# Patient Record
Sex: FEMALE | Race: WHITE | Employment: OTHER | URBAN - METROPOLITAN AREA
[De-identification: names, ages, dates, MRNs, and addresses within clinical notes are randomized per-mention and may not be internally consistent; named-entity substitution may affect disease eponyms.]

---

## 2017-01-03 ENCOUNTER — TELEPHONE (OUTPATIENT)
Dept: INTERNAL MEDICINE | Facility: CLINIC | Age: 62
End: 2017-01-03

## 2017-01-03 NOTE — TELEPHONE ENCOUNTER
[facility] Prior Authorization Team   Phone: [phone]  Fax: [phone]    PA Initiation    Medication: Lyrica 75mg capsules   Insurance Company: [employer]  Fax Number: [phone]    Phone Number:    Pharmacy Filling the Rx: [facility] [address]  Filling Pharmacy Phone: [phone]  Filling Pharmacy Fax:    Start Date: 1/3/2017    ALSO FAXED CLINIC NOTES.

## 2017-01-03 NOTE — TELEPHONE ENCOUNTER
[facility] Prior Authorization Team Request    Medication: Lyrica 75mg capsules   Dosing: One capsule three times daily   Qty: 90   Day Supply: 30   NDC (required for Medicaid members):     Insurance advance PCS   BIN: [id number]  PCN: [id number]  Grp: [id number]  ID: [id number]    CoverMyMeds Key (if applicable):     Additional documentation: non-formulary. Patient has been on this, insurance changed first of the year.       Filling Pharmacy: [facility]   Phone Number: [phone]  Contact:  Dieudonne Linder NPI (required for Medicaid members):

## 2017-01-04 DIAGNOSIS — G89.29 CHRONIC ABDOMINAL PAIN: Primary | ICD-10-CM

## 2017-01-04 DIAGNOSIS — R10.9 CHRONIC ABDOMINAL PAIN: Primary | ICD-10-CM

## 2017-01-04 NOTE — TELEPHONE ENCOUNTER
Prior Authorization Approval    Authorization Effective Date: 1/3/2017  Authorization Expiration Date: 1/3/2018  Medication: Lyrica 75mg capsules -approved  Approved Dose/Quantity:   Reference #:     Insurance Company: [employer]  Expected CoPay: $0.00     CoPay Card Available:      Foundation Assistance Needed:    Which Pharmacy is filling the prescription (Not needed for infusion/clinic administered): [facility] [place], [place] - [address] [place] [address] [phone]    Pharmacy has been notified and they will notify pt

## 2017-01-06 RX ORDER — PREGABALIN 75 MG/1
75 CAPSULE ORAL 3 TIMES DAILY
Qty: 90 CAPSULE | Refills: 2
Start: 2017-01-06 | End: 2017-03-22 | Stop reason: DRUGHIGH

## 2017-01-12 DIAGNOSIS — F31.9 BIPOLAR I DISORDER (H): Primary | ICD-10-CM

## 2017-01-13 RX ORDER — RISPERIDONE 1 MG/1
1 TABLET ORAL AT BEDTIME
Qty: 30 TABLET | Refills: 2 | Status: SHIPPED | OUTPATIENT
Start: 2017-01-13 | End: 2017-04-10

## 2017-02-21 ENCOUNTER — TELEPHONE (OUTPATIENT)
Dept: INTERNAL MEDICINE | Facility: CLINIC | Age: 62
End: 2017-02-21

## 2017-02-21 DIAGNOSIS — D64.9 ANEMIA: Primary | ICD-10-CM

## 2017-02-21 NOTE — TELEPHONE ENCOUNTER
Ordered.  -------------------------------------------      ----- Message from Fredy Merritt MD sent at 2/20/2017  1:47 PM CST -----  Regarding: RE: Patient with Lab Appt 2/22/17 and no lab orders  Soon mi,  Can you set up cbc/diff, iron, tibc, ferritin and cmet, all for anemia?

## 2017-02-22 ENCOUNTER — OFFICE VISIT (OUTPATIENT)
Dept: FAMILY MEDICINE | Facility: CLINIC | Age: 62
End: 2017-02-22

## 2017-02-22 VITALS
DIASTOLIC BLOOD PRESSURE: 86 MMHG | SYSTOLIC BLOOD PRESSURE: 135 MMHG | RESPIRATION RATE: 16 BRPM | BODY MASS INDEX: 23.14 KG/M2 | WEIGHT: 134.8 LBS | HEART RATE: 64 BPM

## 2017-02-22 DIAGNOSIS — D64.9 ANEMIA: ICD-10-CM

## 2017-02-22 DIAGNOSIS — M79.7 FIBROMYALGIA: Primary | ICD-10-CM

## 2017-02-22 LAB
ALBUMIN SERPL-MCNC: 2.7 G/DL (ref 3.4–5)
ALP SERPL-CCNC: 102 U/L (ref 40–150)
ALT SERPL W P-5'-P-CCNC: 15 U/L (ref 0–50)
ANION GAP SERPL CALCULATED.3IONS-SCNC: 8 MMOL/L (ref 3–14)
AST SERPL W P-5'-P-CCNC: 16 U/L (ref 0–45)
BASOPHILS # BLD AUTO: 0 10E9/L (ref 0–0.2)
BASOPHILS NFR BLD AUTO: 0.4 %
BILIRUB SERPL-MCNC: 0.1 MG/DL (ref 0.2–1.3)
BUN SERPL-MCNC: 18 MG/DL (ref 7–30)
CALCIUM SERPL-MCNC: 7.9 MG/DL (ref 8.5–10.1)
CHLORIDE SERPL-SCNC: 110 MMOL/L (ref 94–109)
CO2 SERPL-SCNC: 27 MMOL/L (ref 20–32)
CREAT SERPL-MCNC: 0.68 MG/DL (ref 0.52–1.04)
DIFFERENTIAL METHOD BLD: ABNORMAL
EOSINOPHIL # BLD AUTO: 0.3 10E9/L (ref 0–0.7)
EOSINOPHIL NFR BLD AUTO: 5.4 %
ERYTHROCYTE [DISTWIDTH] IN BLOOD BY AUTOMATED COUNT: 16.3 % (ref 10–15)
FERRITIN SERPL-MCNC: 5 NG/ML (ref 8–252)
GFR SERPL CREATININE-BSD FRML MDRD: 88 ML/MIN/1.7M2
GLUCOSE SERPL-MCNC: 116 MG/DL (ref 70–99)
HCT VFR BLD AUTO: 34 % (ref 35–47)
HGB BLD-MCNC: 9.6 G/DL (ref 11.7–15.7)
IMM GRANULOCYTES # BLD: 0 10E9/L (ref 0–0.4)
IMM GRANULOCYTES NFR BLD: 0.4 %
IRON SATN MFR SERPL: 7 % (ref 15–46)
IRON SERPL-MCNC: 23 UG/DL (ref 35–180)
LYMPHOCYTES # BLD AUTO: 1.2 10E9/L (ref 0.8–5.3)
LYMPHOCYTES NFR BLD AUTO: 23 %
MCH RBC QN AUTO: 24.2 PG (ref 26.5–33)
MCHC RBC AUTO-ENTMCNC: 28.2 G/DL (ref 31.5–36.5)
MCV RBC AUTO: 86 FL (ref 78–100)
MONOCYTES # BLD AUTO: 0.6 10E9/L (ref 0–1.3)
MONOCYTES NFR BLD AUTO: 10.9 %
NEUTROPHILS # BLD AUTO: 3.2 10E9/L (ref 1.6–8.3)
NEUTROPHILS NFR BLD AUTO: 59.9 %
NRBC # BLD AUTO: 0 10*3/UL
NRBC BLD AUTO-RTO: 0 /100
PLATELET # BLD AUTO: 174 10E9/L (ref 150–450)
POTASSIUM SERPL-SCNC: 4.1 MMOL/L (ref 3.4–5.3)
PROT SERPL-MCNC: 5.6 G/DL (ref 6.8–8.8)
RBC # BLD AUTO: 3.96 10E12/L (ref 3.8–5.2)
SODIUM SERPL-SCNC: 145 MMOL/L (ref 133–144)
TIBC SERPL-MCNC: 352 UG/DL (ref 240–430)
WBC # BLD AUTO: 5.4 10E9/L (ref 4–11)

## 2017-02-22 RX ORDER — PREGABALIN 100 MG/1
100 CAPSULE ORAL 3 TIMES DAILY
Qty: 90 CAPSULE | Refills: 3 | Status: SHIPPED | OUTPATIENT
Start: 2017-02-22 | End: 2017-06-14

## 2017-02-22 ASSESSMENT — PAIN SCALES - GENERAL: PAINLEVEL: SEVERE PAIN (7)

## 2017-02-22 NOTE — PATIENT INSTRUCTIONS
[facility] Medication Refill Request Information:  * Please contact your pharmacy regarding ANY request for medication refills.  ** [facility] Prescription Fax = [phone]  * Please allow 3 business days for routine medication refills.  * Please allow 5 business days for controlled substance medication refills.     [facility] Test Result notification information:  *You will be notified with in 7-10 days of your appointment day regarding the results of your test.  If you are on MyChart you will be notified as soon as the provider has reviewed the results and signed off on them.    [facility] [phone]     Rheumatology [phone]     Gastroenterology Consultation [phone]

## 2017-02-22 NOTE — MR AVS SNAPSHOT
After Visit Summary   2/22/2017    Demetra Richardson    MRN: [id number]           Patient Information     Date Of Birth          1955        Visit Information        Provider Department      2/22/2017 1:35 PM Fredy Merritt MD [facility]        Today's Diagnoses     Fibromyalgia    -  1      Care Instructions    [facility] Medication Refill Request Information:  * Please contact your pharmacy regarding ANY request for medication refills.  ** [facility] Prescription Fax = [phone]  * Please allow 3 business days for routine medication refills.  * Please allow 5 business days for controlled substance medication refills.     [facility] Test Result notification information:  *You will be notified with in 7-10 days of your appointment day regarding the results of your test.  If you are on MyChart you will be notified as soon as the provider has reviewed the results and signed off on them.    [facility] [phone]     Rheumatology [phone]     Gastroenterology Consultation [phone]          Follow-ups after your visit        Who to contact     Please call your clinic at [phone] to:    Ask questions about your health    Make or cancel appointments    Discuss your medicines    Learn about your test results    Speak to your doctor   If you have compliments or concerns about an experience at your clinic, or if you wish to file a complaint, please contact [facility] Patient Relations at [phone] or email us at [email or web address]         Additional Information About Your Visit        MyChart Information     [employer] is an electronic gateway that provides easy, online access to your medical records. With [employer], you can request a clinic appointment, read your test results, renew a prescription or communicate with your care team.     To sign up for [employer] visit the website at  [email or web address]   You will be asked to enter the access code listed below, as well as some personal information. Please follow the directions to create your username and password.     Your access code is: [email or web address]  Expires: 2017  6:31 AM     Your access code will  in 90 days. If you need help or a new code, please contact your [facility] or call [phone] for assistance.        Care EveryWhere ID     This is your Care EveryWhere ID. This could be used by other organizations to access your [facility] medical records  [id number]        Your Vitals Were     Pulse Respirations Breastfeeding? BMI (Body Mass Index)          64 16 No 23.14 kg/m2         Blood Pressure from Last 3 Encounters:   17 135/86   10/14/16 93/62   10/12/16 135/85    Weight from Last 3 Encounters:   17 61.1 kg (134 lb 12.8 oz)   10/14/16 65.3 kg (144 lb)   10/12/16 65.3 kg (144 lb)              Today, you had the following     No orders found for display         Today's Medication Changes          These changes are accurate as of: 17  3:03 PM.  If you have any questions, ask your nurse or doctor.               These medicines have changed or have updated prescriptions.        Dose/Directions    * pregabalin 75 MG capsule   Commonly known as:  LYRICA   This may have changed:  Another medication with the same name was added. Make sure you understand how and when to take each.   Used for:  Chronic abdominal pain   Changed by:  Fredy Merritt MD        Dose:  75 mg   Take 1 capsule (75 mg) by mouth 3 times daily   Quantity:  90 capsule   Refills:  2       * pregabalin 100 MG capsule   Commonly known as:  LYRICA   This may have changed:  You were already taking a medication with the same name, and this prescription was added. Make sure you understand how and when to take each.   Used for:  Fibromyalgia   Changed by:  Fredy Merritt MD        Dose:  100 mg   Take 1  capsule (100 mg) by mouth 3 times daily   Quantity:  90 capsule   Refills:  3       * Notice:  This list has 2 medication(s) that are the same as other medications prescribed for you. Read the directions carefully, and ask your doctor or other care provider to review them with you.         Where to get your medicines      Some of these will need a paper prescription and others can be bought over the counter.  Ask your nurse if you have questions.     Bring a paper prescription for each of these medications     pregabalin 100 MG capsule                Primary Care Provider Office Phone # Fax #    Fredy Merritt [phone]       [facility] [address] [place] [address]  [facility] [address]        Thank you!     Thank you for choosing [facility]  for your care. Our goal is always to provide you with excellent care. Hearing back from our patients is one way we can continue to improve our services. Please take a few minutes to complete the written survey that you may receive in the mail after your visit with us. Thank you!             Your Updated Medication List - Protect others around you: Learn how to safely use, store and throw away your medicines at www.disposemymeds.org.          This list is accurate as of: 2/22/17  3:03 PM.  Always use your most recent med list.                   Brand Name Dispense Instructions for use    aspirin-acetaminophen-caffeine 250-250-65 MG per tablet    EXCEDRIN MIGRAINE    180 tablet    Take 2 tablets by mouth every 6 hours as needed for headaches       bisacodyl 5 MG EC tablet     60 tablet    Take 2 tablets (10 mg) by mouth At Bedtime       escitalopram 10 MG tablet    LEXAPRO    30 tablet    Take 1 tablet (10 mg) by mouth daily       hydroxychloroquine 200 MG tablet    PLAQUENIL    60 tablet    Take 1 tablet (200 mg) by mouth 2 times daily       HYDROXYZINE HCL PO      Take 50 mg by mouth Take 1-2 tabs by mouth up to 3 times a day as  needed       ibuprofen 800 MG tablet    ADVIL/MOTRIN    90 tablet    Take 1 tablet (800 mg) by mouth every 6 hours as needed for moderate pain       * lamoTRIgine 200 MG tablet    LaMICtal    30 tablet    Take 1 tablet (200 mg) by mouth every morning       * lamoTRIgine 100 MG tablet    LaMICtal    30 tablet    Take 1 tablet (100 mg) by mouth At Bedtime       Lidocaine HCl 2 % Crea     1 Bottle    Externally apply topically 2 times daily       methadone 5 MG/5ML solution    DOLPHINE     Take 128 mLs (128 mg) by mouth daily       mupirocin 2 % ointment    BACTROBAN    22 g    Apply topically 3 times daily       pantoprazole 40 MG EC tablet    PROTONIX    30 tablet    Take 1 tablet (40 mg) by mouth every morning (before breakfast)       * pregabalin 75 MG capsule    LYRICA    90 capsule    Take 1 capsule (75 mg) by mouth 3 times daily       * pregabalin 100 MG capsule    LYRICA    90 capsule    Take 1 capsule (100 mg) by mouth 3 times daily       * risperiDONE 0.5 MG tablet    risperDAL    60 tablet    Take 1 tab by mouth at 6 AM and take 1 tab daily as needed for irritability, anxiety or racing thoughts       * risperiDONE 1 MG tablet    risperDAL    30 tablet    Take 1 tablet (1 mg) by mouth At Bedtime       tamsulosin 0.4 MG capsule    FLOMAX    30 capsule    Take 1 capsule (0.4 mg) by mouth daily       valACYclovir 500 MG tablet    VALTREX    6 tablet    Take 1 tablet (500 mg) by mouth 2 times daily       * Notice:  This list has 6 medication(s) that are the same as other medications prescribed for you. Read the directions carefully, and ask your doctor or other care provider to review them with you.

## 2017-02-22 NOTE — NURSING NOTE
Chief Complaint   Patient presents with     Results     Here for blood labs follow up     Refill Request     Also here for Lyrica refills; discuss change in doses     Chetan Abel CMA at 1:47 PM on 2/22/2017

## 2017-02-22 NOTE — PROGRESS NOTES
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, continuous     Chronic abdominal pain     Nephrolithiasis     Lupus (systemic lupus erythematosus) (H)     Anxiety     Sclerosing cholangitis     Hepatitis     Microcytic anemia     Migraines     Leukopenia     Hydronephrosis     Acute post-operative pain     Anemia     Seizure (H)     Bipolar 1 disorder, mixed, moderate (H)     Bipolar 1 disorder (H)     Anemia     Iron deficiency anemia     Depression     Overdose     Behavior disturbance     Closed Colles' fracture of left radius with routine healing, subsequent encounter     Left wrist pain     Borderline personality disorder       who is here for evaluation of had concerns including Results and Refill Request.    Here for a few things.  One, per pt since last here doing very well--got own apt, lives alone, has not seen any other MD.   Two, plans to see local community psychiatrist soon.  Three, FM, Lyrica, well tolerated, helps but still pain  Four, last fall saw me, we discussed her many issues and gave her many referrals for tests and consults, she did not do any. We review those; she agrees to rheum and liver here and community psychiatry.  Five, anemia, low iron, in past did IV iron, cannot use po. Would like to redo, is fatigued and feels the way she did when low before then felt better after IV iron.    Allergies   Allergen Reactions     Penicillins Hives     Hives in childhood.           Current Outpatient Prescriptions   Medication Sig Dispense Refill     pregabalin (LYRICA) 100 MG capsule Take 1 capsule (100 mg) by mouth 3 times daily 90 capsule 3     risperiDONE (RISPERDAL) 1 MG tablet Take 1 tablet (1 mg) by mouth At Bedtime 30 tablet 2     pregabalin (LYRICA) 75 MG capsule Take 1 capsule (75 mg) by mouth 3 times daily 90 capsule 2     lamoTRIgine (LAMICTAL) 200  MG tablet Take 1 tablet (200 mg) by mouth every morning 30 tablet 2     lamoTRIgine (LAMICTAL) 100 MG tablet Take 1 tablet (100 mg) by mouth At Bedtime 30 tablet 2     risperiDONE (RISPERDAL) 0.5 MG tablet Take 1 tab by mouth at 6 AM and take 1 tab daily as needed for irritability, anxiety or racing thoughts 60 tablet 1     pantoprazole (PROTONIX) 40 MG enteric coated tablet Take 1 tablet (40 mg) by mouth every morning (before breakfast) 30 tablet 2     bisacodyl (DULCOLAX) 5 MG EC tablet Take 2 tablets (10 mg) by mouth At Bedtime 60 tablet 2     valACYclovir (VALTREX) 500 MG tablet Take 1 tablet (500 mg) by mouth 2 times daily 6 tablet 3     mupirocin (BACTROBAN) 2 % ointment Apply topically 3 times daily 22 g 0     Lidocaine HCl 2 % CREA Externally apply topically 2 times daily 1 Bottle 0     escitalopram (LEXAPRO) 10 MG tablet Take 1 tablet (10 mg) by mouth daily 30 tablet 2     HYDROXYZINE HCL PO Take 50 mg by mouth Take 1-2 tabs by mouth up to 3 times a day as needed       aspirin-acetaminophen-caffeine (EXCEDRIN MIGRAINE) 250-250-65 MG per tablet Take 2 tablets by mouth every 6 hours as needed for headaches 180 tablet 1     ibuprofen (ADVIL,MOTRIN) 800 MG tablet Take 1 tablet (800 mg) by mouth every 6 hours as needed for moderate pain 90 tablet 1     hydroxychloroquine (PLAQUENIL) 200 MG tablet Take 1 tablet (200 mg) by mouth 2 times daily 60 tablet 2     tamsulosin (FLOMAX) 0.4 MG 24 hr capsule Take 1 capsule (0.4 mg) by mouth daily 30 capsule 2     methadone (DOLPHINE) 5 MG/5ML solution Take 128 mLs (128 mg) by mouth daily  0       Social History   Substance Use Topics     Smoking status: Never Smoker     Smokeless tobacco: Never Used     Alcohol use No      Comment: none in 10 years           OBJECTIVE:  /86 (BP Location: Right arm, Patient Position: Chair, Cuff Size: Adult Regular)  Pulse 64  Resp 16  Wt 61.1 kg (134 lb 12.8 oz)  Breastfeeding? No  BMI 23.14 kg/m2  GENERAL APPEARANCE: Alert, no acute  distress  RESP: lungs clear to auscultation   CV: normal rate, regular rhythm, no murmur or gallop  ABDOMEN: soft, no organomegaly, masses or tenderness  MS: extremities normal, no peripheral edema  NEURO: Alert, oriented, speech and mentation normal  PSYCHE: mentation appears normal, affect and mood normal    ASSESSMENT/PLAN:    Bipolar: her mental health presentation today is normal  Liver ds: see liver clinic  Lupus: rheum f/u  Anemia: will communicate w/ her hematologist for IV iron ideas  See me in the spring  See my Oct note for further referral needs, again rvwd and the rest declined today    SOPHIA COPE MD

## 2017-02-28 ENCOUNTER — TELEPHONE (OUTPATIENT)
Dept: INTERNAL MEDICINE | Facility: CLINIC | Age: 62
End: 2017-02-28

## 2017-02-28 NOTE — TELEPHONE ENCOUNTER
Ordered 2 infusions of injectafer.  Pt was informed. She will call for appts. Phone number given.  ---------------------------------------------------      ----- Message from Fredy Merritt MD sent at 2/27/2017  1:27 PM CST -----  Soon mi do you know how to order this? If so put in order and let me know, and pt too.  ----- Message -----     From: Milton Chapa MD     Sent: 2/27/2017   1:15 PM       To: Fredy Merritt MD    Easiest thing to to give her Injectafer (ferric carboxymaltose).  It requires 2 infusions, a week apart.  Infusions are quick -- less than 15 min each (although the nurses watch them for about 30 min after).  No need for premeds or test dose.    There is an order template in Epic for this.  Once the orders are in, Infusion room will schedule.    Let me know if you need more help.  I could place the orders, but I haven't seen her since 2014, so would need to see her first.    Milton.          ----- Message -----     From: Fredy Merritt MD     Sent: 2/22/2017   1:52 PM       To: Milton Chapa MD    Hi,  Her hemoglobin a bit low, iron too, you'd ordered IV iron for her way back, could you advise me on how to arrange for? I don't order it much.  Thanks  Edgar

## 2017-03-22 ENCOUNTER — INFUSION THERAPY VISIT (OUTPATIENT)
Dept: INFUSION THERAPY | Facility: CLINIC | Age: 62
End: 2017-03-22
Attending: FAMILY MEDICINE
Payer: COMMERCIAL

## 2017-03-22 VITALS
DIASTOLIC BLOOD PRESSURE: 62 MMHG | OXYGEN SATURATION: 95 % | SYSTOLIC BLOOD PRESSURE: 96 MMHG | TEMPERATURE: 99.4 F | HEART RATE: 60 BPM | RESPIRATION RATE: 16 BRPM

## 2017-03-22 DIAGNOSIS — D50.9 IRON DEFICIENCY ANEMIA: Primary | ICD-10-CM

## 2017-03-22 DIAGNOSIS — R10.9 CHRONIC ABDOMINAL PAIN: ICD-10-CM

## 2017-03-22 DIAGNOSIS — G89.29 CHRONIC ABDOMINAL PAIN: ICD-10-CM

## 2017-03-22 PROCEDURE — 25000128 H RX IP 250 OP 636: Mod: ZF | Performed by: FAMILY MEDICINE

## 2017-03-22 PROCEDURE — 96365 THER/PROPH/DIAG IV INF INIT: CPT

## 2017-03-22 RX ADMIN — FERRIC CARBOXYMALTOSE INJECTION 750 MG: 50 INJECTION, SOLUTION INTRAVENOUS at 14:30

## 2017-03-22 NOTE — PATIENT INSTRUCTIONS
**Call [phone] and ask for the physician on call for Dr GILDA Vasquez should you have any concerns following discharge.**    Ferric carboxymaltose Solution for injection  What is this medicine?  FERRIC CARBOXYMALTOSE (ferr-ik car-box-ee-mol-toes) is an iron complex. Iron is used to make healthy red blood cells, which carry oxygen and nutrients throughout the body. This medicine is used to treat anemia in people with chronic kidney disease or people who cannot take iron by mouth.  This medicine may be used for other purposes; ask your health care provider or pharmacist if you have questions.  What should I tell my health care provider before I take this medicine?  They need to know if you have any of these conditions:    anemia not caused by low iron levels    high levels of iron in the blood    liver disease    an unusual or allergic reaction to iron, other medicines, foods, dyes, or preservatives    pregnant or trying to get pregnant    breast-feeding  How should I use this medicine?  This medicine is for infusion into a vein. It is given by a health care professional in a hospital or clinic setting.  Talk to your pediatrician regarding the use of this medicine in children. Special care may be needed.  Overdosage: If you think you've taken too much of this medicine contact a poison control center or emergency room at once.  NOTE: This medicine is only for you. Do not share this medicine with others.  What if I miss a dose?  It is important not to miss your dose. Call your doctor or health care professional if you are unable to keep an appointment.  What may interact with this medicine?  Do not take this medicine with any of the following medications:  deferoxamine  dimercaprol  other iron products  This medicine may also interact with the following medications:  chloramphenicol  deferasirox  This list may not describe all possible interactions. Give your health care provider a list of all the medicines, herbs,  non-prescription drugs, or dietary supplements you use. Also tell them if you smoke, drink alcohol, or use illegal drugs. Some items may interact with your medicine.  What should I watch for while using this medicine?  Visit your doctor or health care professional regularly. Tell your doctor if your symptoms do not start to get better or if they get worse. You may need blood work done while you are taking this medicine.  You may need to follow a special diet. Talk to your doctor. Foods that contain iron include: whole grains/cereals, dried fruits, beans, or peas, leafy green vegetables, and organ meats (liver, kidney).  What side effects may I notice from receiving this medicine?  Side effects that you should report to your doctor or health care professional as soon as possible:  allergic reactions like skin rash, itching or hives, swelling of the face, lips, or tonguebreathing problems  changes in blood pressure  feeling faint or lightheaded, falls  flushing, sweating, or hot feelings  Side effects that usually do not require medical attention (Report these to your doctor or health care professional if they continue or are bothersome.):  changes in taste  constipation  dizziness  headache  nausea  pain, redness, or irritation at site where injected  vomiting  This list may not describe all possible side effects. Call your doctor for medical advice about side effects. You may report side effects to FDA at 4-506-FDA-4191.  Where should I keep my medicine?  This drug is given in a hospital or clinic and will not be stored at home.  NOTE: This sheet is a summary. It may not cover all possible information. If you have questions about this medicine, talk to your doctor, pharmacist, or health care provider.  NOTE:This sheet is a summary. It may not cover all possible information. If you have questions about this medicine, talk to your doctor, pharmacist, or health care provider. Copyright  2016 [employer] Standard

## 2017-03-22 NOTE — PROGRESS NOTES
Nursing Note  Demetra Richardson [facility] for:   Chief Complaint   Patient presents with     Infusion     IV Injectafer for Iron deficiency anemia     During today's [facility] appointment, orders from Dr GILDA Merritt were completed.  Frequency: today is dose 1 of 2 total.    Progress note:  Patient identification verified by name and date of birth.  Assessment completed.  Vitals recorded in Doc Flowsheets.  Patient was provided with education regarding infusion and possible side effects.  Patient verbalized understanding.      needed: No  Premedications: were not ordered.  Infusion Rates: infusion given over approximately 15 minutes; patient then observed for 30 minutes post infusion per orders.  Labs: were not ordered for this appointment.  Vascular access: peripheral IV placed today.  Treatment Conditions: patient denies fever, chills, signs of infection, recent illness, on antibiotics, productive cough or elevated temperature.  non-applicable.  Patient tolerated infusion: well.    Discharge Plan:   Follow up plan of care with: ongoing infusions at [facility].  Discharge instructions were reviewed with patient.  Patient/representative verbalized understanding of discharge instructions and all questions answered.  Patient discharged from [facility] in stable condition.    Melania Luong RN    Administrations This Visit     ferric carboxymaltose (INJECTAFER) 750 mg in NaCl 0.9 % 100 mL intermittent infusion     Admin Date Action Dose Rate Route Administered By          03/22/2017 New Bag 750 mg 500 mL/hr Intravenous Melania Luong RN                         BP 99/63 (BP Location: Left arm)  Pulse 66  Temp 99.4  F (37.4  C) (Tympanic)  Resp 16  SpO2 95%

## 2017-03-22 NOTE — MR AVS SNAPSHOT
After Visit Summary   3/22/2017    Demetra Richardson    MRN: [id number]           Patient Information     Date Of Birth          1955        Visit Information        Provider Department      3/22/2017 2:00 PM  45 ATC;  SPEC INFUSION [facility] Specialty and Procedure        Today's Diagnoses     Iron deficiency anemia    -  1    Chronic abdominal pain          Care Instructions      **Call [phone] and ask for the physician on call for Dr GILDA Vasquez should you have any concerns following discharge.**    Ferric carboxymaltose Solution for injection  What is this medicine?  FERRIC CARBOXYMALTOSE (ferr-ik car-box-ee-mol-toes) is an iron complex. Iron is used to make healthy red blood cells, which carry oxygen and nutrients throughout the body. This medicine is used to treat anemia in people with chronic kidney disease or people who cannot take iron by mouth.  This medicine may be used for other purposes; ask your health care provider or pharmacist if you have questions.  What should I tell my health care provider before I take this medicine?  They need to know if you have any of these conditions:    anemia not caused by low iron levels    high levels of iron in the blood    liver disease    an unusual or allergic reaction to iron, other medicines, foods, dyes, or preservatives    pregnant or trying to get pregnant    breast-feeding  How should I use this medicine?  This medicine is for infusion into a vein. It is given by a health care professional in a hospital or clinic setting.  Talk to your pediatrician regarding the use of this medicine in children. Special care may be needed.  Overdosage: If you think you've taken too much of this medicine contact a poison control center or emergency room at once.  NOTE: This medicine is only for you. Do not share this medicine with others.  What if I miss a dose?  It is important not to miss your dose. Call your doctor or  health care professional if you are unable to keep an appointment.  What may interact with this medicine?  Do not take this medicine with any of the following medications:  deferoxamine  dimercaprol  other iron products  This medicine may also interact with the following medications:  chloramphenicol  deferasirox  This list may not describe all possible interactions. Give your health care provider a list of all the medicines, herbs, non-prescription drugs, or dietary supplements you use. Also tell them if you smoke, drink alcohol, or use illegal drugs. Some items may interact with your medicine.  What should I watch for while using this medicine?  Visit your doctor or health care professional regularly. Tell your doctor if your symptoms do not start to get better or if they get worse. You may need blood work done while you are taking this medicine.  You may need to follow a special diet. Talk to your doctor. Foods that contain iron include: whole grains/cereals, dried fruits, beans, or peas, leafy green vegetables, and organ meats (liver, kidney).  What side effects may I notice from receiving this medicine?  Side effects that you should report to your doctor or health care professional as soon as possible:  allergic reactions like skin rash, itching or hives, swelling of the face, lips, or tonguebreathing problems  changes in blood pressure  feeling faint or lightheaded, falls  flushing, sweating, or hot feelings  Side effects that usually do not require medical attention (Report these to your doctor or health care professional if they continue or are bothersome.):  changes in taste  constipation  dizziness  headache  nausea  pain, redness, or irritation at site where injected  vomiting  This list may not describe all possible side effects. Call your doctor for medical advice about side effects. You may report side effects to FDA at 8-643-FDA-4508.  Where should I keep my medicine?  This drug is given in a hospital  "or clinic and will not be stored at home.  NOTE: This sheet is a summary. It may not cover all possible information. If you have questions about this medicine, talk to your doctor, pharmacist, or health care provider.  NOTE:This sheet is a summary. It may not cover all possible information. If you have questions about this medicine, talk to your doctor, pharmacist, or health care provider. Copyright  2016 Gold Standard              Follow-ups after your visit        Who to contact     If you have questions or need follow up information about today's clinic visit or your schedule please contact [facility] SPECIALTY AND PROCEDURE directly at [phone].  Normal or non-critical lab and imaging results will be communicated to you by [employer], letter or phone within 4 business days after the clinic has received the results. If you do not hear from us within 7 days, please contact the clinic through [employer] or phone. If you have a critical or abnormal lab result, we will notify you by phone as soon as possible.  Submit refill requests through [employer] or call your pharmacy and they will forward the refill request to us. Please allow 3 business days for your refill to be completed.          Additional Information About Your Visit        Mersive Information     [employer] lets you send messages to your doctor, view your test results, renew your prescriptions, schedule appointments and more. To sign up, go to [email or web address] . Click on \"Log in\" on the left side of the screen, which will take you to the Welcome page. Then click on \"Sign up Now\" on the right side of the page.     You will be asked to enter the access code listed below, as well as some personal information. Please follow the directions to create your username and password.     Your access code is: [email or web address]  Expires: 2017  7:31 AM     Your access code will  in 90 days. If you need help or a new code, please call your " [facility] or [phone].        Care EveryWhere ID     This is your Care EveryWhere ID. This could be used by other organizations to access your [facility] medical records  [id number]        Your Vitals Were     Pulse Temperature Respirations Pulse Oximetry          66 99.4  F (37.4  C) (Tympanic) 16 95%         Blood Pressure from Last 3 Encounters:   03/22/17 99/63   02/22/17 135/86   10/14/16 93/62    Weight from Last 3 Encounters:   02/22/17 61.1 kg (134 lb 12.8 oz)   10/14/16 65.3 kg (144 lb)   10/12/16 65.3 kg (144 lb)              We Performed the Following     Treatment Conditions          Today's Medication Changes          These changes are accurate as of: 3/22/17  2:36 PM.  If you have any questions, ask your nurse or doctor.               These medicines have changed or have updated prescriptions.        Dose/Directions    pregabalin 100 MG capsule   Commonly known as:  LYRICA   This may have changed:  Another medication with the same name was removed. Continue taking this medication, and follow the directions you see here.   Used for:  Fibromyalgia   Changed by:  Fredy Merritt MD        Dose:  100 mg   Take 1 capsule (100 mg) by mouth 3 times daily   Quantity:  90 capsule   Refills:  3                Primary Care Provider Office Phone # Fax #    Fredy Merritt [phone]       [facility] [address] [place] [id number]        Thank you!     Thank you for choosing [facility] SPECIALTY AND PROCEDURE  for your care. Our goal is always to provide you with excellent care. Hearing back from our patients is one way we can continue to improve our services. Please take a few minutes to complete the written survey that you may receive in the mail after your visit with us. Thank you!             Your Updated Medication List - Protect others around you: Learn how to safely use, store and throw away your medicines at  www.disposemymeds.org.          This list is accurate as of: 3/22/17  2:36 PM.  Always use your most recent med list.                   Brand Name Dispense Instructions for use    aspirin-acetaminophen-caffeine 250-250-65 MG per tablet    EXCEDRIN MIGRAINE    180 tablet    Take 2 tablets by mouth every 6 hours as needed for headaches       bisacodyl 5 MG EC tablet     60 tablet    Take 2 tablets (10 mg) by mouth At Bedtime       escitalopram 10 MG tablet    LEXAPRO    30 tablet    Take 1 tablet (10 mg) by mouth daily       hydroxychloroquine 200 MG tablet    PLAQUENIL    60 tablet    Take 1 tablet (200 mg) by mouth 2 times daily       HYDROXYZINE HCL PO      Take 50 mg by mouth Reported on 3/22/2017       ibuprofen 800 MG tablet    ADVIL/MOTRIN    90 tablet    Take 1 tablet (800 mg) by mouth every 6 hours as needed for moderate pain       Lidocaine HCl 2 % Crea     1 Bottle    Externally apply topically 2 times daily       methadone 5 MG/5ML solution    DOLPHINE     Take 128 mLs (128 mg) by mouth daily       mupirocin 2 % ointment    BACTROBAN    22 g    Apply topically 3 times daily       pantoprazole 40 MG EC tablet    PROTONIX    30 tablet    Take 1 tablet (40 mg) by mouth every morning (before breakfast)       pregabalin 100 MG capsule    LYRICA    90 capsule    Take 1 capsule (100 mg) by mouth 3 times daily       * risperiDONE 0.5 MG tablet    risperDAL    60 tablet    Take 1 tab by mouth at 6 AM and take 1 tab daily as needed for irritability, anxiety or racing thoughts       * risperiDONE 1 MG tablet    risperDAL    30 tablet    Take 1 tablet (1 mg) by mouth At Bedtime       tamsulosin 0.4 MG capsule    FLOMAX    30 capsule    Take 1 capsule (0.4 mg) by mouth daily       valACYclovir 500 MG tablet    VALTREX    6 tablet    Take 1 tablet (500 mg) by mouth 2 times daily       * Notice:  This list has 2 medication(s) that are the same as other medications prescribed for you. Read the directions carefully, and  ask your doctor or other care provider to review them with you.

## 2017-03-29 ENCOUNTER — INFUSION THERAPY VISIT (OUTPATIENT)
Dept: INFUSION THERAPY | Facility: CLINIC | Age: 62
End: 2017-03-29
Attending: FAMILY MEDICINE
Payer: COMMERCIAL

## 2017-03-29 VITALS
SYSTOLIC BLOOD PRESSURE: 99 MMHG | OXYGEN SATURATION: 94 % | HEART RATE: 58 BPM | DIASTOLIC BLOOD PRESSURE: 63 MMHG | RESPIRATION RATE: 16 BRPM | TEMPERATURE: 96.5 F

## 2017-03-29 DIAGNOSIS — D50.9 IRON DEFICIENCY ANEMIA: Primary | ICD-10-CM

## 2017-03-29 DIAGNOSIS — D64.9 ANEMIA: ICD-10-CM

## 2017-03-29 DIAGNOSIS — G89.29 CHRONIC ABDOMINAL PAIN: ICD-10-CM

## 2017-03-29 DIAGNOSIS — R10.9 CHRONIC ABDOMINAL PAIN: ICD-10-CM

## 2017-03-29 PROCEDURE — 96374 THER/PROPH/DIAG INJ IV PUSH: CPT

## 2017-03-29 PROCEDURE — 25000128 H RX IP 250 OP 636: Mod: ZF | Performed by: FAMILY MEDICINE

## 2017-03-29 RX ADMIN — FERRIC CARBOXYMALTOSE INJECTION 750 MG: 50 INJECTION, SOLUTION INTRAVENOUS at 14:30

## 2017-03-29 NOTE — MR AVS SNAPSHOT
After Visit Summary   3/29/2017    Demetra Richardson    MRN: [id number]           Patient Information     Date Of Birth          1955        Visit Information        Provider Department      3/29/2017 2:00 PM UC 41 ATC;  SPEC [facility] Specialty and Procedure        Today's Diagnoses     Iron deficiency anemia    -  1    Anemia        Chronic abdominal pain          Care Instructions      Patient Education    Iron Sucrose Chewable tablet    Iron Sucrose Solution for injection  Iron Sucrose Solution for injection  What is this medicine?  IRON SUCROSE (AHY bridger TIA krohs) is an iron complex. Iron is used to make healthy red blood cells, which carry oxygen and nutrients throughout the body. This medicine is used to treat iron deficiency anemia in people with chronic kidney disease.  This medicine may be used for other purposes; ask your health care provider or pharmacist if you have questions.  What should I tell my health care provider before I take this medicine?  They need to know if you have any of these conditions:    anemia not caused by low iron levels    heart disease    high levels of iron in the blood    kidney disease    liver disease    an unusual or allergic reaction to iron, other medicines, foods, dyes, or preservatives    pregnant or trying to get pregnant    breast-feeding  How should I use this medicine?  This medicine is for infusion into a vein. It is given by a health care professional in a hospital or clinic setting.  Talk to your pediatrician regarding the use of this medicine in children. While this drug may be prescribed for children as young as 2 years for selected conditions, precautions do apply.  Overdosage: If you think you have taken too much of this medicine contact a poison control center or emergency room at once.  NOTE: This medicine is only for you. Do not share this medicine with others.  What if I miss a dose?  It is  important not to miss your dose. Call your doctor or health care professional if you are unable to keep an appointment.  What may interact with this medicine?  Do not take this medicine with any of the following medications:    deferoxamine    dimercaprol    other iron products  This medicine may also interact with the following medications:    chloramphenicol    deferasirox  This list may not describe all possible interactions. Give your health care provider a list of all the medicines, herbs, non-prescription drugs, or dietary supplements you use. Also tell them if you smoke, drink alcohol, or use illegal drugs. Some items may interact with your medicine.  What should I watch for while using this medicine?  Visit your doctor or healthcare professional regularly. Tell your doctor or healthcare professional if your symptoms do not start to get better or if they get worse. You may need blood work done while you are taking this medicine.  You may need to follow a special diet. Talk to your doctor. Foods that contain iron include: whole grains/cereals, dried fruits, beans, or peas, leafy green vegetables, and organ meats (liver, kidney).  What side effects may I notice from receiving this medicine?  Side effects that you should report to your doctor or health care professional as soon as possible:    allergic reactions like skin rash, itching or hives, swelling of the face, lips, or tongue    breathing problems    changes in blood pressure    cough    fast, irregular heartbeat    feeling faint or lightheaded, falls    fever or chills    flushing, sweating, or hot feelings    joint or muscle aches/pains    seizures    swelling of the ankles or feet    unusually weak or tired  Side effects that usually do not require medical attention (report to your doctor or health care professional if they continue or are bothersome):    diarrhea    feeling achy    headache    irritation at site where injected    nausea,  "vomiting    stomach upset    tiredness  This list may not describe all possible side effects. Call your doctor for medical advice about side effects. You may report side effects to FDA at 0-302-WUU-4483.  Where should I keep my medicine?  This drug is given in a hospital or clinic and will not be stored at home.  NOTE:This sheet is a summary. It may not cover all possible information. If you have questions about this medicine, talk to your doctor, pharmacist, or health care provider. Copyright  2016 Gold Standard              Follow-ups after your visit        Who to contact     If you have questions or need follow up information about today's clinic visit or your schedule please contact [facility] SPECIALTY AND PROCEDURE directly at [phone].  Normal or non-critical lab and imaging results will be communicated to you by [employer], letter or phone within 4 business days after the clinic has received the results. If you do not hear from us within 7 days, please contact the clinic through "[employer]t or phone. If you have a critical or abnormal lab result, we will notify you by phone as soon as possible.  Submit refill requests through [employer] or call your pharmacy and they will forward the refill request to us. Please allow 3 business days for your refill to be completed.          Additional Information About Your Visit        [employer] Information     [employer] lets you send messages to your doctor, view your test results, renew your prescriptions, schedule appointments and more. To sign up, go to [email or web address] . Click on \"Log in\" on the left side of the screen, which will take you to the Welcome page. Then click on \"Sign up Now\" on the right side of the page.     You will be asked to enter the access code listed below, as well as some personal information. Please follow the directions to create your username and password.     Your access code is: [email or web address]  Expires: 5/21/2017  7:31 " AM     Your access code will  in 90 days. If you need help or a new code, please call your [facility] clinic or [phone].        Care EveryWhere ID     This is your Care EveryWhere ID. This could be used by other organizations to access your [facility] medical records  [id number]        Your Vitals Were     Pulse Temperature Respirations Pulse Oximetry          58 96.5  F (35.8  C) (Oral) 16 94%         Blood Pressure from Last 3 Encounters:   17 99/63   17 (P) 97/62   17 135/86    Weight from Last 3 Encounters:   17 61.1 kg (134 lb 12.8 oz)   10/14/16 65.3 kg (144 lb)   10/12/16 65.3 kg (144 lb)              Today, you had the following     No orders found for display       Primary Care Provider Office Phone # Fax #    Fredy Merritt [phone]       [facility] [address] [place] [id number]        Thank you!     Thank you for choosing [facility] SPECIALTY AND PROCEDURE  for your care. Our goal is always to provide you with excellent care. Hearing back from our patients is one way we can continue to improve our services. Please take a few minutes to complete the written survey that you may receive in the mail after your visit with us. Thank you!             Your Updated Medication List - Protect others around you: Learn how to safely use, store and throw away your medicines at www.disposemymeds.org.          This list is accurate as of: 3/29/17  3:30 PM.  Always use your most recent med list.                   Brand Name Dispense Instructions for use    aspirin-acetaminophen-caffeine 250-250-65 MG per tablet    EXCEDRIN MIGRAINE    180 tablet    Take 2 tablets by mouth every 6 hours as needed for headaches       bisacodyl 5 MG EC tablet     60 tablet    Take 2 tablets (10 mg) by mouth At Bedtime       escitalopram 10 MG tablet    LEXAPRO    30 tablet    Take 1 tablet (10 mg) by mouth daily       hydroxychloroquine  200 MG tablet    PLAQUENIL    60 tablet    Take 1 tablet (200 mg) by mouth 2 times daily       HYDROXYZINE HCL PO      Take 50 mg by mouth Reported on 3/22/2017       ibuprofen 800 MG tablet    ADVIL/MOTRIN    90 tablet    Take 1 tablet (800 mg) by mouth every 6 hours as needed for moderate pain       Lidocaine HCl 2 % Crea     1 Bottle    Externally apply topically 2 times daily       methadone 5 MG/5ML solution    DOLPHINE     Take 128 mLs (128 mg) by mouth daily       mupirocin 2 % ointment    BACTROBAN    22 g    Apply topically 3 times daily       pantoprazole 40 MG EC tablet    PROTONIX    30 tablet    Take 1 tablet (40 mg) by mouth every morning (before breakfast)       pregabalin 100 MG capsule    LYRICA    90 capsule    Take 1 capsule (100 mg) by mouth 3 times daily       * risperiDONE 0.5 MG tablet    risperDAL    60 tablet    Take 1 tab by mouth at 6 AM and take 1 tab daily as needed for irritability, anxiety or racing thoughts       * risperiDONE 1 MG tablet    risperDAL    30 tablet    Take 1 tablet (1 mg) by mouth At Bedtime       tamsulosin 0.4 MG capsule    FLOMAX    30 capsule    Take 1 capsule (0.4 mg) by mouth daily       valACYclovir 500 MG tablet    VALTREX    6 tablet    Take 1 tablet (500 mg) by mouth 2 times daily       * Notice:  This list has 2 medication(s) that are the same as other medications prescribed for you. Read the directions carefully, and ask your doctor or other care provider to review them with you.

## 2017-03-29 NOTE — PATIENT INSTRUCTIONS
Patient Education    Iron Sucrose Chewable tablet    Iron Sucrose Solution for injection  Iron Sucrose Solution for injection  What is this medicine?  IRON SUCROSE (MILAGRO gerber) is an iron complex. Iron is used to make healthy red blood cells, which carry oxygen and nutrients throughout the body. This medicine is used to treat iron deficiency anemia in people with chronic kidney disease.  This medicine may be used for other purposes; ask your health care provider or pharmacist if you have questions.  What should I tell my health care provider before I take this medicine?  They need to know if you have any of these conditions:    anemia not caused by low iron levels    heart disease    high levels of iron in the blood    kidney disease    liver disease    an unusual or allergic reaction to iron, other medicines, foods, dyes, or preservatives    pregnant or trying to get pregnant    breast-feeding  How should I use this medicine?  This medicine is for infusion into a vein. It is given by a health care professional in a hospital or clinic setting.  Talk to your pediatrician regarding the use of this medicine in children. While this drug may be prescribed for children as young as 2 years for selected conditions, precautions do apply.  Overdosage: If you think you have taken too much of this medicine contact a poison control center or emergency room at once.  NOTE: This medicine is only for you. Do not share this medicine with others.  What if I miss a dose?  It is important not to miss your dose. Call your doctor or health care professional if you are unable to keep an appointment.  What may interact with this medicine?  Do not take this medicine with any of the following medications:    deferoxamine    dimercaprol    other iron products  This medicine may also interact with the following medications:    chloramphenicol    deferasirox  This list may not describe all possible interactions. Give your health care  provider a list of all the medicines, herbs, non-prescription drugs, or dietary supplements you use. Also tell them if you smoke, drink alcohol, or use illegal drugs. Some items may interact with your medicine.  What should I watch for while using this medicine?  Visit your doctor or healthcare professional regularly. Tell your doctor or healthcare professional if your symptoms do not start to get better or if they get worse. You may need blood work done while you are taking this medicine.  You may need to follow a special diet. Talk to your doctor. Foods that contain iron include: whole grains/cereals, dried fruits, beans, or peas, leafy green vegetables, and organ meats (liver, kidney).  What side effects may I notice from receiving this medicine?  Side effects that you should report to your doctor or health care professional as soon as possible:    allergic reactions like skin rash, itching or hives, swelling of the face, lips, or tongue    breathing problems    changes in blood pressure    cough    fast, irregular heartbeat    feeling faint or lightheaded, falls    fever or chills    flushing, sweating, or hot feelings    joint or muscle aches/pains    seizures    swelling of the ankles or feet    unusually weak or tired  Side effects that usually do not require medical attention (report to your doctor or health care professional if they continue or are bothersome):    diarrhea    feeling achy    headache    irritation at site where injected    nausea, vomiting    stomach upset    tiredness  This list may not describe all possible side effects. Call your doctor for medical advice about side effects. You may report side effects to FDA at 4-914-FDA-5631.  Where should I keep my medicine?  This drug is given in a hospital or clinic and will not be stored at home.  NOTE:This sheet is a summary. It may not cover all possible information. If you have questions about this medicine, talk to your doctor, pharmacist, or  health care provider. Copyright  2016 Gold Standard

## 2017-03-29 NOTE — PROGRESS NOTES
Nursing Note  Demetra Richardson presents today to [facility] for:   Chief Complaint   Patient presents with     Infusion     injectafer     During today's [facility] appointment, orders from Dr GILDA Merritt were completed.  Frequency: today is dose 2 of 2 total.    Progress note:  Patient identification verified by name and date of birth.  Assessment completed.  Vitals recorded in Doc Flowsheets.  Patient was provided with education regarding infusion and possible side effects.  Patient verbalized understanding.      needed: No  Premedications: were not ordered.  Infusion Rates: infusion given over approximately 15 minutes and observed for 30 minutes after infusion per therapy plan.   Labs: were not ordered for this appointment.  Vascular access: peripheral IV placed today.  Treatment Conditions: patient denies fever, chills, signs of infection, recent illness, on antibiotics, productive cough or elevated temperature.  non-applicable.  Patient tolerated infusion: well.    Administrations This Visit     ferric carboxymaltose (INJECTAFER) 750 mg in NaCl 0.9 % 100 mL intermittent infusion     Admin Date Action Dose Route Administered By             03/29/2017 New Bag 750 mg Intravenous Ramandeep Kim RN                          Discharge Plan:   Follow up plan of care with: ongoing infusions at [facility].  Discharge instructions were reviewed with patient.  Patient/representative verbalized understanding of discharge instructions and all questions answered.  Patient discharged from [facility] in stable condition.    Ramandeep Kim RN        /57  Pulse 59  Temp 96.5  F (35.8  C) (Oral)  Resp 16  SpO2 94%

## 2017-04-10 DIAGNOSIS — F31.9 BIPOLAR I DISORDER (H): ICD-10-CM

## 2017-04-10 RX ORDER — RISPERIDONE 1 MG/1
1 TABLET ORAL AT BEDTIME
Qty: 30 TABLET | Refills: 2 | Status: SHIPPED | OUTPATIENT
Start: 2017-04-10 | End: 2017-11-28

## 2017-04-10 NOTE — TELEPHONE ENCOUNTER
Nicoledol    Last Written Prescription Date:  1/13/17  Last Fill Quantity: 30,   # refills: 2  Last Office Visit : 2/22/17  Future Office visit:  no    Routing refill request to provider for review/approval because:  Drug not on the [facility], [facility] or [facility] refill protocol or controlled substance

## 2017-05-05 ENCOUNTER — TELEPHONE (OUTPATIENT)
Dept: FAMILY MEDICINE | Facility: CLINIC | Age: 62
End: 2017-05-05

## 2017-05-05 DIAGNOSIS — G43.009 MIGRAINE WITHOUT AURA AND WITHOUT STATUS MIGRAINOSUS, NOT INTRACTABLE: ICD-10-CM

## 2017-05-05 RX ORDER — IBUPROFEN 800 MG/1
800 TABLET, FILM COATED ORAL EVERY 6 HOURS PRN
Qty: 90 TABLET | Refills: 1 | Status: ON HOLD | OUTPATIENT
Start: 2017-05-05 | End: 2019-04-29

## 2017-05-05 NOTE — TELEPHONE ENCOUNTER
"Demetra called earlier and asked for a refill on her Fioricet.    Per note in chart ibuprofen was the requested medication.  Demetra asked that I route this message to Dr Merritt now \"I'm sure they're (the providers) still there. \"  I could not find the last time this was filled.  Please call Demetra, [phone]  Routed: DR. Shaina MARSHALL RN [facility]     "

## 2017-05-21 DIAGNOSIS — F31.9 BIPOLAR I DISORDER (H): ICD-10-CM

## 2017-05-21 NOTE — TELEPHONE ENCOUNTER
risperiDONE (RISPERDAL) 0.5 MG tablet  Last Written Prescription Date:  12/14/16  Last Fill Quantity: 60,   # refills: 1  Last Office Visit with G, P or [facility] prescribing provider: 2/22/17  Future Office visit:  None  Lab Results   Component Value Date    A1C 5.5 06/02/2011     Recent Labs   Lab Test  06/06/11   0712  05/30/11   0533   10/08/09   0934   CHOL   --    --    --   122   HDL   --    --    --   35*   LDL   --    --    --   65   TRIG  50  127   < >  108   CHOLHDLRATIO   --    --    --   3.5    < > = values in this interval not displayed.       Routing refill request to provider for review/approval because:   risperiDONE (RISPERDAL) 0.5 MG tablet.  Lipid panel past due.  rf?

## 2017-05-22 RX ORDER — RISPERIDONE 0.5 MG/1
TABLET ORAL
Qty: 60 TABLET | Refills: 1 | Status: SHIPPED | OUTPATIENT
Start: 2017-05-22 | End: 2017-08-09

## 2017-06-03 ENCOUNTER — HEALTH MAINTENANCE LETTER (OUTPATIENT)
Age: 62
End: 2017-06-03

## 2017-06-14 DIAGNOSIS — M79.7 FIBROMYALGIA: ICD-10-CM

## 2017-06-14 RX ORDER — PREGABALIN 100 MG/1
100 CAPSULE ORAL 3 TIMES DAILY
Qty: 90 CAPSULE | Refills: 3
Start: 2017-06-14 | End: 2017-10-15

## 2017-07-02 DIAGNOSIS — F31.9 BIPOLAR I DISORDER (H): ICD-10-CM

## 2017-07-02 RX ORDER — ESCITALOPRAM OXALATE 10 MG/1
10 TABLET ORAL DAILY
Qty: 90 TABLET | Refills: 2 | Status: SHIPPED | OUTPATIENT
Start: 2017-07-02 | End: 2018-04-05

## 2017-07-02 NOTE — TELEPHONE ENCOUNTER
escitalopram (LEXAPRO) 10 MG tablet  Last Written Prescription Date: 10/5/16  Last Fill Quantity: 30,   # refills: 2  Last Office Visit with [facility], [facility] or [facility] prescribing provider: 2/22/17  Future Office visit:   none

## 2017-07-21 DIAGNOSIS — N20.0 KIDNEY STONE: Primary | ICD-10-CM

## 2017-07-27 ENCOUNTER — PRE VISIT (OUTPATIENT)
Dept: UROLOGY | Facility: CLINIC | Age: 62
End: 2017-07-27

## 2017-08-01 ENCOUNTER — RECORDS - HEALTHEAST (OUTPATIENT)
Dept: ADMINISTRATIVE | Facility: OTHER | Age: 62
End: 2017-08-01

## 2017-08-09 DIAGNOSIS — F31.9 BIPOLAR I DISORDER (H): ICD-10-CM

## 2017-08-09 NOTE — TELEPHONE ENCOUNTER
risperiDONE (RISPERDAL) 0.5 MG tablet   Last Written Prescription Date:  5/22/17  Last Fill Quantity: 60,   # refills: 1  Last Office Visit with G, P or [facility] prescribing provider: 2/22/17  Future Office visit:   None    Lab Results   Component Value Date    A1C 5.5 06/02/2011     BP Readings from Last 3 Encounters:   03/29/17 99/63   03/22/17 (P) 97/62   02/22/17 135/86     Recent Labs   Lab Test  06/06/11   0712  05/30/11   0533   10/08/09   0934   CHOL   --    --    --   122   HDL   --    --    --   35*   LDL   --    --    --   65   TRIG  50  127   < >  108   CHOLHDLRATIO   --    --    --   3.5    < > = values in this interval not displayed.       Routing refill request to provider for review/approval because:    risperiDONE (RISPERDAL) 0.5 MG tablet  Labs past due rf?

## 2017-08-14 RX ORDER — RISPERIDONE 0.5 MG/1
TABLET ORAL
Qty: 60 TABLET | Refills: 1 | Status: SHIPPED | OUTPATIENT
Start: 2017-08-14 | End: 2017-11-10

## 2017-09-01 ENCOUNTER — OFFICE VISIT (OUTPATIENT)
Dept: OPHTHALMOLOGY | Facility: CLINIC | Age: 62
End: 2017-09-01
Attending: OPHTHALMOLOGY
Payer: COMMERCIAL

## 2017-09-01 DIAGNOSIS — H40.003 GLAUCOMA SUSPECT OF BOTH EYES: ICD-10-CM

## 2017-09-01 DIAGNOSIS — H04.123 DRY EYES: Primary | ICD-10-CM

## 2017-09-01 PROCEDURE — 92015 DETERMINE REFRACTIVE STATE: CPT | Mod: ZF

## 2017-09-01 PROCEDURE — 68761 CLOSE TEAR DUCT OPENING: CPT | Mod: ZF | Performed by: OPHTHALMOLOGY

## 2017-09-01 PROCEDURE — 68761 CLOSE TEAR DUCT OPENING: CPT | Mod: ZF,E2

## 2017-09-01 PROCEDURE — 99213 OFFICE O/P EST LOW 20 MIN: CPT | Mod: ZF

## 2017-09-01 ASSESSMENT — REFRACTION_MANIFEST
OD_CYLINDER: +0.50
OS_SPHERE: -0.75
OD_SPHERE: -0.50
OD_AXIS: 167
OD_ADD: +2.50
OS_AXIS: 167
OS_ADD: +2.50
OS_CYLINDER: +1.75

## 2017-09-01 ASSESSMENT — TONOMETRY
OS_IOP_MMHG: 14
OD_IOP_MMHG: 12
IOP_METHOD: TONOPEN

## 2017-09-01 ASSESSMENT — EXTERNAL EXAM - LEFT EYE: OS_EXAM: NORMAL

## 2017-09-01 ASSESSMENT — VISUAL ACUITY
METHOD: SNELLEN - LINEAR
OD_PH_SC: 20/30
OS_SC+: +2
OD_SC: 20/40
OS_SC: 20/40
OS_PH_SC: 20/25

## 2017-09-01 ASSESSMENT — CONF VISUAL FIELD
OS_NORMAL: 1
METHOD: COUNTING FINGERS
OD_NORMAL: 1

## 2017-09-01 ASSESSMENT — CUP TO DISC RATIO
OD_RATIO: 0.5
OS_RATIO: 0.65

## 2017-09-01 ASSESSMENT — SLIT LAMP EXAM - LIDS
COMMENTS: NORMAL
COMMENTS: NORMAL

## 2017-09-01 ASSESSMENT — EXTERNAL EXAM - RIGHT EYE: OD_EXAM: NORMAL

## 2017-09-01 NOTE — NURSING NOTE
Chief Complaints and History of Present Illnesses   Patient presents with     Follow Up For     Annual eye exam      HPI    Additional Referring Providers:  self referred    Affected eye(s):  Both   Symptoms:     Blurred vision      Unknown duration    Frequency:  Constant       Do you have eye pain now?:  No      Comments:  She is here today for her annual eye exam. She was told to come to to the eye clinic by her primary   She has had glasses in the past but lost them.  She denies flashes or floaters OU.    Jones Benton COT 1:46 PM September 1, 2017

## 2017-09-01 NOTE — MR AVS SNAPSHOT
After Visit Summary   2017    Demetra Richardson    MRN: [id number]           Patient Information     Date Of Birth          1955        Visit Information        Provider Department      2017 1:45 PM Deandra Donald [facility]        Today's Diagnoses     Dry eyes - Both Eyes    -  1    Glaucoma suspect of both eyes - Both Eyes           Follow-ups after your visit        Follow-up notes from your care team     Return in about 3 months (around 2017) for Follow Up, OCT ON.      Who to contact     Please call your clinic at [phone] to:    Ask questions about your health    Make or cancel appointments    Discuss your medicines    Learn about your test results    Speak to your doctor   If you have compliments or concerns about an experience at your clinic, or if you wish to file a complaint, please contact [facility] Patient Relations at [phone] or email us at [email or web address]         Additional Information About Your Visit        MyChart Information     [employer] is an electronic gateway that provides easy, online access to your medical records. With [employer], you can request a clinic appointment, read your test results, renew a prescription or communicate with your care team.     To sign up for [employer] visit the website at [email or web address]   You will be asked to enter the access code listed below, as well as some personal information. Please follow the directions to create your username and password.     Your access code is: [email or web address]  Expires: 10/12/2017  6:31 AM     Your access code will  in 90 days. If you need help or a new code, please contact your [facility] or call [phone] for assistance.        Care EveryWhere ID     This is your Care EveryWhere ID. This could be used by other organizations to access your [place] medical records  [id number]         Blood Pressure  from Last 3 Encounters:   03/29/17 99/63   03/22/17 (P) 97/62   02/22/17 135/86    Weight from Last 3 Encounters:   02/22/17 61.1 kg (134 lb 12.8 oz)   10/14/16 65.3 kg (144 lb)   10/12/16 65.3 kg (144 lb)              We Performed the Following     Punctal Closure, Plugs          Today's Medication Changes          These changes are accurate as of: 9/1/17 11:59 PM.  If you have any questions, ask your nurse or doctor.               Stop taking these medicines if you haven't already. Please contact your care team if you have questions.     HYDROXYZINE HCL PO   Stopped by:  Deandra Donald MD                    Primary Care Provider Office Phone # Fax #    Fredy Merritt [phone] [address] [address]        Equal Access to Services     [facility] : Hadii aad ku hadasho Sobebeto, waaxda luqadaha, qaybta kaalmada adesureshyada, glen schulz . So [facility] [phone].    ATENCIÓN: Si habla español, tiene a [place] disposición servicios gratuitos de asistencia lingüística. Franklyn al [phone].    We comply with applicable federal civil rights laws and [place] laws. We do not discriminate on the basis of race, color, national origin, age, disability sex, sexual orientation or gender identity.            Thank you!     Thank you for choosing [facility]  for your care. Our goal is always to provide you with excellent care. Hearing back from our patients is one way we can continue to improve our services. Please take a few minutes to complete the written survey that you may receive in the mail after your visit with us. Thank you!             Your Updated Medication List - Protect others around you: Learn how to safely use, store and throw away your medicines at www.disposemymeds.org.          This list is accurate as of: 9/1/17 11:59 PM.  Always use your most recent med list.                   Brand Name Dispense Instructions for use Diagnosis     aspirin-acetaminophen-caffeine 250-250-65 MG per tablet    EXCEDRIN MIGRAINE    180 tablet    Take 2 tablets by mouth every 6 hours as needed for headaches    Migraine without aura and without status migrainosus, not intractable       bisacodyl 5 MG EC tablet     60 tablet    Take 2 tablets (10 mg) by mouth At Bedtime    Other constipation       escitalopram 10 MG tablet    LEXAPRO    90 tablet    Take 1 tablet (10 mg) by mouth daily    Bipolar I disorder (H)       hydroxychloroquine 200 MG tablet    PLAQUENIL    60 tablet    Take 1 tablet (200 mg) by mouth 2 times daily    Lupus (systemic lupus erythematosus) (H)       ibuprofen 800 MG tablet    ADVIL/MOTRIN    90 tablet    Take 1 tablet (800 mg) by mouth every 6 hours as needed for moderate pain    Migraine without aura and without status migrainosus, not intractable       Lidocaine HCl 2 % Crea     1 Bottle    Externally apply topically 2 times daily    HSV (herpes simplex virus) infection       methadone 5 MG/5ML solution    DOLPHINE     Take 138 mg by mouth daily        mupirocin 2 % ointment    BACTROBAN    22 g    Apply topically 3 times daily    Open wound       pantoprazole 40 MG EC tablet    PROTONIX    30 tablet    Take 1 tablet (40 mg) by mouth every morning (before breakfast)    Gastroesophageal reflux disease without esophagitis       pregabalin 100 MG capsule    LYRICA    90 capsule    Take 1 capsule (100 mg) by mouth 3 times daily    Fibromyalgia       * risperiDONE 1 MG tablet    risperDAL    30 tablet    Take 1 tablet (1 mg) by mouth At Bedtime    Bipolar I disorder (H)       * risperiDONE 0.5 MG tablet    risperDAL    60 tablet    Take 1 tab by mouth at 6 AM and take 1 tab daily as needed for irritability, anxiety or racing thoughts    Bipolar I disorder (H)       tamsulosin 0.4 MG capsule    FLOMAX    30 capsule    Take 1 capsule (0.4 mg) by mouth daily    Urinary hesitancy       valACYclovir 500 MG tablet    VALTREX    6 tablet    Take 1  tablet (500 mg) by mouth 2 times daily    HSV (herpes simplex virus) infection       * Notice:  This list has 2 medication(s) that are the same as other medications prescribed for you. Read the directions carefully, and ask your doctor or other care provider to review them with you.

## 2017-09-01 NOTE — PROGRESS NOTES
CC: Red eyes OU    HPI: A 61 yr F presented with hx of red eyes in morning OS>OD, started few days prior (first episode). It has improved ever last 3 days.  Denies hx of blood thinner, no hx of HTN    Denies vision problems, seeing well with MRx done today. Denies any worsening of photophobia, flashes, floaters    Hx of migraine, last episode many years prior    FHx of glaucoma in grandfather and Fuch's dystrophy.    Meds: none    A/P  Dry eyes OU  Hx of lupus  Diffuse PEE OU, likely causing symptoms  On visine (might be contributing): stop visine  No intraocular inflammation  Collagen punctal plugs LL OU placed today  Start PFAT q2h OU    Glaucoma suspect  FHx in granfather, CDR asym  IOP controlled  OCT ON OU on f/up    FHx of Fuch's dystrophy  No guttata OU on exam today, monitor    F/up 3 months with OCT ON, earlier prn    Complete documentation of historical and exam elements from today's encounter can be found in the full encounter summary report (not reduplicated in this progress note). I personally obtained the chief complaint(s) and history of present illness.  I confirmed and edited as necessary the review of systems, past medical/surgical history, family history, social history, and examination findings as documented by others.  I examined the patient myself, and I personally reviewed the relevant tests, images, and reports as documented above. I formulated and edited as necessary the assessment and plan and discussed the findings and management plan with the patient and family.        LESLEY Rios  Cornea fellow

## 2017-10-15 DIAGNOSIS — M79.7 FIBROMYALGIA: ICD-10-CM

## 2017-10-16 NOTE — TELEPHONE ENCOUNTER
pregabalin 100 MG      Last Written Prescription Date:  6/14/17  Last Fill Quantity: 90,   # refills: 3  Last Office Visit : 2/22/17  Future Office visit:  NONE  Routing refill request to provider for review/approval because:  Drug not on refill protocol or controlled substance

## 2017-10-17 RX ORDER — PREGABALIN 100 MG/1
100 CAPSULE ORAL 3 TIMES DAILY
Qty: 90 CAPSULE | Refills: 3
Start: 2017-10-17 | End: 2018-02-09

## 2017-10-18 ENCOUNTER — TELEPHONE (OUTPATIENT)
Dept: FAMILY MEDICINE | Facility: CLINIC | Age: 62
End: 2017-10-18

## 2017-11-10 DIAGNOSIS — F31.9 BIPOLAR I DISORDER (H): ICD-10-CM

## 2017-11-13 DIAGNOSIS — F31.9 BIPOLAR I DISORDER (H): ICD-10-CM

## 2017-11-13 RX ORDER — RISPERIDONE 1 MG/1
1 TABLET ORAL AT BEDTIME
Qty: 30 TABLET | Refills: 2 | Status: CANCELLED | OUTPATIENT
Start: 2017-11-13

## 2017-11-13 NOTE — TELEPHONE ENCOUNTER
Risperidone 1 mg  Last Written Prescription Date:  4/10/16  Last Fill Quantity: 30,   # refills: 2  Last Office Visit : 2/22/17  Future Office visit:  No future appt  Lab Results   Component Value Date    CHOL 122 10/08/2009     Lab Results   Component Value Date    HDL 35 10/08/2009     Lab Results   Component Value Date    LDL 65 10/08/2009     Lab Results   Component Value Date    TRIG 50 06/06/2011     Lab Results   Component Value Date    CHOLHDLRATIO 3.5 10/08/2009     Lab Results   Component Value Date    WBC 5.4 02/22/2017     Lab Results   Component Value Date    RBC 3.96 02/22/2017     Lab Results   Component Value Date    HGB 9.6 02/22/2017     Lab Results   Component Value Date    HCT 34.0 02/22/2017     No components found for: MCT  Lab Results   Component Value Date    MCV 86 02/22/2017     Lab Results   Component Value Date    MCH 24.2 02/22/2017     Lab Results   Component Value Date    MCHC 28.2 02/22/2017     Lab Results   Component Value Date    RDW 16.3 02/22/2017     Lab Results   Component Value Date     02/22/2017         Routing refill request to clinic nurse for review/approval because:  Lipid lab needed per protocol

## 2017-11-13 NOTE — TELEPHONE ENCOUNTER
Last Written Prescription Date:  8/14/17  Last Fill Quantity: 60,   # refills: 1  Last Office Visit : 2/22/17  Future Office visit:  NONE    Routing refill request to provider for review/approval  LABS

## 2017-11-14 RX ORDER — RISPERIDONE 0.5 MG/1
TABLET ORAL
Qty: 60 TABLET | Refills: 1 | Status: ON HOLD | OUTPATIENT
Start: 2017-11-14 | End: 2019-04-29

## 2017-11-28 DIAGNOSIS — F31.9 BIPOLAR I DISORDER (H): ICD-10-CM

## 2017-11-28 RX ORDER — RISPERIDONE 1 MG/1
1 TABLET ORAL AT BEDTIME
Qty: 30 TABLET | Refills: 1 | Status: SHIPPED | OUTPATIENT
Start: 2017-11-28 | End: 2018-02-12

## 2017-12-19 DIAGNOSIS — B00.9 HSV (HERPES SIMPLEX VIRUS) INFECTION: ICD-10-CM

## 2017-12-19 RX ORDER — VALACYCLOVIR HYDROCHLORIDE 500 MG/1
500 TABLET, FILM COATED ORAL 2 TIMES DAILY
Qty: 6 TABLET | Refills: 0 | Status: SHIPPED | OUTPATIENT
Start: 2017-12-19 | End: 2018-01-22

## 2018-01-10 ENCOUNTER — TELEPHONE (OUTPATIENT)
Dept: INTERNAL MEDICINE | Facility: CLINIC | Age: 63
End: 2018-01-10

## 2018-01-10 NOTE — TELEPHONE ENCOUNTER
Prior Authorization Retail Medication Request  Medication/Dose: pregabalin (LYRICA) 100 MG capsule  Diagnosis and ICD code:  New/Renewal/Insurance Change PA:   Previously Tried and Failed Therapies:     Insurance ID (if provided):   Insurance Phone (if provided):     Any additional info from fax request:     If you received a fax notification from an outside Pharmacy:  Pharmacy Name:  Pharmacy #:  Pharmacy Fax:

## 2018-01-11 NOTE — TELEPHONE ENCOUNTER
Questions still have not generated.  Called and spoke with Gary at [employer], he cancelled out the request and renewed the PA to try and get PA questions.  Get a response of A pending PA request for the patient/medication combination exists; If you need to make any changes to pending request please contact [phone] on new PA request.  [facility] told Gary that since it hasn't hit the 24 hour george of waiting for the questions, nothing can be done.

## 2018-01-12 NOTE — TELEPHONE ENCOUNTER
Central Prior Authorization Team   Phone: [phone]      PA Initiation    Medication: pregabalin (LYRICA) 100 MG capsule-Initiated  Insurance Company: [employer] - Phone [phone] Fax [phone]  Pharm[facility]illing [id number] [place] [facility] [place], [place] - [address] [place] [address]  Filling Pharmacy Phone: [phone]  Filling Pharmacy Fax:    Start Date: 1/12/2018    Questions still did not generate on Cover [employer], called [facility] and spoke with Monalisa and initiated PA via the phone.

## 2018-01-12 NOTE — TELEPHONE ENCOUNTER
Prior Authorization Approval    Authorization Effective Date: 1/12/2018  Authorization Expiration Date: 1/12/2019  Medication: pregabalin (LYRICA) 100 MG capsule-Initiated-Approved-  Approved Dose/Quantity:   Reference #:     Insurance Company: [employer] - Phone [phone] Fax [phone]  Expected CoPay: $0.00     CoPay Card Available:      Foundation Assistance Needed:    Which Pharmacy is filling the prescription (Not needed for infusion/clinic administered): [id number] [place] [address]  Pharmacy Notified: Yes  Patient Notified: Yes

## 2018-01-22 DIAGNOSIS — B00.9 HSV (HERPES SIMPLEX VIRUS) INFECTION: ICD-10-CM

## 2018-01-23 RX ORDER — VALACYCLOVIR HYDROCHLORIDE 500 MG/1
500 TABLET, FILM COATED ORAL 2 TIMES DAILY
Qty: 6 TABLET | Refills: 0 | Status: SHIPPED | OUTPATIENT
Start: 2018-01-23 | End: 2018-05-01

## 2018-02-09 DIAGNOSIS — M79.7 FIBROMYALGIA: ICD-10-CM

## 2018-02-09 NOTE — TELEPHONE ENCOUNTER
LYRICA 100 MG capsule      Last Written Prescription Date:  10-17-17  Last Fill Quantity: 90,   # refills: 3  Last Office Visit : 2-22-17  Future Office visit:  none    Routing refill request to provider for review/approval because:  Scheduled med. Kathleen M Doege RN

## 2018-02-12 DIAGNOSIS — F31.9 BIPOLAR I DISORDER (H): ICD-10-CM

## 2018-02-12 NOTE — TELEPHONE ENCOUNTER
patrickadal  Last Written Prescription Date:  11/28/17  Last Fill Quantity: 30,   # refills: 1  Last Office Visit : 2/22/17  Future Office visit:  No future appt    Routing refill request to clinic nurse for review/approval because:  Drug not on the [facility], [facility] or [facility] refill protocol or controlled substance

## 2018-02-15 RX ORDER — PREGABALIN 100 MG/1
100 CAPSULE ORAL 3 TIMES DAILY
Qty: 90 CAPSULE | Refills: 1 | Status: SHIPPED | OUTPATIENT
Start: 2018-02-15 | End: 2018-05-01

## 2018-02-16 RX ORDER — RISPERIDONE 1 MG/1
TABLET ORAL
Qty: 30 TABLET | Refills: 1 | Status: ON HOLD | OUTPATIENT
Start: 2018-02-16 | End: 2019-04-29

## 2018-04-05 DIAGNOSIS — F31.9 BIPOLAR I DISORDER (H): ICD-10-CM

## 2018-04-07 RX ORDER — ESCITALOPRAM OXALATE 10 MG/1
10 TABLET ORAL DAILY
Qty: 30 TABLET | Refills: 0 | Status: SHIPPED | OUTPATIENT
Start: 2018-04-07 | End: 2018-05-01

## 2018-04-07 NOTE — TELEPHONE ENCOUNTER
escitalopram (LEXAPRO) 10 MG tablet     Last Written Prescription Date:  7/2/17  Last Fill Quantity: 90,   # refills: 2  Last Office Visit : 2/22/17  Future Office visit:  None    Scheduling has been notified to contact the pt for appointment.     30 tabs to pharmacy

## 2018-04-09 ENCOUNTER — TELEPHONE (OUTPATIENT)
Dept: INTERNAL MEDICINE | Facility: CLINIC | Age: 63
End: 2018-04-09

## 2018-04-09 NOTE — TELEPHONE ENCOUNTER
----- Message from Leighann Moon RN sent at 4/7/2018  4:30 PM CDT -----  Pt    Demetra Richardson [[id number]]  [facility]    Please call to schedule.    Patient is overdue for annual clinic visit - unless otherwise indicated patient needs to be scheduled with 1st available.    Patient may need labs and or imaging - please check with RN care coordinator.    Thanks    I do not need any follow up on the scheduling of this appointment unless the patient will no longer be receiving care in our clinic.        Spoke to patient and schedule annual clinic visit with PCP on Wed, April 18 at 2:35 PM. Appointment confirm by patient.   Gina Smith 4/9/18 10:47 AM

## 2018-04-25 ENCOUNTER — TELEPHONE (OUTPATIENT)
Dept: FAMILY MEDICINE | Facility: CLINIC | Age: 63
End: 2018-04-25

## 2018-04-25 NOTE — TELEPHONE ENCOUNTER
----- Message from Leighann Moon RN sent at 4/25/2018 11:09 AM CDT -----  Pt    Demetra Richardson [[id number]]  [facility]    Please call to schedule.    Patient is overdue for annual clinic visit - unless otherwise indicated patient needs to be scheduled with 1st available.    Patient may need labs and or imaging - please check with RN care coordinator.    Thanks    I do not need any follow up on the scheduling of this appointment unless the patient will no longer be receiving care in our clinic.

## 2018-04-25 NOTE — TELEPHONE ENCOUNTER
Andrés Sanon-Mi,  I called and scheduled patient as ordered. She is scheduled for next Tuesday May 1 at 11am. She would like to do her labs ahead of time. Can this be done ahead of time or does she need to be seen first? Can you please call patient? Thanks

## 2018-04-26 NOTE — PROGRESS NOTES
[facility]  Primary Care Center   Fredy Merritt MD  05/01/2018      Chief Complaint:   Recheck Medication       History of Present Illness:   Demetra Richardson is a 62 year old female with a history of fibromyalgia, migraine, lupus, seizure, major depressive disorder, bipolar 1 disorder, and generalized anxiety disorder  who presents for an annual physical examination and a medication check.    Gastrointestinal: Her stomach is acting up, and she wants Protonix. Protonix helps when she takes it. She is sensitive to hot and cold. Her stools are normal.     Fibromyalgia: Her fibromyalgia is worsening, and she wants an increase of her Lyrica, which she is currently taking 2 100mg tablets daily. It hurts to carry her body around.     Lupus: She needs Plaquenil for lupus.    Ophthalmology: She has seen an ophthalmologist, because her tear duct is blocked, and she was given drops which did not help. Her eye is still red.     Psychiatry: She takes Lexapro regularly. She takes 0.5 mg Risperidone, which was prescribed by [facility].    Health Maintenance: She is due for a colonoscopy, bone density, and mammogram. She has not been to see a rheumatologist or liver doctor, as I had recommended at her last visit. She does not want to see a liver doctor, psychiatrist, gynecologist, or ophthalmologist. I discussed with her why she should see each one, she decines. She will schedule a bone density exam. She walks every day. She is living on [employer], and reports that her diet is not the best.    Osteoporosis: She wants a prescription for something to take for her osteoporosis, because she is not good about taking Vitamin D.    Viral: She gets herpes every 2 months, and she needs a refill of her Valacyclovir.    She has no other concerns.     Review of Systems:   Pertinent items are noted in HPI and as below, remainder of complete ROS is negative.    Answers for HPI/ROS submitted by the patient on  5/1/2018   General Symptoms: No  Skin Symptoms: No  HENT Symptoms: No  EYE SYMPTOMS: No  HEART SYMPTOMS: No  LUNG SYMPTOMS: No  INTESTINAL SYMPTOMS: Yes  URINARY SYMPTOMS: No  GYNECOLOGIC SYMPTOMS: No  BREAST SYMPTOMS: No  SKELETAL SYMPTOMS: No  BLOOD SYMPTOMS: No  NERVOUS SYSTEM SYMPTOMS: Yes  MENTAL HEALTH SYMPTOMS: No  Heart burn or indigestion: Yes  Nausea: No  Vomiting: No  Abdominal pain: Yes  Bloating: No  Constipation: No  Diarrhea: No  Blood in stool: No  Black stools: No  Rectal or Anal pain: No  Fecal incontinence: No  Yellowing of skin or eyes: No  Vomit with blood: No  Change in stools: No  Trouble with coordination: No  Dizziness or trouble with balance: No  Fainting or black-out spells: No  Memory loss: No  Headache: No  Seizures: No  Speech problems: No  Tingling: No  Tremor: No  Weakness: No  Difficulty walking: No  Paralysis: No  Numbness: No    Active Medications:     Current Outpatient Prescriptions:      aspirin-acetaminophen-caffeine (EXCEDRIN MIGRAINE) 250-250-65 MG per tablet, Take 2 tablets by mouth every 6 hours as needed for headaches, Disp: 180 tablet, Rfl: 1     bisacodyl (DULCOLAX) 5 MG EC tablet, Take 2 tablets (10 mg) by mouth At Bedtime, Disp: 60 tablet, Rfl: 2     escitalopram (LEXAPRO) 10 MG tablet, Take 1 tablet (10 mg) by mouth daily, Disp: 30 tablet, Rfl: 0     hydroxychloroquine (PLAQUENIL) 200 MG tablet, Take 1 tablet (200 mg) by mouth 2 times daily, Disp: 60 tablet, Rfl: 2     ibuprofen (ADVIL/MOTRIN) 800 MG tablet, Take 1 tablet (800 mg) by mouth every 6 hours as needed for moderate pain, Disp: 90 tablet, Rfl: 1     Lidocaine HCl 2 % CREA, Externally apply topically 2 times daily, Disp: 1 Bottle, Rfl: 0     methadone (DOLPHINE) 5 MG/5ML solution, Take 138 mg by mouth daily, Disp: , Rfl: 0     mupirocin (BACTROBAN) 2 % ointment, Apply topically 3 times daily, Disp: 22 g, Rfl: 0     pantoprazole (PROTONIX) 40 MG enteric coated tablet, Take 1 tablet (40 mg) by mouth every  morning (before breakfast), Disp: 30 tablet, Rfl: 2     pregabalin (LYRICA) 100 MG capsule, Take 1 capsule (100 mg) by mouth 3 times daily, Disp: 90 capsule, Rfl: 1     risperiDONE (RISPERDAL) 0.5 MG tablet, TAKE 1 TABLET BY MOUTH EVERY MORNING AT 6AM & 1T DAILY AS NEEDED IRRITIBILITY/ANXIETY/RACING THOUGHT, Disp: 60 tablet, Rfl: 1     risperiDONE (RISPERDAL) 1 MG tablet, TAKE 1 TABLET (1 MG) BY MOUTH AT BEDTIME, Disp: 30 tablet, Rfl: 1     tamsulosin (FLOMAX) 0.4 MG 24 hr capsule, Take 1 capsule (0.4 mg) by mouth daily, Disp: 30 capsule, Rfl: 2     valACYclovir (VALTREX) 500 MG tablet, Take 1 tablet (500 mg) by mouth 2 times daily **Call clinic to schedule follow up MD appointment FOR REFILLS/ANNUAL PHYS., Disp: 6 tablet, Rfl: 0      Allergies:   Penicillins      Past Medical History:  Microcytic anemia (2/2 h/p gastrectomy with inability to absorb oral iron), Iron deficiency anemia  Benzodiazepine dependence (H) (With h/o withdrawal seizure x 1)  Bipolar 1 disorder, mixed, moderate (H) 2/7/2013  Chronic pain (Back, legs.)  Epidural abscess (x4) 2005  Fibromyalgia  Generalised anxiety disorder  GERD (gastroesophageal reflux disease)  Major depressive disorder  Migraine  Nephrolithiasis (S/p left sided lithotripsy)  Opiate dependence (H)  Osteoarthritis  Osteoporosis  Primary sclerosing cholangitis 2005  PUD (peptic ulcer disease)  SLE (systemic lupus erythematosus) (H)  Acute post-operative pain  Combinations of opioid type drug with any other drug dependence, continuous  Chronic abdominal pain  Nephrolithiasis  Lupus (systemic lupus erythematosus) (H)  Anxiety  Sclerosing cholangitis  Hepatitis  Leukopenia  Hydronephrosis  Seizure (H)  Overdose  Behavior disturbance  Closed Colles' fracture of left radius with routine healing, subsequent encounter  Left wrist pain  Borderline personality disorder    Past Surgical History:  Back surgery (L4-5 epidural abscess) 7-16-04  Back surgery (L3-4 spinal stenosis)  04  Back surgery (Lt psoas abscess) 04   section X2  Cholecystectomy 2-  Closed reduction, percutaneous pinning finger (5th proximal phalanx), combined 8/10/2011  Gastrectomy (s/p 11 had temporary fdg tube, now removed)  Gastrojejunostomy, exploratory laparotomy with revision of gastrojejunostomy, antrectomy, koby-en-y, gastrojejunostomy 2011  Laser Holmium lithotripsy ureter(s), insert stent, combined  Laser Holmium nephrolithotomy via percutaneous nephrostomy (left percutaneous access, left percutaneous ultrasonic nephrolithotomy, ureteroscopy Holmium laser lithotripsy stent placement) 2012  Mammoplasty augmentation bilateral  Open reduction internal fixation wrist, left 2016  Reconstruct breast, implant prosthesis, combined    Family History:    Family history is negative in father.  Mother- stroke     Social History:   Non-smoker, and never used smokeless tobacco. No alcohol use in 10 years. Her mom had a stroke in [place] last week from a protein build up in her brain. Her dad is 87. She has 2 children, age 30 and 22.     Physical Exam:   /71 (BP Location: Right arm, Patient Position: Sitting, Cuff Size: Adult Regular)  Pulse 70  Resp 16  Wt 67.6 kg (149 lb)  SpO2 94%  Breastfeeding? No  BMI 25.58 kg/m2   Constitutional: Alert. In no distress.  Head: Normocephalic. No masses, lesions, tenderness or abnormalities.  ENT: No neck nodes or sinus tenderness.  Cardiovascular: RRR. No murmurs, clicks, gallops, or rub.  Respiratory: Clear to auscultation bilaterally, no wheezes or crackles.  Gastrointestinal: Abdomen soft. Non-tender. BS normal. No masses. Liver palpable several inces belowe the rib cage, non tender and firm.  Musculoskeletal: Extremities normal. No gross deformities noted. Normal muscle tone. Trace edema bilaterally on legs.  Skin: No suspicious lesions. No rashes.  Neurologic: Gait normal. Reflexes normal and symmetric. Sensation grossly  WNL.  Psychiatric: Mentation appears normal. Normal affect.   Hematologic/Lymphatic/Immunologic: Normal cervical lymph nodes.      Assessment and Plan:  We will meet in the near future to discuss follow up and results.  Bipolar I disorder (H)  History of bipolar. She feels very stable. She declines psychiatry follow up, I recommend. Only taking Lexapro and Lyrica.  Mentation and affect are all normoal today. Do PHQ9 and GAD7 at next visit, and discuss seeing a psychiatrist again then.  - escitalopram (LEXAPRO) 10 MG tablet  Dispense: 30 tablet; Refill: 3    Systemic lupus erythematosus, unspecified SLE type, unspecified organ involvement status (H)  She is due for follow up in rheumatology for lupus, and would like to get back on her Plaquenil. I will fill it for 2 months, with the understanding she will follow up with rheumatology for her other med needs.  - Comprehensive metabolic panel  - hydroxychloroquine (PLAQUENIL) 200 MG tablet  Dispense: 60 tablet; Refill: 1  - RHEUMATOLOGY REFERRAL    Gastroesophageal reflux disease without esophagitis  She is off of her PPI, and is having some reflux symptoms. These were well managed on Protonix, I will refill it today.   - pantoprazole (PROTONIX) 40 MG EC tablet  Dispense: 30 tablet; Refill: 3    Fibromyalgia  Only taking Lexapro and Lyrica. For fibromyalgia, Lyrica works well, but she is only taking it 2X/day.I encouraged her to take it 3X/day.  - pregabalin (LYRICA) 100 MG capsule  Dispense: 90 capsule; Refill: 3    HSV (herpes simplex virus) infection  She gets herpes every 2 months. Valtrex works well. I will refill this for PRN use.  - valACYclovir (VALTREX) 500 MG tablet  Dispense: 6 tablet; Refill: 11    Iron deficiency anemia, unspecified iron deficiency anemia type  She is anemic by history. We will recheck labs today. Has needed IV iron in the past.  - CBC with platelets differential  - Iron and iron binding capacity  - Ferritin    Routine general medical  examination at a health care facility  Recent eye doctor note reviewed. They would like to see her back at this point. She declines. She is due for follow up in OBGYN routinely. She declines. She is due for follow up with a liver specialist for liver disease, and she declines. She is due for a colonoscopy and declines. Given handout for new shingles vaccine.  - Lipid panel reflex to direct LDL Fasting  - Mammogram, routine screening    Postmenopausal status  She also gets a bone density for osteoporosis follow up.  - Dexa hip/pelvis/spine*     Follow-up: Data Unavailable See me in 2 months.        Scribe Disclosure:   Negra CASTELLON, am serving as a scribe to document services personally performed by Fredy Merritt MD at this visit, based upon the provider's statements to me. All documentation has been reviewed by the aforementioned provider prior to being entered into the official medical record.     Portions of this medical record were completed by a scribe. UPON MY REVIEW AND AUTHENTICATION BY ELECTRONIC SIGNATURE, this confirms (a) I performed the applicable clinical services, and (b) the record is accurate.   Fredy Merritt MD

## 2018-04-27 NOTE — TELEPHONE ENCOUNTER
Spoke with pt and informed that Dr. Merritt will decide what to order during the appt. No labs prior to the appt. Pt agreed.

## 2018-05-01 ENCOUNTER — OFFICE VISIT (OUTPATIENT)
Dept: FAMILY MEDICINE | Facility: CLINIC | Age: 63
End: 2018-05-01
Payer: COMMERCIAL

## 2018-05-01 VITALS
BODY MASS INDEX: 25.58 KG/M2 | RESPIRATION RATE: 16 BRPM | WEIGHT: 149 LBS | SYSTOLIC BLOOD PRESSURE: 104 MMHG | OXYGEN SATURATION: 94 % | DIASTOLIC BLOOD PRESSURE: 71 MMHG | HEART RATE: 70 BPM

## 2018-05-01 DIAGNOSIS — K21.9 GASTROESOPHAGEAL REFLUX DISEASE WITHOUT ESOPHAGITIS: ICD-10-CM

## 2018-05-01 DIAGNOSIS — M79.7 FIBROMYALGIA: ICD-10-CM

## 2018-05-01 DIAGNOSIS — F31.9 BIPOLAR I DISORDER (H): ICD-10-CM

## 2018-05-01 DIAGNOSIS — Z00.00 ROUTINE GENERAL MEDICAL EXAMINATION AT A HEALTH CARE FACILITY: ICD-10-CM

## 2018-05-01 DIAGNOSIS — D50.9 IRON DEFICIENCY ANEMIA, UNSPECIFIED IRON DEFICIENCY ANEMIA TYPE: Primary | ICD-10-CM

## 2018-05-01 DIAGNOSIS — M32.9 SYSTEMIC LUPUS ERYTHEMATOSUS, UNSPECIFIED SLE TYPE, UNSPECIFIED ORGAN INVOLVEMENT STATUS (H): ICD-10-CM

## 2018-05-01 DIAGNOSIS — B00.9 HSV (HERPES SIMPLEX VIRUS) INFECTION: ICD-10-CM

## 2018-05-01 DIAGNOSIS — Z78.0 POSTMENOPAUSAL STATUS: ICD-10-CM

## 2018-05-01 DIAGNOSIS — D50.9 IRON DEFICIENCY ANEMIA, UNSPECIFIED IRON DEFICIENCY ANEMIA TYPE: ICD-10-CM

## 2018-05-01 LAB
ALBUMIN SERPL-MCNC: 3.4 G/DL (ref 3.4–5)
ALP SERPL-CCNC: 190 U/L (ref 40–150)
ALT SERPL W P-5'-P-CCNC: 52 U/L (ref 0–50)
ANION GAP SERPL CALCULATED.3IONS-SCNC: 6 MMOL/L (ref 3–14)
AST SERPL W P-5'-P-CCNC: 25 U/L (ref 0–45)
BASOPHILS # BLD AUTO: 0.1 10E9/L (ref 0–0.2)
BASOPHILS NFR BLD AUTO: 0.8 %
BILIRUB SERPL-MCNC: 0.2 MG/DL (ref 0.2–1.3)
BUN SERPL-MCNC: 22 MG/DL (ref 7–30)
CALCIUM SERPL-MCNC: 9.3 MG/DL (ref 8.5–10.1)
CHLORIDE SERPL-SCNC: 107 MMOL/L (ref 94–109)
CHOLEST SERPL-MCNC: 163 MG/DL
CO2 SERPL-SCNC: 30 MMOL/L (ref 20–32)
CREAT SERPL-MCNC: 0.83 MG/DL (ref 0.52–1.04)
DIFFERENTIAL METHOD BLD: ABNORMAL
EOSINOPHIL # BLD AUTO: 0.6 10E9/L (ref 0–0.7)
EOSINOPHIL NFR BLD AUTO: 8 %
ERYTHROCYTE [DISTWIDTH] IN BLOOD BY AUTOMATED COUNT: 12.6 % (ref 10–15)
FERRITIN SERPL-MCNC: 40 NG/ML (ref 8–252)
GFR SERPL CREATININE-BSD FRML MDRD: 70 ML/MIN/1.7M2
GLUCOSE SERPL-MCNC: 90 MG/DL (ref 70–99)
HCT VFR BLD AUTO: 45.6 % (ref 35–47)
HDLC SERPL-MCNC: 41 MG/DL
HGB BLD-MCNC: 14.3 G/DL (ref 11.7–15.7)
IMM GRANULOCYTES # BLD: 0.1 10E9/L (ref 0–0.4)
IMM GRANULOCYTES NFR BLD: 0.6 %
IRON SATN MFR SERPL: 16 % (ref 15–46)
IRON SERPL-MCNC: 57 UG/DL (ref 35–180)
LDLC SERPL CALC-MCNC: 95 MG/DL
LYMPHOCYTES # BLD AUTO: 1.8 10E9/L (ref 0.8–5.3)
LYMPHOCYTES NFR BLD AUTO: 22.9 %
MCH RBC QN AUTO: 31.4 PG (ref 26.5–33)
MCHC RBC AUTO-ENTMCNC: 31.4 G/DL (ref 31.5–36.5)
MCV RBC AUTO: 100 FL (ref 78–100)
MONOCYTES # BLD AUTO: 0.6 10E9/L (ref 0–1.3)
MONOCYTES NFR BLD AUTO: 7.3 %
NEUTROPHILS # BLD AUTO: 4.8 10E9/L (ref 1.6–8.3)
NEUTROPHILS NFR BLD AUTO: 60.4 %
NONHDLC SERPL-MCNC: 122 MG/DL
NRBC # BLD AUTO: 0 10*3/UL
NRBC BLD AUTO-RTO: 0 /100
PLATELET # BLD AUTO: 241 10E9/L (ref 150–450)
POTASSIUM SERPL-SCNC: 5.3 MMOL/L (ref 3.4–5.3)
PROT SERPL-MCNC: 6.9 G/DL (ref 6.8–8.8)
RBC # BLD AUTO: 4.56 10E12/L (ref 3.8–5.2)
SODIUM SERPL-SCNC: 143 MMOL/L (ref 133–144)
TIBC SERPL-MCNC: 354 UG/DL (ref 240–430)
TRIGL SERPL-MCNC: 139 MG/DL
WBC # BLD AUTO: 7.9 10E9/L (ref 4–11)

## 2018-05-01 RX ORDER — PREGABALIN 100 MG/1
100 CAPSULE ORAL 3 TIMES DAILY
Qty: 90 CAPSULE | Refills: 3 | Status: SHIPPED | OUTPATIENT
Start: 2018-05-01 | End: 2018-08-21

## 2018-05-01 RX ORDER — HYDROXYCHLOROQUINE SULFATE 200 MG/1
200 TABLET, FILM COATED ORAL 2 TIMES DAILY
Qty: 60 TABLET | Refills: 1 | Status: ON HOLD | OUTPATIENT
Start: 2018-05-01 | End: 2019-04-29

## 2018-05-01 RX ORDER — VALACYCLOVIR HYDROCHLORIDE 500 MG/1
500 TABLET, FILM COATED ORAL 2 TIMES DAILY
Qty: 6 TABLET | Refills: 11 | Status: ON HOLD | OUTPATIENT
Start: 2018-05-01 | End: 2019-04-29

## 2018-05-01 RX ORDER — PANTOPRAZOLE SODIUM 40 MG/1
40 TABLET, DELAYED RELEASE ORAL
Qty: 30 TABLET | Refills: 3 | Status: ON HOLD | OUTPATIENT
Start: 2018-05-01 | End: 2019-04-29

## 2018-05-01 RX ORDER — ESCITALOPRAM OXALATE 10 MG/1
10 TABLET ORAL DAILY
Qty: 30 TABLET | Refills: 3 | Status: SHIPPED | OUTPATIENT
Start: 2018-05-01 | End: 2018-08-22

## 2018-05-01 ASSESSMENT — PAIN SCALES - GENERAL: PAINLEVEL: MILD PAIN (3)

## 2018-05-01 ASSESSMENT — ENCOUNTER SYMPTOMS
ABDOMINAL PAIN: 1
DIARRHEA: 0
NAUSEA: 0
MEMORY LOSS: 0
BLOOD IN STOOL: 0
RECTAL PAIN: 0
WEAKNESS: 0
CONSTIPATION: 0
TINGLING: 0
SPEECH CHANGE: 0
DISTURBANCES IN COORDINATION: 0
TREMORS: 0
JAUNDICE: 0
HEADACHES: 0
PARALYSIS: 0
BLOATING: 0
VOMITING: 0
SEIZURES: 0
LOSS OF CONSCIOUSNESS: 0
NUMBNESS: 0
BOWEL INCONTINENCE: 0
DIZZINESS: 0
HEARTBURN: 1

## 2018-05-01 NOTE — MR AVS SNAPSHOT
After Visit Summary   5/1/2018    Demetra Richardson    MRN: [id number]           Patient Information     Date Of Birth          1955        Visit Information        Provider Department      5/1/2018 11:00 AM Fredy Merritt MD [facility]        Today's Diagnoses     Iron deficiency anemia, unspecified iron deficiency anemia type    -  1    Bipolar I disorder (H)        Systemic lupus erythematosus, unspecified SLE type, unspecified organ involvement status (H)        Gastroesophageal reflux disease without esophagitis        Fibromyalgia        HSV (herpes simplex virus) infection        Routine general medical examination at a health care facility        Postmenopausal status          Care Instructions    Primary Care Center Phone Number [phone]  Primary Care Center Medication Refill Request Information:  * Please contact your pharmacy regarding ANY request for medication refills.  ** [facility] Prescription Fax = [phone]  * Please allow 3 business days for routine medication refills.  * Please allow 5 business days for controlled substance medication refills.     Primary Care Center Test Result notification information:  *You will be notified with in 7-10 days of your appointment day regarding the results of your test.  If you are on MyChart you will be notified as soon as the provider has reviewed the results and signed off on them.      The Primary Care Clinic now has the Shingrix vaccine available.     However, not all insurance carriers cover the entire cost of the Shingrix vaccine if the vaccine is administered at a primary clinic.     Prior to receiving the vaccine, we recommend that you call your insurance carrier and ask them the following questions:     * Does my insurance cover the Shingrix vaccine and administration of the vaccine?   * What is my co-pay or deductible for the vaccine?   * Is there a cost difference if I receive the vaccine at my doctors  office or at a pharmacy?     Unfortunately, the clinic cannot determine your insurance benefits.  Please call your insurance provider prior to scheduling an appointment to receive the Shingrix vaccine.    If you decide to receive your vaccine at the Primary Care Clinic please call [phone] and request a nurse only appointment for the Shingrix vaccine.     Please schedule the following appointments:  R[place] [phone] [place])  Mammogram:  Breast Center [facility]) [phone] ([facility])  Breast Center ([facility]) [phone] ([address])   Dexa Scan: Radiology ([place] [facility] [place])  [phone]               Follow-ups after your visit        Additional Services     RHEUMATOLOGY REFERRAL       Your provider has referred you to: [facility]: Rheumatology Clinic [facility] [phone]   [email or web address]    Please be aware that coverage of these services is subject to the terms and limitations of your health insurance plan.  Call member services at your health plan with any benefit or coverage questions.      Please bring the following with you to your appointment:    (1) Any X-Rays, CTs or MRIs which have been performed.  Contact the facility where they were done to arrange for  prior to your scheduled appointment.    (2) List of current medications   (3) This referral request   (4) Any documents/labs given to you for this referral                  Future tests that were ordered for you today     Open Future Orders        Priority Expected Expires Ordered    Mammogram, routine screening Routine  5/1/2019 5/1/2018    Dexa hip/pelvis/spine* Routine  5/1/2019 5/1/2018    Lipid panel reflex to direct LDL Fasting Routine 5/1/2018 5/1/2019 5/1/2018    CBC with platelets differential Routine 5/1/2018 5/15/2018 5/1/2018    Comprehensive metabolic panel Routine 5/1/2018 5/15/2018 5/1/2018    Iron and iron binding  capacity Routine 2018    Ferritin Routine 2018            Who to contact     Please call your clinic at [phone] to:    Ask questions about your health    Make or cancel appointments    Discuss your medicines    Learn about your test results    Speak to your doctor            Additional Information About Your Visit        MyChart Information     "[employer]" is an electronic gateway that provides easy, online access to your medical records. With "[employer]", you can request a clinic appointment, read your test results, renew a prescription or communicate with your care team.     To sign up for [employer] visit the website at [email or web address]   You will be asked to enter the access code listed below, as well as some personal information. Please follow the directions to create your username and password.     Your access code is: [email or web address]  Expires: 2018  6:31 AM     Your access code will  in 90 days. If you need help or a new code, please contact your [facility] or call [phone] for assistance.        Care EveryWhere ID     This is your Care EveryWhere ID. This could be used by other organizations to access your [place] medical records  [id number]        Your Vitals Were     Pulse Respirations Pulse Oximetry Breastfeeding? BMI (Body Mass Index)       70 16 94% No 25.58 kg/m2        Blood Pressure from Last 3 Encounters:   18 104/71   17 99/63   17 (P) 97/62    Weight from Last 3 Encounters:   18 67.6 kg (149 lb)   17 61.1 kg (134 lb 12.8 oz)   10/14/16 65.3 kg (144 lb)              We Performed the Following     RHEUMATOLOGY REFERRAL          Today's Medication Changes          These changes are accurate as of 18 11:37 AM.  If you have any questions, ask your nurse or doctor.               These medicines have changed or have updated prescriptions.        Dose/Directions    valACYclovir  500 MG tablet   Commonly known as:  VALTREX   This may have changed:  additional instructions   Used for:  HSV (herpes simplex virus) infection   Changed by:  Fredy Merritt MD        Dose:  500 mg   Take 1 tablet (500 mg) by mouth 2 times daily   Quantity:  6 tablet   Refills:  11            Where to get your medicines      These medications were sent to [place], [place] - [address]  [address], [facility] [address]    Hours:  TRANSPLANT PHONE NUMBER [phone] Phone:  [phone]     escitalopram 10 MG tablet    hydroxychloroquine 200 MG tablet    pantoprazole 40 MG EC tablet    valACYclovir 500 MG tablet         Some of these will need a paper prescription and others can be bought over the counter.  Ask your nurse if you have questions.     Bring a paper prescription for each of these medications     pregabalin 100 MG capsule                Primary Care Provider Office Phone # Fax #    Fredy Merritt [phone]       [address] [address]        Equal Access to Services     [facility] AH: Hadii cinthia wen hadasho Soomaali, waaxda luqadaha, qaybta kaalmada adeegyada, waxay emma haypaul schulz . So [facility] [phone].    ATENCIÓN: Si habla español, tiene a [place] disposición servicios gratuitos de asistencia lingüística. Llame al [phone].    We comply with applicable federal civil rights laws and [place] laws. We do not discriminate on the basis of race, color, national origin, age, disability, sex, sexual orientation, or gender identity.            Thank you!     Thank you for choosing [facility]  for your care. Our goal is always to provide you with excellent care. Hearing back from our patients is one way we can continue to improve our services. Please take a few minutes to complete the written survey that you may receive in the mail after your visit with us.  Thank you!             Your Updated Medication List - Protect others around you: Learn how to safely use, store and throw away your medicines at www.disposemymeds.org.          This list is accurate as of 5/1/18 11:37 AM.  Always use your most recent med list.                   Brand Name Dispense Instructions for use Diagnosis    aspirin-acetaminophen-caffeine 250-250-65 MG per tablet    EXCEDRIN MIGRAINE    180 tablet    Take 2 tablets by mouth every 6 hours as needed for headaches    Migraine without aura and without status migrainosus, not intractable       bisacodyl 5 MG EC tablet     60 tablet    Take 2 tablets (10 mg) by mouth At Bedtime    Other constipation       escitalopram 10 MG tablet    LEXAPRO    30 tablet    Take 1 tablet (10 mg) by mouth daily    Bipolar I disorder (H)       hydroxychloroquine 200 MG tablet    PLAQUENIL    60 tablet    Take 1 tablet (200 mg) by mouth 2 times daily    Systemic lupus erythematosus, unspecified SLE type, unspecified organ involvement status (H)       ibuprofen 800 MG tablet    ADVIL/MOTRIN    90 tablet    Take 1 tablet (800 mg) by mouth every 6 hours as needed for moderate pain    Migraine without aura and without status migrainosus, not intractable       Lidocaine HCl 2 % Crea     1 Bottle    Externally apply topically 2 times daily    HSV (herpes simplex virus) infection       methadone 5 MG/5ML solution    DOLPHINE     Take 138 mg by mouth daily        mupirocin 2 % ointment    BACTROBAN    22 g    Apply topically 3 times daily    Open wound       pantoprazole 40 MG EC tablet    PROTONIX    30 tablet    Take 1 tablet (40 mg) by mouth every morning (before breakfast)    Gastroesophageal reflux disease without esophagitis       pregabalin 100 MG capsule    LYRICA    90 capsule    Take 1 capsule (100 mg) by mouth 3 times daily    Fibromyalgia       * risperiDONE 0.5 MG tablet    risperDAL    60 tablet    TAKE 1 TABLET BY MOUTH EVERY MORNING AT 6AM & 1T DAILY AS NEEDED  IRRITIBILITY/ANXIETY/RACING THOUGHT    Bipolar I disorder (H)       * risperiDONE 1 MG tablet    risperDAL    30 tablet    TAKE 1 TABLET (1 MG) BY MOUTH AT BEDTIME    Bipolar I disorder (H)       tamsulosin 0.4 MG capsule    FLOMAX    30 capsule    Take 1 capsule (0.4 mg) by mouth daily    Urinary hesitancy       valACYclovir 500 MG tablet    VALTREX    6 tablet    Take 1 tablet (500 mg) by mouth 2 times daily    HSV (herpes simplex virus) infection       * Notice:  This list has 2 medication(s) that are the same as other medications prescribed for you. Read the directions carefully, and ask your doctor or other care provider to review them with you.

## 2018-05-01 NOTE — PATIENT INSTRUCTIONS
Primary Care Center Phone Number [phone]  Primary Care Center Medication Refill Request Information:  * Please contact your pharmacy regarding ANY request for medication refills.  ** [facility] Prescription Fax = [phone]  * Please allow 3 business days for routine medication refills.  * Please allow 5 business days for controlled substance medication refills.     Primary Care Center Test Result notification information:  *You will be notified with in 7-10 days of your appointment day regarding the results of your test.  If you are on MyChart you will be notified as soon as the provider has reviewed the results and signed off on them.      The Primary Care Clinic now has the Shingrix vaccine available.     However, not all insurance carriers cover the entire cost of the Shingrix vaccine if the vaccine is administered at a primary clinic.     Prior to receiving the vaccine, we recommend that you call your insurance carrier and ask them the following questions:     * Does my insurance cover the Shingrix vaccine and administration of the vaccine?   * What is my co-pay or deductible for the vaccine?   * Is there a cost difference if I receive the vaccine at my doctors office or at a pharmacy?     Unfortunately, the clinic cannot determine your insurance benefits.  Please call your insurance provider prior to scheduling an appointment to receive the Shingrix vaccine.    If you decide to receive your vaccine at the Primary Care Clinic please call [phone] and request a nurse only appointment for the Shingrix vaccine.     Please schedule the following appointments:  Rheumatology [phone] ([facility] [place] [facility])  Mammogram:  Breast Center ([facility]) [phone] ([facility] [place] [facility])  Breast Center ([facility]) [phone] ([address])   Dexa Scan: Radiology ([facility] Center [facility] [place] 1st [facility])  [phone]

## 2018-05-01 NOTE — NURSING NOTE
Chief Complaint   Patient presents with     Recheck Medication     Patient is here to discuss medication     Jessica Juarez CMA 11:02 AM on 5/1/2018.

## 2018-05-07 ENCOUNTER — TELEPHONE (OUTPATIENT)
Dept: INTERNAL MEDICINE | Facility: CLINIC | Age: 63
End: 2018-05-07

## 2018-05-07 NOTE — TELEPHONE ENCOUNTER
[facility] Call Center    Phone Message    May a detailed message be left on voicemail: yes    Reason for Call: Symptoms or Concerns     If patient has red-flag symptoms, warm transfer to triage line    Current symptom or concern: RBC 7.9, would like to know if she is needing a Iron infusion.    Symptoms have been present for:  A few day(s)    Has patient previously been seen for this? Yes    By : KIEL Merritt    Date: multiple time     Are there any new or worsening symptoms? No      Action Taken: Message routed to:  [facility] ([facility] Primary Care

## 2018-05-08 NOTE — TELEPHONE ENCOUNTER
Patient returned call. Accidentally looked at WBC and thought it was RBC.  Advised no infusion needed.  Patient verbalizes understanding.  Ritu Aguirre LPN

## 2018-05-15 DIAGNOSIS — F31.9 BIPOLAR I DISORDER (H): ICD-10-CM

## 2018-05-16 RX ORDER — ESCITALOPRAM OXALATE 10 MG/1
10 TABLET ORAL DAILY
Qty: 30 TABLET | Refills: 2 | Status: SHIPPED | OUTPATIENT
Start: 2018-05-16 | End: 2018-08-21

## 2018-06-29 ENCOUNTER — RADIANT APPOINTMENT (OUTPATIENT)
Dept: MAMMOGRAPHY | Facility: CLINIC | Age: 63
End: 2018-06-29
Attending: FAMILY MEDICINE
Payer: COMMERCIAL

## 2018-06-29 ENCOUNTER — RADIANT APPOINTMENT (OUTPATIENT)
Dept: BONE DENSITY | Facility: CLINIC | Age: 63
End: 2018-06-29
Attending: FAMILY MEDICINE
Payer: COMMERCIAL

## 2018-06-29 DIAGNOSIS — Z00.00 ROUTINE GENERAL MEDICAL EXAMINATION AT A HEALTH CARE FACILITY: ICD-10-CM

## 2018-06-29 DIAGNOSIS — Z78.0 POSTMENOPAUSAL STATUS: ICD-10-CM

## 2018-07-11 ENCOUNTER — TELEPHONE (OUTPATIENT)
Dept: INTERNAL MEDICINE | Facility: CLINIC | Age: 63
End: 2018-07-11

## 2018-07-11 NOTE — TELEPHONE ENCOUNTER
dexa actually, while still low, is a bit better than last time. Will discuss further next visit. She should be on over the counter calcium 1000 mg/d, and vit D 1000 iu/day, if not on already.

## 2018-07-11 NOTE — TELEPHONE ENCOUNTER
Discussed with patient. She will get vitamin D and Calcium and start taking as directed.  Patient verbalizes understanding.  Ritu Aguirre LPN

## 2018-07-11 NOTE — TELEPHONE ENCOUNTER
[facility] Call Center    Phone Message    May a detailed message be left on voicemail: no    Reason for Call: Other: Wants her dexa scan results      Action Taken: Other: Wants her dexa scan results

## 2018-08-21 DIAGNOSIS — F31.9 BIPOLAR I DISORDER (H): ICD-10-CM

## 2018-08-21 DIAGNOSIS — M79.7 FIBROMYALGIA: ICD-10-CM

## 2018-08-21 RX ORDER — ESCITALOPRAM OXALATE 10 MG/1
10 TABLET ORAL DAILY
Qty: 30 TABLET | Refills: 8 | Status: SHIPPED | OUTPATIENT
Start: 2018-08-21 | End: 2019-06-29

## 2018-08-21 NOTE — TELEPHONE ENCOUNTER
pregabalin (LYRICA) 100 MG capsule  Last Written Prescription Date:  5/1/18  Last Fill Quantity: 90,   # refills: 3  Last Office Visit : 5/1/18  Future Office visit:  none    Routing refill request to RN for review/approval because:  Drug not on the [facility], [facility] or [facility] refill protocol

## 2018-08-22 RX ORDER — PREGABALIN 100 MG/1
100 CAPSULE ORAL 3 TIMES DAILY
Qty: 90 CAPSULE | Refills: 1 | Status: SHIPPED | OUTPATIENT
Start: 2018-08-22 | End: 2018-10-17

## 2018-08-23 DIAGNOSIS — M79.7 FIBROMYALGIA: ICD-10-CM

## 2018-08-24 RX ORDER — PREGABALIN 100 MG
CAPSULE ORAL
Qty: 90 CAPSULE | Refills: 2 | OUTPATIENT
Start: 2018-08-24

## 2018-09-04 ENCOUNTER — TELEPHONE (OUTPATIENT)
Dept: RHEUMATOLOGY | Facility: CLINIC | Age: 63
End: 2018-09-04

## 2018-09-04 NOTE — TELEPHONE ENCOUNTER
[facility] Call Center    Phone Message    May a detailed message be left on voicemail: yes    Reason for Call: Other: REFERRAL DX: Systemic lupus erythematosus/ PATIENT HAS SEEN DR. WATSON PREVIOUSLY IN 2014     Action Taken: Message routed to:  [facility] RHEUM

## 2018-09-13 NOTE — TELEPHONE ENCOUNTER
Call attempted to patient regarding the referral. Patient stated that now was not a good time and ended the call.   Lianna Rayo CMA  9/13/2018 12:09 PM

## 2018-09-17 ENCOUNTER — OFFICE VISIT (OUTPATIENT)
Dept: DERMATOLOGY | Facility: CLINIC | Age: 63
End: 2018-09-17
Payer: COMMERCIAL

## 2018-09-17 DIAGNOSIS — C44.310 BCC (BASAL CELL CARCINOMA), FACE: Primary | ICD-10-CM

## 2018-09-17 ASSESSMENT — PAIN SCALES - GENERAL
PAINLEVEL: NO PAIN (0)
PAINLEVEL: NO PAIN (0)

## 2018-09-17 NOTE — NURSING NOTE
Dermatology Rooming Note    Demetra Richardson's goals for this visit include:   Chief Complaint   Patient presents with     Derm Problem     Demetra is here today to have her face looked at- some pigmentation issues.      Bel Nguyen MA

## 2018-09-17 NOTE — PROGRESS NOTES
[facility] Dermatology Note      Dermatology Problem List:  1. Likely BCC on the left and right nasal bridge, shave biopsy 9/17/2018  2. SLE, on plaquenil     Specialty Problems     None          CC:   Derm Problem (Demetra is here today to have her face looked at- some pigmentation issues. )      Encounter Date: Sep 17, 2018    History of Present Illness:  Ms. Demetra Richardson is a 62 year old female who presents as a self referral. She is concerned about uneven pigmentation on her cheeks which has been present since the mid-2000s. She also has 2 spots on her left and right nasal bridge that have been present for the past few months. The size waxes and wanes but they are always present. They are not associated with itching, pain, or bleeding. She also has red spots on her hands that come and go but have been present for years. She suspects that these are related to SLE. She also has history of genital herpes and opioid use disorder.     Past Medical History:   Patient Active Problem List   Diagnosis     Postoperative pain     Combinations of opioid type drug with any other drug dependence, continuous     Chronic abdominal pain     Nephrolithiasis     Lupus (systemic lupus erythematosus) (H)     Anxiety     Sclerosing cholangitis     Hepatitis     Microcytic anemia     Migraines     Leukopenia     Hydronephrosis     Acute post-operative pain     Anemia     Seizure (H)     Bipolar 1 disorder, mixed, moderate (H)     Bipolar 1 disorder (H)     Anemia     Iron deficiency anemia     Depression     Overdose     Behavior disturbance     Closed Colles' fracture of left radius with routine healing, subsequent encounter     Left wrist pain     Borderline personality disorder     Past Medical History:   Diagnosis Date     Anemia     2/2 h/p gastrectomy with inability to absorb oral iron     Benzodiazepine dependence (H)     With h/o withdrawal seizure x 1     Bipolar 1 disorder, mixed, moderate (H)  2/7/2013     Chronic pain     Back, legs.     Epidural abscess 2005    x4     Fibromyalgia      Generalised anxiety disorder      GERD (gastroesophageal reflux disease)      Major depressive disorder      Migraine      Nephrolithiasis     S/p left sided lithotripsy     Opiate dependence (H)      Osteoarthritis      Osteoporosis      Primary sclerosing cholangitis 2005     PUD (peptic ulcer disease)      SLE (systemic lupus erythematosus) (H)        Allergy History:     Allergies   Allergen Reactions     Penicillins Hives     Hives in childhood.       Social History:   reports that she has never smoked. She has never used smokeless tobacco. She reports that she does not drink alcohol or use illicit drugs.      Family History:  Family History   Problem Relation Age of Onset     Family History Negative Mother      Family History Negative Father        Medications:  Current Outpatient Prescriptions   Medication Sig Dispense Refill     aspirin-acetaminophen-caffeine (EXCEDRIN MIGRAINE) 250-250-65 MG per tablet Take 2 tablets by mouth every 6 hours as needed for headaches 180 tablet 1     bisacodyl (DULCOLAX) 5 MG EC tablet Take 2 tablets (10 mg) by mouth At Bedtime 60 tablet 2     Bupivacaine HCl (BUPIVACAINE-LIDOCAINE-EPINEPHERINE 0.33%-0.33%-1:300,000) SOLN injection Inject 3 mLs as directed once for 1 dose 3 mL 0     escitalopram (LEXAPRO) 10 MG tablet Take 1 tablet (10 mg) by mouth daily 30 tablet 8     hydroxychloroquine (PLAQUENIL) 200 MG tablet Take 1 tablet (200 mg) by mouth 2 times daily 60 tablet 1     ibuprofen (ADVIL/MOTRIN) 800 MG tablet Take 1 tablet (800 mg) by mouth every 6 hours as needed for moderate pain 90 tablet 1     methadone (DOLPHINE) 5 MG/5ML solution Take 138 mg by mouth daily  0     pantoprazole (PROTONIX) 40 MG EC tablet Take 1 tablet (40 mg) by mouth every morning (before breakfast) 30 tablet 3     pregabalin (LYRICA) 100 MG capsule Take 1 capsule (100 mg) by mouth 3 times daily 90 capsule 1      risperiDONE (RISPERDAL) 0.5 MG tablet TAKE 1 TABLET BY MOUTH EVERY MORNING AT 6AM & 1T DAILY AS NEEDED IRRITIBILITY/ANXIETY/RACING THOUGHT 60 tablet 1     valACYclovir (VALTREX) 500 MG tablet Take 1 tablet (500 mg) by mouth 2 times daily 6 tablet 11     Lidocaine HCl 2 % CREA Externally apply topically 2 times daily (Patient not taking: Reported on 9/17/2018) 1 Bottle 0     mupirocin (BACTROBAN) 2 % ointment Apply topically 3 times daily (Patient not taking: Reported on 9/17/2018) 22 g 0     risperiDONE (RISPERDAL) 1 MG tablet TAKE 1 TABLET (1 MG) BY MOUTH AT BEDTIME (Patient not taking: Reported on 9/17/2018) 30 tablet 1     tamsulosin (FLOMAX) 0.4 MG 24 hr capsule Take 1 capsule (0.4 mg) by mouth daily (Patient not taking: Reported on 5/1/2018) 30 capsule 2       Review of Systems:  -As per HPI  -Constitutional: The patient denies fatigue, fevers, chills, unintended weight loss, and night sweats.  -HEENT: Patient denies nonhealing oral sores.  -Skin: As above in HPI. No additional skin concerns.    Physical exam:  Vitals: There were no vitals taken for this visit.  GEN: This is a well developed, well-nourished female in no acute distress, in a pleasant mood.    SKIN: Focused examination of the face and hands was performed.  -Medial angle of the left eye infiltration of the area between eye and nose of about 12x6mm centrally ulcerated lesion with pearly infiltrations. Unfortunately as well the upper part of medial eyelid involved  -Nose right below the eye a not well defined, about 6mm lesion with infiltrated wall  -Patchy hypopigmentation on the bilateral cheeks.   -Several pink rough annular plaques on the bilateral palms.   -No other lesions of concern on areas examined.     Impression/Plan:  1. Periorbital/eyelid medial left and below right eye suspicion for each of them sclerodermiform/invasive type of BCC    Plan: Dermatological path order and indications, BIOPSY SKIN/SUBQ/MUC MEM, SINGLE LESION, BIOPSY  SKIN/SUBQ/MUC MEM, EACH ADDTL LESION, Bupivacaine HCl (BUPIVACAINE-LIDOCAINE-EPINEPHERINE 0.33%-0.33%-1:300,000) SOLN injection. Shave biopsy:  After discussion of benefits and risks including but not limited to bleeding/bruising, pain/swelling, infection, scar, incomplete removal, nerve damage/numbness, recurrence, and non-diagnostic biopsy, written consent, verbal consent and photographs were obtained. Time-out was performed. The area was cleaned with isopropyl alcohol. 0.5mL of 1% lidocaine with epinephrine was injected to obtain adequate anesthesia of the lesion on the 2 lesions on the face. A shave biopsy was performed. Hemostasis was achieved with aluminium chloride. Vaseline and a sterile dressing were applied. The patient tolerated the procedure and no complications were noted. The patient was provided with verbal and written post care instructions.     As planned referral to dermatologic surgery     --> Specimen #: [id number]   A: Skin, periorbital medial left   B: Skin, paranasal right below eyelid     FINAL DIAGNOSIS:   A. Skin, periorbital medial left:   - Basal cell carcinoma, nodular type, extending to the lateral and deep   margins - (see description)     B. Skin, paranasal right below eyelid:   - Basal cell carcinoma, infiltrating type, extending to the lateral and   deep margins - (see description)     2. Hypopigmentation on the bilateral cheeks    Likely 2/2 SLE    Encourage sun precautions for now, will consult with cosmetic team in the future given likely new diagnosis of BCC     3. Dyshidrotic eczema on bilateral palms     Follow-up once biopsy results have returned.     Bebe Myers ([facility]), MS4    Staff Physician Comments:  I was present with the medical student who participated in the service and in the documentation of the note. I have verified the history and personally performed the physical exam and medical decision making. I agree with the assessment and plan as documented in the note.  I have reviewed and if necessary amended the note.  I personally performed the procedure.      Antoine Naranjo MD  Professor  Head of Dermato-Allergy Division  Department of Dermatology  [facility]

## 2018-09-17 NOTE — LETTER
9/17/2018       RE: Demetra Richardson  [address]  [address]  [place] [address]     Dear Colleague,    Thank you for referring your patient, Demetra Richardson, to the [facility] DERMATOLOG[facility] at [facility]. Please see a copy of my visit note below.    [facility] Dermatology Note      Dermatology Problem List:  1. Likely BCC on the left and right nasal bridge, shave biopsy 9/17/2018  2. SLE, on plaquenil     Specialty Problems     None          CC:   Derm Problem (Demetra is here today to have her face looked at- some pigmentation issues. )      Encounter Date: Sep 17, 2018    History of Present Illness:  Ms. Demetra Richardson is a 62 year old female who presents as a self referral. She is concerned about uneven pigmentation on her cheeks which has been present since the mid-2000s. She also has 2 spots on her left and right nasal bridge that have been present for the past few months. The size waxes and wanes but they are always present. They are not associated with itching, pain, or bleeding. She also has red spots on her hands that come and go but have been present for years. She suspects that these are related to SLE. She also has history of genital herpes and opioid use disorder.     Past Medical History:   Patient Active Problem List   Diagnosis     Postoperative pain     Combinations of opioid type drug with any other drug dependence, continuous     Chronic abdominal pain     Nephrolithiasis     Lupus (systemic lupus erythematosus) (H)     Anxiety     Sclerosing cholangitis     Hepatitis     Microcytic anemia     Migraines     Leukopenia     Hydronephrosis     Acute post-operative pain     Anemia     Seizure (H)     Bipolar 1 disorder, mixed, moderate (H)     Bipolar 1 disorder (H)     Anemia     Iron deficiency anemia     Depression     Overdose     Behavior disturbance     Closed Colles' fracture of left radius with routine healing,  subsequent encounter     Left wrist pain     Borderline personality disorder     Past Medical History:   Diagnosis Date     Anemia     2/2 h/p gastrectomy with inability to absorb oral iron     Benzodiazepine dependence (H)     With h/o withdrawal seizure x 1     Bipolar 1 disorder, mixed, moderate (H) 2/7/2013     Chronic pain     Back, legs.     Epidural abscess 2005    x4     Fibromyalgia      Generalised anxiety disorder      GERD (gastroesophageal reflux disease)      Major depressive disorder      Migraine      Nephrolithiasis     S/p left sided lithotripsy     Opiate dependence (H)      Osteoarthritis      Osteoporosis      Primary sclerosing cholangitis 2005     PUD (peptic ulcer disease)      SLE (systemic lupus erythematosus) (H)        Allergy History:     Allergies   Allergen Reactions     Penicillins Hives     Hives in childhood.       Social History:   reports that she has never smoked. She has never used smokeless tobacco. She reports that she does not drink alcohol or use illicit drugs.      Family History:  Family History   Problem Relation Age of Onset     Family History Negative Mother      Family History Negative Father        Medications:  Current Outpatient Prescriptions   Medication Sig Dispense Refill     aspirin-acetaminophen-caffeine (EXCEDRIN MIGRAINE) 250-250-65 MG per tablet Take 2 tablets by mouth every 6 hours as needed for headaches 180 tablet 1     bisacodyl (DULCOLAX) 5 MG EC tablet Take 2 tablets (10 mg) by mouth At Bedtime 60 tablet 2     Bupivacaine HCl (BUPIVACAINE-LIDOCAINE-EPINEPHERINE 0.33%-0.33%-1:300,000) SOLN injection Inject 3 mLs as directed once for 1 dose 3 mL 0     escitalopram (LEXAPRO) 10 MG tablet Take 1 tablet (10 mg) by mouth daily 30 tablet 8     hydroxychloroquine (PLAQUENIL) 200 MG tablet Take 1 tablet (200 mg) by mouth 2 times daily 60 tablet 1     ibuprofen (ADVIL/MOTRIN) 800 MG tablet Take 1 tablet (800 mg) by mouth every 6 hours as needed for moderate pain  90 tablet 1     methadone (DOLPHINE) 5 MG/5ML solution Take 138 mg by mouth daily  0     pantoprazole (PROTONIX) 40 MG EC tablet Take 1 tablet (40 mg) by mouth every morning (before breakfast) 30 tablet 3     pregabalin (LYRICA) 100 MG capsule Take 1 capsule (100 mg) by mouth 3 times daily 90 capsule 1     risperiDONE (RISPERDAL) 0.5 MG tablet TAKE 1 TABLET BY MOUTH EVERY MORNING AT 6AM & 1T DAILY AS NEEDED IRRITIBILITY/ANXIETY/RACING THOUGHT 60 tablet 1     valACYclovir (VALTREX) 500 MG tablet Take 1 tablet (500 mg) by mouth 2 times daily 6 tablet 11     Lidocaine HCl 2 % CREA Externally apply topically 2 times daily (Patient not taking: Reported on 9/17/2018) 1 Bottle 0     mupirocin (BACTROBAN) 2 % ointment Apply topically 3 times daily (Patient not taking: Reported on 9/17/2018) 22 g 0     risperiDONE (RISPERDAL) 1 MG tablet TAKE 1 TABLET (1 MG) BY MOUTH AT BEDTIME (Patient not taking: Reported on 9/17/2018) 30 tablet 1     tamsulosin (FLOMAX) 0.4 MG 24 hr capsule Take 1 capsule (0.4 mg) by mouth daily (Patient not taking: Reported on 5/1/2018) 30 capsule 2       Review of Systems:  -As per HPI  -Constitutional: The patient denies fatigue, fevers, chills, unintended weight loss, and night sweats.  -HEENT: Patient denies nonhealing oral sores.  -Skin: As above in HPI. No additional skin concerns.    Physical exam:  Vitals: There were no vitals taken for this visit.  GEN: This is a well developed, well-nourished female in no acute distress, in a pleasant mood.    SKIN: Focused examination of the face and hands was performed.  -Medial angle of the left eye infiltration of the area between eye and nose of about 12x6mm centrally ulcerated lesion with pearly infiltrations. Unfortunately as well the upper part of medial eyelid involved  -Nose right below the eye a not well defined, about 6mm lesion with infiltrated wall  -Patchy hypopigmentation on the bilateral cheeks.   -Several pink rough annular plaques on the  bilateral palms.   -No other lesions of concern on areas examined.     Impression/Plan:  1. Periorbital/eyelid medial left and below right eye suspicion for each of them sclerodermiform/invasive type of BCC    Plan: Dermatological path order and indications, BIOPSY SKIN/SUBQ/MUC MEM, SINGLE LESION, BIOPSY SKIN/SUBQ/MUC MEM, EACH ADDTL LESION, Bupivacaine HCl (BUPIVACAINE-LIDOCAINE-EPINEPHERINE 0.33%-0.33%-1:300,000) SOLN injection. Shave biopsy:  After discussion of benefits and risks including but not limited to bleeding/bruising, pain/swelling, infection, scar, incomplete removal, nerve damage/numbness, recurrence, and non-diagnostic biopsy, written consent, verbal consent and photographs were obtained. Time-out was performed. The area was cleaned with isopropyl alcohol. 0.5mL of 1% lidocaine with epinephrine was injected to obtain adequate anesthesia of the lesion on the 2 lesions on the face. A shave biopsy was performed. Hemostasis was achieved with aluminium chloride. Vaseline and a sterile dressing were applied. The patient tolerated the procedure and no complications were noted. The patient was provided with verbal and written post care instructions.     Referral to dermatologic surgery     2. Hypopigmentation on the bilateral cheeks    Likely 2/2 SLE    Encourage sun precautions for now, will consult with cosmetic team in the future given likely new diagnosis of BCC     3. Dyshidrotic eczema on bilateral palms     Follow-up once biopsy results have returned.     Bebe Myers ([facility]), MS4    Staff Physician Comments:  I was present with the medical student who participated in the service and in the documentation of the note. I have verified the history and personally performed the physical exam and medical decision making. I agree with the assessment and plan as documented in the note. I have reviewed and if necessary amended the note.  I personally performed the procedure.        Antoine Naranjo,  MD

## 2018-09-17 NOTE — PATIENT INSTRUCTIONS

## 2018-09-17 NOTE — MR AVS SNAPSHOT
After Visit Summary   9/17/2018    Demetra Richardson    MRN: [id number]           Patient Information     Date Of Birth          1955        Visit Information        Provider Department      9/17/2018 11:15 AM Antoine Naranjo MD [facility] Dermatology        Today's Diagnoses     BCC (basal cell carcinoma), face    -  1      Care Instructions    Wound Care After a Biopsy    What is a skin biopsy?  A skin biopsy allows the doctor to examine a very small piece of tissue under the microscope to determine the diagnosis and the best treatment for the skin condition. A local anesthetic (numbing medicine)  is injected with a very small needle into the skin area to be tested. A small piece of skin is taken from the area. Sometimes a suture (stitch) is used.     What are the risks of a skin biopsy?  I will experience scar, bleeding, swelling, pain, crusting and redness. I may experience incomplete removal or recurrence. Risks of this procedure are excessive bleeding, bruising, infection, nerve damage, numbness, thick (hypertrophic or keloidal) scar and non-diagnostic biopsy.    How should I care for my wound for the first 24 hours?    Keep the wound dry and covered for 24 hours    If it bleeds, hold direct pressure on the area for 15 minutes. If bleeding does not stop then go to the emergency room    Avoid strenuous exercise the first 1-2 days or as your doctor instructs you    How should I care for the wound after 24 hours?    After 24 hours, remove the bandage    You may bathe or shower as normal    If you had a scalp biopsy, you can shampoo as usual and can use shower water to clean the biopsy site daily    Clean the wound twice a day with gentle soap and water    Do not scrub, be gentle    Apply white petroleum/Vaseline after cleaning the wound with a cotton swab or a clean finger, and keep the site covered with a Bandaid /bandage. Bandages are not necessary with a scalp biopsy    If you are unable  to cover the site with a Bandaid /bandage, re-apply ointment 2-3 times a day to keep the site moist. Moisture will help with healing    Avoid strenuous activity for first 1-2 days    Avoid lakes, rivers, pools, and oceans until the stitches are removed or the site is healed    How do I clean my wound?    Wash hands thoroughly with soap or use hand  before all wound care    Clean the wound with gentle soap and water    Apply white petroleum/Vaseline  to wound after it is clean    Replace the Bandaid /bandage to keep the wound covered for the first few days or as instructed by your doctor    If you had a scalp biopsy, warm shower water to the area on a daily basis should suffice    What should I use to clean my wound?     Cotton-tipped applicators (Qtips )    White petroleum jelly (Vaseline ). Use a clean new container and use Q-tips to apply.    Bandaids   as needed    Gentle soap     How should I care for my wound long term?    Do not get your wound dirty    Keep up with wound care for one week or until the area is healed.    A small scab will form and fall off by itself when the area is completely healed. The area will be red and will become pink in color as it heals. Sun protection is very important for how your scar will turn out. Sunscreen with an SPF 30 or greater is recommended once the area is healed.I    You should have some soreness but it should be mild and slowly go away over several days. Talk to your doctor about using tylenol for pain,    When should I call my doctor?  If you have increased:     Pain or swelling    Pus or drainage (clear or slightly yellow drainage is ok)    Temperature over 100F    Spreading redness or warmth around wound    When will I hear about my results?  The biopsy results can take 2-3 weeks to come back. The clinic will call you with the results, send you a [employer] message, or have you schedule a follow-up clinic or phone time to discuss the results. Contact our  clinics if you do not hear from us in 3 weeks.     Who should I call with questions?    [facility]: [phone]     [facility]: [phone]    For urgent needs outside of business hours call the [facility] at [phone] and ask for the dermatology resident on call            Follow-ups after your visit        Additional Services     DERMATOLOGY SURGERY REFERRAL       Will be tricky, because it looks like that the medial part of the eyelid angle is involved. See photo                  Who to contact     Please call your clinic at [phone] to:    Ask questions about your health    Make or cancel appointments    Discuss your medicines    Learn about your test results    Speak to your doctor            Additional Information About Your Visit        [employer] Information     [employer] gives you secure access to your electronic health record. If you see a primary care provider, you can also send messages to your care team and make appointments. If you have questions, please call your primary care clinic.  If you do not have a primary care provider, please call [phone] and they will assist you.      [employer] is an electronic gateway that provides easy, online access to your medical records. With [employer], you can request a clinic appointment, read your test results, renew a prescription or communicate with your care team.     To access your existing account, please contact your [facility] or call [phone] for assistance.        Care EveryWhere ID     This is your Care EveryWhere ID. This could be used by other organizations to access your [place] medical records  [id number]         Blood Pressure from Last 3 Encounters:   05/01/18 104/71   03/29/17 99/63   03/22/17 (P) 97/62    Weight from Last 3 Encounters:   05/01/18 67.6 kg (149 lb)   02/22/17 61.1 kg (134 lb 12.8 oz)   10/14/16 65.3 kg (144 lb)               We Performed the Following     BIOPSY SKIN/SUBQ/MUC MEM, EACH ADDTL LESION     BIOPSY SKIN/SUBQ/MUC MEM, SINGLE LESION     Dermatological path order and indications     DERMATOLOGY SURGERY REFERRAL          Today's Medication Changes          These changes are accurate as of 9/17/18  1:46 PM.  If you have any questions, ask your nurse or doctor.               Start taking these medicines.        Dose/Directions    bupivacaine-lidocaine-EPINEPHErine 0.33%-0.33%-1:300,000 Soln injection   Used for:  BCC (basal cell carcinoma), face   Started by:  Antoine Naranjo MD        Dose:  3 mL   Inject 3 mLs as directed once for 1 dose   Quantity:  3 mL   Refills:  0            Where to get your medicines      Some of these will need a paper prescription and others can be bought over the counter.  Ask your nurse if you have questions.     You don't need a prescription for these medications     bupivacaine-lidocaine-EPINEPHErine 0.33%-0.33%-1:300,000 Soln injection                Primary Care Provider Office Phone # Fax #    Fredy Merritt [phone]        [address] [address]        Equal Access to Services     [facility]: Hadii cinthia wen hadaliciao Sobebeto, waaxda luqcesar, qaybta kaalmada adenicolas, glen schulz . So [facility] [phone].    ATENCIÓN: Si habla español, tiene a [place] disposición servicios gratuitos de asistencia lingüística. [facility] [phone].    We comply with applicable federal civil rights laws and [place] laws. We do not discriminate on the basis of race, color, national origin, age, disability, sex, sexual orientation, or gender identity.            Thank you!     Thank you for choosing [facility]  for your care. Our goal is always to provide you with excellent care. Hearing back from our patients is one way we can continue to improve our services. Please take a few minutes to complete the written survey that you may  receive in the mail after your visit with us. Thank you!             Your Updated Medication List - Protect others around you: Learn how to safely use, store and throw away your medicines at www.disposemymeds.org.          This list is accurate as of 9/17/18  1:46 PM.  Always use your most recent med list.                   Brand Name Dispense Instructions for use Diagnosis    aspirin-acetaminophen-caffeine 250-250-65 MG per tablet    EXCEDRIN MIGRAINE    180 tablet    Take 2 tablets by mouth every 6 hours as needed for headaches    Migraine without aura and without status migrainosus, not intractable       bisacodyl 5 MG EC tablet     60 tablet    Take 2 tablets (10 mg) by mouth At Bedtime    Other constipation       bupivacaine-lidocaine-EPINEPHErine 0.33%-0.33%-1:300,000 Soln injection     3 mL    Inject 3 mLs as directed once for 1 dose    BCC (basal cell carcinoma), face       escitalopram 10 MG tablet    LEXAPRO    30 tablet    Take 1 tablet (10 mg) by mouth daily    Bipolar I disorder (H)       hydroxychloroquine 200 MG tablet    PLAQUENIL    60 tablet    Take 1 tablet (200 mg) by mouth 2 times daily    Systemic lupus erythematosus, unspecified SLE type, unspecified organ involvement status (H)       ibuprofen 800 MG tablet    ADVIL/MOTRIN    90 tablet    Take 1 tablet (800 mg) by mouth every 6 hours as needed for moderate pain    Migraine without aura and without status migrainosus, not intractable       Lidocaine HCl 2 % Crea     1 Bottle    Externally apply topically 2 times daily    HSV (herpes simplex virus) infection       methadone 5 MG/5ML solution    DOLPHINE     Take 138 mg by mouth daily        mupirocin 2 % ointment    BACTROBAN    22 g    Apply topically 3 times daily    Open wound       pantoprazole 40 MG EC tablet    PROTONIX    30 tablet    Take 1 tablet (40 mg) by mouth every morning (before breakfast)    Gastroesophageal reflux disease without esophagitis       pregabalin 100 MG capsule     LYRICA    90 capsule    Take 1 capsule (100 mg) by mouth 3 times daily    Fibromyalgia       * risperiDONE 0.5 MG tablet    risperDAL    60 tablet    TAKE 1 TABLET BY MOUTH EVERY MORNING AT 6AM & 1T DAILY AS NEEDED IRRITIBILITY/ANXIETY/RACING THOUGHT    Bipolar I disorder (H)       * risperiDONE 1 MG tablet    risperDAL    30 tablet    TAKE 1 TABLET (1 MG) BY MOUTH AT BEDTIME    Bipolar I disorder (H)       tamsulosin 0.4 MG capsule    FLOMAX    30 capsule    Take 1 capsule (0.4 mg) by mouth daily    Urinary hesitancy       valACYclovir 500 MG tablet    VALTREX    6 tablet    Take 1 tablet (500 mg) by mouth 2 times daily    HSV (herpes simplex virus) infection       * Notice:  This list has 2 medication(s) that are the same as other medications prescribed for you. Read the directions carefully, and ask your doctor or other care provider to review them with you.

## 2018-09-17 NOTE — NURSING NOTE
Lidocaine 1% injection   3mL once for one use, starting 9/17/2018 ending 9/17/2018,  2mL disp, R-0, injection  Injected by Chayo Crystal LPN

## 2018-09-19 LAB — COPATH REPORT: NORMAL

## 2018-09-20 PROBLEM — C44.310 BCC (BASAL CELL CARCINOMA), FACE: Status: ACTIVE | Noted: 2018-09-20

## 2018-09-22 ENCOUNTER — TELEPHONE (OUTPATIENT)
Dept: DERMATOLOGY | Facility: CLINIC | Age: 63
End: 2018-09-22

## 2018-09-22 ENCOUNTER — NURSE TRIAGE (OUTPATIENT)
Dept: NURSING | Facility: CLINIC | Age: 63
End: 2018-09-22

## 2018-09-22 NOTE — TELEPHONE ENCOUNTER
Patient wanting lab results from 9-17-18.  Relayed this information to patient:    Notes Recorded by Antoine Naranjo MD on 9/20/2018 at 8:17 PM  As expected in face tow Basal cell carcinomas and both need surgical removal. I have already written the referral to Dermatosurgery and patient should go to them in the next 1-2 weeks for discussion of the procedures.  However, these skin cancers do NOT spread and make metastasis!    Patient states she has received a letter from the surgery center.  She will follow-up with them for the next steps.    Protocol and care advice reviewed  Caller states understanding of the recommended disposition  Advised to call back if further questions or concerns    Additional Information    [1] Follow-up call to recent contact AND [2] information only call, no triage required    Protocols used: INFORMATION ONLY CALL-ADULT-

## 2018-09-22 NOTE — TELEPHONE ENCOUNTER
[facility] Call Center    Phone Message    May a detailed message be left on voicemail: yes    Reason for Call: Other: Please call the Pt with the most recent lab results as soon as possible     Action Taken: Message routed to:  [facility] ([facility]): Transferred to  nurse line

## 2018-09-24 NOTE — TELEPHONE ENCOUNTER
I spoke to Demetra Richardson and gave results. Patient understood and had no further questions or concerns.  Mohs consult scheduled.

## 2018-09-25 ENCOUNTER — OFFICE VISIT (OUTPATIENT)
Dept: DERMATOLOGY | Facility: CLINIC | Age: 63
End: 2018-09-25
Payer: COMMERCIAL

## 2018-09-25 DIAGNOSIS — C44.111 BASAL CELL CARCINOMA OF MEDIAL CANTHUS OF RIGHT EYE: ICD-10-CM

## 2018-09-25 DIAGNOSIS — C44.111 BASAL CELL CARCINOMA OF MEDIAL CANTHUS OF LEFT EYE: Primary | ICD-10-CM

## 2018-09-25 ASSESSMENT — PAIN SCALES - GENERAL: PAINLEVEL: NO PAIN (0)

## 2018-09-25 NOTE — PATIENT INSTRUCTIONS
Mohs Cutaneous Micrographic Surgery Unit  Mo Wagoner DO      Pre-Surgical Instruction Sheet    1. Expect to be here majority of the morning.  The Mohs surgical procedure can be an extensive surgery requiring multiple stages. Each stage may take 1-2 hours.    ? You may eat breakfast  ? You may bring snacks or beverages with you  ? Take all normal medications morning of surgery -- unless otherwise indicated by your physician  ? If you have an artificial heart valve, or joint replacement within two years, we will prescribe an antibiotic. Please take one hour prior to surgery.    2. You will need a  to be with you if your surgical site is close to your eyes. You can get swelling/bruising immediately after surgery and will go home with a big bulky bandage that could obstruct your view of driving safely.    3. Wear comfortable clothing -- preferably a button down shirt or a loose fitted shirt. (to avoid pulling over pressure bandage off when you get home) If your surgery site is on your leg, try wearing loose fitted pants. If your surgery site is on your arm, try wearing a short-sleeve shirt.    4. Bring reading material or other items to help pass time. We do have internet access available if you own a laptop, iPad, etc.)    5. Women - do NOT wear make-up. We will be washing it off anyone needing surgery on the face    6. Do NOT stop blood thinning medication unless instructed to do so by your surgeon. This will include: Warfarin, Coumadin, Jantoven, Aspirin, Plavix and Pradaxa. If you are taking Warfarin or Coumadin, please have your INR blood test results sent to our office no more than 7 days prior to your surgery.     7. Tylenol is a good alternative to take for headaches and pain, and can be taken throughout your surgery and postoperative recovery.    8. If your surgery is on your trunk, arms, hands, legs, feet, fingernail or a toenail, please wash the area with Hibiclens, an over-the-counter antiseptic soap,  the night before and morning of your surgery.     If you have any questions, please feel free to contact us.    Phone numbers:  During business hours (M-F 8:00-4:30 p.m.)  [facility]:  [phone] - select option 1 for appt. desk  [phone] - select option 3 for nurse triage line  [facility]:   [phone] - goes straight to call center   ---------------------------------------------------------  Evenings/Weekends/Holidays  Hospital - [phone]   TTY for hearing mhyxwofu-[phone]  *Ask  to page dermatologist on-call  Emergency Ogvd-[phone]  TTY for hearing impaired- [phone]

## 2018-09-25 NOTE — NURSING NOTE
Chief Complaint   Patient presents with     Consult For     mohs x2: L periorbital medial & R paranasal below eyelid      Jayashree Echols, EMT

## 2018-09-25 NOTE — MR AVS SNAPSHOT
After Visit Summary   9/25/2018    Demetra Richardson    MRN: [id number]           Patient Information     Date Of Birth          1955        Visit Information        Provider Department      9/25/2018 9:45 AM Mo Wagoner MD [facility] Dermatologic Surgery        Care Instructions    Mohs Cutaneous Micrographic Surgery Unit  Mo Wagoner DO      Pre-Surgical Instruction Sheet    1. Expect to be here majority of the morning.  The Mohs surgical procedure can be an extensive surgery requiring multiple stages. Each stage may take 1-2 hours.    ? You may eat breakfast  ? You may bring snacks or beverages with you  ? Take all normal medications morning of surgery -- unless otherwise indicated by your physician  ? If you have an artificial heart valve, or joint replacement within two years, we will prescribe an antibiotic. Please take one hour prior to surgery.    2. You will need a  to be with you if your surgical site is close to your eyes. You can get swelling/bruising immediately after surgery and will go home with a big bulky bandage that could obstruct your view of driving safely.    3. Wear comfortable clothing -- preferably a button down shirt or a loose fitted shirt. (to avoid pulling over pressure bandage off when you get home) If your surgery site is on your leg, try wearing loose fitted pants. If your surgery site is on your arm, try wearing a short-sleeve shirt.    4. Bring reading material or other items to help pass time. We do have internet access available if you own a laptop, iPad, etc.)    5. Women - do NOT wear make-up. We will be washing it off anyone needing surgery on the face    6. Do NOT stop blood thinning medication unless instructed to do so by your surgeon. This will include: Warfarin, Coumadin, Jantoven, Aspirin, Plavix and Pradaxa. If you are taking Warfarin or Coumadin, please have your INR blood test results sent to our office no more than 7 days prior to your  surgery.     7. Tylenol is a good alternative to take for headaches and pain, and can be taken throughout your surgery and postoperative recovery.    8. If your surgery is on your trunk, arms, hands, legs, feet, fingernail or a toenail, please wash the area with Hibiclens, an over-the-counter antiseptic soap, the night before and morning of your surgery.     If you have any questions, please feel free to contact us.    Phone numbers:  During business hours (M-F 8:00-4:30 p.m.)  [facility]:  [phone] - select option 1 for appt. desk  [phone] - select option 3 for nurse triage line  [facility]:   [phone] - goes straight to call center   ---------------------------------------------------------  Evenings/Weekends/Holidays  Hospital - [phone]   TTY for hearing bvexkpft-[phone]  *Ask  to page dermatologist on-call  Emergency Sywh-[phone]  TTY for hearing impaired- [phone]                Follow-ups after your visit        Your next 10 appointments already scheduled     Oct 04, 2018  8:30 AM CDT   (Arrive by 8:15 AM)   New Mohs with Mo Wagoner MD   [facility] Dermatologic [facility] ([facility])    [address] [place] [id number] [phone]              Who to contact     Please call your clinic at [phone] to:    Ask questions about your health    Make or cancel appointments    Discuss your medicines    Learn about your test results    Speak to your doctor            Additional Information About Your Visit        [employer] Information     [employer] gives you secure access to your electronic health record. If you see a primary care provider, you can also send messages to your care team and make appointments. If you have questions, please call your primary care clinic.  If you do not have a primary care provider, please call [phone] and they will assist you.       [employer] is an electronic gateway that provides easy, online access to your medical records. With [employer], you can request a clinic appointment, read your test results, renew a prescription or communicate with your care team.     To access your existing account, please contact your [facility] or call [phone] for assistance.        Care EveryWhere ID     This is your Care EveryWhere ID. This could be used by other organizations to access your [place] medical records  [id number]         Blood Pressure from Last 3 Encounters:   05/01/18 104/71   03/29/17 99/63   03/22/17 (P) 97/62    Weight from Last 3 Encounters:   05/01/18 67.6 kg (149 lb)   02/22/17 61.1 kg (134 lb 12.8 oz)   10/14/16 65.3 kg (144 lb)              Today, you had the following     No orders found for display       Primary Care Provider Office Phone # Fax #    Fredy Merritt [phone]       [address] [address]        Equal Access to Services     [facility]: Hadii aad ku hadasho Soomaali, waaxda luqadaha, qaybta kaalmada adeegyada, glen schulz . So [facility] [phone].    ATENCIÓN: Si habla español, tiene a [place] disposición servicios gratuitos de asistencia lingüística. Llame al [phone].    We comply with applicable federal civil rights laws and [place] laws. We do not discriminate on the basis of race, color, national origin, age, disability, sex, sexual orientation, or gender identity.            Thank you!     Thank you for choosing [facility] DERMAT[facility] SURG[facility]  for your care. Our goal is always to provide you with excellent care. Hearing back from our patients is one way we can continue to improve our services. Please take a few minutes to complete the written survey that you may receive in the mail after your visit with us. Thank you!             Your Updated Medication List - Protect others around you: Learn how to safely use,  store and throw away your medicines at www.disposemymeds.org.          This list is accurate as of 9/25/18 10:01 AM.  Always use your most recent med list.                   Brand Name Dispense Instructions for use Diagnosis    aspirin-acetaminophen-caffeine 250-250-65 MG per tablet    EXCEDRIN MIGRAINE    180 tablet    Take 2 tablets by mouth every 6 hours as needed for headaches    Migraine without aura and without status migrainosus, not intractable       bisacodyl 5 MG EC tablet     60 tablet    Take 2 tablets (10 mg) by mouth At Bedtime    Other constipation       escitalopram 10 MG tablet    LEXAPRO    30 tablet    Take 1 tablet (10 mg) by mouth daily    Bipolar I disorder (H)       hydroxychloroquine 200 MG tablet    PLAQUENIL    60 tablet    Take 1 tablet (200 mg) by mouth 2 times daily    Systemic lupus erythematosus, unspecified SLE type, unspecified organ involvement status (H)       ibuprofen 800 MG tablet    ADVIL/MOTRIN    90 tablet    Take 1 tablet (800 mg) by mouth every 6 hours as needed for moderate pain    Migraine without aura and without status migrainosus, not intractable       Lidocaine HCl 2 % Crea     1 Bottle    Externally apply topically 2 times daily    HSV (herpes simplex virus) infection       methadone 5 MG/5ML solution    DOLPHINE     Take 138 mg by mouth daily        mupirocin 2 % ointment    BACTROBAN    22 g    Apply topically 3 times daily    Open wound       pantoprazole 40 MG EC tablet    PROTONIX    30 tablet    Take 1 tablet (40 mg) by mouth every morning (before breakfast)    Gastroesophageal reflux disease without esophagitis       pregabalin 100 MG capsule    LYRICA    90 capsule    Take 1 capsule (100 mg) by mouth 3 times daily    Fibromyalgia       * risperiDONE 0.5 MG tablet    risperDAL    60 tablet    TAKE 1 TABLET BY MOUTH EVERY MORNING AT 6AM & 1T DAILY AS NEEDED IRRITIBILITY/ANXIETY/RACING THOUGHT    Bipolar I disorder (H)       * risperiDONE 1 MG tablet     risperDAL    30 tablet    TAKE 1 TABLET (1 MG) BY MOUTH AT BEDTIME    Bipolar I disorder (H)       tamsulosin 0.4 MG capsule    FLOMAX    30 capsule    Take 1 capsule (0.4 mg) by mouth daily    Urinary hesitancy       valACYclovir 500 MG tablet    VALTREX    6 tablet    Take 1 tablet (500 mg) by mouth 2 times daily    HSV (herpes simplex virus) infection       * Notice:  This list has 2 medication(s) that are the same as other medications prescribed for you. Read the directions carefully, and ask your doctor or other care provider to review them with you.

## 2018-09-25 NOTE — PROGRESS NOTES
[facility] Dermatology Note    Dermatology Surgery Clinic  [facility]  [address] [place] [id number]    Dermatology Problem List:  1. BCC:  - Periorbital medial left, Mohs 10/04/2018  - Right Paranasal below eyelid, Mohs TBD  2. SLE, on plaquenil     Encounter Date: Sep 25, 2018    CC:  No chief complaint on file.    History of Present Illness:  Ms. Demetra Richardson is a 62 year old female who presents today for a Mohs consultation regarding basal cell carcinoma of the paranasal and periorbital regions. These BCCs were diagnosed during her pervious visit with Dr. Naranjo, on 09/17/2018. Today she states these lesions emerged during summer 2018, and are painful to the touch. She had some general questions in regards to the healing process post procedure. The patient is otherwise feeling well. There are no other skin concerns at this time.    Past Medical History:   Patient Active Problem List   Diagnosis     Postoperative pain     Combinations of opioid type drug with any other drug dependence, continuous     Chronic abdominal pain     Nephrolithiasis     Lupus (systemic lupus erythematosus) (H)     Anxiety     Sclerosing cholangitis     Hepatitis     Microcytic anemia     Migraines     Leukopenia     Hydronephrosis     Acute post-operative pain     Anemia     Seizure (H)     Bipolar 1 disorder, mixed, moderate (H)     Bipolar 1 disorder (H)     Anemia     Iron deficiency anemia     Depression     Overdose     Behavior disturbance     Closed Colles' fracture of left radius with routine healing, subsequent encounter     Left wrist pain     Borderline personality disorder     BCC (basal cell carcinoma), face     Past Medical History:   Diagnosis Date     Anemia     2/2 h/p gastrectomy with inability to absorb oral iron     Benzodiazepine dependence (H)     With h/o withdrawal seizure x 1     Bipolar 1 disorder, mixed, moderate (H)  2013     Chronic pain     Back, legs.     Epidural abscess 2005    x4     Fibromyalgia      Generalised anxiety disorder      GERD (gastroesophageal reflux disease)      Major depressive disorder      Migraine      Nephrolithiasis     S/p left sided lithotripsy     Opiate dependence (H)      Osteoarthritis      Osteoporosis      Primary sclerosing cholangitis      PUD (peptic ulcer disease)      SLE (systemic lupus erythematosus) (H)      Past Surgical History:   Procedure Laterality Date     BACK SURGERY  04    L4-5 epidural abscess     BACK SURGERY  04    L3-4 spinal stenosis     BACK SURGERY  04    Lt psoas abscess      SECTION      x 2     CHOLECYSTECTOMY  -     CHOLECYSTECTOMY       CLOSED REDUCTION, PERCUTANEOUS PINNING FINGER, COMBINED  8/10/2011    Procedure:COMBINED CLOSED REDUCTION, PERCUTANEOUS PINNING FINGER; 5th Proximal Phalanx; Surgeon:RADHA BUITRAGO; [facility]     COLONOSCOPY       GASTRECTOMY  2005    Bilat truncal vagotomy, hemigastrectomy, RnY gastrojejunostomy     GASTRECTOMY      s/p 11 had temporary fdg tube, now removed     GASTROJEJUNOSTOMY  2011    Procedure:GASTROJEJUNOSTOMY; exploratory laparotmy with revision of gastrojejunostomy, Antrectomy, Miles-en-y, Gastrojejunostomy; Surgeon:JULIUS HELM; [facility]     LASER HOLMIUM LITHOTRIPSY URETER(S), INSERT STENT, COMBINED      Procedure: COMBINED CYSTOSCOPY, URETEROSCOPY, LASER HOLMIUM LITHOTRIPSY URETER(S), INSERT STENT;;  Surgeon: Blair Correa MD;  [facility]     LASER HOLMIUM NEPHROLITHOTOMY VIA PERCUTANEOUS NEPHROSTOMY  2012    Procedure: LASER HOLMIUM NEPHROLITHOTOMY VIA PERCUTANEOUS NEPHROSTOMY;  proceedure should read: Left Percutaneous Access, Left Percutaneous ultrasonic Nephrolithotomy, Ureteroscopy Holmium Laser Lithotripsy Stent Placement ;  Surgeon: Blair Correa MD;  [facility]     MAMMOPLASTY AUGMENTATION BILATERAL       OPEN REDUCTION  INTERNAL FIXATION WRIST Left 1/11/2016    Procedure: OPEN REDUCTION INTERNAL FIXATION WRIST;  Surgeon: Darling Ellis MD;  [facility]     RECONSTRUCT BREAST, IMPLANT PROSTHESIS, COMBINED         Social History:  Social History     Social History     Marital status: Single     Spouse name: N/A     Number of children: N/A     Years of education: N/A     Social History Main Topics     Smoking status: Never Smoker     Smokeless tobacco: Never Used     Alcohol use No      Comment: none in 10 years     Drug use: No     Sexual activity: No     Other Topics Concern     Not on file     Social History Narrative    Intake 4/16/15:        H/o divorce but most recently living with JUANIS Hamilton. Alfonso recently passed away.         In past employed as a .         Tobacco use: denies    Alcohol use: denies    Drug use: daily pot use for 30 years. Currently with sobriety.                Family History:  Family History   Problem Relation Age of Onset     Family History Negative Mother      Family History Negative Father         Medications:  Current Outpatient Prescriptions   Medication Sig Dispense Refill     aspirin-acetaminophen-caffeine (EXCEDRIN MIGRAINE) 250-250-65 MG per tablet Take 2 tablets by mouth every 6 hours as needed for headaches 180 tablet 1     bisacodyl (DULCOLAX) 5 MG EC tablet Take 2 tablets (10 mg) by mouth At Bedtime 60 tablet 2     escitalopram (LEXAPRO) 10 MG tablet Take 1 tablet (10 mg) by mouth daily 30 tablet 8     hydroxychloroquine (PLAQUENIL) 200 MG tablet Take 1 tablet (200 mg) by mouth 2 times daily 60 tablet 1     ibuprofen (ADVIL/MOTRIN) 800 MG tablet Take 1 tablet (800 mg) by mouth every 6 hours as needed for moderate pain 90 tablet 1     Lidocaine HCl 2 % CREA Externally apply topically 2 times daily (Patient not taking: Reported on 9/17/2018) 1 Bottle 0     methadone (DOLPHINE) 5 MG/5ML solution Take 138 mg by mouth daily  0     mupirocin (BACTROBAN) 2 % ointment Apply topically 3  times daily (Patient not taking: Reported on 9/17/2018) 22 g 0     pantoprazole (PROTONIX) 40 MG EC tablet Take 1 tablet (40 mg) by mouth every morning (before breakfast) 30 tablet 3     pregabalin (LYRICA) 100 MG capsule Take 1 capsule (100 mg) by mouth 3 times daily 90 capsule 1     risperiDONE (RISPERDAL) 0.5 MG tablet TAKE 1 TABLET BY MOUTH EVERY MORNING AT 6AM & 1T DAILY AS NEEDED IRRITIBILITY/ANXIETY/RACING THOUGHT 60 tablet 1     risperiDONE (RISPERDAL) 1 MG tablet TAKE 1 TABLET (1 MG) BY MOUTH AT BEDTIME (Patient not taking: Reported on 9/17/2018) 30 tablet 1     tamsulosin (FLOMAX) 0.4 MG 24 hr capsule Take 1 capsule (0.4 mg) by mouth daily (Patient not taking: Reported on 5/1/2018) 30 capsule 2     valACYclovir (VALTREX) 500 MG tablet Take 1 tablet (500 mg) by mouth 2 times daily 6 tablet 11       Allergies   Allergen Reactions     Penicillins Hives     Hives in childhood.       Review of Systems:  -Skin Establ Pt: The patient denies any new rash, pruritus, or lesions that are symptomatic, changing or bleeding, except as per HPI.  -Constitutional: The patient is feeling generally well.    Physical exam:  Vitals: There were no vitals taken for this visit.  GEN: This is a well developed, well-nourished female in no acute distress, in a pleasant mood.    SKIN: Focused examination of the face was performed.  - 1.3 cm ulcerated papule of right paranasal region  - 2.5 cm pearly  plaque of left periorbital region.  Mobile on underlying tissue.  NO epiphora.....  - No other lesions of concern on areas examined.     Impression/Plan:  1. Basal cell carcinoma of the paranasal right below eyelid and periorbital medial left. Mohs surgery recommended:    Discussed nature of BCC with patient.     Both eyes will be performed during two separate procedures, to allow proper vision between surgeries. Left BCC first, and Right BCC second.    Risks, benefits, and process of Mohs micrographic surgery were discussed including  possibility of damage to surrounding anatomical structures and infection. Patient is comfortable proceeding with the surgery; consent form was obtained. She will be scheduled at her convenience.      Follow-up as scheduled for MMS.       Staff Involved:    Scribe Disclosure  I, Atif Laura, am serving as a scribe to document services personally performed by Dr. Mo Wagoner, based on data collection and the provider's statements to me.     Attending Attestation  I attest that the Scribe recorded the interview and exam that I personally performed.  I have reviewed the note and edited it as necessary.    Mo Wagoner M.D.    Director of Dermatologic Surgery  Department of Dermatology  [facility]

## 2018-09-25 NOTE — LETTER
9/25/2018       RE: Demetra Richardson  [address]  [address]  [place] [address]     Dear Colleague,    Thank you for referring your patient, Demetra Richardson, to the [facility] DERMATOLOGIC SURGERY at [facility]. Please see a copy of my visit note below.    [facility] Dermatology Note    Dermatology Surgery Clinic  [facility]  [address] [place] [id number]    Dermatology Problem List:  1. BCC:  - Periorbital medial left, Mohs 10/04/2018  - Right Paranasal below eyelid, Mohs TBD  2. SLE, on plaquenil     Encounter Date: Sep 25, 2018    CC:  No chief complaint on file.    History of Present Illness:  Ms. Demetra Richardson is a 62 year old female who presents today for a Mohs consultation regarding basal cell carcinoma of the paranasal and periorbital regions. These BCCs were diagnosed during her pervious visit with Dr. Naranjo, on 09/17/2018. Today she states these lesions emerged during summer 2018, and are painful to the touch. She had some general questions in regards to the healing process post procedure. The patient is otherwise feeling well. There are no other skin concerns at this time.    Past Medical History:   Patient Active Problem List   Diagnosis     Postoperative pain     Combinations of opioid type drug with any other drug dependence, continuous     Chronic abdominal pain     Nephrolithiasis     Lupus (systemic lupus erythematosus) (H)     Anxiety     Sclerosing cholangitis     Hepatitis     Microcytic anemia     Migraines     Leukopenia     Hydronephrosis     Acute post-operative pain     Anemia     Seizure (H)     Bipolar 1 disorder, mixed, moderate (H)     Bipolar 1 disorder (H)     Anemia     Iron deficiency anemia     Depression     Overdose     Behavior disturbance     Closed Colles' fracture of left radius with routine healing, subsequent encounter     Left  wrist pain     Borderline personality disorder     BCC (basal cell carcinoma), face     Past Medical History:   Diagnosis Date     Anemia      h/p gastrectomy with inability to absorb oral iron     Benzodiazepine dependence (H)     With h/o withdrawal seizure x 1     Bipolar 1 disorder, mixed, moderate (H) 2013     Chronic pain     Back, legs.     Epidural abscess 2005    x4     Fibromyalgia      Generalised anxiety disorder      GERD (gastroesophageal reflux disease)      Major depressive disorder      Migraine      Nephrolithiasis     S/p left sided lithotripsy     Opiate dependence (H)      Osteoarthritis      Osteoporosis      Primary sclerosing cholangitis      PUD (peptic ulcer disease)      SLE (systemic lupus erythematosus) (H)      Past Surgical History:   Procedure Laterality Date     BACK SURGERY  04    L4-5 epidural abscess     BACK SURGERY  04    L3-4 spinal stenosis     BACK SURGERY  04    Lt psoas abscess      SECTION      x 2     CHOLECYSTECTOMY  -     CHOLECYSTECTOMY       CLOSED REDUCTION, PERCUTANEOUS PINNING FINGER, COMBINED  8/10/2011    Procedure:COMBINED CLOSED REDUCTION, PERCUTANEOUS PINNING FINGER; 5th Proximal Phalanx; Surgeon:RADHA BUITRAGO; [facility]     COLONOSCOPY       GASTRECTOMY  2005    Bilat truncal vagotomy, hemigastrectomy, RnY gastrojejunostomy     GASTRECTOMY      s/p 11 had temporary fdg tube, now removed     GASTROJEJUNOSTOMY  2011    Procedure:GASTROJEJUNOSTOMY; exploratory laparotmy with revision of gastrojejunostomy, Antrectomy, Miles-en-y, Gastrojejunostomy; Surgeon:JULIUS HELM; [facility]     LASER HOLMIUM LITHOTRIPSY URETER(S), INSERT STENT, COMBINED      Procedure: COMBINED CYSTOSCOPY, URETEROSCOPY, LASER HOLMIUM LITHOTRIPSY URETER(S), INSERT STENT;;  Surgeon: Blair Correa MD;  [facility]     LASER HOLMIUM NEPHROLITHOTOMY VIA PERCUTANEOUS NEPHROSTOMY  2012    Procedure: LASER HOLMIUM  NEPHROLITHOTOMY VIA PERCUTANEOUS NEPHROSTOMY;  proceedure should read: Left Percutaneous Access, Left Percutaneous ultrasonic Nephrolithotomy, Ureteroscopy Holmium Laser Lithotripsy Stent Placement ;  Surgeon: Blair Correa MD;  [facility]     MAMMOPLASTY AUGMENTATION BILATERAL       OPEN REDUCTION INTERNAL FIXATION WRIST Left 1/11/2016    Procedure: OPEN REDUCTION INTERNAL FIXATION WRIST;  Surgeon: Darling Ellis MD;  [facility]     RECONSTRUCT BREAST, IMPLANT PROSTHESIS, COMBINED         Social History:  Social History     Social History     Marital status: Single     Spouse name: N/A     Number of children: N/A     Years of education: N/A     Social History Main Topics     Smoking status: Never Smoker     Smokeless tobacco: Never Used     Alcohol use No      Comment: none in 10 years     Drug use: No     Sexual activity: No     Other Topics Concern     Not on file     Social History Narrative    Intake 4/16/15:        H/o divorce but most recently living with JUANIS Hamilton. Alfonso recently passed away.         In past employed as a .         Tobacco use: denies    Alcohol use: denies    Drug use: daily pot use for 30 years. Currently with sobriety.                Family History:  Family History   Problem Relation Age of Onset     Family History Negative Mother      Family History Negative Father         Medications:  Current Outpatient Prescriptions   Medication Sig Dispense Refill     aspirin-acetaminophen-caffeine (EXCEDRIN MIGRAINE) 250-250-65 MG per tablet Take 2 tablets by mouth every 6 hours as needed for headaches 180 tablet 1     bisacodyl (DULCOLAX) 5 MG EC tablet Take 2 tablets (10 mg) by mouth At Bedtime 60 tablet 2     escitalopram (LEXAPRO) 10 MG tablet Take 1 tablet (10 mg) by mouth daily 30 tablet 8     hydroxychloroquine (PLAQUENIL) 200 MG tablet Take 1 tablet (200 mg) by mouth 2 times daily 60 tablet 1     ibuprofen (ADVIL/MOTRIN) 800 MG tablet Take 1 tablet (800 mg) by  mouth every 6 hours as needed for moderate pain 90 tablet 1     Lidocaine HCl 2 % CREA Externally apply topically 2 times daily (Patient not taking: Reported on 9/17/2018) 1 Bottle 0     methadone (DOLPHINE) 5 MG/5ML solution Take 138 mg by mouth daily  0     mupirocin (BACTROBAN) 2 % ointment Apply topically 3 times daily (Patient not taking: Reported on 9/17/2018) 22 g 0     pantoprazole (PROTONIX) 40 MG EC tablet Take 1 tablet (40 mg) by mouth every morning (before breakfast) 30 tablet 3     pregabalin (LYRICA) 100 MG capsule Take 1 capsule (100 mg) by mouth 3 times daily 90 capsule 1     risperiDONE (RISPERDAL) 0.5 MG tablet TAKE 1 TABLET BY MOUTH EVERY MORNING AT 6AM & 1T DAILY AS NEEDED IRRITIBILITY/ANXIETY/RACING THOUGHT 60 tablet 1     risperiDONE (RISPERDAL) 1 MG tablet TAKE 1 TABLET (1 MG) BY MOUTH AT BEDTIME (Patient not taking: Reported on 9/17/2018) 30 tablet 1     tamsulosin (FLOMAX) 0.4 MG 24 hr capsule Take 1 capsule (0.4 mg) by mouth daily (Patient not taking: Reported on 5/1/2018) 30 capsule 2     valACYclovir (VALTREX) 500 MG tablet Take 1 tablet (500 mg) by mouth 2 times daily 6 tablet 11       Allergies   Allergen Reactions     Penicillins Hives     Hives in childhood.       Review of Systems:  -Skin Establ Pt: The patient denies any new rash, pruritus, or lesions that are symptomatic, changing or bleeding, except as per HPI.  -Constitutional: The patient is feeling generally well.    Physical exam:  Vitals: There were no vitals taken for this visit.  GEN: This is a well developed, well-nourished female in no acute distress, in a pleasant mood.    SKIN: Focused examination of the face was performed.  - 1.3 cm ulcerated papule of right paranasal region  - 2.5 cm pearly  plaque of left periorbital region.  Mobile on underlying tissue.  NO epiphora.....  - No other lesions of concern on areas examined.     Impression/Plan:  1. Basal cell carcinoma of the paranasal right below eyelid and periorbital  medial left. Mohs surgery recommended:    Discussed nature of BCC with patient.     Both eyes will be performed during two separate procedures, to allow proper vision between surgeries. Left BCC first, and Right BCC second.    Risks, benefits, and process of Mohs micrographic surgery were discussed including possibility of damage to surrounding anatomical structures and infection. Patient is comfortable proceeding with the surgery; consent form was obtained. She will be scheduled at her convenience.      Follow-up as scheduled for MMS.       Staff Involved:    Scribe Disclosure  I, Atif Laura, am serving as a scribe to document services personally performed by Dr. Mo Wagoner, based on data collection and the provider's statements to me.     Attending Attestation  I attest that the Scribe recorded the interview and exam that I personally performed.  I have reviewed the note and edited it as necessary.    Mo Wagoner M.D.    Director of Dermatologic Surgery  Department of Dermatology  [facility]        History of Skin cancer : no    History of Mohs Surgery: no    History of a solid organ transplant: no  Bone Marrow Transplant : no     Immunosuppressive Medications: yes     HIV or Hepatitis B or C : no    Chronic lymphocytic leukemia: no    Diabetes: no    Any Bleeding disorders: no    Do you have a Pacemaker or Defibrillator: no     Artificial heart valve: no     Joint Replacement in the last 2 years: no    Do you typically take Prophylactic Antibiotics before seeing a dentist or having a procedure?: no    If yes, verify that patient has the antibiotic on hand and instruct to take one hour before their surgery appointment.    If they do not have the antibiotic, verify the patients pharmacy and notify surgeon by printing the pre-mohs call sheet.    Do you wear a C Pap Mask: no    If yes, and procedure is on the face, ask patient to bring in the mask with them (Mask  only)    Do you have any mobility issues: yes    If yes, is a van service is required to transport you to/from your appointment? Yes     Smoking History (tobacco of any sort): former smoker (Cannibis)     Do you have any other health issues that we should know about? no    Do you take any of the following Blood Thinners:    Aspirin: no    Plavix/Aggrastat/Brilinta: no    Warfarin: no     Pradaxa/Eliquis/Xarelto: no    Ibuprofen (Advil/Motrin): no    Naproxen (Aleve): no    Vitamin E: no    Fish Oil: no    Ginkgo biloba: no    Other: Excendrine    noPaient was instructed of the following: Ibuprofen, naproxen, Vitamin E, Fish Oil, and Ginkgo biloba should be stopped 1 week prior to and 1 week after the procedure.    Medication Allergies: Penicillins    Are medications in Epic: yes    Yes Patient was reminded to take all medications as usual, other than those listed above, on the day of surgery    Yes Patient was instructed to bring all medications to clinic on day of surgery    Yes Patient will bring a     (A  is helpful for the following reasons: some patients need medications to relax, but once given, patients should not drive; sometimes bandages obstruct your vision making it difficult to see and unsafe to drive; the day can get long so it s nice to have a filemon; sometimes the procedure wears people out/makes them quite tired)    Yes Photograph of the surgical site(s) is/are available    Yes Patient was reminded that this can be an all-day procedure and that no other appointments should be scheduled on the day of Mohs  Mo Wagoner MD

## 2018-09-25 NOTE — PROGRESS NOTES
History of Skin cancer : no    History of Mohs Surgery: no    History of a solid organ transplant: no  Bone Marrow Transplant : no     Immunosuppressive Medications: yes     HIV or Hepatitis B or C : no    Chronic lymphocytic leukemia: no    Diabetes: no    Any Bleeding disorders: no    Do you have a Pacemaker or Defibrillator: no     Artificial heart valve: no     Joint Replacement in the last 2 years: no    Do you typically take Prophylactic Antibiotics before seeing a dentist or having a procedure?: no    If yes, verify that patient has the antibiotic on hand and instruct to take one hour before their surgery appointment.    If they do not have the antibiotic, verify the patients pharmacy and notify surgeon by printing the pre-mohs call sheet.    Do you wear a C Pap Mask: no    If yes, and procedure is on the face, ask patient to bring in the mask with them (Mask only)    Do you have any mobility issues: yes    If yes, is a van service is required to transport you to/from your appointment? Yes     Smoking History (tobacco of any sort): former smoker (Cannibis)     Do you have any other health issues that we should know about? no    Do you take any of the following Blood Thinners:    Aspirin: no    Plavix/Aggrastat/Brilinta: no    Warfarin: no     Pradaxa/Eliquis/Xarelto: no    Ibuprofen (Advil/Motrin): no    Naproxen (Aleve): no    Vitamin E: no    Fish Oil: no    Ginkgo biloba: no    Other: Excendrine    noPaient was instructed of the following: Ibuprofen, naproxen, Vitamin E, Fish Oil, and Ginkgo biloba should be stopped 1 week prior to and 1 week after the procedure.    Medication Allergies: Penicillins    Are medications in Epic: yes    Yes Patient was reminded to take all medications as usual, other than those listed above, on the day of surgery    Yes Patient was instructed to bring all medications to clinic on day of surgery    Yes Patient will bring a     (A  is helpful for the following  reasons: some patients need medications to relax, but once given, patients should not drive; sometimes bandages obstruct your vision making it difficult to see and unsafe to drive; the day can get long so it s nice to have a filemon; sometimes the procedure wears people out/makes them quite tired)    Yes Photograph of the surgical site(s) is/are available    Yes Patient was reminded that this can be an all-day procedure and that no other appointments should be scheduled on the day of Mohs

## 2018-09-28 ENCOUNTER — TELEPHONE (OUTPATIENT)
Dept: DERMATOLOGY | Facility: CLINIC | Age: 63
End: 2018-09-28

## 2018-09-28 NOTE — TELEPHONE ENCOUNTER
[facility] Center    Phone Message    May a detailed message be left on voicemail: yes    Reason for Call: Other: PT states she is returning a call from the clinic but there was no message or name left.  She is wondering if it's regarding her upcoming surgery.  Please follow up with the PT.     Action Taken: Message routed to:  [facility] ([facility]): Derm Surg.

## 2018-10-01 ENCOUNTER — TELEPHONE (OUTPATIENT)
Dept: DERMATOLOGY | Facility: CLINIC | Age: 63
End: 2018-10-01

## 2018-10-01 NOTE — TELEPHONE ENCOUNTER
[facility] Call Center    Phone Message    May a detailed message be left on voicemail: yes    Reason for Call: pt is scheduled for MOHS on Thursday.  Her lesion is lan-orbital -she is very anxious and would like to talk to someone about sedation.     Action Taken: Message routed to:  [facility] ([facility] derm/surg clinic

## 2018-10-01 NOTE — TELEPHONE ENCOUNTER
I called the patient and I let her know that we do not put anyone to sleep for our portion of the surgery, I let her know that the risks are too high doing that. All of the patients questions were answered.   Katy Valdivia CMA

## 2018-10-04 ENCOUNTER — OFFICE VISIT (OUTPATIENT)
Dept: DERMATOLOGY | Facility: CLINIC | Age: 63
End: 2018-10-04
Payer: COMMERCIAL

## 2018-10-04 VITALS — HEART RATE: 86 BPM | DIASTOLIC BLOOD PRESSURE: 98 MMHG | SYSTOLIC BLOOD PRESSURE: 151 MMHG

## 2018-10-04 DIAGNOSIS — C44.111 BASAL CELL CARCINOMA (BCC) OF MEDIAL CANTHUS OF LEFT EYE: Primary | ICD-10-CM

## 2018-10-04 RX ORDER — DIAZEPAM 5 MG
5 TABLET ORAL ONCE
Qty: 1 TABLET | Refills: 0 | Status: SHIPPED | OUTPATIENT
Start: 2018-10-04 | End: 2018-10-04

## 2018-10-04 RX ORDER — ACETAMINOPHEN 500 MG
1000 TABLET ORAL ONCE
Qty: 1 TABLET | Refills: 0 | Status: SHIPPED | OUTPATIENT
Start: 2018-10-04 | End: 2018-10-04

## 2018-10-04 ASSESSMENT — PAIN SCALES - GENERAL
PAINLEVEL: NO PAIN (0)
PAINLEVEL: MILD PAIN (2)

## 2018-10-04 NOTE — LETTER
10/4/2018       RE: Demetra Richardson  [address]  [address]  [place] [address]     Dear Colleague,    Thank you for referring your patient, Demetra Richardson, to the [facility] DERMATOLOGIC SURGERY at [facility]. Please see a copy of my visit note below.    MOHS MICROGRAPHIC SURGERY REPORT   October 4, 2018    Dermatology Surgery Clinic  [facility]  [address] [place] [address]    Surgeon:  Mo Wagoner MD  Scrub:  Lara Goldman RN    INDICATION:    Preoperative Diagnosis: primary basal cell carcinoma, nodular type, extending to the lateral and deep margins  Location: L periorbital medial   Postoperative Diagnosis: same  Preoperative Lesion size: 1.8 x 1.2 cm    After appropriate discussion and informed consent for Mohs surgery and possible repair of the Mohs surgery defect, the patient underwent Mohs surgery as follows:    STAGE I:  The patient was placed on the operating room table.  The area was cleansed with chlorhexidine and infiltrated with 1% Lidocaine and epinephrine. Tumor was debulked. Using a #15-blade, complete excision was made around the tumor in 1 section.  Hemostasis was obtained by electrodesiccation.  A dressing was placed.  Tissue was divided into 1 tissue block that was subsequently mapped, color coded and processed in the Mohs Laboratory.  Microscopic tumor (BCC) was  found in the tissue block.    STAGE II: The patient returned to the operating room.  By reference to the Mohs map, the area of positivity was delineated, infiltrated with the anesthetic mixture described above and excised in 1 section. Tissue was divided into 1 tissue block that were again mapped, color coded and processed in the Mohs Laboratory.  Hemostasis was obtained in the usual manner and a dressing placed.  Microscopic tumor was not found in the tissue block.    With the lesion clear of micrographic tumor,  surgery was considered complete.  The defect extended to the fascia and measured 2.8 x 2.6 cm.    RECONSTRUCTIVE DERMATOLOGIC SURGERY REPORT  We discussed the options for wound management in full with the patient including risks/benefits/possible outcomes.      Full Thickness Skin Graft  Final defect measured 2.8 x 2.6 cm. Because of the size and location of the defect a Burow's graft was planned.  After Betasept prep and local anesthesia, the patient was draped in a sterile fashion.  After templating the defect to be repaired, the template was transferred to the donor site on the posterior auricular neck.  A elliptical incision was made in the relaxed skin tension lines posterior auricular neck to the defect and a Burow s graft was harvested.  The donor site (left post auricular region) was then widely undermined, and, after hemostasis, closed by advancement in a layered fashion using 4-0 & 5-0 Monocryl sutures. The graft skin, measuring 4.2 cm2, was then defatted and trimmed to fit the defect.  It was sutured into place using 4-0 & 5-0 Monocryl and a bolster dressing was applied.  Post-operative size was 2.6 x 2.4 cm.  Estimated blood loss, minimal; complications, none; bandaging and wound care, routine.   Patient was given written and verbal wound care procedures prior to discharge. Total anesthesia 14.0 ml. Patient was discharged in good condition and will return for assessment of surgical repair in 2 weeks.     Staff Involved:    Scribe Disclosure  I, Atif Laura, am serving as a scribe to document services personally performed by Dr. Mo Wagoner, based on data collection and the provider's statements to me.     Attending attestation:  I personally performed the entire procedure.  I have reviewed the note and edited it as necessary, and agree with its contents.  Pt will need Mohs on R nasofacial sulcus site.    Dermatology Surgery Clinic  [facility]  [address]  [place] [address]    Again, thank you for allowing me to participate in the care of your patient.      Sincerely,    Mo Wagoner MD

## 2018-10-04 NOTE — NURSING NOTE
Wound pressure dressing applied:   left periorbital rim: Vaseline gauze, telfa, gauze and tape  left side of neck (donor site): Vaseline, telfa, dental rolls, gauze and tape    Patient denies pain and sent home with supplies: Vaseline, Vaseline gauze, telfa, gauze and tape  All questions answered.    Defect photo:  DH  Closure photo: TRINH Villegas LPN

## 2018-10-04 NOTE — NURSING NOTE
Closure performed on the left periorbital. Injected 2.0ml of Xylocaine  (Lidocaine 1% and Epinephrine  (1:100,000))    Donor site:  Skin graft site on the left side of neck. Injected 12.0ml of Xylocaine  (Lidocaine 1% and Epinephrine  (1:100,000))    Present for procedure:  Ga Villegas LPN  Doctor Mo Wagoner

## 2018-10-04 NOTE — NURSING NOTE
2nd layer performed on the L periorbital medial. Injected 4.0 ml of Xylocaine  (Lidocaine 1% and Epinephrine  (1:100,000)) BH      Present for procedure:  Dr. Ciaran Villegas, TONG Echols, EMT

## 2018-10-04 NOTE — PROGRESS NOTES
MOHS MICROGRAPHIC SURGERY REPORT   October 4, 2018    Dermatology Surgery Clinic  [facility]  [address] [place] [address]    Surgeon:  Mo Wagoner MD  Scrub:  Lara Goldman RN    INDICATION:    Preoperative Diagnosis: primary basal cell carcinoma, nodular type, extending to the lateral and deep margins  Location: L periorbital medial   Postoperative Diagnosis: same  Preoperative Lesion size: 1.8 x 1.2 cm    After appropriate discussion and informed consent for Mohs surgery and possible repair of the Mohs surgery defect, the patient underwent Mohs surgery as follows:    STAGE I:  The patient was placed on the operating room table.  The area was cleansed with chlorhexidine and infiltrated with 1% Lidocaine and epinephrine. Tumor was debulked. Using a #15-blade, complete excision was made around the tumor in 1 section.  Hemostasis was obtained by electrodesiccation.  A dressing was placed.  Tissue was divided into 1 tissue block that was subsequently mapped, color coded and processed in the Mohs Laboratory.  Microscopic tumor (BCC) was  found in the tissue block.    STAGE II: The patient returned to the operating room.  By reference to the Mohs map, the area of positivity was delineated, infiltrated with the anesthetic mixture described above and excised in 1 section. Tissue was divided into 1 tissue block that were again mapped, color coded and processed in the Mohs Laboratory.  Hemostasis was obtained in the usual manner and a dressing placed.  Microscopic tumor was not found in the tissue block.    With the lesion clear of micrographic tumor, surgery was considered complete.  The defect extended to the fascia and measured 2.8 x 2.6 cm.    RECONSTRUCTIVE DERMATOLOGIC SURGERY REPORT  We discussed the options for wound management in full with the patient including risks/benefits/possible outcomes.      Full Thickness Skin Graft  Final defect measured 2.8 x 2.6  cm. Because of the size and location of the defect a Burow's graft was planned.  After Betasept prep and local anesthesia, the patient was draped in a sterile fashion.  After templating the defect to be repaired, the template was transferred to the donor site on the posterior auricular neck.  A elliptical incision was made in the relaxed skin tension lines posterior auricular neck to the defect and a Burow s graft was harvested.  The donor site (left post auricular region) was then widely undermined, and, after hemostasis, closed by advancement in a layered fashion using 4-0 & 5-0 Monocryl sutures. The graft skin, measuring 4.2 cm2, was then defatted and trimmed to fit the defect.  It was sutured into place using 4-0 & 5-0 Monocryl and a bolster dressing was applied.  Post-operative size was 2.6 x 2.4 cm.  Estimated blood loss, minimal; complications, none; bandaging and wound care, routine.   Patient was given written and verbal wound care procedures prior to discharge. Total anesthesia 14.0 ml. Patient was discharged in good condition and will return for assessment of surgical repair in 2 weeks.     Staff Involved:    Scribe Disclosure  I, Atif Laura, am serving as a scribe to document services personally performed by Dr. Mo Wagoner, based on data collection and the provider's statements to me.     Attending attestation:  I personally performed the entire procedure.  I have reviewed the note and edited it as necessary, and agree with its contents.  Pt will need Mohs on R nasofacial sulcus site.  Mo Wagoner M.D.    Director of Dermatologic Surgery  Department of Dermatology  [facility]    Dermatology Surgery Clinic  [facility] [address] [address]

## 2018-10-04 NOTE — NURSING NOTE
Chief Complaint   Patient presents with     Derm Problem     mohs x1 periorbital medial     Jayashree Echols, EMT

## 2018-10-04 NOTE — MR AVS SNAPSHOT
After Visit Summary   10/4/2018    Demetra Richardson    MRN: [id number]           Patient Information     Date Of Birth          1955        Visit Information        Provider Department      10/4/2018 8:30 AM Mo Wagoner MD M [facility] Dermatologic Surgery        Today's Diagnoses     Basal cell carcinoma (BCC) of medial canthus of left eye    -  1      Care Instructions    Excision Wound Care Instructions  I will experience scar, altered skin color, bleeding, swelling, pain, crusting and redness. I may experience altered sensation. Risks are excessive bleeding, infection, muscle weakness, thick (hypertrophic or keloidal) scar, and recurrence,. A second procedure may be recommended to obtain the best cosmetic or functional result.  Possible complications of any surgical procedure are bleeding, infection, scarring, alteration in skin color and sensation, muscle weakness in the area, wound dehiscence or seperation, or recurrence of the lesion or disease. On occasion, after healing, a secondary procedure or revision may be recommended in order to obtain the best cosmetic or functional result.   After your surgery, a pressure bandage will be placed over the area that has sutures. This will help prevent bleeding.   For the First 48 hours After Surgery:  1. Leave the pressure bandage on and keep it dry. If it should come loose, you may retape it, but do not take it off.  2. Relax and take it easy. Do not do any vigorous exercise, heavy lifting, or bending forward. This could cause the wound to bleed.  3. Post-operative pain is usually mild. You may take plain or extra strength Tylenol every 4 hours as needed (do not take more than 4,000mg in one day). Do not take any medicine that contains aspirin, ibuprofen or motrin unless you have been recommended these by a doctor.  Avoid alcohol and vitamin E as these may increase your tendency to bleed.  4. You may put an ice pack around the bandaged area for 20  minutes every 2-3 hours. This may help reduce swelling, bruising, and pain. Make sure the ice pack is waterproof so that the pressure bandage does not get wet.   5. You may see a small amount of drainage or blood on your pressure bandage. This is normal. However, if drainage or bleeding continues or saturates the bandage, you will need to apply firm pressure over the bandage with a washcloth for 15 minutes. If bleeding continues after applying pressure for 15 minutes then go to the nearest emergency room.  48 Hours After Surgery  Wound care for 2 weeks  Carefully remove the bandage and start daily wound care and dressing changes. You may also now shower and get the wound wet. Wash wound with a mild soap and water.  Use caution when washing the wound. Be gentle and do not let the forceful shower stream hit the wound directly.  PAT dry.  Daily Wound Care:  1. Wash wound with a mild soap and water.  Use caution when washing the wound, be gentle and do not let the forceful shower stream hit the wound directly.  2. PAT DRY.  3. Apply Vaseline (from a new container or tube) over the suture line with a Q-tip. It is very important to keep the wound continuously moist, as wounds heal best in a moist environment.  4.  Keep the site covered until sutures are removed, you can cover it with a Telfa (non-stick) dressing and tape or a band-aid.    5. If you are unable to keep wound covered, you must apply Vaseline every 2 - 3 hours (while awake) to ensure it is being kept moist for optimal healing. A dressing overnight is recommended to keep the area moist.   Call Us If:  1. You have pain that is not controlled with Tylenol.  2. You have signs or symptoms of an infection, such as: fever over 100 degrees F, redness, warmth, or foul-smelling or yellow/creamy drainage from the wound.  Who should I call with questions?    [facility]: [phone]     [facility]:  [phone]    For urgent needs outside of business hours call the [facility] at [phone] and ask for the dermatology resident on call          Follow-ups after your visit        Your next 10 appointments already scheduled     Oct 18, 2018  1:30 PM CDT   Nurse Visit with  Derm Surg Nurse   [facility] Dermatologic Surgery ([facility])    [address]  3rd Floor  [facility] [address] [phone]              Who to contact     Please call your clinic at [phone] to:    Ask questions about your health    Make or cancel appointments    Discuss your medicines    Learn about your test results    Speak to your doctor            Additional Information About Your Visit        [employer] Information     [email or web address] gives you secure access to your electronic health record. If you see a primary care provider, you can also send messages to your care team and make appointments. If you have questions, please call your primary care clinic.  If you do not have a primary care provider, please call [phone] and they will assist you.      [email or web address] is an electronic gateway that provides easy, online access to your medical records. With [email or web address], you can request a clinic appointment, read your test results, renew a prescription or communicate with your care team.     To access your existing account, please contact your [facility] or call [phone] for assistance.        Care EveryWhere ID     This is your Care EveryWhere ID. This could be used by other organizations to access your [facility] medical records  [id number]        Your Vitals Were     Pulse                   86            Blood Pressure from Last 3 Encounters:   10/04/18 (!) 151/98   05/01/18 104/71   03/29/17 99/63    Weight from Last 3 Encounters:   05/01/18 67.6 kg (149 lb)   02/22/17 61.1 kg (134 lb 12.8 oz)   10/14/16 65.3 kg (144 lb)              Today, you had the following     No  orders found for display         Today's Medication Changes          These changes are accurate as of 10/4/18 12:56 PM.  If you have any questions, ask your nurse or doctor.               Start taking these medicines.        Dose/Directions    acetaminophen 500 MG tablet   Commonly known as:  TYLENOL   Used for:  Basal cell carcinoma (BCC) of medial canthus of left eye   Started by:  Mo Wagoner MD        Dose:  1000 mg   Take 2 tablets (1,000 mg) by mouth once for 1 dose   Quantity:  1 tablet   Refills:  0       * diazepam 5 MG tablet   Commonly known as:  VALIUM   Used for:  Basal cell carcinoma (BCC) of medial canthus of left eye   Started by:  Mo Wagoner MD        Dose:  5 mg   Take 1 tablet (5 mg) by mouth once for 1 dose   Quantity:  1 tablet   Refills:  0       * diazepam 5 MG tablet   Commonly known as:  VALIUM   Used for:  Basal cell carcinoma (BCC) of medial canthus of left eye   Started by:  Mo Wagoner MD        Dose:  5 mg   Take 1 tablet (5 mg) by mouth once for 1 dose   Quantity:  1 tablet   Refills:  0       * Notice:  This list has 2 medication(s) that are the same as other medications prescribed for you. Read the directions carefully, and ask your doctor or other care provider to review them with you.         Where to get your medicines      These medications were sent to [place] [id number] [address] [place] [address] [address] [address]    Hours:  TRANSPLANT PHONE NUMBER [phone] Phone:  [phone]     acetaminophen 500 MG tablet         Some of these will need a paper prescription and others can be bought over the counter.  Ask your nurse if you have questions.     Bring a paper prescription for each of these medications     diazepam 5 MG tablet    diazepam 5 MG tablet                Primary Care Provider Office Phone # Fax #    Fredy Merritt [phone]       [address] [place] [address]  [phone]        Equal Access to Services     [facility]: Hadii cinthia wen elio Brooks, warohitda luqadaha, qaybta kaomairajessica rojas, glen pavondylanwes manzanares. So [facility] [phone].    ATENCIÓN: Si habla español, tiene a [place] disposición servicios gratuitos de asistencia lingüística. Ellieame al [phone].    We comply with applicable federal civil rights laws and [place] laws. We do not discriminate on the basis of race, color, national origin, age, disability, sex, sexual orientation, or gender identity.            Thank you!     Thank you for choosing [facility] DERMATOLOGIC SURGERY  for your care. Our goal is always to provide you with excellent care. Hearing back from our patients is one way we can continue to improve our services. Please take a few minutes to complete the written survey that you may receive in the mail after your visit with us. Thank you!             Your Updated Medication List - Protect others around you: Learn how to safely use, store and throw away your medicines at www.disposemymeds.org.          This list is accurate as of 10/4/18 12:56 PM.  Always use your most recent med list.                   Brand Name Dispense Instructions for use Diagnosis    acetaminophen 500 MG tablet    TYLENOL    1 tablet    Take 2 tablets (1,000 mg) by mouth once for 1 dose    Basal cell carcinoma (BCC) of medial canthus of left eye       aspirin-acetaminophen-caffeine 250-250-65 MG per tablet    EXCEDRIN MIGRAINE    180 tablet    Take 2 tablets by mouth every 6 hours as needed for headaches    Migraine without aura and without status migrainosus, not intractable       bisacodyl 5 MG EC tablet     60 tablet    Take 2 tablets (10 mg) by mouth At Bedtime    Other constipation       * diazepam 5 MG tablet    VALIUM    1 tablet    Take 1 tablet (5 mg) by mouth once for 1 dose    Basal cell carcinoma (BCC) of medial canthus of left eye       * diazepam 5 MG tablet    VALIUM    1 tablet    Take 1  tablet (5 mg) by mouth once for 1 dose    Basal cell carcinoma (BCC) of medial canthus of left eye       escitalopram 10 MG tablet    LEXAPRO    30 tablet    Take 1 tablet (10 mg) by mouth daily    Bipolar I disorder (H)       hydroxychloroquine 200 MG tablet    PLAQUENIL    60 tablet    Take 1 tablet (200 mg) by mouth 2 times daily    Systemic lupus erythematosus, unspecified SLE type, unspecified organ involvement status (H)       ibuprofen 800 MG tablet    ADVIL/MOTRIN    90 tablet    Take 1 tablet (800 mg) by mouth every 6 hours as needed for moderate pain    Migraine without aura and without status migrainosus, not intractable       Lidocaine HCl 2 % Crea     1 Bottle    Externally apply topically 2 times daily    HSV (herpes simplex virus) infection       methadone 5 MG/5ML solution    DOLPHINE     Take 138 mg by mouth daily        mupirocin 2 % ointment    BACTROBAN    22 g    Apply topically 3 times daily    Open wound       pantoprazole 40 MG EC tablet    PROTONIX    30 tablet    Take 1 tablet (40 mg) by mouth every morning (before breakfast)    Gastroesophageal reflux disease without esophagitis       pregabalin 100 MG capsule    LYRICA    90 capsule    Take 1 capsule (100 mg) by mouth 3 times daily    Fibromyalgia       * risperiDONE 0.5 MG tablet    risperDAL    60 tablet    TAKE 1 TABLET BY MOUTH EVERY MORNING AT 6AM & 1T DAILY AS NEEDED IRRITIBILITY/ANXIETY/RACING THOUGHT    Bipolar I disorder (H)       * risperiDONE 1 MG tablet    risperDAL    30 tablet    TAKE 1 TABLET (1 MG) BY MOUTH AT BEDTIME    Bipolar I disorder (H)       tamsulosin 0.4 MG capsule    FLOMAX    30 capsule    Take 1 capsule (0.4 mg) by mouth daily    Urinary hesitancy       valACYclovir 500 MG tablet    VALTREX    6 tablet    Take 1 tablet (500 mg) by mouth 2 times daily    HSV (herpes simplex virus) infection       * Notice:  This list has 4 medication(s) that are the same as other medications prescribed for you. Read the  directions carefully, and ask your doctor or other care provider to review them with you.

## 2018-10-04 NOTE — PATIENT INSTRUCTIONS

## 2018-10-04 NOTE — NURSING NOTE
1st layer performed on the lan orbital rim. Injected 4.5ml of Xylocaine  (Lidocaine 1% and Epinephrine  (1:100,000)      Present for procedure:  MD Katy Fajardo, PATRICIA Echols, EMIR Walton RN

## 2018-10-08 ENCOUNTER — TELEPHONE (OUTPATIENT)
Dept: DERMATOLOGY | Facility: CLINIC | Age: 63
End: 2018-10-08

## 2018-10-08 NOTE — TELEPHONE ENCOUNTER
Follow up call completed following Mohs procedure with Dr. Kaur.     The following questions were asked:    What are you doing to manage your pain? nothing  Is it helping? n/a  Are you applying ice?  no  Have you had any noticeable bleeding through the bandage? no   Do you have any concerns? Wound care was gone over with the patient.            Please call [phone] option 3 if you have any additional questions.

## 2018-10-10 ENCOUNTER — TELEPHONE (OUTPATIENT)
Dept: DERMATOLOGY | Facility: CLINIC | Age: 63
End: 2018-10-10

## 2018-10-10 DIAGNOSIS — T81.41XA INFECTION OF SUPERFICIAL INCISIONAL SURGICAL SITE AFTER PROCEDURE, INITIAL ENCOUNTER: Primary | ICD-10-CM

## 2018-10-10 RX ORDER — DOXYCYCLINE 100 MG/1
100 CAPSULE ORAL 2 TIMES DAILY
Qty: 20 CAPSULE | Refills: 0 | Status: SHIPPED | OUTPATIENT
Start: 2018-10-10 | End: 2018-10-20

## 2018-10-10 NOTE — TELEPHONE ENCOUNTER
Demetra called stating that she is having yellow creamy discharge coming out of the graph site. She believes the site is infected. Will route to the resident on call.    Please call patient    JHOANA Jones    ______________________________________________  Called patient and discussed her concerns. She is having foul yellow thick discharge from her graft site. It is sticky and the site is painful. She had chills yesterday but no fevers she is aware of. Given significant concern for infection, asked the patient to take doxycycline 100 mg BID x 10 days. Will ask for her to be scheduled tomorrow with Dr. Wagoner if possible.     __________________________  Jessica Ballard MD  Medicine/Dermatology PGY-4  p [phone]

## 2018-10-11 NOTE — TELEPHONE ENCOUNTER
I called the patient and she was given the information below. She stated that she has an appointment next week and would call if anything changes.   Katy Valdivia, [facility]     done

## 2018-10-11 NOTE — TELEPHONE ENCOUNTER
----- Message from Mo Wagoner MD sent at 10/11/2018 10:43 AM CDT -----  She needs to come in and see us next week.  Please have her call us if she is having any increasing pain or discharge.  ----- Message -----     From: Yamilka Avila     Sent: 10/11/2018   8:30 AM       To: Jessica Bustillos MD, Katy Valdivia [facility], #    Spoke with the patient this morning for scheduling. She said that she was able to get some antibiotics and she will not be coming in today.       ----- Message -----     From: Jessica Bustillos MD     Sent: 10/10/2018   5:51 PM       To: Clinic Coordinators-Derm[facility],    Please schedule Demetra to come in today (Thursday) due to concern for infection of her graft site. Please schedule with Dr. Wagoner if possible, as he did the surgery.    Thanks,  Jessica

## 2018-10-17 DIAGNOSIS — M79.7 FIBROMYALGIA: ICD-10-CM

## 2018-10-18 ENCOUNTER — ALLIED HEALTH/NURSE VISIT (OUTPATIENT)
Dept: DERMATOLOGY | Facility: CLINIC | Age: 63
End: 2018-10-18
Payer: COMMERCIAL

## 2018-10-18 DIAGNOSIS — T81.41XA INFECTION OF SUPERFICIAL INCISIONAL SURGICAL SITE AFTER PROCEDURE, INITIAL ENCOUNTER: Primary | ICD-10-CM

## 2018-10-18 RX ORDER — PREGABALIN 100 MG
CAPSULE ORAL
Qty: 90 CAPSULE | Refills: 1 | Status: SHIPPED | OUTPATIENT
Start: 2018-10-18 | End: 2018-12-12

## 2018-10-18 ASSESSMENT — PAIN SCALES - GENERAL: PAINLEVEL: NO PAIN (0)

## 2018-10-18 NOTE — NURSING NOTE
Chief Complaint   Patient presents with     Derm Problem     2 week follow up mohs Lindsay Clay, EMT

## 2018-10-18 NOTE — NURSING NOTE
"Demetra came in today to clinic for a nurse visit. Patient has 5 days of antibiotics left to take. I informed her to complete the course of antibiotics. She is not having any pain at this time. Patient states, \" I am not doing the other site.\" I informed patient to call if she has any issues or concerns. Patient is happy with how her site looks and feels today.     Neris Walton RN    "

## 2018-10-18 NOTE — MR AVS SNAPSHOT
After Visit Summary   10/18/2018    Demetra Richardson    MRN: [id number]           Patient Information     Date Of Birth          1955        Visit Information        Provider Department      10/18/2018 1:30 PM Nurse, [facility]        Today's Diagnoses     Infection of superficial incisional surgical site after procedure, initial encounter    -  1       Follow-ups after your visit        Who to contact     Please call your clinic at [phone] to:    Ask questions about your health    Make or cancel appointments    Discuss your medicines    Learn about your test results    Speak to your doctor            Additional Information About Your Visit        MyChart Information     A[employer] gives you secure access to your electronic health record. If you see a primary care provider, you can also send messages to your care team and make appointments. If you have questions, please call your primary care clinic.  If you do not have a primary care provider, please call [phone] and they will assist you.      A[employer] is an electronic gateway that provides easy, online access to your medical records. With [employer], you can request a clinic appointment, read your test results, renew a prescription or communicate with your care team.     To access your existing account, please contact your [facility] or call [phone] for assistance.        Care EveryWhere ID     This is your Care EveryWhere ID. This could be used by other organizations to access your [place] medical records  [id number]         Blood Pressure from Last 3 Encounters:   10/04/18 (!) 151/98   05/01/18 104/71   03/29/17 99/63    Weight from Last 3 Encounters:   05/01/18 67.6 kg (149 lb)   02/22/17 61.1 kg (134 lb 12.8 oz)   10/14/16 65.3 kg (144 lb)              Today, you had the following     No orders found for display       Primary Care Provider Office Phone # Fax #     Fredy Merritt [phone]       [address] [address]        Equal Access to Services     YANDEL GARZA : Lisa cinthia wen elio Brooks, jono johanncesar, ady kaomairada crystal, glen sharp lalondonjim glenna. So [facility] [phone].    ATENCIÓN: Si habla español, tiene a [place] disposición servicios gratuitos de asistencia lingüística. Llame al [phone].    We comply with applicable federal civil rights laws and [place] laws. We do not discriminate on the basis of race, color, national origin, age, disability, sex, sexual orientation, or gender identity.            Thank you!     Thank you for choosing [facility]  for your care. Our goal is always to provide you with excellent care. Hearing back from our patients is one way we can continue to improve our services. Please take a few minutes to complete the written survey that you may receive in the mail after your visit with us. Thank you!             Your Updated Medication List - Protect others around you: Learn how to safely use, store and throw away your medicines at www.disposemymeds.org.          This list is accurate as of 10/18/18  2:03 PM.  Always use your most recent med list.                   Brand Name Dispense Instructions for use Diagnosis    aspirin-acetaminophen-caffeine 250-250-65 MG per tablet    EXCEDRIN MIGRAINE    180 tablet    Take 2 tablets by mouth every 6 hours as needed for headaches    Migraine without aura and without status migrainosus, not intractable       bisacodyl 5 MG EC tablet     60 tablet    Take 2 tablets (10 mg) by mouth At Bedtime    Other constipation       doxycycline monohydrate 100 MG capsule     20 capsule    Take 1 capsule (100 mg) by mouth 2 times daily for 10 days    Infection of superficial incisional surgical site after procedure, initial encounter       escitalopram 10 MG tablet    LEXAPRO    30 tablet    Take 1 tablet (10 mg) by mouth  daily    Bipolar I disorder (H)       hydroxychloroquine 200 MG tablet    PLAQUENIL    60 tablet    Take 1 tablet (200 mg) by mouth 2 times daily    Systemic lupus erythematosus, unspecified SLE type, unspecified organ involvement status (H)       ibuprofen 800 MG tablet    ADVIL/MOTRIN    90 tablet    Take 1 tablet (800 mg) by mouth every 6 hours as needed for moderate pain    Migraine without aura and without status migrainosus, not intractable       Lidocaine HCl 2 % Crea     1 Bottle    Externally apply topically 2 times daily    HSV (herpes simplex virus) infection       methadone 5 MG/5ML solution    DOLPHINE     Take 138 mg by mouth daily        mupirocin 2 % ointment    BACTROBAN    22 g    Apply topically 3 times daily    Open wound       pantoprazole 40 MG EC tablet    PROTONIX    30 tablet    Take 1 tablet (40 mg) by mouth every morning (before breakfast)    Gastroesophageal reflux disease without esophagitis       pregabalin 100 MG capsule    LYRICA    90 capsule    Take 1 capsule (100 mg) by mouth 3 times daily    Fibromyalgia       * risperiDONE 0.5 MG tablet    risperDAL    60 tablet    TAKE 1 TABLET BY MOUTH EVERY MORNING AT 6AM & 1T DAILY AS NEEDED IRRITIBILITY/ANXIETY/RACING THOUGHT    Bipolar I disorder (H)       * risperiDONE 1 MG tablet    risperDAL    30 tablet    TAKE 1 TABLET (1 MG) BY MOUTH AT BEDTIME    Bipolar I disorder (H)       tamsulosin 0.4 MG capsule    FLOMAX    30 capsule    Take 1 capsule (0.4 mg) by mouth daily    Urinary hesitancy       valACYclovir 500 MG tablet    VALTREX    6 tablet    Take 1 tablet (500 mg) by mouth 2 times daily    HSV (herpes simplex virus) infection       * Notice:  This list has 2 medication(s) that are the same as other medications prescribed for you. Read the directions carefully, and ask your doctor or other care provider to review them with you.

## 2018-10-19 ENCOUNTER — TELEPHONE (OUTPATIENT)
Dept: DERMATOLOGY | Facility: CLINIC | Age: 63
End: 2018-10-19

## 2018-10-19 NOTE — TELEPHONE ENCOUNTER
I contacted the patient and she was scheduled for a recheck in 2 more weeks. She was also advised to continue wound care.   Katy Valdivia CMA

## 2018-11-23 ENCOUNTER — TELEPHONE (OUTPATIENT)
Dept: RHEUMATOLOGY | Facility: CLINIC | Age: 63
End: 2018-11-23

## 2018-11-23 NOTE — TELEPHONE ENCOUNTER
[facility] Call Center    Phone Message    May a detailed message be left on voicemail: yes    Reason for Call: Other: New Rheum // order in epic // no outside records // call patient to schedule.      Action Taken: Message routed to:  [facility] ines

## 2018-11-29 DIAGNOSIS — M79.7 FIBROMYALGIA: ICD-10-CM

## 2018-11-29 RX ORDER — PREGABALIN 100 MG
CAPSULE ORAL
Qty: 90 CAPSULE | Refills: 1 | Status: CANCELLED | OUTPATIENT
Start: 2018-11-29

## 2018-11-30 NOTE — TELEPHONE ENCOUNTER
LYRICA 100 MG capsule      Last Written Prescription Date:  10-18-18  Last Fill Quantity: 90,   # refills: 1  Last Office Visit : 5-1-18  Future Office visit:  none    Routing refill request to provider for review/approval because:  Controlled medication.    Pharmacy was called and they confirm that the patient picked up first refill from the 10-18-1/ order on 10-18 and the second one on 11-15. Can not  next refill until 12-13 - but patient is requesting - perhaps to get ready for next refill.      Kathleen M Doege RN

## 2018-12-11 NOTE — TELEPHONE ENCOUNTER
Call was placed to patient regarding the referral we received for lupus from Dr. Merritt, however, there was no answer. Message was left asking patient to call the clinic to discuss the referral. Awaiting a call back from the patient.  Lianna Rayo CMA  12/11/2018 1:51 PM

## 2018-12-12 DIAGNOSIS — M79.7 FIBROMYALGIA: ICD-10-CM

## 2018-12-12 NOTE — TELEPHONE ENCOUNTER
LYRICA 100 MG capsule      Last Written Prescription Date:  10-18-18  Last Fill Quantity: 90,   # refills: 1  Last Office Visit : 5-1-18  Future Office visit:  none    Routing refill request to provider for review/approval because:  Controlled medication.      Kathleen M Doege RN

## 2018-12-13 RX ORDER — PREGABALIN 100 MG/1
100 CAPSULE ORAL 3 TIMES DAILY
Qty: 90 CAPSULE | Refills: 1 | Status: SHIPPED | OUTPATIENT
Start: 2018-12-13 | End: 2019-02-12

## 2018-12-13 NOTE — TELEPHONE ENCOUNTER
[facility] Call Center    Phone Message    May a detailed message be left on voicemail: yes    Reason for Call: Other: Pt calling to see when Lyrica will be sent to her pharmacy.     Action Taken: Message routed to:  [facility] ANNABEL

## 2018-12-14 NOTE — TELEPHONE ENCOUNTER
Spoke with patient who stated that she has not followed up with a Rheumatologist since she last saw Dr. Narayanan in 2014. She states that she is currently having skin rashes and leg swelling and would like to reestablish care. Patient is scheduled 3/14/2018 with Dr. Narayanan.  Lianna Rayo [facility]  12/14/2018 3:45 PM

## 2019-01-16 ENCOUNTER — TELEPHONE (OUTPATIENT)
Dept: FAMILY MEDICINE | Facility: CLINIC | Age: 64
End: 2019-01-16

## 2019-01-16 NOTE — TELEPHONE ENCOUNTER
Central Prior Authorization Team   Phone: [phone]      PA Initiation    Medication: pregabalin (LYRICA) 100 MG capsule-PA Initiated  Insurance Company: [place] Medicaid ([facility]) - Phone [phone] Fax [phone]  Pharmacy [facility]illing the Rx[id number] - [place], [place] - [address] [place] [address]  Filling Pharmacy Phone: [phone]  Filling Pharmacy Fax: [phone]  Start Date: 1/16/2019

## 2019-01-16 NOTE — TELEPHONE ENCOUNTER
Central Prior Authorization Team   Phone: [phone]    Prior Authorization Retail Medication Request    Medication/Dose: pregabalin (LYRICA) 100 MG capsule  ICD code (if different than what is on RX):  Fibromyalgia [M79.7]   Previously Tried and Failed:    Rationale:      Insurance Name:  BO.LT  Insurance ID:  [id number]      Pharmacy Information (if different than what is on RX)  Name:  [facility] [address]  Phone:  [phone]

## 2019-01-17 NOTE — TELEPHONE ENCOUNTER
Prior Authorization Approval    Authorization Effective Date: 1/17/2019  Authorization Expiration Date: 1/17/2020  Medication: pregabalin (LYRICA) 100 MG capsule-APPROVED  Approved Dose/Quantity:   Reference #: [id number]   Insurance Company: [employer] - Phone [phone] Fax [phone]  Expected CoPay:       CoPay Card Available:      Foundation Assistance Needed:    Which Pharmacy is filling the prescription (Not needed for infusion/clinic administered): [id number] [place] [address]  Pharmacy Notified: Yes  Patient Notified: No-Pharmacy will be filling script and patient is aware.

## 2019-01-17 NOTE — TELEPHONE ENCOUNTER
PA Initiation (re-submitted to [facility]tor)    Medication: pregabalin (LYRICA) 100 MG capsule-PA Initiated   Insurance Company: [employer] - Phone [phone] Fax [phone]  Ph[facility] [id number] [place] [facility] [place], [place] - [address] [place] [address]  Filling Pharmacy Phone: [phone]  Filling Pharmacy Fax: [phone]  Start Date: 1/17/2019

## 2019-01-17 NOTE — TELEPHONE ENCOUNTER
[facility] Call Center    Phone Message    May a detailed message be left on voicemail: yes    Reason for Call: Form or Letter   Type or form/letter needing completion: Prior Auth for pregabalin (LYRICA) 100 MG capsule [63656]  Please call the patients insurance to start that process [phone] once that's completed please call patient to  Let her know we've done our part thank you its urgent      Provider: Dr Merritt   Date form needed: asap        Action Taken: Message routed to:  [facility] daniel

## 2019-01-17 NOTE — TELEPHONE ENCOUNTER
[facility] Call Center    Phone Message    May a detailed message be left on voicemail: yes    Reason for Call: Other: CORRECTION TO PREVIOUS ENCOUNTER NEW ###:  Please call Medicaid for prior auth on Lyrica phone: [phone]     Action Taken: Message routed to:  [facility] fariha primary

## 2019-02-12 DIAGNOSIS — M79.7 FIBROMYALGIA: ICD-10-CM

## 2019-02-13 ENCOUNTER — TELEPHONE (OUTPATIENT)
Dept: FAMILY MEDICINE | Facility: CLINIC | Age: 64
End: 2019-02-13

## 2019-02-13 NOTE — TELEPHONE ENCOUNTER
[facility] Call Center    Phone Message    May a detailed message be left on voicemail: yes    Reason for Call: Medication Refill Request    Has the patient contacted the pharmacy for the refill? Yes   Name of medication being requested: pregabalin (LYRICA) 100 MG capsule  Provider who prescribed the medication: Fredy Smith  Pharmacy: [facility] [id number] [place] [address]  Date medication is needed: 2/15/2019       Action Taken: Message routed to:  [facility] daniel

## 2019-02-14 RX ORDER — PREGABALIN 100 MG
CAPSULE ORAL
Qty: 90 CAPSULE | Refills: 3 | Status: ON HOLD | OUTPATIENT
Start: 2019-02-14 | End: 2019-05-14

## 2019-03-04 ENCOUNTER — DOCUMENTATION ONLY (OUTPATIENT)
Dept: CARE COORDINATION | Facility: CLINIC | Age: 64
End: 2019-03-04

## 2019-03-14 ENCOUNTER — OFFICE VISIT (OUTPATIENT)
Dept: RHEUMATOLOGY | Facility: CLINIC | Age: 64
End: 2019-03-14
Attending: INTERNAL MEDICINE
Payer: COMMERCIAL

## 2019-03-14 VITALS
WEIGHT: 152 LBS | DIASTOLIC BLOOD PRESSURE: 85 MMHG | SYSTOLIC BLOOD PRESSURE: 129 MMHG | HEIGHT: 63 IN | TEMPERATURE: 98.2 F | OXYGEN SATURATION: 95 % | BODY MASS INDEX: 26.93 KG/M2 | HEART RATE: 96 BPM

## 2019-03-14 DIAGNOSIS — M35.00 SICCA SYNDROME (H): Primary | ICD-10-CM

## 2019-03-14 DIAGNOSIS — R06.01 ORTHOPNEA: ICD-10-CM

## 2019-03-14 PROCEDURE — G0463 HOSPITAL OUTPT CLINIC VISIT: HCPCS | Mod: ZF

## 2019-03-14 RX ORDER — CEVIMELINE HYDROCHLORIDE 30 MG/1
30 CAPSULE ORAL 3 TIMES DAILY
Qty: 90 CAPSULE | Refills: 3 | Status: ON HOLD | OUTPATIENT
Start: 2019-03-14 | End: 2019-04-29

## 2019-03-14 ASSESSMENT — MIFFLIN-ST. JEOR: SCORE: 1218.6

## 2019-03-14 ASSESSMENT — PAIN SCALES - GENERAL: PAINLEVEL: MODERATE PAIN (5)

## 2019-03-14 NOTE — PROGRESS NOTES
"New Patient Rheumatology    Demetra Richardson MRN# [id number]   Age: 63 year old YOB: 1955       Reason for consult: { :9689935}       Requesting physician: ***              {   Rheumatology Assessment and Plan headers :4611754}          Chief Complaint:   {                      :5305189}     {History obtained from :3768945::\"History is obtained from the patient\"}    {   HPI format                                      :6402118}           Past Medical History:   { :8963772}          Past Surgical History:   { :4967009}          Social History:   { :8545820}          Family History:   { :8815524}          Immunizations:   { :7150443}          Allergies:   { :0514495}          Medications:   { :7338076}          Review of Systems:   CONSTITUTIONAL:  {CONSTITUTIONAL:413304}  EYES:  {EYES:684383}  HEENT:  {HEENT:157897}  RESPIRATORY:  {RESPIRATORY:772194}  CARDIOVASCULAR:  {CARDIAC:441566}  GASTROINTESTINAL:  {GASTROINTESTINAL:726556}  GENITOURINARY:  {:946040}  INTEGUMENT/BREAST:  {SKIN/BREAST:893533}  HEMATOLOGIC/LYMPHATIC:  {HEMATOLOGIC LYMPHATIC:095878}  ENDOCRINE:  {ENDOCRINE:665153}  MUSCULOSKELETAL:  {MUSCULOSKELETAL:481256}  NEUROLOGICAL:  {NEURO:048456}  BEHAVIOR/PSYCH:  {PSYCHIATRIC:198263}     {   Physical exam format             :8230929}          Data:   {Lab options               :9995058}  {Cardiac studies        :7476814}  {Imaging                     :0837657}    Attestation:  {   Attending physician attestation:7381506}    Haylee Marshall         "
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
## 2019-03-14 NOTE — NURSING NOTE
"Chief Complaint   Patient presents with     Consult     Lupus     /85   Pulse 96   Temp 98.2  F (36.8  C) (Oral)   Ht 1.6 m (5' 3\")   Wt 68.9 kg (152 lb)   SpO2 95%   BMI 26.93 kg/m    Jessi Holguin MA    "
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
## 2019-03-14 NOTE — PATIENT INSTRUCTIONS
Labs     For dry mouth, try evoxac (Rx), ACT lozenges, hydris toothpaste, biotene products    For dry eyes, try systane eyedrops without preservatives, ask your eye doctor about restasis or xiidra (Rx)    For fibromyalgia, recommend Eliud Chi    2D-Echo 1st then cardiology referral    Return in 6 months

## 2019-03-28 DIAGNOSIS — R68.2 DRY MOUTH: Primary | ICD-10-CM

## 2019-03-28 NOTE — TELEPHONE ENCOUNTER
[facility] Call Center    Phone Message    May a detailed message be left on voicemail: yes    Reason for Call: Other: Pt states that Dr. Narayanan and her self discuss for the pt to have a MRI done, and not a EKG, which that what the imaging lab has. Please reach out and discuss     Action Taken: Message routed to:  [facility] ([facility] Rheum

## 2019-03-29 RX ORDER — PILOCARPINE HYDROCHLORIDE 5 MG/1
5 TABLET, FILM COATED ORAL 4 TIMES DAILY PRN
Qty: 120 TABLET | Refills: 3 | Status: ON HOLD | OUTPATIENT
Start: 2019-03-29 | End: 2019-04-29

## 2019-03-29 NOTE — TELEPHONE ENCOUNTER
Discussed the difference between and EKG and Echocardiogram.  Pt now understands the difference and will schedule.      Pt is requesting a different medication as the evoxac is not covered by her insurance and she would like to try something else that may be covered by her insurance.  Discussed Pilocarpine with her.  She is willing to try.  Will call back if insurance does not cover that one as well.    Discussed finding TaiChi classes and that her PCP will manage fibromyalgia.     Pt had no further questions.    Deirdre Russell RN  Rheumatology Clinic

## 2019-04-26 ENCOUNTER — ANCILLARY PROCEDURE (OUTPATIENT)
Dept: CARDIOLOGY | Facility: CLINIC | Age: 64
End: 2019-04-26
Attending: INTERNAL MEDICINE
Payer: COMMERCIAL

## 2019-04-26 ENCOUNTER — ANCILLARY PROCEDURE (OUTPATIENT)
Dept: GENERAL RADIOLOGY | Facility: CLINIC | Age: 64
End: 2019-04-26
Attending: NURSE PRACTITIONER
Payer: COMMERCIAL

## 2019-04-26 ENCOUNTER — OFFICE VISIT (OUTPATIENT)
Dept: INTERNAL MEDICINE | Facility: CLINIC | Age: 64
End: 2019-04-26
Payer: COMMERCIAL

## 2019-04-26 VITALS
BODY MASS INDEX: 27.07 KG/M2 | WEIGHT: 152.8 LBS | SYSTOLIC BLOOD PRESSURE: 95 MMHG | DIASTOLIC BLOOD PRESSURE: 63 MMHG | HEART RATE: 73 BPM

## 2019-04-26 DIAGNOSIS — J18.9 PNEUMONIA OF LEFT LOWER LOBE DUE TO INFECTIOUS ORGANISM: ICD-10-CM

## 2019-04-26 DIAGNOSIS — R07.81 PLEURITIC PAIN: ICD-10-CM

## 2019-04-26 DIAGNOSIS — R07.81 PLEURITIC PAIN: Primary | ICD-10-CM

## 2019-04-26 DIAGNOSIS — R06.01 ORTHOPNEA: ICD-10-CM

## 2019-04-26 DIAGNOSIS — M35.00 SICCA SYNDROME (H): ICD-10-CM

## 2019-04-26 LAB
ALBUMIN SERPL-MCNC: 2.3 G/DL (ref 3.4–5)
ALBUMIN UR-MCNC: 30 MG/DL
ALT SERPL W P-5'-P-CCNC: 32 U/L (ref 0–50)
APPEARANCE UR: ABNORMAL
AST SERPL W P-5'-P-CCNC: 27 U/L (ref 0–45)
BASOPHILS # BLD AUTO: 0 10E9/L (ref 0–0.2)
BASOPHILS NFR BLD AUTO: 0.3 %
BILIRUB UR QL STRIP: ABNORMAL
COLOR UR AUTO: YELLOW
CREAT SERPL-MCNC: 0.89 MG/DL (ref 0.52–1.04)
CREAT UR-MCNC: 161 MG/DL
CREAT UR-MCNC: 161 MG/DL
CRP SERPL-MCNC: 198 MG/L (ref 0–8)
DIFFERENTIAL METHOD BLD: ABNORMAL
EOSINOPHIL # BLD AUTO: 0.5 10E9/L (ref 0–0.7)
EOSINOPHIL NFR BLD AUTO: 3.1 %
ERYTHROCYTE [DISTWIDTH] IN BLOOD BY AUTOMATED COUNT: 14.2 % (ref 10–15)
ERYTHROCYTE [SEDIMENTATION RATE] IN BLOOD BY WESTERGREN METHOD: 99 MM/H (ref 0–30)
GFR SERPL CREATININE-BSD FRML MDRD: 69 ML/MIN/{1.73_M2}
GLUCOSE UR STRIP-MCNC: NEGATIVE MG/DL
HCT VFR BLD AUTO: 37.5 % (ref 35–47)
HGB BLD-MCNC: 11.3 G/DL (ref 11.7–15.7)
HGB UR QL STRIP: ABNORMAL
HYALINE CASTS #/AREA URNS LPF: 6 /LPF (ref 0–2)
IMM GRANULOCYTES # BLD: 0.1 10E9/L (ref 0–0.4)
IMM GRANULOCYTES NFR BLD: 0.5 %
KETONES UR STRIP-MCNC: NEGATIVE MG/DL
LEUKOCYTE ESTERASE UR QL STRIP: ABNORMAL
LYMPHOCYTES # BLD AUTO: 1.5 10E9/L (ref 0.8–5.3)
LYMPHOCYTES NFR BLD AUTO: 9.4 %
MCH RBC QN AUTO: 28.8 PG (ref 26.5–33)
MCHC RBC AUTO-ENTMCNC: 30.1 G/DL (ref 31.5–36.5)
MCV RBC AUTO: 95 FL (ref 78–100)
MONOCYTES # BLD AUTO: 1 10E9/L (ref 0–1.3)
MONOCYTES NFR BLD AUTO: 6.1 %
MUCOUS THREADS #/AREA URNS LPF: PRESENT /LPF
NEUTROPHILS # BLD AUTO: 12.5 10E9/L (ref 1.6–8.3)
NEUTROPHILS NFR BLD AUTO: 80.6 %
NITRATE UR QL: NEGATIVE
NRBC # BLD AUTO: 0 10*3/UL
NRBC BLD AUTO-RTO: 0 /100
PH UR STRIP: 5 PH (ref 5–7)
PLATELET # BLD AUTO: 288 10E9/L (ref 150–450)
PROT UR-MCNC: 0.44 G/L
PROT/CREAT 24H UR: 0.28 G/G CR (ref 0–0.2)
RBC # BLD AUTO: 3.93 10E12/L (ref 3.8–5.2)
RBC #/AREA URNS AUTO: 33 /HPF (ref 0–2)
SOURCE: ABNORMAL
SP GR UR STRIP: 1.03 (ref 1–1.03)
SQUAMOUS #/AREA URNS AUTO: 1 /HPF (ref 0–1)
TSH SERPL DL<=0.005 MIU/L-ACNC: 0.68 MU/L (ref 0.4–4)
UROBILINOGEN UR STRIP-MCNC: 0 MG/DL (ref 0–2)
WBC # BLD AUTO: 15.5 10E9/L (ref 4–11)
WBC #/AREA URNS AUTO: 32 /HPF (ref 0–5)

## 2019-04-26 PROCEDURE — 86200 CCP ANTIBODY: CPT | Performed by: INTERNAL MEDICINE

## 2019-04-26 PROCEDURE — 86235 NUCLEAR ANTIGEN ANTIBODY: CPT | Performed by: INTERNAL MEDICINE

## 2019-04-26 PROCEDURE — 87086 URINE CULTURE/COLONY COUNT: CPT | Performed by: INTERNAL MEDICINE

## 2019-04-26 PROCEDURE — 86160 COMPLEMENT ANTIGEN: CPT | Performed by: INTERNAL MEDICINE

## 2019-04-26 PROCEDURE — 82306 VITAMIN D 25 HYDROXY: CPT | Performed by: INTERNAL MEDICINE

## 2019-04-26 PROCEDURE — 86039 ANTINUCLEAR ANTIBODIES (ANA): CPT | Performed by: INTERNAL MEDICINE

## 2019-04-26 PROCEDURE — 86038 ANTINUCLEAR ANTIBODIES: CPT | Performed by: INTERNAL MEDICINE

## 2019-04-26 PROCEDURE — 86225 DNA ANTIBODY NATIVE: CPT | Performed by: INTERNAL MEDICINE

## 2019-04-26 PROCEDURE — 86431 RHEUMATOID FACTOR QUANT: CPT | Performed by: INTERNAL MEDICINE

## 2019-04-26 RX ORDER — DOXYCYCLINE 100 MG/1
100 CAPSULE ORAL 2 TIMES DAILY
Qty: 14 CAPSULE | Refills: 0 | Status: ON HOLD | OUTPATIENT
Start: 2019-04-26 | End: 2019-04-29

## 2019-04-26 ASSESSMENT — PAIN SCALES - GENERAL: PAINLEVEL: SEVERE PAIN (7)

## 2019-04-26 NOTE — NURSING NOTE
Chief Complaint   Patient presents with     Back Pain     pt states she is having upper back pain, trouble breathing,        Cristela Paulson CMA at 12:20 PM on 4/26/2019.

## 2019-04-26 NOTE — LETTER
Patient:  Demetra Richardson  :   1955  MRN:     [id number]        Ms.Patricia SEVERIANO Richardson  [address]   [place] [address]        May 14, 2019    Dear ,    We are writing to inform you of your test results. 2D-Echo shows mild narrowing of aortic valve which is new. Recommend follow up with cardiology for close monitoring.      Results for orders placed or performed in visit on 19   Echocardiogram Complete    Narrative    [id number]  [id number]  [id number]  [id number]^SHIRLEY^ERIKA           [facility]  Diagnostic and Treamtent-3rd Floor  [address] [place] [address]     Name: DEMETRA RICHARDSON  MRN: [id number]  : 1955  Study Date: 2019 02:51 PM  Age: 63 yrs  Gender: Female  Patient Location: [facility]  Reason For Study: Orthopnea  Ordering Physician: ERIKA WATSON  Referring Physician: ERIKA WATSON  Performed By: Marie Roque     BSA: 1.7 m2  Height: 63 in  Weight: 152 lb  HR: 76  BP: 108/66 mmHg  _____________________________________________________________________________  __        Procedure  Echocardiogram with two-dimensional, color and spectral Doppler performed.  _____________________________________________________________________________  __        Interpretation Summary  Global and regional left ventricular function is normal with an EF of 55-60%.  Left ventricular diastolic function is normal.  The right ventricle is normal size. Global right ventricular function is  normal.  Mild aortic stenosis is present.  IVC is normal, RA pressure is normal.  No pericardial effusion is present.     This study was compared with the study from 2014. Mild aortic stenosis is  new in this study.  A left pleural effusion is present.  _____________________________________________________________________________  __        Left Ventricle  Left ventricular size is normal. Left ventricular wall  thickness is normal.  Global and regional left ventricular function is normal with an EF of 55-60%.  Left ventricular diastolic function is normal.     Right Ventricle  The right ventricle is normal size. Global right ventricular function is  normal.     Atria  Both atria appear normal. The atrial septum is intact as assessed by color  Doppler .     Mitral Valve  The mitral valve is normal. Mild mitral annular calcification is present.        Aortic Valve  Mild aortic valve calcification is present. The aortic valve is tricuspid.  Trace aortic insufficiency is present. Mild aortic stenosis is present. The  mean AoV pressure gradient is 15.2 mmHg. The peak AoV pressure gradient is  29.0 mmHg. The calculated aortic valve are is 1.9 cm^2.     Tricuspid Valve  The tricuspid valve is normal. The peak velocity of the tricuspid regurgitant  jet is not obtainable. Pulmonary artery systolic pressure cannot be assessed.     Pulmonic Valve  The pulmonic valve is normal.     Vessels  The aorta root is normal. The thoracic aorta is normal. The inferior vena cava  was normal in size with preserved respiratory variability. IVC diameter <2.1  cm collapsing >50% with sniff suggests a normal RA pressure of 3 mmHg.     Pericardium  No pericardial effusion is present.        Miscellaneous  A left pleural effusion is present.     Compared to Previous Study  This study was compared with the study from 11/5/2014 . Mild aortic stenosis  is new in this study.  _____________________________________________________________________________  __     MMode/2D Measurements & Calculations  IVSd: 0.96 cm  LVIDd: 3.7 cm  LVIDs: 2.9 cm  LVPWd: 0.80 cm  FS: 22.0 %  LV mass(C)d: 96.2 grams  LV mass(C)dI: 55.9 grams/m2  Ao root diam: 3.3 cm  asc Aorta Diam: 3.6 cm  LVOT diam: 1.9 cm  LVOT area: 3.0 cm2  LA Volume (BP): 54.9 ml     LA Volume Index (BP): 31.9 ml/m2  RWT: 0.43        Doppler Measurements & Calculations  MV E max candice: 82.5 cm/sec  MV A max  denny: 110.1 cm/sec  MV E/A: 0.75  MV dec slope: 468.4 cm/sec2  MV dec time: 0.18 sec  Ao V2 max: 266.7 cm/sec  Ao max P.0 mmHg  Ao V2 mean: 184.4 cm/sec  Ao mean PG: 15.2 mmHg  Ao V2 VTI: 53.3 cm  MARLENA(I,D): 1.9 cm2  MARLENA(V,D): 1.9 cm2  LV V1 max P.1 mmHg  LV V1 max: 173.9 cm/sec  LV V1 VTI: 34.0 cm  SV(LVOT): 100.8 ml  SI(LVOT): 58.6 ml/m2  PA V2 max: 101.8 cm/sec  PA max P.1 mmHg  PA acc time: 0.14 sec  AV Denny Ratio (DI): 0.65  MARLENA Index (cm2/m2): 1.1  E/E' av.8  Lateral E/e': 7.0  Medial E/e': 10.5        _____________________________________________________________________________  __        Report approved by: João ZUNIGA 2019 06:50 PM        *Note: Due to a large number of results and/or encounters for the requested time period, some results have not been displayed. A complete set of results can be found in Results Review.       Sam Narayanan MD

## 2019-04-26 NOTE — PATIENT INSTRUCTIONS
[facility] Medication Refill Request Information:  * Please contact your pharmacy regarding ANY request for medication refills.  ** [facility] Prescription Fax = [phone]  * Please allow 3 business days for routine medication refills.  * Please allow 5 business days for controlled substance medication refills.     [facility] Test Result notification information:  *You will be notified with in 7-10 days of your appointment day regarding the results of your test.  If you are on MyChart you will be notified as soon as the provider has reviewed the results and signed off on them.    [facility]: [phone]

## 2019-04-26 NOTE — PROGRESS NOTES
[facility] [place]   Cecilia London, VINOD CNP  2019      Chief Complaint:   Back Pain     History of Present Illness:   Demetra Richardson is a 63 year old female with a history of chronic back pain with a history of opioid dependence, as well as epidural abscess x4, fibromyalgia who presents for evaluation of back pain.    Back pain  She has worsening generalized back pain, worse with inspiration. She thinks that this is related to her lungs. Her pain is worse with walking. In addition, she has had a cough productive of yellow phlegm intermittently, as well as episodic chills and shortness of breath. Patient states that she will not trust a CXR only because this pain is new and significant for her. Denies fever, rashes, and wheezing. She does comment that her father  from pneumonia in [place] 7 weeks ago, and she wonders if she could have caught something from this. In addition, she notes that she has a large kidney stone that was supposed to be surgically removed, but she has canceled this three times because it was not causing her problems. She had one previously removed. She wonders if this pain could be related to that stone. Planning on rescheduling with urology soon.     Of note, she also adds that she fell on the ice in January and hit her head, she describes caused her expressive aphasia for 20 hours--she was seen at [facility] for this, and was intubated during this time as well because she was having trouble breathing and became apneic. She denies that this was related to an overdose as is documented, as she states she had one Xanax in her system at the time. See note from  for full summary--she states that she had purchased Xanax that day and had it in her pocket, but states again that she took just the one, and no more than that.  Patient continues to work with Methadone clinic at [place]. States that the Methadone is has helped significantly.     Pain  Wondering if she  can get an increased dose of Lyrica for her fibromyalgia pain. She states that this is not well managed, and knows people on a higher dose.     Review of Systems:   Pertinent items are noted in HPI, remainder of complete ROS is negative.      Active Medications:      aspirin-acetaminophen-caffeine (EXCEDRIN MIGRAINE) 250-250-65 MG per tablet, Take 2 tablets by mouth every 6 hours as needed for headaches, Disp: 180 tablet, Rfl: 1     escitalopram (LEXAPRO) 10 MG tablet, Take 1 tablet (10 mg) by mouth daily, Disp: 30 tablet, Rfl: 8     LYRICA 100 MG capsule, TAKE 1 CAPSULE BY MOUTH THREE TIMES A DAY, Disp: 90 capsule, Rfl: 3     methadone (DOLPHINE) 5 MG/5ML solution, Take 138 mg by mouth daily, Disp: , Rfl: 0     bisacodyl (DULCOLAX) 5 MG EC tablet, Take 2 tablets (10 mg) by mouth At Bedtime (Patient not taking: Reported on 3/14/2019), Disp: 60 tablet, Rfl: 2     cevimeline (EVOXAC) 30 MG capsule, Take 1 capsule (30 mg) by mouth 3 times daily (Patient not taking: Reported on 4/26/2019), Disp: 90 capsule, Rfl: 3     hydroxychloroquine (PLAQUENIL) 200 MG tablet, Take 1 tablet (200 mg) by mouth 2 times daily (Patient not taking: Reported on 3/14/2019), Disp: 60 tablet, Rfl: 1     ibuprofen (ADVIL/MOTRIN) 800 MG tablet, Take 1 tablet (800 mg) by mouth every 6 hours as needed for moderate pain (Patient not taking: Reported on 3/14/2019), Disp: 90 tablet, Rfl: 1     Lidocaine HCl 2 % CREA, Externally apply topically 2 times daily (Patient not taking: Reported on 9/17/2018), Disp: 1 Bottle, Rfl: 0     mupirocin (BACTROBAN) 2 % ointment, Apply topically 3 times daily (Patient not taking: Reported on 9/17/2018), Disp: 22 g, Rfl: 0     pantoprazole (PROTONIX) 40 MG EC tablet, Take 1 tablet (40 mg) by mouth every morning (before breakfast) (Patient not taking: Reported on 3/14/2019), Disp: 30 tablet, Rfl: 3     pilocarpine (SALAGEN) 5 MG tablet, Take 1 tablet (5 mg) by mouth 4 times daily as needed (Patient not taking: Reported  on 2019), Disp: 120 tablet, Rfl: 3     risperiDONE (RISPERDAL) 0.5 MG tablet, TAKE 1 TABLET BY MOUTH EVERY MORNING AT 6AM & 1T DAILY AS NEEDED IRRITIBILITY/ANXIETY/RACING THOUGHT (Patient not taking: Reported on 3/14/2019), Disp: 60 tablet, Rfl: 1     risperiDONE (RISPERDAL) 1 MG tablet, TAKE 1 TABLET (1 MG) BY MOUTH AT BEDTIME (Patient not taking: Reported on 2018), Disp: 30 tablet, Rfl: 1     tamsulosin (FLOMAX) 0.4 MG 24 hr capsule, Take 1 capsule (0.4 mg) by mouth daily (Patient not taking: Reported on 2018), Disp: 30 capsule, Rfl: 2     valACYclovir (VALTREX) 500 MG tablet, Take 1 tablet (500 mg) by mouth 2 times daily (Patient not taking: Reported on 3/14/2019), Disp: 6 tablet, Rfl: 11      Allergies:   Penicillins      Past Medical History:  Anemia  Basal cell carcinoma   Benzodiazepine dependence with history of withdrawal seizure x1  Bipolar I disorder  Chronic pain - back, legs  Fibromyalgia   Epidural abscess x4   Generalized anxiety disorder   Gastroesophageal reflux disease   Major depressive disorder  Migraine  Nephrolithiasis   Opiate dependence   Osteoarthritis   Osteoporosis   Primary sclerosing cholangitis   Peptic ulcer disease  Systemic lupus erythematous  Microcytic anemia   Hydronephrosis   Closed Colles' fracture of left radius   Overdose      Past Surgical History:  Back surgery x3   section x2  Cholecystectomy    Closed reduction pre cutaneous pinning finger, combined   Colonoscopy  Gasterectomy x2   Gastrojejunostomy   Laser holmium lithotripsy ureters insert stent combined   Laser holmium nephrolithotomy via percutaneous nephrostomy   Mammoplasty augmentation bilateral  Open reduction internal fixation wrist  Reconstruct breast implant prosthesis combined     Family History:    History reviewed. No pertinent family history.        Social History:   The patient was alone.  Smoking Status: Never   Smokeless Tobacco: Never   Alcohol Use: No       Physical Exam:   BP  95/63 (BP Location: Right arm, Patient Position: Sitting, Cuff Size: Adult Regular)   Pulse 73   Wt 69.3 kg (152 lb 12.8 oz)   BMI 27.07 kg/m       Constitutional: Alert, oriented, pleasant, no acute distress  Head: Normocephalic, atraumatic  Eyes: Extra-ocular movements intact, pupils equally round and reactive bilaterally, no scleral icterus  Ears: tympanic membranes pearly gray with positive light reflex  ENT: Oropharynx clear, moist mucus membranes, good dentition  Neck: Supple, no lymphadenopathy, no thyromegaly  Cardiovascular: Regular rate and rhythm, 3/6 holosystolic murmurs, no rubs or gallops  Respiratory: Good air movement bilaterally, lungs clear, no wheezes/rales/rhonchi. Posterior pain with inspiration.  GI: Abdomen soft, bowel sounds present, nondistended, nontender, no organomegaly or masses, no rebound/guarding  Musculoskeletal: No edema, normal muscle tone, normal gait. No spinal or paraspinal muscle tenderness. No bruising or scoliosis. Normal movement.   Skin: No rashes/lesions  Psychiatric: normal mentation, affect and mood       Assessment and Plan:  Pleuritic pain  Will order CXR to evaluate her lungs. Rheumatology has labs ordered, including CBC, requested that she have these completed today. She will also have her echocardiogram today as scheduled. For her pain, recommended ibuprofen or Tylenol.  Will call her with the results of her x-ray.   - XR Chest 2 Views     Xray shows L pleural effusion and opacity concerning for infection. WBC elevated to 15.5 with increased CRP and sed rate. Will treat with 7-day course doxycycline. F/u if not improving, seek emergency care over the weekend if worsening SOB or any CP. She agrees with plan.  Fibromyalgia   To discuss her Lyrica dose, she is aware that she will need to follow-up with Dr. Merritt for this as he managing this prescription, but first should treat the infection as this may be contributing to her worsening generalized pain at this  point.    Follow-up: as needed if symptoms worsen or do not improve     Scribe Disclosure:  I, Renya Quezada, am serving as a scribe to document services personally performed by VINOD Mahmood CNP at this visit, based upon the provider's statements to me. All documentation has been reviewed by the aforementioned provider prior to being entered into the official medical record.     Portions of this medical record were completed by a scribe. UPON MY REVIEW AND AUTHENTICATION BY ELECTRONIC SIGNATURE, this confirms (a) I performed the applicable clinical services, and (b) the record is accurate.     VINOD Mahmood CNP

## 2019-04-27 LAB
BACTERIA SPEC CULT: NORMAL
ENA RNP IGG SER IA-ACNC: 0.7 AI (ref 0–0.9)
ENA SCL70 IGG SER IA-ACNC: <0.2 AI (ref 0–0.9)
ENA SM IGG SER-ACNC: 0.2 AI (ref 0–0.9)
ENA SS-A IGG SER IA-ACNC: 0.3 AI (ref 0–0.9)
ENA SS-B IGG SER IA-ACNC: <0.2 AI (ref 0–0.9)
Lab: NORMAL
SPECIMEN SOURCE: NORMAL

## 2019-04-28 ENCOUNTER — HOSPITAL ENCOUNTER (INPATIENT)
Facility: CLINIC | Age: 64
LOS: 16 days | Discharge: NURSING, REHAB OR LONG-TERM CARE | DRG: 853 | End: 2019-05-14
Attending: INTERNAL MEDICINE | Admitting: FAMILY MEDICINE
Payer: COMMERCIAL

## 2019-04-28 ENCOUNTER — APPOINTMENT (OUTPATIENT)
Dept: CT IMAGING | Facility: CLINIC | Age: 64
DRG: 853 | End: 2019-04-28
Attending: INTERNAL MEDICINE
Payer: COMMERCIAL

## 2019-04-28 DIAGNOSIS — R07.81 PLEURITIC CHEST PAIN: ICD-10-CM

## 2019-04-28 DIAGNOSIS — M35.09 SICCA SYNDROME WITH OTHER ORGAN INVOLVEMENT: ICD-10-CM

## 2019-04-28 DIAGNOSIS — J90 PLEURAL EFFUSION: ICD-10-CM

## 2019-04-28 DIAGNOSIS — D62 ANEMIA DUE TO BLOOD LOSS, ACUTE: Primary | ICD-10-CM

## 2019-04-28 DIAGNOSIS — J90 RECURRENT LEFT PLEURAL EFFUSION: ICD-10-CM

## 2019-04-28 DIAGNOSIS — A60.00 RECURRENT GENITAL HERPES SIMPLEX: ICD-10-CM

## 2019-04-28 DIAGNOSIS — J90 LOCULATED PLEURAL EFFUSION: ICD-10-CM

## 2019-04-28 DIAGNOSIS — F41.9 ANXIETY DISORDER, UNSPECIFIED TYPE: ICD-10-CM

## 2019-04-28 DIAGNOSIS — G89.29 OTHER CHRONIC PAIN: ICD-10-CM

## 2019-04-28 DIAGNOSIS — M35.00 SJOGREN'S SYNDROME, WITH UNSPECIFIED ORGAN INVOLVEMENT (H): ICD-10-CM

## 2019-04-28 PROBLEM — T50.901A POLYSUBSTANCE OVERDOSE: Status: ACTIVE | Noted: 2019-01-22

## 2019-04-28 LAB
ALBUMIN SERPL-MCNC: 2.4 G/DL (ref 3.4–5)
ALBUMIN UR-MCNC: 10 MG/DL
ALP SERPL-CCNC: 318 U/L (ref 40–150)
ALT SERPL W P-5'-P-CCNC: 43 U/L (ref 0–50)
ANION GAP SERPL CALCULATED.3IONS-SCNC: 7 MMOL/L (ref 3–14)
APPEARANCE UR: CLEAR
AST SERPL W P-5'-P-CCNC: 49 U/L (ref 0–45)
BASOPHILS # BLD AUTO: 0 10E9/L (ref 0–0.2)
BASOPHILS NFR BLD AUTO: 0 %
BILIRUB SERPL-MCNC: 0.3 MG/DL (ref 0.2–1.3)
BILIRUB UR QL STRIP: NEGATIVE
BUN SERPL-MCNC: 29 MG/DL (ref 7–30)
C3 SERPL-MCNC: 130 MG/DL (ref 76–169)
C4 SERPL-MCNC: 34 MG/DL (ref 15–50)
CALCIUM SERPL-MCNC: 9.4 MG/DL (ref 8.5–10.1)
CHLORIDE SERPL-SCNC: 102 MMOL/L (ref 94–109)
CO2 SERPL-SCNC: 27 MMOL/L (ref 20–32)
COLOR UR AUTO: YELLOW
CREAT SERPL-MCNC: 0.96 MG/DL (ref 0.52–1.04)
CRP SERPL-MCNC: 360 MG/L (ref 0–8)
DIFFERENTIAL METHOD BLD: ABNORMAL
EOSINOPHIL # BLD AUTO: 0 10E9/L (ref 0–0.7)
EOSINOPHIL NFR BLD AUTO: 0 %
ERYTHROCYTE [DISTWIDTH] IN BLOOD BY AUTOMATED COUNT: 14.4 % (ref 10–15)
ERYTHROCYTE [SEDIMENTATION RATE] IN BLOOD BY WESTERGREN METHOD: 46 MM/H (ref 0–30)
GFR SERPL CREATININE-BSD FRML MDRD: 64 ML/MIN/{1.73_M2}
GLUCOSE SERPL-MCNC: 146 MG/DL (ref 70–99)
GLUCOSE UR STRIP-MCNC: NEGATIVE MG/DL
HCT VFR BLD AUTO: 39.2 % (ref 35–47)
HGB BLD-MCNC: 11.8 G/DL (ref 11.7–15.7)
HGB UR QL STRIP: NEGATIVE
INR PPP: 1.13 (ref 0.86–1.14)
KETONES UR STRIP-MCNC: NEGATIVE MG/DL
LACTATE BLD-SCNC: 2.5 MMOL/L (ref 0.7–2)
LEUKOCYTE ESTERASE UR QL STRIP: ABNORMAL
LIPASE SERPL-CCNC: 32 U/L (ref 73–393)
LYMPHOCYTES # BLD AUTO: 0.9 10E9/L (ref 0.8–5.3)
LYMPHOCYTES NFR BLD AUTO: 3.4 %
MCH RBC QN AUTO: 28.6 PG (ref 26.5–33)
MCHC RBC AUTO-ENTMCNC: 30.1 G/DL (ref 31.5–36.5)
MCV RBC AUTO: 95 FL (ref 78–100)
MONOCYTES # BLD AUTO: 0.9 10E9/L (ref 0–1.3)
MONOCYTES NFR BLD AUTO: 3.4 %
MUCOUS THREADS #/AREA URNS LPF: PRESENT /LPF
NEUTROPHILS # BLD AUTO: 25 10E9/L (ref 1.6–8.3)
NEUTROPHILS NFR BLD AUTO: 93.2 %
NITRATE UR QL: NEGATIVE
PH UR STRIP: 6 PH (ref 5–7)
PLATELET # BLD AUTO: 403 10E9/L (ref 150–450)
PLATELET # BLD EST: ABNORMAL 10*3/UL
POLYCHROMASIA BLD QL SMEAR: SLIGHT
POTASSIUM SERPL-SCNC: 5.5 MMOL/L (ref 3.4–5.3)
PROT SERPL-MCNC: 8.4 G/DL (ref 6.8–8.8)
RBC # BLD AUTO: 4.13 10E12/L (ref 3.8–5.2)
RBC #/AREA URNS AUTO: 9 /HPF (ref 0–2)
RHEUMATOID FACT SER NEPH-ACNC: <20 IU/ML (ref 0–20)
SODIUM SERPL-SCNC: 136 MMOL/L (ref 133–144)
SOURCE: ABNORMAL
SP GR UR STRIP: 1.03 (ref 1–1.03)
SQUAMOUS #/AREA URNS AUTO: 1 /HPF (ref 0–1)
TRANS CELLS #/AREA URNS HPF: <1 /HPF (ref 0–1)
UROBILINOGEN UR STRIP-MCNC: 2 MG/DL (ref 0–2)
WBC # BLD AUTO: 26.8 10E9/L (ref 4–11)
WBC #/AREA URNS AUTO: 5 /HPF (ref 0–5)

## 2019-04-28 PROCEDURE — 96376 TX/PRO/DX INJ SAME DRUG ADON: CPT | Performed by: INTERNAL MEDICINE

## 2019-04-28 PROCEDURE — 25000128 H RX IP 250 OP 636: Performed by: RADIOLOGY

## 2019-04-28 PROCEDURE — 93010 ELECTROCARDIOGRAM REPORT: CPT | Mod: Z6 | Performed by: INTERNAL MEDICINE

## 2019-04-28 PROCEDURE — 80307 DRUG TEST PRSMV CHEM ANLYZR: CPT | Performed by: FAMILY MEDICINE

## 2019-04-28 PROCEDURE — 85652 RBC SED RATE AUTOMATED: CPT | Performed by: INTERNAL MEDICINE

## 2019-04-28 PROCEDURE — 86140 C-REACTIVE PROTEIN: CPT | Performed by: INTERNAL MEDICINE

## 2019-04-28 PROCEDURE — 81001 URINALYSIS AUTO W/SCOPE: CPT | Performed by: INTERNAL MEDICINE

## 2019-04-28 PROCEDURE — 99285 EMERGENCY DEPT VISIT HI MDM: CPT | Mod: 25 | Performed by: INTERNAL MEDICINE

## 2019-04-28 PROCEDURE — 83690 ASSAY OF LIPASE: CPT | Performed by: INTERNAL MEDICINE

## 2019-04-28 PROCEDURE — 96375 TX/PRO/DX INJ NEW DRUG ADDON: CPT | Performed by: INTERNAL MEDICINE

## 2019-04-28 PROCEDURE — 96361 HYDRATE IV INFUSION ADD-ON: CPT | Performed by: INTERNAL MEDICINE

## 2019-04-28 PROCEDURE — 87899 AGENT NOS ASSAY W/OPTIC: CPT | Performed by: EMERGENCY MEDICINE

## 2019-04-28 PROCEDURE — 71260 CT THORAX DX C+: CPT

## 2019-04-28 PROCEDURE — 25800030 ZZH RX IP 258 OP 636: Performed by: INTERNAL MEDICINE

## 2019-04-28 PROCEDURE — 96366 THER/PROPH/DIAG IV INF ADDON: CPT | Performed by: INTERNAL MEDICINE

## 2019-04-28 PROCEDURE — 85610 PROTHROMBIN TIME: CPT | Performed by: INTERNAL MEDICINE

## 2019-04-28 PROCEDURE — 76604 US EXAM CHEST: CPT | Mod: 26 | Performed by: INTERNAL MEDICINE

## 2019-04-28 PROCEDURE — 25000128 H RX IP 250 OP 636: Performed by: INTERNAL MEDICINE

## 2019-04-28 PROCEDURE — 96365 THER/PROPH/DIAG IV INF INIT: CPT | Mod: 59 | Performed by: INTERNAL MEDICINE

## 2019-04-28 PROCEDURE — 83605 ASSAY OF LACTIC ACID: CPT | Performed by: INTERNAL MEDICINE

## 2019-04-28 PROCEDURE — 93005 ELECTROCARDIOGRAM TRACING: CPT | Performed by: INTERNAL MEDICINE

## 2019-04-28 PROCEDURE — 25000125 ZZHC RX 250: Performed by: RADIOLOGY

## 2019-04-28 PROCEDURE — 76604 US EXAM CHEST: CPT | Performed by: INTERNAL MEDICINE

## 2019-04-28 PROCEDURE — 80320 DRUG SCREEN QUANTALCOHOLS: CPT | Performed by: FAMILY MEDICINE

## 2019-04-28 PROCEDURE — 85025 COMPLETE CBC W/AUTO DIFF WBC: CPT | Performed by: INTERNAL MEDICINE

## 2019-04-28 PROCEDURE — 99291 CRITICAL CARE FIRST HOUR: CPT | Mod: 25 | Performed by: INTERNAL MEDICINE

## 2019-04-28 PROCEDURE — 12000001 ZZH R&B MED SURG/OB UMMC

## 2019-04-28 PROCEDURE — 80053 COMPREHEN METABOLIC PANEL: CPT | Performed by: INTERNAL MEDICINE

## 2019-04-28 RX ORDER — HYDROXYCHLOROQUINE SULFATE 200 MG/1
200 TABLET, FILM COATED ORAL 2 TIMES DAILY
Status: CANCELLED | OUTPATIENT
Start: 2019-04-28

## 2019-04-28 RX ORDER — HYDROMORPHONE HYDROCHLORIDE 1 MG/ML
0.5 INJECTION, SOLUTION INTRAMUSCULAR; INTRAVENOUS; SUBCUTANEOUS ONCE
Status: COMPLETED | OUTPATIENT
Start: 2019-04-28 | End: 2019-04-28

## 2019-04-28 RX ORDER — CEVIMELINE HYDROCHLORIDE 30 MG/1
30 CAPSULE ORAL 3 TIMES DAILY
Status: CANCELLED | OUTPATIENT
Start: 2019-04-29

## 2019-04-28 RX ORDER — BISACODYL 5 MG
10 TABLET, DELAYED RELEASE (ENTERIC COATED) ORAL AT BEDTIME
Status: CANCELLED | OUTPATIENT
Start: 2019-04-28

## 2019-04-28 RX ORDER — KETOROLAC TROMETHAMINE 30 MG/ML
30 INJECTION, SOLUTION INTRAMUSCULAR; INTRAVENOUS ONCE
Status: COMPLETED | OUTPATIENT
Start: 2019-04-28 | End: 2019-04-28

## 2019-04-28 RX ORDER — IOPAMIDOL 755 MG/ML
55 INJECTION, SOLUTION INTRAVASCULAR ONCE
Status: COMPLETED | OUTPATIENT
Start: 2019-04-28 | End: 2019-04-28

## 2019-04-28 RX ORDER — LEVOFLOXACIN 5 MG/ML
750 INJECTION, SOLUTION INTRAVENOUS ONCE
Status: COMPLETED | OUTPATIENT
Start: 2019-04-28 | End: 2019-04-28

## 2019-04-28 RX ORDER — CEFTRIAXONE 2 G/1
2 INJECTION, POWDER, FOR SOLUTION INTRAMUSCULAR; INTRAVENOUS EVERY 24 HOURS
Status: CANCELLED | OUTPATIENT
Start: 2019-04-28

## 2019-04-28 RX ADMIN — IOPAMIDOL 55 ML: 755 INJECTION, SOLUTION INTRAVENOUS at 20:05

## 2019-04-28 RX ADMIN — SODIUM CHLORIDE 1000 ML: 9 INJECTION, SOLUTION INTRAVENOUS at 20:48

## 2019-04-28 RX ADMIN — SODIUM CHLORIDE, PRESERVATIVE FREE 86 ML: 5 INJECTION INTRAVENOUS at 20:07

## 2019-04-28 RX ADMIN — LEVOFLOXACIN 750 MG: 5 INJECTION, SOLUTION INTRAVENOUS at 22:00

## 2019-04-28 RX ADMIN — KETOROLAC TROMETHAMINE 30 MG: 30 INJECTION, SOLUTION INTRAMUSCULAR at 20:47

## 2019-04-28 RX ADMIN — Medication 0.5 MG: at 23:40

## 2019-04-28 RX ADMIN — Medication 0.5 MG: at 22:02

## 2019-04-28 RX ADMIN — SODIUM CHLORIDE 1000 ML: 9 INJECTION, SOLUTION INTRAVENOUS at 19:46

## 2019-04-28 ASSESSMENT — ENCOUNTER SYMPTOMS
COUGH: 0
FEVER: 0

## 2019-04-28 NOTE — ED NOTES
Bed: ED11  Expected date:   Expected time:   Means of arrival:   Comments:  H 432 63 F left chest wall pain, recent pneumonia.

## 2019-04-28 NOTE — LETTER
Transition Communication Hand-off for Care Transitions to Next Level of Care Provider    Name: Demetra Richardson  : 1955  MRN #: [id number]  Primary Care Provider: Fredy Merritt     Primary Clinic: [address] [place] [id number]     Reason for Hospitalization:  Pleuritic chest pain [R07.81]  Recurrent left pleural effusion [J90]  Sjogren's syndrome, with unspecified organ involvement (H) [M35.00]  Admit Date/Time: 2019  6:23 PM  Discharge Date: 19   Payor Source: Payor: MEDICA / Plan: MEDICA ACCESS ABILITY MA / Product Type: HMO /     Reason for Communication Hand-off Referral: Multiple providers/specialties    Discharge Plan:  [facility]     Concern for non-adherence with plan of care:   Y/N No  Discharge Needs Assessment:  Needs      Most Recent Value   Anticipated Changes Related to Illness  none   Equipment Currently Used at Home  none            Follow-up specialty is recommended: Yes    Follow-up plan:    Future Appointments   Date Time Provider Department Center   5/15/2019  6:00 AM Rossy Gonzales, OT [facility]   5/15/2019  9:30 AM Yodit Ling, OT [facility]   5/15/2019  1:30 PM India Booker, PT [facility]   2019 11:00 AM Sam Narayanan MD [facility]       Any outstanding tests or procedures:        Radiology & Cardiology Orders     Future Labs/Procedures Complete By Expires    X-ray Chest 2 vws*  2019 (Approximate) 2019    Comments:    Schedule prior to visit with Thoracic Surgery in 1 month.        Referrals     Future Labs/Procedures    Occupational Therapy Adult Consult     Comments:    Evaluate and treat as clinically indicated.    Reason:  deconditioning    Physical Therapy Adult Consult     Comments:    Evaluate and treat as clinically indicated.    Reason:  deconditioning            Key Recommendations:      Salma Neville    AVS/Discharge Summary is the source of truth; this is a helpful guide  for improved communication of patient story
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
## 2019-04-28 NOTE — ED TRIAGE NOTES
Pt went to clinic Friday and was dx with LLL pluretic pain and was placed on Doxycycline. Pt presents today with worse left sided pain.

## 2019-04-29 ENCOUNTER — APPOINTMENT (OUTPATIENT)
Dept: GENERAL RADIOLOGY | Facility: CLINIC | Age: 64
DRG: 853 | End: 2019-04-29
Attending: INTERNAL MEDICINE
Payer: COMMERCIAL

## 2019-04-29 PROBLEM — J90 LOCULATED PLEURAL EFFUSION: Status: ACTIVE | Noted: 2019-04-29

## 2019-04-29 LAB
ALBUMIN FLD-MCNC: 1.7 G/DL
ALBUMIN SERPL-MCNC: 1.9 G/DL (ref 3.4–5)
ALP SERPL-CCNC: 211 U/L (ref 40–150)
ALT SERPL W P-5'-P-CCNC: 33 U/L (ref 0–50)
AMPHETAMINES UR QL SCN: NEGATIVE
AMYLASE FLD-CCNC: 14 U/L
ANA PAT SER IF-IMP: ABNORMAL
ANA SER QL IF: ABNORMAL
ANA TITR SER IF: ABNORMAL {TITER}
ANION GAP SERPL CALCULATED.3IONS-SCNC: 7 MMOL/L (ref 3–14)
APTT PPP: 45 SEC (ref 22–37)
AST SERPL W P-5'-P-CCNC: 41 U/L (ref 0–45)
BARBITURATES UR QL: NEGATIVE
BASOPHILS # BLD AUTO: 0.1 10E9/L (ref 0–0.2)
BASOPHILS NFR BLD AUTO: 0.2 %
BENZODIAZ UR QL: NEGATIVE
BILIRUB SERPL-MCNC: 0.4 MG/DL (ref 0.2–1.3)
BUN SERPL-MCNC: 32 MG/DL (ref 7–30)
CALCIUM SERPL-MCNC: 8.6 MG/DL (ref 8.5–10.1)
CANNABINOIDS UR QL SCN: NEGATIVE
CCP AB SER IA-ACNC: 16 U/ML
CHLORIDE SERPL-SCNC: 106 MMOL/L (ref 94–109)
CHOLEST FLD-MCNC: 68 MG/DL
CO2 SERPL-SCNC: 25 MMOL/L (ref 20–32)
COCAINE UR QL: NEGATIVE
CREAT SERPL-MCNC: 0.85 MG/DL (ref 0.52–1.04)
DIFFERENTIAL METHOD BLD: ABNORMAL
DSDNA AB SER-ACNC: 13 IU/ML
EOSINOPHIL # BLD AUTO: 0.1 10E9/L (ref 0–0.7)
EOSINOPHIL NFR BLD AUTO: 0.5 %
ERYTHROCYTE [DISTWIDTH] IN BLOOD BY AUTOMATED COUNT: 14.5 % (ref 10–15)
ETHANOL UR QL SCN: NEGATIVE
GFR SERPL CREATININE-BSD FRML MDRD: 73 ML/MIN/{1.73_M2}
GLUCOSE FLD-MCNC: 26 MG/DL
GLUCOSE SERPL-MCNC: 109 MG/DL (ref 70–99)
GRAM STN SPEC: NORMAL
HCT VFR BLD AUTO: 35.3 % (ref 35–47)
HGB BLD-MCNC: 10.2 G/DL (ref 11.7–15.7)
IMM GRANULOCYTES # BLD: 0.2 10E9/L (ref 0–0.4)
IMM GRANULOCYTES NFR BLD: 0.7 %
INR PPP: 1.38 (ref 0.86–1.14)
INTERPRETATION ECG - MUSE: NORMAL
LACTATE BLD-SCNC: 0.7 MMOL/L (ref 0.7–2)
LACTATE BLD-SCNC: 1.4 MMOL/L (ref 0.7–2)
LDH FLD L TO P-CCNC: 1116 U/L
LIPASE FLD-CCNC: 30 U/L
LYMPHOCYTES # BLD AUTO: 0.9 10E9/L (ref 0.8–5.3)
LYMPHOCYTES NFR BLD AUTO: 4.2 %
MCH RBC QN AUTO: 28.3 PG (ref 26.5–33)
MCHC RBC AUTO-ENTMCNC: 28.9 G/DL (ref 31.5–36.5)
MCV RBC AUTO: 98 FL (ref 78–100)
MONOCYTES # BLD AUTO: 1 10E9/L (ref 0–1.3)
MONOCYTES NFR BLD AUTO: 4.6 %
MRSA DNA SPEC QL NAA+PROBE: NEGATIVE
NEUTROPHILS # BLD AUTO: 19.8 10E9/L (ref 1.6–8.3)
NEUTROPHILS NFR BLD AUTO: 89.8 %
NRBC # BLD AUTO: 0 10*3/UL
NRBC BLD AUTO-RTO: 0 /100
OPIATES UR QL SCN: NEGATIVE
PCP UR QL SCN: NEGATIVE
PH FLD: 7.7 PH
PLATELET # BLD AUTO: 281 10E9/L (ref 150–450)
POTASSIUM SERPL-SCNC: 4.4 MMOL/L (ref 3.4–5.3)
PROT FLD-MCNC: 4.7 G/DL
PROT SERPL-MCNC: 6.7 G/DL (ref 6.8–8.8)
RBC # BLD AUTO: 3.6 10E12/L (ref 3.8–5.2)
S PNEUM AG SPEC QL: NORMAL
SODIUM SERPL-SCNC: 138 MMOL/L (ref 133–144)
SPECIMEN SOURCE FLD: NORMAL
SPECIMEN SOURCE: NORMAL
TRIGL FLD-MCNC: 34 MG/DL
WBC # BLD AUTO: 22.1 10E9/L (ref 4–11)

## 2019-04-29 PROCEDURE — 87641 MR-STAPH DNA AMP PROBE: CPT | Performed by: INTERNAL MEDICINE

## 2019-04-29 PROCEDURE — 87102 FUNGUS ISOLATION CULTURE: CPT | Performed by: INTERNAL MEDICINE

## 2019-04-29 PROCEDURE — 84311 SPECTROPHOTOMETRY: CPT | Performed by: INTERNAL MEDICINE

## 2019-04-29 PROCEDURE — 36415 COLL VENOUS BLD VENIPUNCTURE: CPT | Performed by: STUDENT IN AN ORGANIZED HEALTH CARE EDUCATION/TRAINING PROGRAM

## 2019-04-29 PROCEDURE — 88112 CYTOPATH CELL ENHANCE TECH: CPT | Performed by: INTERNAL MEDICINE

## 2019-04-29 PROCEDURE — 40000556 ZZH STATISTIC PERIPHERAL IV START W US GUIDANCE

## 2019-04-29 PROCEDURE — 25000128 H RX IP 250 OP 636: Performed by: INTERNAL MEDICINE

## 2019-04-29 PROCEDURE — 36415 COLL VENOUS BLD VENIPUNCTURE: CPT | Performed by: INTERNAL MEDICINE

## 2019-04-29 PROCEDURE — 87070 CULTURE OTHR SPECIMN AEROBIC: CPT | Performed by: INTERNAL MEDICINE

## 2019-04-29 PROCEDURE — 88184 FLOWCYTOMETRY/ TC 1 MARKER: CPT | Performed by: INTERNAL MEDICINE

## 2019-04-29 PROCEDURE — 82042 OTHER SOURCE ALBUMIN QUAN EA: CPT | Performed by: INTERNAL MEDICINE

## 2019-04-29 PROCEDURE — 88185 FLOWCYTOMETRY/TC ADD-ON: CPT | Performed by: INTERNAL MEDICINE

## 2019-04-29 PROCEDURE — 96366 THER/PROPH/DIAG IV INF ADDON: CPT | Performed by: INTERNAL MEDICINE

## 2019-04-29 PROCEDURE — 96365 THER/PROPH/DIAG IV INF INIT: CPT | Mod: 59 | Performed by: INTERNAL MEDICINE

## 2019-04-29 PROCEDURE — 40000986 XR CHEST PORT 1 VW

## 2019-04-29 PROCEDURE — 87077 CULTURE AEROBIC IDENTIFY: CPT | Performed by: INTERNAL MEDICINE

## 2019-04-29 PROCEDURE — 0W9B30Z DRAINAGE OF LEFT PLEURAL CAVITY WITH DRAINAGE DEVICE, PERCUTANEOUS APPROACH: ICD-10-PCS | Performed by: INTERNAL MEDICINE

## 2019-04-29 PROCEDURE — 83986 ASSAY PH BODY FLUID NOS: CPT | Performed by: INTERNAL MEDICINE

## 2019-04-29 PROCEDURE — 87015 SPECIMEN INFECT AGNT CONCNTJ: CPT | Performed by: INTERNAL MEDICINE

## 2019-04-29 PROCEDURE — 87040 BLOOD CULTURE FOR BACTERIA: CPT | Performed by: EMERGENCY MEDICINE

## 2019-04-29 PROCEDURE — 87040 BLOOD CULTURE FOR BACTERIA: CPT | Performed by: FAMILY MEDICINE

## 2019-04-29 PROCEDURE — 93010 ELECTROCARDIOGRAM REPORT: CPT | Performed by: INTERNAL MEDICINE

## 2019-04-29 PROCEDURE — 85730 THROMBOPLASTIN TIME PARTIAL: CPT | Performed by: STUDENT IN AN ORGANIZED HEALTH CARE EDUCATION/TRAINING PROGRAM

## 2019-04-29 PROCEDURE — 85025 COMPLETE CBC W/AUTO DIFF WBC: CPT | Performed by: STUDENT IN AN ORGANIZED HEALTH CARE EDUCATION/TRAINING PROGRAM

## 2019-04-29 PROCEDURE — 87206 SMEAR FLUORESCENT/ACID STAI: CPT | Performed by: INTERNAL MEDICINE

## 2019-04-29 PROCEDURE — 85610 PROTHROMBIN TIME: CPT | Performed by: STUDENT IN AN ORGANIZED HEALTH CARE EDUCATION/TRAINING PROGRAM

## 2019-04-29 PROCEDURE — 25000132 ZZH RX MED GY IP 250 OP 250 PS 637: Performed by: FAMILY MEDICINE

## 2019-04-29 PROCEDURE — 87205 SMEAR GRAM STAIN: CPT | Performed by: INTERNAL MEDICINE

## 2019-04-29 PROCEDURE — 82150 ASSAY OF AMYLASE: CPT | Performed by: INTERNAL MEDICINE

## 2019-04-29 PROCEDURE — 00000102 ZZHCL STATISTIC CYTO WRIGHT STAIN TC: Performed by: INTERNAL MEDICINE

## 2019-04-29 PROCEDURE — 00000155 ZZHCL STATISTIC H-CELL BLOCK W/STAIN: Performed by: INTERNAL MEDICINE

## 2019-04-29 PROCEDURE — 25000132 ZZH RX MED GY IP 250 OP 250 PS 637: Performed by: SURGERY

## 2019-04-29 PROCEDURE — 32551 INSERTION OF CHEST TUBE: CPT | Performed by: INTERNAL MEDICINE

## 2019-04-29 PROCEDURE — 83690 ASSAY OF LIPASE: CPT | Performed by: INTERNAL MEDICINE

## 2019-04-29 PROCEDURE — 82664 ELECTROPHORETIC TEST: CPT | Performed by: INTERNAL MEDICINE

## 2019-04-29 PROCEDURE — 82945 GLUCOSE OTHER FLUID: CPT | Performed by: INTERNAL MEDICINE

## 2019-04-29 PROCEDURE — G0500 MOD SEDAT ENDO SERVICE >5YRS: HCPCS | Performed by: INTERNAL MEDICINE

## 2019-04-29 PROCEDURE — 80053 COMPREHEN METABOLIC PANEL: CPT | Performed by: STUDENT IN AN ORGANIZED HEALTH CARE EDUCATION/TRAINING PROGRAM

## 2019-04-29 PROCEDURE — 83605 ASSAY OF LACTIC ACID: CPT

## 2019-04-29 PROCEDURE — 12000001 ZZH R&B MED SURG/OB UMMC

## 2019-04-29 PROCEDURE — 83615 LACTATE (LD) (LDH) ENZYME: CPT | Performed by: INTERNAL MEDICINE

## 2019-04-29 PROCEDURE — 84157 ASSAY OF PROTEIN OTHER: CPT | Performed by: INTERNAL MEDICINE

## 2019-04-29 PROCEDURE — 93005 ELECTROCARDIOGRAM TRACING: CPT

## 2019-04-29 PROCEDURE — 25000128 H RX IP 250 OP 636: Performed by: FAMILY MEDICINE

## 2019-04-29 PROCEDURE — 87186 SC STD MICRODIL/AGAR DIL: CPT | Performed by: INTERNAL MEDICINE

## 2019-04-29 PROCEDURE — 87640 STAPH A DNA AMP PROBE: CPT | Performed by: INTERNAL MEDICINE

## 2019-04-29 PROCEDURE — HZ2ZZZZ DETOXIFICATION SERVICES FOR SUBSTANCE ABUSE TREATMENT: ICD-10-PCS | Performed by: FAMILY MEDICINE

## 2019-04-29 PROCEDURE — 84478 ASSAY OF TRIGLYCERIDES: CPT | Performed by: INTERNAL MEDICINE

## 2019-04-29 PROCEDURE — 96367 TX/PROPH/DG ADDL SEQ IV INF: CPT | Performed by: INTERNAL MEDICINE

## 2019-04-29 PROCEDURE — 40001004 ZZHCL STATISTIC FLOW INT 9-15 ABY TC 88188: Performed by: INTERNAL MEDICINE

## 2019-04-29 PROCEDURE — 83605 ASSAY OF LACTIC ACID: CPT | Performed by: FAMILY MEDICINE

## 2019-04-29 PROCEDURE — 87116 MYCOBACTERIA CULTURE: CPT | Performed by: INTERNAL MEDICINE

## 2019-04-29 PROCEDURE — 82465 ASSAY BLD/SERUM CHOLESTEROL: CPT | Performed by: INTERNAL MEDICINE

## 2019-04-29 PROCEDURE — 89051 BODY FLUID CELL COUNT: CPT | Performed by: INTERNAL MEDICINE

## 2019-04-29 PROCEDURE — 88305 TISSUE EXAM BY PATHOLOGIST: CPT | Performed by: INTERNAL MEDICINE

## 2019-04-29 RX ORDER — ONDANSETRON 2 MG/ML
4 INJECTION INTRAMUSCULAR; INTRAVENOUS EVERY 6 HOURS PRN
Status: DISCONTINUED | OUTPATIENT
Start: 2019-04-29 | End: 2019-05-14 | Stop reason: HOSPADM

## 2019-04-29 RX ORDER — LIDOCAINE 40 MG/G
CREAM TOPICAL
Status: DISCONTINUED | OUTPATIENT
Start: 2019-04-29 | End: 2019-05-14 | Stop reason: HOSPADM

## 2019-04-29 RX ORDER — LEVOFLOXACIN 5 MG/ML
750 INJECTION, SOLUTION INTRAVENOUS EVERY 24 HOURS
Status: DISCONTINUED | OUTPATIENT
Start: 2019-04-29 | End: 2019-05-02

## 2019-04-29 RX ORDER — METHADONE HYDROCHLORIDE 10 MG/ML
113 CONCENTRATE ORAL DAILY
Status: ON HOLD | COMMUNITY
End: 2019-05-14

## 2019-04-29 RX ORDER — LIDOCAINE 4 G/G
1 PATCH TOPICAL
Status: DISCONTINUED | OUTPATIENT
Start: 2019-04-29 | End: 2019-05-01

## 2019-04-29 RX ORDER — PREGABALIN 100 MG/1
100 CAPSULE ORAL 3 TIMES DAILY
Status: DISCONTINUED | OUTPATIENT
Start: 2019-04-29 | End: 2019-05-08

## 2019-04-29 RX ORDER — OXYCODONE HYDROCHLORIDE 5 MG/1
5-10 TABLET ORAL
Status: DISCONTINUED | OUTPATIENT
Start: 2019-04-29 | End: 2019-05-02

## 2019-04-29 RX ORDER — IBUPROFEN 200 MG
600 TABLET ORAL EVERY 6 HOURS
Status: DISCONTINUED | OUTPATIENT
Start: 2019-04-29 | End: 2019-04-30

## 2019-04-29 RX ORDER — PANTOPRAZOLE SODIUM 40 MG/1
40 TABLET, DELAYED RELEASE ORAL
Status: DISCONTINUED | OUTPATIENT
Start: 2019-04-29 | End: 2019-05-14 | Stop reason: HOSPADM

## 2019-04-29 RX ORDER — ONDANSETRON 4 MG/1
4 TABLET, ORALLY DISINTEGRATING ORAL EVERY 6 HOURS PRN
Status: DISCONTINUED | OUTPATIENT
Start: 2019-04-29 | End: 2019-05-14 | Stop reason: HOSPADM

## 2019-04-29 RX ORDER — NALOXONE HYDROCHLORIDE 0.4 MG/ML
.1-.4 INJECTION, SOLUTION INTRAMUSCULAR; INTRAVENOUS; SUBCUTANEOUS
Status: DISCONTINUED | OUTPATIENT
Start: 2019-04-29 | End: 2019-05-07

## 2019-04-29 RX ORDER — SACCHAROMYCES BOULARDII 250 MG
250 CAPSULE ORAL 2 TIMES DAILY
Status: DISCONTINUED | OUTPATIENT
Start: 2019-04-29 | End: 2019-05-14 | Stop reason: HOSPADM

## 2019-04-29 RX ORDER — ACETAMINOPHEN 325 MG/1
650 TABLET ORAL EVERY 6 HOURS PRN
Status: DISCONTINUED | OUTPATIENT
Start: 2019-04-29 | End: 2019-04-29

## 2019-04-29 RX ORDER — POLYETHYLENE GLYCOL 3350 17 G/17G
17 POWDER, FOR SOLUTION ORAL DAILY
Status: DISCONTINUED | OUTPATIENT
Start: 2019-04-29 | End: 2019-05-08

## 2019-04-29 RX ORDER — IBUPROFEN 600 MG/1
600 TABLET, FILM COATED ORAL EVERY 6 HOURS PRN
Status: DISCONTINUED | OUTPATIENT
Start: 2019-04-29 | End: 2019-04-29

## 2019-04-29 RX ORDER — HYDROMORPHONE HYDROCHLORIDE 1 MG/ML
0.5 INJECTION, SOLUTION INTRAMUSCULAR; INTRAVENOUS; SUBCUTANEOUS ONCE
Status: COMPLETED | OUTPATIENT
Start: 2019-04-29 | End: 2019-04-29

## 2019-04-29 RX ORDER — METHADONE HYDROCHLORIDE 10 MG/ML
113 CONCENTRATE ORAL DAILY
Status: DISCONTINUED | OUTPATIENT
Start: 2019-04-29 | End: 2019-05-01

## 2019-04-29 RX ORDER — ACETAMINOPHEN 325 MG/1
650 TABLET ORAL EVERY 6 HOURS
Status: DISCONTINUED | OUTPATIENT
Start: 2019-04-29 | End: 2019-05-06

## 2019-04-29 RX ORDER — FENTANYL CITRATE 50 UG/ML
INJECTION, SOLUTION INTRAMUSCULAR; INTRAVENOUS PRN
Status: DISCONTINUED | OUTPATIENT
Start: 2019-04-29 | End: 2019-04-29 | Stop reason: HOSPADM

## 2019-04-29 RX ORDER — ESCITALOPRAM OXALATE 10 MG/1
10 TABLET ORAL DAILY
Status: DISCONTINUED | OUTPATIENT
Start: 2019-04-29 | End: 2019-05-14 | Stop reason: HOSPADM

## 2019-04-29 RX ADMIN — OXYCODONE HYDROCHLORIDE 10 MG: 5 TABLET ORAL at 10:24

## 2019-04-29 RX ADMIN — PREGABALIN 100 MG: 100 CAPSULE ORAL at 20:35

## 2019-04-29 RX ADMIN — METRONIDAZOLE 500 MG: 500 INJECTION, SOLUTION INTRAVENOUS at 10:25

## 2019-04-29 RX ADMIN — ACETAMINOPHEN, ASPIRIN AND CAFFEINE 2 TABLET: 250; 250; 65 TABLET, FILM COATED ORAL at 22:01

## 2019-04-29 RX ADMIN — LEVOFLOXACIN 750 MG: 5 INJECTION, SOLUTION INTRAVENOUS at 22:01

## 2019-04-29 RX ADMIN — METHADONE HYDROCHLORIDE 113 MG: 10 CONCENTRATE ORAL at 09:38

## 2019-04-29 RX ADMIN — ESCITALOPRAM 10 MG: 5 TABLET, FILM COATED ORAL at 07:42

## 2019-04-29 RX ADMIN — POLYETHYLENE GLYCOL 3350 17 G: 17 POWDER, FOR SOLUTION ORAL at 09:38

## 2019-04-29 RX ADMIN — OXYCODONE HYDROCHLORIDE 5 MG: 5 TABLET ORAL at 02:38

## 2019-04-29 RX ADMIN — OXYCODONE HYDROCHLORIDE 10 MG: 5 TABLET ORAL at 23:48

## 2019-04-29 RX ADMIN — Medication 250 MG: at 07:42

## 2019-04-29 RX ADMIN — METRONIDAZOLE 500 MG: 500 INJECTION, SOLUTION INTRAVENOUS at 17:54

## 2019-04-29 RX ADMIN — OXYCODONE HYDROCHLORIDE 5 MG: 5 TABLET ORAL at 04:57

## 2019-04-29 RX ADMIN — IBUPROFEN 600 MG: 600 TABLET ORAL at 07:45

## 2019-04-29 RX ADMIN — PANTOPRAZOLE SODIUM 40 MG: 40 TABLET, DELAYED RELEASE ORAL at 07:42

## 2019-04-29 RX ADMIN — PREGABALIN 100 MG: 100 CAPSULE ORAL at 07:42

## 2019-04-29 RX ADMIN — LIDOCAINE 1 PATCH: 560 PATCH PERCUTANEOUS; TOPICAL; TRANSDERMAL at 07:43

## 2019-04-29 RX ADMIN — ACETAMINOPHEN 650 MG: 325 TABLET, FILM COATED ORAL at 02:41

## 2019-04-29 RX ADMIN — Medication 0.5 MG: at 06:29

## 2019-04-29 RX ADMIN — OXYCODONE HYDROCHLORIDE 10 MG: 5 TABLET ORAL at 14:57

## 2019-04-29 RX ADMIN — METRONIDAZOLE 500 MG: 500 INJECTION, SOLUTION INTRAVENOUS at 02:37

## 2019-04-29 RX ADMIN — ACETAMINOPHEN, ASPIRIN AND CAFFEINE 2 TABLET: 250; 250; 65 TABLET, FILM COATED ORAL at 10:25

## 2019-04-29 RX ADMIN — IBUPROFEN 600 MG: 600 TABLET ORAL at 14:57

## 2019-04-29 RX ADMIN — PREGABALIN 100 MG: 100 CAPSULE ORAL at 14:57

## 2019-04-29 RX ADMIN — IBUPROFEN 600 MG: 600 TABLET ORAL at 02:41

## 2019-04-29 RX ADMIN — ACETAMINOPHEN 650 MG: 325 TABLET, FILM COATED ORAL at 07:45

## 2019-04-29 ASSESSMENT — ENCOUNTER SYMPTOMS
TREMORS: 0
ABDOMINAL PAIN: 0
LIGHT-HEADEDNESS: 0
SORE THROAT: 1
JOINT SWELLING: 0
NERVOUS/ANXIOUS: 0
SHORTNESS OF BREATH: 1
DYSURIA: 0
DIAPHORESIS: 0
CHILLS: 1
WEAKNESS: 0
UNEXPECTED WEIGHT CHANGE: 0
DYSPHORIC MOOD: 0
CONSTIPATION: 0
NAUSEA: 0
DIFFICULTY URINATING: 0
ARTHRALGIAS: 0
HEADACHES: 0
VOMITING: 0
NUMBNESS: 0
FEVER: 0

## 2019-04-29 ASSESSMENT — ACTIVITIES OF DAILY LIVING (ADL)
COGNITION: 0 - NO COGNITION ISSUES REPORTED
DRESS: 0-->INDEPENDENT
TRANSFERRING: 0-->INDEPENDENT
ADLS_ACUITY_SCORE: 8
ADLS_ACUITY_SCORE: 9.5
RETIRED_COMMUNICATION: 0-->UNDERSTANDS/COMMUNICATES WITHOUT DIFFICULTY
FALL_HISTORY_WITHIN_LAST_SIX_MONTHS: NO
ADLS_ACUITY_SCORE: 8
AMBULATION: 0-->INDEPENDENT
ADLS_ACUITY_SCORE: 8
TOILETING: 0-->INDEPENDENT
RETIRED_EATING: 0-->INDEPENDENT
BATHING: 0-->INDEPENDENT
SWALLOWING: 0-->SWALLOWS FOODS/LIQUIDS WITHOUT DIFFICULTY

## 2019-04-29 NOTE — OR NURSING
Chest tube placed to plaft side. Placed to water seal. Pt to have xray when back to her inpt room. Pt tolerated procedure fair with conscious sedation. About 600ml pink fluid drained, 200 of which was sent to lab. Pt retruning to 5A with transport.

## 2019-04-29 NOTE — H&P
[facility] Medicine History and Physical        Date of Admission:  4/28/2019  Date of Service: 4/28/2019         HPI      Chief Complaint   Chest pain and SOB     History is obtained from the patient and extensive chart review    History of Present Illness   Demetra Richardson is a 63 year old female w/complicated PMHx, significant for SLE, PSC, Sjogren's, fibromyalgia, opiate dependence and polysubstance abuse with history of OD and withdrawal seizures, epidural abscess, bipolar disorder type II, currently on a methadone program, and is admitted for further workup of dyspnea and chest pain.     Patient reports onset of left-sided stabbing pain and SOB on exertion about a week ago. She describes the pain as stabbing, very intense, worse with deep inspiration, and radiates down her left chest towards the back.  Symptoms have been progressive, w/dyspnea now w/ADLS. She denies upper respiratory symptoms, cough, fever, chills, night sweats, or known sick contacts. She saw her doctor on 2/26, where she was found to have leukocytosis, elevated CRP, and CXR concerning for PNA, so she was started on doxycycline. She had normal echocardiogram, and autoimmune workup as well. She has been compliant with the antibiotics.     No palpitations, no abdominal pain, n/v, diarrhea, bloody stools, urinary frequency or burning. She also denies myalgia, arthralgias or joint swelling. She has not been taking steroids or immunosuppressants recently. No recent LOC, denies recent drug use other than her prescribed methadone. Denies IVDU ever. She does have a history of substance abuse, including alcohol (sober for 7 years), prescription opiates, as well as benzodiazepines w/history of accidental overdose and w/d seizures. She has been on methadone treatment at [place], attends support group.     On arrival to the ED, patient was afebrile, non-tachycardic, normotensive, oxygenating  well on RA. Workup was significant for hyperkalemia 5.5, normal renal function, elevated alkPhos and AST, elevated CRP and ESR, LA 2.5, WBC 26.8 w/neutrophilia. UA unremarkable, CT PE showed loculated left-sided pleural effusion with atelectasis. RLL and RML atelectasis, as well as lymphadenopathy.     ED discussed case w/Pulm, who recommended admission w/plan for thoracentesis and possible bronchoscopy. She received IV toradol and Dilaudid, 2 L IV NS, levofloxacin, and was admitted to our service.         History:   Review of Systems   The 10 point Review of Systems is negative other than noted in the HPI or here.     Past Medical History    I have reviewed this patient's medical history and updated it with pertinent information if needed.   Past Medical History:   Diagnosis Date     Anemia     2/2 h/p gastrectomy with inability to absorb oral iron     Basal cell carcinoma     Left-sided, skin over over medial orbit/nasal bone. Removed Oct 2018.     Benzodiazepine dependence (H)     With h/o withdrawal seizure x 1     Bipolar 1 disorder, mixed, moderate (H) 2013     Chronic pain     Back, legs.     Epidural abscess 2005    x4     Fibromyalgia      Generalised anxiety disorder      GERD (gastroesophageal reflux disease)      Major depressive disorder      Migraine      Nephrolithiasis     S/p left sided lithotripsy     Opiate dependence (H)      Osteoarthritis      Osteoporosis      Primary sclerosing cholangitis      PUD (peptic ulcer disease)      SLE (systemic lupus erythematosus) (H)         Past Surgical History   I have reviewed this patient's surgical history and updated it with pertinent information if needed.  Past Surgical History:   Procedure Laterality Date     BACK SURGERY  04    L4-5 epidural abscess     BACK SURGERY  04    L3-4 spinal stenosis     BACK SURGERY  04    Lt psoas abscess      SECTION      x 2     CHOLECYSTECTOMY  -     CHOLECYSTECTOMY       CLOSED  REDUCTION, PERCUTANEOUS PINNING FINGER, COMBINED  8/10/2011    Procedure:COMBINED CLOSED REDUCTION, PERCUTANEOUS PINNING FINGER; 5th Proximal Phalanx; Surgeon:RADHA BUITRAGO; Location:[facility]     COLONOSCOPY       GASTRECTOMY  11-    Bilat truncal vagotomy, hemigastrectomy, RnY gastrojejunostomy     GASTRECTOMY      s/p 6/2/11 had temporary fdg tube, now removed     GASTROJEJUNOSTOMY  6/2/2011    Procedure:GASTROJEJUNOSTOMY; exploratory laparotmy with revision of gastrojejunostomy, Antrectomy, Miles-en-y, Gastrojejunostomy; Surgeon:JULIUS HELM; Location:[facility]     LASER HOLMIUM LITHOTRIPSY URETER(S), INSERT STENT, COMBINED      Procedure: COMBINED CYSTOSCOPY, URETEROSCOPY, LASER HOLMIUM LITHOTRIPSY URETER(S), INSERT STENT;;  Surgeon: Blair Correa MD;  Location: [facility]     LASER HOLMIUM NEPHROLITHOTOMY VIA PERCUTANEOUS NEPHROSTOMY  7/11/2012    Procedure: LASER HOLMIUM NEPHROLITHOTOMY VIA PERCUTANEOUS NEPHROSTOMY;  proceedure should read: Left Percutaneous Access, Left Percutaneous ultrasonic Nephrolithotomy, Ureteroscopy Holmium Laser Lithotripsy Stent Placement ;  Surgeon: Blair Correa MD;  [facility]     MAMMOPLASTY AUGMENTATION BILATERAL       OPEN REDUCTION INTERNAL FIXATION WRIST Left 1/11/2016    Procedure: OPEN REDUCTION INTERNAL FIXATION WRIST;  Surgeon: Darling Ellis MD;  Location: [facility]     RECONSTRUCT BREAST, IMPLANT PROSTHESIS, COMBINED          Social History   Social History     Tobacco Use     Smoking status: Never Smoker     Smokeless tobacco: Never Used   Substance Use Topics     Alcohol use: No     Comment: none in 10 years     Drug use: No       Family History   Non-contributory.    Prior to Admission Medications   Prior to Admission Medications   Prescriptions Last Dose Informant Patient Reported? Taking?   LYRICA 100 MG capsule   No No   Sig: TAKE 1 CAPSULE BY MOUTH THREE TIMES A DAY   Lidocaine HCl 2 % CREA   No No   Sig: Externally apply topically 2 times  daily   Patient not taking: Reported on 9/17/2018   aspirin-acetaminophen-caffeine (EXCEDRIN MIGRAINE) 250-250-65 MG per tablet   No No   Sig: Take 2 tablets by mouth every 6 hours as needed for headaches   bisacodyl (DULCOLAX) 5 MG EC tablet   No No   Sig: Take 2 tablets (10 mg) by mouth At Bedtime   Patient not taking: Reported on 3/14/2019   cevimeline (EVOXAC) 30 MG capsule   No No   Sig: Take 1 capsule (30 mg) by mouth 3 times daily   Patient not taking: Reported on 4/26/2019   doxycycline hyclate (VIBRAMYCIN) 100 MG capsule   No No   Sig: Take 1 capsule (100 mg) by mouth 2 times daily   escitalopram (LEXAPRO) 10 MG tablet   No No   Sig: Take 1 tablet (10 mg) by mouth daily   hydroxychloroquine (PLAQUENIL) 200 MG tablet   No No   Sig: Take 1 tablet (200 mg) by mouth 2 times daily   Patient not taking: Reported on 3/14/2019   ibuprofen (ADVIL/MOTRIN) 800 MG tablet   No No   Sig: Take 1 tablet (800 mg) by mouth every 6 hours as needed for moderate pain   Patient not taking: Reported on 3/14/2019   methadone (DOLPHINE) 5 MG/5ML solution   Yes No   Sig: Take 138 mg by mouth daily   mupirocin (BACTROBAN) 2 % ointment   No No   Sig: Apply topically 3 times daily   Patient not taking: Reported on 9/17/2018   pantoprazole (PROTONIX) 40 MG EC tablet   No No   Sig: Take 1 tablet (40 mg) by mouth every morning (before breakfast)   Patient not taking: Reported on 3/14/2019   pilocarpine (SALAGEN) 5 MG tablet   No No   Sig: Take 1 tablet (5 mg) by mouth 4 times daily as needed   Patient not taking: Reported on 4/26/2019   risperiDONE (RISPERDAL) 0.5 MG tablet   No No   Sig: TAKE 1 TABLET BY MOUTH EVERY MORNING AT 6AM & 1T DAILY AS NEEDED IRRITIBILITY/ANXIETY/RACING THOUGHT   Patient not taking: Reported on 3/14/2019   risperiDONE (RISPERDAL) 1 MG tablet   No No   Sig: TAKE 1 TABLET (1 MG) BY MOUTH AT BEDTIME   Patient not taking: Reported on 9/17/2018   tamsulosin (FLOMAX) 0.4 MG 24 hr capsule   No No   Sig: Take 1 capsule  (0.4 mg) by mouth daily   Patient not taking: Reported on 5/1/2018   valACYclovir (VALTREX) 500 MG tablet   No No   Sig: Take 1 tablet (500 mg) by mouth 2 times daily   Patient not taking: Reported on 3/14/2019      Facility-Administered Medications: None     Allergies   Allergies   Allergen Reactions     Penicillins Hives     Hives in childhood.           Physical Exam      Vital Signs: Temp: 97.7  F (36.5  C) Temp src: Oral BP: 104/69 Pulse: 81 Heart Rate: 69 Resp: 13 SpO2: 93 % O2 Device: None (Room air)    Weight: 0 lbs 0 oz    Physical Exam  General: very pleasant, talkative white elderly female. Resting comfortably in bed, NAD. Non-toxic appearing.    HEENT: No signs of trauma. No rhinorrhea, clear oropharynx. Moist mucous membranes.   Eyes: Conjunctivae are normal. Pupils are equal.   Neck: Normal range of motion.    Resp: No respiratory distress. Lower half of L lung with decreased sounds, underlying crackles. RLL w/scant crackles in lower 1/3. No wheezing.   CV: normal RRR, 2/6 systolic murmur, best auscultated at R parasternal 2nd ICS. Normal capillary refill.  MSK: Normal range of motion. No obvious deformity.  Neuro: The patient is alert and interactive. Speech normal. No gross focal symptoms.  Skin: No lesion or sign of trauma noted.   Psych: Affect is appropriate and concordant with mood, behavior is normal.    Assessment & Plan      Demetra Richardson is a 63 year old female w/complicated PMHx, significant for SLE, PSC, Sjogren's, fibromyalgia, opiate dependence and polysubstance abuse with history of OD and withdrawal seizures, epidural abscess, bipolar disorder type II, currently on a methadone program, and is admitted for management of L pleural effusion.    # PNA  # loculated pleural effusion  CRP increased at 360 from 2 days before, ESR improved at 46, WBC increased at 26.8, but neutrophilia, and elevated lactic. Patient is afebrile, hemodynamically stable and is not requiring oxygen.  Most  "likely etiology is parapneumonic effusion, also considered malignant effusion, effusion secondary to SLE, PE and pulmonary infarction, hemothorax, among others.  PE ruled out given negative CT PE.  She denies recent loss of consciousness to suspect aspiration pneumonia, and the lack of improvement with doxycycline could be due to increasing pneumococcal resistance.  However, given loculation of pleural effusion, will need to cover for anaerobes, as well as typical and atypical bacteria.  She denies history of IV drug use or previous MRSA infection, but will obtain screening.  Unlikely related to malignancy, given no weight loss, and acute presentation, with increased inflammatory markers.  No other symptoms of SLE exacerbation to suggest effusion secondary to this, and she has elevated neutrophils.   - MRSA nares  - Strep Ag  - Blood cultures x2  - repeat LA  - IV levofloxacin 750 mg qd  - IV metronidazole 500 mg TID  - probiotic  - O2 PRN to sat's >92%  - Pulm consult in the AM  - thoracentesis w/pleural fluid studies in the AM  - strict i/o     # thoracic lymphadenopathy  CT showed enlarged left lower cervical lymph node, prominent left heel or soft tissue, possibly left hilar adenopathy, prominent left paratracheal lymph nodes.  DDX includes infectious versus malignant versus sarcoidosis.  Will discuss with pulmonary as they had already digested bronchoscopy to ED attending.    # chronic pain  # polysubstance use disorder  # h/o overdose and w/d seizures  # on methadone treatment   Patient denies recent substance use, and she has not had alcohol in 7 years. She does admit to taking some xanax about 2 months ago (1/22/2019), and she was hospitalized with acute toxic encephalopathy, even intubated as she was having apneic pauses. [facility] but was unable to reach anyone despite trying multiple phone #, including their \"24/7\" line.   - withdrawal precautions, withdrawal monitoring    - " neurochecks every 4 hours  - UTS pending  - call [facility] AM to confirm methadone dose      - Scheduled Tylenol, ibuprofen   - PTA pregabalin   - heat/cold packs  - topical diclofenac   - oxycodone 5-10 mg q4h PRN   - Avoid IV opiates    # SLE  # Sjogren's syndrome  # primary sclerosing cholangitis  Patient had recent serology, but has returned negative so far.  She is not taking steroids, hydroxychloroquine or other immunosuppressants.  She she has recently tried medications for Sjogren's without much success.  She denies significant dry mouth, no rash, joint swelling, neurologic or acute psychiatric issues to suggest flares.  She was noted to have a protein creatinine ratio of 0.28, but denies other renal complications  She does have mildly elevated alk phos and AST, normal bilirubin and lipase    #Bipolar disorder type II  Patient endorses recent loss of her father to atypical pneumonia, but considers her mood has been stable, denies suicidal ideation.  -PTA escitalopram    # mild aortic stenosis  Murmur noticed on exam, echocardiogram 4/26 showed LVEF, w/normal diastolic function, normal RV. Unlikely to be related to current symptoms.     # migraines  -PTA Excedrin Migraine    # s/p Billroth II w/revision   # History of iron deficiency anemia  She has evidence of hypochromic RBC's without anemia. Patient has malabsorption secondary to probably surgery, receives IV iron as outpatient. On 1/23/19 she had low iron and saturation, normal ferritin, folate and B12.  - continue PTA pantoprazole   - IV iron replacement as outpatient    # Pain Assessment:  Current Pain Score 3/29/2017   Patient currently in pain? denies   Pain score (0-10) -   Pain location -   Pain descriptors -   Some encounter information is confidential and restricted. Go to Review Flowsheets activity to see all data.   - Demetra is experiencing pain due to left-sided pleural effusion. Pain management was discussed and the plan was created in a  collaborative caroline.  Demetra's response to the current recommendations: mixed response  - Please see the plan for pain management as documented above    Diet: Advance Diet as Tolerated: Clear Liquid Diet  Fluids: PO  DVT Prophylaxis: Pneumatic Compression Devices and Ambulate every shift  Code Status: Full Code    Disposition Plan   Expected discharge: 4 - 7 days; recommended to prior living arrangement once antibiotic plan established, safe disposition plan/ TCU bed available and evaluation completed. Dispo: Expected Discharge Date: 05/02/19        Entered: Caterina Tyson 04/28/2019, 11:31 PM   Information in the above section will display in the discharge planner report.    The patient was discussed with Dr. Lei and will be seen by Dr. Deny Tyson MD    [facility] Inpatient Service  [facility]   Pager: [phone]  Please see sticky note for cross cover information      Results:     Data   Recent Labs   Lab 04/28/19  1935 04/28/19  1907 04/26/19  1304   WBC 26.8*  --  15.5*   HGB 11.8  --  11.3*   MCV 95  --  95     --  288   INR  --  1.13  --    NA  --  136  --    POTASSIUM  --  5.5*  --    CHLORIDE  --  102  --    CO2  --  27  --    BUN  --  29  --    CR  --  0.96 0.89   ANIONGAP  --  7  --    RAFAELA  --  9.4  --    GLC  --  146*  --    ALBUMIN  --  2.4* 2.3*   PROTTOTAL  --  8.4  --    BILITOTAL  --  0.3  --    ALKPHOS  --  318*  --    ALT  --  43 32   AST  --  49* 27   LIPASE  --  32*  --      Recent Results (from the past 24 hour(s))   POC US CHEST B-SCAN    Impression    [facility] Procedure Note      Limited Bedside ED Ultrasound of Thorax:    PROCEDURE: PERFORMED BY: Dr. Carole Flores MD  INDICATIONS/SYMPTOM:  Chest pain  PROBE: High frequency linear probe  BODY LOCATION: Chest  FINDINGS:  Images of both lung hemithoracies taken in 2D in multiple rib spaces        Right side:  Lung sliding artifact  Present     Comet tail artifacts  Present   Left  side:  Lung sliding artifact  Present     Comet tail artifacts  Present   Hemothorax: Right side Absent     Left side Absent   Pleural effusion: Right side Absent      Left side Present    INTERPRETATION: There is evidence of free fluid consistent with a Left pleural effusion.  IMAGE DOCUMENTATION: Images were archived to PACs system.     CT Chest Pulmonary Embolism w Contrast    Narrative    EXAMINATION: CTA pulmonary angiogram, 4/28/2019 8:17 PM     COMPARISON: Chest radiograph 4/26/2019    HISTORY: PA suspected, high pretest probability.    TECHNIQUE: Volumetric helical acquisition of CT images of the chest  from the lung apices to the kidneys were acquired after the  administration of 55 mL of Isovue-370 IV contrast. .   Three-dimensional (3D) post-processed angiographic images were  reconstructed, archived to PACS and used in interpretation of this  study.     FINDINGS:      Contrast bolus is: suboptimal.  Exam is negative for acute pulmonary  embolism.     Moderate-sized loculated left-sided pleural effusion, with left  interfissural component. Associated left lower lobe atelectasis. No  pneumothorax. Mild subsegmental right lower lobe and middle lobe  atelectasis. There is mild bronchial nodularity in the posterior  aspect of the superior right lower lobe. The right ventricle is  slightly larger than the left ventricle. The thoracic aorta is  unremarkable. No pericardial effusion. Enlarged left lower cervical  lymph node (series 5, image 18). Prominent left hilar soft tissue  series 5, image 40) which may represent left hilar adenopathy.  Prominent left paratracheal lymph nodes.    Bone and soft tissue: Bilateral breast implants which are peripherally  calcified. Thoracic kyphosis with mild anterior wedge deformity of the  mid-lower thoracic vertebra. Redemonstration of the nonobstructing  right-sided renal stones.    Upper abdomen: Partially visualized postsurgical changes of gastric  bypass.      Impression     IMPRESSION:   1. Exam is negative for acute pulmonary embolism.   2. Moderate size loculated left-sided pleural effusion with associated  atelectasis. Mild right lower lobe and right middle lobe atelectasis.   3. Left hilar soft tissue, likely enlarged left hilar lymph node. Left  lower cervical adenopathy.    I have personally reviewed the examination and initial interpretation  and I agree with the findings.    ALICE JAY MD

## 2019-04-29 NOTE — PHARMACY-ADMISSION MEDICATION HISTORY
"Admission medication history interview status for the 4/28/2019 admission is complete. See Epic admission navigator for allergy information, pharmacy, prior to admission medications and immunization status.     Medication history interview sources:  Patient, [facility] [phone]    Changes made to PTA medication list (reason)  Added: None  Deleted: bisacodyl, cevimeline, doxycycline, hydroxychloroquine, ibuprofen, lidocaine, mupirocin, pantoprazole, pilocarpine, risperidone, tamsulosin, valacyclovir  Changed: Excedrin (takes 2 tablets scheduled three times daily, not PRN)    Additional medication history information (including reliability of information, actions taken by pharmacist):  -Patient knows all names and doses of medications  -Methadone dosing confirmed by Dara Garcia earlier this morning. Patient prefers high concentration liquid.  -With current Excedrin dosing, patient is taking 1000 mg of aspirin daily, which may put her at risk of GI bleed, especially in the setting of previous gastrectomy. She takes it regularly because \"it helps with lots of things.\"  -Patient was prescribed pilocarpine for Sjogren's but stopped using d/t side effect of sweating. She was subsequently prescribed cevimeline, but did not  from pharmacy.  -Patient stopped taking hydroxychloroquine months ago because her diagnosis of lupus was unclear. Met with rheumatologist about 6 weeks ago and, per pt, plan may be to restart it in the future if needed.  -Patient does not use any OTC med, supplements, or topical products.     Prior to Admission medications    Medication Sig Last Dose Taking? Auth Provider   aspirin-acetaminophen-caffeine (EXCEDRIN MIGRAINE) 250-250-65 MG tablet Take 2 tablets by mouth 3 times daily 4/28/2019 at Unknown time Yes Unknown, Entered By History   escitalopram (LEXAPRO) 10 MG tablet Take 1 tablet (10 mg) by mouth daily 4/28/2019 at Unknown time Yes Fredy Merritt MD   LYRICA 100 MG " capsule TAKE 1 CAPSULE BY MOUTH THREE TIMES A DAY 4/28/2019 at Unknown time Yes Fredy Merritt MD   methadone (DOLOPHINE-INTENSOL) 10 MG/ML (HIGH CONC) solution Take 113 mg by mouth daily [facility] [phone]    Dose as of 4/29/19 4/28/2019 at Unknown time Yes Unknown, Entered By History     Medication history completed by: Roslyn Tenorio, [employer]4 pharmacy student

## 2019-04-29 NOTE — PROCEDURES
Chest tube placement     Consent:   1. Obtained from patient after discussion of risk/benefit/alternatives) and all questions answered to the best of my knowledge. This signed consent is in the chart. Yes     Universal Protocol: Patient Identification was verified, time out was performed, site marking was done. Imaging data reviewed. Full Barrier precaution done: Hands washed, mask, gloves, gown, cap and eye protection all used.     Indication: Loculated pleural effusion    Narrative:  Ultrasound was used to identify area of the interest.  Drugs given for sedation: Versed 2 mg, Fentanyl 100 mcg  Area prepped with chlorhexedine/sterily draped. 1% lidocaine injected subcut. A small needle was then advanced above the superior margin of the rib in the 5th ITC space until the pleural space was entered. Using a standard Seldinger technique, a 14F Albanian pigtail/small chest tube was inserted into the pleural space and connected to the water seal chamber and wall suction. Approximately 600ml serosanguinous fluid immediately obtained. The tube was then sutured to the skin and all tubing connections were secured.  A Chest X ray ordered     Complications: None    Procedure performed by Jay Maxwell MD on April 29, 2019.  I was present for the entire procedure by Melissa Nichols MD.

## 2019-04-29 NOTE — CONSULTS
[facility]    Pulmonary, Allergy, Critical Care and Sleep Medicine    Initial Consultation  04/29/2019    Demetra Richardson    Age: 63 year old    MRN# [id number]  YOB: 1955    Date of Admission: 4/28/2019  Reason for Consultation: Loculated pleural effusion   Requesting Team: [place]     Primary care provider: Freyd Merritt     Assessment and Recommendations:    Demetra Richardson is a 63 year old female with history of SLE, PSC, Sjogren's, fibromyalgia and previous hs of subsubstance currently on methadone who presented with 1 week of worsening shortness of breath and found to have a moderate left sided loculated pleural effusion.    Moderate loculated left-sided pleural effusion  Likely complex parapneumonic effusion  She is in quite a bit of discomfort with lots of pleuritic chest pain.  Bedside ultrasound shows loculated appearing pleural effusion with multiple septations.  Given the likelihood of this representing a complicated parapneumonic effusion, a 14F pigtail chest tube was placed today with 600 cc of serosanguineous fluid removed.  Initial pleural fluid analysis shows pending cell count, LDH of 1116, glucose of 26 and protein of 4.7.  Gram stain is pending as is pleural fluid cultures.  Repeat chest x-ray shows slight improvement in left-sided effusion.    -For now please keep chest tube to -20 suction overnight  -CXR in the a.m. (ordered for you)  -We will follow-up pleural fluid analysis  -Likely will begin TPA/dornase through chest tube tomorrow (I will order in the a.m.)  -Agree with continuing empiric antibiotics, can tailor coverage based on cultures    Seen and discussed with Dr. Nichols.    Jay Maxwell MD  Pulmonary and Critical Care Fellow   Pager [phone]    Chief Complaint and History of Present Illness:    CC: Left sided chest pain     HPI: 63 year old female with history of SLE, PSC, Sjogren's, fibromyalgia and previous hs of  subsubstance currently on methadone who presented with 1 week of worsening shortness of breath and found to have a moderate left sided loculated pleural effusion.    Patient notes 1 week of stabbing left-sided chest pain and shortness of breath on exertion.  The pain has become so bad that she is unable to move around without severe discomfort.  The pain progressed to involve both her upper left chest and radiating around to her back.  She denies cold symptoms, fever, chills or night sweats.  She is recently been seen by her rheumatologist and there was some concern about recent blackouts and whether this was related to her lupus.  This was ruled out and she has been told that her lupus is likely not active.    She has a long history of substance abuse and is currently on methadone.  She denies recent abuse or IV drugs.  Previous alcohol abuse but has been sober for a number of years.    Upon arrival to the ED she was found to have a white count of 26 with a grossly elevated CRP of 198.  CT PE study showed no evidence of pleural effusion but a moderate-sized loculated left-sided pleural effusion with some associated atelectasis.  In addition there was some right middle lobe and right lower lobe atelectasis as well as some lymphadenopathy.     On my evaluation today, she is in extreme discomfort in her chest and back that is exacerbated with any movement.  She is currently on 2 L oxygen but does not appear to be in any respiratory distress.  She denies any coughing and has been afebrile since admission.    Review of Systems:  C: negative for fever, chills, change in weight, change in appetite  INTEGUMENTARY/SKIN: no rash or obvious new lesions  ENT/MOUTH: no sore throat, no sinus pain, no nasal drainage  RESP: see interval history  CV: + chest pain, no palpitations, no peripheral edema, no orthopnea  GI: no nausea, no vomiting, no reflux symptoms, no change in stools  : no dysuria  MUSCULOSKELETAL: + myalgias, +  arthralgias  ENDOCRINE: blood sugars with adequate control  NEURO: no headache, no numbness or tingling  PSYCHIATRIC: mood stable    Histories, Prior to Admission Medications, Allergies:    Past Medical History:  Past Medical History:   Diagnosis Date     Anemia     2/2 h/p gastrectomy with inability to absorb oral iron     Basal cell carcinoma     Left-sided, skin over over medial orbit/nasal bone. Removed Oct 2018.     Benzodiazepine dependence (H)     With h/o withdrawal seizure x 1     Bipolar 1 disorder, mixed, moderate (H) 2013     Chronic pain     Back, legs.     Epidural abscess 2005    x4     Fibromyalgia      Generalised anxiety disorder      GERD (gastroesophageal reflux disease)      Major depressive disorder      Migraine      Nephrolithiasis     S/p left sided lithotripsy     Opiate dependence (H)      Osteoarthritis      Osteoporosis      Primary sclerosing cholangitis      PUD (peptic ulcer disease)      SLE (systemic lupus erythematosus) (H)        Past Surgical History:  Past Surgical History:   Procedure Laterality Date     BACK SURGERY  04    L4-5 epidural abscess     BACK SURGERY  04    L3-4 spinal stenosis     BACK SURGERY  04    Lt psoas abscess      SECTION      x 2     CHOLECYSTECTOMY  -     CHOLECYSTECTOMY       CLOSED REDUCTION, PERCUTANEOUS PINNING FINGER, COMBINED  8/10/2011    Procedure:COMBINED CLOSED REDUCTION, PERCUTANEOUS PINNING FINGER; 5th Proximal Phalanx; Surgeon:RADHA BUITRAGO; Location[facility]     COLONOSCOPY       GASTRECTOMY  2005    Bilat truncal vagotomy, hemigastrectomy, RnY gastrojejunostomy     GASTRECTOMY      s/p 11 had temporary fdg tube, now removed     GASTROJEJUNOSTOMY  2011    Procedure:GASTROJEJUNOSTOMY; exploratory laparotmy with revision of gastrojejunostomy, Antrectomy, Miles-en-y, Gastrojejunostomy; Surgeon:JULIUS HELM; Location[facility]     LASER HOLMIUM LITHOTRIPSY URETER(S), INSERT STENT, COMBINED       Procedure: COMBINED CYSTOSCOPY, URETEROSCOPY, LASER HOLMIUM LITHOTRIPSY URETER(S), INSERT STENT;;  Surgeon: Blair Correa MD;  Location: [facility]     LASER HOLMIUM NEPHROLITHOTOMY VIA PERCUTANEOUS NEPHROSTOMY  7/11/2012    Procedure: LASER HOLMIUM NEPHROLITHOTOMY VIA PERCUTANEOUS NEPHROSTOMY;  proceedure should read: Left Percutaneous Access, Left Percutaneous ultrasonic Nephrolithotomy, Ureteroscopy Holmium Laser Lithotripsy Stent Placement ;  Surgeon: Blair Correa MD;  Location: [facility]     MAMMOPLASTY AUGMENTATION BILATERAL       OPEN REDUCTION INTERNAL FIXATION WRIST Left 1/11/2016    Procedure: OPEN REDUCTION INTERNAL FIXATION WRIST;  Surgeon: Darling Ellis MD;  Location: [facility]     RECONSTRUCT BREAST, IMPLANT PROSTHESIS, COMBINED         Past Social History:  ETOH: Previous abuse  Tobacco: Never smoker   Prior substance abuse with oxycodone. Now on methadone   Significant inhalational exposures: None   PPD/TB exposure status: None    Social History     Socioeconomic History     Marital status: Single     Spouse name: Not on file     Number of children: Not on file     Years of education: Not on file     Highest education level: Not on file   Occupational History     Not on file   Social Needs     Financial resource strain: Not on file     Food insecurity:     Worry: Not on file     Inability: Not on file     Transportation needs:     Medical: Not on file     Non-medical: Not on file   Tobacco Use     Smoking status: Never Smoker     Smokeless tobacco: Never Used   Substance and Sexual Activity     Alcohol use: No     Comment: none in 10 years     Drug use: No     Sexual activity: Not Currently   Lifestyle     Physical activity:     Days per week: Not on file     Minutes per session: Not on file     Stress: Not on file   Relationships     Social connections:     Talks on phone: Not on file     Gets together: Not on file     Attends [employer] service: Not on file     Active member of club or  organization: Not on file     Attends meetings of clubs or organizations: Not on file     Relationship status: Not on file     Intimate partner violence:     Fear of current or ex partner: Not on file     Emotionally abused: Not on file     Physically abused: Not on file     Forced sexual activity: Not on file   Other Topics Concern     Parent/sibling w/ CABG, MI or angioplasty before 65F 55M? Not Asked   Social History Narrative    Intake 4/16/15:        H/o divorce but most recently living with JUANIS Hamilton. Alfonso recently passed away.         In past employed as a .         Tobacco use: denies    Alcohol use: denies    Drug use: daily pot use for 30 years. Currently with sobriety.            Family History:  Family History   Problem Relation Age of Onset     Family History Negative Mother      Family History Negative Father        Medications:    acetaminophen  650 mg Oral Q6H     escitalopram  10 mg Oral Daily     ibuprofen  600 mg Oral Q6H     levofloxacin  750 mg Intravenous Q24H     lidocaine  1 patch Transdermal Q24H     lidocaine   Transdermal Q8H     lidocaine   Transdermal Q24h     methadone  113 mg Oral Daily     metroNIDAZOLE  500 mg Intravenous Q8H     pantoprazole  40 mg Oral QAM AC     polyethylene glycol  17 g Oral Daily     pregabalin  100 mg Oral TID     saccharomyces boulardii  250 mg Oral BID     sodium chloride (PF)  3 mL Intracatheter Q8H     aspirin-acetaminophen-caffeine, lidocaine 4%, lidocaine (buffered or not buffered), melatonin, naloxone, ondansetron **OR** ondansetron, oxyCODONE, sodium chloride (PF)    Allergies:     Allergies   Allergen Reactions     Penicillins Hives     Hives in childhood.       Physical Exam:    Temp:  [96  F (35.6  C)-97.7  F (36.5  C)] 96  F (35.6  C)  Pulse:  [68-89] 68  Heart Rate:  [64-89] 72  Resp:  [11-26] 18  BP: ()/(65-78) 114/69  FiO2 (%):  [2 %] 2 %  SpO2:  [90 %-97 %] 93 %    Intake/Output Summary (Last 24 hours) at 4/29/2019 1311  Last data  filed at 4/29/2019 0700  Gross per 24 hour   Intake 2150 ml   Output 300 ml   Net 1850 ml     General: laying in bed, very anxious, screaming out in pain with any movememt  HEENT: anicteric, moist mucosa  Neck: no palpable lymphadenopathy, no JVD noted  Chest: absent breath sounds at the left base, no wheeze or crackles appreciated   Cardiac: RRR no murmurs  Abdomen: Soft, flat, non tender, active BS  Extremities: no lower extremity edema  Neuro: awake and oriented x3, no focal deficits   Skin: no rash noted    Laboratory, imaging, and microbiologic data:    All laboratory and imaging data reviewed.      WBC 15.5 -> 26.8 -> 22.1   -> 360  Lactate 2.5 -> 0.7    CTPE:   Reviewed by me.   No pulmonary embolism. Moderate left sided loculated pleural effusion with some associated atelectasis.    Pleural fluid analysis  Cell count: Pending  LDH 1116  Glucose 26  Lipase 30  Total protein 4.7  Triglycerides 34   Cholesterol 68  Amylase 14  Gram stain culture: Pending

## 2019-04-29 NOTE — PHARMACY-CONSULT NOTE
Methadone Clinic Information Note    Clinic Name: [facility]  Clinic Location ([facility]): [place], [place]  Phone Number: [phone]    I talked to Monica, who said the patient's dose was 113mg mg of methadone daily, in 10mg/ml concentrated liquid.    Khang Qureshi PharmD, [facility]    [phone]  Pager [phone]

## 2019-04-29 NOTE — PROGRESS NOTES
Pt admitted from ED at 0450. VSS. 3 L O2 via NC. LS: exp wheezes, SOB d/t anxiety. Sats above 92%. +BS, +FL. Pt states she had BM yesterday. Denies nausea. On clear liquid diet, can be advanced. Pt states she is anxious d/t pain in left chest, rating pain 9/10. Also anxious about getting methadone dose verified so she can get her dose of methadone. Pt called methadone clinic but they needed release of information before releasing current dose. Pt given release of info and filled out and sent. Pt received 5 mg oxycodone on arrival. She states that has not been helping. Pt requesting IV dilaudid since she received in ED and she said that is what helped the most. MD paged and 1x dose of Dilaudid ordered and given. Pt states it was helpful. Up with SBA. Pt is calling appropriately.

## 2019-04-29 NOTE — PROGRESS NOTES
[facility] Progress Note    Main Plans for Today   - Thoracentesis with pulmonology  - Continue Antibiotics  - EKG to check QTc    Assessment & Plan   Demetra Richardson is a 63 year old female w/complicated PMHx, significant for SLE, PSC, Sjogren's, fibromyalgia, opiate dependence and polysubstance abuse with history of OD and withdrawal seizures, epidural abscess, bipolar disorder type II, currently on a methadone program, and is admitted for management of L pleural effusion.     # PNA  # loculated pleural effusion  CRP increased at 360 from 2 days before, ESR improved at 46, WBC increased at 26.8, but neutrophilia, and elevated lactic. Patient is afebrile, hemodynamically stable and is requiring 3L O2. Most likely etiology is parapneumonic effusion, also considered malignant effusion, effusion secondary to SLE, PE and pulmonary infarction, hemothorax, among others.  PE ruled out given negative CT PE. She denies recent loss of consciousness to suspect aspiration pneumonia, and the lack of improvement with doxycycline could be due to increasing pneumococcal resistance.  However, given loculation of pleural effusion, will need to cover for anaerobes, as well as typical and atypical bacteria.  She denies history of IV drug use or previous MRSA infection, but will obtain screening.  Unlikely related to malignancy, given no weight loss, and acute presentation, with increased inflammatory markers.  No other symptoms of SLE exacerbation to suggest effusion secondary to this, and she has elevated neutrophils.   - MRSA nares negative  - Strep Ag negative  - Blood cultures x2  - repeat LA within normal limits after mild elevation on admission  - IV levofloxacin 750 mg qd  - IV metronidazole 500 mg TID  - probiotic  - O2 PRN to sat's >92%  - Pulm consulted, planning for thoracentesis; appreciate recs  - thoracentesis w/pleural fluid studies in the AM  -  "strict i/o      # thoracic lymphadenopathy  CT showed enlarged left lower cervical lymph node, prominent left heel or soft tissue, possibly left hilar adenopathy, prominent left paratracheal lymph nodes.  DDX includes infectious versus malignant versus sarcoidosis.    - Pulm likely will not do bronchoscopy at this time     # chronic pain  # polysubstance use disorder  # h/o overdose and w/d seizures  # on methadone treatment   Patient denies recent substance use, and she has not had alcohol in 7 years. She does admit to taking some xanax about 2 months ago (2019), and she was hospitalized with acute toxic encephalopathy, even intubated as she was having apneic pauses. Patient is on methadone, and fills her prescriptions at [facility].  - withdrawal precautions, withdrawal monitoring    - UTS negative  - Continue methadone 113 mg daily     - Scheduled Tylenol, ibuprofen   - PTA pregabalin   - heat/cold packs  - topical diclofenac   - oxycodone 5-10 mg q4h PRN   - Avoid IV opiates     # SLE  # Sjogren's syndrome  # primary sclerosing cholangitis  Patient had recent serology, but has returned negative so far.  She is not taking steroids, hydroxychloroquine or other immunosuppressants.  She has recently tried medications for Sjogren's without much success.  She does report a dry mouth and dry eyes, and has a rash on her bilateral hands, but has no joint swelling, neurologic or acute psychiatric issues to suggest flares.  She was noted to have a protein creatinine ratio of 0.28, but denies other renal complications  She does have mildly elevated alk phos and AST, normal bilirubin and lipase     #Bipolar disorder type II  Patient endorses recent loss of her father to atypical pneumonia, but considers her mood has been stable, denies suicidal ideation. Patient reports being on Risperidone in the past after a \"manic episode after a family member .\" She reports not being on this medication for 6 months at least " though.  -PTA escitalopram     # mild aortic stenosis  Murmur noticed on exam, echocardiogram 4/26 showed LVEF, w/normal diastolic function, normal RV. Unlikely to be related to current symptoms.      # migraines  -PTA Excedrin Migraine     # s/p Billroth II w/revision   # History of iron deficiency anemia  She has evidence of hypochromic RBC's without anemia. Patient has malabsorption secondary to probably surgery, receives IV iron as outpatient. On 1/23/19 she had low iron and saturation, normal ferritin, folate and B12.  - continue PTA pantoprazole   - IV iron replacement as outpatient       # Pain Assessment:  Current Pain Score 4/29/2019   Patient currently in pain? yes   Pain score (0-10) -   Pain location -   Pain descriptors -   Some encounter information is confidential and restricted. Go to Review Flowsheets activity to see all data.   - Demetra is experiencing pain due to pleural effusion. Pain management was discussed and the plan was created in a collaborative fashion.  Demetra's response to the current recommendations: engaged  - Please see the plan for pain management as documented above      Diet: NPO per Anesthesia Guidelines for Procedure/Surgery Except for: Meds  Fluids: PO  DVT Prophylaxis: Pneumatic Compression Devices  Code Status: Full Code    Disposition Plan   Expected discharge:Expected Discharge Date: 05/02/19 2 - 3 days, recommended to prior living arrangement once adequate pain management/ tolerating PO medications and antibiotic plan established.Dispo: Expected Discharge Date: 05/02/19        Entered: Gio Stephens 04/29/2019, 8:50 AM   Information in the above section will display in the discharge planner report.      The patient's care was discussed with the Attending Physician, Dr. Barclay.    Gio Stephens  [facility]: PGY-1  Pager: [phone]  Please see sticky note for cross cover information    Interval History    Patient is  still dyspneic this AM. She still also has some chest pain worse with deep breaths. Denies abdominal pain, N/V. Denies any other concerns at this time besides wanting to get her Methadone dose this morning.    Review of Systems   Constitutional: Positive for chills. Negative for diaphoresis, fever and unexpected weight change.   HENT: Positive for sore throat.    Respiratory: Positive for shortness of breath.    Cardiovascular: Positive for chest pain. Negative for leg swelling.   Gastrointestinal: Negative for abdominal pain, constipation, nausea and vomiting.   Genitourinary: Negative for difficulty urinating and dysuria.   Musculoskeletal: Negative for arthralgias and joint swelling.   Skin: Positive for rash.   Neurological: Negative for tremors, weakness, light-headedness, numbness and headaches.   Psychiatric/Behavioral: Negative for dysphoric mood. The patient is not nervous/anxious.        Physical Exam   Vital Signs: Temp: 96.6  F (35.9  C) Temp src: Oral BP: 115/65 Pulse: 68 Heart Rate: 74 Resp: 22 SpO2: 94 % O2 Device: Nasal cannula Oxygen Delivery: 3 LPM  Weight: 156 lbs 0 oz  Physical Exam   Constitutional: She is oriented to person, place, and time. She appears well-developed and well-nourished. No distress.   HENT:   Head: Normocephalic and atraumatic.   Mouth/Throat: Oropharynx is clear and moist. No oropharyngeal exudate.   Eyes: Conjunctivae and EOM are normal. No scleral icterus.   Cardiovascular: Normal rate and regular rhythm. Exam reveals no friction rub.   Murmur heard.  Pulmonary/Chest: Effort normal. No respiratory distress. She has rales (LLL).   Mildly tachypneic  Taking shallow breaths with chest movement limited by pain   Abdominal: Soft. Bowel sounds are normal. There is no tenderness. There is no guarding.   Musculoskeletal: She exhibits no edema.   Neurological: She is alert and oriented to person, place, and time.   Skin: Skin is warm and dry. Rash (faint erythematous 2-10 mm patches  on bilateral hands) noted. No erythema.   Psychiatric: She has a normal mood and affect.

## 2019-04-29 NOTE — PLAN OF CARE
Pt is alert, orientated, up with SBA, using call light appropriately, c/o left chest pain radiating to back, taking oxycodone with some relief, Excedrin for headache with relief, pt down for chest tube insertion this afternoon, pt would not go down until they agreed for her to have some sedation for procedure, pt has chest tube to left side with 450 serosanguinous drainage, VSS on 3 liters, some BENSON, triggered sepsis this a.m. Lactic 0.7, CIWA score 2    1800: BP 85/51 95/59 HR 70's, paged and updated MD, no dizziness

## 2019-04-30 ENCOUNTER — APPOINTMENT (OUTPATIENT)
Dept: GENERAL RADIOLOGY | Facility: CLINIC | Age: 64
DRG: 853 | End: 2019-04-30
Attending: INTERNAL MEDICINE
Payer: COMMERCIAL

## 2019-04-30 LAB
ALBUMIN SERPL-MCNC: 1.7 G/DL (ref 3.4–5)
ALP SERPL-CCNC: 296 U/L (ref 40–150)
ALT SERPL W P-5'-P-CCNC: 33 U/L (ref 0–50)
ANION GAP SERPL CALCULATED.3IONS-SCNC: 5 MMOL/L (ref 3–14)
APPEARANCE FLD: NORMAL
AST SERPL W P-5'-P-CCNC: 49 U/L (ref 0–45)
BASOPHILS # BLD AUTO: 0 10E9/L (ref 0–0.2)
BASOPHILS NFR BLD AUTO: 0.2 %
BILIRUB SERPL-MCNC: 0.3 MG/DL (ref 0.2–1.3)
BUN SERPL-MCNC: 20 MG/DL (ref 7–30)
CALCIUM SERPL-MCNC: 8.4 MG/DL (ref 8.5–10.1)
CHLORIDE SERPL-SCNC: 106 MMOL/L (ref 94–109)
CO2 SERPL-SCNC: 27 MMOL/L (ref 20–32)
COLOR FLD: NORMAL
COPATH REPORT: NORMAL
CREAT SERPL-MCNC: 0.74 MG/DL (ref 0.52–1.04)
CRP SERPL-MCNC: 320 MG/L (ref 0–8)
DEPRECATED CALCIDIOL+CALCIFEROL SERPL-MC: 46 UG/L (ref 20–75)
DIFFERENTIAL METHOD BLD: ABNORMAL
EOSINOPHIL # BLD AUTO: 0.7 10E9/L (ref 0–0.7)
EOSINOPHIL NFR BLD AUTO: 3.8 %
ERYTHROCYTE [DISTWIDTH] IN BLOOD BY AUTOMATED COUNT: 14.5 % (ref 10–15)
GFR SERPL CREATININE-BSD FRML MDRD: 86 ML/MIN/{1.73_M2}
GLUCOSE SERPL-MCNC: 93 MG/DL (ref 70–99)
HCT VFR BLD AUTO: 36.4 % (ref 35–47)
HGB BLD-MCNC: 10.7 G/DL (ref 11.7–15.7)
IMM GRANULOCYTES # BLD: 0.1 10E9/L (ref 0–0.4)
IMM GRANULOCYTES NFR BLD: 0.7 %
INTERPRETATION ECG - MUSE: NORMAL
LACTATE BLD-SCNC: 0.7 MMOL/L (ref 0.7–2)
LYMPHOCYTES # BLD AUTO: 0.7 10E9/L (ref 0.8–5.3)
LYMPHOCYTES NFR BLD AUTO: 3.9 %
LYMPHOCYTES NFR FLD MANUAL: 7 %
MCH RBC QN AUTO: 28.7 PG (ref 26.5–33)
MCHC RBC AUTO-ENTMCNC: 29.4 G/DL (ref 31.5–36.5)
MCV RBC AUTO: 98 FL (ref 78–100)
MONOCYTES # BLD AUTO: 1 10E9/L (ref 0–1.3)
MONOCYTES NFR BLD AUTO: 5.7 %
MONOS+MACROS NFR FLD MANUAL: 6 %
NEUTROPHILS # BLD AUTO: 15.4 10E9/L (ref 1.6–8.3)
NEUTROPHILS NFR BLD AUTO: 85.7 %
NEUTS BAND NFR FLD MANUAL: 87 %
NRBC # BLD AUTO: 0 10*3/UL
NRBC BLD AUTO-RTO: 0 /100
PLATELET # BLD AUTO: 300 10E9/L (ref 150–450)
POTASSIUM SERPL-SCNC: 4.8 MMOL/L (ref 3.4–5.3)
PROT SERPL-MCNC: 6.4 G/DL (ref 6.8–8.8)
RBC # BLD AUTO: 3.73 10E12/L (ref 3.8–5.2)
SODIUM SERPL-SCNC: 138 MMOL/L (ref 133–144)
SPECIMEN SOURCE FLD: NORMAL
WBC # BLD AUTO: 18 10E9/L (ref 4–11)
WBC # FLD AUTO: NORMAL /UL

## 2019-04-30 PROCEDURE — 25000132 ZZH RX MED GY IP 250 OP 250 PS 637: Performed by: FAMILY MEDICINE

## 2019-04-30 PROCEDURE — 80053 COMPREHEN METABOLIC PANEL: CPT | Performed by: STUDENT IN AN ORGANIZED HEALTH CARE EDUCATION/TRAINING PROGRAM

## 2019-04-30 PROCEDURE — 25000128 H RX IP 250 OP 636: Performed by: FAMILY MEDICINE

## 2019-04-30 PROCEDURE — 25000132 ZZH RX MED GY IP 250 OP 250 PS 637: Performed by: SURGERY

## 2019-04-30 PROCEDURE — 25000125 ZZHC RX 250: Performed by: FAMILY MEDICINE

## 2019-04-30 PROCEDURE — 12000001 ZZH R&B MED SURG/OB UMMC

## 2019-04-30 PROCEDURE — 71045 X-RAY EXAM CHEST 1 VIEW: CPT

## 2019-04-30 PROCEDURE — 86140 C-REACTIVE PROTEIN: CPT | Performed by: STUDENT IN AN ORGANIZED HEALTH CARE EDUCATION/TRAINING PROGRAM

## 2019-04-30 PROCEDURE — 25800030 ZZH RX IP 258 OP 636: Performed by: STUDENT IN AN ORGANIZED HEALTH CARE EDUCATION/TRAINING PROGRAM

## 2019-04-30 PROCEDURE — 85025 COMPLETE CBC W/AUTO DIFF WBC: CPT | Performed by: STUDENT IN AN ORGANIZED HEALTH CARE EDUCATION/TRAINING PROGRAM

## 2019-04-30 PROCEDURE — 25800030 ZZH RX IP 258 OP 636: Performed by: FAMILY MEDICINE

## 2019-04-30 PROCEDURE — 25000128 H RX IP 250 OP 636: Performed by: STUDENT IN AN ORGANIZED HEALTH CARE EDUCATION/TRAINING PROGRAM

## 2019-04-30 PROCEDURE — 3E0L3GC INTRODUCTION OF OTHER THERAPEUTIC SUBSTANCE INTO PLEURAL CAVITY, PERCUTANEOUS APPROACH: ICD-10-PCS | Performed by: INTERNAL MEDICINE

## 2019-04-30 PROCEDURE — 25000125 ZZHC RX 250: Performed by: STUDENT IN AN ORGANIZED HEALTH CARE EDUCATION/TRAINING PROGRAM

## 2019-04-30 PROCEDURE — 36415 COLL VENOUS BLD VENIPUNCTURE: CPT | Performed by: FAMILY MEDICINE

## 2019-04-30 PROCEDURE — 36415 COLL VENOUS BLD VENIPUNCTURE: CPT | Performed by: STUDENT IN AN ORGANIZED HEALTH CARE EDUCATION/TRAINING PROGRAM

## 2019-04-30 PROCEDURE — 83605 ASSAY OF LACTIC ACID: CPT | Performed by: FAMILY MEDICINE

## 2019-04-30 RX ADMIN — ACETAMINOPHEN, ASPIRIN AND CAFFEINE 2 TABLET: 250; 250; 65 TABLET, FILM COATED ORAL at 06:09

## 2019-04-30 RX ADMIN — POLYETHYLENE GLYCOL 3350 17 G: 17 POWDER, FOR SOLUTION ORAL at 07:56

## 2019-04-30 RX ADMIN — LEVOFLOXACIN 750 MG: 5 INJECTION, SOLUTION INTRAVENOUS at 22:24

## 2019-04-30 RX ADMIN — PREGABALIN 100 MG: 100 CAPSULE ORAL at 20:38

## 2019-04-30 RX ADMIN — OXYCODONE HYDROCHLORIDE 5 MG: 5 TABLET ORAL at 16:48

## 2019-04-30 RX ADMIN — OXYCODONE HYDROCHLORIDE 5 MG: 5 TABLET ORAL at 15:10

## 2019-04-30 RX ADMIN — OXYCODONE HYDROCHLORIDE 10 MG: 5 TABLET ORAL at 20:59

## 2019-04-30 RX ADMIN — OXYCODONE HYDROCHLORIDE 10 MG: 5 TABLET ORAL at 12:08

## 2019-04-30 RX ADMIN — Medication 50 ML: at 22:24

## 2019-04-30 RX ADMIN — PREGABALIN 100 MG: 100 CAPSULE ORAL at 15:10

## 2019-04-30 RX ADMIN — METRONIDAZOLE 500 MG: 500 INJECTION, SOLUTION INTRAVENOUS at 02:01

## 2019-04-30 RX ADMIN — OXYCODONE HYDROCHLORIDE 10 MG: 5 TABLET ORAL at 09:06

## 2019-04-30 RX ADMIN — Medication 50 ML: at 17:28

## 2019-04-30 RX ADMIN — Medication 50 ML: at 11:01

## 2019-04-30 RX ADMIN — PREGABALIN 100 MG: 100 CAPSULE ORAL at 07:57

## 2019-04-30 RX ADMIN — METRONIDAZOLE 500 MG: 500 INJECTION, SOLUTION INTRAVENOUS at 18:22

## 2019-04-30 RX ADMIN — METHADONE HYDROCHLORIDE 113 MG: 10 CONCENTRATE ORAL at 07:55

## 2019-04-30 RX ADMIN — ACETAMINOPHEN, ASPIRIN AND CAFFEINE 2 TABLET: 250; 250; 65 TABLET, FILM COATED ORAL at 16:45

## 2019-04-30 RX ADMIN — METRONIDAZOLE 500 MG: 500 INJECTION, SOLUTION INTRAVENOUS at 11:01

## 2019-04-30 RX ADMIN — ESCITALOPRAM 10 MG: 5 TABLET, FILM COATED ORAL at 07:57

## 2019-04-30 RX ADMIN — OXYCODONE HYDROCHLORIDE 10 MG: 5 TABLET ORAL at 02:47

## 2019-04-30 RX ADMIN — OXYCODONE HYDROCHLORIDE 10 MG: 5 TABLET ORAL at 06:09

## 2019-04-30 RX ADMIN — PANTOPRAZOLE SODIUM 40 MG: 40 TABLET, DELAYED RELEASE ORAL at 07:57

## 2019-04-30 ASSESSMENT — ENCOUNTER SYMPTOMS
DIAPHORESIS: 0
NUMBNESS: 0
SORE THROAT: 1
FEVER: 0
NERVOUS/ANXIOUS: 0
CHILLS: 0
HEADACHES: 0
DYSURIA: 0
UNEXPECTED WEIGHT CHANGE: 0
MYALGIAS: 1
ABDOMINAL PAIN: 0
LIGHT-HEADEDNESS: 0
TREMORS: 0
CONSTIPATION: 0
SHORTNESS OF BREATH: 1
VOMITING: 0
DYSPHORIC MOOD: 0
ARTHRALGIAS: 1
WEAKNESS: 0
NAUSEA: 0
DIFFICULTY URINATING: 0
JOINT SWELLING: 0

## 2019-04-30 ASSESSMENT — ACTIVITIES OF DAILY LIVING (ADL)
ADLS_ACUITY_SCORE: 8

## 2019-04-30 NOTE — PLAN OF CARE
Time: 7195-7977    Reason for admission: shortness of breath  Vitals: VSS on 2.5L via Nc.  BP soft  Activity: SBA to BSC  Pain: C/O in L lateral chest, at chest tube site.  Oxycodone 10 mg given x3, declines scheduled tylenol and ibuprofen, prefers Excedrin extra strength.  Excedrin given x2  Neuro: A&O x4  Cardiac: WDL  Respiratory: LS diminished bilaterally  GI/: Voiding spontaneously, last BM 4/29  Diet: regular diet, tolerating   Lines: R PIV infusing TKO, IV flagyl and levofloxacin  Skin: chest tube site on L posterior chest, dressing CDI,  at -20 suction, little drainage since previous shift  Plan: will continue to monitor and follow POC.

## 2019-04-30 NOTE — PROGRESS NOTES
[facility], [facility] Progress Note    Main Plans for Today   - Continue Antibiotics  - Incentive spirometry  - f/u repeat CXR  - tPA through chest tube TID per pulm.  - Discontinue NSAIDs for pain    Assessment & Plan   Demetra Richardson is a 63 year old female w/complicated PMHx, significant for SLE, PSC, Sjogren's, fibromyalgia, opiate dependence and polysubstance abuse with history of OD and withdrawal seizures, epidural abscess, bipolar disorder type II, currently on a methadone program, and is admitted for management of L pleural effusion.     # Pneumonia  # Loculated pleural effusion   # Sepsis secondary to pneumonia  Most likely parapneumonic effusion, other differentials include effusion 2/2 rheumatologic disease given ?history SLE, sjogren's, primary sclerosing cholangitis. Initial labs significant for , WBC 26.8 - now downtrending. She is s/p chest tube placement on 4/29 with immediate drainage of 600 ml serosanguinous fluid, and subsequent 150 ml throughout the next evening. Fluid analysis with pleural to serum protein 4.7/6.7, LDH 1116, glucose 26, WBCs seen with no organisms in stain. Repeat CXR on 4/30 with stable L-sided effusion.  - Pulmonology consulted, appreciate recs  - Start TID tPA through chest tube per pulm recs  - Discontinue NSAIDs (ibuprofen) given tPA and also given hx of Billroth since reversed. Goal to eventually discontinue excedrin given ASA component, but will taper per patient as able given severity of migraines.   - Continue levo 750 mg every day, flagyl 500 mg TID  - Supplemental oxygen prn, goal >92%  - Start incentive spirometry  - f/u full fluid analysis, cultures  - q2d CRP, daily CBC  - f/u chest tube output  - Plan to re-image in next 2-3 days per pulm    # Thoracic lymphadenopathy  CT showed enlarged left lower cervical lymph node, prominent left heel or soft tissue, possibly left hilar adenopathy, prominent  left paratracheal lymph nodes.  DDX includes infectious versus malignant versus sarcoidosis.    - Pulm likely will not do bronchoscopy at this time     # Chronic pain  # Polysubstance use disorder  # History of overdose and withdrawal seizures  # Methadone maintenance therapy, active  # Fibromyalgia  Patient denies recent substance use, reports no alcohol use in last 7 years. She does admit to taking some xanax about 2 months ago (1/22/2019), and she was hospitalized with acute toxic encephalopathy, even intubated as she was having apneic pauses. Patient is on methadone and fills her prescriptions at [place]. Admission UDS negative.  - Withdrawal precautions, withdrawal monitoring    - Continue methadone 113 mg daily     - Continue scheduled Tylenol - discontinue ibuprofen per above  - PTA pregabalin   - Heat/cold packs  - Topical diclofenac   - Oxycodone 5-10 mg q4h PRN   - Avoid IV opiates     # Hypoalbuminemia  Low albumin 2.4 >1.9 >1.7. Likely due to acute infection, with chronic malnutrition contributing. Patient aware and reports poor diet at home - lots of frozen meals, fast foods, due to issues with ambulation. Lives alone, no help with food prep or grocery shopping.   - Nutrition consult  - Discuss outpatient services for meal/grocery delivery services    # SLE  # Sjogren's syndrome  # primary sclerosing cholangitis  Patient had recent serology, but has returned negative so far. She is not taking steroids, hydroxychloroquine or other immunosuppressants. Has ongoing history of symptoms and reports diagnosis of PSC 14 years ago, and notes chronic LFT abnormalities. Does report of dry mouth and dry eyes, and has recently tried medications for Sjogren's without much success. Also reports rash on her bilateral hands, but has no joint swelling, neurologic or acute psychiatric issues to suggest flares.  She was noted to have a protein creatinine ratio of 0.28, but denies other renal complications.  - Labs here  "notable for alk phos elevation 318 >211>296. Normal bilirubin, lipase, ALT, AST wnl.  - Admission ESR 46,   - INR 1.13>1.38, likely secondary to malnutrition vs mild hepatic impairment from PSC. No s/s of bleeding.     #Bipolar disorder type II  Patient endorses recent loss of her father to atypical pneumonia, but considers her mood has been stable, denies suicidal ideation. Patient reports being on Risperidone in the past after a \"manic episode after a family member .\" She reports not being on this medication for 6 months at least though.  -PTA escitalopram     # Mild aortic stenosis  Murmur noticed on exam, echocardiogram  showed LVEF, w/normal diastolic function, normal RV. Unlikely to be related to current symptoms.      # Migraines  # Fibromyalgia  Patient has long history of migraines with significant use of excedrin - now prone to rebound headaches.  -PTA Excedrin Migraine - attempt wean per above.      # s/p Billroth II w/revision   # History of iron deficiency anemia  She has evidence of hypochromic RBC's without anemia. Patient has malabsorption secondary to probably surgery, receives IV iron as outpatient. On 19 she had low iron and saturation, normal ferritin, folate and B12.  - continue PTA pantoprazole   - Discontinue ibuprofen  - IV iron replacement as outpatient       # Pain Assessment:  Current Pain Score 2019   Patient currently in pain? denies   Pain score (0-10) -   Pain location -   Pain descriptors -   Some encounter information is confidential and restricted. Go to Review Flowsheets activity to see all data.   - Demetra is experiencing pain due to pleural effusion. Pain management was discussed and the plan was created in a collaborative fashion.  Demetra's response to the current recommendations: engaged  - Please see the plan for pain management as documented above      Diet: Regular Diet Adult  Snacks/Supplements Adult: Other; bowl of chicken salad at bedtime with " crackers + milk skim; Between Meals  Fluids: PO  DVT Prophylaxis: Pneumatic Compression Devices  Code Status: Full Code    Disposition Plan   Expected discharge:Expected Discharge Date: 05/02/19 2 - 3 days, recommended to transitional care unit once adequate pain management/ tolerating PO medications and antibiotic plan established.Dispo: Expected Discharge Date: 05/02/19        Entered: Rimma Pizarro 04/30/2019, 3:15 PM   Information in the above section will display in the discharge planner report.      The patient's care was discussed with the Attending Physician, Dr. Barclay.    Rimma Pizarro  [facility] Medicine: PGY-1  Pager: [phone]  Please see sticky note for cross cover information    Interval History    Patient reports no change in pain since chest tube insertion. Has now some pain over R lower chest also. Overall feels okay, pain is manageable, still pleuritic, worse with deep inspiration.    Review of Systems   Constitutional: Negative for chills, diaphoresis, fever and unexpected weight change.   HENT: Positive for sore throat.    Respiratory: Positive for shortness of breath.    Cardiovascular: Positive for chest pain. Negative for leg swelling.   Gastrointestinal: Negative for abdominal pain, constipation, nausea and vomiting.   Genitourinary: Negative for difficulty urinating and dysuria.   Musculoskeletal: Positive for arthralgias (R knee, chronic) and myalgias. Negative for joint swelling.   Skin: Positive for rash.   Neurological: Negative for tremors, weakness, light-headedness, numbness and headaches.   Psychiatric/Behavioral: Negative for dysphoric mood. The patient is not nervous/anxious.        Physical Exam   Vital Signs: Temp: 96.8  F (36  C) Temp src: Oral BP: 117/87   Heart Rate: 85 Resp: 18 SpO2: 94 % O2 Device: Nasal cannula Oxygen Delivery: 3 LPM  Weight: 154 lbs 14.4 oz  Physical Exam   Constitutional: She is oriented to person, place, and time. She  appears well-developed and well-nourished. No distress.   HENT:   Head: Normocephalic and atraumatic.   Mouth/Throat: Oropharynx is clear and moist. No oropharyngeal exudate.   Eyes: Conjunctivae and EOM are normal. No scleral icterus.   Cardiovascular: Normal rate and regular rhythm. Exam reveals no friction rub.   Murmur (systolic ejection) heard.  Pulmonary/Chest: Effort normal. No respiratory distress. She has no rales (LLL).   Diminished breath sounds in LLL. R lower chest wall tender to palpation over rib (patient notes fall few weeks prior)   Abdominal: Soft. Bowel sounds are normal. There is no tenderness. There is no guarding.   Neurological: She is alert and oriented to person, place, and time.   Skin: Skin is warm and dry.   Psychiatric: She has a normal mood and affect.

## 2019-04-30 NOTE — PROGRESS NOTES
CLINICAL NUTRITION SERVICES - ASSESSMENT NOTE     Nutrition Prescription    RECOMMENDATIONS FOR MDs/PROVIDERS TO ORDER:  1. May consider Social work consult to assist patient with meal delivery assistance and list of programs available.     2. Patient requesting prescription for bowel regimen, please discuss with patient      Malnutrition Status:    Patient does not meet two of the above criteria necessary for diagnosing malnutrition but is at risk for malnutrition    Recommendations already ordered by Registered Dietitian (RD):  Set up bed time snacks for patient, encouraged TID meal intake.     Future/Additional Recommendations:  None       REASON FOR ASSESSMENT  Demetra Richardson is a/an 63 year old female assessed by the dietitian for Provider Order - Optimize nutrition    Chart reviewed:  PMH: PMHx, significant for SLE, PSC, Sjogren's, fibromyalgia, opiate dependence and history of OD and withdrawal seizures, epidural abscess, bipolar disorder type II, currently on a methadone program, and is admitted for management of L pleural effusion.    --Also with PMH of Gastric ulcer/Peptic ulcer disease, lead to partial gastrectomy, bilateral truncal vagotomy with Billroth 2 gastrojejunostomy in 2005. Has had severe delayed gastric emptying and history of gastric outlet obstruction due to stricture and stenosis at gastro-jejunal outlet, S/P surgery on (6/2/11).      NUTRITION HISTORY  --Patient is on a regular diet at home.     - Typical food/fluid intake: Patient reports she does not consume meals per say due to unavailability, difficult time to buy foods ( physically vs. Financially). Per patient, she  snacks throughout the days. Has a protein source at all meals and either toast or leopoldo crackers with peanut butter for snacks and drinks skim milk through out the day. Unable to prepare much meals. Would like to know of services that would deliver meals to home.     --GI status: Per patient, she is usually  constipated due to prior surgeries and being on opiates. Per patient, she uses miralex weekly and would like to get a prescription for it given financial status.       CURRENT NUTRITION ORDERS  Diet: Regular  Intake/Tolerance: Tolerating intake, had 1/2 peanut butter jelly with skim milk for lunch. Not feeling hungry. Not interested in oral nutrition drinks at this time.       LABS  CRP: 320 (H), WBC: 18 (H), Albumin: 1.7 ( likely related to infection/inflammation picture).  Albumin @ 1.7, is a poor nutrition status indicator as it is decreased during inflammation d/t down-regulated synthesis in acute phase response. CRP currently elevated @ 320, Albumin is not an appropriate nutrition status indicator in this pt.     Urine ketones: negative on admit     MEDICATIONS  Medications reviewed    ANTHROPOMETRICS  Height: 0 cm (Data Unavailable)- 160 cm   Most Recent Weight: 70.3 kg (154 lb 14.4 oz) - wt on 4/30   IBW: 52.3 kg ( 134% IBW)   BMI: 27.44 kg /m2 - Overweight BMI 25-29.9  Weight History: Stable wt @ baseline   Wt Readings from Last 25 Encounters:   04/30/19 70.3 kg (154 lb 14.4 oz)   04/26/19 69.3 kg (152 lb 12.8 oz)   03/14/19 68.9 kg (152 lb)   05/01/18 67.6 kg (149 lb)   02/22/17 61.1 kg (134 lb 12.8 oz)   10/14/16 65.3 kg (144 lb)   10/12/16 65.3 kg (144 lb)   07/29/16 69.7 kg (153 lb 11.2 oz)   07/19/16 68.9 kg (151 lb 14.4 oz)   07/08/16 70.3 kg (155 lb)   05/26/16 72.1 kg (159 lb)   03/27/16 68 kg (150 lb)   03/18/16 70.9 kg (156 lb 4.8 oz)   01/11/16 70.8 kg (156 lb 1.4 oz)   01/08/16 71.8 kg (158 lb 6.4 oz)   01/06/16 72.2 kg (159 lb 3.2 oz)   01/04/16 68.5 kg (151 lb)   12/18/15 68.8 kg (151 lb 9.6 oz)   12/14/15 67.6 kg (149 lb)   12/04/15 68.9 kg (151 lb 12.8 oz)   11/19/15 69.8 kg (153 lb 12.8 oz)   11/06/15 69.9 kg (154 lb 1.6 oz)   11/02/15 68.5 kg (151 lb)   10/20/15 70.5 kg (155 lb 8 oz)   10/14/15 61.9 kg (136 lb 8 oz)       Dosing Weight: 57 kg adjusted wt based on current dry wt     ASSESSED  NUTRITION NEEDS  Estimated Energy Needs: 7762-2940 kcals/day (25 - 30 kcals/kg)  Justification: Maintenance  Estimated Protein Needs: 68-86 grams protein/day (1.2 - 1.5 grams of pro/kg)  Justification: Hypercatabolism with acute illness, Infection etiology   Estimated Fluid Needs: (1 mL/kcal)   Justification: Maintenance    PHYSICAL FINDINGS  Chest tube site on L posterior chest    MALNUTRITION  % Intake: </=75% for >/= 1 month (severe)  % Weight Loss: None noted  Subcutaneous Fat Loss: None observed  Muscle Loss: None observed  Fluid Accumulation/Edema: None noted  Malnutrition Diagnosis: Patient does not meet two of the above criteria necessary for diagnosing malnutrition but is at risk for malnutrition    NUTRITION DIAGNOSIS  Inadequate oral intake related to hypercatabolism associated with infectious etiology may hinder patients ability to take sufficient PO with history of difficulties providing daily meals at home PTA as evidence by patients report     INTERVENTIONS  Implementation  Nutrition Education: Role of RD in nutrition POC,   --Will Discuss ways to  provide meal delivery assistance with social work     Goals  Patient to consume % of nutritionally adequate meal trays TID, or the equivalent with supplements/snacks.     Monitoring/Evaluation  Progress toward goals will be monitored and evaluated per protocol.    Wild Lloyd RD/CONSUELO  Pager [phone]

## 2019-04-30 NOTE — PLAN OF CARE
A&O. VSS on RA while awake. 2L NC while resting. C/o L chest pain at chest tube site. PRN oxycodone given. PRN Excedrin given for headache. Chest Tube to -20 suction. TPA instilled at scheduled times. Increased output following TPA. IV ABX given as scheduled. Lido patch on L shoulder. Decreased appetite nutrition consult place. Ambulating SBA to BSC. Will continue with POC.

## 2019-05-01 ENCOUNTER — APPOINTMENT (OUTPATIENT)
Dept: GENERAL RADIOLOGY | Facility: CLINIC | Age: 64
DRG: 853 | End: 2019-05-01
Attending: INTERNAL MEDICINE
Payer: COMMERCIAL

## 2019-05-01 LAB
ACID FAST STN SPEC QL: NORMAL
ACID FAST STN SPEC QL: NORMAL
ADENOSINE DEAMINASE PLR-CCNC: 7.8 U/L (ref 0–9.4)
ALBUMIN SERPL-MCNC: 1.6 G/DL (ref 3.4–5)
ALP SERPL-CCNC: 456 U/L (ref 40–150)
ALT SERPL W P-5'-P-CCNC: 31 U/L (ref 0–50)
ANION GAP SERPL CALCULATED.3IONS-SCNC: 9 MMOL/L (ref 3–14)
AST SERPL W P-5'-P-CCNC: 39 U/L (ref 0–45)
BILIRUB SERPL-MCNC: 0.3 MG/DL (ref 0.2–1.3)
BUN SERPL-MCNC: 15 MG/DL (ref 7–30)
CALCIUM SERPL-MCNC: 8.3 MG/DL (ref 8.5–10.1)
CHLORIDE SERPL-SCNC: 106 MMOL/L (ref 94–109)
CHYLO FLD QL: NORMAL
CO2 SERPL-SCNC: 24 MMOL/L (ref 20–32)
CREAT SERPL-MCNC: 0.69 MG/DL (ref 0.52–1.04)
ERYTHROCYTE [DISTWIDTH] IN BLOOD BY AUTOMATED COUNT: 14.5 % (ref 10–15)
GFR SERPL CREATININE-BSD FRML MDRD: >90 ML/MIN/{1.73_M2}
GLUCOSE SERPL-MCNC: 136 MG/DL (ref 70–99)
HCT VFR BLD AUTO: 40 % (ref 35–47)
HGB BLD-MCNC: 11.9 G/DL (ref 11.7–15.7)
MCH RBC QN AUTO: 28.3 PG (ref 26.5–33)
MCHC RBC AUTO-ENTMCNC: 29.8 G/DL (ref 31.5–36.5)
MCV RBC AUTO: 95 FL (ref 78–100)
PLATELET # BLD AUTO: 367 10E9/L (ref 150–450)
POTASSIUM SERPL-SCNC: 4.5 MMOL/L (ref 3.4–5.3)
PROT SERPL-MCNC: 6.4 G/DL (ref 6.8–8.8)
RBC # BLD AUTO: 4.2 10E12/L (ref 3.8–5.2)
SODIUM SERPL-SCNC: 138 MMOL/L (ref 133–144)
SPECIMEN SOURCE: NORMAL
WBC # BLD AUTO: 20.6 10E9/L (ref 4–11)

## 2019-05-01 PROCEDURE — 36415 COLL VENOUS BLD VENIPUNCTURE: CPT | Performed by: STUDENT IN AN ORGANIZED HEALTH CARE EDUCATION/TRAINING PROGRAM

## 2019-05-01 PROCEDURE — 25000132 ZZH RX MED GY IP 250 OP 250 PS 637: Performed by: STUDENT IN AN ORGANIZED HEALTH CARE EDUCATION/TRAINING PROGRAM

## 2019-05-01 PROCEDURE — 25000125 ZZHC RX 250: Performed by: STUDENT IN AN ORGANIZED HEALTH CARE EDUCATION/TRAINING PROGRAM

## 2019-05-01 PROCEDURE — 25000132 ZZH RX MED GY IP 250 OP 250 PS 637: Performed by: FAMILY MEDICINE

## 2019-05-01 PROCEDURE — 25000128 H RX IP 250 OP 636: Performed by: INTERNAL MEDICINE

## 2019-05-01 PROCEDURE — 99253 IP/OBS CNSLTJ NEW/EST LOW 45: CPT | Performed by: PHYSICIAN ASSISTANT

## 2019-05-01 PROCEDURE — 25000125 ZZHC RX 250: Performed by: INTERNAL MEDICINE

## 2019-05-01 PROCEDURE — 25000128 H RX IP 250 OP 636: Performed by: FAMILY MEDICINE

## 2019-05-01 PROCEDURE — 85027 COMPLETE CBC AUTOMATED: CPT | Performed by: STUDENT IN AN ORGANIZED HEALTH CARE EDUCATION/TRAINING PROGRAM

## 2019-05-01 PROCEDURE — 25000128 H RX IP 250 OP 636: Performed by: STUDENT IN AN ORGANIZED HEALTH CARE EDUCATION/TRAINING PROGRAM

## 2019-05-01 PROCEDURE — 25000132 ZZH RX MED GY IP 250 OP 250 PS 637: Performed by: SURGERY

## 2019-05-01 PROCEDURE — 25000125 ZZHC RX 250: Performed by: FAMILY MEDICINE

## 2019-05-01 PROCEDURE — 40000986 XR CHEST PORT 1 VW

## 2019-05-01 PROCEDURE — 25800030 ZZH RX IP 258 OP 636: Performed by: FAMILY MEDICINE

## 2019-05-01 PROCEDURE — 25800030 ZZH RX IP 258 OP 636: Performed by: STUDENT IN AN ORGANIZED HEALTH CARE EDUCATION/TRAINING PROGRAM

## 2019-05-01 PROCEDURE — 25800030 ZZH RX IP 258 OP 636: Performed by: INTERNAL MEDICINE

## 2019-05-01 PROCEDURE — 80053 COMPREHEN METABOLIC PANEL: CPT | Performed by: STUDENT IN AN ORGANIZED HEALTH CARE EDUCATION/TRAINING PROGRAM

## 2019-05-01 PROCEDURE — 12000001 ZZH R&B MED SURG/OB UMMC

## 2019-05-01 RX ORDER — METHOCARBAMOL 500 MG/1
500 TABLET, FILM COATED ORAL EVERY 6 HOURS PRN
Status: DISCONTINUED | OUTPATIENT
Start: 2019-05-01 | End: 2019-05-07

## 2019-05-01 RX ORDER — ALPRAZOLAM 1 MG
1 TABLET ORAL 3 TIMES DAILY PRN
Status: DISCONTINUED | OUTPATIENT
Start: 2019-05-01 | End: 2019-05-01

## 2019-05-01 RX ORDER — METHADONE HYDROCHLORIDE 10 MG/ML
57 CONCENTRATE ORAL
Status: DISCONTINUED | OUTPATIENT
Start: 2019-05-02 | End: 2019-05-03

## 2019-05-01 RX ORDER — LIDOCAINE 50 MG/G
OINTMENT TOPICAL EVERY 6 HOURS PRN
Status: DISCONTINUED | OUTPATIENT
Start: 2019-05-01 | End: 2019-05-08

## 2019-05-01 RX ORDER — METHADONE HYDROCHLORIDE 10 MG/ML
56 CONCENTRATE ORAL EVERY MORNING
Status: DISCONTINUED | OUTPATIENT
Start: 2019-05-02 | End: 2019-05-03

## 2019-05-01 RX ORDER — ANALGESIC BALM 1.74; 4.06 G/29G; G/29G
OINTMENT TOPICAL EVERY 6 HOURS PRN
Status: DISCONTINUED | OUTPATIENT
Start: 2019-05-01 | End: 2019-05-14 | Stop reason: HOSPADM

## 2019-05-01 RX ORDER — ALPRAZOLAM 1 MG
1 TABLET ORAL ONCE
Status: COMPLETED | OUTPATIENT
Start: 2019-05-01 | End: 2019-05-01

## 2019-05-01 RX ADMIN — ESCITALOPRAM 10 MG: 5 TABLET, FILM COATED ORAL at 08:09

## 2019-05-01 RX ADMIN — ACETAMINOPHEN, ASPIRIN AND CAFFEINE 2 TABLET: 250; 250; 65 TABLET, FILM COATED ORAL at 05:23

## 2019-05-01 RX ADMIN — ALPRAZOLAM 1 MG: 1 TABLET ORAL at 18:37

## 2019-05-01 RX ADMIN — OXYCODONE HYDROCHLORIDE 10 MG: 5 TABLET ORAL at 00:40

## 2019-05-01 RX ADMIN — LEVOFLOXACIN 750 MG: 5 INJECTION, SOLUTION INTRAVENOUS at 22:54

## 2019-05-01 RX ADMIN — METHOCARBAMOL 500 MG: 500 TABLET, FILM COATED ORAL at 22:54

## 2019-05-01 RX ADMIN — OXYCODONE HYDROCHLORIDE 10 MG: 5 TABLET ORAL at 19:13

## 2019-05-01 RX ADMIN — Medication 50 ML: at 16:32

## 2019-05-01 RX ADMIN — OXYCODONE HYDROCHLORIDE 10 MG: 5 TABLET ORAL at 15:27

## 2019-05-01 RX ADMIN — METRONIDAZOLE 500 MG: 500 INJECTION, SOLUTION INTRAVENOUS at 02:33

## 2019-05-01 RX ADMIN — PREGABALIN 100 MG: 100 CAPSULE ORAL at 08:05

## 2019-05-01 RX ADMIN — Medication 50 ML: at 09:01

## 2019-05-01 RX ADMIN — OXYCODONE HYDROCHLORIDE 10 MG: 5 TABLET ORAL at 12:22

## 2019-05-01 RX ADMIN — PREGABALIN 100 MG: 100 CAPSULE ORAL at 19:13

## 2019-05-01 RX ADMIN — Medication 250 MG: at 19:13

## 2019-05-01 RX ADMIN — OXYCODONE HYDROCHLORIDE 10 MG: 5 TABLET ORAL at 22:54

## 2019-05-01 RX ADMIN — OXYCODONE HYDROCHLORIDE 10 MG: 5 TABLET ORAL at 05:21

## 2019-05-01 RX ADMIN — Medication 50 ML: at 20:23

## 2019-05-01 RX ADMIN — METRONIDAZOLE 500 MG: 500 INJECTION, SOLUTION INTRAVENOUS at 09:58

## 2019-05-01 RX ADMIN — ACETAMINOPHEN 650 MG: 325 TABLET, FILM COATED ORAL at 12:22

## 2019-05-01 RX ADMIN — PANTOPRAZOLE SODIUM 40 MG: 40 TABLET, DELAYED RELEASE ORAL at 08:09

## 2019-05-01 RX ADMIN — METRONIDAZOLE 500 MG: 500 INJECTION, SOLUTION INTRAVENOUS at 18:37

## 2019-05-01 RX ADMIN — POLYETHYLENE GLYCOL 3350 17 G: 17 POWDER, FOR SOLUTION ORAL at 08:08

## 2019-05-01 RX ADMIN — PREGABALIN 100 MG: 100 CAPSULE ORAL at 14:37

## 2019-05-01 RX ADMIN — METHOCARBAMOL 500 MG: 500 TABLET, FILM COATED ORAL at 14:37

## 2019-05-01 RX ADMIN — ACETAMINOPHEN 650 MG: 325 TABLET, FILM COATED ORAL at 18:37

## 2019-05-01 RX ADMIN — METHADONE HYDROCHLORIDE 113 MG: 10 CONCENTRATE ORAL at 08:09

## 2019-05-01 ASSESSMENT — ENCOUNTER SYMPTOMS
CONSTIPATION: 0
DYSURIA: 0
FEVER: 0
ARTHRALGIAS: 1
SHORTNESS OF BREATH: 1
LIGHT-HEADEDNESS: 0
ABDOMINAL PAIN: 0
HEADACHES: 0
UNEXPECTED WEIGHT CHANGE: 0
CHILLS: 0
NAUSEA: 0
JOINT SWELLING: 0
NUMBNESS: 0
TREMORS: 0
DYSPHORIC MOOD: 0
VOMITING: 0
DIAPHORESIS: 0
WEAKNESS: 0
MYALGIAS: 1
NERVOUS/ANXIOUS: 0

## 2019-05-01 ASSESSMENT — ACTIVITIES OF DAILY LIVING (ADL)
ADLS_ACUITY_SCORE: 8

## 2019-05-01 ASSESSMENT — PAIN DESCRIPTION - DESCRIPTORS
DESCRIPTORS: ACHING
DESCRIPTORS: ACHING;SHARP

## 2019-05-01 NOTE — PROGRESS NOTES
[facility]    Pulmonary, Allergy, Critical Care and Sleep Medicine    Follow Up Visit  05/01/2019    Demetra Richardson    Age: 63 year old    MRN# [id number]  YOB: 1955    Date of Admission: 4/28/2019  Reason for Consultation: Loculated pleural effusion   Requesting Team: [place]     Primary care provider: Fredy Merritt     Assessment and Recommendations:    Demetra Richardson is a 63 year old female with history of SLE, PSC, Sjogren's, fibromyalgia and previous hs of subsubstance currently on methadone who presented with 1 week of worsening shortness of breath and found to have a moderate left sided loculated pleural effusion.    Moderate loculated left-sided pleural effusion  complex (possibly) parapneumonic effusion  Continue lytic therapy via 14F pigtail chest tube. pleural fluid analysis clearly inflammatory/exudative no definite infection detected yet.  Today chest x-ray shows slight worsening in left-sided effusion, possible kinked tube .    - chest tube to -20cm H20 suction   -CXR daily  -TPA/dornase through chest tube tomorrow scheduled x 3 days then reassess with CT.   -Today we redressed the chest tube and reinforced support next to the skin to prevent kinking at the skin exit site.  -The chest tube drain should be occlusive clamped during lytic administration (using fingers or Ginny clamp, which is stronger than the plastic clamp intrinsic to the drainage tube).  Today when we did this, we proved that the tube was in fact kinked, as we were unable to instill medications.  If there is concern for tube kinking, or  inability to administer lytic, please contact pulmonary consult service.  -Today, chest tube should be clamped until 6 PM and released at that time.  Evening administration of lytic can be pushed back from 6 PM to 8 PM and then resume usual post lytic care.  -Agree with continuing empiric antibiotics, can tailor coverage based on  cultures      History of Present Illness:      Today she is complaining of chest and shoulder pain.  The bedside nurse reported a concern for chest tube kinking.  We attempted to administer the held dose of lytic, and confirmed chest tube kinked.  We redressed the tube and reinforced a large wad of 4 x 4's placed under the tube against the skin, to prevent kinking at the skin exit site.  Immediately, nearly a liter of dark red fluid came out of the chest tube.  The patient experienced an increase in pain, so the tube was clamped while we were at the bedside (approximately 5 PM).    Review of Systems:  C: negative for fever, chills, change in weight, change in appetite  INTEGUMENTARY/SKIN: no rash or obvious new lesions  ENT/MOUTH: no sore throat, no sinus pain, no nasal drainage  RESP: see interval history  CV: + chest pain, no palpitations, no peripheral edema, no orthopnea  GI: no nausea, no vomiting, no reflux symptoms, no change in stools  : no dysuria  MUSCULOSKELETAL: + myalgias, + arthralgias  ENDOCRINE: blood sugars with adequate control  NEURO: no headache, no numbness or tingling  PSYCHIATRIC: mood stable    Histories, Prior to Admission Medications, Allergies:    Past Medical History:  Past Medical History:   Diagnosis Date     Anemia     2/2 h/p gastrectomy with inability to absorb oral iron     Basal cell carcinoma     Left-sided, skin over over medial orbit/nasal bone. Removed Oct 2018.     Benzodiazepine dependence (H)     With h/o withdrawal seizure x 1     Bipolar 1 disorder, mixed, moderate (H) 2/7/2013     Chronic pain     Back, legs.     Epidural abscess 2005    x4     Fibromyalgia      Generalised anxiety disorder      GERD (gastroesophageal reflux disease)      Major depressive disorder      Migraine      Nephrolithiasis     S/p left sided lithotripsy     Opiate dependence (H)      Osteoarthritis      Osteoporosis      Primary sclerosing cholangitis 2005     PUD (peptic ulcer disease)       SLE (systemic lupus erythematosus) (H)          Past Social History:  ETOH: Previous abuse  Tobacco: Never smoker   Prior substance abuse with oxycodone. Now on methadone   Significant inhalational exposures: None   PPD/TB exposure status: None    Social History     Socioeconomic History     Marital status: Single     Spouse name: Not on file     Number of children: Not on file     Years of education: Not on file     Highest education level: Not on file   Occupational History     Not on file   Social Needs     Financial resource strain: Not on file     Food insecurity:     Worry: Not on file     Inability: Not on file     Transportation needs:     Medical: Not on file     Non-medical: Not on file   Tobacco Use     Smoking status: Never Smoker     Smokeless tobacco: Never Used   Substance and Sexual Activity     Alcohol use: No     Comment: none in 10 years     Drug use: No     Sexual activity: Not Currently   Lifestyle     Physical activity:     Days per week: Not on file     Minutes per session: Not on file     Stress: Not on file   Relationships     Social connections:     Talks on phone: Not on file     Gets together: Not on file     Attends Zoroastrian service: Not on file     Active member of club or organization: Not on file     Attends meetings of clubs or organizations: Not on file     Relationship status: Not on file     Intimate partner violence:     Fear of current or ex partner: Not on file     Emotionally abused: Not on file     Physically abused: Not on file     Forced sexual activity: Not on file   Other Topics Concern     Parent/sibling w/ CABG, MI or angioplasty before 65F 55M? Not Asked   Social History Narrative    Intake 4/16/15:        H/o divorce but most recently living with JUANIS Hamilton. Alfonso recently passed away.         In past employed as a .         Tobacco use: denies    Alcohol use: denies    Drug use: daily pot use for 30 years. Currently with sobriety.            Medications:     acetaminophen  650 mg Oral Q6H     alteplase (ACTIVASE) 10 mg and dornase 5 mg in NS syringe for chest tube instillation  50 mL Chest Tube TID     enoxaparin  40 mg Subcutaneous Q24H     escitalopram  10 mg Oral Daily     levofloxacin  750 mg Intravenous Q24H     lidocaine  1 patch Transdermal Q24H     lidocaine   Transdermal Q8H     lidocaine   Transdermal Q24h     methadone  113 mg Oral Daily     metroNIDAZOLE  500 mg Intravenous Q8H     pantoprazole  40 mg Oral QAM AC     polyethylene glycol  17 g Oral Daily     pregabalin  100 mg Oral TID     saccharomyces boulardii  250 mg Oral BID     sodium chloride (PF)  3 mL Intracatheter Q8H     aspirin-acetaminophen-caffeine, lidocaine 4%, lidocaine (buffered or not buffered), melatonin, methyl salicylate-menthol, naloxone, ondansetron **OR** ondansetron, oxyCODONE, sodium chloride (PF)    Allergies:     Allergies   Allergen Reactions     Penicillins Hives     Hives in childhood.       Physical Exam:    Temp:  [95.9  F (35.5  C)-97  F (36.1  C)] 95.9  F (35.5  C)  Heart Rate:  [] 95  Resp:  [18] 18  BP: (104-117)/(73-87) 116/74  SpO2:  [92 %-94 %] 92 %    Intake/Output Summary (Last 24 hours) at 4/29/2019 1311  Last data filed at 4/29/2019 0700  Gross per 24 hour   Intake 2150 ml   Output 300 ml   Net 1850 ml     General: laying in bed, very anxious, screaming out in pain with any movememt  HEENT: anicteric, moist mucosa  Neck: no palpable lymphadenopathy, no JVD noted  Chest: absent breath sounds at the left base, no wheeze or crackles appreciated   Cardiac: RRR no murmurs  Abdomen: Soft, flat, non tender, active BS  Extremities: no lower extremity edema  Neuro: awake and oriented x3, no focal deficits   Skin: no rash noted    Laboratory, imaging, and microbiologic data:    All laboratory and imaging data reviewed.      WBC 20.6   -> 320  Lactate 2.5 -> 0.7    CTPE:   Reviewed by me.   No pulmonary embolism. Moderate left sided loculated pleural effusion  with some associated atelectasis.    Pleural fluid analysis  Cell count: WBC 15,373 (87% neutrophils)  LDH 1116  Glucose 26  Lipase 30  Total protein 4.7  Triglycerides 34   Cholesterol 68  Amylase 14  Gram stain culture: Pending

## 2019-05-01 NOTE — PLAN OF CARE
"AVSS on 2L O2 via NC. Increase O2 to 4L when pt up to commode; pt needs SBA. No bm. Chest tube to -20 suction, unclamped at 0025; 110 ml output since then. Pt expresses strong pain/dyspnea when moving around/out of bed. Gave 10 mg oxy x2 which pt reports \"doesn't touch the pain\", also gave excedrin x1. Moonlighter was paged about pt's inadequate pain control & menthyl cream was ordered which pt refused. T-pump & aqua K pad ordered to try to offer some relief. Pt becoming increasingly frustrated/anxious this morning about her pain/what the plan is. Offered emotional support & reassurance. Continue to monitor & follow POC.    Problem: Adult Inpatient Plan of Care  Goal: Plan of Care Review  5/1/2019 0635 by Taty Preston, RN  Outcome: No Change     Problem: Adult Inpatient Plan of Care  Goal: Readiness for Transition of Care  5/1/2019 0635 by Taty Preston, RN  Outcome: No Change     Problem: Fluid Imbalance (Pneumonia)  Goal: Fluid Balance  5/1/2019 0635 by Taty Preston, RN  Outcome: No Change     Problem: Infection (Pneumonia)  Goal: Resolution of Infection Signs/Symptoms  5/1/2019 0635 by Taty Preston, RN  Outcome: No Change     Problem: Respiratory Compromise (Pneumonia)  Goal: Effective Oxygenation and Ventilation  5/1/2019 0635 by Taty Preston, RN  Outcome: No Change     Problem: Mood Alteration Comorbidity  Goal: Mood Alteration Comorbidity  Description  Patient comorbidity will be monitored for signs and symptoms of Mood Alteration condition.  Problems will be absent, minimized or managed by discharge/transition of care.  5/1/2019 0635 by Taty Preston, RN  Outcome: No Change     Problem: Adult Inpatient Plan of Care  Goal: Optimal Comfort and Wellbeing  5/1/2019 0635 by Taty Preston, RN  Outcome: Declining     Problem: Pain Chronic (Persistent) (Comorbidity Management)  Goal: Acceptable Pain Control and Functional Ability  5/1/2019 0635 by Taty Preston, RN  Outcome: " Declining

## 2019-05-01 NOTE — PLAN OF CARE
Time:9313-4981   Reason for admission: Loculated pleural effusion   VS: /73 (BP Location: Right arm)   Pulse 76   Temp 97  F (36.1  C) (Oral)   Resp 18   Wt 70.3 kg (154 lb 14.4 oz)   SpO2 94%   BMI 27.44 kg/m     Activity: up with SBA  Neuros: AA & Ox4  Cardiac: Tachycardiac  Respiratory: dyspnea with expiratory after transferring from commode to bed  GI/: +BS, voiding adequately, passing  flatus   Diet: fair appetite   Lines: CT to -20 suction   Labs: triggered the sepsis,  lactate-0.7  New changes this shift: tpa 50ml given through chest tube port, clamped until 1225. Pain is managed with oxy 10mg.   Will continue to monitor & follow POC.

## 2019-05-01 NOTE — PLAN OF CARE
A&O. VSS on RA. C/o pain at chest tube site. PRN oxycodone and tylenol given. Pain management consult. Alteplase given via CT x1. AM CXR showed possible kinked CT, waiting on Pulm recs. IV ABX given as scheduled. + growth on pleural fluid cultures MD aware. CT -20 suction. Ambulating SBA to BSC. Voiding. BMx1. Decreased appetite po intake encouraged. Will continue with POC.

## 2019-05-01 NOTE — PROGRESS NOTES
Care Coordinator Note    Admission Date/Time:  4/28/2019  Attending MD:  Demetra Barclay MD    Data  Chart reviewed, discussed with interdisciplinary team.   Patient was admitted for:    Pleuritic chest pain  Recurrent left pleural effusion  Sjogren's syndrome, with unspecified organ involvement (H)  Pleural effusion  Sicca syndrome with other organ involvement (H).      Coordination of Care  D: Plan of care discussed with Medical Team.     I/A: Per Dietitian patient needs Home Delivered meals information. Printed information (page 52, 2019 version) from [facility] Guide Book and met with patient at bedside to discuss. Patient was satisfied with this list and stated she would follow up independently after discharge.    P: Care Coordinator will remain available for discharge needs that may arise.      Plan  Anticipated Discharge Date:  Pending medical needs  Anticipated Discharge Plan:  Home    Marcia Rhodes RN, BSN, PHN  Medicine Care Coordinator  [facility]  Desk Phone: [phone]  Pager: [phone]    To contact Weekend RN[facility], dial [phone] and enter job code [phone] at prompt.   This pager can not be contacted by text page or outside line.

## 2019-05-01 NOTE — PROGRESS NOTES
[facility], [facility] Progress Note    Main Plans for Today   - Continue Antibiotics  - Incentive spirometry  - f/u pulm recs  - Pain consulted, appreciate recs  - Lovenox for DVT prophylaxis    Assessment & Plan   Demetra Richardson is a 63 year old female w/complicated PMHx, significant for SLE, PSC, Sjogren's, fibromyalgia, opiate dependence and polysubstance abuse with history of OD and withdrawal seizures, epidural abscess, bipolar disorder type II, currently on a methadone program, and is admitted for management of L pleural effusion.    # Pneumonia  # Loculated pleural effusion   # Sepsis secondary to pneumonia  Most likely parapneumonic effusion, other differentials include effusion 2/2 rheumatologic disease given ?history SLE, sjogren's, primary sclerosing cholangitis. Initial labs significant for , WBC 26.8 - now downtrending. She is s/p chest tube placement on 4/29 with immediate drainage of 600 ml serosanguinous fluid, and subsequent 150 ml throughout the next evening. Fluid analysis with pleural to serum protein 4.7/6.7, LDH 1116, glucose 26, WBCs seen with no organisms in stain. Repeat CXR on 4/30 with stable L-sided effusion. CXR 5/1 worsening left pleural effusion with concern for chest tube kink. After Pulm fixed chest tube, it put out over 1 L in fluid within minutes.   - Pulmonology consulted, appreciate recs. Discussion with pulm 5/1- patient was in extreme pain/anxiety when repositioning chest tube and fixing kink (can become very anxious with chest tube, can order ativan once on an as needed basis)  - Continue TID tPA through chest tube per pulm recs  - Goal to eventually discontinue excedrin given ASA component, but will taper per patient as able given severity of migraines.   - Daily CXR  - Continue levo 750 mg every day, flagyl 500 mg TID  - Supplemental oxygen prn, goal >92%  - Continue incentive spirometry  - f/u full fluid  "analysis, cultures, fluid cx growing staph intermedius   - q2d CRP, daily CBC  - f/u chest tube output  - Plan to re-image in next 1-2 days per pulm    # Thoracic lymphadenopathy  CT showed enlarged left lower cervical lymph node, prominent left heel or soft tissue, possibly left hilar adenopathy, prominent left paratracheal lymph nodes.  DDX includes infectious versus malignant versus sarcoidosis.    - Pulm likely will not do bronchoscopy at this time     # Chronic pain  # Polysubstance use disorder  # History of overdose and withdrawal seizures  # Methadone maintenance therapy, active  # Fibromyalgia  Patient denies recent substance use, reports no alcohol use in last 7 years. She does admit to taking some xanax about 2 months ago (1/22/2019), and she was hospitalized with acute toxic encephalopathy, even intubated as she was having apneic pauses. Patient is on methadone and fills her prescriptions at [facility]. Admission UDS negative.  - Withdrawal precautions, withdrawal monitoring    - Pain consult 5/1 as patient tearful, requests more opioids and is concerned that \"nothing is touching the pain\"  - Split methadone dose for 8 AM and 8 PM  - robaxin 500 mg po Q6h as needed   - Lidocaine ointment around chest tube as needed  - Continue scheduled Tylenol  - PTA pregabalin   - Heat/cold packs  - Oxycodone 5-10 mg q3h PRN  - Avoid IV opiates  - Bowel regimen    # Hypoalbuminemia  Low albumin 2.4 >1.9 >1.7. Likely due to acute infection, with chronic malnutrition contributing. Patient aware and reports poor diet at home - lots of frozen meals, fast foods, due to issues with ambulation. Lives alone, no help with food prep or grocery shopping.   - Nutrition consult  - Discuss outpatient services for meal/grocery delivery services- talked with [facility] 5/1    # SLE  # Sjogren's syndrome  # primary sclerosing cholangitis  Patient had recent serology, but has returned negative so far. She is not taking steroids, " "hydroxychloroquine or other immunosuppressants. Has ongoing history of symptoms and reports diagnosis of PSC 14 years ago, and notes chronic LFT abnormalities. Does report of dry mouth and dry eyes, and has recently tried medications for Sjogren's without much success. Also reports rash on her bilateral hands, but has no joint swelling, neurologic or acute psychiatric issues to suggest flares.  She was noted to have a protein creatinine ratio of 0.28, but denies other renal complications.  - Labs here notable for alk phos elevation, Normal bilirubin, lipase, ALT, AST wnl.  - Admission ESR 46,   - INR 1.13>1.38, likely secondary to malnutrition vs mild hepatic impairment from PSC. No s/s of bleeding.     #Bipolar disorder type II  Patient endorses recent loss of her father to atypical pneumonia, but considers her mood has been stable, denies suicidal ideation. Patient reports being on Risperidone in the past after a \"manic episode after a family member .\"   -PTA escitalopram     # Mild aortic stenosis  Murmur noticed on exam, echocardiogram  showed LVEF, w/normal diastolic function, normal RV. Unlikely to be related to current symptoms.      # Migraines  # Fibromyalgia  Patient has long history of migraines with significant use of excedrin - now prone to rebound headaches.  -PTA Excedrin Migraine - attempt wean per above     # s/p Billroth II w/revision   # History of iron deficiency anemia  She has evidence of hypochromic RBC's without anemia. Patient has malabsorption secondary to probably surgery, receives IV iron as outpatient. On 19 she had low iron and saturation, normal ferritin, folate and B12.  - continue PTA pantoprazole   - IV iron replacement as outpatient     # Pain Assessment:  Current Pain Score 2019   Patient currently in pain? yes   Pain score (0-10) -   Pain location -   Pain descriptors Aching;Sharp   Some encounter information is confidential and restricted. Go to Review " Flowsheets activity to see all data.   - Demetra is experiencing pain due to pleural effusion. Pain management was discussed and the plan was created in a collaborative fashion.  Demetra's response to the current recommendations: engaged  - Please see the plan for pain management as documented above    Diet: Regular Diet Adult  Snacks/Supplements Adult: Other; bowl of chicken salad at bedtime with crackers + milk skim; Between Meals  Fluids: PO  DVT Prophylaxis:  Lovenox   Code Status: Full Code    Disposition Plan   Expected discharge:Expected Discharge Date: 05/02/19 2 - 3 days, recommended to transitional care unit once adequate pain management/ tolerating PO medications and antibiotic plan established.Dispo: Expected Discharge Date: 05/02/19      Entered: Wander Mooney 05/01/2019, 6:52 AM   Information in the above section will display in the discharge planner report.      The patient's care was discussed with the Attending Physician, Dr. Barclay.    Wander Mooney  [facility]: PGY-1  Pager: [phone]  Please see sticky note for cross cover information    Interval History  Pain still present overnight and was given menthyl cream, patient refused.Tachycardic overnight (101) triggered sepsis protocol, lactate at 0.7. Dyspnea worsened with ambulation and was on 2 L O2 overnight satting 92-94%. Otherwise HDS. 1 BM yesterday. 0.4 ml/kg/hr UOP (with one unmeasured yesterday). Chest tube put out 250 total yesterday. Net negative 500 yesterday. She does still have significant pain, became tearful when talking, says the oxy is not touching the pain and that she would like a pain consult.     Review of Systems   Constitutional: Negative for chills, diaphoresis, fever and unexpected weight change.   Respiratory: Positive for shortness of breath.    Cardiovascular: Negative for chest pain and leg swelling.   Gastrointestinal: Negative for abdominal pain, constipation,  nausea and vomiting.   Genitourinary: Negative for dysuria.   Musculoskeletal: Positive for arthralgias (R knee, chronic) and myalgias. Negative for joint swelling.   Skin: Positive for rash.   Neurological: Negative for tremors, weakness, light-headedness, numbness and headaches.   Psychiatric/Behavioral: Negative for dysphoric mood. The patient is not nervous/anxious.      Physical Exam   Vital Signs: Temp: 95.9  F (35.5  C) Temp src: Oral BP: 116/74   Heart Rate: 95 Resp: 18 SpO2: 92 % O2 Device: Nasal cannula Oxygen Delivery: 2 LPM  Weight: 154 lbs 14.4 oz  Physical Exam   Constitutional: She is oriented to person, place, and time. She appears well-developed and well-nourished. No distress.   HENT:   Head: Normocephalic and atraumatic.   Mouth/Throat: Oropharynx is clear and moist. No oropharyngeal exudate.   Eyes: Conjunctivae and EOM are normal. No scleral icterus.   Cardiovascular: Normal rate and regular rhythm. Exam reveals no friction rub.   Murmur (systolic ejection) heard.  Pain when touching chest with stethoscope   Pulmonary/Chest: Effort normal. No respiratory distress. She has no wheezes. She has no rales (LLL).   Diminished breath sounds in LLL.   Abdominal: Soft. Bowel sounds are normal. There is no tenderness. There is no guarding.   Neurological: She is alert and oriented to person, place, and time.   Skin: Skin is warm and dry. She is not diaphoretic.   Psychiatric: She has a normal mood and affect.   Appears somewhat anxious, was tearful when initially seen due to pain   Vitals reviewed.

## 2019-05-01 NOTE — CONSULTS
Inpatient Pain Management Service: Consultation    DATE OF CONSULT: May 1, 2019      REASON FOR PAIN CONSULTATION:  Demetra Richardson is a 63 year old female I am seeing in consultation at the request of Anita Edwards MD for evaluation and recommendations for her   Left sided chest pain due to pleural effusion s/p pigtail chest tube placement on 4/29/19.    CHIEF PAIN COMPLAINT: left sided chest pain      ASSESSMENT:   1. Left sided chest pain due to pleural effusion s/p pigtail chest tube placement on 4/29/19.  2. H/o SLE, Sjogren's, primary sclerosing cholantitis  3. Fibromyalgia on Lyrica  4. On methadone maintenance x 6 years for h/o oxycodone abuse including snorting OxyContin 80mg, 5x/day.  States that she has been sober since being on methadone.  Of note, patient admits to getting Xanax on the streets and had an overdose on 1/22/19 requiring intubation at [facility].  5. H/o alcohol abuse-sober for many years.  6. H/o PUD s/p Billroth II with revision.  7. H/o Bipolar II  8. Opioid induced constipation-has bowel regimen PTA.      -- Outpatient opioid requirements prior to admission: Methadone maintenance at [facility] x 6 years: on Methadone liquid 113mg/day.  States that she is working with clinic to taper down and off.  Plan is to decrease methadone by 3mg/day/on a weekly basis.     reviewed.  Mostly Lyrica prescription    Primary Care Provider: Fredy Merritt  Chronic Pain Provider: none at this time.    TREATMENT RECOMMENDATIONS/PLAN:   1. Continue oxycodone 5-10mg PO Q 3 hours PRN.  Advise to stay on top of pain as she felt pain increased this morning because she skipped a dose.    2. Consider splitting methadone dosing while inpatient:  Methadone solution 56mg at 8 am and 57 mg at 8 pm.  Start on Thursday 5/2/19 morning.    Discussed with patient that she will return to using PTA methadone (113mg) once daily dosing upon discharge and is in agreement.  3. Start Robaxin 500mg PO  Q 6 hours PRN for spasm which patient endorses.  4. Start lidocaine ointment, apply to intact affected skin area up to 4x/day PRN.  5. Discontinue lidocaine patch as ineffective-pt felt it caused more pain at the shoulder area.  6. Continue PTA dose of Lyrica 100mg PO TID  7. Continue acetaminophen 650mg PO Q 6 hours  8. Continue bowel regimen to prevent opioid induced constipation.  Last BM on 5/1/19.  9. Could consider RAPS consult for nerve block for chest tube site pain on 5/2/19 if needed.    Pain Service will Continue to follow peripherally, but will not put notes in every day.     Recommendations were discussed with medicine team and pain team  Plan was reviewed by the Pain Service consisting of Arsenio Chaney MD.    Thank you for consulting the Inpatient Pain Management Service.   The above recommendations are to be acted upon at the primary team s discretion.    To reach us:  Mon - Friday 8 AM - 3 PM: Pager [phone] (Text Page)  After hours, weekends and holidays: Primary service should call [phone] for the on-call pain specialist    HISTORY OF PRESENT ILLNESS: Demetra Richardson is a 63 year old female with history of SLE, primary sclerosing cholangitis, Sjogren's, fibromyalgia, bipolar disorder 2, substance abuse of alcohol and snorting of OxyContin s/p chemical dependency treatment at [facility] -currently on methadone maintenance x 6 years who was admitted on 4/28/19 for left sided pleural effusion s/p pigtail chest tube placement on 4/29/19.   Pleural fluid from 4/29/19 shows light growth of streptococcus intermedius-on antibiotics.    Today, patient is lying in bed. NAD.  She states pain is on the left side of chest.  Rate pain at rest is 5/10 which is tolerable to her.  Felt pain increased this morning because she was behind with her oxycodone dosing due to skipping a dose.   States pain is worse with breathing, swallowing and any movements.  She is fearful to move because of pain.  Pain is  better with staying still.      We reviewed the current medication regimen that she has available to use for pain.  Discussed risks and benefits of the medications which she expresses understanding.  She agrees to try some adjuvants.      PAIN HISTORY:   Location: L chest  Duration: constant but worse with movements  Pain intensity: 5/10 on VAS  Quality of the pain: sharp  Aggravating factors: movements  Relieving factors : rest, pain medication    CAPA (Clinically Aligned Pain Assessment)  Comfort (How is your pain?): Tolerable with discomfort  Change in Pain (Since your last medication/intervention?): About the same  Pain Control (How are your pain treatments working?): Partially effective pain control  Functioning (Are you able to do activities to get better?) : Pain keeps me from doing most of what I need to do  Sleep (Does your pain management allow you to sleep or rest?): Awake with pain due to environment (roommate groaning) and pain      FUNCTIONAL STATUS:  Change:      staying the same  Oral intake:     Regular, painful due to increase chest movements with swallowing  Bowel function:    Soft  Activity level:     Up with assistance ambulating in room but painful  Sleep:      Did not sleep well due to pain  Mood:      Calm, pleasant, cooperative      REVIEW OF 10 BODY SYSTEMS: 10 point ROS of systems including Constitutional, Eyes, Respiratory, Cardiovascular, Gastroenterology, Genitourinary, Integumentary, Musculoskeletal, Psychiatric were all negative except for pertinent positives noted in my HPI.       INPATIENT MEDICATIONS PERTINENT TO PAIN CONSULT:   Medications related to Pain Management (From now, onward)    Start     Dose/Rate Route Frequency Ordered Stop    04/29/19 0800  pregabalin (LYRICA) capsule 100 mg      100 mg Oral 3 TIMES DAILY 04/29/19 0127      04/29/19 0800  Lidocaine (LIDOCARE) 4 % Patch 1 patch      1 patch  over 12 Hours Transdermal EVERY 24 HOURS 0800 04/29/19 0225      04/29/19 0800   lidocaine patch in PLACE       Transdermal EVERY 8 HOURS 04/29/19 0225      04/29/19 0800  methadone (DOLOPHINE-INTENSOL) 10 MG/ML (HIGH CONC) solution 113 mg      113 mg Oral DAILY 04/29/19 0733      04/29/19 0800  polyethylene glycol (MIRALAX/GLYCOLAX) Packet 17 g      17 g Oral DAILY 04/29/19 0734      04/29/19 0453  lidocaine 1 % 0.1-1 mL      0.1-1 mL Other EVERY 1 HOUR PRN 04/29/19 0453      04/29/19 0453  lidocaine (LMX4) cream       Topical EVERY 1 HOUR PRN 04/29/19 0453      04/29/19 0453  aspirin-acetaminophen-caffeine (EXCEDRIN MIGRAINE) per tablet 2 tablet      2 tablet Oral EVERY 6 HOURS PRN 04/29/19 0453      04/29/19 0226  acetaminophen (TYLENOL) tablet 650 mg      650 mg Oral EVERY 6 HOURS 04/29/19 0225      04/29/19 0224  oxyCODONE (ROXICODONE) tablet 5-10 mg     Note to Pharmacy:  DO NOT USE THIS FIELD FOR ADMIN INSTRUCTIONS; INFORMATION DOES NOT SHOW ON MAR. USE THE FIELD ABOVE MARKED ADMIN INSTRUCTIONS    5-10 mg Oral EVERY 3 HOURS PRN 04/29/19 0225              CURRENT MEDICATIONS:   Current Facility-Administered Medications Ordered in Epic   Medication Dose Route Frequency Provider Last Rate Last Dose     acetaminophen (TYLENOL) tablet 650 mg  650 mg Oral Q6H Caterina Tyson MD   650 mg at 04/29/19 0745     alteplase (ACTIVASE) 10 mg, dornase alpha (PULMOZYME) 5 mg in sodium chloride 0.9 % 50 mL for chest tube instillation in syringe  50 mL Chest Tube TID Anita Edwards MD   50 mL at 05/01/19 0901     aspirin-acetaminophen-caffeine (EXCEDRIN MIGRAINE) per tablet 2 tablet  2 tablet Oral Q6H PRN Caterina Tyson MD   2 tablet at 05/01/19 0523     enoxaparin (LOVENOX) injection 40 mg  40 mg Subcutaneous Q24H Wander Mooney MD         escitalopram (LEXAPRO) tablet 10 mg  10 mg Oral Daily Caterina Tyson MD   10 mg at 05/01/19 0809     levofloxacin (LEVAQUIN) infusion 750 mg  750 mg Intravenous Q24H Caterina Tyson  mL/hr at 04/30/19 2224 750 mg at 04/30/19 2224      Lidocaine (LIDOCARE) 4 % Patch 1 patch  1 patch Transdermal Q24H Caterina Tyson MD   1 patch at 04/30/19 2039     lidocaine (LMX4) cream   Topical Q1H PRN Caterina Tyson MD         lidocaine 1 % 0.1-1 mL  0.1-1 mL Other Q1H PRN Caterina Tyson MD         lidocaine patch in PLACE   Transdermal Q8H Caterina Tyson MD         lidocaine patch REMOVAL   Transdermal Q24h Caterina Tyson MD         melatonin tablet 1 mg  1 mg Oral At Bedtime PRN Caterina Tyson MD         methadone (DOLOPHINE-INTENSOL) 10 MG/ML (HIGH CONC) solution 113 mg  113 mg Oral Daily Caterina Tyson MD   113 mg at 05/01/19 0809     methyl salicylate-menthol (DUARTE-QURESHI) ointment   Topical Q6H PRN Kaveh Moon DO         metroNIDAZOLE (FLAGYL) infusion 500 mg  500 mg Intravenous Q8H Caterina Tyson  mL/hr at 04/30/19 0201 500 mg at 05/01/19 0958     naloxone (NARCAN) injection 0.1-0.4 mg  0.1-0.4 mg Intravenous Q2 Min PRN Caterina Tyson MD         ondansetron (ZOFRAN-ODT) ODT tab 4 mg  4 mg Oral Q6H PRN Caterina Tyson MD        Or     ondansetron (ZOFRAN) injection 4 mg  4 mg Intravenous Q6H PRN Caterina Tyson MD         oxyCODONE (ROXICODONE) tablet 5-10 mg  5-10 mg Oral Q3H PRN Caterina Tyson MD   10 mg at 05/01/19 0521     pantoprazole (PROTONIX) EC tablet 40 mg  40 mg Oral QAM AC Caterina Tyson MD   40 mg at 05/01/19 0809     polyethylene glycol (MIRALAX/GLYCOLAX) Packet 17 g  17 g Oral Daily Caterina Tyson MD   17 g at 05/01/19 0808     pregabalin (LYRICA) capsule 100 mg  100 mg Oral TID Caterina Tyson MD   100 mg at 05/01/19 0805     saccharomyces boulardii (FLORASTOR) capsule 250 mg  250 mg Oral BID Caterina Tyson MD   250 mg at 04/29/19 0742     sodium chloride (PF) 0.9% PF flush 3 mL  3 mL Intracatheter q1 min prn Caterina Tyson MD         sodium chloride (PF) 0.9% PF flush 3 mL  3 mL Intracatheter Q8H Caterina Tyson MD   3 mL at 04/29/19 0630     No current  [facility]-ordered outpatient medications on file.           HOME/PREVIOUS MEDICATIONS:   Prior to Admission medications    Medication Sig Start Date End Date Taking? Authorizing Provider   aspirin-acetaminophen-caffeine (EXCEDRIN MIGRAINE) 250-250-65 MG tablet Take 2 tablets by mouth 3 times daily   Yes Unknown, Entered By History   escitalopram (LEXAPRO) 10 MG tablet Take 1 tablet (10 mg) by mouth daily 18  Yes Fredy Merritt MD   LYRICA 100 MG capsule TAKE 1 CAPSULE BY MOUTH THREE TIMES A DAY 19  Yes Fredy Merritt MD   methadone (DOLOPHINE-INTENSOL) 10 MG/ML (HIGH CONC) solution Take 113 mg by mouth daily Rufina Marcial [phone]    Dose as of 19   Yes Unknown, Entered By History               ALLERGIES:    Allergies   Allergen Reactions     Penicillins Hives     Hives in childhood.            PAST MEDICAL AND PSYCHIATRIC HISTORY:    Past Medical History:   Diagnosis Date     Anemia     2/2 h/p gastrectomy with inability to absorb oral iron     Basal cell carcinoma     Left-sided, skin over over medial orbit/nasal bone. Removed Oct 2018.     Benzodiazepine dependence (H)     With h/o withdrawal seizure x 1     Bipolar 1 disorder, mixed, moderate (H) 2013     Chronic pain     Back, legs.     Epidural abscess 2005    x4     Fibromyalgia      Generalised anxiety disorder      GERD (gastroesophageal reflux disease)      Major depressive disorder      Migraine      Nephrolithiasis     S/p left sided lithotripsy     Opiate dependence (H)      Osteoarthritis      Osteoporosis      Primary sclerosing cholangitis      PUD (peptic ulcer disease)      SLE (systemic lupus erythematosus) (H)            PAST SURGICAL HISTORY:   Past Surgical History:   Procedure Laterality Date     BACK SURGERY  04    L4-5 epidural abscess     BACK SURGERY  04    L3-4 spinal stenosis     BACK SURGERY  04    Lt psoas abscess      SECTION      x 2     CHOLECYSTECTOMY  -      CHOLECYSTECTOMY       CLOSED REDUCTION, PERCUTANEOUS PINNING FINGER, COMBINED  8/10/2011    Procedure:COMBINED CLOSED REDUCTION, PERCUTANEOUS PINNING FINGER; 5th Proximal Phalanx; Surgeon:RADHA BUITRAGO; Location:[facility]     COLONOSCOPY       GASTRECTOMY  11-    Bilat truncal vagotomy, hemigastrectomy, RnY gastrojejunostomy     GASTRECTOMY      s/p 6/2/11 had temporary fdg tube, now removed     GASTROJEJUNOSTOMY  6/2/2011    Procedure:GASTROJEJUNOSTOMY; exploratory laparotmy with revision of gastrojejunostomy, Antrectomy, Miles-en-y, Gastrojejunostomy; Surgeon:JULIUS HELM; Location:[facility] OR     INSERT CHEST TUBE N/A 4/29/2019    Procedure: INSERTION, CATHETER, INTERCOSTAL, FOR DRAINAGE;  Surgeon: Melissa Nichols MD;  Location: [facility] GI     LASER HOLMIUM LITHOTRIPSY URETER(S), INSERT STENT, COMBINED      Procedure: COMBINED CYSTOSCOPY, URETEROSCOPY, LASER HOLMIUM LITHOTRIPSY URETER(S), INSERT STENT;;  Surgeon: Blair Correa MD;  Location: [facility] OR     LASER HOLMIUM NEPHROLITHOTOMY VIA PERCUTANEOUS NEPHROSTOMY  7/11/2012    Procedure: LASER HOLMIUM NEPHROLITHOTOMY VIA PERCUTANEOUS NEPHROSTOMY;  proceedure should read: Left Percutaneous Access, Left Percutaneous ultrasonic Nephrolithotomy, Ureteroscopy Holmium Laser Lithotripsy Stent Placement ;  Surgeon: Blair Correa MD;  Location: [facility]     MAMMOPLASTY AUGMENTATION BILATERAL       OPEN REDUCTION INTERNAL FIXATION WRIST Left 1/11/2016    Procedure: OPEN REDUCTION INTERNAL FIXATION WRIST;  Surgeon: Darling Ellis MD;  Location: [facility]     RECONSTRUCT BREAST, IMPLANT PROSTHESIS, COMBINED             FAMILY HISTORY: family history includes Family History Negative in her father and mother.      HEALTH & LIFESTYLE PRACTICES:   Tobacco:  reports that she has never smoked. She has never used smokeless tobacco.  Alcohol:  reports that she does not drink alcohol.  Illicit drugs:  reports that she does not use drugs.      SOCIAL HISTORY:   Social  History     Socioeconomic History     Marital status: Single     Spouse name: Not on file     Number of children: Not on file     Years of education: Not on file     Highest education level: Not on file   Occupational History     Not on file   Social Needs     Financial resource strain: Not on file     Food insecurity:     Worry: Not on file     Inability: Not on file     Transportation needs:     Medical: Not on file     Non-medical: Not on file   Tobacco Use     Smoking status: Never Smoker     Smokeless tobacco: Never Used   Substance and Sexual Activity     Alcohol use: No     Comment: none in 10 years     Drug use: No     Sexual activity: Not Currently   Lifestyle     Physical activity:     Days per week: Not on file     Minutes per session: Not on file     Stress: Not on file   Relationships     Social connections:     Talks on phone: Not on file     Gets together: Not on file     Attends Congregational service: Not on file     Active member of club or organization: Not on file     Attends meetings of clubs or organizations: Not on file     Relationship status: Not on file     Intimate partner violence:     Fear of current or ex partner: Not on file     Emotionally abused: Not on file     Physically abused: Not on file     Forced sexual activity: Not on file   Other Topics Concern     Parent/sibling w/ CABG, MI or angioplasty before 65F 55M? Not Asked   Social History Narrative    Intake 4/16/15:        H/o divorce but most recently living with JUANIS Hamilton. Alfonso recently passed away.         In past employed as a .         Tobacco use: denies    Alcohol use: denies    Drug use: daily pot use for 30 years. Currently with sobriety.              LABORATORY VALUES:   Last Basic Metabolic Panel:  Lab Results   Component Value Date     05/01/2019      Lab Results   Component Value Date    POTASSIUM 4.5 05/01/2019     Lab Results   Component Value Date    CHLORIDE 106 05/01/2019     Lab Results   Component  Value Date    RAFAELA 8.3 05/01/2019     Lab Results   Component Value Date    CO2 24 05/01/2019     Lab Results   Component Value Date    BUN 15 05/01/2019     Lab Results   Component Value Date    CR 0.69 05/01/2019     Lab Results   Component Value Date     05/01/2019       CBC results:  Lab Results   Component Value Date    WBC 20.6 05/01/2019     Lab Results   Component Value Date    HGB 11.9 05/01/2019     Lab Results   Component Value Date    HCT 40.0 05/01/2019     Lab Results   Component Value Date     05/01/2019       DIAGNOSTIC TESTS:       Labs above reviewed as well as additional relevant diagnostic studies from the EPIC record.       PHYSICAL EXAMINATION:  VITAL SIGNS:  B/P: 116/74, T: 95.9, P: 76, R: 18    CONSTITUTIONAL/GENERAL APPEARANCE: AAOx3. Conversant.  EYES: EOMI, sclerae clear  ENT/NECK: neck is supple  RESPIRATORY: breathing on room air, discomfort at times with deep breathing.  Chest tube site on left posterior flank-d,c,i  CARDIOVASCULAR: RRR  GI:soft, nontender, bowel sounds present  MUSCULOSKELETAL/BACK/SPINE/EXTREMITIES: Moving UE and LE spontaneously.     NEURO:  SILT to UE and LE.  SKIN/VASCULAR EXAM:  Dry and warm.  PSYCHIATRIC/BEHAVIORAL/OBSERVATIONS:  No objective signs of pain observed during our interview.   Judgment/Insight -fair   Orientation - x3   Memory -good   Mood and affect - calm, pleasant, cooperative        TIME SPENT: 60 minutes including 30 minutes of face-to-face time counseling her  about her pain management treatment options, and coordinating care with the primary team.    Doris Bonds PA-C  May 1, 2019, 11:53 AM  Inpatient Pain Management Service

## 2019-05-02 ENCOUNTER — APPOINTMENT (OUTPATIENT)
Dept: GENERAL RADIOLOGY | Facility: CLINIC | Age: 64
DRG: 853 | End: 2019-05-02
Attending: INTERNAL MEDICINE
Payer: COMMERCIAL

## 2019-05-02 LAB
ALBUMIN SERPL-MCNC: 1.4 G/DL (ref 3.4–5)
ALP SERPL-CCNC: 574 U/L (ref 40–150)
ALT SERPL W P-5'-P-CCNC: 28 U/L (ref 0–50)
ANION GAP SERPL CALCULATED.3IONS-SCNC: 9 MMOL/L (ref 3–14)
AST SERPL W P-5'-P-CCNC: 41 U/L (ref 0–45)
BILIRUB SERPL-MCNC: 0.6 MG/DL (ref 0.2–1.3)
BUN SERPL-MCNC: 18 MG/DL (ref 7–30)
CALCIUM SERPL-MCNC: 8.4 MG/DL (ref 8.5–10.1)
CHLORIDE SERPL-SCNC: 104 MMOL/L (ref 94–109)
CO2 SERPL-SCNC: 25 MMOL/L (ref 20–32)
COPATH REPORT: NORMAL
CREAT SERPL-MCNC: 0.66 MG/DL (ref 0.52–1.04)
CRP SERPL-MCNC: 290 MG/L (ref 0–8)
ERYTHROCYTE [DISTWIDTH] IN BLOOD BY AUTOMATED COUNT: 14.6 % (ref 10–15)
GFR SERPL CREATININE-BSD FRML MDRD: >90 ML/MIN/{1.73_M2}
GLUCOSE SERPL-MCNC: 123 MG/DL (ref 70–99)
HCT VFR BLD AUTO: 43 % (ref 35–47)
HGB BLD-MCNC: 12.8 G/DL (ref 11.7–15.7)
LACTATE BLD-SCNC: 1.6 MMOL/L (ref 0.7–2)
MCH RBC QN AUTO: 28.3 PG (ref 26.5–33)
MCHC RBC AUTO-ENTMCNC: 29.8 G/DL (ref 31.5–36.5)
MCV RBC AUTO: 95 FL (ref 78–100)
PLATELET # BLD AUTO: 424 10E9/L (ref 150–450)
POTASSIUM SERPL-SCNC: 4.9 MMOL/L (ref 3.4–5.3)
PROT SERPL-MCNC: 6 G/DL (ref 6.8–8.8)
RBC # BLD AUTO: 4.52 10E12/L (ref 3.8–5.2)
SODIUM SERPL-SCNC: 138 MMOL/L (ref 133–144)
WBC # BLD AUTO: 18.7 10E9/L (ref 4–11)

## 2019-05-02 PROCEDURE — 12000001 ZZH R&B MED SURG/OB UMMC

## 2019-05-02 PROCEDURE — 84080 ASSAY ALKALINE PHOSPHATASES: CPT | Performed by: STUDENT IN AN ORGANIZED HEALTH CARE EDUCATION/TRAINING PROGRAM

## 2019-05-02 PROCEDURE — 36415 COLL VENOUS BLD VENIPUNCTURE: CPT | Performed by: STUDENT IN AN ORGANIZED HEALTH CARE EDUCATION/TRAINING PROGRAM

## 2019-05-02 PROCEDURE — 25000128 H RX IP 250 OP 636: Performed by: FAMILY MEDICINE

## 2019-05-02 PROCEDURE — 25000125 ZZHC RX 250: Performed by: STUDENT IN AN ORGANIZED HEALTH CARE EDUCATION/TRAINING PROGRAM

## 2019-05-02 PROCEDURE — 25000132 ZZH RX MED GY IP 250 OP 250 PS 637: Performed by: FAMILY MEDICINE

## 2019-05-02 PROCEDURE — 25000132 ZZH RX MED GY IP 250 OP 250 PS 637: Performed by: SURGERY

## 2019-05-02 PROCEDURE — 85027 COMPLETE CBC AUTOMATED: CPT | Performed by: STUDENT IN AN ORGANIZED HEALTH CARE EDUCATION/TRAINING PROGRAM

## 2019-05-02 PROCEDURE — 80053 COMPREHEN METABOLIC PANEL: CPT | Performed by: STUDENT IN AN ORGANIZED HEALTH CARE EDUCATION/TRAINING PROGRAM

## 2019-05-02 PROCEDURE — 99232 SBSQ HOSP IP/OBS MODERATE 35: CPT | Performed by: PHYSICIAN ASSISTANT

## 2019-05-02 PROCEDURE — 25000128 H RX IP 250 OP 636: Performed by: STUDENT IN AN ORGANIZED HEALTH CARE EDUCATION/TRAINING PROGRAM

## 2019-05-02 PROCEDURE — 40000556 ZZH STATISTIC PERIPHERAL IV START W US GUIDANCE

## 2019-05-02 PROCEDURE — 25000132 ZZH RX MED GY IP 250 OP 250 PS 637: Performed by: STUDENT IN AN ORGANIZED HEALTH CARE EDUCATION/TRAINING PROGRAM

## 2019-05-02 PROCEDURE — 99207 ZZC CDG-MDM COMPONENT: MEETS MODERATE - UP CODED: CPT | Performed by: PHYSICIAN ASSISTANT

## 2019-05-02 PROCEDURE — 25800030 ZZH RX IP 258 OP 636: Performed by: INTERNAL MEDICINE

## 2019-05-02 PROCEDURE — 25000128 H RX IP 250 OP 636: Performed by: SURGERY

## 2019-05-02 PROCEDURE — 86140 C-REACTIVE PROTEIN: CPT | Performed by: STUDENT IN AN ORGANIZED HEALTH CARE EDUCATION/TRAINING PROGRAM

## 2019-05-02 PROCEDURE — 25000128 H RX IP 250 OP 636: Performed by: INTERNAL MEDICINE

## 2019-05-02 PROCEDURE — 71045 X-RAY EXAM CHEST 1 VIEW: CPT

## 2019-05-02 PROCEDURE — 83605 ASSAY OF LACTIC ACID: CPT | Performed by: INTERNAL MEDICINE

## 2019-05-02 PROCEDURE — 36415 COLL VENOUS BLD VENIPUNCTURE: CPT | Performed by: INTERNAL MEDICINE

## 2019-05-02 PROCEDURE — 25000125 ZZHC RX 250: Performed by: INTERNAL MEDICINE

## 2019-05-02 PROCEDURE — 25800030 ZZH RX IP 258 OP 636: Performed by: STUDENT IN AN ORGANIZED HEALTH CARE EDUCATION/TRAINING PROGRAM

## 2019-05-02 RX ORDER — CEFTRIAXONE 2 G/1
2 INJECTION, POWDER, FOR SOLUTION INTRAMUSCULAR; INTRAVENOUS EVERY 24 HOURS
Status: DISCONTINUED | OUTPATIENT
Start: 2019-05-02 | End: 2019-05-11

## 2019-05-02 RX ORDER — ALPRAZOLAM 0.5 MG
0.5 TABLET ORAL 3 TIMES DAILY PRN
Status: DISCONTINUED | OUTPATIENT
Start: 2019-05-02 | End: 2019-05-03

## 2019-05-02 RX ADMIN — ACETAMINOPHEN 650 MG: 325 TABLET, FILM COATED ORAL at 17:48

## 2019-05-02 RX ADMIN — PANTOPRAZOLE SODIUM 40 MG: 40 TABLET, DELAYED RELEASE ORAL at 08:18

## 2019-05-02 RX ADMIN — OXYCODONE HYDROCHLORIDE 10 MG: 5 TABLET ORAL at 11:32

## 2019-05-02 RX ADMIN — ACETAMINOPHEN 650 MG: 325 TABLET, FILM COATED ORAL at 05:38

## 2019-05-02 RX ADMIN — METRONIDAZOLE 500 MG: 500 INJECTION, SOLUTION INTRAVENOUS at 02:00

## 2019-05-02 RX ADMIN — ESCITALOPRAM 10 MG: 5 TABLET, FILM COATED ORAL at 08:19

## 2019-05-02 RX ADMIN — OXYCODONE HYDROCHLORIDE 10 MG: 5 TABLET ORAL at 05:38

## 2019-05-02 RX ADMIN — Medication 50 ML: at 13:48

## 2019-05-02 RX ADMIN — Medication 250 MG: at 08:18

## 2019-05-02 RX ADMIN — PREGABALIN 100 MG: 100 CAPSULE ORAL at 08:01

## 2019-05-02 RX ADMIN — PREGABALIN 100 MG: 100 CAPSULE ORAL at 13:49

## 2019-05-02 RX ADMIN — OXYCODONE HYDROCHLORIDE 10 MG: 5 TABLET ORAL at 08:50

## 2019-05-02 RX ADMIN — ALPRAZOLAM 0.5 MG: 0.5 TABLET ORAL at 16:27

## 2019-05-02 RX ADMIN — ACETAMINOPHEN, ASPIRIN AND CAFFEINE 2 TABLET: 250; 250; 65 TABLET, FILM COATED ORAL at 22:50

## 2019-05-02 RX ADMIN — METHADONE HYDROCHLORIDE 56 MG: 10 CONCENTRATE ORAL at 08:04

## 2019-05-02 RX ADMIN — CEFTRIAXONE SODIUM 2 G: 2 INJECTION, POWDER, FOR SOLUTION INTRAMUSCULAR; INTRAVENOUS at 17:49

## 2019-05-02 RX ADMIN — Medication 50 ML: at 07:44

## 2019-05-02 RX ADMIN — METRONIDAZOLE 500 MG: 500 INJECTION, SOLUTION INTRAVENOUS at 10:17

## 2019-05-02 RX ADMIN — Medication 250 MG: at 20:34

## 2019-05-02 RX ADMIN — POLYETHYLENE GLYCOL 3350 17 G: 17 POWDER, FOR SOLUTION ORAL at 08:18

## 2019-05-02 RX ADMIN — OXYCODONE HYDROCHLORIDE 10 MG: 5 TABLET ORAL at 02:00

## 2019-05-02 RX ADMIN — ACETAMINOPHEN 650 MG: 325 TABLET, FILM COATED ORAL at 00:07

## 2019-05-02 RX ADMIN — ACETAMINOPHEN 650 MG: 325 TABLET, FILM COATED ORAL at 11:32

## 2019-05-02 RX ADMIN — METHADONE HYDROCHLORIDE 57 MG: 10 CONCENTRATE ORAL at 19:15

## 2019-05-02 RX ADMIN — PREGABALIN 100 MG: 100 CAPSULE ORAL at 20:34

## 2019-05-02 RX ADMIN — METHOCARBAMOL 500 MG: 500 TABLET, FILM COATED ORAL at 08:06

## 2019-05-02 RX ADMIN — Medication 50 ML: at 20:35

## 2019-05-02 ASSESSMENT — ACTIVITIES OF DAILY LIVING (ADL)
ADLS_ACUITY_SCORE: 8
ADLS_ACUITY_SCORE: 8.5

## 2019-05-02 ASSESSMENT — ENCOUNTER SYMPTOMS
MYALGIAS: 1
NERVOUS/ANXIOUS: 0
DYSURIA: 0
DIAPHORESIS: 0
CONSTIPATION: 0
DYSPHORIC MOOD: 0
ARTHRALGIAS: 1
UNEXPECTED WEIGHT CHANGE: 0
WEAKNESS: 0
JOINT SWELLING: 0
TREMORS: 0
NUMBNESS: 0
CHILLS: 0
SHORTNESS OF BREATH: 1
VOMITING: 0
LIGHT-HEADEDNESS: 0
NAUSEA: 0
ABDOMINAL PAIN: 0
FEVER: 0
HEADACHES: 0

## 2019-05-02 NOTE — PROGRESS NOTES
[facility]    Pulmonary, Allergy, Critical Care and Sleep Medicine    Follow-up Note  05/02/2019    Assessment and Recommendations:    Demetra Richardson is a 63 year old female with hx of Sjogren's, SLE, PSC, fibromyalgia and prior substance abuse on methadone who presented 4/28/2019 with progressive shortness and found to have moderate left sided loculated pleural effusion, now s/p chest tube with ongoing tPA and strep intermedius on pleural cultures.     Loculated Strep Empyema   Loculated fluid with septations seen on initial bedside ultrasound, now growing strep intermedius. Pigtail chest tube in place that following repositioning yesterday has had significant output in past 24 hrs with administration of lytics. CXR improved from prior and will plan to continue lytic therapy will still having large output from chest tube.     - Agree with ceftriaxone  - Chest tube to -20 cm H2O suction  - Continue tPA/dornase through chest tube for additional day, ordered changed for you  - Chest tube drain to be clamped using Ginny clamp during lytic administration   - Contact Pulmonary service if issues with tube kinking or inability to instil lytics  - Pending output will determine if should extend lytics for additional 1-2 days over the weekend  - CXR daily, will obtain CT once stop lytic therapy    Seen and discussed with Dr. Magdalena Champagne MD PhD  Pulmonary and Critical Care Fellow   Pager [phone]     Subjective, Interval history:   Overnight significant drainage from chest tube which continued today. Patient was reportedly frustrated by lack of anxiety medication with chest tube cares. Today very upset during the day, tearful and frustrated with slow pace of her recovery. Feels somewhat abandoned by her many siblings. More calm later in day. Discussed plan for continued tPA.    Objective:   Medications:    acetaminophen  650 mg Oral Q6H     alteplase (ACTIVASE) 10 mg and  dornase 5 mg in NS syringe for chest tube instillation  50 mL Chest Tube TID     cefTRIAXone  2 g Intravenous Q24H     enoxaparin  40 mg Subcutaneous Q24H     escitalopram  10 mg Oral Daily     methadone  56 mg Oral QAM     methadone  57 mg Oral Daily at 8 pm     pantoprazole  40 mg Oral QAM AC     polyethylene glycol  17 g Oral Daily     pregabalin  100 mg Oral TID     saccharomyces boulardii  250 mg Oral BID     sodium chloride (PF)  3 mL Intracatheter Q8H     ALPRAZolam, aspirin-acetaminophen-caffeine, lidocaine 4%, lidocaine, lidocaine (buffered or not buffered), melatonin, methocarbamol, methyl salicylate-menthol, naloxone, ondansetron **OR** ondansetron, sodium chloride (PF)    Physical Exam:  Temp:  [96  F (35.6  C)-97.6  F (36.4  C)] 97.6  F (36.4  C)  Pulse:  [105] 105  Heart Rate:  [] 103  Resp:  [18-22] 20  BP: (100-109)/(66-79) 100/66  SpO2:  [91 %-96 %] 95 %    Intake/Output Summary (Last 24 hours) at 5/2/2019 1824  Last data filed at 5/2/2019 1550  Gross per 24 hour   Intake 420 ml   Output 2665 ml   Net -2245 ml     General: laying in bed in distress, tearful  HEENT: anicteric, moist mucosa  Chest: Diminished breath sounds left base, no wheezing or crackles  Cardiac: RRR no murmurs  Abdomen: Soft, flat, non tender, active BS  Extremities: No LE Edema  Neuro: A&Ox3, no focal deficits   Skin: no rash noted    Labs and imaging: All laboratory and imaging data personally reviewed.

## 2019-05-02 NOTE — PROGRESS NOTES
[facility], [facility] Progress Note    Main Plans for Today   - Start ceftriaxone  - Incentive spirometry  - f/u pulm recs     Assessment & Plan   Demetra Richardson is a 63 year old female w/complicated PMHx, significant for SLE, PSC, Sjogren's, fibromyalgia, opiate dependence and polysubstance abuse with history of OD and withdrawal seizures, epidural abscess, bipolar disorder type II, currently on a methadone program, and is admitted for management of L pleural effusion.    # Pneumonia  # Loculated pleural effusion  # Sepsis secondary to pneumonia  Most likely parapneumonic effusion, other differentials include effusion 2/2 rheumatologic disease given ?history SLE, sjogren's, primary sclerosing cholangitis. Initial labs significant for , WBC 26.8 - now downtrending. She is s/p chest tube placement on 4/29. Fluid analysis with pleural to serum protein 4.7/6.7, LDH 1116, glucose 26, WBCs seen with no organisms in stain. CXR 5/1 worsening left pleural effusion with concern for chest tube kink; After Pulm fixed chest tube, it put out over 1 L in fluid within minutes. Pleural effusion seems to be improving 5/2.   - Pulmonology consulted, appreciate cares  - Continue TID tPA through chest tube per pulm recs  - Goal to eventually discontinue excedrin given ASA component, but will taper per patient as able given severity of migraines.   - Daily CXR  - Stop levo 750 mg (4/28-5/2) flagyl 500 mg tid (4/29- 5/2) switch to ceftriaxone 5/2  - Supplemental oxygen prn, goal >92%  - Continue incentive spirometry  - f/u full fluid analysis, cultures, fluid cx growing strep intermedius   - q2d CRP, daily CBC  - f/u chest tube output  - Plan to re-image in next 1-2 days per pulm    # Thoracic lymphadenopathy  CT showed enlarged left lower cervical lymph node, prominent left heel or soft tissue, possibly left hilar adenopathy, prominent left paratracheal lymph  nodes.  DDX includes infectious versus malignant versus sarcoidosis.    - Pulm likely will not do bronchoscopy at this time     # Chronic pain  # Polysubstance use disorder  # History of overdose and withdrawal seizures  # Methadone maintenance therapy, active  # Fibromyalgia  Patient denies recent substance use, reports no alcohol use in last 7 years. She does admit to taking some xanax about 2 months ago (1/22/2019), and she was hospitalized with acute toxic encephalopathy, even intubated as she was having apneic pauses. Patient is on methadone and fills her prescriptions at [facility]. Admission UDS negative.  - Withdrawal precautions, withdrawal monitoring    - Pain consult 5/1, appreciate recs  - Split methadone dose for 8 AM and 8 PM  - robaxin 500 mg po Q6h as needed   - Lidocaine ointment around chest tube as needed  - Continue scheduled Tylenol  - PTA pregabalin  - Heat/cold packs  - Oxycodone 5-10 mg q3h AS NEEDED, after multiple discussions with patient, patient feels like xanax helps her pain/anxiety more than oxy, decision made to stop oxy and switch to as needed xanax   - Avoid IV opiates  - Bowel regimen    # Hypoalbuminemia  Low albumin 2.4 >1.9 >1.7. Likely due to acute infection, with chronic malnutrition contributing. Patient aware and reports poor diet at home - lots of frozen meals, fast foods, due to issues with ambulation. Lives alone, no help with food prep or grocery shopping.   - Nutrition consult  - Discuss outpatient services for meal/grocery delivery services- talked with [facility] 5/1    # SLE  # Sjogren's syndrome  # primary sclerosing cholangitis  Patient had recent serology, but has returned negative so far. She is not taking steroids, hydroxychloroquine or other immunosuppressants. Has ongoing history of symptoms and reports diagnosis of PSC 14 years ago, and notes chronic LFT abnormalities. Does report of dry mouth and dry eyes, and has recently tried medications for Sjogren's without much  "success. Also reports rash on her bilateral hands, but has no joint swelling, neurologic or acute psychiatric issues to suggest flares.  She was noted to have a protein creatinine ratio of 0.28, but denies other renal complications.   Alk phos elevation up to 500 .   - Alk phos bone specific test pending  - Labs here notable for alk phos elevation, Normal bilirubin, lipase, ALT, AST wnl.  - Admission ESR 46,   - INR 1.13>1.38, likely secondary to malnutrition vs mild hepatic impairment from PSC. No s/s of bleeding.     #Bipolar disorder type II  Patient endorses recent loss of her father to atypical pneumonia, but considers her mood has been stable, denies suicidal ideation. Patient reports being on Risperidone in the past after a \"manic episode after a family member .\"   -PTA escitalopram     # Mild aortic stenosis  Murmur noticed on exam, echocardiogram  showed LVEF, w/normal diastolic function, normal RV. Unlikely to be related to current symptoms.      # Migraines  # Fibromyalgia  Patient has long history of migraines with significant use of excedrin - now prone to rebound headaches.  -PTA Excedrin Migraine - attempt wean per above     # s/p Billroth II w/revision   # History of iron deficiency anemia  She has evidence of hypochromic RBC's without anemia. Patient has malabsorption secondary to probably surgery, receives IV iron as outpatient. On 19 she had low iron and saturation, normal ferritin, folate and B12.  - continue PTA pantoprazole  - IV iron replacement as outpatient     # Pain Assessment:  Current Pain Score 2019   Patient currently in pain? yes   Pain score (0-10) -   Pain location -   Pain descriptors Aching   Some encounter information is confidential and restricted. Go to Review Flowsheets activity to see all data.   - Demetra is experiencing pain due to pleural effusion. Pain management was discussed and the plan was created in a collaborative fashion.  Demetra's " response to the current recommendations: engaged  - Please see the plan for pain management as documented above    Diet: Regular Diet Adult  Snacks/Supplements Adult: Other; bowl of chicken salad at bedtime with crackers + milk skim; Between Meals  Fluids: PO  DVT Prophylaxis:  Lovenox  Code Status: Full Code    Disposition Plan   Expected discharge:Expected Discharge Date: 05/02/19 2 - 3 days, recommended to transitional care unit once adequate pain management/ tolerating PO medications and antibiotic plan established.Dispo: Expected Discharge Date: 05/02/19      Entered: Wander Mooney 05/02/2019, 6:41 AM   Information in the above section will display in the discharge planner report.      The patient's care was discussed with the Attending Physician, Dr. Barclay.    Wander Mooney  [facility]: PGY-1  Pager: [phone]  Please see sticky note for cross cover information    Interval History  HDS overnight. Tachycardic up to 105, otherwise within normal limits. BP normal. Now on RA. Chest tube put out 1.9 L yesterday after Pulm fixed kink.   BM x1 yesterday. Less shortness of breath now. Pain largely unchanged but managed with meds. UOP adequate. Net neg 1.8 L yesterday.     Review of Systems   Constitutional: Negative for chills, diaphoresis, fever and unexpected weight change.   Respiratory: Positive for shortness of breath.    Cardiovascular: Negative for chest pain and leg swelling.   Gastrointestinal: Negative for abdominal pain, constipation, nausea and vomiting.   Genitourinary: Negative for dysuria.   Musculoskeletal: Positive for arthralgias (R knee, chronic) and myalgias. Negative for joint swelling.   Skin: Positive for rash.   Neurological: Negative for tremors, weakness, light-headedness, numbness and headaches.   Psychiatric/Behavioral: Negative for dysphoric mood. The patient is not nervous/anxious.      Physical Exam   Vital Signs: Temp: 96  F (35.6   C) Temp src: Axillary BP: 109/78 Pulse: 105 Heart Rate: 94 Resp: 22 SpO2: 96 % O2 Device: None (Room air)    Weight: 154 lbs 14.4 oz  Physical Exam   Constitutional: She is oriented to person, place, and time. She appears well-developed and well-nourished. No distress.   HENT:   Head: Normocephalic and atraumatic.   Mouth/Throat: Oropharynx is clear and moist. No oropharyngeal exudate.   Eyes: Conjunctivae and EOM are normal. No scleral icterus.   Cardiovascular: Normal rate and regular rhythm. Exam reveals no friction rub.   Murmur (systolic ejection) heard.  Pain when touching chest with stethoscope   Pulmonary/Chest: Effort normal. No respiratory distress. She has no wheezes. She has no rales (LLL).   Diminished breath sounds in LLL.   Abdominal: Soft. Bowel sounds are normal. There is no tenderness. There is no guarding.   Neurological: She is alert and oriented to person, place, and time.   Skin: Skin is warm and dry. She is not diaphoretic.   Psychiatric: She has a normal mood and affect.   Vitals reviewed.

## 2019-05-02 NOTE — PROGRESS NOTES
Patient: Demetra Richardson  Date of Service: May 2, 2019 Admission Date:4/28/2019   3 Days Post-Op     Chief Pain Endorsement: left sided chest pain 2/2 left pleural effusion    Recommendations were discussed and relayed to medicine team  Plan was reviewed by the Inpatient Pain Service and staff attending, Dr. Rodrick Christopher      1. Continue with oxycodone 5-10mg PO Q 3 hours PRN while inpatient.    On 5/3/19, consider decreasing oxycodone 5-10 mg PO Q 4 hours PRN.    2. While inpatient, continue with methadone solution 56mg at 8 am and 57mg at 8 pm.      Discussed with patient that she will return to using PTA methadone (113mg) once daily dosing upon discharge and is in agreement.    -of note, her methadone clinic opens from Monday through Saturday.  She receives her methadone on site at the clinic M-Sat.  She has one carry out dose for Sunday.  At this time, she   does not have any methadone dosing at home.  Please plan accordingly when discharging patient, she will need to go to the methadone clinic ([facility]) for her continued outpatient   methadone.    3. Continue lidocaine ointment, apply to intact affected skin area up to 4x/day PRN.  4. Continue Menthol 5% patches, 1 patch transdermal every 8 hours as needed when Lidocaine 4% patches are off.   5. Continue Robaxin 500mg PO Q 6 hours PRN for spasms. Pt feels this is helpful.  6. Continue PTA dose of Lyrica 100mg PO TID.   7. Continue acetaminophen 650mg PO Q 6 hours  8. Bowel regimen per primary team to prevent opioid induced constipation.    Pain Service will Continue to follow peripherally, but will not put notes in every day.     Thank you for consulting the Inpatient Pain Management Service. The above recommendations are to be acted upon at the primary team s discretion.     To reach us:  Mon - Friday 8 AM - 3 PM: Pager [phone] (Text Page)  After hours, weekends and holidays: Primary service should call [phone] for the on-call pain  specialist    PAIN MEDICATION SAFE USE:   Prior to discharge instruct patient on the following in addition to the medication fact sheet:    Caution: these medications can cause sedation    Take prescription medicine only if it has been prescribed by your doctor    Do not take more medicine or take it more often than instructed     Call your doctor if pain gets worse    Never mix pain medicine with alcohol, sleeping pills, or any illicit drugs    Do not operate heavy machinery, including vehicles, when initiation opioid therapy or increasing dosage    Store prescription opioids in a locked container, whenever possible     Dispose of unused opioids appropriately     Do not stop abruptly once at higher doses.  These medications must be tapered off.    Opioid pain medications do carry the risk for physical dependence and addiction and patients should be counseled about this.         1. Improving-Left sided chest pain due to pleural effusion s/p pigtail chest tube placement on 4/29/19.  Today, 5/2/19, she states that she is able to breathe better and feels pain is improved overall.  2. H/o SLE, Sjogren's, primary sclerosing cholantitis  3. Fibromyalgia on Lyrica  4. On methadone maintenance x 6 years for h/o oxycodone abuse including snorting OxyContin 80mg, 5x/day.  States that she has been sober since being on methadone.  Of note, patient admits to getting Xanax on the streets and had an overdose on 1/22/19 requiring intubation at [facility].  5. H/o alcohol abuse-sober for many years.  6. H/o PUD s/p Billroth II with revision.  7. H/o Bipolar II  8. Opioid induced constipation-has bowel regimen PTA.        -- Outpatient opioid requirements prior to admission: Methadone maintenance at [facility] x 6 years: on Methadone liquid 113mg/day.  States that she is working with methadone clinic to taper down and off.  Plan is to decrease methadone by 3mg/day/on a weekly basis.     reviewed:Mostly Lyrica  prescription          Primary Care Provider: Fredy Merritt  Chronic Pain Provider: none at this time.    Interval History:  Demetra (Paxton Richardson was seen today (May 2, 2019) and she reports that her overall pain management is better but she still hurts.  States throbbing pain on left side of chest with severity between 6-10/10 on VAS.  Pain is increased with activations.  She reports improved eating, breathing and sleeping.   However, her main challenge is not being able to activate well due to pain.  States she has not worked with PT as she does not want to yet due to pain.  She reports being able to get out of bed to use commode this morning.    She appears sleepy today but is able to converse and answers questions appropriately. She states that she is catching up on sleep and able to do that since her roommate has been moved from the room.  She also admits that PTA, she does get sedated 45-60mins after her methadone dose but then improves with a bit of time.    We reviewed the pain medication regimen that she has while inpatient which she was agreeable with.  Reviewed risks and benefits of medications. No changes to regimen today.  Encourage activations.    Her last bowel movement was 5/1/19 and it was Normal.     CAPA (Clinically Aligned Pain Assessment):    Comfort (How is your pain?): Tolerable with discomfort  Change in Pain (Since your last medication/intervention?): Getting better  Pain Control (How are your pain treatments working?):  effective control  Functioning (Are you able to do activities to get better?) : Pain keeps me from doing most of what I need to do  Sleep (Does your pain management allow you to sleep or rest?): Normal sleep      FUNCTIONAL STATUS:  Change:      unchanged  Oral intake:     Regular  Activity level:     Up with assistance ambulating a few steps in room to commode  Mood:      tired, poor energy, cooperative     -- Inpatient Medications Related to Pain Management:    Medications related to Pain Management (From now, onward)    Start     Dose/Rate Route Frequency Ordered Stop    05/02/19 2000  methadone (DOLOPHINE-INTENSOL) 10 MG/ML (HIGH CONC) solution 57 mg      57 mg Oral DAILY AT 8PM 05/01/19 1335      05/02/19 0800  methadone (DOLOPHINE-INTENSOL) 10 MG/ML (HIGH CONC) solution 56 mg      56 mg Oral EVERY MORNING 05/01/19 1335      05/01/19 1416  lidocaine (XYLOCAINE) 5 % ointment       Topical EVERY 6 HOURS PRN 05/01/19 1417      05/01/19 1409  methocarbamol (ROBAXIN) tablet 500 mg      500 mg Oral EVERY 6 HOURS PRN 05/01/19 1417      04/29/19 0800  pregabalin (LYRICA) capsule 100 mg      100 mg Oral 3 TIMES DAILY 04/29/19 0127      04/29/19 0800  polyethylene glycol (MIRALAX/GLYCOLAX) Packet 17 g      17 g Oral DAILY 04/29/19 0734      04/29/19 0453  lidocaine 1 % 0.1-1 mL      0.1-1 mL Other EVERY 1 HOUR PRN 04/29/19 0453      04/29/19 0453  lidocaine (LMX4) cream       Topical EVERY 1 HOUR PRN 04/29/19 0453      04/29/19 0453  aspirin-acetaminophen-caffeine (EXCEDRIN MIGRAINE) per tablet 2 tablet      2 tablet Oral EVERY 6 HOURS PRN 04/29/19 0453      04/29/19 0226  acetaminophen (TYLENOL) tablet 650 mg      650 mg Oral EVERY 6 HOURS 04/29/19 0225      04/29/19 0224  oxyCODONE (ROXICODONE) tablet 5-10 mg     Note to Pharmacy:  DO NOT USE THIS FIELD FOR ADMIN INSTRUCTIONS; INFORMATION DOES NOT SHOW ON MAR. USE THE FIELD ABOVE MARKED ADMIN INSTRUCTIONS    5-10 mg Oral EVERY 3 HOURS PRN 04/29/19 0225            LAB DATA:  Recent Labs   Lab 05/02/19  0549 05/01/19  0848 04/30/19  0542   CR 0.66 0.69 0.74   WBC 18.7* 20.6* 18.0*   HGB 12.8 11.9 10.7*   AST 41 39 49*   ALT 28 31 33     CONSTITUTIONAL/GENERAL APPEARANCE: AAOx3. Tired appearing. Conversant.  EYES: EOMI, sclerae clear  ENT/NECK: normocephalic  RESPIRATORY: non-labored breathing.  Pigtail chest tube in present on L flank-dressing-c,d,i  CARDIOVASCULAR: S1 and S2 appreciated  GI:soft, nontender, bowel sounds  present  MUSCULOSKELETAL/BACK/SPINE/EXTREMITIES: Moving UE and LE spontaneously       NEURO:  SILT to UE and LE.  SKIN/VASCULAR EXAM:  Dry and warm.  PSYCHIATRIC/BEHAVIORAL/OBSERVATIONS:  No objective signs of pain observed during our interview. Appears comfortable.   Judgment/Insight -fair   Orientation - x3   Memory -good   Mood and affect - calm, pleasant, cooperative        ----------------------------------------------------------------------------------  Doris Bonds PA-C  Inpatient Pain Service     To reach us:  Mon - Friday 8 AM - 3 PM: Pager [phone] (Text Page)  After hours, weekends and holidays: Primary service should call [phone] for the on-call pain specialist    Helpful Resources:  Getting Rid of Unwanted Medications (printable PDF for patients)   Opioid Overdose Prevention Toolkit (printable PDF for patients)   Prescription Opioids: What You Need To Know (printable PDF for patients)

## 2019-05-02 NOTE — PLAN OF CARE
"Time: 2291-3013  BP: 105/71 P: 92 Temp: 97.2 RR:18 Wt: 69.6 kg, SpO2: 96%, BMI: 27.17 kg/m2  Activity: SBA of 1 to BSC  Pain: C/o pain in left shoulder and right knee, heat pack administered. Pain improvement at beginning of shift but decline in management throughout shift. Taking oxycodone q3hr and robaxin q6hr.  Neuro: A&O x4  Cardiac: WDL  Resp: Chest tube to -20 suction with 450 mL sanguineous  output. TPA given via CT x2. Changed drainage pump at 1350. Lung sounds clear with left lung sounds diminished.   GI/: Voided x2, no BM on shift. Voiding in BSC.  Diet: Pt tolerated meals, regular diet. Enc. Fluids.  Lines: IV infiltrated at 1020, restarted in left lower arm. CT left posterior back, no air leak noted, clamps at bedside.  Labs: Lactate levels from sepsis trigger on NOC is WDL. Hypokalemic and low albumin. WBC elevated. CRP inflammation values extremely elevated.    New this shift: Pt requested Xanax for anxiety after IV infiltrated stating, \"It's all too much to deal with\". Paged doctor for order, but did not fill. Talked to Pt about appropriate use for Xanax, but did not feel like it was fair to be declined the medication. Continued requesting throughout shift.    S: Pt stating \"It's all too much to deal with\" after IV infiltrated and notified her that a new line would be placed. Stated, \"Something has gone wrong with every thing I have had done to me.\"  Pt requested Xanax.  O: IV infiltrated, bulge of liquid at IV site  A: Pt requested medications to ease anxiety before seeking other interventions.   P: RN and student nurse discussed appropriate use of Xanax. Offered Pt lavender for anxiety with relief. Pt refused hand massage.        "

## 2019-05-02 NOTE — PLAN OF CARE
Pt is alert, orientated, VSS on room air, slight tachycardia at times, pt chest tube I instilled altepase X 2 per order and clamped for 2 hours with some serosanguinious output, see flowsheets for amount, pt feels SOB has improved, also states pain has improved this morning, this afternoon, she explains the pain is so bad and she is so anxious that she wants Xanax, updated medical team but decided not to order, pt very upset she cant have Xanax and refused to get new PIV until she gets Xanax, able to encourage her to take her oxycodone that was due and she agreed to get PIV despite not getting Xanax, active listening and lavender aromatherapy to help her anxiety.  Pt sleeping between cares, fair po intake, up with SBA due to lines, using call light appropriately, c/o left shoulder and chest tube insertion site pain, triggered sepsis this a.m., lactic 1.6

## 2019-05-02 NOTE — PLAN OF CARE
Pt A&O. VSS no RA. Up with SBA to BSC. C/o pain in back and at chest tube site, managed with oxycodone q3h. Reports pain is improved this am. Chest tube to -20 suction. Dressing CDI, no air leak noted, clamps at bedside. Triggered sepsis this am, lactic pending. Moderate amount of sanguinous output. Void x 2, no BM. Urine is dark daniel. Plan to monitor chest tube/pleural effusion.

## 2019-05-02 NOTE — PLAN OF CARE
Pt is alert, oriented and anxious. Up with SBA to BSC. Chest tube to -20 cm suction, dark red drainage. Chest tube dressing changed by pulm and tube splinted with gauze to prevent future kinking. Lytic therapy performed at 8 pm per pulm's request. Pt frustrated that the primary team cancelled the xanax that pulm team ordered. Notified [facility] and a 1 time dose was ordered. Pt requesting oxy q 3 hrs and robaxin q 6 hrs. Reports pain is poorly controlled. Voiding in BSC. Pt reports 1 BM today. Eating without issue.

## 2019-05-03 ENCOUNTER — APPOINTMENT (OUTPATIENT)
Dept: GENERAL RADIOLOGY | Facility: CLINIC | Age: 64
DRG: 853 | End: 2019-05-03
Payer: COMMERCIAL

## 2019-05-03 ENCOUNTER — APPOINTMENT (OUTPATIENT)
Dept: GENERAL RADIOLOGY | Facility: CLINIC | Age: 64
DRG: 853 | End: 2019-05-03
Attending: INTERNAL MEDICINE
Payer: COMMERCIAL

## 2019-05-03 LAB
ALBUMIN SERPL-MCNC: 1.3 G/DL (ref 3.4–5)
ALP BONE SERPL-MCNC: 50.4 UG/L
ALP SERPL-CCNC: 328 U/L (ref 40–150)
ALT SERPL W P-5'-P-CCNC: 16 U/L (ref 0–50)
ANION GAP SERPL CALCULATED.3IONS-SCNC: 7 MMOL/L (ref 3–14)
AST SERPL W P-5'-P-CCNC: 16 U/L (ref 0–45)
BACTERIA SPEC CULT: ABNORMAL
BACTERIA SPEC CULT: ABNORMAL
BILIRUB SERPL-MCNC: 0.2 MG/DL (ref 0.2–1.3)
BUN SERPL-MCNC: 15 MG/DL (ref 7–30)
CALCIUM SERPL-MCNC: 8.1 MG/DL (ref 8.5–10.1)
CHLORIDE SERPL-SCNC: 104 MMOL/L (ref 94–109)
CO2 SERPL-SCNC: 25 MMOL/L (ref 20–32)
CREAT SERPL-MCNC: 0.62 MG/DL (ref 0.52–1.04)
ERYTHROCYTE [DISTWIDTH] IN BLOOD BY AUTOMATED COUNT: 14.6 % (ref 10–15)
GFR SERPL CREATININE-BSD FRML MDRD: >90 ML/MIN/{1.73_M2}
GLUCOSE SERPL-MCNC: 119 MG/DL (ref 70–99)
HCT VFR BLD AUTO: 40.9 % (ref 35–47)
HGB BLD-MCNC: 12.2 G/DL (ref 11.7–15.7)
LACTATE BLD-SCNC: 1 MMOL/L (ref 0.7–2)
MCH RBC QN AUTO: 27.7 PG (ref 26.5–33)
MCHC RBC AUTO-ENTMCNC: 29.8 G/DL (ref 31.5–36.5)
MCV RBC AUTO: 93 FL (ref 78–100)
PHOSPHATE SERPL-MCNC: 3.8 MG/DL (ref 2.5–4.5)
PLATELET # BLD AUTO: 426 10E9/L (ref 150–450)
POTASSIUM SERPL-SCNC: 4.6 MMOL/L (ref 3.4–5.3)
PROT SERPL-MCNC: 5.4 G/DL (ref 6.8–8.8)
RBC # BLD AUTO: 4.4 10E12/L (ref 3.8–5.2)
SODIUM SERPL-SCNC: 136 MMOL/L (ref 133–144)
SPECIMEN SOURCE: ABNORMAL
WBC # BLD AUTO: 18.5 10E9/L (ref 4–11)

## 2019-05-03 PROCEDURE — 36415 COLL VENOUS BLD VENIPUNCTURE: CPT | Performed by: STUDENT IN AN ORGANIZED HEALTH CARE EDUCATION/TRAINING PROGRAM

## 2019-05-03 PROCEDURE — 25000125 ZZHC RX 250: Performed by: STUDENT IN AN ORGANIZED HEALTH CARE EDUCATION/TRAINING PROGRAM

## 2019-05-03 PROCEDURE — 25000132 ZZH RX MED GY IP 250 OP 250 PS 637: Performed by: FAMILY MEDICINE

## 2019-05-03 PROCEDURE — 71045 X-RAY EXAM CHEST 1 VIEW: CPT

## 2019-05-03 PROCEDURE — 25000128 H RX IP 250 OP 636: Performed by: SURGERY

## 2019-05-03 PROCEDURE — 85027 COMPLETE CBC AUTOMATED: CPT | Performed by: STUDENT IN AN ORGANIZED HEALTH CARE EDUCATION/TRAINING PROGRAM

## 2019-05-03 PROCEDURE — 12000001 ZZH R&B MED SURG/OB UMMC

## 2019-05-03 PROCEDURE — 25000128 H RX IP 250 OP 636: Performed by: STUDENT IN AN ORGANIZED HEALTH CARE EDUCATION/TRAINING PROGRAM

## 2019-05-03 PROCEDURE — 25000132 ZZH RX MED GY IP 250 OP 250 PS 637: Performed by: SURGERY

## 2019-05-03 PROCEDURE — 71045 X-RAY EXAM CHEST 1 VIEW: CPT | Mod: 77

## 2019-05-03 PROCEDURE — 84100 ASSAY OF PHOSPHORUS: CPT | Performed by: STUDENT IN AN ORGANIZED HEALTH CARE EDUCATION/TRAINING PROGRAM

## 2019-05-03 PROCEDURE — 80053 COMPREHEN METABOLIC PANEL: CPT | Performed by: STUDENT IN AN ORGANIZED HEALTH CARE EDUCATION/TRAINING PROGRAM

## 2019-05-03 PROCEDURE — 25000125 ZZHC RX 250: Performed by: SURGERY

## 2019-05-03 PROCEDURE — 25800030 ZZH RX IP 258 OP 636: Performed by: STUDENT IN AN ORGANIZED HEALTH CARE EDUCATION/TRAINING PROGRAM

## 2019-05-03 PROCEDURE — 25000132 ZZH RX MED GY IP 250 OP 250 PS 637: Performed by: STUDENT IN AN ORGANIZED HEALTH CARE EDUCATION/TRAINING PROGRAM

## 2019-05-03 PROCEDURE — 25000128 H RX IP 250 OP 636: Performed by: FAMILY MEDICINE

## 2019-05-03 PROCEDURE — 83605 ASSAY OF LACTIC ACID: CPT

## 2019-05-03 RX ORDER — OXYCODONE HYDROCHLORIDE 5 MG/1
10 TABLET ORAL
Status: DISCONTINUED | OUTPATIENT
Start: 2019-05-03 | End: 2019-05-04

## 2019-05-03 RX ORDER — AMOXICILLIN 250 MG
1 CAPSULE ORAL 2 TIMES DAILY
Status: DISCONTINUED | OUTPATIENT
Start: 2019-05-03 | End: 2019-05-07

## 2019-05-03 RX ORDER — METHADONE HYDROCHLORIDE 10 MG/ML
113 CONCENTRATE ORAL DAILY
Status: DISCONTINUED | OUTPATIENT
Start: 2019-05-03 | End: 2019-05-05

## 2019-05-03 RX ADMIN — OXYCODONE HYDROCHLORIDE 10 MG: 5 TABLET ORAL at 21:06

## 2019-05-03 RX ADMIN — ESCITALOPRAM 10 MG: 5 TABLET, FILM COATED ORAL at 08:43

## 2019-05-03 RX ADMIN — PREGABALIN 100 MG: 100 CAPSULE ORAL at 08:43

## 2019-05-03 RX ADMIN — ALPRAZOLAM 0.5 MG: 0.5 TABLET ORAL at 00:38

## 2019-05-03 RX ADMIN — PREGABALIN 100 MG: 100 CAPSULE ORAL at 13:43

## 2019-05-03 RX ADMIN — METHOCARBAMOL 500 MG: 500 TABLET, FILM COATED ORAL at 17:20

## 2019-05-03 RX ADMIN — ALPRAZOLAM 0.5 MG: 0.5 TABLET ORAL at 14:01

## 2019-05-03 RX ADMIN — ENOXAPARIN SODIUM 40 MG: 40 INJECTION SUBCUTANEOUS at 11:35

## 2019-05-03 RX ADMIN — ALPRAZOLAM 0.5 MG: 0.5 TABLET ORAL at 08:44

## 2019-05-03 RX ADMIN — OXYCODONE HYDROCHLORIDE 10 MG: 5 TABLET ORAL at 17:57

## 2019-05-03 RX ADMIN — PREGABALIN 100 MG: 100 CAPSULE ORAL at 21:06

## 2019-05-03 RX ADMIN — Medication 50 ML: at 15:04

## 2019-05-03 RX ADMIN — LIDOCAINE: 50 OINTMENT TOPICAL at 08:51

## 2019-05-03 RX ADMIN — Medication 50 ML: at 08:43

## 2019-05-03 RX ADMIN — METHADONE HYDROCHLORIDE 113 MG: 10 CONCENTRATE ORAL at 08:42

## 2019-05-03 RX ADMIN — ACETAMINOPHEN, ASPIRIN AND CAFFEINE 2 TABLET: 250; 250; 65 TABLET, FILM COATED ORAL at 06:52

## 2019-05-03 RX ADMIN — ACETAMINOPHEN 650 MG: 325 TABLET, FILM COATED ORAL at 17:20

## 2019-05-03 RX ADMIN — Medication 50 ML: at 19:37

## 2019-05-03 RX ADMIN — CEFTRIAXONE SODIUM 2 G: 2 INJECTION, POWDER, FOR SOLUTION INTRAMUSCULAR; INTRAVENOUS at 16:23

## 2019-05-03 RX ADMIN — ACETAMINOPHEN 650 MG: 325 TABLET, FILM COATED ORAL at 11:36

## 2019-05-03 RX ADMIN — METHOCARBAMOL 500 MG: 500 TABLET, FILM COATED ORAL at 02:14

## 2019-05-03 RX ADMIN — PANTOPRAZOLE SODIUM 40 MG: 40 TABLET, DELAYED RELEASE ORAL at 08:43

## 2019-05-03 RX ADMIN — Medication 250 MG: at 08:43

## 2019-05-03 ASSESSMENT — ENCOUNTER SYMPTOMS
FEVER: 0
VOMITING: 0
NAUSEA: 0
DIAPHORESIS: 0
CHILLS: 0
UNEXPECTED WEIGHT CHANGE: 0
NERVOUS/ANXIOUS: 0
SHORTNESS OF BREATH: 1
MYALGIAS: 1
DYSPHORIC MOOD: 0
JOINT SWELLING: 0
WEAKNESS: 0
CONSTIPATION: 0
ABDOMINAL PAIN: 0
ARTHRALGIAS: 1
NUMBNESS: 0
TREMORS: 0
HEADACHES: 0
LIGHT-HEADEDNESS: 0
DYSURIA: 0

## 2019-05-03 ASSESSMENT — ACTIVITIES OF DAILY LIVING (ADL)
ADLS_ACUITY_SCORE: 10

## 2019-05-03 NOTE — PLAN OF CARE
Time: 4605-8242     Reason for admission: pleural effusion  Vitals: VSS on RA, intermittently tachycardic  Activity: SBA to BSC  Pain: C/O of headache and back pain, Excedrin and robaxin given, pt reports little pain relief  Neuro: A&O x4, pt anxious, xanax given x1  Cardiac: intermittently tachycardic,   Respiratory: SOB, LS clear bilaterally, diminished LS in L base.    GI/: Voiding spontaneously, last BM 3/2  Diet: Regular diet, tolerating  Lines: L PIV infusing TKO  Skin: Chest tube on L side to -20 suction.  TPA given at 2030 and unclamped at 0030.  450ml sanguinous drainage.    Plan: discharge when pain is adequately managed.  Will continue to monitor and follow POC.

## 2019-05-03 NOTE — PLAN OF CARE
Pt is alert, orientated, sleeps between cares, pt c/o significant pain at chest tube site, upset we are not giving her oxycodone along with her Xanax but yesterday, agreed to take Xanax instead of oxycodone because her anxiety is so much worse then her pain.  But now upset we are not treating her pain, medical team came to discuss and pt wants to stay on her Xanax verses change to oxycodone, pt up to BSC with assist of one and then chest tube leaking and sucking noise at site with large air leak indicated in chest tube chamber, tasia's doc to bedside and then pulmonary came to assess and reinserted slightly, TPA administered per pulmonary team, TPA X 2 today with chest tube clamped for 2 hours, serosanguinous output, see flow sheets for amounts, pt is tearful and frustrated with cares and lack of medications we are giving to her, seems to prefer narcotics and not other med's or other forms of pain relief like other med's, aromatherapy, heat, ice etc., activated sepsis protocol today and lactic acid WNL    TPA administered at 1930 and chest tube clamped, at 2130 pt up to BSC and chest tube unclamped, moderate amount bright red bleeding from chest tube site, noticed air leak in chest tube chamber but only when up on BSC, also skin bubbled up around chest tube insertion site, was like this when pulm reinserted but not as large, air leak gone once pt back in bed, called and updated jeannine jacinto who came bedside to assess, pulm consulted and awaiting CXR, pt taking her oxycodone q 3 hours, angry and tearful throughout day

## 2019-05-03 NOTE — PROGRESS NOTES
Inpatient Pain Service:  Follow Up    Discussed patient case with Dr. Mooney, patient requested methadone be changed back to once daily dosing and preferred to have alprazolam rather than oxycodone to control her pain / anxiety.  The pain service will sign off at this time.  Thank you cor the consultation.    Sharon Llanos, PharmD, MS  Inpatient Pain Service  [phone]

## 2019-05-03 NOTE — PROGRESS NOTES
[facility]    Pulmonary, Allergy, Critical Care and Sleep Medicine    Follow-up Note  05/03/2019    Assessment and Recommendations:    Demetra Richardson is a 63 year old female with hx of Sjogren's, SLE, PSC, fibromyalgia and prior substance abuse on methadone who presented 4/28/2019 with progressive shortness and found to have moderate left sided loculated pleural effusion, now s/p chest tube with ongoing tPA and strep intermedius on pleural cultures.     Loculated Strep Empyema   Loculated fluid with septations seen on initial bedside ultrasound, now growing strep intermedius. Pigtail chest tube that initially kinked, following repositioning has had ongoing large output. Slightly displaced today and again repositioned. Will be important to keep chest tube while having regular output as fluid needs to be drained and placement of a replacement chest tube will be quite difficult for patient to tolerate. Continued improvement on daily CXR.     - Chest tube to -20 cm H2O suction  -Continue tPA/dornase through chest tube for additional day (through Sat 5/4), order changed for you  - Chest tube drain to be clamped using Ginny clamp during lytic administration   - Contact Pulmonary service if issues with tube kinking or inability to instil lytics  - Pending output will determine if should extend lytics for additional 1-2 days over the weekend  - CXR daily, will obtain CT once stop lytic therapy    Seen and discussed with Dr. Magdalena Champagne MD PhD  Pulmonary and Critical Care Fellow   Pager [phone]     Subjective, Interval history:   Patient reports feeling better today. Thinks breathing has improved significantly with drain of fluid. Still having some chest pain. Increased discomfort later in day with redressing of chest tube. Discussed importance of keeping chest tube in place while there is still significant fluid to drain.     Objective:   Medications:    acetaminophen  650  mg Oral Q6H     alteplase (ACTIVASE) 10 mg and dornase 5 mg in NS syringe for chest tube instillation  50 mL Chest Tube TID     cefTRIAXone  2 g Intravenous Q24H     enoxaparin  40 mg Subcutaneous Q24H     escitalopram  10 mg Oral Daily     methadone  56 mg Oral QAM     methadone  57 mg Oral Daily at 8 pm     pantoprazole  40 mg Oral QAM AC     polyethylene glycol  17 g Oral Daily     pregabalin  100 mg Oral TID     saccharomyces boulardii  250 mg Oral BID     sodium chloride (PF)  3 mL Intracatheter Q8H     ALPRAZolam, aspirin-acetaminophen-caffeine, lidocaine 4%, lidocaine, lidocaine (buffered or not buffered), melatonin, methocarbamol, methyl salicylate-menthol, naloxone, ondansetron **OR** ondansetron, sodium chloride (PF)    Physical Exam:  Temp:  [97.2  F (36.2  C)-98.7  F (37.1  C)] 97.6  F (36.4  C)  Heart Rate:  [] 100  Resp:  [18-20] 20  BP: (100-126)/(66-81) 124/81  SpO2:  [92 %-96 %] 96 %    Intake/Output Summary (Last 24 hours) at 5/3/2019 0654  Last data filed at 5/3/2019 0634  Gross per 24 hour   Intake 660 ml   Output 2160 ml   Net -1500 ml     General: laying in bed, NAD  HEENT: anicteric, moist mucosa  Chest: Diminished breath sounds left base, no wheezing or crackles, chest tube with continued serosanginous output, no air leak  Cardiac: RRR no murmurs  Abdomen: Soft, flat, non tender, active BS  Extremities: No LE Edema  Neuro: A&Ox3, no focal deficits   Skin: no rash noted    Labs and imaging: All laboratory and imaging data personally reviewed, pertinent results discussed above.

## 2019-05-03 NOTE — PROGRESS NOTES
"[id number]  BP: 92/64, RUE P: 92 T: 98.3 F RR: 15 Wt: 69 kg SpO2: 93% BMI: 26.96 kg/m2  Activity: SBA x1, up to BSC  Pain: C/O pain to touch near CT, PRN Xanax given along with scheduled Tylenol with relief. Pt reports better pain control today  Neuro: A&O x4, Pt anxious this morning and after CT slipped out, Xanax given x2  Cardiac: Pt became tacchycardic when CT slipped out, otherwise stable throughout shift  Resp: SB, RUL, and RML clear to auscultation. LLL and RLL remain diminished. Alteplase instilled x1. 600 mL serosanguineous drainage output. CT pulled out a few centimeters and sucking air at 1330, doctor paged and discovered fenestration on tubing open to air, awaiting pulmonology.  GI/: Last BM 5/1. Pt refused laxatives.  Diet: Regular diet, tolerating meals, ate 50% of both breakfast and lunch  Lines: IV line right lower arm. CT left upper back with air leak.  Labs: Calcium, albumin, and protein low. Alkaline phosphatase still significantly elevated. WBC elevated.  New this shift: Pt taking PRN Xanax x2 throughout shift for pain/anxiety. CT pulled out a few centimeters with air leak after transfer from BSC to bed.    S: Patient states, \"Something is dripping on my foot\" after standing to transfer from BSC to bed. Denies pain at CT site.  O: Serosanguineous fluid dripping out of leur-lock port. Air leak in CT. Doctor paged, inspected and indicated a portion of fenestrated tubing pulled out and sucking air. Paged pulmonology.  A: Tubing of CT open to air after transfer. Respiratory assessment is WDL.  P: Reapplied dressing after inspection and awaiting pulmonology. Patient sitting at edge of bed and anxious, administered Xanax.     To the best of my knowledge the note above, written by Tamika Rosa, is accurate.  "

## 2019-05-03 NOTE — PROGRESS NOTES
[facility], [facility] Progress Note    Main Plans for Today   - ceftriaxone  - Incentive spirometry  - f/u pulm recs   - F/u chest tube output  - Lovenox discussion (patient has been refusing)    Assessment & Plan   Demetra Richardson is a 63 year old female w/complicated PMHx, significant for SLE, PSC, Sjogren's, fibromyalgia, opiate dependence and polysubstance abuse with history of OD and withdrawal seizures, epidural abscess, bipolar disorder type II, currently on a methadone program, and is admitted for management of L pleural effusion.     # Pneumonia  # Loculated pleural effusion  # Sepsis secondary to pneumonia  Most likely parapneumonic effusion, other differentials include effusion 2/2 rheumatologic disease given ?history SLE, sjogren's, primary sclerosing cholangitis. Initial labs significant for , WBC 26.8 - now downtrending. She is s/p chest tube placement on 4/29. Fluid analysis with pleural to serum protein 4.7/6.7, LDH 1116, glucose 26, WBCs seen with no organisms in stain. CXR 5/1 worsening left pleural effusion with concern for chest tube kink; After Pulm fixed chest tube, it put out over 1 L in fluid within minutes. Pleural effusion seems to be improving 5/2.   - Pulmonology consulted, appreciate cares  - Continue tPA through chest tube tid per pulm recs, continue until chest tube drops off below  ml   - Goal to eventually discontinue excedrin given ASA component, but will taper per patient as able given severity of migraines.   - Daily CXR  - Ceftriaxone 5/2  (Stopped levo 750 mg (4/28-5/2) flagyl 500 mg tid (4/29- 5/2)  - Supplemental oxygen prn, goal >92%  - Continue incentive spirometry  - f/u full fluid analysis, cultures, fluid cx growing strep intermedius   - q2d CRP, daily CBC  - f/u chest tube output  - Plan to re-image once stopping lytic therapy per Pulm    # Thoracic lymphadenopathy  CT showed enlarged left lower  "cervical lymph node, prominent left heel or soft tissue, possibly left hilar adenopathy, prominent left paratracheal lymph nodes.  DDX includes infectious versus malignant versus sarcoidosis.    - Pulm likely will not do bronchoscopy at this time     # Chronic pain  # Polysubstance use disorder  # History of overdose and withdrawal seizures  # Methadone maintenance therapy, active  # Fibromyalgia  Patient denies recent substance use, reports no alcohol use in last 7 years. She does admit to taking some xanax about 2 months ago (1/22/2019), and she was hospitalized with acute toxic encephalopathy, even intubated as she was having apneic pauses. Patient is on methadone and fills her prescriptions at [place]. Admission UDS negative.  - Withdrawal precautions, withdrawal monitoring    - Pain consult 5/1, appreciate recs  - PTA methadone dose  - robaxin 500 mg po Q6h as needed   - Lidocaine ointment around chest tube as needed  - Continue scheduled Tylenol  - PTA pregabalin  - Heat/cold packs  - after multiple discussions with patient, patient feels like xanax helps her pain/anxiety more than oxy, decision made to stop oxy and switch to as needed xanax. 5/3: Told me that adding xanax and taking away oxy was a \"terrible idea\" but that she does NOT want me to change it, told her I am uncomfortable with both oxy and xanax. Pain team aware and suggest lowering xanax if possible, will discuss with patient.   - Avoid IV opiates  - Bowel regimen    # Hypoalbuminemia  Low albumin 2.4 >1.9 >1.7. Likely due to acute infection, with chronic malnutrition contributing. Patient aware and reports poor diet at home - lots of frozen meals, fast foods, due to issues with ambulation. Lives alone, no help with food prep or grocery shopping.   - Nutrition consult  - Discuss outpatient services for meal/grocery delivery services- talked with SW 5/1    # SLE  # Sjogren's syndrome  # primary sclerosing cholangitis  Patient had recent serology, " "but has returned negative so far. She is not taking steroids, hydroxychloroquine or other immunosuppressants. Has ongoing history of symptoms and reports diagnosis of PSC 14 years ago, and notes chronic LFT abnormalities. Does report of dry mouth and dry eyes, and has recently tried medications for Sjogren's without much success. Also reports rash on her bilateral hands, but has no joint swelling, neurologic or acute psychiatric issues to suggest flares.  She was noted to have a protein creatinine ratio of 0.28, but denies other renal complications.   Alk phos elevation up to 500 .   - Alk phos bone specific test pending  - Labs here notable for alk phos elevation, Normal bilirubin, lipase, ALT, AST wnl.  - Admission ESR 46,   - INR 1.13>1.38, likely secondary to malnutrition vs mild hepatic impairment from PSC. No s/s of bleeding.     #Bipolar disorder type II  Patient endorses recent loss of her father to atypical pneumonia, but considers her mood has been stable, denies suicidal ideation. Patient reports being on Risperidone in the past after a \"manic episode after a family member .\"   -PTA escitalopram     # Mild aortic stenosis  Murmur noticed on exam, echocardiogram  showed LVEF, w/normal diastolic function, normal RV. Unlikely to be related to current symptoms.      # Migraines  # Fibromyalgia  Patient has long history of migraines with significant use of excedrin - now prone to rebound headaches.  -PTA Excedrin Migraine - attempt wean per above     # s/p Billroth II w/revision   # History of iron deficiency anemia  She has evidence of hypochromic RBC's without anemia. Patient has malabsorption secondary to probably surgery, receives IV iron as outpatient. On 19 she had low iron and saturation, normal ferritin, folate and B12.  - continue PTA pantoprazole  - IV iron replacement as outpatient     # Pain Assessment:  Current Pain Score 2019   Patient currently in pain? sleeping: " "patient not able to self report   Pain score (0-10) -   Pain location -   Pain descriptors -   Some encounter information is confidential and restricted. Go to Review Flowsheets activity to see all data.   - Demetra is experiencing pain due to pleural effusion. Pain management was discussed and the plan was created in a collaborative fashion.  Demetra's response to the current recommendations: engaged  - Please see the plan for pain management as documented above    Diet: Regular Diet Adult  Snacks/Supplements Adult: Other; bowl of chicken salad at bedtime with crackers + milk skim; Between Meals  Fluids: PO  DVT Prophylaxis:  Lovenox, has been refusing, will discuss with patient.   Code Status: Full Code    Disposition Plan   Expected discharge:Expected Discharge Date: 05/02/19 2 - 3 days, recommended to transitional care unit once adequate pain management/ tolerating PO medications and antibiotic plan established.Dispo: Expected Discharge Date: 05/02/19      Entered: Wander Mooney 05/03/2019, 6:37 AM   Information in the above section will display in the discharge planner report.      The patient's care was discussed with the Attending Physician, Dr. Barclay.    Wander Mooney  [facility]: PGY-1  Pager: [phone]  Please see sticky note for cross cover information    Interval History  HDS overnight. Mild tachycardic up to 109, otherwise within normal limits. BP normal. Now on RA. Chest tube put out  960 ml yesterday with lytic therapy.  No BM yesterday says she usually has \"one BM a week\". Less shortness of breath now. Pain largely unchanged but managed with meds, told me that adding xanax and taking away oxy was a \"terrible idea\" but that she does NOT want me to change it, told her I am uncomfortable with both oxy and xanax. Pain team aware and suggest lowering xanax if possible, will discuss with patient.  UOP adequate. Net neg 1.4  L yesterday.     Review " of Systems   Constitutional: Negative for chills, diaphoresis, fever and unexpected weight change.   Respiratory: Positive for shortness of breath.    Cardiovascular: Negative for chest pain and leg swelling.   Gastrointestinal: Negative for abdominal pain, constipation, nausea and vomiting.   Genitourinary: Negative for dysuria.   Musculoskeletal: Positive for arthralgias (R knee, chronic) and myalgias. Negative for joint swelling.   Skin: Positive for rash.   Neurological: Negative for tremors, weakness, light-headedness, numbness and headaches.   Psychiatric/Behavioral: Negative for dysphoric mood. The patient is not nervous/anxious.      Physical Exam   Vital Signs: Temp: 97.6  F (36.4  C) Temp src: Oral BP: 124/81 Pulse: 105 Heart Rate: 100 Resp: 20 SpO2: 96 % O2 Device: None (Room air)    Weight: 153 lbs 6.4 oz  Physical Exam   Constitutional: She is oriented to person, place, and time. She appears well-developed and well-nourished. No distress.   HENT:   Head: Normocephalic and atraumatic.   Mouth/Throat: Oropharynx is clear and moist. No oropharyngeal exudate.   Eyes: Conjunctivae and EOM are normal. No scleral icterus.   Cardiovascular: Normal rate and regular rhythm. Exam reveals no friction rub.   Murmur (systolic ejection) heard.  Pain when touching chest with stethoscope   Pulmonary/Chest: Effort normal. No respiratory distress. She has no wheezes. She has no rales (LLL).   Diminished breath sounds in LLL.   Abdominal: Soft. Bowel sounds are normal. There is no tenderness. There is no guarding.   Neurological: She is alert and oriented to person, place, and time.   Skin: Skin is warm and dry. She is not diaphoretic.   Psychiatric: She has a normal mood and affect.   Vitals reviewed.

## 2019-05-04 ENCOUNTER — APPOINTMENT (OUTPATIENT)
Dept: CT IMAGING | Facility: CLINIC | Age: 64
DRG: 853 | End: 2019-05-04
Attending: STUDENT IN AN ORGANIZED HEALTH CARE EDUCATION/TRAINING PROGRAM
Payer: COMMERCIAL

## 2019-05-04 ENCOUNTER — APPOINTMENT (OUTPATIENT)
Dept: GENERAL RADIOLOGY | Facility: CLINIC | Age: 64
DRG: 853 | End: 2019-05-04
Attending: INTERNAL MEDICINE
Payer: COMMERCIAL

## 2019-05-04 LAB
ALBUMIN SERPL-MCNC: 1.3 G/DL (ref 3.4–5)
ALP SERPL-CCNC: 262 U/L (ref 40–150)
ALT SERPL W P-5'-P-CCNC: 13 U/L (ref 0–50)
ANION GAP SERPL CALCULATED.3IONS-SCNC: 5 MMOL/L (ref 3–14)
AST SERPL W P-5'-P-CCNC: 14 U/L (ref 0–45)
BILIRUB SERPL-MCNC: 0.4 MG/DL (ref 0.2–1.3)
BUN SERPL-MCNC: 16 MG/DL (ref 7–30)
CALCIUM SERPL-MCNC: 8 MG/DL (ref 8.5–10.1)
CHLORIDE SERPL-SCNC: 102 MMOL/L (ref 94–109)
CO2 SERPL-SCNC: 27 MMOL/L (ref 20–32)
CREAT SERPL-MCNC: 0.68 MG/DL (ref 0.52–1.04)
ERYTHROCYTE [DISTWIDTH] IN BLOOD BY AUTOMATED COUNT: 14.7 % (ref 10–15)
GFR SERPL CREATININE-BSD FRML MDRD: >90 ML/MIN/{1.73_M2}
GLUCOSE SERPL-MCNC: 110 MG/DL (ref 70–99)
HCT VFR BLD AUTO: 37.4 % (ref 35–47)
HGB BLD-MCNC: 11.6 G/DL (ref 11.7–15.7)
LACTATE BLD-SCNC: 0.9 MMOL/L (ref 0.7–2)
MCH RBC QN AUTO: 28.1 PG (ref 26.5–33)
MCHC RBC AUTO-ENTMCNC: 31 G/DL (ref 31.5–36.5)
MCV RBC AUTO: 91 FL (ref 78–100)
PHOSPHATE SERPL-MCNC: 3.4 MG/DL (ref 2.5–4.5)
PLATELET # BLD AUTO: 495 10E9/L (ref 150–450)
POTASSIUM SERPL-SCNC: 4.4 MMOL/L (ref 3.4–5.3)
PROT SERPL-MCNC: 5.6 G/DL (ref 6.8–8.8)
RBC # BLD AUTO: 4.13 10E12/L (ref 3.8–5.2)
SODIUM SERPL-SCNC: 134 MMOL/L (ref 133–144)
TSH SERPL DL<=0.005 MIU/L-ACNC: 0.61 MU/L (ref 0.4–4)
WBC # BLD AUTO: 15.8 10E9/L (ref 4–11)

## 2019-05-04 PROCEDURE — 25000132 ZZH RX MED GY IP 250 OP 250 PS 637: Performed by: SURGERY

## 2019-05-04 PROCEDURE — 25000128 H RX IP 250 OP 636: Performed by: STUDENT IN AN ORGANIZED HEALTH CARE EDUCATION/TRAINING PROGRAM

## 2019-05-04 PROCEDURE — 36415 COLL VENOUS BLD VENIPUNCTURE: CPT | Performed by: STUDENT IN AN ORGANIZED HEALTH CARE EDUCATION/TRAINING PROGRAM

## 2019-05-04 PROCEDURE — 25000132 ZZH RX MED GY IP 250 OP 250 PS 637: Performed by: STUDENT IN AN ORGANIZED HEALTH CARE EDUCATION/TRAINING PROGRAM

## 2019-05-04 PROCEDURE — 85027 COMPLETE CBC AUTOMATED: CPT | Performed by: STUDENT IN AN ORGANIZED HEALTH CARE EDUCATION/TRAINING PROGRAM

## 2019-05-04 PROCEDURE — 71045 X-RAY EXAM CHEST 1 VIEW: CPT

## 2019-05-04 PROCEDURE — 25000132 ZZH RX MED GY IP 250 OP 250 PS 637: Performed by: FAMILY MEDICINE

## 2019-05-04 PROCEDURE — 12000001 ZZH R&B MED SURG/OB UMMC

## 2019-05-04 PROCEDURE — 84100 ASSAY OF PHOSPHORUS: CPT | Performed by: STUDENT IN AN ORGANIZED HEALTH CARE EDUCATION/TRAINING PROGRAM

## 2019-05-04 PROCEDURE — 80053 COMPREHEN METABOLIC PANEL: CPT | Performed by: STUDENT IN AN ORGANIZED HEALTH CARE EDUCATION/TRAINING PROGRAM

## 2019-05-04 PROCEDURE — 25000128 H RX IP 250 OP 636: Performed by: SURGERY

## 2019-05-04 PROCEDURE — 84443 ASSAY THYROID STIM HORMONE: CPT | Performed by: STUDENT IN AN ORGANIZED HEALTH CARE EDUCATION/TRAINING PROGRAM

## 2019-05-04 PROCEDURE — 83605 ASSAY OF LACTIC ACID: CPT | Performed by: INTERNAL MEDICINE

## 2019-05-04 PROCEDURE — 36415 COLL VENOUS BLD VENIPUNCTURE: CPT | Performed by: INTERNAL MEDICINE

## 2019-05-04 PROCEDURE — 71250 CT THORAX DX C-: CPT

## 2019-05-04 RX ORDER — OXYCODONE HYDROCHLORIDE 5 MG/1
10 TABLET ORAL EVERY 4 HOURS PRN
Status: DISCONTINUED | OUTPATIENT
Start: 2019-05-04 | End: 2019-05-04

## 2019-05-04 RX ORDER — OXYCODONE HYDROCHLORIDE 5 MG/1
5-10 TABLET ORAL EVERY 4 HOURS PRN
Status: DISCONTINUED | OUTPATIENT
Start: 2019-05-04 | End: 2019-05-05

## 2019-05-04 RX ADMIN — ACETAMINOPHEN 650 MG: 325 TABLET, FILM COATED ORAL at 00:01

## 2019-05-04 RX ADMIN — PANTOPRAZOLE SODIUM 40 MG: 40 TABLET, DELAYED RELEASE ORAL at 08:21

## 2019-05-04 RX ADMIN — CEFTRIAXONE SODIUM 2 G: 2 INJECTION, POWDER, FOR SOLUTION INTRAMUSCULAR; INTRAVENOUS at 16:35

## 2019-05-04 RX ADMIN — OXYCODONE HYDROCHLORIDE 10 MG: 5 TABLET ORAL at 23:08

## 2019-05-04 RX ADMIN — ACETAMINOPHEN, ASPIRIN AND CAFFEINE 2 TABLET: 250; 250; 65 TABLET, FILM COATED ORAL at 22:05

## 2019-05-04 RX ADMIN — PREGABALIN 100 MG: 100 CAPSULE ORAL at 14:18

## 2019-05-04 RX ADMIN — OXYCODONE HYDROCHLORIDE 10 MG: 5 TABLET ORAL at 10:40

## 2019-05-04 RX ADMIN — ACETAMINOPHEN 650 MG: 325 TABLET, FILM COATED ORAL at 06:25

## 2019-05-04 RX ADMIN — ACETAMINOPHEN, ASPIRIN AND CAFFEINE 2 TABLET: 250; 250; 65 TABLET, FILM COATED ORAL at 11:53

## 2019-05-04 RX ADMIN — OXYCODONE HYDROCHLORIDE 10 MG: 5 TABLET ORAL at 03:32

## 2019-05-04 RX ADMIN — ANALGESIC BALM: 1.74; 4.06 OINTMENT TOPICAL at 20:33

## 2019-05-04 RX ADMIN — SENNOSIDES AND DOCUSATE SODIUM 1 TABLET: 8.6; 5 TABLET ORAL at 20:33

## 2019-05-04 RX ADMIN — OXYCODONE HYDROCHLORIDE 10 MG: 5 TABLET ORAL at 00:01

## 2019-05-04 RX ADMIN — Medication 1 MG: at 23:08

## 2019-05-04 RX ADMIN — ACETAMINOPHEN 650 MG: 325 TABLET, FILM COATED ORAL at 13:02

## 2019-05-04 RX ADMIN — ESCITALOPRAM 10 MG: 5 TABLET, FILM COATED ORAL at 08:20

## 2019-05-04 RX ADMIN — METHOCARBAMOL 500 MG: 500 TABLET, FILM COATED ORAL at 13:08

## 2019-05-04 RX ADMIN — METHADONE HYDROCHLORIDE 113 MG: 10 CONCENTRATE ORAL at 08:23

## 2019-05-04 RX ADMIN — METHOCARBAMOL 500 MG: 500 TABLET, FILM COATED ORAL at 06:25

## 2019-05-04 RX ADMIN — OXYCODONE HYDROCHLORIDE 10 MG: 5 TABLET ORAL at 06:25

## 2019-05-04 RX ADMIN — OXYCODONE HYDROCHLORIDE 10 MG: 5 TABLET ORAL at 15:15

## 2019-05-04 RX ADMIN — PREGABALIN 100 MG: 100 CAPSULE ORAL at 08:23

## 2019-05-04 RX ADMIN — METHOCARBAMOL 500 MG: 500 TABLET, FILM COATED ORAL at 00:04

## 2019-05-04 RX ADMIN — OXYCODONE HYDROCHLORIDE 10 MG: 5 TABLET ORAL at 18:58

## 2019-05-04 RX ADMIN — PREGABALIN 100 MG: 100 CAPSULE ORAL at 20:33

## 2019-05-04 RX ADMIN — ACETAMINOPHEN 650 MG: 325 TABLET, FILM COATED ORAL at 18:58

## 2019-05-04 RX ADMIN — POLYETHYLENE GLYCOL 3350 17 G: 17 POWDER, FOR SOLUTION ORAL at 08:20

## 2019-05-04 RX ADMIN — METHOCARBAMOL 500 MG: 500 TABLET, FILM COATED ORAL at 20:33

## 2019-05-04 ASSESSMENT — ENCOUNTER SYMPTOMS
LIGHT-HEADEDNESS: 0
WEAKNESS: 0
TREMORS: 0
DYSPHORIC MOOD: 0
HEADACHES: 0
NERVOUS/ANXIOUS: 0
NUMBNESS: 0
CONSTIPATION: 0
CHILLS: 0
UNEXPECTED WEIGHT CHANGE: 0
ABDOMINAL PAIN: 0
FEVER: 0
JOINT SWELLING: 0
NAUSEA: 0
VOMITING: 0
SHORTNESS OF BREATH: 0
DYSURIA: 0
DIAPHORESIS: 0

## 2019-05-04 ASSESSMENT — ACTIVITIES OF DAILY LIVING (ADL)
ADLS_ACUITY_SCORE: 10
ADLS_ACUITY_SCORE: 16
ADLS_ACUITY_SCORE: 12
ADLS_ACUITY_SCORE: 10

## 2019-05-04 NOTE — PROGRESS NOTES
"Progress note    S: Patient assessed at bedside due to concerns about chest tube and bleeding. Increased bleeding around chest tube site during active tPA infusion noted by nursing, as well as new air leak as patient is sitting on commode currently, as well as \"hissing\" at chest tube insertion site earlier during tPA administration. Nursing also notes persistent swelling over back around chest tube insertion site, potentially enlarging size. Tube in position, no new displacements since afternoon. Patient anxious. Palm-width area of blood on pillow.    O:   /71   Pulse 117   Temp 99.2  F (37.3  C) (Axillary)   Resp 16   Wt 69 kg (152 lb 3.2 oz)   SpO2 94%   BMI 26.96 kg/m      Gen: Patient anxious, but alert, sitting on commode.  Resp/chest: no respiratory distress. Posterior chest wall at site of chest tube insertion with surrounding firm swelling. No crepitus. Non-tender. Chest tube taped in place to skin, scant oozing of blood. Does not appear to be kinked or displaced.    AP:  No active bleeding since tPA infusion completed. Air leak resolved after patient back in bed.  - Bleed minor and only with tPA infusion. No need to f/u Hgb unless repeat/significant bleed  - Hold AM dose of tPA until assessed by pulmonology.  - f/u CXR  Discussed with pulmonology on call.      Rimma Pizarro MD  [facility] PGY-1  Pager [phone]       "

## 2019-05-04 NOTE — PLAN OF CARE
Pt A&O. VSS on RA except for tachy. HRs 100-118. Up w SBA to BSC. L CT to -20 cm suction, oozing blood at site, 390 ml of serosanguineous output. No air leak noted this shift. PRN oxycodone, robaxin, and scheduled Tylenol given for pain at CT site. Scored 3 on COWS scale. Voiding. No BM. L PIV-TKO. Will cont to monitor.

## 2019-05-04 NOTE — PROGRESS NOTES
[facility], [facility] Progress Note    Main Plans for Today   - ceftriaxone  - Pulm to see prior to tPA this AM, follow up recs  - Incentive spirometry  - f/u pulm recs  - F/u chest tube output    Assessment & Plan   Demetra Richardson is a 63 year old female w/complicated PMHx, significant for SLE, PSC, Sjogren's, fibromyalgia, opiate dependence and polysubstance abuse with history of OD and withdrawal seizures, epidural abscess, bipolar disorder type II, currently on a methadone program, and is admitted for management of L pleural effusion.     # Pneumonia  # Loculated pleural effusion  # Sepsis secondary to pneumonia  Most likely parapneumonic effusion, other differentials include effusion 2/2 rheumatologic disease given ?history SLE, sjogren's, primary sclerosing cholangitis. Initial labs significant for , WBC 26.8 - now downtrending. She is s/p chest tube placement on 4/29. Fluid analysis with pleural to serum protein 4.7/6.7, LDH 1116, glucose 26, WBCs seen with no organisms in stain. CXR 5/1 worsening left pleural effusion with concern for chest tube kink; After Pulm fixed chest tube, it put out over 1 L in fluid within minutes. Pleural effusion seems to be improving 5/2- 5/4. Chest tube may be kinked again 5/4, lytics held, Pulm to assess 5/4. Follow up pulm recs.   - Pulmonology consulted, appreciate cares  - Continue tPA through chest tube tid per pulm recs, continue until chest tube drops off below  ml   - Goal to eventually discontinue excedrin given ASA component, but will taper per patient as able given severity of migraines.   - Daily CXR  - Ceftriaxone started 5/2  (Stopped levo 750 mg (4/28-5/2) flagyl 500 mg tid (4/29- 5/2)  - Supplemental oxygen prn, goal >92%  - Continue incentive spirometry  - f/u full fluid analysis, cultures, fluid cx growing strep intermedius   - q2d CRP, daily CBC  - f/u chest tube output  - Plan to  re-image once stopping lytic therapy per Pulm    # Thoracic lymphadenopathy  CT showed enlarged left lower cervical lymph node, prominent left heel or soft tissue, possibly left hilar adenopathy, prominent left paratracheal lymph nodes.  DDX includes infectious versus malignant versus sarcoidosis.    - Pulm likely will not do bronchoscopy at this time     # Chronic pain  # Polysubstance use disorder  # History of overdose and withdrawal seizures  # Methadone maintenance therapy, active  # Fibromyalgia  Patient denies recent substance use, reports no alcohol use in last 7 years. She does admit to taking some xanax about 2 months ago (1/22/2019), and she was hospitalized with acute toxic encephalopathy, even intubated as she was having apneic pauses. Patient is on methadone and fills her prescriptions at [place]. Admission UDS negative.  - Withdrawal precautions, withdrawal monitoring    - Pain consult 5/1, appreciate recs  - PTA methadone dose  - robaxin 500 mg po Q6h as needed   - Lidocaine ointment around chest tube as needed  - Continue scheduled Tylenol  - PTA pregabalin  - Heat/cold packs  - Oxy 5-10 mg Q4h as needed per discussion with patient 5/3  - Avoid IV opiates  - Bowel regimen    # Hypoalbuminemia  Low albumin 2.4 >1.9 >1.7. Likely due to acute infection, with chronic malnutrition contributing. Patient aware and reports poor diet at home - lots of frozen meals, fast foods, due to issues with ambulation. Lives alone, no help with food prep or grocery shopping.   - Nutrition consult  - Discuss outpatient services for meal/grocery delivery services- talked with SW 5/1    # SLE  # Sjogren's syndrome  # primary sclerosing cholangitis  Patient had recent serology, but has returned negative so far. She is not taking steroids, hydroxychloroquine or other immunosuppressants. Has ongoing history of symptoms and reports diagnosis of PSC 14 years ago, and notes chronic LFT abnormalities. Does report of dry mouth  "and dry eyes, and has recently tried medications for Sjogren's without much success. Also reports rash on her bilateral hands, but has no joint swelling, neurologic or acute psychiatric issues to suggest flares.  She was noted to have a protein creatinine ratio of 0.28, but denies other renal complications.   Alk phos elevation up to 500 .   - Alk phos bone specific test elevated to 50 consistent with bone etiology. Did have recent fall but no documented fracture.   - TSH low normal, parathyroid level pending in AM, phosphorus pending in AM to rule out other etiology of elevated bone alk phos  - Labs here notable for alk phos elevation, Normal bilirubin, lipase, ALT, AST wnl.  - Admission ESR 46,   - INR 1.13>1.38, likely secondary to malnutrition vs mild hepatic impairment from PSC. No s/s of bleeding.     #Bipolar disorder type II  Patient endorses recent loss of her father to atypical pneumonia, but considers her mood has been stable, denies suicidal ideation. Patient reports being on Risperidone in the past after a \"manic episode after a family member .\"   -PTA escitalopram     # Mild aortic stenosis  Murmur noticed on exam, echocardiogram  showed LVEF, w/normal diastolic function, normal RV. Unlikely to be related to current symptoms.      # Migraines  # Fibromyalgia  Patient has long history of migraines with significant use of excedrin - now prone to rebound headaches.  -PTA Excedrin Migraine - attempt wean per above     # s/p Billroth II w/revision   # History of iron deficiency anemia  She has evidence of hypochromic RBC's without anemia. Patient has malabsorption secondary to probably surgery, receives IV iron as outpatient. On 19 she had low iron and saturation, normal ferritin, folate and B12.  - continue PTA pantoprazole  - IV iron replacement as outpatient     # Pain Assessment:  Current Pain Score 2019   Patient currently in pain? yes   Pain score (0-10) -   Pain location - " "  Pain descriptors Aching   Some encounter information is confidential and restricted. Go to Review Flowsheets activity to see all data.   - Demetra is experiencing pain due to pleural effusion. Pain management was discussed and the plan was created in a collaborative fashion.  Demetra's response to the current recommendations: engaged  - Please see the plan for pain management as documented above    Diet: Regular Diet Adult  Snacks/Supplements Adult: Other; bowl of chicken salad at bedtime with crackers + milk skim; Between Meals  Fluids: PO  DVT Prophylaxis:  Lovenox  Code Status: Full Code    Disposition Plan   Expected discharge:Expected Discharge Date: 05/09/19 2 - 3 days, recommended to transitional care unit once adequate pain management/ tolerating PO medications and antibiotic plan established.Dispo: Expected Discharge Date: 05/09/19      Entered: Wander Mooney 05/04/2019, 2:15 PM   Information in the above section will display in the discharge planner report.      The patient's care was discussed with the Attending Physician, Dr. Barclay.    Wander Mooney  [facility]: PGY-1  Pager: [phone]  Please see sticky note for cross cover information    Interval History  Night team called to assess patient with increased bleeding around chest tube site. Discussed with pulm night team to hold AM dose of tPA until Pulm sees. Didn't appear to be kinked or displaced overnight on report. CXR shows chest tube in place and unchanged pleural effusion. Afebrile. Tachycardic overnight up to 117, BP stable. On RA. Chest tube put out  900 ml yesterday with lytic therapy.  No BM yesterday says she usually has \"one BM a week\".     UOP adequate. Net neg 510 mL yesterday.     Review of Systems   Constitutional: Negative for chills, diaphoresis, fever and unexpected weight change.   Respiratory: Negative for shortness of breath.    Cardiovascular: Negative for chest pain and " leg swelling.   Gastrointestinal: Negative for abdominal pain, constipation, nausea and vomiting.   Genitourinary: Negative for dysuria.   Musculoskeletal: Negative for joint swelling.   Neurological: Negative for tremors, weakness, light-headedness, numbness and headaches.   Psychiatric/Behavioral: Negative for dysphoric mood. The patient is not nervous/anxious.      Physical Exam   Vital Signs: Temp: 96.2  F (35.7  C) Temp src: Axillary BP: 97/62 Pulse: 108 Heart Rate: 90 Resp: 18 SpO2: 94 % O2 Device: None (Room air) Oxygen Delivery: 2 LPM  Weight: 152 lbs 3.2 oz  Physical Exam   Constitutional: She is oriented to person, place, and time. She appears well-developed and well-nourished. No distress.   HENT:   Head: Normocephalic and atraumatic.   Mouth/Throat: Oropharynx is clear and moist. No oropharyngeal exudate.   Eyes: Conjunctivae and EOM are normal. No scleral icterus.   Cardiovascular: Normal rate and regular rhythm. Exam reveals no friction rub.   Murmur (systolic ejection) heard.  Pain when touching chest with stethoscope   Pulmonary/Chest: Effort normal. No respiratory distress. She has no wheezes. She has no rales (LLL).   Diminished breath sounds in LLL but improved  Left sided chest tube site- dried blood around site, mild edema surrounding insertion site- no fluctuance, no erythema. Non tender to palpation.    Abdominal: Soft. Bowel sounds are normal. There is no tenderness. There is no guarding.   Neurological: She is alert and oriented to person, place, and time.   Skin: Skin is warm and dry. She is not diaphoretic.   Psychiatric: She has a normal mood and affect.   Vitals reviewed.

## 2019-05-04 NOTE — PLAN OF CARE
Time 6637-2822     Reason for admission: L sided chest pain related to L lower lobe infection  Vitals: VSS on RA  Activity: Pt up to bedside commode with stand by assist  Pain: Pain relief managed with oxycodone PRN and scheduled Tylenol   Neuro: A&O x 4  Cardiac: Runs tachy  Respiratory: Dyspnea upon exertion   GI/: Last BM 3/2, voiding spontaneously, calls appropriately  Diet: Regular, tolerating well  Lines: L PIV saline locked. Chest tube at -20 suction, small serosanguinous drainage from chest tube site. No air leaks during shift. 150 ml output from chest tube.   Wounds: None       New changes this shift: Pt is in pain from movement with chest tube. COWS score of 2. Pt has Chest CT with contrast scheduled for today. Calls appropriately.     Plan: Continue antibiotics for pneumonia, doctors trying to wean off of oxycodone, monitor chest tube site for worsening drainage.      Continue to monitor and follow POC

## 2019-05-04 NOTE — PROGRESS NOTES
[facility]    Pulmonary, Allergy, Critical Care and Sleep Medicine    Follow-up Note  05/04/2019    Assessment and Recommendations:    Demetra Richardson is a 63 year old female with hx of Sjogren's, SLE, PSC, fibromyalgia and prior substance abuse on methadone who presented 4/28/2019 with progressive shortness and found to have moderate left sided loculated pleural effusion, now s/p chest tube with ongoing tPA and strep intermedius on pleural cultures.     Loculated Strep Empyema   Loculated fluid with septations seen on initial bedside ultrasound, now growing strep intermedius. Pigtail chest tube in place that following repositioning yesterday has had significant output in past 24 hrs with administration of lytics. CXR improved from prior and still having moderate output from chest tube. Review of CXR again today and may not have a large amount of fluid left. Will proceed with non-contrast CT of the chest for evaluation of residual fluid. No evidence of infection around chest tube site but with some bleeding and swelling would hold off on any additional lytics.    - CT non-contrast of chest today  - Leave chest tube in place overnight, no additional lytics  - Pending amount and location of remaining fluid would discuss if chest tube still likely to drain vs need repositioning vs should be pulled    Seen and discussed with Dr. Bety Champagne MD PhD  Pulmonary and Critical Care Fellow   Pager [phone]     Subjective, Interval history:   During evening administration of tPA reportedly skin around chest tube becoming more swollen and bleeding from tube site. Air leak noticed when patient to commode that resolved when back in bed. CXR with chest tube in place. This morning denies any increasing pain around chest tube site. No air leak and with continued output overnight. Discussed importance of fluid drainage to treat infection.    Objective:   Medications:    acetaminophen   650 mg Oral Q6H     cefTRIAXone  2 g Intravenous Q24H     enoxaparin  40 mg Subcutaneous Q24H     escitalopram  10 mg Oral Daily     methadone  113 mg Oral Daily     pantoprazole  40 mg Oral QAM AC     polyethylene glycol  17 g Oral Daily     pregabalin  100 mg Oral TID     saccharomyces boulardii  250 mg Oral BID     senna-docusate  1 tablet Oral BID     sodium chloride (PF)  3 mL Intracatheter Q8H     [Rx hold ] alteplase (ACTIVASE) 10 mg and dornase 5 mg in NS syringe for chest tube instillation, aspirin-acetaminophen-caffeine, lidocaine 4%, lidocaine, lidocaine (buffered or not buffered), melatonin, methocarbamol, methyl salicylate-menthol, naloxone, ondansetron **OR** ondansetron, oxyCODONE, sodium chloride (PF)    Physical Exam:  Temp:  [97.3  F (36.3  C)-99.2  F (37.3  C)] 97.3  F (36.3  C)  Pulse:  [100-117] 108  Heart Rate:  [88-94] 92  Resp:  [15-18] 18  BP: ()/(64-76) 121/76  SpO2:  [92 %-96 %] 96 %    Intake/Output Summary (Last 24 hours) at 5/2/2019 1824  Last data filed at 5/2/2019 1550  Gross per 24 hour   Intake 420 ml   Output 2665 ml   Net -2245 ml     General: laying in bed, NAD  HEENT: anicteric, moist mucosa  Chest: Diminished breath sounds left base, no wheezing or crackles, chest tube with blood around site, small amount of lateral swelling without significant tenderness or warmth  Cardiac: RRR no murmurs  Abdomen: Soft, flat, non tender, active BS  Extremities: No LE Edema  Neuro: A&Ox3, no focal deficits   Skin: no rash noted    Labs and imaging: All laboratory and imaging data personally reviewed.

## 2019-05-05 ENCOUNTER — APPOINTMENT (OUTPATIENT)
Dept: GENERAL RADIOLOGY | Facility: CLINIC | Age: 64
DRG: 853 | End: 2019-05-05
Payer: COMMERCIAL

## 2019-05-05 LAB
ALBUMIN SERPL-MCNC: 1.3 G/DL (ref 3.4–5)
ALP SERPL-CCNC: 256 U/L (ref 40–150)
ALT SERPL W P-5'-P-CCNC: 11 U/L (ref 0–50)
ANION GAP SERPL CALCULATED.3IONS-SCNC: 6 MMOL/L (ref 3–14)
AST SERPL W P-5'-P-CCNC: 16 U/L (ref 0–45)
BACTERIA SPEC CULT: NO GROWTH
BACTERIA SPEC CULT: NO GROWTH
BILIRUB SERPL-MCNC: 0.2 MG/DL (ref 0.2–1.3)
BUN SERPL-MCNC: 14 MG/DL (ref 7–30)
CA-I SERPL ISE-MCNC: 4.4 MG/DL (ref 4.4–5.2)
CALCIUM SERPL-MCNC: 7.9 MG/DL (ref 8.5–10.1)
CHLORIDE SERPL-SCNC: 102 MMOL/L (ref 94–109)
CO2 SERPL-SCNC: 26 MMOL/L (ref 20–32)
CREAT SERPL-MCNC: 0.62 MG/DL (ref 0.52–1.04)
ERYTHROCYTE [DISTWIDTH] IN BLOOD BY AUTOMATED COUNT: 14.9 % (ref 10–15)
GFR SERPL CREATININE-BSD FRML MDRD: >90 ML/MIN/{1.73_M2}
GLUCOSE SERPL-MCNC: 109 MG/DL (ref 70–99)
HCT VFR BLD AUTO: 34.9 % (ref 35–47)
HGB BLD-MCNC: 10.5 G/DL (ref 11.7–15.7)
Lab: NORMAL
Lab: NORMAL
MCH RBC QN AUTO: 27.5 PG (ref 26.5–33)
MCHC RBC AUTO-ENTMCNC: 30.1 G/DL (ref 31.5–36.5)
MCV RBC AUTO: 91 FL (ref 78–100)
PLATELET # BLD AUTO: 417 10E9/L (ref 150–450)
POTASSIUM SERPL-SCNC: 4.2 MMOL/L (ref 3.4–5.3)
PROT SERPL-MCNC: 5.7 G/DL (ref 6.8–8.8)
PTH-INTACT SERPL-MCNC: 35 PG/ML (ref 18–80)
RBC # BLD AUTO: 3.82 10E12/L (ref 3.8–5.2)
SODIUM SERPL-SCNC: 134 MMOL/L (ref 133–144)
SPECIMEN SOURCE: NORMAL
SPECIMEN SOURCE: NORMAL
WBC # BLD AUTO: 15.2 10E9/L (ref 4–11)

## 2019-05-05 PROCEDURE — 25000132 ZZH RX MED GY IP 250 OP 250 PS 637: Performed by: SURGERY

## 2019-05-05 PROCEDURE — 85027 COMPLETE CBC AUTOMATED: CPT | Performed by: STUDENT IN AN ORGANIZED HEALTH CARE EDUCATION/TRAINING PROGRAM

## 2019-05-05 PROCEDURE — 25000132 ZZH RX MED GY IP 250 OP 250 PS 637: Performed by: FAMILY MEDICINE

## 2019-05-05 PROCEDURE — 25000125 ZZHC RX 250: Performed by: STUDENT IN AN ORGANIZED HEALTH CARE EDUCATION/TRAINING PROGRAM

## 2019-05-05 PROCEDURE — 80053 COMPREHEN METABOLIC PANEL: CPT | Performed by: STUDENT IN AN ORGANIZED HEALTH CARE EDUCATION/TRAINING PROGRAM

## 2019-05-05 PROCEDURE — 71045 X-RAY EXAM CHEST 1 VIEW: CPT

## 2019-05-05 PROCEDURE — 83970 ASSAY OF PARATHORMONE: CPT | Performed by: STUDENT IN AN ORGANIZED HEALTH CARE EDUCATION/TRAINING PROGRAM

## 2019-05-05 PROCEDURE — 25000128 H RX IP 250 OP 636: Performed by: STUDENT IN AN ORGANIZED HEALTH CARE EDUCATION/TRAINING PROGRAM

## 2019-05-05 PROCEDURE — 25000128 H RX IP 250 OP 636: Performed by: SURGERY

## 2019-05-05 PROCEDURE — 87633 RESP VIRUS 12-25 TARGETS: CPT | Performed by: STUDENT IN AN ORGANIZED HEALTH CARE EDUCATION/TRAINING PROGRAM

## 2019-05-05 PROCEDURE — 82330 ASSAY OF CALCIUM: CPT | Performed by: STUDENT IN AN ORGANIZED HEALTH CARE EDUCATION/TRAINING PROGRAM

## 2019-05-05 PROCEDURE — 36415 COLL VENOUS BLD VENIPUNCTURE: CPT | Performed by: STUDENT IN AN ORGANIZED HEALTH CARE EDUCATION/TRAINING PROGRAM

## 2019-05-05 PROCEDURE — 25800030 ZZH RX IP 258 OP 636: Performed by: STUDENT IN AN ORGANIZED HEALTH CARE EDUCATION/TRAINING PROGRAM

## 2019-05-05 PROCEDURE — 25000132 ZZH RX MED GY IP 250 OP 250 PS 637: Performed by: STUDENT IN AN ORGANIZED HEALTH CARE EDUCATION/TRAINING PROGRAM

## 2019-05-05 PROCEDURE — 12000026 ZZH R&B TRANSPLANT

## 2019-05-05 RX ORDER — ALPRAZOLAM 0.5 MG
0.5 TABLET ORAL 2 TIMES DAILY PRN
Status: DISCONTINUED | OUTPATIENT
Start: 2019-05-05 | End: 2019-05-06

## 2019-05-05 RX ORDER — METHADONE HYDROCHLORIDE 10 MG/ML
113 CONCENTRATE ORAL DAILY
Status: DISCONTINUED | OUTPATIENT
Start: 2019-05-06 | End: 2019-05-14 | Stop reason: HOSPADM

## 2019-05-05 RX ORDER — OXYCODONE HYDROCHLORIDE 5 MG/1
5 TABLET ORAL EVERY 4 HOURS PRN
Status: DISCONTINUED | OUTPATIENT
Start: 2019-05-05 | End: 2019-05-07

## 2019-05-05 RX ADMIN — METHOCARBAMOL 500 MG: 500 TABLET, FILM COATED ORAL at 04:45

## 2019-05-05 RX ADMIN — POLYETHYLENE GLYCOL 3350 17 G: 17 POWDER, FOR SOLUTION ORAL at 08:14

## 2019-05-05 RX ADMIN — OXYCODONE HYDROCHLORIDE 10 MG: 5 TABLET ORAL at 11:09

## 2019-05-05 RX ADMIN — ACETAMINOPHEN, ASPIRIN AND CAFFEINE 2 TABLET: 250; 250; 65 TABLET, FILM COATED ORAL at 21:38

## 2019-05-05 RX ADMIN — OXYCODONE HYDROCHLORIDE 5 MG: 5 TABLET ORAL at 20:34

## 2019-05-05 RX ADMIN — ACETAMINOPHEN 650 MG: 325 TABLET, FILM COATED ORAL at 01:31

## 2019-05-05 RX ADMIN — ALPRAZOLAM 0.5 MG: 0.5 TABLET ORAL at 17:56

## 2019-05-05 RX ADMIN — OXYCODONE HYDROCHLORIDE 10 MG: 5 TABLET ORAL at 04:31

## 2019-05-05 RX ADMIN — ACETAMINOPHEN 650 MG: 325 TABLET, FILM COATED ORAL at 17:54

## 2019-05-05 RX ADMIN — OXYCODONE HYDROCHLORIDE 10 MG: 5 TABLET ORAL at 15:59

## 2019-05-05 RX ADMIN — Medication 50 ML: at 21:39

## 2019-05-05 RX ADMIN — PREGABALIN 100 MG: 100 CAPSULE ORAL at 07:54

## 2019-05-05 RX ADMIN — METHADONE HYDROCHLORIDE 113 MG: 10 CONCENTRATE ORAL at 07:55

## 2019-05-05 RX ADMIN — PANTOPRAZOLE SODIUM 40 MG: 40 TABLET, DELAYED RELEASE ORAL at 07:53

## 2019-05-05 RX ADMIN — ESCITALOPRAM 10 MG: 5 TABLET, FILM COATED ORAL at 08:11

## 2019-05-05 RX ADMIN — ACETAMINOPHEN, ASPIRIN AND CAFFEINE 2 TABLET: 250; 250; 65 TABLET, FILM COATED ORAL at 06:15

## 2019-05-05 RX ADMIN — PREGABALIN 100 MG: 100 CAPSULE ORAL at 20:34

## 2019-05-05 RX ADMIN — CEFTRIAXONE SODIUM 2 G: 2 INJECTION, POWDER, FOR SOLUTION INTRAMUSCULAR; INTRAVENOUS at 15:59

## 2019-05-05 RX ADMIN — PREGABALIN 100 MG: 100 CAPSULE ORAL at 15:13

## 2019-05-05 RX ADMIN — ACETAMINOPHEN 650 MG: 325 TABLET, FILM COATED ORAL at 11:09

## 2019-05-05 RX ADMIN — ANALGESIC BALM: 1.74; 4.06 OINTMENT TOPICAL at 04:45

## 2019-05-05 ASSESSMENT — ENCOUNTER SYMPTOMS
NERVOUS/ANXIOUS: 0
CONSTIPATION: 0
LIGHT-HEADEDNESS: 0
NUMBNESS: 0
FEVER: 0
JOINT SWELLING: 0
CHILLS: 0
NAUSEA: 0
DIAPHORESIS: 0
DYSURIA: 0
TREMORS: 0
ABDOMINAL PAIN: 0
DYSPHORIC MOOD: 0
UNEXPECTED WEIGHT CHANGE: 0
VOMITING: 0
SHORTNESS OF BREATH: 0
HEADACHES: 0
WEAKNESS: 0

## 2019-05-05 ASSESSMENT — ACTIVITIES OF DAILY LIVING (ADL)
ADLS_ACUITY_SCORE: 14
ADLS_ACUITY_SCORE: 16

## 2019-05-05 NOTE — PROGRESS NOTES
Discussed case with pulm. Given new Right sided opacity, will add procal, resp viral panel and sputum culture. Pulm considering a thoracic surgery consult, will follow-up on this issue tomorrow.    Kaveh Moon D.O.  [place] [id number]  [phone]

## 2019-05-05 NOTE — PLAN OF CARE
Pt A&O. VSS on RA except for intermittent tachy. Up w SBA to BSC. L CT to -20 cm suction, leaking small amounts of blood at CT site, 50 ml of serosanguineous output. No air leak noted this shift. PRN oxycodone, robaxin,and scheduled Tylenol given for pain at CT site. Excedrin given for headache. Scored 3 on COWS scale. Voiding. No BM. L PIV-SL. CT done. Triggered sepsis- lactic 0.9.Will cont to monitor.

## 2019-05-05 NOTE — PROGRESS NOTES
[facility], [facility] Progress Note    Main Plans for Today   -continue antibiotics  - f/u pulm recs  - F/u chest tube output  -Schedule Methadone at 5am    Assessment & Plan   Demetra Richardson is a 63 year old female w/complicated PMHx, significant for SLE, PSC, Sjogren's, fibromyalgia, opiate dependence and polysubstance abuse with history of OD and withdrawal seizures, epidural abscess, bipolar disorder type II, currently on a methadone program, and is admitted for management of L pleural effusion.     # Pneumonia  # Loculated pleural effusion  # Sepsis secondary to pneumonia  Most likely parapneumonic effusion, other differentials include effusion 2/2 rheumatologic disease given ?history SLE, sjogren's, primary sclerosing cholangitis. Initial labs significant for , WBC 26.8 - now downtrending. She is s/p chest tube placement on 4/29. Fluid analysis with pleural to serum protein 4.7/6.7, LDH 1116, glucose 26, WBCs seen with no organisms in stain. CXR 5/1 worsening left pleural effusion with concern for chest tube kink; After Pulm fixed chest tube, it put out over 1 L in fluid within minutes. Pleural effusion seems to be improving 5/2- 5/4. Chest tube may be kinked again 5/4, lytics held, Pulm to assess 5/4. Follow up pulm recs.   - Pulmonology consulted, appreciate cares  - holding TPA in chest tube  - Goal to eventually discontinue excedrin given ASA component, but will taper per patient as able given severity of migraines.   - Daily CXR  - Ceftriaxone started 5/2  (Stopped levo 750 mg (4/28-5/2) flagyl 500 mg tid (4/29- 5/2)  - Supplemental oxygen prn, goal >92%  - Continue incentive spirometry  - f/u full fluid analysis, cultures, fluid cx growing strep intermedius   - q2d CRP, daily CBC  - f/u chest tube output    # Thoracic lymphadenopathy  CT showed enlarged left lower cervical lymph node, prominent left heel or soft tissue, possibly left  hilar adenopathy, prominent left paratracheal lymph nodes.  DDX includes infectious versus malignant versus sarcoidosis.    - Pulm likely will not do bronchoscopy at this time     # Chronic pain  # Polysubstance use disorder  # History of overdose and withdrawal seizures  # Methadone maintenance therapy, active  # Fibromyalgia  Patient denies recent substance use, reports no alcohol use in last 7 years. She does admit to taking some xanax about 2 months ago (1/22/2019), and she was hospitalized with acute toxic encephalopathy, even intubated as she was having apneic pauses. Patient is on methadone and fills her prescriptions at [place]. Admission UDS negative.  - Withdrawal precautions, withdrawal monitoring    - Pain consult 5/1, appreciate recs  - PTA methadone dose, change admin time to 5AM  - robaxin 500 mg po Q6h as needed   - Lidocaine ointment around chest tube as needed  - Continue scheduled Tylenol  - PTA pregabalin  - Heat/cold packs  - Oxy 5-10 mg Q4h as needed per discussion with patient 5/3  - Avoid IV opiates  - Bowel regimen    # Hypoalbuminemia  Low albumin 2.4 >1.9 >1.7. Likely due to acute infection, with chronic malnutrition contributing. Patient aware and reports poor diet at home - lots of frozen meals, fast foods, due to issues with ambulation. Lives alone, no help with food prep or grocery shopping.   - Nutrition consult  - Discuss outpatient services for meal/grocery delivery services- talked with PARVIZ 5/1    # SLE  # Sjogren's syndrome  # primary sclerosing cholangitis  Patient had recent serology, but has returned negative so far. She is not taking steroids, hydroxychloroquine or other immunosuppressants. Has ongoing history of symptoms and reports diagnosis of PSC 14 years ago, and notes chronic LFT abnormalities. Does report of dry mouth and dry eyes, and has recently tried medications for Sjogren's without much success. Also reports rash on her bilateral hands, but has no joint swelling,  "neurologic or acute psychiatric issues to suggest flares.  She was noted to have a protein creatinine ratio of 0.28, but denies other renal complications.   Alk phos elevation up to 500 .   - Alk phos bone specific test elevated to 50 consistent with bone etiology. Did have recent fall but no documented fracture. Unclear cause of elevated alk phos  -consider outpatient endocrine consult for elevated bone specific alk phos  - TSH low normal, parathyroid level normal,, ionized Ca normal  - Labs here notable for alk phos elevation, Normal bilirubin, lipase, ALT, AST wnl.  - INR 1.13>1.38, likely secondary to malnutrition vs mild hepatic impairment from PSC. No s/s of bleeding.     #Bipolar disorder type II  Patient endorses recent loss of her father to atypical pneumonia, but considers her mood has been stable, denies suicidal ideation. Patient reports being on Risperidone in the past after a \"manic episode after a family member .\"   -PTA escitalopram     # Mild aortic stenosis  Murmur noticed on exam, echocardiogram  showed LVEF, w/normal diastolic function, normal RV. Unlikely to be related to current symptoms.      # Migraines  # Fibromyalgia  Patient has long history of migraines with significant use of excedrin - now prone to rebound headaches.  -PTA Excedrin Migraine - attempt wean per above     # s/p Billroth II w/revision   # History of iron deficiency anemia  She has evidence of hypochromic RBC's without anemia. Patient has malabsorption secondary to probably surgery, receives IV iron as outpatient. On 19 she had low iron and saturation, normal ferritin, folate and B12.  - continue PTA pantoprazole  - IV iron replacement as outpatient     # Pain Assessment:  Current Pain Score 2019   Patient currently in pain? yes   Pain score (0-10) -   Pain location -   Pain descriptors -   Some encounter information is confidential and restricted. Go to Review Flowsheets activity to see all data.   - " Demetra is experiencing pain due to pleural effusion. Pain management was discussed and the plan was created in a collaborative fashion.  Demetra's response to the current recommendations: engaged  - Please see the plan for pain management as documented above    Diet: Regular Diet Adult  Snacks/Supplements Adult: Other; bowl of chicken salad at bedtime with crackers + milk skim; Between Meals  Fluids: PO  DVT Prophylaxis:  Lovenox  Code Status: Full Code    Disposition Plan   Expected discharge:Expected Discharge Date: 05/09/19 2 - 3 days, recommended to transitional care unit once adequate pain management/ tolerating PO medications and antibiotic plan established.Dispo: Expected Discharge Date: 05/09/19      Entered: Kaveh Moon 05/05/2019, 1:30 PM   Information in the above section will display in the discharge planner report.      The patient's care was discussed with the Attending Physician, Dr. Barclay.    Kaveh Moon  [facility] Medicine: PGY-3  Pager: [phone]  Please see sticky note for cross cover information    Interval History  No acute events overnight. Patient reports stable symptoms. Pain seems to breakthrough in early AM, would prefer to move up time of methadone dose. No fevers or chills.    Review of Systems   Constitutional: Negative for chills, diaphoresis, fever and unexpected weight change.   Respiratory: Negative for shortness of breath.    Cardiovascular: Negative for chest pain and leg swelling.   Gastrointestinal: Negative for abdominal pain, constipation, nausea and vomiting.   Genitourinary: Negative for dysuria.   Musculoskeletal: Negative for joint swelling.   Neurological: Negative for tremors, weakness, light-headedness, numbness and headaches.   Psychiatric/Behavioral: Negative for dysphoric mood. The patient is not nervous/anxious.      Physical Exam   Vital Signs: Temp: 95.5  F (35.3  C) Temp src: Axillary BP: 99/67 Pulse: 103 Heart Rate:  80 Resp: 18 SpO2: 93 % O2 Device: None (Room air) Oxygen Delivery: 2 LPM  Weight: 152 lbs 3.2 oz  Physical Exam   Constitutional: She is oriented to person, place, and time. She appears well-developed and well-nourished. No distress.   HENT:   Head: Normocephalic and atraumatic.   Eyes: Conjunctivae are normal. No scleral icterus.   Cardiovascular: Normal rate and regular rhythm. Exam reveals no friction rub.   Murmur (systolic ejection) heard.  Pain when touching chest with stethoscope   Pulmonary/Chest: Effort normal. No respiratory distress. She has no wheezes. She has no rales (LLL).   Diminished breath sounds in LLL but improved  Left sided chest tube site- dried blood around site, mild edema surrounding insertion site- no fluctuance, no erythema. Non tender to palpation.    Neurological: She is alert and oriented to person, place, and time.   Skin: Skin is warm and dry. She is not diaphoretic.   Psychiatric: She has a normal mood and affect.   Vitals reviewed.

## 2019-05-05 NOTE — PLAN OF CARE
Transfer  Transferred to: 7A  Via: bed  Reason for transfer:Pt needs private room for droplet precautions, rule out RSV  Family:  n/a  Belongings: Packed and sent with pt  Chart: Delivered with pt to next unit  Medications: Meds sent to new unit with pt  Report given to: Jesenia  Pt status: stable

## 2019-05-05 NOTE — PROGRESS NOTES
Discussed at bedside with patient regarding her anxiety. She is very nervous about getting a procedure from surgery. Also nervous about being apart from her family. Earlier in week the plan was to decrease opioids and replace with xanax, initially she didn't like this, but wants to try again    --decrease oxycodone to 5mg q4prn  --add xanax 0.5mgbid prn  --thoracic surgery to see patient per Pulm

## 2019-05-05 NOTE — PROGRESS NOTES
Shift: [id number]  Vitals: BP: 99/67, T: 95.5 ,  HR: 80, RR: 18, SpO2: 93%  Activity: SBA x1, up to BSC x3  Pain: C/O pain to touch near CT, Tylenol and Oxycodone given by RN. Patient reports better pain control today.  Neuro: A&O x4, Patient anxious this morning, methadone given by RN.  Cardio: Patient stable throughout shift.  Resp: SB, and RUL clear to auscultate, LLL and RLL diminished.  Serosanguineous chest tube drainage 100 mL at 1330. CT left upper back, no air leak noted today.    GI/: Last BM 5/3.   Diet: Regular  Lines: IV line right lower arm.   Labs:  Albumin, Calcium, and Protein low Alkaline Phosphatase still significantly elevated (5/5/19). WBC elevated (5/5/19).  New this shift: Portable CT done at bedside.

## 2019-05-05 NOTE — PROGRESS NOTES
[facility]    Pulmonary, Allergy, Critical Care and Sleep Medicine    Follow-up Note  05/05/2019    Assessment and Recommendations:    Demetra Richardson is a 63 year old female with hx of Sjogren's, SLE, PSC, fibromyalgia and prior substance abuse on methadone who presented 4/28/2019 with progressive shortness and found to have moderate left sided loculated pleural effusion, now s/p chest tube with ongoing tPA and strep intermedius on pleural cultures.     Loculated Strep Empyema   Loculated fluid with septations seen on initial bedside ultrasound, now growing strep intermedius. Pigtail chest tube in place that following repositioning has had significant output even without lytics in last 48 hours. CT chest with hydropneumothorax, decreased loculated fluid, chest tube in fine position. Given continued presence of empyema despite course of lytics should be evaluated by Thoracic Surgery as may ultimately require decortication. Can attempt lytics again tonight to see if can get additional drainage.  - Thoracic Surgery consult, ok to see tomorrow  - tPA/dornase (order for you) to start tonight  - Daily CXR    Ground Glass Opacities  New right upper lobe opacities on CT. No new clinical symptoms per patient but may have developed infection. Does not appear consistent with pulmonary edema.  - Respiratory viral panel, procal, sputum culture    Seen and discussed with Dr. Osito Champagne MD PhD  Pulmonary and Critical Care Fellow   Pager [phone]     Subjective, Interval history:   No issues with chest tube overnight. Still with some bleeding at site, no air leak. This afternoon feeling better, denies significant pain at her chest tube site. No new fever, chills, or cough.    Objective:   Medications:    acetaminophen  650 mg Oral Q6H     cefTRIAXone  2 g Intravenous Q24H     enoxaparin  40 mg Subcutaneous Q24H     escitalopram  10 mg Oral Daily     methadone  113 mg Oral Daily      pantoprazole  40 mg Oral QAM AC     polyethylene glycol  17 g Oral Daily     pregabalin  100 mg Oral TID     saccharomyces boulardii  250 mg Oral BID     senna-docusate  1 tablet Oral BID     sodium chloride (PF)  3 mL Intracatheter Q8H     [Rx hold ] alteplase (ACTIVASE) 10 mg and dornase 5 mg in NS syringe for chest tube instillation, aspirin-acetaminophen-caffeine, lidocaine 4%, lidocaine, lidocaine (buffered or not buffered), melatonin, methocarbamol, methyl salicylate-menthol, naloxone, ondansetron **OR** ondansetron, oxyCODONE, sodium chloride (PF)    Physical Exam:  Temp:  [96.2  F (35.7  C)-98.1  F (36.7  C)] 97.2  F (36.2  C)  Pulse:  [103-105] 103  Heart Rate:  [90-96] 96  Resp:  [18] 18  BP: ()/(62-75) 101/66  SpO2:  [93 %-95 %] 93 %    Intake/Output Summary (Last 24 hours) at 5/2/2019 1824  Last data filed at 5/2/2019 1550  Gross per 24 hour   Intake 420 ml   Output 2665 ml   Net -2245 ml     General: laying in bed, NAD  HEENT: anicteric, moist mucosa  Chest: Diminished breath sounds left base, no wheezing or crackles, chest tube with ongoing drainage, no air leak  Cardiac: RRR no murmurs  Abdomen: Soft, flat, non tender, active BS  Extremities: No LE Edema  Neuro: A&Ox3, no focal deficits   Skin: no rash noted    Labs and imaging: All laboratory and imaging data personally reviewed.

## 2019-05-05 NOTE — CONSULTS
Thoracic Surgery Consult Note     Patient name: Demetra Richardson  Date of Service: 5/5/2019  Reason for Consult: Loculated pleural effusion   Requesting Physician: Dr. Moon      Assessment/Plan:   64 yo F admitted with pneumonia and loculated left pleural effusion, s/p chest tube placement and lytics with persistent loculated collections.    - Recommend continuing lytics at this time as the chest tube is draining and appears to be in good position  - Continue antibiotics  - Thoracic surgery will follow and further address need for surgical intervention    Discussed with fellow, Dr. Vieyra.    Celena Nguyen  General Surgery PGY2  ------------------------------------------------------------------------  HPI:   63 F with PMH Sjogren's, SLD, PSC, fibromyalgia, prior opiate abuse on methadone who was admitted 4/28 with progressive shortness of breath and was found to have pneumonia with a left-sided loculated pleural effusion.  A chest tube was placed and she was started on antibiotics.  Chest tube has been draining however due to presence of loculations she underwent tPA lytics via the chest tube for 4 days.  Repeat CT scan demonstrated persistent effusion and possible new right sided consolidation.  Thoracic surgery is consulted for possible decortication.  She states her SOB is somewhat improved however she still has sharp pleuritis left sided chest pain.  She states she fell onto her left side 4 weeks ago and was intubated for ~1 day (upon chart review she was admitted for polysubstance overdose and was intubated briefly due to apneic episodes), she denies any rib fractures or hemopneumothorax at that time.    Pleural fluid cultures growing strep intermedius, tube draining ~750cc daily.    PMH:   Past Medical History:   Diagnosis Date     Anemia     2/2 h/p gastrectomy with inability to absorb oral iron     Basal cell carcinoma     Left-sided, skin over over medial orbit/nasal bone. Removed Oct 2018.      Benzodiazepine dependence (H)     With h/o withdrawal seizure x 1     Bipolar 1 disorder, mixed, moderate (H) 2013     Chronic pain     Back, legs.     Epidural abscess 2005    x4     Fibromyalgia      Generalised anxiety disorder      GERD (gastroesophageal reflux disease)      Major depressive disorder      Migraine      Nephrolithiasis     S/p left sided lithotripsy     Opiate dependence (H)      Osteoarthritis      Osteoporosis      Primary sclerosing cholangitis      PUD (peptic ulcer disease)      SLE (systemic lupus erythematosus) (H)      PSH:   Past Surgical History:   Procedure Laterality Date     BACK SURGERY  04    L4-5 epidural abscess     BACK SURGERY  04    L3-4 spinal stenosis     BACK SURGERY  04    Lt psoas abscess      SECTION      x 2     CHOLECYSTECTOMY  2-     CHOLECYSTECTOMY       CLOSED REDUCTION, PERCUTANEOUS PINNING FINGER, COMBINED  8/10/2011    Procedure:COMBINED CLOSED REDUCTION, PERCUTANEOUS PINNING FINGER; 5th Proximal Phalanx; Surgeon:RADHA BUITRAGO; Location:[facility]     COLONOSCOPY       GASTRECTOMY  2005    Bilat truncal vagotomy, hemigastrectomy, RnY gastrojejunostomy     GASTRECTOMY      s/p 11 had temporary fdg tube, now removed     GASTROJEJUNOSTOMY  2011    Procedure:GASTROJEJUNOSTOMY; exploratory laparotmy with revision of gastrojejunostomy, Antrectomy, Miles-en-y, Gastrojejunostomy; Surgeon:JULIUS HELM; Location:[facility]     INSERT CHEST TUBE N/A 2019    Procedure: INSERTION, CATHETER, INTERCOSTAL, FOR DRAINAGE;  Surgeon: Melissa Nichols MD;  Location: [facility]     LASER HOLMIUM LITHOTRIPSY URETER(S), INSERT STENT, COMBINED      Procedure: COMBINED CYSTOSCOPY, URETEROSCOPY, LASER HOLMIUM LITHOTRIPSY URETER(S), INSERT STENT;;  Surgeon: Blair Correa MD;  Location: [facility]     LASER HOLMIUM NEPHROLITHOTOMY VIA PERCUTANEOUS NEPHROSTOMY  2012    Procedure: LASER HOLMIUM NEPHROLITHOTOMY VIA PERCUTANEOUS NEPHROSTOMY;   proceedure should read: Left Percutaneous Access, Left Percutaneous ultrasonic Nephrolithotomy, Ureteroscopy Holmium Laser Lithotripsy Stent Placement ;  Surgeon: Blair Correa MD;  Location: [facility]     MAMMOPLASTY AUGMENTATION BILATERAL       OPEN REDUCTION INTERNAL FIXATION WRIST Left 1/11/2016    Procedure: OPEN REDUCTION INTERNAL FIXATION WRIST;  Surgeon: Darling Ellis MD;  Location: [facility]     RECONSTRUCT BREAST, IMPLANT PROSTHESIS, COMBINED       Meds:   Medications Prior to Admission   Medication Sig Dispense Refill Last Dose     aspirin-acetaminophen-caffeine (EXCEDRIN MIGRAINE) 250-250-65 MG tablet Take 2 tablets by mouth 3 times daily   4/28/2019 at Unknown time     escitalopram (LEXAPRO) 10 MG tablet Take 1 tablet (10 mg) by mouth daily 30 tablet 8 4/28/2019 at Unknown time     LYRICA 100 MG capsule TAKE 1 CAPSULE BY MOUTH THREE TIMES A DAY 90 capsule 3 4/28/2019 at Unknown time     methadone (DOLOPHINE-INTENSOL) 10 MG/ML (HIGH CONC) solution Take 113 mg by mouth daily Rufina Marcial [phone]    Dose as of 4/29/19 4/28/2019 at Unknown time     Allergies:   Allergies   Allergen Reactions     Penicillins Hives     Hives in childhood.     FamHx:   Family History   Problem Relation Age of Onset     Family History Negative Mother      Family History Negative Father      SocHx:   Never smoker  No current ETOH     ROS: 10-pt ROS negative except as noted above.     Objective:   /72 (BP Location: Right arm)   Pulse 103   Temp 97.4  F (36.3  C) (Axillary)   Resp 18   Wt 69 kg (152 lb 3.2 oz)   SpO2 94%   BMI 26.96 kg/m    General - no acute distress, comfortable  HEENT - normocephalic, atraumatic, EOMI  Cardio - regular rate, regular rhythm  Pulm - non labored respirations on room air.  L posterior chest tube in place with serosang fluid in canister.  No air leak  Abdomen - soft, nontender, nondistended  Neuro - moves all extremities without apparent deficit,  non-focal  Extremities - no lower extremity edema, warm, well-perfused  Skin - no rash or bruising  Psych - age appropriate mental status / engagement     Labs:  WBC 15.2 (26 on admission)  Hbg 10.5  Plt 417  INR 1.38 (on 4/29)    Imaging:  CT 5/4:  IMPRESSION:   1. Decreased size of the loculated left pleural effusion and the  associated basilar atelectasis.  2. Left hilar soft tissue prominence is likely hilar lymphadenopathy  and reactive to the above process.  3. New right upper lobe ground glass opacities may be of infectious  etiology.  4. Partially visualized large right renal stones.    CXR:  IMPRESSION:   1. No significant change in size of left pleural effusion with  overlying atelectasis versus consolidation. Left chest tube in place.  2. Hazy opacities in the right upper lobe, stable, likely atelectasis.

## 2019-05-05 NOTE — PLAN OF CARE
1530 to 1930:pt. is alert and oriented x 4 c/o left sided chest pain ,oxycodone 10 mg and tylenol given at 7 PM.pt denies nausea tolerated well regular diet,pt voided adequate amount but no BM.Chest tube to suction ,no air leak ,chest tube site is oozing serosanguinous drainage there is swelling at the chest tube site pt had about 200 ml   output from chest tube .patient had chest CT this evening .pt needs SBA to use bedside commode.VSS,except slightly tachy.RA 94%

## 2019-05-06 ENCOUNTER — TELEPHONE (OUTPATIENT)
Dept: SURGERY | Facility: CLINIC | Age: 64
End: 2019-05-06

## 2019-05-06 ENCOUNTER — APPOINTMENT (OUTPATIENT)
Dept: GENERAL RADIOLOGY | Facility: CLINIC | Age: 64
DRG: 853 | End: 2019-05-06
Payer: COMMERCIAL

## 2019-05-06 ENCOUNTER — ANESTHESIA EVENT (OUTPATIENT)
Dept: SURGERY | Facility: CLINIC | Age: 64
DRG: 853 | End: 2019-05-06
Payer: COMMERCIAL

## 2019-05-06 LAB
ALBUMIN SERPL-MCNC: 1.2 G/DL (ref 3.4–5)
ALP SERPL-CCNC: 1698 U/L (ref 40–150)
ALT SERPL W P-5'-P-CCNC: 61 U/L (ref 0–50)
ANION GAP SERPL CALCULATED.3IONS-SCNC: 3 MMOL/L (ref 3–14)
APAP SERPL-MCNC: 4 MG/L (ref 10–20)
AST SERPL W P-5'-P-CCNC: 167 U/L (ref 0–45)
BASOPHILS # BLD AUTO: 0.1 10E9/L (ref 0–0.2)
BASOPHILS NFR BLD AUTO: 0.6 %
BILIRUB SERPL-MCNC: 0.1 MG/DL (ref 0.2–1.3)
BUN SERPL-MCNC: 12 MG/DL (ref 7–30)
CALCIUM SERPL-MCNC: 8 MG/DL (ref 8.5–10.1)
CHLORIDE SERPL-SCNC: 106 MMOL/L (ref 94–109)
CO2 SERPL-SCNC: 29 MMOL/L (ref 20–32)
CREAT SERPL-MCNC: 0.64 MG/DL (ref 0.52–1.04)
CRP SERPL-MCNC: 190 MG/L (ref 0–8)
DIFFERENTIAL METHOD BLD: ABNORMAL
EOSINOPHIL # BLD AUTO: 1.1 10E9/L (ref 0–0.7)
EOSINOPHIL NFR BLD AUTO: 9.2 %
ERYTHROCYTE [DISTWIDTH] IN BLOOD BY AUTOMATED COUNT: 14.8 % (ref 10–15)
FLUAV H1 2009 PAND RNA SPEC QL NAA+PROBE: NEGATIVE
FLUAV H1 RNA SPEC QL NAA+PROBE: NEGATIVE
FLUAV H3 RNA SPEC QL NAA+PROBE: NEGATIVE
FLUAV RNA SPEC QL NAA+PROBE: NEGATIVE
FLUBV RNA SPEC QL NAA+PROBE: NEGATIVE
GFR SERPL CREATININE-BSD FRML MDRD: >90 ML/MIN/{1.73_M2}
GGT SERPL-CCNC: 1223 U/L (ref 0–40)
GLUCOSE SERPL-MCNC: 98 MG/DL (ref 70–99)
HADV DNA SPEC QL NAA+PROBE: NEGATIVE
HADV DNA SPEC QL NAA+PROBE: NEGATIVE
HCT VFR BLD AUTO: 32.9 % (ref 35–47)
HGB BLD-MCNC: 10 G/DL (ref 11.7–15.7)
HMPV RNA SPEC QL NAA+PROBE: NEGATIVE
HPIV1 RNA SPEC QL NAA+PROBE: NEGATIVE
HPIV2 RNA SPEC QL NAA+PROBE: NEGATIVE
HPIV3 RNA SPEC QL NAA+PROBE: NEGATIVE
IMM GRANULOCYTES # BLD: 0.6 10E9/L (ref 0–0.4)
IMM GRANULOCYTES NFR BLD: 4.6 %
LYMPHOCYTES # BLD AUTO: 1.8 10E9/L (ref 0.8–5.3)
LYMPHOCYTES NFR BLD AUTO: 14.9 %
MCH RBC QN AUTO: 28.2 PG (ref 26.5–33)
MCHC RBC AUTO-ENTMCNC: 30.4 G/DL (ref 31.5–36.5)
MCV RBC AUTO: 93 FL (ref 78–100)
MICROBIOLOGIST REVIEW: NORMAL
MONOCYTES # BLD AUTO: 1.1 10E9/L (ref 0–1.3)
MONOCYTES NFR BLD AUTO: 9.1 %
NEUTROPHILS # BLD AUTO: 7.4 10E9/L (ref 1.6–8.3)
NEUTROPHILS NFR BLD AUTO: 61.6 %
NRBC # BLD AUTO: 0 10*3/UL
NRBC BLD AUTO-RTO: 0 /100
PLATELET # BLD AUTO: 408 10E9/L (ref 150–450)
POTASSIUM SERPL-SCNC: 4.8 MMOL/L (ref 3.4–5.3)
PROCALCITONIN SERPL-MCNC: 0.69 NG/ML
PROT SERPL-MCNC: 5.9 G/DL (ref 6.8–8.8)
RBC # BLD AUTO: 3.55 10E12/L (ref 3.8–5.2)
RHINOVIRUS RNA SPEC QL NAA+PROBE: NEGATIVE
RSV RNA SPEC QL NAA+PROBE: NEGATIVE
RSV RNA SPEC QL NAA+PROBE: NEGATIVE
SODIUM SERPL-SCNC: 138 MMOL/L (ref 133–144)
SPECIMEN SOURCE: NORMAL
WBC # BLD AUTO: 12.1 10E9/L (ref 4–11)

## 2019-05-06 PROCEDURE — 25000128 H RX IP 250 OP 636: Performed by: STUDENT IN AN ORGANIZED HEALTH CARE EDUCATION/TRAINING PROGRAM

## 2019-05-06 PROCEDURE — 25000132 ZZH RX MED GY IP 250 OP 250 PS 637: Performed by: SURGERY

## 2019-05-06 PROCEDURE — 80053 COMPREHEN METABOLIC PANEL: CPT | Performed by: STUDENT IN AN ORGANIZED HEALTH CARE EDUCATION/TRAINING PROGRAM

## 2019-05-06 PROCEDURE — 71045 X-RAY EXAM CHEST 1 VIEW: CPT

## 2019-05-06 PROCEDURE — 99207 ZZC NO CHARGE FOLLOW UP PS: CPT

## 2019-05-06 PROCEDURE — 80329 ANALGESICS NON-OPIOID 1 OR 2: CPT | Performed by: STUDENT IN AN ORGANIZED HEALTH CARE EDUCATION/TRAINING PROGRAM

## 2019-05-06 PROCEDURE — 25000132 ZZH RX MED GY IP 250 OP 250 PS 637: Performed by: FAMILY MEDICINE

## 2019-05-06 PROCEDURE — 85025 COMPLETE CBC W/AUTO DIFF WBC: CPT | Performed by: STUDENT IN AN ORGANIZED HEALTH CARE EDUCATION/TRAINING PROGRAM

## 2019-05-06 PROCEDURE — 82977 ASSAY OF GGT: CPT | Performed by: STUDENT IN AN ORGANIZED HEALTH CARE EDUCATION/TRAINING PROGRAM

## 2019-05-06 PROCEDURE — 99222 1ST HOSP IP/OBS MODERATE 55: CPT | Performed by: PSYCHIATRY & NEUROLOGY

## 2019-05-06 PROCEDURE — 36415 COLL VENOUS BLD VENIPUNCTURE: CPT | Performed by: STUDENT IN AN ORGANIZED HEALTH CARE EDUCATION/TRAINING PROGRAM

## 2019-05-06 PROCEDURE — 99207 ZZC NO CHARGE SIGN-OFF PS: CPT

## 2019-05-06 PROCEDURE — 80048 BASIC METABOLIC PNL TOTAL CA: CPT | Performed by: STUDENT IN AN ORGANIZED HEALTH CARE EDUCATION/TRAINING PROGRAM

## 2019-05-06 PROCEDURE — 84145 PROCALCITONIN (PCT): CPT | Performed by: STUDENT IN AN ORGANIZED HEALTH CARE EDUCATION/TRAINING PROGRAM

## 2019-05-06 PROCEDURE — 25000132 ZZH RX MED GY IP 250 OP 250 PS 637: Performed by: STUDENT IN AN ORGANIZED HEALTH CARE EDUCATION/TRAINING PROGRAM

## 2019-05-06 PROCEDURE — 86140 C-REACTIVE PROTEIN: CPT | Performed by: STUDENT IN AN ORGANIZED HEALTH CARE EDUCATION/TRAINING PROGRAM

## 2019-05-06 PROCEDURE — 12000012 ZZH R&B MS OVERFLOW UMMC

## 2019-05-06 RX ORDER — CLONAZEPAM 0.5 MG/1
0.5 TABLET ORAL 2 TIMES DAILY
Status: DISCONTINUED | OUTPATIENT
Start: 2019-05-06 | End: 2019-05-14 | Stop reason: HOSPADM

## 2019-05-06 RX ORDER — ACETAMINOPHEN 325 MG/1
650 TABLET ORAL EVERY 6 HOURS PRN
Status: DISCONTINUED | OUTPATIENT
Start: 2019-05-06 | End: 2019-05-12

## 2019-05-06 RX ORDER — OXYCODONE HYDROCHLORIDE 5 MG/1
5 TABLET ORAL EVERY 4 HOURS PRN
Status: COMPLETED | OUTPATIENT
Start: 2019-05-06 | End: 2019-05-07

## 2019-05-06 RX ADMIN — PREGABALIN 100 MG: 100 CAPSULE ORAL at 08:14

## 2019-05-06 RX ADMIN — OXYCODONE HYDROCHLORIDE 5 MG: 5 TABLET ORAL at 16:59

## 2019-05-06 RX ADMIN — ALPRAZOLAM 0.5 MG: 0.5 TABLET ORAL at 06:05

## 2019-05-06 RX ADMIN — PREGABALIN 100 MG: 100 CAPSULE ORAL at 20:23

## 2019-05-06 RX ADMIN — POLYETHYLENE GLYCOL 3350 17 G: 17 POWDER, FOR SOLUTION ORAL at 08:19

## 2019-05-06 RX ADMIN — OXYCODONE HYDROCHLORIDE 5 MG: 5 TABLET ORAL at 12:42

## 2019-05-06 RX ADMIN — OXYCODONE HYDROCHLORIDE 5 MG: 5 TABLET ORAL at 20:24

## 2019-05-06 RX ADMIN — PANTOPRAZOLE SODIUM 40 MG: 40 TABLET, DELAYED RELEASE ORAL at 08:16

## 2019-05-06 RX ADMIN — METHOCARBAMOL 500 MG: 500 TABLET, FILM COATED ORAL at 18:40

## 2019-05-06 RX ADMIN — OXYCODONE HYDROCHLORIDE 5 MG: 5 TABLET ORAL at 20:23

## 2019-05-06 RX ADMIN — OXYCODONE HYDROCHLORIDE 5 MG: 5 TABLET ORAL at 03:51

## 2019-05-06 RX ADMIN — ACETAMINOPHEN 650 MG: 325 TABLET, FILM COATED ORAL at 06:05

## 2019-05-06 RX ADMIN — PREGABALIN 100 MG: 100 CAPSULE ORAL at 14:09

## 2019-05-06 RX ADMIN — ESCITALOPRAM 10 MG: 5 TABLET, FILM COATED ORAL at 08:15

## 2019-05-06 RX ADMIN — OXYCODONE HYDROCHLORIDE 5 MG: 5 TABLET ORAL at 00:06

## 2019-05-06 RX ADMIN — ACETAMINOPHEN 650 MG: 325 TABLET, FILM COATED ORAL at 11:28

## 2019-05-06 RX ADMIN — METHADONE HYDROCHLORIDE 113 MG: 10 CONCENTRATE ORAL at 04:58

## 2019-05-06 RX ADMIN — ACETAMINOPHEN 650 MG: 325 TABLET, FILM COATED ORAL at 00:06

## 2019-05-06 RX ADMIN — ACETAMINOPHEN, ASPIRIN AND CAFFEINE 2 TABLET: 250; 250; 65 TABLET, FILM COATED ORAL at 08:29

## 2019-05-06 RX ADMIN — Medication 0.5 MG: at 18:41

## 2019-05-06 RX ADMIN — METHOCARBAMOL 500 MG: 500 TABLET, FILM COATED ORAL at 13:11

## 2019-05-06 ASSESSMENT — ENCOUNTER SYMPTOMS
UNEXPECTED WEIGHT CHANGE: 0
DIAPHORESIS: 0
CHILLS: 0
ABDOMINAL PAIN: 0
LIGHT-HEADEDNESS: 0
TREMORS: 0
NERVOUS/ANXIOUS: 0
DYSPHORIC MOOD: 0
DYSURIA: 0
HEADACHES: 0
SHORTNESS OF BREATH: 0
WEAKNESS: 0
FEVER: 0
NAUSEA: 0
JOINT SWELLING: 0
CONSTIPATION: 0
VOMITING: 0
NUMBNESS: 0

## 2019-05-06 ASSESSMENT — ACTIVITIES OF DAILY LIVING (ADL)
ADLS_ACUITY_SCORE: 14
ADLS_ACUITY_SCORE: 14
ADLS_ACUITY_SCORE: 15
ADLS_ACUITY_SCORE: 15
ADLS_ACUITY_SCORE: 14
ADLS_ACUITY_SCORE: 14

## 2019-05-06 NOTE — PROGRESS NOTES
THORACIC SURGERY PROGRESS NOTE     S: No acute events. Seen sitting up comfortably in bed. No dyspnea on room air. CT to suction without appreciable air leak. Pulmonology has been managing the chest tube. Has not been ambulating much, just up in her room. She has remained afebrile on ceftriaxone.       O:  /69 (BP Location: Right arm)   Pulse 103   Temp 97.8  F (36.6  C) (Oral)   Resp 14   Wt 69.3 kg (152 lb 11.2 oz)   SpO2 92%   BMI 27.05 kg/m       Gen: NAD   Resp: non labored breathing on room air. Left-sided chest tube in place to suction without appreciable air leak.   CV: WWP, non cyanotic   Abd: soft, NT, ND   Ext: No edema     Imaging:   Chest CT  5/4/2019   IMPRESSION:   1. Decreased size of the loculated left pleural effusion and the  associated basilar atelectasis.  2. Left hilar soft tissue prominence is likely hilar lymphadenopathy  and reactive to the above process.  3. New right upper lobe ground glass opacities may be of infectious  etiology.  4. Partially visualized large right renal stones.     A/P: 62 yo F with PMH of Sjogren's, SLD, PSC, fibromyalgia, and prior opiate abuse on methadone who was admitted 4/28/19 with progressive shortness of breath and was found to have pneumonia with a left-sided loculated pleural effusion. A chest tube was placed (4/29) and she was started on antibiotics. Chest tube has been draining however due to presence of loculations she underwent tPA lytics via the chest tube. She has thus far received 13 doses since 4/30/19. Repeat CT scan demonstrating persistent effusion and possible new right sided consolidation. Thoracic surgery is consulted for possible decortication. Decortication has been offered to the patient, and she is on the schedule for Tuesday 5/7/19. She would like to further discuss it with family today prior to signing consent form.     -Recommend pain consult pre-operatively, given patient is on methadone and will likely have robust pain  control needs post-operatively.   -Do not recommend continuing lytics   -NPO at midnight  -Will plan to keep patient on the family medicine service post-operatively given her medical history, and thoracic surgery will follow closely.       Seen and discussed with staff, Dr. Wood Delcid MD   Surgery PGY-1

## 2019-05-06 NOTE — PROGRESS NOTES
[facility] Progress Note    Main Plans for Today   -continue antibiotics  -stop tPA   -Thoracic surgery consulted, follow up recs/plan  -Follow up pulm recs  -Psych consult  -Pain consult    Assessment & Plan   Demetra Richardson is a 63 year old female w/complicated PMHx, significant for SLE, PSC, Sjogren's, fibromyalgia, opiate dependence and polysubstance abuse with history of OD and withdrawal seizures, epidural abscess, bipolar disorder type II, currently on a methadone program, and is admitted for management of L pleural effusion.     # Pneumonia  # Loculated pleural effusion/ empyema  # Sepsis secondary to pneumonia  Most likely parapneumonic effusion, other differentials include effusion 2/2 rheumatologic disease given ?history SLE, sjogren's, primary sclerosing cholangitis. Initial labs significant for , WBC 26.8 - now downtrending. She is s/p chest tube placement on 4/29. Fluid analysis with pleural to serum protein 4.7/6.7, LDH 1116, glucose 26, WBCs seen with no organisms in stain. CXR 5/1 worsening left pleural effusion with concern for chest tube kink; After Pulm fixed chest tube, it put out over 1 L in fluid within minutes. Pleural effusion seems to be improving 5/2- 5/4. Thoracic consulted 5/5 with concern for persistent empyema to perform decortication 5/7. CT showed new opacities in RUL, pro jazzy returned moderate risk 0.6.   - Hold tPA  - Thoracic surgery to perform decortication 5/7, NPO at midnight  - RVP pending   - Pulmonology consulted, appreciate care  - Daily CXR  - Ceftriaxone started 5/2  (Stopped levo 750 mg (4/28-5/2) flagyl 500 mg tid (4/29- 5/2)  - Supplemental oxygen prn, goal >92%  - Continue incentive spirometry  - f/u full fluid analysis, cultures, fluid cx growing strep intermedius   - q2d CRP, daily CBC  - f/u chest tube output    # Thoracic lymphadenopathy  CT showed enlarged left lower cervical lymph  node, prominent left heel or soft tissue, possibly left hilar adenopathy, prominent left paratracheal lymph nodes.  DDX includes infectious versus malignant versus sarcoidosis.    - Pulm likely will not do bronchoscopy at this time     # Chronic pain  # Polysubstance use disorder  # History of overdose and withdrawal seizures  # Methadone maintenance therapy, active  # Fibromyalgia  # anxiety  Patient denies recent substance use, reports no alcohol use in last 7 years. She does admit to taking some xanax about 2 months ago (1/22/2019), and she was hospitalized with acute toxic encephalopathy, even intubated as she was having apneic pauses. Patient is on methadone and fills her prescriptions at [place]. Admission UDS negative. Pain and anxiety continue to be an issue for the patient, we have made several adjustments and pain/anxiety still not well controlled. Will consult psych and pain team for further recs.   - Withdrawal precautions, withdrawal monitoring    - Pain consult 5/1, appreciate recs  - Re consult Pain Team 5/6  - Psych consult, appreciate recs for anxiety  - PTA methadone dose, changed admin time to 5AM 5/5  - robaxin 500 mg po Q6h as needed   - Lidocaine ointment around chest tube as needed  - Continue scheduled Tylenol  - PTA pregabalin  - Heat/cold packs  - Oxy 5 mg Q4h as needed  - Xanax 0.5 mg twice daily as needed   - Avoid IV opiates  - Bowel regimen    # Hypoalbuminemia  Low albumin 2.4 >1.9 >1.7. Likely due to acute infection, with chronic malnutrition contributing. Patient aware and reports poor diet at home - lots of frozen meals, fast foods, due to issues with ambulation. Lives alone, no help with food prep or grocery shopping.   - Nutrition consult  - Discuss outpatient services for meal/grocery delivery services- talked with [facility] 5/1    # SLE  # Sjogren's syndrome  # primary sclerosing cholangitis  Patient had recent serology, but has returned negative so far. She is not taking steroids,  "hydroxychloroquine or other immunosuppressants. Has ongoing history of symptoms and reports diagnosis of PSC 14 years ago, and notes chronic LFT abnormalities. Does report of dry mouth and dry eyes, and has recently tried medications for Sjogren's without much success. Also reports rash on her bilateral hands, but has no joint swelling, neurologic or acute psychiatric issues to suggest flares.  She was noted to have a protein creatinine ratio of 0.28, but denies other renal complications.   Alk phos elevation up to 500 5 and now elevated up to 1600 , unknown etiology at this point.   - Alk phos bone specific test elevated to 50 consistent with bone etiology. Did have recent fall but no documented fracture.   - TSH low normal, PTH normal, calcium is low (would expect elevated with MM), ionized calcium normal   - Corrected calcium with hypoalbuminemia on  is 9.2 (normal)   - consider outpatient endocrine consult for elevated bone specific alk phos  - INR 1.13>1.38, likely secondary to malnutrition vs mild hepatic impairment from PSC. No s/s of bleeding.     #Bipolar disorder type II  Patient endorses recent loss of her father to atypical pneumonia, but considers her mood has been stable, denies suicidal ideation. Patient reports being on Risperidone in the past after a \"manic episode after a family member .\"   -PTA escitalopram     # Mild aortic stenosis  Murmur noticed on exam, echocardiogram  showed LVEF, w/normal diastolic function, normal RV. Unlikely to be related to current symptoms.      # Migraines  # Fibromyalgia  Patient has long history of migraines with significant use of excedrin - now prone to rebound headaches.  -PTA Excedrin Migraine - attempt wean per above     # s/p Billroth II w/revision   # History of iron deficiency anemia  She has evidence of hypochromic RBC's without anemia. Patient has malabsorption secondary to probably surgery, receives IV iron as outpatient. On 19 she " had low iron and saturation, normal ferritin, folate and B12.  - continue PTA pantoprazole  - IV iron replacement as outpatient     # Pain Assessment:  Current Pain Score 5/6/2019   Patient currently in pain? yes   Pain score (0-10) -   Pain location -   Pain descriptors Aching   Some encounter information is confidential and restricted. Go to Review Flowsheets activity to see all data.   - Demetra is experiencing pain due to pleural effusion. Pain management was discussed and the plan was created in a collaborative fashion.  Demetra's response to the current recommendations: engaged  - Please see the plan for pain management as documented above    Diet: Regular Diet Adult  Snacks/Supplements Adult: Other; bowl of chicken salad at bedtime with crackers + milk skim; Between Meals  NPO per Anesthesia Guidelines for Procedure/Surgery Except for: Meds  Fluids: PO  DVT Prophylaxis:  Lovenox  Code Status: Full Code    Disposition Plan   Expected discharge:Expected Discharge Date: 05/09/19 2 - 3 days, recommended to transitional care unit once adequate pain management/ tolerating PO medications and antibiotic plan established.Dispo: Expected Discharge Date: 05/09/19      Entered: Wander Mooney 05/06/2019, 1:43 PM   Information in the above section will display in the discharge planner report.      The patient's care was discussed with the Attending Physician, Dr. dwyer.    aWnder Mooney  [facility]: PGY-3  Pager: [phone]  Please see sticky note for cross cover information    Interval History  No acute events overnight. HDS. Thoracic consulted yesterday with concerns for persistent empyema despite chest tube and lytics. Decision to start back lytics overnight. Now oxy spaced 5 mg Q4H as needed as yesterday afternoon patient was very anxious, wanting more xanax. Was prescribed 0.5 mg twice daily as needed. 190 out of chest tube yesterday.     Review of Systems    Constitutional: Negative for chills, diaphoresis, fever and unexpected weight change.   Respiratory: Negative for shortness of breath.    Cardiovascular: Negative for chest pain and leg swelling.   Gastrointestinal: Negative for abdominal pain, constipation, nausea and vomiting.   Genitourinary: Negative for dysuria.   Musculoskeletal: Negative for joint swelling.   Neurological: Negative for tremors, weakness, light-headedness, numbness and headaches.   Psychiatric/Behavioral: Negative for dysphoric mood. The patient is not nervous/anxious.      Physical Exam   Vital Signs: Temp: 97.8  F (36.6  C) Temp src: Axillary BP: 95/68   Heart Rate: 80 Resp: 14 SpO2: 91 % O2 Device: None (Room air) Oxygen Delivery: 1 LPM  Weight: 152 lbs 11.2 oz  Physical Exam   Constitutional: She is oriented to person, place, and time. She appears well-developed and well-nourished. No distress.   HENT:   Head: Normocephalic and atraumatic.   Eyes: Conjunctivae are normal. No scleral icterus.   Cardiovascular: Normal rate and regular rhythm. Exam reveals no friction rub.   Murmur (systolic ejection) heard.  Pulmonary/Chest: Effort normal. No respiratory distress. She has no wheezes. She has no rales (LLL).   Diminished breath sounds in LLL but improved   Neurological: She is alert and oriented to person, place, and time.   Skin: Skin is warm and dry. She is not diaphoretic.   Psychiatric: She has a normal mood and affect.   Vitals reviewed.

## 2019-05-06 NOTE — PLAN OF CARE
BP 95/68 (BP Location: Right arm)   Pulse 103   Temp 97.8  F (36.6  C) (Axillary)   Resp 14   Wt 69.3 kg (152 lb 11.2 oz)   SpO2 91%   BMI 27.05 kg/m     Pt A/Ox3. VSS on RA except tachy. Patient back/chest pain controlled by scheduled methadone+ tylenol and PRN oxycodone x1 and Robaxin x1. CIWA score 2. Patient denies nausea. Chest tube intact at -20 suction, about 30ml serosanguineous drainage, Alteplase on hold for thoracic surgery tomorrow. Urine Output - voiding spontaneously in bedside commode. Bowel Function - on BM this shift and pt refused senna. Nutrition - regular diet and tolerating well. Drains - Chest tube intact, PIV SL. Activity - assist of 1. Education - pain/bowel management. Plan of Care - pain management and psych consult are pending.  Pt requested that thoracic surgery call her family and give them updates. Continue with POC  Update: Thoracic surgery team came in and explained surgery plan to pt, called pt's sister Rosette and gave update.

## 2019-05-06 NOTE — PROGRESS NOTES
Patient: Demetra Richardson  Date of Service: May 6, 2019 Admission Date:4/28/2019   7 Days Post-Op     Chief Pain Endorsement: left sided chest pain 2/2 left pleural effusion    Recommendations were discussed and relayed to Dr. Mooney  Plan was reviewed by the Inpatient Pain Service and staff attending, Dr. Chaney      1. Change acetaminophen to 650 mg PO every 6 hours as needed d/t increased LFTs  2. Please enter RAPS consult for block/catheter prior to procedure tomorrow 5/7/19  3. Post procedure tomorrow 5/7/19 increase oxycodone to 5 mg PO every 3 hours PRN. If psychiatry stops the benzodiazepines, okay to increase to oxycodone 5-10 mg PO every 3 hours PRN if absolutely necessary. This should not be necessary if patient receives a block pre-op.   4. Continue PTA methadone solution 113 mg PO every morning (maintenance home dose, patient refuses to split while inpatient to aid in pain management)  5. Hold Excedrin (contains aspirin) prior to procedure 5/7/19. Defer to primary team  6. Continue methocarbamol 500 mg PO every 6 hours PRN  7. Continue lidocaine ointment, apply to intact affected skin area up to 4x/day PRN.  8. Continue PTA Lyrica 100 mg PO three times daily  9. Pysch consult to manage anxiety  10. Bowel regimen per primary team to prevent opioid induced constipation.    Pain Service will Continue to follow at this time.     Thank you for consulting the Inpatient Pain Management Service. The above recommendations are to be acted upon at the primary team s discretion.     To reach us:  Mon - Friday 8 AM - 3 PM: Pager [phone] (Text Page)  After hours, weekends and holidays: Primary service should call [phone] for the on-call pain specialist    PAIN MEDICATION SAFE USE:   Prior to discharge instruct patient on the following in addition to the medication fact sheet:    Caution: these medications can cause sedation    Take prescription medicine only if it has been prescribed by your  doctor    Do not take more medicine or take it more often than instructed     Call your doctor if pain gets worse    Never mix pain medicine with alcohol, sleeping pills, or any illicit drugs    Do not operate heavy machinery, including vehicles, when initiation opioid therapy or increasing dosage    Store prescription opioids in a locked container, whenever possible     Dispose of unused opioids appropriately     Do not stop abruptly once at higher doses.  These medications must be tapered off.    Opioid pain medications do carry the risk for physical dependence and addiction and patients should be counseled about this.         1. Left sided chest pain due to pleural effusion s/p pigtail chest tube placement on 4/29/19. Today 5/6, pain is worst in left side of back up into her left shoulder  2. H/o SLE, Sjogren's, primary sclerosing cholantitis  3. Fibromyalgia on Lyrica  4. On methadone maintenance x 6 years for h/o oxycodone abuse including snorting OxyContin 80mg, 5x/day.  States that she has been sober since being on methadone.  Of note, patient admits to getting Xanax on the streets and had an overdose on 1/22/19 requiring intubation at [facility].  5. H/o alcohol abuse-sober for many years.  6. H/o PUD s/p Billroth II with revision.  7. H/o Bipolar II  8. Opioid induced constipation-has bowel regimen PTA.      -- Outpatient opioid requirements prior to admission:   Methadone maintenance at [facility] x 6 years: on Methadone liquid 113mg/day.  States that she is working with methadone clinic to taper down and off.  Plan is to decrease methadone by 3mg/day/on a weekly basis.      reviewed: Mostly Lyrica prescription     Primary Care Provider: Fredy Merritt  Chronic Pain Provider: none at this time.    Interval History:  Demetra Richardson was seen today (May 6, 2019) and she reports that her pain is not being well controlled. She is upset that when she wants to take some Xanax for her anxiety,  that the oxycodone gets decreased. She also feels that the Xanax dose is not high enough. We briefly discussed her history noted in her chart of a Xanax overdose and she said that was a misunderstanding. She said she fell and had the wind knocked out of her and this is why she wasn't able to breathe-- she said she hadn't taken a Xanax for 2 days, but did admit that taking a medication that was not prescribed for her was wrong.   She describes her worst pain today as being on the left side of her back going up into her left shoulder. She said the pain is both achy and sharp, and is worse when she inhales and gets up to use the commode (every couple hours). She rates her pain today as a 7/10 on the numeric scale, which she says is not tolerable. She said a tolerable score for her would be 5-6/10. She does appear comfortable. She also complains of pain around the chest tube site. She refuses any patches because she said the pressure makes things worse. She said that pain medications help, but only the oxycodone 10 mg dose every 3 hours (not 5 mg every 4 hours). She said her maintenance methadone dose every morning also helps. Recently this daily methadone dose had been split (AM/PM) to help manage her pain as well, but she said this was not helpful and refused to try this again after her procedure tomorrow. We made it clear that we would not be increasing her maintenance methadone dose while she is here and she expressed understanding of this. She also refused Icy-hot patches for her shoulder and back and said that BenGay cream with a heating pad helped a little. She said she slept well last night being she no longer has a roommate. She also is eating pretty well.   We did explain to her that there was a psych evaluation entered to help manage her anxiety, and she is looking forward to this visit. We also mentioned that she possible could be evaluated for a block prior to surgery to help with post-op pain. She seemed a  little anxious about this d/t a past painful injection, but was open to considering it. We said that she may need to hold her Excedrin for a few doses being it has aspirin in it. She said she gets rebound headaches if she doesn't take it, and she has been taking it twice daily long-term for headaches, fibromyalgia, and arthritis pain.     Her last bowel movement was 2 days ago, which she states is normal for her while she's been on methadone the past 6 years.   CAPA (Clinically Aligned Pain Assessment):    Comfort (How is your pain?): Intolerable  Change in Pain (Since your last medication/intervention?): Getting worse  Pain Control (How are your pain treatments working?):  Inadequate pain control  Functioning (Are you able to do activities to get better?) : Can't do anything because of pain  Sleep (Does your pain management allow you to sleep or rest?): Awake with occasional pain      FUNCTIONAL STATUS:  Change:      getting worse  Oral intake:     Regular  Activity level:     Up with assistance ambulating in room  Up in chair and to bathroom  Mood:      agitation     -- Inpatient Medications Related to Pain Management:   Medications related to Pain Management (From now, onward)    Start     Dose/Rate Route Frequency Ordered Stop    05/06/19 0500  methadone (DOLOPHINE-INTENSOL) 10 MG/ML (HIGH CONC) solution 113 mg      113 mg Oral DAILY 05/05/19 1201      05/05/19 1658  ALPRAZolam (XANAX) tablet 0.5 mg      0.5 mg Oral 2 TIMES DAILY PRN 05/05/19 1658      05/05/19 1657  oxyCODONE (ROXICODONE) tablet 5 mg      5 mg Oral EVERY 4 HOURS PRN 05/05/19 1658      05/03/19 0915  senna-docusate (SENOKOT-S/PERICOLACE) 8.6-50 MG per tablet 1 tablet      1 tablet Oral 2 TIMES DAILY 05/03/19 0914      05/01/19 1416  lidocaine (XYLOCAINE) 5 % ointment       Topical EVERY 6 HOURS PRN 05/01/19 1417      05/01/19 1409  methocarbamol (ROBAXIN) tablet 500 mg      500 mg Oral EVERY 6 HOURS PRN 05/01/19 1417      04/29/19 0800   pregabalin (LYRICA) capsule 100 mg      100 mg Oral 3 TIMES DAILY 04/29/19 0127      04/29/19 0800  polyethylene glycol (MIRALAX/GLYCOLAX) Packet 17 g      17 g Oral DAILY 04/29/19 0734      04/29/19 0453  lidocaine 1 % 0.1-1 mL      0.1-1 mL Other EVERY 1 HOUR PRN 04/29/19 0453      04/29/19 0453  lidocaine (LMX4) cream       Topical EVERY 1 HOUR PRN 04/29/19 0453      04/29/19 0453  aspirin-acetaminophen-caffeine (EXCEDRIN MIGRAINE) per tablet 2 tablet      2 tablet Oral EVERY 6 HOURS PRN 04/29/19 0453      04/29/19 0226  acetaminophen (TYLENOL) tablet 650 mg      650 mg Oral EVERY 6 HOURS 04/29/19 0225            LAB DATA:  Recent Labs   Lab 05/06/19  0559 05/05/19  0737 05/04/19  0727   CR 0.64 0.62 0.68   WBC 12.1* 15.2* 15.8*   HGB 10.0* 10.5* 11.6*   * 16 14   ALT 61* 11 13         ----------------------------------------------------------------------------------  [facility]uz Villalta [facility]  Inpatient Pain Service     To reach us:  Mon - Friday 8 AM - 3 PM: Pager [phone] (Text Page)  After hours, weekends and holidays: Primary service should call [phone] for the on-call pain specialist    Helpful Resources:  Getting Rid of Unwanted Medications (printable PDF for patients)   Opioid Overdose Prevention Toolkit (printable PDF for patients)   Prescription Opioids: What You Need To Know (printable PDF for patients)

## 2019-05-06 NOTE — ANESTHESIA PREPROCEDURE EVALUATION
Anesthesia Pre-Procedure Evaluation    Patient: Demetra Richardson   MRN:     [id number] Gender:   female   Age:    63 year old :      1955        Preoperative Diagnosis: Left Empyema   Procedure(s):  Left Video Assisted Thoracoscopic Decortication  Possible Thoracotomy  Flexible Bronchoscopy     Past Medical History:   Diagnosis Date     Anemia     2/2 h/p gastrectomy with inability to absorb oral iron     Basal cell carcinoma     Left-sided, skin over over medial orbit/nasal bone. Removed Oct 2018.     Benzodiazepine dependence (H)     With h/o withdrawal seizure x 1     Bipolar 1 disorder, mixed, moderate (H) 2013     Chronic pain     Back, legs.     Epidural abscess 2005    x4     Fibromyalgia      Generalised anxiety disorder      GERD (gastroesophageal reflux disease)      Major depressive disorder      Migraine      Nephrolithiasis     S/p left sided lithotripsy     Opiate dependence (H)      Osteoarthritis      Osteoporosis      Primary sclerosing cholangitis      PUD (peptic ulcer disease)      SLE (systemic lupus erythematosus) (H)       Past Surgical History:   Procedure Laterality Date     BACK SURGERY  04    L4-5 epidural abscess     BACK SURGERY  04    L3-4 spinal stenosis     BACK SURGERY  04    Lt psoas abscess      SECTION      x 2     CHOLECYSTECTOMY  2-     CHOLECYSTECTOMY       CLOSED REDUCTION, PERCUTANEOUS PINNING FINGER, COMBINED  8/10/2011    Procedure:COMBINED CLOSED REDUCTION, PERCUTANEOUS PINNING FINGER; 5th Proximal Phalanx; Surgeon:RADHA BUITRAGO; Location:[facility]     COLONOSCOPY       GASTRECTOMY  2005    Bilat truncal vagotomy, hemigastrectomy, RnY gastrojejunostomy     GASTRECTOMY      s/p 11 had temporary fdg tube, now removed     GASTROJEJUNOSTOMY  2011    Procedure:GASTROJEJUNOSTOMY; exploratory laparotmy with revision of gastrojejunostomy, Antrectomy, Miles-en-y, Gastrojejunostomy; Surgeon:JULIUS HELM; Location:[facility]      INSERT CHEST TUBE N/A 4/29/2019    Procedure: INSERTION, CATHETER, INTERCOSTAL, FOR DRAINAGE;  Surgeon: Melissa Nichols MD;  Location: [facility]     LASER HOLMIUM LITHOTRIPSY URETER(S), INSERT STENT, COMBINED      Procedure: COMBINED CYSTOSCOPY, URETEROSCOPY, LASER HOLMIUM LITHOTRIPSY URETER(S), INSERT STENT;;  Surgeon: Blair Correa MD;  Location: [facility]     LASER HOLMIUM NEPHROLITHOTOMY VIA PERCUTANEOUS NEPHROSTOMY  7/11/2012    Procedure: LASER HOLMIUM NEPHROLITHOTOMY VIA PERCUTANEOUS NEPHROSTOMY;  proceedure should read: Left Percutaneous Access, Left Percutaneous ultrasonic Nephrolithotomy, Ureteroscopy Holmium Laser Lithotripsy Stent Placement ;  Surgeon: Blair Correa MD;  Location: [facility]     MAMMOPLASTY AUGMENTATION BILATERAL       OPEN REDUCTION INTERNAL FIXATION WRIST Left 1/11/2016    Procedure: OPEN REDUCTION INTERNAL FIXATION WRIST;  Surgeon: Darling Ellis MD;  Location: [facility]     RECONSTRUCT BREAST, IMPLANT PROSTHESIS, COMBINED            Anesthesia Evaluation     . Pt has had prior anesthetic. Type: General           ROS/MED HX    ENT/Pulmonary: Comment: Loculated pleural effusion s/p CT placement      Neurologic: Comment: H/o epidural abscess  Fibromyalgia      Cardiovascular:  - neg cardiovascular ROS       METS/Exercise Tolerance:  4 - Raking leaves, gardening   Hematologic:     (+) Anemia, -      Musculoskeletal:  - neg musculoskeletal ROS (+)  other musculoskeletal- SLE      GI/Hepatic: Comment: Primary sclerosing cholangitis    (+) GERD hepatitis liver disease,       Renal/Genitourinary:     (+) Nephrolithiasis ,       Endo:  - neg endo ROS       Psychiatric:     (+) psychiatric history bipolar      Infectious Disease:  - neg infectious disease ROS       Malignancy:      - no malignancy   Other: Comment: Methadone program   (+) H/O Chronic Pain,H/O chronic opiod use ,                        PHYSICAL EXAM:   Mental Status/Neuro: A/A/O   Airway:   Mallampati:  "II  Mouth/Opening: Full  TM distance: > 6 cm  Neck ROM: Full   Respiratory:   Resp. Rate: Normal     Resp. Effort: Normal      CV:    Comments:                    Lab Results   Component Value Date    WBC 12.1 (H) 05/06/2019    HGB 10.0 (L) 05/06/2019    HCT 32.9 (L) 05/06/2019     05/06/2019    .0 (H) 05/06/2019    SED 46 (H) 04/28/2019     05/06/2019    POTASSIUM 4.8 05/06/2019    CHLORIDE 106 05/06/2019    CO2 29 05/06/2019    BUN 12 05/06/2019    CR 0.64 05/06/2019    GLC 98 05/06/2019    RAFAELA 8.0 (L) 05/06/2019    PHOS 3.4 05/04/2019    MAG 1.8 07/14/2015    ALBUMIN 1.2 (L) 05/06/2019    PROTTOTAL 5.9 (L) 05/06/2019    ALT 61 (H) 05/06/2019     (H) 05/06/2019     (H) 02/18/2009    ALKPHOS 1,698 (H) 05/06/2019    BILITOTAL 0.1 (L) 05/06/2019    LIPASE 32 (L) 04/28/2019    AMYLASE 33 05/20/2016    PTT 45 (H) 04/29/2019    INR 1.38 (H) 04/29/2019    FIBR 219 06/02/2011    TSH 0.61 05/04/2019    T4 0.81 02/04/2013    HCG Negative 04/15/2015    HCGS Negative 11/05/2010       Preop Vitals  BP Readings from Last 3 Encounters:   05/06/19 95/68   04/26/19 95/63   03/14/19 129/85    Pulse Readings from Last 3 Encounters:   05/04/19 103   04/26/19 73   03/14/19 96      Resp Readings from Last 3 Encounters:   05/06/19 14   05/01/18 16   03/29/17 16    SpO2 Readings from Last 3 Encounters:   05/06/19 91%   03/14/19 95%   05/01/18 94%      Temp Readings from Last 1 Encounters:   05/06/19 36.6  C (97.8  F) (Axillary)    Ht Readings from Last 1 Encounters:   03/14/19 1.6 m (5' 3\")      Wt Readings from Last 1 Encounters:   05/06/19 69.3 kg (152 lb 11.2 oz)    Estimated body mass index is 27.05 kg/m  as calculated from the following:    Height as of 3/14/19: 1.6 m (5' 3\").    Weight as of this encounter: 69.3 kg (152 lb 11.2 oz).     LDA:  Peripheral IV 05/02/19 Left;Anterior Lower forearm (Active)   Site Assessment WDL 5/6/2019  8:00 AM   Line Status Saline locked 5/6/2019  8:00 AM   Phlebitis " Scale 0-->no symptoms 5/6/2019  8:00 AM   Infiltration Scale 0 5/6/2019  8:00 AM   Infiltration Site Treatment Method  None 5/6/2019 12:00 AM   Extravasation? No 5/6/2019 12:00 AM   Number of days: 4       Chest Tube 1 Left (Active)   Site Assessment WDL 5/6/2019  8:00 AM   Suction -20 cm H2O 5/6/2019  8:00 AM   Chest Tube Airleak No 5/6/2019  8:00 AM   Drainage Description Serosanguinous 5/6/2019  8:00 AM   Dressing Status Normal: Clean, Dry & Intact 5/6/2019  8:00 AM   Dressing Change Due 05/06/19 5/6/2019 12:06 AM   Dressing Intervention Gauze 5/6/2019 12:06 AM   Patency Intervention Tip/Tilt 5/6/2019  8:00 AM   Chest Tube Clamps at Bedside present 5/6/2019  8:00 AM   Irrigation Intake (mL) 50 5/1/2019  9:00 AM   Container Amount 230 5/6/2019  8:00 AM   Output (ml) 40 ml 5/5/2019  7:00 PM   Number of days: 7            Assessment:   ASA SCORE: 3    NPO Status: > 6 hours since completed Solid Foods   Documentation: Preop Testing complete   Proceeding: Proceed without further delay  Tobacco Use:  NO Active use of Tobacco/UNKNOWN Tobacco use status     Plan:   Anes. Type:  General   Pre-Induction: Midazolam IV   Induction:  IV (Standard)   Airway: Oral ETT     Advanced: Double Lumen ETT   Access/Monitoring: PIV; 2nd PIV; A-Line   Maintenance: Balanced   Emergence: Procedure Site        Postop Pain/Sedation Strategy:  Standard-Options: Opioids PRN     PONV Management:  Adult Risk Factors: Female, Non-Smoker, Postop Opioids  Prevention: Ondansetron; Dexamethasone     CONSENT: Direct conversation   Plan and risks discussed with: Patient   Blood Products: Consented (ALL Blood Products)                         Harpreet Brooks MD

## 2019-05-06 NOTE — CONSULTS
Consult Date:  05/06/2019      PSYCHIATRIC CONSULTATION      IDENTIFICATION:  Ms. Richardson is a 63-year-old white female with a significant pulmonary infection.  She also has a history of chronic pain and is on a methadone program.  I am asked to evaluate her anxiety by Dr. Edwards.      Ms. Richardson is currently treated with Xanax 0.5 b.i.d. and Lexapro 10.  It is unclear exactly how much Xanax she has been taking.  She has a history of taking Xanax  perhaps off the streets.  She tells me that she may have been taking some Xanax recently, but it is unclear to me how much.  I therefore think it is reasonable to cover her with Klonopin, so I will be recommending we discontinue the Xanax and start Klonopin 0.5 b.i.d.  This could be tapered as an outpatient.  At that point, I would also think about increasing her Lexapro to 20.  The patient is quite anxious about upcoming procedures and has a history of chronic anxiety.      The patient does have a history of becoming dependent on opiates and apparently has done well on a methadone program.  She reports that her family is in [place] with the exception of her son and she would like to get to [place], so she is slowly tapering the methadone.  She has gone from 150-113.  She denies depression at this point, but thinks her biggest issue is anxiety around her upcoming procedure.      I note that her LFTs are somewhat elevated and was concerned that perhaps she was taking too much Excedrin/acetaminophen.  The patient tells me she has a chronic history of subacute sclerosing cholangitis and has chronic liver enzyme abnormalities.  The reason for her elevated alk phos remains unclear.  She does have a pulmonary infection with elevated white count and elevated CRP.  She has loculated pulmonary effusion and will undergo a pulmonary procedure tomorrow.  According to our records, she has a history of withdrawal seizures in the past, again suggesting that we should probably cover  "her for potential benzodiazepine withdrawal.  At some point in the past, she was apparently diagnosed with bipolar disorder.  This would not be consistent with her current medication regimen.  She reports that her sister has depression and/or borderline personality disorder.  It is interesting to note that despite what she tells me in interview ,on 01/22/2019 she was seen at [facility] and there she \"revealed that she has been taking Xanax more consistently.  She is worried about withdrawal.  Psych saw patient, recommended short course of Klonopin.\"  Zyprexa was also started on a p.r.n. basis.  On that hospitalization, she did receive a diagnosis of borderline personality disorder, and on that hospitalization she did require intubation.  I note that back in 2015 at [facility], she was seen with anxiety and anger issues, she was buying Xanax and opiates on the street, she was noted to be irrational and angry and made both homicidal and suicidal threats at that point.  Apparently, her boyfriend had recently committed suicide.  She did have a history of a brief stay at an intensive residential treatment center.  She obviously has a long history of intermittent substance abuse and very significant psychosocial stressors.      PAST MEDICAL HISTORY:  Includes anemia, basal cell carcinoma, benzodiazepine dependence, history of bipolar 1 - mixed, chronic pain, epidural abscess, fibromyalgia, generalized anxiety disorder, GERD, depression, migraine, nephrolithiasis, opiate dependence, osteoarthritis, osteoporosis, primary sclerosing cholangitis, peptic ulcer disease and lupus.      PAST SURGICAL HISTORY:  Also quite complex, but well described in the initial history and physical which was completed by Dr. Tyson.      FAMILY HISTORY:  Includes at least 1 sister with depression and borderline personality disorder.      SOCIAL HISTORY:  The patient is not a smoker.  She has not used alcohol in over 10 years, but she " does have a history of polysubstance abuse, including opiates and benzodiazepines.      PHYSICAL REVIEW OF SYSTEMS:  The patient denies current headache, but has intermittent migraine.  She denies problems with vision or hearing.  She does have chest pain and shortness of breath.  She denied abdominal pain, diarrhea or constipation, genitourinary symptoms or current problems with muscles, skin or joints, though she does carry a history of fibromyalgia.  She tells me that she walks 45 minutes a day.      MENTAL STATUS EXAMINATION:  On my interview, the patient was pleasant and cooperative, anxious appearing white female.  Her mood was neutral.  Her affect was full and appropriate.  Her speech was coherent and goal oriented.  Her associations were tight.  Her thought process is logical and linear.  Her content of thought was without overt psychosis or suicidal ideation.  Recent and remote memory, concentration, fund of knowledge and use of language appear to be at baseline.  She is alert and oriented x3.  Insight and judgment may be somewhat guarded.  Muscle strength and tone appear to be at baseline.  Recent vital signs include a temperature of 98, heart rate of 80, respiration rate of 16 with 94% oxygen saturation on nasal cannula oxygen, blood pressure is 109/74.      ASSESSMENT:  A history of polysubstance use, currently generalized anxiety disorder, past history of borderline personality disorder.      RECOMMENDATIONS:  Discontinue Xanax and use Klonopin 0.5 p.o. b.i.d.  This can be tapered as an outpatient and as an outpatient the Lexapro could be increased to 20.         JAVY ORR MD             D: 2019   T: 2019   MT: ANN MARIE      Name:     NIA CURTIS   MRN:      [id number]        Account:       [id number]   :      1955           Consult Date:  2019      Document: [id number]       cc: Fredy Merritt MD

## 2019-05-06 NOTE — PROGRESS NOTES
[facility] Physicians    Pulmonary, Allergy, Critical Care and Sleep Medicine    Follow-up Note  05/06/2019    Assessment and Recommendations:    Demetra Richardson is a 63 year old female with hx of Sjogren's, SLE, PSC, fibromyalgia and prior substance abuse on methadone who presented 4/28/2019 with progressive shortness and found to have moderate left sided loculated pleural effusion, now s/p chest tube with ongoing tPA and strep intermedius on pleural cultures.     Loculated Strep Empyema   Loculated fluid with septations seen on initial bedside ultrasound, now growing strep intermedius. Pigtail chest tube in place that following repositioning has had significant output. CT chest with hydropneumothorax, decreased loculated fluid, chest tube in fine position. With continued empyema despite course of lytics evaluation by Thoracic Surgery with plan for decortication in the morning. Ground glass opacities on CT from 5/4. No new clinical symptoms and unlikely pulmonary edema.  - Hold of any additional lytics per Thoracic Surgery  - RVP negative, sputum culture to be performed    Seen and discussed with Dr. Geovany Champagne MD PhD  Pulmonary and Critical Care Fellow   Pager [phone]     Subjective, Interval history:   This morning patient upset about pain and anxiety treatment. No significant respiratory symptoms. Output from chest tube decreasing.    Objective:   Medications:    cefTRIAXone  2 g Intravenous Q24H     clonazePAM  0.5 mg Oral BID     enoxaparin  40 mg Subcutaneous Q24H     escitalopram  10 mg Oral Daily     methadone  113 mg Oral Daily     pantoprazole  40 mg Oral QAM AC     polyethylene glycol  17 g Oral Daily     pregabalin  100 mg Oral TID     saccharomyces boulardii  250 mg Oral BID     senna-docusate  1 tablet Oral BID     sodium chloride (PF)  3 mL Intracatheter Q8H     acetaminophen, [Rx hold ] aspirin-acetaminophen-caffeine, lidocaine 4%, lidocaine, lidocaine  (buffered or not buffered), melatonin, methocarbamol, methyl salicylate-menthol, naloxone, ondansetron **OR** ondansetron, oxyCODONE, sodium chloride (PF)    Physical Exam:  Temp:  [97.6  F (36.4  C)-99.6  F (37.6  C)] 98  F (36.7  C)  Heart Rate:  [80-98] 80  Resp:  [14-18] 16  BP: ()/(61-77) 109/74  SpO2:  [89 %-95 %] 94 %    Intake/Output Summary (Last 24 hours) at 5/2/2019 1824  Last data filed at 5/2/2019 1550  Gross per 24 hour   Intake 420 ml   Output 2665 ml   Net -2245 ml     General: laying in bed, NAD  HEENT: anicteric, moist mucosa  Chest: Diminished breath sounds at the left lung base, chest tube with decreasing drainage, no air leak  Cardiac: RRR no murmurs  Abdomen: Soft, flat, non tender, active BS  Extremities: No LE Edema  Neuro: A&Ox3, no focal deficits   Skin: no rash noted    Labs and imaging: All laboratory and imaging data personally reviewed.

## 2019-05-06 NOTE — PROVIDER NOTIFICATION
"  Notified oncall provider of pt request for more pain medication; current regimen of oxy 5mg q4hrs; pt stating, \"this isn't working!\" pt very angry/frustrated. Pt also refusing dose of IV rocephin tonight stating that her PIV is 'painful'; but pt is also refusing to have another PIV placed as well. MD to come and speak with pt. Will continue to monitor.     ADDENDUM: 3 additional 5mg doses ordered to be given tonight alongside current prn oxy dosage. Per MD, okay if pt does not take IV rocephin tonight and okay if pt does not get another PIV; day team to reassess tomorrow.   "

## 2019-05-06 NOTE — TELEPHONE ENCOUNTER
----- Message from Lucius Walsh PA-C sent at 5/6/2019  7:52 AM CDT -----  Regarding: Case add on for tomorrow  Betty Alejo,  Happy Monday.  Can we please schedule this patient for the OR tomorrow with Dr. Muir?    PROCEDURE: Left VATS decortication, possible thoracotomy, flexible bronchoscopy    CASE LENGTH = 3 hours    DIAGNOSIS = Left empyema    Please call me on my cell at [phone] if there are additional questions.    Thank you!    Jesse

## 2019-05-06 NOTE — TELEPHONE ENCOUNTER
Patient scheduled on 05/07 at [facility] OR per In-basket message.   Did not contact patient as she is currently In-patient.

## 2019-05-06 NOTE — PLAN OF CARE
VS: VSS, afebrile, on RA  Mental Status: A&Ox4, anxious at baseline   Cardiac: WNL, denies chest pain   Respiratory: LS diminished bilaterally, dyspnea with exertion, chest tube patent on suction, continues on RA  GI/: voiding adequate amount urine. Abdomen rounded, + bowel tones, denies nausea, no BM/patient denies constipation   Pain: + back and L shoulder pain; continues on scheduled methadone and tylenol, PRN oxycodone given x1 and PRN xanax given x1  Diet: tolerating regular diet   Mobility: Up with SBA; pivot to BSC   Treatment: continue chest tube care with tPA scheduled flush, chest x-ray this AM to assess chest tube status and need  DC plan: TBD

## 2019-05-06 NOTE — PROGRESS NOTES
"SPIRITUAL HEALTH SERVICES  SPIRITUAL ASSESSMENT Progress Note  [facility] [address]     REFERRAL SOURCE: Length-of-stay      I made had a brief introductory visit with \"Radha\" based on length-of-stay.    Radha respectfully declined  support but understands that if her needs change, she can request a visit through her nurse.    PLAN: [facility] remains available to support Radha as needed.    Seth Lima  Chaplain Resident  Pager [phone]    "

## 2019-05-06 NOTE — PLAN OF CARE
1950-2330  *Contact and Droplet iso r/o RSV  BP 94/61 (BP Location: Right arm)   Pulse 103   Temp 99.6  F (37.6  C) (Axillary)   Resp 18   Wt 69 kg (152 lb 3.2 oz)   SpO2 92%   BMI 26.96 kg/m       VS: AVSS, t max 99.6 (pt warm in room as heat was all the way up) on 1 LPM NC continuous pulse ox.   BG: none.   LABS: WNL.   Pain: Oxy 5mg x1, Excedrin x1 for headache, effective relief .  Nausea: denies.   Diet: Regular.   LDA: Chest tube right side, site intact, to suction -20, currently clamped due to TPA in chest tube line, needs to be clamped for 2 hours after administration (will unclamp at 2330).   GI/: Voiding in bedside commode, no BM this shift.   Skin: intact.   Mobility: SBA (needs assistance with managing chest tube when moving).   Education: Managing pain plan, distraction when putting tpa in chest tube as it is uncomfortable.   Tests/Procedures: none.   Plan: Continue to monitor output and chest tube site, monitor pain, create a relaxing environment and waiting for RSV result.     All care explained and questions answered. Will continue to monitor and notify team of changes.      03-Feb-2018 09:35

## 2019-05-07 ENCOUNTER — APPOINTMENT (OUTPATIENT)
Dept: GENERAL RADIOLOGY | Facility: CLINIC | Age: 64
DRG: 853 | End: 2019-05-07
Payer: COMMERCIAL

## 2019-05-07 ENCOUNTER — ANESTHESIA (OUTPATIENT)
Dept: SURGERY | Facility: CLINIC | Age: 64
DRG: 853 | End: 2019-05-07
Payer: COMMERCIAL

## 2019-05-07 LAB
ALBUMIN SERPL-MCNC: 1.3 G/DL (ref 3.4–5)
ALP SERPL-CCNC: 1063 U/L (ref 40–150)
ALT SERPL W P-5'-P-CCNC: 31 U/L (ref 0–50)
ANION GAP SERPL CALCULATED.3IONS-SCNC: 2 MMOL/L (ref 3–14)
ANION GAP SERPL CALCULATED.3IONS-SCNC: 6 MMOL/L (ref 3–14)
AST SERPL W P-5'-P-CCNC: 47 U/L (ref 0–45)
BASE EXCESS BLDA CALC-SCNC: 2.2 MMOL/L
BASE EXCESS BLDA CALC-SCNC: 2.3 MMOL/L
BASE EXCESS BLDA CALC-SCNC: 2.3 MMOL/L
BASE EXCESS BLDA CALC-SCNC: 2.6 MMOL/L
BILIRUB SERPL-MCNC: 0.3 MG/DL (ref 0.2–1.3)
BLD PROD TYP BPU: NORMAL
BLD PROD TYP BPU: NORMAL
BLD UNIT ID BPU: 0
BLD UNIT ID BPU: 0
BLOOD PRODUCT CODE: NORMAL
BLOOD PRODUCT CODE: NORMAL
BPU ID: NORMAL
BPU ID: NORMAL
BUN SERPL-MCNC: 8 MG/DL (ref 7–30)
BUN SERPL-MCNC: 8 MG/DL (ref 7–30)
CA-I BLD-MCNC: 4.4 MG/DL (ref 4.4–5.2)
CA-I BLD-MCNC: 4.5 MG/DL (ref 4.4–5.2)
CA-I BLD-MCNC: 5.3 MG/DL (ref 4.4–5.2)
CA-I SERPL ISE-MCNC: 4.7 MG/DL (ref 4.4–5.2)
CALCIUM SERPL-MCNC: 7.9 MG/DL (ref 8.5–10.1)
CALCIUM SERPL-MCNC: 8.2 MG/DL (ref 8.5–10.1)
CHLORIDE SERPL-SCNC: 102 MMOL/L (ref 94–109)
CHLORIDE SERPL-SCNC: 102 MMOL/L (ref 94–109)
CO2 SERPL-SCNC: 28 MMOL/L (ref 20–32)
CO2 SERPL-SCNC: 31 MMOL/L (ref 20–32)
CREAT SERPL-MCNC: 0.61 MG/DL (ref 0.52–1.04)
CREAT SERPL-MCNC: 0.66 MG/DL (ref 0.52–1.04)
ERYTHROCYTE [DISTWIDTH] IN BLOOD BY AUTOMATED COUNT: 14.8 % (ref 10–15)
ERYTHROCYTE [DISTWIDTH] IN BLOOD BY AUTOMATED COUNT: 15 % (ref 10–15)
GFR SERPL CREATININE-BSD FRML MDRD: >90 ML/MIN/{1.73_M2}
GFR SERPL CREATININE-BSD FRML MDRD: >90 ML/MIN/{1.73_M2}
GLUCOSE BLD-MCNC: 128 MG/DL (ref 70–99)
GLUCOSE BLD-MCNC: 139 MG/DL (ref 70–99)
GLUCOSE BLD-MCNC: 140 MG/DL (ref 70–99)
GLUCOSE BLDC GLUCOMTR-MCNC: 92 MG/DL (ref 70–99)
GLUCOSE SERPL-MCNC: 104 MG/DL (ref 70–99)
GLUCOSE SERPL-MCNC: 132 MG/DL (ref 70–99)
HCO3 BLD-SCNC: 27 MMOL/L (ref 21–28)
HCT VFR BLD AUTO: 27.4 % (ref 35–47)
HCT VFR BLD AUTO: 33.7 % (ref 35–47)
HGB BLD-MCNC: 10.1 G/DL (ref 11.7–15.7)
HGB BLD-MCNC: 7.2 G/DL (ref 11.7–15.7)
HGB BLD-MCNC: 8.9 G/DL (ref 11.7–15.7)
HGB BLD-MCNC: 9.3 G/DL (ref 11.7–15.7)
HGB BLD-MCNC: 9.6 G/DL (ref 11.7–15.7)
INR PPP: 1.17 (ref 0.86–1.14)
MAGNESIUM SERPL-MCNC: 1.7 MG/DL (ref 1.6–2.3)
MCH RBC QN AUTO: 27.4 PG (ref 26.5–33)
MCH RBC QN AUTO: 28.5 PG (ref 26.5–33)
MCHC RBC AUTO-ENTMCNC: 28.5 G/DL (ref 31.5–36.5)
MCHC RBC AUTO-ENTMCNC: 32.5 G/DL (ref 31.5–36.5)
MCV RBC AUTO: 88 FL (ref 78–100)
MCV RBC AUTO: 96 FL (ref 78–100)
O2/TOTAL GAS SETTING VFR VENT: 99 %
O2/TOTAL GAS SETTING VFR VENT: ABNORMAL %
PCO2 BLD: 42 MM HG (ref 35–45)
PCO2 BLD: 43 MM HG (ref 35–45)
PCO2 BLD: 44 MM HG (ref 35–45)
PCO2 BLD: 44 MM HG (ref 35–45)
PH BLD: 7.4 PH (ref 7.35–7.45)
PH BLD: 7.41 PH (ref 7.35–7.45)
PH BLD: 7.41 PH (ref 7.35–7.45)
PH BLD: 7.42 PH (ref 7.35–7.45)
PHOSPHATE SERPL-MCNC: 5 MG/DL (ref 2.5–4.5)
PLATELET # BLD AUTO: 389 10E9/L (ref 150–450)
PLATELET # BLD AUTO: 439 10E9/L (ref 150–450)
PO2 BLD: 117 MM HG (ref 80–105)
PO2 BLD: 231 MM HG (ref 80–105)
PO2 BLD: 246 MM HG (ref 80–105)
PO2 BLD: 276 MM HG (ref 80–105)
POTASSIUM BLD-SCNC: 4.1 MMOL/L (ref 3.4–5.3)
POTASSIUM BLD-SCNC: 4.3 MMOL/L (ref 3.4–5.3)
POTASSIUM BLD-SCNC: 4.5 MMOL/L (ref 3.4–5.3)
POTASSIUM SERPL-SCNC: 4.6 MMOL/L (ref 3.4–5.3)
POTASSIUM SERPL-SCNC: 4.7 MMOL/L (ref 3.4–5.3)
PROT SERPL-MCNC: 5.8 G/DL (ref 6.8–8.8)
RBC # BLD AUTO: 3.12 10E12/L (ref 3.8–5.2)
RBC # BLD AUTO: 3.5 10E12/L (ref 3.8–5.2)
SODIUM BLD-SCNC: 132 MMOL/L (ref 133–144)
SODIUM BLD-SCNC: 133 MMOL/L (ref 133–144)
SODIUM BLD-SCNC: 133 MMOL/L (ref 133–144)
SODIUM SERPL-SCNC: 135 MMOL/L (ref 133–144)
SODIUM SERPL-SCNC: 135 MMOL/L (ref 133–144)
TRANSFUSION STATUS PATIENT QL: NORMAL
WBC # BLD AUTO: 12 10E9/L (ref 4–11)
WBC # BLD AUTO: 18.5 10E9/L (ref 4–11)

## 2019-05-07 PROCEDURE — 25000128 H RX IP 250 OP 636

## 2019-05-07 PROCEDURE — 71000016 ZZH RECOVERY PHASE 1 LEVEL 3 FIRST HR: Performed by: THORACIC SURGERY (CARDIOTHORACIC VASCULAR SURGERY)

## 2019-05-07 PROCEDURE — 37000009 ZZH ANESTHESIA TECHNICAL FEE, EACH ADDTL 15 MIN: Performed by: THORACIC SURGERY (CARDIOTHORACIC VASCULAR SURGERY)

## 2019-05-07 PROCEDURE — 86901 BLOOD TYPING SEROLOGIC RH(D): CPT | Performed by: STUDENT IN AN ORGANIZED HEALTH CARE EDUCATION/TRAINING PROGRAM

## 2019-05-07 PROCEDURE — 27210794 ZZH OR GENERAL SUPPLY STERILE: Performed by: THORACIC SURGERY (CARDIOTHORACIC VASCULAR SURGERY)

## 2019-05-07 PROCEDURE — 86850 RBC ANTIBODY SCREEN: CPT | Performed by: STUDENT IN AN ORGANIZED HEALTH CARE EDUCATION/TRAINING PROGRAM

## 2019-05-07 PROCEDURE — 015L4ZZ DESTRUCTION OF THORACIC SYMPATHETIC NERVE, PERCUTANEOUS ENDOSCOPIC APPROACH: ICD-10-PCS | Performed by: THORACIC SURGERY (CARDIOTHORACIC VASCULAR SURGERY)

## 2019-05-07 PROCEDURE — 25000125 ZZHC RX 250: Performed by: ANESTHESIOLOGY

## 2019-05-07 PROCEDURE — 82330 ASSAY OF CALCIUM: CPT | Performed by: FAMILY MEDICINE

## 2019-05-07 PROCEDURE — 84295 ASSAY OF SERUM SODIUM: CPT | Performed by: ANESTHESIOLOGY

## 2019-05-07 PROCEDURE — 0W9B30Z DRAINAGE OF LEFT PLEURAL CAVITY WITH DRAINAGE DEVICE, PERCUTANEOUS APPROACH: ICD-10-PCS | Performed by: THORACIC SURGERY (CARDIOTHORACIC VASCULAR SURGERY)

## 2019-05-07 PROCEDURE — 25000132 ZZH RX MED GY IP 250 OP 250 PS 637: Performed by: SURGERY

## 2019-05-07 PROCEDURE — 82947 ASSAY GLUCOSE BLOOD QUANT: CPT | Performed by: FAMILY MEDICINE

## 2019-05-07 PROCEDURE — 71000017 ZZH RECOVERY PHASE 1 LEVEL 3 EA ADDTL HR: Performed by: THORACIC SURGERY (CARDIOTHORACIC VASCULAR SURGERY)

## 2019-05-07 PROCEDURE — 25000128 H RX IP 250 OP 636: Performed by: ANESTHESIOLOGY

## 2019-05-07 PROCEDURE — 87102 FUNGUS ISOLATION CULTURE: CPT | Performed by: THORACIC SURGERY (CARDIOTHORACIC VASCULAR SURGERY)

## 2019-05-07 PROCEDURE — 00HU33Z INSERTION OF INFUSION DEVICE INTO SPINAL CANAL, PERCUTANEOUS APPROACH: ICD-10-PCS | Performed by: ANESTHESIOLOGY

## 2019-05-07 PROCEDURE — 36415 COLL VENOUS BLD VENIPUNCTURE: CPT | Performed by: STUDENT IN AN ORGANIZED HEALTH CARE EDUCATION/TRAINING PROGRAM

## 2019-05-07 PROCEDURE — 82803 BLOOD GASES ANY COMBINATION: CPT | Performed by: SURGERY

## 2019-05-07 PROCEDURE — 25000125 ZZHC RX 250

## 2019-05-07 PROCEDURE — 87070 CULTURE OTHR SPECIMN AEROBIC: CPT | Performed by: THORACIC SURGERY (CARDIOTHORACIC VASCULAR SURGERY)

## 2019-05-07 PROCEDURE — 80053 COMPREHEN METABOLIC PANEL: CPT | Performed by: STUDENT IN AN ORGANIZED HEALTH CARE EDUCATION/TRAINING PROGRAM

## 2019-05-07 PROCEDURE — 83735 ASSAY OF MAGNESIUM: CPT | Performed by: SURGERY

## 2019-05-07 PROCEDURE — 85027 COMPLETE CBC AUTOMATED: CPT | Performed by: SURGERY

## 2019-05-07 PROCEDURE — 00000146 ZZHCL STATISTIC GLUCOSE BY METER IP

## 2019-05-07 PROCEDURE — 36000062 ZZH SURGERY LEVEL 4 1ST 30 MIN - UMMC: Performed by: THORACIC SURGERY (CARDIOTHORACIC VASCULAR SURGERY)

## 2019-05-07 PROCEDURE — 40000014 ZZH STATISTIC ARTERIAL MONITORING DAILY

## 2019-05-07 PROCEDURE — 86900 BLOOD TYPING SEROLOGIC ABO: CPT | Performed by: STUDENT IN AN ORGANIZED HEALTH CARE EDUCATION/TRAINING PROGRAM

## 2019-05-07 PROCEDURE — 40000275 ZZH STATISTIC RCP TIME EA 10 MIN

## 2019-05-07 PROCEDURE — 82330 ASSAY OF CALCIUM: CPT | Performed by: ANESTHESIOLOGY

## 2019-05-07 PROCEDURE — 82947 ASSAY GLUCOSE BLOOD QUANT: CPT | Performed by: ANESTHESIOLOGY

## 2019-05-07 PROCEDURE — 3E0R3BZ INTRODUCTION OF ANESTHETIC AGENT INTO SPINAL CANAL, PERCUTANEOUS APPROACH: ICD-10-PCS | Performed by: ANESTHESIOLOGY

## 2019-05-07 PROCEDURE — 87176 TISSUE HOMOGENIZATION CULTR: CPT | Performed by: THORACIC SURGERY (CARDIOTHORACIC VASCULAR SURGERY)

## 2019-05-07 PROCEDURE — 25000132 ZZH RX MED GY IP 250 OP 250 PS 637: Performed by: STUDENT IN AN ORGANIZED HEALTH CARE EDUCATION/TRAINING PROGRAM

## 2019-05-07 PROCEDURE — 84132 ASSAY OF SERUM POTASSIUM: CPT | Performed by: FAMILY MEDICINE

## 2019-05-07 PROCEDURE — 25800030 ZZH RX IP 258 OP 636

## 2019-05-07 PROCEDURE — 85027 COMPLETE CBC AUTOMATED: CPT | Performed by: STUDENT IN AN ORGANIZED HEALTH CARE EDUCATION/TRAINING PROGRAM

## 2019-05-07 PROCEDURE — 88305 TISSUE EXAM BY PATHOLOGIST: CPT | Performed by: THORACIC SURGERY (CARDIOTHORACIC VASCULAR SURGERY)

## 2019-05-07 PROCEDURE — 25000128 H RX IP 250 OP 636: Performed by: STUDENT IN AN ORGANIZED HEALTH CARE EDUCATION/TRAINING PROGRAM

## 2019-05-07 PROCEDURE — 85610 PROTHROMBIN TIME: CPT | Performed by: SURGERY

## 2019-05-07 PROCEDURE — 82330 ASSAY OF CALCIUM: CPT | Performed by: SURGERY

## 2019-05-07 PROCEDURE — 40000170 ZZH STATISTIC PRE-PROCEDURE ASSESSMENT II: Performed by: THORACIC SURGERY (CARDIOTHORACIC VASCULAR SURGERY)

## 2019-05-07 PROCEDURE — 84295 ASSAY OF SERUM SODIUM: CPT | Performed by: FAMILY MEDICINE

## 2019-05-07 PROCEDURE — 20000004 ZZH R&B ICU UMMC

## 2019-05-07 PROCEDURE — 25000128 H RX IP 250 OP 636: Performed by: NURSE PRACTITIONER

## 2019-05-07 PROCEDURE — 71045 X-RAY EXAM CHEST 1 VIEW: CPT

## 2019-05-07 PROCEDURE — 40000986 XR CHEST PORT 1 VW

## 2019-05-07 PROCEDURE — 25000132 ZZH RX MED GY IP 250 OP 250 PS 637: Performed by: FAMILY MEDICINE

## 2019-05-07 PROCEDURE — 84132 ASSAY OF SERUM POTASSIUM: CPT | Performed by: ANESTHESIOLOGY

## 2019-05-07 PROCEDURE — 25000125 ZZHC RX 250: Performed by: NURSE ANESTHETIST, CERTIFIED REGISTERED

## 2019-05-07 PROCEDURE — 86923 COMPATIBILITY TEST ELECTRIC: CPT | Performed by: STUDENT IN AN ORGANIZED HEALTH CARE EDUCATION/TRAINING PROGRAM

## 2019-05-07 PROCEDURE — 84100 ASSAY OF PHOSPHORUS: CPT | Performed by: SURGERY

## 2019-05-07 PROCEDURE — 0BBP4ZZ EXCISION OF LEFT PLEURA, PERCUTANEOUS ENDOSCOPIC APPROACH: ICD-10-PCS | Performed by: THORACIC SURGERY (CARDIOTHORACIC VASCULAR SURGERY)

## 2019-05-07 PROCEDURE — 37000008 ZZH ANESTHESIA TECHNICAL FEE, 1ST 30 MIN: Performed by: THORACIC SURGERY (CARDIOTHORACIC VASCULAR SURGERY)

## 2019-05-07 PROCEDURE — C2618 PROBE/NEEDLE, CRYO: HCPCS | Performed by: THORACIC SURGERY (CARDIOTHORACIC VASCULAR SURGERY)

## 2019-05-07 PROCEDURE — 25800030 ZZH RX IP 258 OP 636: Performed by: NURSE PRACTITIONER

## 2019-05-07 PROCEDURE — 36000064 ZZH SURGERY LEVEL 4 EA 15 ADDTL MIN - UMMC: Performed by: THORACIC SURGERY (CARDIOTHORACIC VASCULAR SURGERY)

## 2019-05-07 PROCEDURE — 82803 BLOOD GASES ANY COMBINATION: CPT | Performed by: ANESTHESIOLOGY

## 2019-05-07 PROCEDURE — 25000566 ZZH SEVOFLURANE, EA 15 MIN: Performed by: THORACIC SURGERY (CARDIOTHORACIC VASCULAR SURGERY)

## 2019-05-07 PROCEDURE — P9016 RBC LEUKOCYTES REDUCED: HCPCS | Performed by: STUDENT IN AN ORGANIZED HEALTH CARE EDUCATION/TRAINING PROGRAM

## 2019-05-07 PROCEDURE — 0BJ08ZZ INSPECTION OF TRACHEOBRONCHIAL TREE, VIA NATURAL OR ARTIFICIAL OPENING ENDOSCOPIC: ICD-10-PCS | Performed by: THORACIC SURGERY (CARDIOTHORACIC VASCULAR SURGERY)

## 2019-05-07 PROCEDURE — 25000128 H RX IP 250 OP 636: Performed by: NURSE ANESTHETIST, CERTIFIED REGISTERED

## 2019-05-07 PROCEDURE — 80048 BASIC METABOLIC PNL TOTAL CA: CPT | Performed by: SURGERY

## 2019-05-07 PROCEDURE — 25800030 ZZH RX IP 258 OP 636: Performed by: STUDENT IN AN ORGANIZED HEALTH CARE EDUCATION/TRAINING PROGRAM

## 2019-05-07 PROCEDURE — 87075 CULTR BACTERIA EXCEPT BLOOD: CPT | Performed by: THORACIC SURGERY (CARDIOTHORACIC VASCULAR SURGERY)

## 2019-05-07 PROCEDURE — 0BNL4ZZ RELEASE LEFT LUNG, PERCUTANEOUS ENDOSCOPIC APPROACH: ICD-10-PCS | Performed by: THORACIC SURGERY (CARDIOTHORACIC VASCULAR SURGERY)

## 2019-05-07 PROCEDURE — P9041 ALBUMIN (HUMAN),5%, 50ML: HCPCS

## 2019-05-07 PROCEDURE — 82803 BLOOD GASES ANY COMBINATION: CPT | Performed by: FAMILY MEDICINE

## 2019-05-07 RX ORDER — CLINDAMYCIN PHOSPHATE 900 MG/50ML
INJECTION, SOLUTION INTRAVENOUS PRN
Status: DISCONTINUED | OUTPATIENT
Start: 2019-05-07 | End: 2019-05-07

## 2019-05-07 RX ORDER — FENTANYL CITRATE 50 UG/ML
25-50 INJECTION, SOLUTION INTRAMUSCULAR; INTRAVENOUS
Status: DISCONTINUED | OUTPATIENT
Start: 2019-05-07 | End: 2019-05-07 | Stop reason: HOSPADM

## 2019-05-07 RX ORDER — ONDANSETRON 4 MG/1
4 TABLET, ORALLY DISINTEGRATING ORAL EVERY 30 MIN PRN
Status: DISCONTINUED | OUTPATIENT
Start: 2019-05-07 | End: 2019-05-07 | Stop reason: HOSPADM

## 2019-05-07 RX ORDER — NALBUPHINE HYDROCHLORIDE 10 MG/ML
2.5-5 INJECTION, SOLUTION INTRAMUSCULAR; INTRAVENOUS; SUBCUTANEOUS EVERY 6 HOURS PRN
Status: DISCONTINUED | OUTPATIENT
Start: 2019-05-07 | End: 2019-05-08

## 2019-05-07 RX ORDER — CEFAZOLIN SODIUM 2 G/100ML
2 INJECTION, SOLUTION INTRAVENOUS
Status: DISCONTINUED | OUTPATIENT
Start: 2019-05-07 | End: 2019-05-07 | Stop reason: HOSPADM

## 2019-05-07 RX ORDER — OXYCODONE HYDROCHLORIDE 5 MG/1
5-10 TABLET ORAL
Status: DISCONTINUED | OUTPATIENT
Start: 2019-05-07 | End: 2019-05-14 | Stop reason: HOSPADM

## 2019-05-07 RX ORDER — CEFAZOLIN SODIUM 1 G/3ML
1 INJECTION, POWDER, FOR SOLUTION INTRAMUSCULAR; INTRAVENOUS SEE ADMIN INSTRUCTIONS
Status: DISCONTINUED | OUTPATIENT
Start: 2019-05-07 | End: 2019-05-07 | Stop reason: HOSPADM

## 2019-05-07 RX ORDER — CALCIUM CHLORIDE 100 MG/ML
INJECTION INTRAVENOUS; INTRAVENTRICULAR PRN
Status: DISCONTINUED | OUTPATIENT
Start: 2019-05-07 | End: 2019-05-07

## 2019-05-07 RX ORDER — AMOXICILLIN 250 MG
2 CAPSULE ORAL 2 TIMES DAILY
Status: DISCONTINUED | OUTPATIENT
Start: 2019-05-07 | End: 2019-05-12

## 2019-05-07 RX ORDER — HYDROMORPHONE HYDROCHLORIDE 1 MG/ML
.3-.5 INJECTION, SOLUTION INTRAMUSCULAR; INTRAVENOUS; SUBCUTANEOUS EVERY 5 MIN PRN
Status: DISCONTINUED | OUTPATIENT
Start: 2019-05-07 | End: 2019-05-07 | Stop reason: HOSPADM

## 2019-05-07 RX ORDER — ONDANSETRON 2 MG/ML
INJECTION INTRAMUSCULAR; INTRAVENOUS PRN
Status: DISCONTINUED | OUTPATIENT
Start: 2019-05-07 | End: 2019-05-07

## 2019-05-07 RX ORDER — FLUMAZENIL 0.1 MG/ML
0.2 INJECTION, SOLUTION INTRAVENOUS
Status: DISCONTINUED | OUTPATIENT
Start: 2019-05-07 | End: 2019-05-07 | Stop reason: HOSPADM

## 2019-05-07 RX ORDER — ONDANSETRON 2 MG/ML
4 INJECTION INTRAMUSCULAR; INTRAVENOUS EVERY 30 MIN PRN
Status: DISCONTINUED | OUTPATIENT
Start: 2019-05-07 | End: 2019-05-07 | Stop reason: HOSPADM

## 2019-05-07 RX ORDER — IPRATROPIUM BROMIDE AND ALBUTEROL SULFATE 2.5; .5 MG/3ML; MG/3ML
3 SOLUTION RESPIRATORY (INHALATION)
Status: COMPLETED | OUTPATIENT
Start: 2019-05-07 | End: 2019-05-07

## 2019-05-07 RX ORDER — FENTANYL CITRATE 50 UG/ML
INJECTION, SOLUTION INTRAMUSCULAR; INTRAVENOUS PRN
Status: DISCONTINUED | OUTPATIENT
Start: 2019-05-07 | End: 2019-05-07

## 2019-05-07 RX ORDER — NALOXONE HYDROCHLORIDE 0.4 MG/ML
.1-.4 INJECTION, SOLUTION INTRAMUSCULAR; INTRAVENOUS; SUBCUTANEOUS
Status: CANCELLED | OUTPATIENT
Start: 2019-05-07 | End: 2019-05-08

## 2019-05-07 RX ORDER — ALBUMIN, HUMAN INJ 5% 5 %
SOLUTION INTRAVENOUS CONTINUOUS PRN
Status: DISCONTINUED | OUTPATIENT
Start: 2019-05-07 | End: 2019-05-07

## 2019-05-07 RX ORDER — METHOCARBAMOL 500 MG/1
500 TABLET, FILM COATED ORAL 3 TIMES DAILY
Status: DISCONTINUED | OUTPATIENT
Start: 2019-05-08 | End: 2019-05-08

## 2019-05-07 RX ORDER — SODIUM CHLORIDE, SODIUM LACTATE, POTASSIUM CHLORIDE, CALCIUM CHLORIDE 600; 310; 30; 20 MG/100ML; MG/100ML; MG/100ML; MG/100ML
INJECTION, SOLUTION INTRAVENOUS CONTINUOUS PRN
Status: DISCONTINUED | OUTPATIENT
Start: 2019-05-07 | End: 2019-05-07

## 2019-05-07 RX ORDER — SODIUM CHLORIDE, SODIUM LACTATE, POTASSIUM CHLORIDE, CALCIUM CHLORIDE 600; 310; 30; 20 MG/100ML; MG/100ML; MG/100ML; MG/100ML
INJECTION, SOLUTION INTRAVENOUS CONTINUOUS
Status: DISCONTINUED | OUTPATIENT
Start: 2019-05-07 | End: 2019-05-07 | Stop reason: HOSPADM

## 2019-05-07 RX ORDER — NALOXONE HYDROCHLORIDE 0.4 MG/ML
.1-.4 INJECTION, SOLUTION INTRAMUSCULAR; INTRAVENOUS; SUBCUTANEOUS
Status: DISCONTINUED | OUTPATIENT
Start: 2019-05-07 | End: 2019-05-13

## 2019-05-07 RX ORDER — DEXAMETHASONE SODIUM PHOSPHATE 4 MG/ML
INJECTION, SOLUTION INTRA-ARTICULAR; INTRALESIONAL; INTRAMUSCULAR; INTRAVENOUS; SOFT TISSUE PRN
Status: DISCONTINUED | OUTPATIENT
Start: 2019-05-07 | End: 2019-05-07

## 2019-05-07 RX ORDER — NALOXONE HYDROCHLORIDE 0.4 MG/ML
.1-.4 INJECTION, SOLUTION INTRAMUSCULAR; INTRAVENOUS; SUBCUTANEOUS
Status: DISCONTINUED | OUTPATIENT
Start: 2019-05-07 | End: 2019-05-07 | Stop reason: HOSPADM

## 2019-05-07 RX ORDER — OXYCODONE HYDROCHLORIDE 5 MG/1
5 TABLET ORAL
Status: DISCONTINUED | OUTPATIENT
Start: 2019-05-07 | End: 2019-05-07

## 2019-05-07 RX ORDER — LIDOCAINE HYDROCHLORIDE AND EPINEPHRINE 15; 5 MG/ML; UG/ML
INJECTION, SOLUTION EPIDURAL PRN
Status: DISCONTINUED | OUTPATIENT
Start: 2019-05-07 | End: 2019-05-07

## 2019-05-07 RX ORDER — LIDOCAINE HYDROCHLORIDE 10 MG/ML
INJECTION, SOLUTION INFILTRATION; PERINEURAL PRN
Status: DISCONTINUED | OUTPATIENT
Start: 2019-05-07 | End: 2019-05-07

## 2019-05-07 RX ORDER — PROPOFOL 10 MG/ML
INJECTION, EMULSION INTRAVENOUS PRN
Status: DISCONTINUED | OUTPATIENT
Start: 2019-05-07 | End: 2019-05-07

## 2019-05-07 RX ORDER — LIDOCAINE HYDROCHLORIDE 20 MG/ML
INJECTION, SOLUTION INFILTRATION; PERINEURAL PRN
Status: DISCONTINUED | OUTPATIENT
Start: 2019-05-07 | End: 2019-05-07

## 2019-05-07 RX ADMIN — ROCURONIUM BROMIDE 20 MG: 10 INJECTION INTRAVENOUS at 14:37

## 2019-05-07 RX ADMIN — Medication 0.5 MG: at 20:31

## 2019-05-07 RX ADMIN — FENTANYL CITRATE 50 MCG: 50 INJECTION INTRAMUSCULAR; INTRAVENOUS at 11:02

## 2019-05-07 RX ADMIN — OXYCODONE HYDROCHLORIDE 5 MG: 5 TABLET ORAL at 04:46

## 2019-05-07 RX ADMIN — BUPIVACAINE HYDROCHLORIDE 8 ML/HR: 7.5 INJECTION, SOLUTION EPIDURAL; RETROBULBAR at 13:30

## 2019-05-07 RX ADMIN — LIDOCAINE HYDROCHLORIDE 2 ML: 10 INJECTION, SOLUTION INFILTRATION; PERINEURAL at 11:11

## 2019-05-07 RX ADMIN — CALCIUM CHLORIDE 0.5 G: 100 INJECTION, SOLUTION INTRAVENOUS at 16:41

## 2019-05-07 RX ADMIN — Medication 0.5 MG: at 06:03

## 2019-05-07 RX ADMIN — PANTOPRAZOLE SODIUM 40 MG: 40 TABLET, DELAYED RELEASE ORAL at 07:41

## 2019-05-07 RX ADMIN — DEXAMETHASONE SODIUM PHOSPHATE 4 MG: 4 INJECTION, SOLUTION INTRA-ARTICULAR; INTRALESIONAL; INTRAMUSCULAR; INTRAVENOUS; SOFT TISSUE at 12:12

## 2019-05-07 RX ADMIN — METHADONE HYDROCHLORIDE 113 MG: 10 CONCENTRATE ORAL at 05:15

## 2019-05-07 RX ADMIN — PHENYLEPHRINE HYDROCHLORIDE 150 MCG: 10 INJECTION, SOLUTION INTRAMUSCULAR; INTRAVENOUS; SUBCUTANEOUS at 16:09

## 2019-05-07 RX ADMIN — ONDANSETRON 4 MG: 2 INJECTION INTRAMUSCULAR; INTRAVENOUS at 18:25

## 2019-05-07 RX ADMIN — Medication 1 UNITS: at 15:21

## 2019-05-07 RX ADMIN — PHENYLEPHRINE HYDROCHLORIDE 150 MCG: 10 INJECTION, SOLUTION INTRAMUSCULAR; INTRAVENOUS; SUBCUTANEOUS at 12:14

## 2019-05-07 RX ADMIN — Medication 0.2 MG: at 20:12

## 2019-05-07 RX ADMIN — PROPOFOL 50 MG: 10 INJECTION, EMULSION INTRAVENOUS at 18:47

## 2019-05-07 RX ADMIN — OXYCODONE HYDROCHLORIDE 5 MG: 5 TABLET ORAL at 00:29

## 2019-05-07 RX ADMIN — OXYCODONE HYDROCHLORIDE 5 MG: 5 TABLET ORAL at 00:28

## 2019-05-07 RX ADMIN — PHENYLEPHRINE HYDROCHLORIDE 200 MCG: 10 INJECTION, SOLUTION INTRAMUSCULAR; INTRAVENOUS; SUBCUTANEOUS at 12:56

## 2019-05-07 RX ADMIN — OXYCODONE HYDROCHLORIDE 5 MG: 5 TABLET ORAL at 04:45

## 2019-05-07 RX ADMIN — ANALGESIC BALM: 1.74; 4.06 OINTMENT TOPICAL at 07:44

## 2019-05-07 RX ADMIN — PHENYLEPHRINE HYDROCHLORIDE 200 MCG: 10 INJECTION, SOLUTION INTRAMUSCULAR; INTRAVENOUS; SUBCUTANEOUS at 14:17

## 2019-05-07 RX ADMIN — SODIUM CHLORIDE, POTASSIUM CHLORIDE, SODIUM LACTATE AND CALCIUM CHLORIDE: 600; 310; 30; 20 INJECTION, SOLUTION INTRAVENOUS at 12:20

## 2019-05-07 RX ADMIN — ROCURONIUM BROMIDE 10 MG: 10 INJECTION INTRAVENOUS at 17:01

## 2019-05-07 RX ADMIN — ROCURONIUM BROMIDE 20 MG: 10 INJECTION INTRAVENOUS at 13:24

## 2019-05-07 RX ADMIN — LIDOCAINE HYDROCHLORIDE 60 MG: 20 INJECTION, SOLUTION INFILTRATION; PERINEURAL at 12:02

## 2019-05-07 RX ADMIN — PHENYLEPHRINE HYDROCHLORIDE 150 MCG: 10 INJECTION, SOLUTION INTRAMUSCULAR; INTRAVENOUS; SUBCUTANEOUS at 14:49

## 2019-05-07 RX ADMIN — ROCURONIUM BROMIDE 30 MG: 10 INJECTION INTRAVENOUS at 12:50

## 2019-05-07 RX ADMIN — FENTANYL CITRATE 25 MCG: 50 INJECTION INTRAMUSCULAR; INTRAVENOUS at 19:44

## 2019-05-07 RX ADMIN — CLINDAMYCIN PHOSPHATE 900 MG: 18 INJECTION, SOLUTION INTRAVENOUS at 12:40

## 2019-05-07 RX ADMIN — PHENYLEPHRINE HYDROCHLORIDE 100 MCG: 10 INJECTION, SOLUTION INTRAMUSCULAR; INTRAVENOUS; SUBCUTANEOUS at 14:45

## 2019-05-07 RX ADMIN — PHENYLEPHRINE HYDROCHLORIDE 100 MCG: 10 INJECTION, SOLUTION INTRAMUSCULAR; INTRAVENOUS; SUBCUTANEOUS at 16:35

## 2019-05-07 RX ADMIN — ROCURONIUM BROMIDE 10 MG: 10 INJECTION INTRAVENOUS at 17:47

## 2019-05-07 RX ADMIN — PHENYLEPHRINE HYDROCHLORIDE 100 MCG: 10 INJECTION, SOLUTION INTRAMUSCULAR; INTRAVENOUS; SUBCUTANEOUS at 17:53

## 2019-05-07 RX ADMIN — PHENYLEPHRINE HYDROCHLORIDE 100 MCG: 10 INJECTION, SOLUTION INTRAMUSCULAR; INTRAVENOUS; SUBCUTANEOUS at 14:06

## 2019-05-07 RX ADMIN — PHENYLEPHRINE HYDROCHLORIDE 100 MCG: 10 INJECTION, SOLUTION INTRAMUSCULAR; INTRAVENOUS; SUBCUTANEOUS at 18:09

## 2019-05-07 RX ADMIN — PHENYLEPHRINE HYDROCHLORIDE 150 MCG: 10 INJECTION, SOLUTION INTRAMUSCULAR; INTRAVENOUS; SUBCUTANEOUS at 15:32

## 2019-05-07 RX ADMIN — PHENYLEPHRINE HYDROCHLORIDE 100 MCG: 10 INJECTION, SOLUTION INTRAMUSCULAR; INTRAVENOUS; SUBCUTANEOUS at 14:01

## 2019-05-07 RX ADMIN — PHENYLEPHRINE HYDROCHLORIDE 200 MCG: 10 INJECTION, SOLUTION INTRAMUSCULAR; INTRAVENOUS; SUBCUTANEOUS at 14:28

## 2019-05-07 RX ADMIN — FENTANYL CITRATE 50 MCG: 50 INJECTION, SOLUTION INTRAMUSCULAR; INTRAVENOUS at 13:03

## 2019-05-07 RX ADMIN — HYDROMORPHONE HYDROCHLORIDE 0.5 MG: 1 INJECTION, SOLUTION INTRAMUSCULAR; INTRAVENOUS; SUBCUTANEOUS at 18:33

## 2019-05-07 RX ADMIN — ROCURONIUM BROMIDE 20 MG: 10 INJECTION INTRAVENOUS at 15:09

## 2019-05-07 RX ADMIN — ROCURONIUM BROMIDE 10 MG: 10 INJECTION INTRAVENOUS at 16:14

## 2019-05-07 RX ADMIN — ALBUMIN HUMAN: 0.05 INJECTION, SOLUTION INTRAVENOUS at 19:11

## 2019-05-07 RX ADMIN — SODIUM CHLORIDE, POTASSIUM CHLORIDE, SODIUM LACTATE AND CALCIUM CHLORIDE: 600; 310; 30; 20 INJECTION, SOLUTION INTRAVENOUS at 16:55

## 2019-05-07 RX ADMIN — PHENYLEPHRINE HYDROCHLORIDE 150 MCG: 10 INJECTION, SOLUTION INTRAMUSCULAR; INTRAVENOUS; SUBCUTANEOUS at 12:48

## 2019-05-07 RX ADMIN — FENTANYL CITRATE 50 MCG: 50 INJECTION, SOLUTION INTRAMUSCULAR; INTRAVENOUS at 16:57

## 2019-05-07 RX ADMIN — OXYCODONE HYDROCHLORIDE 10 MG: 5 TABLET ORAL at 22:44

## 2019-05-07 RX ADMIN — PHENYLEPHRINE HYDROCHLORIDE 200 MCG: 10 INJECTION, SOLUTION INTRAMUSCULAR; INTRAVENOUS; SUBCUTANEOUS at 13:33

## 2019-05-07 RX ADMIN — FENTANYL CITRATE 50 MCG: 50 INJECTION INTRAMUSCULAR; INTRAVENOUS at 19:51

## 2019-05-07 RX ADMIN — MIDAZOLAM HYDROCHLORIDE 2 MG: 1 INJECTION, SOLUTION INTRAMUSCULAR; INTRAVENOUS at 11:01

## 2019-05-07 RX ADMIN — LIDOCAINE HYDROCHLORIDE,EPINEPHRINE BITARTRATE 3 ML: 15; .005 INJECTION, SOLUTION EPIDURAL; INFILTRATION; INTRACAUDAL; PERINEURAL at 11:08

## 2019-05-07 RX ADMIN — OXYCODONE HYDROCHLORIDE 5 MG: 5 TABLET ORAL at 09:10

## 2019-05-07 RX ADMIN — IPRATROPIUM BROMIDE AND ALBUTEROL SULFATE 3 ML: .5; 3 SOLUTION RESPIRATORY (INHALATION) at 20:21

## 2019-05-07 RX ADMIN — PHENYLEPHRINE HYDROCHLORIDE 100 MCG: 10 INJECTION, SOLUTION INTRAMUSCULAR; INTRAVENOUS; SUBCUTANEOUS at 17:32

## 2019-05-07 RX ADMIN — FENTANYL CITRATE 25 MCG: 50 INJECTION INTRAMUSCULAR; INTRAVENOUS at 19:33

## 2019-05-07 RX ADMIN — PHENYLEPHRINE HYDROCHLORIDE 150 MCG: 10 INJECTION, SOLUTION INTRAMUSCULAR; INTRAVENOUS; SUBCUTANEOUS at 16:18

## 2019-05-07 RX ADMIN — PHENYLEPHRINE HYDROCHLORIDE 150 MCG: 10 INJECTION, SOLUTION INTRAMUSCULAR; INTRAVENOUS; SUBCUTANEOUS at 15:55

## 2019-05-07 RX ADMIN — ESCITALOPRAM 10 MG: 5 TABLET, FILM COATED ORAL at 07:42

## 2019-05-07 RX ADMIN — PHENYLEPHRINE HYDROCHLORIDE 100 MCG: 10 INJECTION, SOLUTION INTRAMUSCULAR; INTRAVENOUS; SUBCUTANEOUS at 15:50

## 2019-05-07 RX ADMIN — Medication 0.5 MG: at 21:04

## 2019-05-07 RX ADMIN — PHENYLEPHRINE HYDROCHLORIDE 150 MCG: 10 INJECTION, SOLUTION INTRAMUSCULAR; INTRAVENOUS; SUBCUTANEOUS at 16:05

## 2019-05-07 RX ADMIN — PHENYLEPHRINE HYDROCHLORIDE 100 MCG: 10 INJECTION, SOLUTION INTRAMUSCULAR; INTRAVENOUS; SUBCUTANEOUS at 12:10

## 2019-05-07 RX ADMIN — ROCURONIUM BROMIDE 40 MG: 10 INJECTION INTRAVENOUS at 12:03

## 2019-05-07 RX ADMIN — FENTANYL CITRATE 100 MCG: 50 INJECTION, SOLUTION INTRAMUSCULAR; INTRAVENOUS at 12:02

## 2019-05-07 RX ADMIN — FENTANYL CITRATE 25 MCG: 50 INJECTION INTRAMUSCULAR; INTRAVENOUS at 11:01

## 2019-05-07 RX ADMIN — PHENYLEPHRINE HYDROCHLORIDE 100 MCG: 10 INJECTION, SOLUTION INTRAMUSCULAR; INTRAVENOUS; SUBCUTANEOUS at 14:32

## 2019-05-07 RX ADMIN — ALBUMIN HUMAN: 0.05 INJECTION, SOLUTION INTRAVENOUS at 14:58

## 2019-05-07 RX ADMIN — PHENYLEPHRINE HYDROCHLORIDE 100 MCG: 10 INJECTION, SOLUTION INTRAMUSCULAR; INTRAVENOUS; SUBCUTANEOUS at 15:27

## 2019-05-07 RX ADMIN — METHOCARBAMOL 500 MG: 500 TABLET, FILM COATED ORAL at 07:41

## 2019-05-07 RX ADMIN — ALBUMIN HUMAN: 0.05 INJECTION, SOLUTION INTRAVENOUS at 13:45

## 2019-05-07 RX ADMIN — SODIUM CHLORIDE, POTASSIUM CHLORIDE, SODIUM LACTATE AND CALCIUM CHLORIDE: 600; 310; 30; 20 INJECTION, SOLUTION INTRAVENOUS at 15:12

## 2019-05-07 RX ADMIN — PREGABALIN 100 MG: 100 CAPSULE ORAL at 08:46

## 2019-05-07 RX ADMIN — SUGAMMADEX 150 MG: 100 INJECTION, SOLUTION INTRAVENOUS at 18:59

## 2019-05-07 RX ADMIN — Medication 0.3 MG: at 20:06

## 2019-05-07 RX ADMIN — SENNOSIDES AND DOCUSATE SODIUM 1 TABLET: 8.6; 5 TABLET ORAL at 07:41

## 2019-05-07 RX ADMIN — PHENYLEPHRINE HYDROCHLORIDE 100 MCG: 10 INJECTION, SOLUTION INTRAMUSCULAR; INTRAVENOUS; SUBCUTANEOUS at 17:59

## 2019-05-07 RX ADMIN — Medication 0.5 MG: at 20:20

## 2019-05-07 RX ADMIN — PHENYLEPHRINE HYDROCHLORIDE 100 MCG: 10 INJECTION, SOLUTION INTRAMUSCULAR; INTRAVENOUS; SUBCUTANEOUS at 12:57

## 2019-05-07 RX ADMIN — PHENYLEPHRINE HYDROCHLORIDE 100 MCG: 10 INJECTION, SOLUTION INTRAMUSCULAR; INTRAVENOUS; SUBCUTANEOUS at 18:57

## 2019-05-07 RX ADMIN — PHENYLEPHRINE HYDROCHLORIDE 150 MCG: 10 INJECTION, SOLUTION INTRAMUSCULAR; INTRAVENOUS; SUBCUTANEOUS at 15:16

## 2019-05-07 RX ADMIN — PHENYLEPHRINE HYDROCHLORIDE 150 MCG: 10 INJECTION, SOLUTION INTRAMUSCULAR; INTRAVENOUS; SUBCUTANEOUS at 15:59

## 2019-05-07 RX ADMIN — PROPOFOL 140 MG: 10 INJECTION, EMULSION INTRAVENOUS at 12:02

## 2019-05-07 ASSESSMENT — ENCOUNTER SYMPTOMS
DYSURIA: 0
CHILLS: 0
NUMBNESS: 0
NAUSEA: 0
LIGHT-HEADEDNESS: 0
ABDOMINAL PAIN: 0
WEAKNESS: 0
UNEXPECTED WEIGHT CHANGE: 0
JOINT SWELLING: 0
DIAPHORESIS: 0
DYSPHORIC MOOD: 0
SHORTNESS OF BREATH: 0
TREMORS: 0
CONSTIPATION: 0
NERVOUS/ANXIOUS: 0
VOMITING: 0
HEADACHES: 0
FEVER: 0

## 2019-05-07 ASSESSMENT — ACTIVITIES OF DAILY LIVING (ADL)
ADLS_ACUITY_SCORE: 14

## 2019-05-07 NOTE — PLAN OF CARE
Pt A/Ox3. VSS at . Pain controlled by scheduled methadone, tylenol, Lyrica and PRN oxycodone and Robaxin. Chest tube intact at -20 pressure suction, total of 90ml serosanguineous drainage, connected to portable suction before transferring to . Voiding in bedside commode. Assist of 1. NPO. Denies nausea. Pt's two sister were at bedside and accompanied pt to . Called and reported to . Robbie wipes completed.

## 2019-05-07 NOTE — PROGRESS NOTES
Block procedure complete.  Patient responding appropriately.  Side rails x2 elevated.  Monitors and oxygen to remain on until transferred to OR.

## 2019-05-07 NOTE — ANESTHESIA PROCEDURE NOTES
Arterial Line Procedure Note  Staff:     Anesthesiologist:  Amy Rios MD  Location: In OR After Induction  Line Placement:     Procedure:  Arterial Line    Insertion Site:  Radial    Insertion laterality:  Right    Skin Prep: Chloraprep      Patient Prep: patient draped      Local skin infiltration:  None    Ultrasound Guided?: No      Catheter size:  3 Fr, 5 cm    Cath secured with: other (comment)      Dressing:  Tegaderm    Complications:  None obvious    Arterial waveform: Yes      IBP within 10% of NIBP: Yes

## 2019-05-07 NOTE — ANESTHESIA PROCEDURE NOTES
Epidural Procedure Note    Staff:     Anesthesiologist:  Hector Klein MD    Resident/CRNA:  Mo Pack MD    Procedure performed by resident/CRNA in the presence of a teaching physician    Location: Pre-op     Procedure start time:  5/7/2019 11:01 AM     Procedure end time:  5/7/2019 11:12 AM   Pre-procedure checklist:   patient identified, IV checked, site marked, risks and benefits discussed, informed consent, monitors and equipment checked, pre-op evaluation, at physician/surgeon's request and post-op pain management      Correct Patient: Yes      Correct Position: Yes      Correct Site: Yes      Correct Procedure: Yes      Correct Laterality:  Yes    Site Marked:  Yes  Procedure:     Procedure:  Epidural catheter    ASA:  3    Diagnosis:  Post operative pain control    Position:  Sitting    Sterile Prep: chloraprep, mask, sterile gloves and patient draped      Insertion site:  T5-6    Local skin infiltration:  1% lidocaine    amount (mL):  3    Approach:  Right paramedian    Needle gauge (G):  17    Needle Length (in):  3.5    Block Needle Type:  Donald    SCHUYLER at (cm):  3    Attempts:  1    Redirects:  0    Catheter gauge (G):  19    Catheter threaded easily: Yes      Threaded to cm at skin:  8    Paresthesias:  No    Aspiration negative for Heme or CSF: Yes      Test dose (mL):  3     Local anesthetic:  Lidocaine 1.5% w/ 1:200,000 epinephrine    Test dose time:  11:08    Test dose negative for signs of intravascular, subdural or intrathecal injection: Yes

## 2019-05-07 NOTE — PLAN OF CARE
/80 (BP Location: Left arm)   Pulse 103   Temp 98  F (36.7  C) (Axillary)   Resp 18   Wt 69.3 kg (152 lb 11.2 oz)   SpO2 96%   BMI 27.05 kg/m      [id number]  VSS on RA, no s/s distress. A/Ox4, agitated/angry and frustrated most of shift d/t pain medication regimen; cooperative with some cares. After pt was seen by jonny LUA and additional oxy was ordered, pt calmed and slept from 2000 on. Pain treated with prn oxy x2 and prn robaxin x1. Denied n/v--on regular diet with fair appetite. Klonopin ordered by psych and pt was very happy but was disgruntled that start time was scheduled for 2000, so klonopin retimed and given @1800 per her request. CT to -20cm H2O suction, patent with scant 5cc serosang output this shift. PIV SL--PIV flushes but pt states PIV hurts with abx infusion; attempted to slow rate of infusion but pt refused to have infusion on at all; MD paged and aware; unsure of PIV patency at this time and pt refusing another PIV placement (but this is okay per jonny LUA). Up SBAx1 to commode--voiding adequate amounts, no BMs this shift; refused bowel meds. Plan for thoracic sx tomorrow--OR time scheduled for 11am. Pt sleeping now, call light and belongings within reach. Will continue to monitor and continue POC/notify team as needed.

## 2019-05-07 NOTE — PROGRESS NOTES
"CLINICAL NUTRITION SERVICES - REASSESSMENT NOTE     Nutrition Prescription    RECOMMENDATIONS FOR MDs/PROVIDERS TO ORDER:  Recommend daily MVI with minerals    Recommend 1000 mcg vitamin B12 injection monthly or 500 mcg sublingual vitamin B12 daily d/t hx of partial gastrectomy (may be getting d/t elevated B12 level on 1/23/19 per care everywhere)    Malnutrition Status:    Severe malnutrition in the context of acute on chronic illness    Future/Additional Recommendations:  Encouraged small/frequent meals/snacks     EVALUATION OF THE PROGRESS TOWARD GOALS   Diet: NPO for procedure (previously on Regular)  + \"bowl of chicken salad at bedtime with crackers + skim milk\"    Intake: Intake is variable, recorded mainly as an average of  ~ 50% of meals.  Nursing reports fair intake, tolerating well.  Has been ordering 3 meals/day, ~2200 kcal and 130 grams of protein daily per ordering system.  Therefore, can estimate patient consuming ~0028-6648 kcal (60-70% of needs), 65 grams of protein daily (96% of needs).  Patient declined oral supplements during meeting with RD 1 week ago.       NEW FINDINGS   Meds: Miralax, Florastor BID, Protonix, Zofran PRN    Labs:  on 4/28, down to 190 on 5/6 (improving, still elevated), Albumin 1.3 (low, related to inflammation), Vitamin D 46 (normal) - 4/26/19    Weight: Weight down 2.5 kg (3.5%) from admission (~1 week).     MALNUTRITION  % Intake: </=75% for >/= 1 month (severe)  % Weight Loss: >2% in 1 week (severe)  Subcutaneous Fat Loss: None observed (per previous)  Muscle Loss: None observed (per previous)  Fluid Accumulation/Edema: None noted (per previous)  Malnutrition Diagnosis: Severe malnutrition in the context of acute on chronic illness    Previous Goals   Patient to consume % of nutritionally adequate meal trays TID, or the equivalent with supplements/snacks.  Evaluation: Not met    Previous Nutrition Diagnosis  Inadequate oral intake  Evaluation: No " change    CURRENT NUTRITION DIAGNOSIS  Inadequate oral intake related to acute illness, early satiety as evidenced by patient eating ~50% of meals and/or 60-70% of assessed needs on average based on RN intake records and [facility]    INTERVENTIONS  Implementation  Nutrition education for recommended modifications - unable to provide d/t patient in procedure.  Will re-attempt a visit when able.     Goals  Patient to consume % of nutritionally adequate meal trays TID, or the equivalent with supplements/snacks.    Monitoring/Evaluation  Progress toward goals will be monitored and evaluated per protocol.    Neris Bhatti MS, RD, LD  Pager [phone]

## 2019-05-07 NOTE — CONSULTS
"REGIONAL ANESTHESIA PAIN SERVICE NOTE  Reason for consult: \"nerve block prior to thoracic surgery decortication 5/7\" Thoracic Surgery Service agrees with request  Provider requesting consult: LUCAS Mooney MD     Demetra Richardson is a 63 year old female with PMH of Sjogrens, SLD, PSC, fibromyalgia, prior opioid use disorder that is managed on methadone maintenance who was admitted 4/28/2019 with progressive shortness of breath and found to have pneumonia with moderate left-sided loculated pleural effusion, s/p L) CT placement 4/29 and despite ongoing tPA, empyema continued.  Eval by Thoracic Surgery yesterday and plan is for decortication this AM.       Please refer to Anesthesia Procedure Note by LIU Pack MD for full details.  Preoperatively, epidural T5-6 catheter threaded to 8 cm at the skin without complication for postoperative pain management.      PLAN:  Epidural infusion bupivacaine 0.125% at 8 mL/hr  RAPS will continue to follow and adjust as needed    Megan Colon CNP  Regional Anesthesia Pain Service (RAPS)  May 7, 2019  11:43 AM    RAPS Contact Info (for in-house use only):  Job code ID: [id number]   [employer] 0599  Peds [phone]  [employer] phone: dial [phone], enter jobcode ID, then enter call-back number.    Text: Use [employer] on the Intranet <Paging/Directory> tab and enter Jobcode ID.   If no call back at any time, contact the hospital  and ask for RAPS attending or backup         "

## 2019-05-07 NOTE — PLAN OF CARE
Time: 7451-2183  Reason of admission: left sided loculated pleural effusion, now s/p chest tube in place.   Vitals: low 100s heart rate with activity, afebrile, and stable the rest vital signs.   Activity: SBA to bedside commode, slept well almost all night.   Pain: c/o left upper back chest tube area pain, treated with Oxycodone 10 mg and patient wants to have prn pain med when its due even when patient is asleep.   Neuro:  A&Ox4, anxious at times, cooperative with cares.   Cardiac: tachycardia, asymptomatic, denied chest pain.  Respiratory: on room air, denied SOB when at rest, but dyspnea with exertion sometimes per patient report, lung sound diminished to bases, chest tube in place.   GI/: abdomen soft and non tender. Denied nausea/vomiting. No bm this shift. Voiding spontaneously without difficult.   Diet: .NPO after midnight except meds.   Skin: no deficit   LDAs:Rt PIV sl, left chest tube -20 cm water seal, drained 175 ml seronsang for this shift.   Labs: perding from this am.  New change for this shift: no change, patient wanted to be awaken for pain med when due.   Plan: . NPO after midnight for left video assisted thoracoscopic decortication, possible thoracotomy, and flexible bronchoscopy.

## 2019-05-07 NOTE — BRIEF OP NOTE
[facility], [place]    Brief Operative Note    Pre-operative diagnosis: Left Empyema  Post-operative diagnosis Same  Procedure: Procedure(s):  Left Video Assisted Thoracoscopic Decortication, Intercostal Nerve Cryo Analgesia  Flexible Bronchoscopy  Surgeon: Surgeon(s) and Role:     * Patrice Regalado MD - Primary     * Amy Morales MD - Assisting  Anesthesia: General   Estimated blood loss: 1000 ml  UOP: 510 ml  Crystalloid: 2700 ml  Albumin: 500 ml  2U PRBCs  Drains:  28F chest tubes x3   #1 angled in base   #2 straight in apex posterior    #3 straight in apex anterior  Specimens:   ID Type Source Tests Collected by Time Destination   1 : left empyema Fluid Other ANAEROBIC BACTERIAL CULTURE, FLUID CULTURE AEROBIC BACTERIAL, FUNGUS CULTURE Patrice Regalado MD 5/7/2019  1:22 PM    2 : left empyema Tissue Other ANAEROBIC BACTERIAL CULTURE, FUNGUS CULTURE, TISSUE CULTURE AEROBIC BACTERIAL Patrice Regalado MD 5/7/2019  1:24 PM    A : Left Pleura Tissue Pleural/Thoracic Cavity SURGICAL PATHOLOGY EXAM Patrice Regalado MD 5/7/2019  4:54 PM      Findings:   Thick rind debrided from visceral and parietal pleura; multiple tears in lung during decortication. Cryoablation of intercostal nerves (rib spaces 3-9). Bronchoscopy with thick clot suctioned.  Complications: None.  Implants:  * No implants in log *     Plan:  - Epidural, Pain team following, pain management per primary team. Robaxin 500 TID ordered. Also ordered oxycodone 5-10 mg PO q3h PRN per Pain team note from 5/6/2019. No IV narcotic ordered as Pain team note did not recommend given patient has epidural and cryoablation of nerves was performed; defer to primary team.  - Chest tubes to suction for 72 hours, even when ambulating. Air leak present and expected.  - CXR in PACU and daily while chest tubes in place.  - Clear liquid diet for the night, likely advance as tolerated tomorrow.  - No IVFs. NO  IVF BOLUSES without discussion with Thoracic Surgery, as fluid overloading can cause lung swelling and prolonged air leak.  - Heparin subcutaneous (while epidural in place) tomorrow ordered for 12 PM.   - Labs in PACU and in AM.  - Cisneros in place for strict I&Os, typically remove POD#1.  - Remainder per primary; Thoracic Surgery will continue to follow.      Plan discussed with Dr. Muir. Primary team notified of above plan as well.  - - - - - - - - - - - - - - - - - -  Amy Morales MD  General Surgery PGY-3  See [facility] for on call information prior to paging me directly. Pager [phone].

## 2019-05-07 NOTE — PROGRESS NOTES
[facility], [facility] Progress Note    Main Plans for Today    -Thoracic surgery, decortication today, RAPS prior for pain block  -continue antibiotics    -Follow up thoracic/ pulm recs  -Pain control    Assessment & Plan   Demetra Richardson is a 63 year old female w/complicated PMHx, significant for SLE, PSC, Sjogren's, fibromyalgia, opiate dependence and polysubstance abuse with history of OD and withdrawal seizures, epidural abscess, bipolar disorder type II, currently on a methadone program, and is admitted for management of L pleural effusion now growing strep intermedius and persistent empyema despite chest tube placement.    # Pneumonia  # Loculated pleural effusion/ empyema  # Sepsis secondary to pneumonia  Most likely parapneumonic effusion, other differentials include effusion 2/2 rheumatologic disease given ?history SLE, sjogren's, primary sclerosing cholangitis. Initial labs significant for , WBC 26.8 - now downtrending. Fluid analysis with pleural to serum protein 4.7/6.7, LDH 1116, glucose 26, WBCs seen with no organisms in stain.  Fluid culture growing strep intermedius. She is s/p chest tube placement on 4/29 with lytic therapy until 5/6, concern for persistent empyema on recent CT scan on 5/5. Thoracic consulted 5/5 with concern for persistent empyema to perform decortication 5/7. CT showed new opacities in RUL, pro jazzy returned moderate risk 0.6.  RVP negative. Sputum not collected yet.   - Thoracic surgery to perform decortication 5/7, pain control as below, RAPS prior to procedure  - Pulmonology consulted, appreciate care  - Daily CXR  - Ceftriaxone started 5/2  (Stopped levo 750 mg (4/28-5/2) flagyl 500 mg tid (4/29- 5/2)  - Supplemental oxygen prn, goal >92%  - Continue incentive spirometry  - q2d CRP, daily CBC  - f/u chest tube output    # Thoracic lymphadenopathy  CT showed enlarged left lower cervical lymph node, prominent  left heel or soft tissue, possibly left hilar adenopathy, prominent left paratracheal lymph nodes.  DDX includes infectious versus malignant versus sarcoidosis.    - Pulm likely will not do bronchoscopy at this time      # Chronic pain  # Polysubstance use disorder  # History of overdose and withdrawal seizures  # Methadone maintenance therapy, active  # Fibromyalgia  # anxiety  Patient denies recent substance use, reports no alcohol use in last 7 years. She does admit to taking some xanax about 2 months ago (1/22/2019), and she was hospitalized with acute toxic encephalopathy, even intubated as she was having apneic pauses. Patient is on methadone and fills her prescriptions at [place]. Admission UDS negative. Pain and anxiety continue to be an issue for the patient, we have made several adjustments and pain/anxiety still not well controlled. Will consult psych and pain team for further recs.   - Withdrawal precautions, withdrawal monitoring    - Pain consult 5/1, appreciate recs;Re consult Pain Team 5/6, changed Tylenol to as needed, RAPs consulted for pain block 5/7 prior to procedure, can increase oxy 5 mg Q3H as needed, hold excedrin, continue other cares  - Psych consult, appreciate recs for anxiety, started clonopin and stop xanax, can increase lexapro outpatient  - PTA methadone dose, changed admin time to 5AM 5/5  - robaxin 500 mg po Q6h as needed   - Lidocaine ointment around chest tube as needed  - PTA pregabalin  - Heat/cold packs  - Avoid IV opiates  - Bowel regimen - miralax twice daily, refusing senna  - Per recs from pain team: Oxy 5 mg Q3h as needed after procedure, may require up to 10 mg Q3H as needed but not to use unless absolutely necessary    # Hypoalbuminemia  Low albumin 2.4 >1.9 >1.7. Likely due to acute infection, with chronic malnutrition contributing. Patient aware and reports poor diet at home - lots of frozen meals, fast foods, due to issues with ambulation. Lives alone, no help with  "food prep or grocery shopping.   - Nutrition consult  - Discuss outpatient services for meal/grocery delivery services- talked with [facility]     # SLE  # Sjogren's syndrome  # primary sclerosing cholangitis  Patient had recent serology, but has returned negative so far. She is not taking steroids, hydroxychloroquine or other immunosuppressants. Has ongoing history of symptoms and reports diagnosis of PSC 14 years ago, and notes chronic LFT abnormalities. Does report of dry mouth and dry eyes, and has recently tried medications for Sjogren's without much success. Also reports rash on her bilateral hands, but has no joint swelling, neurologic or acute psychiatric issues to suggest flares.  She was noted to have a protein creatinine ratio of 0.28, but denies other renal complications.   Alk phos elevation up to 500  and now elevated up to 1600  with elevated GGT, normal acetaminophen level consistent with PSC.   - Alk phos bone specific test elevated to 50 elevated but not significant enough compared to elevated GGT in the 1,000 so likely PSC. Did have recent fall but no documented fracture.   - TSH low normal, PTH normal, calcium is low (would expect elevated with MM), ionized calcium normal   - Corrected calcium with hypoalbuminemia on  is 9.2 (normal)   - consider outpatient endocrine consult for elevated bone specific alk phos if needed    #Bipolar disorder type II  Patient endorses recent loss of her father to atypical pneumonia, but considers her mood has been stable, denies suicidal ideation. Patient reports being on Risperidone in the past after a \"manic episode after a family member .\"   -PTA escitalopram, may increase to 20 mg as outpatient      # Mild aortic stenosis  Murmur noticed on exam, echocardiogram  showed LVEF, w/normal diastolic function, normal RV. Unlikely to be related to current symptoms.      # Migraines  # Fibromyalgia  Patient has long history of migraines with significant use " of excedrin - now prone to rebound headaches.  -PTA Excedrin Migraine -help pre procedure     # s/p Billroth II w/revision   # History of iron deficiency anemia  She has evidence of hypochromic RBC's without anemia. Patient has malabsorption secondary to probably surgery, receives IV iron as outpatient. On 1/23/19 she had low iron and saturation, normal ferritin, folate and B12.  - continue PTA pantoprazole  - IV iron replacement as outpatient     # Pain Assessment:  Current Pain Score 5/7/2019   Patient currently in pain? yes   Pain score (0-10) -   Pain location -   Pain descriptors Aching   Some encounter information is confidential and restricted. Go to Review Flowsheets activity to see all data.   - Demetra is experiencing pain due to pleural effusion. Pain management was discussed and the plan was created in a collaborative fashion.  Demetra's response to the current recommendations: engaged  - Please see the plan for pain management as documented above    Diet: Snacks/Supplements Adult: Other; bowl of chicken salad at bedtime with crackers + milk skim; Between Meals  NPO per Anesthesia Guidelines for Procedure/Surgery Except for: Meds  Fluids: PO  DVT Prophylaxis:  Lovenox, held today for procedure  Code Status: Full Code    Disposition Plan   Expected discharge:Expected Discharge Date: 05/09/19 2 - 3 days, recommended to transitional care unit once adequate pain management/ tolerating PO medications and antibiotic plan established.Dispo: Expected Discharge Date: 05/09/19      Entered: Wander Mooney 05/07/2019, 2:15 PM   Information in the above section will display in the discharge planner report.      The patient's care was discussed with the Attending Physician, Dr. dwyer.    Wander Mooney  [facility]: PGY-3  Pager: [phone]  Please see sticky note for cross cover information    Interval History  Patient had increased pain overnight- Given 3 5 mg  Oxy additional overnight due to worsening pain, 6 total. HDS on RA. Stopped ceftriaxone dose 5/6 overnight due to pain from IV site and patient refused?  50 ml out of chest tube yesterday.     Review of Systems   Constitutional: Negative for chills, diaphoresis, fever and unexpected weight change.   Respiratory: Negative for shortness of breath.    Cardiovascular: Negative for chest pain and leg swelling.   Gastrointestinal: Negative for abdominal pain, constipation, nausea and vomiting.   Genitourinary: Negative for dysuria.   Musculoskeletal: Negative for joint swelling.   Neurological: Negative for tremors, weakness, light-headedness, numbness and headaches.   Psychiatric/Behavioral: Negative for dysphoric mood. The patient is not nervous/anxious.      Physical Exam   Vital Signs: Temp: 99.1  F (37.3  C) Temp src: Axillary BP: 112/78   Heart Rate: 84 Resp: 22 SpO2: 95 % O2 Device: Nasal cannula Oxygen Delivery: 4 LPM  Weight: 150 lbs 9.6 oz  Physical Exam   Constitutional: She is oriented to person, place, and time. She appears well-developed and well-nourished. No distress.   HENT:   Head: Normocephalic and atraumatic.   Eyes: Conjunctivae are normal. No scleral icterus.   Cardiovascular: Normal rate and regular rhythm. Exam reveals no friction rub.   Murmur (systolic ejection) heard.  Pulmonary/Chest: Effort normal. No respiratory distress. She has no wheezes. She has no rales (LLL).   Diminished breath sounds in LLL but improved   Neurological: She is alert and oriented to person, place, and time.   Skin: Skin is warm and dry. She is not diaphoretic.   Psychiatric: She has a normal mood and affect.   Vitals reviewed.

## 2019-05-07 NOTE — CONSULTS
Patient was a re-consult in the same visit. Please see note from 5/6/19 at 10:40am, thank you.  Luz Villalta RPH on 5/7/2019 at 7:04 AM

## 2019-05-08 ENCOUNTER — APPOINTMENT (OUTPATIENT)
Dept: GENERAL RADIOLOGY | Facility: CLINIC | Age: 64
DRG: 853 | End: 2019-05-08
Payer: COMMERCIAL

## 2019-05-08 LAB
ALBUMIN SERPL-MCNC: 1.7 G/DL (ref 3.4–5)
ALP SERPL-CCNC: 685 U/L (ref 40–150)
ALT SERPL W P-5'-P-CCNC: 26 U/L (ref 0–50)
ANION GAP SERPL CALCULATED.3IONS-SCNC: 6 MMOL/L (ref 3–14)
AST SERPL W P-5'-P-CCNC: 60 U/L (ref 0–45)
BILIRUB SERPL-MCNC: 0.4 MG/DL (ref 0.2–1.3)
BUN SERPL-MCNC: 7 MG/DL (ref 7–30)
CALCIUM SERPL-MCNC: 7.9 MG/DL (ref 8.5–10.1)
CHLORIDE SERPL-SCNC: 101 MMOL/L (ref 94–109)
CO2 SERPL-SCNC: 28 MMOL/L (ref 20–32)
CREAT SERPL-MCNC: 0.59 MG/DL (ref 0.52–1.04)
ERYTHROCYTE [DISTWIDTH] IN BLOOD BY AUTOMATED COUNT: 15 % (ref 10–15)
GFR SERPL CREATININE-BSD FRML MDRD: >90 ML/MIN/{1.73_M2}
GLUCOSE SERPL-MCNC: 110 MG/DL (ref 70–99)
HCT VFR BLD AUTO: 24.4 % (ref 35–47)
HGB BLD-MCNC: 7.8 G/DL (ref 11.7–15.7)
MCH RBC QN AUTO: 28 PG (ref 26.5–33)
MCHC RBC AUTO-ENTMCNC: 32 G/DL (ref 31.5–36.5)
MCV RBC AUTO: 88 FL (ref 78–100)
PLATELET # BLD AUTO: 376 10E9/L (ref 150–450)
POTASSIUM SERPL-SCNC: 4.2 MMOL/L (ref 3.4–5.3)
PROT SERPL-MCNC: 5 G/DL (ref 6.8–8.8)
RBC # BLD AUTO: 2.79 10E12/L (ref 3.8–5.2)
SODIUM SERPL-SCNC: 136 MMOL/L (ref 133–144)
WBC # BLD AUTO: 16.4 10E9/L (ref 4–11)

## 2019-05-08 PROCEDURE — 25000132 ZZH RX MED GY IP 250 OP 250 PS 637: Performed by: SURGERY

## 2019-05-08 PROCEDURE — 71046 X-RAY EXAM CHEST 2 VIEWS: CPT

## 2019-05-08 PROCEDURE — 80053 COMPREHEN METABOLIC PANEL: CPT | Performed by: SURGERY

## 2019-05-08 PROCEDURE — 12000001 ZZH R&B MED SURG/OB UMMC

## 2019-05-08 PROCEDURE — 85027 COMPLETE CBC AUTOMATED: CPT | Performed by: SURGERY

## 2019-05-08 PROCEDURE — 25800030 ZZH RX IP 258 OP 636: Performed by: NURSE PRACTITIONER

## 2019-05-08 PROCEDURE — 25000132 ZZH RX MED GY IP 250 OP 250 PS 637: Performed by: STUDENT IN AN ORGANIZED HEALTH CARE EDUCATION/TRAINING PROGRAM

## 2019-05-08 PROCEDURE — 25000128 H RX IP 250 OP 636: Performed by: NURSE PRACTITIONER

## 2019-05-08 PROCEDURE — 25000128 H RX IP 250 OP 636: Performed by: SURGERY

## 2019-05-08 PROCEDURE — 99207 ZZC NO CHARGE FOLLOW UP PS: CPT

## 2019-05-08 RX ORDER — METHOCARBAMOL 500 MG/1
500 TABLET, FILM COATED ORAL EVERY 6 HOURS
Status: DISCONTINUED | OUTPATIENT
Start: 2019-05-08 | End: 2019-05-14 | Stop reason: HOSPADM

## 2019-05-08 RX ORDER — BUPIVACAINE HYDROCHLORIDE 5 MG/ML
1.3 INJECTION, SOLUTION EPIDURAL; INTRACAUDAL ONCE
Status: COMPLETED | OUTPATIENT
Start: 2019-05-08 | End: 2019-05-08

## 2019-05-08 RX ORDER — HEPARIN SODIUM 5000 [USP'U]/.5ML
5000 INJECTION, SOLUTION INTRAVENOUS; SUBCUTANEOUS EVERY 8 HOURS
Status: DISCONTINUED | OUTPATIENT
Start: 2019-05-08 | End: 2019-05-14 | Stop reason: HOSPADM

## 2019-05-08 RX ORDER — PREGABALIN 100 MG/1
100 CAPSULE ORAL 2 TIMES DAILY
Status: DISCONTINUED | OUTPATIENT
Start: 2019-05-08 | End: 2019-05-14 | Stop reason: HOSPADM

## 2019-05-08 RX ORDER — POLYETHYLENE GLYCOL 3350 17 G/17G
17 POWDER, FOR SOLUTION ORAL 2 TIMES DAILY
Status: DISCONTINUED | OUTPATIENT
Start: 2019-05-08 | End: 2019-05-14 | Stop reason: HOSPADM

## 2019-05-08 RX ORDER — PREGABALIN 75 MG/1
150 CAPSULE ORAL AT BEDTIME
Status: DISCONTINUED | OUTPATIENT
Start: 2019-05-08 | End: 2019-05-14 | Stop reason: HOSPADM

## 2019-05-08 RX ADMIN — OXYCODONE HYDROCHLORIDE 10 MG: 5 TABLET ORAL at 06:26

## 2019-05-08 RX ADMIN — Medication 0.5 MG: at 18:09

## 2019-05-08 RX ADMIN — METHADONE HYDROCHLORIDE 113 MG: 10 CONCENTRATE ORAL at 05:03

## 2019-05-08 RX ADMIN — OXYCODONE HYDROCHLORIDE 10 MG: 5 TABLET ORAL at 12:15

## 2019-05-08 RX ADMIN — METHOCARBAMOL 500 MG: 500 TABLET, FILM COATED ORAL at 23:01

## 2019-05-08 RX ADMIN — OXYCODONE HYDROCHLORIDE 10 MG: 5 TABLET ORAL at 23:01

## 2019-05-08 RX ADMIN — BUPIVACAINE HYDROCHLORIDE 6.5 MG: 5 INJECTION, SOLUTION EPIDURAL; INTRACAUDAL; PERINEURAL at 11:44

## 2019-05-08 RX ADMIN — PREGABALIN 100 MG: 100 CAPSULE ORAL at 09:23

## 2019-05-08 RX ADMIN — CEFTRIAXONE SODIUM 2 G: 2 INJECTION, POWDER, FOR SOLUTION INTRAMUSCULAR; INTRAVENOUS at 16:11

## 2019-05-08 RX ADMIN — PREGABALIN 150 MG: 75 CAPSULE ORAL at 19:58

## 2019-05-08 RX ADMIN — POLYETHYLENE GLYCOL 3350 17 G: 17 POWDER, FOR SOLUTION ORAL at 19:59

## 2019-05-08 RX ADMIN — BUPIVACAINE HYDROCHLORIDE: 7.5 INJECTION, SOLUTION EPIDURAL; RETROBULBAR at 20:14

## 2019-05-08 RX ADMIN — Medication 0.5 MG: at 06:26

## 2019-05-08 RX ADMIN — OXYCODONE HYDROCHLORIDE 10 MG: 5 TABLET ORAL at 19:58

## 2019-05-08 RX ADMIN — OXYCODONE HYDROCHLORIDE 10 MG: 5 TABLET ORAL at 02:17

## 2019-05-08 RX ADMIN — OXYCODONE HYDROCHLORIDE 10 MG: 5 TABLET ORAL at 16:40

## 2019-05-08 RX ADMIN — METHOCARBAMOL 500 MG: 500 TABLET, FILM COATED ORAL at 16:12

## 2019-05-08 RX ADMIN — Medication 250 MG: at 19:59

## 2019-05-08 RX ADMIN — PREGABALIN 100 MG: 100 CAPSULE ORAL at 14:15

## 2019-05-08 RX ADMIN — PANTOPRAZOLE SODIUM 40 MG: 40 TABLET, DELAYED RELEASE ORAL at 09:23

## 2019-05-08 RX ADMIN — ESCITALOPRAM 10 MG: 5 TABLET, FILM COATED ORAL at 09:23

## 2019-05-08 RX ADMIN — METHOCARBAMOL 500 MG: 500 TABLET, FILM COATED ORAL at 09:23

## 2019-05-08 RX ADMIN — SENNOSIDES AND DOCUSATE SODIUM 2 TABLET: 8.6; 5 TABLET ORAL at 19:58

## 2019-05-08 RX ADMIN — POLYETHYLENE GLYCOL 3350 17 G: 17 POWDER, FOR SOLUTION ORAL at 09:23

## 2019-05-08 ASSESSMENT — ENCOUNTER SYMPTOMS
WEAKNESS: 0
VOMITING: 0
HEADACHES: 0
CONSTIPATION: 0
SHORTNESS OF BREATH: 0
DIAPHORESIS: 0
NERVOUS/ANXIOUS: 0
DYSPHORIC MOOD: 0
UNEXPECTED WEIGHT CHANGE: 0
TREMORS: 0
LIGHT-HEADEDNESS: 0
NAUSEA: 0
NUMBNESS: 0
DYSURIA: 0
ABDOMINAL PAIN: 0
FEVER: 0
CHILLS: 0
JOINT SWELLING: 0

## 2019-05-08 ASSESSMENT — ACTIVITIES OF DAILY LIVING (ADL)
ADLS_ACUITY_SCORE: 15
ADLS_ACUITY_SCORE: 15
ADLS_ACUITY_SCORE: 17
ADLS_ACUITY_SCORE: 17
ADLS_ACUITY_SCORE: 15
ADLS_ACUITY_SCORE: 17

## 2019-05-08 ASSESSMENT — PAIN DESCRIPTION - DESCRIPTORS
DESCRIPTORS: ACHING
DESCRIPTORS: ACHING

## 2019-05-08 NOTE — PROGRESS NOTES
Admitted/transferred from: PACU  Reason for admission/transfer: Overflow patient for 6B needing Tele monitoring  Patient status upon admission/transfer: Alert, c/o having some pain. 3 chest tubes sites on the left side slight oozing on one site otherwise dressing are clean and intact. An incisional site on the left side that is sutured and with a derma-bond seal looks clean, dry an intact. On 3L oxygen and sats.90%  Interventions: monitor hemodynamics overnight and ETCO's. Oxycodone 10mg PRN given for orally. Epidural running 8mg/hr site with small drainage but intact.  Plan: Monitor patient for status changes and notify MD.  2 RN skin assessment: completed by Kiley HASSAN and Sol HOLLOWAY  Result of skin assessment and interventions/actions: incisional sites on the left look clean and intact. Some bruising and PIV access sites.  Height, weight, drug calc weight: done  Patient belongings (see Flowsheet - Adult Profile for details): with patient at bedside  MDRO education (if applicable): n/a

## 2019-05-08 NOTE — PROGRESS NOTES
[facility], [facility] Progress Note    Main Plans for Today    -continue antibiotics   -Follow up thoracic recs  -Follow up pain team recs, pain control    Assessment & Plan   Demetra Richardson is a 63 year old female w/complicated PMHx, significant for SLE, PSC, Sjogren's, fibromyalgia, opiate dependence and polysubstance abuse with history of OD and withdrawal seizures, epidural abscess, bipolar disorder type II, currently on a methadone program, and is admitted for management of L pleural effusion now growing strep intermedius and persistent empyema despite chest tube placement. Now POD#1 from left decortication and bronchoscopy by thoracic surgery.     # Loculated pleural effusion/ empyema  # Sepsis secondary to empyema  Most likely parapneumonic effusion, other differentials include effusion 2/2 rheumatologic disease given ?history SLE, sjogren's, primary sclerosing cholangitis. Initial labs significant for , WBC 26.8 - now downtrending. Fluid analysis with pleural to serum protein 4.7/6.7, LDH 1116, glucose 26, WBCs seen with no organisms in stain.  Fluid culture growing strep intermedius.   She is s/p chest tube placement on 4/29 with lytic therapy until 5/6, concern for persistent empyema on recent CT scan on 5/5. Thoracic consulted 5/5 with concern for persistent empyema to perform decortication 5/7. CT showed new opacities in RUL, pro jzazy returned moderate risk 0.6.  RVP negative. Sputum not collected yet.   POD #1 from decortication 5/7, pain control as below, RAPS prior to procedure  - Pulmonology consulted, appreciate care  - Daily CXR  - Ceftriaxone started 5/2  (Stopped levo 750 mg (4/28-5/2) flagyl 500 mg tid (4/29- 5/2)  - Supplemental oxygen prn, goal >92%  - Continue incentive spirometry  - q2d CRP, daily CBC  - f/u chest tube output    # Thoracic lymphadenopathy  CT showed enlarged left lower cervical lymph node, prominent left heel  or soft tissue, possibly left hilar adenopathy, prominent left paratracheal lymph nodes.  DDX includes infectious versus malignant versus sarcoidosis.    - Pulm likely will not do bronchoscopy at this time      # Chronic pain  # Polysubstance use disorder  # History of overdose and withdrawal seizures  # Methadone maintenance therapy, active  # Fibromyalgia  # anxiety  Patient denies recent substance use, reports no alcohol use in last 7 years. She does admit to taking some xanax about 2 months ago (1/22/2019), and she was hospitalized with acute toxic encephalopathy, even intubated as she was having apneic pauses. Patient is on methadone and fills her prescriptions at [place]. Admission UDS negative. Pain and anxiety continue to be an issue for the patient, we have made several adjustments and pain/anxiety still not well controlled. Will consult psych and pain team for further recs.   - Withdrawal precautions, withdrawal monitoring    - Pain consult 5/1, appreciate recs;Re consult Pain Team 5/6, changed Tylenol to as needed, RAPs consulted for pain block 5/7 prior to procedure, can increase oxy 5 mg Q3H as needed, hold excedrin, continue other cares  - Psych consult, appreciate recs for anxiety, started clonopin and stop xanax, can increase lexapro outpatient  - PTA methadone dose, changed admin time to 5AM 5/5  - robaxin 500 mg po Q6h as needed   - Lidocaine ointment around chest tube as needed  - PTA pregabalin  - Heat/cold packs  - Avoid IV opiates  - Bowel regimen - miralax twice daily, refusing senna  - Per recs from pain team: Oxy 5 mg Q3h as needed after procedure, may require up to 10 mg Q3H as needed but not to use unless absolutely necessary    # Hypoalbuminemia  Low albumin 2.4 >1.9 >1.7. Likely due to acute infection, with chronic malnutrition contributing. Patient aware and reports poor diet at home - lots of frozen meals, fast foods, due to issues with ambulation. Lives alone, no help with food prep  "or grocery shopping.   - Nutrition consult  - Discuss outpatient services for meal/grocery delivery services- talked with [facility]     # SLE  # Sjogren's syndrome  # primary sclerosing cholangitis  Patient had recent serology, but has returned negative so far. She is not taking steroids, hydroxychloroquine or other immunosuppressants. Has ongoing history of symptoms and reports diagnosis of PSC 14 years ago, and notes chronic LFT abnormalities. Does report of dry mouth and dry eyes, and has recently tried medications for Sjogren's without much success. Also reports rash on her bilateral hands, but has no joint swelling, neurologic or acute psychiatric issues to suggest flares.  She was noted to have a protein creatinine ratio of 0.28, but denies other renal complications.   Alk phos elevation up to 500 5 and now elevated up to 1600  with elevated GGT, normal acetaminophen level consistent with PSC.   - Alk phos bone specific test elevated to 50 elevated but not significant enough compared to elevated GGT in the 1,000 so likely PSC. Did have recent fall but no documented fracture.   - TSH low normal, PTH normal, calcium is low (would expect elevated with MM), ionized calcium normal   - Corrected calcium with hypoalbuminemia on  is 9.2 (normal)   - consider outpatient endocrine consult for elevated bone specific alk phos if needed but suspect elevated alk phos due to PSC with elevated GGT.     #Bipolar disorder type II  Patient endorses recent loss of her father to atypical pneumonia, but considers her mood has been stable, denies suicidal ideation. Patient reports being on Risperidone in the past after a \"manic episode after a family member .\"   -PTA escitalopram, may increase to 20 mg as outpatient      # Mild aortic stenosis  Murmur noticed on exam, echocardiogram  showed LVEF, w/normal diastolic function, normal RV. Unlikely to be related to current symptoms.      # Migraines  # Fibromyalgia  Patient " has long history of migraines with significant use of excedrin - now prone to rebound headaches.  -PTA Excedrin Migraine -help pre procedure     # s/p Billroth II w/revision   # History of iron deficiency anemia  She has evidence of hypochromic RBC's without anemia. Patient has malabsorption secondary to probably surgery, receives IV iron as outpatient. On 1/23/19 she had low iron and saturation, normal ferritin, folate and B12.  - continue PTA pantoprazole  - IV iron replacement as outpatient     # Pain Assessment:  Current Pain Score 5/8/2019   Patient currently in pain? yes   Pain score (0-10) -   Pain location -   Pain descriptors Aching   Some encounter information is confidential and restricted. Go to Review Flowsheets activity to see all data.   - Demetra is experiencing pain due to pleural effusion. Pain management was discussed and the plan was created in a collaborative fashion.  Demetra's response to the current recommendations: engaged  - Please see the plan for pain management as documented above    Diet: Snacks/Supplements Adult: Other; bowl of chicken salad at bedtime with crackers + milk skim; Between Meals  Snacks/Supplements Adult: Boost Plus; With Meals  Clear Liquid Diet  Fluids: PO  DVT Prophylaxis:  Lovenox, will discuss with thoracic surg when appropriate  Code Status: Full Code    Disposition Plan   Expected discharge:Expected Discharge Date: 05/09/19 2 - 3 days, recommended to transitional care unit once adequate pain management/ tolerating PO medications and antibiotic plan established.Dispo: Expected Discharge Date: 05/09/19      Entered: Wander Mooney 05/08/2019, 8:39 AM   Information in the above section will display in the discharge planner report.      The patient's care was discussed with the Attending Physician, Dr. dwyer.    Wander Mooney  [facility]  Pager: [phone]  Please see sticky note for cross cover  information    Interval History  NAEO. Recovering from post op. HDS. Pain currently controlled.   425 ml out of chest tubes yesterday. Net positive 590 yesterday.     Review of Systems   Constitutional: Negative for chills, diaphoresis, fever and unexpected weight change.   Respiratory: Negative for shortness of breath.    Cardiovascular: Negative for chest pain and leg swelling.   Gastrointestinal: Negative for abdominal pain, constipation, nausea and vomiting.   Genitourinary: Negative for dysuria.   Musculoskeletal: Negative for joint swelling.   Neurological: Negative for tremors, weakness, light-headedness, numbness and headaches.   Psychiatric/Behavioral: Negative for dysphoric mood. The patient is not nervous/anxious.      Physical Exam   Vital Signs: Temp: 97.9  F (36.6  C) Temp src: Oral BP: 116/75 Pulse: 100 Heart Rate: 90 Resp: 11 SpO2: 97 % O2 Device: Nasal cannula Oxygen Delivery: 2 LPM  Weight: 152 lbs 1.88 oz  Physical Exam   Constitutional: She is oriented to person, place, and time. She appears well-developed and well-nourished. No distress.   HENT:   Head: Normocephalic and atraumatic.   Eyes: Conjunctivae are normal. No scleral icterus.   Cardiovascular: Normal rate and regular rhythm. Exam reveals no friction rub.   Murmur (systolic ejection) heard.  Pulmonary/Chest: Effort normal. No respiratory distress. She has no wheezes. She has no rales (LLL).   Rales heard in LLL  3 chest tubes in place, draining serosang fluid   Neurological: She is alert and oriented to person, place, and time.   Skin: Skin is warm and dry. She is not diaphoretic.   Psychiatric: She has a normal mood and affect.   Vitals reviewed.

## 2019-05-08 NOTE — PROGRESS NOTES
Post-Operative check: 11:06 PM 5/7/2019   Name: Demetra Richardson 63 year old female ID: [id number]      POD#0 s/p Left Video Assisted Thoracoscopic Decortication, Intercostal Nerve Cryo Analgesia, Flexible Bronchoscopy, Flexible Bronchoscopy    S: Pt is alert and oriented, pt states that pain is adequately controlled. Tolerating sips of clears with medications. No nausea/vomiting/chest pain/shortness of breath.     O:  /75   Pulse 100   Temp (P) 100.2  F (37.9  C) (Axillary)   Resp 24   Wt 68.3 kg (150 lb 9.6 oz)   SpO2 100%   BMI 26.68 kg/m    GEN: Pleasant female Alert and oriented, appropriate responses to questions, NAD  CV: Non cyanotic   PULM: Non labored breathing on 3L Oxymask. Left anterior and posterior chest tube connected together with Y adaptor with serosanguinous output with airleak present. Base chest tube with serosanguinous output without airleak present. All chest tubes to suction  Abdomen: Soft, NT, ND   Ext: Warm and well perfused   Incision: c/d/i    A/P: 63 year old female who is s/p Left Video Assisted Thoracoscopic Decortication, Intercostal Nerve Cryo Analgesia, Flexible Bronchoscopy, Flexible Bronchoscopy. Recovering well following surgery. No acute post-op issues. Continue plan of care per primary team. Please call with any questions.      Alexandr Toro MD on 5/7/2019   PGY-1, General Surgery

## 2019-05-08 NOTE — OP NOTE
Procedure Date: 05/07/2019      DATE OF OPERATION:  05/07/2019      PREOPERATIVE DIAGNOSES:   1.  Left empyema.   2.  Trapped lung.   3.  History of methadone use.      POSTOPERATIVE DIAGNOSES:   1.  Left empyema.   2.  Trapped lung.   3.  History of methadone use.      PROCEDURES PERFORMED:   1.  Left VATS pulmonary decortication.   2.  Extensive pneumolysis.   3.  Evacuation of empyema.   4.  Complete parietal pleurectomy.   5.  Flexible bronchoscopy.   6.  Intercostal nerve block cryoanalgesia from intercostal space, levels 3 through 9.      ANESTHESIA:  General endotracheal anesthesia with a double-lumen endotracheal tube.      SURGEON:  Patrice Patel MD      ASSISTANT:  Amy Morales, General Surgery Resident.        SPECIMENS:     1.  Left parietal pleura for permanent.   2.  Left pleural fluid for Gram stain and culture.      DRAINS:     1.  A 28-Tunisian straight Lincoln tube, directed anteroapical (posterior on the skin).     2.  A #28-Tunisian straight Lincoln tube, directed posterior apical (middle on skin).   3.  A 28-Tunisian angled Lincoln tube, directed posterior and inferior (anterior on skin).      IMPLANTS:  None.      OPERATIVE FINDINGS:  The patient had severe, acute inflammatory changes of the entire left chest wall, worse on the left lower lobe.  The patient had a very thick rind on the visceral pleura, mainly affecting the interlobar fissure, as well as the left lower lobe.  The phrenic nerve was identified and preserved throughout the case.  We performed an intercostal nerve block cryoanalgesia from levels 3 through 9.  Hemostasis was confirmed.  Then, we placed 3 pleural tubes.  We did encounter several tears on the visceral pleura, mainly on the left lower lobe.      DESCRIPTION OF PROCEDURE IN DETAIL:  The patient was taken to the operating room, placed in a supine position.  All pressure points were adequately padded.  General endotracheal anesthesia was induced.  A double-lumen tube was  placed and confirmed with bronchoscopy.  She was then switched to the right lateral decubitus position.  The left chest was prepped with ChloraPrep and draped in a normal sterile fashion.  A formal timeout was carried out, confirming the name of the patient and correct procedure.  She had SCDs in place and functioning prior to induction of anesthesia.  She received antibiotics within 30 minutes of incision.      After the timeout was completed, we started by placing a 12 mm incision in the 8th intercostal space posterior axillary line.  We used a utility incision in the 5th intercostal space posterior axillary line.  We placed an Elpidio wound protector.  Initial inspection revealed severe pulmonary adhesions from the left lower lobe and left upper lobe to the chest wall.  She had an acute inflammatory process; however, she did have a very thick rind on the visceral pleura.  This made the dissection extremely challenging.  We spent at least 2 or 3 hours resecting the visceral pleura.  By doing this, we did encounter several tears of the visceral pleura, mainly in the left lower lobe.  Next, a complete parietal pleurectomy was performed.  Hemostasis was confirmed using the Aquamantys device.  Next, after an intercostal nerve cryoanalgesia was made from intercostal nerves 3 through 9.  Finally, we tested lung expansion, which was fairly adequate.  We placed 3 pleural tubes; 2 directed anteroapical and posterior apical, and 1 posterior inferior.  The wound was closed with multiple layers using absorbable suture.  The patient tolerated the procedure well.      She did require 2 units of packed red blood cells.  She made approximately 500 mL of urine output throughout the case.  All instruments and sponge counts were correct at the end of the case.  The patient was then extubated and transferred to PACU for recovery.         MAGED FONSECA MD             D: 05/07/2019   T: 05/07/2019   MT: [id number]      Name:      RUFINO CURTISRICIA   MRN:      [id number]        Account:        [id number]   :      1955           Procedure Date: 2019      Document: [id number]

## 2019-05-08 NOTE — ANESTHESIA CARE TRANSFER NOTE
Patient: Demetra Richardson    Procedure(s):  Left Video Assisted Thoracoscopic Decortication, Intercostal Nerve Cryo Analgesia, Flexible Bronchoscopy  Flexible Bronchoscopy    Diagnosis: Left Empyema  Diagnosis Additional Information: No value filed.    Anesthesia Type:   No value filed.     Note:  Airway :Face Mask  Patient transferred to:PACU  Comments: VSS. Breathing spont. Report to RN at bedside.Handoff Report: Identifed the Patient, Identified the Reponsible Provider, Reviewed the pertinent medical history, Discussed the surgical course, Reviewed Intra-OP anesthesia mangement and issues during anesthesia, Set expectations for post-procedure period and Allowed opportunity for questions and acknowledgement of understanding      Vitals: (Last set prior to Anesthesia Care Transfer)    CRNA VITALS  5/7/2019 1849 - 5/7/2019 1919      5/7/2019             Resp Rate (observed):  3  (Abnormal)                 Electronically Signed By: VINOD Harris CRNA  May 7, 2019  7:19 PM

## 2019-05-08 NOTE — ANESTHESIA POSTPROCEDURE EVALUATION
Anesthesia POST Procedure Evaluation    Patient: Demetra Richardson   MRN:     [id number] Gender:   female   Age:    63 year old :      1955        Preoperative Diagnosis: Left Empyema   Procedure(s):  Left Video Assisted Thoracoscopic Decortication, Intercostal Nerve Cryo Analgesia, Flexible Bronchoscopy  Flexible Bronchoscopy   Postop Comments: No value filed.       Anesthesia Type:  General  General, Peripheral Nerve Block for post-op pain at surgeon's request    Reportable Event: NO     PAIN: Uncomplicated   Sign Out status: Comfortable, Well controlled pain     PONV: No PONV   Sign Out status:  No Nausea or Vomiting     Neuro/Psych: Uneventful perioperative course   Sign Out Status: Preoperative baseline; Age appropriate mentation     Airway/Resp.: Uneventful perioperative course   Sign Out Status: Non labored breathing, age appropriate RR; Resp. Status within EXPECTED Parameters     CV: Uneventful perioperative course   Sign Out status: Appropriate BP and perfusion indices; Appropriate HR/Rhythm     Disposition:   Sign Out in:  PACU  Disposition:  Floor  Recovery Course: Uneventful  Follow-Up: Not required           Last Anesthesia Record Vitals:  CRNA VITALS  2019 1858 - 2019 1958      2019             EKG:  Sinus rhythm          Last PACU Vitals:  Vitals Value Taken Time   /80 2019  7:20 PM   Temp 37.4  C (99.4  F) 2019  8:00 PM   Pulse 105 2019  7:20 PM   Resp 18 2019  8:00 PM   SpO2 97 % 2019  8:38 PM   Temp src     NIBP     Pulse     SpO2     Resp     Temp     Ht Rate     Temp 2     Vitals shown include unvalidated device data.      Electronically Signed By: Roxi Gustafson MD, May 7, 2019, 8:40 PM

## 2019-05-08 NOTE — PROGRESS NOTES
REGIONAL ANESTHESIA PAIN SERVICE EPIDURAL NOTE  Demetra Richardson is a 63 year old female POD #1 s/p Left Video Assisted Thoracoscopic Decortication, Intercostal Nerve Cryo Analgesia, Flexible Bronchoscopy, Flexible Bronchoscopy and placement of T5-6 epidural catheter for postoperative pain management.    SUBJECTIVE  Interval History: Overnight no acute events. Patient reports adequate pain control with epidural infusion and current analgesic medications (see below). Patient has history of chronic pain from fibromyalgia. Reports pain mainly left shoulder and back. No weakness, paresthesias, circumoral numbness, metallic taste or tinnitus.  Patient not ambulating. Currently tolerating a regular diet, no nausea or vomiting, voiding without difficulty.     Clinically Aligned Pain Assessment (CAPA):  Comfort (How is your pain?): Tolerable with discomfort  Change in Pain (Since your last medication/intervention?): Getting better  Pain Control (How are your pain treatments working?):  Partially effective pain control  Functioning (Are you able to do activities to get better?) : Can do most things, but pain gets in the way of some   Sleep (Does your pain management allow you to sleep or rest?): Awake with occasional pain     Pain Intensity using Numerical Rating Scale (NRS):  7-8/10  Antithrombotic/Thrombolytic Therapy ordered:    5,000 Units, SC, Q8H             Analgesic Medications:  Medications related to Pain Management (From now, onward)    Start     Dose/Rate Route Frequency Ordered Stop    05/08/19 0800  polyethylene glycol (MIRALAX/GLYCOLAX) Packet 17 g      17 g Oral 2 TIMES DAILY 05/08/19 0706      05/08/19 0800  methocarbamol (ROBAXIN) tablet 500 mg      500 mg Oral 3 TIMES DAILY 05/07/19 2216 05/07/19 2230  senna-docusate (SENOKOT-S/PERICOLACE) 8.6-50 MG per tablet 2 tablet      2 tablet Oral 2 TIMES DAILY 05/07/19 2216 05/07/19 2216  oxyCODONE (ROXICODONE) tablet 5-10 mg      5-10 mg Oral EVERY 3  HOURS PRN 05/07/19 2216      05/07/19 1115  bupivacaine (MARCAINE) 0.125 % in sodium chloride 0.9 % 250 mL EPIDURAL Infusion      8 mL/hr  EPIDURAL CONTINUOUS 05/07/19 1101      05/06/19 1841  clonazePAM (klonoPIN) tablet 0.5 mg      0.5 mg Oral 2 TIMES DAILY 05/06/19 1601      05/06/19 1430  acetaminophen (TYLENOL) tablet 650 mg      650 mg Oral EVERY 6 HOURS PRN 05/06/19 1415      05/06/19 1430  [Rx hold ]  aspirin-acetaminophen-caffeine (EXCEDRIN MIGRAINE) per tablet 2 tablet     (Rx hold  since Mon 5/6/2019 at 1447 by Erica Casarez, [facility].)    2 tablet Oral HOLD 05/06/19 1415      05/06/19 0500  methadone (DOLOPHINE-INTENSOL) 10 MG/ML (HIGH CONC) solution 113 mg      113 mg Oral DAILY 05/05/19 1201      05/01/19 1416  lidocaine (XYLOCAINE) 5 % ointment       Topical EVERY 6 HOURS PRN 05/01/19 1417      04/29/19 0800  pregabalin (LYRICA) capsule 100 mg      100 mg Oral 3 TIMES DAILY 04/29/19 0127      04/29/19 0453  lidocaine 1 % 0.1-1 mL      0.1-1 mL Other EVERY 1 HOUR PRN 04/29/19 0453      04/29/19 0453  lidocaine (LMX4) cream       Topical EVERY 1 HOUR PRN 04/29/19 0453             OBJECTIVE  Lab Results:   Recent Labs   Lab Test 05/08/19  0440   WBC 16.4*   RBC 2.79*   HGB 7.8*   HCT 24.4*   MCV 88   MCH 28.0   MCHC 32.0   RDW 15.0          Lab Results   Component Value Date    INR 1.17 05/07/2019    INR 1.38 04/29/2019    INR 1.13 04/28/2019       Vitals:    Temp:  [36.6  C (97.8  F)-38.6  C (101.4  F)] 36.6  C (97.8  F)  Pulse:  [100-105] 100  Heart Rate:  [] 93  Resp:  [9-31] 14  BP: (116-125)/(75-80) 116/75  MAP:  [72 mmHg-94 mmHg] 78 mmHg  Arterial Line BP: ()/(54-74) 103/60  SpO2:  [95 %-100 %] 96 %  /75   Pulse 100   Temp 36.6  C (97.8  F) (Oral)   Resp 14   Wt 69 kg (152 lb 1.9 oz)   SpO2 96%   BMI 26.95 kg/m         Exam:   GEN: alert, no distress at rest and mild distress with repositioning  NEURO/MSK: Strength BLE 5/5  and overall symmetric  SKIN: Epidural  catheter site with dressing c/d/i, no tenderness, erythema, heme, edema          ASSESSMENT/PLAN:    Patient is receiving adequate analgesia with current multimodal therapy including T5-6 epidural catheter infusion of bupivacaine 0.125% at 8mL/hr.  Motor function intact and adequate sensory block, meeting activity goals.  No evidence of adverse side effects related to local anesthetic. Voiding without difficulty.      - continue current epidural infusion bupivacaine 0.125% at 8mL/hour, POD #1  - antithrombotic/thrombolytic therapy 5,000 Units, SC, Q8H ordered. Please contact RAPS (#[phone]) prior to any medication changes    - will continue to follow and adjust as needed    - discussed plan with attending anesthesiologist    Arian Guerra MD  Regional Anesthesia Pain Service  5/8/2019 12:04 PM    RAPS Contact Info (24 hour job code pager is the last 4 digits) For in-house use only:   YoungCurrent phone: [place] [phone], [place] [phone], [facility] [phone], then enter call-back number.    Text: Use [employer] on the Intranet <Paging/Directory> tab and enter Jobcode ID.   If no call back at any time, contact the hospital  and ask for RAPS attending or backup

## 2019-05-08 NOTE — PROGRESS NOTES
THORACIC & FOREGUT SURGERY PROGRESS NOTE  05/08/2019    Patient: Demetra Richardson  MRN: [id number]    Subjective/Interval Events  No acute overnight events. Febrile as expected after decortication; defervesced appropriately. Doing well since surgery, reports pain is controlled and using oxycodone intermittently. Hasn't been out of bed yet, but plans to walk today multiple times. Urine output adequate, tolerating clears, no n/v.       Objective  Temp:  [97.8  F (36.6  C)-101.4  F (38.6  C)] 97.9  F (36.6  C)  Pulse:  [100-105] 100  Heart Rate:  [] 90  Resp:  [9-31] 11  BP: (112-125)/(75-80) 116/75  MAP:  [72 mmHg-94 mmHg] 82 mmHg  Arterial Line BP: ()/(54-74) 110/62  SpO2:  [95 %-100 %] 97 %    General: AAOx4, NAD, lying comfortably in bed  CV: regular rate, warm, well-perfused  Pulm: no dyspnea, breathing comfortably on NC, left chest tubes x3 with serosanguinous output (apical tubes Y-ed to Atrium to moderate air leak, basilar Atrium with no air leak)  Abd: soft, non-distended, non-tender  Extremities: no edema  Neuro: moving all extremities spontaneously without apparent deficit    I/O last 3 completed shifts:  In: 3940 [P.O.:90; I.V.:2500]  Out: 3427 [Urine:1725; Blood:1300; Chest Tube:402]    Labs:  Recent Labs   Lab 05/08/19 0440 05/07/19 1940 05/07/19 1652 05/07/19 0616   WBC 16.4* 18.5*  --   --  12.0*   HGB 7.8* 8.9* 10.1*   < > 9.6*    389  --   --  439    < > = values in this interval not displayed.     Recent Labs   Lab 05/08/19 0440 05/07/19 1940 05/07/19 1652 05/07/19 0616  05/04/19  0727 05/03/19  0633    135 132*   < > 135   < > 134 136   POTASSIUM 4.2 4.6 4.5   < > 4.7   < > 4.4 4.6   CHLORIDE 101 102  --   --  102   < > 102 104   CO2 28 28  --   --  31   < > 27 25   BUN 7 8  --   --  8   < > 16 15   CR 0.59 0.61  --   --  0.66   < > 0.68 0.62   * 132* 140*   < > 104*   < > 110* 119*   RAFAELA 7.9* 7.9*  --   --  8.2*   < > 8.0* 8.1*   MAG  --  1.7  --   --    --   --   --   --    PHOS  --  5.0*  --   --   --   --  3.4 3.8    < > = values in this interval not displayed.       Imaging:  CXR pending      Assessment & Plan  Demetra Richardson is a 63 year old female with a h/o Sjogren's, SLD, PSC, fibromyalgia, and prior opiate abuse on methadone who was admitted 4/28/2019 with progressive shortness of breath and was found to have pneumonia with a left-sided loculated pleural effusion. A chest tube was placed (4/29/2019) and she was started on antibiotics. Chest tube had been draining however due to presence of loculations she underwent tPA lytics via the chest tube x3 days. Repeat CT scan demonstrating persistent effusion and possible new right sided consolidation. She is now s/p LEFT VATS decortication on 5/7/2019 with Dr. Muir.     - Chest tubes to suction (even when ambulating) for 72 hours postop.  - Daily CXR while chest tubes in place.  - Ambulate, PT/OT, aggressive pulmonary toilet with IS/Acapella (ordered).  - Ok to advance diet as tolerating and discontinue ascencio today.  - No IVFs. Please no IVFs/boluses without discussion with Thoracic Surgery, as fluid overloading can cause lung swelling and prolonged air leak.  - Hgb downtrending as expected due to acute blood loss intraop; recommend recheck in AM.  - DVT ppx  - Pain control per primary team and Pain team consultants, appreciate recs.  - Antibiotics and remainder of cares per primary team, appreciate cares.      Patient seen and discussed with fellow and staff.  - - - - - - - - - - - - - - - - - -  Amy Morales MD  General Surgery PGY-3  See [facility] for on call information prior to paging me directly. Pager [phone].

## 2019-05-08 NOTE — PROGRESS NOTES
Patient: Demetra Richardson  Date of Service: May 8, 2019 Admission Date:4/28/2019   1 Day Post-Op     Chief Pain Endorsement:  Left shoulder pain    Recommendations were discussed and relayed to Dr. Wander Mooney ([facility])  Plan was reviewed by the Inpatient Pain Service and staff attending, Dr. Rodrick Christopher      1. Continue oxycodone 5-10mg po q3h prn moderate to severe pain.  2. Continue methadone solution 113mg po daily.  This is a PTA medication.  3. Increase methocarbamol to 500mg po q6h scheduled for muscle spasm.  4. Increase pregabalin to [id number].  5. Discontinue lidocaine ointment while epidural is in place.  6. Continue methylsalicylate - menthol ointment ([place]) q6h prn to left shoulder.  7. Bowel regimen per primary team to prevent opioid induced constipation.  8. Bupivacaine 0.125% epidural running at 8 ml/hr, managed by RAPS team.    Pain Service will Continue to follow at this time.     Thank you for consulting the Inpatient Pain Management Service. The above recommendations are to be acted upon at the primary team s discretion.     To reach us:  Mon - Friday 8 AM - 3 PM: Pager [phone] (Text Page)  After hours, weekends and holidays: Primary service should call [phone] for the on-call pain specialist        1. Left sided chest pain due to pleural effusion s/p pigtail chest tube placement on 4/29/19. Today pain is worst in left side of back up into her left shoulder.  To OR on 5/7/19 for left video assisted thorascopic decortication, intercostal nerve cryo analgesia and flexible bronchoscopy.  2. H/o SLE, Sjogren's, primary sclerosing cholantitis  3. Fibromyalgia on Lyrica  4. On methadone maintenance x 6 years for h/o oxycodone abuse including snorting OxyContin 80mg, 5x/day.  States that she has been sober since being on methadone.  Of note, patient admits to getting Xanax on the streets and had an overdose on 1/22/19 requiring intubation at [facility].  5. H/o alcohol  abuse-sober for many years.  6. H/o PUD s/p Billroth II with revision.  7. H/o Bipolar II  8. Opioid induced constipation-has bowel regimen PTA.       -- Outpatient opioid requirements prior to admission:   Methadone maintenance at [facility] x 6 years: on Methadone liquid 113mg/day.  States that she is working with methadone clinic to taper down and off.  Plan is to decrease methadone by 3mg/day/on a weekly basis.      reviewed: Mostly Lyrica prescription     Primary Care Provider: Fredy Merritt  Chronic Pain Provider: none at this time.    Interval History:  Demetra Richardson was seen today (May 8, 2019) and she reports that she is doing well overall today. She still reports her pain as being the worst in her upper left shoulder and down her left back. She describes it as feeling  like someone punched me . It is constant. This pain was present prior to her surgery and she was hoping it would be better by now. She currently rates it as an 8/10 on the numeric scale.  She said that she definitely needs the oxycodone 10 mg by mouth every 3 hours right now. She is happy that she agreed to receive the epidural prior to her procedure yesterday and does not report any pain around her 3 chest tube sites. She did not report any muscle spasms initially, but then did say that her shoulder/back pain could be from some spasms. She would like to increase the methocarbamol if possible. She also reports poorly controlled fibromyalgia pain and has wanted to increase her Lyrica dose for a while now. We talked about possibly increasing the dose at bedtime slightly. She slept well last night and has not had a bowel movement for 4 days, but she is passing gas.     CAPA (Clinically Aligned Pain Assessment):    Comfort (How is your pain?): Tolerable with discomfort  Change in Pain (Since your last medication/intervention?): Getting better  Pain Control (How are your pain treatments working?):  Partially effective  control  Functioning (Are you able to do activities to get better?) : Pain keeps me from doing most of what I need to do  Sleep (Does your pain management allow you to sleep or rest?): Awake with occasional pain      FUNCTIONAL STATUS:  Change:      Improving  Oral intake:     Advancing  Activity level:     Planning to work with PT today.  Mood:      Tired, poor energy     -- Inpatient Medications Related to Pain Management:   Medications related to Pain Management (From now, onward)    Start     Dose/Rate Route Frequency Ordered Stop    05/08/19 0800  polyethylene glycol (MIRALAX/GLYCOLAX) Packet 17 g      17 g Oral 2 TIMES DAILY 05/08/19 0706 05/08/19 0800  methocarbamol (ROBAXIN) tablet 500 mg      500 mg Oral 3 TIMES DAILY 05/07/19 2216 05/07/19 2230  senna-docusate (SENOKOT-S/PERICOLACE) 8.6-50 MG per tablet 2 tablet      2 tablet Oral 2 TIMES DAILY 05/07/19 2216 05/07/19 2216  oxyCODONE (ROXICODONE) tablet 5-10 mg      5-10 mg Oral EVERY 3 HOURS PRN 05/07/19 2216      05/07/19 1115  bupivacaine (MARCAINE) 0.125 % in sodium chloride 0.9 % 250 mL EPIDURAL Infusion      8 mL/hr  EPIDURAL CONTINUOUS 05/07/19 1101      05/07/19 1101  nalbuphine (NUBAIN) injection 2.5-5 mg      2.5-5 mg Intravenous EVERY 6 HOURS PRN 05/07/19 1101      05/06/19 1841  clonazePAM (klonoPIN) tablet 0.5 mg      0.5 mg Oral 2 TIMES DAILY 05/06/19 1601      05/06/19 1430  acetaminophen (TYLENOL) tablet 650 mg      650 mg Oral EVERY 6 HOURS PRN 05/06/19 1415      05/06/19 1430  [Rx hold ]  aspirin-acetaminophen-caffeine (EXCEDRIN MIGRAINE) per tablet 2 tablet     (Rx hold  since Mon 5/6/2019 at 1447 by Erica Casarez, [facility].)    2 tablet Oral HOLD 05/06/19 1415      05/06/19 0500  methadone (DOLOPHINE-INTENSOL) 10 MG/ML (HIGH CONC) solution 113 mg      113 mg Oral DAILY 05/05/19 1201      05/01/19 1416  lidocaine (XYLOCAINE) 5 % ointment       Topical EVERY 6 HOURS PRN 05/01/19 1417      04/29/19 0800  pregabalin (LYRICA)  capsule 100 mg      100 mg Oral 3 TIMES DAILY 04/29/19 0127      04/29/19 0453  lidocaine 1 % 0.1-1 mL      0.1-1 mL Other EVERY 1 HOUR PRN 04/29/19 0453      04/29/19 0453  lidocaine (LMX4) cream       Topical EVERY 1 HOUR PRN 04/29/19 0453            LAB DATA:  Recent Labs   Lab 05/08/19  0440 05/07/19  1940 05/07/19  1652  05/07/19  0616 05/06/19  0559   CR 0.59 0.61  --   --  0.66 0.64   WBC 16.4* 18.5*  --   --  12.0* 12.1*   HGB 7.8* 8.9* 10.1*   < > 9.6* 10.0*   AST 60*  --   --   --  47* 167*   ALT 26  --   --   --  31 61*    < > = values in this interval not displayed.         ----------------------------------------------------------------------------------  Sharon Llanos PharmD, MS  Inpatient Pain Service     To reach us:  Mon - Friday 8 AM - 3 PM: Pager [phone] (Text Page)  After hours, weekends and holidays: Primary service should call [phone] for the on-call pain specialist    Helpful Resources:  Getting Rid of Unwanted Medications (printable PDF for patients)   Opioid Overdose Prevention Toolkit (printable PDF for patients)   Prescription Opioids: What You Need To Know (printable PDF for patients)

## 2019-05-08 NOTE — PLAN OF CARE
Admitted for L pleural effusion management. POD1 L decortication and bronch.    Pt lethargic at times. Irritable, frustrated and frequently asking for pain medication throughout shift. Pain management consulted and to bedside x3 this shift. Epidural in place and dose increased by anesthesia/bolused by anesthesia. See note. Afebrile this shift. Sinus tachy. Arterial line removed per MD order. On 3-4L oxymask. Lung sounds clear and diminished in bilateral lower bases. Chest tube dressings changed. No air leak after dressings changed. See flowsheets for output. Pt complaining of pain on left lower side by chest tube sites. No BM this shift. Poor appetite. On clear liquid diet. Cisneros removed. Awaiting urination. Xray completed this shift.    Transfer to  when bed available. Monitor chest tube output. Manage pain. PRN oxy q3h. Notify smileys if any changes.  Problem: Mood Alteration Comorbidity  Goal: Mood Alteration Comorbidity  Description  Patient comorbidity will be monitored for signs and symptoms of Mood Alteration condition.  Problems will be absent, minimized or managed by discharge/transition of care.  Outcome: No Change     Problem: Pain Chronic (Persistent) (Comorbidity Management)  Goal: Acceptable Pain Control and Functional Ability  Outcome: No Change

## 2019-05-08 NOTE — PROGRESS NOTES
REGIONAL ANESTHESIA PAIN SERVICE EPIDURAL NOTE  Demetra Richardson is a 63 year old female who is s/p Left Video Assisted Thoracoscopic Decortication, Intercostal Nerve Cryo Analgesia, Flexible Bronchoscopy, Flexible Bronchoscopy and placement of T5-6 epidural catheter for postoperative pain management.      SUBJECTIVE  Interval History: No acute events overnight, O2 1L by mask. Patient reports chest tube site and incisional pain well controlled with epidural infusion and current analgesic medications (see below).   Currently rates upper back and shoulder pain 7/10. Moves independently in bed. Denies weakness, paresthesias, circumoral numbness, metallic taste or tinnitus.  Patient has not been OOB yet.  Denies nausea or vomiting, urinary catheter in place,.  Patient has a history of fibromyalgia so muscles are painful to touch and movement, on outpatient dose of Lyrica; opioid use disorder on methadone 113 mg daily, is being followed by Inpatient Pain Management Service     Clinically Aligned Pain Assessment (CAPA):  Comfort (How is your pain?): Tolerable with discomfort  Change in Pain (Since your last medication/intervention?): About the same  Pain Control (How are your pain treatments working?):  Partially effective pain control      Antithrombotic/Thrombolytic Therapy ordered:    heparin sodium injection  5,000 Units, SC, Q8H         Analgesic Medications:  Medications related to Pain Management (From now, onward)    Start     Dose/Rate Route Frequency Ordered Stop    05/08/19 0800  polyethylene glycol (MIRALAX/GLYCOLAX) Packet 17 g      17 g Oral 2 TIMES DAILY 05/08/19 0706      05/08/19 0800  methocarbamol (ROBAXIN) tablet 500 mg      500 mg Oral 3 TIMES DAILY 05/07/19 2216 05/07/19 2230  senna-docusate (SENOKOT-S/PERICOLACE) 8.6-50 MG per tablet 2 tablet      2 tablet Oral 2 TIMES DAILY 05/07/19 2216 05/07/19 2216  oxyCODONE (ROXICODONE) tablet 5-10 mg      5-10 mg Oral EVERY 3 HOURS PRN 05/07/19  2216      05/07/19 1115  bupivacaine (MARCAINE) 0.125 % in sodium chloride 0.9 % 250 mL EPIDURAL Infusion      8 mL/hr  EPIDURAL CONTINUOUS 05/07/19 1101      05/07/19 1101  nalbuphine (NUBAIN) injection 2.5-5 mg      2.5-5 mg Intravenous EVERY 6 HOURS PRN 05/07/19 1101      05/06/19 1841  clonazePAM (klonoPIN) tablet 0.5 mg      0.5 mg Oral 2 TIMES DAILY 05/06/19 1601      05/06/19 1430  acetaminophen (TYLENOL) tablet 650 mg      650 mg Oral EVERY 6 HOURS PRN 05/06/19 1415      05/06/19 1430  [Rx hold ]  aspirin-acetaminophen-caffeine (EXCEDRIN MIGRAINE) per tablet 2 tablet     (Rx hold  since Mon 5/6/2019 at 1447 by Erica Casarez, [facility].)    2 tablet Oral HOLD 05/06/19 1415      05/06/19 0500  methadone (DOLOPHINE-INTENSOL) 10 MG/ML (HIGH CONC) solution 113 mg      113 mg Oral DAILY 05/05/19 1201      05/01/19 1416  lidocaine (XYLOCAINE) 5 % ointment       Topical EVERY 6 HOURS PRN 05/01/19 1417      04/29/19 0800  pregabalin (LYRICA) capsule 100 mg      100 mg Oral 3 TIMES DAILY 04/29/19 0127      04/29/19 0453  lidocaine 1 % 0.1-1 mL      0.1-1 mL Other EVERY 1 HOUR PRN 04/29/19 0453      04/29/19 0453  lidocaine (LMX4) cream       Topical EVERY 1 HOUR PRN 04/29/19 0453             OBJECTIVE  Lab Results:   Recent Labs   Lab Test 05/08/19  0440   WBC 16.4*   RBC 2.79*   HGB 7.8*   HCT 24.4*   MCV 88   MCH 28.0   MCHC 32.0   RDW 15.0          Lab Results   Component Value Date    INR 1.17 05/07/2019    INR 1.38 04/29/2019    INR 1.13 04/28/2019       Vitals:    Temp:  [97.8  F (36.6  C)-101.4  F (38.6  C)] 97.9  F (36.6  C)  Pulse:  [100-105] 100  Heart Rate:  [] 94  Resp:  [12-31] 14  BP: ()/(63-80) 116/75  MAP:  [75 mmHg-94 mmHg] 80 mmHg  Arterial Line BP: (103-131)/(54-74) 114/59  SpO2:  [91 %-100 %] 95 %  /75   Pulse 100   Temp 97.9  F (36.6  C) (Oral)   Resp 14   Wt 69 kg (152 lb 1.9 oz)   SpO2 95%   BMI 26.95 kg/m         Exam:   GEN: alert, no distress at rest and mild  distress with repositioning  NEURO/MSK: Strength BLE 5/5  and overall symmetric  SKIN: Epidural catheter site with dressing c/d/i, no tenderness, erythema, heme, edema     ASSESSMENT  Demetra Richardson is a 63 year old female who is s/p Left Video Assisted Thoracoscopic Decortication, Intercostal Nerve Cryo Analgesia, Flexible Bronchoscopy, Flexible Bronchoscopy and placement of T5-6 epidural catheter for postoperative pain management.      Patient is receiving adequate analgesia with current multimodal therapy including infusion of bupivacaine 0.125% at 8 mL/hr.  Motor function intact and currently is meeting activity goals.  No evidence of adverse side effects related to local anesthetic. Adequate daniel urine output      PLAN  - continue current epidural infusion bupivacaine 0.125% at 8mL/hour  - antithrombotic/thrombolytic therapy  Heparin SQ Q 8 hrs as ordered. Please contact RAPS (#[id number]) prior to any medication changes  - will continue to follow and adjust as needed    1145 Patient just returned from xray, reports L) back and shoulder pain worsened, currently rating 7/10.  Patient informed and would like a epidural bolus  Clinician administered epidural bolus  - MEDICATION: PF bupivacaine 0.125% 5 mL   - PROCEDURE: Clinician bolus administered without complication, negative aspirate before and between each mL.    No symptoms of local anesthetic systemic toxicity (LAST).   Remained with and assessed patient for 10 min post-injection. BP, P and MAP stable  - POST-PROCEDURE: Bedside RN aware of need to continue monitoring and document BP, P, and MAP Q 10 min for an additional 20 min. Contact RAPS (jobcode ID [id number]) if any of the following: patient experiencing any untoward effects, SBP < 90, P < 50 or > 120, MAP < 60     1330 Follow up. Patient reports pain virtually unchanged following bolus, notices about 10% improvement at best.    1400: Contacted by bedside RN who reports leaking clear fluid from under  epidural dressing.  Epidural catheter intact 8 cm at the skin.  Using sterile technique, added dermabond at insertion site, covered lockit device with folded sterile 4 x 4 and applied tegaderm and secured with tape.  No evidence of leaking.  Patient may require daily and PRN dressing changes to maintain epidural integrity.      - patient can be evaluated to receive local anesthetic bolus Q 12 hr PRN pain not controlled with continuous infusion.  Bedside RN must page RAPS to request bolus    - discussed plan with attending anesthesiologist    VINOD [facility]  Regional Anesthesia Pain Service  5/8/2019 7:26 AM    RAPS Contact Info (24 hour job code pager is the last 4 digits) For in-house use only:   [employer] phone: [place] [phone], [place] [employer] [phone], Peds [phone], then enter call-back number.    Text: Use [employer] on the Intranet <Paging/Directory> tab and enter Jobcode ID.   If no call back at any time, contact the hospital  and ask for RAPS attending or backup

## 2019-05-08 NOTE — PLAN OF CARE
POD #1: Patient states having pain left side where the chest tubes and incisional site located, oxycodone 10mg given for pain management. Continuing to titrate oxygen down to 1L unable to turn off as patient desat <90%.     P: Pull ascencio catheter this AM. A-line will be taken out when before patient transfers off the unit.

## 2019-05-09 ENCOUNTER — APPOINTMENT (OUTPATIENT)
Dept: OCCUPATIONAL THERAPY | Facility: CLINIC | Age: 64
DRG: 853 | End: 2019-05-09
Payer: COMMERCIAL

## 2019-05-09 ENCOUNTER — APPOINTMENT (OUTPATIENT)
Dept: PHYSICAL THERAPY | Facility: CLINIC | Age: 64
DRG: 853 | End: 2019-05-09
Payer: COMMERCIAL

## 2019-05-09 ENCOUNTER — APPOINTMENT (OUTPATIENT)
Dept: GENERAL RADIOLOGY | Facility: CLINIC | Age: 64
DRG: 853 | End: 2019-05-09
Payer: COMMERCIAL

## 2019-05-09 LAB
ABO + RH BLD: NORMAL
ABO + RH BLD: NORMAL
ANION GAP SERPL CALCULATED.3IONS-SCNC: 2 MMOL/L (ref 3–14)
BASOPHILS # BLD AUTO: 0.1 10E9/L (ref 0–0.2)
BASOPHILS NFR BLD AUTO: 0.4 %
BLD GP AB SCN SERPL QL: NORMAL
BLD PROD TYP BPU: NORMAL
BLD PROD TYP BPU: NORMAL
BLD UNIT ID BPU: 0
BLOOD BANK CMNT PATIENT-IMP: NORMAL
BLOOD PRODUCT CODE: NORMAL
BPU ID: NORMAL
BUN SERPL-MCNC: 7 MG/DL (ref 7–30)
CALCIUM SERPL-MCNC: 7.8 MG/DL (ref 8.5–10.1)
CHLORIDE SERPL-SCNC: 103 MMOL/L (ref 94–109)
CO2 SERPL-SCNC: 33 MMOL/L (ref 20–32)
COPATH REPORT: NORMAL
CREAT SERPL-MCNC: 0.6 MG/DL (ref 0.52–1.04)
CRP SERPL-MCNC: 170 MG/L (ref 0–8)
DIFFERENTIAL METHOD BLD: ABNORMAL
EOSINOPHIL # BLD AUTO: 1.2 10E9/L (ref 0–0.7)
EOSINOPHIL NFR BLD AUTO: 7.3 %
ERYTHROCYTE [DISTWIDTH] IN BLOOD BY AUTOMATED COUNT: 14.7 % (ref 10–15)
ERYTHROCYTE [DISTWIDTH] IN BLOOD BY AUTOMATED COUNT: 15 % (ref 10–15)
GFR SERPL CREATININE-BSD FRML MDRD: >90 ML/MIN/{1.73_M2}
GLUCOSE SERPL-MCNC: 93 MG/DL (ref 70–99)
HCT VFR BLD AUTO: 22.5 % (ref 35–47)
HCT VFR BLD AUTO: 29.1 % (ref 35–47)
HGB BLD-MCNC: 6.8 G/DL (ref 11.7–15.7)
HGB BLD-MCNC: 9 G/DL (ref 11.7–15.7)
IMM GRANULOCYTES # BLD: 0.4 10E9/L (ref 0–0.4)
IMM GRANULOCYTES NFR BLD: 2.2 %
LYMPHOCYTES # BLD AUTO: 1.7 10E9/L (ref 0.8–5.3)
LYMPHOCYTES NFR BLD AUTO: 10.6 %
MCH RBC QN AUTO: 28.2 PG (ref 26.5–33)
MCH RBC QN AUTO: 28.9 PG (ref 26.5–33)
MCHC RBC AUTO-ENTMCNC: 30.2 G/DL (ref 31.5–36.5)
MCHC RBC AUTO-ENTMCNC: 30.9 G/DL (ref 31.5–36.5)
MCV RBC AUTO: 93 FL (ref 78–100)
MCV RBC AUTO: 94 FL (ref 78–100)
MONOCYTES # BLD AUTO: 1.3 10E9/L (ref 0–1.3)
MONOCYTES NFR BLD AUTO: 8.4 %
NEUTROPHILS # BLD AUTO: 11.4 10E9/L (ref 1.6–8.3)
NEUTROPHILS NFR BLD AUTO: 71.1 %
NRBC # BLD AUTO: 0 10*3/UL
NRBC BLD AUTO-RTO: 0 /100
NUM BPU REQUESTED: 3
PLATELET # BLD AUTO: 340 10E9/L (ref 150–450)
PLATELET # BLD AUTO: 384 10E9/L (ref 150–450)
POTASSIUM SERPL-SCNC: 4.1 MMOL/L (ref 3.4–5.3)
RBC # BLD AUTO: 2.41 10E12/L (ref 3.8–5.2)
RBC # BLD AUTO: 3.11 10E12/L (ref 3.8–5.2)
SODIUM SERPL-SCNC: 138 MMOL/L (ref 133–144)
SPECIMEN EXP DATE BLD: NORMAL
TRANSFUSION STATUS PATIENT QL: NORMAL
TRANSFUSION STATUS PATIENT QL: NORMAL
WBC # BLD AUTO: 13.6 10E9/L (ref 4–11)
WBC # BLD AUTO: 16 10E9/L (ref 4–11)

## 2019-05-09 PROCEDURE — 25000132 ZZH RX MED GY IP 250 OP 250 PS 637: Performed by: SURGERY

## 2019-05-09 PROCEDURE — 25000128 H RX IP 250 OP 636: Performed by: SURGERY

## 2019-05-09 PROCEDURE — P9016 RBC LEUKOCYTES REDUCED: HCPCS | Performed by: STUDENT IN AN ORGANIZED HEALTH CARE EDUCATION/TRAINING PROGRAM

## 2019-05-09 PROCEDURE — 71046 X-RAY EXAM CHEST 2 VIEWS: CPT

## 2019-05-09 PROCEDURE — 40000141 ZZH STATISTIC PERIPHERAL IV START W/O US GUIDANCE

## 2019-05-09 PROCEDURE — 99207 ZZC NO CHARGE FOLLOW UP PS: CPT

## 2019-05-09 PROCEDURE — 25000128 H RX IP 250 OP 636: Performed by: NURSE PRACTITIONER

## 2019-05-09 PROCEDURE — 25800030 ZZH RX IP 258 OP 636: Performed by: NURSE PRACTITIONER

## 2019-05-09 PROCEDURE — 97530 THERAPEUTIC ACTIVITIES: CPT | Mod: GP

## 2019-05-09 PROCEDURE — 97161 PT EVAL LOW COMPLEX 20 MIN: CPT | Mod: GP

## 2019-05-09 PROCEDURE — 85025 COMPLETE CBC W/AUTO DIFF WBC: CPT | Performed by: FAMILY MEDICINE

## 2019-05-09 PROCEDURE — 12000012 ZZH R&B MS OVERFLOW UMMC

## 2019-05-09 PROCEDURE — 36415 COLL VENOUS BLD VENIPUNCTURE: CPT | Performed by: STUDENT IN AN ORGANIZED HEALTH CARE EDUCATION/TRAINING PROGRAM

## 2019-05-09 PROCEDURE — 85027 COMPLETE CBC AUTOMATED: CPT | Performed by: SURGERY

## 2019-05-09 PROCEDURE — 97535 SELF CARE MNGMENT TRAINING: CPT | Mod: GO

## 2019-05-09 PROCEDURE — 97530 THERAPEUTIC ACTIVITIES: CPT | Mod: GO

## 2019-05-09 PROCEDURE — 97165 OT EVAL LOW COMPLEX 30 MIN: CPT | Mod: GO

## 2019-05-09 PROCEDURE — 80048 BASIC METABOLIC PNL TOTAL CA: CPT | Performed by: STUDENT IN AN ORGANIZED HEALTH CARE EDUCATION/TRAINING PROGRAM

## 2019-05-09 PROCEDURE — 86140 C-REACTIVE PROTEIN: CPT | Performed by: STUDENT IN AN ORGANIZED HEALTH CARE EDUCATION/TRAINING PROGRAM

## 2019-05-09 PROCEDURE — 25000128 H RX IP 250 OP 636: Performed by: STUDENT IN AN ORGANIZED HEALTH CARE EDUCATION/TRAINING PROGRAM

## 2019-05-09 PROCEDURE — 12000004 ZZH R&B IMCU UMMC

## 2019-05-09 PROCEDURE — 36415 COLL VENOUS BLD VENIPUNCTURE: CPT | Performed by: FAMILY MEDICINE

## 2019-05-09 PROCEDURE — 25000132 ZZH RX MED GY IP 250 OP 250 PS 637: Performed by: STUDENT IN AN ORGANIZED HEALTH CARE EDUCATION/TRAINING PROGRAM

## 2019-05-09 RX ORDER — FUROSEMIDE 10 MG/ML
10 INJECTION INTRAMUSCULAR; INTRAVENOUS ONCE
Status: COMPLETED | OUTPATIENT
Start: 2019-05-09 | End: 2019-05-09

## 2019-05-09 RX ADMIN — PREGABALIN 150 MG: 75 CAPSULE ORAL at 20:09

## 2019-05-09 RX ADMIN — POLYETHYLENE GLYCOL 3350 17 G: 17 POWDER, FOR SOLUTION ORAL at 20:09

## 2019-05-09 RX ADMIN — OXYCODONE HYDROCHLORIDE 10 MG: 5 TABLET ORAL at 23:21

## 2019-05-09 RX ADMIN — ACETAMINOPHEN 650 MG: 325 TABLET, FILM COATED ORAL at 06:59

## 2019-05-09 RX ADMIN — POLYETHYLENE GLYCOL 3350 17 G: 17 POWDER, FOR SOLUTION ORAL at 08:27

## 2019-05-09 RX ADMIN — PANTOPRAZOLE SODIUM 40 MG: 40 TABLET, DELAYED RELEASE ORAL at 08:26

## 2019-05-09 RX ADMIN — OXYCODONE HYDROCHLORIDE 10 MG: 5 TABLET ORAL at 09:40

## 2019-05-09 RX ADMIN — OXYCODONE HYDROCHLORIDE 10 MG: 5 TABLET ORAL at 17:09

## 2019-05-09 RX ADMIN — METHOCARBAMOL 500 MG: 500 TABLET, FILM COATED ORAL at 08:32

## 2019-05-09 RX ADMIN — BUPIVACAINE HYDROCHLORIDE: 7.5 INJECTION, SOLUTION EPIDURAL; RETROBULBAR at 19:56

## 2019-05-09 RX ADMIN — OXYCODONE HYDROCHLORIDE 10 MG: 5 TABLET ORAL at 20:13

## 2019-05-09 RX ADMIN — METHOCARBAMOL 500 MG: 500 TABLET, FILM COATED ORAL at 14:50

## 2019-05-09 RX ADMIN — METHOCARBAMOL 500 MG: 500 TABLET, FILM COATED ORAL at 22:59

## 2019-05-09 RX ADMIN — ESCITALOPRAM 10 MG: 5 TABLET, FILM COATED ORAL at 08:27

## 2019-05-09 RX ADMIN — Medication 0.5 MG: at 06:07

## 2019-05-09 RX ADMIN — Medication 0.5 MG: at 17:42

## 2019-05-09 RX ADMIN — OXYCODONE HYDROCHLORIDE 10 MG: 5 TABLET ORAL at 06:07

## 2019-05-09 RX ADMIN — OXYCODONE HYDROCHLORIDE 10 MG: 5 TABLET ORAL at 13:27

## 2019-05-09 RX ADMIN — PREGABALIN 100 MG: 100 CAPSULE ORAL at 13:27

## 2019-05-09 RX ADMIN — PREGABALIN 100 MG: 100 CAPSULE ORAL at 08:26

## 2019-05-09 RX ADMIN — METHOCARBAMOL 500 MG: 500 TABLET, FILM COATED ORAL at 03:35

## 2019-05-09 RX ADMIN — HEPARIN SODIUM 5000 UNITS: 5000 INJECTION, SOLUTION INTRAVENOUS; SUBCUTANEOUS at 22:59

## 2019-05-09 RX ADMIN — METHADONE HYDROCHLORIDE 113 MG: 10 CONCENTRATE ORAL at 04:51

## 2019-05-09 RX ADMIN — FUROSEMIDE 10 MG: 10 INJECTION, SOLUTION INTRAVENOUS at 10:42

## 2019-05-09 RX ADMIN — OXYCODONE HYDROCHLORIDE 10 MG: 5 TABLET ORAL at 01:58

## 2019-05-09 ASSESSMENT — ENCOUNTER SYMPTOMS
JOINT SWELLING: 0
LIGHT-HEADEDNESS: 0
NAUSEA: 0
HEADACHES: 0
NERVOUS/ANXIOUS: 0
VOMITING: 0
ABDOMINAL PAIN: 0
DYSPHORIC MOOD: 0
CHILLS: 0
WEAKNESS: 0
CONSTIPATION: 0
DIAPHORESIS: 0
TREMORS: 0
NUMBNESS: 0
SHORTNESS OF BREATH: 0
DYSURIA: 0
UNEXPECTED WEIGHT CHANGE: 0
FEVER: 0

## 2019-05-09 ASSESSMENT — ACTIVITIES OF DAILY LIVING (ADL)
ADLS_ACUITY_SCORE: 15

## 2019-05-09 ASSESSMENT — PAIN DESCRIPTION - DESCRIPTORS
DESCRIPTORS: ACHING;CONSTANT
DESCRIPTORS: ACHING
DESCRIPTORS: JABBING

## 2019-05-09 ASSESSMENT — MIFFLIN-ST. JEOR: SCORE: 1229.13

## 2019-05-09 NOTE — PROGRESS NOTES
STAFF ADDENDUM:  I saw and evaluated Ms. Richardson and agree with the resident s findings and plan of care as documented in the resident s note and edited by me, as applicable.      In summary, Ms. Richardson is doing well.   PRBC for low HB  Consider diuresis  Continue tubes to suction    Beckie Kay MD

## 2019-05-09 NOTE — PLAN OF CARE
4C - PT evaluation completed and treatment initiated.   Discharge Planner PT   Patient plan for discharge: not discussed due to interruption by transport  Current status: Pt ambulated 15' with CGA-min A, decreased step length and tsering with impaired balance, improved balance and step length with UE assist x 1 on IV pole for support. Sit > supine with min A and cues for technique. Activity limited due to transport for chest x-ray  Barriers to return to prior living situation: pain, activity tolerance, balance  Recommendations for discharge: TCU  Rationale for recommendations: Currently recommend TCU due to impaired activity tolerance, balance, decreased strength and increased pain impacting overall functional mobility. Pt lives alone with decreased support system available to assist with chores, cooking, cleaning, ect.       Entered by: Coty Gross 05/09/2019 3:34 PM

## 2019-05-09 NOTE — PROGRESS NOTES
REGIONAL ANESTHESIA PAIN SERVICE EPIDURAL NOTE  Demetra Richardson is a 63 year old female who is POD#2 s/p Left Video Assisted Thoracoscopic Decortication, Intercostal Nerve Cryo Analgesia, Flexible Bronchoscopy, Flexible Bronchoscopy and placement of T5-6 epidural catheter for postoperative pain management.      SUBJECTIVE  Interval History: Overnight no acute events. Patient reports improved comfort and good pain control with epidural infusion + Q 12 hr bolus, bolus last night seemed to help a lot, tolerating current analgesic medications (see below).  Denies LE weakness, paresthesias, circumoral numbness, metallic taste or tinnitus.  Dangled at bedside and walked from bed to transport cart this AM.  Currently tolerating diet, denies nausea or vomiting, L) CT x 3, voiding without difficulty.  Patient has a history of chronic pain, fibromyalgia; on methadone maintenance 113mg PO daily for opioid-substance use disorder     Clinically Aligned Pain Assessment (CAPA):  Comfort (How is your pain?): Tolerable with discomfort  Change in Pain (Since your last medication/intervention?): Getting better  Pain Control (How are your pain treatments working?):  Partially effective pain control  Functioning (Are you able to do activities to get better?) : Can do most things, but pain gets in the way of some      Pain Intensity using Numerical Rating Scale (NRS):    8/10 at rest and 8/10 with activity      Antithrombotic/Thrombolytic Therapy ordered:   heparin sodium injection 5,000 Units 5,000 Units, SC, Q8H         Analgesic Medications:  Medications related to Pain Management (From now, onward)    Start     Dose/Rate Route Frequency Ordered Stop    05/08/19 2000  pregabalin (LYRICA) capsule 150 mg      150 mg Oral AT BEDTIME 05/08/19 1240      05/08/19 1523  methocarbamol (ROBAXIN) tablet 500 mg      500 mg Oral EVERY 6 HOURS 05/08/19 1235      05/08/19 1400  pregabalin (LYRICA) capsule 100 mg      100 mg Oral 2 TIMES DAILY  05/08/19 1240      05/08/19 0800  polyethylene glycol (MIRALAX/GLYCOLAX) Packet 17 g      17 g Oral 2 TIMES DAILY 05/08/19 0706      05/07/19 2230  senna-docusate (SENOKOT-S/PERICOLACE) 8.6-50 MG per tablet 2 tablet      2 tablet Oral 2 TIMES DAILY 05/07/19 2216      05/07/19 2216  oxyCODONE (ROXICODONE) tablet 5-10 mg      5-10 mg Oral EVERY 3 HOURS PRN 05/07/19 2216      05/07/19 1115  bupivacaine (MARCAINE) 0.125 % in sodium chloride 0.9 % 250 mL EPIDURAL Infusion      10 mL/hr  EPIDURAL CONTINUOUS 05/07/19 1101      05/06/19 1841  clonazePAM (klonoPIN) tablet 0.5 mg      0.5 mg Oral 2 TIMES DAILY 05/06/19 1601      05/06/19 1430  acetaminophen (TYLENOL) tablet 650 mg      650 mg Oral EVERY 6 HOURS PRN 05/06/19 1415      05/06/19 1430  [Rx hold ]  aspirin-acetaminophen-caffeine (EXCEDRIN MIGRAINE) per tablet 2 tablet     (Rx hold  since Mon 5/6/2019 at 1447 by Erica Casarez, [facility].)    2 tablet Oral HOLD 05/06/19 1415      05/06/19 0500  methadone (DOLOPHINE-INTENSOL) 10 MG/ML (HIGH CONC) solution 113 mg      113 mg Oral DAILY 05/05/19 1201      04/29/19 0453  lidocaine 1 % 0.1-1 mL      0.1-1 mL Other EVERY 1 HOUR PRN 04/29/19 0453      04/29/19 0453  lidocaine (LMX4) cream       Topical EVERY 1 HOUR PRN 04/29/19 0453             OBJECTIVE  Lab Results:   Recent Labs   Lab Test 05/09/19  0622   WBC 13.6*   RBC 2.41*   HGB 6.8*   HCT 22.5*   MCV 93   MCH 28.2   MCHC 30.2*   RDW 15.0          Lab Results   Component Value Date    INR 1.17 05/07/2019    INR 1.38 04/29/2019    INR 1.13 04/28/2019       Vitals:    Temp:  [96.6  F (35.9  C)-98.7  F (37.1  C)] 96.6  F (35.9  C)  Pulse:  [78-98] 81  Heart Rate:  [] 78  Resp:  [8-16] 10  BP: ()/(61-72) 90/71  MAP:  [72 mmHg-87 mmHg] 72 mmHg  Arterial Line BP: ()/(58-64) 93/58  SpO2:  [92 %-99 %] 99 %  BP 90/71   Pulse 81   Temp 96.6  F (35.9  C) (Axillary)   Resp 10   Wt 68.6 kg (151 lb 3.8 oz)   SpO2 99%   BMI 26.79 kg/m         Exam:    GEN: alert, pleasant, conversant, no distress  NEURO/MSK: Strength BLE 5/5  and overall symmetric  SKIN: Epidural catheter site with dressing c/d/i, no tenderness, erythema, heme, edema     ASSESSMENT  Demetra Richardson is a 63 year old female who is POD#2 s/p Left Video Assisted Thoracoscopic Decortication, Intercostal Nerve Cryo Analgesia, Flexible Bronchoscopy, Flexible Bronchoscopy and placement of T5-6 epidural catheter for postoperative pain management.    Patient is receiving moderate analgesia with current multimodal therapy including infusion of bupivacaine 0.125% at 10 mL/hr + Q 12 hr local anesthetic bolus.  Motor function intact and adequate sensory block, is prepared concerned about pain control and willing to work with PT to meet activity goals.  No evidence of adverse side effects related to local anesthetic. Adequate urine output    PLAN  0940 Clinician administered epidural bolus  - MEDICATION: PF bupivacaine 0.125% 5 mL   - PROCEDURE: Clinician bolus administered without complication via CADD Alexandre pump  No symptoms of local anesthetic systemic toxicity (LAST).   Remained with and assessed patient for 10 min post-injection. BP, P and MAP stable  - POST-PROCEDURE: Bedside RN aware of need to continue monitoring and document BP, P, and MAP Q 10 min for an additional 20 min. Contact RAPS (jobcode ID [id number]) if any of the following: patient experiencing any untoward effects, SBP < 90, P < 50 or > 120, MAP < 60    - patient can be evaluated to receive local anesthetic bolus Q 12 hr PRN, bedside nurse must page RAPS (#[phone]) to request bolus  - continue current epidural infusion bupivacaine 0.125% at 10 mL/hour  - antithrombotic/thrombolytic therapy okay to continue Heparin SQ Q 8 hrs as ordered. Please contact RAPS (#[phone]) prior to any medication changes  - will continue to follow and adjust as needed        1050 Follow up.  Patient reports bolus made a difference in left chest wall pain, currently  "reporting pain 6/10 and comfortably manageable with medications.    - At time of visit the CADD Alexandre pump alarm \"downstream occlusion.\" A slight kink was noted in epidural distal catheter so catheter cut and new sterile connector applied using sterile technique but pump still alarmed, so continued to work down system including extension tubing change, dressing change and catheter relooped using sterile technique without change in occlusion alarm, so a 1 mL bolus from CADD Alexandre pump was performed and occlusion alarm resolved.          - discussed plan and interventions with attending anesthesiologist    VINOD [facility]  Regional Anesthesia Pain Service  5/9/2019 10:30 AM    RAPS Contact Info (24 hour job code pager is the last 4 digits) For in-house use only:   [employer] phone: [place] [phone], [place] [employer] [phone], [place] [phone], then enter call-back number.    Text: Use [employer] on the Intranet <Paging/Directory> tab and enter Jobcode ID.   If no call back at any time, contact the hospital  and ask for RAPS attending or backup     "

## 2019-05-09 NOTE — PROGRESS NOTES
Report given to 6B RN.  Pt updated with POC.  Pt transported to  via wheelchair and patient transport staff.  All belongings sent with pt.  Eddie rodriguez MD notified of pt's refusal of IV antibiotics and 1600 CBC check.  Continue to with POC.

## 2019-05-09 NOTE — PROGRESS NOTES
RT department will order more metanebs to administer intrapulmonary percussive therapy per MD order. Currently, all in house are being used for other patients. RT will continue to follow.

## 2019-05-09 NOTE — PROGRESS NOTES
THORACIC & FOREGUT SURGERY PROGRESS NOTE  05/09/2019    Patient: Demetra Richardson  MRN: [id number]    Subjective/Interval Events  No acute overnight events. Air leak resolved this morning. Patient reports lots of pain and states it is limiting her ambulating and using IS/Acapella. She did walk some yesterday though. Refusing heparin subcutaneous DVT ppx because she doesn't like shots; thinks she might do better with daily shots after epidural is out. Urine output adequate, tolerating regular diet, no n/v, no BM yet.      Objective  Temp:  [96.6  F (35.9  C)-98.7  F (37.1  C)] 96.8  F (36  C)  Pulse:  [78-98] 89  Heart Rate:  [] 89  Resp:  [8-16] 12  BP: ()/(61-72) 94/69  MAP:  [72 mmHg-87 mmHg] 72 mmHg  Arterial Line BP: ()/(58-64) 93/58  SpO2:  [92 %-99 %] 94 %    General: AAOx4, NAD, lying comfortably in bed  CV: regular rate, warm, well-perfused  Pulm: no dyspnea, breathing comfortably on NC, left chest tubes x3 with serosanguinous output (apical tubes Y-ed to Atrium, basilar Atrium - both with no air leak)  Abd: soft, non-distended, non-tender  Extremities: no edema  Neuro: moving all extremities spontaneously without apparent deficit    I/O last 3 completed shifts:  In: 1230 [P.O.:950; I.V.:280]  Out: 1369 [Urine:1100; Chest Tube:269]    Labs:  Recent Labs   Lab 05/09/19 0622 05/08/19 0440 05/07/19 1940   WBC 13.6* 16.4* 18.5*   HGB 6.8* 7.8* 8.9*    376 389     Recent Labs   Lab 05/09/19 0622 05/08/19 0440 05/07/19 1940 05/04/19 0727 05/03/19  0633    136 135   < > 134 136   POTASSIUM 4.1 4.2 4.6   < > 4.4 4.6   CHLORIDE 103 101 102   < > 102 104   CO2 33* 28 28   < > 27 25   BUN 7 7 8   < > 16 15   CR 0.60 0.59 0.61   < > 0.68 0.62   GLC 93 110* 132*   < > 110* 119*   RAFAELA 7.8* 7.9* 7.9*   < > 8.0* 8.1*   MAG  --   --  1.7  --   --   --    PHOS  --   --  5.0*  --  3.4 3.8    < > = values in this interval not displayed.       Imaging:  CXR L pleural effusion stable,  pulmonary edema stable      Assessment & Plan  Demetra Richardson is a 63 year old female with a h/o Sjogren's, SLD, PSC, fibromyalgia, and prior opiate abuse on methadone who was admitted 4/28/2019 with progressive shortness of breath and was found to have pneumonia with a left-sided loculated pleural effusion. A chest tube was placed (4/29/2019) and she was started on antibiotics. Chest tube had been draining however due to presence of loculations she underwent tPA lytics via the chest tube x3 days. Repeat CT scan demonstrating persistent effusion and possible new right sided consolidation. She is now s/p LEFT VATS decortication on 5/7/2019 with Dr. Muir.     - Chest tubes to suction (even when ambulating) for 72 hours postop.  - Metanebs and aggressive pulmonary toilet to continue today; patient with atelectasis and effusion on CXR and will likely need further pulmonary toilet to help mobilize fluids and help lungs heal.   - Daily CXR while chest tubes in place.  - Ambulate, PT/OT, aggressive pulmonary toilet with IS/Acapella (ordered).  - No IVFs. Please no IVFs/boluses without discussion with Thoracic Surgery, as fluid overloading can cause lung swelling and prolonged air leak.  - Hgb downtrending continues, no large chest tube output to suggest ongoing surgical bleeding so suspect continued downtrend from acute blood loss intraop; could also have a dilutional component. Agree with 1U PRBCs and recommend following with Lasix as patient is at risk of fluid overload.  - DVT ppx to continue. Discussed risk of DVT/PE and death, as well as epidural hematoma (increased risk with use of Lovenox) with patient. She expressed understanding, and will continue to decline heparin ppx.  - Pain control per primary team and Pain team consultants, appreciate recs.  - Antibiotics and remainder of cares per primary team, appreciate cares.  - Ok for no further telemetry from a Thoracic Surgery standpoint.  - Thoracic Surgery will  continue to follow closely.      Patient seen and discussed with fellow and staff.  - - - - - - - - - - - - - - - - - -  Amy oMrales MD  General Surgery PGY-3  See [facility] for on call information prior to paging me directly. Pager [phone].

## 2019-05-09 NOTE — PROGRESS NOTES
[facility], [facility] Progress Note    Main Plans for Today    -continue antibiotics (day 6)   -Follow up thoracic recs  -Follow up pain team recs, RAPS  -Pain control  -Recheck Hgb at 2 PM s/p 1 unit     Assessment & Plan   Demetra Richardson is a 63 year old female w/complicated PMHx, significant for SLE, PSC, Sjogren's, fibromyalgia, opiate dependence and polysubstance abuse with history of OD and withdrawal seizures, epidural abscess, bipolar disorder type II, currently on a methadone program, and is admitted for management of L pleural effusion now growing strep intermedius and persistent empyema despite chest tube placement. Now POD#2 from left decortication and bronchoscopy by thoracic surgery.     # Loculated pleural effusion/ empyema  # Sepsis secondary to empyema  Most likely parapneumonic effusion, other differentials include effusion 2/2 rheumatologic disease given ?history SLE, sjogren's, primary sclerosing cholangitis. Fluid analysis with pleural to serum protein 4.7/6.7, LDH 1116, glucose 26, WBCs seen with no organisms in stain.  Fluid culture growing strep intermedius sensitive to ceftriaxone. She is s/p chest tube placement on 4/29 with lytic therapy until 5/6, concern for persistent empyema on recent CT scan on 5/5. Thoracic consulted 5/5 with concern for persistent empyema to perform decortication 5/7. CT showed new opacities in RUL, pro jazzy returned moderate risk 0.6.  RVP negative. Sputum not collected yet.   POD #2 from decortication 5/7, pain control as below, RAPS prior to procedure  - Pulmonology consulted, following along  - thoracic surgery managing chest tubes and post op care  - Daily CXR  - Ceftriaxone started 5/2  (Stopped levo 750 mg (4/28-5/2) flagyl 500 mg tid (4/29- 5/2)  - Supplemental oxygen prn, goal >92%  - Continue incentive spirometry  - f/u chest tube output    # Thoracic lymphadenopathy  CT showed enlarged left lower  cervical lymph node, prominent left heel or soft tissue, possibly left hilar adenopathy, prominent left paratracheal lymph nodes.  DDX includes infectious versus malignant versus sarcoidosis. Pulm on board.      # Chronic pain  # Polysubstance use disorder  # History of overdose and withdrawal seizures  # Methadone maintenance therapy, active  # Fibromyalgia  # anxiety  Patient denies recent substance use, reports no alcohol use in last 7 years. She does admit to taking some xanax about 2 months ago (1/22/2019), and she was hospitalized with acute toxic encephalopathy, even intubated as she was having apneic pauses. Patient is on methadone and fills her prescriptions at [facility]. Admission UDS negative. Pain and anxiety continue to be an issue for the patient, we have made several adjustments and pain/anxiety still not well controlled. Psych and pain team consulted for recs. Now post op requiring epidural pain control with RAPS team. Epidural has helped her pain.   - Pain consult 5/1, appreciate recs;Re consult Pain Team 5/6, changed Tylenol to as needed, can increase oxy 5 mg Q3H as needed, hold excedrin, continue other cares  - Psych consult, appreciate recs for anxiety, clonopin, can increase lexapro outpatient  - PTA methadone dose, changed admin time to 5AM 5/5  - robaxin 500 mg po Q6h  - PTA pregabalin increased to 100-100-150   - Heat/cold packs  - Avoid IV opiates  - Bowel regimen  - Per recs from pain team: Oxy 5 mg Q3h as needed after procedure, may require up to 10 mg Q3H as needed but not to use unless absolutely necessary    # Hypoalbuminemia  Likely due to acute infection and chronic malnutrition contributing. Patient aware and reports poor diet at home - lots of frozen meals, fast foods, due to issues with ambulation. Lives alone, no help with food prep or grocery shopping.   - Nutrition consult  - Discuss outpatient services for meal/grocery delivery services- talked with [facility] 5/1    # SLE  # Sjogren's  "syndrome  # primary sclerosing cholangitis  # elevated alk phos from biliary source (PSC)  Patient had recent serology, but has returned negative so far. Reports diagnosis of PSC 14 years ago, and notes chronic LFT abnormalities. Elevated GGT, normal acetaminophen level consistent with PSC. Alk phos bone specific test elevated was mildly elevated to 50 but not significant enough compared to elevated GGT in the 1,000 so likely PSC. Did have recent fall but no documented fracture.   - TSH low normal, PTH normal, calcium is low (would expect elevated with MM), ionized calcium normal   - Corrected calcium with hypoalbuminemia on  is 9.2 (normal)   - consider outpatient endocrine consult for elevated bone specific alk phos if needed but suspect elevated alk phos due to PSC with elevated GGT.     #Bipolar disorder type II  Patient endorses recent loss of her father to atypical pneumonia, but considers her mood has been stable, denies suicidal ideation. Patient reports being on Risperidone in the past after a \"manic episode after a family member .\"   -PTA escitalopram, may increase to 20 mg as outpatient      # Mild aortic stenosis  Murmur noticed on exam, echocardiogram  showed LVEF, w/normal diastolic function, normal RV. Unlikely to be related to current symptoms.      # Migraines  # Fibromyalgia  Patient has long history of migraines with significant use of excedrin - now prone to rebound headaches.  -PTA Excedrin Migraine, weaning     # s/p Billroth II w/revision   # History of iron deficiency anemia  # acute blood loss anemia  Patient has malabsorption secondary to probably surgery, receives IV iron as outpatient. On 19 she had low iron and saturation, normal ferritin, folate and B12. Now anemic requiring 1 unit of RBCs  from procedure, stable.   - continue PTA pantoprazole  - IV iron replacement as outpatient  - Recheck Hgb 2 PM, inform thoracic if significant bleeding     # Pain " Assessment:  Current Pain Score 5/9/2019   Patient currently in pain? yes   Pain score (0-10) -   Pain location -   Pain descriptors -   Some encounter information is confidential and restricted. Go to Review Flowsheets activity to see all data.   - Demetra is experiencing pain due to pleural effusion. Pain management was discussed and the plan was created in a collaborative fashion.  Demetra's response to the current recommendations: engaged  - Please see the plan for pain management as documented above    Diet: Snacks/Supplements Adult: Other; bowl of chicken salad at bedtime with crackers + milk skim; Between Meals  Snacks/Supplements Adult: Boost Plus; With Meals  Advance Diet as Tolerated: Regular Diet Adult  Fluids: PO  DVT Prophylaxis:  Heparin per RAPS  Code Status: Full Code    Disposition Plan   Expected discharge:Expected Discharge Date: 05/09/19 2 - 3 days, recommended to transitional care unit once adequate pain management/ tolerating PO medications and antibiotic plan established.Dispo: Expected Discharge Date: 05/09/19      Entered: Wander Mooney 05/09/2019, 12:52 PM   Information in the above section will display in the discharge planner report.      The patient's care was discussed with the Attending Physician, Dr. dwyer.    Wander Mooney  [facility]  Pager: [phone]  Please see sticky note for cross cover information    Interval History  NAEO. Recovering from post op. HDS. Pain currently controlled.   380 ml out of chest tubes yesterday. Net neg 700 yesterday. Given 1 unit of blood overnight with Hgb of 6.8 this AM    Review of Systems   Constitutional: Negative for chills, diaphoresis, fever and unexpected weight change.   Respiratory: Negative for shortness of breath.    Cardiovascular: Negative for chest pain and leg swelling.   Gastrointestinal: Negative for abdominal pain, constipation, nausea and vomiting.   Genitourinary: Negative  for dysuria.   Musculoskeletal: Negative for joint swelling.   Neurological: Negative for tremors, weakness, light-headedness, numbness and headaches.   Psychiatric/Behavioral: Negative for dysphoric mood. The patient is not nervous/anxious.      Physical Exam   Vital Signs: Temp: 96.8  F (36  C) Temp src: Oral BP: 94/69 Pulse: 89 Heart Rate: 89 Resp: 12 SpO2: 94 % O2 Device: Nasal cannula Oxygen Delivery: 2 LPM  Weight: 151 lbs 3.77 oz  Physical Exam   Constitutional: She is oriented to person, place, and time. She appears well-developed and well-nourished. No distress.   HENT:   Head: Normocephalic and atraumatic.   Eyes: Conjunctivae are normal. No scleral icterus.   Cardiovascular: Normal rate and regular rhythm. Exam reveals no friction rub.   Murmur (systolic ejection) heard.  Pulmonary/Chest: Effort normal. No respiratory distress. She has no wheezes. She has no rales (LLL).   Rales heard in LLL  3 chest tubes in place, draining serosang fluid   Neurological: She is alert and oriented to person, place, and time.   Skin: Skin is warm and dry. She is not diaphoretic.   Psychiatric: She has a normal mood and affect.   Vitals reviewed.

## 2019-05-09 NOTE — PLAN OF CARE
Problem: Mood Alteration Comorbidity  Intervention: Promote Anxiety Reduction    Patient a lot calmer this AM, more agreeable when informed of what is to happen to mentally prepare.    Problem: Pain Chronic (Persistent) (Comorbidity Management)  Goal: Acceptable Pain Control and Functional Ability  5/9/2019 0728 by Kiley Velasco, RN  Outcome: No Change  5/8/2019 1824 by Cristin Leon, RN  Outcome: No Change   Family/Support System Care: support provided;self-care encouraged  Note:   Patient continues to have pain on her left side giving oxycodone Q3 hrs has helped to decrease some of the pain. This AM she got her scheduled methadone and tylenol also given after patient was up this morning.    Will continue to monitor patient and intervene as needed.

## 2019-05-09 NOTE — PROGRESS NOTES
Patient: Demetra Richardson  Date of Service: May 9, 2019 Admission Date:4/28/2019   2 Days Post-Op     Chief Pain Endorsement: Left shoulder pain    Recommendations were discussed and relayed to Dr. Wander Mooney ([facility])  Plan was reviewed by the Inpatient Pain Service and staff attending, Dr. Arsenio Chaney      1. Continue oxycodone 5-10mg po q3h prn mdoerate to severe pain.  2. Continue methadone solution 113mg po daily.  This is a PTA medication.  3. Continue acetaminophen 650mg po q6h prn.  4. Continue methocarbamol 500mg po q6h scheduled for spasm.  5. Continue methylsalicylate - menthol ([employer]) ointment topically q6h prn.  6. Continue pregabalin 100mg-100mg -150mg.  7. Bowel regimen per primary team to prevent opioid induced constipation.    Pain Service will Continue to follow peripherally, but will not put notes in every day.     Thank you for consulting the Inpatient Pain Management Service. The above recommendations are to be acted upon at the primary team s discretion.     To reach us:  Mon - Friday 8 AM - 3 PM: Pager [phone] (Text Page)  After hours, weekends and holidays: Primary service should call [phone] for the on-call pain specialist    PAIN MEDICATION SAFE USE:   Prior to discharge instruct patient on the following in addition to the medication fact sheet:    Caution: these medications can cause sedation    Take prescription medicine only if it has been prescribed by your doctor    Do not take more medicine or take it more often than instructed     Call your doctor if pain gets worse    Never mix pain medicine with alcohol, sleeping pills, or any illicit drugs    Do not operate heavy machinery, including vehicles, when initiation opioid therapy or increasing dosage    Store prescription opioids in a locked container, whenever possible     Dispose of unused opioids appropriately     Do not stop abruptly once at higher doses.  These medications must be tapered off.    Opioid pain  medications do carry the risk for physical dependence and addiction and patients should be counseled about this.         1. Left sided chest pain due to pleural effusion s/p pigtail chest tube placement on 4/29/19. Today pain is worst in left side of back up into her left shoulder.  To OR on 5/7/19 for left video assisted thorascopic decortication, intercostal nerve cryo analgesia and flexible bronchoscopy.  Three left sided chest tubes in place.  2. H/o SLE, Sjogren's, primary sclerosing cholantitis  3. Fibromyalgia on Lyrica  4. On methadone maintenance x 6 years for h/o oxycodone abuse including snorting OxyContin 80mg, 5x/day.  States that she has been sober since being on methadone.  Of note, patient admits to getting Xanax on the streets and had an overdose on 1/22/19 requiring intubation at [facility].  5. H/o alcohol abuse-sober for many years.  6. H/o PUD s/p Billroth II with revision.  7. H/o Bipolar II  8. Opioid induced constipation-has bowel regimen PTA.       -- Outpatient opioid requirements prior to admission:   Methadone maintenance at [facility] x 6 years: on Methadone liquid 113mg/day.  States that she is working with methadone clinic to taper down and off.  Plan is to decrease methadone by 3mg/day/on a weekly basis.      reviewed: Mostly Lyrica prescription     Primary Care Provider: Fredy Merritt  Chronic Pain Provider: none at this time.      Interval History:  Demetra Richardson was seen today (May 9, 2019) and she reports that her pain is well controlled with her multimodal pain regimen and bupivacaine epidural.  Was able to get some good sleep last night and plans to work with PT after she had an x-ray earlier today.  Received 1 unit of prbcs today.  Patient is working on having a BM.  Will continue to chart check the patient and will assist with pain control when transitioning off the epidural.    CAPA (Clinically Aligned Pain Assessment):    Comfort (How is your pain?):  Tolerable with discomfort  Change in Pain (Since your last medication/intervention?): Getting better  Pain Control (How are your pain treatments working?):  Partially effective control  Functioning (Are you able to do activities to get better?) : Can do most things, but pain gets in the way of some   Sleep (Does your pain management allow you to sleep or rest?): Awake with occasional pain      FUNCTIONAL STATUS:  Change:      Improving  Oral intake:     Regular  Activity level:     Up with assistance ambulating in room  Mood:      Stable     -- Inpatient Medications Related to Pain Management:   Medications related to Pain Management (From now, onward)    Start     Dose/Rate Route Frequency Ordered Stop    05/08/19 2000  pregabalin (LYRICA) capsule 150 mg      150 mg Oral AT BEDTIME 05/08/19 1240      05/08/19 1523  methocarbamol (ROBAXIN) tablet 500 mg      500 mg Oral EVERY 6 HOURS 05/08/19 1235      05/08/19 1400  pregabalin (LYRICA) capsule 100 mg      100 mg Oral 2 TIMES DAILY 05/08/19 1240      05/08/19 0800  polyethylene glycol (MIRALAX/GLYCOLAX) Packet 17 g      17 g Oral 2 TIMES DAILY 05/08/19 0706      05/07/19 2230  senna-docusate (SENOKOT-S/PERICOLACE) 8.6-50 MG per tablet 2 tablet      2 tablet Oral 2 TIMES DAILY 05/07/19 2216      05/07/19 2216  oxyCODONE (ROXICODONE) tablet 5-10 mg      5-10 mg Oral EVERY 3 HOURS PRN 05/07/19 2216      05/07/19 1115  bupivacaine (MARCAINE) 0.125 % in sodium chloride 0.9 % 250 mL EPIDURAL Infusion      10 mL/hr  EPIDURAL CONTINUOUS 05/07/19 1101      05/06/19 1841  clonazePAM (klonoPIN) tablet 0.5 mg      0.5 mg Oral 2 TIMES DAILY 05/06/19 1601      05/06/19 1430  acetaminophen (TYLENOL) tablet 650 mg      650 mg Oral EVERY 6 HOURS PRN 05/06/19 1415      05/06/19 1430  [Rx hold ]  aspirin-acetaminophen-caffeine (EXCEDRIN MIGRAINE) per tablet 2 tablet     (Rx hold  since Mon 5/6/2019 at 1447 [facility]    2 tablet Oral HOLD 05/06/19 1415      05/06/19  0500  methadone (DOLOPHINE-INTENSOL) 10 MG/ML (HIGH CONC) solution 113 mg      113 mg Oral DAILY 05/05/19 1201      04/29/19 0453  lidocaine 1 % 0.1-1 mL      0.1-1 mL Other EVERY 1 HOUR PRN 04/29/19 0453      04/29/19 0453  lidocaine (LMX4) cream       Topical EVERY 1 HOUR PRN 04/29/19 0453            LAB DATA:  Recent Labs   Lab 05/09/19  0622 05/08/19 0440 05/07/19  1940  05/07/19  0616 05/06/19  0559   CR 0.60 0.59 0.61  --  0.66 0.64   WBC 13.6* 16.4* 18.5*  --  12.0* 12.1*   HGB 6.8* 7.8* 8.9*   < > 9.6* 10.0*   AST  --  60*  --   --  47* 167*   ALT  --  26  --   --  31 61*    < > = values in this interval not displayed.         ----------------------------------------------------------------------------------  Sharon Llanos PharmD, MS  Inpatient Pain Service     To reach us:  Mon - Friday 8 AM - 3 PM: Pager [phone] (Text Page)  After hours, weekends and holidays: Primary service should call [phone] for the on-call pain specialist    Helpful Resources:  Getting Rid of Unwanted Medications (printable PDF for patients)   Opioid Overdose Prevention Toolkit (printable PDF for patients)   Prescription Opioids: What You Need To Know (printable PDF for patients)

## 2019-05-09 NOTE — PROGRESS NOTES
05/09/19 1300   Quick Adds   Type of Visit Initial Occupational Therapy Evaluation   Living Environment   Lives With alone   Living Arrangements apartment   Home Accessibility stairs to enter home   Number of Stairs, Main Entrance 10   Stair Railings, Main Entrance railings on both sides of stairs   Transportation Anticipated public transportation   Self-Care   Usual Activity Tolerance good   Current Activity Tolerance fair   Regular Exercise Yes   Activity/Exercise Type walking   Exercise Amount/Frequency 45 mins;3-5 times/wk   Equipment Currently Used at Home none   Functional Level   Ambulation 0-->independent   Transferring 0-->independent   Toileting 0-->independent   Bathing 0-->independent   Dressing 0-->independent   Eating 0-->independent   Communication 0-->understands/communicates without difficulty   Swallowing 0-->swallows foods/liquids without difficulty   Cognition 0 - no cognition issues reported   Fall history within last six months yes   Number of times patient has fallen within last six months 1   Which of the above functional risks had a recent onset or change? ambulation;transferring;toileting;bathing;dressing   General Information   Onset of Illness/Injury or Date of Surgery - Date 04/28/19   Referring Physician Amy Morales MD   Patient/Family Goals Statement Pt would like to return home independently.    Additional Occupational Profile Info/Pertinent History of Current Problem Demetra Richardson is a 63 year old female with a h/o Sjogren's, SLD, PSC, fibromyalgia, and prior opiate abuse on methadone who was admitted 4/28/2019 with progressive shortness of breath and was found to have pneumonia with a left-sided loculated pleural effusion. A chest tube was placed (4/29/2019) and she was started on antibiotics. Chest tube had been draining however due to presence of loculations she underwent tPA lytics via the chest tube x3 days. Repeat CT scan demonstrating persistent effusion and  possible new right sided consolidation. She is now s/p LEFT VATS decortication on 5/7/2019 with Dr. Muir.    Precautions/Limitations fall precautions  (Left VATS precautions. )   Weight-Bearing Status - LUE   (20lbs)   Weight-Bearing Status - RUE full weight-bearing   Weight-Bearing Status - LLE full weight-bearing   Weight-Bearing Status - RLE full weight-bearing   Cognitive Status Examination   Orientation orientation to person, place and time   Level of Consciousness alert   Follows Commands (Cognition) physical/tactile prompts required;verbal cues/prompting required   Visual Perception   Visual Perception No deficits were identified;Wears glasses   Visual Perception Comments Reading glasses.    Sensory Examination   Sensory Quick Adds No deficits were identified   Sensory Comments Pt with no changes in sensation, Pt able to feel light touch and pressure throughout BUE's and BLE's.    Pain Assessment   Patient Currently in Pain Yes, see Vital Sign flowsheet   Integumentary/Edema   Integumentary/Edema no deficits were identifed   Range of Motion (ROM)   ROM Comment RUE AROM WFL. LUE AROM 0-~80 degrees humeral flxion limited by pain/tightness.    Strength   Strength Comments RUE Strength grossly 4-/5 MMT. LUE not formally tested due to VATS precautions, Pt demonstrates a moderate deficit in BUE  strength.    Mobility   Bed Mobility Comments Not assessed.    Transfer Skills   Transfer Comments CGA/Min A and vc's.   Activities of Daily Living Analysis   Impairments Contributing to Impaired Activities of Daily Living balance impaired;fear and anxiety;flexibility decreased;pain;post surgical precautions;ROM decreased;strength decreased   General Therapy Interventions   Planned Therapy Interventions ADL retraining;IADL retraining;bed mobility training;cognition;ROM;strengthening;stretching;transfer training;home program guidelines;progressive activity/exercise   Clinical Impression   Criteria for Skilled Therapeutic  "Interventions Met yes, treatment indicated   OT Diagnosis Decreased independence with functional transfers and ADLs.   Influenced by the following impairments Decreased strength, ROM and activity tolerance. Decreased functional mobility.    Assessment of Occupational Performance 3-5 Performance Deficits   Identified Performance Deficits Decreased independence with functional transfers/ADLs/IADLs.    Clinical Decision Making (Complexity) Low complexity   Therapy Frequency 5 times/wk   Predicted Duration of Therapy Intervention (days/wks) 5/16/2019   Anticipated Discharge Disposition Transitional Care Facility   Risks and Benefits of Treatment have been explained. Yes   Patient, Family & other staff in agreement with plan of care Yes   [facility] TM \"6 Clicks\"   2016, Trustees of [facility], under license to [employer].  All rights reserved.   6 Clicks Short Forms Daily Activity Inpatient Short Form   [facility]  \"6 Clicks\" Daily Activity Inpatient Short Form   1. Putting on and taking off regular lower body clothing? 2 - A Lot   2. Bathing (including washing, rinsing, drying)? 2 - A Lot   3. Toileting, which includes using toilet, bedpan or urinal? 3 - A Little   4. Putting on and taking off regular upper body clothing? 2 - A Lot   5. Taking care of personal grooming such as brushing teeth? 4 - None   6. Eating meals? 4 - None   Daily Activity Raw Score (Score out of 24.Lower scores equate to lower levels of function) 17   Total Evaluation Time   Total Evaluation Time (Minutes) 10      05/09/19 1300   Quick Adds   Type of Visit Initial Occupational Therapy Evaluation   Living Environment   Lives With alone   Living Arrangements apartment   Home Accessibility stairs to enter home   Number of Stairs, Main Entrance 10   Stair Railings, Main Entrance railings on both sides of stairs   Transportation Anticipated public transportation   Self-Care   Usual Activity Tolerance good "   Current Activity Tolerance fair   Regular Exercise Yes   Activity/Exercise Type walking   Exercise Amount/Frequency 45 mins;3-5 times/wk   Equipment Currently Used at Home none   Functional Level   Ambulation 0-->independent   Transferring 0-->independent   Toileting 0-->independent   Bathing 0-->independent   Dressing 0-->independent   Eating 0-->independent   Communication 0-->understands/communicates without difficulty   Swallowing 0-->swallows foods/liquids without difficulty   Cognition 0 - no cognition issues reported   Fall history within last six months yes   Number of times patient has fallen within last six months 1   Which of the above functional risks had a recent onset or change? ambulation;transferring;toileting;bathing;dressing   General Information   Onset of Illness/Injury or Date of Surgery - Date 04/28/19   Referring Physician Amy Morales MD   Patient/Family Goals Statement Pt would like to return home independently.    Additional Occupational Profile Info/Pertinent History of Current Problem Demetra Richardson is a 63 year old female with a h/o Sjogren's, SLD, PSC, fibromyalgia, and prior opiate abuse on methadone who was admitted 4/28/2019 with progressive shortness of breath and was found to have pneumonia with a left-sided loculated pleural effusion. A chest tube was placed (4/29/2019) and she was started on antibiotics. Chest tube had been draining however due to presence of loculations she underwent tPA lytics via the chest tube x3 days. Repeat CT scan demonstrating persistent effusion and possible new right sided consolidation. She is now s/p LEFT VATS decortication on 5/7/2019 with Dr. Muir.    Precautions/Limitations fall precautions  (Left VATS precautions. )   Weight-Bearing Status - LUE   (20lbs)   Weight-Bearing Status - RUE full weight-bearing   Weight-Bearing Status - LLE full weight-bearing   Weight-Bearing Status - RLE full weight-bearing   Cognitive Status Examination    Orientation orientation to person, place and time   Level of Consciousness alert   Follows Commands (Cognition) physical/tactile prompts required;verbal cues/prompting required   Visual Perception   Visual Perception No deficits were identified;Wears glasses   Visual Perception Comments Reading glasses.    Sensory Examination   Sensory Quick Adds No deficits were identified   Sensory Comments Pt with no changes in sensation, Pt able to feel light touch and pressure throughout BUE's and BLE's.    Pain Assessment   Patient Currently in Pain Yes, see Vital Sign flowsheet   Integumentary/Edema   Integumentary/Edema no deficits were identifed   Range of Motion (ROM)   ROM Comment RUE AROM WFL. LUE AROM 0-~80 degrees humeral flxion limited by pain/tightness.    Strength   Strength Comments RUE Strength grossly 4-/5 MMT. LUE not formally tested due to VATS precautions, Pt demonstrates a moderate deficit in BUE  strength.    Mobility   Bed Mobility Comments Not assessed.    Transfer Skills   Transfer Comments CGA/Min A and vc's.   Activities of Daily Living Analysis   Impairments Contributing to Impaired Activities of Daily Living balance impaired;fear and anxiety;flexibility decreased;pain;post surgical precautions;ROM decreased;strength decreased   General Therapy Interventions   Planned Therapy Interventions ADL retraining;IADL retraining;bed mobility training;cognition;ROM;strengthening;stretching;transfer training;home program guidelines;progressive activity/exercise   Clinical Impression   Criteria for Skilled Therapeutic Interventions Met yes, treatment indicated   OT Diagnosis Decreased independence with functional transfers and ADLs.   Influenced by the following impairments Decreased strength, ROM and activity tolerance. Decreased functional mobility.    Assessment of Occupational Performance 3-5 Performance Deficits   Identified Performance Deficits Decreased independence with functional  "transfers/ADLs/IADLs.    Clinical Decision Making (Complexity) Low complexity   Therapy Frequency 5 times/wk   Predicted Duration of Therapy Intervention (days/wks) 5/16/2019   Anticipated Discharge Disposition Transitional Care Facility   Risks and Benefits of Treatment have been explained. Yes   Patient, Family & other staff in agreement with plan of care Yes   [facility] TM \"6 Clicks\"   2016, Trustees of [facility], under license to [employer].  All rights reserved.   6 Clicks Short Forms Daily Activity Inpatient Short Form   [facility]  \"6 Clicks\" Daily Activity Inpatient Short Form   1. Putting on and taking off regular lower body clothing? 2 - A Lot   2. Bathing (including washing, rinsing, drying)? 2 - A Lot   3. Toileting, which includes using toilet, bedpan or urinal? 3 - A Little   4. Putting on and taking off regular upper body clothing? 2 - A Lot   5. Taking care of personal grooming such as brushing teeth? 4 - None   6. Eating meals? 4 - None   Daily Activity Raw Score (Score out of 24.Lower scores equate to lower levels of function) 17   Total Evaluation Time   Total Evaluation Time (Minutes) 10     "

## 2019-05-09 NOTE — PROGRESS NOTES
Transfer  Transferred from: 4C  Via: bed  Reason for transfer:Pt appropriate for 6B  Family: Aware of transfer  Belongings: Received with pt include glasses, cell phone, duffle bag, shoes, purse  Chart: Received with pt  Medications: Meds received from old unit with pt  2 RN Skin Assessment Completed By: Astrid Dejesus  Report received from: Lindsey VO  Pt status:  Alert and oriented, c/o pain at CT site. Chest tubes connected to -20 wall suction upon arrival. Epidural in place, site c/d/i/. Skin assessment completed. Will continue to monitor and follow POC.

## 2019-05-09 NOTE — PROGRESS NOTES
Perioperative Pain Service Cross Cover Note    Patient with moderately well controlled pain at this point.  Evaluated for scheduled bolus of epidural catheter.      Epidural catheter bolused with PF bupivacaine 0.125%, 5 mL incrementally with negative aspirate before and between each mL without complication, no symptoms of local anesthetic toxicity (LAST).  Remained with and assessed patient for 10 min post-injection.  Bedside nurse aware she should continue monitor BP/P, MAP Q 5 min x 30 min, and assess for any untoward effects to local anesthetic following injection and contact anesthesia for any questions or concerns. or concerns.    Ryan Swanson MD  Anesthesiology Resident   [phone]    5/8/2019 8:45 PM

## 2019-05-09 NOTE — PLAN OF CARE
Discharge Planner OT   Patient plan for discharge: Home   Current status: Evaluation completed and treatment initiated. Therapist educated pt on OT role and discussed POC/Goals for therapy. Pt required Mild encouragement to participate in therapy session. Educated pt on left VATS precautions and application to functional mobility. Pt verbalized understanding. Pt required CGA/Min A and vc's for transfer sit<->standing  transfer. Pt ambulated ~100ft x2 with CGA, vc's, IV pole and WC follow. Pt required standing and 2 seated rest breaks due to fatigue, weakness. Pt tolerated therapy session well. VSS. 3L O2 NC.   Barriers to return to prior living situation: Pain, Decreased independence with functional transfers and ADLs. Decreased strength and endurance, Fatigue, anxiety.   Recommendations for discharge: TCU at this time though pt will likely progress to discharge home with OP therapies.   Rationale for recommendations: Pt will benefit from continued therapy to address barriers above and to maximize functional independence.        Entered by: Rachid Mcgee 05/09/2019 2:25 PM

## 2019-05-09 NOTE — PLAN OF CARE
Admitted for L pleural effusion management. POD 2 L decortication and bronch.     Pt alert and oriented x4. Complains of pain throughout shift. PRN pain medication given. See MAR. Epidural in place and managed by anesthesia. See note. Afebrile this shift. Sinus tachy. On 2 L nasal cannula. Lung sounds clear and diminished in bilateral lower bases. Chest tube dressings clean, dry and intact. No air leak  noted. See flowsheets for output. Pt complaining of pain on left lower side by chest tube sites. No BM this shift. Poor appetite. On regular diet. Xray completed this shift. Up with stand by assist.     Transfer to  when bed available. Monitor chest tube output. Manage pain. PRN oxy q3h. Notify smileys if any changes. Notify CT surgery at [phone] if any bleeding or chest tube questions/concerns.     Problem: Mood Alteration Comorbidity  Goal: Mood Alteration Comorbidity  Description  Patient comorbidity will be monitored for signs and symptoms of Mood Alteration condition.  Problems will be absent, minimized or managed by discharge/transition of care.  5/9/2019 1458 by Cristin Leon RN  Outcome: No Change  5/9/2019 0728 by Kiley Velasco, RN  Outcome: Improving     Problem: Pain Chronic (Persistent) (Comorbidity Management)  Goal: Acceptable Pain Control and Functional Ability  5/9/2019 1458 by Cristin Leon RN  Outcome: No Change  5/9/2019 0728 by Kiley Velasco, RN  Outcome: No Change     Problem: Pain Chronic (Persistent) (Comorbidity Management)  Goal: Acceptable Pain Control and Functional Ability  5/9/2019 1458 by Cristin Leon RN  Outcome: No Change  5/9/2019 0728 by Kiley Velasco, RN  Outcome: No Change

## 2019-05-09 NOTE — PROGRESS NOTES
05/09/19 1100   Quick Adds   Type of Visit Initial PT Evaluation   Living Environment   Lives With alone   Living Arrangements apartment   Home Accessibility stairs to enter home   Number of Stairs, Main Entrance 10   Stair Railings, Main Entrance railing on left side (ascending)   Transportation Anticipated public transportation   Living Environment Comment Pt lives alone, reports she has minimal support system at home. Does her own cleaning, cooking, ect. Has not been eating much more than milk and grahams per patient, planning on ordering full meals in the future due to reports of malnutrition   Self-Care   Usual Activity Tolerance good   Current Activity Tolerance fair   Regular Exercise Yes   Activity/Exercise Type walking   Exercise Amount/Frequency 45 mins;3-5 times/wk  (3 days/wk)   Equipment Currently Used at Home none   Activity/Exercise/Self-Care Comment Pt travels to/from methadone clinic and walks for exercise 3 days/wk. Reports this exhausts her and she isn't able to do much more.   Functional Level Prior   Ambulation 0-->independent   Transferring 0-->independent   Toileting 0-->independent   Bathing 0-->independent   Communication 0-->understands/communicates without difficulty   Swallowing 0-->swallows foods/liquids without difficulty   Cognition 0 - no cognition issues reported   Fall history within last six months yes   Number of times patient has fallen within last six months 1  (Fall in Jan on icey day)   Which of the above functional risks had a recent onset or change? ambulation;transferring;toileting;bathing;dressing;fall history   Prior Functional Level Comment Previously IND with ADLs and IADLs   General Information   Onset of Illness/Injury or Date of Surgery - Date 04/28/19   Referring Physician Amy Morales MD   Patient/Family Goals Statement none specifically stated when asked   Pertinent History of Current Problem (include personal factors and/or comorbidities that impact the  POC) 63 year old female with a h/o Sjogren's, SLD, PSC, fibromyalgia, and prior opiate abuse on methadone who was admitted 4/28/2019 with progressive shortness of breath and was found to have pneumonia with a left-sided loculated pleural effusion. A chest tube was placed (4/29/2019) and she was started on antibiotics. Chest tube had been draining however due to presence of loculations she underwent tPA lytics via the chest tube x3 days. Repeat CT scan demonstrating persistent effusion and possible new right sided consolidation. She is now s/p LEFT VATS decortication on 5/7/2019 with Dr. Muir.    Precautions/Limitations   (L thoracotomy)   Heart Disease Risk Factors Lack of physical activity;Stress;Age   Cognitive Status Examination   Orientation orientation to person, place and time   Level of Consciousness alert   Follows Commands and Answers Questions 100% of the time   Personal Safety and Judgment intact   Pain Assessment   Patient Currently in Pain Yes, see Vital Sign flowsheet   Posture    Posture Forward head position   Range of Motion (ROM)   ROM Comment LUE grossly limited due to pain, RUE and BLE grossly WFL   Strength   Strength Comments BUE and BLE grossly demonstrates > 3/5 with transfers   Bed Mobility   Bed Mobility Comments Not assessed as pt up at EOB   Transfer Skills   Transfer Comments Sit <> stand with CGA   Gait   Gait Comments Ambulate 5' with no AD and CGA, minor unsteadiness   Balance   Balance Comments Good static sitting balance, unsteadiness with gait requiring UE support on IV pole   General Therapy Interventions   Planned Therapy Interventions balance training;bed mobility training;gait training;strengthening;transfer training;risk factor education;home program guidelines;progressive activity/exercise   Clinical Impression   Criteria for Skilled Therapeutic Intervention yes, treatment indicated   PT Diagnosis Impaired functional mobility   Influenced by the following impairments  "strength, activity tolerance, respiratory status, pain control   Functional limitations due to impairments gait, stairs, transfers, bed mobility, functional endurance   Clinical Presentation Stable/Uncomplicated   Clinical Presentation Rationale S/p L VATS, limited by pain, clinical reasoning   Clinical Decision Making (Complexity) Low complexity   Therapy Frequency` 5 times/week   Predicted Duration of Therapy Intervention (days/wks) 1 weeks   Anticipated Discharge Disposition Home with Outpatient Therapy;Home with Assist   Risk & Benefits of therapy have been explained Yes   Patient, Family & other staff in agreement with plan of care Yes   [facility] TM \"6 Clicks\"   2016, Trustees of [facility], under license to [employer].  All rights reserved.   6 Clicks Short Forms Basic Mobility Inpatient Short Form   [facility] AM-PAC  \"6 Clicks\" V.2 Basic Mobility Inpatient Short Form   1. Turning from your back to your side while in a flat bed without using bedrails? 3 - A Little   2. Moving from lying on your back to sitting on the side of a flat bed without using bedrails? 3 - A Little   3. Moving to and from a bed to a chair (including a wheelchair)? 3 - A Little   4. Standing up from a chair using your arms (e.g., wheelchair, or bedside chair)? 3 - A Little   5. To walk in hospital room? 3 - A Little   6. Climbing 3-5 steps with a railing? 2 - A Lot   Basic Mobility Raw Score (Score out of 24.Lower scores equate to lower levels of function) 17   Total Evaluation Time   Total Evaluation Time (Minutes) 9     "

## 2019-05-09 NOTE — PROGRESS NOTES
REGIONAL ANESTHESIA PAIN SERVICE EPIDURAL NOTE  Demetra Richardson is a 63 year old female POD #2 s/p No admission procedures for hospital encounter. and placement of T5-6 epidural catheter for pain management.      SUBJECTIVE  Interval History: Overnight no acute events. Patient reports breakthrough pain helped with bolus PF bupivacaine 0.125% 5 ml.  Patient reports adequate pain control with epidural infusion and current analgesic medications (see below).  No weakness, paresthesias, circumoral numbness, metallic taste or tinnitus.  Patient ambulating with assistance. Sits in chair.  Currently tolerating a regular diet, denies nausea or vomiting, voiding without difficulty.     Clinically Aligned Pain Assessment (CAPA):  Comfort (How is your pain?): Comfortably manageable  Change in Pain (Since your last medication/intervention?): Getting better  Pain Control (How are your pain treatments working?):  Partially effective pain control  Functioning (Are you able to do activities to get better?) : Can do most things, but pain gets in the way of some   Sleep (Does your pain management allow you to sleep or rest?): Awake with occasional pain     Pain Intensity using Numerical Rating Scale (NRS):    8/10 at rest and 8/10 with activity      Antithrombotic/Thrombolytic Therapy ordered:   heparin sodium injection 5,000 Units 5,000 Units, SC, Q8H     Analgesic Medications:  Medications related to Pain Management (From now, onward)    Start     Dose/Rate Route Frequency Ordered Stop    05/08/19 2000  pregabalin (LYRICA) capsule 150 mg      150 mg Oral AT BEDTIME 05/08/19 1240      05/08/19 1523  methocarbamol (ROBAXIN) tablet 500 mg      500 mg Oral EVERY 6 HOURS 05/08/19 1235      05/08/19 1400  pregabalin (LYRICA) capsule 100 mg      100 mg Oral 2 TIMES DAILY 05/08/19 1240      05/08/19 0800  polyethylene glycol (MIRALAX/GLYCOLAX) Packet 17 g      17 g Oral 2 TIMES DAILY 05/08/19 0706      05/07/19 2230  senna-docusate  (SENOKOT-S/PERICOLACE) 8.6-50 MG per tablet 2 tablet      2 tablet Oral 2 TIMES DAILY 05/07/19 2216      05/07/19 2216  oxyCODONE (ROXICODONE) tablet 5-10 mg      5-10 mg Oral EVERY 3 HOURS PRN 05/07/19 2216      05/07/19 1115  bupivacaine (MARCAINE) 0.125 % in sodium chloride 0.9 % 250 mL EPIDURAL Infusion      10 mL/hr  EPIDURAL CONTINUOUS 05/07/19 1101      05/06/19 1841  clonazePAM (klonoPIN) tablet 0.5 mg      0.5 mg Oral 2 TIMES DAILY 05/06/19 1601      05/06/19 1430  acetaminophen (TYLENOL) tablet 650 mg      650 mg Oral EVERY 6 HOURS PRN 05/06/19 1415      05/06/19 1430  [Rx hold ]  aspirin-acetaminophen-caffeine (EXCEDRIN MIGRAINE) per tablet 2 tablet     (Rx hold  since Mon 5/6/2019 at 1447 by Erica Casarez, [facility].)    2 tablet Oral HOLD 05/06/19 1415      05/06/19 0500  methadone (DOLOPHINE-INTENSOL) 10 MG/ML (HIGH CONC) solution 113 mg      113 mg Oral DAILY 05/05/19 1201      04/29/19 0453  lidocaine 1 % 0.1-1 mL      0.1-1 mL Other EVERY 1 HOUR PRN 04/29/19 0453      04/29/19 0453  lidocaine (LMX4) cream       Topical EVERY 1 HOUR PRN 04/29/19 0453             OBJECTIVE  Lab Results:   Recent Labs   Lab Test 05/09/19  0622   WBC 13.6*   RBC 2.41*   HGB 6.8*   HCT 22.5*   MCV 93   MCH 28.2   MCHC 30.2*   RDW 15.0          Lab Results   Component Value Date    INR 1.17 05/07/2019    INR 1.38 04/29/2019    INR 1.13 04/28/2019       Vitals:    Temp:  [35.9  C (96.6  F)-37.1  C (98.7  F)] 36  C (96.8  F)  Pulse:  [78-98] 89  Heart Rate:  [] 89  Resp:  [8-16] 12  BP: ()/(61-72) 94/69  MAP:  [72 mmHg-87 mmHg] 72 mmHg  Arterial Line BP: ()/(58-64) 93/58  SpO2:  [92 %-99 %] 94 %  BP 94/69   Pulse 89   Temp 36  C (96.8  F) (Oral)   Resp 12   Wt 68.6 kg (151 lb 3.8 oz)   SpO2 94%   BMI 26.79 kg/m         Exam:   GEN: alert, pleasant, conversant, no distress  NEURO/MSK: Strength BLE 5/5  and overall symmetric  SKIN: Epidural catheter site with dressing c/d/i, no tenderness,  erythema, heme, edema      ASSESSMENT/PLAN:    Patient is receiving adequate analgesia with current multimodal therapy including T5-6 epidural catheter infusion of bupivacaine 0.125% at 10mL/hr.  Motor function intact and adequate sensory block, meeting activity goals.  No evidence of adverse side effects related to local anesthetic. Voiding without difficulty.      - continue current epidural infusion bupivacaine 0.125% at 10 mL/hour, POD #2  - patient can be evaluated to receive local anesthetic bolus Q 12 hr PRN, bedside nurse must page RAPS (#[phone]) to request bolus  - antithrombotic/thrombolytic therapy Heparin SQ Q 8 hrs as ordered. Please contact RAPS (#[phone]) prior to any medication changes  - will continue to follow and adjust as needed    - discussed plan with attending anesthesiologist    Arian Guerra MD  Regional Anesthesia Pain Service  5/9/2019 1:28 PM    RAPS Contact Info (24 hour job code pager is the last 4 digits) For in-house use only:   [facility]ZO phone: [place] [phone], [place] [phone], [facility] [phone], then enter call-back number.    Text: Use Smile on the Intranet <Paging/Directory> tab and enter Jobcode ID.   If no call back at any time, contact the hospital  and ask for RAPS attending or backup

## 2019-05-10 ENCOUNTER — APPOINTMENT (OUTPATIENT)
Dept: PHYSICAL THERAPY | Facility: CLINIC | Age: 64
DRG: 853 | End: 2019-05-10
Payer: COMMERCIAL

## 2019-05-10 ENCOUNTER — APPOINTMENT (OUTPATIENT)
Dept: GENERAL RADIOLOGY | Facility: CLINIC | Age: 64
DRG: 853 | End: 2019-05-10
Payer: COMMERCIAL

## 2019-05-10 LAB
ANION GAP SERPL CALCULATED.3IONS-SCNC: 2 MMOL/L (ref 3–14)
BASOPHILS # BLD AUTO: 0.1 10E9/L (ref 0–0.2)
BASOPHILS NFR BLD AUTO: 0.5 %
BUN SERPL-MCNC: 7 MG/DL (ref 7–30)
CALCIUM SERPL-MCNC: 7.6 MG/DL (ref 8.5–10.1)
CHLORIDE SERPL-SCNC: 103 MMOL/L (ref 94–109)
CO2 SERPL-SCNC: 34 MMOL/L (ref 20–32)
CREAT SERPL-MCNC: 0.67 MG/DL (ref 0.52–1.04)
DIFFERENTIAL METHOD BLD: ABNORMAL
EOSINOPHIL # BLD AUTO: 1.3 10E9/L (ref 0–0.7)
EOSINOPHIL NFR BLD AUTO: 10.1 %
ERYTHROCYTE [DISTWIDTH] IN BLOOD BY AUTOMATED COUNT: 14.7 % (ref 10–15)
ERYTHROCYTE [DISTWIDTH] IN BLOOD BY AUTOMATED COUNT: 14.8 % (ref 10–15)
GFR SERPL CREATININE-BSD FRML MDRD: >90 ML/MIN/{1.73_M2}
GLUCOSE SERPL-MCNC: 93 MG/DL (ref 70–99)
HCT VFR BLD AUTO: 25.1 % (ref 35–47)
HCT VFR BLD AUTO: 27.5 % (ref 35–47)
HGB BLD-MCNC: 7.7 G/DL (ref 11.7–15.7)
HGB BLD-MCNC: 8.2 G/DL (ref 11.7–15.7)
IMM GRANULOCYTES # BLD: 0.3 10E9/L (ref 0–0.4)
IMM GRANULOCYTES NFR BLD: 2 %
LYMPHOCYTES # BLD AUTO: 1.9 10E9/L (ref 0.8–5.3)
LYMPHOCYTES NFR BLD AUTO: 15 %
MCH RBC QN AUTO: 28.3 PG (ref 26.5–33)
MCH RBC QN AUTO: 28.9 PG (ref 26.5–33)
MCHC RBC AUTO-ENTMCNC: 29.8 G/DL (ref 31.5–36.5)
MCHC RBC AUTO-ENTMCNC: 30.7 G/DL (ref 31.5–36.5)
MCV RBC AUTO: 94 FL (ref 78–100)
MCV RBC AUTO: 95 FL (ref 78–100)
MONOCYTES # BLD AUTO: 0.9 10E9/L (ref 0–1.3)
MONOCYTES NFR BLD AUTO: 7.2 %
NEUTROPHILS # BLD AUTO: 8.1 10E9/L (ref 1.6–8.3)
NEUTROPHILS NFR BLD AUTO: 65.2 %
NRBC # BLD AUTO: 0 10*3/UL
NRBC BLD AUTO-RTO: 0 /100
PLATELET # BLD AUTO: 364 10E9/L (ref 150–450)
PLATELET # BLD AUTO: 375 10E9/L (ref 150–450)
POTASSIUM SERPL-SCNC: 3.9 MMOL/L (ref 3.4–5.3)
RBC # BLD AUTO: 2.66 10E12/L (ref 3.8–5.2)
RBC # BLD AUTO: 2.9 10E12/L (ref 3.8–5.2)
SODIUM SERPL-SCNC: 139 MMOL/L (ref 133–144)
WBC # BLD AUTO: 12.4 10E9/L (ref 4–11)
WBC # BLD AUTO: 12.6 10E9/L (ref 4–11)

## 2019-05-10 PROCEDURE — 80048 BASIC METABOLIC PNL TOTAL CA: CPT | Performed by: STUDENT IN AN ORGANIZED HEALTH CARE EDUCATION/TRAINING PROGRAM

## 2019-05-10 PROCEDURE — 25000132 ZZH RX MED GY IP 250 OP 250 PS 637: Performed by: SURGERY

## 2019-05-10 PROCEDURE — 40000556 ZZH STATISTIC PERIPHERAL IV START W US GUIDANCE

## 2019-05-10 PROCEDURE — 85027 COMPLETE CBC AUTOMATED: CPT | Performed by: SURGERY

## 2019-05-10 PROCEDURE — 71046 X-RAY EXAM CHEST 2 VIEWS: CPT

## 2019-05-10 PROCEDURE — 12000004 ZZH R&B IMCU UMMC

## 2019-05-10 PROCEDURE — 25000128 H RX IP 250 OP 636: Performed by: STUDENT IN AN ORGANIZED HEALTH CARE EDUCATION/TRAINING PROGRAM

## 2019-05-10 PROCEDURE — 97116 GAIT TRAINING THERAPY: CPT | Mod: GP

## 2019-05-10 PROCEDURE — 25000128 H RX IP 250 OP 636: Performed by: NURSE PRACTITIONER

## 2019-05-10 PROCEDURE — 25800030 ZZH RX IP 258 OP 636: Performed by: NURSE PRACTITIONER

## 2019-05-10 PROCEDURE — 36415 COLL VENOUS BLD VENIPUNCTURE: CPT | Performed by: SURGERY

## 2019-05-10 PROCEDURE — 85025 COMPLETE CBC W/AUTO DIFF WBC: CPT | Performed by: STUDENT IN AN ORGANIZED HEALTH CARE EDUCATION/TRAINING PROGRAM

## 2019-05-10 PROCEDURE — 99207 ZZC NO CHARGE FOLLOW UP PS: CPT

## 2019-05-10 PROCEDURE — 25000128 H RX IP 250 OP 636: Performed by: SURGERY

## 2019-05-10 PROCEDURE — 36415 COLL VENOUS BLD VENIPUNCTURE: CPT | Performed by: STUDENT IN AN ORGANIZED HEALTH CARE EDUCATION/TRAINING PROGRAM

## 2019-05-10 PROCEDURE — 25000132 ZZH RX MED GY IP 250 OP 250 PS 637: Performed by: STUDENT IN AN ORGANIZED HEALTH CARE EDUCATION/TRAINING PROGRAM

## 2019-05-10 RX ORDER — FUROSEMIDE 10 MG/ML
20 INJECTION INTRAMUSCULAR; INTRAVENOUS ONCE
Status: COMPLETED | OUTPATIENT
Start: 2019-05-10 | End: 2019-05-10

## 2019-05-10 RX ADMIN — Medication 0.5 MG: at 06:01

## 2019-05-10 RX ADMIN — BUPIVACAINE HYDROCHLORIDE: 7.5 INJECTION, SOLUTION EPIDURAL; RETROBULBAR at 20:22

## 2019-05-10 RX ADMIN — FUROSEMIDE 20 MG: 10 INJECTION, SOLUTION INTRAVENOUS at 11:40

## 2019-05-10 RX ADMIN — ESCITALOPRAM 10 MG: 5 TABLET, FILM COATED ORAL at 08:01

## 2019-05-10 RX ADMIN — OXYCODONE HYDROCHLORIDE 10 MG: 5 TABLET ORAL at 12:43

## 2019-05-10 RX ADMIN — OXYCODONE HYDROCHLORIDE 10 MG: 5 TABLET ORAL at 22:48

## 2019-05-10 RX ADMIN — OXYCODONE HYDROCHLORIDE 10 MG: 5 TABLET ORAL at 06:01

## 2019-05-10 RX ADMIN — METHADONE HYDROCHLORIDE 113 MG: 10 CONCENTRATE ORAL at 05:19

## 2019-05-10 RX ADMIN — METHOCARBAMOL 500 MG: 500 TABLET, FILM COATED ORAL at 11:40

## 2019-05-10 RX ADMIN — OXYCODONE HYDROCHLORIDE 10 MG: 5 TABLET ORAL at 16:37

## 2019-05-10 RX ADMIN — PREGABALIN 100 MG: 100 CAPSULE ORAL at 14:03

## 2019-05-10 RX ADMIN — PREGABALIN 150 MG: 75 CAPSULE ORAL at 19:56

## 2019-05-10 RX ADMIN — CEFTRIAXONE SODIUM 2 G: 2 INJECTION, POWDER, FOR SOLUTION INTRAMUSCULAR; INTRAVENOUS at 16:34

## 2019-05-10 RX ADMIN — OXYCODONE HYDROCHLORIDE 10 MG: 5 TABLET ORAL at 09:34

## 2019-05-10 RX ADMIN — METHOCARBAMOL 500 MG: 500 TABLET, FILM COATED ORAL at 18:15

## 2019-05-10 RX ADMIN — OXYCODONE HYDROCHLORIDE 10 MG: 5 TABLET ORAL at 02:57

## 2019-05-10 RX ADMIN — OXYCODONE HYDROCHLORIDE 10 MG: 5 TABLET ORAL at 19:56

## 2019-05-10 RX ADMIN — ACETAMINOPHEN, ASPIRIN AND CAFFEINE 1 TABLET: 250; 250; 65 TABLET, FILM COATED ORAL at 12:52

## 2019-05-10 RX ADMIN — Medication 250 MG: at 08:01

## 2019-05-10 RX ADMIN — HEPARIN SODIUM 5000 UNITS: 5000 INJECTION, SOLUTION INTRAVENOUS; SUBCUTANEOUS at 14:03

## 2019-05-10 RX ADMIN — PREGABALIN 100 MG: 100 CAPSULE ORAL at 08:01

## 2019-05-10 RX ADMIN — POLYETHYLENE GLYCOL 3350 17 G: 17 POWDER, FOR SOLUTION ORAL at 08:00

## 2019-05-10 RX ADMIN — Medication 0.5 MG: at 18:15

## 2019-05-10 RX ADMIN — POLYETHYLENE GLYCOL 3350 17 G: 17 POWDER, FOR SOLUTION ORAL at 19:56

## 2019-05-10 RX ADMIN — ACETAMINOPHEN 650 MG: 325 TABLET, FILM COATED ORAL at 20:24

## 2019-05-10 RX ADMIN — METHOCARBAMOL 500 MG: 500 TABLET, FILM COATED ORAL at 06:01

## 2019-05-10 RX ADMIN — PANTOPRAZOLE SODIUM 40 MG: 40 TABLET, DELAYED RELEASE ORAL at 08:01

## 2019-05-10 ASSESSMENT — MIFFLIN-ST. JEOR: SCORE: 1220.13

## 2019-05-10 ASSESSMENT — ENCOUNTER SYMPTOMS
ABDOMINAL PAIN: 0
NERVOUS/ANXIOUS: 0
NAUSEA: 0
CONSTIPATION: 0
WEAKNESS: 0
LIGHT-HEADEDNESS: 0
DYSURIA: 0
VOMITING: 0
DIAPHORESIS: 0
TREMORS: 0
UNEXPECTED WEIGHT CHANGE: 0
CHILLS: 0
FEVER: 0
NUMBNESS: 0
HEADACHES: 0
SHORTNESS OF BREATH: 0
JOINT SWELLING: 0
DYSPHORIC MOOD: 0

## 2019-05-10 ASSESSMENT — PAIN DESCRIPTION - DESCRIPTORS
DESCRIPTORS: CONSTANT;CRUSHING
DESCRIPTORS: ACHING;CONSTANT
DESCRIPTORS: ACHING;CONSTANT

## 2019-05-10 ASSESSMENT — ACTIVITIES OF DAILY LIVING (ADL)
ADLS_ACUITY_SCORE: 14

## 2019-05-10 NOTE — PROGRESS NOTES
Perioperative Pain Service Cross Cover Note      Patient requests evening bolus. Reports pain well controlled.      Epidural catheter bolused with PF bupivacaine 0.125%, 5 mL incrementally with negative aspirate before and between each mL without complication, no symptoms of local anesthetic toxicity (LAST).  Remained with and assessed patient for 10 min post-injection.  Bedside nurse aware she should continue monitor BP/P, MAP Q 5 min x 30 min, and assess for any untoward effects to local anesthetic following injection and contact anesthesia for any questions or concerns. or concerns.     Ronaldo Lux MD  Anesthesiology Resident   [phone]

## 2019-05-10 NOTE — PROGRESS NOTES
REGIONAL ANESTHESIA PAIN SERVICE EPIDURAL NOTE  Demetra Richardson is a 63 year old female POD #3 s/p Left Video Assisted Thoracoscopic Decortication, Intercostal Nerve Cryo Analgesia, Flexible Bronchoscopy, Flexible Bronchoscopy and placement of T5-6 epidural catheter for postoperative pain management.      SUBJECTIVE  Interval History: Overnight no acute events. Patient reports good pain control with epidural infusion and current analgesic medications (see below).  Clinician bolus of PF bupivacaine 0.125% 5ml helpful. No weakness, paresthesias, circumoral numbness, metallic taste or tinnitus.  Patient ambulating without assistance between bed and commode.  She was administered lasix causing her to have frequent urination. Currently tolerating a regular diet, no nausea or vomiting, voiding without difficulty.     Clinically Aligned Pain Assessment (CAPA):  Comfort (How is your pain?): Comfortably manageable  Change in Pain (Since your last medication/intervention?): Getting better  Pain Control (How are your pain treatments working?):  Partially effective pain control  Functioning (Are you able to do activities to get better?) : Can do most things, but pain gets in the way of some   Sleep (Does your pain management allow you to sleep or rest?): Awake with occasional pain     Pain Intensity using Numerical Rating Scale (NRS):    6/10 at rest and 8/10 with activity      Antithrombotic/Thrombolytic Therapy ordered:    heparin sodium injection 5,000 Units 5,000 Units, SC, Q8H       Analgesic Medications:  Medications related to Pain Management (From now, onward)    Start     Dose/Rate Route Frequency Ordered Stop    05/10/19 0751  aspirin-acetaminophen-caffeine (EXCEDRIN MIGRAINE) per tablet 1 tablet      1 tablet Oral DAILY PRN 05/10/19 0751      05/08/19 2000  pregabalin (LYRICA) capsule 150 mg      150 mg Oral AT BEDTIME 05/08/19 1240      05/08/19 1523  methocarbamol (ROBAXIN) tablet 500 mg      500 mg Oral EVERY  "6 HOURS 05/08/19 1235      05/08/19 1400  pregabalin (LYRICA) capsule 100 mg      100 mg Oral 2 TIMES DAILY 05/08/19 1240      05/08/19 0800  polyethylene glycol (MIRALAX/GLYCOLAX) Packet 17 g      17 g Oral 2 TIMES DAILY 05/08/19 0706      05/07/19 2230  senna-docusate (SENOKOT-S/PERICOLACE) 8.6-50 MG per tablet 2 tablet      2 tablet Oral 2 TIMES DAILY 05/07/19 2216 05/07/19 2216  oxyCODONE (ROXICODONE) tablet 5-10 mg      5-10 mg Oral EVERY 3 HOURS PRN 05/07/19 2216      05/07/19 1115  bupivacaine (MARCAINE) 0.125 % in sodium chloride 0.9 % 250 mL EPIDURAL Infusion      10 mL/hr  EPIDURAL CONTINUOUS 05/07/19 1101      05/06/19 1841  clonazePAM (klonoPIN) tablet 0.5 mg      0.5 mg Oral 2 TIMES DAILY 05/06/19 1601      05/06/19 1430  acetaminophen (TYLENOL) tablet 650 mg      650 mg Oral EVERY 6 HOURS PRN 05/06/19 1415      05/06/19 0500  methadone (DOLOPHINE-INTENSOL) 10 MG/ML (HIGH CONC) solution 113 mg      113 mg Oral DAILY 05/05/19 1201      04/29/19 0453  lidocaine 1 % 0.1-1 mL      0.1-1 mL Other EVERY 1 HOUR PRN 04/29/19 0453      04/29/19 0453  lidocaine (LMX4) cream       Topical EVERY 1 HOUR PRN 04/29/19 0453             OBJECTIVE  Lab Results:   Recent Labs   Lab Test 05/10/19  0424   WBC 12.6*   RBC 2.90*   HGB 8.2*   HCT 27.5*   MCV 95   MCH 28.3   MCHC 29.8*   RDW 14.8          Lab Results   Component Value Date    INR 1.17 05/07/2019    INR 1.38 04/29/2019    INR 1.13 04/28/2019       Vitals:    Temp:  [36.2  C (97.1  F)-37.1  C (98.8  F)] 36.7  C (98  F)  Pulse:  [77-89] 86  Heart Rate:  [78-87] 78  Resp:  [14-16] 16  BP: ()/(58-72) 95/70  SpO2:  [94 %-100 %] 96 %  BP 95/70 (BP Location: Right arm)   Pulse 86   Temp 36.7  C (98  F) (Axillary)   Resp 16   Ht 1.6 m (5' 3\")   Wt 69.1 kg (152 lb 5.4 oz)   SpO2 96%   BMI 26.99 kg/m         Exam:   GEN: alert, pleasant, and no distress  NEURO/MSK: Strength BLE 5/5  and overall symmetric  SKIN: Epidural catheter site with dressing " c/d/i, no tenderness, erythema, heme, edema      ASSESSMENT/PLAN:    Patient is receiving adequate analgesia with current multimodal therapy including T5-6 epidural catheter infusion of bupivacaine 0.125% at 10mL/hr.  Motor function intact and adequate sensory block, meeting activity goals.  No evidence of adverse side effects related to local anesthetic. Voiding without difficulty.      - continue current epidural infusion bupivacaine 0.125% at 10mL/hour, POD #3  - patient can be evaluated to receive local anesthetic bolus Q 12 hr PRN, bedside nurse must page RAPS (#[phone]) to request bolus  -followed by pain service for oral analgesics in setting of opioid tolerance on methadone maintenance outpt.  - antithrombotic/thrombolytic therapy Heparin SQ Q 8 hrs as ordered. Please contact RAPS (#[phone]) prior to any medication changes  - will continue to follow and adjust as needed    - discussed plan with attending anesthesiologist    Arian Guerra MD  Regional Anesthesia Pain Service  5/10/2019 1:51 PM    RAPS Contact Info (24 hour job code pager is the last 4 digits) For in-house use only:   [employer] phone: [place] [phone], [place] [phone], [facility] [phone], then enter call-back number.    Text: Use nodila on the Intranet <Paging/Directory> tab and enter Jobcode ID.   If no call back at any time, contact the hospital  and ask for RAPS attending or backup

## 2019-05-10 NOTE — PROGRESS NOTES
THORACIC & FOREGUT SURGERY PROGRESS NOTE  05/10/2019    Patient: Demetra Richardson  MRN: [id number]    Subjective/Interval Events  No acute overnight events. No air leak on exam. Pain is controlled. Continues to refuse subQ heparin, yet is wearing the SCDs. She has ambulated some. Urine output adequate, tolerating regular diet, no n/v, no BM yet.    Objective  Temp:  [97.1  F (36.2  C)-98.8  F (37.1  C)] 98  F (36.7  C)  Pulse:  [77-89] 86  Heart Rate:  [78-87] 78  Resp:  [14-16] 16  BP: ()/(58-72) 95/70  SpO2:  [94 %-100 %] 96 %    General: AAOx4, NAD, lying comfortably in bed  CV: regular rate, warm, well-perfused  Pulm: no dyspnea, breathing comfortably on NC, left chest tubes x3 with serosanguinous output (apical tubes Y-ed to Atrium, basilar Atrium - both with no air leak)  Abd: soft, non-distended, non-tender  Extremities: no edema  Neuro: moving all extremities spontaneously without apparent deficit    I/O last 3 completed shifts:  In: 677.5 [P.O.:260; I.V.:100]  Out: 1278 [Urine:1175; Chest Tube:103]    Labs:  Recent Labs   Lab 05/10/19  0424 05/10/19  0106 05/09/19  1415   WBC 12.6* 12.4* 16.0*   HGB 8.2* 7.7* 9.0*    364 384     Recent Labs   Lab 05/10/19  0106 05/09/19  0622 05/08/19  0440 05/07/19  1940  05/04/19  0727    138 136 135   < > 134   POTASSIUM 3.9 4.1 4.2 4.6   < > 4.4   CHLORIDE 103 103 101 102   < > 102   CO2 34* 33* 28 28   < > 27   BUN 7 7 7 8   < > 16   CR 0.67 0.60 0.59 0.61   < > 0.68   GLC 93 93 110* 132*   < > 110*   RAFAELA 7.6* 7.8* 7.9* 7.9*   < > 8.0*   MAG  --   --   --  1.7  --   --    PHOS  --   --   --  5.0*  --  3.4    < > = values in this interval not displayed.       Imaging:  AM CXR reviewed   IMPRESSION:   1. Stable support devices.  2. Unchanged left basilar atelectasis or infection.    Assessment & Plan  Demetra Richardson is a 63 year old female with a h/o Sjogren's, SLD, PSC, fibromyalgia, and prior opiate abuse on methadone who was admitted  4/28/2019 with progressive shortness of breath and was found to have pneumonia with a left-sided loculated pleural effusion. A chest tube was placed (4/29/2019) and she was started on antibiotics. Chest tube had been draining however due to presence of loculations she underwent tPA lytics via the chest tube x3 days. Repeat CT scan demonstrating persistent effusion and possible new right sided consolidation. She is now s/p LEFT VATS decortication on 5/7/2019 with Dr. Muir.     - One chest tube removed today, will leave remaining to suction. Pending morning CXR will likely water seal and remove remaining CT tomorrow.   - Metanebs and aggressive pulmonary toilet to continue today; patient with atelectasis and effusion on CXR and will likely need further pulmonary toilet to help mobilize fluids and help lungs heal.   - Agree with diuresis today   - Daily CXR while chest tubes in place.  - Ambulate, PT/OT, aggressive pulmonary toilet with IS/Acapella (ordered).  - No IVFs. Please no IVFs/boluses without discussion with Thoracic Surgery, as fluid overloading can cause lung swelling and prolonged air leak.  - Hgb stable after one unit given yesterday, recommend continued monitoring with daily hgb.   - DVT ppx to continue. Discussed risk of DVT/PE and death, as well as epidural hematoma (increased risk with use of Lovenox) with patient. She expressed understanding, and will continue to decline heparin ppx. Continue SCDs.   - Pain control per primary team and Pain team consultants, appreciate recs.  - Antibiotics and remainder of cares per primary team, appreciate cares.  - Ok for no further telemetry from a Thoracic Surgery standpoint.  - Thoracic Surgery will continue to follow closely.      Patient seen and discussed with fellow and staff.  - - - - - - - - - - - - - - - - - -  Ching Delcid MD   Surgery PGY-1

## 2019-05-10 NOTE — PLAN OF CARE
"/71 (BP Location: Right arm)   Pulse 86   Temp 98.1  F (36.7  C) (Axillary)   Resp 16   Ht 1.6 m (5' 3\")   Wt 69.1 kg (152 lb 5.4 oz)   SpO2 96%   BMI 26.99 kg/m      VSS. Afebrile. Alert and oriented x 4. 2L NC. Epidural in place at 10 ml/hr. Left CT's x 3 to -20 suction with no air leaks. Up SBA to commode. Good UOP. Tolerating diet with no n/v. Patient refused heparin injection this AM but did agree to wearing SCD's. Continue to monitor.  "

## 2019-05-10 NOTE — PROGRESS NOTES
[facility], [facility] Progress Note    Main Plans for Today    -Follow up CXR, may need lasix 20 mg today with crackles   -continue antibiotics (day 7),Follow up pulm recs for antibiotics and duration  -Follow up thoracic recs  -Added back one dose of Excedrin daily per patient request  -Follow up pain team recs, RAPS  -Physical therapy/ occupational therapy recs TCU    Assessment & Plan   Demetra Richardson is a 63 year old female w/complicated PMHx, significant for SLE, PSC, Sjogren's, fibromyalgia, opiate dependence and polysubstance abuse with history of OD and withdrawal seizures, epidural abscess, bipolar disorder type II, currently on a methadone program, and is admitted for management of L pleural effusion now growing strep intermedius and persistent empyema despite chest tube placement. Now POD#3 from left decortication and bronchoscopy by thoracic surgery.     # Loculated pleural effusion/ empyema  # Sepsis secondary to empyema  Most likely parapneumonic effusion, other differentials include effusion 2/2 rheumatologic disease given ?history SLE, sjogren's, primary sclerosing cholangitis. Fluid analysis with pleural to serum protein 4.7/6.7, LDH 1116, glucose 26, WBCs seen with no organisms in stain.  Fluid culture growing strep intermedius sensitive to ceftriaxone. She is s/p chest tube placement on 4/29 with lytic therapy until 5/6, concern for persistent empyema on recent CT scan on 5/5. Thoracic consulted 5/5 with concern for persistent empyema to perform decortication 5/7. CT showed new opacities in RUL, pro jazzy returned moderate risk 0.6.  RVP negative. Sputum not collected yet.   POD #3 from decortication 5/7, pain control as below, RAPS prior to procedure  - Pulmonology consulted, following along  - thoracic surgery managing chest tubes and post op care  - Daily CXR   - Ceftriaxone started 5/2  (Stopped levo 750 mg (4/28-5/2) flagyl 500 mg tid  (4/29- 5/2)  - Supplemental oxygen prn, goal >92%  - Continue incentive spirometry  - f/u chest tube output  - Added metanebs 5/9    # Thoracic lymphadenopathy  CT showed enlarged left lower cervical lymph node, prominent left heel or soft tissue, possibly left hilar adenopathy, prominent left paratracheal lymph nodes.  DDX includes infectious versus malignant versus sarcoidosis. Pulm on board.      # Chronic pain  # Polysubstance use disorder  # History of overdose and withdrawal seizures  # Methadone maintenance therapy, active  # Fibromyalgia  # anxiety  Patient denies recent substance use, reports no alcohol use in last 7 years. She does admit to taking some xanax about 2 months ago (1/22/2019), and she was hospitalized with acute toxic encephalopathy, even intubated as she was having apneic pauses. Patient is on methadone and fills her prescriptions at [facility]. Admission UDS negative. Pain and anxiety continue to be an issue for the patient, we have made several adjustments and pain/anxiety still not well controlled. Psych and pain team consulted for recs. Now post op requiring epidural pain control with RAPS team. Epidural has helped her pain.   - Pain consult 5/1, appreciate recs;Re consult Pain Team 5/6, changed Tylenol to as needed, can increase oxy 5 mg Q3H as needed, hold excedrin, continue other cares  - Psych consult, appreciate recs for anxiety, clonopin, can increase lexapro outpatient  - PTA methadone dose, changed admin time to 5AM 5/5  - robaxin 500 mg po Q6h  - PTA pregabalin increased to 100-100-150   - Heat/cold packs  - Avoid IV opiates  - Bowel regimen  - Per recs from pain team: Oxy 5-10 mg Q3h as needed after procedure    # Hypoalbuminemia  Likely due to acute infection and chronic malnutrition contributing. Patient aware and reports poor diet at home - lots of frozen meals, fast foods, due to issues with ambulation. Lives alone, no help with food prep or grocery shopping.   - Nutrition  "consult  - Discuss outpatient services for meal/grocery delivery services- talked with [facility]     # SLE  # Sjogren's syndrome  # primary sclerosing cholangitis  # elevated alk phos from biliary source (PSC)  Patient had recent serology, but has returned negative so far. Reports diagnosis of PSC 14 years ago, and notes chronic LFT abnormalities. Elevated GGT, normal acetaminophen level consistent with PSC. Alk phos bone specific test elevated was mildly elevated to 50 but not significant enough compared to elevated GGT in the 1,000 so likely PSC. Did have recent fall but no documented fracture.   - TSH low normal, PTH normal, calcium is low (would expect elevated with MM), ionized calcium normal   - Corrected calcium with hypoalbuminemia on  is 9.2 (normal)   - consider outpatient endocrine consult for elevated bone specific alk phos if needed but suspect elevated alk phos due to PSC with elevated GGT.     #Bipolar disorder type II  Patient endorses recent loss of her father to atypical pneumonia, but considers her mood has been stable, denies suicidal ideation. Patient reports being on Risperidone in the past after a \"manic episode after a family member .\"   -PTA escitalopram, may increase to 20 mg as outpatient      # Mild aortic stenosis  Murmur noticed on exam, echocardiogram  showed LVEF, w/normal diastolic function, normal RV. Unlikely to be related to current symptoms.      # Migraines  # Fibromyalgia  Patient has long history of migraines with significant use of excedrin - now prone to rebound headaches.  -PTA Excedrin Migraine, weaning     # s/p Billroth II w/revision   # History of iron deficiency anemia  # acute blood loss anemia  Patient has malabsorption secondary to probably surgery, receives IV iron as outpatient. On 19 she had low iron and saturation, normal ferritin, folate and B12. Now anemic requiring 1 unit of RBCs  from procedure, stable.   - continue PTA pantoprazole  - IV iron " replacement as outpatient     # Pain Assessment:  Current Pain Score 5/10/2019   Patient currently in pain? sleeping: patient not able to self report   Pain score (0-10) -   Pain location -   Pain descriptors -   Some encounter information is confidential and restricted. Go to Review Flowsheets activity to see all data.   - Demetra is experiencing pain due to pleural effusion. Pain management was discussed and the plan was created in a collaborative fashion.  Demetra's response to the current recommendations: engaged  - Please see the plan for pain management as documented above    Diet: Snacks/Supplements Adult: Other; bowl of chicken salad at bedtime with crackers + milk skim; Between Meals  Snacks/Supplements Adult: Boost Plus; With Meals  Advance Diet as Tolerated: Regular Diet Adult  Fluids: PO  DVT Prophylaxis:  Heparin per RAPS  Code Status: Full Code    Disposition Plan   Expected discharge:Expected Discharge Date: 05/09/19 2 - 3 days, recommended to transitional care unit once adequate pain management/ tolerating PO medications and antibiotic plan established, physical therapy recs TCU.Dispo: Expected Discharge Date: 05/09/19      Entered: Wander Mooney 05/10/2019, 9:44 AM   Information in the above section will display in the discharge planner report.      The patient's care was discussed with the Attending Physician, Dr. dwyer.    Wander Mooney  [facility]  Pager: [phone]  Please see sticky note for cross cover information    Interval History  NAEO. Pain controlled. HDS, afebrile on 2 L NC. Pain currently controlled. 144 ml out of chest tubes yesterday. Net neg 400 yesterday. Given 10 mg IV lasix yesterday. Good UOP.   Agreed to take heparin shots and ceftriaxone.     Review of Systems   Constitutional: Negative for chills, diaphoresis, fever and unexpected weight change.   Respiratory: Negative for shortness of breath.    Cardiovascular:  Negative for chest pain and leg swelling.   Gastrointestinal: Negative for abdominal pain, constipation, nausea and vomiting.   Genitourinary: Negative for dysuria.   Musculoskeletal: Negative for joint swelling.   Neurological: Negative for tremors, weakness, light-headedness, numbness and headaches.   Psychiatric/Behavioral: Negative for dysphoric mood. The patient is not nervous/anxious.      Physical Exam   Vital Signs: Temp: 98  F (36.7  C) Temp src: Axillary BP: 95/70 Pulse: 86 Heart Rate: 78 Resp: 16 SpO2: 96 % O2 Device: Nasal cannula Oxygen Delivery: 2 LPM  Weight: 152 lbs 5.41 oz  Physical Exam   Constitutional: She is oriented to person, place, and time. She appears well-developed and well-nourished. No distress.   HENT:   Head: Normocephalic and atraumatic.   Eyes: Conjunctivae are normal. No scleral icterus.   Cardiovascular: Normal rate and regular rhythm. Exam reveals no friction rub.   Murmur (systolic ejection) heard.  Pulmonary/Chest: Effort normal. No respiratory distress. She has no wheezes. She has no rales (LLL).   Crackles heard in LLL  3 chest tubes in place, draining serosang fluid   Neurological: She is alert and oriented to person, place, and time.   Skin: Skin is warm and dry. She is not diaphoretic.   Psychiatric: She has a normal mood and affect.   Vitals reviewed.

## 2019-05-10 NOTE — PLAN OF CARE
OT/6B - Cancel. Pt working with other care providers upon afternoon attempt. Will check back and/or reschedule as appropriate.

## 2019-05-10 NOTE — PROGRESS NOTES
REGIONAL ANESTHESIA PAIN SERVICE EPIDURAL NOTE  Demetra Richardson is a 63 year old female who is POD #3 s/p Left Video Assisted Thoracoscopic Decortication, Intercostal Nerve Cryo Analgesia, Flexible Bronchoscopy, Flexible Bronchoscopy and placement of T5-6 epidural catheter for postoperative pain management.      SUBJECTIVE  Interval History: No acute events overnight.  Patient reports she is very pleased with benefit of epidural for pain control, reporting left-sided chest pain 6.5/10 and tolerating oxycodone PRN (see below).  Has history of opioid use disorder on methadone 113 daily and Lyrica.  Denies weakness, paresthesias, circumoral numbness, metallic taste or tinnitus.  Patient has been OOB, ambulated in [place]bin with PT yesterday, using bedside commode overnight, voiding without difficulty. Currently tolerating a regular diet, denies nausea     Clinically Aligned Pain Assessment (CAPA):  Comfort (How is your pain?): Tolerable with discomfort  Change in Pain (Since your last medication/intervention?): Getting better  Pain Control (How are your pain treatments working?):  Partially effective pain control  Functioning (Are you able to do activities to get better?) : Can do most things, but pain gets in the way of some       Antithrombotic/Thrombolytic Therapy ordered:    heparin sodium injection 5,000 Units 5,000 Units, SC, Q8H         Analgesic Medications:  Medications related to Pain Management (From now, onward)    Start     Dose/Rate Route Frequency Ordered Stop    05/10/19 0751  aspirin-acetaminophen-caffeine (EXCEDRIN MIGRAINE) per tablet 1 tablet      1 tablet Oral DAILY PRN 05/10/19 0751      05/08/19 2000  pregabalin (LYRICA) capsule 150 mg      150 mg Oral AT BEDTIME 05/08/19 1240      05/08/19 1523  methocarbamol (ROBAXIN) tablet 500 mg      500 mg Oral EVERY 6 HOURS 05/08/19 1235      05/08/19 1400  pregabalin (LYRICA) capsule 100 mg      100 mg Oral 2 TIMES DAILY 05/08/19 1240      05/08/19 0800   "polyethylene glycol (MIRALAX/GLYCOLAX) Packet 17 g      17 g Oral 2 TIMES DAILY 05/08/19 0706 05/07/19 2230  senna-docusate (SENOKOT-S/PERICOLACE) 8.6-50 MG per tablet 2 tablet      2 tablet Oral 2 TIMES DAILY 05/07/19 2216 05/07/19 2216  oxyCODONE (ROXICODONE) tablet 5-10 mg      5-10 mg Oral EVERY 3 HOURS PRN 05/07/19 2216      05/07/19 1115  bupivacaine (MARCAINE) 0.125 % in sodium chloride 0.9 % 250 mL EPIDURAL Infusion      10 mL/hr  EPIDURAL CONTINUOUS 05/07/19 1101      05/06/19 1841  clonazePAM (klonoPIN) tablet 0.5 mg      0.5 mg Oral 2 TIMES DAILY 05/06/19 1601      05/06/19 1430  acetaminophen (TYLENOL) tablet 650 mg      650 mg Oral EVERY 6 HOURS PRN 05/06/19 1415      05/06/19 0500  methadone (DOLOPHINE-INTENSOL) 10 MG/ML (HIGH CONC) solution 113 mg      113 mg Oral DAILY 05/05/19 1201      04/29/19 0453  lidocaine 1 % 0.1-1 mL      0.1-1 mL Other EVERY 1 HOUR PRN 04/29/19 0453      04/29/19 0453  lidocaine (LMX4) cream       Topical EVERY 1 HOUR PRN 04/29/19 0453             OBJECTIVE  Lab Results:   Recent Labs   Lab Test 05/10/19  0424   WBC 12.6*   RBC 2.90*   HGB 8.2*   HCT 27.5*   MCV 95   MCH 28.3   MCHC 29.8*   RDW 14.8          Lab Results   Component Value Date    INR 1.17 05/07/2019    INR 1.38 04/29/2019    INR 1.13 04/28/2019       Vitals:    Temp:  [96.6  F (35.9  C)-98.8  F (37.1  C)] 98  F (36.7  C)  Pulse:  [77-89] 86  Heart Rate:  [77-89] 78  Resp:  [8-16] 16  BP: ()/(58-72) 95/70  SpO2:  [94 %-100 %] 96 %  BP 95/70 (BP Location: Right arm)   Pulse 86   Temp 98  F (36.7  C) (Axillary)   Resp 16   Ht 1.6 m (5' 3\")   Wt 69.1 kg (152 lb 5.4 oz)   SpO2 96%   BMI 26.99 kg/m         Exam:   GEN: alert, pleasant, and no distress  NEURO/MSK: Strength BLE 5/5  and overall symmetric  SKIN: Epidural catheter site with dressing c/d/i, no tenderness, erythema, heme, edema     ASSESSMENT/PLAN:    Demetra Richardson is a 63 year old female who is POD #3 s/p Left Video " "Assisted Thoracoscopic Decortication, Intercostal Nerve Cryo Analgesia, Flexible Bronchoscopy, Flexible Bronchoscopy and placement of T5-6 epidural catheter for postoperative pain management.    Patient is receiving moderate analgesia with current multimodal therapy including infusion of bupivacaine 0.125% at 10 mL/hr + Q 12 hr local anesthetic bolus.  Being followed by pain service for oral analgesics in setting of opioid tolerance on methadone maintenance outpt.  Motor function intact and adequate sensory block, is meeting activity goals.  No evidence of adverse side effects related to local anesthetic. Adequate urine output    PLAN  0805 Clinician administered epidural bolus  - MEDICATION: PF bupivacaine 0.125% 5 mL   - PROCEDURE: Clinician bolus administered without complication via CADD Alexandre pump  No symptoms of local anesthetic systemic toxicity (LAST).   Remained with and assessed patient for 10 min post-injection. BP, P and MAP stable  - POST-PROCEDURE: Bedside RN aware of need to continue monitoring and document BP, P, and MAP Q 10 min for an additional 20 min. Contact RAPS (jobcode ID [id number]) if any of the following: patient experiencing any untoward effects, SBP < 90, P < 50 or > 120, MAP < 60      - continue current epidural infusion bupivacaine 0.125% at 10 mL/hour  - Inpatient Pain Management Service following for oral analgesic pain recommendations   - antithrombotic/thrombolytic therapy okay to continue Heparin SQ Q 8 hrs as ordered. Please contact RAPS (#[id number]) prior to any medication changes  - will continue to follow and adjust as needed    1110 Follow up. Patient being prepped for removal of one CT.  States \"its all good\" when asked if epidural bolus helped.    - patient can be evaluated to receive local anesthetic bolus Q 12 hr PRN, bedside nurse must page RAPS (#[id number]) to request bolus    - discussed plan with attending anesthesiologist    Megan Colon, VINOD CNP  Regional Anesthesia Pain " Service  5/10/2019 8:57 AM    RAPS Contact Info (24 hour job code pager is the last 4 digits) For in-house use only:   [employer] phone: [place] [phone], [place] [phone], [employer] [phone], then enter call-back number.    Text: Use [employer] on the Intranet <Paging/Directory> tab and enter Jobcode ID.   If no call back at any time, contact the hospital  and ask for RAPS attending or backup

## 2019-05-10 NOTE — PROGRESS NOTES
Perioperative Pain Service Cross Cover Note    Patient doing well.  Evaluated for scheduled bolus of epidural catheter.      Epidural catheter bolused with PF bupivacaine 0.125%, 5 mL incrementally with negative aspirate before and between each mL without complication, no symptoms of local anesthetic toxicity (LAST).  Remained with and assessed patient for 10 min post-injection.  Bedside nurse aware she should continue monitor BP/P, MAP Q 5 min x 30 min, and assess for any untoward effects to local anesthetic following injection and contact anesthesia for any questions or concerns. or concerns.    Ryan Swanson MD  Anesthesiology Resident   [phone]    5/9/2019 6:45 PM

## 2019-05-10 NOTE — PLAN OF CARE
Discharge Planner PT   Patient plan for discharge: Rehab  Current status: Pt ambulated 100' x1 Mod I with CGA hands on IV pole. Pt requires multiple standing rest breaks. Feet clearing, not passing. Pt trial FWW 40' and able to lengthen steps with cueing, able to pattern for a brief period prior to regressing. Reports lightheadedness, ringing in ears, and shortness of breath. VSS. Pt ambulated 100' to room with FWW and SBA, required single standing rest breaks.   Barriers to return to prior living situation: Level of A,   Recommendations for discharge: TCU  Rationale for recommendations: Pt requires further skilled intervention to address deficits.        Entered by: Kuldip Prakash 05/10/2019 3:28 PM

## 2019-05-10 NOTE — PLAN OF CARE
B/P: 92/66, T: 97.1, P: 89, R: 16  Alert and oriented. Chest tubes x3 to -20 suction with 10-30 mL serosanguinous output past 8 hrs. Slight drainage on dressing. LS Coarse. Sats 96% on 2L nc. Epidural bolus received per Anesthesia this evening, no residual symptoms, vitals remained stable. Pain controlled with epidural and PRN Oxycodone. Pt has no telemetry order, murmur on auscultation. Up to bedside commode with 1 assist. Voiding adequately. No BM, miralax given. Pt refused senna. Heparin dose cleared and Ok to give per Anesthesia. Pt stated she does not take the heparin shots because she doesn't like shots. Agreed to take evening dose after RN explanation of risks. Requested to take only one shot/day or alternate DVT prophylaxis. Question written on white board for morning rounds. C/o pain at IV site with IV Rocephin but after checking site, applying ice and slowing transfusion rate, administration able to be completed.  Continue to monitor and follow POC.

## 2019-05-11 ENCOUNTER — APPOINTMENT (OUTPATIENT)
Dept: GENERAL RADIOLOGY | Facility: CLINIC | Age: 64
DRG: 853 | End: 2019-05-11
Payer: COMMERCIAL

## 2019-05-11 LAB
ANION GAP SERPL CALCULATED.3IONS-SCNC: 2 MMOL/L (ref 3–14)
BUN SERPL-MCNC: 7 MG/DL (ref 7–30)
CALCIUM SERPL-MCNC: 7.8 MG/DL (ref 8.5–10.1)
CHLORIDE SERPL-SCNC: 103 MMOL/L (ref 94–109)
CO2 SERPL-SCNC: 33 MMOL/L (ref 20–32)
CREAT SERPL-MCNC: 0.66 MG/DL (ref 0.52–1.04)
ERYTHROCYTE [DISTWIDTH] IN BLOOD BY AUTOMATED COUNT: 14.9 % (ref 10–15)
GFR SERPL CREATININE-BSD FRML MDRD: >90 ML/MIN/{1.73_M2}
GLUCOSE SERPL-MCNC: 111 MG/DL (ref 70–99)
HCT VFR BLD AUTO: 27.5 % (ref 35–47)
HGB BLD-MCNC: 8.2 G/DL (ref 11.7–15.7)
MCH RBC QN AUTO: 28.6 PG (ref 26.5–33)
MCHC RBC AUTO-ENTMCNC: 29.8 G/DL (ref 31.5–36.5)
MCV RBC AUTO: 96 FL (ref 78–100)
PLATELET # BLD AUTO: 417 10E9/L (ref 150–450)
POTASSIUM SERPL-SCNC: 4 MMOL/L (ref 3.4–5.3)
RBC # BLD AUTO: 2.87 10E12/L (ref 3.8–5.2)
SODIUM SERPL-SCNC: 138 MMOL/L (ref 133–144)
WBC # BLD AUTO: 12.1 10E9/L (ref 4–11)

## 2019-05-11 PROCEDURE — 25000128 H RX IP 250 OP 636: Performed by: NURSE PRACTITIONER

## 2019-05-11 PROCEDURE — 36415 COLL VENOUS BLD VENIPUNCTURE: CPT | Performed by: SURGERY

## 2019-05-11 PROCEDURE — 25000132 ZZH RX MED GY IP 250 OP 250 PS 637: Performed by: FAMILY MEDICINE

## 2019-05-11 PROCEDURE — 25000132 ZZH RX MED GY IP 250 OP 250 PS 637: Performed by: SURGERY

## 2019-05-11 PROCEDURE — 85027 COMPLETE CBC AUTOMATED: CPT | Performed by: SURGERY

## 2019-05-11 PROCEDURE — 12000004 ZZH R&B IMCU UMMC

## 2019-05-11 PROCEDURE — 25800030 ZZH RX IP 258 OP 636: Performed by: NURSE PRACTITIONER

## 2019-05-11 PROCEDURE — 25000128 H RX IP 250 OP 636: Performed by: SURGERY

## 2019-05-11 PROCEDURE — 71046 X-RAY EXAM CHEST 2 VIEWS: CPT | Mod: 76

## 2019-05-11 PROCEDURE — 25000128 H RX IP 250 OP 636: Performed by: FAMILY MEDICINE

## 2019-05-11 PROCEDURE — 80048 BASIC METABOLIC PNL TOTAL CA: CPT | Performed by: SURGERY

## 2019-05-11 PROCEDURE — 71046 X-RAY EXAM CHEST 2 VIEWS: CPT

## 2019-05-11 PROCEDURE — 25000132 ZZH RX MED GY IP 250 OP 250 PS 637: Performed by: STUDENT IN AN ORGANIZED HEALTH CARE EDUCATION/TRAINING PROGRAM

## 2019-05-11 RX ORDER — CEFDINIR 300 MG/1
300 CAPSULE ORAL EVERY 12 HOURS SCHEDULED
Status: DISCONTINUED | OUTPATIENT
Start: 2019-05-11 | End: 2019-05-14 | Stop reason: HOSPADM

## 2019-05-11 RX ORDER — FUROSEMIDE 10 MG/ML
20 INJECTION INTRAMUSCULAR; INTRAVENOUS ONCE
Status: COMPLETED | OUTPATIENT
Start: 2019-05-11 | End: 2019-05-11

## 2019-05-11 RX ADMIN — OXYCODONE HYDROCHLORIDE 10 MG: 5 TABLET ORAL at 08:47

## 2019-05-11 RX ADMIN — HEPARIN SODIUM 5000 UNITS: 5000 INJECTION, SOLUTION INTRAVENOUS; SUBCUTANEOUS at 14:00

## 2019-05-11 RX ADMIN — ACETAMINOPHEN 650 MG: 325 TABLET, FILM COATED ORAL at 23:15

## 2019-05-11 RX ADMIN — Medication 0.5 MG: at 06:01

## 2019-05-11 RX ADMIN — OXYCODONE HYDROCHLORIDE 10 MG: 5 TABLET ORAL at 23:15

## 2019-05-11 RX ADMIN — ACETAMINOPHEN, ASPIRIN AND CAFFEINE 1 TABLET: 250; 250; 65 TABLET, FILM COATED ORAL at 14:01

## 2019-05-11 RX ADMIN — CEFDINIR 300 MG: 300 CAPSULE ORAL at 12:20

## 2019-05-11 RX ADMIN — METHOCARBAMOL 500 MG: 500 TABLET, FILM COATED ORAL at 12:20

## 2019-05-11 RX ADMIN — OXYCODONE HYDROCHLORIDE 10 MG: 5 TABLET ORAL at 01:44

## 2019-05-11 RX ADMIN — OXYCODONE HYDROCHLORIDE 10 MG: 5 TABLET ORAL at 12:20

## 2019-05-11 RX ADMIN — FUROSEMIDE 20 MG: 10 INJECTION, SOLUTION INTRAVENOUS at 14:00

## 2019-05-11 RX ADMIN — BUPIVACAINE HYDROCHLORIDE: 7.5 INJECTION, SOLUTION EPIDURAL; RETROBULBAR at 16:19

## 2019-05-11 RX ADMIN — OXYCODONE HYDROCHLORIDE 10 MG: 5 TABLET ORAL at 15:55

## 2019-05-11 RX ADMIN — OXYCODONE HYDROCHLORIDE 10 MG: 5 TABLET ORAL at 19:42

## 2019-05-11 RX ADMIN — METHOCARBAMOL 500 MG: 500 TABLET, FILM COATED ORAL at 00:27

## 2019-05-11 RX ADMIN — METHOCARBAMOL 500 MG: 500 TABLET, FILM COATED ORAL at 06:01

## 2019-05-11 RX ADMIN — METHOCARBAMOL 500 MG: 500 TABLET, FILM COATED ORAL at 18:28

## 2019-05-11 RX ADMIN — PREGABALIN 100 MG: 100 CAPSULE ORAL at 08:47

## 2019-05-11 RX ADMIN — Medication 0.5 MG: at 18:28

## 2019-05-11 RX ADMIN — OXYCODONE HYDROCHLORIDE 10 MG: 5 TABLET ORAL at 04:40

## 2019-05-11 RX ADMIN — ESCITALOPRAM 10 MG: 5 TABLET, FILM COATED ORAL at 08:47

## 2019-05-11 RX ADMIN — PREGABALIN 150 MG: 75 CAPSULE ORAL at 19:42

## 2019-05-11 RX ADMIN — ACETAMINOPHEN 650 MG: 325 TABLET, FILM COATED ORAL at 15:55

## 2019-05-11 RX ADMIN — METHADONE HYDROCHLORIDE 113 MG: 10 CONCENTRATE ORAL at 04:59

## 2019-05-11 RX ADMIN — CEFDINIR 300 MG: 300 CAPSULE ORAL at 19:47

## 2019-05-11 RX ADMIN — POLYETHYLENE GLYCOL 3350 17 G: 17 POWDER, FOR SOLUTION ORAL at 08:48

## 2019-05-11 RX ADMIN — PREGABALIN 100 MG: 100 CAPSULE ORAL at 14:00

## 2019-05-11 RX ADMIN — PANTOPRAZOLE SODIUM 40 MG: 40 TABLET, DELAYED RELEASE ORAL at 08:47

## 2019-05-11 RX ADMIN — ACETAMINOPHEN 650 MG: 325 TABLET, FILM COATED ORAL at 08:47

## 2019-05-11 RX ADMIN — Medication 250 MG: at 08:47

## 2019-05-11 RX ADMIN — HEPARIN SODIUM 5000 UNITS: 5000 INJECTION, SOLUTION INTRAVENOUS; SUBCUTANEOUS at 06:01

## 2019-05-11 ASSESSMENT — ENCOUNTER SYMPTOMS
NERVOUS/ANXIOUS: 0
DYSURIA: 0
ABDOMINAL PAIN: 0
LIGHT-HEADEDNESS: 0
NAUSEA: 0
FEVER: 0
NUMBNESS: 0
CHILLS: 0
WEAKNESS: 0
DYSPHORIC MOOD: 0
DIAPHORESIS: 0
VOMITING: 0
HEADACHES: 0
UNEXPECTED WEIGHT CHANGE: 0
TREMORS: 0
SHORTNESS OF BREATH: 0
JOINT SWELLING: 0
CONSTIPATION: 0

## 2019-05-11 ASSESSMENT — ACTIVITIES OF DAILY LIVING (ADL)
ADLS_ACUITY_SCORE: 14

## 2019-05-11 ASSESSMENT — PAIN DESCRIPTION - DESCRIPTORS: DESCRIPTORS: DULL;SHARP

## 2019-05-11 ASSESSMENT — MIFFLIN-ST. JEOR: SCORE: 1216.33

## 2019-05-11 NOTE — PLAN OF CARE
"Neuro: A&Ox4.Patient refused heparin at 2200 because it was too painful.  Took 0600 dose.   Cardiac: SR. VSS. BP 99/66 (BP Location: Right arm)   Pulse 86   Temp 98.8  F (37.1  C) (Axillary)   Resp 16   Ht 1.6 m (5' 3\")   Wt 69.1 kg (152 lb 5.4 oz)   SpO2 96%   BMI 26.99 kg/m       Respiratory: Sating * 95-99 on 2L NC. Pt desats to 85% on room air.  GI/: Adequate urine output. No BM on this shift  Diet/appetite: Tolerating regular diet. Not eating well.  Activity:  Assist of 1 for equipment management up to chair and in halls.  Pain: At acceptable level on current regimen. Patient taking 10mg of oxycodone q 3 hours on this shift  Skin: No change on this shift  LDA's: 1 Left PIV TKO    Plan: Continue with POC. Notify primary team with changes.   "

## 2019-05-11 NOTE — PROGRESS NOTES
STAFF ADDENDUM:  I saw and evaluated Ms. Richardson and agree with the resident s findings and plan of care as documented in the resident s note and edited by me, as applicable.      In summary, Ms. Richardson is progressing well. We'll remove her apical chest tube and keep the basilar tube one more day. We think a total of 7 days of antibiotics after surgery should be sufficient, she can switch to oral antibiotics.  The patient had all questions answered and was in agreement with the plan.  Jono Gray MD

## 2019-05-11 NOTE — PROGRESS NOTES
REGIONAL ANESTHESIA PAIN SERVICE EPIDURAL NOTE  Demetra Richardson is a 63 year old female POD #4 s/p Left Video Assisted Thoracoscopic Decortication, Intercostal Nerve Cryo Analgesia, Flexible Bronchoscopy, Flexible Bronchoscopy and placement of T5-6 epidural catheter for postoperative pain management.      SUBJECTIVE  Interval History: Overnight no acute events. Patient reports good pain control with epidural infusion and current analgesic medications (see below).  Clinician bolus's of PF bupivacaine 0.125% 5ml helpful. No weakness, paresthesias, circumoral numbness, metallic taste or tinnitus.  Patient ambulating without assistance between bed and commode. Currently tolerating a regular diet, no nausea or vomiting, voiding without difficulty.     Clinically Aligned Pain Assessment (CAPA):  Comfort (How is your pain?): Comfortably manageable  Change in Pain (Since your last medication/intervention?): Getting better  Pain Control (How are your pain treatments working?):  Partially effective pain control  Functioning (Are you able to do activities to get better?) : Can do most things, but pain gets in the way of some   Sleep (Does your pain management allow you to sleep or rest?): Awake with occasional pain     Pain Intensity using Numerical Rating Scale (NRS):    4/10 at rest and 8/10 with activity      Antithrombotic/Thrombolytic Therapy ordered:    heparin sodium injection 5,000 Units 5,000 Units, SC, Q8H       Analgesic Medications:  Medications related to Pain Management (From now, onward)    Start     Dose/Rate Route Frequency Ordered Stop    05/10/19 0751  aspirin-acetaminophen-caffeine (EXCEDRIN MIGRAINE) per tablet 1 tablet      1 tablet Oral DAILY PRN 05/10/19 0751      05/08/19 2000  pregabalin (LYRICA) capsule 150 mg      150 mg Oral AT BEDTIME 05/08/19 1240      05/08/19 1523  methocarbamol (ROBAXIN) tablet 500 mg      500 mg Oral EVERY 6 HOURS 05/08/19 1235      05/08/19 1400  pregabalin (LYRICA)  "capsule 100 mg      100 mg Oral 2 TIMES DAILY 05/08/19 1240      05/08/19 0800  polyethylene glycol (MIRALAX/GLYCOLAX) Packet 17 g      17 g Oral 2 TIMES DAILY 05/08/19 0706 05/07/19 2230  senna-docusate (SENOKOT-S/PERICOLACE) 8.6-50 MG per tablet 2 tablet      2 tablet Oral 2 TIMES DAILY 05/07/19 2216 05/07/19 2216  oxyCODONE (ROXICODONE) tablet 5-10 mg      5-10 mg Oral EVERY 3 HOURS PRN 05/07/19 2216      05/07/19 1115  bupivacaine (MARCAINE) 0.125 % in sodium chloride 0.9 % 250 mL EPIDURAL Infusion      10 mL/hr  EPIDURAL CONTINUOUS 05/07/19 1101      05/06/19 1841  clonazePAM (klonoPIN) tablet 0.5 mg      0.5 mg Oral 2 TIMES DAILY 05/06/19 1601      05/06/19 1430  acetaminophen (TYLENOL) tablet 650 mg      650 mg Oral EVERY 6 HOURS PRN 05/06/19 1415      05/06/19 0500  methadone (DOLOPHINE-INTENSOL) 10 MG/ML (HIGH CONC) solution 113 mg      113 mg Oral DAILY 05/05/19 1201      04/29/19 0453  lidocaine 1 % 0.1-1 mL      0.1-1 mL Other EVERY 1 HOUR PRN 04/29/19 0453      04/29/19 0453  lidocaine (LMX4) cream       Topical EVERY 1 HOUR PRN 04/29/19 0453             OBJECTIVE  Lab Results:   Recent Labs   Lab Test 05/10/19  0424   WBC 12.6*   RBC 2.90*   HGB 8.2*   HCT 27.5*   MCV 95   MCH 28.3   MCHC 29.8*   RDW 14.8          Lab Results   Component Value Date    INR 1.17 05/07/2019    INR 1.38 04/29/2019    INR 1.13 04/28/2019       Vitals:    Temp:  [36.6  C (97.8  F)-37.1  C (98.8  F)] 37.1  C (98.8  F)  Heart Rate:  [78-88] 88  Resp:  [16] 16  BP: ()/(53-70) 99/66  SpO2:  [91 %-97 %] 96 %  BP 99/66 (BP Location: Right arm)   Pulse 86   Temp 37.1  C (98.8  F) (Axillary)   Resp 16   Ht 1.6 m (5' 3\")   Wt 69.1 kg (152 lb 5.4 oz)   SpO2 96%   BMI 26.99 kg/m         Exam:   GEN: alert, pleasant, and no distress  NEURO/MSK: Strength BLE 5/5  and overall symmetric  SKIN: Epidural catheter site with dressing c/d/i, no tenderness, erythema, heme, edema      ASSESSMENT/PLAN:    Patient is " receiving adequate analgesia with current multimodal therapy including T5-6 epidural catheter infusion of bupivacaine 0.125% at 10mL/hr.  Motor function intact and adequate sensory block, meeting activity goals.  No evidence of adverse side effects related to local anesthetic. Voiding without difficulty.      - continue current epidural infusion bupivacaine 0.125% at 10mL/hour, POD #4  - patient can be evaluated to receive local anesthetic bolus Q 12 hr PRN, bedside nurse must page RAPS (#[phone]) to request bolus  -followed by pain service for oral analgesics in setting of opioid tolerance on methadone maintenance outpt.  - antithrombotic/thrombolytic therapy Heparin SQ Q 8 hrs as ordered. Please contact RAPS (#[phone]) prior to any medication changes  - will continue to follow and adjust as needed    -Bolused catheter with 5 mL of 0.25% bupivacaine and patient stated adequate relief. Monitored patient for 10 mins following and patient remained hemodynamically stable.     - discussed plan with attending anesthesiologist    George Sosa MD  Regional Anesthesia Pain Service  5/11/2019 7:49 AM    RAPS Contact Info (24 hour job code pager is the last 4 digits) For in-house use only:   [employer] phone: [place] [phone], [place] [phone], [facility] [phone], then enter call-back number.    Text: Use [employer] on the Intranet <Paging/Directory> tab and enter Jobcode ID.   If no call back at any time, contact the hospital  and ask for RAPS attending or backup

## 2019-05-11 NOTE — PROGRESS NOTES
THORACIC & FOREGUT SURGERY PROGRESS NOTE  05/11/2019    Patient: Demetra Richardson  MRN: [id number]    Subjective/Interval Events  No acute overnight events. No air leak on exam, minimal output from chest tubes. Pain is controlled. She has ambulated some. Urine output adequate, tolerating regular diet, no n/v, no BM yet.    Objective  Temp:  [97.5  F (36.4  C)-98.8  F (37.1  C)] 97.5  F (36.4  C)  Pulse:  [69-78] 69  Heart Rate:  [79-88] 88  Resp:  [16] 16  BP: ()/(62-72) 103/69  SpO2:  [91 %-99 %] 99 %    General: AAOx4, NAD, lying comfortably in bed  CV: regular rate, warm, well-perfused  Pulm: no dyspnea, breathing comfortably on NC, left chest tubes x2 with serosanguinous output no air leak  Extremities: no edema  Neuro: moving all extremities spontaneously without apparent deficit    I/O last 3 completed shifts:  In: 480 [P.O.:480]  Out: 2019 [Urine:1950; Chest Tube:69]    Labs:  Recent Labs   Lab 05/11/19  0505 05/10/19  0424 05/10/19  0106   WBC 12.1* 12.6* 12.4*   HGB 8.2* 8.2* 7.7*    375 364     Recent Labs   Lab 05/11/19  0505 05/10/19  0106 05/09/19  0622  05/07/19  1940    139 138   < > 135   POTASSIUM 4.0 3.9 4.1   < > 4.6   CHLORIDE 103 103 103   < > 102   CO2 33* 34* 33*   < > 28   BUN 7 7 7   < > 8   CR 0.66 0.67 0.60   < > 0.61   * 93 93   < > 132*   RAFAELA 7.8* 7.6* 7.8*   < > 7.9*   MAG  --   --   --   --  1.7   PHOS  --   --   --   --  5.0*    < > = values in this interval not displayed.       Imaging:  AM CXR reviewed. No change.      Assessment & Plan  Demetra Richardson is a 63 year old female with a h/o Sjogren's, SLD, PSC, fibromyalgia, and prior opiate abuse on methadone who was admitted 4/28/2019 with progressive shortness of breath and was found to have pneumonia with a left-sided loculated pleural effusion. A chest tube was placed (4/29/2019) and she was started on antibiotics. Chest tube had been draining however due to presence of loculations she underwent  tPA lytics via the chest tube x3 days. Repeat CT scan demonstrating persistent effusion and possible new right sided consolidation. She is now s/p LEFT VATS decortication on 5/7/2019 with Dr. Muir.     - Diuresis per primary.  - Bowel regimen per primary, but BM yet and it is POD#4 so would recommend enema or suppository.  - Recommend total 7 days of antibiotics from surgery; ok for po antibiotics; defer to primary.  - Remaining two chest tubes removed today and repeat CXR without PTX. Chest tube dressings should remain for 48 hours, then can be removed (5/13/2019 in the afternoon).   - Recommend continued DVT ppx while hospitalized. No indication for discharge with DVT chemoppx from our standpoint.  - Thoracic Surgery will sign off, but please do not hesitate to contact us with questions or concerns. Follow up with Thoracic Surgery as well as surgery-specific discharge instructions placed in Discharge Navigator.       Patient seen and discussed with fellow and staff.  - - - - - - - - - - - - - - - - - -  Amy Morales MD  General Surgery PGY-3  See [facility] for on call information prior to paging me directly. Pager [phone].

## 2019-05-11 NOTE — PLAN OF CARE
"/62 (BP Location: Right arm)   Pulse 86   Temp 98.6  F (37  C) (Oral)   Resp 16   Ht 1.6 m (5' 3\")   Wt 69.1 kg (152 lb 5.4 oz)   SpO2 96%   BMI 26.99 kg/m    Neuro: A&Ox4.   Cardiac: No tele orders. VSS. Afebrile  Respiratory: Sating 96% on RA. CT x2 to -20 suction, no air leak.  GI/: Adequate urine output. No BM this shift, BS+  Diet/appetite: Tolerating reg diet. Poor appetite  Activity:  Assist of 1, up to chair and in halls.  Pain: Continuous epidural @ 10mL/hr, PRN Oxy 10mg q3h, scheduled robaxin q6h  Skin: No new deficits noted.  LDA's: L PIV abx and TKO    Plan: Continue with POC. Notify primary team with changes.    "

## 2019-05-11 NOTE — PROGRESS NOTES
[facility], [facility] Progress Note    Main Plans for Today    -Follow up CXR, may need lasix 20 mg today but weight stable   -continue antibiotics for 7 days from surgery  -CT removal per thoracic, post removal CXR  -Epidural management per anesthesia  -Physical therapy/ occupational therapy recs TCU    Assessment & Plan   Demetra Richardson is a 63 year old female w/complicated PMHx, significant for SLE, PSC, Sjogren's, fibromyalgia, opiate dependence and polysubstance abuse with history of OD and withdrawal seizures, epidural abscess, bipolar disorder type II, currently on a methadone program, and is admitted for management of L pleural effusion now growing strep intermedius and persistent empyema despite chest tube placement. S/P VATS left pleural decortication and bronchoscopy by thoracic surgery.     # Loculated pleural effusion/ empyema  # Sepsis secondary to empyema  # S/P vats decortication  # S/P epidural placement for pain  Most likely parapneumonic effusion, other differentials include effusion 2/2 rheumatologic disease given ?history SLE, sjogren's, primary sclerosing cholangitis. Fluid analysis with pleural to serum protein 4.7/6.7, LDH 1116, glucose 26, WBCs seen with no organisms in stain.  Fluid culture growing strep intermedius sensitive to ceftriaxone. She is s/p chest tube placement on 4/29 with lytic therapy until 5/6, concern for persistent empyema on recent CT scan on 5/5. Thoracic consulted 5/5 with concern for persistent empyema to perform decortication 5/7. CT showed new opacities in RUL, pro jazzy returned moderate risk 0.6.  RVP negative. Sputum not collected yet.   S/P decortication 5/7, pain control as below  - CT management per thoracic surgery, likely CT removal today  - F/U CXR  - Transition to Cefdinir 300mg BID PO, 7 day course of antibiotics postoperatively (through 5/14)  - Supplemental oxygen prn, goal >92%  - Continue  incentive spirometry  - Added metanebs 5/9    # Thoracic lymphadenopathy  CT showed enlarged left lower cervical lymph node, prominent left heel or soft tissue, possibly left hilar adenopathy, prominent left paratracheal lymph nodes.  DDX includes infectious versus malignant versus sarcoidosis. Pulm on board. Likely reactive     # Pulmonary edema  # Fluid overload  - 20mg IV lasix x 1 today  - Daily weights    # Chronic pain  # Polysubstance use disorder  # History of overdose and withdrawal seizures  # Methadone maintenance therapy, active  # Fibromyalgia  # anxiety  Patient denies recent substance use, reports no alcohol use in last 7 years. She does admit to taking some xanax about 2 months ago (1/22/2019), and she was hospitalized with acute toxic encephalopathy, even intubated as she was having apneic pauses. Patient is on methadone and fills her prescriptions at [facility]. Admission UDS negative. Pain and anxiety continue to be an issue for the patient, we have made several adjustments and pain/anxiety still not well controlled. Psych and pain team consulted for recs. Now post op requiring epidural pain control with RAPS team. Epidural has helped her pain.   - Pain consult 5/1, appreciate recs;Re consult Pain Team 5/6, changed Tylenol to as needed, can increase oxy 5 mg Q3H as needed, hold excedrin, continue other cares  - Psych consult, appreciate recs for anxiety, clonopin, can increase lexapro outpatient  - PTA methadone dose, changed admin time to 5AM 5/5  - robaxin 500 mg po Q6h  - PTA pregabalin increased to 100-100-150   - Heat/cold packs  - Avoid IV opiates  - Bowel regimen  - Per recs from pain team: Oxy 5-10 mg Q3h as needed after procedure    # Hypoalbuminemia  Likely due to acute infection and chronic malnutrition contributing. Patient aware and reports poor diet at home - lots of frozen meals, fast foods, due to issues with ambulation. Lives alone, no help with food prep or grocery shopping.   -  "Nutrition consult  - Discuss outpatient services for meal/grocery delivery services- talked with [facility]     # SLE  # Sjogren's syndrome  # primary sclerosing cholangitis  # elevated alk phos from biliary source (PSC)  Patient had recent serology, but has returned negative so far. Reports diagnosis of PSC 14 years ago, and notes chronic LFT abnormalities. Elevated GGT, normal acetaminophen level consistent with PSC. Alk phos bone specific test elevated was mildly elevated to 50 but not significant enough compared to elevated GGT in the 1,000 so likely PSC. Did have recent fall but no documented fracture.   - TSH low normal, PTH normal, calcium is low (would expect elevated with MM), ionized calcium normal   - Corrected calcium with hypoalbuminemia on  is 9.2 (normal)   - consider outpatient endocrine consult for elevated bone specific alk phos if needed but suspect elevated alk phos due to PSC with elevated GGT.     #Bipolar disorder type II  Patient endorses recent loss of her father to atypical pneumonia, but considers her mood has been stable, denies suicidal ideation. Patient reports being on Risperidone in the past after a \"manic episode after a family member .\"   -PTA escitalopram, may increase to 20 mg as outpatient      # Mild aortic stenosis  Murmur noticed on exam, echocardiogram  showed LVEF, w/normal diastolic function, normal RV. Unlikely to be related to current symptoms.      # Migraines  # Fibromyalgia  Patient has long history of migraines with significant use of excedrin - now prone to rebound headaches.  -PTA Excedrin Migraine, weaning     # s/p Billroth II w/revision   # History of iron deficiency anemia  # acute blood loss anemia  Patient has malabsorption secondary to probably surgery, receives IV iron as outpatient. On 19 she had low iron and saturation, normal ferritin, folate and B12. Now anemic requiring 1 unit of RBCs  from procedure, stable.   - continue PTA " pantoprazole  - IV iron replacement as outpatient     # Pain Assessment:  Current Pain Score 5/11/2019   Patient currently in pain? yes   Pain score (0-10) -   Pain location -   Pain descriptors -   Some encounter information is confidential and restricted. Go to Review Flowsheets activity to see all data.   - Demetra is experiencing pain due to pleural effusion. Pain management was discussed and the plan was created in a collaborative fashion.  Demetra's response to the current recommendations: engaged  - Please see the plan for pain management as documented above    Diet: Snacks/Supplements Adult: Other; bowl of chicken salad at bedtime with crackers + milk skim; Between Meals  Snacks/Supplements Adult: Boost Plus; With Meals  Advance Diet as Tolerated: Regular Diet Adult  Fluids: PO  DVT Prophylaxis:  Heparin per RAPS  Code Status: Full Code    Disposition Plan   Expected discharge:Expected Discharge Date: 05/09/19 2 - 3 days, recommended to transitional care unit once adequate pain management/ tolerating PO medications and antibiotic plan established, physical therapy recs TCU.Dispo: Expected Discharge Date: 05/09/19      Entered: Jaswinder Parker 05/11/2019, 11:18 AM   Information in the above section will display in the discharge planner report.      The patient's care was discussed with the Attending Physician, Dr. dwyer.    Jaswinder Parker  [facility]  Pager: [phone]  Please see sticky note for cross cover information    Interval History  No overnight events. Pain controlled. Hemodynamically stable.  Breathing well.  OOB x 1 this morning.  No fevers or chills.  CT output low.  Patient does not want heparin shots and is willing to walk to avoid them.  Would prefer PO antibiotics if possible.      Review of Systems   Constitutional: Negative for chills, diaphoresis, fever and unexpected weight change.   Respiratory: Negative for shortness of breath.     Cardiovascular: Negative for leg swelling.   Gastrointestinal: Negative for abdominal pain, constipation, nausea and vomiting.   Endocrine: Positive for polyuria.   Genitourinary: Negative for dysuria.   Musculoskeletal: Negative for joint swelling.   Neurological: Negative for tremors, weakness, light-headedness, numbness and headaches.   Psychiatric/Behavioral: Negative for dysphoric mood. The patient is not nervous/anxious.      Physical Exam   Vital Signs: Temp: 97.6  F (36.4  C) Temp src: Axillary BP: 96/72 Pulse: 78 Heart Rate: 88 Resp: 16 SpO2: 98 % O2 Device: Nasal cannula Oxygen Delivery: 2 LPM  Weight: 151 lbs 8 oz  Physical Exam   Constitutional: She is oriented to person, place, and time. She appears well-developed and well-nourished. No distress.   HENT:   Head: Normocephalic and atraumatic.   Eyes: Conjunctivae are normal. No scleral icterus.   Cardiovascular: Normal rate and regular rhythm. Exam reveals no friction rub.   Murmur (systolic ejection) heard.  Pulmonary/Chest: Effort normal. No respiratory distress. She has no wheezes. She has no rales (LLL).   Crackles heard in LLL  2 chest tubes in place (1 apical and 1 basilar), draining serosanguineous fluid  CT dressings clean and dry   Neurological: She is alert and oriented to person, place, and time.   Skin: Skin is warm and dry. She is not diaphoretic.   Psychiatric: She has a normal mood and affect.   Vitals reviewed.      I/O last 3 completed shifts:  In: 360 [P.O.:360]  Out: 1331 [Urine:1250; Chest Tube:81]

## 2019-05-12 LAB
ANION GAP SERPL CALCULATED.3IONS-SCNC: 3 MMOL/L (ref 3–14)
BACTERIA SPEC CULT: NO GROWTH
BUN SERPL-MCNC: 8 MG/DL (ref 7–30)
CALCIUM SERPL-MCNC: 7.7 MG/DL (ref 8.5–10.1)
CHLORIDE SERPL-SCNC: 105 MMOL/L (ref 94–109)
CO2 SERPL-SCNC: 34 MMOL/L (ref 20–32)
CREAT SERPL-MCNC: 0.64 MG/DL (ref 0.52–1.04)
ERYTHROCYTE [DISTWIDTH] IN BLOOD BY AUTOMATED COUNT: 15.2 % (ref 10–15)
GFR SERPL CREATININE-BSD FRML MDRD: >90 ML/MIN/{1.73_M2}
GLUCOSE SERPL-MCNC: 95 MG/DL (ref 70–99)
HCT VFR BLD AUTO: 27.4 % (ref 35–47)
HGB BLD-MCNC: 8 G/DL (ref 11.7–15.7)
MCH RBC QN AUTO: 28.1 PG (ref 26.5–33)
MCHC RBC AUTO-ENTMCNC: 29.2 G/DL (ref 31.5–36.5)
MCV RBC AUTO: 96 FL (ref 78–100)
PLATELET # BLD AUTO: 435 10E9/L (ref 150–450)
POTASSIUM SERPL-SCNC: 4 MMOL/L (ref 3.4–5.3)
RBC # BLD AUTO: 2.85 10E12/L (ref 3.8–5.2)
SODIUM SERPL-SCNC: 141 MMOL/L (ref 133–144)
SPECIMEN SOURCE: NORMAL
WBC # BLD AUTO: 9.4 10E9/L (ref 4–11)

## 2019-05-12 PROCEDURE — 25000128 H RX IP 250 OP 636: Performed by: NURSE PRACTITIONER

## 2019-05-12 PROCEDURE — 25000132 ZZH RX MED GY IP 250 OP 250 PS 637: Performed by: FAMILY MEDICINE

## 2019-05-12 PROCEDURE — 25000132 ZZH RX MED GY IP 250 OP 250 PS 637: Performed by: SURGERY

## 2019-05-12 PROCEDURE — 12000001 ZZH R&B MED SURG/OB UMMC

## 2019-05-12 PROCEDURE — 85027 COMPLETE CBC AUTOMATED: CPT | Performed by: SURGERY

## 2019-05-12 PROCEDURE — 36415 COLL VENOUS BLD VENIPUNCTURE: CPT | Performed by: STUDENT IN AN ORGANIZED HEALTH CARE EDUCATION/TRAINING PROGRAM

## 2019-05-12 PROCEDURE — 25000132 ZZH RX MED GY IP 250 OP 250 PS 637: Performed by: STUDENT IN AN ORGANIZED HEALTH CARE EDUCATION/TRAINING PROGRAM

## 2019-05-12 PROCEDURE — 80048 BASIC METABOLIC PNL TOTAL CA: CPT | Performed by: STUDENT IN AN ORGANIZED HEALTH CARE EDUCATION/TRAINING PROGRAM

## 2019-05-12 PROCEDURE — 25800030 ZZH RX IP 258 OP 636: Performed by: NURSE PRACTITIONER

## 2019-05-12 RX ORDER — DOCUSATE SODIUM 100 MG/1
100 CAPSULE, LIQUID FILLED ORAL 2 TIMES DAILY
Status: DISCONTINUED | OUTPATIENT
Start: 2019-05-12 | End: 2019-05-14 | Stop reason: HOSPADM

## 2019-05-12 RX ORDER — BISACODYL 10 MG
10 SUPPOSITORY, RECTAL RECTAL DAILY
Status: DISCONTINUED | OUTPATIENT
Start: 2019-05-12 | End: 2019-05-12

## 2019-05-12 RX ORDER — ACETAMINOPHEN 325 MG/1
650 TABLET ORAL
Status: DISCONTINUED | OUTPATIENT
Start: 2019-05-12 | End: 2019-05-14 | Stop reason: HOSPADM

## 2019-05-12 RX ADMIN — PREGABALIN 100 MG: 100 CAPSULE ORAL at 08:16

## 2019-05-12 RX ADMIN — OXYCODONE HYDROCHLORIDE 10 MG: 5 TABLET ORAL at 18:08

## 2019-05-12 RX ADMIN — Medication 250 MG: at 20:07

## 2019-05-12 RX ADMIN — PREGABALIN 150 MG: 75 CAPSULE ORAL at 20:07

## 2019-05-12 RX ADMIN — CEFDINIR 300 MG: 300 CAPSULE ORAL at 20:06

## 2019-05-12 RX ADMIN — ACETAMINOPHEN, ASPIRIN AND CAFFEINE 1 TABLET: 250; 250; 65 TABLET, FILM COATED ORAL at 14:26

## 2019-05-12 RX ADMIN — Medication 0.5 MG: at 18:08

## 2019-05-12 RX ADMIN — METHOCARBAMOL 500 MG: 500 TABLET, FILM COATED ORAL at 05:14

## 2019-05-12 RX ADMIN — POLYETHYLENE GLYCOL 3350 17 G: 17 POWDER, FOR SOLUTION ORAL at 20:07

## 2019-05-12 RX ADMIN — PANTOPRAZOLE SODIUM 40 MG: 40 TABLET, DELAYED RELEASE ORAL at 08:16

## 2019-05-12 RX ADMIN — OXYCODONE HYDROCHLORIDE 10 MG: 5 TABLET ORAL at 15:29

## 2019-05-12 RX ADMIN — METHOCARBAMOL 500 MG: 500 TABLET, FILM COATED ORAL at 18:08

## 2019-05-12 RX ADMIN — METHOCARBAMOL 500 MG: 500 TABLET, FILM COATED ORAL at 12:11

## 2019-05-12 RX ADMIN — OXYCODONE HYDROCHLORIDE 10 MG: 5 TABLET ORAL at 05:14

## 2019-05-12 RX ADMIN — DOCUSATE SODIUM 100 MG: 100 CAPSULE, LIQUID FILLED ORAL at 20:06

## 2019-05-12 RX ADMIN — METHADONE HYDROCHLORIDE 113 MG: 10 CONCENTRATE ORAL at 05:14

## 2019-05-12 RX ADMIN — DOCUSATE SODIUM 100 MG: 100 CAPSULE, LIQUID FILLED ORAL at 12:11

## 2019-05-12 RX ADMIN — OXYCODONE HYDROCHLORIDE 10 MG: 5 TABLET ORAL at 08:16

## 2019-05-12 RX ADMIN — ESCITALOPRAM 10 MG: 5 TABLET, FILM COATED ORAL at 08:16

## 2019-05-12 RX ADMIN — PREGABALIN 100 MG: 100 CAPSULE ORAL at 14:26

## 2019-05-12 RX ADMIN — METHOCARBAMOL 500 MG: 500 TABLET, FILM COATED ORAL at 00:14

## 2019-05-12 RX ADMIN — METHOCARBAMOL 500 MG: 500 TABLET, FILM COATED ORAL at 23:49

## 2019-05-12 RX ADMIN — ACETAMINOPHEN 650 MG: 325 TABLET, FILM COATED ORAL at 08:16

## 2019-05-12 RX ADMIN — BUPIVACAINE HYDROCHLORIDE: 7.5 INJECTION, SOLUTION EPIDURAL; RETROBULBAR at 15:40

## 2019-05-12 RX ADMIN — CEFDINIR 300 MG: 300 CAPSULE ORAL at 08:16

## 2019-05-12 RX ADMIN — OXYCODONE HYDROCHLORIDE 10 MG: 5 TABLET ORAL at 21:46

## 2019-05-12 RX ADMIN — OXYCODONE HYDROCHLORIDE 10 MG: 5 TABLET ORAL at 12:11

## 2019-05-12 RX ADMIN — Medication 0.5 MG: at 05:14

## 2019-05-12 RX ADMIN — OXYCODONE HYDROCHLORIDE 10 MG: 5 TABLET ORAL at 02:29

## 2019-05-12 ASSESSMENT — ENCOUNTER SYMPTOMS
CONSTIPATION: 0
LIGHT-HEADEDNESS: 0
NUMBNESS: 0
HEADACHES: 0
DYSPHORIC MOOD: 0
VOMITING: 0
NERVOUS/ANXIOUS: 0
ABDOMINAL PAIN: 0
UNEXPECTED WEIGHT CHANGE: 0
TREMORS: 0
JOINT SWELLING: 0
SHORTNESS OF BREATH: 0
WEAKNESS: 0
FEVER: 0
DIAPHORESIS: 0
CHILLS: 0
NAUSEA: 0
DYSURIA: 0

## 2019-05-12 ASSESSMENT — ACTIVITIES OF DAILY LIVING (ADL)
ADLS_ACUITY_SCORE: 14
ADLS_ACUITY_SCORE: 15

## 2019-05-12 ASSESSMENT — PAIN DESCRIPTION - DESCRIPTORS
DESCRIPTORS: SHARP;DULL
DESCRIPTORS: DULL;SHARP
DESCRIPTORS: ACHING;SHARP

## 2019-05-12 ASSESSMENT — MIFFLIN-ST. JEOR: SCORE: 1209.53

## 2019-05-12 NOTE — PLAN OF CARE
"Neuro: A&Ox4.   Cardiac: SR. VSS. BP 98/73 (BP Location: Right arm)   Pulse 69   Temp 98  F (36.7  C) (Axillary)   Resp 16   Ht 1.6 m (5' 3\")   Wt 68.7 kg (151 lb 8 oz)   SpO2 99%   BMI 26.84 kg/m      Respiratory: Sating % on 2L NC  GI/: Adequate urine output. BM X1 on 5/11/20 in the evening   Diet/appetite: Tolerating regular diet.  Activity:  Stand by assist for equipment management  up to chair and in halls.  Pain: Prn pain medications used on schedule, 10 mg oxy q 3 hours.  Skin: No change in skin overnight  LDA's: Left PIV saline locked; Epidural catheter  in the middle of the back running at 10ml/hr.  Site is clean dry and intact.    Plan: Continue with POC. Notify primary team with changes.   "

## 2019-05-12 NOTE — PLAN OF CARE
Patient doing well today. Two CT removed in afternoon, site CDI. Epidural still intact with morning bolus. Patient ambulating in hallway x3 today, up in chair for meals. Had large BM in afternoon. Voiding spontaneously 2LNC. Requesting any and all PRN pain meds when they are due. Refusing heparin injections. Changed to oral ABX

## 2019-05-12 NOTE — PROGRESS NOTES
[facility] Progress Note    Main Plans for Today    -continue antibiotics for 7 days from surgery (end 5/14)  -Epidural management per anesthesia  -Bowel regimen, added dulcolax suppository  -RAPS recs Excedrin twice daily and tylenol at night  -Remove CT dressings 5/13 afternoon    Assessment & Plan   Demetra Richardson is a 63 year old female w/complicated PMHx, significant for SLE, PSC, Sjogren's, fibromyalgia, opiate dependence and polysubstance abuse with history of OD and withdrawal seizures, epidural abscess, bipolar disorder type II, currently on a methadone program, and is admitted for management of L pleural effusion now growing strep intermedius and persistent empyema despite chest tube placement. S/P VATS left pleural decortication and bronchoscopy by thoracic surgery.     # Loculated pleural effusion/ empyema  # Sepsis secondary to empyema  # S/P vats decortication  # S/P epidural placement for pain  Most likely parapneumonic effusion, other differentials include effusion 2/2 rheumatologic disease given ?history SLE, sjogren's, primary sclerosing cholangitis. Fluid analysis with pleural to serum protein 4.7/6.7, LDH 1116, glucose 26, WBCs seen with no organisms in stain.  Fluid culture growing strep intermedius sensitive to ceftriaxone. She is s/p chest tube placement on 4/29 with lytic therapy until 5/6, concern for persistent empyema on recent CT scan on 5/5. Thoracic consulted 5/5 with concern for persistent empyema to perform decortication 5/7. CT showed new opacities in RUL, pro jazzy returned moderate risk 0.6.  RVP negative. Sputum not collected yet.   S/P decortication 5/7, pain control as below  - all chest tubes removed now   - Transition to Cefdinir 300mg BID PO, 7 day course of antibiotics postoperatively (through 5/14)  - Supplemental oxygen prn, goal >92%  - Continue incentive spirometry  - Added metanebs 5/9    # Thoracic  lymphadenopathy  CT showed enlarged left lower cervical lymph node, prominent left heel or soft tissue, possibly left hilar adenopathy, prominent left paratracheal lymph nodes.  DDX includes infectious versus malignant versus sarcoidosis. Pulm on board. Likely reactive     # Pulmonary edema  # Fluid overload  - 20mg IV lasix x 1 today  - Daily weights    # Chronic pain  # Polysubstance use disorder  # History of overdose and withdrawal seizures  # Methadone maintenance therapy, active  # Fibromyalgia  # anxiety  Patient denies recent substance use, reports no alcohol use in last 7 years. She does admit to taking some xanax about 2 months ago (1/22/2019), and she was hospitalized with acute toxic encephalopathy, even intubated as she was having apneic pauses. Patient is on methadone and fills her prescriptions at [facility]. Admission UDS negative. Pain and anxiety continue to be an issue for the patient, we have made several adjustments and pain/anxiety still not well controlled. Psych and pain team consulted for recs. Now post op requiring epidural pain control with RAPS team. Epidural has helped her pain, now chest tubes out and anticipate epidural removed in the next 1-2 days, defer to RAPS. Pain team will resume seeing patient after epidural removed per last discussion with them.   - Pain consult 5/1, appreciate recs;Re consult Pain Team 5/6  - Psych consult, appreciate recs for anxiety, clonopin, can increase lexapro outpatient  - PTA methadone dose, changed admin time to 5AM 5/5  - robaxin 500 mg po Q6h  - PTA pregabalin increased to 100-100-150   - Heat/cold packs  - Avoid IV opiates  - Bowel regimen  - Per recs from pain team: Oxy 5-10 mg Q3h as needed after procedure    # Hypoalbuminemia  Likely due to acute infection and chronic malnutrition contributing. Patient aware and reports poor diet at home - lots of frozen meals, fast foods, due to issues with ambulation. Lives alone, no help with food prep or  "grocery shopping.   - Nutrition consult  - Discuss outpatient services for meal/grocery delivery services- talked with [facility]     # SLE  # Sjogren's syndrome  # primary sclerosing cholangitis  # elevated alk phos from biliary source (PSC)  Patient had recent serology, but has returned negative so far. Reports diagnosis of PSC 14 years ago, and notes chronic LFT abnormalities. Elevated GGT, normal acetaminophen level consistent with PSC. Alk phos bone specific test elevated was mildly elevated to 50 but not significant enough compared to elevated GGT in the 1,000 so likely PSC. Did have recent fall but no documented fracture.   - TSH low normal, PTH normal, calcium is low (would expect elevated with MM), ionized calcium normal   - Corrected calcium with hypoalbuminemia on  is 9.2 (normal)   - consider outpatient endocrine consult for elevated bone specific alk phos if needed but suspect elevated alk phos due to PSC with elevated GGT.     #Bipolar disorder type II  Patient endorses recent loss of her father to atypical pneumonia, but considers her mood has been stable, denies suicidal ideation. Patient reports being on Risperidone in the past after a \"manic episode after a family member .\"   -PTA escitalopram, may increase to 20 mg as outpatient      # Mild aortic stenosis  Murmur noticed on exam, echocardiogram  showed LVEF, w/normal diastolic function, normal RV. Unlikely to be related to current symptoms.      # Migraines  # Fibromyalgia  Patient has long history of migraines with significant use of excedrin - now prone to rebound headaches.  -PTA Excedrin Migraine, weaning     # s/p Billroth II w/revision   # History of iron deficiency anemia  # acute blood loss anemia, stable  Patient has malabsorption secondary to probably surgery, receives IV iron as outpatient. On 19 she had low iron and saturation, normal ferritin, folate and B12. Required 1 unit of RBCs  from procedure, stable.   - continue " PTA pantoprazole  - IV iron replacement as outpatient     # Pain Assessment:  Current Pain Score 5/12/2019   Patient currently in pain? yes   Pain score (0-10) -   Pain location -   Pain descriptors -   Some encounter information is confidential and restricted. Go to Review Flowsheets activity to see all data.   - Demetra is experiencing pain due to pleural effusion. Pain management was discussed and the plan was created in a collaborative fashion.  Demetra's response to the current recommendations: engaged  - Please see the plan for pain management as documented above    Diet: Snacks/Supplements Adult: Other; bowl of chicken salad at bedtime with crackers + milk skim; Between Meals  Snacks/Supplements Adult: Boost Plus; With Meals  Advance Diet as Tolerated: Regular Diet Adult  Fluids: PO  DVT Prophylaxis:  Heparin per RAPS, ambulate if continued refusal  Code Status: Full Code    Disposition Plan   Expected discharge:Expected Discharge Date: 05/09/19 2 - 3 days, recommended to transitional care unit once adequate pain management/ tolerating PO medications and antibiotic plan established, physical therapy recs TCU.Dispo: Expected Discharge Date: 05/09/19      Entered: Wander Mooney 05/12/2019, 6:51 AM   Information in the above section will display in the discharge planner report.      The patient's care was discussed with the Attending Physician, Dr. dwyer.    Wander Mooney  [facility]  Pager: [phone]  Please see sticky note for cross cover information    Interval History  NAEO. HDS. 1 BM overnight. All chest tubes removed. Good UOP. Epidural for pain control still in place per RAPS.  Ambulated yesterday.  Thoracic- removal of all chest tubes yesterday.    Review of Systems   Constitutional: Negative for chills, diaphoresis, fever and unexpected weight change.   Respiratory: Negative for shortness of breath.    Cardiovascular: Negative for leg swelling.    Gastrointestinal: Negative for abdominal pain, constipation, nausea and vomiting.   Genitourinary: Negative for dysuria.   Musculoskeletal: Negative for joint swelling.   Neurological: Negative for tremors, weakness, light-headedness, numbness and headaches.   Psychiatric/Behavioral: Negative for dysphoric mood. The patient is not nervous/anxious.      Physical Exam   Vital Signs: Temp: 98  F (36.7  C) Temp src: Axillary BP: 98/73 Pulse: 69 Heart Rate: 79 Resp: 16 SpO2: 99 % O2 Device: Nasal cannula Oxygen Delivery: 2 LPM  Weight: 151 lbs 8 oz  Physical Exam   Constitutional: She is oriented to person, place, and time. She appears well-developed and well-nourished. No distress.   HENT:   Head: Normocephalic and atraumatic.   Eyes: Conjunctivae are normal. No scleral icterus.   Cardiovascular: Normal rate and regular rhythm. Exam reveals no friction rub.   Murmur (systolic ejection) heard.  Pulmonary/Chest: Effort normal. No respiratory distress. She has no wheezes. She has rales (mild in LLL).   CT dressings- minimal serosang discharge on dressings from CT removal   Neurological: She is alert and oriented to person, place, and time.   Skin: Skin is warm and dry. She is not diaphoretic.   Psychiatric: She has a normal mood and affect.   Vitals reviewed.    I/O last 3 completed shifts:  In: 680 [P.O.:680]  Out: 1934 [Urine:1900; Chest Tube:34]

## 2019-05-12 NOTE — PROGRESS NOTES
"REGIONAL ANESTHESIA PAIN SERVICE CONTINUOUS NERVE INFUSION NOTE  Subjective and Interval History: Pt reports poor pain control via nerve block continuous infusion and PO analgesics.  Denies any weakness, paresthesias, circumoral numbness, metallic taste or tinnitus.  Pt is ambulating with assistance.  Patient currently denies nausea or vomiting.      Clinically Aligned Pain Assessment (CAPA):   Comfort (How is your pain?): Uncomfortable but manageable  Change in Pain (Since your last medication/intervention?): About the same  Pain Control (How are your pain treatments working?):  Fully effective pain control  Functioning (Are you able to do activities to get better?) : Can do most things, but pain gets in the way of some        OBJECTIVE:   Blood pressure 115/73, pulse 77, temperature 36.3  C (97.4  F), temperature source Axillary, resp. rate 16, height 1.6 m (5' 3\"), weight 68.7 kg (151 lb 8 oz), SpO2 93 %, not currently breastfeeding.      Exam:   Pt is pleasant and conversational, in no acute distress, AOx3.  Strength 5/5 and symmetric grossly in bilateral LE.    Sensorium in tact to light touch in bilateral LE.  Insertion sites c/d/i, no tenderness, erythema, heme, edema.     ASSESSMENT:     Demetra Richardson is a 63 year old female  s/p left VATS decortication and placement of T5-6 epidural for analgesia.  Pt is receiving marginally adequate mutli-modal analgesia with epidural infusion, oxycodone, acetaminophen and robaxin. However, this patient has a history of fibromyalgia which should be taken into account as she likely has a component of central sensitization.  Pt is ambulating without difficulty and denies weakness nor paresthesias, but still with an adequate sensory block.  No evidence of adverse side effects associated with local anesthetic.      PLAN:  - Epidural bolus delivered from the bump 0.125% 4mL. I stayed with the patient for an additional 20mins and VSS. She states that her pain has " marginally improved after the bolus. Of note, I started by attempted to hand bolus but there was a considerable amount of resistance. The catheter was then liberated from beneath the patients shoulder and then bolused from the pump.  - Radha also has a h/o chronic intermittent daily headaches that have been effectively treated with Excedrin in the past. Strongly recommend placing parameters on giving Excedrin if also scheduling Acetaminophen out of concern for additive hepatotoxicity. Consider Excedrin 0800 and 1200, with acetaminophen at bedtime only.  - pt can receive local anesthetic bolus Q 12 hr PRN, bedside nurse must contact [facility] jobcode pager [phone]  - Anticoagulation: Heparin 5kU q8hrs, [facility] or [facility]  - plan catheter removal nearing time of discharge, either tomorrow or Tuesday  - will continue to follow and adjust as needed  - discussed plan with attending anesthesiologist     Siva Evans DO, MSc  CA-2/PGY-3 Anesthesiology  [phone]     24 hour Job Code Pager.  For in-house use only.     [place]:  * * [phone]  [place]: * * [phone]  Peds: * * [phone]  Enter call-back number and #      This pager only accepts text messages through [facility]

## 2019-05-13 ENCOUNTER — APPOINTMENT (OUTPATIENT)
Dept: OCCUPATIONAL THERAPY | Facility: CLINIC | Age: 64
DRG: 853 | End: 2019-05-13
Payer: COMMERCIAL

## 2019-05-13 ENCOUNTER — APPOINTMENT (OUTPATIENT)
Dept: PHYSICAL THERAPY | Facility: CLINIC | Age: 64
DRG: 853 | End: 2019-05-13
Payer: COMMERCIAL

## 2019-05-13 LAB
ANION GAP SERPL CALCULATED.3IONS-SCNC: 5 MMOL/L (ref 3–14)
BUN SERPL-MCNC: 8 MG/DL (ref 7–30)
CALCIUM SERPL-MCNC: 8.1 MG/DL (ref 8.5–10.1)
CHLORIDE SERPL-SCNC: 106 MMOL/L (ref 94–109)
CO2 SERPL-SCNC: 31 MMOL/L (ref 20–32)
CREAT SERPL-MCNC: 0.62 MG/DL (ref 0.52–1.04)
ERYTHROCYTE [DISTWIDTH] IN BLOOD BY AUTOMATED COUNT: 15.4 % (ref 10–15)
GFR SERPL CREATININE-BSD FRML MDRD: >90 ML/MIN/{1.73_M2}
GLUCOSE SERPL-MCNC: 99 MG/DL (ref 70–99)
HCT VFR BLD AUTO: 27.9 % (ref 35–47)
HGB BLD-MCNC: 8.1 G/DL (ref 11.7–15.7)
MCH RBC QN AUTO: 28.2 PG (ref 26.5–33)
MCHC RBC AUTO-ENTMCNC: 29 G/DL (ref 31.5–36.5)
MCV RBC AUTO: 97 FL (ref 78–100)
PLATELET # BLD AUTO: 428 10E9/L (ref 150–450)
POTASSIUM SERPL-SCNC: 4.6 MMOL/L (ref 3.4–5.3)
RBC # BLD AUTO: 2.87 10E12/L (ref 3.8–5.2)
SODIUM SERPL-SCNC: 141 MMOL/L (ref 133–144)
WBC # BLD AUTO: 10.5 10E9/L (ref 4–11)

## 2019-05-13 PROCEDURE — 25000132 ZZH RX MED GY IP 250 OP 250 PS 637: Performed by: SURGERY

## 2019-05-13 PROCEDURE — 12000001 ZZH R&B MED SURG/OB UMMC

## 2019-05-13 PROCEDURE — 97530 THERAPEUTIC ACTIVITIES: CPT | Mod: GP | Performed by: REHABILITATION PRACTITIONER

## 2019-05-13 PROCEDURE — 99207 ZZC NO CHARGE FOLLOW UP PS: CPT

## 2019-05-13 PROCEDURE — 25000132 ZZH RX MED GY IP 250 OP 250 PS 637: Performed by: STUDENT IN AN ORGANIZED HEALTH CARE EDUCATION/TRAINING PROGRAM

## 2019-05-13 PROCEDURE — 97110 THERAPEUTIC EXERCISES: CPT | Mod: GO

## 2019-05-13 PROCEDURE — 36415 COLL VENOUS BLD VENIPUNCTURE: CPT | Performed by: SURGERY

## 2019-05-13 PROCEDURE — 25000128 H RX IP 250 OP 636: Performed by: STUDENT IN AN ORGANIZED HEALTH CARE EDUCATION/TRAINING PROGRAM

## 2019-05-13 PROCEDURE — 80048 BASIC METABOLIC PNL TOTAL CA: CPT | Performed by: STUDENT IN AN ORGANIZED HEALTH CARE EDUCATION/TRAINING PROGRAM

## 2019-05-13 PROCEDURE — 97116 GAIT TRAINING THERAPY: CPT | Mod: GP | Performed by: REHABILITATION PRACTITIONER

## 2019-05-13 PROCEDURE — 97535 SELF CARE MNGMENT TRAINING: CPT | Mod: GO

## 2019-05-13 PROCEDURE — 25000132 ZZH RX MED GY IP 250 OP 250 PS 637: Performed by: FAMILY MEDICINE

## 2019-05-13 PROCEDURE — 85027 COMPLETE CBC AUTOMATED: CPT | Performed by: SURGERY

## 2019-05-13 RX ORDER — LIDOCAINE 40 MG/G
CREAM TOPICAL 3 TIMES DAILY PRN
Status: DISCONTINUED | OUTPATIENT
Start: 2019-05-13 | End: 2019-05-14 | Stop reason: HOSPADM

## 2019-05-13 RX ORDER — FUROSEMIDE 10 MG/ML
20 INJECTION INTRAMUSCULAR; INTRAVENOUS ONCE
Status: COMPLETED | OUTPATIENT
Start: 2019-05-13 | End: 2019-05-13

## 2019-05-13 RX ORDER — VALACYCLOVIR HYDROCHLORIDE 500 MG/1
500 TABLET, FILM COATED ORAL EVERY 12 HOURS SCHEDULED
Status: DISCONTINUED | OUTPATIENT
Start: 2019-05-13 | End: 2019-05-14 | Stop reason: HOSPADM

## 2019-05-13 RX ADMIN — PREGABALIN 100 MG: 100 CAPSULE ORAL at 09:15

## 2019-05-13 RX ADMIN — FUROSEMIDE 20 MG: 10 INJECTION, SOLUTION INTRAVENOUS at 09:17

## 2019-05-13 RX ADMIN — ACETAMINOPHEN, ASPIRIN AND CAFFEINE 1 TABLET: 250; 250; 65 TABLET, FILM COATED ORAL at 06:12

## 2019-05-13 RX ADMIN — OXYCODONE HYDROCHLORIDE 10 MG: 5 TABLET ORAL at 00:50

## 2019-05-13 RX ADMIN — METHOCARBAMOL 500 MG: 500 TABLET, FILM COATED ORAL at 06:07

## 2019-05-13 RX ADMIN — Medication 250 MG: at 20:15

## 2019-05-13 RX ADMIN — OXYCODONE HYDROCHLORIDE 10 MG: 5 TABLET ORAL at 04:03

## 2019-05-13 RX ADMIN — POLYETHYLENE GLYCOL 3350 17 G: 17 POWDER, FOR SOLUTION ORAL at 09:15

## 2019-05-13 RX ADMIN — OXYCODONE HYDROCHLORIDE 10 MG: 5 TABLET ORAL at 12:24

## 2019-05-13 RX ADMIN — ACETAMINOPHEN 650 MG: 325 TABLET, FILM COATED ORAL at 03:15

## 2019-05-13 RX ADMIN — Medication 0.5 MG: at 06:07

## 2019-05-13 RX ADMIN — OXYCODONE HYDROCHLORIDE 10 MG: 5 TABLET ORAL at 09:15

## 2019-05-13 RX ADMIN — CEFDINIR 300 MG: 300 CAPSULE ORAL at 09:16

## 2019-05-13 RX ADMIN — METHOCARBAMOL 500 MG: 500 TABLET, FILM COATED ORAL at 18:06

## 2019-05-13 RX ADMIN — PREGABALIN 150 MG: 75 CAPSULE ORAL at 20:15

## 2019-05-13 RX ADMIN — VALACYCLOVIR HYDROCHLORIDE 500 MG: 500 TABLET, FILM COATED ORAL at 20:15

## 2019-05-13 RX ADMIN — OXYCODONE HYDROCHLORIDE 10 MG: 5 TABLET ORAL at 21:38

## 2019-05-13 RX ADMIN — PREGABALIN 100 MG: 100 CAPSULE ORAL at 14:51

## 2019-05-13 RX ADMIN — Medication 250 MG: at 09:15

## 2019-05-13 RX ADMIN — ACETAMINOPHEN, ASPIRIN AND CAFFEINE 1 TABLET: 250; 250; 65 TABLET, FILM COATED ORAL at 20:15

## 2019-05-13 RX ADMIN — OXYCODONE HYDROCHLORIDE 10 MG: 5 TABLET ORAL at 18:06

## 2019-05-13 RX ADMIN — CEFDINIR 300 MG: 300 CAPSULE ORAL at 20:15

## 2019-05-13 RX ADMIN — METHOCARBAMOL 500 MG: 500 TABLET, FILM COATED ORAL at 12:24

## 2019-05-13 RX ADMIN — METHADONE HYDROCHLORIDE 113 MG: 10 CONCENTRATE ORAL at 05:00

## 2019-05-13 RX ADMIN — VALACYCLOVIR HYDROCHLORIDE 500 MG: 500 TABLET, FILM COATED ORAL at 14:51

## 2019-05-13 RX ADMIN — ESCITALOPRAM 10 MG: 5 TABLET, FILM COATED ORAL at 09:16

## 2019-05-13 RX ADMIN — Medication 0.5 MG: at 18:06

## 2019-05-13 RX ADMIN — DOCUSATE SODIUM 100 MG: 100 CAPSULE, LIQUID FILLED ORAL at 09:15

## 2019-05-13 RX ADMIN — PANTOPRAZOLE SODIUM 40 MG: 40 TABLET, DELAYED RELEASE ORAL at 09:16

## 2019-05-13 ASSESSMENT — ENCOUNTER SYMPTOMS
DYSPHORIC MOOD: 0
SHORTNESS OF BREATH: 0
FEVER: 0
NUMBNESS: 0
WEAKNESS: 0
ABDOMINAL PAIN: 0
UNEXPECTED WEIGHT CHANGE: 0
VOMITING: 0
NERVOUS/ANXIOUS: 0
DIAPHORESIS: 0
CONSTIPATION: 0
LIGHT-HEADEDNESS: 0
HEADACHES: 0
JOINT SWELLING: 0
NAUSEA: 0
TREMORS: 0
DYSURIA: 0
CHILLS: 0

## 2019-05-13 ASSESSMENT — MIFFLIN-ST. JEOR: SCORE: 1202.72

## 2019-05-13 ASSESSMENT — ACTIVITIES OF DAILY LIVING (ADL)
ADLS_ACUITY_SCORE: 12
ADLS_ACUITY_SCORE: 12
ADLS_ACUITY_SCORE: 14
ADLS_ACUITY_SCORE: 12
ADLS_ACUITY_SCORE: 14
ADLS_ACUITY_SCORE: 14

## 2019-05-13 NOTE — PROGRESS NOTES
REGIONAL ANESTHESIA PAIN SERVICE EPIDURAL NOTE  Demetra Richardson is a 63 year old female POD #6 s/p VATS left pleural decortication and bronchoscopy by thoracic surgery on 5/7 and placement of T5-6 epidural catheter for pain management.      SUBJECTIVE  Interval History: Overnight events: none.  Patient reports adequate pain control with epidural infusion and current  analgesic medications (see below).  Denies weakness, paresthesias, circumoral numbness, metallic taste or tinnitus.  Patient is ambulating with assistance.  Currently tolerating a diet, denies nausea or vomiting.  Pt has chronic pain at baseline.                 Clinically Aligned Pain Assessment (CAPA):  Comfort (How is your pain?): Tolerable with discomfort  Change in Pain (Since your last medication/intervention?): Getting better  Pain Control (How are your pain treatments working?):  Partially effective pain control  Functioning (Are you able to do activities to get better?) : Can do most things, but pain gets in the way of some   Sleep (Does your pain management allow you to sleep or rest?): Awake with occasional pain           Antithrombotic/Thrombolytic Therapy ordered:  Heparin 5,000 units subcutaneous q 8hrs     Analgesic Medications:              Medications related to Pain Management (From now, onward)     Start     Dose/Rate Route Frequency Ordered Stop     05/12/19 1500   aspirin-acetaminophen-caffeine (EXCEDRIN MIGRAINE) per tablet 1 tablet      1 tablet Oral 2 TIMES DAILY PRN 05/12/19 1447       05/12/19 1500   acetaminophen (TYLENOL) tablet 650 mg      650 mg Oral AT BEDTIME PRN 05/12/19 1447       05/12/19 1115   docusate sodium (COLACE) capsule 100 mg      100 mg Oral 2 TIMES DAILY 05/12/19 1112       05/08/19 2000   pregabalin (LYRICA) capsule 150 mg      150 mg Oral AT BEDTIME 05/08/19 1240       05/08/19 1523   methocarbamol (ROBAXIN) tablet 500 mg      500 mg Oral EVERY 6 HOURS 05/08/19 1235       05/08/19 1400   pregabalin  "(LYRICA) capsule 100 mg      100 mg Oral 2 TIMES DAILY 05/08/19 1240       05/08/19 0800   polyethylene glycol (MIRALAX/GLYCOLAX) Packet 17 g      17 g Oral 2 TIMES DAILY 05/08/19 0706       05/07/19 2216   oxyCODONE (ROXICODONE) tablet 5-10 mg      5-10 mg Oral EVERY 3 HOURS PRN 05/07/19 2216       05/07/19 1115   bupivacaine (MARCAINE) 0.125 % in sodium chloride 0.9 % 250 mL EPIDURAL Infusion      10 mL/hr  EPIDURAL CONTINUOUS 05/07/19 1101       05/06/19 1841   clonazePAM (klonoPIN) tablet 0.5 mg      0.5 mg Oral 2 TIMES DAILY 05/06/19 1601       05/06/19 0500   methadone (DOLOPHINE-INTENSOL) 10 MG/ML (HIGH CONC) solution 113 mg      113 mg Oral DAILY 05/05/19 1201       04/29/19 0453   lidocaine 1 % 0.1-1 mL      0.1-1 mL Other EVERY 1 HOUR PRN 04/29/19 0453       04/29/19 0453   lidocaine (LMX4) cream        Topical EVERY 1 HOUR PRN 04/29/19 0453               OBJECTIVE  Lab Results:       Recent Labs   Lab Test   05/13/19  0516   WBC 10.5   RBC 2.87*   HGB 8.1*   HCT 27.9*   MCV 97   MCH 28.2   MCHC 29.0*   RDW 15.4*                  Lab Results   Component Value Date     INR 1.17 05/07/2019     INR 1.38 04/29/2019     INR 1.13 04/28/2019         Vitals:              Temp:  [96.2  F (35.7  C)-98.6  F (37  C)] 96.2  F (35.7  C)  Pulse:  [60-79] 60  Heart Rate:  [68] 68  Resp:  [16-18] 16  BP: ()/() 92/60  SpO2:  [92 %-95 %] 94 %  BP 92/60 (BP Location: Left arm)   Pulse 60   Temp 96.2  F (35.7  C) (Axillary)   Resp 16   Ht 1.6 m (5' 3\")   Wt 68 kg (150 lb)   SpO2 94%   BMI 26.57 kg/m          Exam:   GEN: alert and no distress  MSK: Strength B/L LE 5/5  and overall symmetric  SKIN: Epidural catheter site with dressing c/d/i, no tenderness, erythema, heme, edema       ASSESSMENT/PLAN:    Patient is receiving adequate analgesia with current multimodal therapy including T5-6 epidural catheter infusion of Bupivacaine 0.125% at 4mL/hr.  Pt is ambulating several times a day.  No evidence of " adverse side effects related to local anesthetic.       - discontinued epidural catheter at 0910 dark tip intact without complication, POD #6  - antithrombotic/thrombolytic therapy:  May give heparin at 1010 - RN aware. Please contact RAPS (#[phone]) prior to any medication changes  - will sign off/call for questions or concerns      Mj Hernández MD  Anesthesiologist    RAPS Contact Info (24 hour job code pager is the last 4 digits) For in-house use only:   [place] phone: [place] [phone], [place] [phone], [place] [phone], then enter call-back number.    Text: Use [place] on the Intranet <Paging/Directory> tab and enter Jobcode ID.   If no call back at any time, contact the hospital  and ask for RAPS attending or backup

## 2019-05-13 NOTE — PLAN OF CARE
"Discharge Planner PT   Patient plan for discharge: TCU  Current status: pt needing use of bed rail and head of bed elevated for all bed mob, min A for LE to return to supine.  Pt needing min A for sit to stand from 19\" standard Ht chairs. Pt amb up to 200'x 2 needing up to 2 standing rests due to SOB. No LOB but mild instability when up.   Barriers to return to prior living situation: SOB, weakness, fatigue.   Recommendations for discharge: PT - per plan established by the Physical Therapist, according to functional mobility the  discharge recommendation is TCU   Rationale for recommendations: pt benefit for cont skilled PT to progress functional IND for safe return home.        Entered by: Jake Mayorga 05/13/2019 9:13 AM      "

## 2019-05-13 NOTE — PLAN OF CARE
Pt AO. VSS on RA. Up IND, reports BENSON. Requesting to take nap this afternoon. Son visited. C/o Lt back and chest pain, managing with PO meds. No nausea. Tolerating PO. Voiding. Last BM 5/11. Drsg c/d/i to Lt last chest. Plan for TCU.

## 2019-05-13 NOTE — PLAN OF CARE
OT/5B:  Discharge Planner OT   Patient plan for discharge: TCU  Current status: Patient greeted having just completed shower; reporting moderate fatigue. Educated on fatigue management strategies to incorporate into ADL/IADL routines, including use of shower chair and sitting down to complete tasks. Instructing patient in gentle thoracotomy HEP; slowly demonstrating shoulder flexion x10 repetitions each, but limited by pain. Verbally introduced to remaining exercises but declining to complete this date.   Barriers to return to prior living situation: Medical Status, Deconditioning, Pain   Recommendations for discharge: TCU  Rationale for recommendations: Patient would benefit from ongoing therapies to promote increased ADL/IADL/Mobility independence and safety.        Entered by: Gayla Milner 05/13/2019 4:36 PM

## 2019-05-13 NOTE — PROGRESS NOTES
"REGIONAL ANESTHESIA PAIN SERVICE CONTINUOUS NERVE INFUSION NOTE  Subjective and Interval History: Pt reports poor pain control via nerve block continuous infusion and PO analgesics.  Denies any weakness, paresthesias, circumoral numbness, metallic taste or tinnitus.  Pt is ambulating with assistance.  Patient currently denies nausea or vomiting.      Clinically Aligned Pain Assessment (CAPA):   Comfort (How is your pain?): Uncomfortable but manageable  Change in Pain (Since your last medication/intervention?): About the same  Pain Control (How are your pain treatments working?):  Fully effective pain control  Functioning (Are you able to do activities to get better?) : Can do most things, but pain gets in the way of some         OBJECTIVE:   Blood pressure 115/73, pulse 77, temperature 36.3  C (97.4  F), temperature source Axillary, resp. rate 16, height 1.6 m (5' 3\"), weight 68.7 kg (151 lb 8 oz), SpO2 93 %, not currently breastfeeding.        Exam:   Pt is pleasant and conversational, in no acute distress, AOx3.  Strength 5/5 and symmetric grossly in bilateral LE.    Sensorium in tact to light touch in bilateral LE.  Insertion sites c/d/i, no tenderness, erythema, heme, edema.     ASSESSMENT:     Demetra Richardson is a 63 year old female  s/p left VATS decortication and placement of T5-6 epidural for analgesia.  Pt is receiving marginally adequate mutli-modal analgesia with epidural infusion, oxycodone, acetaminophen and robaxin. However, this patient has a history of fibromyalgia which should be taken into account as she likely has a component of central sensitization.  Pt is ambulating without difficulty and denies weakness nor paresthesias, but still with an adequate sensory block.  No evidence of adverse side effects associated with local anesthetic.      PLAN:  - Epidural bolus delivered from the bump 0.125% 4mL. I stayed with the patient for an additional 20mins and VSS. She states that her pain has " marginally improved after the bolus. Of note, I started by attempted to hand bolus but there was a considerable amount of resistance. The catheter was then liberated from beneath the patients shoulder and then bolused from the pump.  - Radha also has a h/o chronic intermittent daily headaches that have been effectively treated with Excedrin in the past. Strongly recommend placing parameters on giving Excedrin if also scheduling Acetaminophen out of concern for additive hepatotoxicity. Consider Excedrin 0800 and 1200, with acetaminophen at bedtime only.  - pt can receive local anesthetic bolus Q 12 hr PRN, bedside nurse must contact [facility] jobcode pager [phone]  - Anticoagulation: Heparin 5kU q8hrs, please contact RAPS if dose or medication is changed  - plan catheter removal nearing time of discharge, either tomorrow or Tuesday  - will continue to follow and adjust as needed    Gary Dennis MD  5/12/19

## 2019-05-13 NOTE — PROGRESS NOTES
REGIONAL ANESTHESIA PAIN SERVICE EPIDURAL NOTE  Demetra Richardson is a 63 year old female POD #6 s/p VATS left pleural decortication and bronchoscopy by thoracic surgery on 5/7 and placement of T5-6 epidural catheter for pain management.      SUBJECTIVE  Interval History: Overnight events: none.  Patient reports adequate pain control with epidural infusion and current  analgesic medications (see below).  Denies weakness, paresthesias, circumoral numbness, metallic taste or tinnitus.  Patient is ambulating with assistance.  Currently tolerating a diet, denies nausea or vomiting.  Pt has chronic pain at baseline.     Clinically Aligned Pain Assessment (CAPA):  Comfort (How is your pain?): Tolerable with discomfort  Change in Pain (Since your last medication/intervention?): Getting better  Pain Control (How are your pain treatments working?):  Partially effective pain control  Functioning (Are you able to do activities to get better?) : Can do most things, but pain gets in the way of some   Sleep (Does your pain management allow you to sleep or rest?): Awake with occasional pain        Antithrombotic/Thrombolytic Therapy ordered:  Heparin 5,000 units subcutaneous q 8hrs    Analgesic Medications:  Medications related to Pain Management (From now, onward)    Start     Dose/Rate Route Frequency Ordered Stop    05/12/19 1500  aspirin-acetaminophen-caffeine (EXCEDRIN MIGRAINE) per tablet 1 tablet      1 tablet Oral 2 TIMES DAILY PRN 05/12/19 1447      05/12/19 1500  acetaminophen (TYLENOL) tablet 650 mg      650 mg Oral AT BEDTIME PRN 05/12/19 1447      05/12/19 1115  docusate sodium (COLACE) capsule 100 mg      100 mg Oral 2 TIMES DAILY 05/12/19 1112      05/08/19 2000  pregabalin (LYRICA) capsule 150 mg      150 mg Oral AT BEDTIME 05/08/19 1240      05/08/19 1523  methocarbamol (ROBAXIN) tablet 500 mg      500 mg Oral EVERY 6 HOURS 05/08/19 1235      05/08/19 1400  pregabalin (LYRICA) capsule 100 mg      100 mg Oral 2  "TIMES DAILY 05/08/19 1240      05/08/19 0800  polyethylene glycol (MIRALAX/GLYCOLAX) Packet 17 g      17 g Oral 2 TIMES DAILY 05/08/19 0706      05/07/19 2216  oxyCODONE (ROXICODONE) tablet 5-10 mg      5-10 mg Oral EVERY 3 HOURS PRN 05/07/19 2216      05/07/19 1115  bupivacaine (MARCAINE) 0.125 % in sodium chloride 0.9 % 250 mL EPIDURAL Infusion      10 mL/hr  EPIDURAL CONTINUOUS 05/07/19 1101      05/06/19 1841  clonazePAM (klonoPIN) tablet 0.5 mg      0.5 mg Oral 2 TIMES DAILY 05/06/19 1601      05/06/19 0500  methadone (DOLOPHINE-INTENSOL) 10 MG/ML (HIGH CONC) solution 113 mg      113 mg Oral DAILY 05/05/19 1201      04/29/19 0453  lidocaine 1 % 0.1-1 mL      0.1-1 mL Other EVERY 1 HOUR PRN 04/29/19 0453      04/29/19 0453  lidocaine (LMX4) cream       Topical EVERY 1 HOUR PRN 04/29/19 0453             OBJECTIVE  Lab Results:   Recent Labs   Lab Test   05/13/19  0516   WBC 10.5   RBC 2.87*   HGB 8.1*   HCT 27.9*   MCV 97   MCH 28.2   MCHC 29.0*   RDW 15.4*          Lab Results   Component Value Date    INR 1.17 05/07/2019    INR 1.38 04/29/2019    INR 1.13 04/28/2019       Vitals:    Temp:  [96.2  F (35.7  C)-98.6  F (37  C)] 96.2  F (35.7  C)  Pulse:  [60-79] 60  Heart Rate:  [68] 68  Resp:  [16-18] 16  BP: ()/() 92/60  SpO2:  [92 %-95 %] 94 %  BP 92/60 (BP Location: Left arm)   Pulse 60   Temp 96.2  F (35.7  C) (Axillary)   Resp 16   Ht 1.6 m (5' 3\")   Wt 68 kg (150 lb)   SpO2 94%   BMI 26.57 kg/m         Exam:   GEN: alert and no distress  MSK: Strength B/L LE 5/5  and overall symmetric  SKIN: Epidural catheter site with dressing c/d/i, no tenderness, erythema, heme, edema     ASSESSMENT/PLAN:    Patient is receiving adequate analgesia with current multimodal therapy including T5-6 epidural catheter infusion of Bupivacaine 0.125% at 4mL/hr.  Pt is ambulating several times a day.  No evidence of adverse side effects related to local anesthetic.      - discontinued epidural catheter at " 0910 dark tip intact without complication, POD #6  - antithrombotic/thrombolytic therapy:  May give heparin at 1010 - RN aware. Please contact RAPS (#[phone]) prior to any medication changes  - will sign off/call for questions or concerns    - discussed plan with attending anesthesiologist    VINOD Dillard CNP  Regional Anesthesia Pain Service  5/13/2019 7:05 AM    RAPS Contact Info (24 hour job code pager is the last 4 digits) For in-house use only:   [employer] phone: [employer] [phone], [place] [employer] [phone], Peds [phone], then enter call-back number.    Text: Use [employer] on the Intranet <Paging/Directory> tab and enter Jobcode ID.   If no call back at any time, contact the hospital  and ask for RAPS attending or backup

## 2019-05-13 NOTE — PLAN OF CARE
Patient stable. Taking pain meds when they're due. Epidural still in place. Chest tube site CDI. Excederin changed to twice daily with cut back on regular tylenol. Transfer orders placed when bed is available to go to general care. No tele. Patient up and ambulating in hallway or to bathroom.

## 2019-05-13 NOTE — PROGRESS NOTES
[facility], [facility] Progress Note    Main Plans for Today    -continue antibiotics for 7 days from surgery (end 5/14)  -epidural removed today per RAPS team  -Valtrex for HSV outbreak  -Pain team to see today for recs going to TCU  -Remove CT dressings 5/13 afternoon  -Likely TCU tomorrow or Wednesday    Assessment & Plan   Demetra Richardson is a 63 year old female w/complicated PMHx, significant for SLE, PSC, Sjogren's, fibromyalgia, opiate dependence and polysubstance abuse with history of OD and withdrawal seizures, epidural abscess, bipolar disorder type II, currently on a methadone program, and is admitted for management of L pleural effusion now growing strep intermedius and persistent empyema despite chest tube placement. S/P VATS left pleural decortication and bronchoscopy by thoracic surgery on 5/7.     # Loculated pleural effusion/ empyema  # Sepsis secondary to empyema  # S/P vats decortication  # S/P epidural placement for pain  Most likely parapneumonic effusion with fluid culture growing strep intermedius sensitive to ceftriaxone now transitioned to Cefdinir. She is s/p chest tube placement on 4/29 with lytic therapy until 5/6, concern for persistent empyema on recent CT scan on 5/5. Thoracic consulted 5/5 with concern for persistent empyema to perform decortication 5/7. CT showed new opacities in RUL, pro jazzy returned moderate risk 0.6. RVP negative. S/P decortication 5/7 by thoracic surgery, all chest tubes out by 5/11, pain control as below.   - Cefdinir 300mg BID PO, 7 day course of antibiotics until 5/14 postoperatively (through 5/14)  - Supplemental oxygen prn, goal >92%, not needed now  - Continue incentive spirometry  - Ambulate  - metanebs    # Thoracic lymphadenopathy  CT showed enlarged left lower cervical lymph node, prominent left heel or soft tissue, possibly left hilar adenopathy, prominent left paratracheal lymph nodes.  DDX  "includes infectious versus malignant versus sarcoidosis. Pulm on board. Likely reactive.      # Pulmonary edema  # Fluid overload  May need lasix dosing. Given 20 mg lasix 5/13  - Daily weights    # Chronic pain  # Polysubstance use disorder  # History of overdose and withdrawal seizures  # Methadone maintenance therapy, active  # Fibromyalgia  # anxiety  Patient denies recent substance use, reports no alcohol use in last 7 years. She does admit to taking some xanax about 2 months ago (1/22/2019), and she was hospitalized with acute toxic encephalopathy, even intubated as she was having apneic pauses. Patient is on methadone and fills her prescriptions at [facility]. Admission UDS negative. Pain and anxiety continue to be an issue for the patient, we have made several adjustments and pain/anxiety still not well controlled. Psych and pain team consulted for recs. Now post op requiring epidural pain control with RAPS team. Epidural has helped her pain, now chest tubes out and anticipate epidural removed in the next 1-2 days, defer to RAPS. Pain team will resume seeing patient after epidural removed per last discussion with them.   - Pain consult 5/1, appreciate recs;Re consult Pain Team 5/6  - Psych consult, appreciate recs for anxiety, clonopin, can increase lexapro outpatient  - PTA methadone dose, changed admin time to 5AM 5/5  - robaxin 500 mg po Q6h  - PTA pregabalin increased to 100-100-150   - Heat/cold packs  - Avoid IV opiates  - Bowel regimen  - Excedrin from PTA  - Per recs from pain team: Oxy 5-10 mg Q3h as needed after procedure    #Herpes simplex virus outbreak  Says she has these outbreaks \"often\". One lesion on mons pubis area present consistent with HSV.   - Valtrex for 5 days    # Hypoalbuminemia  Likely due to acute infection and chronic malnutrition contributing. Patient aware and reports poor diet at home - lots of frozen meals, fast foods, due to issues with ambulation. Lives alone, no help with " "food prep or grocery shopping.   - Nutrition consult  - Discuss outpatient services for meal/grocery delivery services- talked with [facility]     # SLE  # Sjogren's syndrome  # primary sclerosing cholangitis  # elevated alk phos from biliary source (PSC)  Patient had recent serology, but has returned negative so far. Reports diagnosis of PSC 14 years ago, and notes chronic LFT abnormalities. Elevated GGT, normal acetaminophen level consistent with PSC. Alk phos bone specific test elevated was mildly elevated to 50 but not significant enough compared to elevated GGT in the 1,000 so likely PSC. Did have recent fall but no documented fracture. TSH low normal, PTH normal, calcium is low (would expect elevated with MM), ionized calcium normal   - consider outpatient endocrine consult for elevated bone specific alk phos if needed but suspect elevated alk phos due to PSC with elevated GGT.     #Bipolar disorder type II  Patient endorses recent loss of her father to atypical pneumonia, but considers her mood has been stable, denies suicidal ideation. Patient reports being on Risperidone in the past after a \"manic episode after a family member .\"   -PTA escitalopram, may increase to 20 mg as outpatient      # Mild aortic stenosis  Murmur noticed on exam, echocardiogram  showed LVEF, w/normal diastolic function, normal RV. Unlikely to be related to current symptoms.      # Migraines  # Fibromyalgia  Patient has long history of migraines with significant use of excedrin - now prone to rebound headaches.  -PTA Excedrin Migraine, weaning     # s/p Billroth II w/revision   # History of iron deficiency anemia  # acute blood loss anemia, stable  Patient has malabsorption secondary to probably surgery, receives IV iron as outpatient. On 19 she had low iron and saturation, normal ferritin, folate and B12. Required 1 unit of RBCs  from procedure, stable.   - continue PTA pantoprazole  - IV iron replacement as " "outpatient     # Pain Assessment:  Current Pain Score 5/13/2019   Patient currently in pain? yes   Pain score (0-10) -   Pain location -   Pain descriptors Heaviness   Some encounter information is confidential and restricted. Go to Review Flowsheets activity to see all data.   - Demetra is experiencing pain due to pleural effusion. Pain management was discussed and the plan was created in a collaborative fashion.  Demetra's response to the current recommendations: engaged  - Please see the plan for pain management as documented above    Diet: Snacks/Supplements Adult: Other; bowl of chicken salad at bedtime with crackers + milk skim; Between Meals  Snacks/Supplements Adult: Boost Plus; With Meals  Advance Diet as Tolerated: Regular Diet Adult  Fluids: PO  DVT Prophylaxis:  Heparin per RAPS, ambulate if continued refusal  Code Status: Full Code    Disposition Plan   Expected discharge:Expected Discharge Date: 05/15/19 2 - 3 days, recommended to transitional care unit once adequate pain management/ tolerating PO medications and antibiotic plan established, physical therapy recs TCU.Dispo: Expected Discharge Date: 05/15/19      Entered: Wander Mooney 05/13/2019, 12:41 PM   Information in the above section will display in the discharge planner report.      The patient's care was discussed with the Attending Physician, Dr. Finn.    Wander Mooney  [facility]  Pager: [phone]  Please see sticky note for cross cover information    Interval History  NAEO. HDS with occasional BP in 80s/50s, epidural still in place. No BM overnight.  Ambulated yesterday.Weight down 1 pound 5/12 from 5/11. Needs chest tube dressings changed today. Pain managed. Noticed feeling tired and \"feeling like I had the flu\" because she had a genital herpes outbreak overnight. Requesting valtrex.     Review of Systems   Constitutional: Negative for chills, diaphoresis, fever and unexpected " weight change.   Respiratory: Negative for shortness of breath.    Cardiovascular: Negative for leg swelling.   Gastrointestinal: Negative for abdominal pain, constipation, nausea and vomiting.   Genitourinary: Negative for dysuria.   Musculoskeletal: Negative for joint swelling.   Neurological: Negative for tremors, weakness, light-headedness, numbness and headaches.   Psychiatric/Behavioral: Negative for dysphoric mood. The patient is not nervous/anxious.      Physical Exam   Vital Signs: Temp: 96.8  F (36  C) Temp src: Axillary BP: 92/53 Pulse: 75 Heart Rate: 68 Resp: 16 SpO2: 95 % O2 Device: None (Room air)    Weight: 150 lbs 0 oz  Physical Exam   Constitutional: She is oriented to person, place, and time. She appears well-developed and well-nourished. No distress.   HENT:   Head: Normocephalic and atraumatic.   Eyes: Conjunctivae are normal. No scleral icterus.   Cardiovascular: Normal rate and regular rhythm. Exam reveals no friction rub.   Murmur (systolic ejection) heard.  Pulmonary/Chest: Effort normal. No respiratory distress. She has no wheezes. She has no rales (mild in LLL).   CT dressings- minimal serosang discharge on dressings from CT removal  Mild crackles heard in LLL   Genitourinary:   Genitourinary Comments: One lesion on mons pubis area present consistent with HSV. Small, 3-5 Fluid filled vesicles with erythematous base.    Neurological: She is alert and oriented to person, place, and time.   Skin: Skin is warm and dry. She is not diaphoretic.   Psychiatric: She has a normal mood and affect.   Vitals reviewed.    I/O last 3 completed shifts:  In: 820 [P.O.:820]  Out: 50 [Urine:50]

## 2019-05-13 NOTE — PROGRESS NOTES
Social Work: Assessment with Discharge Plan    Patient Name:  Demetra Richardson  :  1955  Age:  63 year old  MRN:  [id number]  Risk/Complexity Score:  Filed Complexity Screen Score: 7  Completed assessment with:  Pt, chart review    Presenting Information   Reason for Referral:  Discharge plan  Date of Intake:  May 13, 2019  Referral Source:  Physician  Decision Maker:  Patient  Alternate Decision Maker:  Family per [facility] policy  Health Care Directive:  Patient considering completing and Provided education  Living Situation:  Apartment  Previous Functional Status:  Independent  Patient and family understanding of hospitalization:  Restorative  Cultural/Language/Spiritual Considerations:  None identified  Adjustment to Illness:  Pt appears to have a clear understanding of hospitalization and her care. No adjustment to illness concerns.    Physical Health  Reason for Admission:    1. Anemia due to blood loss, acute    2. Pleuritic chest pain    3. Recurrent left pleural effusion    4. Sjogren's syndrome, with unspecified organ involvement (H)    5. Pleural effusion    6. Sicca syndrome with other organ involvement (H)    7. Loculated pleural effusion      Services Needed/Recommended:  TCU    Mental Health/Chemical Dependency  Diagnosis:  History of polysubstance use, borderline personality disorder, current generalized anxiety disorder (see psychiatrist's note from 19)  Support/Services in Place:  Methadone clinic, support group 3X/week  Services Needed/Recommended:  None additional    Support System  Significant relationship at present time:  Son, siblings  Family of origin is available for support:  Yes  Other support available:  Methadone clinic, ex-boyfriend  Gaps in support system:  None identified. Pt's father  recently and mother had a stroke, which has been a strain on pt and siblings.  Patient is caregiver to:  None     Provider Information   Primary Care Physician:  Fredy Merritt    [phone]   Clinic:  [address] [place] [address] / [facility] [address]      :  N/A    Financial   Income Source:  SSDI  Financial Concerns:  None  Insurance:    Payor/Plan Subscriber Name Rel Member # Group #   MEDICA - MEDICA ACCES* KATERINA CURTIS*  [id number]      [address] [address]       Discharge Plan   Patient and family discharge goal:  TCU  Provided education on discharge plan:  YES  Patient agreeable to discharge plan:  YES  A list of Medicare Certified Facilities was provided to the patient and/or family to encourage patient choice. Patient's choices for facility are:  [facility], [facility] provide Skilled rehabilitation or complex medical:  YES  General information regarding anticipated insurance coverage and possible out of pocket cost was discussed. Patient and patient's family are aware patient may incur the cost of transportation to the facility, pending insurance payment: YES  Barriers to discharge:  Medical readiness, bed availability    Discharge Recommendations   Anticipated Disposition:  Facility:  TBD  Transportation Needs:  Other:  TBD  Name of Transportation Company and Phone:  TBD    Additional comments   SW will continue to remain available for discharge planning, other resources and support PRN.    Salma Neville, ARIES, LGSW  [facility]   Pager: [phone]

## 2019-05-13 NOTE — PLAN OF CARE
Time: 1396-6308  Reason of admission: left pleural effusion   Vitals: vss on room air and afebrile.  Activity: up independent to the bathroom, did well.    Pain: C/o back and left lateral abdominal pain, epidural 10 ml/hr continuous, oxycodone 10 mg every 3 hours prn, Methadone 113 mg once per day scheduled, Tylenol prn. Patient is strictly on time when pain med is due. Discussed pain control plan, wanted to be awaken when due time. Patient thinks her pain is not controlled enough, but helped the current regimen.   Neuro:  A&Ox4, no nimbleness and no tingling, calm and cooperative with cares, but watching time for pain med.   Cardiac: wnl, denies chest pain  Respiratory: on room air, denies SOB or difficult breathing. Encouraged deep breathing and coughing while awake.   GI/: no bowel movement this shift. Denies nausea/vomiting. Abdomen soft with some tenderness when palpated to left sie of abdomen. Voiding spontaneously using bathroom, not saved.   Diet: regular, tolerated well.   Skin: chest tube site to left side, original dressing in place with some shadwing. Will be changed today.   LDAs: PIV saline locked, Epidural infusing per order.   Labs: no change overnight   New change for this shift: none  Plan: .chest tube dressing change today.

## 2019-05-13 NOTE — PLAN OF CARE
"VSS on room air. A&Ox4. Up ad shelby. Patient reporting \"heavy\" pain in back on L side. PRN oxy Q3 for pain management. On scheduled robaxin. Epidural removed today per RAPS team. Dressing change done this shift. Good appetite eating majority of breakfast & lunch trays. Worked w/ PT. Started on valtrex. Will report to oncoming RN.       "

## 2019-05-13 NOTE — PROGRESS NOTES
REGIONAL ANESTHESIA PAIN SERVICE EPIDURAL NOTE  Demetra Richardson is a 63 year old female POD #4 s/p Left Video Assisted Thoracoscopic Decortication, Intercostal Nerve Cryo Analgesia, Flexible Bronchoscopy, Flexible Bronchoscopy and placement of T5-6 epidural catheter for postoperative pain management.      SUBJECTIVE  Interval History: Overnight no acute events. Patient reports good pain control with epidural infusion and current analgesic medications (see below).  Clinician bolus's of PF bupivacaine 0.125% 5ml helpful. No weakness, paresthesias, circumoral numbness, metallic taste or tinnitus.  Patient ambulating without assistance between bed and commode. Currently tolerating a regular diet, no nausea or vomiting, voiding without difficulty.                 Clinically Aligned Pain Assessment (CAPA):  Comfort (How is your pain?): Comfortably manageable  Change in Pain (Since your last medication/intervention?): Getting better  Pain Control (How are your pain treatments working?):  Partially effective pain control  Functioning (Are you able to do activities to get better?) : Can do most things, but pain gets in the way of some   Sleep (Does your pain management allow you to sleep or rest?): Awake with occasional pain                 Pain Intensity using Numerical Rating Scale (NRS):    4/10 at rest and 8/10 with activity        Antithrombotic/Thrombolytic Therapy ordered:    heparin sodium injection 5,000 Units 5,000 Units, SC, Q8H         Analgesic Medications:              Medications related to Pain Management (From now, onward)     Start     Dose/Rate Route Frequency Ordered Stop     05/10/19 0751   aspirin-acetaminophen-caffeine (EXCEDRIN MIGRAINE) per tablet 1 tablet      1 tablet Oral DAILY PRN 05/10/19 0751       05/08/19 2000   pregabalin (LYRICA) capsule 150 mg      150 mg Oral AT BEDTIME 05/08/19 1240       05/08/19 1523   methocarbamol (ROBAXIN) tablet 500 mg      500 mg Oral EVERY 6 HOURS 05/08/19  "1235       05/08/19 1400   pregabalin (LYRICA) capsule 100 mg      100 mg Oral 2 TIMES DAILY 05/08/19 1240       05/08/19 0800   polyethylene glycol (MIRALAX/GLYCOLAX) Packet 17 g      17 g Oral 2 TIMES DAILY 05/08/19 0706 05/07/19 2230   senna-docusate (SENOKOT-S/PERICOLACE) 8.6-50 MG per tablet 2 tablet      2 tablet Oral 2 TIMES DAILY 05/07/19 2216 05/07/19 2216   oxyCODONE (ROXICODONE) tablet 5-10 mg      5-10 mg Oral EVERY 3 HOURS PRN 05/07/19 2216       05/07/19 1115   bupivacaine (MARCAINE) 0.125 % in sodium chloride 0.9 % 250 mL EPIDURAL Infusion      10 mL/hr  EPIDURAL CONTINUOUS 05/07/19 1101       05/06/19 1841   clonazePAM (klonoPIN) tablet 0.5 mg      0.5 mg Oral 2 TIMES DAILY 05/06/19 1601       05/06/19 1430   acetaminophen (TYLENOL) tablet 650 mg      650 mg Oral EVERY 6 HOURS PRN 05/06/19 1415       05/06/19 0500   methadone (DOLOPHINE-INTENSOL) 10 MG/ML (HIGH CONC) solution 113 mg      113 mg Oral DAILY 05/05/19 1201       04/29/19 0453   lidocaine 1 % 0.1-1 mL      0.1-1 mL Other EVERY 1 HOUR PRN 04/29/19 0453       04/29/19 0453   lidocaine (LMX4) cream        Topical EVERY 1 HOUR PRN 04/29/19 0453               OBJECTIVE  Lab Results:       Recent Labs   Lab Test 05/10/19  0424   WBC 12.6*   RBC 2.90*   HGB 8.2*   HCT 27.5*   MCV 95   MCH 28.3   MCHC 29.8*   RDW 14.8                  Lab Results   Component Value Date     INR 1.17 05/07/2019     INR 1.38 04/29/2019     INR 1.13 04/28/2019         Vitals:              Temp:  [36.6  C (97.8  F)-37.1  C (98.8  F)] 37.1  C (98.8  F)  Heart Rate:  [78-88] 88  Resp:  [16] 16  BP: ()/(53-70) 99/66  SpO2:  [91 %-97 %] 96 %  BP 99/66 (BP Location: Right arm)   Pulse 86   Temp 37.1  C (98.8  F) (Axillary)   Resp 16   Ht 1.6 m (5' 3\")   Wt 69.1 kg (152 lb 5.4 oz)   SpO2 96%   BMI 26.99 kg/m          Exam:   GEN: alert, pleasant, and no distress  NEURO/MSK: Strength BLE 5/5  and overall symmetric  SKIN: Epidural catheter site " with dressing c/d/i, no tenderness, erythema, heme, edema       ASSESSMENT/PLAN:    Patient is receiving adequate analgesia with current multimodal therapy including T5-6 epidural catheter infusion of bupivacaine 0.125% at 10mL/hr.  Motor function intact and adequate sensory block, meeting activity goals.  No evidence of adverse side effects related to local anesthetic. Voiding without difficulty.       - continue current epidural infusion bupivacaine 0.125% at 10mL/hour, POD #4  - patient can be evaluated to receive local anesthetic bolus Q 12 hr PRN, bedside nurse must page RAPS (#[phone]) to request bolus  -followed by pain service for oral analgesics in setting of opioid tolerance on methadone maintenance outpt.  - antithrombotic/thrombolytic therapy Heparin SQ Q 8 hrs as ordered. Please contact RAPS (#[phone]) prior to any medication changes  - will continue to follow and adjust as needed     -Bolused catheter with 5 mL of 0.25% bupivacaine and patient stated adequate relief. Monitored patient for 10 mins following and patient remained hemodynamically stable.          Gary Dennis MD  5/11/19

## 2019-05-13 NOTE — SUMMARY OF CARE
Pt arrived to  with the following belongings: green duffle bag, brown purse, phone, wallet with id,debit card, car and house keys, white sneakers, reading glasses.  Pt is wearing a necklace, earrings, and a ring. Belongings at bedside.

## 2019-05-13 NOTE — PROGRESS NOTES
Transfer  Transferred to:  34-1  Via: wheelchair  Reason for transfer:Pt no longer appropriate for 6B- improved patient condition  Belongings: Packed and sent with pt  Chart: Delivered with pt to next unit  Medications: Meds sent to new unit with pt (extra epidural delivered to Tessa JONES and spoke to pharmacy/put in request for methadone to be switched to 5B).  Report given to: Tessa JONES  Pt status: VSS. Pain managed with prn oxy right before transfer.

## 2019-05-14 ENCOUNTER — APPOINTMENT (OUTPATIENT)
Dept: OCCUPATIONAL THERAPY | Facility: CLINIC | Age: 64
DRG: 853 | End: 2019-05-14
Payer: COMMERCIAL

## 2019-05-14 ENCOUNTER — APPOINTMENT (OUTPATIENT)
Dept: GENERAL RADIOLOGY | Facility: CLINIC | Age: 64
DRG: 853 | End: 2019-05-14
Attending: STUDENT IN AN ORGANIZED HEALTH CARE EDUCATION/TRAINING PROGRAM
Payer: COMMERCIAL

## 2019-05-14 ENCOUNTER — HOSPITAL ENCOUNTER (INPATIENT)
Facility: SKILLED NURSING FACILITY | Age: 64
LOS: 5 days | Discharge: HOME | DRG: 949 | End: 2019-05-19
Attending: HOSPITALIST | Admitting: HOSPITALIST
Payer: COMMERCIAL

## 2019-05-14 VITALS
OXYGEN SATURATION: 93 % | HEIGHT: 63 IN | TEMPERATURE: 95.7 F | BODY MASS INDEX: 26.31 KG/M2 | DIASTOLIC BLOOD PRESSURE: 53 MMHG | HEART RATE: 73 BPM | RESPIRATION RATE: 16 BRPM | WEIGHT: 148.5 LBS | SYSTOLIC BLOOD PRESSURE: 84 MMHG

## 2019-05-14 DIAGNOSIS — J90 LOCULATED PLEURAL EFFUSION: ICD-10-CM

## 2019-05-14 DIAGNOSIS — F41.9 ANXIETY DISORDER, UNSPECIFIED TYPE: ICD-10-CM

## 2019-05-14 DIAGNOSIS — G89.29 OTHER CHRONIC PAIN: ICD-10-CM

## 2019-05-14 DIAGNOSIS — G89.18 ACUTE POST-OPERATIVE PAIN: Primary | ICD-10-CM

## 2019-05-14 LAB
ANION GAP SERPL CALCULATED.3IONS-SCNC: 2 MMOL/L (ref 3–14)
BACTERIA SPEC CULT: NORMAL
BUN SERPL-MCNC: 10 MG/DL (ref 7–30)
CALCIUM SERPL-MCNC: 8 MG/DL (ref 8.5–10.1)
CHLORIDE SERPL-SCNC: 108 MMOL/L (ref 94–109)
CO2 SERPL-SCNC: 31 MMOL/L (ref 20–32)
CREAT SERPL-MCNC: 0.67 MG/DL (ref 0.52–1.04)
ERYTHROCYTE [DISTWIDTH] IN BLOOD BY AUTOMATED COUNT: 15.5 % (ref 10–15)
GFR SERPL CREATININE-BSD FRML MDRD: >90 ML/MIN/{1.73_M2}
GLUCOSE SERPL-MCNC: 91 MG/DL (ref 70–99)
HCT VFR BLD AUTO: 28.5 % (ref 35–47)
HGB BLD-MCNC: 8.2 G/DL (ref 11.7–15.7)
Lab: NORMAL
MCH RBC QN AUTO: 28 PG (ref 26.5–33)
MCHC RBC AUTO-ENTMCNC: 28.8 G/DL (ref 31.5–36.5)
MCV RBC AUTO: 97 FL (ref 78–100)
PLATELET # BLD AUTO: 444 10E9/L (ref 150–450)
POTASSIUM SERPL-SCNC: 4.9 MMOL/L (ref 3.4–5.3)
RBC # BLD AUTO: 2.93 10E12/L (ref 3.8–5.2)
SODIUM SERPL-SCNC: 141 MMOL/L (ref 133–144)
SPECIMEN SOURCE: NORMAL
WBC # BLD AUTO: 8.8 10E9/L (ref 4–11)

## 2019-05-14 PROCEDURE — 25000132 ZZH RX MED GY IP 250 OP 250 PS 637: Performed by: SURGERY

## 2019-05-14 PROCEDURE — 99207 ZZC NO CHARGE FOLLOW UP PS: CPT

## 2019-05-14 PROCEDURE — 97530 THERAPEUTIC ACTIVITIES: CPT | Mod: GO

## 2019-05-14 PROCEDURE — 25000132 ZZH RX MED GY IP 250 OP 250 PS 637: Performed by: PHYSICIAN ASSISTANT

## 2019-05-14 PROCEDURE — 71045 X-RAY EXAM CHEST 1 VIEW: CPT

## 2019-05-14 PROCEDURE — 25000132 ZZH RX MED GY IP 250 OP 250 PS 637: Performed by: FAMILY MEDICINE

## 2019-05-14 PROCEDURE — 25000132 ZZH RX MED GY IP 250 OP 250 PS 637: Performed by: HOSPITALIST

## 2019-05-14 PROCEDURE — 36415 COLL VENOUS BLD VENIPUNCTURE: CPT | Performed by: SURGERY

## 2019-05-14 PROCEDURE — 85027 COMPLETE CBC AUTOMATED: CPT | Performed by: SURGERY

## 2019-05-14 PROCEDURE — 25000132 ZZH RX MED GY IP 250 OP 250 PS 637: Performed by: STUDENT IN AN ORGANIZED HEALTH CARE EDUCATION/TRAINING PROGRAM

## 2019-05-14 PROCEDURE — 97110 THERAPEUTIC EXERCISES: CPT | Mod: GO

## 2019-05-14 PROCEDURE — 71046 X-RAY EXAM CHEST 2 VIEWS: CPT

## 2019-05-14 PROCEDURE — 80048 BASIC METABOLIC PNL TOTAL CA: CPT | Performed by: SURGERY

## 2019-05-14 PROCEDURE — 12000022 ZZH R&B SNF

## 2019-05-14 RX ORDER — METHADONE HYDROCHLORIDE 10 MG/ML
113 CONCENTRATE ORAL DAILY
Refills: 0 | Status: CANCELLED | DISCHARGE
Start: 2019-05-15

## 2019-05-14 RX ORDER — POLYETHYLENE GLYCOL 3350 17 G/17G
17 POWDER, FOR SOLUTION ORAL 2 TIMES DAILY
Status: DISCONTINUED | OUTPATIENT
Start: 2019-05-14 | End: 2019-05-19 | Stop reason: HOSPADM

## 2019-05-14 RX ORDER — OXYCODONE HYDROCHLORIDE 5 MG/1
5-10 TABLET ORAL EVERY 4 HOURS PRN
Status: DISPENSED | OUTPATIENT
Start: 2019-05-14 | End: 2019-05-17

## 2019-05-14 RX ORDER — PREGABALIN 100 MG/1
100 CAPSULE ORAL 2 TIMES DAILY
Qty: 30 CAPSULE | Refills: 0 | Status: SHIPPED | OUTPATIENT
Start: 2019-05-15 | End: 2019-08-06

## 2019-05-14 RX ORDER — PANTOPRAZOLE SODIUM 40 MG/1
40 TABLET, DELAYED RELEASE ORAL
Status: DISCONTINUED | OUTPATIENT
Start: 2019-05-15 | End: 2019-05-19 | Stop reason: HOSPADM

## 2019-05-14 RX ORDER — FUROSEMIDE 20 MG
20 TABLET ORAL DAILY
Status: DISCONTINUED | OUTPATIENT
Start: 2019-05-14 | End: 2019-05-16

## 2019-05-14 RX ORDER — VALACYCLOVIR HYDROCHLORIDE 500 MG/1
500 TABLET, FILM COATED ORAL EVERY 12 HOURS
Status: ON HOLD | DISCHARGE
Start: 2019-05-14 | End: 2019-05-18

## 2019-05-14 RX ORDER — ONDANSETRON 2 MG/ML
4 INJECTION INTRAMUSCULAR; INTRAVENOUS EVERY 6 HOURS PRN
Status: DISCONTINUED | OUTPATIENT
Start: 2019-05-14 | End: 2019-05-19 | Stop reason: HOSPADM

## 2019-05-14 RX ORDER — OXYCODONE HYDROCHLORIDE 5 MG/1
5-10 TABLET ORAL EVERY 4 HOURS PRN
Status: DISCONTINUED | OUTPATIENT
Start: 2019-05-17 | End: 2019-05-19 | Stop reason: HOSPADM

## 2019-05-14 RX ORDER — ACETAMINOPHEN 325 MG/1
650 TABLET ORAL
Status: DISCONTINUED | OUTPATIENT
Start: 2019-05-14 | End: 2019-05-19 | Stop reason: HOSPADM

## 2019-05-14 RX ORDER — METHOCARBAMOL 500 MG/1
500 TABLET, FILM COATED ORAL EVERY 6 HOURS PRN
Status: ON HOLD | DISCHARGE
Start: 2019-05-14 | End: 2019-05-18

## 2019-05-14 RX ORDER — SACCHAROMYCES BOULARDII 250 MG
250 CAPSULE ORAL 2 TIMES DAILY
Status: DISCONTINUED | OUTPATIENT
Start: 2019-05-14 | End: 2019-05-19 | Stop reason: HOSPADM

## 2019-05-14 RX ORDER — DOCUSATE SODIUM 100 MG/1
100 CAPSULE, LIQUID FILLED ORAL 2 TIMES DAILY
DISCHARGE
Start: 2019-05-14 | End: 2020-08-03

## 2019-05-14 RX ORDER — CLONAZEPAM 0.5 MG/1
0.5 TABLET ORAL 2 TIMES DAILY PRN
Status: DISCONTINUED | OUTPATIENT
Start: 2019-05-14 | End: 2019-05-19 | Stop reason: HOSPADM

## 2019-05-14 RX ORDER — OXYCODONE HYDROCHLORIDE 5 MG/1
5-10 TABLET ORAL
Status: DISCONTINUED | OUTPATIENT
Start: 2019-05-14 | End: 2019-05-14

## 2019-05-14 RX ORDER — OXYCODONE HYDROCHLORIDE 5 MG/1
5-10 TABLET ORAL EVERY 4 HOURS PRN
Status: DISCONTINUED | OUTPATIENT
Start: 2019-05-19 | End: 2019-05-19 | Stop reason: HOSPADM

## 2019-05-14 RX ORDER — PANTOPRAZOLE SODIUM 40 MG/1
40 TABLET, DELAYED RELEASE ORAL
DISCHARGE
Start: 2019-05-15 | End: 2019-05-31

## 2019-05-14 RX ORDER — PREGABALIN 100 MG/1
100 CAPSULE ORAL 2 TIMES DAILY
Status: DISCONTINUED | OUTPATIENT
Start: 2019-05-15 | End: 2019-05-19 | Stop reason: HOSPADM

## 2019-05-14 RX ORDER — ONDANSETRON 4 MG/1
4 TABLET, ORALLY DISINTEGRATING ORAL EVERY 6 HOURS PRN
Status: DISCONTINUED | OUTPATIENT
Start: 2019-05-14 | End: 2019-05-19 | Stop reason: HOSPADM

## 2019-05-14 RX ORDER — DOCUSATE SODIUM 100 MG/1
100 CAPSULE, LIQUID FILLED ORAL 2 TIMES DAILY
Status: DISCONTINUED | OUTPATIENT
Start: 2019-05-14 | End: 2019-05-14

## 2019-05-14 RX ORDER — CLONAZEPAM 0.5 MG/1
0.5 TABLET ORAL 2 TIMES DAILY PRN
Qty: 20 TABLET | Refills: 0 | Status: ON HOLD | OUTPATIENT
Start: 2019-05-14 | End: 2019-05-18

## 2019-05-14 RX ORDER — VALACYCLOVIR HYDROCHLORIDE 500 MG/1
500 TABLET, FILM COATED ORAL EVERY 12 HOURS
Status: COMPLETED | OUTPATIENT
Start: 2019-05-14 | End: 2019-05-18

## 2019-05-14 RX ORDER — METHADONE HYDROCHLORIDE 10 MG/ML
113 CONCENTRATE ORAL DAILY
Status: DISCONTINUED | OUTPATIENT
Start: 2019-05-15 | End: 2019-05-19 | Stop reason: HOSPADM

## 2019-05-14 RX ORDER — POLYETHYLENE GLYCOL 3350 17 G/17G
17 POWDER, FOR SOLUTION ORAL 2 TIMES DAILY
DISCHARGE
Start: 2019-05-14 | End: 2020-08-03

## 2019-05-14 RX ORDER — DOCUSATE SODIUM 100 MG/1
100 CAPSULE, LIQUID FILLED ORAL 2 TIMES DAILY
Status: DISCONTINUED | OUTPATIENT
Start: 2019-05-14 | End: 2019-05-19 | Stop reason: HOSPADM

## 2019-05-14 RX ORDER — PREGABALIN 150 MG/1
150 CAPSULE ORAL AT BEDTIME
Qty: 30 CAPSULE | Refills: 0 | Status: SHIPPED | OUTPATIENT
Start: 2019-05-14 | End: 2019-08-06

## 2019-05-14 RX ORDER — METHADONE HYDROCHLORIDE 10 MG/ML
113 CONCENTRATE ORAL DAILY
Qty: 300 ML | Refills: 0 | Status: ON HOLD | OUTPATIENT
Start: 2019-05-14 | End: 2021-06-16

## 2019-05-14 RX ORDER — CEFDINIR 300 MG/1
300 CAPSULE ORAL EVERY 12 HOURS SCHEDULED
Status: COMPLETED | OUTPATIENT
Start: 2019-05-14 | End: 2019-05-14

## 2019-05-14 RX ORDER — METHOCARBAMOL 500 MG/1
500 TABLET, FILM COATED ORAL EVERY 6 HOURS PRN
Status: DISCONTINUED | OUTPATIENT
Start: 2019-05-14 | End: 2019-05-19 | Stop reason: HOSPADM

## 2019-05-14 RX ORDER — FUROSEMIDE 20 MG
20 TABLET ORAL DAILY
Status: ON HOLD | DISCHARGE
Start: 2019-05-14 | End: 2019-05-18

## 2019-05-14 RX ORDER — SACCHAROMYCES BOULARDII 250 MG
250 CAPSULE ORAL 2 TIMES DAILY
DISCHARGE
Start: 2019-05-14 | End: 2020-05-04

## 2019-05-14 RX ORDER — ACETAMINOPHEN 325 MG/1
650 TABLET ORAL
Status: DISCONTINUED | OUTPATIENT
Start: 2019-05-14 | End: 2019-05-14

## 2019-05-14 RX ORDER — ESCITALOPRAM OXALATE 5 MG/1
10 TABLET ORAL DAILY
Status: DISCONTINUED | OUTPATIENT
Start: 2019-05-15 | End: 2019-05-19 | Stop reason: HOSPADM

## 2019-05-14 RX ORDER — SENNOSIDES 8.6 MG
1-2 TABLET ORAL 2 TIMES DAILY PRN
Status: DISCONTINUED | OUTPATIENT
Start: 2019-05-14 | End: 2019-05-19 | Stop reason: HOSPADM

## 2019-05-14 RX ORDER — ANALGESIC BALM 1.74; 4.06 G/29G; G/29G
28 OINTMENT TOPICAL EVERY 6 HOURS PRN
Status: DISCONTINUED | OUTPATIENT
Start: 2019-05-14 | End: 2019-05-19 | Stop reason: HOSPADM

## 2019-05-14 RX ORDER — OXYCODONE HYDROCHLORIDE 5 MG/1
5-10 TABLET ORAL
Status: DISCONTINUED | OUTPATIENT
Start: 2019-05-14 | End: 2019-05-14 | Stop reason: CLARIF

## 2019-05-14 RX ORDER — PREGABALIN 75 MG/1
150 CAPSULE ORAL AT BEDTIME
Status: DISCONTINUED | OUTPATIENT
Start: 2019-05-14 | End: 2019-05-19 | Stop reason: HOSPADM

## 2019-05-14 RX ORDER — ACETAMINOPHEN 325 MG/1
650 TABLET ORAL
DISCHARGE
Start: 2019-05-14 | End: 2019-10-11

## 2019-05-14 RX ORDER — NALOXONE HYDROCHLORIDE 0.4 MG/ML
.1-.4 INJECTION, SOLUTION INTRAMUSCULAR; INTRAVENOUS; SUBCUTANEOUS
Status: DISCONTINUED | OUTPATIENT
Start: 2019-05-14 | End: 2019-05-19 | Stop reason: HOSPADM

## 2019-05-14 RX ORDER — ACETAMINOPHEN 325 MG/1
650 TABLET ORAL EVERY 4 HOURS PRN
Status: DISCONTINUED | OUTPATIENT
Start: 2019-05-14 | End: 2019-05-14

## 2019-05-14 RX ORDER — OXYCODONE HYDROCHLORIDE 5 MG/1
TABLET ORAL
Qty: 40 TABLET | Refills: 0 | Status: ON HOLD | OUTPATIENT
Start: 2019-05-14 | End: 2019-05-18

## 2019-05-14 RX ORDER — ACETAMINOPHEN 650 MG/1
650 SUPPOSITORY RECTAL EVERY 4 HOURS PRN
Status: DISCONTINUED | OUTPATIENT
Start: 2019-05-14 | End: 2019-05-19 | Stop reason: HOSPADM

## 2019-05-14 RX ORDER — ACETAMINOPHEN 325 MG/1
650 TABLET ORAL EVERY 4 HOURS PRN
Status: DISCONTINUED | OUTPATIENT
Start: 2019-05-14 | End: 2019-05-19 | Stop reason: HOSPADM

## 2019-05-14 RX ORDER — ANALGESIC BALM 1.74; 4.06 G/29G; G/29G
28 OINTMENT TOPICAL EVERY 6 HOURS PRN
DISCHARGE
Start: 2019-05-14 | End: 2020-05-04

## 2019-05-14 RX ADMIN — ESCITALOPRAM 10 MG: 5 TABLET, FILM COATED ORAL at 07:52

## 2019-05-14 RX ADMIN — CEFDINIR 300 MG: 300 CAPSULE ORAL at 21:34

## 2019-05-14 RX ADMIN — METHADONE HYDROCHLORIDE 113 MG: 10 CONCENTRATE ORAL at 05:05

## 2019-05-14 RX ADMIN — PREGABALIN 100 MG: 100 CAPSULE ORAL at 13:12

## 2019-05-14 RX ADMIN — OXYCODONE HYDROCHLORIDE 10 MG: 5 TABLET ORAL at 01:00

## 2019-05-14 RX ADMIN — VALACYCLOVIR HYDROCHLORIDE 500 MG: 500 TABLET, FILM COATED ORAL at 19:46

## 2019-05-14 RX ADMIN — CLONAZEPAM 0.5 MG: 0.5 TABLET ORAL at 17:40

## 2019-05-14 RX ADMIN — METHOCARBAMOL 500 MG: 500 TABLET, FILM COATED ORAL at 01:00

## 2019-05-14 RX ADMIN — DOCUSATE SODIUM 100 MG: 100 CAPSULE, LIQUID FILLED ORAL at 21:34

## 2019-05-14 RX ADMIN — Medication 250 MG: at 07:52

## 2019-05-14 RX ADMIN — OXYCODONE HYDROCHLORIDE 10 MG: 5 TABLET ORAL at 06:29

## 2019-05-14 RX ADMIN — OXYCODONE HYDROCHLORIDE 10 MG: 5 TABLET ORAL at 09:38

## 2019-05-14 RX ADMIN — PREGABALIN 100 MG: 100 CAPSULE ORAL at 07:52

## 2019-05-14 RX ADMIN — OXYCODONE HYDROCHLORIDE 10 MG: 5 TABLET ORAL at 03:47

## 2019-05-14 RX ADMIN — ACETAMINOPHEN, ASPIRIN AND CAFFEINE 1 TABLET: 250; 250; 65 TABLET, FILM COATED ORAL at 11:53

## 2019-05-14 RX ADMIN — OXYCODONE HYDROCHLORIDE 10 MG: 5 TABLET ORAL at 15:55

## 2019-05-14 RX ADMIN — OXYCODONE HYDROCHLORIDE 10 MG: 5 TABLET ORAL at 19:43

## 2019-05-14 RX ADMIN — METHOCARBAMOL 500 MG: 500 TABLET, FILM COATED ORAL at 11:49

## 2019-05-14 RX ADMIN — FUROSEMIDE 20 MG: 20 TABLET ORAL at 17:36

## 2019-05-14 RX ADMIN — METHOCARBAMOL 500 MG: 500 TABLET, FILM COATED ORAL at 06:12

## 2019-05-14 RX ADMIN — PANTOPRAZOLE SODIUM 40 MG: 40 TABLET, DELAYED RELEASE ORAL at 07:52

## 2019-05-14 RX ADMIN — OXYCODONE HYDROCHLORIDE 10 MG: 5 TABLET ORAL at 13:12

## 2019-05-14 RX ADMIN — Medication 0.5 MG: at 06:12

## 2019-05-14 RX ADMIN — VALACYCLOVIR HYDROCHLORIDE 500 MG: 500 TABLET, FILM COATED ORAL at 07:52

## 2019-05-14 RX ADMIN — Medication 250 MG: at 21:34

## 2019-05-14 RX ADMIN — CEFDINIR 300 MG: 300 CAPSULE ORAL at 07:52

## 2019-05-14 RX ADMIN — PREGABALIN 150 MG: 75 CAPSULE ORAL at 21:34

## 2019-05-14 ASSESSMENT — ACTIVITIES OF DAILY LIVING (ADL)
ADLS_ACUITY_SCORE: 12

## 2019-05-14 NOTE — PLAN OF CARE
Discharge Planner OT   Patient plan for discharge: TCU.  Current status: Patient tolerated morning OT session well. Patient required mobility transfers in room and hallway ambulation x200 feet with CGA. Thoracotomy precautions and exercise program reviewed with continued reinforcement required.  Barriers to return to prior living situation: Strength, endurance, balance.  Recommendations for discharge: TCU.  Rationale for recommendations: To progress functional independence.        Entered by: Rossy Gonzales 05/14/2019 12:02 PM

## 2019-05-14 NOTE — PLAN OF CARE
Patient reporting tolerable pain control from PRN pain medication. Up independently in room. Good appetite with meals. No complaints of shortness of breath. CXR completed. [place] Report given to RN at rehab.

## 2019-05-14 NOTE — PROGRESS NOTES
Social Work Services Discharge Note      Patient Name:  Demetra Richardson     Anticipated Discharge Date:  5/14/19    Discharge Disposition:   TCU:  [facility]    Following MD:  Assigned per facility     Pre-Admission Screening (PAS) online form has been completed.  The Level of Care (LOC) is:  Determined  Confirmation Code is:  [id number]  Patient/caregiver informed of referral to [facility] for Pre-Admission Screening for skilled nursing facility (SNF) placement and to expect a phone call post discharge from SNF.     Additional Services/Equipment Arranged:  [facility] wheelchair van ride scheduled for 4:30     Patient / Family response to discharge plan:  Pt is excited to be discharging [facility] as she is eager to move on to the next step. Pt asks appropriate questions about TCU environment.     Persons notified of above discharge plan:  Pt, MD, RN, TCU admissions    Staff Discharge Instructions:  Please fax discharge orders and signed hard scripts for any controlled substances.  Please print a packet and send with patient.     CTS Handoff completed:  YES    Medicare Notice of Rights provided to the patient/family:  NO. Not Medicare pt.    ARIES Albarran, [facility]   Pager: [phone]

## 2019-05-14 NOTE — PROVIDER NOTIFICATION
"Paged MDs (#[phone]) with the following information regarding low BP this morning. \"FYI: BP is 86/52, 81/50, 82/52, and 89/62.  HR is 70, 71, 70, and 73. Pt denies dizziness. States that she runs low. However she is on oxycodone, methadone, & klonopin.  Thanks. Vena. [phone] (#[phone])\"  "

## 2019-05-14 NOTE — PLAN OF CARE
Physical Therapy Discharge Summary    Reason for therapy discharge:    Discharged to transitional care facility.    Progress towards therapy goal(s). See goals on Care Plan in [facility] electronic health record for goal details.  Goals partially met.  Barriers to achieving goals:   discharge from facility.    Therapy recommendation(s):    Continued therapy is recommended.  Rationale/Recommendations:  To progress strength, endurance, balance, and safety and independence with functional mobility.

## 2019-05-14 NOTE — PROGRESS NOTES
Patient: Demetra Richardosn  Date of Service: May 14, 2019 Admission Date:4/28/2019   7 Days Post-Op     Chief Pain Endorsement:  Left sided abdominal pain    Recommendations were discussed and relayed to Dr. Wander Mooney ([facility])  Plan was reviewed by the Inpatient Pain Service and staff attending, Dr. Arsenio Chaney.      1. Continue oxycodone 5-10mg po q3h prn moderate to severe pain.    When discharged, decrease to oxycodone 5-10mg po q4h prn x 3 days then decrease to oxycodone 5-10mg po q4h prn, max of 4 tabs per day x 2 days, then decrease to oxycodone 5-10mg po q4h prn, max of 2 doses per day then stop.  2. Continue methadone 113mg po daily, this is a PTA dose.  Continue this dose when discharged.  3. Continue acetaminophen 650mg po at bedtime prn.  4. Continue methocarbamol 500mg po q6h for muscle spasm.    When discharged, continue as needed dosing x 7 days.  5. Continue methyl salicylate ointment ([employer]) topically qid prn.    When discharged, continue this as needed medication.  6. Continue pregabalin [id number].    When discharged to TCU, continue this dosing.    When discharged to home, follow up with PCP for increased dose at bedtime.  7. Bowel regimen per primary team to prevent opioid induced constipation.  8. Aspirin - acetaminophen - caffeine and clonazepam per primary team.    Pain Service will Sign Off at this time.     Thank you for consulting the Inpatient Pain Management Service. The above recommendations are to be acted upon at the primary team s discretion.     To reach us:  Mon - Friday 8 AM - 3 PM: Pager [phone] (Text Page)  After hours, weekends and holidays: Primary service should call [phone] for the on-call pain specialist    PAIN MEDICATION SAFE USE:   Prior to discharge instruct patient on the following in addition to the medication fact sheet:    Caution: these medications can cause sedation    Take prescription medicine only if it has been prescribed by your  doctor    Do not take more medicine or take it more often than instructed     Call your doctor if pain gets worse    Never mix pain medicine with alcohol, sleeping pills, or any illicit drugs    Do not operate heavy machinery, including vehicles, when initiation opioid therapy or increasing dosage    Store prescription opioids in a locked container, whenever possible     Dispose of unused opioids appropriately     Do not stop abruptly once at higher doses.  These medications must be tapered off.    Opioid pain medications do carry the risk for physical dependence and addiction and patients should be counseled about this.         1. Left sided chest pain due to pleural effusion s/p pigtail chest tube placement on 4/29/19. Today pain is worst in left side of back up into her left shoulder.  To OR on 5/7/19 for left video assisted thorascopic decortication, intercostal nerve cryo analgesia and flexible bronchoscopy.  Chest tubes out, epidural off.  2. H/o SLE, Sjogren's, primary sclerosing cholantitis  3. Fibromyalgia on Lyrica  4. On methadone maintenance x 6 years for h/o oxycodone abuse including snorting OxyContin 80mg, 5x/day.  States that she has been sober since being on methadone.  Of note, patient admits to getting Xanax on the streets and had an overdose on 1/22/19 requiring intubation at [facility].  5. H/o alcohol abuse-sober for many years.  6. H/o PUD s/p Billroth II with revision.  7. H/o Bipolar II  8. Opioid induced constipation-has bowel regimen PTA.       -- Outpatient opioid requirements prior to admission:   Methadone maintenance at [facility] x 6 years: on Methadone liquid 113mg/day.  States that she is working with methadone clinic to taper down and off.  Plan is to decrease methadone by 3mg/day/on a weekly basis.      reviewed: Mostly Lyrica prescription     Primary Care Provider: Fredy Merritt  Chronic Pain Provider: none at this time.      Interval History:  Demetra Richardson  was seen today (May 14, 2019) and she reports that her pain is not getting better, continues to have pain on the left side and is describes as sharp, pain in the left shoulder has improved.  Also reports pain under the left breast to the rib cage.  Patient reports her fibromyalgia pain is very bad at this time, she is very sensitive to touch.  Reviewed pain medications available, she is in agreement with the plan.    CAPA (Clinically Aligned Pain Assessment):    Comfort (How is your pain?): Tolerable with discomfort  Change in Pain (Since your last medication/intervention?): About the same  Pain Control (How are your pain treatments working?):  Partially effective control  Functioning (Are you able to do activities to get better?) : Can do most things, but pain gets in the way of some   Sleep (Does your pain management allow you to sleep or rest?): Awake with occasional pain      FUNCTIONAL STATUS:  Change:      Improving  Oral intake:     Regular  Activity level:     Ambulating in corbin  Mood:      Stable     -- Inpatient Medications Related to Pain Management:   Medications related to Pain Management (From now, onward)    Start     Dose/Rate Route Frequency Ordered Stop    05/13/19 1237  lidocaine (LMX4) cream       Topical 3 TIMES DAILY PRN 05/13/19 1237      05/12/19 1500  aspirin-acetaminophen-caffeine (EXCEDRIN MIGRAINE) per tablet 1 tablet      1 tablet Oral 2 TIMES DAILY PRN 05/12/19 1447      05/12/19 1500  acetaminophen (TYLENOL) tablet 650 mg      650 mg Oral AT BEDTIME PRN 05/12/19 1447      05/12/19 1115  docusate sodium (COLACE) capsule 100 mg      100 mg Oral 2 TIMES DAILY 05/12/19 1112      05/08/19 2000  pregabalin (LYRICA) capsule 150 mg      150 mg Oral AT BEDTIME 05/08/19 1240      05/08/19 1523  methocarbamol (ROBAXIN) tablet 500 mg      500 mg Oral EVERY 6 HOURS 05/08/19 1235      05/08/19 1400  pregabalin (LYRICA) capsule 100 mg      100 mg Oral 2 TIMES DAILY 05/08/19 1240      05/08/19 0800   polyethylene glycol (MIRALAX/GLYCOLAX) Packet 17 g      17 g Oral 2 TIMES DAILY 05/08/19 0706      05/07/19 2216  oxyCODONE (ROXICODONE) tablet 5-10 mg      5-10 mg Oral EVERY 3 HOURS PRN 05/07/19 2216      05/06/19 1841  clonazePAM (klonoPIN) tablet 0.5 mg      0.5 mg Oral 2 TIMES DAILY 05/06/19 1601      05/06/19 0500  methadone (DOLOPHINE-INTENSOL) 10 MG/ML (HIGH CONC) solution 113 mg      113 mg Oral DAILY 05/05/19 1201      04/29/19 0453  lidocaine 1 % 0.1-1 mL      0.1-1 mL Other EVERY 1 HOUR PRN 04/29/19 0453      04/29/19 0453  lidocaine (LMX4) cream       Topical EVERY 1 HOUR PRN 04/29/19 0453            LAB DATA:  Recent Labs   Lab 05/14/19  0432 05/13/19  0516 05/12/19  0449  05/08/19  0440   CR 0.67 0.62 0.64   < > 0.59   WBC 8.8 10.5 9.4   < > 16.4*   HGB 8.2* 8.1* 8.0*   < > 7.8*   AST  --   --   --   --  60*   ALT  --   --   --   --  26    < > = values in this interval not displayed.         ----------------------------------------------------------------------------------  Sharon Llanos PharmD, MS  Inpatient Pain Service     To reach us:  Mon - Friday 8 AM - 3 PM: Pager [phone] (Text Page)  After hours, weekends and holidays: Primary service should call [phone] for the on-call pain specialist    Helpful Resources:  Getting Rid of Unwanted Medications (printable PDF for patients)   Opioid Overdose Prevention Toolkit (printable PDF for patients)   Prescription Opioids: What You Need To Know (printable PDF for patients)

## 2019-05-14 NOTE — PLAN OF CARE
Pt alert and oriented, VSS on RA. Hypotensive at times, MD paged and aware. Regular diet and tolerating well. Up independently in room and to bathroom. PRN Excedrin given x 1 for patient complaints of headache, PRN oxy given x 1 for pain. Left dressing changed during day shift today. PIV saline locked. Continue with POC.

## 2019-05-14 NOTE — PLAN OF CARE
Patient is a 63 year old female admitted to room 424 via wheelchair. Patient is alert and oriented X 3. See Epic for VS and assessment.  Patient denies pain on admission.  Patient is able to transfer independently without device or assist. Patient was settled into their room, shown call light, tv, mealtimes etc. Oriented to unit. Will continue monitoring pain level and VS. Full skin assessment. Skin is intact. Old chest tube site on left lateral back and chest CDI. New dressing applied to wound left lateral back. Edema trace BLE. Declines Lovenox anticoagulant injection. Notifying MD with any concerns. Follow MD orders for cares and medications.    Level of Schooling: Masters  Ethnicity: [employer] [facility]   Marital Status: Single  Vascular Access: none  Dentures: none  Smoker: no   Glasses: yes   Occupation: None  Falls 0-1    :

## 2019-05-14 NOTE — PLAN OF CARE
D/I: Pt alert and oriented, BP runs low and this morning the results are FYI: BP is 86/52, 81/50, 82/52, and 89/62. HR is 70, 71, 70, and 73. Pt denies dizziness. States that she usually runs low. However she is on oxycodone, methadone, & klonopin. MDs notified.  Tolerating regular diet. Up in room with assist of 1. Left chest dressing CDI. PIV saline locked.   P: Continue to assess pain and intervene per order. Follow plan of care and update MDs with changes.

## 2019-05-14 NOTE — DISCHARGE SUMMARY
[facility], [facility] Discharge Summary       Date of Admission:  4/28/2019  Date of Discharge:  5/14/2019  Date of Service: 5/14/2019  Discharging Attending Provider: Dr. Finn  Discharge Team: [facility] Service    Discharge Diagnoses      Pleuritic chest pain  Recurrent left pleural effusion  Sjogren's syndrome, with unspecified organ involvement (H)  Pleural effusion  Sicca syndrome with other organ involvement (H)  Anemia due to blood loss, acute  Loculated pleural effusion  Recurrent genital herpes simplex  Other chronic pain  Anxiety disorder, unspecified type    Follow-ups Needed After Discharge    -Follow up with TCU provider within 7 days for hospital follow up and reevaluation for need of PO lasix.   -Follow up with PCP within 7 days for hospital follow up. Recommend CBC.   -Follow up with Thoracic Surgery in 1 month for surgery follow up. Patient needs a Chest xray prior to this appointment.  -Follow up with Pain clinic within the next 1-2 weeks after TCU discharge for chronic pain.     Hospital Course   Demetra Richardson was admitted on 4/28/2019 for management of L pleural effusion growing strep intermedius and persistent empyema despite chest tube placement by Pulm and is now S/P VATS left pleural decortication and bronchoscopy by thoracic surgery on 5/7. She has a complicated PMHx, significant for SLE, PSC, Sjogren's, fibromyalgia, opiate dependence and polysubstance abuse with history of OD and withdrawal seizures, epidural abscess, bipolar disorder type II, currently on a methadone program.     The following problems were addressed during her hospitalization:     # Loculated pleural effusion/ empyema, improving  # Sepsis secondary to empyema, resolved  # S/P vats decortication  # S/P epidural placement for pain, resolved  Parapneumonic effusion with fluid culture growing strep intermedius sensitive to ceftriaxone and  transitioned to cefdinir completed course 5/14. She is s/p chest tube placement on 4/29 with lytic therapy until 5/6 with concern for persistent empyema on recent CT scan on 5/5. Thoracic surgery was consulted 5/5 with concern for persistent empyema to perform decortication 5/7. S/P decortication 5/7 by thoracic surgery, all chest tubes out by 5/11, pain controlled in hospital with Pain team and Anesthesia team recs explained below. No supplemental oxygen needed prior to discharge to TCU.   -When talking with Thoracic surgery prior to discharge, patient will continue to have small effusion on CXR and no need to recheck CXR at TCU unless patient is symptomatic. Recommend TCU doctor to reevaluate patient 7 days from hospital discharge. Discharging with 7 days of 20 mg PO lasix daily.   - Completed Cefdinir 300mg BID PO, 7 day course of antibiotics until 5/14 postoperatively (through 5/14) per thoracic surgery recs  - Pain control as below  - Continue incentive spirometry  - Ambulate  - metanebs     # Thoracic lymphadenopathy  CT showed enlarged left lower cervical lymph node, prominent left heel or soft tissue, possibly left hilar adenopathy, prominent left paratracheal lymph nodes.  DDX includes infectious versus malignant versus sarcoidosis. Likely reactive. Follow up with PCP for further work up outpatient if desired.      # Pulmonary edema, improved  # Fluid overload, stable  May need lasix every other day. Given 20 mg lasix 5/13. Discussed with Pulm prior to discharge plan for lasix 40 mg PO for 7days at TCU, then TCU doctor to reevaluate and see if further lasix is needed with repeat CXR.  - PO Lasix for 7 days, reevaluate at TCU with provider   - Daily weights at TCU     #Hypotension  Asymptomatic and has had lower BPs throughout hospital stay.      # Chronic pain  # Polysubstance use disorder  # History of overdose and withdrawal seizures  # Methadone maintenance therapy, active  # Fibromyalgia  #  "anxiety  Patient denies recent substance use, reports no alcohol use in last 7 years. She does admit to taking some xanax about 2 months ago (1/22/2019), and she was hospitalized with acute toxic encephalopathy, even intubated as she was having apneic pauses. Patient is on methadone and fills her prescriptions at [place]. Admission UDS negative. Psych and pain team consulted for recs. Post op requiring epidural pain control with RAPS team. Epidural helped her pain, taken out POD#6. Pain team also consulted. Recs for TCU below per Pain team:   \"-When discharged, decrease to oxycodone 5-10mg po q4h prn x 3 days then decrease to oxycodone 5-10mg po q4h prn, max of 4 tabs per day x 2 days, then decrease to oxycodone 5-10mg po q4h prn, max of 2 doses per day then stop.  -Continue methadone 113mg po daily, this is a PTA dose.  -Continue acetaminophen 650mg po at bedtime prn.  -Continue methocarbamol 500mg po q6h as needed for muscle spasm for 7 days   -Continue methyl salicylate ointment ([place]) topically qid prn.  -Continue pregabalin [id number].    When discharged to home from TCU, follow up with PCP for increased dose at bedtime.  -Bowel regimen  - Aspirin - acetaminophen - caffeine twice daily as needed   - Heat/cold packs  - Avoid IV opiates  - Bowel regimen   - Excedrin from PTA to take twice daily as needed \"  - Clonopin 0.5 mg twice daily while in TCU, can taper with outpatient PCP     #Herpes simplex virus outbreak  Says she has these outbreaks \"often\". One lesion on mons pubis area present consistent with HSV.   - Valtrex for 5 days on discharge     # Hypoalbuminemia  Likely due to acute infection and chronic malnutrition contributing. Lives alone, no help with food prep or grocery shopping.   - Nutrition consulted here.   - Discuss outpatient services for meal/grocery delivery services- talked with [facility] 5/1     # SLE  # Sjogren's syndrome  # primary sclerosing cholangitis  # elevated alk phos from " biliary source (PSC)  - consider outpatient endocrine consult for elevated bone specific alk phos if needed but suspect elevated alk phos due to PSC with elevated GGT.      #Bipolar disorder type II  -PTA escitalopram, may increase to 20 mg as outpatient per Psych recs     # Migraines  # Fibromyalgia  -PTA Excedrin Migraine to twice daily as needed      # s/p Billroth II w/revision  # History of iron deficiency anemia  # acute blood loss anemia, stable  Patient has malabsorption secondary to probably surgery, receives IV iron as outpatient. Required 1 unit of RBCs 5/9 from procedure, stable.   - continue PTA pantoprazole  - IV iron replacement as outpatient    # Discharge Pain Plan:   - During her hospitalization, Demetra experienced pain due to chronic pain and surgery.  The pain plan for discharge was discussed with Demetra and the plan was created in a collaborative fashion.    - see above for pain plan from pain team    Consultations This Hospital Stay   PULMONARY GENERAL ADULT IP CONSULT  VASCULAR ACCESS CARE ADULT IP CONSULT  NUTRITION SERVICES ADULT IP CONSULT  PAIN MANAGEMENT ADULT IP CONSULT  VASCULAR ACCESS CARE ADULT IP CONSULT  THORACIC SURGERY ADULT IP CONSULT  PAIN MANAGEMENT ADULT IP CONSULT  PSYCHIATRY IP CONSULT  REGIONAL ANESTHESIA PAIN SERVICE ADULT IP CONSULT  PHYSICAL THERAPY ADULT IP CONSULT  OCCUPATIONAL THERAPY ADULT IP CONSULT  RESPIRATORY CARE IP CONSULT  VASCULAR ACCESS CARE ADULT IP CONSULT  VASCULAR ACCESS CARE ADULT IP CONSULT  VASCULAR ACCESS CARE ADULT IP CONSULT  PHYSICAL THERAPY ADULT IP CONSULT  OCCUPATIONAL THERAPY ADULT IP CONSULT    Code Status   Full Code     The patient was discussed with Dr. [facility]  [facility]   Pager: [phone]  ______________________________________________________________________  Review of Systems   Constitutional: Negative for chills, diaphoresis, fever and unexpected  weight change.   Respiratory: Negative for shortness of breath.    Cardiovascular: Negative for leg swelling.   Gastrointestinal: Negative for abdominal pain, constipation, nausea and vomiting.   Neurological: Negative for weakness, light-headedness, numbness and headaches.   Psychiatric/Behavioral: Negative for dysphoric mood. The patient is not nervous/anxious.      Physical Exam   Vital Signs: Temp: 95.7  F (35.4  C) Temp src: Axillary BP: (!) 84/53 Pulse: 73 Heart Rate: 71 Resp: 16 SpO2: 93 % O2 Device: None (Room air)    Weight: 148 lbs 8 oz    Physical Exam  Constitutional: She is oriented to person, place, and time. She appears well-developed and well-nourished. No distress.   HENT:   Head: Normocephalic and atraumatic.   Eyes: Conjunctivae are normal. No scleral icterus.   Cardiovascular: Normal rate and regular rhythm. Exam reveals no friction rub.   Murmur (systolic ejection) heard.  Pulmonary/Chest: Effort normal. No respiratory distress. She has no wheezes. She has no rales (mild in LLL).   CT dressings c/d/i. Mild crackles heard in LLL   Neurological: She is alert and oriented to person, place, and time.   Skin: Skin is warm and dry. She is not diaphoretic.   Psychiatric: She has a normal mood and affect.     Significant Results and Procedures   Most Recent 3 CBC's:  Recent Labs   Lab Test 05/14/19  0432 05/13/19  0516 05/12/19  0449   WBC 8.8 10.5 9.4   HGB 8.2* 8.1* 8.0*   MCV 97 97 96    428 435     Most Recent 3 BMP's:  Recent Labs   Lab Test 05/14/19  0432 05/13/19  0516 05/12/19  0449    141 141   POTASSIUM 4.9 4.6 4.0   CHLORIDE 108 106 105   CO2 31 31 34*   BUN 10 8 8   CR 0.67 0.62 0.64   ANIONGAP 2* 5 3   RAFAELA 8.0* 8.1* 7.7*   GLC 91 99 95     Most Recent 2 LFT's:  Recent Labs   Lab Test 05/08/19  0440 05/07/19  0616   AST 60* 47*   ALT 26 31   ALKPHOS 685* 1,063*   BILITOTAL 0.4 0.3     Most Recent 6 Bacteria Isolates From Any Culture (See EPIC Reports for Culture  Details):  Recent Labs   Lab Test 05/07/19  1324 04/29/19  1405 04/29/19  0158 04/29/19  0157 04/26/19  1308 05/19/16  0945   CULT Culture negative after 1 week  No anaerobes isolated  No growth No growth after 15 days  Culture negative after 2 weeks  Light growth  Streptococcus intermedius  *  Critical Value/Significant Value, preliminary result only, called to and read back by  Caren Verdugo RN on 5.1.19 at  1048.     Culture received and in progress.  Positive AFB results are called as soon as detected.    Final report to follow in 7 to 8 weeks.    Assayed at [facility] [address] [place] [id number] [phone] No growth No growth 10,000 to 50,000 colonies/mL  mixed urogenital sammi  Susceptibility testing not routinely done   10,000 to 50,000 colonies/mL mixed urogenital sammi Susceptibility testing not   routinely done     ,   Results for orders placed or performed during the hospital encounter of 04/28/19   POC US CHEST B-SCAN    Impression    [facility] Procedure Note      Limited Bedside ED Ultrasound of Thorax:    PROCEDURE: PERFORMED BY: Dr. Carole Flores MD  INDICATIONS/SYMPTOM:  Chest pain  PROBE: High frequency linear probe  BODY LOCATION: Chest  FINDINGS:  Images of both lung hemithoracies taken in 2D in multiple rib spaces        Right side:  Lung sliding artifact  Present     Comet tail artifacts  Present   Left side:  Lung sliding artifact  Present     Comet tail artifacts  Present   Hemothorax: Right side Absent     Left side Absent   Pleural effusion: Right side Absent      Left side Present    INTERPRETATION: There is evidence of free fluid consistent with a Left pleural effusion.  IMAGE DOCUMENTATION: Images were archived to PACs system.     CT Chest Pulmonary Embolism w Contrast    Narrative    EXAMINATION: CTA pulmonary angiogram, 4/28/2019 8:17 PM     COMPARISON: Chest radiograph 4/26/2019    HISTORY: PA suspected, high pretest probability.    TECHNIQUE:  Volumetric helical acquisition of CT images of the chest  from the lung apices to the kidneys were acquired after the  administration of 55 mL of Isovue-370 IV contrast. .   Three-dimensional (3D) post-processed angiographic images were  reconstructed, archived to PACS and used in interpretation of this  study.     FINDINGS:      Contrast bolus is: suboptimal.  Exam is negative for acute pulmonary  embolism.     Moderate-sized loculated left-sided pleural effusion, with left  interfissural component. Associated left lower lobe atelectasis. No  pneumothorax. Mild subsegmental right lower lobe and middle lobe  atelectasis. There is mild bronchial nodularity in the posterior  aspect of the superior right lower lobe. The right ventricle is  slightly larger than the left ventricle. The thoracic aorta is  unremarkable. No pericardial effusion. Enlarged left lower cervical  lymph node (series 5, image 18). Prominent left hilar soft tissue  series 5, image 40) which may represent left hilar adenopathy.  Prominent left paratracheal lymph nodes.    Bone and soft tissue: Bilateral breast implants which are peripherally  calcified. Thoracic kyphosis with mild anterior wedge deformity of the  mid-lower thoracic vertebra. Redemonstration of the nonobstructing  right-sided renal stones.    Upper abdomen: Partially visualized postsurgical changes of gastric  bypass.      Impression    IMPRESSION:   1. Exam is negative for acute pulmonary embolism.   2. Moderate size loculated left-sided pleural effusion with associated  atelectasis. Mild right lower lobe and right middle lobe atelectasis.   3. Left hilar soft tissue, likely enlarged left hilar lymph node. Left  lower cervical adenopathy.    I have personally reviewed the examination and initial interpretation  and I agree with the findings.    ALICE JAY MD   XR Chest Port 1 View    Narrative    EXAM:  XR CHEST PORT 1 VW    INDICATION: chest tube placement    COMPARISON:   CT 4/28/2019    FINDINGS:  AP view of the chest. Left-sided chest tube is in place. Bilateral  calcified breast implants. Left pleural effusion and left retrocardiac  opacities. Cardiac silhouette is slightly obscured. Right lung field  is clear. No acute osseous lesions. Upper abdomen is unremarkable. No  pneumothorax      Impression    IMPRESSION:  1. Left sided chest tube has been placed. Catheter is kinked  2. Left-sided pleural effusion with associated atelectasis.    Findings discussed with Dr. Balaji Moore, R2.    I have personally reviewed the examination and initial interpretation  and I agree with the findings.    VIVI MARK MD   XR Chest Port 1 View    Narrative    EXAMINATION: XR CHEST PORT 1 VW, 4/30/2019 9:47 AM    INDICATION: chest tube    COMPARISON: 4/29/2019    FINDINGS: Single portable AP radiograph of the chest. Stable  positioning of left basilar chest tube. Cardiomediastinal silhouette  is stable. Stable to minimal increase in moderate left pleural  effusion with overlying atelectasis. No right pleural effusion. Right  basilar opacities, likely atelectasis. No pneumothorax.  Prominent  pulmonary vascular. Bilateral breast implants. Cholecystectomy clips.  Surgical clips overlie the left upper quadrant. Calyceal stones in the  right kidney.      Impression    IMPRESSION:   Stable to minimal increase in loculated left pleural effusion with  overlying atelectasis. Stable positioning of left basilar chest tube.    I have personally reviewed the examination and initial interpretation  and I agree with the findings.    TOPHER SCHMIDT,    XR Chest Port 1 View    Narrative    Exam: Chest x-ray, 1 view, 5/1/2019.    COMPARISON: 4/30/2018.    HISTORY: Chest.    FINDINGS: AP view of the chest was obtained. Moderate left pleural  effusion increased from the prior study. Left lower lobe and  retrocardiac opacities, unchanged. Small stable intrafissural right  pleural effusion. Obscured  cardiomediastinal silhouette. Stable  positioning of left basilar chest tube with concern for kinking. No  pneumothorax. Bilateral calcified breast implants. Surgical clips  projecting over the left upper abdominal quadrant.      Impression    IMPRESSION:  1. Moderate left pleural effusion increased from the prior study.  Stable positioning of left basilar chest tube with concern for  kinking.  2. Left lower lobe and retrocardiac opacities, unchanged.    KHAI LEWIS MD   XR Chest Port 1 View    Narrative    EXAMINATION: XR CHEST PORT 1 VW, 5/2/2019 10:19 AM    INDICATION: chest tube    COMPARISON: 5/1/2019    FINDINGS: Single portable AP radiograph of the chest. Left basilar  chest tube in place with decreased size of the moderate left pleural  effusion and overlying left basilar opacities. Mild bilateral  interstitial opacities. No large right pleural effusion. No  pneumothorax. Bilateral calcified breast implants. Surgical clips  project over the upper abdomen.      Impression    IMPRESSION:   1. Reduction in moderate left pleural effusion with overlying  atelectasis.   2. Mild interstitial pulmonary edema.    I have personally reviewed the examination and initial interpretation  and I agree with the findings.    FRANCES DEVINE MD   XR Chest Port 1 View    Narrative    Exam: XR CHEST PORT 1 VW, 5/3/2019 9:45 AM    Indication: chest tube    Comparison: 5/2/2018    Findings:   Left chest tube unchanged. Loculated left pleural effusion with  associated atelectasis or scarring. Heart enlarged but stable. No  significant pulmonary edema.      Impression    Impression: Left chest tube is still in place. No pneumothorax.  Loculated left pleural effusion with associated scarring or  atelectasis is stable.    VIVI MARK MD   XR Chest Port 1 View    Narrative    EXAM: XR CHEST PORT 1 VW  5/3/2019 11:06 PM     HISTORY:  Assess chest tube placement       COMPARISON: Chest radiograph 5/3/2019.    FINDINGS: Single  portable AP view of chest. Left basilar chest tube is  in place. Unchanged left-sided pleural effusion. Left  basilar/retrocardiac opacities are unchanged.    Calcified bilateral breast implant capsules.      Impression    IMPRESSION:   1. Left basilar chest tube with unchanged pleural effusion. Lateral  view could be considered to ensure no kinking of the tube.   2. Left basilar sagittal cardiac opacities may represent atelectasis,  although infection cannot be excluded.    I have personally reviewed the examination and initial interpretation  and I agree with the findings.    ROBERT TURCIOS MD   XR Chest Port 1 View    Narrative    Exam: XR CHEST PORT 1 VW, 5/4/2019 9:17 AM    Indication: chest tube    Comparison: 5/3/2019    Findings:   Single portable view of the chest. Left-sided pigtail chest tube  slightly extended from previous. The cardiomediastinum and upper  abdomen are unchanged. No pneumothorax. Blunting of the left  costophrenic angle and loculated left major fissure fluid, unchanged.  Multiple surgical clips and staple lines in the upper abdomen. Right  renal stones.      Impression    Impression:   1. Unchanged left-sided chest tube. Lateral view could be obtained to  evaluate for kinking.  2. Unchanged left pleural effusion and loculated fluid in the left  major fissure.  3. Unchanged left basilar atelectasis/consolidation.  4. Right nephrolithiasis.    I have personally reviewed the examination and initial interpretation  and I agree with the findings.    ROBERT TURCIOS MD   CT Chest w/o Contrast    Narrative    EXAMINATION: Chest CT  5/4/2019     CLINICAL HISTORY: Pleural effusion. Chest tube placement with left  pleural effusion.    COMPARISON: X-ray 5/14/2019. CT 4/28/2019.    TECHNIQUE: CT imaging obtained through the chest without intravenous  contrast. Coronal and axial MIP reformatted images obtained.    FINDINGS:    Chest: The visualized thyroid is unremarkable. The heart size  is  within normal limits. No pericardial effusion. Scattered  atherosclerotic calcifications of the coronary arteries. Mild aortic  valve and mitral annular calcifications. Thoracic aorta main pulmonary  artery diameters are within normal limits. Normal three-vessel  branching pattern. No mediastinal lymphadenopathy. Prominent left  hilar soft tissue fullness which is suggestive of left hilar  adenopathy.    The Central tracheal bronchial tree is patent. New right upper lobe  ground glass opacities within additional areas seen slightly lower in  the right upper lobe. Right lower lobe fibroatelectasis. Left sided  loculated pleural effusion with chest tube terminating within the  fluid component, overall this appears decreased from the prior CT. The  degree of overlying atelectasis is also decreased. No pneumothorax.    Bones and soft tissues: No suspicious bone findings.Multilevel  degenerative changes of the thoracic spine. Exaggerated kyphotic  curvature with multiple mild anterior wedge compression deformities.  Bilateral breast implants.    Partially imaged upper abdomen: Cholecystectomy Large right-sided  renal stones. Partially visualized gastric bypass.       Impression    IMPRESSION:   1. Decreased size of the loculated left pleural effusion and the  associated basilar atelectasis.  2. Left hilar soft tissue prominence is likely hilar lymphadenopathy  and reactive to the above process.  3. New right upper lobe ground glass opacities may be of infectious  etiology.  4. Partially visualized large right renal stones.    I have personally reviewed the examination and initial interpretation  and I agree with the findings.    NOEMY LABOY MD   XR Chest Port 1 View    Narrative    XR CHEST PORT 1 VW  5/5/2019 8:19 AM      HISTORY: pleural effusion    COMPARISON: 5/4/2019    FINDINGS: Left upper quadrant surgical staple line. Cholecystectomy  clips. Breast implants. Left basilar chest tube. No pneumothorax.  Small  left pleural effusion with overlying atelectasis versus  consolidation. Retrocardiac opacities. Cardiac silhouette is not  enlarged. Streaky opacities in the right lower lobe, likely  atelectasis. Hazy opacities in the right upper lobe, unchanged. Aortic  knob with calcifications. Right nephrolithiasis.      Impression    IMPRESSION:   1. No significant change in size of left pleural effusion with  overlying atelectasis versus consolidation. Left chest tube in place.  2. Hazy opacities in the right upper lobe, stable, likely atelectasis.    I have personally reviewed the examination and initial interpretation  and I agree with the findings.    KHAI LEWIS MD   XR Chest Port 1 View    Narrative    XR CHEST PORT 1 VW  5/6/2019 9:48 AM      HISTORY: loculated pleural effusion, chest tube placement    COMPARISON: Chest radiograph 5/4/2019, 5/5/2019, CT chest 5/4/2018.    FINDINGS: Single semiupright AP view of the chest. Trachea is midline.  Cardiac silhouette is obscured. Stable left pleural effusion with left  chest tube. No pneumothorax. Continued left pleural effusion with  overlying atelectasis versus consolidation. Right-sided atelectasis  versus consolidation.    Demonstration of right renal stones and calcified bilateral breast  implants.      Impression    IMPRESSION:   1.  No substantial change in left pleural effusion or left chest tube.  Consider lateral view to evaluate for tube kinking.  2.  Bibasilar atelectasis versus consolidations.  3.  Stable right nephrolithiasis.    I have personally reviewed the examination and initial interpretation  and I agree with the findings.    LEATHA ZAVALETA MD   XR Chest Port 1 View    Narrative    XR CHEST PORT 1 VW  5/7/2019 9:19 AM      HISTORY: loculated pleural effusion, chest tube placement    COMPARISON: 5/5/2019, 5/6/2019.    FINDINGS: Single AP view of the chest. Stable positioning of the left  chest tube, question kinking of the tubing. Trachea is  midline.  Cardiac border is indistinct. No pneumothorax. Stable size of the left  pleural effusion with overlying opacities. No acute osseous  abnormality. Visualized abdomen is unremarkable. Calcified breast  implants. Cholecystectomy clips. Stable right nephrolithiasis.      Impression    IMPRESSION:  1. Stable left pleural effusion and chest tube, consider lateral view  to evaluate for kinking if tube is not draining appropriately.  2. Stable bibasilar opacities, likely atelectasis versus  consolidation.  3. Stable right nephrolithiasis.    I have personally reviewed the examination and initial interpretation  and I agree with the findings.    LEATHA ZAVALETA MD   XR Chest Port 1 View    Narrative    EXAM: XR CHEST PORT 1 VW  5/7/2019 8:04 PM     HISTORY:  postop thoracic surgery - left decortication       COMPARISON: Chest radiograph 5/7/2019    FINDINGS: Single portable AP view of chest. 2 upper left-sided and  left basilar chest tubes are in place. No appreciable pneumothorax.  Partially visualized. Diffuse left lung patchy opacities. Mild linear  lower right lung opacity. Calcified breast implants.      Impression    IMPRESSION:   1. 3 left-sided chest tubes without appreciable pneumothorax.  2. Left lung patchy opacities.  3. Right lower lobe linear opacity, likely atelectasis.    I have personally reviewed the examination and initial interpretation  and I agree with the findings.    NOEMY LABOY MD   XR Chest 2 Views    Narrative    XR CHEST 2 VW  5/8/2019 10:52 AM      HISTORY: interval change    COMPARISON: Chest radiograph 5/6/2015, 5/7/2019, 5/7/2019.    FINDINGS: AP and lateral views of the chest. 2 apically directed  left-sided chest tubes, one anterior and one posterior, and one  basilar left-sided chest tube are not significantly changed in  position.. There is one left apical chest tube in place.  Trachea is  midline, cardiac silhouette is indistinct. Left-sided pleural effusion  and associated  left basilar consolidative opacities versus atelectasis  are not significantly changed. Increased hazy opacification of the  aerated portion of the left lung. Unchanged basilar atelectasis of the  right lung. Small left basilar pneumothorax is not significantly  changed. No acute osseous abnormality. Visualized abdomen is  unremarkable. Bilateral breast implants.      Impression    IMPRESSION:  1. 3 left-sided chest tubes, not significantly changed in position.  Small left basilar pneumothorax is not significantly changed.  2. Left-sided pleural effusion and associated left basilar atelectasis  versus consolidation are not significantly changed.  3. Increased hazy opacification of the left lung, could be related to  changes in positioning with posterior layering of effusion.    I have personally reviewed the examination and initial interpretation  and I agree with the findings.    ELAINE VALENTIN MD   XR Chest 2 Views    Narrative    XR CHEST 2 VW  5/9/2019 11:37 AM      HISTORY: interval change    COMPARISON: 5/8/2019 oral consent 2018    FINDINGS: AP and lateral views of the chest. Left-sided chest tubes  are not substantially changed in position. Bilateral breast implants  with calcifications of the capsule.    Low lung volumes. Patchy opacities in the left lung are minimally  changed. Stable patchy right perihilar/basilar opacities. Small left  pleural effusion. Heart size is unchanged. No appreciable residual  left basilar pneumothorax.      Impression    IMPRESSION:   1. Stable chest tubes. Resolved left basilar pneumothorax.  2. Stable patchy left lung and right basilar opacities, infection  versus atelectasis.  3. Small left pleural effusion.    I have personally reviewed the examination and initial interpretation  and I agree with the findings.    TOPHER SCHMIDT DO   XR Chest 2 Views    Narrative    XR CHEST 2 VW  5/10/2019 10:00 AM      HISTORY: interval change    COMPARISON: 5/9/2019    FINDINGS: PA  and lateral views of the chest. Left-sided chest tubes  are unchanged in position. Postoperative changes from bilateral breast  implants with capsular calcifications.    Unchanged left basilar consolidation/atelectasis. No appreciable  pneumothorax. Heart size is normal.      Impression    IMPRESSION:   1. Stable support devices.  2. Unchanged left basilar atelectasis or infection.    I have personally reviewed the examination and initial interpretation  and I agree with the findings.    LEATHA ZAVALETA MD   XR Chest 2 Views    Narrative    PA and lateral chest x-ray    Comparisons: 5/10/2019    HISTORY: Interval change    FINDINGS: Left-sided chest tubes remain in place. Breast implants with  calcified capsules. Upper abdominal surgical clips. Unchanged left  basilar opacities. No definite pneumothorax. Cardiac silhouette is  unchanged. Pulmonary vascularity is distinct.      Impression    IMPRESSION: No significant change in left basilar  atelectasis/consolidation.    ROBERT TURCIOS MD   XR Chest 2 Views    Narrative    XR CHEST 2 VW  5/11/2019 4:58 PM      HISTORY: post chest tube pull    COMPARISON: Chest x-ray same day 1119 hours    TECHNIQUE: Upright frontal and lateral views of the chest.    FINDINGS: Interval removal of left-sided chest tubes without  appreciable pneumothorax. Stable left retrocardiac airspace opacity  and small sided pleural effusion. Right lung is clear. Cardiomedial  silhouette is within normal respiratory is midline. Bilateral breast  augmentation with peripheral capsular calcification. Surgical clips in  right quadrant. Spinal anesthesia catheter place. No suspicious  osseous lesion      Impression    IMPRESSION: Interval removal of left-sided chest tubes without  appreciable pneumothorax. Stable left retrocardiac airspace opacity as  well as pleural effusion.    I have personally reviewed the examination and initial interpretation  and I agree with the findings.    ARTEMIO BRICENO,  MD   XR Chest Port 1 View    Narrative    XR CHEST PORT 1 VW  5/14/2019 10:48 AM      HISTORY: fluid status    COMPARISON: Chest , chest radiograph 5/11/2019.    FINDINGS: Single AP view of the chest. Spinal anesthesia catheter has  been removed. Trachea is midline, heart size is stable. Slightly  increased left pleural effusion and retrocardiac opacity. Essentially  unchanged bilateral interstitial perihilar opacities. No pneumothorax  or right pleural effusion. No acute osseous abnormality. Bilateral  breast augmentation with calcified implants.      Impression    IMPRESSION: Slightly increased left pleural effusion and retrocardiac  opacity, consolidation versus atelectasis.    I have personally reviewed the examination and initial interpretation  and I agree with the findings.    TOPHER SCHMIDT,      *Note: Due to a large number of results and/or encounters for the requested time period, some results have not been displayed. A complete set of results can be found in Results Review.     Pending Results   These results will be followed up by TCU and PCP  Unresulted Labs Ordered in the Past 30 Days of this Admission     Date and Time Order Name Status Description    5/7/2019 1325 Fungus Culture, non-blood Preliminary     4/29/2019 1411 Nocardia culture Preliminary     4/29/2019 1411 Fungus Culture, non-blood Preliminary     4/29/2019 1411 AFB Culture Non Blood Preliminary         Primary Care Physician   Fredy Merritt    Discharge Disposition   Discharged to TCU  Condition at discharge: Stable    Discharge Orders      X-ray Chest 2 vws*    Schedule prior to visit with Thoracic Surgery in 1 month.     Follow Up ([facility])    Follow up with the Thoracic Surgery CNS (Clinical Nurse Specialist) will be arranged in 1 month to follow up on how you are doing after surgery. You will need a chest x-ray prior to this appointment that will be arranged. The Thoracic office will contact you to set up the  "appointment.  You may call [phone] if you wish to schedule before you are contacted. Please call if you have not heard regarding this appointment within 3 business days of discharge.     Appointments on [place] and/or [facility] (with [facility] or [facility] provider or service). Call [phone] if you haven't heard regarding these appointments within 7 days of discharge.     Discharge Instructions    THORACIC SURGERY DISCHARGE INSTRUCTIONS    DRESSINGS/INCISIONS  You may remove chest tube dressing 48 hours after tube removal and bandage the site at your own discretion thereafter.  Small amounts of leakage are normal for 2-3 days after removal.  Remember not to remove the original dressing for 48 hours; if there is leakage, just put more dressing/tape over the existing bandage for the first 48 hours. Feel free to call with questions.    You may get incisions wet 2 days after operation. Do not submerge, soak, or scrub incision or swim until seen in follow-up. You have surgical superglue (Dermabond) on your incisions; this will flake off on it's own over time.       OTHER INSTRUCTIONS  Take incentive spirometer home for continued frequent use      WHEN TO CALL    Call the numbers below for fever greater than 101.5, chills, increased size of incision, red skin around incision, drainage from incision, or shortness of breath.    In emergencies, call [phone]    For other Questions or Concerns;   A.) During weekday working hours (Monday through Friday 8am to 4:30pm)   call [phone] and ask to speak to a clinical nurse specialist.     B.) At nights (after 4:30pm), on weekends, or if urgent call [phone] and   tell the  \"I would like to page job code [phone], the thoracic surgery   fellow on call, please.\"     Activity    If your plans upon discharge include prolonged periods of sitting (i.e a lengthy car or plane ride), it is highly beneficial to get up and walk at least once per hour to help prevent " swelling and blood clots.     Activity as tolerated; there are no specific activity restrictions from a surgical standpoint. Listen to your body; if something hurts, don't do it and work up to it over time.     General info for SNF    Length of Stay Estimate: Short Term Care: Estimated # of Days <30  Condition at Discharge: Stable  Level of care:skilled   Rehabilitation Potential: Good  Admission H&P remains valid and up-to-date: Yes  Recent Chemotherapy: N/A  Use Nursing Home Standing Orders: Yes     Mantoux instructions    Give two-step Mantoux (PPD) Per Facility Policy Yes     Reason for your hospital stay    You were hospitalized for a pleural effusion caused by Strep bacteria, needing a thoracic surgery procedure to clear out the infection along with IV and oral antibiotics.     Daily weights    Call Provider for weight gain of more than 2 pounds per day or 5 pounds per week.     Follow Up and recommended labs and tests    Follow up with TCU phyisican in 7 days. You will need a CXR in 7 days from hospital discharge day, 5/14.  The following labs/tests are recommended: BMP and CBC.  Follow up with primary care provider in 1-2 weeks.   Follow up with Thoracic surgery in about 1 month for surgery follow up. You will need another CXR prior to this appointment.   Follow up with your pain clinic in the next 1-2 months.     Activity - Up ad [place]     Physical Therapy Adult Consult    Evaluate and treat as clinically indicated.    Reason:  deconditioning     Occupational Therapy Adult Consult    Evaluate and treat as clinically indicated.    Reason:  deconditioning     Advance Diet as Tolerated    Follow this diet upon discharge: Orders Placed This Encounter      Snacks/Supplements Adult: Other; bowl of chicken salad at bedtime with crackers + milk skim; Between Meals      Snacks/Supplements Adult: Boost Plus; With Meals      Advance Diet as Tolerated: Regular Diet Adult     Discharge Medications   Current Discharge  Medication List      START taking these medications    Details   acetaminophen (TYLENOL) 325 MG tablet Take 2 tablets (650 mg) by mouth nightly as needed for mild pain or fever    Associated Diagnoses: Other chronic pain      clonazePAM (KLONOPIN) 0.5 MG tablet Take 1 tablet (0.5 mg) by mouth 2 times daily as needed for anxiety  Qty: 20 tablet, Refills: 0    Associated Diagnoses: Anxiety disorder, unspecified type      docusate sodium (COLACE) 100 MG capsule Take 1 capsule (100 mg) by mouth 2 times daily    Associated Diagnoses: Other chronic pain      furosemide (LASIX) 20 MG tablet Take 1 tablet (20 mg) by mouth daily for 7 days    Associated Diagnoses: Loculated pleural effusion      methocarbamol (ROBAXIN) 500 MG tablet Take 1 tablet (500 mg) by mouth every 6 hours as needed for muscle spasms    Associated Diagnoses: Other chronic pain      methyl salicylate-menthol (DUARTE-QURESHI) OINT ointment Apply 28 g topically every 6 hours as needed (pain)    Associated Diagnoses: Other chronic pain      oxyCODONE (ROXICODONE) 5 MG tablet Take 1-2 tablets (5-10 mg) by mouth every 4 hours as needed for moderate to severe pain (Overall plan: decrease to oxycodone 5-10mg po q4h prn x 3 days then decrease to oxycodone 5-10mg po q4h prn, max of 4 tabs per day x 2 days, then decrease to oxycodone 5-10mg po q4h prn, max of 2 doses per day then stop.) for 3 days, THEN 1 tablet (5 mg) every 6 hours as needed for moderate to severe pain (Overall plan: decrease to oxycodone 5-10mg po q4h prn x 3 days then decrease to oxycodone 5-10mg po q4h prn, max of 4 tabs per day x 2 days, then decrease to oxycodone 5-10mg po q4h prn, max of 2 doses per day then stop.) for 2 days, THEN 1 tablet (5 mg) every 6 hours as needed for moderate to severe pain (Overall plan: decrease to oxycodone 5-10mg po q4h prn x 3 days then decrease to oxycodone 5-10mg po q4h prn, max of 4 tabs per day x 2 days, then decrease to oxycodone 5-10mg po q4h prn, max of 2 doses  per day then stop.).  Qty: 40 tablet, Refills: 0    Associated Diagnoses: Other chronic pain      polyethylene glycol (MIRALAX/GLYCOLAX) packet Take 17 g by mouth 2 times daily    Associated Diagnoses: Other chronic pain      saccharomyces boulardii (FLORASTOR) 250 MG capsule Take 1 capsule (250 mg) by mouth 2 times daily    Associated Diagnoses: Other chronic pain      valACYclovir (VALTREX) 500 MG tablet Take 1 tablet (500 mg) by mouth every 12 hours for 4 days    Associated Diagnoses: Recurrent genital herpes simplex         CONTINUE these medications which have CHANGED    Details   aspirin-acetaminophen-caffeine (EXCEDRIN MIGRAINE) 250-250-65 MG tablet Take 1 tablet by mouth 2 times daily as needed for headaches    Associated Diagnoses: Other chronic pain      methadone (DOLOPHINE-INTENSOL) 10 MG/ML (HIGH CONC) solution Take 11.3 mLs (113 mg) by mouth daily [place] [phone]    Dose as of 4/29/19  Qty: 300 mL, Refills: 0    Associated Diagnoses: Other chronic pain      pantoprazole (PROTONIX) 40 MG EC tablet Take 1 tablet (40 mg) by mouth every morning (before breakfast)    Associated Diagnoses: Other chronic pain      !! pregabalin (LYRICA) 100 MG capsule Take 1 capsule (100 mg) by mouth 2 times daily  Qty: 30 capsule, Refills: 0    Associated Diagnoses: Other chronic pain      !! pregabalin (LYRICA) 150 MG capsule Take 1 capsule (150 mg) by mouth At Bedtime  Qty: 30 capsule, Refills: 0    Associated Diagnoses: Other chronic pain       !! - Potential duplicate medications found. Please discuss with provider.      CONTINUE these medications which have NOT CHANGED    Details   escitalopram (LEXAPRO) 10 MG tablet Take 1 tablet (10 mg) by mouth daily  Qty: 30 tablet, Refills: 8    Associated Diagnoses: Bipolar I disorder (H)           Allergies   Allergies   Allergen Reactions     Penicillins Hives     Hives in childhood.

## 2019-05-14 NOTE — RESULT ENCOUNTER NOTE
2D-Echo shows mild narrowing of aortic valve which is new. Recommend follow up with cardiology for close monitoring.

## 2019-05-15 PROCEDURE — 97166 OT EVAL MOD COMPLEX 45 MIN: CPT | Mod: GO | Performed by: OCCUPATIONAL THERAPIST

## 2019-05-15 PROCEDURE — 99305 1ST NF CARE MODERATE MDM 35: CPT | Performed by: HOSPITALIST

## 2019-05-15 PROCEDURE — 99207 ZZC NO CHARGE SIGN-OFF PS: CPT

## 2019-05-15 PROCEDURE — 25000132 ZZH RX MED GY IP 250 OP 250 PS 637: Performed by: PHYSICIAN ASSISTANT

## 2019-05-15 PROCEDURE — 97110 THERAPEUTIC EXERCISES: CPT | Mod: GP | Performed by: PHYSICAL THERAPIST

## 2019-05-15 PROCEDURE — 97161 PT EVAL LOW COMPLEX 20 MIN: CPT | Mod: GP | Performed by: PHYSICAL THERAPIST

## 2019-05-15 PROCEDURE — 97116 GAIT TRAINING THERAPY: CPT | Mod: GP | Performed by: PHYSICAL THERAPIST

## 2019-05-15 PROCEDURE — 12000022 ZZH R&B SNF

## 2019-05-15 PROCEDURE — 97535 SELF CARE MNGMENT TRAINING: CPT | Mod: GO | Performed by: OCCUPATIONAL THERAPIST

## 2019-05-15 PROCEDURE — 25000125 ZZHC RX 250: Performed by: PHYSICIAN ASSISTANT

## 2019-05-15 RX ADMIN — PANTOPRAZOLE SODIUM 40 MG: 40 TABLET, DELAYED RELEASE ORAL at 05:24

## 2019-05-15 RX ADMIN — FUROSEMIDE 20 MG: 20 TABLET ORAL at 08:04

## 2019-05-15 RX ADMIN — Medication 250 MG: at 21:12

## 2019-05-15 RX ADMIN — METHADONE HYDROCHLORIDE 113 MG: 10 CONCENTRATE ORAL at 05:19

## 2019-05-15 RX ADMIN — ESCITALOPRAM 10 MG: 5 TABLET, FILM COATED ORAL at 08:04

## 2019-05-15 RX ADMIN — PREGABALIN 100 MG: 100 CAPSULE ORAL at 08:03

## 2019-05-15 RX ADMIN — Medication 5 UNITS: at 08:06

## 2019-05-15 RX ADMIN — VALACYCLOVIR HYDROCHLORIDE 500 MG: 500 TABLET, FILM COATED ORAL at 21:13

## 2019-05-15 RX ADMIN — VALACYCLOVIR HYDROCHLORIDE 500 MG: 500 TABLET, FILM COATED ORAL at 08:04

## 2019-05-15 RX ADMIN — PREGABALIN 100 MG: 100 CAPSULE ORAL at 13:14

## 2019-05-15 RX ADMIN — METHOCARBAMOL 500 MG: 500 TABLET, FILM COATED ORAL at 00:23

## 2019-05-15 RX ADMIN — PREGABALIN 150 MG: 75 CAPSULE ORAL at 21:12

## 2019-05-15 RX ADMIN — OXYCODONE HYDROCHLORIDE 10 MG: 5 TABLET ORAL at 03:54

## 2019-05-15 RX ADMIN — Medication 250 MG: at 08:04

## 2019-05-15 RX ADMIN — OXYCODONE HYDROCHLORIDE 10 MG: 5 TABLET ORAL at 08:03

## 2019-05-15 RX ADMIN — OXYCODONE HYDROCHLORIDE 10 MG: 5 TABLET ORAL at 12:27

## 2019-05-15 RX ADMIN — OXYCODONE HYDROCHLORIDE 10 MG: 5 TABLET ORAL at 16:18

## 2019-05-15 RX ADMIN — OXYCODONE HYDROCHLORIDE 10 MG: 5 TABLET ORAL at 00:13

## 2019-05-15 RX ADMIN — OXYCODONE HYDROCHLORIDE 10 MG: 5 TABLET ORAL at 21:12

## 2019-05-15 RX ADMIN — DOCUSATE SODIUM 100 MG: 100 CAPSULE, LIQUID FILLED ORAL at 08:03

## 2019-05-15 RX ADMIN — DOCUSATE SODIUM 100 MG: 100 CAPSULE, LIQUID FILLED ORAL at 21:12

## 2019-05-15 RX ADMIN — CLONAZEPAM 0.5 MG: 0.5 TABLET ORAL at 05:23

## 2019-05-15 RX ADMIN — CLONAZEPAM 0.5 MG: 0.5 TABLET ORAL at 21:12

## 2019-05-15 ASSESSMENT — ACTIVITIES OF DAILY LIVING (ADL): PREVIOUS_RESPONSIBILITIES: MEAL PREP;HOUSEKEEPING;LAUNDRY;SHOPPING;MEDICATION MANAGEMENT;FINANCES

## 2019-05-15 NOTE — PROGRESS NOTES
Transitional Care Unit  Extended Progress Note           Assessment and Plan:   Demetra Richardson is a 63 year old female with a history of SLE, PSC, sjogrens, fibromyalgia, opiate dependence and polysubstance abuse (on chronic methadone), epidural abscess, and bipolar disorder who was admitted to [facility] 4/28/19 for L pleural effusion and persistent empyema s/p VATS L pleural decortication and bronmchoscopy by thoracic surgery (5/6) who was transferred to TCU 5/14/19 for aggressive rehabilitation.     # Loculated pleural effusion/empyema s/p VATS decortication. Parapneumonic effusion w/ fluid culture growing strep intermedius sensitive to ceftriaxone and transitioned to cefdinir and complete course 5/14. She is s/p chest tube placement on 4/29 w/ lytic therapy until 5/6 w/ concern for persistent empyema on CT scan 5/5. Thoracic surgery consulted at that time and performed decortication 5/7. Chest tubes removed 5/11. Per discussion with thoracic surgery prior to discharge, patient will continue to have a small effusion on CXR. No need to recheck CXR unless pt is symptomatic.  - Discharged with 7 day course of PO lasix for small L pleural effusion (5/13-5/20), will re-evaluate fluid status after 7 days to determine need for ongoing diuretics (repeat CXR)  - Continue IS  - PT/OT    Chronic pain  Polysubstance abuse, methadone maintenance  Fibromyalgia  Anxiety  With h x of overdose and withdrawal seizures. Denies recent substance abuse, and no alcohol use in past 7 years. Pt on methadone maintenance and fills prescriptions at [facility]. Psych and pain service consulted during recent admission given. Post op required epidural pain control, which was removed prior to discharge on POD#6.  - Per pain team, will taper opiate regimen as follows:   Oxycodone 5-10mg po q4h prn x 3 days (5/14-5/16) then,   Oxycodone 5-10mg po q4h prn, max of 4 tabs/day (5/17-5/19) then,   Oxycodone 5-10mg po q4h prn, max of 2  tbas/day (5/20-5/22) then stop  - Continue methadone 113mg PO daily (PTA dose)  - Continue APAP 650 mg at bedtime as needed  - Continue methocarbamol 500 mg po q6h as needed x 7 days (through 5/22)  - Continue pregabalin [id number], bedtime dose increased while inpatient, will need follow up with PCP on discharge to discuss this change  - Excedrin BID prn for headaches  - Klonopin 0.5 mg BID prn, will need to taper outpatient    HSV outbreak. Complete 5 day course valtrex.    SLE  Sjogren's syndrome  Primary sclerosing cholangitis  elevated alk phos from biliary source (PSC)  - consider outpatient endocrine consult for elevated bone specific alk phos if needed but suspect elevated alk phos due to PSC with elevated GGT.      Bipolar disorder type II  -PTA escitalopram     Migraines  Fibromyalgia  -PTA Excedrin Migraine to twice daily as needed      s/p Billroth II w/revision  History of iron deficiency anemia  acute blood loss anemia, stable  Patient has malabsorption secondary to probably surgery, receives IV iron as outpatient. Required 1 unit of RBCs 5/9 from procedure, stable.   - continue PTA pantoprazole  - IV iron replacement as outpatient            Discussed with Dr. Mendoza.     Diet and/or tube feedings: regular   DVT/GI prophylaxis: Enoxaparin (Lovenox) SQ  Indications for psychotropic medications: Bipolar disorder  Pneumococcal Vaccination Status: Pneumo-poly 11/17/2009  Code status discussed on admission: Full Code    Ramandeep Bland PA-C  Hospitalist Service  [phone]         Consults:   PT/OT         History of Present Illness:   Demetra Richardson is a 63 year old female with a history of SLE, PSC, sjogrens, fibromyalgia, opiate dependence and polysubstance abuse (on chronic methadone), epidural abscess, and bipolar disorder who was admitted to [facility] 4/28/19 for L pleural effusion and persistent empyema s/p VATS L pleural decortication and bronchoscopy by thoracic surgery (5/6) who  "was transferred to TCU 5/14/19 for aggressive rehabilitation. '    During hospital admission patient was seen by pain service and required epidural pain management, which was removed prior to discharge.     Currently, patient complains of ongoing pleuritic chest pain with deep inspiration. She describes the pain as sharp. Notes that laying on her back feels like \"laying on bricks\". Very anxious about opiate taper, sure that her pain will not be improved.     Denies overt chest pain/pressure, shortness of breath or difficulty breathing. No fevers, body aches, or chills.          Physical Exam:   Blood pressure 102/59, pulse 77, temperature 95.8  F (35.4  C), temperature source Axillary, resp. rate 16, weight 65.7 kg (144 lb 12.8 oz), SpO2 92 %, not currently breastfeeding.    Constitutional: healthy, alert and no distress   Cardiovascular: negative, PMI normal. No lifts, heaves, or thrills. RRR. No murmurs, clicks gallops or rub  Respiratory: Lung sounds distant, but CTA, no crackles or wheezing  Psychiatric: mentation appears normal and affect normal/bright  Head: Normocephalic. No masses, lesions, tenderness or abnormalities  Abdomen: Abdomen soft, non-tender. BS normal. No masses, organomegaly  NEURO: negative  SKIN: no suspicious lesions or rashes  JOINT/EXTREMITIES: extremities normal- no gross deformities noted, gait normal and normal muscle tone          Past Medical History:     Past Medical History:   Diagnosis Date     Anemia     2/2 h/p gastrectomy with inability to absorb oral iron     Basal cell carcinoma     Left-sided, skin over over medial orbit/nasal bone. Removed Oct 2018.     Benzodiazepine dependence (H)     With h/o withdrawal seizure x 1     Bipolar 1 disorder, mixed, moderate (H) 2/7/2013     Chronic pain     Back, legs.     Epidural abscess 2005    x4     Fibromyalgia      Generalised anxiety disorder      GERD (gastroesophageal reflux disease)      Major depressive disorder      Migraine      " Nephrolithiasis     S/p left sided lithotripsy     Opiate dependence (H)      Osteoarthritis      Osteoporosis      Primary sclerosing cholangitis      PUD (peptic ulcer disease)      SLE (systemic lupus erythematosus) (H)              Past Surgical History:      Past Surgical History:   Procedure Laterality Date     BACK SURGERY  04    L4-5 epidural abscess     BACK SURGERY  04    L3-4 spinal stenosis     BACK SURGERY  04    Lt psoas abscess     BRONCHOSCOPY FLEXIBLE N/A 2019    Procedure: Flexible Bronchoscopy;  Surgeon: Patrice Regalado MD;  Location: [facility] OR      SECTION      x 2     CHOLECYSTECTOMY  -     CHOLECYSTECTOMY       CLOSED REDUCTION, PERCUTANEOUS PINNING FINGER, COMBINED  8/10/2011    Procedure:COMBINED CLOSED REDUCTION, PERCUTANEOUS PINNING FINGER; 5th Proximal Phalanx; Surgeon:RADHA BUITRAGO; Location:[facility] OR     COLONOSCOPY       GASTRECTOMY  2005    Bilat truncal vagotomy, hemigastrectomy, RnY gastrojejunostomy     GASTRECTOMY      s/p 11 had temporary fdg tube, now removed     GASTROJEJUNOSTOMY  2011    Procedure:GASTROJEJUNOSTOMY; exploratory laparotmy with revision of gastrojejunostomy, Antrectomy, Miles-en-y, Gastrojejunostomy; Surgeon:JULIUS HELM; Location:[facility] OR     INSERT CHEST TUBE N/A 2019    Procedure: INSERTION, CATHETER, INTERCOSTAL, FOR DRAINAGE;  Surgeon: Melissa Nichols MD;  Location: [facility] GI     LASER HOLMIUM LITHOTRIPSY URETER(S), INSERT STENT, COMBINED      Procedure: COMBINED CYSTOSCOPY, URETEROSCOPY, LASER HOLMIUM LITHOTRIPSY URETER(S), INSERT STENT;;  Surgeon: Blair Correa MD;  Location: [facility] OR     LASER HOLMIUM NEPHROLITHOTOMY VIA PERCUTANEOUS NEPHROSTOMY  2012    Procedure: LASER HOLMIUM NEPHROLITHOTOMY VIA PERCUTANEOUS NEPHROSTOMY;  proceedure should read: Left Percutaneous Access, Left Percutaneous ultrasonic Nephrolithotomy, Ureteroscopy Holmium Laser Lithotripsy Stent Placement ;  Surgeon:  Blair Correa MD;  Location: [facility] OR     MAMMOPLASTY AUGMENTATION BILATERAL       OPEN REDUCTION INTERNAL FIXATION WRIST Left 1/11/2016    Procedure: OPEN REDUCTION INTERNAL FIXATION WRIST;  Surgeon: Darling Ellis MD;  Location: [facility] OR     RECONSTRUCT BREAST, IMPLANT PROSTHESIS, COMBINED       THORACOSCOPIC DECORTICATION LUNG Left 5/7/2019    Procedure: Left Video Assisted Thoracoscopic Decortication, Intercostal Nerve Cryo Analgesia, Flexible Bronchoscopy;  Surgeon: Patrice Regalado MD;  Location: [facility] OR             Family History:     Family History   Problem Relation Age of Onset     Family History Negative Mother      Family History Negative Father              Social History:     Social History     Tobacco Use     Smoking status: Never Smoker     Smokeless tobacco: Never Used   Substance Use Topics     Alcohol use: No     Comment: none in 10 years             Medications:     No current facility-administered medications on file prior to encounter.   Current Outpatient Medications on File Prior to Encounter:  acetaminophen (TYLENOL) 325 MG tablet Take 2 tablets (650 mg) by mouth nightly as needed for mild pain or fever   aspirin-acetaminophen-caffeine (EXCEDRIN MIGRAINE) 250-250-65 MG tablet Take 1 tablet by mouth 2 times daily as needed for headaches   clonazePAM (KLONOPIN) 0.5 MG tablet Take 1 tablet (0.5 mg) by mouth 2 times daily as needed for anxiety   docusate sodium (COLACE) 100 MG capsule Take 1 capsule (100 mg) by mouth 2 times daily   escitalopram (LEXAPRO) 10 MG tablet Take 1 tablet (10 mg) by mouth daily   furosemide (LASIX) 20 MG tablet Take 1 tablet (20 mg) by mouth daily for 7 days   methadone (DOLOPHINE-INTENSOL) 10 MG/ML (HIGH CONC) solution Take 11.3 mLs (113 mg) by mouth daily Rufina Marcial [phone]    Dose as of 4/29/19   methocarbamol (ROBAXIN) 500 MG tablet Take 1 tablet (500 mg) by mouth every 6 hours as needed for muscle spasms   methyl salicylate-menthol  (DUARTE-QURESHI) OINT ointment Apply 28 g topically every 6 hours as needed (pain)   oxyCODONE (ROXICODONE) 5 MG tablet Take 1-2 tablets (5-10 mg) by mouth every 4 hours as needed for moderate to severe pain (Overall plan: decrease to oxycodone 5-10mg po q4h prn x 3 days then decrease to oxycodone 5-10mg po q4h prn, max of 4 tabs per day x 2 days, then decrease to oxycodone 5-10mg po q4h prn, max of 2 doses per day then stop.) for 3 days, THEN 1 tablet (5 mg) every 6 hours as needed for moderate to severe pain (Overall plan: decrease to oxycodone 5-10mg po q4h prn x 3 days then decrease to oxycodone 5-10mg po q4h prn, max of 4 tabs per day x 2 days, then decrease to oxycodone 5-10mg po q4h prn, max of 2 doses per day then stop.) for 2 days, THEN 1 tablet (5 mg) every 6 hours as needed for moderate to severe pain (Overall plan: decrease to oxycodone 5-10mg po q4h prn x 3 days then decrease to oxycodone 5-10mg po q4h prn, max of 4 tabs per day x 2 days, then decrease to oxycodone 5-10mg po q4h prn, max of 2 doses per day then stop.).   pantoprazole (PROTONIX) 40 MG EC tablet Take 1 tablet (40 mg) by mouth every morning (before breakfast)   polyethylene glycol (MIRALAX/GLYCOLAX) packet Take 17 g by mouth 2 times daily   pregabalin (LYRICA) 100 MG capsule Take 1 capsule (100 mg) by mouth 2 times daily   pregabalin (LYRICA) 150 MG capsule Take 1 capsule (150 mg) by mouth At Bedtime   saccharomyces boulardii (FLORASTOR) 250 MG capsule Take 1 capsule (250 mg) by mouth 2 times daily   valACYclovir (VALTREX) 500 MG tablet Take 1 tablet (500 mg) by mouth every 12 hours for 4 days            Allergies:     Allergies   Allergen Reactions     Penicillins Hives     Hives in childhood.             Labs:     Lab Results   Component Value Date    WBC 8.8 05/14/2019    WBC 10.5 05/13/2019    WBC 9.4 05/12/2019    HGB 8.2 (L) 05/14/2019    HGB 8.1 (L) 05/13/2019    HGB 8.0 (L) 05/12/2019    HCT 28.5 (L) 05/14/2019    HCT 27.9 (L) 05/13/2019     HCT 27.4 (L) 05/12/2019     05/14/2019     05/13/2019     05/12/2019     05/14/2019     05/13/2019     05/12/2019    POTASSIUM 4.9 05/14/2019    POTASSIUM 4.6 05/13/2019    POTASSIUM 4.0 05/12/2019    CHLORIDE 108 05/14/2019    CHLORIDE 106 05/13/2019    CHLORIDE 105 05/12/2019    CO2 31 05/14/2019    CO2 31 05/13/2019    CO2 34 (H) 05/12/2019    BUN 10 05/14/2019    BUN 8 05/13/2019    BUN 8 05/12/2019    CR 0.67 05/14/2019    CR 0.62 05/13/2019    CR 0.64 05/12/2019    GLC 91 05/14/2019    GLC 99 05/13/2019    GLC 95 05/12/2019    SED 46 (H) 04/28/2019    SED 99 (H) 04/26/2019    SED 14 07/14/2015    NTBNP 253 (H) 10/27/2014    TROPONIN 0.00 10/13/2013    TROPONIN 0.00 10/07/2009    TROPI 0.018 01/05/2011    TROPI 0.020 10/08/2009    TROPI 0.019 10/07/2009    AST 60 (H) 05/08/2019    AST 47 (H) 05/07/2019     (H) 05/06/2019    ALT 26 05/08/2019    ALT 31 05/07/2019    ALT 61 (H) 05/06/2019    GGT 1,223 (H) 05/06/2019     (H) 02/18/2009    GGT 95 (H) 01/14/2008    ALKPHOS 685 (H) 05/08/2019    ALKPHOS 1,063 (H) 05/07/2019    ALKPHOS 1,698 (H) 05/06/2019    BILITOTAL 0.4 05/08/2019    BILITOTAL 0.3 05/07/2019    BILITOTAL 0.1 (L) 05/06/2019    INR 1.17 (H) 05/07/2019    INR 1.38 (H) 04/29/2019    INR 1.13 04/28/2019

## 2019-05-15 NOTE — PROGRESS NOTES
05/15/19 0806   Quick Adds   Quick Adds Certification   Type of Visit Initial PT Evaluation   Living Environment   Lives With alone   Living Arrangements apartment   Home Accessibility stairs to enter home   Number of Stairs, Main Entrance 10   Stair Railings, Main Entrance railings on both sides of stairs  (can only reach one)   Transportation Anticipated public transportation   Living Environment Comment patient lives alone in apartment - reports minimal support system.  Patient does her own cooking, cleaning, laundry, finanaces. Patient has not been eating much per report recently   Self-Care   Usual Activity Tolerance good   Current Activity Tolerance moderate   Regular Exercise Yes   Activity/Exercise Type walking   Exercise Amount/Frequency 45 mins;3-5 times/wk   Equipment Currently Used at Home none   Activity/Exercise/Self-Care Comment patient travels to/from Methadone clinic by walking and walks for exercise 3 days per week  - patient reports this amount exhausts her and she is not able to do much more than that   Functional Level Prior   Ambulation 0-->independent   Transferring 0-->independent   Toileting 0-->independent   Bathing 0-->independent   Communication 0-->understands/communicates without difficulty   Swallowing 0-->swallows foods/liquids without difficulty   Cognition 0 - no cognition issues reported   Fall history within last six months no   Number of times patient has fallen within last six months 1  (on ice in Jan)   Which of the above functional risks had a recent onset or change? ambulation   Prior Functional Level Comment IND without AD prior for mobility   General Information   Onset of Illness/Injury or Date of Surgery - Date 04/28/19   Referring Physician Ramandeep Bland PA-C; Dr Kaveh Kendall   Patient/Family Goals Statement return home   Pertinent History of Current Problem (include personal factors and/or comorbidities that impact the POC) 63 year old female with a h/o  Sjogren's, SLD, PSC, fibromyalgia, and prior opiate abuse on methadone who was admitted 4/28/2019 with progressive shortness of breath and was found to have pneumonia with a left-sided loculated pleural effusion. A chest tube was placed (4/29/2019) and she was started on antibiotics. Chest tube had been draining however due to presence of loculations she underwent tPA lytics via the chest tube x3 days. Repeat CT scan demonstrating persistent effusion and possible new right sided consolidation. She is now s/p LEFT VATS decortication on 5/7/2019    General Observations pt lying in bed; agreeable to PT   Cognitive Status Examination   Orientation orientation to person, place and time   Level of Consciousness alert   Follows Commands and Answers Questions 100% of the time   Personal Safety and Judgment intact   Memory intact   Pain Assessment   Patient Currently in Pain   (mild discomfort L chest incisions; general fibromayalgia)   Integumentary/Edema   Integumentary/Edema Comments chest incisions with steri strips;    Posture    Posture Forward head position;Protracted shoulders   Range of Motion (ROM)   ROM Comment WNL all motions hip/knee,DF   Strength   Strength Comments MMT not done except DF 4+/5; pt demonstrated good functional strength    Bed Mobility   Bed Mobility Comments IND sit<>supine--discomfort L chest wall;; uses logrolling   Transfer Skills   Transfer Comments SBA sit<>stand   Gait   Gait Comments amb without  ft; SBA; step thru pattern, decreased L armswing due to lateral chest discomfort; mild path deviation with scanning but had not overt LOB   Balance   Balance Comments no LOB reaching out of midline; single leg stand 5 sec--pt unsteady getting into position, trunk sway; tandem stand 10 sec.    Sensory Examination   Sensory Perception Comments sensitive to deeper pressure lower legs due to fibromyalgia; light touch and proprio intact   Coordination   Coordination no deficits were identified    Muscle Tone   Muscle Tone no deficits were identified   General Therapy Interventions   Planned Therapy Interventions gait training;groups;manual therapy;neuromuscular re-education;strengthening;transfer training;home program guidelines;progressive activity/exercise   Clinical Impression   Criteria for Skilled Therapeutic Intervention yes, treatment indicated   PT Diagnosis deconditioning   Influenced by the following impairments decreased activity tolerance; pain--L lateral chest, fibromyalgia   Functional limitations due to impairments transfers and gait below prior IND level   Clinical Presentation Stable/Uncomplicated   Clinical Presentation Rationale clinical judgment of mobility; comorbidities   Clinical Decision Making (Complexity) Low complexity   Therapy Frequency`   (6 days/wk)   Predicted Duration of Therapy Intervention (days/wks) 5/20/19   Anticipated Equipment Needs at Discharge   (none)   Anticipated Discharge Disposition Home  (no further PT anticipated)   Risk & Benefits of therapy have been explained Yes   Patient, Family & other staff in agreement with plan of care Yes   Clinical Impression Comments pt s/p VATS L pleural decortication and bronchoscopy, empyema. Previously IND without AD but limited by fibromyalgia. SBA level currently; will make good progress with short bout of skilled PT for return home.    Therapy Certification   Start of care date 05/15/19   Certification date from 05/15/19   Certification date to 06/04/19   Medical Diagnosis VATS L pleural decortication and bronchoscopy, empyema   Certification I certify the need for these services furnished under this plan of treatment and while under my care.  (Physician co-signature of this document indicates review and certification of the therapy plan).    Total Evaluation Time   Total Evaluation Time (Minutes) 15

## 2019-05-15 NOTE — PLAN OF CARE
"OT Evaluation completed and treatment initiated. Patient lives alone in apartment - reports minimal to no support from family and friends (most family lives in [place] - son lives in [place] but is full time student). Does use public transport and [facility] for appointments, does own household tasks and finances. Patient reports a fall last January that \"thru me for a loop\" and reports 'I don't remember much of it\". Patient asking for support for grocery assistance (set up with [employer] or other) Likely short stay for OT - now independent in room and on unit per PT report.  "

## 2019-05-15 NOTE — PLAN OF CARE
Occupational Therapy Discharge Summary    Reason for therapy discharge:    Discharged to transitional care facility.    Progress towards therapy goal(s). See goals on Care Plan in [facility] electronic health record for goal details.  Goals not met.  Barriers to achieving goals:   discharge from facility.    Therapy recommendation(s):    Continued therapy is recommended.  Rationale/Recommendations:  TCU to progress functional independence.

## 2019-05-15 NOTE — PROGRESS NOTES
05/15/19 0636   Quick Adds   Quick Adds Certification   Type of Visit Initial Occupational Therapy Evaluation   Living Environment   Lives With alone   Living Arrangements apartment   Home Accessibility stairs to enter home   Number of Stairs, Main Entrance 10   Stair Railings, Main Entrance railings on both sides of stairs   Transportation Anticipated public transportation   Living Environment Comment patient lives alone in apartment - reports minimal support system.  Patient does her own cooking, cleaning, laundry, finanaces. Patient has not been eating much per report recently   Self-Care   Usual Activity Tolerance good   Current Activity Tolerance fair   Regular Exercise Yes   Activity/Exercise Type walking   Exercise Amount/Frequency 45 mins;3-5 times/wk   Equipment Currently Used at Home none   Activity/Exercise/Self-Care Comment patient travels to/from [facility] clinic by walking and walks for exercise 3 days per week  - patient reports this amount exhausts her and she is not able to do much more than that   Functional Level   Ambulation 0-->independent   Transferring 0-->independent   Toileting 0-->independent   Bathing 0-->independent   Dressing 0-->independent   Eating 0-->independent   Communication 0-->understands/communicates without difficulty   Swallowing 0-->swallows foods/liquids without difficulty   Cognition 0 - no cognition issues reported   Fall history within last six months yes   Number of times patient has fallen within last six months 1  (reprots a fall in January / reports she lost consiousness)   Which of the above functional risks had a recent onset or change? ambulation;transferring;toileting;bathing;dressing   Prior Functional Level Comment previously IND with ADL/self cares and IADL's       Present no   Language English   General Information   Onset of Illness/Injury or Date of Surgery - Date 04/28/19   Referring Physician Dr. Rehan Mendoza   Patient/Family  Goals Statement to return to home independently   Additional Occupational Profile Info/Pertinent History of Current Problem patient is a 63 y.o. female with history of Sjogren's, SLD, PSC, fibromyalgia and prior opiate abuse on methadone who was admitted on 4/28/2019 with progressive shortness of breath and was found to have pneumonia wiht left-sided loculated pleural effusion. A chest tube was placed (4/29/2019) and she started on antibiotics. Chest tube had been draining , however, due to presence of loculations she underwent tPA lytics via the chest tube x 3 days. Repeat CT scan demonstrating persistent effusion and possible new right sided consolidation. She is now LEFT VATS decortication on 5/7/2019 with Dr. Muir   Precautions/Limitations fall precautions;other (see comments)  (Left VATS precautions)   Weight-Bearing Status - LUE other (see comments)  (20 lbs.)   Weight-Bearing Status - RUE full weight-bearing   Weight-Bearing Status - LLE full weight-bearing   Weight-Bearing Status - RLE full weight-bearing   Heart Disease Risk Factors Medical history   General Observations sitting up  in recliner /pleasant   General Info Comments reports issues with fibromyalgia lately and her Lupus   Cognitive Status Examination   Orientation orientation to person, place and time   Level of Consciousness alert   Follows Commands (Cognition) WFL   Memory intact   Attention Distractible during evaluation;Quiet environment required   Organization/Problem Solving Sequencing impaired;Problem solving impaired   Executive Function Impulsive   Cognitive Comment some physical /tactile promplts required/ verbal cues given/ prompting for coniutation of tasks   Visual Perception   Visual Perception Wears glasses   Visual Perception Comments reading glasses reports no changes in vision will further assess   Sensory Examination   Sensory Quick Adds No deficits were identified   Sensory Comments patient with no changes in sensation - able  to feel light touch and pressure throughout B UE's and B LE's   Pain Assessment   Patient Currently in Pain Yes, see Vital Sign flowsheet  (L side / trunk area)   Integumentary/Edema   Integumentary/Edema Comments slgiht edema noted B LE's   Posture   Posture forward head position;protracted shoulders   Range of Motion (ROM)   ROM Comment R UE AROM WFL L UE AROM 0~80 degrees humeral flexion limited by pain/tightness   Strength   Strength Comments B UE strength grossly 4-/5 MMT. L UE not formally tested due to VATS precautions - patient demonstrates a moderate deficit in B UE  strength   Hand Strength   Hand Strength Comments B UE moderate  strength deficit   Muscle Tone Assessment   Muscle Tone Quick Adds No deficits were identified   Coordination   Coordination Comments good finger/thumb opposition noted B UE's   Mobility   Bed Mobility Bed mobility skill: Sit to supine;Bed mobility skill: Supine to sit   Bed Mobility Skill: Sit to Supine   Level of [place]: Sit/Supine stand-by assist   Physical Assist/Nonphysical Assist: Sit/Supine set-up required   Assistive Device: Sit/Supine bedrail   Bed Mobility Skill: Supine to Sit   Level of [place]: Supine/Sit stand-by assist   Physical Assist/Nonphysical Assist: Supine/Sit set-up required   Assistive Device: Supine/Sit bedrail   Transfer Skill: Bed to Chair/Chair to Bed   Level of [place]: Bed to Chair stand-by assist   Physical Assist/Nonphysical Assist: Bed to Chair set-up required   Weight-Bearing Restrictions full weight-bearing   Transfer Skill: Sit to Stand   Level of [place]: Sit/Stand stand-by assist   Physical Assist/Nonphysical Assist: Sit/Stand set-up required   Transfer Skill: Sit to Stand full weight-bearing   Transfer Skill: Toilet Transfer   Level of [place]: Toilet stand-by assist   Physical Assist/Nonphysical Assist: Toilet set-up required   Weight-Bearing Restrictions: Toilet full weight-bearing   Tub/Shower Transfer    Tub/Shower Transfer Transfer Skill: Tub/Shower Transfers   Tub/Shower Transfer Comments has a tub/shower combo no chair no bars   Transfer Skill: Tub/Shower Transfer   Level of [place]: Tub/Shower contact guard   Physical Assist/Nonphysical Assist: Tub/Shower set-up required;supervision;verbal cues;1 person assist   Weight-Bearing Restrictions: Tub/Shower full weight-bearing   Balance   Balance Comments appears steady wtih static standing , mild sway with dynamic balance - will further assess with ADL's   Bathing   Level of [place] stand-by assist   Physical Assist/Nonphysical Assist set-up required;supervision;verbal cues   Upper Body Dressing   Level of [place]: Dress Upper Body stand-by assist   Physical Assist/Nonphysical Assist: Dress Upper Body set-up required   Lower Body Dressing   Level of [place]: Dress Lower Body stand-by assist   Physical Assist/Nonphysical Assist: Dress Lower Body set-up required   Toileting   Level of [place]: Toilet stand-by assist   Physical Assist/Nonphysical Assist: Toilet set-up required   Grooming   Level of [place]: Grooming stand-by assist   Physical Assist/Nonphysical Assist: Grooming set-up required   Eating/Self Feeding   Level of [place]: Eating independent  (reports poor eater and poor appetite)   Instrumental Activities of Daily Living (IADL)   Previous Responsibilities meal prep;housekeeping;laundry;shopping;medication management;finances   Activities of Daily Living Analysis   Impairments Contributing to Impaired Activities of Daily Living balance impaired;cognition impaired;fear and anxiety;flexibility decreased;pain;post surgical precautions;ROM decreased;strength decreased   General Therapy Interventions   Planned Therapy Interventions ADL retraining;IADL retraining;balance training;bed mobility training;cognition;groups;strengthening;stretching;transfer training;visual perception;home program guidelines;progressive  activity/exercise;risk factor education   Clinical Impression   Criteria for Skilled Therapeutic Interventions Met yes, treatment indicated   OT Diagnosis decreased independence with functional transfers and ADL's   Influenced by the following impairments decreased strength, ROM /activity tolerance, decreased functional mobility    Assessment of Occupational Performance 3-5 Performance Deficits   Identified Performance Deficits bathing, dressing, toileting, home management,   Clinical Decision Making (Complexity) Moderate complexity   Therapy Frequency other (see comments)  (6 days per week)   Predicted Duration of Therapy Intervention (days/wks) one week   Anticipated Discharge Disposition Home;Home with Assist;Home with Home Therapy   Risks and Benefits of Treatment have been explained. Yes   Patient, Family & other staff in agreement with plan of care Yes   Clinical Impression Comments patient would benefit form skilled OT to address basic ADL/self cares and IADL's for safe home alone discharge plan - patient [place] limited support in the cities - all family in [place] - son does live here but is in school full time   Therapy Certification   Start of Care Date 04/28/19   Certification date from 05/15/19   Certification date to 06/15/19   Medical Diagnosis see above for PMHX   Certification I certify the need for these services furnished under this plan of treatment and while under my care.  (Physician co-signature of this document indicates review and certification of the therapy plan).   Total Evaluation Time   Total Evaluation Time (Minutes) 30

## 2019-05-15 NOTE — PLAN OF CARE
Patient is alert and oriented x 4. Oxycodone given for L lower back pain- effective per her report. BP- 84/54, P-77, R-16, oxygen sats-92-94 % on RA. Patient no respiratory distress noted. Patient sleeps in between cares. Will continue with current plan of care.

## 2019-05-15 NOTE — PLAN OF CARE
Marianal completed. Pt able to amb 200 ft without AD, SBA. 12 stairs SBA with rail. Needs to work on activity tolerance but anticipate good return to IND mobility in few days.

## 2019-05-15 NOTE — PLAN OF CARE
VSS. Patient is alert and oriented x4. She participated in therapy. Patient c/o pain and received 10 mg of oxycodone x2 this shift. She is independent in her room. Patient's skin is intact. She declined miralax this morning. LBM 5/14/19. Patient is continent of bowel and bladder. Continue with current POC.

## 2019-05-15 NOTE — H&P
Transitional Care Unit  HP           Assessment and Plan:   Demetra Richardson is a 63 year old female with a history of SLE, PSC, sjogrens, fibromyalgia, opiate dependence and polysubstance abuse (on chronic methadone), epidural abscess, and bipolar disorder who was admitted to [facility] 4/28/19 for L pleural effusion and persistent empyema s/p VATS L pleural decortication and bronmchoscopy by thoracic surgery (5/6) who was transferred to TCU 5/14/19 for aggressive rehabilitation.     # Loculated pleural effusion/empyema s/p VATS decortication. Parapneumonic effusion w/ fluid culture growing strep intermedius sensitive to ceftriaxone and transitioned to cefdinir and complete course 5/14. She is s/p chest tube placement on 4/29 w/ lytic therapy until 5/6 w/ concern for persistent empyema on CT scan 5/5. Thoracic surgery consulted at that time and performed decortication 5/7. Chest tubes removed 5/11. Per discussion with thoracic surgery prior to discharge, patient will continue to have a small effusion on CXR. No need to recheck CXR unless pt is symptomatic.    - Discharged with 7 day course of PO lasix for small L pleural effusion (5/13-5/20), will re-evaluate fluid status after 7 days to determine need for ongoing diuretics (repeat CXR)  - Continue IS  - PT/OT    Chronic pain  Polysubstance abuse, methadone maintenance  Fibromyalgia  Anxiety    With h x of overdose and withdrawal seizures. Denies recent substance abuse, and no alcohol use in past 7 years. Pt on methadone maintenance and fills prescriptions at [facility]. Psych and pain service consulted during recent admission given. Post op required epidural pain control, which was removed prior to discharge on POD#6.  - Per pain team, will taper opiate regimen as follows:   Oxycodone 5-10mg po q4h prn x 3 days (5/14-5/16) then,   Oxycodone 5-10mg po q4h prn, max of 4 tabs/day (5/17-5/19) then,   Oxycodone 5-10mg po q4h prn, max of 2 tbas/day (5/20-5/22)  then stop  - Continue methadone 113mg PO daily (PTA dose)  - Continue APAP 650 mg at bedtime as needed  - Continue methocarbamol 500 mg po q6h as needed x 7 days (through 5/22)  - Continue pregabalin [id number], bedtime dose increased while inpatient, will need follow up with PCP on discharge to discuss this change  - Excedrin BID prn for headaches  - Klonopin 0.5 mg BID prn, will need to taper outpatient    HSV outbreak. Complete 5 day course valtrex.    SLE  Sjogren's syndrome  Primary sclerosing cholangitis  elevated alk phos from biliary source (PSC)  - consider outpatient endocrine consult for elevated bone specific alk phos if needed but suspect elevated alk phos due to PSC with elevated GGT.      Bipolar disorder type II  -PTA escitalopram     Migraines  Fibromyalgia  -PTA Excedrin Migraine to twice daily as needed      s/p Billroth II w/revision  History of iron deficiency anemia  acute blood loss anemia, stable    Patient has malabsorption secondary to probably surgery, receives IV iron as outpatient. Required 1 unit of RBCs 5/9 from procedure, stable.   - continue PTA pantoprazole  - IV iron replacement as outpatient    Diet and/or tube feedings: regular   DVT/GI prophylaxis: Enoxaparin (Lovenox) SQ  Indications for psychotropic medications: Bipolar disorder  Pneumococcal Vaccination Status: Pneumo-poly 11/17/2009  Code status discussed on admission: Full Code    Rehan Mendoza  Hospitalist Service           Consults:     PT/OT         History of Present Illness:     Demetra Richardson is a 63 year old female with a history of SLE, PSC, sjogrens, fibromyalgia, opiate dependence and polysubstance abuse (on chronic methadone), epidural abscess, and bipolar disorder who was admitted to [facility] 4/28/19 for L pleural effusion and persistent empyema s/p VATS L pleural decortication and bronchoscopy by thoracic surgery (5/6) who was transferred to TCU 5/14/19 for aggressive rehabilitation.  "'    During hospital admission patient was seen by pain service and required epidural pain management, which was removed prior to discharge.     Currently, patient complains of ongoing pleuritic chest pain with deep inspiration. She describes the pain as sharp. Notes that laying on her back feels like \"laying on bricks\". Very anxious about opiate taper, sure that her pain will not be improved.     Denies overt chest pain/pressure, shortness of breath or difficulty breathing. No fevers, body aches, or chills.          Physical Exam:   Blood pressure (!) 85/55, pulse 72, temperature 96.4  F (35.8  C), temperature source Axillary, resp. rate 12, weight 65.7 kg (144 lb 12.8 oz), SpO2 93 %, not currently breastfeeding.    Constitutional: healthy, alert and no distress   Cardiovascular: negative, PMI normal. No lifts, heaves, or thrills. RRR. No murmurs, clicks gallops or rub  Respiratory: Lung sounds distant, but CTA, no crackles or wheezing  Psychiatric: mentation appears normal and affect normal/bright  Head: Normocephalic. No masses, lesions, tenderness or abnormalities  Abdomen: Abdomen soft, non-tender. BS normal. No masses, organomegaly  NEURO: negative  SKIN: no suspicious lesions or rashes  JOINT/EXTREMITIES: extremities normal- no gross deformities noted, gait normal and normal muscle tone          Past Medical History:     Past Medical History:   Diagnosis Date     Anemia     2/2 h/p gastrectomy with inability to absorb oral iron     Basal cell carcinoma     Left-sided, skin over over medial orbit/nasal bone. Removed Oct 2018.     Benzodiazepine dependence (H)     With h/o withdrawal seizure x 1     Bipolar 1 disorder, mixed, moderate (H) 2/7/2013     Chronic pain     Back, legs.     Epidural abscess 2005    x4     Fibromyalgia      Generalised anxiety disorder      GERD (gastroesophageal reflux disease)      Major depressive disorder      Migraine      Nephrolithiasis     S/p left sided lithotripsy     Opiate " dependence (H)      Osteoarthritis      Osteoporosis      Primary sclerosing cholangitis      PUD (peptic ulcer disease)      SLE (systemic lupus erythematosus) (H)              Past Surgical History:      Past Surgical History:   Procedure Laterality Date     BACK SURGERY  04    L4-5 epidural abscess     BACK SURGERY  04    L3-4 spinal stenosis     BACK SURGERY  04    Lt psoas abscess     BRONCHOSCOPY FLEXIBLE N/A 2019    Procedure: Flexible Bronchoscopy;  Surgeon: Patrice Regalado MD;  Location: [facility] OR      SECTION      x 2     CHOLECYSTECTOMY  -     CHOLECYSTECTOMY       CLOSED REDUCTION, PERCUTANEOUS PINNING FINGER, COMBINED  8/10/2011    Procedure:COMBINED CLOSED REDUCTION, PERCUTANEOUS PINNING FINGER; 5th Proximal Phalanx; Surgeon:RADHA BUITRAGO; Location:[facility] OR     COLONOSCOPY       GASTRECTOMY  2005    Bilat truncal vagotomy, hemigastrectomy, RnY gastrojejunostomy     GASTRECTOMY      s/p 11 had temporary fdg tube, now removed     GASTROJEJUNOSTOMY  2011    Procedure:GASTROJEJUNOSTOMY; exploratory laparotmy with revision of gastrojejunostomy, Antrectomy, Miles-en-y, Gastrojejunostomy; Surgeon:JULIUS HELM; Location:[facility] OR     INSERT CHEST TUBE N/A 2019    Procedure: INSERTION, CATHETER, INTERCOSTAL, FOR DRAINAGE;  Surgeon: Melissa Nichols MD;  Location: [facility] GI     LASER HOLMIUM LITHOTRIPSY URETER(S), INSERT STENT, COMBINED      Procedure: COMBINED CYSTOSCOPY, URETEROSCOPY, LASER HOLMIUM LITHOTRIPSY URETER(S), INSERT STENT;;  Surgeon: Blair Correa MD;  Location: [facility] OR     LASER HOLMIUM NEPHROLITHOTOMY VIA PERCUTANEOUS NEPHROSTOMY  2012    Procedure: LASER HOLMIUM NEPHROLITHOTOMY VIA PERCUTANEOUS NEPHROSTOMY;  proceedure should read: Left Percutaneous Access, Left Percutaneous ultrasonic Nephrolithotomy, Ureteroscopy Holmium Laser Lithotripsy Stent Placement ;  Surgeon: Blair Correa MD;  Location: [facility] OR     MAMMOPLASTY  AUGMENTATION BILATERAL       OPEN REDUCTION INTERNAL FIXATION WRIST Left 1/11/2016    Procedure: OPEN REDUCTION INTERNAL FIXATION WRIST;  Surgeon: Darling Ellis MD;  Location: [facility] OR     RECONSTRUCT BREAST, IMPLANT PROSTHESIS, COMBINED       THORACOSCOPIC DECORTICATION LUNG Left 5/7/2019    Procedure: Left Video Assisted Thoracoscopic Decortication, Intercostal Nerve Cryo Analgesia, Flexible Bronchoscopy;  Surgeon: Patrice Regalado MD;  Location: [facility] OR             Family History:     Family History   Problem Relation Age of Onset     Family History Negative Mother      Family History Negative Father              Social History:     Social History     Tobacco Use     Smoking status: Never Smoker     Smokeless tobacco: Never Used   Substance Use Topics     Alcohol use: No     Comment: none in 10 years             Medications:       No current facility-administered medications on file prior to encounter.   Current Outpatient Medications on File Prior to Encounter:  acetaminophen (TYLENOL) 325 MG tablet Take 2 tablets (650 mg) by mouth nightly as needed for mild pain or fever   aspirin-acetaminophen-caffeine (EXCEDRIN MIGRAINE) 250-250-65 MG tablet Take 1 tablet by mouth 2 times daily as needed for headaches   clonazePAM (KLONOPIN) 0.5 MG tablet Take 1 tablet (0.5 mg) by mouth 2 times daily as needed for anxiety   docusate sodium (COLACE) 100 MG capsule Take 1 capsule (100 mg) by mouth 2 times daily   escitalopram (LEXAPRO) 10 MG tablet Take 1 tablet (10 mg) by mouth daily   furosemide (LASIX) 20 MG tablet Take 1 tablet (20 mg) by mouth daily for 7 days   methadone (DOLOPHINE-INTENSOL) 10 MG/ML (HIGH CONC) solution Take 11.3 mLs (113 mg) by mouth daily Rufina Marcial [phone]    Dose as of 4/29/19   methocarbamol (ROBAXIN) 500 MG tablet Take 1 tablet (500 mg) by mouth every 6 hours as needed for muscle spasms   methyl salicylate-menthol (DUARTE-QURESHI) OINT ointment Apply 28 g topically every 6 hours  as needed (pain)   oxyCODONE (ROXICODONE) 5 MG tablet Take 1-2 tablets (5-10 mg) by mouth every 4 hours as needed for moderate to severe pain (Overall plan: decrease to oxycodone 5-10mg po q4h prn x 3 days then decrease to oxycodone 5-10mg po q4h prn, max of 4 tabs per day x 2 days, then decrease to oxycodone 5-10mg po q4h prn, max of 2 doses per day then stop.) for 3 days, THEN 1 tablet (5 mg) every 6 hours as needed for moderate to severe pain (Overall plan: decrease to oxycodone 5-10mg po q4h prn x 3 days then decrease to oxycodone 5-10mg po q4h prn, max of 4 tabs per day x 2 days, then decrease to oxycodone 5-10mg po q4h prn, max of 2 doses per day then stop.) for 2 days, THEN 1 tablet (5 mg) every 6 hours as needed for moderate to severe pain (Overall plan: decrease to oxycodone 5-10mg po q4h prn x 3 days then decrease to oxycodone 5-10mg po q4h prn, max of 4 tabs per day x 2 days, then decrease to oxycodone 5-10mg po q4h prn, max of 2 doses per day then stop.).   pantoprazole (PROTONIX) 40 MG EC tablet Take 1 tablet (40 mg) by mouth every morning (before breakfast)   polyethylene glycol (MIRALAX/GLYCOLAX) packet Take 17 g by mouth 2 times daily   pregabalin (LYRICA) 100 MG capsule Take 1 capsule (100 mg) by mouth 2 times daily   pregabalin (LYRICA) 150 MG capsule Take 1 capsule (150 mg) by mouth At Bedtime   saccharomyces boulardii (FLORASTOR) 250 MG capsule Take 1 capsule (250 mg) by mouth 2 times daily   valACYclovir (VALTREX) 500 MG tablet Take 1 tablet (500 mg) by mouth every 12 hours for 4 days            Allergies:     Allergies   Allergen Reactions     Penicillins Hives     Hives in childhood.             Labs:     Lab Results   Component Value Date    WBC 8.8 05/14/2019    WBC 10.5 05/13/2019    WBC 9.4 05/12/2019    HGB 8.2 (L) 05/14/2019    HGB 8.1 (L) 05/13/2019    HGB 8.0 (L) 05/12/2019    HCT 28.5 (L) 05/14/2019    HCT 27.9 (L) 05/13/2019    HCT 27.4 (L) 05/12/2019     05/14/2019      05/13/2019     05/12/2019     05/14/2019     05/13/2019     05/12/2019    POTASSIUM 4.9 05/14/2019    POTASSIUM 4.6 05/13/2019    POTASSIUM 4.0 05/12/2019    CHLORIDE 108 05/14/2019    CHLORIDE 106 05/13/2019    CHLORIDE 105 05/12/2019    CO2 31 05/14/2019    CO2 31 05/13/2019    CO2 34 (H) 05/12/2019    BUN 10 05/14/2019    BUN 8 05/13/2019    BUN 8 05/12/2019    CR 0.67 05/14/2019    CR 0.62 05/13/2019    CR 0.64 05/12/2019    GLC 91 05/14/2019    GLC 99 05/13/2019    GLC 95 05/12/2019    SED 46 (H) 04/28/2019    SED 99 (H) 04/26/2019    SED 14 07/14/2015    NTBNP 253 (H) 10/27/2014    TROPONIN 0.00 10/13/2013    TROPONIN 0.00 10/07/2009    TROPI 0.018 01/05/2011    TROPI 0.020 10/08/2009    TROPI 0.019 10/07/2009    AST 60 (H) 05/08/2019    AST 47 (H) 05/07/2019     (H) 05/06/2019    ALT 26 05/08/2019    ALT 31 05/07/2019    ALT 61 (H) 05/06/2019    GGT 1,223 (H) 05/06/2019     (H) 02/18/2009    GGT 95 (H) 01/14/2008    ALKPHOS 685 (H) 05/08/2019    ALKPHOS 1,063 (H) 05/07/2019    ALKPHOS 1,698 (H) 05/06/2019    BILITOTAL 0.4 05/08/2019    BILITOTAL 0.3 05/07/2019    BILITOTAL 0.1 (L) 05/06/2019    INR 1.17 (H) 05/07/2019    INR 1.38 (H) 04/29/2019    INR 1.13 04/28/2019

## 2019-05-15 NOTE — PROGRESS NOTES
Social Work: Initial Assessment with Discharge Plan    Patient Name: Demetra Richardson  : 1955  Age: 63 year old  MRN: [id number]  Completed assessment with: Pt  Admitted to TCU: 2019    Presenting Information   Date of SW assessment: May 15, 2019  Health Care Directive: Provided education  Primary Health Care Agent: Self  Secondary Health Care Agent: Pt's sister would be NOK per policy   Living Situation: Pt lives alone in [place] in an apartment.   Previous Functional Status: Independent   DME available: See therapy notes   Patient and family understanding of hospitalization: Pt states that she is here for therapy  Cultural/Language/Spiritual Considerations: English speaking   Abuse concerns: No concerns   BIMS: Pt scored 15 on BIMS indicating cognitively intact.   PHQ-9: Pt scored 00 on PHQ-9 indicating no depressive symptoms.   PAS: confirmation number- [id number]  Has there been a level II screen?  No  Were there any recommendations in the screen? N/A  If yes, will the recommendations we incorporated into the Plan of Care?  N/A  Physical Health  Reason for admission: Loculated pleural effusion    Provider Information   Primary Care Physician:Fredy Merritt   : [facility]    Mental Health:   Diagnosis: See below information.   Current Support/Services: NA  Previous Services: NA  Services Needed/Recommended: NA    Substance Use:  Diagnosis: History of polysubstance use, borderline personality disorder, current generalized anxiety disorder (see psychiatrist's note from 19)  Current Support/Services: Methadone clinic, support group 3X/week  Previous Services: NA  Services Needed/Recommended: Pt will continue the above recommendations for support.     Support System  Marital Status: Single   Family support: Pt has limited support, but pt states that she can figure things out for herself.   Other support available: Pt has a son who lives local but wants him to focus on his life.    Gaps in support system: limited support in the community.     Community Resources  Current in home services: NA  Previous services: NA    Financial/Employment/Education  Employment Status: None   Income Source: SSDI  Education: Master's Degree   Financial Concerns:  No concerns presented.   Insurance: [employer].       Discharge Plan   Patient and family discharge goal: Pt's goal is to return home   Provided Education on discharge plan: YES  Patient agreeable to discharge plan:  YES  A list of Medicare Certified Facilities was provided to the patient and/or family to encourage patient choice. Based on location and rating, patient would like referrals made to: [facility]  General information regarding anticipated insurance coverage and possible out of pocket cost was discussed. Patient and patient's family are aware patient may incur the cost of transportation to the facility, pending insurance payment: YES  Barriers to discharge: Medical clearance, safe discharge plan, complete therapy goals.     Discharge Recommendations   Disposition: Home   Transportation Needs: [employer].   Name of Transportation Company and Phone: [employer]- pt to set up.     Additional comments   Writer introduced self and role of SW. Pt was pleasant and open to SW services while here on TCU. Pt received her care plan goals and orders, and there were no questions at this time. SW will continue to follow and assist as needed.    PARISH Wells  [facility]   P: [phone]  Pgr: [phone]               05/16/19 0900   Living Arrangements   Lives With alone   Living Arrangements apartment   Able to Return to Prior Arrangements yes   Home Safety   Patient Feels Safe Living in Home? yes   Discharge Planning   Expected Discharge Date 05/21/19   Patient/Family Anticipates Transition to home   Discharge Needs Assessment   Transportation Anticipated health plan transportation

## 2019-05-16 ENCOUNTER — APPOINTMENT (OUTPATIENT)
Dept: LAB | Facility: CLINIC | Age: 64
End: 2019-05-16
Attending: HOSPITALIST
Payer: COMMERCIAL

## 2019-05-16 LAB
ANION GAP SERPL CALCULATED.3IONS-SCNC: 5 MMOL/L (ref 3–14)
BUN SERPL-MCNC: 10 MG/DL (ref 7–30)
CALCIUM SERPL-MCNC: 8.4 MG/DL (ref 8.5–10.1)
CHLORIDE SERPL-SCNC: 108 MMOL/L (ref 94–109)
CO2 SERPL-SCNC: 29 MMOL/L (ref 20–32)
CREAT SERPL-MCNC: 0.68 MG/DL (ref 0.52–1.04)
ERYTHROCYTE [DISTWIDTH] IN BLOOD BY AUTOMATED COUNT: 15.4 % (ref 10–15)
GFR SERPL CREATININE-BSD FRML MDRD: >90 ML/MIN/{1.73_M2}
GLUCOSE SERPL-MCNC: 94 MG/DL (ref 70–99)
HCT VFR BLD AUTO: 28.7 % (ref 35–47)
HGB BLD-MCNC: 8.3 G/DL (ref 11.7–15.7)
MCH RBC QN AUTO: 27.2 PG (ref 26.5–33)
MCHC RBC AUTO-ENTMCNC: 28.9 G/DL (ref 31.5–36.5)
MCV RBC AUTO: 94 FL (ref 78–100)
PLATELET # BLD AUTO: 414 10E9/L (ref 150–450)
POTASSIUM SERPL-SCNC: 5.4 MMOL/L (ref 3.4–5.3)
RBC # BLD AUTO: 3.05 10E12/L (ref 3.8–5.2)
SODIUM SERPL-SCNC: 142 MMOL/L (ref 133–144)
WBC # BLD AUTO: 7.8 10E9/L (ref 4–11)

## 2019-05-16 PROCEDURE — 36415 COLL VENOUS BLD VENIPUNCTURE: CPT | Performed by: PHYSICIAN ASSISTANT

## 2019-05-16 PROCEDURE — 80048 BASIC METABOLIC PNL TOTAL CA: CPT | Performed by: PHYSICIAN ASSISTANT

## 2019-05-16 PROCEDURE — 97112 NEUROMUSCULAR REEDUCATION: CPT | Mod: GP | Performed by: PHYSICAL THERAPIST

## 2019-05-16 PROCEDURE — 25000132 ZZH RX MED GY IP 250 OP 250 PS 637: Performed by: PHYSICIAN ASSISTANT

## 2019-05-16 PROCEDURE — 97110 THERAPEUTIC EXERCISES: CPT | Mod: GP | Performed by: PHYSICAL THERAPIST

## 2019-05-16 PROCEDURE — 97535 SELF CARE MNGMENT TRAINING: CPT | Mod: GO | Performed by: OCCUPATIONAL THERAPIST

## 2019-05-16 PROCEDURE — 12000022 ZZH R&B SNF

## 2019-05-16 PROCEDURE — 97116 GAIT TRAINING THERAPY: CPT | Mod: GP | Performed by: PHYSICAL THERAPIST

## 2019-05-16 PROCEDURE — 85027 COMPLETE CBC AUTOMATED: CPT | Performed by: PHYSICIAN ASSISTANT

## 2019-05-16 RX ORDER — FUROSEMIDE 20 MG
20 TABLET ORAL DAILY
Status: DISCONTINUED | OUTPATIENT
Start: 2019-05-17 | End: 2019-05-19 | Stop reason: HOSPADM

## 2019-05-16 RX ADMIN — POLYETHYLENE GLYCOL 3350 17 G: 17 POWDER, FOR SOLUTION ORAL at 09:35

## 2019-05-16 RX ADMIN — OXYCODONE HYDROCHLORIDE 10 MG: 5 TABLET ORAL at 19:10

## 2019-05-16 RX ADMIN — OXYCODONE HYDROCHLORIDE 10 MG: 5 TABLET ORAL at 14:01

## 2019-05-16 RX ADMIN — Medication 250 MG: at 09:34

## 2019-05-16 RX ADMIN — PANTOPRAZOLE SODIUM 40 MG: 40 TABLET, DELAYED RELEASE ORAL at 05:11

## 2019-05-16 RX ADMIN — CLONAZEPAM 0.5 MG: 0.5 TABLET ORAL at 20:26

## 2019-05-16 RX ADMIN — Medication 250 MG: at 20:19

## 2019-05-16 RX ADMIN — OXYCODONE HYDROCHLORIDE 10 MG: 5 TABLET ORAL at 02:26

## 2019-05-16 RX ADMIN — PREGABALIN 100 MG: 100 CAPSULE ORAL at 14:00

## 2019-05-16 RX ADMIN — OXYCODONE HYDROCHLORIDE 10 MG: 5 TABLET ORAL at 06:39

## 2019-05-16 RX ADMIN — FUROSEMIDE 20 MG: 20 TABLET ORAL at 09:36

## 2019-05-16 RX ADMIN — DOCUSATE SODIUM 100 MG: 100 CAPSULE, LIQUID FILLED ORAL at 09:35

## 2019-05-16 RX ADMIN — DOCUSATE SODIUM 100 MG: 100 CAPSULE, LIQUID FILLED ORAL at 20:19

## 2019-05-16 RX ADMIN — VALACYCLOVIR HYDROCHLORIDE 500 MG: 500 TABLET, FILM COATED ORAL at 20:19

## 2019-05-16 RX ADMIN — VALACYCLOVIR HYDROCHLORIDE 500 MG: 500 TABLET, FILM COATED ORAL at 09:35

## 2019-05-16 RX ADMIN — METHOCARBAMOL 500 MG: 500 TABLET, FILM COATED ORAL at 14:08

## 2019-05-16 RX ADMIN — ESCITALOPRAM 10 MG: 5 TABLET, FILM COATED ORAL at 09:34

## 2019-05-16 RX ADMIN — METHADONE HYDROCHLORIDE 113 MG: 10 CONCENTRATE ORAL at 05:11

## 2019-05-16 RX ADMIN — PREGABALIN 100 MG: 100 CAPSULE ORAL at 09:48

## 2019-05-16 RX ADMIN — CLONAZEPAM 0.5 MG: 0.5 TABLET ORAL at 09:48

## 2019-05-16 RX ADMIN — ACETAMINOPHEN, ASPIRIN AND CAFFEINE 1 TABLET: 250; 250; 65 TABLET, FILM COATED ORAL at 19:52

## 2019-05-16 RX ADMIN — METHOCARBAMOL 500 MG: 500 TABLET, FILM COATED ORAL at 02:26

## 2019-05-16 RX ADMIN — PREGABALIN 150 MG: 75 CAPSULE ORAL at 20:20

## 2019-05-16 NOTE — PLAN OF CARE
Pt reports walked outside with son ~45 minutes just prior so legs are tired. Pt had no overt LOB with dynamic gait drills. No AD used for gait. On track for goals.

## 2019-05-16 NOTE — H&P
Brief Medicine Attestation    I have reviewed the H&P dated 5/15/19 by Dr. Mendoza.    Kenyatta Bland PA-C  Internal Medicine [facility]  Pager [phone]  May 16, 2019

## 2019-05-16 NOTE — PLAN OF CARE
Patient alert and oriented x 4. Medicated with oxycodone for L back pain. Patient sleeps in between cares. No respiratory distress noted. Will continue with current plan of care.

## 2019-05-16 NOTE — PLAN OF CARE
OT patient on target for anticipated OT discontinue. Patient given tablet today to write down questions, thoughts, concerns  for MD and others , and use as a memory notebook for items OT instructs for homework as she prepares for discharge.

## 2019-05-16 NOTE — PHARMACY-MEDICATION REGIMEN REVIEW
Pharmacy Medication Regimen Review  Demetra Richardson is a 63 year old female who is currently in the Transitional Care Unit.    Assessment: All medications have an appropriate indications, duration and no unnecessary use was found.    Plan:   Continue current treatment.  Please reassess the need of PRN Clonazepam and Melatonin 5/28/19.  Attending provider will be sent this note for review.  If there are any emergent issues noted above, pharmacist will contact provider directly by phone.      Pharmacy will periodically review the resident's medication regimen for any PRN medications not administered in > 72 hours and discontinue them. The pharmacist will discuss gradual dose reductions of psychopharmacologic medications with interdisciplinary team on a regular basis.    Please contact pharmacy if the above does not answer specific medication questions/concerns.    Background:  A pharmacist has reviewed all medications and pertinent medical history today.  Medications were reviewed for appropriate use and any irregularities found are listed with recommendations.      Current Facility-Administered Medications:      [START ON 5/17/2019] - Skin Test Reading -, , Does not apply, Q21 Days, Budge, Ramandeep H, PA-C     acetaminophen (TYLENOL) Suppository 650 mg, 650 mg, Rectal, Q4H PRN, Budge, Ramandeep H, PA-C     acetaminophen (TYLENOL) tablet 650 mg, 650 mg, Oral, At Bedtime PRN, Budge, Ramandeep H, PA-C     acetaminophen (TYLENOL) tablet 650 mg, 650 mg, Oral, Q4H PRN, Rehan Mendoza MD     aspirin-acetaminophen-caffeine (EXCEDRIN MIGRAINE) per tablet 1 tablet, 1 tablet, Oral, BID PRN, Budge, Ramandeep H, PA-C     clonazePAM (klonoPIN) tablet 0.5 mg, 0.5 mg, Oral, BID PRN, Budge, Ramandeep H, PA-C, 0.5 mg at 05/16/19 0948     docusate sodium (COLACE) capsule 100 mg, 100 mg, Oral, BID, Budge, Ramandeep H, PA-C, 100 mg at 05/16/19 0935     enoxaparin (LOVENOX) injection 40 mg, 40 mg, Subcutaneous, Q24H, Rehan Mendoza  MD Rubén     escitalopram (LEXAPRO) tablet 10 mg, 10 mg, Oral, Daily, Baldo, Ramandeep H, PA-C, 10 mg at 05/16/19 0934     [START ON 5/17/2019] furosemide (LASIX) tablet 20 mg, 20 mg, Oral, Daily, Rehan Mendoza MD     medication instructions, , Does not apply, Continuous PRN, Levarge, Ramandeep H, PA-C     melatonin tablet 1 mg, 1 mg, Oral, At Bedtime PRN, Budge, Ramandeep H, PA-C     methadone (DOLOPHINE-INTENSOL) 10 MG/ML (HIGH CONC) solution 113 mg, 113 mg, Oral, Daily, Baldo, Ramandeep H, PA-C, 113 mg at 05/16/19 0511     methocarbamol (ROBAXIN) tablet 500 mg, 500 mg, Oral, Q6H PRN, Budge, Ramandeep H, PA-C, 500 mg at 05/16/19 0226     methyl salicylate-menthol (DUARTE-QURESHI) ointment 28 g, 28 g, Topical, Q6H PRN, Budge, Ramandeep H, PA-C     naloxone (NARCAN) injection 0.1-0.4 mg, 0.1-0.4 mg, Intravenous, Q2 Min PRN, Rehan Mendoza MD     ondansetron (ZOFRAN-ODT) ODT tab 4 mg, 4 mg, Oral, Q6H PRN **OR** ondansetron (ZOFRAN) injection 4 mg, 4 mg, Intravenous, Q6H PRN, Daksha Blandecvicente FINNEY, PA-C     oxyCODONE (ROXICODONE) tablet 5-10 mg, 5-10 mg, Oral, Q4H PRN, 10 mg at 05/16/19 0639 **FOLLOWED BY** [START ON 5/17/2019] oxyCODONE (ROXICODONE) tablet 5-10 mg, 5-10 mg, Oral, Q4H PRN **FOLLOWED BY** [START ON 5/19/2019] oxyCODONE (ROXICODONE) tablet 5-10 mg, 5-10 mg, Oral, Q4H PRN, Budge, Ramandeep H, PA-C     pantoprazole (PROTONIX) EC tablet 40 mg, 40 mg, Oral, QAM AC, Budge, Ramandeep H, PA-C, 40 mg at 05/16/19 0511     polyethylene glycol (MIRALAX/GLYCOLAX) Packet 17 g, 17 g, Oral, BID, Budge, Ramandeep H, PA-C, 17 g at 05/16/19 0935     pregabalin (LYRICA) capsule 100 mg, 100 mg, Oral, BID, Budge, Ramandeep H, PA-C, 100 mg at 05/16/19 0948     pregabalin (LYRICA) capsule 150 mg, 150 mg, Oral, At Bedtime, Budge, Ramandeep H, PA-C, 150 mg at 05/15/19 2112     saccharomyces boulardii (FLORASTOR) capsule 250 mg, 250 mg, Oral, BID, Budge, Ramandeep H, PA-C, 250 mg at 05/16/19 0934     sennosides (SENOKOT) tablet 1-2 tablet,  1-2 tablet, Oral, BID PRN, Budge, Ramandeep H, PA-C     tuberculin injection 5 Units, 5 Units, Intradermal, Q21 Days, Budge, Ramandeep H, PA-C, 5 Units at 05/15/19 0806     valACYclovir (VALTREX) tablet 500 mg, 500 mg, Oral, Q12H, Budge, Ramandeep H, PA-C, 500 mg at 05/16/19 0935  No current outpatient prescriptions on file.   PMH: Loculated pleural effusion/empyema s/p VATS decortication, SLE, Primary sclerosing cholangitis, elevated alk phos from biliary source, sjogrens, Chronic pain, Migraines, fibromyalgia, opiate dependence and polysubstance abuse (on chronic methadone), Anxiety and bipolar disorder type II, s/p Billroth II w/revision, iron deficiency anemia receiving IV iron outpatient, GERD and recurrent HSV.

## 2019-05-16 NOTE — PLAN OF CARE
The patient is alert and oriented x 3. VSS and able to make needs known. Ambulate independently. Appetite is good. Medicated with Oxycodone x 2 for left lateral chest pain and expressed some pain relief. Declines Lovenox again tonight. Also, complained of pain left lateral breast toward the axilla and the nipple. Sticky note left for the provider. Continue to monitor for comfort.

## 2019-05-16 NOTE — PLAN OF CARE
Pt alert and oriented x 4,up in the chair during meals.Reports no BM this shift,drinking okay,up in the halls ambulating with her son,participating in therapies.Left lateral abdomen wound dressing changed,got wet after shower,scanty serous drainage.PRN pain medication x 1 with relief,klonopin and Robaxin given x 1 per patient request.

## 2019-05-17 ENCOUNTER — APPOINTMENT (OUTPATIENT)
Dept: LAB | Facility: CLINIC | Age: 64
End: 2019-05-17
Attending: HOSPITALIST
Payer: COMMERCIAL

## 2019-05-17 LAB
CREAT SERPL-MCNC: 0.69 MG/DL (ref 0.52–1.04)
GFR SERPL CREATININE-BSD FRML MDRD: >90 ML/MIN/{1.73_M2}
PLATELET # BLD AUTO: 387 10E9/L (ref 150–450)

## 2019-05-17 PROCEDURE — 97110 THERAPEUTIC EXERCISES: CPT | Mod: GP

## 2019-05-17 PROCEDURE — 85049 AUTOMATED PLATELET COUNT: CPT | Performed by: PHYSICIAN ASSISTANT

## 2019-05-17 PROCEDURE — 97112 NEUROMUSCULAR REEDUCATION: CPT | Mod: GP

## 2019-05-17 PROCEDURE — 25000132 ZZH RX MED GY IP 250 OP 250 PS 637: Performed by: HOSPITALIST

## 2019-05-17 PROCEDURE — 36415 COLL VENOUS BLD VENIPUNCTURE: CPT | Performed by: PHYSICIAN ASSISTANT

## 2019-05-17 PROCEDURE — 97530 THERAPEUTIC ACTIVITIES: CPT | Mod: GP

## 2019-05-17 PROCEDURE — 12000022 ZZH R&B SNF

## 2019-05-17 PROCEDURE — 25000132 ZZH RX MED GY IP 250 OP 250 PS 637: Performed by: PHYSICIAN ASSISTANT

## 2019-05-17 PROCEDURE — 97535 SELF CARE MNGMENT TRAINING: CPT | Mod: GO | Performed by: OCCUPATIONAL THERAPIST

## 2019-05-17 PROCEDURE — 82565 ASSAY OF CREATININE: CPT | Performed by: PHYSICIAN ASSISTANT

## 2019-05-17 RX ADMIN — METHOCARBAMOL 500 MG: 500 TABLET, FILM COATED ORAL at 08:34

## 2019-05-17 RX ADMIN — METHADONE HYDROCHLORIDE 113 MG: 10 CONCENTRATE ORAL at 05:00

## 2019-05-17 RX ADMIN — PREGABALIN 100 MG: 100 CAPSULE ORAL at 14:05

## 2019-05-17 RX ADMIN — CLONAZEPAM 0.5 MG: 0.5 TABLET ORAL at 22:11

## 2019-05-17 RX ADMIN — PREGABALIN 150 MG: 75 CAPSULE ORAL at 22:11

## 2019-05-17 RX ADMIN — ESCITALOPRAM 10 MG: 5 TABLET, FILM COATED ORAL at 08:36

## 2019-05-17 RX ADMIN — PREGABALIN 100 MG: 100 CAPSULE ORAL at 08:34

## 2019-05-17 RX ADMIN — POLYETHYLENE GLYCOL 3350 17 G: 17 POWDER, FOR SOLUTION ORAL at 08:36

## 2019-05-17 RX ADMIN — PANTOPRAZOLE SODIUM 40 MG: 40 TABLET, DELAYED RELEASE ORAL at 05:02

## 2019-05-17 RX ADMIN — FUROSEMIDE 20 MG: 20 TABLET ORAL at 08:37

## 2019-05-17 RX ADMIN — OXYCODONE HYDROCHLORIDE 5 MG: 5 TABLET ORAL at 11:52

## 2019-05-17 RX ADMIN — VALACYCLOVIR HYDROCHLORIDE 500 MG: 500 TABLET, FILM COATED ORAL at 08:37

## 2019-05-17 RX ADMIN — CLONAZEPAM 0.5 MG: 0.5 TABLET ORAL at 14:05

## 2019-05-17 RX ADMIN — OXYCODONE HYDROCHLORIDE 5 MG: 5 TABLET ORAL at 22:11

## 2019-05-17 RX ADMIN — OXYCODONE HYDROCHLORIDE 10 MG: 5 TABLET ORAL at 03:14

## 2019-05-17 RX ADMIN — ACETAMINOPHEN 650 MG: 325 TABLET, FILM COATED ORAL at 14:06

## 2019-05-17 RX ADMIN — VALACYCLOVIR HYDROCHLORIDE 500 MG: 500 TABLET, FILM COATED ORAL at 22:10

## 2019-05-17 RX ADMIN — DOCUSATE SODIUM 100 MG: 100 CAPSULE, LIQUID FILLED ORAL at 22:11

## 2019-05-17 RX ADMIN — DOCUSATE SODIUM 100 MG: 100 CAPSULE, LIQUID FILLED ORAL at 08:37

## 2019-05-17 RX ADMIN — Medication 250 MG: at 22:11

## 2019-05-17 RX ADMIN — Medication 250 MG: at 08:36

## 2019-05-17 NOTE — PLAN OF CARE
Patient is wanting to go home on Sunday instead of   Monday.C/O abdominal pain and left lateral back,requesting for MRI,manager updated  and will update MD.She is participating in therapies and eating well.Wound site dressing CDI.

## 2019-05-17 NOTE — PLAN OF CARE
PT: Pt's activity tolerance improving, needs rest breaks intermittently but able to ambulate outside longer distances over various terrain, no assistive device.

## 2019-05-17 NOTE — PLAN OF CARE
Alert and oriented. Independent with ADL's, transfer and toileting. Oxycodone given once and Klonopin per pt's request.  Pt vomited with fluidsx1, declined zofran, crackers offered. No SOB noted. Ambulate well.  Pt verbalized increasing pain to L abdomen and wants to be seen tomorrow morning.  Declines lovenox again. Dressing to wound was clean, dry and intact.   Will continue to monitor pt.  Vital signs:  Temp: 97  F (36.1  C) Temp src: Oral BP: 95/56 Pulse: 83 Heart Rate: 67 Resp: 17 SpO2: 95 % O2 Device: None (Room air)

## 2019-05-17 NOTE — PLAN OF CARE
Alert and orientedx4. Cooperative. Used call light appropriately. Appear comfortable and slept well. Oxycodone given once this shift per pts request. No episode of vomiting. No further complaints of increasing pain to L abdomen. Independent in room.  Will cont poc.

## 2019-05-18 PROCEDURE — 97110 THERAPEUTIC EXERCISES: CPT | Mod: GP | Performed by: PHYSICAL THERAPIST

## 2019-05-18 PROCEDURE — 99316 NF DSCHRG MGMT 30 MIN+: CPT | Performed by: PHYSICIAN ASSISTANT

## 2019-05-18 PROCEDURE — 25000132 ZZH RX MED GY IP 250 OP 250 PS 637: Performed by: PHYSICIAN ASSISTANT

## 2019-05-18 PROCEDURE — 97535 SELF CARE MNGMENT TRAINING: CPT | Mod: GO | Performed by: OCCUPATIONAL THERAPIST

## 2019-05-18 PROCEDURE — 12000022 ZZH R&B SNF

## 2019-05-18 PROCEDURE — 25000132 ZZH RX MED GY IP 250 OP 250 PS 637: Performed by: HOSPITALIST

## 2019-05-18 PROCEDURE — 97116 GAIT TRAINING THERAPY: CPT | Mod: GP | Performed by: PHYSICAL THERAPIST

## 2019-05-18 RX ORDER — PREGABALIN 50 MG/1
CAPSULE ORAL
Qty: 7 CAPSULE | Refills: 0 | Status: SHIPPED | OUTPATIENT
Start: 2019-05-18 | End: 2019-08-06

## 2019-05-18 RX ORDER — CLONAZEPAM 0.5 MG/1
0.5 TABLET ORAL 2 TIMES DAILY PRN
Qty: 14 TABLET | Refills: 0 | Status: SHIPPED | OUTPATIENT
Start: 2019-05-19 | End: 2019-06-25

## 2019-05-18 RX ORDER — OXYCODONE HYDROCHLORIDE 5 MG/1
5-10 TABLET ORAL EVERY 4 HOURS PRN
Qty: 8 TABLET | Refills: 0 | Status: SHIPPED | OUTPATIENT
Start: 2019-05-19 | End: 2019-05-31

## 2019-05-18 RX ORDER — METHOCARBAMOL 500 MG/1
500 TABLET, FILM COATED ORAL EVERY 6 HOURS PRN
Qty: 14 TABLET | Refills: 0 | Status: SHIPPED | OUTPATIENT
Start: 2019-05-19 | End: 2019-05-31

## 2019-05-18 RX ORDER — FUROSEMIDE 20 MG
20 TABLET ORAL DAILY
Qty: 1 TABLET | Refills: 0 | Status: SHIPPED | OUTPATIENT
Start: 2019-05-20 | End: 2020-08-03

## 2019-05-18 RX ADMIN — CLONAZEPAM 0.5 MG: 0.5 TABLET ORAL at 18:05

## 2019-05-18 RX ADMIN — ACETAMINOPHEN, ASPIRIN AND CAFFEINE 1 TABLET: 250; 250; 65 TABLET, FILM COATED ORAL at 15:59

## 2019-05-18 RX ADMIN — VALACYCLOVIR HYDROCHLORIDE 500 MG: 500 TABLET, FILM COATED ORAL at 08:30

## 2019-05-18 RX ADMIN — OXYCODONE HYDROCHLORIDE 5 MG: 5 TABLET ORAL at 22:10

## 2019-05-18 RX ADMIN — ESCITALOPRAM 10 MG: 5 TABLET, FILM COATED ORAL at 08:30

## 2019-05-18 RX ADMIN — METHOCARBAMOL 500 MG: 500 TABLET, FILM COATED ORAL at 05:02

## 2019-05-18 RX ADMIN — DOCUSATE SODIUM 100 MG: 100 CAPSULE, LIQUID FILLED ORAL at 08:30

## 2019-05-18 RX ADMIN — CLONAZEPAM 0.5 MG: 0.5 TABLET ORAL at 08:28

## 2019-05-18 RX ADMIN — POLYETHYLENE GLYCOL 3350 17 G: 17 POWDER, FOR SOLUTION ORAL at 08:30

## 2019-05-18 RX ADMIN — METHOCARBAMOL 500 MG: 500 TABLET, FILM COATED ORAL at 22:10

## 2019-05-18 RX ADMIN — OXYCODONE HYDROCHLORIDE 5 MG: 5 TABLET ORAL at 18:05

## 2019-05-18 RX ADMIN — OXYCODONE HYDROCHLORIDE 5 MG: 5 TABLET ORAL at 03:09

## 2019-05-18 RX ADMIN — FUROSEMIDE 20 MG: 20 TABLET ORAL at 08:30

## 2019-05-18 RX ADMIN — METHOCARBAMOL 500 MG: 500 TABLET, FILM COATED ORAL at 13:45

## 2019-05-18 RX ADMIN — PANTOPRAZOLE SODIUM 40 MG: 40 TABLET, DELAYED RELEASE ORAL at 04:59

## 2019-05-18 RX ADMIN — ACETAMINOPHEN 650 MG: 325 TABLET, FILM COATED ORAL at 08:28

## 2019-05-18 RX ADMIN — ACETAMINOPHEN, ASPIRIN AND CAFFEINE 1 TABLET: 250; 250; 65 TABLET, FILM COATED ORAL at 03:10

## 2019-05-18 RX ADMIN — PREGABALIN 100 MG: 100 CAPSULE ORAL at 08:29

## 2019-05-18 RX ADMIN — PREGABALIN 100 MG: 100 CAPSULE ORAL at 13:45

## 2019-05-18 RX ADMIN — Medication 250 MG: at 08:30

## 2019-05-18 RX ADMIN — ACETAMINOPHEN 650 MG: 325 TABLET, FILM COATED ORAL at 03:10

## 2019-05-18 RX ADMIN — METHADONE HYDROCHLORIDE 113 MG: 10 CONCENTRATE ORAL at 04:58

## 2019-05-18 RX ADMIN — PREGABALIN 150 MG: 75 CAPSULE ORAL at 22:10

## 2019-05-18 RX ADMIN — OXYCODONE HYDROCHLORIDE 5 MG: 5 TABLET ORAL at 13:45

## 2019-05-18 RX ADMIN — Medication 250 MG: at 22:10

## 2019-05-18 NOTE — PLAN OF CARE
RN: Pt c/o of pain on her LUQ- Oxycodone 5 mg tab x 1 given . Also on scheduled Methadone. Appear to be sleeping/resting between meds. Continue with plan of care.

## 2019-05-18 NOTE — PLAN OF CARE
Occupational Therapy Discharge Summary    Reason for therapy discharge:    All goals and outcomes met, no further needs identified.    Progress towards therapy goal(s). See goals on Care Plan in [facility] electronic health record for goal details.  Goals met    Therapy recommendation(s):    No further therapy is recommended.

## 2019-05-18 NOTE — PLAN OF CARE
OT discharge today all in patient OT goals met. Patient has good strategies for transition home and ride set up. No OT follow up planned. Patient independent with support from clinic and family.

## 2019-05-18 NOTE — PLAN OF CARE
Pt will discharge home tomorrow.Left chest wound dressing CDI.Pt is ambulatory,does her ADL'S independently.PRN pain meds x 1,complained of HA that resolved after taking Tylenol.Continue with POC.

## 2019-05-18 NOTE — PLAN OF CARE
The patient is alert and oriented x 3. VSS and able to make needs known. Ambulate independently. Appetite is good. Declines Lovenox again tonight and has been since admission to the unit. Ambulation encouraged. Also, complained of pain left lateral breast toward the axilla and the nipple. Medicated with oxycodone x 1 for left side pain control. The patient is requesting for a follow up chest x ray post beginning Lasix daily andand due to increased pain left lower abdomen, MRI left lower abdomen to rule out abscess as she has had one in the same area in the past. Still refusing Lovenox injection. A new sticky note left for the provider to address patient's concerns. Patient is looking forward to discharging on Sunday. Continue to monitor for comfort.

## 2019-05-18 NOTE — DISCHARGE SUMMARY
Transitional Care Unit  Discharge Summary    Demetra Richardson MRN# [id number]   Age: 63 year old YOB: 1955     Date of Admission:  5/14/2019  Date of Discharge:  5/19/2019  Admitting Physician:  Rehan Mendoza MD  Discharging Provider:  Ramandeep Bland PA-C  Primary Care Provider:             Fredy Merritt         Outpatient To Do:   Follow up with PCP within one week, consider CXR to evaluate ongoing need for diuretics  Follow up with Thoracic Surgery in one month, needs CXR prior to this appointment  Follow up in Pain clinic within 1-2 weeks of discharge for management or chronic pain          Reason for Admission:   Demetra Richardson is a 63 year old female with a history of SLE, PSC, sjogrens, fibromyalgia, opiate dependence and polysubstance abuse (on chronic methadone), epidural abscess, and bipolar disorder who was admitted to [facility] 4/28/19 for L pleural effusion and persistent empyema s/p VATS L pleural decortication and bronmchoscopy by thoracic surgery (5/6) who was transferred to TCU 5/14/19 for aggressive rehabilitation.            Discharge Diagnoses:   Loculated pleural effusion/empyema s/p VATS decortication  Chronic pain  Polysubstance abuse, methadone maintenance  Fibromyalgia  Anciety  HSV Outbreak  SLE  Sjogrens syndrome  Primary sclerosing cholangitis  Elevated Alk Phos from biliary source  Bipolar type II  Migraines  Fibromyalgia  S/p Billroth II w/ revision  Hx of Iron deficiency anemia  Acute blood loss anemia, stable         Pending Results:   Unresulted Labs Ordered in the Past 30 Days of this Admission     Date and Time Order Name Status Description    5/7/2019 1325 Fungus Culture, non-blood Preliminary     4/29/2019 1411 Nocardia culture Preliminary     4/29/2019 1411 Fungus Culture, non-blood Preliminary     4/29/2019 1411 AFB Culture Non Blood Preliminary                 Consultations:   PT/OT         Rehab Course:     # Loculated pleural  effusion/empyema s/p VATS decortication. Parapneumonic effusion w/ fluid culture growing strep intermedius sensitive to ceftriaxone and transitioned to cefdinir and complete course 5/14. She is s/p chest tube placement on 4/29 w/ lytic therapy until 5/6 w/ concern for persistent empyema on CT scan 5/5. Thoracic surgery consulted at that time and performed decortication 5/7. Chest tubes removed 5/11. Per discussion with thoracic surgery prior to discharge, patient will continue to have a small effusion on CXR. No need to recheck CXR unless pt is symptomatic.Discharged from [facility] with 7 day course of PO lasix for small L pleural effusion (5/13-5/20)  - PCP to consider repeat CXR to evaluate fluid status and ongoing need for diuretics     Chronic pain  Polysubstance abuse, methadone maintenance  Fibromyalgia  Anxiety  With h x of overdose and withdrawal seizures. Denies recent substance abuse, and no alcohol use in past 7 years. Pt on methadone maintenance and fills prescriptions at [facility]. Psych and pain service consulted during recent admission given. Post op required epidural pain control, which was removed prior to discharge on POD#6.  - Per pain team, will taper opiate regimen as follows:              Oxycodone 5-10mg po q4h prn x 3 days (5/14-5/16) then,              Oxycodone 5-10mg po q4h prn, max of 4 tabs/day (5/17-5/19) then,              Oxycodone 5-10mg po q4h prn, max of 2 tabs/day (5/20-5/22) then stop  - Continue methadone 113mg PO daily (PTA dose)  - Continue APAP 650 mg at bedtime as needed  - Continue methocarbamol 500 mg po q6h as needed x 7 days (through 5/22)  - Continue pregabalin [id number], bedtime dose increased while inpatient, will need follow up with PCP on discharge to discuss this change  - Excedrin BID prn for headaches  - Klonopin 0.5 mg BID prn, will need to taper outpatient     Discharge meds ordered:    - Oxycodone 5 mg #8  - Methocarbamol 500 mg #14  - Lyrica 50 mg (to  augment at bedtime dose) #7  - Klonopin 0.5 mg #14    Pt instructed to follow up with pain clinic in 1-2 weeks for ongoing management     HSV outbreak. Completed 5 day course valtrex.     SLE  Sjogren's syndrome  Primary sclerosing cholangitis  elevated alk phos from biliary source (PSC)  - consider outpatient endocrine consult for elevated bone specific alk phos if needed but suspect elevated alk phos due to PSC with elevated GGT.      Bipolar disorder type II. Continued PTA escitalopram     Migraines  Fibromyalgia  Continued PTA Excedrin Migraine to twice daily as needed      s/p Billroth II w/revision  History of iron deficiency anemia  acute blood loss anemia, stable  Patient has malabsorption secondary to probably surgery, receives IV iron as outpatient. Required 1 unit of RBCs 5/9 from procedure, stable. Continued PTA pantoprazole.  - IV iron replacement as outpatient              Physical Exam:   Blood pressure 106/54, pulse 66, temperature 97.4  F (36.3  C), temperature source Oral, resp. rate 16, weight 65.8 kg (145 lb), SpO2 92 %, not currently breastfeeding.    Constitutional: healthy, alert and no distress   Cardiovascular: negative, PMI normal. No lifts, heaves, or thrills. RRR. No murmurs, clicks gallops or rub  Respiratory: negative, Percussion normal. Good diaphragmatic excursion. Lungs clear  Psychiatric: mentation appears normal and affect normal/bright  Head: Normocephalic. No masses, lesions, tenderness or abnormalities  Abdomen: Abdomen soft, non-tender. BS normal. No masses, organomegaly  SKIN: no suspicious lesions or rashes  JOINT/EXTREMITIES: extremities normal- no gross deformities noted, gait normal and normal muscle tone          Discharge Medications:     Current Discharge Medication List      START taking these medications    Details   !! pregabalin (LYRICA) 50 MG capsule Take one tab at bedtime on top of your regular 100 mg dose for a total of 150mg at bedtime  Qty: 7 capsule, Refills:  0    Associated Diagnoses: Anxiety disorder, unspecified type       !! - Potential duplicate medications found. Please discuss with provider.      CONTINUE these medications which have CHANGED    Details   clonazePAM (KLONOPIN) 0.5 MG tablet Take 1 tablet (0.5 mg) by mouth 2 times daily as needed for anxiety  Qty: 14 tablet, Refills: 0    Associated Diagnoses: Anxiety disorder, unspecified type      furosemide (LASIX) 20 MG tablet Take 1 tablet (20 mg) by mouth daily  Qty: 1 tablet, Refills: 0    Associated Diagnoses: Loculated pleural effusion      methocarbamol (ROBAXIN) 500 MG tablet Take 1 tablet (500 mg) by mouth every 6 hours as needed for muscle spasms  Qty: 14 tablet, Refills: 0    Associated Diagnoses: Other chronic pain      oxyCODONE (ROXICODONE) 5 MG tablet Take 1-2 tablets (5-10 mg) by mouth every 4 hours as needed for pain (max 2 tabs (10mg) per day)  Qty: 8 tablet, Refills: 0    Associated Diagnoses: Acute post-operative pain         CONTINUE these medications which have NOT CHANGED    Details   acetaminophen (TYLENOL) 325 MG tablet Take 2 tablets (650 mg) by mouth nightly as needed for mild pain or fever    Associated Diagnoses: Other chronic pain      aspirin-acetaminophen-caffeine (EXCEDRIN MIGRAINE) 250-250-65 MG tablet Take 1 tablet by mouth 2 times daily as needed for headaches    Associated Diagnoses: Other chronic pain      docusate sodium (COLACE) 100 MG capsule Take 1 capsule (100 mg) by mouth 2 times daily    Associated Diagnoses: Other chronic pain      escitalopram (LEXAPRO) 10 MG tablet Take 1 tablet (10 mg) by mouth daily  Qty: 30 tablet, Refills: 8    Associated Diagnoses: Bipolar I disorder (H)      methadone (DOLOPHINE-INTENSOL) 10 MG/ML (HIGH CONC) solution Take 11.3 mLs (113 mg) by mouth daily Rufina Marcial [phone]    Dose as of 4/29/19  Qty: 300 mL, Refills: 0    Associated Diagnoses: Other chronic pain      methyl salicylate-menthol (DUARTE-QURESHI) OINT ointment Apply 28 g  topically every 6 hours as needed (pain)    Associated Diagnoses: Other chronic pain      pantoprazole (PROTONIX) 40 MG EC tablet Take 1 tablet (40 mg) by mouth every morning (before breakfast)    Associated Diagnoses: Other chronic pain      polyethylene glycol (MIRALAX/GLYCOLAX) packet Take 17 g by mouth 2 times daily    Associated Diagnoses: Other chronic pain      !! pregabalin (LYRICA) 100 MG capsule Take 1 capsule (100 mg) by mouth 2 times daily  Qty: 30 capsule, Refills: 0    Associated Diagnoses: Other chronic pain      !! pregabalin (LYRICA) 150 MG capsule Take 1 capsule (150 mg) by mouth At Bedtime  Qty: 30 capsule, Refills: 0    Associated Diagnoses: Other chronic pain      saccharomyces boulardii (FLORASTOR) 250 MG capsule Take 1 capsule (250 mg) by mouth 2 times daily    Associated Diagnoses: Other chronic pain       !! - Potential duplicate medications found. Please discuss with provider.      STOP taking these medications       valACYclovir (VALTREX) 500 MG tablet Comments:   Reason for Stopping:                    Discharge Disposition:   Discharged to home      Code status on discharge: Full Code          Discharge Instructions:     Discharge Procedure Orders   Reason for your hospital stay   Order Comments: You were admitted to TCU for rehabilitation after your hospital stay     Adult [facility] Follow-up and recommended labs and tests   Order Comments: Follow up with primary care provider, Fredy Merritt, within 7 days for hospital follow- up.  The following labs/tests are recommended: Repeat CXR to evaluate fluid status/ need for ongoing diuretics.      Appointments on [place] and/or [facility] (with [facility] or [facility] provider or service). Call [phone] if you haven't heard regarding these appointments within 7 days of discharge.     Activity   Order Comments: Your activity upon discharge: activity as tolerated     Order Specific Question Answer Comments   Is discharge order? Yes       When to contact your care team   Order Comments: Call your primary doctor if you have any of the following: temperature greater than 102 or less than 95, chest pain, shortness of breath, or difficulty breathing.     Adult [facility] Follow-up and recommended labs and tests   Order Comments: Follow up with the Thoracic Surgery CNS (Clinical Nurse Specialist) will be arranged in 1 month to follow up on how you are doing after surgery. You will need a chest x-ray prior to this appointment that will be arranged. The Thoracic office will contact you to set up the appointment.  You may call [phone] if you wish to schedule before you are contacted. Please call if you have not heard regarding this appointment within 3 business days of discharge.    Follow up with your pain clinic in 1-2 weeks     Full Code     Order Specific Question Answer Comments   Code status determined by: Unable to discuss and no AD/POLST on file; continue PREVIOUSLY ORDERED code status      Diet   Order Comments: Follow this diet upon discharge: Orders Placed This Encounter      Regular Diet Adult     Order Specific Question Answer Comments   Is discharge order? Yes         Patient seen and examined on 5/18/2019 in anticipation of discharge on 5/19/19. Time spent with patient and in coordination of discharge plan was >30 min. Discharge summary forwarded to PCP, Fredy Merritt PA-C  Hospitalist Service  Pager: [phone]   .u

## 2019-05-19 VITALS
BODY MASS INDEX: 25.69 KG/M2 | HEART RATE: 66 BPM | WEIGHT: 145 LBS | RESPIRATION RATE: 12 BRPM | DIASTOLIC BLOOD PRESSURE: 51 MMHG | SYSTOLIC BLOOD PRESSURE: 89 MMHG | TEMPERATURE: 96.6 F | OXYGEN SATURATION: 91 %

## 2019-05-19 PROCEDURE — 25000132 ZZH RX MED GY IP 250 OP 250 PS 637: Performed by: HOSPITALIST

## 2019-05-19 PROCEDURE — 25000132 ZZH RX MED GY IP 250 OP 250 PS 637: Performed by: PHYSICIAN ASSISTANT

## 2019-05-19 RX ADMIN — ESCITALOPRAM 10 MG: 5 TABLET, FILM COATED ORAL at 08:45

## 2019-05-19 RX ADMIN — PREGABALIN 100 MG: 100 CAPSULE ORAL at 08:46

## 2019-05-19 RX ADMIN — FUROSEMIDE 20 MG: 20 TABLET ORAL at 08:45

## 2019-05-19 RX ADMIN — Medication 250 MG: at 08:45

## 2019-05-19 RX ADMIN — CLONAZEPAM 0.5 MG: 0.5 TABLET ORAL at 05:29

## 2019-05-19 RX ADMIN — OXYCODONE HYDROCHLORIDE 10 MG: 5 TABLET ORAL at 10:24

## 2019-05-19 RX ADMIN — METHADONE HYDROCHLORIDE 113 MG: 10 CONCENTRATE ORAL at 05:15

## 2019-05-19 RX ADMIN — POLYETHYLENE GLYCOL 3350 17 G: 17 POWDER, FOR SOLUTION ORAL at 08:45

## 2019-05-19 RX ADMIN — ACETAMINOPHEN, ASPIRIN AND CAFFEINE 1 TABLET: 250; 250; 65 TABLET, FILM COATED ORAL at 05:34

## 2019-05-19 RX ADMIN — DOCUSATE SODIUM 100 MG: 100 CAPSULE, LIQUID FILLED ORAL at 08:45

## 2019-05-19 RX ADMIN — OXYCODONE HYDROCHLORIDE 5 MG: 5 TABLET ORAL at 02:23

## 2019-05-19 RX ADMIN — PANTOPRAZOLE SODIUM 40 MG: 40 TABLET, DELAYED RELEASE ORAL at 05:30

## 2019-05-19 NOTE — PLAN OF CARE
VS: /55 (BP Location: Right arm)   Pulse 71   Temp 97.3  F (36.3  C) (Oral)   Resp 20   Wt 65.8 kg (145 lb)   SpO2 98%   BMI 25.69 kg/m      O2: Pt is on room air. Denies shortness of breath and chest pain.   Output: Pt voids spontaneously in bathroom. Continent of urine   Last BM: LBM: 5/18/19   Activity: Independent in room   Skin: Skin is intact besides incision.    Pain: Pt complained of pain in back. PRN oxy 5mg given x1. Scheduled methadone given for pain control. Medications helped with pain.     Pt complained of headache, PRN Excedrin given.     CMS: Alert and oriented during cares. Pt slept most of the night   Dressing: Pt has dressing on (L) chest wall. CDI   Diet: Regular   LDA: N/A   Equipment: Pt has walker and cane in room. Call light and phone within reach   Plan: Discharge Sunday 5/19. Continue with POC   Additional Info:

## 2019-05-19 NOTE — PLAN OF CARE
Patient alert and oriented x 4,discharge papers and a bag of medications given to patient and she signed for both discharge papers as well as the three meds,klonopin,Lyrica and Oxycodone.Patient waiting for her ride @ noon.

## 2019-05-19 NOTE — PLAN OF CARE
The patient is alert and oriented x 3. VSS and able to make needs known. Ambulate independently. Appetite is good. Medicated with oxycodone x 2 for left chest wound pain control. New dressing applied to left chest wound. The patient continue to refuse Lovenox injection. Ambulation encouraged. Patient is looking forward to discharging home tomorrow around noon. Continue to monitor for comfort.

## 2019-05-21 ENCOUNTER — TELEPHONE (OUTPATIENT)
Dept: FAMILY MEDICINE | Facility: CLINIC | Age: 64
End: 2019-05-21

## 2019-05-21 NOTE — TELEPHONE ENCOUNTER
[facility] Call Center    Phone Message    May a detailed message be left on voicemail: yes    Reason for Call: Other: Pt needs a hospital FU with someone this Thursday or Friday. Nothing until 05/30/2019.     Action Taken: Message routed to:  [facility] ANNABEL

## 2019-05-27 LAB
BACTERIA SPEC CULT: NORMAL
FUNGUS SPEC CULT: NORMAL
SPECIMEN SOURCE: NORMAL
SPECIMEN SOURCE: NORMAL

## 2019-05-29 NOTE — TELEPHONE ENCOUNTER
[facility] Call Center    Phone Message    May a detailed message be left on voicemail: yes    Reason for Call: Other: The pt called again about getting a hospital f/up - appointment. She had surgery 3 wks ago for [id number] in her li=ungs and had 3 chest tubes. Now her chest hurts very bad and she can't get in a car due to pain, etc. She also has pleural pain dx'd by estee rodgers.  She needs to be seen asap. Please call the pt to discuss. Thanks.     Action Taken: Message routed to:  [facility] med

## 2019-05-29 NOTE — TELEPHONE ENCOUNTER
"Spoke to patient who states that she needs to get her \"holes checked\" from when she had her chest tubes put in when she was in the hospital. Scheduled patient for appointment with PCP on 5/31/19. Sarah Ruiz LPN 5/29/2019 1:48 PM    "

## 2019-05-30 NOTE — PROGRESS NOTES
"[facility] [place]   Fredy Merritt MD  05/31/2019      Chief Complaint:   Wound Check; Pain; and Surgical Followup     History of Present Illness:   Demetra Richardson is a 63 year old female with a complex medical history, including anxiety, depression, GERD, and osteoarthritis, who presents alone for surgical follow-up and evaluation of surgical wounds.     ED course and current symptoms:  She had chest tubes inserted 4/29/19 after being admitted at [facility] 4/28 from the ED for L pleural effusion and persistent emphysema. During her admission, she underwent VATS L pleural decortication and bronchoscopy on 5/7. She was transferred to [facility] from 5/14-5/19 for aggressive rehabilitation. Today she notes her top wound was weepy this morning. She states she is currently eating better than she was previously. She endorses a lot of pain at this point. She also mentioned that her back is hurting which makes it hard for her to sit.     Healthcare maintenance: She is not currently being managed by the pain clinic here or anywhere else. She notes that she has previously had injections for pain, which she found to be very scary. She is not currently seeing a psychiatrist because she believes she is fine. She did have a mammogram completed, which was normal. She also met with  of rhuematology and reports that she was asymptomatic for lupus and they think she may have Sjogren's syndrome. She is due for tetanus and pneumonia boosters but declines shots today. She has not seen a liver doctor lately. She does not see a gynecologist regularly and wishes to defer this because she \"wants to prioritize.\" She takes Lyrica three times during the day and once at bedtime for her fibromyalgia, which she notes is pretty bad. She endorses severe pain when people touch her. She endorses taking her Protonix, which helps with her reflux and her ulcers. Of note, her father passed away recently " and her mother had a stroke.    Other concerns discussed:  1. Thoracic surgery follow-up in June  2. Wants a supply of migraine medication, says she threw up on Imitrex, will see headache clinic     Review of Systems:   Pertinent items are noted in HPI, remainder of complete ROS is negative.      Active Medications:      acetaminophen (TYLENOL) 325 MG tablet, Take 2 tablets (650 mg) by mouth nightly as needed for mild pain or fever, Disp: , Rfl:      aspirin-acetaminophen-caffeine (EXCEDRIN MIGRAINE) 250-250-65 MG tablet, Take 1 tablet by mouth 2 times daily as needed for headaches, Disp: , Rfl:      docusate sodium (COLACE) 100 MG capsule, Take 1 capsule (100 mg) by mouth 2 times daily, Disp: , Rfl:      escitalopram (LEXAPRO) 10 MG tablet, Take 1 tablet (10 mg) by mouth daily, Disp: 30 tablet, Rfl: 8     furosemide (LASIX) 20 MG tablet, Take 1 tablet (20 mg) by mouth daily, Disp: 1 tablet, Rfl: 0     methadone (DOLOPHINE-INTENSOL) 10 MG/ML (HIGH CONC) solution, Take 11.3 mLs (113 mg) by mouth daily [facility] [phone]    Dose as of 4/29/19, Disp: 300 mL, Rfl: 0     methocarbamol (ROBAXIN) 500 MG tablet, Take 1 tablet (500 mg) by mouth every 6 hours as needed for muscle spasms, Disp: 50 tablet, Rfl: 1     methyl salicylate-menthol (DUARTE-QURESHI) OINT ointment, Apply 28 g topically every 6 hours as needed (pain), Disp: , Rfl:      oxyCODONE (ROXICODONE) 5 MG tablet, Take 1-2 tablets (5-10 mg) by mouth every 4 hours as needed for pain (max 2 tabs (10mg) per day), Disp: 20 tablet, Rfl: 0     pantoprazole (PROTONIX) 40 MG EC tablet, Take 1 tablet (40 mg) by mouth every morning (before breakfast), Disp: 90 tablet, Rfl: 3     polyethylene glycol (MIRALAX/GLYCOLAX) packet, Take 17 g by mouth 2 times daily, Disp: , Rfl:      pregabalin (LYRICA) 100 MG capsule, Take 1 capsule (100 mg) by mouth 2 times daily, Disp: 30 capsule, Rfl: 0     pregabalin (LYRICA) 150 MG capsule, Take 1 capsule (150 mg) by mouth At  Bedtime, Disp: 30 capsule, Rfl: 0     Pregabalin (LYRICA) 200 MG capsule, Take 1 capsule (200 mg) by mouth 2 times daily, Disp: 60 capsule, Rfl: 1     pregabalin (LYRICA) 50 MG capsule, Take one tab at bedtime on top of your regular 100 mg dose for a total of 150mg at bedtime, Disp: 7 capsule, Rfl: 0     clonazePAM (KLONOPIN) 0.5 MG tablet, Take 1 tablet (0.5 mg) by mouth 2 times daily as needed for anxiety, Disp: 14 tablet, Rfl: 0     saccharomyces boulardii (FLORASTOR) 250 MG capsule, Take 1 capsule (250 mg) by mouth 2 times daily (Patient not taking: Reported on 2019), Disp: , Rfl:       Allergies:   Penicillins      Past Medical History:  Anemia, microcytic  Basal cell carcinoma  Benzodiazepine dependence  Bipolar 1 disorder  Chronic pain  Epidural abscess (x4)  Fibromyalgia  Generalized anxiety disorder  Gastroesophageal reflux disease (GERD)  Major depressive disorder  Migraine  Nephrolithiasis  Osteoarthritis  Osteoporosis  Primary sclerosing cholangitis  Peptic ulcer disease  Systemic lupus erythematosus  Leukopenia  Hydronephrosis  Seizure  Borderline personality disorder  Opioid dependence on agonist therapy  Orthostatic hypotension  Drug-induced mood disorder  Inflammatory liver disease  Loculated pleural effusion     Past Surgical History:  Back surgery, L4-5 epidural abscess - 04  Back surgery, L3-4 spinal stenosis - 04  Back surgery, Left psoas abscess - 04  Bronchoscopy flexible - 2019   section (x2)  Cholecystectomy - 2004  Closed reduction, percutaneous pinning finger, combined - 8/10/2011  Gastrectomy, bilateral truncal vagotomy, hemigastrectomy - 2005  Gastrojejunostomy - 2011  Insert chest tube - 2019  Laser holmium lithotripsy ureter(s), insert stent, combined  Laser holmium nephrolithotomy luiza percutaneous nephrostomy - 2012  Mammoplasty augmentation bilateral   Open reduction fixation wrist, left - 2016  Reconstruct breast, implant  prosthesis, combined  Thoracoscopic decoration lung, left - 5/7/2019    Family History:   Stroke- mother      Social History:   The patient is single, a nonsmoker, and does not consume alcohol. Her father passed away recently.    Physical Exam:   BP 99/66 (BP Location: Right arm, Patient Position: Sitting, Cuff Size: Adult Small)   Pulse 67   Temp 98.3  F (36.8  C) (Oral)   Resp 20   Wt 62.1 kg (136 lb 14.4 oz)   SpO2 96%   BMI 24.25 kg/m     Constitutional: Alert. In no distress.  Head: Normocephalic.   Musculoskeletal: Extremities normal. No gross deformities noted. Normal muscle tone. Chest wall mild tenderness for several inches around chest tube site. There is no redness, warmth, swelling, or drainage.   Psychiatric: Mentation appears normal. Normal affect.     Assessment and Plan:  1. Acute post-operative pain  I did agree to give her a one-time amount with no refills for pain around the chest tube site. I did ask her to see the pain clinic if pain persists, perhaps an injection would help. Patient is on the [employer] program and has free narcan through them and I reviewed what that is for.  - oxyCODONE (ROXICODONE) 5 MG tablet  Dispense: 20 tablet; Refill: 0    2. Depression, unspecified depression type  Patient has history of bipolar disorder and personality disorder. She thinks her only issue is depression. She agrees to meet with Dr. Velasquez to help us decide if she can just follow with me and continue Lexapro or if she should be seeing a long-term psychiatrist.  - BEHAVIORAL / SPIRITUAL HEALTH ([facility] ONLY)    3. Nonintractable headache, unspecified chronicity pattern, unspecified headache type  Patient would like Fiornal, which helps her headaches. I did explain to her that this is not a good long-term medication and given her complex chronic headache pattern, I would like her to see our new headache clinic  - NEUROLOGY ADULT REFERRAL    4. Fibromyalgia  Lyrica is very helpful for her fibromyalgia  with PRN Robaxin.  - Pregabalin (LYRICA) 200 MG capsule  Dispense: 60 capsule; Refill: 1  - methocarbamol (ROBAXIN) 500 MG tablet  Dispense: 50 tablet; Refill: 1    5. Pleural effusion  - XR Chest 2 Views    6. Sclerosing cholangitis  - GASTROENTEROLOGY ADULT REFERRAL  - CBC with platelets differential  - Comprehensive metabolic panel    7. Chest wall pain  - PAIN MANAGEMENT REFERRAL    8. Osteopenia, unspecified location  - ENDOCRINOLOGY ADULT REFERRAL    9. Gastroesophageal reflux disease without esophagitis  - pantoprazole (PROTONIX) 40 MG EC tablet  Dispense: 90 tablet; Refill: 3    Patient would like to wait to do shots and gynecology visit in the future, as well as another mammogram soon.       Follow-up: Return in about 1 month (around 6/30/2019).         Scribe Disclosure:  We, Lori Hanna and Genevieve Galan, are serving as scribes to document services personally performed by Fredy Merritt MD at this visit, based upon the provider's statements to us. All documentation has been reviewed by the aforementioned provider prior to being entered into the official medical record.     Portions of this medical record were completed by a scribe. UPON MY REVIEW AND AUTHENTICATION BY ELECTRONIC SIGNATURE, this confirms (a) I performed the applicable clinical services, and (b) the record is accurate.   Fredy Merritt MD

## 2019-05-31 ENCOUNTER — OFFICE VISIT (OUTPATIENT)
Dept: FAMILY MEDICINE | Facility: CLINIC | Age: 64
End: 2019-05-31
Payer: COMMERCIAL

## 2019-05-31 VITALS
TEMPERATURE: 98.3 F | BODY MASS INDEX: 24.25 KG/M2 | WEIGHT: 136.9 LBS | DIASTOLIC BLOOD PRESSURE: 66 MMHG | OXYGEN SATURATION: 96 % | HEART RATE: 67 BPM | SYSTOLIC BLOOD PRESSURE: 99 MMHG | RESPIRATION RATE: 20 BRPM

## 2019-05-31 DIAGNOSIS — M79.7 FIBROMYALGIA: ICD-10-CM

## 2019-05-31 DIAGNOSIS — M85.80 OSTEOPENIA, UNSPECIFIED LOCATION: ICD-10-CM

## 2019-05-31 DIAGNOSIS — K21.9 GASTROESOPHAGEAL REFLUX DISEASE WITHOUT ESOPHAGITIS: ICD-10-CM

## 2019-05-31 DIAGNOSIS — K83.09 SCLEROSING CHOLANGITIS (H): ICD-10-CM

## 2019-05-31 DIAGNOSIS — J90 PLEURAL EFFUSION: Primary | ICD-10-CM

## 2019-05-31 DIAGNOSIS — F32.A DEPRESSION, UNSPECIFIED DEPRESSION TYPE: ICD-10-CM

## 2019-05-31 DIAGNOSIS — R07.89 CHEST WALL PAIN: ICD-10-CM

## 2019-05-31 DIAGNOSIS — R51.9 NONINTRACTABLE HEADACHE, UNSPECIFIED CHRONICITY PATTERN, UNSPECIFIED HEADACHE TYPE: ICD-10-CM

## 2019-05-31 DIAGNOSIS — G89.29 OTHER CHRONIC PAIN: ICD-10-CM

## 2019-05-31 DIAGNOSIS — G89.18 ACUTE POST-OPERATIVE PAIN: ICD-10-CM

## 2019-05-31 RX ORDER — METHOCARBAMOL 500 MG/1
500 TABLET, FILM COATED ORAL EVERY 6 HOURS PRN
Qty: 50 TABLET | Refills: 1 | Status: SHIPPED | OUTPATIENT
Start: 2019-05-31 | End: 2019-10-11

## 2019-05-31 RX ORDER — PREGABALIN 200 MG/1
200 CAPSULE ORAL 2 TIMES DAILY
Qty: 60 CAPSULE | Refills: 1 | Status: SHIPPED | OUTPATIENT
Start: 2019-05-31 | End: 2019-07-09

## 2019-05-31 RX ORDER — OXYCODONE HYDROCHLORIDE 5 MG/1
5-10 TABLET ORAL EVERY 4 HOURS PRN
Qty: 20 TABLET | Refills: 0 | Status: SHIPPED | OUTPATIENT
Start: 2019-05-31 | End: 2019-06-14

## 2019-05-31 RX ORDER — PANTOPRAZOLE SODIUM 40 MG/1
40 TABLET, DELAYED RELEASE ORAL
Qty: 90 TABLET | Refills: 3 | Status: SHIPPED | OUTPATIENT
Start: 2019-05-31 | End: 2020-04-02

## 2019-05-31 ASSESSMENT — PAIN SCALES - GENERAL: PAINLEVEL: SEVERE PAIN (7)

## 2019-05-31 NOTE — NURSING NOTE
Chief Complaint   Patient presents with     Wound Check     have the wounds checked where had chest tubes     Pain     pain in left side     Surgical Followup     had left chest surgery on 5/14/19   Rhiannon Moon LPN 12:05 PM on 5/31/2019    Patient unsure if has been screened for HIV.Rhiannon Moon LPN 12:05 PM on 5/31/2019  Information given to patient about Health Directive.Rhiannon Moon LPN 12:06 PM on 5/31/2019

## 2019-06-04 ENCOUNTER — ANCILLARY PROCEDURE (OUTPATIENT)
Dept: GENERAL RADIOLOGY | Facility: CLINIC | Age: 64
End: 2019-06-04
Attending: FAMILY MEDICINE
Payer: COMMERCIAL

## 2019-06-04 DIAGNOSIS — J90 PLEURAL EFFUSION: ICD-10-CM

## 2019-06-04 LAB
FUNGUS SPEC CULT: NORMAL
SPECIMEN SOURCE: NORMAL

## 2019-06-19 ENCOUNTER — OFFICE VISIT (OUTPATIENT)
Dept: SURGERY | Facility: CLINIC | Age: 64
End: 2019-06-19
Attending: CLINICAL NURSE SPECIALIST
Payer: COMMERCIAL

## 2019-06-19 ENCOUNTER — ANCILLARY PROCEDURE (OUTPATIENT)
Dept: GENERAL RADIOLOGY | Facility: CLINIC | Age: 64
End: 2019-06-19
Payer: COMMERCIAL

## 2019-06-19 VITALS
TEMPERATURE: 97 F | WEIGHT: 135.4 LBS | HEART RATE: 57 BPM | RESPIRATION RATE: 16 BRPM | DIASTOLIC BLOOD PRESSURE: 67 MMHG | HEIGHT: 63 IN | SYSTOLIC BLOOD PRESSURE: 97 MMHG | OXYGEN SATURATION: 97 % | BODY MASS INDEX: 23.99 KG/M2

## 2019-06-19 DIAGNOSIS — R93.89 ABNORMAL CHEST CT: Primary | ICD-10-CM

## 2019-06-19 DIAGNOSIS — J90 LOCULATED PLEURAL EFFUSION: ICD-10-CM

## 2019-06-19 PROCEDURE — G0463 HOSPITAL OUTPT CLINIC VISIT: HCPCS | Mod: ZF

## 2019-06-19 RX ORDER — LEVOFLOXACIN 500 MG/1
500 TABLET, FILM COATED ORAL DAILY
Qty: 10 TABLET | Refills: 0 | Status: SHIPPED | OUTPATIENT
Start: 2019-06-19 | End: 2019-10-11

## 2019-06-19 ASSESSMENT — MIFFLIN-ST. JEOR: SCORE: 1143.17

## 2019-06-19 ASSESSMENT — PAIN SCALES - GENERAL: PAINLEVEL: SEVERE PAIN (7)

## 2019-06-19 NOTE — PROGRESS NOTES
"THORACIC SURGERY FOLLOW UP VISIT    Dear Dr. Vasquez,  I saw Ms. Demetra Richardson in follow-up today. The clinical summary follows:     PREOP DIAGNOSIS   Left pleural effusion  PROCEDURE   1.  Left VATS pulmonary decortication.   2.  Extensive pneumolysis.   3.  Evacuation of empyema.   4.  Complete parietal pleurectomy.   5.  Flexible bronchoscopy.   6.  Intercostal nerve block cryoanalgesia from intercostal space, levels 3 through 9.   DATE OF PROCEDURE  19    COMPLICATIONS  None    INTERVAL STUDIES  CXR today  IMPRESSION: Stable left basilar streaky opacities and  small-to-moderate left pleural effusion.    SUBJECTIVE  Radha is here alone today.   She was tempted to cancel her appointment because she felt ill, vomited earlier today and has c/o \"significant left chest pain\", pushed herself to come in today.      IMPRESSION:    63 year-old female, here for a post-discharge follow-up after a decortication procedure.   She appears pale and tired and she says she has limited energy, lays on the couch all day and has missed some of her support sessions \"that I really love and need\".   She also reported that her father  a short time before her surgery, and her mother recently suffered a stroke \"that changed her personality\".       She reports an intermittent, shooting pain across her anterior left chest, and a steady pain in the posterior left back.   She reports chills and suspects a fever at times.   She has a productive cough with bright yellow/green secretions.   Her chest xray shows some left basilar atelectasis, possible infectious changes.     Her surgical incisions are well healed with no erythema.   Her breath sounds on ausculation are diminished in left base, otherwise clear.        PLAN  I spent a total of 45 minutes with Ms. Demetra Richardson, more than 50% of which were spent in counseling, coordination of care, and face-to-face time. I reviewed the plan as follows:  Levofloxacin 500 mg po " daily x 10 days  Repeat chest CT scan in 3 months with f/u appointment with Dr. Merritt.  Necessary Tests & Appointments: Chest CT  Pain Control Plan: To be managed by Rufina Garcia ([facility]) and primary provider, Dr. Merritt  All questions were answered and the patient and present family were in agreement with the plan.  I appreciate the opportunity to participate in the care of your patient and will keep you updated.  Sincerely,  VINOD Price, CNS

## 2019-06-19 NOTE — NURSING NOTE
"Oncology Rooming Note    June 19, 2019 2:36 PM   Demetra Richardson is a 63 year old female who presents for:    Chief Complaint   Patient presents with     RECHECK     BCC (basal cell carcinoma), face     Initial Vitals: There were no vitals taken for this visit. Estimated body mass index is 24.25 kg/m  as calculated from the following:    Height as of 5/12/19: 1.6 m (5' 3\").    Weight as of 5/31/19: 62.1 kg (136 lb 14.4 oz). There is no height or weight on file to calculate BSA.  Data Unavailable Comment: Data Unavailable   No LMP recorded. Patient is postmenopausal.  Allergies reviewed: Yes  Medications reviewed: Yes    Medications: Medication refills not needed today.  Pharmacy name entered into [facility]:    [facility] [address] [place], [place] - [address] AT [facility] [address] [place], [place] [address]  [facility] [place], [place] [address] [place], [place] [phone]  [facility] [place], [place] [address]  [place], [place] [address] [place], [place] [address]  [id number] [place], [place] - [address]  [id number] - [place], [place] - [address]  [id number] - [place], [place] - [address] AT [facility] [address]  [facility] [id number] [place], [place] - [address]  [facility] [address] [place], [place] - [address]    Clinical concerns: none        Whitney Ashok, CMA              "

## 2019-06-19 NOTE — LETTER
"2019       RE: Demetra Richardson  [address]  [place] [address]     Dear Colleague,    Thank you for referring your patient, Demetra Richardson, to the [facility]. Please see a copy of my visit note below.    THORACIC SURGERY FOLLOW UP VISIT    Dear Dr. Vasquez,  I saw Ms. Demetra Richardson in follow-up today. The clinical summary follows:     PREOP DIAGNOSIS   Left pleural effusion  PROCEDURE   1.  Left VATS pulmonary decortication.   2.  Extensive pneumolysis.   3.  Evacuation of empyema.   4.  Complete parietal pleurectomy.   5.  Flexible bronchoscopy.   6.  Intercostal nerve block cryoanalgesia from intercostal space, levels 3 through 9.   DATE OF PROCEDURE  19    COMPLICATIONS  None    INTERVAL STUDIES  CXR today  IMPRESSION: Stable left basilar streaky opacities and  small-to-moderate left pleural effusion.    SUBJECTIVE  Radha is here alone today.   She was tempted to cancel her appointment because she felt ill, vomited earlier today and has c/o \"significant left chest pain\", pushed herself to come in today.      IMPRESSION:    63 year-old female, here for a post-discharge follow-up after a decortication procedure.   She appears pale and tired and she says she has limited energy, lays on the couch all day and has missed some of her support sessions \"that I really love and need\".   She also reported that her father  a short time before her surgery, and her mother recently suffered a stroke \"that changed her personality\".       She reports an intermittent, shooting pain across her anterior left chest, and a steady pain in the posterior left back.   She reports chills and suspects a fever at times.   She has a productive cough with bright yellow/green secretions.   Her chest xray shows some left basilar atelectasis, possible infectious changes.     Her surgical incisions are well healed with no erythema.   Her breath sounds on ausculation are " diminished in left base, otherwise clear.        PLAN  I spent a total of 45 minutes with Ms. Demetra Richardson, more than 50% of which were spent in counseling, coordination of care, and face-to-face time. I reviewed the plan as follows:  Levofloxacin 500 mg po daily x 10 days  Repeat chest CT scan in 3 months with f/u appointment with Dr. Merritt.  Necessary Tests & Appointments: Chest CT  Pain Control Plan: To be managed by Rufina Garcia ([facility]) and primary provider, Dr. Merritt  All questions were answered and the patient and present family were in agreement with the plan.  I appreciate the opportunity to participate in the care of your patient and will keep you updated.  Sincerely,  VINOD Price, CNS

## 2019-06-19 NOTE — PATIENT INSTRUCTIONS
Rx for levofloxacin 500 mg daily x 10 days to [facility] pharmacy, as requested.    See Dr. Merritt with chest CT scan in 3 months    Call if further concerns/questions.

## 2019-06-22 ENCOUNTER — TELEPHONE (OUTPATIENT)
Dept: FAMILY MEDICINE | Facility: CLINIC | Age: 64
End: 2019-06-22

## 2019-06-22 NOTE — TELEPHONE ENCOUNTER
[facility] Call Center    Phone Message    May a detailed message be left on voicemail: yes    Reason for Call: Symptoms or Concerns     If patient has red-flag symptoms, warm transfer to triage line    Current symptom or concern: Patient had a chest xray and went to the surgery appointment, patient did not received antibiotics or anything for the Pneumonia and plural effusion she was given.     She wants to talk to PCP.        Action Taken: Message routed to:  [facility]

## 2019-06-24 ENCOUNTER — TELEPHONE (OUTPATIENT)
Dept: FAMILY MEDICINE | Facility: CLINIC | Age: 64
End: 2019-06-24

## 2019-06-24 DIAGNOSIS — F41.9 ANXIETY DISORDER, UNSPECIFIED TYPE: ICD-10-CM

## 2019-06-24 NOTE — TELEPHONE ENCOUNTER
[facility] Call Center    Phone Message    May a detailed message be left on voicemail: yes    Reason for Call: Medication Question or concern regarding medication   Prescription Clarification  Name of Medication: clonazePAM (KLONOPIN) 0.5 MG tablet   Prescribing Provider: [facility]   Pharmacy: [address] [address]    [phone]     What on the order needs clarification? Patient would like to know if Dr Merritt would be willing to take over refills of this medication as she was prescribed it in the Hospital she stated. Please contact the patient with any concerns.          Action Taken: Message routed to:  [facility]

## 2019-06-24 NOTE — TELEPHONE ENCOUNTER
Left a message for the patient that [facility]. If patient has any more questions regarding this, the patient would need to call the Surgery clinic as they saw the patient for this. Otherwise the patient would need to schedule an appointment for a follow up appointment if she is still symptomatic. Sarah Ruiz LPN 6/24/2019 8:24 AM

## 2019-06-25 DIAGNOSIS — F41.9 ANXIETY: ICD-10-CM

## 2019-06-25 LAB
MYCOBACTERIUM SPEC CULT: NORMAL
MYCOBACTERIUM SPEC CULT: NORMAL
SPECIMEN SOURCE: NORMAL

## 2019-06-25 RX ORDER — CLONAZEPAM 0.5 MG/1
0.5 TABLET ORAL 2 TIMES DAILY PRN
Qty: 14 TABLET | Refills: 0
Start: 2019-06-25 | End: 2019-06-25

## 2019-06-25 RX ORDER — CLONAZEPAM 0.5 MG/1
0.5 TABLET ORAL 2 TIMES DAILY PRN
Qty: 28 TABLET | Refills: 0
Start: 2019-06-25 | End: 2019-07-09

## 2019-06-25 NOTE — TELEPHONE ENCOUNTER
Clonazepam 0.5 mg : last filled on 5/18/19, 14 tabs, prescribed by Dr. Bland in [place] per  data.    Rx called in to [facility] and pt was informed to keep the appt on 7/8. She is wondering if she [facility]. She had 2 month supply on 5/31` and filled first one on 5/31 per . I called in Lyrica to [facility] today.

## 2019-06-25 NOTE — TELEPHONE ENCOUNTER
[facility] Call Center    Phone Message    May a detailed message be left on voicemail: yes    Reason for Call: Other: The pt is waiting to hear from the clinic on her med refills Please call today. Thanks.     Action Taken: Message routed to:  [facility] med

## 2019-06-25 NOTE — TELEPHONE ENCOUNTER
Health Call Center    Phone Message    May a detailed message be left on voicemail: yes    Reason for Call: Other: [facility] (on [address], phone [phone]).  Please use this pharmacy.      Action Taken: Message routed to:  [facility]

## 2019-06-25 NOTE — TELEPHONE ENCOUNTER
[facility] Call Center    Phone Message    May a detailed message be left on voicemail: yes    Reason for Call: Medication Question or concern regarding medication   Prescription Clarification  Name of Medication: clonazePAM (KLONOPIN) 0.5 MG tablet   Prescribing Provider: Fredy Merritt MD   Pharmacy: [place] [facility]     What on the order needs clarification? Pt would like to know when the medication will be sent to the pharmacy? She has been waiting for 24 hours and would like a call back from Dr Merritt's nurse. Please call back to discuss.          Action Taken: Message routed to:  [facility] Primary care

## 2019-06-25 NOTE — TELEPHONE ENCOUNTER
[facility]    Phone Message    May a detailed message be left on voicemail: yes    Reason for Call: Other: The pt called back about the Klonopin. The pt needs 28 tablets as she take it 2x a day. It is 14 days to her appt. Please call in the right # of tabs and let the pt know - thanks.     Action Taken: Message routed to:  [facility] yanira sanchez

## 2019-06-25 NOTE — TELEPHONE ENCOUNTER
[facility]    Phone Message    May a detailed message be left on voicemail: no    Reason for Call: Other: Pt wants her prescription called in so she can call her insurance and get a ride for tomorrow so she can get her medication and she has to call 1 day ahead, their office closes at 4:30pm.     Action Taken: Message routed to:  [facility]

## 2019-06-28 RX ORDER — GABAPENTIN 300 MG/1
300 CAPSULE ORAL 3 TIMES DAILY
OUTPATIENT
Start: 2019-06-28

## 2019-06-28 NOTE — TELEPHONE ENCOUNTER
Gabapentin 600 mg tablets       Not on medication list 600 mg In Patient order only (2013)  Last AMB order was 300 mg tabs (2014)    Last Office Visit : 5-31-19  Per last clinic note:    4. Fibromyalgia  Lyrica is very helpful for her fibromyalgia with PRN Robaxin.  - Pregabalin (LYRICA) 200 MG capsule  Dispense: 60 capsule; Refill: 1  - methocarbamol (ROBAXIN) 500 MG tablet  Dispense: 50 tablet; Refill: 1        Future Office visit:  none    Routing refill request to provider for review/approval because:  Drug not active on patient's medication list.  Not on protocol.      Kathleen M Doege RN

## 2019-06-29 DIAGNOSIS — F31.9 BIPOLAR I DISORDER (H): ICD-10-CM

## 2019-07-01 ENCOUNTER — TELEPHONE (OUTPATIENT)
Dept: FAMILY MEDICINE | Facility: CLINIC | Age: 64
End: 2019-07-01

## 2019-07-01 RX ORDER — ESCITALOPRAM OXALATE 10 MG/1
10 TABLET ORAL DAILY
Qty: 30 TABLET | Refills: 10 | Status: SHIPPED | OUTPATIENT
Start: 2019-07-01 | End: 2019-07-09

## 2019-07-01 NOTE — TELEPHONE ENCOUNTER
Health Call Center    Phone Message    May a detailed message be left on voicemail: yes    Reason for Call: Medication Refill Request    Has the patient contacted the pharmacy for the refill? Yes   Name of medication being requested: escitalopram (LEXAPRO) 10 MG tablet  Provider who prescribed the medication: Dr. Merritt   Pharmacy:    [facility] [address] [place] [address]    Date medication is needed: asap - pt is completely out of this medication          Action Taken: Message routed to:  [facility] ANNABEL

## 2019-07-09 ENCOUNTER — OFFICE VISIT (OUTPATIENT)
Dept: BEHAVIORAL HEALTH | Facility: CLINIC | Age: 64
End: 2019-07-09
Attending: FAMILY MEDICINE
Payer: COMMERCIAL

## 2019-07-09 VITALS
WEIGHT: 136.1 LBS | SYSTOLIC BLOOD PRESSURE: 117 MMHG | BODY MASS INDEX: 24.12 KG/M2 | HEART RATE: 88 BPM | DIASTOLIC BLOOD PRESSURE: 72 MMHG

## 2019-07-09 DIAGNOSIS — F41.9 ANXIETY DISORDER, UNSPECIFIED TYPE: Primary | ICD-10-CM

## 2019-07-09 DIAGNOSIS — F11.20 OPIOID DEPENDENCE ON AGONIST THERAPY (H): ICD-10-CM

## 2019-07-09 DIAGNOSIS — M79.7 FIBROMYALGIA: ICD-10-CM

## 2019-07-09 RX ORDER — PREGABALIN 200 MG/1
200 CAPSULE ORAL 2 TIMES DAILY
Qty: 60 CAPSULE | Refills: 1 | Status: SHIPPED | OUTPATIENT
Start: 2019-07-09 | End: 2019-09-10

## 2019-07-09 RX ORDER — CLONAZEPAM 0.5 MG/1
0.5 TABLET ORAL 3 TIMES DAILY
Qty: 84 TABLET | Refills: 0 | Status: SHIPPED | OUTPATIENT
Start: 2019-07-09 | End: 2019-08-06

## 2019-07-09 RX ORDER — ESCITALOPRAM OXALATE 20 MG/1
20 TABLET ORAL DAILY
Qty: 30 TABLET | Refills: 1 | Status: SHIPPED | OUTPATIENT
Start: 2019-07-09 | End: 2019-09-03

## 2019-07-09 ASSESSMENT — ANXIETY QUESTIONNAIRES
7. FEELING AFRAID AS IF SOMETHING AWFUL MIGHT HAPPEN: NOT AT ALL
GAD7 TOTAL SCORE: 6
5. BEING SO RESTLESS THAT IT IS HARD TO SIT STILL: MORE THAN HALF THE DAYS
GAD7 TOTAL SCORE: 6
6. BECOMING EASILY ANNOYED OR IRRITABLE: NOT AT ALL
GAD7 TOTAL SCORE: 6
3. WORRYING TOO MUCH ABOUT DIFFERENT THINGS: SEVERAL DAYS
2. NOT BEING ABLE TO STOP OR CONTROL WORRYING: SEVERAL DAYS
4. TROUBLE RELAXING: SEVERAL DAYS
7. FEELING AFRAID AS IF SOMETHING AWFUL MIGHT HAPPEN: NOT AT ALL
1. FEELING NERVOUS, ANXIOUS, OR ON EDGE: SEVERAL DAYS

## 2019-07-09 ASSESSMENT — PATIENT HEALTH QUESTIONNAIRE - PHQ9
10. IF YOU CHECKED OFF ANY PROBLEMS, HOW DIFFICULT HAVE THESE PROBLEMS MADE IT FOR YOU TO DO YOUR WORK, TAKE CARE OF THINGS AT HOME, OR GET ALONG WITH OTHER PEOPLE: NOT DIFFICULT AT ALL
SUM OF ALL RESPONSES TO PHQ QUESTIONS 1-9: 0
SUM OF ALL RESPONSES TO PHQ QUESTIONS 1-9: 0

## 2019-07-09 NOTE — PROGRESS NOTES
"   Psychiatric Outpatient Medication Management Consultation   Demetra Richardson is a 63 year old female who was referred for consultation by Fredy Merritt for evaluation of depression on 19.   Will plan to engage in brief care of up to 3 months, with plan to transition back to the referring provider or a designated prescriber on completion of brief care.     History was provided by the patient who was a guarded historian.      Chief Complaint    \" My doctor wanted me to see you because he's been doing my medications for 15 years. \"      History of Present Illness    Psych critical item history includes suicidal ideation, mutiple psychotropic trials, psych hosp (>5), SUBSTANCE USE: opiates and benzos and substance use treatment . Medical history is significant for SLE, PSC, Sjögren's, fibromyalgia.   Pertinent Background: Radha reports having previously been diagnosed with depression, but thinks that it might have just been hormones because it went away after menopause. She was diagnosed with manic depressive disorder at one point 4 years ago after her significant other , but thinks this was mostly because she was just talking a lot at this time. She notes that she has never had issues with cutting.   Has been on methadone for about 6 years after she got addicted to oxycodone after having multiple surgeries in the course of 3 years.   Denies any history of manic episodes, denies any episodic sleep disturbance, impulsivity, mood elevation or irritability. Just notes that she talks really fast when things are difficult. Might have seemed manic when her significant other . She does not agree with her previous bipolar diagnosis because she has not had psychosis in the past.   Most Recent History: Radha notes that she has had a very difficult time recently, as her dad  a few months ago unexpectedly from a pneumonia, and she had a lung surgery since that time for an abscess in the wall of her " lung. She feels that she has still been having trouble recovering for this. Her mom then just had a stroke, and her mom and 8 brothers and sisters are in [place] so she feels that her supports are limited.   She notes that she doesn't hear voices or have any dissociative type issues, so she hasn't really felt that she has needed to see a psychiatrist.   She has a goal of getting off of methadone and being able to go back to [place] to be with her family. She states that her family doesn't want her back in [place] with them if she is still taking methadone. She feels that having mor Klonopin would help her to be able to cut back on the methadone.   Has been on 1/2 mg BID of Klonopin since her dad , but feels like she needs more than this.   She does feel that she needs ongoing therapy, but doesn't have any currently. She had a therapist previously through her methadone program, and really liked him, so has been trying to look into getting back in with him because there were issues with insurance. The methadone clinic also had a 3 days per week 3 hour per day therapy program that she felt worked really well in the past.   She is thinking that she might go back to her day treatment program starting next week.   Feels that she has been having panic attacks daily recently. She recently accidentally doubled her dose of Lyrica due to confusion about the dose, and now she is out which she feels like made her worse.   Feels like if she could just get her meds straightened around for a few weeks or a month she would be fine.   We discussed that she feels that she has been on Lexapro for many years, and does think that she could potentially benefit from going up on the dose.   Tries to exercise regularly.     Recent Substance Use  Alcohol- None  Tobacco- None  Caffeine- no caffeine  Opioids- Prescribed methadone  Narcan Kit- Gets Narcan from methadone clinic.   Cannabis- None, but her sister uses regularly and has been  trying to convince her this is a good option.   Other Illicit Drugs- none    Current Social Hx:  FINANCIAL SUPPORT- Currently on SSI. Worked as a  for 25 years previously.   LIVING SITUATION / RELATIONSHIPS- Lives by herself in apartment currently, but is worried she might have to go to public housing within the next year, as her father was previously paying for 600 per month of her rent. Her family has agreed to continue this for the next year.   SOCIAL/ SPIRITUAL SUPPORT- Has 23 year old son Kenneth who lives here, starting doctorate program in PT at [employer] in a few weeks. Daughter is 32, living in [place], is pregnant with her first grandchild.   FEELS SAFE AT HOME- Yes     Substance Use History   Past Use- Hx of opiate and benzo use disorders. Used marijuana regularly earlier in life.   Treatment [#, most recent]- Has been to [facility], [place]. 4-5x CD tx altogether, most recently about 4 years ago.   Medical Consequences [withdrawal, sz etc]- None  Legal Consequences- None     Medical Review of Systems    Has been having significant issues with pleural pain since her recent surgery and the chest tubes that she had.   A comprehensive review of systems was performed and is negative other than noted in the HPI.     Past Psychiatric History    SIB [method, most recent]- None  Suicide Attempt [#, recent, method]- Denies. Per hx, did once overdose on Xanax, butalbital  Suicidal Ideation Hx [passive, active]- Denies, states that she sometimes feels a little negative about life with everything she is dealing with currently, but no SI. Of note, history notes suicidal ideation a multiple hospitalizations with plan to overdose at one point. She denied this.     Psychosis Hx- None  Psych Hosp [ #, most recent, committed]- Notes that she chose to go in to the hospital after her SO  4 years ago, and she was talking really fast at that time. She does note just a few hospitalizations during the grief  "period.   ECT [#, most recent]- None    Eating Disorder- None    Outpatient Programs [ DBT, Day Treatment, Eating Disorder Tx etc]- States that she has been doing a day treatment program related to her methadone clinic.      Psychiatric Medication Trials       Drug /  Start Date Dose (mg) Helpful Adverse Effects DC Reason / Date   Lexapro 2004       Effexor 2004   Felt bad    buspirone       amitriptyline       lithium    Stigma - \"Didn't like the idea of being on it\"   Depakote    Stigma - \"Didn't like the idea of being on it\"   Lamotrigine [id number] 200 ?     Risperidone 2017       Zyprexa 2015 5 BID      Seroquel 2014 100  Felt \"snowed\"    Xanax    Had withdrawal seizure   Klonopin       Ativan       Valium       phenobarbital    For seizure   gabapentin 600 QID   For fibromyalgia   Lyrica    For fibromyalgia   Hydroxyzine 2016 50 no Dry mouth    Benadryl    For allergies   Trazodone 2013 100      melatonin       propranolol 10 TID      clonidine 0.1   For acute withdrawal   Suboxone       methadone 128         Social History                [per patient report]   Financial Support- See above  Living Situation/Family/Relationships- See above. . 2 children, daughter is 32, son is 23.   Social/Spiritual Support- See above  Trauma History (self-report)- Denies  Legal- None  Early History/Education- Verified and updated from previous H&P: She grew up in [place], raised by her parents.  She is the middle of 9 children. She describes her childhood \"really good.\" All 8 of her siblings have graduate degrees. Her father cheated on her mother with his  after 40 years of marriage and they subsequently . Received a criminology degree through [employer]. She then went to the [employer]"" at [employer] to receive her master's in social work degree. 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     Family History - Mother, maternal uncle, and maternal " grandfather with alcohol use disorder. Older sister with depression and borderline personality disorder, with multiple suicide attempts.      Past Medical History      CARE TEAM:   PCP- rFedy Merritt  Therapist- Jay Butterfield with [facility]- Office: [phone] - therapy-PT [facility]  Rheum- Parastoo Fazeli    Neurologic Hx [head injury, seizures, etc.]: Has had seizure secondary to benzo withdrawal  Patient Active Problem List   Diagnosis     Chronic pain     Nephrolithiasis     Lupus (H)     Anxiety disorder     Sclerosing cholangitis     Hepatitis     Microcytic anemia     Migraines     Leukopenia     Hydronephrosis     Seizure (H)     Bipolar 1 disorder, mixed, moderate (H)     Iron deficiency anemia     Depression     Overdose     Behavior disturbance     Left wrist pain     Borderline personality disorder (H)     BCC (basal cell carcinoma), face     Benzodiazepine dependence (H)     Chronic pain disorder     Opioid dependence on agonist therapy (H)     Orthostatic hypotension     S/P partial gastrectomy     Drug-induced mood disorder (H)     Polysubstance overdose     Inflammatory liver disease     Loculated pleural effusion      Allergies    Penicillins     Medications      Current Outpatient Medications   Medication Sig Dispense Refill     acetaminophen (TYLENOL) 325 MG tablet Take 2 tablets (650 mg) by mouth nightly as needed for mild pain or fever       aspirin-acetaminophen-caffeine (EXCEDRIN MIGRAINE) 250-250-65 MG tablet Take 1 tablet by mouth 2 times daily as needed for headaches       clonazePAM (KLONOPIN) 0.5 MG tablet Take 1 tablet (0.5 mg) by mouth 2 times daily as needed for anxiety 28 tablet 0     docusate sodium (COLACE) 100 MG capsule Take 1 capsule (100 mg) by mouth 2 times daily (Patient not taking: Reported on 6/19/2019)       escitalopram (LEXAPRO) 10 MG tablet Take 1 tablet (10 mg) by mouth daily 30 tablet 10     furosemide (LASIX) 20 MG tablet Take 1 tablet (20 mg)  by mouth daily (Patient not taking: Reported on 6/19/2019) 1 tablet 0     methadone (DOLOPHINE-INTENSOL) 10 MG/ML (HIGH CONC) solution Take 11.3 mLs (113 mg) by mouth daily Rufina Marcial [phone]    Dose as of 4/29/19 300 mL 0     methocarbamol (ROBAXIN) 500 MG tablet Take 1 tablet (500 mg) by mouth every 6 hours as needed for muscle spasms (Patient not taking: Reported on 6/19/2019) 50 tablet 1     methyl salicylate-menthol (DUARTE-QURESHI) OINT ointment Apply 28 g topically every 6 hours as needed (pain)       oxyCODONE (ROXICODONE) 5 MG tablet Take 1-2 tablets (5-10 mg) by mouth every 4 hours as needed for pain (max 2 tabs (10mg) per day) (Patient not taking: Reported on 6/19/2019) 20 tablet 0     pantoprazole (PROTONIX) 40 MG EC tablet Take 1 tablet (40 mg) by mouth every morning (before breakfast) 90 tablet 3     polyethylene glycol (MIRALAX/GLYCOLAX) packet Take 17 g by mouth 2 times daily       pregabalin (LYRICA) 100 MG capsule Take 1 capsule (100 mg) by mouth 2 times daily (Patient not taking: Reported on 6/19/2019) 30 capsule 0     pregabalin (LYRICA) 150 MG capsule Take 1 capsule (150 mg) by mouth At Bedtime (Patient not taking: Reported on 6/19/2019) 30 capsule 0     Pregabalin (LYRICA) 200 MG capsule Take 1 capsule (200 mg) by mouth 2 times daily 60 capsule 1     pregabalin (LYRICA) 50 MG capsule Take one tab at bedtime on top of your regular 100 mg dose for a total of 150mg at bedtime (Patient not taking: Reported on 6/19/2019) 7 capsule 0     saccharomyces boulardii (FLORASTOR) 250 MG capsule Take 1 capsule (250 mg) by mouth 2 times daily (Patient not taking: Reported on 5/31/2019)        Physical Exam  (Vitals Only)   /72 (BP Location: Right arm, Patient Position: Sitting, Cuff Size: Adult Regular)   Pulse 88   Wt 61.7 kg (136 lb 1.6 oz)   BMI 24.12 kg/m      Pulse Readings from Last 5 Encounters:   06/19/19 57   05/31/19 67   05/19/19 66   05/14/19 73   04/26/19 73     Wt Readings from  Last 5 Encounters:   06/19/19 61.4 kg (135 lb 6.4 oz)   05/31/19 62.1 kg (136 lb 14.4 oz)   05/17/19 65.8 kg (145 lb)   05/13/19 67.4 kg (148 lb 8 oz)   04/26/19 69.3 kg (152 lb 12.8 oz)     BP Readings from Last 5 Encounters:   06/19/19 97/67   05/31/19 99/66   05/19/19 (!) 89/51   05/14/19 (!) 84/53   04/26/19 95/63      Mental Status Exam   Alertness: alert  and oriented  Appearance: adequately groomed  Behavior/Demeanor: generally cooperative and calm, somewhat guarded vs anxious, with good eye contact   Speech: normal and regular rate and rhythm  Language: intact and no problems  Psychomotor: normal or unremarkable  Mood: description consistent with euthymia  Affect: full range and appropriate; was congruent to mood; was congruent to content  Thought Process/Associations: unremarkable  Thought Content:  Reports none;  Denies suicidal ideation, violent ideation and delusions  Perception:  Reports none;  Denies auditory hallucinations and visual hallucinations  Insight: adequate  Judgment: adequate for safety  Cognition: does  appear grossly intact; formal cognitive testing was not done  Gait and Station: unremarkable     Labs and Data      PHQ-9 SCORE 7/5/2012 1/16/2014 7/9/2019   PHQ-9 Total Score 7 5 -   PHQ-9 Total Score MyChart - - 0   PHQ-9 Total Score - - 0     DARRIUS-7 SCORE 7/9/2019   Total Score 6 (mild anxiety)   Total Score 6       Recent Labs   Lab Test 05/17/19  0633 05/16/19  0830 05/14/19  0432   CR 0.69 0.68 0.67   GFRESTIMATED >90 >90 >90     Recent Labs   Lab Test 05/08/19  0440 05/07/19  0616   AST 60* 47*   ALT 26 31   ALKPHOS 685* 1,063*     Recent Labs   Lab Test 05/04/19  0727 04/26/19  1304 11/19/15  1037 07/14/15  1134  02/04/13  0335  06/16/12  0751   TSH 0.61 0.68 0.85 0.97   < > 0.38*   < > 0.16*   T4  --   --   --   --   --  0.81  --  1.17    < > = values in this interval not displayed.     ECG 4/28/19 QTc = 433ms     Assessment & Plan    Demetra SEVERIANO Richardson is a 63 year old female who  "provides a history supporting the following diagnoses:  Anxiety disorder, unspecified (Adjustment dx vs DARRIUS)  Opiate use disorder (on methadone)  Benzodiazepine use disorder  Hx of bipolar disorder  Hx of borderline personality disorder    Pertinent History: Radha has had numerous previous hospitalizations for various medical and psychiatric issues in the past, including for mood, SI, and substance abuse issues. She is notably a poor historian with regard to her symptoms and psychiatric history. She significantly minimizes all reporting of past symptoms and the severity of past issues that she has had. It seems likely that this is at least in part intentional, as she also shows signs of being very sensitive to stigma when discussing certain elements of her history.   TODAY: Currently she notes that she has a goal of getting off of methadone because her family doesn't want her to move back to [place] where they are living if she is still on it. Notes that they half jokingly refer to her as a \"meth head\" for taking it. She was previously going to therapy and feels that she can restart this soon. Her biggest complaint is panic attacks that have recently increased to daily.   Psychotherapy: Primary recommendation for treatment of current anxiety / mood symptoms. She states that she was seeing her therapist until just a few weeks ago, but that she had stopped seeing him for a bit, but wants to start today in his 3 hour per day 3 days per week program that sounds like a day treatment program. I requested to contact him, but she noted that she would prefer that I wait until she has contacted him again. I told her that I would call her in a week to see if she has contacted him by then so that I can coordinate care with him.   Pharmacotherapy: Radha notes that she has been on Lexapro for many years. Did reduce it at one point, but feels that the higher dose may have worked better. Given this, she is agreeable to increasing " the dose up to 20mg at this time.   Radha requested an increase in her clonazepam today, noting that she does not yet have therapy resources set up, although she feels she can get back into this quickly, and noting that the last month or so has been very taxing on her with a lot of loss and medical stress. I am agreeable to increasing for 28 days, and discussed with her that the dose will be decreased at that time. We discussed that she will likely feel anxious about the decrease. She will likely not want to do it. But that it is important because of her history with this medication, as well as the universal detrimental effect that benzos have on anxiety. I conveyed that even if she does not want to decrease it in 28 days, we will decrease just to twice daily at that time, with plan to continue tapering after that.     [place] PRESCRIPTION MONITORING PROGRAM [] was checked today: indicates taking controlled substances as prescribed    PSYCHOTROPIC DRUG INTERACTIONS:   CLONAZEPAM -- METHADONE -- METHOCARBAMOL may result in increased risk of respiratory and CNS depression.    CLONAZEPAM -- CAFFEINE may result in reduced sedative and anxiolytic effects of clonazepam.    ESCITALOPRAM -- ASPIRIN may result in an increased risk of bleeding.    ESCITALOPRAM -- METHADONE may result in increased risk of serotonin syndrome; increased risk of QT prolongation.    MANAGEMENT:  Monitoring for adverse effects and routine vitals     Plan     1) PSYCHOTROPIC MEDICATIONS: **Hx of gastric bypass**  - Increase to Lexapro 20mg daily  - Prescribed clonazepam 0.5mg TID scheduled for 28 days, then will plan on decrease to BID    [see the following SmartPhrase(s) for more information: PSYMEDINFOESCITALOPRAM - PSYMEDINFOBENZOS]    2) THERAPY: Psychotherapy is a primary recommendation. Was previously seeing Jay Butterfield at [facility], and is planning to get re-established with him ASA.     3) NEXT DUE:   Labs- Routine  lab monitoring is not indicated for current psychotropic medication regimen  ECG- No concerns with most recent QTc   Rating Scales- N/A    4) REFERRALS: None    5) : None    6) RTC: 4 weeks with Dr. Velasquez for follow up, with plan for transition back to referring provider or designated prescriber within 3 months    Treatment Risk Statement:  The patient understands the risks, benefits, adverse effects and alternatives. Agrees to treatment with the capacity to do so. No medical contraindications to treatment. Agrees to call clinic for any problems. The patient understands to call 911 or go to the nearest ED if life threatening or urgent symptoms occur. Crisis numbers are provided routinely in the After Visit Summary.     100 minutes face-to-face with Demetra Richardson during today s visit. Over 50% of this time was spent counseling the patient and / or coordinating care regarding diagnosis and follow up treatment.       PROVIDER:  Juan C Velasquez MD

## 2019-07-09 NOTE — NURSING NOTE
Chief Complaint   Patient presents with     Recheck Medication     depression     Would like out of today's visit:  Was confused about her Lyrica dose. Misunderstood the directions and was taking a total of 400mg BID and has been out for a few days ago. Feels like she is going through withdrawal.  Would like treatment for anxiety. In the last 6-8 weeks she had a major surgery, her father just  and her mother had a stroke.   Chief Complaint   Patient presents with     Recheck Medication     depression

## 2019-07-09 NOTE — PATIENT INSTRUCTIONS
Scheduling: If you have any concerns about today's visit or wish to schedule another appointment please call our office during normal business hours [phone] (8-5:00 M-F)    Contact Us: Please call [phone] during business hours (8-5:00 M-F).  If after clinic hours, or on the weekend, please call  [phone].    Financial Assistance [phone]  [employer] Billing [phone]  [facility] Billing [phone]  Medical Records [phone]    MENTAL HEALTH CRISIS NUMBERS:  [facility]:  [facility] - [phone]  Crisis Residence [facility] - [phone]  Walk-In [facility] - [phone]  COPE 24/7 [facility] Mobile Team - [[phone]]  Crisis Connection - [phone]    [facility] - [phone]  Walk-in counseling [facility] - [phone]  Walk-in counseling [facility] - [phone]  Crisis Residence [facility] - [phone]  Urgent Care Adult Mental Health:   --Drop-in, 24/7 crisis line, and [facility] Mobile Team [[phone]]    24/7 CRISIS TEXT LINE: www.crisistextline.org OR text [phone] anywhere, anytime, any crisis    Poison Control Center - [phone]  [facility] - [phone]; or [facility] - [phone]    If you have a medical emergency please call 911 or go to the nearest ER.

## 2019-07-10 ASSESSMENT — PATIENT HEALTH QUESTIONNAIRE - PHQ9: SUM OF ALL RESPONSES TO PHQ QUESTIONS 1-9: 0

## 2019-07-10 ASSESSMENT — ANXIETY QUESTIONNAIRES: GAD7 TOTAL SCORE: 6

## 2019-08-06 ENCOUNTER — OFFICE VISIT (OUTPATIENT)
Dept: BEHAVIORAL HEALTH | Facility: CLINIC | Age: 64
End: 2019-08-06
Payer: COMMERCIAL

## 2019-08-06 VITALS
SYSTOLIC BLOOD PRESSURE: 114 MMHG | BODY MASS INDEX: 23.67 KG/M2 | DIASTOLIC BLOOD PRESSURE: 71 MMHG | HEART RATE: 55 BPM | WEIGHT: 133.6 LBS

## 2019-08-06 DIAGNOSIS — F41.9 ANXIETY DISORDER, UNSPECIFIED TYPE: Primary | ICD-10-CM

## 2019-08-06 DIAGNOSIS — F11.20 OPIOID DEPENDENCE ON AGONIST THERAPY (H): ICD-10-CM

## 2019-08-06 DIAGNOSIS — F13.20 BENZODIAZEPINE DEPENDENCE (H): ICD-10-CM

## 2019-08-06 RX ORDER — CLONAZEPAM 0.5 MG/1
0.5 TABLET ORAL 2 TIMES DAILY
Qty: 60 TABLET | Refills: 0 | Status: SHIPPED | OUTPATIENT
Start: 2019-08-06 | End: 2019-09-03

## 2019-08-06 ASSESSMENT — ANXIETY QUESTIONNAIRES
2. NOT BEING ABLE TO STOP OR CONTROL WORRYING: SEVERAL DAYS
GAD7 TOTAL SCORE: 6
6. BECOMING EASILY ANNOYED OR IRRITABLE: NOT AT ALL
GAD7 TOTAL SCORE: 6
7. FEELING AFRAID AS IF SOMETHING AWFUL MIGHT HAPPEN: NOT AT ALL
5. BEING SO RESTLESS THAT IT IS HARD TO SIT STILL: SEVERAL DAYS
3. WORRYING TOO MUCH ABOUT DIFFERENT THINGS: SEVERAL DAYS
4. TROUBLE RELAXING: MORE THAN HALF THE DAYS
GAD7 TOTAL SCORE: 6
7. FEELING AFRAID AS IF SOMETHING AWFUL MIGHT HAPPEN: NOT AT ALL
1. FEELING NERVOUS, ANXIOUS, OR ON EDGE: SEVERAL DAYS

## 2019-08-06 ASSESSMENT — PATIENT HEALTH QUESTIONNAIRE - PHQ9
SUM OF ALL RESPONSES TO PHQ QUESTIONS 1-9: 2
SUM OF ALL RESPONSES TO PHQ QUESTIONS 1-9: 2

## 2019-08-06 NOTE — NURSING NOTE
Chief Complaint   Patient presents with     Recheck Medication     Depression and Opioid Dependance     Would like out of today's visit:  Med check.  Lou Zheng LPN

## 2019-08-06 NOTE — PROGRESS NOTES
Psychiatric Outpatient Consultation Follow Up   Demetra Richardson is a 63 year old female who was referred for consultation by Fredy Merritt for evaluation of depression on 07/09/19.   Will plan to engage in brief care of up to 3 months, with plan to transition back to the referring provider or a designated prescriber on completion of brief care.   History was provided by the patient who was a somewhat guarded historian.   Psych critical item history includes suicidal ideation, mutiple psychotropic trials, psych hosp (>5), SUBSTANCE USE: opiates and benzos and substance use treatment . Medical history is significant for SLE, PSC, Sjögren's, fibromyalgia.    Interval History    Just saw day treatment this morning about getting her back into her group at the methadone clinic, still having some insurance issues regarding this. Her insurance will not cover therapy with her previous individual therapist anymore.   Salome is her counselor at the methadone clinic that she sees monthly. This is at [facility] in [place].   Notes that being on methadone is a big stressor for her and she would very much prefer to be on Suboxone. Feels like being on methadone keeps her from being with her family in [place] and she has a goal of being there within a year.        Recent Substance Use  Alcohol- None  Tobacco- None  Caffeine- no caffeine  Opioids- Prescribed methadone  Narcan Kit- Gets Narcan from methadone clinic.   Cannabis- None, but her sister uses regularly and has been trying to convince her this is a good option.   Other Illicit Drugs- none    Current Social Hx:  FINANCIAL SUPPORT- Currently on SSI. Worked as a  for 25 years previously.   LIVING SITUATION / RELATIONSHIPS- Lives by herself in apartment currently, but is worried she might have to go to public housing within the next year, as her father was previously paying for 600 per month of her rent. Her family has agreed to continue this  "for the next year.   SOCIAL/ SPIRITUAL SUPPORT- Has 23 year old son Kenneth who lives here, starting doctorate program in PT at [employer] in a few weeks. Daughter is 32, living in [place], is pregnant with her first grandchild.   FEELS SAFE AT HOME- Yes     Medical Review of Systems    Has been having significant issues with pleural pain since her recent surgery and the chest tubes that she had.   A comprehensive review of systems was performed and is negative other than noted in the HPI.     Psychiatric Medication Trials       Drug /  Start Date Dose (mg) Helpful Adverse Effects DC Reason / Date   Lexapro 2004       Effexor 2004   Felt bad    buspirone       amitriptyline       lithium    Stigma - \"Didn't like the idea of being on it\"   Depakote    Stigma - \"Didn't like the idea of being on it\"   Lamotrigine 4984-1889 200 ?     Risperidone 2017       Zyprexa 2015 5 BID      Seroquel 2014 100  Felt \"snowed\"    Xanax    Had withdrawal seizure   Klonopin       Ativan       Valium       phenobarbital    For seizure   gabapentin 600 QID   For fibromyalgia   Lyrica    For fibromyalgia   Hydroxyzine 2016 50 no Dry mouth    Benadryl    For allergies   Trazodone 2013 100      melatonin       propranolol 10 TID      clonidine 0.1   For acute withdrawal   Suboxone       methadone 128         Past Medical History      CARE TEAM:   PCP- Fredy Merritt  Therapist- Was previously seen by Jay Butterfield with [facility]- Office: [phone] - therapy-[employer], but can no longer attend due to insurance issues.   Rheum- Parastoo Fazeli    Neurologic Hx [head injury, seizures, etc.]: Has had seizure secondary to benzo withdrawal  Patient Active Problem List   Diagnosis     Chronic pain     Nephrolithiasis     Lupus (H)     Anxiety disorder     Sclerosing cholangitis     Hepatitis     Microcytic anemia     Migraines     Leukopenia     Hydronephrosis     Seizure (H)     Bipolar 1 disorder, mixed, moderate (H)     Iron " deficiency anemia     Depression     Overdose     Behavior disturbance     Left wrist pain     Borderline personality disorder (H)     BCC (basal cell carcinoma), face     Benzodiazepine dependence (H)     Chronic pain disorder     Opioid dependence on agonist therapy (H)     Orthostatic hypotension     S/P partial gastrectomy     Drug-induced mood disorder (H)     Polysubstance overdose     Inflammatory liver disease     Loculated pleural effusion      Allergies    Penicillins     Medications      Current Outpatient Medications   Medication Sig Dispense Refill     acetaminophen (TYLENOL) 325 MG tablet Take 2 tablets (650 mg) by mouth nightly as needed for mild pain or fever       aspirin-acetaminophen-caffeine (EXCEDRIN MIGRAINE) 250-250-65 MG tablet Take 1 tablet by mouth 2 times daily as needed for headaches       clonazePAM (KLONOPIN) 0.5 MG tablet Take 1 tablet (0.5 mg) by mouth 3 times daily for 28 days 84 tablet 0     docusate sodium (COLACE) 100 MG capsule Take 1 capsule (100 mg) by mouth 2 times daily (Patient not taking: Reported on 6/19/2019)       escitalopram (LEXAPRO) 20 MG tablet Take 1 tablet (20 mg) by mouth daily 30 tablet 1     furosemide (LASIX) 20 MG tablet Take 1 tablet (20 mg) by mouth daily (Patient not taking: Reported on 6/19/2019) 1 tablet 0     methadone (DOLOPHINE-INTENSOL) 10 MG/ML (HIGH CONC) solution Take 11.3 mLs (113 mg) by mouth daily [facility] [phone]    Dose as of 4/29/19 (Patient taking differently: Take 123 mg by mouth daily [facility] [phone]    Dose as of 4/29/19 ) 300 mL 0     methocarbamol (ROBAXIN) 500 MG tablet Take 1 tablet (500 mg) by mouth every 6 hours as needed for muscle spasms 50 tablet 1     methyl salicylate-menthol (DUARTE-QURESHI) OINT ointment Apply 28 g topically every 6 hours as needed (pain)       naloxone (NARCAN) 4 MG/0.1ML nasal spray Spray 1 spray (4 mg) into one nostril alternating nostrils once as needed for opioid reversal  "repeat every 2-3 minutes until responsive 1 each 1     pantoprazole (PROTONIX) 40 MG EC tablet Take 1 tablet (40 mg) by mouth every morning (before breakfast) 90 tablet 3     polyethylene glycol (MIRALAX/GLYCOLAX) packet Take 17 g by mouth 2 times daily       pregabalin (LYRICA) 100 MG capsule Take 1 capsule (100 mg) by mouth 2 times daily (Patient not taking: Reported on 6/19/2019) 30 capsule 0     pregabalin (LYRICA) 150 MG capsule Take 1 capsule (150 mg) by mouth At Bedtime (Patient not taking: Reported on 6/19/2019) 30 capsule 0     Pregabalin (LYRICA) 200 MG capsule Take 1 capsule (200 mg) by mouth 2 times daily 60 capsule 1     pregabalin (LYRICA) 50 MG capsule Take one tab at bedtime on top of your regular 100 mg dose for a total of 150mg at bedtime (Patient not taking: Reported on 6/19/2019) 7 capsule 0     saccharomyces boulardii (FLORASTOR) 250 MG capsule Take 1 capsule (250 mg) by mouth 2 times daily (Patient not taking: Reported on 5/31/2019)        Physical Exam  (Vitals Only)   /71 (BP Location: Right arm, Patient Position: Sitting)   Pulse 55   Wt 60.6 kg (133 lb 9.6 oz)   BMI 23.67 kg/m      Pulse Readings from Last 5 Encounters:   08/06/19 55   07/09/19 88   06/19/19 57   05/31/19 67   05/19/19 66     Wt Readings from Last 5 Encounters:   08/06/19 60.6 kg (133 lb 9.6 oz)   07/09/19 61.7 kg (136 lb 1.6 oz)   06/19/19 61.4 kg (135 lb 6.4 oz)   05/31/19 62.1 kg (136 lb 14.4 oz)   05/17/19 65.8 kg (145 lb)     BP Readings from Last 5 Encounters:   08/06/19 114/71   07/09/19 117/72   06/19/19 97/67   05/31/19 99/66   05/19/19 (!) 89/51      Mental Status Exam   Alertness: alert  and oriented  Appearance: adequately groomed  Behavior/Demeanor: generally cooperative and calm, with good eye contact   Speech: normal and regular rate and rhythm  Language: intact and no problems  Psychomotor: normal or unremarkable  Mood: \"Doing okay\"  Affect: full range and appropriate; was congruent to mood; was " congruent to content  Thought Process/Associations: unremarkable  Thought Content:  Reports none;  Denies suicidal ideation, violent ideation and delusions  Perception:  Reports none;  Denies auditory hallucinations and visual hallucinations  Insight: adequate  Judgment: adequate for safety  Cognition: does  appear grossly intact; formal cognitive testing was not done  Gait and Station: unremarkable     Labs and Data      PHQ-9 SCORE 1/16/2014 7/9/2019 8/6/2019   PHQ-9 Total Score 5 - -   PHQ-9 Total Score MyChart - 0 2 (Minimal depression)   PHQ-9 Total Score - 0 2     DARRIUS-7 SCORE 7/9/2019 8/6/2019   Total Score 6 (mild anxiety) 6 (mild anxiety)   Total Score 6 6       Recent Labs   Lab Test 05/17/19  0633 05/16/19  0830 05/14/19  0432   CR 0.69 0.68 0.67   GFRESTIMATED >90 >90 >90     Recent Labs   Lab Test 05/08/19  0440 05/07/19  0616   AST 60* 47*   ALT 26 31   ALKPHOS 685* 1,063*     Recent Labs   Lab Test 05/04/19  0727 04/26/19  1304 11/19/15  1037 07/14/15  1134  02/04/13  0335  06/16/12  0751   TSH 0.61 0.68 0.85 0.97   < > 0.38*   < > 0.16*   T4  --   --   --   --   --  0.81  --  1.17    < > = values in this interval not displayed.     ECG 4/28/19 QTc = 433ms     Assessment & Plan    Demetra Richardson is a 63 year old female who provides a history supporting the following diagnoses:  Anxiety disorder, unspecified (Adjustment dx vs DARRIUS)  Opiate use disorder (on methadone)  Benzodiazepine use disorder  Hx of bipolar disorder  Hx of borderline personality disorder    Pertinent History: Radha has had numerous previous hospitalizations for various medical and psychiatric issues in the past, including for mood, SI, and substance abuse issues. She is notably a poor historian with regard to her symptoms and psychiatric history. She significantly minimizes all reporting of past symptoms and the severity of past issues that she has had. It seems likely that this is at least in part intentional, as she also shows  signs of being very sensitive to stigma when discussing certain elements of her history. She notes that depression has not been an issue for her since she went through menopause.   TODAY: Radha continues to be concerned about her long term use of methadone, and would like to start the process of potentially switching to Suboxone. I referred her to the addiction medication program at [facility] as a resources to help her work on this and provide her with therapy services.   I did decrease her clonazepam back down to BID at this time. She wasn't happy about this change, but we had agreed on it last time and she understood. I continued to reinforce that benzos are dangerous when used in conjunction with opiates and that they aren't a good long term treatment for anxiety anyway. She did feel that the increase in Lexapro went well.   Psychotherapy: This remains the primary recommendation for the treatment of anxiety / mood symptoms. Unfortunately, she did not resume day treatment as she had previously indicated that she was going to, but does state that she is still working on this. She did find that she is no longer able to see her [facility] therapist due to insurance issues, but is trying to get back into the day treatment program through [facility]. I left a message with Salome at [facility] [phone] to coordinate on therapy for Radha.     [place] PRESCRIPTION MONITORING PROGRAM [] was checked today: indicates taking controlled substances as prescribed    PSYCHOTROPIC DRUG INTERACTIONS:   CLONAZEPAM -- METHADONE -- METHOCARBAMOL may result in increased risk of respiratory and CNS depression.    CLONAZEPAM -- CAFFEINE may result in reduced sedative and anxiolytic effects of clonazepam.    ESCITALOPRAM -- ASPIRIN may result in an increased risk of bleeding.    ESCITALOPRAM -- METHADONE may result in increased risk of serotonin syndrome; increased risk of QT prolongation.    MANAGEMENT:  Monitoring for adverse  effects and routine vitals     Plan     1) PSYCHOTROPIC MEDICATIONS: **Hx of gastric bypass**  - Continue Lexapro 20mg daily  - Decrease to clonazepam 0.5mg BID    - Currently prescribed Lyrica 200mg BID    [see the following SmartPhrase(s) for more information: PSYMEDINFOESCITALOPRAM - PSYMEDINFOBENZOS]    2) THERAPY: Psychotherapy is a primary recommendation. Was previously seeing Jay Butterfield at [facility], and is planning to get re-established with him ASAP.     3) NEXT DUE:   Labs- Routine lab monitoring is not indicated for current psychotropic medication regimen  ECG- No concerns with most recent QTc   Rating Scales- N/A    4) REFERRALS: None    5) : None    6) RTC: 4 weeks with Dr. Velasquez for follow up, with plan for transition back to referring provider or designated prescriber within 3 months    Treatment Risk Statement:  The patient understands the risks, benefits, adverse effects and alternatives. Agrees to treatment with the capacity to do so. No medical contraindications to treatment. Agrees to call clinic for any problems. The patient understands to call 911 or go to the nearest ED if life threatening or urgent symptoms occur. Crisis numbers are provided routinely in the After Visit Summary.       PROVIDER:  Juan C Velasquez MD

## 2019-08-06 NOTE — PATIENT INSTRUCTIONS
Scheduling: If you have any concerns about today's visit or wish to schedule another appointment please call our office during normal business hours [phone] (8-5:00 M-F)    Contact Us: Please call [phone] during business hours (8-5:00 M-F).  If after clinic hours, or on the weekend, please call  [phone].    Financial Assistance [phone]  [employer] Billing [phone]  [facility] Billing [phone]  Medical Records [phone]    MENTAL HEALTH CRISIS NUMBERS:  [facility]:  [facility] - [phone]  Crisis Residence [facility] - [phone]  Walk-In Counseling Center [facility] - [phone]  COPE 24/7 [facility] Mobile Team - [[phone]]  Crisis Connection - [phone]    [facility] - [phone]  Walk-in counseling [facility] - [phone]  Walk-in counseling [facility] - [phone]  Crisis Residence [facility] - [phone]  Urgent Care Adult Mental Health:   --Drop-in, 24/7 crisis line, and [facility] Mobile Team [[phone]]    24/7 CRISIS TEXT LINE: www.crisistextline.org OR text 698-836 anywhere, anytime, any crisis    Poison Control Center - [phone]  [employer] - [phone]; or [facility] - [phone]    If you have a medical emergency please call 911 or go to the nearest ER.

## 2019-08-07 ASSESSMENT — PATIENT HEALTH QUESTIONNAIRE - PHQ9: SUM OF ALL RESPONSES TO PHQ QUESTIONS 1-9: 2

## 2019-08-07 ASSESSMENT — ANXIETY QUESTIONNAIRES: GAD7 TOTAL SCORE: 6

## 2019-08-09 ENCOUNTER — TELEPHONE (OUTPATIENT)
Dept: PSYCHIATRY | Facility: CLINIC | Age: 64
End: 2019-08-09

## 2019-08-09 NOTE — TELEPHONE ENCOUNTER
----- Message from Juan C Velasquez MD sent at 8/6/2019 10:57 AM CDT -----  Regarding: Addiction Medication Referral  PSYCHIATRY DEPT  [facility] Referral    Patient:  Demetra Katzzacharyderrek    Referring Provider:  Vallera      Program Requested:    -Addiction Medicine Program  [AMP]   ([facility])                      Type of Care Requested :  -consider transfer to specialty program    Referral Reason:  -clarify diagnosis  -Suboxone therapy (currently on methadone)    Referral Reason further explained:  Looking to transition off of methadone, currently on 114 of methadone. Also in need of addiction oriented therapy.

## 2019-09-03 DIAGNOSIS — F41.9 ANXIETY DISORDER, UNSPECIFIED TYPE: ICD-10-CM

## 2019-09-03 RX ORDER — CLONAZEPAM 0.5 MG/1
0.5 TABLET ORAL 2 TIMES DAILY
Qty: 60 TABLET | Refills: 0 | Status: SHIPPED | OUTPATIENT
Start: 2019-09-03 | End: 2019-09-24

## 2019-09-03 RX ORDER — ESCITALOPRAM OXALATE 20 MG/1
20 TABLET ORAL DAILY
Qty: 30 TABLET | Refills: 0 | Status: SHIPPED | OUTPATIENT
Start: 2019-09-03 | End: 2019-09-24

## 2019-09-05 ENCOUNTER — DOCUMENTATION ONLY (OUTPATIENT)
Dept: CARE COORDINATION | Facility: CLINIC | Age: 64
End: 2019-09-05

## 2019-09-10 DIAGNOSIS — M79.7 FIBROMYALGIA: ICD-10-CM

## 2019-09-10 RX ORDER — PREGABALIN 200 MG/1
200 CAPSULE ORAL 2 TIMES DAILY
Qty: 60 CAPSULE | Refills: 1 | Status: SHIPPED | OUTPATIENT
Start: 2019-09-10 | End: 2019-10-09

## 2019-09-24 ENCOUNTER — OFFICE VISIT (OUTPATIENT)
Dept: BEHAVIORAL HEALTH | Facility: CLINIC | Age: 64
End: 2019-09-24
Payer: COMMERCIAL

## 2019-09-24 DIAGNOSIS — F41.9 ANXIETY DISORDER, UNSPECIFIED TYPE: ICD-10-CM

## 2019-09-24 RX ORDER — ESCITALOPRAM OXALATE 10 MG/1
10 TABLET ORAL DAILY
Qty: 30 TABLET | Refills: 0 | Status: SHIPPED | OUTPATIENT
Start: 2019-09-30 | End: 2019-10-22

## 2019-09-24 RX ORDER — CLONAZEPAM 0.5 MG/1
0.5 TABLET ORAL 2 TIMES DAILY
Qty: 60 TABLET | Refills: 0 | Status: SHIPPED | OUTPATIENT
Start: 2019-09-30 | End: 2019-10-22

## 2019-09-24 RX ORDER — ESCITALOPRAM OXALATE 20 MG/1
20 TABLET ORAL DAILY
Qty: 30 TABLET | Refills: 0 | Status: SHIPPED | OUTPATIENT
Start: 2019-09-30 | End: 2019-09-24

## 2019-09-24 RX ORDER — CLONAZEPAM 0.5 MG/1
0.5 TABLET ORAL 2 TIMES DAILY
Qty: 60 TABLET | Refills: 0 | Status: SHIPPED | OUTPATIENT
Start: 2019-09-30 | End: 2019-09-24

## 2019-09-24 NOTE — PROGRESS NOTES
Psychiatric Outpatient Consultation Follow Up   Demetra Richardson is a 63 year old female who was referred for consultation by Fredy Merritt for evaluation of depression on 07/09/19.   Will plan to engage in brief care of up to 3 months, with plan to transition back to the referring provider or a designated prescriber on completion of brief care.   History was provided by the patient who was a somewhat guarded historian.   Psych critical item history includes suicidal ideation, mutiple psychotropic trials, psych hosp (>5), SUBSTANCE USE: opiates and benzos and substance use treatment . Medical history is significant for SLE, PSC, Sjögren's, fibromyalgia.    Interval History    Radha notes that she has had some significant insomnia going on for almost a month, but now finally getting back on schedule.   She wasn't sure if this was related to grief or not, but it was very disruptive and she was using a little extra clonazepam to get through this.        Discussion points from previous appointments:   Notes that being on methadone is a big stressor for her and she would very much prefer to be on Suboxone. Feels like being on methadone keeps her from being with her family in [place] and she has a goal of being there within a year.     Recent Substance Use  Alcohol- None  Tobacco- None  Caffeine- no caffeine  Opioids- Prescribed methadone  Narcan Kit- Gets Narcan from methadone clinic.   Cannabis- None, but her sister uses regularly and has been trying to convince her this is a good option.   Other Illicit Drugs- none    Current Social Hx:  FINANCIAL SUPPORT- Currently on SSI. Worked as a  for 25 years previously.   LIVING SITUATION / RELATIONSHIPS- Lives by herself in apartment currently, but is worried she might have to go to public housing within the next year, as her father was previously paying for 600 per month of her rent. Her family has agreed to continue this for the next year.   SOCIAL/  "SPIRITUAL SUPPORT- Has 23 year old son Kenneth who lives here, starting doctorate program in PT at [employer] in a few weeks. Daughter is 32, living in [place], is pregnant with her first grandchild.   FEELS SAFE AT HOME- Yes     Medical Review of Systems    Has been having significant issues with pleural pain since her recent surgery and the chest tubes that she had.   A comprehensive review of systems was performed and is negative other than noted in the HPI.     Psychiatric Medication Trials       Drug /  Start Date Dose (mg) Helpful Adverse Effects DC Reason / Date   Lexapro 2004       Effexor 2004   Felt bad    buspirone       amitriptyline       lithium    Stigma - \"Didn't like the idea of being on it\"   Depakote    Stigma - \"Didn't like the idea of being on it\"   Lamotrigine 7700-7862 200 ?     Risperidone 2017       Zyprexa 2015 5 BID      Seroquel 2014 100  Felt \"snowed\"    Xanax    Had withdrawal seizure   Klonopin       Ativan       Valium       phenobarbital    For seizure   gabapentin 600 QID   For fibromyalgia   Lyrica    For fibromyalgia   Hydroxyzine 2016 50 no Dry mouth    Benadryl    For allergies   Trazodone 2013 100  Felt zoned out    melatonin       propranolol 10 TID      clonidine 0.1   For acute withdrawal   Suboxone       methadone 128         Past Medical History      CARE TEAM:   PCP- Fredy Merritt  Therapist- Was previously seen by Jay Butterfield with [facility]- Office: [phone] - [facility], but can no longer attend due to insurance issues.   Rheum- Parastoo Fazeli    Neurologic Hx [head injury, seizures, etc.]: Has had seizure secondary to benzo withdrawal  Patient Active Problem List   Diagnosis     Chronic pain     Nephrolithiasis     Lupus (H)     Anxiety disorder     Sclerosing cholangitis     Hepatitis     Microcytic anemia     Migraines     Leukopenia     Hydronephrosis     Seizure (H)     Bipolar 1 disorder, mixed, moderate (H)     Iron deficiency anemia "     Depression     Overdose     Behavior disturbance     Left wrist pain     Borderline personality disorder (H)     BCC (basal cell carcinoma), face     Benzodiazepine dependence (H)     Chronic pain disorder     Opioid dependence on agonist therapy (H)     Orthostatic hypotension     S/P partial gastrectomy     Drug-induced mood disorder (H)     Polysubstance overdose     Inflammatory liver disease     Loculated pleural effusion      Allergies    Penicillins     Medications      Current Outpatient Medications   Medication Sig Dispense Refill     acetaminophen (TYLENOL) 325 MG tablet Take 2 tablets (650 mg) by mouth nightly as needed for mild pain or fever       aspirin-acetaminophen-caffeine (EXCEDRIN MIGRAINE) 250-250-65 MG tablet Take 1 tablet by mouth 2 times daily as needed for headaches       clonazePAM (KLONOPIN) 0.5 MG tablet Take 1 tablet (0.5 mg) by mouth 2 times daily 60 tablet 0     docusate sodium (COLACE) 100 MG capsule Take 1 capsule (100 mg) by mouth 2 times daily (Patient not taking: Reported on 6/19/2019)       escitalopram (LEXAPRO) 20 MG tablet Take 1 tablet (20 mg) by mouth daily 30 tablet 0     furosemide (LASIX) 20 MG tablet Take 1 tablet (20 mg) by mouth daily (Patient not taking: Reported on 6/19/2019) 1 tablet 0     methadone (DOLOPHINE-INTENSOL) 10 MG/ML (HIGH CONC) solution Take 11.3 mLs (113 mg) by mouth daily Rufina Marcial [phone]    Dose as of 4/29/19 (Patient taking differently: Take 123 mg by mouth daily Rufina Marcial [phone]    Dose as of 4/29/19 ) 300 mL 0     methocarbamol (ROBAXIN) 500 MG tablet Take 1 tablet (500 mg) by mouth every 6 hours as needed for muscle spasms 50 tablet 1     methyl salicylate-menthol (DUARTE-QURESHI) OINT ointment Apply 28 g topically every 6 hours as needed (pain)       naloxone (NARCAN) 4 MG/0.1ML nasal spray Spray 1 spray (4 mg) into one nostril alternating nostrils once as needed for opioid reversal repeat every 2-3 minutes until  "responsive 1 each 1     pantoprazole (PROTONIX) 40 MG EC tablet Take 1 tablet (40 mg) by mouth every morning (before breakfast) 90 tablet 3     polyethylene glycol (MIRALAX/GLYCOLAX) packet Take 17 g by mouth 2 times daily       Pregabalin (LYRICA) 200 MG capsule Take 1 capsule (200 mg) by mouth 2 times daily 60 capsule 1     saccharomyces boulardii (FLORASTOR) 250 MG capsule Take 1 capsule (250 mg) by mouth 2 times daily (Patient not taking: Reported on 5/31/2019)        Physical Exam  (Vitals Only)   There were no vitals taken for this visit.    Pulse Readings from Last 5 Encounters:   08/06/19 55   07/09/19 88   06/19/19 57   05/31/19 67   05/19/19 66     Wt Readings from Last 5 Encounters:   08/06/19 60.6 kg (133 lb 9.6 oz)   07/09/19 61.7 kg (136 lb 1.6 oz)   06/19/19 61.4 kg (135 lb 6.4 oz)   05/31/19 62.1 kg (136 lb 14.4 oz)   05/17/19 65.8 kg (145 lb)     BP Readings from Last 5 Encounters:   08/06/19 114/71   07/09/19 117/72   06/19/19 97/67   05/31/19 99/66   05/19/19 (!) 89/51      Mental Status Exam   Alertness: alert  and oriented  Appearance: adequately groomed  Behavior/Demeanor: generally cooperative and calm, with good eye contact   Speech: normal and regular rate and rhythm  Language: intact and no problems  Psychomotor: normal or unremarkable  Mood: \"Doing okay\"  Affect: full range and appropriate; was congruent to mood; was congruent to content  Thought Process/Associations: unremarkable  Thought Content:  Reports none;  Denies suicidal ideation, violent ideation and delusions  Perception:  Reports none;  Denies auditory hallucinations and visual hallucinations  Insight: adequate  Judgment: adequate for safety  Cognition: does  appear grossly intact; formal cognitive testing was not done  Gait and Station: unremarkable     Labs and Data      PHQ-9 SCORE 1/16/2014 7/9/2019 8/6/2019   PHQ-9 Total Score 5 - -   PHQ-9 Total Score MyChart - 0 2 (Minimal depression)   PHQ-9 Total Score - 0 2     DARRIUS-7 " SCORE 7/9/2019 8/6/2019   Total Score 6 (mild anxiety) 6 (mild anxiety)   Total Score 6 6       Recent Labs   Lab Test 05/17/19  0633 05/16/19  0830 05/14/19  0432   CR 0.69 0.68 0.67   GFRESTIMATED >90 >90 >90     Recent Labs   Lab Test 05/08/19  0440 05/07/19  0616   AST 60* 47*   ALT 26 31   ALKPHOS 685* 1,063*     Recent Labs   Lab Test 05/04/19  0727 04/26/19  1304 11/19/15  1037 07/14/15  1134  02/04/13  0335  06/16/12  0751   TSH 0.61 0.68 0.85 0.97   < > 0.38*   < > 0.16*   T4  --   --   --   --   --  0.81  --  1.17    < > = values in this interval not displayed.     ECG 4/28/19 QTc = 433ms     Assessment & Plan    Demetra Richardson is a 63 year old female who provides a history supporting the following diagnoses:  Anxiety disorder, unspecified (Adjustment dx vs DARRIUS)  Opiate use disorder (on methadone)  Benzodiazepine use disorder  Hx of bipolar disorder  Hx of borderline personality disorder    Pertinent History: Radha has had numerous previous hospitalizations for various medical and psychiatric issues in the past, including for mood, SI, and substance abuse issues. She is notably a poor historian with regard to her symptoms and psychiatric history. She significantly minimizes all reporting of past symptoms and the severity of past issues that she has had. It seems likely that this is at least in part intentional, as she also shows signs of being very sensitive to stigma when discussing certain elements of her history. She notes that depression has not been an issue for her since she went through menopause.   TODAY: Radha continues to be concerned about her long term use of methadone, and would like to start the process of potentially switching to Suboxone. She will be transitioning to the addiction medicine program at [facility] starting with intake appointment on 10/21.   She continues to feel that the clonazepam is not enough at BID dosing. I reiterated to her today the concerns about  benzos and opiates, but really reinforced the bigger issue that benzos are not an appropriate treatment for anxiety, and that long term use tends to reinforce anxiety. I did previously discuss with her that this was only started as a short term medication, and that 6 months is the maximum time we recommend being on benzos, so it would be ideal to be off of this by 11/2019.   Regarding her sleep, we did discuss options today, but she notes that both trazodone and Seroquel made her feel very out of it, and she is not interested in these options at this time. May consider clonidine as an option in the future.   After the appointment Radha communicated to us that she feels that increasing escitalopram may have been responsible for her insomnia and would like to decrease the dose back, which I am agreeable to.   Psychotherapy: This remains the primary recommendation for the treatment of anxiety / mood symptoms. Unfortunately, she did not resume day treatment as she had previously indicated that she was going to, but does state that she is still working on this. She did find that she is no longer able to see her [facility] therapist due to insurance issues, but is trying to get back into the day treatment program through [facility]. I left a message with Salome at [facility] [phone] to coordinate on therapy for Radha, but did not hear back on this.     [place] PRESCRIPTION MONITORING PROGRAM [] was checked today: indicates taking controlled substances as prescribed    PSYCHOTROPIC DRUG INTERACTIONS:   CLONAZEPAM -- METHADONE -- METHOCARBAMOL may result in increased risk of respiratory and CNS depression.    CLONAZEPAM -- CAFFEINE may result in reduced sedative and anxiolytic effects of clonazepam.    ESCITALOPRAM -- ASPIRIN may result in an increased risk of bleeding.    ESCITALOPRAM -- METHADONE may result in increased risk of serotonin syndrome; increased risk of QT prolongation.    MANAGEMENT:  Monitoring for adverse  effects and routine vitals     Plan     1) PSYCHOTROPIC MEDICATIONS: **Hx of gastric bypass**  - Decrease to escitalopram 10mg daily  - Continue clonazepam 0.5mg BID (Started in 5/2019, recommended that she be off by 11/2019)    - Currently prescribed Lyrica 200mg BID    [see the following SmartPhrase(s) for more information: PSYMEDINFOESCITALOPRAM - PSYMEDINFOBENZOS]    2) THERAPY: Psychotherapy is a primary recommendation. Was previously seeing Jay Butterfield at [facility], and is planning to get re-established with him ASAP.   Currently working on getting to therapy group with Edilia at [place]. It's relapse rehab prevention group that she was in previously and really enjoyed.     3) NEXT DUE:   Labs- Routine lab monitoring is not indicated for current psychotropic medication regimen  ECG- No concerns with most recent QTc   Rating Scales- N/A    4) REFERRALS: None    5) : None    6) RTC:     Treatment Risk Statement:  The patient understands the risks, benefits, adverse effects and alternatives. Agrees to treatment with the capacity to do so. No medical contraindications to treatment. Agrees to call clinic for any problems. The patient understands to call 911 or go to the nearest ED if life threatening or urgent symptoms occur. Crisis numbers are provided routinely in the After Visit Summary.       PROVIDER:  Juan C Velasquez MD

## 2019-09-29 ENCOUNTER — HEALTH MAINTENANCE LETTER (OUTPATIENT)
Age: 64
End: 2019-09-29

## 2019-10-09 ENCOUNTER — TELEPHONE (OUTPATIENT)
Dept: FAMILY MEDICINE | Facility: CLINIC | Age: 64
End: 2019-10-09

## 2019-10-09 DIAGNOSIS — M79.7 FIBROMYALGIA: ICD-10-CM

## 2019-10-09 RX ORDER — PREGABALIN 200 MG/1
200 CAPSULE ORAL 2 TIMES DAILY
Qty: 60 CAPSULE | Refills: 1 | Status: SHIPPED | OUTPATIENT
Start: 2019-10-09 | End: 2019-11-26

## 2019-10-09 NOTE — TELEPHONE ENCOUNTER
[facility] Call Center    Phone Message    May a detailed message be left on voicemail: yes, Pt is wanting medication to go to a New Pharmacy     Reason for Call: Medication Refill Request    Has the patient contacted the pharmacy for the refill? Yes   Name of medication being requested: Pregabalin (LYRICA) 200 MG capsule  Provider who prescribed the medication: Fredy Merritt,  Pharmacy: [facility] AT [address] [place] [id number], Phone : [phone]  Date medication is needed: 10/9/2019         Action Taken: Message routed to:  [facility] ([facility]

## 2019-10-09 NOTE — TELEPHONE ENCOUNTER
Patient requested a refill of Lyrica from Dr. Velasquez.  Dr. Velasquez has not filled this medication for patient.  I left a message for patient that she has a refill on this medication and she should check with the pharmacy.    PATI BRANHAM CMA at 11:25 AM on 10/9/2019.

## 2019-10-09 NOTE — TELEPHONE ENCOUNTER
Client called again, leaving 5 voice mail messages for writer within a half hour, wanting Dr. Velasquez to refill her Lyrica. Writer told client client that Dr. Velasquez has never filled her Lyrica, and according to the note in her chart (attached to this encounter) that the [facility] at [address] [place] [address] should have a refill available. Client stated she would call the pharmacy.

## 2019-10-11 ENCOUNTER — HOSPITAL ENCOUNTER (EMERGENCY)
Facility: CLINIC | Age: 64
Discharge: HOME | End: 2019-10-11
Attending: EMERGENCY MEDICINE | Admitting: EMERGENCY MEDICINE
Payer: COMMERCIAL

## 2019-10-11 VITALS
BODY MASS INDEX: 23.12 KG/M2 | HEART RATE: 78 BPM | RESPIRATION RATE: 15 BRPM | OXYGEN SATURATION: 98 % | TEMPERATURE: 98.2 F | WEIGHT: 130.5 LBS | DIASTOLIC BLOOD PRESSURE: 75 MMHG | SYSTOLIC BLOOD PRESSURE: 107 MMHG

## 2019-10-11 DIAGNOSIS — T50.901A ACCIDENTAL OVERDOSE: ICD-10-CM

## 2019-10-11 LAB
AMPHETAMINES UR QL SCN: NEGATIVE
ANION GAP SERPL CALCULATED.3IONS-SCNC: 6 MMOL/L (ref 3–14)
APAP SERPL-MCNC: 4 MG/L (ref 10–20)
BARBITURATES UR QL: NEGATIVE
BASOPHILS # BLD AUTO: 0 10E9/L (ref 0–0.2)
BASOPHILS NFR BLD AUTO: 0.5 %
BENZODIAZ UR QL: NEGATIVE
BUN SERPL-MCNC: 23 MG/DL (ref 7–30)
CALCIUM SERPL-MCNC: 8.1 MG/DL (ref 8.5–10.1)
CANNABINOIDS UR QL SCN: NEGATIVE
CHLORIDE SERPL-SCNC: 110 MMOL/L (ref 94–109)
CO2 SERPL-SCNC: 26 MMOL/L (ref 20–32)
COCAINE UR QL: NEGATIVE
CREAT SERPL-MCNC: 0.72 MG/DL (ref 0.52–1.04)
DIFFERENTIAL METHOD BLD: ABNORMAL
EOSINOPHIL # BLD AUTO: 0.8 10E9/L (ref 0–0.7)
EOSINOPHIL NFR BLD AUTO: 13 %
ERYTHROCYTE [DISTWIDTH] IN BLOOD BY AUTOMATED COUNT: 19.3 % (ref 10–15)
ETHANOL SERPL-MCNC: <0.01 G/DL
ETHANOL UR QL SCN: NEGATIVE
GFR SERPL CREATININE-BSD FRML MDRD: 90 ML/MIN/{1.73_M2}
GLUCOSE SERPL-MCNC: 99 MG/DL (ref 70–99)
HCT VFR BLD AUTO: 32.9 % (ref 35–47)
HGB BLD-MCNC: 9.2 G/DL (ref 11.7–15.7)
IMM GRANULOCYTES # BLD: 0 10E9/L (ref 0–0.4)
IMM GRANULOCYTES NFR BLD: 0.2 %
LYMPHOCYTES # BLD AUTO: 1.6 10E9/L (ref 0.8–5.3)
LYMPHOCYTES NFR BLD AUTO: 26.6 %
MCH RBC QN AUTO: 22.2 PG (ref 26.5–33)
MCHC RBC AUTO-ENTMCNC: 28 G/DL (ref 31.5–36.5)
MCV RBC AUTO: 79 FL (ref 78–100)
MONOCYTES # BLD AUTO: 0.7 10E9/L (ref 0–1.3)
MONOCYTES NFR BLD AUTO: 10.9 %
NEUTROPHILS # BLD AUTO: 3 10E9/L (ref 1.6–8.3)
NEUTROPHILS NFR BLD AUTO: 48.8 %
NRBC # BLD AUTO: 0 10*3/UL
NRBC BLD AUTO-RTO: 0 /100
OPIATES UR QL SCN: NEGATIVE
PLATELET # BLD AUTO: 303 10E9/L (ref 150–450)
POTASSIUM SERPL-SCNC: 4.3 MMOL/L (ref 3.4–5.3)
RBC # BLD AUTO: 4.15 10E12/L (ref 3.8–5.2)
SODIUM SERPL-SCNC: 142 MMOL/L (ref 133–144)
WBC # BLD AUTO: 6.2 10E9/L (ref 4–11)

## 2019-10-11 PROCEDURE — 85025 COMPLETE CBC W/AUTO DIFF WBC: CPT | Performed by: EMERGENCY MEDICINE

## 2019-10-11 PROCEDURE — 99284 EMERGENCY DEPT VISIT MOD MDM: CPT | Mod: GC | Performed by: EMERGENCY MEDICINE

## 2019-10-11 PROCEDURE — 80329 ANALGESICS NON-OPIOID 1 OR 2: CPT | Performed by: EMERGENCY MEDICINE

## 2019-10-11 PROCEDURE — 99283 EMERGENCY DEPT VISIT LOW MDM: CPT | Mod: 25 | Performed by: EMERGENCY MEDICINE

## 2019-10-11 PROCEDURE — 80307 DRUG TEST PRSMV CHEM ANLYZR: CPT | Performed by: EMERGENCY MEDICINE

## 2019-10-11 PROCEDURE — 96360 HYDRATION IV INFUSION INIT: CPT | Performed by: EMERGENCY MEDICINE

## 2019-10-11 PROCEDURE — 96361 HYDRATE IV INFUSION ADD-ON: CPT | Performed by: EMERGENCY MEDICINE

## 2019-10-11 PROCEDURE — 80048 BASIC METABOLIC PNL TOTAL CA: CPT | Performed by: EMERGENCY MEDICINE

## 2019-10-11 PROCEDURE — 80320 DRUG SCREEN QUANTALCOHOLS: CPT | Performed by: EMERGENCY MEDICINE

## 2019-10-11 PROCEDURE — 25800030 ZZH RX IP 258 OP 636: Performed by: EMERGENCY MEDICINE

## 2019-10-11 PROCEDURE — 25000128 H RX IP 250 OP 636: Performed by: EMERGENCY MEDICINE

## 2019-10-11 RX ORDER — SODIUM CHLORIDE 9 MG/ML
1000 INJECTION, SOLUTION INTRAVENOUS CONTINUOUS
Status: DISCONTINUED | OUTPATIENT
Start: 2019-10-11 | End: 2019-10-11 | Stop reason: HOSPADM

## 2019-10-11 RX ADMIN — SODIUM CHLORIDE 1000 ML: 9 INJECTION, SOLUTION INTRAVENOUS at 11:06

## 2019-10-11 RX ADMIN — SODIUM CHLORIDE 1000 ML: 9 INJECTION, SOLUTION INTRAVENOUS at 10:06

## 2019-10-11 ASSESSMENT — ENCOUNTER SYMPTOMS
DYSURIA: 0
CONSTIPATION: 0
TROUBLE SWALLOWING: 0
PHOTOPHOBIA: 0
APPETITE CHANGE: 1
CHILLS: 0
DIZZINESS: 1
TREMORS: 0
FEVER: 0
ABDOMINAL PAIN: 0
MYALGIAS: 0
SHORTNESS OF BREATH: 0
HEADACHES: 0
LIGHT-HEADEDNESS: 0
NUMBNESS: 0
DIARRHEA: 0
SORE THROAT: 0
WEAKNESS: 0
EYE PAIN: 0
RHINORRHEA: 0
ARTHRALGIAS: 0
NAUSEA: 0
PALPITATIONS: 0
VOMITING: 0
COUGH: 0

## 2019-10-11 NOTE — DISCHARGE INSTRUCTIONS
Please make an appointment to follow up with Your Primary Care Provider within 7 days for emergency department follow up and discussion of continued insomnia.

## 2019-10-11 NOTE — ED AVS SNAPSHOT
[facility], Emergency Department  [address]  [facility] [address]  Phone:  [phone]  Fax:  [phone]                                    Demetra Richardson   MRN: [id number]    Department:  [facility], Emergency Department   Date of Visit:  10/11/2019           After Visit Summary Signature Page    I have received my discharge instructions, and my questions have been answered. I have discussed any challenges I see with this plan with the nurse or doctor.    ..........................................................................................................................................  Patient/Patient Representative Signature      ..........................................................................................................................................  Patient Representative Print Name and Relationship to Patient    ..................................................               ................................................  Date                                   Time    ..........................................................................................................................................  Reviewed by Signature/Title    ...................................................              ..............................................  Date                                               Time          22EPIC Rev 08/18

## 2019-10-11 NOTE — ED NOTES
Bed: ED09  Expected date: 10/11/19  Expected time: 8:41 AM  Means of arrival:   Comments:  N725 64 yo F poly subst inj

## 2019-10-11 NOTE — ED PROVIDER NOTES
History     Chief Complaint   Patient presents with     Drug Overdose     accidental od on Lyrica at 0230, possibly clonazepam at 0230. Sluggish     HPI  Demetra Richardson is a 63 year old female with a history of lupus, anxiety, bipolar 1 disorder, BPD, h/o opioid dependence who presents from methadone clinic for somnolence and accidental overdose. Patient states she has had increased difficulty falling and staying asleep since her father  in March. She also has lost about 20 lbs over that time. Last night, patient took two tablets of Lyrica, woke up at 0230 hours and took another 2 tablets. She also may have taken 2 tablets of the clonazepam this morning. She presented to methadone clinic and received her dose here, but was sent here for somnolence. Patient is arousable. She reports double vision and being unsteady on her feet. She otherwise denies headache, hearing changes, congestion, sore throat, chest pain, shortness of breath, abdominal pain, changes in bowel and bladder, and unilateral leg pain and leg swelling. She denies that this was a suicide attempt.      I have reviewed the Medications, Allergies, Past Medical and Surgical History, and Social History in the Epic system.    Review of Systems   Constitutional: Positive for appetite change (decreased over many months). Negative for chills and fever.   HENT: Negative for congestion, ear pain, hearing loss, rhinorrhea, sore throat and trouble swallowing.    Eyes: Positive for visual disturbance (double vision). Negative for photophobia and pain.   Respiratory: Negative for cough and shortness of breath.    Cardiovascular: Negative for chest pain, palpitations and leg swelling.   Gastrointestinal: Negative for abdominal pain, constipation, diarrhea, nausea and vomiting.   Genitourinary: Negative for decreased urine volume and dysuria.   Musculoskeletal: Negative for arthralgias and myalgias.   Neurological: Positive for dizziness. Negative for  tremors, weakness, light-headedness, numbness and headaches.        Reports she was running into doors and walls at home, but no falls or head injury   Psychiatric/Behavioral: Negative for suicidal ideas.       Physical Exam   BP: 94/62  Pulse: 58  Heart Rate: 58  Temp: 98.2  F (36.8  C)  Resp: 11  Weight: 59.2 kg (130 lb 8 oz)  SpO2: 93 %      Physical Exam  Constitutional:       General: She is sleeping.      Appearance: She is well-developed, well-groomed and normal weight.   HENT:      Head: Normocephalic and atraumatic.      Right Ear: Tympanic membrane normal.      Left Ear: Tympanic membrane normal.      Mouth/Throat:      Mouth: Mucous membranes are moist.      Pharynx: Oropharynx is clear.   Eyes:      Extraocular Movements: Extraocular movements intact.      Pupils:      Right eye: Pupil is round.      Left eye: Pupil is round.      Comments: Bilateral pinpoint pupils.   Cardiovascular:      Rate and Rhythm: Normal rate and regular rhythm.      Heart sounds: Normal heart sounds.   Pulmonary:      Effort: Pulmonary effort is normal. No respiratory distress.      Breath sounds: Normal breath sounds. No wheezing, rhonchi or rales.   Abdominal:      General: Abdomen is flat. Bowel sounds are normal. There is no distension.      Palpations: Abdomen is soft.      Tenderness: There is no tenderness.   Musculoskeletal: Normal range of motion.   Neurological:      General: No focal deficit present.      Mental Status: She is oriented to person, place, and time and easily aroused.      Coordination: Heel to Shin Test normal.   Psychiatric:         Speech: Speech is slurred (slowed).         Behavior: Behavior is cooperative.         ED Course     1134: Patient is evaluated at bedside. Nursing reports patient will have decrease in respirations during sleep with saturations dropping into the upper 80's, which improve on 2L NC O2.    1331: patient reevaluated. Continues to be somnolent with slurred speech. Nursing  reports respirations dropping down to 7 at times while she is sleeping. On 2L NC O2, saturations at 100.     1448: Patient evaluated at bedside. Sleeping. She was arousable. Slurred speech improving some. Eye exam now shows PERRL bilaterally. She is requesting something to eat.     1535: Patient evaluated with Dr. Callahan. Patient is sitting up in bed, eating a sandwich. She reports she is feeling better.    ED Course as of Oct 11 1538   Fri Oct 11, 2019   1233 Acetaminophen Level: 4       Critical Care time:  none     Results for orders placed or performed during the hospital encounter of 10/11/19   Basic metabolic panel   Result Value Ref Range    Sodium 142 133 - 144 mmol/L    Potassium 4.3 3.4 - 5.3 mmol/L    Chloride 110 (H) 94 - 109 mmol/L    Carbon Dioxide 26 20 - 32 mmol/L    Anion Gap 6 3 - 14 mmol/L    Glucose 99 70 - 99 mg/dL    Urea Nitrogen 23 7 - 30 mg/dL    Creatinine 0.72 0.52 - 1.04 mg/dL    GFR Estimate 90 >60 mL/min/[1.73_m2]    GFR Estimate If Black >90 >60 mL/min/[1.73_m2]    Calcium 8.1 (L) 8.5 - 10.1 mg/dL   CBC with platelets differential   Result Value Ref Range    WBC 6.2 4.0 - 11.0 10e9/L    RBC Count 4.15 3.8 - 5.2 10e12/L    Hemoglobin 9.2 (L) 11.7 - 15.7 g/dL    Hematocrit 32.9 (L) 35.0 - 47.0 %    MCV 79 78 - 100 fl    MCH 22.2 (L) 26.5 - 33.0 pg    MCHC 28.0 (L) 31.5 - 36.5 g/dL    RDW 19.3 (H) 10.0 - 15.0 %    Platelet Count 303 150 - 450 10e9/L    Diff Method Automated Method     % Neutrophils 48.8 %    % Lymphocytes 26.6 %    % Monocytes 10.9 %    % Eosinophils 13.0 %    % Basophils 0.5 %    % Immature Granulocytes 0.2 %    Nucleated RBCs 0 0 /100    Absolute Neutrophil 3.0 1.6 - 8.3 10e9/L    Absolute Lymphocytes 1.6 0.8 - 5.3 10e9/L    Absolute Monocytes 0.7 0.0 - 1.3 10e9/L    Absolute Eosinophils 0.8 (H) 0.0 - 0.7 10e9/L    Absolute Basophils 0.0 0.0 - 0.2 10e9/L    Abs Immature Granulocytes 0.0 0 - 0.4 10e9/L    Absolute Nucleated RBC 0.0    Alcohol ethyl   Result Value Ref  Range    Ethanol g/dL <0.01 <0.01 g/dL   Acetaminophen level   Result Value Ref Range    Acetaminophen Level 4 mg/L   Drug abuse screen 6 urine (chem dep)   Result Value Ref Range    Amphetamine Qual Urine Negative NEG^Negative    Barbiturates Qual Urine Negative NEG^Negative    Benzodiazepine Qual Urine Negative NEG^Negative    Cannabinoids Qual Urine Negative NEG^Negative    Cocaine Qual Urine Negative NEG^Negative    Ethanol Qual Urine Negative NEG^Negative    Opiates Qualitative Urine Negative NEG^Negative     *Note: Due to a large number of results and/or encounters for the requested time period, some results have not been displayed. A complete set of results can be found in Results Review.         Assessments & Plan (with Medical Decision Making)   Demetra Richardson is a 63 year old female with a history of lupus, anxiety, bipolar 1 disorder, BPD, h/o opioid dependence who presents from methadone clinic for somnolence and accidental overdose.  Differential diagnosis includes polysubstance overdose, intentional versus accidental overdose, acute alcohol intoxication.   Patient presents sleepy but arousable. She has had some double vision today but otherwise feels well. She denies any pain. Vital signs show a blood pressure in the 90's/60's, SpO2 in the low 90's. HR and temperature WNL. Physical exam reveals slurred speech, pinpoint pupils. Labs reveal negative urine drug screen, hgb 9.2 (improved from previous), negative alcohol, acetaminophen level of 4, and reassuring BMP.   While here, patient received IVF. She required NC oxygen at times for decreased oxygen saturations while sleeping. Respiratory rate decreased to 7 at times. She was monitored and clinically improving, vital signs improving. She was able to eat here.   This is likely accidental overdose. Patient is improved here and discharged home in stable condition.     I have reviewed the nursing notes.    I have reviewed the findings, diagnosis, plan  and need for follow up with the patient.    New Prescriptions    No medications on file       Final diagnoses:   Accidental overdose       10/11/2019   [facility], [place], EMERGENCY DEPARTMENT    Yaima Coles MD PGY-1  [facility]- Family Medicine Residency    This data collected with the Resident working in the Emergency Department.  Patient was seen and evaluated by myself and I repeated the history and physical exam with the patient.  The plan of care was discussed with them.  The key portions of the note including the entire assessment and plan reflect my documentation.           Mj Callahan,   10/11/19 1555

## 2019-10-11 NOTE — ED NOTES
States she has had problem with insomnia because of recent death of her father.  Rx lyrica was suppose to take 1 pill 2 times per day.  States she took 2 pills last night and 2 this morning.  Was up this morning and bumping into doors this morning and was up 0230 this morning.  Confused with the times.

## 2019-10-21 ENCOUNTER — TELEPHONE (OUTPATIENT)
Dept: RHEUMATOLOGY | Facility: CLINIC | Age: 64
End: 2019-10-21

## 2019-10-21 ENCOUNTER — OFFICE VISIT (OUTPATIENT)
Dept: PSYCHIATRY | Facility: CLINIC | Age: 64
End: 2019-10-21
Attending: PSYCHOLOGIST
Payer: COMMERCIAL

## 2019-10-21 ENCOUNTER — TELEPHONE (OUTPATIENT)
Dept: FAMILY MEDICINE | Facility: CLINIC | Age: 64
End: 2019-10-21

## 2019-10-21 DIAGNOSIS — F13.20 BENZODIAZEPINE DEPENDENCE (H): Primary | ICD-10-CM

## 2019-10-21 DIAGNOSIS — F41.9 ANXIETY DISORDER, UNSPECIFIED TYPE: ICD-10-CM

## 2019-10-21 NOTE — TELEPHONE ENCOUNTER
Spoke to patient who states that she spoke to rheumatology clinic and her questions were answered. Sarah Ruiz LPN 10/21/2019 11:19 AM

## 2019-10-21 NOTE — TELEPHONE ENCOUNTER
[facility] Call Center    Phone Message    May a detailed message be left on voicemail: yes    Reason for Call: Other: the patient would like a call from care team. she its about her post surgery concerns from 3/18/19 its urgent.     Action Taken: Message routed to:  [facility]

## 2019-10-21 NOTE — PROGRESS NOTES
"Department of Psychiatry  Addiction Medicine Program ([facility]) Diagnostic Assessment    Date of Service: 10/21/2019  Care Provider: eLighann Gentile, PhD, LP  Diagnostic interview time with patient (25621): 60 minutes  Psychological assessment and scoring (77102 + 62595): 45 minutes  Review of records, interpretation, and report writing (06050): 60 minutes    DSM-5 DIAGNOSES: ***    IDENTIFYING DATA:  Demetra Richardson is a 63 year old female who prefers the name \"Radha\". Shee presented for the current evaluation as part of the intake process for the Addiction Medicine Program ([facility]). The following information was obtained through a clinical interview, self-report questionnaires completed by Radha, and chart review. Her PCP Dr. Merritt referred her to Dr. Velasquez for evaluation of depression on 7/9/19 and Dr. Velasquez referred her for this assessment for NATALIA-specific treatment.      CHIEF COMPLAINT:  \"I need a refill of my medications and I want to get off methadone\"; \"process grief of my Dad\"    MENTAL HEALTH HISTORY AND DIAGNOSTIC INTERVIEW:   Radha reported low mood, difficulty falling and staying asleep, and low appetite since her father passed away in March, 2019. Reported 30 pound weight loss in past 6 months. She does notice some recent gradual improvements with this being the best she has felt in the past 6 months.          Depression: Radha described experiencing recurrent depression in her 20s and 30s that has improved since menopause. In the past month, she denied depressed lasting 2 weeks or longer or anhedonia. However, she did endorse feeling sad much of the time and feeling more discouraged about the future. She also endorsed low appetite, insomnia, and less energy.     She denied excessive worthlessness or guilt, excessive crying, psychomotor agitation/retardation, poor concentration/ memory, indecisiveness, or suicide ideation.    Mireya/hypomania: increased energy (up, high, or hyper)/ increased " activity; persistently irritable; feeling you could do things others couldn't do/ especially important person; decreased sleep need; pressured speech; racing thoughts; easily distracted; increased goal-directed activity; excessive spending, excessive pleasure seeking, excessive risk taking. (3 days or less; 4-6 days; 7+ days)    Anxiety. DARRIUS: Excessive worry about varied and insignificant things; restless keyed up/on edge; muscle tension; tired, weak, exhausted easily; difficult concentrating/ mind going blank; irritability; difficulty sleeping; disrupt functioning       Radha reported significant worry that is difficult to control. She reported     Panic: Unexpectedly occur/ unpredictable/ unprovoked; concern about having another attack; behavior changes; more anxiety in crowded places, open spaces, away from home, travelling    Social anxiety: Anxiety in social situations due to fears of judgement (having a conversation, meeting new people, eating/ drinking in front of others, giving a speech; social situations avoided or endured with great fear    Obsessions: - In past month, been bothered by recurrent thoughts, impulses, or images that were unwanted, distasteful inappropriate, intrusive or distressing (e.g, idea that you were dirty, contaminated or had germs, or fear of contaminating others, or fear of harming someone even though it disturbs or distresses you). - Did you try to suppress these thoughts, impulses, or images with another thought or action?    Compulsions: - Driven to do something repeatedly in response to an obsession or a rigid rule (washing or cleaning excessively, counting or checking things over an jay, or repeating or arranging things, or other superstitious rituals. - Are these things done to prevent/reduce anxiety/distress or to prevent something bad from happening?    Trauma/ PTSD: Ever experienced or witness or had to deal with an extremely traumatic even that included actual or  "threatened death or serious injury or sexual violence to you or someone else? - Repeatedly re-experience event?    Psychosis: delusions, auditory hallucinations and visual hallucinations    Eating concerns:    NATALIA:  - using more than planning to; use for longer period of time than planning to  - inability to cut down  - substantial time obtaining substance, using, or recovering   - cravings  - problems with family or other people  - used in dangerous situations  - worsened psychological or physical symptoms  - give up important work, social, or recreational activities  - tolerance  - withdrawal. AUD: (sweating, increased heart rate; hand tremor, \"the shakes\"; trouble sleeping; nausea, vomiting; hearing os seeing things other people couldn't see or hear, sensations on skin; agitation; anxiety; seizures    Nicotine:  Age of first use:  Pattern of use:  Date of last use:    Alcohol:  Age of first use:  Pattern of use:  Date of last use:    Cannabis:  Age of first use:  Pattern of use:  Date of last use:    Heroin:  Age of first use:  Pattern of use:  Date of last use:    Cocaine:  Age of first use:  Pattern of use:  Date of last use:    Amphetamines:  Age of first use:  Pattern of use:  Date of last use:    Hallucinogens:  Age of first use:  Pattern of use:  Date of last use:    Benzodiazepines: Radha reported that use first became problematic in her late 40s that she obtained from her then-boyfriend who was a drug dealer. She sought treatment for benzo use several years ago. Currently reported that she is prescribed Klonopin for insomnia and has been taking more than prescribed to help sleep. She denied any use in the past 2 weeks (due to running out early), but denied symptoms of withdrawal .           PREVIOUS PSYCHIATRIC HISTORY:   Radha estimated that she completed 3 residential NATALIA treatment programs and started another 3 residential programs that she did not complete. Most recently, she completed [facility] " (about 3-4 years ago) for problematic benzo use. Subsequently, she lived in a sober house and saw a therapist in private practice who was very helpful (Jay Butterfield with [facility]).      She could not remember precisely when she initiated methadone, but thinks it was around 2012. Previously, she was taking Suboxone.     FAMILY PSYCHIATRIC HISTORY:      SOCIAL HISTORY:  Radha has 8 siblings. She has a daughter (age 30) and son (age 24). 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 Currently is on SSI. She currently lives by herself in her apartment, but her father helped her out financially and with his recent death she is worried that she will not have the finances to maintain her living situation.     Family Environment: ***  Academic: ***  Occupation: ***  Current source of income: ***  Social relationships: ***  Leisure time and interests: ***  Spiritual/ Worship orientation: ***  Sexual orientation: ***  Children: ***  Marital status: ***  Living arrangements: ***  Legal: ***  Patient strengths: ***    SAFETY ASSESSMENT:  Suicide:  Assessed level of immediate risk: {None/Low/Medium/High:425657875}  Ideation: ***  Plan: None  Means: None  Intent: None    Homicide/Violence:  Assessed level of immediate risk: {None/Low/Medium/High:003854531}  Ideation: ***  Plan: None  Means: None  Intent: None    PSYCHOLOGICAL TESTING     M.I.N.I. International Neuropsychiatric Interview  Leslie Depression Inventory (BDI-II)  Generalized Anxiety Disorder (DARRIUS-7) scale  Stages of Change for alcohol or other drugs  Jenniffer Screening Instrument for Borderline Personality Disorder  Perceived Stress Scale (PSS)   PTSD Checklist (PCL) - 5  Adult ADHD Self-Report Screening Scale (ASRS-5)  PROMIS Sleep Disturbance Short Form  Mood Disorder Questionnaire (MDQ)  Drug Abuse Screening Test, DAST-10  Brief [place] Alcoholism Screening Test (MAST)    Leslie Depression Inventory (BDI-II)  Raw  score= 14  Interpretation= Mild depression    Generalized Anxiety Disorder (DARRIUS-7) scale  Raw score= 18  Interpretation= Severe anxiety      Stages of Change for alcohol or other drugs  Important= 5/10  Confident: 5/10  Commitment: 5/10      Jenniffer Screening Instrument for Borderline Personality Disorder  Raw score= 0 /10 Interpretation= Not clinically significant                  33+ Likely to meet criteria for PTSD    Adult ADHD Self-Report Screening Scale (ASRS-5)  Raw score= 11       Elevated symptom count= 1 / 6      PROMIS Sleep Disturbance Short Form   Raw score= 36  Interpretation= Less than 55 None to slight   T-score: ***               55.0-59.9 Mild       60.0-69.9 Moderate       70+ Severe    Interpretation of Psychological Tests    *** reported clinically significant anxiety in the *** range of severity and a *** level of stress.     His sleep disturbance is ***    He reported traits of borderline personality disorder that are likely to meet criteria for a diagnosis of borderline personality disorder    He reported symptoms of bipolar disorder that are likely to meet criteria for a diagnosis of bipolar spectrum disorder.     He reported symptoms of PTSD that are likely to meet criteria for a diagnosis of PTSD.     He endorsed *** clinically significant symptoms of ADHD: Difficulty unwinding and relaxing when have time to herself    He did not endorse problematic alcohol or drug use.        MENTAL STATUS EXAM:   Mental Status Exam:  Appearance: Casually dressed and appropriately groomed   Behavior/relationship to examiner/demeanor: Warm, engaged, forthcoming  Motor activity: Within normal range  Speech rate: Within normal range  Speech volume: Within normal range  Speech articulation:  Within normal range  Speech coherence:  Within normal range  Speech spontaneity:  Within normal range  Mood (subjective report): Anxious  Affect (objective appearance):  Euthymic with demonstrated range appropriate to  context  Thought process: Linear, logical, goal-oriented  Thought content:  Negative for suicidal or homicidal ideation  Abnormal Perception: None disclosed or observed  Attention/Concentration:  Not formally assessed but patient noted difficulty with both attention and concentration  Memory:  Not formally assessed but appeared intact  Insight:  Good    PLAN:   - Recommend attend part 2 of AMP assessment with medical provider on ***

## 2019-10-21 NOTE — TELEPHONE ENCOUNTER
Called Radha and she is unsure on what to do regarding her most recent results and the letter that was sent to her. She was unable to make her appt recently and now doesn't have one until march. She doesn't feel well and has pain everywhere, but her right knee is particularly bad. She is waiting to hear back from her PCP regarding surgery she had recently since she is having pain again. Nothing has been improving as far as her pain unrelated to the surgery and she is wondering what the best next steps would be and whether that may require resuming plaquenil.    Will route to provider for recommendations.     Lindsey Veliz, BSN RN   Rheumatology Clinic Nurse   [facility]

## 2019-10-21 NOTE — TELEPHONE ENCOUNTER
----- Message from Amy Mccollum sent at 10/21/2019  4:30 PM CDT -----  Regarding: Med refill  Contact: [phone]  Andrés Barrazaily,    This patient is needing a refill on her lexapro in the next couple days. She confirmed she is using the [facility] in [place] that is in her chart. She asked for an update to confirm we can refill the med.    Thanks,Amy

## 2019-10-21 NOTE — TELEPHONE ENCOUNTER
[facility] Call Center    Phone Message    May a detailed message be left on voicemail: yes    Reason for Call: Other: The pt got a letter with her test results and needs to speak to someone about them. Thanks     Action Taken: Message routed to:  [facility]

## 2019-10-21 NOTE — TELEPHONE ENCOUNTER
Call and spoke with pt regarding appt for 10/23, pt will take the appt.    Deirdre Russell RN  Rheumatology Clinic

## 2019-10-22 RX ORDER — CLONAZEPAM 0.5 MG/1
.25-.5 TABLET ORAL 2 TIMES DAILY
Qty: 60 TABLET | Refills: 1 | Status: SHIPPED | OUTPATIENT
Start: 2019-10-22 | End: 2019-12-13

## 2019-10-22 RX ORDER — ESCITALOPRAM OXALATE 10 MG/1
10 TABLET ORAL DAILY
Qty: 30 TABLET | Refills: 1 | Status: SHIPPED | OUTPATIENT
Start: 2019-10-22 | End: 2020-08-03

## 2019-10-22 NOTE — TELEPHONE ENCOUNTER
Provided her with enough refills to get through to appointment with new provider, and included a reminder in the sig that her course of benzos is short term and to plan to start taper soon.     I do not think that it is necessary for me to see her again prior to this. Also, she is not a patient of mine at the psychiatry clinic and is not part of my patient panel there. Thank you for the coordination efforts.

## 2019-10-22 NOTE — TELEPHONE ENCOUNTER
Message   Received: Yesterday   Message Contents   Katy Meeks MD Moccio, Lindsey, RN   Caller: Unspecified (Yesterday,  5:32 PM)             Has someone spoken with her to make sure she is:   A) not having self injury urges   B) trying to keep her substance use limited (because of concurrent use of medication)   If those issues are ok, she can have a one month supply.   Will need to do a phone check-in to get another month supply.   DAVID Luna does have openings in our clinic week of Nov 18 Mondays and Weds.   With his permission, perhaps he could see her once.   Or, can just do a phone check-in and see Gio Christiansen on the 4th.   Thanks Lindsey.   Tish      Spoke with pt. She is taking Lexapro 10mg daily and has been on it for a long time. She recently tried increasing to 20mg daily, but experienced potential SE of insomnia, so it was decreased back down. She reports increased difficulty falling and staying asleep. Her father passed away in March and she has to move out of her current apartment in July of next year since she will no longer be able to afford her rent. She reports social isolation and is interested in building a support system in [facility]. Her son is in school at the [employer], but they are not very close. Her family members live in [place]. She denies recent substance use. She denies nadege. She denies suicidal ideation. She had to cancel her appointment with Dr. Bradley tomorrow due to an urgent rheumatology appointment.    She is on methadone and receives her methadone daily at the methadone clinic. She is in need of refills of her Lexapro and Klonopin.     Per [place] : clonazepam 0.5mg last refilled: 9/30/19, 9/5/19, 8/6/19 for 60 tabs.

## 2019-10-23 ENCOUNTER — OFFICE VISIT (OUTPATIENT)
Dept: RHEUMATOLOGY | Facility: CLINIC | Age: 64
End: 2019-10-23
Attending: INTERNAL MEDICINE
Payer: COMMERCIAL

## 2019-10-23 ENCOUNTER — ANCILLARY PROCEDURE (OUTPATIENT)
Dept: GENERAL RADIOLOGY | Facility: CLINIC | Age: 64
End: 2019-10-23
Attending: INTERNAL MEDICINE
Payer: COMMERCIAL

## 2019-10-23 VITALS
SYSTOLIC BLOOD PRESSURE: 101 MMHG | OXYGEN SATURATION: 93 % | TEMPERATURE: 97.7 F | HEIGHT: 64 IN | DIASTOLIC BLOOD PRESSURE: 66 MMHG | HEART RATE: 78 BPM | BODY MASS INDEX: 21.99 KG/M2 | WEIGHT: 128.8 LBS

## 2019-10-23 DIAGNOSIS — M32.9 SYSTEMIC LUPUS ERYTHEMATOSUS, UNSPECIFIED SLE TYPE, UNSPECIFIED ORGAN INVOLVEMENT STATUS (H): ICD-10-CM

## 2019-10-23 DIAGNOSIS — M32.9 SYSTEMIC LUPUS ERYTHEMATOSUS, UNSPECIFIED SLE TYPE, UNSPECIFIED ORGAN INVOLVEMENT STATUS (H): Primary | ICD-10-CM

## 2019-10-23 DIAGNOSIS — M53.3 PAIN OF LEFT SACROILIAC JOINT: ICD-10-CM

## 2019-10-23 LAB
ALBUMIN SERPL-MCNC: 2.3 G/DL (ref 3.4–5)
ALP SERPL-CCNC: 110 U/L (ref 40–150)
ALT SERPL W P-5'-P-CCNC: 13 U/L (ref 0–50)
AST SERPL W P-5'-P-CCNC: 13 U/L (ref 0–45)
BILIRUB DIRECT SERPL-MCNC: <0.1 MG/DL (ref 0–0.2)
BILIRUB SERPL-MCNC: 0.1 MG/DL (ref 0.2–1.3)
CRP SERPL-MCNC: 23.5 MG/L (ref 0–8)
ERYTHROCYTE [SEDIMENTATION RATE] IN BLOOD BY WESTERGREN METHOD: 61 MM/H (ref 0–30)
FERRITIN SERPL-MCNC: 15 NG/ML (ref 8–252)
PROT SERPL-MCNC: 6 G/DL (ref 6.8–8.8)
RETICS # AUTO: 101.9 10E9/L (ref 25–95)
RETICS/RBC NFR AUTO: 2.7 % (ref 0.5–2)

## 2019-10-23 PROCEDURE — 82728 ASSAY OF FERRITIN: CPT | Performed by: INTERNAL MEDICINE

## 2019-10-23 PROCEDURE — 85045 AUTOMATED RETICULOCYTE COUNT: CPT | Performed by: INTERNAL MEDICINE

## 2019-10-23 PROCEDURE — 83010 ASSAY OF HAPTOGLOBIN QUANT: CPT | Performed by: INTERNAL MEDICINE

## 2019-10-23 PROCEDURE — 36415 COLL VENOUS BLD VENIPUNCTURE: CPT | Performed by: INTERNAL MEDICINE

## 2019-10-23 PROCEDURE — 86225 DNA ANTIBODY NATIVE: CPT | Performed by: INTERNAL MEDICINE

## 2019-10-23 PROCEDURE — 86160 COMPLEMENT ANTIGEN: CPT | Performed by: INTERNAL MEDICINE

## 2019-10-23 PROCEDURE — 86200 CCP ANTIBODY: CPT | Performed by: INTERNAL MEDICINE

## 2019-10-23 PROCEDURE — 86140 C-REACTIVE PROTEIN: CPT | Performed by: INTERNAL MEDICINE

## 2019-10-23 PROCEDURE — 85652 RBC SED RATE AUTOMATED: CPT | Performed by: INTERNAL MEDICINE

## 2019-10-23 PROCEDURE — 82306 VITAMIN D 25 HYDROXY: CPT | Performed by: INTERNAL MEDICINE

## 2019-10-23 PROCEDURE — 80076 HEPATIC FUNCTION PANEL: CPT | Performed by: INTERNAL MEDICINE

## 2019-10-23 PROCEDURE — G0463 HOSPITAL OUTPT CLINIC VISIT: HCPCS | Mod: ZF

## 2019-10-23 RX ORDER — HYDROXYCHLOROQUINE SULFATE 200 MG/1
200 TABLET, FILM COATED ORAL 2 TIMES DAILY WITH MEALS
Qty: 60 TABLET | Refills: 5 | Status: SHIPPED | OUTPATIENT
Start: 2019-10-23 | End: 2020-09-11

## 2019-10-23 ASSESSMENT — PAIN SCALES - GENERAL: PAINLEVEL: NO PAIN (0)

## 2019-10-23 ASSESSMENT — MIFFLIN-ST. JEOR: SCORE: 1129.23

## 2019-10-23 NOTE — LETTER
Patient:  Demetra Richardson  :   1955  MRN:     [id number]        Ms.Patricia SEVERIANO Richardson  [address]   [place] [address]        2019    Dear ,    We are writing to inform you of your test results. Repeat anti-DNA (lupus lab) is back to normal and CRP inflammation marker is improved. I faxed a vitamin D prescription to your pharmacy to replace low vitamin D.      Results for orders placed or performed in visit on 10/23/19   Reticulocyte count   Result Value Ref Range    % Retic 2.7 (H) 0.5 - 2.0 %    Absolute Retic 101.9 (H) 25 - 95 10e9/L   Haptoglobin   Result Value Ref Range    Haptoglobin 263 (H) 35 - 175 mg/dL   Hepatic panel   Result Value Ref Range    Bilirubin Direct <0.1 0.0 - 0.2 mg/dL    Bilirubin Total 0.1 (L) 0.2 - 1.3 mg/dL    Albumin 2.3 (L) 3.4 - 5.0 g/dL    Protein Total 6.0 (L) 6.8 - 8.8 g/dL    Alkaline Phosphatase 110 40 - 150 U/L    ALT 13 0 - 50 U/L    AST 13 0 - 45 U/L   Erythrocyte sedimentation rate auto   Result Value Ref Range    Sed Rate 61 (H) 0 - 30 mm/h   Ferritin   Result Value Ref Range    Ferritin 15 8 - 252 ng/mL   Vitamin D Deficiency   Result Value Ref Range    Vitamin D Deficiency screening 22 20 - 75 ug/L   Complement C3   Result Value Ref Range    Complement C3 106 76 - 169 mg/dL   Complement C4   Result Value Ref Range    Complement C4 28 15 - 50 mg/dL   DNA double stranded antibodies   Result Value Ref Range    DNA-ds 6 <10 IU/mL   CRP inflammation   Result Value Ref Range    CRP Inflammation 23.5 (H) 0.0 - 8.0 mg/L     *Note: Due to a large number of results and/or encounters for the requested time period, some results have not been displayed. A complete set of results can be found in Results Review.       Sam Narayanan MD
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
## 2019-10-23 NOTE — NURSING NOTE
"Chief Complaint   Patient presents with     RECHECK     Lupus     /66   Pulse 78   Temp 97.7  F (36.5  C) (Oral)   Ht 1.626 m (5' 4\")   Wt 58.4 kg (128 lb 12.8 oz)   SpO2 93%   BMI 22.11 kg/m    Jessi Holguin CMA    "

## 2019-10-23 NOTE — LETTER
Patient:  Demetra Richardson  :   1955  MRN:     [id number]        Ms.Patricia SEVERIANO Richardson  [address]   [place] [address]        2019    Dear ,    We are writing to inform you of your test results. Repeat anti-CCP (the rheumatoid arthritis marker which was high during infection) is negative.      Results for orders placed or performed in visit on 10/23/19   Cyclic Citrullinated Peptide Antibody IgG   Result Value Ref Range    Cyclic Citrullinated Peptide Antibody, IgG 6 <7 U/mL     *Note: Due to a large number of results and/or encounters for the requested time period, some results have not been displayed. A complete set of results can be found in Results Review.       Sam Narayanan MD
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
## 2019-10-23 NOTE — PATIENT INSTRUCTIONS
Labs today    X-rays today    Resume plaquenil 200 mg twice a day     ACT lozenges (over the counter) for dry mouth     Schedule an eye exam    Return in 6 months

## 2019-10-24 ENCOUNTER — TELEPHONE (OUTPATIENT)
Dept: RHEUMATOLOGY | Facility: CLINIC | Age: 64
End: 2019-10-24

## 2019-10-24 ENCOUNTER — TELEPHONE (OUTPATIENT)
Dept: FAMILY MEDICINE | Facility: CLINIC | Age: 64
End: 2019-10-24

## 2019-10-24 LAB
C3 SERPL-MCNC: 106 MG/DL (ref 76–169)
C4 SERPL-MCNC: 28 MG/DL (ref 15–50)
CCP AB SER IA-ACNC: 6 U/ML
DEPRECATED CALCIDIOL+CALCIFEROL SERPL-MC: 22 UG/L (ref 20–75)
DSDNA AB SER-ACNC: 6 IU/ML
HAPTOGLOB SERPL-MCNC: 263 MG/DL (ref 35–175)

## 2019-10-24 NOTE — TELEPHONE ENCOUNTER
[facility] Call Center    Phone Message    May a detailed message be left on voicemail: yes, Pt is wanting to get a call back with the results. Pt states she is wanting a call back ASAP, 10/24/2019, Pt is concerned.     Reason for Call: Requesting Results   Name/type of test: X-ray  Date of test: 10/23/2019  Was test done at a location other than [facility] (Please fill in the location if not [facility])?: No      Action Taken: Message routed to:  [facility] Maggy

## 2019-10-24 NOTE — TELEPHONE ENCOUNTER
Called Radha and left a message to have her call back.   Will also leave a mychart message.    Lindsey Veliz, BSN RN   Rheumatology Clinic Nurse   [facility]

## 2019-10-24 NOTE — TELEPHONE ENCOUNTER
[facility]    Phone Message    May a detailed message be left on voicemail: yes    Reason for Call: Other: Pt would like a call back regarding the red spot that is the size of a golf ball on her SI joint. Pt would like to know if this is anything serious.      Action Taken: Message routed to:  [facility] Rheum

## 2019-10-24 NOTE — TELEPHONE ENCOUNTER
[facility]    Phone Message    May a detailed message be left on voicemail: yes    Reason for Call: Requesting Results   Name/type of test: Xrays and Labs  Date of test: 10/23/2019  Was test done at a location other than [facility] (Please fill in the location if not [facility])?: No    Pt is wanting to speak with a provider as soon as possible as she is nervous about the results.       Action Taken: Message routed to:  [facility] Rheum

## 2019-10-28 NOTE — TELEPHONE ENCOUNTER
Sam Narayanan MD Beard, ROBERT Gilmore   Caller: Unspecified (4 days ago,  3:49 PM)             I have to write a detailed note about x-rays to her, some arthritis but nothing urgent and no change  in therapy plan.      Relayed information to patient, she is not happy with generalized message.  Will wait for letter to be sent regarding xray results. She felt like the xray tech was indicating that there was something wrong on her xray.   Pt understands that Dr. Narayanan will send a letter.    Deirdre Russell RN  Rheumatology Clinic
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
## 2019-10-30 ENCOUNTER — OFFICE VISIT (OUTPATIENT)
Dept: INTERNAL MEDICINE | Facility: CLINIC | Age: 64
End: 2019-10-30
Payer: COMMERCIAL

## 2019-10-30 VITALS
DIASTOLIC BLOOD PRESSURE: 62 MMHG | WEIGHT: 128 LBS | HEART RATE: 56 BPM | OXYGEN SATURATION: 96 % | HEIGHT: 64 IN | SYSTOLIC BLOOD PRESSURE: 94 MMHG | TEMPERATURE: 98.5 F | BODY MASS INDEX: 21.85 KG/M2

## 2019-10-30 DIAGNOSIS — R22.42 MASS OF HIP REGION, LEFT: Primary | ICD-10-CM

## 2019-10-30 ASSESSMENT — MIFFLIN-ST. JEOR: SCORE: 1125.85

## 2019-10-30 ASSESSMENT — PAIN SCALES - GENERAL: PAINLEVEL: SEVERE PAIN (6)

## 2019-10-30 NOTE — NURSING NOTE
Chief Complaint   Patient presents with     Recheck Medication     bulge on left hip, painful- x2 weeks, worsening in past 5 days        Latosha North LPN, EMT at 10:53 AM on 10/30/2019.

## 2019-10-30 NOTE — PATIENT INSTRUCTIONS
[facility] Medication Refill Request Information:  * Please contact your pharmacy regarding ANY request for medication refills.  ** [facility] Prescription Fax = [phone]  * Please allow 3 business days for routine medication refills.  * Please allow 5 business days for controlled substance medication refills.     [facility] Test Result notification information:  *You will be notified with in 7-10 days of your appointment day regarding the results of your test.  If you are on MyChart you will be notified as soon as the provider has reviewed the results and signed off on them.    [facility]: [phone]

## 2019-10-30 NOTE — PROGRESS NOTES
[facility]  Primary Care Center   Cecilia HURSTSly London, APRN CNP  10/30/2019      Chief Complaint:   Mass     History of Present Illness:   Demetra Richardson is a 63 year old female with a complex medical history, including anxiety, depression, GERD, systemic lupus erythematosus, primary sclerosing cholangitis, and osteoarthritis who presents for evaluation of mass. She has a painful hard mass on her left hip. This has been painful for over a week and a hard bulge appeared within the last few days (since seeing Dr. Narayanan on Friday, 10/25). She reports she had surgery on this area in 2005 and afterwards developed a blood clot which became an abscess. She is concerned she could have another abscess at this time. She denies injury to the area. She denies fever.    Other concerns discussed:  1. She reports rib pain in the area where she had chest tubes removed this summer.     Review of Systems:   Pertinent items are noted in HPI or as in patient entered ROS below, remainder of complete ROS is negative.     Active Medications:      aspirin-acetaminophen-caffeine (EXCEDRIN MIGRAINE) 250-250-65 MG tablet, Take 1 tablet by mouth 2 times daily as needed for headaches, Disp: , Rfl:      clonazePAM (KLONOPIN) 0.5 MG tablet, Take 0.5-1 tablets (0.25-0.5 mg) by mouth 2 times daily Reminder: 6 month course. Started 5/2019. Plan for taper starting 11/2019., Disp: 60 tablet, Rfl: 1     escitalopram (LEXAPRO) 10 MG tablet, Take 1 tablet (10 mg) by mouth daily, Disp: 30 tablet, Rfl: 1     hydroxychloroquine (PLAQUENIL) 200 MG tablet, Take 1 tablet (200 mg) by mouth 2 times daily (with meals), Disp: 60 tablet, Rfl: 5     Pregabalin (LYRICA) 200 MG capsule, Take 1 capsule (200 mg) by mouth 2 times daily, Disp: 60 capsule, Rfl: 1     docusate sodium (COLACE) 100 MG capsule, Take 1 capsule (100 mg) by mouth 2 times daily (Patient not taking: Reported on 6/19/2019), Disp: , Rfl:      furosemide (LASIX) 20 MG tablet, Take 1 tablet (20 mg)  by mouth daily (Patient not taking: Reported on 6/19/2019), Disp: 1 tablet, Rfl: 0     methadone (DOLOPHINE-INTENSOL) 10 MG/ML (HIGH CONC) solution, Take 11.3 mLs (113 mg) by mouth daily Rufina Marcial [phone]    Dose as of 4/29/19 (Patient not taking: Reported on 10/30/2019), Disp: 300 mL, Rfl: 0     methyl salicylate-menthol (DUARTE-QURESHI) OINT ointment, Apply 28 g topically every 6 hours as needed (pain) (Patient not taking: Reported on 10/23/2019), Disp: , Rfl:      naloxone (NARCAN) 4 MG/0.1ML nasal spray, Spray 1 spray (4 mg) into one nostril alternating nostrils once as needed for opioid reversal repeat every 2-3 minutes until responsive (Patient not taking: Reported on 10/23/2019), Disp: 1 each, Rfl: 1     pantoprazole (PROTONIX) 40 MG EC tablet, Take 1 tablet (40 mg) by mouth every morning (before breakfast) (Patient not taking: Reported on 10/23/2019), Disp: 90 tablet, Rfl: 3     polyethylene glycol (MIRALAX/GLYCOLAX) packet, Take 17 g by mouth 2 times daily (Patient not taking: Reported on 10/23/2019), Disp: , Rfl:      saccharomyces boulardii (FLORASTOR) 250 MG capsule, Take 1 capsule (250 mg) by mouth 2 times daily (Patient not taking: Reported on 5/31/2019), Disp: , Rfl:       Allergies:   Penicillins      Past Medical History:  Anemia, microcytic  Basal cell carcinoma  Benzodiazepine dependence  Bipolar 1 disorder  Chronic pain  Epidural abscess (x4)  Fibromyalgia  Generalized anxiety disorder  Gastroesophageal reflux disease (GERD)  Major depressive disorder  Migraine  Nephrolithiasis  Osteoarthritis  Osteoporosis  Primary sclerosing cholangitis  Peptic ulcer disease  Systemic lupus erythematosus  Leukopenia  Hydronephrosis  Seizure  Borderline personality disorder  Opioid dependence on agonist therapy  Orthostatic hypotension  Drug-induced mood disorder  Inflammatory liver disease  Loculated pleural effusion     Past Surgical History:  Back surgery, L4-5 epidural abscess - 07/16/2004  Back  "surgery, L3-4 spinal stenosis - 2004  Back surgery, Left psoas abscess - 2004  Bronchoscopy flexible - 2019   section (x2)  Cholecystectomy - 2004  Closed reduction, percutaneous pinning finger, combined - 08/10/2011  Gastrectomy, bilateral truncal vagotomy, hemigastrectomy - 2005  Gastrojejunostomy - 2011  Insert chest tube - 2019  Laser holmium lithotripsy ureter(s), insert stent, combined  Laser holmium nephrolithotomy luiza percutaneous nephrostomy - 2012  Mammoplasty augmentation bilateral   Open reduction fixation wrist, left - 2016  Reconstruct breast, implant prosthesis, combined  Thoracoscopic decoration lung, left - 2019     Family History:   History reviewed. No pertinent family history.     Social History:   The patient is single, a nonsmoker, and does not consume alcohol.      Physical Exam:   BP 94/62 (BP Location: Right arm, Patient Position: Sitting, Cuff Size: Adult Regular)   Pulse 56   Temp 98.5  F (36.9  C) (Tympanic)   Ht 1.626 m (5' 4.02\")   Wt 58.1 kg (128 lb)   LMP  (LMP Unknown)   SpO2 96%   Breastfeeding? No   BMI 21.96 kg/m     Constitutional: Alert, oriented, pleasant, no acute distress  Head: Normocephalic, atraumatic  Cardiovascular: Regular rate and rhythm, no murmurs, rubs or gallops  Respiratory: Good air movement bilaterally, lungs clear, no wheezes/rales/rhonchi  Musculoskeletal: No edema, normal muscle tone, normal gait, [id number] cm tender, erythematous, firm mass over lateral left hip.  Psychiatric: normal mentation, affect and mood        Assessment and Plan:  Mass of hip region, left  She has had left hip pain for a week and developed a firm mass a few days ago. Xray completed on 10/23/19 shows L SI joint ankylosis, suspicious for psoriatic or reactive arthritis (no sign of soft tissue mass/swelling at that time). She feels confident the mass is an abscess. Will obtain labs and ultrasound to further " evaluate.  Addendum 11/1/19: Inflammatory changes seen on US, CRP mildly elevated with normal WBC. She continues to be afebrile. Will treat with medrol dose pack and have her follow-up in 1-2 weeks, sooner as needed. She states she will call to schedule follow-up.   - US Extremity Non Vascular Left  - CRP inflammation  - CBC with platelets differential  - Basic metabolic panel       Follow-up: as needed     Scribe Disclosure:  I, Genevieve Galan, am serving as a scribe to document services personally performed by VINOD Mahmood CNP at this visit, based upon the provider's statements to me. All documentation has been reviewed by the aforementioned provider prior to being entered into the official medical record.     Portions of this medical record were completed by a scribe. UPON MY REVIEW AND AUTHENTICATION BY ELECTRONIC SIGNATURE, this confirms (a) I performed the applicable clinical services, and (b) the record is accurate.     VINOD Mahmood CNP

## 2019-11-01 ENCOUNTER — ANCILLARY PROCEDURE (OUTPATIENT)
Dept: ULTRASOUND IMAGING | Facility: CLINIC | Age: 64
End: 2019-11-01
Attending: NURSE PRACTITIONER
Payer: COMMERCIAL

## 2019-11-01 DIAGNOSIS — R22.42 MASS OF HIP REGION, LEFT: ICD-10-CM

## 2019-11-01 LAB
ANION GAP SERPL CALCULATED.3IONS-SCNC: 2 MMOL/L (ref 3–14)
BASOPHILS # BLD AUTO: 0 10E9/L (ref 0–0.2)
BASOPHILS NFR BLD AUTO: 1 %
BUN SERPL-MCNC: 16 MG/DL (ref 7–30)
CALCIUM SERPL-MCNC: 8.1 MG/DL (ref 8.5–10.1)
CHLORIDE SERPL-SCNC: 108 MMOL/L (ref 94–109)
CO2 SERPL-SCNC: 29 MMOL/L (ref 20–32)
CREAT SERPL-MCNC: 0.77 MG/DL (ref 0.52–1.04)
CRP SERPL-MCNC: 18.2 MG/L (ref 0–8)
DIFFERENTIAL METHOD BLD: ABNORMAL
EOSINOPHIL # BLD AUTO: 0.6 10E9/L (ref 0–0.7)
EOSINOPHIL NFR BLD AUTO: 13.4 %
ERYTHROCYTE [DISTWIDTH] IN BLOOD BY AUTOMATED COUNT: 18.5 % (ref 10–15)
GFR SERPL CREATININE-BSD FRML MDRD: 82 ML/MIN/{1.73_M2}
GLUCOSE SERPL-MCNC: 134 MG/DL (ref 70–99)
HCT VFR BLD AUTO: 33.1 % (ref 35–47)
HGB BLD-MCNC: 9.1 G/DL (ref 11.7–15.7)
IMM GRANULOCYTES # BLD: 0 10E9/L (ref 0–0.4)
IMM GRANULOCYTES NFR BLD: 0.2 %
LYMPHOCYTES # BLD AUTO: 1.3 10E9/L (ref 0.8–5.3)
LYMPHOCYTES NFR BLD AUTO: 31 %
MCH RBC QN AUTO: 22.4 PG (ref 26.5–33)
MCHC RBC AUTO-ENTMCNC: 27.5 G/DL (ref 31.5–36.5)
MCV RBC AUTO: 81 FL (ref 78–100)
MONOCYTES # BLD AUTO: 0.4 10E9/L (ref 0–1.3)
MONOCYTES NFR BLD AUTO: 8.6 %
NEUTROPHILS # BLD AUTO: 1.9 10E9/L (ref 1.6–8.3)
NEUTROPHILS NFR BLD AUTO: 45.8 %
NRBC # BLD AUTO: 0 10*3/UL
NRBC BLD AUTO-RTO: 0 /100
PLATELET # BLD AUTO: 308 10E9/L (ref 150–450)
POTASSIUM SERPL-SCNC: 4.8 MMOL/L (ref 3.4–5.3)
RBC # BLD AUTO: 4.07 10E12/L (ref 3.8–5.2)
SODIUM SERPL-SCNC: 139 MMOL/L (ref 133–144)
WBC # BLD AUTO: 4.2 10E9/L (ref 4–11)

## 2019-11-01 RX ORDER — METHYLPREDNISOLONE 4 MG
TABLET, DOSE PACK ORAL
Qty: 21 TABLET | Refills: 0 | Status: SHIPPED | OUTPATIENT
Start: 2019-11-01 | End: 2019-11-01

## 2019-11-01 RX ORDER — METHYLPREDNISOLONE 4 MG
TABLET, DOSE PACK ORAL
Qty: 21 TABLET | Refills: 0 | Status: SHIPPED | OUTPATIENT
Start: 2019-11-01 | End: 2019-11-26

## 2019-11-13 ENCOUNTER — OFFICE VISIT (OUTPATIENT)
Dept: INTERNAL MEDICINE | Facility: CLINIC | Age: 64
End: 2019-11-13
Payer: COMMERCIAL

## 2019-11-13 VITALS
HEART RATE: 66 BPM | SYSTOLIC BLOOD PRESSURE: 99 MMHG | BODY MASS INDEX: 21.85 KG/M2 | WEIGHT: 128 LBS | OXYGEN SATURATION: 100 % | HEIGHT: 64 IN | DIASTOLIC BLOOD PRESSURE: 66 MMHG

## 2019-11-13 DIAGNOSIS — R22.42 HIP MASS, LEFT: ICD-10-CM

## 2019-11-13 DIAGNOSIS — R22.42 HIP MASS, LEFT: Primary | ICD-10-CM

## 2019-11-13 LAB
ANION GAP SERPL CALCULATED.3IONS-SCNC: 3 MMOL/L (ref 3–14)
BASOPHILS # BLD AUTO: 0.1 10E9/L (ref 0–0.2)
BASOPHILS NFR BLD AUTO: 0.8 %
BUN SERPL-MCNC: 23 MG/DL (ref 7–30)
CALCIUM SERPL-MCNC: 8.2 MG/DL (ref 8.5–10.1)
CHLORIDE SERPL-SCNC: 104 MMOL/L (ref 94–109)
CO2 SERPL-SCNC: 29 MMOL/L (ref 20–32)
CREAT SERPL-MCNC: 0.8 MG/DL (ref 0.52–1.04)
CRP SERPL-MCNC: 3.9 MG/L (ref 0–8)
DIFFERENTIAL METHOD BLD: ABNORMAL
EOSINOPHIL # BLD AUTO: 0.5 10E9/L (ref 0–0.7)
EOSINOPHIL NFR BLD AUTO: 6.6 %
ERYTHROCYTE [DISTWIDTH] IN BLOOD BY AUTOMATED COUNT: 17.6 % (ref 10–15)
ERYTHROCYTE [SEDIMENTATION RATE] IN BLOOD BY WESTERGREN METHOD: 23 MM/H (ref 0–30)
GFR SERPL CREATININE-BSD FRML MDRD: 78 ML/MIN/{1.73_M2}
GLUCOSE SERPL-MCNC: 80 MG/DL (ref 70–99)
HCT VFR BLD AUTO: 33.5 % (ref 35–47)
HGB BLD-MCNC: 9.1 G/DL (ref 11.7–15.7)
IMM GRANULOCYTES # BLD: 0.1 10E9/L (ref 0–0.4)
IMM GRANULOCYTES NFR BLD: 0.6 %
LYMPHOCYTES # BLD AUTO: 1.7 10E9/L (ref 0.8–5.3)
LYMPHOCYTES NFR BLD AUTO: 21.7 %
MCH RBC QN AUTO: 22.1 PG (ref 26.5–33)
MCHC RBC AUTO-ENTMCNC: 27.2 G/DL (ref 31.5–36.5)
MCV RBC AUTO: 81 FL (ref 78–100)
MONOCYTES # BLD AUTO: 0.8 10E9/L (ref 0–1.3)
MONOCYTES NFR BLD AUTO: 10.1 %
NEUTROPHILS # BLD AUTO: 4.7 10E9/L (ref 1.6–8.3)
NEUTROPHILS NFR BLD AUTO: 60.2 %
NRBC # BLD AUTO: 0 10*3/UL
NRBC BLD AUTO-RTO: 0 /100
PLATELET # BLD AUTO: 262 10E9/L (ref 150–450)
POTASSIUM SERPL-SCNC: 4.5 MMOL/L (ref 3.4–5.3)
RBC # BLD AUTO: 4.12 10E12/L (ref 3.8–5.2)
SODIUM SERPL-SCNC: 137 MMOL/L (ref 133–144)
WBC # BLD AUTO: 7.7 10E9/L (ref 4–11)

## 2019-11-13 RX ORDER — DOXYCYCLINE HYCLATE 100 MG
100 TABLET ORAL 2 TIMES DAILY
Qty: 14 TABLET | Refills: 0 | Status: SHIPPED | OUTPATIENT
Start: 2019-11-13 | End: 2019-11-26

## 2019-11-13 ASSESSMENT — MIFFLIN-ST. JEOR: SCORE: 1120.85

## 2019-11-13 ASSESSMENT — PAIN SCALES - GENERAL: PAINLEVEL: NO PAIN (0)

## 2019-11-13 NOTE — PATIENT INSTRUCTIONS
[phone]    [facility] Medication Refill Request Information:  * Please contact your pharmacy regarding ANY request for medication refills.  ** [facility] Prescription Fax = [phone]  * Please allow 3 business days for routine medication refills.  * Please allow 5 business days for controlled substance medication refills.     [facility] Test Result notification information:  *You will be notified with in 7-10 days of your appointment day regarding the results of your test.  If you are on MyChart you will be notified as soon as the provider has reviewed the results and signed off on them.    [facility]: [phone]

## 2019-11-13 NOTE — PROGRESS NOTES
"[facility]   Cecilia London, APRN CNP  11/13/2019      Chief Complaint:   Recheck Medication       History of Present Illness:   Demetra Richardson is a 64 year old female with a complex medical history including anxiety, depression, GERD, systemic lupus erythematosus, primary sclerosing cholangitis, and osteoarthritis who presents for follow up of hip mass.    Left hip mass: She has a hard \"immobile\" mass on her left hip. It initially presented about 4 weeks ago. I saw her for this 2 weeks ago and an US showed, per radiology read:  \"Nonspecific area of echogenic fat in the area of interest involving  the subcutaneous fat of the left buttock. No focal fluid collection.  Early phlegmonous or cellulitis-type changes not excluded, however.  Patient was sent to her primary care clinic from the radiology  Department for further evaluation.\"  We did a medrol dosepak for the inflammation as she had no signs of infection at that time with normal WBC and no fevers. She feels the steroids did \"nothing\" to help improve the mass. She would like an MRI to figure out what is going on. She is still able to move her hip. She is concerned that it will turn into an abscess, as she has a history of abscesses at this site following hip surgery. She is concerned that if there is something significant, lab results would not show it, since she had an abscess in her lung and reports that lab results were mostly unremarkable during this time.     Chest pain: She is having some rib pain with inspiration and wonders if she needs an x-ray. She is s/p pleural decortication and bronchoscopy for pleural effusion and persistent empyema in 05/2019.     Other concerns discussed:  1. She has lost 30 pounds in the last six months.  2. She is due for colonoscopy. She declines setting this up at this point, reports she will do it when her hip issue is taken care of.      Review of Systems:   Pertinent items are noted in HPI or as " in patient entered ROS below, remainder of complete ROS is negative.     Active Medications:     Current Outpatient Medications:      aspirin-acetaminophen-caffeine (EXCEDRIN MIGRAINE) 250-250-65 MG tablet, Take 1 tablet by mouth 2 times daily as needed for headaches, Disp: , Rfl:      clonazePAM (KLONOPIN) 0.5 MG tablet, Take 0.5-1 tablets (0.25-0.5 mg) by mouth 2 times daily Reminder: 6 month course. Started 5/2019. Plan for taper starting 11/2019., Disp: 60 tablet, Rfl: 1     doxycycline hyclate (VIBRA-TABS) 100 MG tablet, Take 1 tablet (100 mg) by mouth 2 times daily, Disp: 14 tablet, Rfl: 0     escitalopram (LEXAPRO) 10 MG tablet, Take 1 tablet (10 mg) by mouth daily, Disp: 30 tablet, Rfl: 1     hydroxychloroquine (PLAQUENIL) 200 MG tablet, Take 1 tablet (200 mg) by mouth 2 times daily (with meals), Disp: 60 tablet, Rfl: 5     methylPREDNISolone (MEDROL DOSEPAK) 4 MG tablet therapy pack, Follow Package Directions, Disp: 21 tablet, Rfl: 0     Pregabalin (LYRICA) 200 MG capsule, Take 1 capsule (200 mg) by mouth 2 times daily, Disp: 60 capsule, Rfl: 1     docusate sodium (COLACE) 100 MG capsule, Take 1 capsule (100 mg) by mouth 2 times daily (Patient not taking: Reported on 6/19/2019), Disp: , Rfl:      furosemide (LASIX) 20 MG tablet, Take 1 tablet (20 mg) by mouth daily (Patient not taking: Reported on 6/19/2019), Disp: 1 tablet, Rfl: 0     methadone (DOLOPHINE-INTENSOL) 10 MG/ML (HIGH CONC) solution, Take 11.3 mLs (113 mg) by mouth daily Rufina Marcial [phone]    Dose as of 4/29/19 (Patient not taking: Reported on 10/30/2019), Disp: 300 mL, Rfl: 0     methyl salicylate-menthol (DUARTE-QURESHI) OINT ointment, Apply 28 g topically every 6 hours as needed (pain) (Patient not taking: Reported on 10/23/2019), Disp: , Rfl:      naloxone (NARCAN) 4 MG/0.1ML nasal spray, Spray 1 spray (4 mg) into one nostril alternating nostrils once as needed for opioid reversal repeat every 2-3 minutes until responsive (Patient  not taking: Reported on 10/23/2019), Disp: 1 each, Rfl: 1     pantoprazole (PROTONIX) 40 MG EC tablet, Take 1 tablet (40 mg) by mouth every morning (before breakfast) (Patient not taking: Reported on 10/23/2019), Disp: 90 tablet, Rfl: 3     polyethylene glycol (MIRALAX/GLYCOLAX) packet, Take 17 g by mouth 2 times daily (Patient not taking: Reported on 10/23/2019), Disp: , Rfl:      saccharomyces boulardii (FLORASTOR) 250 MG capsule, Take 1 capsule (250 mg) by mouth 2 times daily (Patient not taking: Reported on 2019), Disp: , Rfl:       Allergies:   Penicillins      Past Medical History:  Anemia  Basal cell carcinoma  Benzodiazepine dependence  Bipolar 1 disorder, mixed, moderate  Chronic pain  Epidural abscess  Fibromyalgia  Generalized anxiety disorder  GERD  Major depressive disorder   Migraine   Nephrolithiasis  Opiate dependence   Osteoarthritis  Osteoporosis  Primary sclerosing cholangitis  Peptic ulcer disease  Systemic lupus erythematosus   Hepatitis  Microcytic anemia   Leukopenia   Hydronephrosis   Seizure   Overdose   Orthostatic hypotension  Inflammatory liver disease  Loculated pleural effusion     Past Surgical History:  Back surgery, L4-5 epidural abscess: 2004  Back surgery, L3-4 spinal stenosis: 2004  Back surgery, psoas abscess: 2004  Bronchoscopy flexible: 2019   section x2  Cholecystectomy: 2004  Closed reduction, percutaneous pinning finger, combined: 2005  Colonoscopy  Gastrectomy: 2005  Gastrojejunostomy: 2011  Insert chest tube: 19  Laser holmium lithotripsy ureter(s), insert stent, combined  Laser holmium nephrolithotomy via percutaneous nephrostomy: 2012  Mammoplasty augmentation bilateral  Open reduction internal fixation left wrist: 2016  Reconstruct breast, implant prosthesis, combined   Thoracoscopic decortication lun2019    Family History:   Reviewed. No pertinent family history on file.       Social History:   Smoking status: never  "smoker  Alcohol use: sober for 10 years      Physical Exam:   BP 99/66 (BP Location: Right arm, Patient Position: Sitting, Cuff Size: Adult Regular)   Pulse 66   Ht 1.626 m (5' 4.02\")   Wt 58.1 kg (128 lb)   LMP  (LMP Unknown)   SpO2 100%   Breastfeeding No   BMI 21.96 kg/m     Constitutional: Alert, oriented, pleasant, no acute distress  Head: Normocephalic, atraumatic  Eyes: Extra-ocular movements intact, pupils equally round and reactive bilaterally, no scleral icterus  Cardiovascular: Regular rate and rhythm, 3/6 systolic murmur heard best at LUSB, no rubs or gallops  Respiratory: Good air movement bilaterally, lungs clear, no wheezes/rales/rhonchi  Musculoskeletal: No edema, normal muscle tone, normal gait. 8x8x2 cm firm fixed mass over left trochanter. Bilateral hips with full range of motion.  Skin: No rashes/lesions. Mild erythema over left hip mass.   Psychiatric: normal mentation, affect and mood     Assessment and Plan:  Hip mass, left  She has had a firm fixed mass on her left hip for nearly 4 weeks. She reports a history of hip abscesses at this site s/p hip surgery, and is concerned that this may be another abscess. Will do course of oral antibiotics, will see if there is any improvement with those. MRI ordered. Her labs were checked following our last visit on 11/01, so we will recheck them today to monitor trends.   - CRP inflammation  - Erythrocyte sedimentation rate  - CBC with platelets differential  - Basic metabolic panel  - MRI Hip Left w/o contrast  - doxycycline hyclate (VIBRA-TABS) 100 MG tablet  Dispense: 14 tablet; Refill: 0    Follow-up: pending MRI results     Scribe Disclosure:  Bebe CASTLELON, am serving as a scribe to document services personally performed by VINOD Mahmood CNP at this visit, based upon the provider's statements to me. All documentation has been reviewed by the aforementioned provider prior to being entered into the official medical record.   "   Portions of this medical record were completed by a scribe. UPON MY REVIEW AND AUTHENTICATION BY ELECTRONIC SIGNATURE, this confirms (a) I performed the applicable clinical services, and (b) the record is accurate.     VINOD Mahmood CNP

## 2019-11-13 NOTE — NURSING NOTE
Chief Complaint   Patient presents with     Recheck Medication     Patient is here to discuss lump on her hip        Latosha North LPN, EMT at 10:25 AM on 11/13/2019.

## 2019-11-15 NOTE — TELEPHONE ENCOUNTER
[facility] Call Center    Phone Message    May a detailed message be left on voicemail: yes    Reason for Call: Medication Refill Request    Has the patient contacted the pharmacy for the refill? Yes   Name of medication being requested: vitamin D  Provider who prescribed the medication: Lady Vargas  Pharmacy: [id number] [place]   Date medication is needed: ASAP//Patient states she would appreciate if the prescription is called in before 4pm today//     Action Taken: Message routed to:  [facility] ([facility]): rheum

## 2019-11-18 NOTE — TELEPHONE ENCOUNTER
Tried to call Demetra back. She does not have a set up voicemail, so I will send her a [employer] message.    Lindsey Veliz, BSN RN   Rheumatology Clinic Nurse   [facility]

## 2019-11-19 ENCOUNTER — ANCILLARY PROCEDURE (OUTPATIENT)
Dept: MRI IMAGING | Facility: CLINIC | Age: 64
End: 2019-11-19
Attending: NURSE PRACTITIONER
Payer: COMMERCIAL

## 2019-11-19 DIAGNOSIS — R22.42 HIP MASS, LEFT: ICD-10-CM

## 2019-11-19 NOTE — PROGRESS NOTES
"Department of Psychiatry  Addiction Medicine Program ([facility]) Diagnostic Assessment    Date of Service: 10/21/2019  Care Provider: Leighann Gentile, PhD, LP  Diagnostic interview time with patient (52894): 60 minutes  Psychological assessment and scoring (82543 + [id number]): 45 minutes  Review of records, interpretation, and report writing (71153): 60 minutes    IDENTIFYING DATA:  Demetra Richardson is a 63 year old female who prefers the name \"Radha\". She presented for the current evaluation as part of the intake process for the Addiction Medicine Program ([facility]). The following information was obtained through a clinical interview, self-report questionnaires completed by Radha, and chart review. Her PCP Dr. Merritt referred her to Dr. Velasquez for evaluation of depression on 7/9/19 and Dr. Velasquez referred her for this assessment for NATALIA-specific treatment. Chart review indicates prior diagnoses of depression, anxiety, borderline personality disorder, benzodiazepine use disorder, and opioid use disorder.     CHIEF COMPLAINT:  \"I need a refill of my medications and I want to get off methadone\"; \"process grief of my Dad\"    MENTAL HEALTH HISTORY AND DIAGNOSTIC INTERVIEW:   Radha's reported insomnia (difficulty falling and staying asleep) and low appetite that was triggered by her father's death 7 months ago (March, 2019). Reported 30 pound weight loss and inability to sleep more than 4 hours at a time. She does notice some recent gradual improvements with this being the best she has felt since since her father's death.     Depression: Radha described experiencing recurrent depression in her 20s and 30s that has improved since menopause. In the past month, she denied depressed lasting 2 weeks or longer or anhedonia. However, she did endorse feeling sad much of the time, emptiness, and feeling more discouraged about the future. She also endorsed low appetite, insomnia, and less energy.     She denied excessive worthlessness " or guilt, excessive crying, psychomotor agitation/retardation, poor concentration/ memory, indecisiveness, or suicide ideation.    Mireya/hypomania: She denied history of manic episode. She stated that when her significant other (Alfonso)  about 4 years ago she accidentally overdosed and checked herself into a hospital. She reported that during this time they diagnosed her with bipolar disorder, but she does not believe this diagnosis is accurate.    Anxiety: Radha reported worries that are difficult to control, particularly with regards to finances. Her father helped her out with rent and since his passing 6 months ago, she is worried that she is going to lose her apartment and whether she'll have enough money for food. She also worries about her health. She described feeling physically restless when anxious. She also experiences significant difficulty falling and staying asleep.    Panic: Denied, although chart review suggests she reported daily panic attacks in     Social anxiety: Denied, although noted that when she worked as a  she found public speaking very difficult.     Obsessions: Denied    Compulsions: Denied    Trauma/ PTSD: Denied    Psychosis: Denied    Eating concerns: Low appetite and loss of 30 pounds since her father's death in     Substance use:    Opioids:  Rx use began following multiple surgeries in her early 50s that escalated to problematic illicit use. She is currently on methadone and does not like having to go in daily to dose, but also notes that she has had problems with misuse of her take-homes when it was allowed.     Cannabis:  History of regular use in college, but denied current use. Her sister recommends that she resume use to help with anxiety, which Radha stated she is considering.     Benzodiazepines: Radha reported that use first became problematic in her late 40s that she obtained from her then-boyfriend who was a drug dealer. She sought  "treatment for benzo use several years ago. Currently reported that she is prescribed Klonopin for insomnia and has been taking more than prescribed to help sleep. She denied any use in the past 2 weeks (due to running out early), but denied symptoms of withdrawal. Chart review indicates history of seizure from withdrawal.     Stimulants  History of experimentation in college with cocaine and amphetamines     Alcohol:  College use, but denied any use in the past 24 years    PREVIOUS PSYCHIATRIC HISTORY:   Radha estimated that she completed 3 residential NATALIA treatment programs and started another 3 residential programs that she did not complete. Most recently, she completed [facility] (about 3-4 years ago) for problematic benzo use. Subsequently, she lived in a sober house and saw a therapist in private practice who was very helpful (Jay Butterfield with [facility]).      She could not remember precisely when she initiated methadone, but thinks it was around 2012. Previously, she was taking Suboxone.     FAMILY PSYCHIATRIC HISTORY:  Per chart review (Eva 7/9/19): \"Mother, maternal uncle, and maternal grandfather with alcohol use disorder. Older sister with depression and borderline personality disorder, with multiple suicide attempts.\"     SOCIAL HISTORY:  Radha has 8 siblings. She has a daughter (age 30) and son (age 24). 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 Currently is on SSI. She currently lives by herself in her apartment, but her father helped her out financially and with his recent death she is worried that she will not have the finances to maintain her living situation. She does not have strong social relationships. She finds comfort in her Evangelical broderick.     SAFETY ASSESSMENT:  Suicide:  Assessed level of immediate risk: None  Ideation: None  Plan: None  Means: None  Intent: None  Suicide attempt: She denied. Chart review suggests history of " overdose, but Radha stated it was accidental.     PSYCHOLOGICAL TESTING     M.I.N.I. International Neuropsychiatric Interview  Leslie Depression Inventory (BDI-II)  Generalized Anxiety Disorder (DARRIUS-7) scale  Stages of Change for alcohol or other drugs  Jenniffer Screening Instrument for Borderline Personality Disorder  Perceived Stress Scale (PSS)   PTSD Checklist (PCL) - 5  Adult ADHD Self-Report Screening Scale (ASRS-5)  PROMIS Sleep Disturbance Short Form  Mood Disorder Questionnaire (MDQ)  Drug Abuse Screening Test, DAST-10  Brief [place] Alcoholism Screening Test (MAST)    Leslie Depression Inventory (BDI-II)  Raw score= 14  Interpretation= Mild depression    Generalized Anxiety Disorder (DARRIUS-7) scale  Raw score= 18  Interpretation= Severe anxiety    Stages of Change for alcohol or other drugs  Important= 5/10  Confident: 5/10  Commitment: 5/10      Jenniffer Screening Instrument for Borderline Personality Disorder  Raw score= 0 /10 Interpretation= Not clinically significant     Adult ADHD Self-Report Screening Scale (ASRS-5)  Raw score= 11       Elevated symptom count= 1 / 6      PROMIS Sleep Disturbance Short Form   Raw score= 36  Interpretation= Moderate Difficulties  T-score: 67.5                   Interpretation of Psychological Tests    Radha reported clinically significant anxiety in the severe range of severity. She reported depression in the mild range of severity. Her sleep disturbance is moderately severe.     She denied traits of borderline personality disorder. She endorsed one clinically significant symptoms of ADHD: Difficulty unwinding and relaxing when have time to herself    MENTAL STATUS EXAM:   Mental Status Exam:  Appearance: Casually dressed and appropriately groomed   Behavior/relationship to examiner/demeanor: Guarded  Motor activity: Within normal range  Speech rate: Within normal range  Speech volume: Within normal range  Speech articulation:  Within normal range  Speech coherence:  Within  normal range  Speech spontaneity:  Within normal range  Mood (subjective report): Euthymic   Affect (objective appearance):  Euthymic with demonstrated range appropriate to context  Thought process: Linear, logical, goal-oriented  Thought content:  Negative for suicidal or homicidal ideation  Abnormal Perception: None disclosed or observed  Attention/Concentration:  Not formally assessed but appeared grossly intact  Memory:  Not formally assessed but appeared intact  Insight:  Adequate for safety    ASSESSMENT AND PLAN:   Radha presented with the initial request for an increase in her Klonopin (per chart review had been taking 1/2 BID) due to finding current dose ineffective and switching from Methadone to Suboxone so that she would not need to dose daily. Upon building rapport, Radha admitted that she has not been taking her Klonopin and prescribed and has run out early. She reported being quite embarrassed for not initially being honest and stated that she no longer wants any prescription for Klonopin after all. Radha endorsed symptoms of DARRIUS and MDD in remission, but denied symptoms of bipolar dx or borderline dx (suggested in chart). She meets criteria for opioid use disorder, on maintenance therapy, and likely also meets criteria for benzodiazepine use disorder, although she did not endorse NATALIA criteria. Radha may be minimizing her mental health symptoms. Further assessment of symptoms in the context of a trusting therapeutic relationship would be helpful in clarifying diagnoses.     1) Recommend attend part 2 of AMP assessment with Dr. Bradley on 10/23 where full treatment recommendations will be provided.   2) Radha is considering initiation of cannabis to help with anxiety per encouragement of her sister. I strongly counseled against medical cannabis as it is likely to exacerbate mental health symptoms  3) Radha had previously participated in [facility] for ages 55+ and expressed interest in  participating in this group again. I told her that I would look into the group and call with referral number. When attempting to call with this information ([facility]: [phone]), patient's voicemail was not set up so unable to leave voicemail.

## 2019-11-21 ENCOUNTER — TELEPHONE (OUTPATIENT)
Dept: INTERNAL MEDICINE | Facility: CLINIC | Age: 64
End: 2019-11-21

## 2019-11-21 ENCOUNTER — TELEPHONE (OUTPATIENT)
Dept: FAMILY MEDICINE | Facility: CLINIC | Age: 64
End: 2019-11-21

## 2019-11-21 NOTE — TELEPHONE ENCOUNTER
Results were reviewed with patient over the phone earlier today by another staff member.     Ness Bearden RN ([facility])

## 2019-11-21 NOTE — TELEPHONE ENCOUNTER
Spoke to patient to relay results. Relayed to patient that results are also in Mychart, patient states that she does not use mychart and would like for her mychart to be deactivated. Deactivated patients mychart per patients request. Scheduled follow up appointment for patient. Sarah Ruiz LPN 11/21/2019 11:26 AM

## 2019-11-21 NOTE — TELEPHONE ENCOUNTER
Health Call Center    Phone Message    May a detailed message be left on voicemail: yes    Reason for Call: Requesting Results   Name/type of test: MRI Hip Left w/o contrast     Date of test: 11/19/19  Was test done at a location other than [facility] (Please fill in the location if not [facility])?: No  Please call patient right away for results       Action Taken: Message routed to:  [facility] ([facility]): daniel

## 2019-11-26 ENCOUNTER — OFFICE VISIT (OUTPATIENT)
Dept: INTERNAL MEDICINE | Facility: CLINIC | Age: 64
End: 2019-11-26
Payer: COMMERCIAL

## 2019-11-26 VITALS
HEART RATE: 71 BPM | DIASTOLIC BLOOD PRESSURE: 62 MMHG | OXYGEN SATURATION: 96 % | WEIGHT: 130 LBS | BODY MASS INDEX: 22.3 KG/M2 | SYSTOLIC BLOOD PRESSURE: 98 MMHG

## 2019-11-26 DIAGNOSIS — R22.42 HIP MASS, LEFT: Primary | ICD-10-CM

## 2019-11-26 DIAGNOSIS — M79.7 FIBROMYALGIA: ICD-10-CM

## 2019-11-26 DIAGNOSIS — G89.29 OTHER CHRONIC PAIN: ICD-10-CM

## 2019-11-26 DIAGNOSIS — G43.009 MIGRAINE WITHOUT AURA AND WITHOUT STATUS MIGRAINOSUS, NOT INTRACTABLE: ICD-10-CM

## 2019-11-26 RX ORDER — PREGABALIN 200 MG/1
200 CAPSULE ORAL 2 TIMES DAILY
Qty: 60 CAPSULE | Refills: 1 | Status: SHIPPED | OUTPATIENT
Start: 2019-12-05 | End: 2020-01-24

## 2019-11-26 ASSESSMENT — PAIN SCALES - GENERAL: PAINLEVEL: SEVERE PAIN (6)

## 2019-11-26 NOTE — PATIENT INSTRUCTIONS
[facility] Medication Refill Request Information:  * Please contact your pharmacy regarding ANY request for medication refills.  ** [facility] Prescription Fax = [phone]  * Please allow 3 business days for routine medication refills.  * Please allow 5 business days for controlled substance medication refills.     [facility] Test Result notification information:  *You will be notified with in 7-10 days of your appointment day regarding the results of your test.  If you are on MyChart you will be notified as soon as the provider has reviewed the results and signed off on them.    [facility]: [phone]

## 2019-11-26 NOTE — NURSING NOTE
Chief Complaint   Patient presents with     Pain     pt here for hip pain for 6 weeks       Carlitos Moss CMA, EMT at 10:45 AM on 11/26/2019.

## 2019-11-26 NOTE — PROGRESS NOTES
[facility]  Primary Care Center   Cecilia HURSTSly London, VINOD CNP  11/26/2019      Chief Complaint:   Left hip mass    History of Present Illness:   Demetra Richardson is a 64 year old female with a history of bipolar 1 disorder, systemic lupus erythematosus, substance dependence, fibromyalgia, and osteoporosis who presents for evaluation of hip swelling.    Left hip mass: She still has a mass and swelling around the left hip. It is non painful unless she lays on her left hip. She completed the course of Doxycycline prescribed at her previous visit, and a course of methylprednisolone prior to that, without improvement.     Lung pain: She has lung pain and occasionally becomes short of breath. She recalls that when she had an infected abscess in her lung that it was not painful and she did not have a fever. Because of this history she is concerned about the possibility of her hip abscess being infected and she is not developing a fever/pain with.     Migraines: She wanted a refill of her Fiorinal or Fioricet (last prescribed in 2014). Dr. Merritt has not refilled it in the past and explained that is it is not an option for long-term treatment. She feels it is the only medication that works. Has used Exedrin without improvement. Dr. Merritt referred her to the headache clinic in the past, she is open to this to discuss options.    Review of Systems:   Pertinent items are noted in HPI, remainder of complete ROS is negative.      Active Medications:     Current Outpatient Medications:      aspirin-acetaminophen-caffeine (EXCEDRIN MIGRAINE) 250-250-65 MG tablet, Take 1 tablet by mouth 2 times daily as needed for headaches, Disp: , Rfl:      clonazePAM (KLONOPIN) 0.5 MG tablet, Take 0.5-1 tablets (0.25-0.5 mg) by mouth 2 times daily Reminder: 6 month course. Started 5/2019. Plan for taper starting 11/2019., Disp: 60 tablet, Rfl: 1     doxycycline hyclate (VIBRA-TABS) 100 MG tablet, Take 1 tablet (100 mg) by mouth 2 times  daily, Disp: 14 tablet, Rfl: 0     escitalopram (LEXAPRO) 10 MG tablet, Take 1 tablet (10 mg) by mouth daily, Disp: 30 tablet, Rfl: 1     hydroxychloroquine (PLAQUENIL) 200 MG tablet, Take 1 tablet (200 mg) by mouth 2 times daily (with meals), Disp: 60 tablet, Rfl: 5     methylPREDNISolone (MEDROL DOSEPAK) 4 MG tablet therapy pack, Follow Package Directions, Disp: 21 tablet, Rfl: 0     Pregabalin (LYRICA) 200 MG capsule, Take 1 capsule (200 mg) by mouth 2 times daily, Disp: 60 capsule, Rfl: 1     docusate sodium (COLACE) 100 MG capsule, Take 1 capsule (100 mg) by mouth 2 times daily (Patient not taking: Reported on 6/19/2019), Disp: , Rfl:      furosemide (LASIX) 20 MG tablet, Take 1 tablet (20 mg) by mouth daily (Patient not taking: Reported on 6/19/2019), Disp: 1 tablet, Rfl: 0     methadone (DOLOPHINE-INTENSOL) 10 MG/ML (HIGH CONC) solution, Take 11.3 mLs (113 mg) by mouth daily Rufina Marcial [phone]    Dose as of 4/29/19 (Patient not taking: Reported on 10/30/2019), Disp: 300 mL, Rfl: 0     methyl salicylate-menthol (DUARTE-QURESHI) OINT ointment, Apply 28 g topically every 6 hours as needed (pain) (Patient not taking: Reported on 10/23/2019), Disp: , Rfl:      naloxone (NARCAN) 4 MG/0.1ML nasal spray, Spray 1 spray (4 mg) into one nostril alternating nostrils once as needed for opioid reversal repeat every 2-3 minutes until responsive (Patient not taking: Reported on 10/23/2019), Disp: 1 each, Rfl: 1     pantoprazole (PROTONIX) 40 MG EC tablet, Take 1 tablet (40 mg) by mouth every morning (before breakfast) (Patient not taking: Reported on 10/23/2019), Disp: 90 tablet, Rfl: 3     polyethylene glycol (MIRALAX/GLYCOLAX) packet, Take 17 g by mouth 2 times daily (Patient not taking: Reported on 10/23/2019), Disp: , Rfl:      saccharomyces boulardii (FLORASTOR) 250 MG capsule, Take 1 capsule (250 mg) by mouth 2 times daily (Patient not taking: Reported on 5/31/2019), Disp: , Rfl:       Allergies:   Penicillins       Past Medical History:  Anemia  BCC  Benzodiazepine dependence  Bipolar 1 disorder  Fibromyalgia  GERD (gastroesophageal reflux disease)  Anxiety  Migraines  Depression  Nephrolithiasis  Osteoarthritis  Opiate dependence  Osteoporosis  Peptic ulcer disease  Systemic lupus erythematosus  Primary sclerosing cholangitis  Borderline personality disorder  Orthostatic hypotension  Inflammatory liver disease     Past Surgical History:  Back surgery, L4-5 epidural abscess: 2004  Back surgery, L3-4 spinal stenosis: 2004  Back surgery, psoas abscess: 2004  Bronchoscopy flexible: 2019   section x2  Cholecystectomy: 2004  Closed reduction, percutaneous pinning finger, combined: 2005  Colonoscopy  Gastrectomy: 2005  Gastrojejunostomy: 2011  Insert chest tube: 19  Laser holmium lithotripsy ureter(s), insert stent, combined  Laser holmium nephrolithotomy via percutaneous nephrostomy: 2012  Mammoplasty augmentation bilateral  Open reduction internal fixation left wrist: 2016  Reconstruct breast, implant prosthesis, combined   Thoracoscopic decortication lun2019     Social History:   Unmarried  Non smoker  Former alcohol abuse, 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    Physical Exam:   BP 98/62 (BP Location: Right arm, Patient Position: Sitting, Cuff Size: Adult Regular)   Pulse 71   Wt 59 kg (130 lb)   LMP  (LMP Unknown)   SpO2 96%   BMI 22.30 kg/m     Constitutional: Alert, oriented, pleasant, no acute distress  Head: Normocephalic, atraumatic  Eyes: Extra-ocular movements intact, pupils equally round and reactive bilaterally, no scleral icterus  ENT: Oropharynx clear, moist mucus membranes, good dentition  Neck: Supple, no lymphadenopathy, no thyromegaly  Cardiovascular: Regular rate and rhythm, no murmurs, rubs or gallops  Respiratory: Good air movement bilaterally, lungs clear, no wheezes/rales/rhonchi  Musculoskeletal: No edema, normal muscle tone, normal gait. Discreet 4x4cm mobile mass tender  to deep palpation over left trochanter, bilateral hips with full range of motion, no pain elicitedwith movement.   Neurologic: Alert and oriented, cranial nerves grossly intact, strength 5/5 throughout  Skin: No rashes/lesions  Psychiatric: normal mentation, affect and mood       Assessment and Plan:  Hip mass, left  Patient has a 6 week history of left hip mass treated over the last 2 weeks with oral steroids and Doxycycline. She previously reported a history of hip abscesses at the site of her hip surgery (SI, not left trochanter). Referral provided to Orthopedics for further evaluation.   - ORTHOPEDICS ADULT REFERRAL    Migraine without aura and without status migrainosus, not intractable  Patient would like Fiornal, which helps her headaches. Explained to her that this is not a good long-term medication and given her complex chronic headache and hx substance abuse. Referral provided to the headache clinic so she can review treatment options.  - NEUROLOGY ADULT REFERRAL    Fibromyalgia, Other chronic pain  Works well to control pain, future refill provided.   - Pregabalin (LYRICA) 200 MG capsule  Dispense: 60 capsule; Refill: 1     Follow-up: as needed     Scribe Disclosure:  I, Judah Shane, am serving as a scribe to document services personally performed by VINOD Mahmood CNP at this visit, based upon the provider's statements to me. All documentation has been reviewed by the aforementioned provider prior to being entered into the official medical record.    Portions of this medical record were completed by a scribe. UPON MY REVIEW AND AUTHENTICATION BY ELECTRONIC SIGNATURE, this confirms (a) I performed the applicable clinical services, and (b) the record is accurate.     VINOD Mahmood CNP

## 2019-11-29 NOTE — TELEPHONE ENCOUNTER
DIAGNOSIS: Hip mass, left    APPOINTMENT DATE: 12.9.19   NOTES STATUS DETAILS   OFFICE NOTE from referring provider Internal 11.26.19 Dr. London  11.13.19   OFFICE NOTE from other specialist N/A    DISCHARGE SUMMARY from hospital N/A    DISCHARGE REPORT from the ER N/A    OPERATIVE REPORT N/A    MEDICATION LIST Internal    IMPLANT RECORD/STICKER N/A    LABS     CBC/DIFF Internal 11.13.19   CULTURES N/A    INJECTIONS DONE IN RADIOLOGY N/A    MRI Internal 11.19.19 hip   CT SCAN N/A    XRAYS (IMAGES & REPORTS) N/A    TUMOR     PATHOLOGY  Slides & report N/A

## 2019-12-09 ENCOUNTER — PRE VISIT (OUTPATIENT)
Dept: ORTHOPEDICS | Facility: CLINIC | Age: 64
End: 2019-12-09

## 2019-12-10 ENCOUNTER — OFFICE VISIT (OUTPATIENT)
Dept: ORTHOPEDICS | Facility: CLINIC | Age: 64
End: 2019-12-10
Attending: NURSE PRACTITIONER
Payer: COMMERCIAL

## 2019-12-10 VITALS — HEIGHT: 63 IN | WEIGHT: 130 LBS | BODY MASS INDEX: 23.04 KG/M2

## 2019-12-10 DIAGNOSIS — R22.42 MASS OF HIP REGION, LEFT: Primary | ICD-10-CM

## 2019-12-10 ASSESSMENT — MIFFLIN-ST. JEOR: SCORE: 1113.81

## 2019-12-10 NOTE — PROGRESS NOTES
"Sports Medicine Clinic Visit    PCP: Fredy Merritt SEVERIANO Richardson is a 64 year old female who is seen  in consultation at the request of Dr. London presenting with a mass on her left hip.     Injury: None    Location of Pain: left hip  Duration of Pain: 6 week(s)  Pain is better with: Nothing  Pain is worse with: Wakes her up at night  Additional Features:   Treatment so far consists of: Nothing  Prior History of related problems:     Ht 1.6 m (5' 3\")   Wt 59 kg (130 lb)   LMP  (LMP Unknown)   BMI 23.03 kg/m           PMH:  Past Medical History:   Diagnosis Date     Anemia     2/2 h/p gastrectomy with inability to absorb oral iron     Basal cell carcinoma     Left-sided, skin over over medial orbit/nasal bone. Removed Oct 2018.     Benzodiazepine dependence (H)     With h/o withdrawal seizure x 1     Bipolar 1 disorder, mixed, moderate (H) 2/7/2013     Chronic pain     Back, legs.     Epidural abscess 2005    x4     Fibromyalgia      Generalised anxiety disorder      GERD (gastroesophageal reflux disease)      Major depressive disorder      Migraine      Nephrolithiasis     S/p left sided lithotripsy     Opiate dependence (H)      Osteoarthritis      Osteoporosis      Primary sclerosing cholangitis 2005     PUD (peptic ulcer disease)      SLE (systemic lupus erythematosus) (H)        Active problem list:  Patient Active Problem List   Diagnosis     Chronic pain     Nephrolithiasis     Lupus (H)     Anxiety disorder     Sclerosing cholangitis     Hepatitis     Microcytic anemia     Migraines     Leukopenia     Hydronephrosis     Seizure (H)     Bipolar 1 disorder, mixed, moderate (H)     Iron deficiency anemia     Depression     Overdose     Behavior disturbance     Left wrist pain     Borderline personality disorder (H)     BCC (basal cell carcinoma), face     Benzodiazepine dependence (H)     Chronic pain disorder     Opioid dependence on agonist therapy (H)     Orthostatic hypotension     S/P " partial gastrectomy     Drug-induced mood disorder (H)     Polysubstance overdose     Inflammatory liver disease     Loculated pleural effusion       FH:  Family History   Problem Relation Age of Onset     Family History Negative Mother      Family History Negative Father        SH:  Social History     Socioeconomic History     Marital status: Single     Spouse name: Not on file     Number of children: Not on file     Years of education: Not on file     Highest education level: Not on file   Occupational History     Not on file   Social Needs     Financial resource strain: Not on file     Food insecurity:     Worry: Not on file     Inability: Not on file     Transportation needs:     Medical: Not on file     Non-medical: Not on file   Tobacco Use     Smoking status: Never Smoker     Smokeless tobacco: Never Used   Substance and Sexual Activity     Alcohol use: No     Comment: none in 10 years     Drug use: No     Sexual activity: Not Currently   Lifestyle     Physical activity:     Days per week: Not on file     Minutes per session: Not on file     Stress: Not on file   Relationships     Social connections:     Talks on phone: Not on file     Gets together: Not on file     Attends [employer] service: Not on file     Active member of club or organization: Not on file     Attends meetings of clubs or organizations: Not on file     Relationship status: Not on file     Intimate partner violence:     Fear of current or ex partner: Not on file     Emotionally abused: Not on file     Physically abused: Not on file     Forced sexual activity: Not on file   Other Topics Concern     Parent/sibling w/ CABG, MI or angioplasty before 65F 55M? Not Asked   Social History Narrative    Intake 4/16/15:        H/o divorce but most recently living with JUANIS Hamilton. Alfonso recently passed away.         In past employed as a .         Tobacco use: denies    Alcohol use: denies    Drug use: daily pot use for 30 years. Currently with  sobriety.            MEDS:  See EMR, reviewed  ALL:  See EMR, reviewed    REVIEW OF SYSTEMS:  CONSTITUTIONAL:NEGATIVE for fever, chills, change in weight  INTEGUMENTARY/SKIN: NEGATIVE for worrisome rashes, moles or lesions  EYES: NEGATIVE for vision changes or irritation  ENT/MOUTH: NEGATIVE for ear, mouth and throat problems  RESP:NEGATIVE for significant cough or SOB  BREAST: NEGATIVE for masses, tenderness or discharge  CV: NEGATIVE for chest pain, palpitations or peripheral edema  GI: NEGATIVE for nausea, abdominal pain, heartburn, or change in bowel habits  :NEGATIVE for frequency, dysuria, or hematuria  :NEGATIVE for frequency, dysuria, or hematuria  NEURO: NEGATIVE for weakness, dizziness or paresthesias  ENDOCRINE: NEGATIVE for temperature intolerance, skin/hair changes  HEME/ALLERGY/IMMUNE: NEGATIVE for bleeding problems  PSYCHIATRIC: NEGATIVE for changes in mood or affect        Subjective: This 64-year-old female has an easily palpable subcutaneous mass the size of a baseball along the lateral aspect of her left hip.  She says it has been there for 2 months.  It bothers her when she rolls over onto the hip in bed at night.  Otherwise it does not ache and cause her discomfort during the day.  She cannot think of a reason for the lump appearing.  She has lost 30 pounds recently and she did have a fall onto her left side approximately a year ago.  She had rib fractures at the time.  She does not remember her hip being an issue in that fall.  She seems to be an abscess former by history.  For no known reason she developed a lumbar epidural abscess in July 2004 and a subsequent psoas abscess in November 2004 that needed to be drained.  Just 7 months ago she had an empyema drained.  She indicates that her lung specialist was not sure of the reason why she developed the empyema.  She is under the impression that they felt it could be related to her previous rib fracture.  She does have a history of lupus and  remains on Plaquenil.    She is recently had normal CRP and sedimentation rate with a normal white cell count.  An ultrasound was nonspecific of the area but did not show a fluid collection.  An MRI is also nonspecific.  There is no obvious fluid collection or signs of abscess on the MRI.    Objective: She does have this freely mobile well-circumscribed nearly baseball sized lump that is nontender along the lateral aspect of her hip in the soft tissues.  She is nontender directly at the greater trochanter.  Normal range of motion at the hip.  Overlying skin is normal.  No radiating discomfort in the extremity or sciatica symptoms.    Assessment left-sided subcutaneous mass left hip.        Plan: I explained to her the possibility that she could have had an area of fat necrosis associated with her fall a year ago and it is only now more obvious or prominent to her because of her recent weight loss.  However this is a very large, well-circumscribed, firm collection and I would think it would be something that she would have noticed prior to the last 2 months if it were present in the past.  I will refer her to Dr. Lemos in consultation to go over her MRI and discuss options.

## 2019-12-10 NOTE — TELEPHONE ENCOUNTER
RECORDS RECEIVED FROM: Left hip mass, MRI in Epic. Referre by Dr. Gambino    DATE RECEIVED: Dec 11, 2019     NOTES STATUS DETAILS   OFFICE NOTE from referring provider Internal  Crhis Gambino MD           12/10/19   11:12 AM   See 12/9/19 pre-visit   Balbina Montague CMA

## 2019-12-10 NOTE — LETTER
"  12/10/2019      RE: Demetra Richardson  [address]  [place] [address]       Sports Medicine Clinic Visit    PCP: Fredy Merritt    Demetra Katzzacharyderrek is a 64 year old female who is seen  in consultation at the request of Dr. London presenting with a mass on her left hip.     Injury: None    Location of Pain: left hip  Duration of Pain: 6 week(s)  Pain is better with: Nothing  Pain is worse with: Wakes her up at night  Additional Features:   Treatment so far consists of: Nothing  Prior History of related problems:     Ht 1.6 m (5' 3\")   Wt 59 kg (130 lb)   LMP  (LMP Unknown)   BMI 23.03 kg/m            PMH:  Past Medical History:   Diagnosis Date     Anemia     2/2 h/p gastrectomy with inability to absorb oral iron     Basal cell carcinoma     Left-sided, skin over over medial orbit/nasal bone. Removed Oct 2018.     Benzodiazepine dependence (H)     With h/o withdrawal seizure x 1     Bipolar 1 disorder, mixed, moderate (H) 2/7/2013     Chronic pain     Back, legs.     Epidural abscess 2005    x4     Fibromyalgia      Generalised anxiety disorder      GERD (gastroesophageal reflux disease)      Major depressive disorder      Migraine      Nephrolithiasis     S/p left sided lithotripsy     Opiate dependence (H)      Osteoarthritis      Osteoporosis      Primary sclerosing cholangitis 2005     PUD (peptic ulcer disease)      SLE (systemic lupus erythematosus) (H)        Active problem list:  Patient Active Problem List   Diagnosis     Chronic pain     Nephrolithiasis     Lupus (H)     Anxiety disorder     Sclerosing cholangitis     Hepatitis     Microcytic anemia     Migraines     Leukopenia     Hydronephrosis     Seizure (H)     Bipolar 1 disorder, mixed, moderate (H)     Iron deficiency anemia     Depression     Overdose     Behavior disturbance     Left wrist pain     Borderline personality disorder (H)     BCC (basal cell carcinoma), face     Benzodiazepine dependence (H)     " Chronic pain disorder     Opioid dependence on agonist therapy (H)     Orthostatic hypotension     S/P partial gastrectomy     Drug-induced mood disorder (H)     Polysubstance overdose     Inflammatory liver disease     Loculated pleural effusion       FH:  Family History   Problem Relation Age of Onset     Family History Negative Mother      Family History Negative Father        SH:  Social History     Socioeconomic History     Marital status: Single     Spouse name: Not on file     Number of children: Not on file     Years of education: Not on file     Highest education level: Not on file   Occupational History     Not on file   Social Needs     Financial resource strain: Not on file     Food insecurity:     Worry: Not on file     Inability: Not on file     Transportation needs:     Medical: Not on file     Non-medical: Not on file   Tobacco Use     Smoking status: Never Smoker     Smokeless tobacco: Never Used   Substance and Sexual Activity     Alcohol use: No     Comment: none in 10 years     Drug use: No     Sexual activity: Not Currently   Lifestyle     Physical activity:     Days per week: Not on file     Minutes per session: Not on file     Stress: Not on file   Relationships     Social connections:     Talks on phone: Not on file     Gets together: Not on file     Attends Synagogue service: Not on file     Active member of club or organization: Not on file     Attends meetings of clubs or organizations: Not on file     Relationship status: Not on file     Intimate partner violence:     Fear of current or ex partner: Not on file     Emotionally abused: Not on file     Physically abused: Not on file     Forced sexual activity: Not on file   Other Topics Concern     Parent/sibling w/ CABG, MI or angioplasty before 65F 55M? Not Asked   Social History Narrative    Intake 4/16/15:        H/o divorce but most recently living with JUANIS Hamilton. Alfonso recently passed away.         In past employed as a .          Tobacco use: denies    Alcohol use: denies    Drug use: daily pot use for 30 years. Currently with sobriety.            MEDS:  See EMR, reviewed  ALL:  See EMR, reviewed    REVIEW OF SYSTEMS:  CONSTITUTIONAL:NEGATIVE for fever, chills, change in weight  INTEGUMENTARY/SKIN: NEGATIVE for worrisome rashes, moles or lesions  EYES: NEGATIVE for vision changes or irritation  ENT/MOUTH: NEGATIVE for ear, mouth and throat problems  RESP:NEGATIVE for significant cough or SOB  BREAST: NEGATIVE for masses, tenderness or discharge  CV: NEGATIVE for chest pain, palpitations or peripheral edema  GI: NEGATIVE for nausea, abdominal pain, heartburn, or change in bowel habits  :NEGATIVE for frequency, dysuria, or hematuria  :NEGATIVE for frequency, dysuria, or hematuria  NEURO: NEGATIVE for weakness, dizziness or paresthesias  ENDOCRINE: NEGATIVE for temperature intolerance, skin/hair changes  HEME/ALLERGY/IMMUNE: NEGATIVE for bleeding problems  PSYCHIATRIC: NEGATIVE for changes in mood or affect        Subjective: This 64-year-old female has an easily palpable subcutaneous mass the size of a baseball along the lateral aspect of her left hip.  She says it has been there for 2 months.  It bothers her when she rolls over onto the hip in bed at night.  Otherwise it does not ache and cause her discomfort during the day.  She cannot think of a reason for the lump appearing.  She has lost 30 pounds recently and she did have a fall onto her left side approximately a year ago.  She had rib fractures at the time.  She does not remember her hip being an issue in that fall.  She seems to be an abscess former by history.  For no known reason she developed a lumbar epidural abscess in July 2004 and a subsequent psoas abscess in November 2004 that needed to be drained.  Just 7 months ago she had an empyema drained.  She indicates that her lung specialist was not sure of the reason why she developed the empyema.  She is under the impression  that they felt it could be related to her previous rib fracture.  She does have a history of lupus and remains on Plaquenil.    She is recently had normal CRP and sedimentation rate with a normal white cell count.  An ultrasound was nonspecific of the area but did not show a fluid collection.  An MRI is also nonspecific.  There is no obvious fluid collection or signs of abscess on the MRI.    Objective: She does have this freely mobile well-circumscribed nearly baseball sized lump that is nontender along the lateral aspect of her hip in the soft tissues.  She is nontender directly at the greater trochanter.  Normal range of motion at the hip.  Overlying skin is normal.  No radiating discomfort in the extremity or sciatica symptoms.    Assessment left-sided subcutaneous mass left hip.    Plan: I explained to her the possibility that she could have had an area of fat necrosis associated with her fall a year ago and it is only now more obvious or prominent to her because of her recent weight loss.  However this is a very large, well-circumscribed, firm collection and I would think it would be something that she would have noticed prior to the last 2 months if it were present in the past.  I will refer her to Dr. Lemos in consultation to go over her MRI and discuss options.    Chris Gambino MD

## 2019-12-11 ENCOUNTER — TELEPHONE (OUTPATIENT)
Dept: ORTHOPEDICS | Facility: CLINIC | Age: 64
End: 2019-12-11

## 2019-12-11 ENCOUNTER — PRE VISIT (OUTPATIENT)
Dept: ORTHOPEDICS | Facility: CLINIC | Age: 64
End: 2019-12-11

## 2019-12-11 DIAGNOSIS — F41.9 ANXIETY DISORDER, UNSPECIFIED TYPE: ICD-10-CM

## 2019-12-11 NOTE — TELEPHONE ENCOUNTER
Returned call to patient, rescheduling her with Dr. Lemos for next week, 12/18/19. She was pleased with the earlier option and had no further questions.

## 2019-12-11 NOTE — TELEPHONE ENCOUNTER
[facility] Call Center    Phone Message    May a detailed message be left on voicemail: yes    Reason for Call: Other: Pt called in to cancel her appointment for today with Dr. Lemos. Pt stated that Dariela a part of Dr. Gambino care team made her the appointment. Writer did try and offer the next available appointment of 1/8/20,but pt declined. Pt stated that Dariela needs to fit her into the schedule on Monday 12/16. Writer did tell pt that there is no  available appointment that day. Pt wanted a message to be sent to Dariela. Please advise      Action Taken: Message routed to:  [facility] ([facility] Sports Med

## 2019-12-11 NOTE — TELEPHONE ENCOUNTER
[facility] Call Center    Phone Message    May a detailed message be left on voicemail: yes    Reason for Call: Medication Refill Request    Has the patient contacted the pharmacy for the refill? Yes   Name of medication being requested: escitalopram 10mg, clonazepam 0.5mg  Provider who prescribed the medication: Juan C Velasquez MD  Pharmacy: [facility] [id number] [place] [address] AT [facility]  Date medication is needed: 12/18/19    Patient is scheduled for CDE with Dr. Fredy Bradley on 1/22/20. These meds were last filled by Dr. Velasquez and patient said she has about one week left of medications.      Action Taken: Message routed to:  Other: Nursing pool

## 2019-12-11 NOTE — TELEPHONE ENCOUNTER
Last seen: 9/24/19 - Eva @[facility]  RTC: NA  Cancel: 10/23/19 (Kirk), 12/4/19 (Kuldip)  No-show: None  Next appt: 1/22/20     Medication requested: escitalopram (LEXAPRO) 10 MG tablet  Directions: Take 1 tablet (10 mg) by mouth daily   Qty: 30 w/1 refill  Last rx written: 10/22/19    Medication requested: clonazePAM (KLONOPIN) 0.5 MG tablet  Directions: Take 0.5-1 tablets (0.25-0.5 mg) by mouth 2 times daily Reminder: 6 month course. Started 5/2019. Plan for taper starting 11/2019.  Qty: 60  Last refilled: 11/25/19, 10/27/19, 9/30/19 per [place] . **(Not due for refill until 12/23/19)     Medication refill request routed to provider for authorization d/t cancellation x2.

## 2019-12-13 RX ORDER — CLONAZEPAM 0.5 MG/1
.25-.5 TABLET ORAL 2 TIMES DAILY
Qty: 60 TABLET | Refills: 0 | Status: SHIPPED | OUTPATIENT
Start: 2019-12-24 | End: 2020-01-22

## 2019-12-13 NOTE — TELEPHONE ENCOUNTER
Per [facility] [address] [place] [address] AT [facility], the pt has 8 refills remaining on her rx for escitalopram (LEXAPRO) 10 MG tablet (ordered by PCP, Fredy Merritt). She has no refills remaining on clonazePAM (KLONOPIN) 0.5 MG tablet. She last filled 60 tablets on 11/26/19.

## 2019-12-16 NOTE — TELEPHONE ENCOUNTER
Called patient to let her know that  has 8 refills remaining on her Lexapro at the pharmacy, and clonazepam has been sent electronically.  Let her know that the clonazepam can be filled on Dec 24, which she understands as the pharmacy explained this to her.  She said that she wishes the clonazepam can be increased as she only takes half a tablet twice a day.  Encouraged her to discuss this with Dr. Bradley on Jan 22.      She said that she will be going to [place] on Jan 23 because her daughter is expecting her first baby.  She is very excited to be a first-time grandmother.      Routed to Dr. Velasquez and Dr. Bradley for FYI.

## 2020-01-03 ENCOUNTER — OFFICE VISIT (OUTPATIENT)
Dept: ORTHOPEDICS | Facility: CLINIC | Age: 65
End: 2020-01-03
Payer: COMMERCIAL

## 2020-01-03 VITALS — HEIGHT: 63 IN | WEIGHT: 123 LBS | BODY MASS INDEX: 21.79 KG/M2

## 2020-01-03 DIAGNOSIS — M79.89 SOFT TISSUE MASS: Primary | ICD-10-CM

## 2020-01-03 ASSESSMENT — ENCOUNTER SYMPTOMS
MUSCLE CRAMPS: 0
BACK PAIN: 1
HEADACHES: 0
SEIZURES: 0
LOSS OF CONSCIOUSNESS: 0
JOINT SWELLING: 0
MYALGIAS: 1
NECK PAIN: 0
SPEECH CHANGE: 0
ARTHRALGIAS: 1
DIZZINESS: 1
MEMORY LOSS: 0
DISTURBANCES IN COORDINATION: 0

## 2020-01-03 ASSESSMENT — MIFFLIN-ST. JEOR: SCORE: 1081.92

## 2020-01-03 NOTE — LETTER
1/3/2020       RE: Demetra Richardson  [address]  [place] [address]     Dear Colleague,    Thank you for referring your patient, Demetra Richardson, to the [facility] at [facility]. Please see a copy of my visit note below.    This patient noticed an asymptomatic left hip mass in November 2019.  She does not think it is grown.  She has recent weight loss of 5 pounds and lost 30 pounds earlier which she cannot account for.  She has a history of lupus.  I reviewed her patient survey information in the EMR.    On examination she is alert oriented has normal mood affect and is in no acute distress.  Respirations are regular and unlabored eyes are nonicteric.  She is able to walk comfortably without an assistive device.  In her left posterior lateral hip area she has a palpable visible soft tissue mass with a slight darkening of the overlying skin but no erythema.  This seems mobile with respect to the underlying muscle.  Its nontender.  There is no edema or erythema in the leg and she has normal hip knee and ankle motion.    I reviewed the MRI which shows inflammation of the septations in the subcutaneous tissue but the lack of discrete mass.    This may be trauma although the patient states that she had a fall here about a year ago has not had anything since then.  It could also be an atypical presentation of benign fatty tumor.  I am inclined to observe this.  We will do an repeat MRI in 4 months.  The patient is in agreement with this plan.  I have answered all of her questions.    Again, thank you for allowing me to participate in the care of your patient.      Sincerely,    Pedro Lemos MD       Patient seen at [facility] ER on 4/7 for abdominal pain    Diagnosed with microscopic hematuria and recommended that she be seen by Urology    Is requesting Urology Consult in the [place] area, hoping that there is one at [facility] as she will be moving to [place] by June 1.    Referral pended, please edit.     LIBRA Benjamin

## 2020-01-03 NOTE — NURSING NOTE
"Reason For Visit:   Chief Complaint   Patient presents with     Consult     left hip mass // notcied mass in november       Ht 1.6 m (5' 2.99\")   Wt 55.8 kg (123 lb)   LMP  (LMP Unknown)   BMI 21.79 kg/m      Pain Assessment  Patient Currently in Pain: Yes  0-10 Pain Scale: 5  Primary Pain Location: Hip(left hip)  Pain Descriptors: (\"comes and goes\")  Alleviating Factors: (\"pressure\")  Aggravating Factors: Lying(\"pressure\")    Brianda Machuca ATC    "

## 2020-01-03 NOTE — PROGRESS NOTES
This patient noticed an asymptomatic left hip mass in November 2019.  She does not think it is grown.  She has recent weight loss of 5 pounds and lost 30 pounds earlier which she cannot account for.  She has a history of lupus.  I reviewed her patient survey information in the EMR.    On examination she is alert oriented has normal mood affect and is in no acute distress.  Respirations are regular and unlabored eyes are nonicteric.  She is able to walk comfortably without an assistive device.  In her left posterior lateral hip area she has a palpable visible soft tissue mass with a slight darkening of the overlying skin but no erythema.  This seems mobile with respect to the underlying muscle.  Its nontender.  There is no edema or erythema in the leg and she has normal hip knee and ankle motion.    I reviewed the MRI which shows inflammation of the septations in the subcutaneous tissue but the lack of discrete mass.    This may be trauma although the patient states that she had a fall here about a year ago has not had anything since then.  It could also be an atypical presentation of benign fatty tumor.  I am inclined to observe this.  We will do an repeat MRI in 4 months.  The patient is in agreement with this plan.  I have answered all of her questions.

## 2020-01-04 ASSESSMENT — PATIENT HEALTH QUESTIONNAIRE - PHQ9: SUM OF ALL RESPONSES TO PHQ QUESTIONS 1-9: 0

## 2020-01-22 ENCOUNTER — OFFICE VISIT (OUTPATIENT)
Dept: PSYCHIATRY | Facility: CLINIC | Age: 65
End: 2020-01-22
Attending: PSYCHIATRY & NEUROLOGY
Payer: COMMERCIAL

## 2020-01-22 VITALS
BODY MASS INDEX: 22.11 KG/M2 | DIASTOLIC BLOOD PRESSURE: 60 MMHG | SYSTOLIC BLOOD PRESSURE: 91 MMHG | WEIGHT: 124.8 LBS | HEART RATE: 98 BPM

## 2020-01-22 DIAGNOSIS — G89.29 OTHER CHRONIC PAIN: ICD-10-CM

## 2020-01-22 DIAGNOSIS — F41.9 ANXIETY DISORDER, UNSPECIFIED TYPE: ICD-10-CM

## 2020-01-22 DIAGNOSIS — F11.21 OPIOID USE DISORDER, SEVERE, IN SUSTAINED REMISSION, ON MAINTENANCE THERAPY, DEPENDENCE (H): ICD-10-CM

## 2020-01-22 DIAGNOSIS — F13.20 SEDATIVE, HYPNOTIC OR ANXIOLYTIC DEPENDENCE (H): Primary | ICD-10-CM

## 2020-01-22 PROCEDURE — G0463 HOSPITAL OUTPT CLINIC VISIT: HCPCS | Mod: ZF

## 2020-01-22 RX ORDER — CLONAZEPAM 0.5 MG/1
.25-.5 TABLET ORAL 2 TIMES DAILY PRN
Qty: 50 TABLET | Refills: 0 | Status: SHIPPED | OUTPATIENT
Start: 2020-01-22 | End: 2020-02-19

## 2020-01-22 ASSESSMENT — PAIN SCALES - GENERAL: PAINLEVEL: NO PAIN (0)
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
## 2020-01-23 ENCOUNTER — TELEPHONE (OUTPATIENT)
Dept: PSYCHIATRY | Facility: CLINIC | Age: 65
End: 2020-01-23

## 2020-01-23 DIAGNOSIS — M79.7 FIBROMYALGIA: ICD-10-CM

## 2020-01-23 DIAGNOSIS — F41.9 ANXIETY DISORDER, UNSPECIFIED TYPE: ICD-10-CM

## 2020-01-23 ASSESSMENT — PATIENT HEALTH QUESTIONNAIRE - PHQ9: SUM OF ALL RESPONSES TO PHQ QUESTIONS 1-9: 6

## 2020-01-24 RX ORDER — PREGABALIN 200 MG/1
200 CAPSULE ORAL 2 TIMES DAILY
Qty: 60 CAPSULE | Refills: 1 | Status: SHIPPED | OUTPATIENT
Start: 2020-02-12 | End: 2020-03-20

## 2020-01-24 RX ORDER — CLONAZEPAM 0.5 MG/1
TABLET ORAL
Qty: 60 TABLET | Refills: 1 | OUTPATIENT
Start: 2020-01-24

## 2020-01-24 NOTE — TELEPHONE ENCOUNTER
Received RF request via the refill team     Last seen: 1/22/2020  RTC: 4 weeks  Cancel: none  No-show: none  Next appt: 2/19/2020      Medication requested: clonazePAM (KLONOPIN) 0.5 MG tablet  Directions: Take 0.5-1 tablets (0.25-0.5 mg) by mouth 2 times daily as needed for anxiety Reminder: 6 month course. Started 5/2019. Plan for taper starting 11/2019. - Oral  Qty: 50  Last Rx written 1/22/2020  [place] on 1/22, 12/23, 11/25, 10/27         Plan per 1/22 office visit:    PSYCHOTROPIC MEDICATIONS:               - reduce clonazepam to 0.5mg BID PRN #50 for 30 days. She should not be given a new Rx prior to her next visit. The plan at that time will be to reduce her to 0.5mg daily PRN.       Patient does not need refill.  She received 50 tabs on Jan 22 and per Dr. Bradley is not to receive any more Rx until her next appointment.

## 2020-02-17 ENCOUNTER — OFFICE VISIT (OUTPATIENT)
Dept: PSYCHIATRY | Facility: CLINIC | Age: 65
End: 2020-02-17
Attending: PSYCHOLOGIST
Payer: COMMERCIAL

## 2020-02-17 ENCOUNTER — TELEPHONE (OUTPATIENT)
Dept: PSYCHIATRY | Facility: CLINIC | Age: 65
End: 2020-02-17

## 2020-02-17 DIAGNOSIS — F13.20 BENZODIAZEPINE DEPENDENCE (H): Primary | ICD-10-CM

## 2020-02-17 NOTE — PATIENT INSTRUCTIONS
Thank you for coming to the PSYCHIATRY CLINIC.    Lab Testing:  If you had lab testing today and your results are reassuring or normal they will be mailed to you or sent through [employer] within 7 days.   If the lab tests need quick action we will call you with the results.  The phone number we will call with results is # [phone] (home) . If this is not the best number please call our clinic and change the number.    Medication Refills:  If you need any refills please call your pharmacy and they will contact us. Our fax number for refills is [phone]. Please allow three business for refill processing.   If you need to  your refill at a new pharmacy, please contact the new pharmacy directly. The new pharmacy will help you get your medications transferred.     Scheduling:  If you have any concerns about today's visit or wish to schedule another appointment please call our office during normal business hours [phone] (8-5:00 M-F)    Contact Us:  Please call [phone] during business hours (8-5:00 M-F).  If after clinic hours, or on the weekend, please call  [phone].    Financial Assistance [phone]  [facility] Billing [phone]  Central Billing Office, [facility]: [phone]  [facility] Billing [phone]  Medical Records [phone]      MENTAL HEALTH CRISIS NUMBERS:  [facility]:   [facility] - [phone]   Crisis Residence [facility] - [phone]   Walk-In Counseling [facility] [phone]   COPE 24/7 [place] Mobile Team for Adults - [[phone]]; Child - [[phone]]        [facility]:   [facility] - [phone]   Walk-in counseling [facility] - [phone]   Walk-in counseling [facility] - [phone]   Crisis Residence [facility] - [phone]   Urgent Care Adult Mental Health:   --Drop-in, 24/7 crisis line, and [place] Mobile Team  [[phone]]    CRISIS TEXT LINE: Text [phone] from anywhere, anytime, any crisis 24/7;    OR SEE www.crisistextline.org     National Suicide Prevention Lifeline: 254-384-HRCG (811-650-0700)    Poison Control Center - [phone]    CHILD: [facility] needs assessment team - [phone]     [facility] Lifeline - [phone]; or [facility] Lifeline - [phone]    If you have a medical emergency please call 911or go to the nearest ER.                    _____________________________________________    Again thank you for choosing PSYCHIATRY CLINIC and please let us know how we can best partner with you to improve you and your family's health.  You may be receiving a survey regarding this appointment. We would love to have your feedback, both positive and negative. The survey is done by an external company, so your answers are anonymous.

## 2020-02-17 NOTE — TELEPHONE ENCOUNTER
----- Message from Yasmin Muhumed sent at 2/17/2020  4:10 PM CST -----   Health Call Center    Phone Message    May a detailed message be left on voicemail: yes     Reason for Call: Medication Refill Request    Has the patient contacted the pharmacy for the refill? Yes   Name of medication being requested: Klonopin 0.5mg  Provider who prescribed the medication: Fredy Bradley   Pharmacy: [facility] [place] [address]   Date medication is needed: pt is all out of medication but needs providers approval        Action Taken: Other: nurse pool    Travel Screening: Not Applicable

## 2020-02-17 NOTE — TELEPHONE ENCOUNTER
Received RF request from patient     Last seen: 1/22/2020  RTC: 4 weeks  Cancel: none  No-show: none  Next appt: 2/19/2020     Medication requested: clonazePAM (KLONOPIN) 0.5 MG tablet  Directions: Take 0.5-1 tablets (0.25-0.5 mg) by mouth 2 times daily as needed for anxiety  Qty: 50  Last Rx written 1/22/2020  [place] and last refilled on 1/22, 12/23, 11/25         Plan per 1/22 office visit:     - reduce clonazepam to 0.5mg BID PRN #50 for 30 days. She should not be given a new Rx prior to her next visit. The plan at that time will be to reduce her to 0.5mg daily PRN.     Called patient to ask how long she has been out (a couple of days).  Explained that it looks like she is on a taper and she is supposed to get the next refill on Wednesday, Feb 19.  She was not aware that she had an appointment, but confirmed that she will come on Wednesday.  She said that she is okay with waiting until then to get her refill.      Routed to Dr. Bradley for FYI.

## 2020-02-19 ENCOUNTER — OFFICE VISIT (OUTPATIENT)
Dept: PSYCHIATRY | Facility: CLINIC | Age: 65
End: 2020-02-19
Attending: PSYCHIATRY & NEUROLOGY
Payer: COMMERCIAL

## 2020-02-19 VITALS
SYSTOLIC BLOOD PRESSURE: 86 MMHG | WEIGHT: 120.8 LBS | DIASTOLIC BLOOD PRESSURE: 59 MMHG | HEART RATE: 90 BPM | BODY MASS INDEX: 21.4 KG/M2

## 2020-02-19 DIAGNOSIS — F11.21 OPIOID USE DISORDER, SEVERE, IN SUSTAINED REMISSION, ON MAINTENANCE THERAPY, DEPENDENCE (H): ICD-10-CM

## 2020-02-19 DIAGNOSIS — F41.9 ANXIETY DISORDER, UNSPECIFIED TYPE: ICD-10-CM

## 2020-02-19 DIAGNOSIS — F13.20 SEDATIVE, HYPNOTIC OR ANXIOLYTIC DEPENDENCE (H): Primary | ICD-10-CM

## 2020-02-19 DIAGNOSIS — G89.29 OTHER CHRONIC PAIN: ICD-10-CM

## 2020-02-19 PROCEDURE — G0463 HOSPITAL OUTPT CLINIC VISIT: HCPCS | Mod: ZF

## 2020-02-19 RX ORDER — CLONAZEPAM 0.5 MG/1
0.5 TABLET ORAL DAILY PRN
Qty: 35 TABLET | Refills: 0 | Status: SHIPPED | OUTPATIENT
Start: 2020-02-19 | End: 2020-02-19

## 2020-02-19 RX ORDER — HYDROXYZINE PAMOATE 25 MG/1
25 CAPSULE ORAL 2 TIMES DAILY PRN
Qty: 60 CAPSULE | Refills: 1 | Status: SHIPPED | OUTPATIENT
Start: 2020-02-19 | End: 2020-04-29

## 2020-02-19 RX ORDER — CLONAZEPAM 0.5 MG/1
0.5 TABLET ORAL DAILY PRN
Qty: 35 TABLET | Refills: 0
Start: 2020-02-19 | End: 2020-03-16

## 2020-02-19 ASSESSMENT — PAIN SCALES - GENERAL: PAINLEVEL: MODERATE PAIN (4)

## 2020-02-19 NOTE — PROGRESS NOTES
"  ----------------------------------------------------------------------------------------------------------  [facility]   Psychiatry Clinic Progress Note  Addiction Medicine/Psychiatry                      IDENTIFICATION:  Demetra Richardson is a 64 year old female with previous psychiatric/substance use  diagnoses of opioid use disorder, benzodiazepine use disorder, anxiety NOS, and borderline traits.   Pt presents for ongoing psychiatric/substance use follow-up. Patient was seen for initial evaluation on 1/22/20.  SUBJECTIVE / INTERIM HISTORY                                                 The pt was last seen in clinic on 1/22/20  at which time the following changes were made: reduced her montly clonazepam from #60 to #50. The patient did not make the change(s). The patient reports poor medication adherence.   There was no concern for pt safety at the time of the last visit.    Demetra Richardson is a 63 year old female who prefers the name \"Radha\". She presented for the current evaluation as part of the intake process for the Addiction [facility] Program ([facility]). The following information was obtained through a clinical interview and chart review. Her PCP Dr. Merritt referred her to Dr. Velasquez for evaluation of depression on 7/9/19 and Dr. Velasquez referred her for this assessment for NATALIA-specific treatment. Chart review indicates prior diagnoses of depression, anxiety, borderline personality disorder, benzodiazepine use disorder, and opioid use disorder.  Interim history:   Since her last visit Radha has been doing fine. She did not effectively reduce her dose of clonazepam after the last visit but continued to take it at 0.5mg BID. She therefore ran out 5 days before this appointment. She was concerned that she was having withdrawals but described GI symptoms, running nose, and yawning. She was also decreasing her methadone as below. She also reports difficulty sleeping " when not taking clonzepam.    Throughout the month she had also been tapering her methadone by 3mg per week. Her current dose is 97mg per week. She is tapering down with a plan to switch to suboxone to avoid stigma and having to go to dose daily.     She reports that she arranged to begin seeing Dr. Gentile for therapy.     Current Substance Use- She continues to deny illicit use.   Last use of substance(s): denies for months. Went to ED for unintentional overdose 2019  Current recovery activities: going to methadone clinic             SYMPTOMS- GI upset, yawning, running nose with methadone reduction  MEDICAL ROS- Denies chest pain, SOB, MSK pain    RELEVANT SOCIAL/FAMILY                                                     Patient  Reported     FINANCIAL SUPPORT- Currently on SSI. Worked as a  for 25 years previously.   LIVING SITUATION / RELATIONSHIPS- Lives by herself in apartment currently, but is worried she might have to go to public housing within the next year, as her father was previously paying for 600 per month of her rent. Her family has agreed to continue this until 2020.   SOCIAL/ SPIRITUAL SUPPORT- Has 23 year old son Kenneth who lives here, doing doctorate program in PT at . Daughter is 32, living in [place], is pregnant with her first grandchild.   FEELS SAFE AT HOME- Yes    PSYCHIATRIC and SUBSTANCE USE HISTORY      SIB [method, most recent]- None  Suicide Attempt [#, recent, method]- Denies. Per hx, did once overdose on Xanax, butalbital  Suicidal Ideation Hx [passive, active]- Denies, states that she sometimes feels a little negative about life with everything she is dealing with currently, but no SI. Of note, history notes suicidal ideation a multiple hospitalizations with plan to overdose at one point. She denied this.      Psychosis Hx- None  Psych Hosp [ #, most recent, committed]- Notes that she chose to go in to the hospital after her SO  4 years ago, and she  "was talking really fast at that time. She does note just a few hospitalizations during the grief period.   ECT [#, most recent]- None     Eating Disorder- None     Outpatient Programs [ DBT, Day Treatment, Eating Disorder Tx etc]- States that she has been doing a day treatment program related to her methadone clinic.      Detailed Substance Use History:     Substance Age first use Quality/character Age max use Most recent use   Alcohol 19 College parties ~22 college   Cannabis 19 Rare, none in years NA ~ age 46   Cocaine none none none none   Stimulants none none none none   Opioids ~45 Progressive, daily On methadone Prescribed May 2019   Sedatives ~46 Progressive uncontrolled 64 Currently prescribed   Hallucinogens none none none none   Inhalants none none none none   Other none none none none   OTC drugs none none none none   Nicotine none none none none      Below is a report given to Dr. Gentile given 10/2019. Differences in todays report noted.      SUBSTANCE USE HISTORY SYNOPSIS:   [note IV use]  Past substances- Opiates - progressive, daily, on methadone since 5/2019  Treatment- [#, most recent] 3 residential, + 3 residential stopped early. Last [facility] in ~2016  Medical consequences- [withdrawal, sz] unintentional overdose  HIV/Hepatitis- Negative at last check  Legal consequences- denies    PAST MEDICATION TRIALS         Drug /  Start Date Dose (mg) Helpful Adverse Effects DC Reason / Date   Lexapro 2004           Effexor 2004     Felt bad     buspirone           amitriptyline           lithium       Stigma - \"Didn't like the idea of being on it\"   Depakote       Stigma - \"Didn't like the idea of being on it\"   Lamotrigine 2645-2065 200 ?       Risperidone 2017           Zyprexa 2015 5 BID         Seroquel 2014 100   Felt \"snowed\"     Xanax       Had withdrawal seizure   Klonopin           Ativan           Valium           phenobarbital       For seizure   gabapentin 600 QID     For fibromyalgia   Lyrica "       For fibromyalgia   Hydroxyzine 2016 50 no Dry mouth     Benadryl       For allergies   Trazodone 2013 100         melatonin           propranolol 10 TID         clonidine 0.1     For acute withdrawal   Suboxone           methadone 104             UPDATED MEDICAL / SURGICAL HISTORY                pregnant [if applicable]--No     Medical problems: Lupus, pleural effusion    CARE TEAM:          PCP- Fredy Eubanks                 Therapist- Jay Butterfield with [facility]- Office: [phone] - [employer]. Reports having to stop because of insurance.  Rheum- Parastoo Fazeli                      ALLERGY   Penicillins  MEDICATIONS                                                                       bold meds Rx     Current Outpatient Medications   Medication Sig     aspirin-acetaminophen-caffeine (EXCEDRIN MIGRAINE) 250-250-65 MG tablet Take 1 tablet by mouth 2 times daily as needed for headaches     clonazePAM (KLONOPIN) 0.5 MG tablet Take 0.5-1 tablets (0.25-0.5 mg) by mouth 2 times daily as needed for anxiety Reminder: 6 month course. Started 5/2019. Plan for taper starting 11/2019.     escitalopram (LEXAPRO) 10 MG tablet Take 1 tablet (10 mg) by mouth daily     hydroxychloroquine (PLAQUENIL) 200 MG tablet Take 1 tablet (200 mg) by mouth 2 times daily (with meals)     Pregabalin (LYRICA) 200 MG capsule Take 1 capsule (200 mg) by mouth 2 times daily This is a 30-day supply.     docusate sodium (COLACE) 100 MG capsule Take 1 capsule (100 mg) by mouth 2 times daily (Patient not taking: Reported on 6/19/2019)     furosemide (LASIX) 20 MG tablet Take 1 tablet (20 mg) by mouth daily (Patient not taking: Reported on 6/19/2019)     methadone (DOLOPHINE-INTENSOL) 10 MG/ML (HIGH CONC) solution Take 11.3 mLs (113 mg) by mouth daily Rufina Marcial [phone]    Dose as of 4/29/19 (Patient not taking: Reported on 10/30/2019)     methyl salicylate-menthol (DUARTE-QURESHI) OINT ointment Apply 28 g  topically every 6 hours as needed (pain) (Patient not taking: Reported on 10/23/2019)     naloxone (NARCAN) 4 MG/0.1ML nasal spray Spray 1 spray (4 mg) into one nostril alternating nostrils once as needed for opioid reversal repeat every 2-3 minutes until responsive (Patient not taking: Reported on 10/23/2019)     pantoprazole (PROTONIX) 40 MG EC tablet Take 1 tablet (40 mg) by mouth every morning (before breakfast) (Patient not taking: Reported on 10/23/2019)     polyethylene glycol (MIRALAX/GLYCOLAX) packet Take 17 g by mouth 2 times daily (Patient not taking: Reported on 10/23/2019)     saccharomyces boulardii (FLORASTOR) 250 MG capsule Take 1 capsule (250 mg) by mouth 2 times daily (Patient not taking: Reported on 5/31/2019)     No current facility-administered medications for this visit.      PSYCHOTROPIC DRUG INTERACTION CHECK was unremarkable   [details below if applicable]  VITALS   BP (!) 86/59   Pulse 90   Wt 54.8 kg (120 lb 12.8 oz)   LMP  (LMP Unknown)   BMI 21.40 kg/m     PHQ9           TODAY = 6             LABS                                                                                                 None  MENTAL STATUS EXAM                                                            Orientation: full, x3  Alertness: alert  and oriented  Appearance: adequately groomed  Behavior/Demeanor: guarded, with good  eye contact   Speech: increased rate  Language: no problems  Psychomotor: normal or unremarkable  Mood: description consistent with euthymia  Affect: elevated; was congruent to mood; was congruent to content  Thought Process/Associations: unremarkable  Thought Content:  Reports none;  Denies suicidal ideation, violent ideation and delusions  Perception:  Reports none;  Denies auditory hallucinations and visual hallucinations  Insight: poor  Judgment: poor  Cognition: does  appear grossly intact; formal cognitive testing was not done  Gait: Normal   MSK: extremities normal, no peripheral  edema  These cognitive functions grossly appear as described, but were not formally tested.    ASSESSMENT                                                                             This patient is a 64 year old female who has opioid use disorder in long term remission on methadone, benzodiazepine use disorder - currently on prescription benzodiazepines, anxiety disorder unspecified and history of borderline personality diagnosis who presents for continued treatment of substance use disorders.     OUD - continue at [facility]. She would like to taper off methadone and switch to suboxone for reasons of stigma and quality of life. We will be here to prescribe suboxone if she desires.    BUD - need to taper BZDs though patient is resistant. Once these are tapered she should not be restarted on them    Anxiety disorder - patient endorses symptoms offered and reports financial anxiety. Will continue lexapro.      [place] PRESCRIPTION MONITORING PROGRAM [] was checked today: not using controlled substances besides prescribed below.      TREATMENT RISK STATEMENT: The risks, benefits, alternatives and potential adverse effects have been explained and are understood by the pt. The pt agrees to the treatment plan with the ability to do so. Discussion of specific concerns included- risk of continued benzodiazepine use. The pt knows to call the clinic for any problems or access emergency care if needed. There are no medical considerations relevant to treatment, as noted above. Substance use is a problem as noted above. Drug interaction check for meds prior to visit is in the MEDICATIONS section.     DIAGNOSES         Psychiatric:  Anxiety Disorder  Borderline traits    Substance Use:  Opioid Use Disorder on maintenance therapy in remission  Benzodiazepine use disorder    PLAN                                                                                                              1) PSYCHOTROPIC MEDICATIONS:                - reduce clonazepam to 0.5mg daily PRN #35 for 35 days. She should not be given a new Rx prior to her next visit. The plan at that time will be to reduce her to 0.5mg daily every other day PRN for 30 days, and then stop.     2) THERAPY:  Patient will begin seeing Dr. Gentile on Thursdays     3) NEXT DUE:  LABS- none at this time. UDS done at methadone clinic.     4) REFERRALS:    none     5) RTC: 5 weeks     6) CRISIS NUMBERS:   Provided routinely in AVS.     Addiction Medicine Fellow: Fredy Bradley MD    Patient seen and discussed with staff psychiatrist, Dr. Edwards. Supervisor is Dr. Christensen   I saw the patient with the Dr. Bradley, and participated in key portions of the service, including the mental status examination and developing the plan of care. I reviewed key portions of the history with the resident. I agree with the findings and plan as documented in this note.    Mary Edwards MD

## 2020-02-25 ENCOUNTER — TELEPHONE (OUTPATIENT)
Dept: BEHAVIORAL HEALTH | Facility: CLINIC | Age: 65
End: 2020-02-25

## 2020-02-25 NOTE — TELEPHONE ENCOUNTER
Pt no call/ no show for her assessment . Phoned pt and left message giving directions to reschedule.

## 2020-02-26 ENCOUNTER — TELEPHONE (OUTPATIENT)
Dept: PSYCHIATRY | Facility: CLINIC | Age: 65
End: 2020-02-26

## 2020-02-26 NOTE — PROGRESS NOTES
Individual Psychotherapy Session                                Time of Service: 3:05-4:00 (55 minutes)  Care Provider: Leighann Gentile, PhD, LP  Participants: Patient and writer  DSM-5 Diagnoses:   Opioid use disorder, on maintenance therapy  Benzodiazepine use disorder  Cluster B traits (by chart review)  Anxiety (r/o DARRIUS)  MDD, in remission    SUBJECTIVE: Radha denied depressed mood or anxiety. She endorsed ambivalence about tapering off her klonopin, as recommended by providers. Grief over loss of her father.     TREATMENT: I assessed symptoms since our DA in October and we began to develop treatment plan.     MSE:  Alertness: alert  and oriented  Appearance: casually groomed  Behavior/Demeanor: cooperative, pleasant and calm, with good eye contact   Speech: normal  Language: intact  Psychomotor: normal or unremarkable  Mood: description consistent with euthymia  Affect: full range; was congruent to mood; was congruent to content  Thought Process/Associations: unremarkable  Thought Content:  Reports none;  Denies suicidal ideation and violent ideation  Perception:  Reports none;  Denies auditory hallucinations and visual hallucinations  Insight: adequate for  safety  Judgment: adequate for safety  Cognition: (6) does  appear grossly intact; formal cognitive testing was not done  Gait and Station: unremarkable    PLAN:   1) Weekly therapy with a focus on providing support as she tapers off klonopin    Treatment plan due for review by: next session    Performed and documented by: Leighann Gentile, PhD, LP

## 2020-02-26 NOTE — TELEPHONE ENCOUNTER
Will need to refill Clonazepam   Received: Today   Message Contents   Fredy Bradley MD Patel, Radhika, RN; Sarah Hernandez, RN             I have had to reschedule Radha's appointment to clear my schedule for 3/25.     Her prescription for clonazepam will run out on that day. I have informed her that we will plan to refill her clonazepam - 0.5mg once daily PRN for 21 days - when her current script runs out in order to make sure she continues on the same dose until she sees me again on 4/15/20. I was not sure how to amend the clinic note, but I want you all to be aware that we she calls for a refill, it should be granted BUT no sooner than 3/25/20.     Thanks      Per  last refilled: 2/19 #30, 1/22 #50, 12/23 #60    - Will wait for a refill request from the patient or the pharmacy

## 2020-03-05 ENCOUNTER — OFFICE VISIT (OUTPATIENT)
Dept: PSYCHIATRY | Facility: CLINIC | Age: 65
End: 2020-03-05
Attending: PSYCHOLOGIST
Payer: COMMERCIAL

## 2020-03-05 DIAGNOSIS — F13.20 BENZODIAZEPINE DEPENDENCE (H): Primary | ICD-10-CM

## 2020-03-10 ENCOUNTER — TELEPHONE (OUTPATIENT)
Dept: RHEUMATOLOGY | Facility: CLINIC | Age: 65
End: 2020-03-10

## 2020-03-10 NOTE — TELEPHONE ENCOUNTER
Spoke to patient , called with a reminder about there upcoming appointment , also instructed to bring medications or medication list.    Sandra Bautista CMA

## 2020-03-11 ENCOUNTER — TELEPHONE (OUTPATIENT)
Dept: BEHAVIORAL HEALTH | Facility: CLINIC | Age: 65
End: 2020-03-11

## 2020-03-12 ENCOUNTER — OFFICE VISIT (OUTPATIENT)
Dept: PSYCHIATRY | Facility: CLINIC | Age: 65
End: 2020-03-12
Attending: PSYCHOLOGIST
Payer: COMMERCIAL

## 2020-03-12 DIAGNOSIS — F13.20 BENZODIAZEPINE DEPENDENCE (H): Primary | ICD-10-CM

## 2020-03-16 ENCOUNTER — HOSPITAL ENCOUNTER (OUTPATIENT)
Dept: BEHAVIORAL HEALTH | Facility: CLINIC | Age: 65
Discharge: HOME | End: 2020-03-16
Attending: PSYCHIATRY & NEUROLOGY | Admitting: PSYCHIATRY & NEUROLOGY
Payer: COMMERCIAL

## 2020-03-16 ENCOUNTER — TELEPHONE (OUTPATIENT)
Dept: PSYCHIATRY | Facility: CLINIC | Age: 65
End: 2020-03-16

## 2020-03-16 DIAGNOSIS — F41.9 ANXIETY DISORDER, UNSPECIFIED TYPE: ICD-10-CM

## 2020-03-16 PROCEDURE — 90791 PSYCH DIAGNOSTIC EVALUATION: CPT | Performed by: COUNSELOR

## 2020-03-16 RX ORDER — CLONAZEPAM 0.5 MG/1
0.5 TABLET ORAL DAILY PRN
Qty: 30 TABLET | Refills: 0
Start: 2020-03-18 | End: 2020-04-15

## 2020-03-16 ASSESSMENT — ANXIETY QUESTIONNAIRES
GAD7 TOTAL SCORE: 7
7. FEELING AFRAID AS IF SOMETHING AWFUL MIGHT HAPPEN: SEVERAL DAYS
6. BECOMING EASILY ANNOYED OR IRRITABLE: SEVERAL DAYS
5. BEING SO RESTLESS THAT IT IS HARD TO SIT STILL: SEVERAL DAYS
2. NOT BEING ABLE TO STOP OR CONTROL WORRYING: SEVERAL DAYS
3. WORRYING TOO MUCH ABOUT DIFFERENT THINGS: SEVERAL DAYS
1. FEELING NERVOUS, ANXIOUS, OR ON EDGE: SEVERAL DAYS
4. TROUBLE RELAXING: SEVERAL DAYS
IF YOU CHECKED OFF ANY PROBLEMS ON THIS QUESTIONNAIRE, HOW DIFFICULT HAVE THESE PROBLEMS MADE IT FOR YOU TO DO YOUR WORK, TAKE CARE OF THINGS AT HOME, OR GET ALONG WITH OTHER PEOPLE: SOMEWHAT DIFFICULT

## 2020-03-16 ASSESSMENT — PATIENT HEALTH QUESTIONNAIRE - PHQ9
SUM OF ALL RESPONSES TO PHQ QUESTIONS 1-9: 9
SUM OF ALL RESPONSES TO PHQ QUESTIONS 1-9: 3

## 2020-03-16 NOTE — TELEPHONE ENCOUNTER
--Placed phone call to pharmacy requested by pt: [id number] - [place], [place] - [address] [facility].  --Provided TORB to pharmacist Edgar for clonazepam 0.5mg tablets (#30, 0RF) with start date of 3/18/20. Med tab updated to reflect refill.  --Pt notified of refill with start date of 3/18/20.

## 2020-03-16 NOTE — TELEPHONE ENCOUNTER
Previous message from provider regarding refill:  FW: Will need to refill Clonazepam   Received: Today   Message Contents   Lindsey Poole, Lindsey Pedro RN            Previous Messages     ----- Message -----   From: Fredy Bradley MD   Sent: 2/26/2020   2:01 PM CDT   To: Elvi Hall RN, Sarah Hernandez RN   Subject: Will need to refill Clonazepam                   I have had to reschedule Radha's appointment to clear my schedule for 3/25.     Her prescription for clonazepam will run out on that day. I have informed her that we will plan to refill her clonazepam - 0.5mg once daily PRN for 21 days - when her current script runs out in order to make sure she continues on the same dose until she sees me again on 4/15/20. I was not sure how to amend the clinic note, but I want you all to be aware that we she calls for a refill, it should be granted BUT no sooner than 3/25/20.     Thanks

## 2020-03-16 NOTE — TELEPHONE ENCOUNTER
Thank you Lindsey,    I also confirmed on  that she only got 30 days. She would be scheduled to need her next pill on 3/19. Please send her a #30 day refill to be filled on 3/18/2020. Thanks.

## 2020-03-16 NOTE — TELEPHONE ENCOUNTER
----- Message from Lindsey Poole RN sent at 3/16/2020  1:09 PM CDT -----    ----- Message -----  From: Muhumed, Yasmin  Sent: 3/16/2020   1:04 PM CDT  To: [facility] Call Center    Phone Message    May a detailed message be left on voicemail: yes     Reason for Call: Medication Refill Request    Has the patient contacted the pharmacy for the refill? Yes   Name of medication being requested: Clonazepam 0.5mg   Provider who prescribed the medication: Fredy Bradley  [facility] [address]  Date medication is needed: pt has one day left          Action Taken: Other: nurse pool    Travel Screening: Not Applicable

## 2020-03-16 NOTE — TELEPHONE ENCOUNTER
Last seen: 2/19/20  RTC: 5 weeks  Cancel: 3/25/20 per provider  No-show: None  Next appt: 4/15/20     Medication requested: clonazePAM 0.5 MG PO tablet   Directions: Take 1 tablet (0.5 mg) by mouth daily as needed for anxiety - Oral   Qty: 35  Last refilled: 2/19/20 (#30/30 d/s) per [place] .     Per [place] , medication was refilled on 2/19/20 for a 30 day supply. The pt is therefore on day 27 of the rx. Writer to reach out to provider for further instruction.
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
## 2020-03-17 ASSESSMENT — ANXIETY QUESTIONNAIRES: GAD7 TOTAL SCORE: 7

## 2020-03-19 DIAGNOSIS — M79.7 FIBROMYALGIA: ICD-10-CM

## 2020-03-19 NOTE — PROGRESS NOTES
Individual Psychotherapy Session                                Time of Service: 11:10-12:00 (50 minutes)  Care Provider: Leighann Gentile, PhD, LP  Participants: Patient and writer  DSM-5 Diagnoses:   Opioid use disorder, on maintenance therapy  Benzodiazepine use disorder  Cluster B traits (by chart review)  Anxiety (r/o DARRIUS)  MDD, in remission    SUBJECTIVE: Radha denied depressed mood or anxiety. She endorsed ambivalence about tapering off her klonopin, as has been initiated by Dr. Bradley. However, she reported that she is willing to learn new skills to cope with anxiety that is likely to increase when she completely tapers off klonopin.     TREATMENT: I helped Radha consider potential anxiety and distress she may be experiencing as she does continue to deny these symptoms. I provided psychoeducation about emotion identification and also provided supportive listening related to conflict she is having with her daughter.     MSE:  Alertness: alert  and oriented  Appearance: casually groomed  Behavior/Demeanor: guarded, cooperative, pleasant and calm, with good eye contact   Speech: normal  Language: intact  Psychomotor: normal or unremarkable  Mood: description consistent with euthymia  Affect: full range; was congruent to mood; was congruent to content  Thought Process/Associations: unremarkable  Thought Content:  Reports none;  Denies suicidal ideation and violent ideation  Perception:  Reports none;  Denies auditory hallucinations and visual hallucinations  Insight: adequate for  safety  Judgment: adequate for safety  Cognition: (6) does  appear grossly intact; formal cognitive testing was not done  Gait and Station: unremarkable    PLAN:   1) Weekly therapy with writer   2) Continue klonopin taper leading to total cessation     Treatment plan due for review by: Patricia 3, 2020    Performed and documented by: Leighann Gentile, PhD, LP

## 2020-03-20 ENCOUNTER — TELEPHONE (OUTPATIENT)
Dept: BEHAVIORAL HEALTH | Facility: CLINIC | Age: 65
End: 2020-03-20

## 2020-03-20 RX ORDER — PREGABALIN 200 MG/1
200 CAPSULE ORAL 2 TIMES DAILY
Qty: 60 CAPSULE | Refills: 1 | Status: SHIPPED | OUTPATIENT
Start: 2020-03-20 | End: 2020-08-03

## 2020-03-20 NOTE — TELEPHONE ENCOUNTER
[facility] Call [facility]    Phone Message    May a detailed message be left on voicemail: yes     Reason for Call: Medication Question or concern regarding medication   Prescription Clarification  Name of Medication: Lyrica   Prescribing Provider: Dr Merritt   Pharmacy:  [facility] [id number] [place] [address] [address] AT [facility] [place]       What on the order needs clarification? Pt would like the medication sent over to the pharmacy so that she can  on Sunday. Please call her when it has been sent.          Action Taken: Message routed to:  [facility] Primary care    Travel Screening: Not Applicable

## 2020-03-20 NOTE — TELEPHONE ENCOUNTER
Patient Requested  Pregabalin (LYRICA) 200 MG capsule  Last Filled  02/26/2020  Last Office Visit  11/26/2019  Next Office Visit  Nothing Scheduled   Checked  03/20/2020    DX: Fibromyalgia     [facility] [id number] [place] [address] [facility]    PATI BRANHAM CMA at 10:51 AM on 3/20/2020.

## 2020-03-26 ENCOUNTER — OFFICE VISIT (OUTPATIENT)
Dept: PSYCHIATRY | Facility: CLINIC | Age: 65
End: 2020-03-26
Attending: PSYCHOLOGIST
Payer: COMMERCIAL

## 2020-03-26 DIAGNOSIS — F13.20 BENZODIAZEPINE DEPENDENCE (H): Primary | ICD-10-CM

## 2020-03-31 ENCOUNTER — TELEPHONE (OUTPATIENT)
Dept: BEHAVIORAL HEALTH | Facility: CLINIC | Age: 65
End: 2020-03-31

## 2020-03-31 NOTE — TELEPHONE ENCOUNTER
Writer called Pt today to talk about telehealth.  Explained the groups were going to be offered virtually starting next week. At first she expressed feeling overwhelmed at the thought but then stated she is willing to have her son help her get connected and would be willing to try it.  She will call back louie few days with her son so this writer can explain the technology requirements. She will be kept on the waiting list for now.

## 2020-04-01 ENCOUNTER — TELEPHONE (OUTPATIENT)
Dept: INTERNAL MEDICINE | Facility: CLINIC | Age: 65
End: 2020-04-01

## 2020-04-01 NOTE — TELEPHONE ENCOUNTER
Arterial    Diagnosis: TOF  Doctor requesting consult: Karen    Patient location during procedure: done in OR  Procedure start time: 2017 8:22 AM  Timeout: 2017 8:22 AM  Procedure end time: 2017 8:31 AM  Staffing  Anesthesiologist: LISSETTE THORNTON  Performed: anesthesiologist   Anesthesiologist was present at the time of the procedure.  Preanesthetic Checklist  Completed: patient identified, site marked, surgical consent, pre-op evaluation, timeout performed, IV checked, risks and benefits discussed, monitors and equipment checked and anesthesia consent givenArterial  Skin Prep: chlorhexidine gluconate  Local Infiltration: none  Orientation: right  Location: femoral  Catheter Size: 3 Fr Cook  Catheter placement by Ultrasound guidance. Heme positive aspiration all ports.  Vessel Caliber: medium, patent, compressibility normal  Vascular Doppler:  not done  Needle advanced into vessel with real time Ultrasound guidance.  Guidewire confirmed in vessel.  Sterile sheath used.  Image recorded and saved.Insertion Attempts: 2  Assessment  Dressing: sutured in place and taped and tegaderm  Patient: Tolerated well             [facility]    Phone Message    May a detailed message be left on voicemail: yes     Reason for Call: Medication Refill Request    Has the patient contacted the pharmacy for the refill? Yes   Name of medication being requested: genital herpes medication - Pt said she was prescribed this in the past. Possibly, Acyclovir.  Provider who prescribed the medication: Cecilia London   Pharmacy:    [facility] [address] [place] [address] [address]     Date medication is needed: asap - pt is having breakouts right now.          Action Taken: Message routed to:  [facility]    Travel Screening: Not Applicable

## 2020-04-02 ENCOUNTER — VIRTUAL VISIT (OUTPATIENT)
Dept: INTERNAL MEDICINE | Facility: CLINIC | Age: 65
End: 2020-04-02
Payer: COMMERCIAL

## 2020-04-02 DIAGNOSIS — B00.9 HERPES SIMPLEX VIRUS INFECTION: Primary | ICD-10-CM

## 2020-04-02 DIAGNOSIS — K21.9 GASTROESOPHAGEAL REFLUX DISEASE WITHOUT ESOPHAGITIS: ICD-10-CM

## 2020-04-02 DIAGNOSIS — G89.29 OTHER CHRONIC PAIN: ICD-10-CM

## 2020-04-02 RX ORDER — VALACYCLOVIR HYDROCHLORIDE 500 MG/1
500 TABLET, FILM COATED ORAL 2 TIMES DAILY
Qty: 6 TABLET | Refills: 1 | Status: SHIPPED | OUTPATIENT
Start: 2020-04-02 | End: 2020-07-16

## 2020-04-02 RX ORDER — PANTOPRAZOLE SODIUM 40 MG/1
40 TABLET, DELAYED RELEASE ORAL
Qty: 90 TABLET | Refills: 3 | Status: SHIPPED | OUTPATIENT
Start: 2020-04-02 | End: 2020-08-03

## 2020-04-02 ASSESSMENT — PAIN SCALES - GENERAL: PAINLEVEL: MODERATE PAIN (4)

## 2020-04-02 NOTE — PROGRESS NOTES
"Subjective   Demetraesther Richardson complains of   Chief Complaint   Patient presents with     Medication Request     Pt needs medication for herpes outbreak.      Ms Richardson calls in today for recurrent genital herpes outbreak.  She reports that she has had genital herpes for more than 30 years and typically has an outbreak twice yearly. Her current genital herpes infection has been ongoing for 2 days and she would like to get treatment for this. She reports some malaise which she says is typical of her outbreaks as well as pain but she denies any fevers or chills though she says \"I don't have a thermometer but I don't feel like I have a fever\".     Additionally, she has h/o PUD s/p gastrectomy and gastro-jejunostomy and has been taking protonix for this and would like a refill. She currently denies any nausea, vomiting, hematemesis or hematochezia.                     ALLERGIES  Penicillins    Reviewed and updated as needed this visit by Provider    ROS: Negative other than noted in HPI.    Objective   Psych: coherent speech, normal   rate and volume, able to articulate logical thoughts, able   to abstract reason     Assessment/Plan:    1. Herpes simplex virus infection  Recurrence of known herpes simplex infection of genitals. Outbreak currently on 2nd day. Denies constitutional symptoms. Typically is prescribed valtrex for her outbreaks and this is what she prefers. Will provide her with refills as she gets these outbreaks twice yearly.  - valACYclovir (VALTREX) 500 MG tablet; Take 1 tablet (500 mg) by mouth 2 times daily for 3 days  Dispense: 6 tablet; Refill: 1    2. Gastroesophageal reflux disease without esophagitis  - pantoprazole (PROTONIX) 40 MG EC tablet; Take 1 tablet (40 mg) by mouth every morning (before breakfast)  Dispense: 90 tablet; Refill: 3      Phone call duration:  10 minutes    Mirit Mohsen Yacoup, MD     Resident telephone visit documentation    Person spoken to: Radha Richardson     Patient " opted to conduct today's return visit via telephone versus an in person visit to the clinic, due to efforts to reduce the spread of COVID-19 clinic, world, nation and state-wide.     Time call initiated: 1:20 pm  Time call ended: 1:30 pm  Total length of call: 12 minutes    Staffed with:  Dr Rico Thomas MD , discussed this patient with the resident and agree with the resident s findings and plan of care as documented in the resident s note. I was not on the call with the patient      For staff coding purposes:  5-10 minutes:  [id number]  11-20 minutes:  [id number]  21-30 minutes:  [id number]

## 2020-04-02 NOTE — TELEPHONE ENCOUNTER
Spoke with pt. She has several concerns of her sxs including genital herpes and GI issues. I encouraged her to have a virtual visit today for further evaluation. She agreed.

## 2020-04-02 NOTE — NURSING NOTE
Chief Complaint   Patient presents with     Medication Request     Pt needs medication for herpes outbreak.      Janene Jensen LPN at 1:10 PM on 4/2/2020.

## 2020-04-08 ENCOUNTER — TELEPHONE (OUTPATIENT)
Dept: SURGERY | Facility: CLINIC | Age: 65
End: 2020-04-08

## 2020-04-08 ENCOUNTER — VIRTUAL VISIT (OUTPATIENT)
Dept: FAMILY MEDICINE | Facility: CLINIC | Age: 65
End: 2020-04-08
Payer: COMMERCIAL

## 2020-04-08 ENCOUNTER — TELEPHONE (OUTPATIENT)
Dept: FAMILY MEDICINE | Facility: CLINIC | Age: 65
End: 2020-04-08

## 2020-04-08 DIAGNOSIS — R07.9 LEFT-SIDED CHEST PAIN: ICD-10-CM

## 2020-04-08 DIAGNOSIS — R63.4 WEIGHT LOSS: Primary | ICD-10-CM

## 2020-04-08 NOTE — PROGRESS NOTES
"Subjective     Demetra Richardson is a 64 year old female who is being evaluated via a billable telephone visit.      The patient has been notified of following:     \"This telephone visit will be conducted via a call between you and your physician/provider. We have found that certain health care needs can be provided without the need for a physical exam.  This service lets us provide the care you need with a short phone conversation.  If a prescription is necessary we can send it directly to your pharmacy.  If lab work is needed we can place an order for that and you can then stop by our lab to have the test done at a later time.    Telephone visits are billed at different rates depending on your insurance coverage. During this emergency period, for some insurers they may be billed the same as an in-person visit.  Please reach out to your insurance provider with any questions.    If during the course of the call the physician/provider feels a telephone visit is not appropriate, you will not be charged for this service.\"    Patient has given verbal consent for Telephone visit?  Yes    How would you like to obtain your AVS? Mail a copy    Demetra Richardson complains of   Chief Complaint   Patient presents with     Pain     Pt reports pain in left lung that is not subsiding    About a year ago, VATS procedure for left thorax fluid infection. Per pt did well without chest symptoms for months, till two weeks ago. At that time, developed fatigue, mild dry cough, mild dyspnea, and left chest pain; all of this reminds her of how she felt pre VATS. Some chills, does not have thermometer.     Before surgery weight about 150 she states; since surgery, slow decline for no reason she can tell. Was not on prednisone before surgery. Was on risperidone but rarely took she states. Appetite good, she thinks she eats a lot, no nausea vomit diarrhea.     No known thyroid disease.    Past Surgical History:   Procedure Laterality Date "     BACK SURGERY  04    L4-5 epidural abscess     BACK SURGERY  04    L3-4 spinal stenosis     BACK SURGERY  04    Lt psoas abscess     BRONCHOSCOPY FLEXIBLE N/A 2019    Procedure: Flexible Bronchoscopy;  Surgeon: Patrice Regalado MD;  Location: [facility] OR      SECTION      x 2     CHOLECYSTECTOMY  -     CHOLECYSTECTOMY       CLOSED REDUCTION, PERCUTANEOUS PINNING FINGER, COMBINED  8/10/2011    Procedure:COMBINED CLOSED REDUCTION, PERCUTANEOUS PINNING FINGER; 5th Proximal Phalanx; Surgeon:RADHA BUITRAGO; Location:[facility]     COLONOSCOPY       GASTRECTOMY  2005    Bilat truncal vagotomy, hemigastrectomy, RnY gastrojejunostomy     GASTRECTOMY      s/p 11 had temporary fdg tube, now removed     GASTROJEJUNOSTOMY  2011    Procedure:GASTROJEJUNOSTOMY; exploratory laparotmy with revision of gastrojejunostomy, Antrectomy, Miles-en-y, Gastrojejunostomy; Surgeon:JULIUS HELM; Location:[facility] OR     INSERT CHEST TUBE N/A 2019    Procedure: INSERTION, CATHETER, INTERCOSTAL, FOR DRAINAGE;  Surgeon: Melissa Nichols MD;  Location: [facility] GI     LASER HOLMIUM LITHOTRIPSY URETER(S), INSERT STENT, COMBINED      Procedure: COMBINED CYSTOSCOPY, URETEROSCOPY, LASER HOLMIUM LITHOTRIPSY URETER(S), INSERT STENT;;  Surgeon: Blair Correa MD;  Location: [facility] OR     LASER HOLMIUM NEPHROLITHOTOMY VIA PERCUTANEOUS NEPHROSTOMY  2012    Procedure: LASER HOLMIUM NEPHROLITHOTOMY VIA PERCUTANEOUS NEPHROSTOMY;  proceedure should read: Left Percutaneous Access, Left Percutaneous ultrasonic Nephrolithotomy, Ureteroscopy Holmium Laser Lithotripsy Stent Placement ;  Surgeon: Blair Correa MD;  Location: [facility] OR     MAMMOPLASTY AUGMENTATION BILATERAL       OPEN REDUCTION INTERNAL FIXATION WRIST Left 2016    Procedure: OPEN REDUCTION INTERNAL FIXATION WRIST;  Surgeon: Darling Ellis MD;  Location: [facility] OR     RECONSTRUCT BREAST, IMPLANT PROSTHESIS, COMBINED       THORACOSCOPIC  DECORTICATION LUNG Left 5/7/2019    Procedure: Left Video Assisted Thoracoscopic Decortication, Intercostal Nerve Cryo Analgesia, Flexible Bronchoscopy;  Surgeon: Patrice Regalado MD;  Location:  OR     Past Medical History:   Diagnosis Date     Anemia     2/2 h/p gastrectomy with inability to absorb oral iron     Basal cell carcinoma     Left-sided, skin over over medial orbit/nasal bone. Removed Oct 2018.     Benzodiazepine dependence (H)     With h/o withdrawal seizure x 1     Bipolar 1 disorder, mixed, moderate (H) 2/7/2013     Chronic pain     Back, legs.     Epidural abscess 2005    x4     Fibromyalgia      Generalised anxiety disorder      GERD (gastroesophageal reflux disease)      Major depressive disorder      Migraine      Nephrolithiasis     S/p left sided lithotripsy     Opiate dependence (H)      Osteoarthritis      Osteoporosis      Primary sclerosing cholangitis 2005     PUD (peptic ulcer disease)      SLE (systemic lupus erythematosus) (H)      Current Outpatient Medications   Medication     aspirin-acetaminophen-caffeine (EXCEDRIN MIGRAINE) 250-250-65 MG tablet     escitalopram (LEXAPRO) 10 MG tablet     hydroxychloroquine (PLAQUENIL) 200 MG tablet     hydrOXYzine (VISTARIL) 25 MG PO capsule     methadone (DOLOPHINE-INTENSOL) 10 MG/ML (HIGH CONC) solution     pantoprazole (PROTONIX) 40 MG EC tablet     Pregabalin (LYRICA) 200 MG capsule     clonazePAM (KLONOPIN) 0.5 MG tablet     docusate sodium (COLACE) 100 MG capsule     furosemide (LASIX) 20 MG tablet     methyl salicylate-menthol (DUARTE-QURESHI) OINT ointment     naloxone (NARCAN) 4 MG/0.1ML nasal spray     polyethylene glycol (MIRALAX/GLYCOLAX) packet     saccharomyces boulardii (FLORASTOR) 250 MG capsule     valACYclovir (VALTREX) 500 MG tablet     No current facility-administered medications for this visit.              ALLERGIES  Penicillins     Updates me that she sees a community psychiatrist elsewhere now for bipolar      Reviewed and  updated as needed this visit by Provider              Objective   Reported vitals:  LMP  (LMP Unknown)      Awake, alert, normal mentation/affect on phone      Diagnostic Test Results:  Labs reviewed in Epic        Assessment/Plan:  1. Weight loss  Was not taking risperidol routinely even before surgery (on med list), was not on long term pred, weight used to be about 150. No meds or GI symptoms to explain weight loss.  - CBC with platelets differential; Future  - TSH with free T4 reflex; Future  - Comprehensive metabolic panel; Future  - CT Chest/Abdomen/Pelvis w/o & w contrast; Future    2. Left-sided chest pain  VATS procedure about a year ago, in Epic. No pain or cardiopulm symptoms for months, last two weeks reminds her of how she felt before VATS.  - CT Chest/Abdomen/Pelvis w/o & w contrast; Future    I've asked her to wait till we get back to her and I've messaged clinic management, as with her two weeks mild dry cough, left chest pain, some dyspnea, she may not be able to enter [facility]. I do feel we cannot wait till post covid to evaluate, discussed with pt who agrees.        Phone call duration:  32 minutes    Fredy Merritt MD

## 2020-04-08 NOTE — TELEPHONE ENCOUNTER
Schedule telephone visit with Dr. Merritt today at 330 PM. Patient cannot do video. Pt confirm appointment.

## 2020-04-08 NOTE — TELEPHONE ENCOUNTER
Patient called and spoke with me about the pain in her left lung. The pain is not like the pain when she had the pleural abscess. It was advised patient talk with her PCP as it has been almost 1 year post op.

## 2020-04-08 NOTE — TELEPHONE ENCOUNTER
[facility] Call [facility]    Phone Message    May a detailed message be left on voicemail: yes     Reason for Call: Symptoms or Concerns     If patient has red-flag symptoms, warm transfer to triage line    Current symptom or concern: Per Patient is wanting to get a call back. Patient states she has been having problems with her left lung. Patient states she had surgery 11 months ago for an abscess. Patient states in the last week has been really bad and painful. Patient states she is not sure if she had an abscess again or if she had pneumonia. Patient states cannot wait. Please Advise.      Symptoms have been present for:  1 week(s)    Has patient previously been seen for this? No    By Ofsetdal    Date: 4/8/2020    Are there any new or worsening symptoms? Yes: Painful and chills      Action Taken: Message routed to:  [facility]    Travel Screening: Not Applicable

## 2020-04-08 NOTE — NURSING NOTE
Chief Complaint   Patient presents with     Pain     Pt reports pain in left lung that is not subsiding       EMIR Sabillon at 3:34 PM sign on 4/8/2020

## 2020-04-09 ENCOUNTER — TELEPHONE (OUTPATIENT)
Dept: INTERNAL MEDICINE | Facility: CLINIC | Age: 65
End: 2020-04-09

## 2020-04-09 ENCOUNTER — TELEPHONE (OUTPATIENT)
Dept: FAMILY MEDICINE | Facility: CLINIC | Age: 65
End: 2020-04-09

## 2020-04-09 NOTE — TELEPHONE ENCOUNTER
I talked to Naina and pt is able to enter our building after the screening is done and she sill be provided a mask.  Spoke with pt and informed that she can come for the labs and coordinated lab appt on 4/14 per her request. She will call for the CT scheduling, but our radiology dept is scheduling about a month out, so she will ask them to put her on the wait list.  I advised her to go to ED if having chest pain or worse symptoms.    Soon-Mi  -----------------------------------------------------------------------------------          ----- Message from Fredy Merritt MD sent at 4/9/2020 10:44 AM CDT -----  I talked to her yesterday.  For possible recurrence infected pleural effusion, plus unexplained weight loss, I'd like her to do labs and ct (ordered)    But I told her since dry cough and possible fever part of her symptoms, she might not be allowed in [facility] by whoever is at the door    I messaged Naina    She is checking on it    Just let her know we're working on it. If cannot do outpatient lab/ct visit she knows I might send her to ER

## 2020-04-09 NOTE — TELEPHONE ENCOUNTER
Called to help schedule labs and CT ordered by Dr. Merritt on 04-08-20. No answered. Left message with Imaging number to call to schedule and informed imaging's are scheduling about a month out.

## 2020-04-10 ENCOUNTER — TELEPHONE (OUTPATIENT)
Dept: FAMILY MEDICINE | Facility: CLINIC | Age: 65
End: 2020-04-10

## 2020-04-10 DIAGNOSIS — F41.9 ANXIETY DISORDER, UNSPECIFIED TYPE: ICD-10-CM

## 2020-04-10 NOTE — TELEPHONE ENCOUNTER
Lyrica is the controlled substance and cannot be filled early. According to the pharmacy she filled one month supply on 3/22/20 and the earliest she can fill is 4/18/20. Pt was informed this and she needs to go to ED if having worsening sxs.

## 2020-04-10 NOTE — TELEPHONE ENCOUNTER
[facility] Call [facility]    Phone Message    May a detailed message be left on voicemail: yes     Reason for Call: Medication Refill Request    Has the patient contacted the pharmacy for the refill? Yes   Name of medication being requested: Lyrica  Provider who prescribed the medication: Shaina  Pharmacy: [facility] [id number] [place] [address] [address] AT [facility]   Date medication is needed: ASAP, pt is out, she forgot half of her meds in [place], needs early fill, call pt and let her know when done         Action Taken: Message routed to:  [facility]    Travel Screening: Not Applicable

## 2020-04-10 NOTE — TELEPHONE ENCOUNTER
"Last seen: 2/19/2020  RTC: 5 weeks  Cancel: 0  No-show: 0  Next appt: 4/15/2020     Incoming refill from pharmacy via refill team     Medication requested: clonazePAM (KLONOPIN) 0.5 MG tablet  Directions: Take 1 tablet (0.5 mg) by mouth daily as needed for anxiety - Oral   Qty: 30  Last refilled: 3/19/2020, 2/19/2020, 1/22/2020    Writer placed call to patient to determine how much medication she has left. Demetra reports about 1-weeks worth but doesn't have the bottle with her at the moment. She is requesting an early refill because she is going to the pharmacy anyway this weekend to  additional meds. Writer informed patient that this is unlikely. According to note from 2/19/2020 with Dr. Bradley, \"  PLAN                                                                                                               1) PSYCHOTROPIC MEDICATIONS:               - reduce clonazepam to 0.5mg daily PRN #35 for 35 days. She should not be given a new Rx prior to her next visit. The plan at that time will be to reduce her to 0.5mg daily every other day PRN for 30 days, and then stop.     Patient reports she is still taking daily.      Writer will route this information to the provider as an FYI.    Order has been pended by refill team. Writer is unable to remove in this encounter. Will ask provider to approve/deny as seen fit.   "

## 2020-04-13 NOTE — TELEPHONE ENCOUNTER
Writer placed a call to patient to update her to discuss refill for Klonopin at the appt. No answer at number provided. LVM, requesting a call back. Clinic number provided.

## 2020-04-14 DIAGNOSIS — R63.4 WEIGHT LOSS: ICD-10-CM

## 2020-04-14 LAB
ALBUMIN SERPL-MCNC: 2.3 G/DL (ref 3.4–5)
ALP SERPL-CCNC: 112 U/L (ref 40–150)
ALT SERPL W P-5'-P-CCNC: 15 U/L (ref 0–50)
ANION GAP SERPL CALCULATED.3IONS-SCNC: 4 MMOL/L (ref 3–14)
AST SERPL W P-5'-P-CCNC: 18 U/L (ref 0–45)
BASOPHILS # BLD AUTO: 0 10E9/L (ref 0–0.2)
BASOPHILS NFR BLD AUTO: 0.5 %
BILIRUB SERPL-MCNC: 0.1 MG/DL (ref 0.2–1.3)
BUN SERPL-MCNC: 18 MG/DL (ref 7–30)
CALCIUM SERPL-MCNC: 8.1 MG/DL (ref 8.5–10.1)
CHLORIDE SERPL-SCNC: 109 MMOL/L (ref 94–109)
CO2 SERPL-SCNC: 27 MMOL/L (ref 20–32)
CORTIS SERPL-MCNC: 14.8 UG/DL (ref 4–22)
CREAT SERPL-MCNC: 0.69 MG/DL (ref 0.52–1.04)
DIFFERENTIAL METHOD BLD: ABNORMAL
EOSINOPHIL # BLD AUTO: 0.3 10E9/L (ref 0–0.7)
EOSINOPHIL NFR BLD AUTO: 5.3 %
ERYTHROCYTE [DISTWIDTH] IN BLOOD BY AUTOMATED COUNT: 19.1 % (ref 10–15)
GFR SERPL CREATININE-BSD FRML MDRD: >90 ML/MIN/{1.73_M2}
GLUCOSE SERPL-MCNC: 115 MG/DL (ref 70–99)
HCT VFR BLD AUTO: 31.8 % (ref 35–47)
HGB BLD-MCNC: 9 G/DL (ref 11.7–15.7)
IMM GRANULOCYTES # BLD: 0 10E9/L (ref 0–0.4)
IMM GRANULOCYTES NFR BLD: 0.2 %
LYMPHOCYTES # BLD AUTO: 1.1 10E9/L (ref 0.8–5.3)
LYMPHOCYTES NFR BLD AUTO: 18 %
MCH RBC QN AUTO: 22.8 PG (ref 26.5–33)
MCHC RBC AUTO-ENTMCNC: 28.3 G/DL (ref 31.5–36.5)
MCV RBC AUTO: 81 FL (ref 78–100)
MONOCYTES # BLD AUTO: 0.5 10E9/L (ref 0–1.3)
MONOCYTES NFR BLD AUTO: 7.5 %
NEUTROPHILS # BLD AUTO: 4.1 10E9/L (ref 1.6–8.3)
NEUTROPHILS NFR BLD AUTO: 68.5 %
NRBC # BLD AUTO: 0 10*3/UL
NRBC BLD AUTO-RTO: 0 /100
PLATELET # BLD AUTO: 223 10E9/L (ref 150–450)
POTASSIUM SERPL-SCNC: 4.9 MMOL/L (ref 3.4–5.3)
PROT SERPL-MCNC: 6.4 G/DL (ref 6.8–8.8)
RBC # BLD AUTO: 3.95 10E12/L (ref 3.8–5.2)
SODIUM SERPL-SCNC: 140 MMOL/L (ref 133–144)
TSH SERPL DL<=0.005 MIU/L-ACNC: 1.63 MU/L (ref 0.4–4)
WBC # BLD AUTO: 6 10E9/L (ref 4–11)

## 2020-04-14 PROCEDURE — 82533 TOTAL CORTISOL: CPT | Performed by: FAMILY MEDICINE

## 2020-04-14 RX ORDER — CLONAZEPAM 0.5 MG/1
TABLET ORAL
Qty: 30 TABLET | Refills: 0 | OUTPATIENT
Start: 2020-04-14

## 2020-04-14 NOTE — ED NOTES
Chart reviewed. Reached out to pt regarding follow up of diabetes care and education. Goals      Patient verbalizes understanding of self -management goals of living with Diabetes. 4/14/20-dkw  -last A1c ws 7.8% on 12/5/19  -pt seen by Dr. Chiara Forrest 4/9/20 for a virtual visit TeleHealth encounter (During IDJUW-29 public health emergency  -labs will be ordered at next appt in July  -pt stated fasting blood sugar was 167 last week; he has run out of test strips, which were sent to his  pharmacy today  -pt stated he is taking his   Key Antihyperglycemic Medications             canagliflozin (Invokana) 100 mg tablet Take 100 mg by mouth Daily (before breakfast). SITagliptin (JANUVIA) 100 mg tablet Take 1 Tab by mouth daily. -pt was getting more vision back s/p stroke, but it has been more blurry lately  -he is eating less carbs, no sweets or sweet drinks  -advised pt to practice social distancing   -he has his own bp machine at home  -will follow    Key Antihyperglycemic Medications             canagliflozin (Invokana) 100 mg tablet Take 100 mg by mouth Daily (before breakfast). SITagliptin (JANUVIA) 100 mg tablet Take 1 Tab by mouth daily.             2/14/20-dkw  -pt was a NS for DSMT session #1 yesterday 2/13/20  -pt has f/u with Dr. Chiara Forrest on 4/9/20; will follow then    1/28/20-dkw  -pt scheduled session #1 DSMT on Thursday February 13th from 10-12:00    1/7/20-dkw  -pt here today for f/u with Dr. Chiara Forrest; his mom is in rehab facility in [place] s/p stroke        -pt's last a1c was 7.8% on 12/5/19 during inpt at [facility] for acute ischemic stroke           that affected vision in his right eye; pt has h/o aortic dissection; vision in his right            eye is slowly coming back; his next appt with Dr. Michael Burgos is March 16th; home                 health only came out twice  -is checking blood sugars a few times a week fasting; four days ago was 130 something; discussed importance of checking [facility] [place]   ED Nurse to Floor Handoff     Demetra Richardson is a 63 year old female who speaks English and lives alone,  in a home  They arrived in the ED by car from home    ED Chief Complaint: Chest Wall Pain (Left sided pain)    ED Dx;   Final diagnoses:   Pleuritic chest pain   Recurrent left pleural effusion   Sjogren's syndrome, with unspecified organ involvement (H)         Needed?: No    Allergies:   Allergies   Allergen Reactions     Penicillins Hives     Hives in childhood.   .  Past Medical Hx:   Past Medical History:   Diagnosis Date     Anemia     2/2 h/p gastrectomy with inability to absorb oral iron     Basal cell carcinoma     Left-sided, skin over over medial orbit/nasal bone. Removed Oct 2018.     Benzodiazepine dependence (H)     With h/o withdrawal seizure x 1     Bipolar 1 disorder, mixed, moderate (H) 2/7/2013     Chronic pain     Back, legs.     Epidural abscess 2005    x4     Fibromyalgia      Generalised anxiety disorder      GERD (gastroesophageal reflux disease)      Major depressive disorder      Migraine      Nephrolithiasis     S/p left sided lithotripsy     Opiate dependence (H)      Osteoarthritis      Osteoporosis      Primary sclerosing cholangitis 2005     PUD (peptic ulcer disease)      SLE (systemic lupus erythematosus) (H)       Baseline Mental status: WDL  Current Mental Status changes: at basesline    Infection present or suspected this encounter: no  Sepsis suspected: No  Isolation type: No active isolations     Activity level - Baseline/Home:  Independent  Activity Level - Current:   Stand with Assist    Bariatric equipment needed?: No    In the ED these meds were given:   Medications   0.9% sodium chloride BOLUS (1,000 mLs Intravenous New Bag 4/28/19 2048)   levofloxacin (LEVAQUIN) infusion 750 mg (has no administration in time range)   0.9% sodium chloride BOLUS (0 mLs Intravenous Stopped 4/28/19 2047)   iopamidol  (ISOVUE-370) solution 55 mL (55 mLs Intravenous Given 4/28/19 2005)   sodium chloride 0.9 % bag 500mL for CT scan flush use (86 mLs Intravenous Given 4/28/19 2007)   ketorolac (TORADOL) injection 30 mg (30 mg Intravenous Given 4/28/19 2047)       Drips running?  No    Home pump  No    Current LDAs  Peripheral IV 04/28/19 Right Lower forearm (Active)   Site Assessment WDL 4/28/2019  7:42 PM   Line Status Saline locked 4/28/2019  7:42 PM   Phlebitis Scale 0-->no symptoms 4/28/2019  7:42 PM   Infiltration Scale 0 4/28/2019  7:42 PM   Infiltration Site Treatment Method  None 4/28/2019  7:42 PM   Extravasation? No 4/28/2019  7:42 PM   Dressing Intervention Dressing reinforced 4/28/2019  7:42 PM   Number of days: 0       Incision/Surgical Site 01/11/16 Left Wrist (Active)   Number of days: 1203       Labs results:   Labs Ordered and Resulted from Time of ED Arrival Up to the Time of Departure from the ED   COMPREHENSIVE METABOLIC PANEL - Abnormal; Notable for the following components:       Result Value    Potassium 5.5 (*)     Glucose 146 (*)     Albumin 2.4 (*)     Alkaline Phosphatase 318 (*)     AST 49 (*)     All other components within normal limits   LACTIC ACID WHOLE BLOOD - Abnormal; Notable for the following components:    Lactic Acid 2.5 (*)     All other components within normal limits   CRP INFLAMMATION - Abnormal; Notable for the following components:    CRP Inflammation 360.0 (*)     All other components within normal limits   LIPASE - Abnormal; Notable for the following components:    Lipase 32 (*)     All other components within normal limits   ROUTINE UA WITH MICROSCOPIC - Abnormal; Notable for the following components:    Protein Albumin Urine 10 (*)     Leukocyte Esterase Urine Small (*)     RBC Urine 9 (*)     Mucous Urine Present (*)     All other components within normal limits   CBC WITH PLATELETS DIFFERENTIAL - Abnormal; Notable for the following components:    WBC 26.8 (*)     MCHC 30.1 (*)      fasting every morning to help with self management of his diabetes; goal is <140  -pt is taking Invokana daily and just got his Januvia refilled; he will take both in the morning  -pt stopped Lexapro due to anxiety decreased when his vision started coming back  -reviewed healthy eating again today; appetite is good; he is drinking a lot of juice and will switch to no calorie drinks like Crystal Light  -discussed exercise like taking a 15 minute walk daily and to get advisement from Dr. 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 George Riley on 4/9/20 12/19/19-dkw  -pt's last a1c was 7.8% on 12/5/19 during inpt at [facility] for acute ischemic stroke that affected vision in his right eye; pt has h/o aortic dissection  -pt picked up the [address] [place] [address] Street and is also taking Januvia;  Joselito Guzman helped pt with prior auth for Invokana  -pt is not monitoring blood sugar due to has not strips; he will take his glucometer to pharmacy and make sure he gets the correct test strips and check fasting daily; pt will call if needs assistance with testing supplies; advised pt his fasting goal is <150 and that having controlled diabetes will help his whole body  -stopped Lexapro yesterday that Dr. George Riley prescribed for reactive depression because feeling better; was really stressed out before due to decreased vision in right eye that is returning now; advised pt the Lexapro may be the reason he was feelign All other components within normal limits   ERYTHROCYTE SEDIMENTATION RATE AUTO - Abnormal; Notable for the following components:    Sed Rate 46 (*)     All other components within normal limits   INR   CARDIAC CONTINUOUS MONITORING       Imaging Studies: No results found for this or any previous visit (from the past 24 hour(s)).    Recent vital signs:   /78   Pulse 87   Temp 97.7  F (36.5  C) (Oral)   Resp 26   SpO2 97%     Noelle Coma Scale Score: 15 (04/28/19 1830)       Cardiac Rhythm: Normal Sinus  Pt needs tele? No  Skin/wound Issues: None    Code Status: full code    Pain control: good    Nausea control: pt had none    Abnormal labs/tests/findings requiring intervention: see epic    Family present during ED course? No   Family Comments/Social Situation comments: none    Tasks needing completion: None    Selena Santiago, RN    [phone] [facility]  [phone] [facility]     better and to restart if his symptoms of depression return  -pt has been resting quite a bit  -pt's appetite is returning, but is still not eating as much  -home health has come out twice  -pt is working on getting approved for disability  -pt will f/u with Dr. Muriel Martinez on 1/7/20 at 8:15  -will follow up with pt at that appt per pt's request    11/1/19-dkw  -left message for pt to call back  -will follow around next ov w/Dr. Muriel Martinez 1/7/20 8/28/19-dkw  -left message to call back with blood sugars  -reminded pt of f/u with Dr. Saeed Titus on 10/7/19 at 8:00  -will follow    6/18/19-dkw  -fasting blood sugar yesterday 164; all <164 pt reported; he will continue to check fasting   -advised pt to start the [facility] [employer] [facility]; pt verbalized understanding   -pt is having dizziness; instructed him to call Dr. Masoud Perry and or Dr. Nadira Parra who prescribed his heart meds   -will follow in one week    6/13/19-dkw  -check blood sugar fasting every morning and write down result; call Alyson Wallace at [phone] in one week with results  -has not and does not was to start [address] [place] [address]; hold for now as last blood sugar fasting was 110 and finished prednisone  -exercise as instructed by Dr. Nadira Parra  -strive to start following the healthy plate meal plan being mindful of what is a carb (fruit, dairy, grains, starchy vegetables) and portion size; guideline is up to 45-60 grams of carb per meal (3 meals per day) or 3-4 servings per meal; remember that 1/2 cup of a sweet drink is 15 grams of carb  -snack guideline - 1 oz. protein serving and may add 1 carb serving   -follow up with Dr. Muriel Martinez on 10/7/19 at 8:00  -call Alyson Wallace at [phone] with any questions  -will follow

## 2020-04-14 NOTE — TELEPHONE ENCOUNTER
Called Radha to let her know that Dr. Bradley would prefer to wait until her appointment tomorrow to refill clonazepam.  She said that she was okay with that and has actually already been informed by [facility] that the early refill request was declined.  No further action needed at this time.

## 2020-04-15 ENCOUNTER — TELEPHONE (OUTPATIENT)
Dept: PSYCHIATRY | Facility: CLINIC | Age: 65
End: 2020-04-15

## 2020-04-15 ENCOUNTER — TELEPHONE (OUTPATIENT)
Dept: INTERNAL MEDICINE | Facility: CLINIC | Age: 65
End: 2020-04-15

## 2020-04-15 ENCOUNTER — VIRTUAL VISIT (OUTPATIENT)
Dept: PSYCHIATRY | Facility: CLINIC | Age: 65
End: 2020-04-15
Attending: PSYCHIATRY & NEUROLOGY
Payer: COMMERCIAL

## 2020-04-15 DIAGNOSIS — F41.9 ANXIETY DISORDER, UNSPECIFIED TYPE: ICD-10-CM

## 2020-04-15 DIAGNOSIS — F13.20 BENZODIAZEPINE DEPENDENCE (H): Primary | ICD-10-CM

## 2020-04-15 RX ORDER — CLONAZEPAM 0.5 MG/1
0.25 TABLET ORAL DAILY PRN
Qty: 15 TABLET | Refills: 0
Start: 2020-04-15 | End: 2020-05-13

## 2020-04-15 NOTE — PROGRESS NOTES
"  ----------------------------------------------------------------------------------------------------------  [facility]   Psychiatry Clinic Progress Note  Addiction Medicine/Psychiatry                      TELEPHONE VISIT  Demetra Richardson is a 64 year old pt. who is being evaluated via a billable telephone visit.      Patient has given verbal consent for a telephone visit?  yes   How would the pt like to obtain the AVS? [email or web address]  Start Time:  8:38 AM          End Time:  9:15    The patient has been notified of following by the  at the time of the visit verification call: \"This telephone visit will be conducted via a call between you and your physician/provider. We have found that certain health care needs can be provided without the need for a physical exam.  This service lets us provide the care you need with a short phone conversation.  If a prescription is necessary we can send it directly to your pharmacy.  If lab work is needed we can place an order for that and you can then stop by our lab to have the test done at a later time.Telephone visits are billed at different rates depending on your insurance coverage. During this emergency period, for some insurers they may be billed the same as an in-person visit. Please reach out to your insurance provider with any questions. If during the course of the call the physician/provider feels a telephone visit is not appropriate, you will not be charged for this service.\"        IDENTIFICATION:  Demetra Richardson is a 64 year old female with previous psychiatric/substance use  diagnoses of opioid use disorder, benzodiazepine use disorder, anxiety NOS, and borderline traits.   Pt presents for ongoing psychiatric/substance use follow-up. Patient was seen for initial evaluation on 1/22/20.  SUBJECTIVE / INTERIM HISTORY                                                 The pt was last seen in clinic on 2/19/20  at which time " "the following changes were made: reduced her montly clonazepam to #30. The patient did not make the change(s). The patient reports poor medication adherence.   There was no concern for pt safety at the time of the last visit.    Demetra Richardson is a 63 year old female who prefers the name \"Radha\". She presented for the current evaluation as part of the intake process for the [facility] Program ([facility]). The following information was obtained through a clinical interview and chart review. Her PCP Dr. Merritt referred her to Dr. Velasquez for evaluation of depression on 7/9/19 and Dr. Velasquez referred her for this assessment for NATALIA-specific treatment. Chart review indicates prior diagnoses of depression, anxiety, borderline personality disorder, benzodiazepine use disorder, and opioid use disorder.  Interim history:   She ran out a few days early this time again. She called and told the nurse that she wanted an early refill just because she was going to the pharmacy, but today she tells me that she ran out 3 days early because she took some clonazepam early. This is in keeping with previous reports.     She also reports that she is seeing Dr. Gentile, but hasn't \"for a couple of weeks\" since she's been feeling sick. She on the schedule with Dr. Gentile every Thursday at 11am, and plans to do it tomorow    She is nervous about her general situation, since she has had a 35# weight loss in the past year. She went in yesterday to get her labs drawn. She was supposed to get a CT, but it has been delayed into May.     She has felt like she has the flu. She is nauseated. She has intermittent vomiting and diarrhea. Genital herpes outbreak in the past couple of weeks. She feels she's had intermittent chills. She reports not having a cough.     Housing is on her mind. She completed an assisted living application on the phone on April 9. It went well. She will be getting CADI waiver and find a place to live for Aug 1 " on Patricia 15. Her son has helped her go look at places. Her sister who is a  lives in [place] and is trying to help from a distance.    She has stopped tapering her methadone for now. Her current dose is 97mg per week. She is eventually tapering down with a plan to switch to suboxone to avoid stigma and having to go to dose daily.     Current Substance Use- She continues to deny illicit use.   Last use of substance(s): denies for months. Went to ED for unintentional overdose October 2019  Current recovery activities: going to methadone clinic             SYMPTOMS- GI upset, yawning, running nose with methadone reduction  MEDICAL ROS- Denies chest pain, SOB, MSK pain    RELEVANT SOCIAL/FAMILY                                                     Patient  Reported     FINANCIAL SUPPORT- Currently on SSI. Worked as a  for 25 years previously.   LIVING SITUATION / RELATIONSHIPS- Lives by herself in apartment currently, but is worried she might have to go to public housing within the next year, as her father was previously paying for 600 per month of her rent. Her family has agreed to continue this until July 2020.   SOCIAL/ SPIRITUAL SUPPORT- Has 23 year old son Kenneth who lives here, doing doctorate program in PT at . Daughter is 32, living in [place], is pregnant with her first grandchild.   FEELS SAFE AT HOME- Yes    PSYCHIATRIC and SUBSTANCE USE HISTORY      SIB [method, most recent]- None  Suicide Attempt [#, recent, method]- Denies. Per hx, did once overdose on Xanax, butalbital  Suicidal Ideation Hx [passive, active]- Denies, states that she sometimes feels a little negative about life with everything she is dealing with currently, but no SI. Of note, history notes suicidal ideation a multiple hospitalizations with plan to overdose at one point. She denied this.      Psychosis Hx- None  Psych Hosp [ #, most recent, committed]- Notes that she chose to go in to the hospital after her SO  " 4 years ago, and she was talking really fast at that time. She does note just a few hospitalizations during the grief period.   ECT [#, most recent]- None     Eating Disorder- None     Outpatient Programs [ DBT, Day Treatment, Eating Disorder Tx etc]- States that she has been doing a day treatment program related to her methadone clinic.      Detailed Substance Use History:     Substance Age first use Quality/character Age max use Most recent use   Alcohol 19 College parties ~22 college   Cannabis 19 Rare, none in years NA ~ age 46   Cocaine none none none none   Stimulants none none none none   Opioids ~45 Progressive, daily On methadone Prescribed May 2019   Sedatives ~46 Progressive uncontrolled 64 Currently prescribed   Hallucinogens none none none none   Inhalants none none none none   Other none none none none   OTC drugs none none none none   Nicotine none none none none      Below is a report given to Dr. Gentile given 10/2019. Differences in todays report noted.      SUBSTANCE USE HISTORY SYNOPSIS:   [note IV use]  Past substances- Opiates - progressive, daily, on methadone since 2019  Treatment- [#, most recent] 3 residential, + 3 residential stopped early. Last [facility] in ~  Medical consequences- [withdrawal, sz] unintentional overdose  HIV/Hepatitis- Negative at last check  Legal consequences- denies    PAST MEDICATION TRIALS         Drug /  Start Date Dose (mg) Helpful Adverse Effects DC Reason / Date   Lexapro 2004           Effexor 2004     Felt bad     buspirone           amitriptyline           lithium       Stigma - \"Didn't like the idea of being on it\"   Depakote       Stigma - \"Didn't like the idea of being on it\"   Lamotrigine [id number] 200 ?       Risperidone 2017           Zyprexa  5 BID         Seroquel  100   Felt \"snowed\"     Xanax       Had withdrawal seizure   Klonopin           Ativan           Valium           phenobarbital       For seizure   gabapentin 600 QID     " For fibromyalgia   Lyrica       For fibromyalgia   Hydroxyzine 2016 50 no Dry mouth     Benadryl       For allergies   Trazodone 2013 100         melatonin           propranolol 10 TID         clonidine 0.1     For acute withdrawal   Suboxone           methadone 104             UPDATED MEDICAL / SURGICAL HISTORY                pregnant [if applicable]--No     Medical problems: Lupus, pleural effusion    CARE TEAM:          PCP- Fredy Merritt                 Therapist- Jay Butterfield with [facility]- Office: [phone] - [facility]. Reports having to stop because of insurance.  Rheum- Parastoo Fazeli                      ALLERGY   Penicillins  MEDICATIONS                                                                       bold meds Rx     Current Outpatient Medications   Medication Sig     aspirin-acetaminophen-caffeine (EXCEDRIN MIGRAINE) 250-250-65 MG tablet Take 1 tablet by mouth 2 times daily as needed for headaches     clonazePAM (KLONOPIN) 0.5 MG tablet Take 1 tablet (0.5 mg) by mouth daily as needed for anxiety (Patient not taking: Reported on 4/8/2020)     docusate sodium (COLACE) 100 MG capsule Take 1 capsule (100 mg) by mouth 2 times daily (Patient not taking: Reported on 4/8/2020)     escitalopram (LEXAPRO) 10 MG tablet Take 1 tablet (10 mg) by mouth daily     furosemide (LASIX) 20 MG tablet Take 1 tablet (20 mg) by mouth daily (Patient not taking: Reported on 4/8/2020)     hydroxychloroquine (PLAQUENIL) 200 MG tablet Take 1 tablet (200 mg) by mouth 2 times daily (with meals)     hydrOXYzine (VISTARIL) 25 MG PO capsule Take 1 capsule (25 mg) by mouth 2 times daily as needed for anxiety     methadone (DOLOPHINE-INTENSOL) 10 MG/ML (HIGH CONC) solution Take 11.3 mLs (113 mg) by mouth daily Rufina Marcial [phone]    Dose as of 4/29/19 (Patient taking differently: Take 97 mg by mouth daily Rufina Marcial [phone]    Dose as of 4/29/19 )     methyl  salicylate-menthol (DUARTE-QURESHI) OINT ointment Apply 28 g topically every 6 hours as needed (pain) (Patient not taking: Reported on 4/8/2020)     naloxone (NARCAN) 4 MG/0.1ML nasal spray Spray 1 spray (4 mg) into one nostril alternating nostrils once as needed for opioid reversal repeat every 2-3 minutes until responsive (Patient not taking: Reported on 10/23/2019)     pantoprazole (PROTONIX) 40 MG EC tablet Take 1 tablet (40 mg) by mouth every morning (before breakfast)     polyethylene glycol (MIRALAX/GLYCOLAX) packet Take 17 g by mouth 2 times daily (Patient not taking: Reported on 10/23/2019)     Pregabalin (LYRICA) 200 MG capsule TAKE 1 CAPSULE (200 MG) BY MOUTH 2 TIMES DAILY THIS IS A 30-DAY SUPPLY.     saccharomyces boulardii (FLORASTOR) 250 MG capsule Take 1 capsule (250 mg) by mouth 2 times daily (Patient not taking: Reported on 4/8/2020)     valACYclovir (VALTREX) 500 MG tablet Take 1 tablet (500 mg) by mouth 2 times daily for 3 days     No current facility-administered medications for this visit.      PSYCHOTROPIC DRUG INTERACTION CHECK was unremarkable   [details below if applicable]  VITALS   LMP  (LMP Unknown)    PHQ9           TODAY = 6             LABS                                                                                                 None  MENTAL STATUS EXAM                                                            Orientation: full, x3  Alertness: alert  and oriented  Appearance: unable to asses  Behavior/Demeanor: unable to asses   Speech: increased rate  Language: no problems  Psychomotor: unable to assess  Mood: description consistent with euthymia  Affect: unable to assess  Thought Process/Associations: unremarkable  Thought Content:  Reports none;  Denies suicidal ideation, violent ideation and delusions  Perception:  Reports none;  Denies auditory hallucinations and visual hallucinations  Insight: poor  Judgment: poor  Cognition: does  appear grossly intact; formal cognitive testing  was not done    ASSESSMENT                                                                             This patient is a 64 year old female who has opioid use disorder in long term remission on methadone, benzodiazepine use disorder - currently on prescription benzodiazepines, anxiety disorder unspecified and history of borderline personality diagnosis who presents for continued treatment of substance use disorders.     OUD - continue at [facility]. She would like to taper off methadone and switch to suboxone for reasons of stigma and quality of life. We will be here to prescribe suboxone if she desires.    BUD - provided last Rx for clonazepam at this visit. Once these are tapered she should not be restarted on them by any provider    Anxiety disorder - patient endorses symptoms offered and reports financial anxiety. Will continue lexapro.      [place] PRESCRIPTION MONITORING PROGRAM [] was checked today: not using controlled substances besides prescribed below.      TREATMENT RISK STATEMENT: The risks, benefits, alternatives and potential adverse effects have been explained and are understood by the pt. The pt agrees to the treatment plan with the ability to do so. Discussion of specific concerns included- risk of continued benzodiazepine use. The pt knows to call the clinic for any problems or access emergency care if needed. There are no medical considerations relevant to treatment, as noted above. Substance use is a problem as noted above. Drug interaction check for meds prior to visit is in the MEDICATIONS section.     DIAGNOSES         Psychiatric:  Anxiety Disorder  Borderline traits    Substance Use:  Opioid Use Disorder on maintenance therapy in remission  Benzodiazepine use disorder    PLAN                                                                                                              1) PSYCHOTROPIC MEDICATIONS:               - reduce clonazepam to 0.5mg daily PRN #15 for 30  days. She should not be given a new Rx after today.     2) THERAPY:  Patient will restart seeing Dr. Gentile on Thursdays. We should discuss further therapy options at her next visit.     3) NEXT DUE:  LABS- none at this time. UDS done at methadone clinic.     4) REFERRALS:    none     5) RTC: 4 weeks     6) CRISIS NUMBERS:   Provided routinely in AVS.     Addiction Medicine Fellow: Fredy Bradley MD    Patient seen and discussed with staff psychiatrist, Dr. Christensen. Supervisor is Dr. Christensen     TELEPHONE: direct phone communication with patient, fellow/resident and me occurred.  I discussed the key portions of the service with the fellow/resident, including the mental status examination and developing the plan of care. I agree with the findings and plan as documented in this note.     MD Mitul

## 2020-04-15 NOTE — PROGRESS NOTES
Individual Psychotherapy Session                                Telephone Visit Details  Demetra Richardson is a 64 year old pt. who is being evaluated via a telephone visit.     Type of service:  Telephone visit for psychotherapy  Time of service:    Date:  3/26/2020    Video Start Time:  11:05        Video End Time:  12:00 (55 minutes)  Reason for Telephone Visit: Patient unable to travel due to Covid-19. We tried to use doxy.me, but due to technical difficulties (sound was not working for patient) switched to a telephone call.   Originating Site (patient location): Patient's home  Distant Site (provider location): Remote location  Mode of Communication:  Video Conference via Doxy.me  Consent:  Patient has given verbal consent for telephone visit?: Yes     Care Provider: Leighann Gentile, PhD,   Participants: Patient and writer  DSM-5 Diagnoses:   Opioid use disorder, on maintenance therapy  Benzodiazepine use disorder  Cluster B traits (by chart review)  Anxiety (r/o DARRIUS)  MDD, in remission    SUBJECTIVE: Radha reported increased anxiety and significant fears related to covid19 and her health.     TREATMENT: I provided supportive listening and gently challenged the accuracy of her most extreme fears. I also helped Radha set small goals, including to reach out to family for social support to help her to cope with increased loneliness.     MSE:  Alertness: alert and oriented  Appearance: casually groomed (briefly assessed during our initial use of doxy before we switched to a telephone call)  Speech: normal  Language: intact  Mood: anxious  Affect: full range; was congruent to mood; was congruent to content  Thought Process/Associations: unremarkable  Thought Content:  Reports none;  Denies suicidal ideation and violent ideation  Perception:  Reports none;  Denies auditory hallucinations and visual hallucinations  Insight: adequate for  safety  Judgment: adequate for safety  Cognition: (6) does  appear grossly  intact; formal cognitive testing was not done    PLAN:   1) Weekly therapy with writer   2) Continue klonopin taper leading to total cessation under the supervision of Dr. Bradley    Treatment plan due for review by: Patricia 3, 2020    Performed and documented by: Leighann Gentile, PhD, LP

## 2020-04-15 NOTE — TELEPHONE ENCOUNTER
Called Radha to confirm the pharmacy she wants Rx called in to.  Called [facility] and gave order details to Edgar, pharmacist.  No further action needed at this time.

## 2020-04-15 NOTE — TELEPHONE ENCOUNTER
----- Message from Fredy Bradley MD sent at 4/15/2020  9:43 AM CDT -----  Can one of you please call in her new clonazepam rx. I cannot eprescribe controlled substances. She should take 0.25mg daily prn for 30 days. So #15 0.5mg tabs for 30 days.

## 2020-04-15 NOTE — TELEPHONE ENCOUNTER
I rescheduled CT on 4/17/20 at 11 am, fasting 2 hours prior to CT.  Left a detailed message to the pt regarding this new info. I also left a message that she needs to call us back for the confirmation.    Soon-Mi  -----------------------------------------------------------        ----- Message -----  From: Fredy Merritt MD  Sent: 4/14/2020   4:19 PM CDT  To: Ness Craven RN    Can you call her  Just did phone visit with me  Weight loss, unexplained, plus chest pain reminding her of chest pain when she had infected pleural fluid requiring surgical drainage  Labs do not show anything to explain this (she is and has been low on protein and needs to increase protein intake)    I also ordered ct chest, abdomen, pelvis. I think I got a message that radiology thought she should wait for covid precautions.    Given her symptoms I'd like her to get that done soon, ideally this or next week.    Can you let her know about labs, and see if we can do ct.    Thanks  Edgar

## 2020-04-16 ENCOUNTER — VIRTUAL VISIT (OUTPATIENT)
Dept: PSYCHIATRY | Facility: CLINIC | Age: 65
End: 2020-04-16
Attending: PSYCHOLOGIST
Payer: COMMERCIAL

## 2020-04-16 DIAGNOSIS — F13.20 BENZODIAZEPINE DEPENDENCE (H): Primary | ICD-10-CM

## 2020-04-20 ENCOUNTER — TELEPHONE (OUTPATIENT)
Dept: FAMILY MEDICINE | Facility: CLINIC | Age: 65
End: 2020-04-20

## 2020-04-20 ENCOUNTER — ANCILLARY PROCEDURE (OUTPATIENT)
Dept: CT IMAGING | Facility: CLINIC | Age: 65
End: 2020-04-20
Attending: FAMILY MEDICINE
Payer: COMMERCIAL

## 2020-04-20 DIAGNOSIS — R63.4 WEIGHT LOSS: ICD-10-CM

## 2020-04-20 DIAGNOSIS — R07.9 LEFT-SIDED CHEST PAIN: ICD-10-CM

## 2020-04-20 LAB — RADIOLOGIST FLAGS: NORMAL

## 2020-04-20 RX ORDER — IOPAMIDOL 755 MG/ML
73 INJECTION, SOLUTION INTRAVASCULAR ONCE
Status: COMPLETED | OUTPATIENT
Start: 2020-04-20 | End: 2020-04-20

## 2020-04-20 RX ADMIN — IOPAMIDOL 73 ML: 755 INJECTION, SOLUTION INTRAVASCULAR at 11:02

## 2020-04-20 NOTE — TELEPHONE ENCOUNTER
[facility] Call Center    Phone Message    May a detailed message be left on voicemail: yes     Reason for Call: Other: Choua from [facility] was calling to provide incidental findings on a CT Chest/Abdomen/Pelvis w Contrast From Today please call to address concerns.      Action Taken: Message routed to:  [facility] pcc    Travel Screening: Not Applicable

## 2020-04-21 ENCOUNTER — TELEPHONE (OUTPATIENT)
Dept: FAMILY MEDICINE | Facility: CLINIC | Age: 65
End: 2020-04-21

## 2020-04-21 NOTE — TELEPHONE ENCOUNTER
[facility] Call Center     Phone Message     May a detailed message be left on voicemail: yes      Reason for Call: Other: Choua from [facility] is calling back to Follow-up on incidental findings on a CT Chest/Abdomen/Pelvis w Contrast From Today please call to address concerns.        Action Taken: Message routed to:  [facility] pcc     Travel Screening: Not Applicable

## 2020-04-21 NOTE — TELEPHONE ENCOUNTER
Spoke with pt and informed the CT & Lab results. I also coordinated scheduling the telephone visit tomorrow.    [facility]    --------------------------------------------------------    ----- Message from Fredy Merritt MD sent at 4/20/2020  2:06 PM CDT -----  Let her know ct does not show anything that would explain weight loss, and also there is no chest fluid infection coming  back.    There is a small spot in one lung, likely nothing but need to double check in six months with non contrast chest ct to make sure    Offer her virtual follow up to review, discuss her problems that triggered the labs/CT scan

## 2020-04-21 NOTE — TELEPHONE ENCOUNTER
Left a detailed message to [facility] that PCP already reviewed the finding and we will take care of it.

## 2020-04-21 NOTE — TELEPHONE ENCOUNTER
[facility] Call Center    Phone Message    May a detailed message be left on voicemail: yes     Reason for Call: Requesting Results   Name/type of test: chest CT - Pt requesting results from Dr. Merritt only  Date of test: 4/20/20   Was test do/ne at a location other than [facility] (Please fill in the location if not [facility])?: No      Action Taken: Message routed to:  [facility]    Travel Screening: Not Applicable

## 2020-04-22 ENCOUNTER — VIRTUAL VISIT (OUTPATIENT)
Dept: FAMILY MEDICINE | Facility: CLINIC | Age: 65
End: 2020-04-22
Payer: COMMERCIAL

## 2020-04-22 DIAGNOSIS — M79.89 SOFT TISSUE MASS: ICD-10-CM

## 2020-04-22 DIAGNOSIS — R91.8 PULMONARY NODULES: ICD-10-CM

## 2020-04-22 DIAGNOSIS — R07.89 CHEST WALL PAIN: ICD-10-CM

## 2020-04-22 DIAGNOSIS — E88.09 HYPOALBUMINEMIA: Primary | ICD-10-CM

## 2020-04-22 DIAGNOSIS — D64.9 ANEMIA, UNSPECIFIED TYPE: ICD-10-CM

## 2020-04-22 ASSESSMENT — PAIN SCALES - GENERAL: PAINLEVEL: MODERATE PAIN (5)

## 2020-04-22 NOTE — PROGRESS NOTES
"Demetra Richardson is a 64 year old female who is being evaluated via a billable telephone visit.      The patient has been notified of following:     \"This telephone visit will be conducted via a call between you and your physician/provider. We have found that certain health care needs can be provided without the need for a physical exam.  This service lets us provide the care you need with a short phone conversation.  If a prescription is necessary we can send it directly to your pharmacy.  If lab work is needed we can place an order for that and you can then stop by our lab to have the test done at a later time.    Telephone visits are billed at different rates depending on your insurance coverage. During this emergency period, for some insurers they may be billed the same as an in-person visit.  Please reach out to your insurance provider with any questions.    If during the course of the call the physician/provider feels a telephone visit is not appropriate, you will not be charged for this service.\"    Patient has given verbal consent for Telephone visit?  Yes    How would you like to obtain your AVS? Nabeel    Subjective     Demetra Richardson is a 64 year old female who presents to clinic today for the following health issues:    HPI  1-f/u work up for chest pain and weightloss, unexplained  2--ct reviewed; lung nodule, discussed it and need for late year recheck  3--reviewed no recurrence pleural fluid/infection seen as she feared  4--reviewed right sided kidney stones still present; no sx on ROS. Offered, she declines see Urology.  5--still gets chest pain. Under left breast. Brief pinching type pain, then goes away.Lasts seconds at one time   6-low protein; per patient she eats a lot of protein. Good appetite. No nausea, vomit, abdomen pain. No diarrhea.  7-recent labs show anemia, around typical levels for her but given weight loss will evaluate we agree  8-she notes left hip abnormal mass still " present, no bigger or smaller, nontender, reviewed recent ortho notes, plan for redo mr about now for ill characterized mass  Current Outpatient Medications   Medication     aspirin-acetaminophen-caffeine (EXCEDRIN MIGRAINE) 250-250-65 MG tablet     clonazePAM (KLONOPIN) 0.5 MG tablet     escitalopram (LEXAPRO) 10 MG tablet     hydroxychloroquine (PLAQUENIL) 200 MG tablet     hydrOXYzine (VISTARIL) 25 MG PO capsule     methadone (DOLOPHINE-INTENSOL) 10 MG/ML (HIGH CONC) solution     pantoprazole (PROTONIX) 40 MG EC tablet     Pregabalin (LYRICA) 200 MG capsule     docusate sodium (COLACE) 100 MG capsule     furosemide (LASIX) 20 MG tablet     methyl salicylate-menthol (DUARTE-QURESHI) OINT ointment     naloxone (NARCAN) 4 MG/0.1ML nasal spray     polyethylene glycol (MIRALAX/GLYCOLAX) packet     saccharomyces boulardii (FLORASTOR) 250 MG capsule     valACYclovir (VALTREX) 500 MG tablet     No current facility-administered medications for this visit.      Allergies   Allergen Reactions     Penicillins Hives     Hives in childhood.     Past Medical History:   Diagnosis Date     Anemia     2/2 h/p gastrectomy with inability to absorb oral iron     Basal cell carcinoma     Left-sided, skin over over medial orbit/nasal bone. Removed Oct 2018.     Benzodiazepine dependence (H)     With h/o withdrawal seizure x 1     Bipolar 1 disorder, mixed, moderate (H) 2/7/2013     Chronic pain     Back, legs.     Epidural abscess 2005    x4     Fibromyalgia      Generalised anxiety disorder      GERD (gastroesophageal reflux disease)      Major depressive disorder      Migraine      Nephrolithiasis     S/p left sided lithotripsy     Opiate dependence (H)      Osteoarthritis      Osteoporosis      Primary sclerosing cholangitis 2005     PUD (peptic ulcer disease)      SLE (systemic lupus erythematosus) (H)      Past Surgical History:   Procedure Laterality Date     BACK SURGERY  7-16-04    L4-5 epidural abscess     BACK SURGERY  8-4-04     L3-4 spinal stenosis     BACK SURGERY  04    Lt psoas abscess     BRONCHOSCOPY FLEXIBLE N/A 2019    Procedure: Flexible Bronchoscopy;  Surgeon: Patrice Regalado MD;  Location: [facility]      SECTION      x 2     CHOLECYSTECTOMY  -     CHOLECYSTECTOMY       CLOSED REDUCTION, PERCUTANEOUS PINNING FINGER, COMBINED  8/10/2011    Procedure:COMBINED CLOSED REDUCTION, PERCUTANEOUS PINNING FINGER; 5th Proximal Phalanx; Surgeon:RADHA BUITRAGO; [facility]     COLONOSCOPY       GASTRECTOMY  2005    Bilat truncal vagotomy, hemigastrectomy, RnY gastrojejunostomy     GASTRECTOMY      s/p 11 had temporary fdg tube, now removed     GASTROJEJUNOSTOMY  2011    Procedure:GASTROJEJUNOSTOMY; exploratory laparotmy with revision of gastrojejunostomy, Antrectomy, Miles-en-y, Gastrojejunostomy; Surgeon:JULIUS HELM; Location:[facility]     INSERT CHEST TUBE N/A 2019    Procedure: INSERTION, CATHETER, INTERCOSTAL, FOR DRAINAGE;  Surgeon: Melissa Nichols MD;  Location: [facility]     LASER HOLMIUM LITHOTRIPSY URETER(S), INSERT STENT, COMBINED      Procedure: COMBINED CYSTOSCOPY, URETEROSCOPY, LASER HOLMIUM LITHOTRIPSY URETER(S), INSERT STENT;;  Surgeon: Blair Correa MD;  [facility]     LASER HOLMIUM NEPHROLITHOTOMY VIA PERCUTANEOUS NEPHROSTOMY  2012    Procedure: LASER HOLMIUM NEPHROLITHOTOMY VIA PERCUTANEOUS NEPHROSTOMY;  proceedure should read: Left Percutaneous Access, Left Percutaneous ultrasonic Nephrolithotomy, Ureteroscopy Holmium Laser Lithotripsy Stent Placement ;  Surgeon: Blair Correa MD;  [facility]     MAMMOPLASTY AUGMENTATION BILATERAL       OPEN REDUCTION INTERNAL FIXATION WRIST Left 2016    Procedure: OPEN REDUCTION INTERNAL FIXATION WRIST;  Surgeon: Darling Ellis MD;  [facility]     RECONSTRUCT BREAST, IMPLANT PROSTHESIS, COMBINED       THORACOSCOPIC DECORTICATION LUNG Left 2019    Procedure: Left Video Assisted Thoracoscopic  Decortication, Intercostal Nerve Cryo Analgesia, Flexible Bronchoscopy;  Surgeon: Patrice Regalado MD;  Location: [facility]       Review of Systems          Objective   Reported vitals:  LMP  (LMP Unknown)    healthy, alert and no distress  PSYCH: Alert and oriented times 3; coherent speech, normal   rate and volume, able to articulate logical thoughts, able   to abstract reason, no tangential thoughts, no hallucinations   or delusions  Her affect is normal  RESP: No cough, no audible wheezing, able to talk in full sentences  Remainder of exam unable to be completed due to telephone visits    Diagnostic Test Results:  Labs reviewed in Epic        Assessment/Plan:  1-hip mass-redo MR   2-weight loss unexplained w/ anemia, low protein-follow up labs  3-kidney stones, no symptoms, offered/declined Urology visit  4-pain under left breast, likely soft tissue chest wall pain, monitor, seconds at a time  5-lung nodule: discussed, I told her redo ct late 2020. No cough.        Phone call duration:  26 minutes    Fredy Merritt MD

## 2020-04-24 ENCOUNTER — TELEPHONE (OUTPATIENT)
Dept: INTERNAL MEDICINE | Facility: CLINIC | Age: 65
End: 2020-04-24

## 2020-04-24 ENCOUNTER — ANCILLARY PROCEDURE (OUTPATIENT)
Dept: MRI IMAGING | Facility: CLINIC | Age: 65
End: 2020-04-24
Attending: FAMILY MEDICINE
Payer: COMMERCIAL

## 2020-04-24 DIAGNOSIS — E88.09 HYPOALBUMINEMIA: ICD-10-CM

## 2020-04-24 DIAGNOSIS — E61.1 IRON DEFICIENCY: Primary | ICD-10-CM

## 2020-04-24 DIAGNOSIS — D64.9 ANEMIA, UNSPECIFIED TYPE: ICD-10-CM

## 2020-04-24 DIAGNOSIS — M79.89 SOFT TISSUE MASS: ICD-10-CM

## 2020-04-24 DIAGNOSIS — D50.9 ANEMIA, IRON DEFICIENCY: ICD-10-CM

## 2020-04-24 LAB
ALBUMIN SERPL-MCNC: 2.5 G/DL (ref 3.4–5)
ALBUMIN UR-MCNC: NEGATIVE MG/DL
ALP SERPL-CCNC: 180 U/L (ref 40–150)
ALT SERPL W P-5'-P-CCNC: 19 U/L (ref 0–50)
APPEARANCE UR: ABNORMAL
AST SERPL W P-5'-P-CCNC: 33 U/L (ref 0–45)
BACTERIA #/AREA URNS HPF: ABNORMAL /HPF
BASOPHILS # BLD AUTO: 0 10E9/L (ref 0–0.2)
BASOPHILS NFR BLD AUTO: 0.8 %
BILIRUB DIRECT SERPL-MCNC: <0.1 MG/DL (ref 0–0.2)
BILIRUB SERPL-MCNC: 0.1 MG/DL (ref 0.2–1.3)
BILIRUB UR QL STRIP: NEGATIVE
CAOX CRY #/AREA URNS HPF: ABNORMAL /HPF
COLOR UR AUTO: YELLOW
DIFFERENTIAL METHOD BLD: ABNORMAL
EOSINOPHIL # BLD AUTO: 0.4 10E9/L (ref 0–0.7)
EOSINOPHIL NFR BLD AUTO: 7.1 %
ERYTHROCYTE [DISTWIDTH] IN BLOOD BY AUTOMATED COUNT: 18.2 % (ref 10–15)
FERRITIN SERPL-MCNC: 4 NG/ML (ref 8–252)
FOLATE SERPL-MCNC: 4.2 NG/ML
GLUCOSE UR STRIP-MCNC: NEGATIVE MG/DL
HCT VFR BLD AUTO: 33.9 % (ref 35–47)
HGB BLD-MCNC: 9.3 G/DL (ref 11.7–15.7)
HGB UR QL STRIP: NEGATIVE
HYALINE CASTS #/AREA URNS LPF: 1 /LPF (ref 0–2)
IMM GRANULOCYTES # BLD: 0 10E9/L (ref 0–0.4)
IMM GRANULOCYTES NFR BLD: 0.4 %
IRON SATN MFR SERPL: 5 % (ref 15–46)
IRON SERPL-MCNC: 19 UG/DL (ref 35–180)
KETONES UR STRIP-MCNC: NEGATIVE MG/DL
LEUKOCYTE ESTERASE UR QL STRIP: ABNORMAL
LYMPHOCYTES # BLD AUTO: 1.9 10E9/L (ref 0.8–5.3)
LYMPHOCYTES NFR BLD AUTO: 37.2 %
MCH RBC QN AUTO: 22.5 PG (ref 26.5–33)
MCHC RBC AUTO-ENTMCNC: 27.4 G/DL (ref 31.5–36.5)
MCV RBC AUTO: 82 FL (ref 78–100)
MONOCYTES # BLD AUTO: 0.5 10E9/L (ref 0–1.3)
MONOCYTES NFR BLD AUTO: 9.1 %
MUCOUS THREADS #/AREA URNS LPF: PRESENT /LPF
NEUTROPHILS # BLD AUTO: 2.3 10E9/L (ref 1.6–8.3)
NEUTROPHILS NFR BLD AUTO: 45.4 %
NITRATE UR QL: NEGATIVE
NRBC # BLD AUTO: 0 10*3/UL
NRBC BLD AUTO-RTO: 0 /100
PH UR STRIP: 5 PH (ref 5–7)
PLATELET # BLD AUTO: 317 10E9/L (ref 150–450)
PROT SERPL-MCNC: 6.9 G/DL (ref 6.8–8.8)
RBC # BLD AUTO: 4.14 10E12/L (ref 3.8–5.2)
RBC #/AREA URNS AUTO: 13 /HPF (ref 0–2)
RETICS # AUTO: 115.9 10E9/L (ref 25–95)
RETICS/RBC NFR AUTO: 2.8 % (ref 0.5–2)
SOURCE: ABNORMAL
SP GR UR STRIP: 1.02 (ref 1–1.03)
SQUAMOUS #/AREA URNS AUTO: <1 /HPF (ref 0–1)
TIBC SERPL-MCNC: 396 UG/DL (ref 240–430)
UROBILINOGEN UR STRIP-MCNC: 0 MG/DL (ref 0–2)
VIT B12 SERPL-MCNC: 504 PG/ML (ref 193–986)
WBC # BLD AUTO: 5.1 10E9/L (ref 4–11)
WBC #/AREA URNS AUTO: 12 /HPF (ref 0–5)

## 2020-04-24 PROCEDURE — 87086 URINE CULTURE/COLONY COUNT: CPT | Performed by: FAMILY MEDICINE

## 2020-04-24 PROCEDURE — 82746 ASSAY OF FOLIC ACID SERUM: CPT | Performed by: FAMILY MEDICINE

## 2020-04-24 RX ORDER — FERROUS SULFATE 325(65) MG
325 TABLET ORAL 2 TIMES DAILY
Qty: 60 TABLET | Refills: 11 | Status: SHIPPED | OUTPATIENT
Start: 2020-04-24 | End: 2020-08-03

## 2020-04-24 NOTE — TELEPHONE ENCOUNTER
Left a detailed message to the pt regarding the results and recommendations. Rx was sent to [facility] and future lab orders placed.    [facility]  -------------------------------------------------------------      ----- Message from Fredy Merritt MD sent at 4/24/2020  1:18 PM CDT -----  Low iron  In normal times we could consider IV iron  To try to keep people out of medical faciliites for now, see if she is ok trying oral iron for now    If ok with that let's try iron sulfate 325 mg tab  One po bid with food, sixty tab, eleven refills  CBC, iron, tibc, ferritin levels in two mo

## 2020-04-25 LAB
BACTERIA SPEC CULT: NORMAL
Lab: NORMAL
SPECIMEN SOURCE: NORMAL

## 2020-04-27 ENCOUNTER — TELEPHONE (OUTPATIENT)
Dept: FAMILY MEDICINE | Facility: CLINIC | Age: 65
End: 2020-04-27

## 2020-04-27 DIAGNOSIS — E53.8 LOW FOLATE: Primary | ICD-10-CM

## 2020-04-27 RX ORDER — FOLIC ACID 1 MG/1
1 TABLET ORAL DAILY
Qty: 30 TABLET | Refills: 11 | Status: ON HOLD | OUTPATIENT
Start: 2020-04-27 | End: 2021-06-16

## 2020-04-27 NOTE — TELEPHONE ENCOUNTER
----- Message from Fredy Merritt MD sent at 4/25/2020  8:03 AM CDT -----  Low on folate    Call and tell her, send in folic acid 1 mg pill, take one  a day, ok 30 pills 11 refills    Redo cbc, folate levels in two months

## 2020-04-27 NOTE — TELEPHONE ENCOUNTER
Spoke to patient to relay providers message. Patient would like to discuss lab and imagining results with provider, scheduled telephone appointment with provider. Sarah Ruiz LPN 4/27/2020 11:08 AM

## 2020-04-28 ENCOUNTER — VIRTUAL VISIT (OUTPATIENT)
Dept: FAMILY MEDICINE | Facility: CLINIC | Age: 65
End: 2020-04-28
Payer: COMMERCIAL

## 2020-04-28 DIAGNOSIS — R00.2 PALPITATIONS: ICD-10-CM

## 2020-04-28 DIAGNOSIS — R61 NIGHT SWEATS: ICD-10-CM

## 2020-04-28 DIAGNOSIS — D64.9 ANEMIA, UNSPECIFIED TYPE: ICD-10-CM

## 2020-04-28 DIAGNOSIS — K83.09 SCLEROSING CHOLANGITIS (H): ICD-10-CM

## 2020-04-28 DIAGNOSIS — R63.4 WEIGHT LOSS: Primary | ICD-10-CM

## 2020-04-28 DIAGNOSIS — L29.9 PRURITIC DISORDER: ICD-10-CM

## 2020-04-28 DIAGNOSIS — M79.7 FIBROMYALGIA: ICD-10-CM

## 2020-04-28 RX ORDER — PREGABALIN 150 MG/1
150 CAPSULE ORAL 3 TIMES DAILY
Qty: 90 CAPSULE | Refills: 3 | Status: SHIPPED | OUTPATIENT
Start: 2020-04-28 | End: 2020-08-17

## 2020-04-28 ASSESSMENT — PAIN SCALES - GENERAL: PAINLEVEL: MODERATE PAIN (4)

## 2020-04-28 NOTE — NURSING NOTE
Chief Complaint   Patient presents with     Results     Follow up on tests resullts.      Neris Espinoza, JHOANA 7:45 AM  4/28/2020

## 2020-04-28 NOTE — PROGRESS NOTES
"Demetra Richardson is a 64 year old female who is being evaluated via a billable telephone visit.      The patient has been notified of following:     \"This telephone visit will be conducted via a call between you and your physician/provider. We have found that certain health care needs can be provided without the need for a physical exam.  This service lets us provide the care you need with a short phone conversation.  If a prescription is necessary we can send it directly to your pharmacy.  If lab work is needed we can place an order for that and you can then stop by our lab to have the test done at a later time.    Telephone visits are billed at different rates depending on your insurance coverage. During this emergency period, for some insurers they may be billed the same as an in-person visit.  Please reach out to your insurance provider with any questions.    If during the course of the call the physician/provider feels a telephone visit is not appropriate, you will not be charged for this service.\"    Patient has given verbal consent for Telephone visit?  Yes    How would you like to obtain your AVS? Mail a copy    Subjective     Demetra Richardson is a 64 year old female who presents to clinic today for the following health issues:    HPI   1-No new symptoms since last seen  2-did not yet get the folate; discussed what it is, why to get and take  3-low hemoglobin; at her baseline. Low iron. She reminds me usually needed IV iron over the years, does not absorb po iron well.   4-she does note that for at least a month, she has had global itchiness and also 3-5 times a week profuse night sweats both new. Recent CT no findings consistent with infection or cancer. Labs no bilirubin excess. Hip MR followup smaller; per radiology could be infectious but smaller and per pt is nontender.   5-she is now not sure if still losing weight but states she's just disappearing, her words.   6-she thinks her left abdomen " looks more protruberant than the right  7-for over a month, noting odd new skin phenomenon-both hands, has not noted elsewhere. WIthout trauma, spots about a cm around show up, raspberry colored, flat, not open, not draining, not itch or hurt or bleed, last 3-5 days and go away.   8-dizzy at times, no syncope, more a sense of off balance or vertigo than near syncope by description. Does have low weight, polypharmacy including methadone  9-FM pain gradually worse, on lyrica, not controlling pain.  10-history lupus, sclerosing cholangitis, due for specilty followup  11--with weight loss she specifically requests endocrine consult    Past Medical History:   Diagnosis Date     Anemia     2/2 h/p gastrectomy with inability to absorb oral iron     Basal cell carcinoma     Left-sided, skin over over medial orbit/nasal bone. Removed Oct 2018.     Benzodiazepine dependence (H)     With h/o withdrawal seizure x 1     Bipolar 1 disorder, mixed, moderate (H) 2013     Chronic pain     Back, legs.     Epidural abscess 2005    x4     Fibromyalgia      Generalised anxiety disorder      GERD (gastroesophageal reflux disease)      Major depressive disorder      Migraine      Nephrolithiasis     S/p left sided lithotripsy     Opiate dependence (H)      Osteoarthritis      Osteoporosis      Primary sclerosing cholangitis      PUD (peptic ulcer disease)      SLE (systemic lupus erythematosus) (H)      Past Surgical History:   Procedure Laterality Date     BACK SURGERY  04    L4-5 epidural abscess     BACK SURGERY  04    L3-4 spinal stenosis     BACK SURGERY  04    Lt psoas abscess     BRONCHOSCOPY FLEXIBLE N/A 2019    Procedure: Flexible Bronchoscopy;  Surgeon: Patrice Regalado MD;  [facility]: [facility]      SECTION      x 2     CHOLECYSTECTOMY  2-     CHOLECYSTECTOMY       CLOSED REDUCTION, PERCUTANEOUS PINNING FINGER, COMBINED  8/10/2011    Procedure:COMBINED CLOSED REDUCTION, PERCUTANEOUS  PINNING FINGER; 5th Proximal Phalanx; Surgeon:RADHA BUITRAGO; [facility]     COLONOSCOPY       GASTRECTOMY  11-    Bilat truncal vagotomy, hemigastrectomy, RnY gastrojejunostomy     GASTRECTOMY      s/p 6/2/11 had temporary fdg tube, now removed     GASTROJEJUNOSTOMY  6/2/2011    Procedure:GASTROJEJUNOSTOMY; exploratory laparotmy with revision of gastrojejunostomy, Antrectomy, Miles-en-y, Gastrojejunostomy; Surgeon:JULIUS HELM; Location[facility]     INSERT CHEST TUBE N/A 4/29/2019    Procedure: INSERTION, CATHETER, INTERCOSTAL, FOR DRAINAGE;  Surgeon: Melissa Nichols MD;  [facility]: [facility]     LASER HOLMIUM LITHOTRIPSY URETER(S), INSERT STENT, COMBINED      Procedure: COMBINED CYSTOSCOPY, URETEROSCOPY, LASER HOLMIUM LITHOTRIPSY URETER(S), INSERT STENT;;  Surgeon: Blair Correa MD;  [facility]     LASER HOLMIUM NEPHROLITHOTOMY VIA PERCUTANEOUS NEPHROSTOMY  7/11/2012    Procedure: LASER HOLMIUM NEPHROLITHOTOMY VIA PERCUTANEOUS NEPHROSTOMY;  proceedure should read: Left Percutaneous Access, Left Percutaneous ultrasonic Nephrolithotomy, Ureteroscopy Holmium Laser Lithotripsy Stent Placement ;  Surgeon: Blair Correa MD;  [facility]     MAMMOPLASTY AUGMENTATION BILATERAL       OPEN REDUCTION INTERNAL FIXATION WRIST Left 1/11/2016    Procedure: OPEN REDUCTION INTERNAL FIXATION WRIST;  Surgeon: Darling Ellis MD;  [facility]     RECONSTRUCT BREAST, IMPLANT PROSTHESIS, COMBINED       THORACOSCOPIC DECORTICATION LUNG Left 5/7/2019    Procedure: Left Video Assisted Thoracoscopic Decortication, Intercostal Nerve Cryo Analgesia, Flexible Bronchoscopy;  Surgeon: Patrice Regalado MD;  Location: [facility]     Current Outpatient Medications   Medication     aspirin-acetaminophen-caffeine (EXCEDRIN MIGRAINE) 250-250-65 MG tablet     clonazePAM (KLONOPIN) 0.5 MG tablet     escitalopram (LEXAPRO) 10 MG tablet     ferrous sulfate (FEROSUL) 325 (65 Fe) MG tablet     folic acid  (FOLVITE) 1 MG tablet     hydroxychloroquine (PLAQUENIL) 200 MG tablet     hydrOXYzine (VISTARIL) 25 MG PO capsule     methadone (DOLOPHINE-INTENSOL) 10 MG/ML (HIGH CONC) solution     pantoprazole (PROTONIX) 40 MG EC tablet     pregabalin (LYRICA) 150 MG capsule     Pregabalin (LYRICA) 200 MG capsule     docusate sodium (COLACE) 100 MG capsule     furosemide (LASIX) 20 MG tablet     methyl salicylate-menthol (DUARTE-QURESHI) OINT ointment     naloxone (NARCAN) 4 MG/0.1ML nasal spray     polyethylene glycol (MIRALAX/GLYCOLAX) packet     saccharomyces boulardii (FLORASTOR) 250 MG capsule     valACYclovir (VALTREX) 500 MG tablet     No current facility-administered medications for this visit.      Allergies   Allergen Reactions     Penicillins Hives     Hives in childhood.                  Review of Systems   No other complaints except occasional brief palpitation; chronic. Not associated with dizziness. Recent labs rvwd.         Objective   Reported vitals:  LMP  (LMP Unknown)    healthy, alert and no distress  PSYCH: Alert and oriented times 3; coherent speech, normal   rate and volume, able to articulate logical thoughts, able   to abstract reason, no tangential thoughts, no hallucinations   or delusions  Her affect is normal  RESP: No cough, no audible wheezing, able to talk in full sentences  Remainder of exam unable to be completed due to telephone visits    Diagnostic Test Results:  Labs reviewed in Epic        Assessment/Plan:  1. Weight loss  Unexplained. Pt requests endocrine eval. Not diabetic, thyroid and random cortisol normal.  - ENDOCRINOLOGY ADULT REFERRAL  - Blood Morphology Pathologist Review; Future  - Protein electrophoresis; Future    2. Palpitations  Will see if can do lab only ekg  - EKG Performed in Clinic w/ Provider Reading Fee    3. Sclerosing cholangitis  Due for hepatology followup  - GASTROENTEROLOGY ADULT REFERRAL; Future    4. Fibromyalgia  Not well managed pain, will go up to max dose Lyrica,  recheck soon  - pregabalin (LYRICA) 150 MG capsule; Take 1 capsule (150 mg) by mouth 3 times daily  Dispense: 90 capsule; Refill: 3    5. Pruritic disorder    - Blood Morphology Pathologist Review; Future  - Protein electrophoresis; Future  - Hepatic panel; Future    6. Night sweats  If persist and no bone marrow abnormality found consider further infetion evaluation  - Blood Morphology Pathologist Review; Future  - Protein electrophoresis; Future    7. Anemia, unspecified type  Consider egd/colonoscopy once can do   - CBC with platelets differential; Future  - Blood Morphology Pathologist Review; Future  - Protein electrophoresis; Future  - Tissue transglutaminase amy IgA and IgG; Future    Consider near term rheumatology followup, routine gyn visit    Discussed hip mass, MR, she's ok just monitoring    She has declined Urology visit for kidney stone        Followup mid May    Phone call duration:  45 minutes    Fredy Merritt MD

## 2020-04-29 DIAGNOSIS — F41.9 ANXIETY DISORDER, UNSPECIFIED TYPE: ICD-10-CM

## 2020-04-29 RX ORDER — HYDROXYZINE PAMOATE 25 MG/1
CAPSULE ORAL
Qty: 60 CAPSULE | Refills: 0 | Status: SHIPPED | OUTPATIENT
Start: 2020-04-29 | End: 2020-05-28

## 2020-04-29 NOTE — TELEPHONE ENCOUNTER
RECORDS RECEIVED FROM: Internal - Sclerosing cholangitis [K83.09   Appt Date: 05.04.2020   NOTES STATUS DETAILS   OFFICE NOTE from referring provider Internal 04.28.2020 Fredy Merritt MD [facility]   OFFICE NOTES from other specialists N/A    DISCHARGE SUMMARY from hospital N/A    MEDICATION LIST Internal    LIVER BIOSPY (IF APPLICABLE)      PATHOLOGY REPORTS  N/A    IMAGING     ENDOSCOPY (IF AVAILABLE) N/A    COLONOSCOPY (IF AVAILABLE) N/A    ULTRASOUND LIVER Internal 10.14.2016   CT OF ABDOMEN Internal 04.20.2020 05.18.2016   MRI OF LIVER N/A    FIBROSCAN, US ELASTOGRAPHY, FIBROSIS SCAN, MR ELASTOGRAPHY N/A    LABS     HEPATIC PANEL (LIVER PANEL) Internal 04.24.2020   BASIC METABOLIC PANEL Internal 11.13.2019   COMPLETE METABOLIC PANEL Internal 11.13.2019   COMPLETE BLOOD COUNT (CBC) Internal 04.24.2020   INTERNATIONAL NORMALIZED RATIO (INR) Internal 05.07.2019   HEPATITIS C ANTIBODY N/A    HEPATITIS C VIRAL LOAD/PCR N/A    HEPATITIS C GENOTYPE N/A    HEPATITIS B SURFACE ANTIGEN N/A    HEPATITIS B SURFACE ANTIBODY N/A    HEPATITIS B DNA QUANT LEVEL N/A    HEPATITIS B CORE ANTIBODY N/A

## 2020-04-29 NOTE — TELEPHONE ENCOUNTER
hydrOXYzine (VISTARIL) 25 MG   Last refilled: 2/19/20  Qty: 60  : 1 RF    Last seen  4/15/20  RTC  1 MOS  Cancel: 0    No-show: 0  Next appt: 5/13/20  Refill decision:   Refilled for 30 days per protocol.

## 2020-04-30 ENCOUNTER — VIRTUAL VISIT (OUTPATIENT)
Dept: PSYCHIATRY | Facility: CLINIC | Age: 65
End: 2020-04-30
Attending: PSYCHOLOGIST
Payer: COMMERCIAL

## 2020-04-30 ENCOUNTER — TELEPHONE (OUTPATIENT)
Dept: BEHAVIORAL HEALTH | Facility: CLINIC | Age: 65
End: 2020-04-30

## 2020-04-30 DIAGNOSIS — R00.2 PALPITATIONS: Primary | ICD-10-CM

## 2020-04-30 DIAGNOSIS — F13.20 BENZODIAZEPINE DEPENDENCE (H): Primary | ICD-10-CM

## 2020-05-01 ENCOUNTER — TELEPHONE (OUTPATIENT)
Dept: FAMILY MEDICINE | Facility: CLINIC | Age: 65
End: 2020-05-01

## 2020-05-01 DIAGNOSIS — R61 NIGHT SWEATS: ICD-10-CM

## 2020-05-01 DIAGNOSIS — R31.9 HEMATURIA, UNSPECIFIED TYPE: Primary | ICD-10-CM

## 2020-05-01 DIAGNOSIS — D64.9 ANEMIA, UNSPECIFIED TYPE: ICD-10-CM

## 2020-05-01 DIAGNOSIS — L29.9 PRURITIC DISORDER: ICD-10-CM

## 2020-05-01 DIAGNOSIS — R00.2 PALPITATIONS: ICD-10-CM

## 2020-05-01 DIAGNOSIS — R31.9 HEMATURIA, UNSPECIFIED TYPE: ICD-10-CM

## 2020-05-01 DIAGNOSIS — R63.4 WEIGHT LOSS: ICD-10-CM

## 2020-05-01 LAB
ALBUMIN SERPL-MCNC: 2.4 G/DL (ref 3.4–5)
ALBUMIN UR-MCNC: 30 MG/DL
ALP SERPL-CCNC: 119 U/L (ref 40–150)
ALT SERPL W P-5'-P-CCNC: 14 U/L (ref 0–50)
APPEARANCE UR: ABNORMAL
AST SERPL W P-5'-P-CCNC: 22 U/L (ref 0–45)
BASOPHILS # BLD AUTO: 0.1 10E9/L (ref 0–0.2)
BASOPHILS NFR BLD AUTO: 0.7 %
BILIRUB DIRECT SERPL-MCNC: <0.1 MG/DL (ref 0–0.2)
BILIRUB SERPL-MCNC: 0.1 MG/DL (ref 0.2–1.3)
BILIRUB UR QL STRIP: NEGATIVE
COLOR UR AUTO: YELLOW
DIFFERENTIAL METHOD BLD: ABNORMAL
EOSINOPHIL # BLD AUTO: 0.5 10E9/L (ref 0–0.7)
EOSINOPHIL NFR BLD AUTO: 7 %
ERYTHROCYTE [DISTWIDTH] IN BLOOD BY AUTOMATED COUNT: 17.9 % (ref 10–15)
GLUCOSE UR STRIP-MCNC: NEGATIVE MG/DL
HCT VFR BLD AUTO: 28.5 % (ref 35–47)
HGB BLD-MCNC: 7.8 G/DL (ref 11.7–15.7)
HGB UR QL STRIP: ABNORMAL
HYALINE CASTS #/AREA URNS LPF: 34 /LPF (ref 0–2)
IMM GRANULOCYTES # BLD: 0 10E9/L (ref 0–0.4)
IMM GRANULOCYTES NFR BLD: 0.3 %
INTERPRETATION ECG - MUSE: NORMAL
KETONES UR STRIP-MCNC: NEGATIVE MG/DL
LEUKOCYTE ESTERASE UR QL STRIP: ABNORMAL
LYMPHOCYTES # BLD AUTO: 1.4 10E9/L (ref 0.8–5.3)
LYMPHOCYTES NFR BLD AUTO: 20.2 %
MCH RBC QN AUTO: 22.3 PG (ref 26.5–33)
MCHC RBC AUTO-ENTMCNC: 27.4 G/DL (ref 31.5–36.5)
MCV RBC AUTO: 81 FL (ref 78–100)
MONOCYTES # BLD AUTO: 0.6 10E9/L (ref 0–1.3)
MONOCYTES NFR BLD AUTO: 8.7 %
MUCOUS THREADS #/AREA URNS LPF: PRESENT /LPF
NEUTROPHILS # BLD AUTO: 4.3 10E9/L (ref 1.6–8.3)
NEUTROPHILS NFR BLD AUTO: 63.1 %
NITRATE UR QL: NEGATIVE
NRBC # BLD AUTO: 0 10*3/UL
NRBC BLD AUTO-RTO: 0 /100
PH UR STRIP: 5 PH (ref 5–7)
PLATELET # BLD AUTO: 229 10E9/L (ref 150–450)
PROT SERPL-MCNC: 6.3 G/DL (ref 6.8–8.8)
RBC # BLD AUTO: 3.5 10E12/L (ref 3.8–5.2)
RBC #/AREA URNS AUTO: 16 /HPF (ref 0–2)
RETICS # AUTO: 95.2 10E9/L (ref 25–95)
RETICS/RBC NFR AUTO: 2.7 % (ref 0.5–2)
SOURCE: ABNORMAL
SP GR UR STRIP: 1.02 (ref 1–1.03)
SQUAMOUS #/AREA URNS AUTO: 1 /HPF (ref 0–1)
UROBILINOGEN UR STRIP-MCNC: 0 MG/DL (ref 0–2)
WBC # BLD AUTO: 6.9 10E9/L (ref 4–11)
WBC #/AREA URNS AUTO: 23 /HPF (ref 0–5)

## 2020-05-01 PROCEDURE — 84165 PROTEIN E-PHORESIS SERUM: CPT | Performed by: FAMILY MEDICINE

## 2020-05-01 PROCEDURE — 87086 URINE CULTURE/COLONY COUNT: CPT | Performed by: FAMILY MEDICINE

## 2020-05-01 PROCEDURE — 83516 IMMUNOASSAY NONANTIBODY: CPT | Performed by: FAMILY MEDICINE

## 2020-05-01 PROCEDURE — 40000611 ZZHCL STATISTIC MORPHOLOGY W/INTERP HEMEPATH TC 85060: Performed by: FAMILY MEDICINE

## 2020-05-01 PROCEDURE — 00000402 ZZHCL STATISTIC TOTAL PROTEIN: Performed by: FAMILY MEDICINE

## 2020-05-01 NOTE — TELEPHONE ENCOUNTER
"[facility] Call Center    Phone Message    May a detailed message be left on voicemail: yes     Reason for Call: Order(s): Other:   Reason for requested: Additional urine test. Pt states she noted her most recent urine test was \"high for WBC and RBC\"; she was wondering if it should be redone. She notes she is coming in to clinic at 1pm today to have additional testing, so she could go to the lab if need be. Please advise.  Date needed: ASAP  Provider name: Dr Merritt    Note: this patient initially called at 9:58am to make this request. Due to Epic outage, message was delayed in being sent to care team.     Action Taken: Message routed to:  [facility]    Travel Screening: Not Applicable  "

## 2020-05-01 NOTE — TELEPHONE ENCOUNTER
I left  saying her order request was approved, she can get UA done with her other labs today.   Latosha North, EMT at 12:23 PM on 5/1/2020.

## 2020-05-02 LAB
BACTERIA SPEC CULT: NORMAL
Lab: NORMAL
SPECIMEN SOURCE: NORMAL

## 2020-05-04 ENCOUNTER — VIRTUAL VISIT (OUTPATIENT)
Dept: GASTROENTEROLOGY | Facility: CLINIC | Age: 65
End: 2020-05-04
Attending: FAMILY MEDICINE
Payer: COMMERCIAL

## 2020-05-04 ENCOUNTER — PRE VISIT (OUTPATIENT)
Dept: GASTROENTEROLOGY | Facility: CLINIC | Age: 65
End: 2020-05-04

## 2020-05-04 DIAGNOSIS — K83.09 SCLEROSING CHOLANGITIS (H): ICD-10-CM

## 2020-05-04 DIAGNOSIS — D50.8 OTHER IRON DEFICIENCY ANEMIA: Primary | ICD-10-CM

## 2020-05-04 DIAGNOSIS — R63.4 WEIGHT LOSS: ICD-10-CM

## 2020-05-04 LAB
ALBUMIN SERPL ELPH-MCNC: 2.8 G/DL (ref 3.7–5.1)
ALPHA1 GLOB SERPL ELPH-MCNC: 0.5 G/DL (ref 0.2–0.4)
ALPHA2 GLOB SERPL ELPH-MCNC: 0.7 G/DL (ref 0.5–0.9)
B-GLOBULIN SERPL ELPH-MCNC: 1 G/DL (ref 0.6–1)
COPATH REPORT: NORMAL
GAMMA GLOB SERPL ELPH-MCNC: 0.9 G/DL (ref 0.7–1.6)
M PROTEIN SERPL ELPH-MCNC: 0 G/DL
PROT PATTERN SERPL ELPH-IMP: ABNORMAL
TTG IGA SER-ACNC: 2 U/ML
TTG IGG SER-ACNC: 2 U/ML

## 2020-05-04 NOTE — PROGRESS NOTES
"Demetra Richardson is a 64 year old female who is being evaluated via a billable telephone visit.      The patient has been notified of following:     \"This telephone visit will be conducted via a call between you and your physician/provider. We have found that certain health care needs can be provided without the need for a physical exam.  This service lets us provide the care you need with a short phone conversation.  If a prescription is necessary we can send it directly to your pharmacy.  If lab work is needed we can place an order for that and you can then stop by our lab to have the test done at a later time.    Telephone visits are billed at different rates depending on your insurance coverage. During this emergency period, for some insurers they may be billed the same as an in-person visit.  Please reach out to your insurance provider with any questions.    If during the course of the call the physician/provider feels a telephone visit is not appropriate, you will not be charged for this service.\"    Patient has given verbal consent for Telephone visit?  Yes    What phone number would you like to be contacted at? [phone]    How would you like to obtain your AVS? Mail a copy    Phone call duration: 45 minutes  _________________________________________________________________________    Telephone visit for ?PSC.    Patient referred for follow-up of ?PSC.  This was first identified in 2005 when liver bx obtained 11/18/2005 (for cholestatic abnormal liver function tests) at the same time as partial gastrectomy showed diffuse changes consistent with \"primary or secondary sclerosing cholangitis\" as opposed to PBC.  Prior abdominal imaging including several MRCPs has shown dilated intra- and extrahepatic biliary dilation but no clear evidence of beads-on-a-string bile ducts.  Most recent imaging April 2020.  The patient has been followed intermittently over the years in the liver clinic (Dr Ronni Dr" Kristin Richards NP).      Patient is well.  Her main complaint is weight loss of 40lbs over the past 12 months.      Patient reports a long history of left-sided abdominal pain.  History of nephrolithiasis noted.    Patient reports episodes of itching- recent onset (past few weeks), diffuse, resolve spontaneously.    Patient denies jaundice, dark urine, pale stools, fevers, sweats or chills.    Patient denies nausea/vomiting, diarrhea, melena, hematemesis or hematochezia.      Past medical history notable for laparoscopic cholecystectomy in Feb 2004; distal gastrectomy with vagatomy in 11/2005 and billroth II with gastrojejunostomy in 6/2011.    Patient lives in [facility] and is originally from [place].  She plans on moving to assisted living in July.  Retired .  2 adult children who are healthy.  Never smoked.  No regular alcohol use.  Prior history of marijuana.  No history of IN or IV drugs.  History of prescription drug addiction noted.    Other medical, surgical, family and social history reviewed and updated in [facility].    Labs  5/1/2020  TTG Ab neg    TB 0.1, ALT 14, AST 22, AlkPh 119    1/21/2009  AMA neg  F-actin Ab neg  HBV SAg neg  HBV CAb neg  HCV Ab neg    Liver biopsy 11/18/2005 reviewed- diffuse, uniform (ie not patchy) peripheral bile duct proliferation, mild patchy lymphocyte infiltrate in portal tracts, early bridging fibrosis.  Changes more consistent with low grade biliary tract obstruction.    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  Liver function tests normal.  No obvious gross changes on prior imaging consistent with PSC or  cirrhosis.  Etiology of changes seen on liver biopsy unclear with no clear evidence of biliary obstruction despite extensive imaging over past 16 years.  Will review prior imaging in detail with radiology but will otherwise monitor clinically for now.  More concerning is the patient's history of iron deficiency anemia (which is chronic) and new weight loss- will evaluate with EGD/colonoscopy.    Plan  1.  Review recent CT C/A/P with radiology for PSC changes and changes consistent with cirrhosis/portal hypertension  2.  EGD and colonoscopy for weight loss and iron-deficiency anemia (will need MAC)  3.  Follow-up in 6 months    Zacarias Milian MD  Hepatology  [facility]    Approximately 76 minutes of non face-to-face time were spent in review of the patient's medical record to date.  This included review of previous: clinic visits, hospital records, lab results, imaging studies, and procedural documentation.  The findings from this review are summarized in the above note.

## 2020-05-05 PROBLEM — J90 LOCULATED PLEURAL EFFUSION: Status: RESOLVED | Noted: 2019-04-29 | Resolved: 2020-05-05

## 2020-05-05 NOTE — PROGRESS NOTES
Individual Psychotherapy Session                                Telephone Visit Details  Demetra Richardson is a 64 year old pt. who is being evaluated via a telephone visit.     Type of service:  Telephone visit for psychotherapy  Time of service:    Start Time:  11:05        End Time:  12:00   Reason for Telephone Visit: Patient unable to travel due to Covid-19.  Originating Site (patient location): Patient's home  Distant Site (provider location): Remote location  Mode of Communication:  Video Conference via [email or web address]  Consent:  Patient has given verbal consent for telephone visit?: Yes     Care Provider: Leighann Gentile, PhD, LP  Participants: Patient and writer  DSM-5 Diagnoses:   Opioid use disorder, on maintenance therapy  Benzodiazepine use disorder  Cluster B traits (by chart review)  Anxiety (r/o DARRIUS)  MDD, in remission    SUBJECTIVE: Radha reported increased anxiety and significant fears related to recent increase in physical symptoms. She described difficulty keeping food down. She is afraid that she might be dying. She is being proactive about reaching out to her medical providers and has an upcoming CT scan.     TREATMENT: I provided supportive listening and gently challenged the accuracy of her most extreme fears. I encouraged Radha to identify areas of her life that she can control and encouraged her to continue to maintain her routine (sleep, diet, any exercise she is able to do). I encouraged her to continue to reach out to family for social support and to follow through with medical appointments.     MSE:  Alertness: alert and oriented  Speech: normal  Language: intact  Mood: anxious  Affect: full range; was congruent to mood; was congruent to content  Thought Process/Associations: unremarkable  Thought Content:  Reports none;  Denies suicidal ideation and violent ideation  Perception:  Reports none;  Denies auditory hallucinations and visual hallucinations  Insight: adequate for   safety  Judgment: adequate for safety  Cognition: (6) does  appear grossly intact; formal cognitive testing was not done    PLAN:   1) Weekly therapy with writer   2) Continue klonopin taper leading to total cessation under the supervision of Dr. Bradley    Treatment plan due for review by: Patricia 3, 2020    Performed and documented by: Leighann Gentile, PhD, LP

## 2020-05-05 NOTE — PROGRESS NOTES
"  Individual Psychotherapy Session                                Telephone Visit Details  Demetra Richardson is a 64 year old pt. who is being evaluated via a telephone visit.     Type of service:  Telephone visit for psychotherapy  Time of service:    Start Time:  11:05        End Time:  12:00   Reason for Telephone Visit: Patient unable to travel due to Covid-19.  Originating Site (patient location): Patient's home  Distant Site (provider location): Remote location  Mode of Communication:  Video Conference via [email or web address]  Consent:  Patient has given verbal consent for telephone visit?: Yes     Care Provider: Leighann Gentile, PhD, LP  Participants: Patient and writer  DSM-5 Diagnoses:   Opioid use disorder, on maintenance therapy  Benzodiazepine use disorder  Cluster B traits (by chart review)  Anxiety (r/o DARRIUS)  MDD, in remission    SUBJECTIVE: Radha reported feeling more optimistic about her physical health, feeling more confident that her providers will be able to identify the problem. She did describe night sweats and itchy that make it difficult for her to sleep through the night, as well as significant fatigue. Radha is walking 45 minutes most days. She reports eating well. She has been thinking a lot about her father death just over a year ago. They were very close, and he supported her both emotionally and financially. Radha has 8 siblings who are mostly very successful, and she feels like she has let many of them down, but always felt supported by her father. \"He believed great things about me.\"     TREATMENT: I helped Radha identify and gently challenge unhelpful self-critical thoughts. I also provided supportive therapy as Radha continues to process the loss of her father. I encouraged her to continue to engage in self-care strategies that she historically has found to be helpful (eg, writing).     MSE:  Alertness: alert and oriented  Speech: normal  Language: intact  Mood: anxious  Affect: full range; " was congruent to mood; was congruent to content  Thought Process/Associations: unremarkable  Thought Content:  Reports none;  Denies suicidal ideation and violent ideation  Perception:  Reports none;  Denies auditory hallucinations and visual hallucinations  Insight: adequate for  safety  Judgment: adequate for safety  Cognition: (6) does  appear grossly intact; formal cognitive testing was not done    PLAN:   1) Weekly therapy with writer   2) Continue klonopin taper leading to total cessation under the supervision of Dr. Bradley    Treatment plan due for review by: Patricia 3, 2020    Performed and documented by: Leighann Gentile, PhD, LP

## 2020-05-07 ENCOUNTER — VIRTUAL VISIT (OUTPATIENT)
Dept: PSYCHIATRY | Facility: CLINIC | Age: 65
End: 2020-05-07
Attending: PSYCHOLOGIST
Payer: COMMERCIAL

## 2020-05-07 DIAGNOSIS — F13.20 BENZODIAZEPINE DEPENDENCE (H): Primary | ICD-10-CM

## 2020-05-08 ENCOUNTER — TELEPHONE (OUTPATIENT)
Dept: GASTROENTEROLOGY | Facility: CLINIC | Age: 65
End: 2020-05-08

## 2020-05-08 ENCOUNTER — DOCUMENTATION ONLY (OUTPATIENT)
Dept: GASTROENTEROLOGY | Facility: CLINIC | Age: 65
End: 2020-05-08

## 2020-05-08 NOTE — TELEPHONE ENCOUNTER
"Pt needs EGD and colonoscopy.  No mention of MRCP in recent Dr. Milian note. Will send to Endoscopy scheduling to get patient scheduled.     [facility] Call Center    Phone Message    May a detailed message be left on voicemail: yes     Reason for Call: Order(s): Other:   Reason for requested: \"MRCP\"; pt is under the impression she is to get orders for an MRCP and have this completed ASAP. Pt was upset nobody had contacted her to have this arranged. No orders in chart; please advise.  Date needed: Asap  Provider name: Dr. Maicol Fritz    Action Taken: Message routed to:  [facility] Hepatology    Travel Screening: Not Applicable  "

## 2020-05-08 NOTE — TELEPHONE ENCOUNTER
RECORDS RECEIVED FROM: Internal    DATE RECEIVED: 5/11/20 4PM    NOTES STATUS DETAILS   OFFICE NOTE from referring provider Internal Virtual OV 5/4/20    OFFICE NOTE from other specialist N/A    DISCHARGE SUMMARY from hospital Internal ED note 4/28/20   OPERATIVE REPORT N/A    MEDICATION LIST N/A         ENDOSCOPY  Internal 3/18/11, 3/10/2010   COLONOSCOPY Internal 6/28/2006, 3/23/2006   PERTINENT LABS Internal    PATHOLOGY REPORTS (RELATED) N/A    IMAGING (CT, MRI, EGD) N/A      REFERRAL INFORMATION    Date referral was placed: 5/11/20   Date all records received:    Date records were scanned into EPIC:    Date records were sent to Provider to review:    Date and recommendation received from provider:  LETTER SENT  SCHEDULE APPOINTMENT   Date patient was contacted to schedule: 5/8/20

## 2020-05-08 NOTE — TELEPHONE ENCOUNTER
Patient called stating that she understood the endoscopy was to be done asap not within 3 months as order states, Please call patient to confirm thank you.

## 2020-05-09 NOTE — PROGRESS NOTES
CT C/A/P 4/20/20 & MR Abdomen 7/11/15 reviewed at liver tumor conference.    No evidence of biliary dilation or biliary strictures on current or prior studies.  No evidence of cirrhosis or portal hypertension ie no varices or splenomegaly.    No evidence of PSC.  Etiology of liver biopsy findings in 2005 unclear but no evidence of cirrhosis or portal hypertension to date.    Will monitor clinically for now.  If LFTs normal at next visit, will discharge back to PCP.    Zacarias Milian MD  Hepatology  [facility]

## 2020-05-11 ENCOUNTER — HOSPITAL ENCOUNTER (OUTPATIENT)
Facility: CLINIC | Age: 65
End: 2020-05-11
Attending: INTERNAL MEDICINE | Admitting: INTERNAL MEDICINE
Payer: COMMERCIAL

## 2020-05-11 ENCOUNTER — PRE VISIT (OUTPATIENT)
Dept: GASTROENTEROLOGY | Facility: CLINIC | Age: 65
End: 2020-05-11

## 2020-05-11 DIAGNOSIS — Z11.59 ENCOUNTER FOR SCREENING FOR OTHER VIRAL DISEASES: Primary | ICD-10-CM

## 2020-05-12 ENCOUNTER — VIRTUAL VISIT (OUTPATIENT)
Dept: FAMILY MEDICINE | Facility: CLINIC | Age: 65
End: 2020-05-12
Payer: COMMERCIAL

## 2020-05-12 DIAGNOSIS — R05.9 COUGH: Primary | ICD-10-CM

## 2020-05-12 ASSESSMENT — PAIN SCALES - GENERAL: PAINLEVEL: SEVERE PAIN (6)

## 2020-05-12 NOTE — PROGRESS NOTES
"Demetra Richardson is a 64 year old female who is being evaluated via a billable telephone visit.      The patient has been notified of following:     \"This telephone visit will be conducted via a call between you and your physician/provider. We have found that certain health care needs can be provided without the need for a physical exam.  This service lets us provide the care you need with a short phone conversation.  If a prescription is necessary we can send it directly to your pharmacy.  If lab work is needed we can place an order for that and you can then stop by our lab to have the test done at a later time.    Telephone visits are billed at different rates depending on your insurance coverage. During this emergency period, for some insurers they may be billed the same as an in-person visit.  Please reach out to your insurance provider with any questions.    If during the course of the call the physician/provider feels a telephone visit is not appropriate, you will not be charged for this service.\"    Patient has given verbal consent for Telephone visit?  Yes    What phone number would you like to be contacted at? In epic      How would you like to obtain your AVS? MyChart    Subjective     Demetra Richardson is a 64 year old female who presents to clinic today for the following health issues:    HPI   1-just saw GI, will do egd/colonosocpy soon, she and I will meet after that, if no findings will then get the endo visit for wt loss discussed last time  2-3-4 days sore throat, myalgias, cough, fever. Per pt did [email or web address] which told her call primary for test. To my knowledge I have no ability to do tests for covid19 but I tild her I'll ask manager and someone will call her.               Review of Systems          Objective   Reported vitals:  LMP  (LMP Unknown)    healthy, alert and no distress  PSYCH: Alert and oriented times 3; coherent speech, normal   rate and volume, able to articulate logical " thoughts, able   to abstract reason, no tangential thoughts, no hallucinations   or delusions  Her affect is normal  RESP: No cough, no audible wheezing, able to talk in full sentences  Remainder of exam unable to be completed due to telephone visits    Diagnostic Test Results:  Labs reviewed in Epic        Assessment/Plan:  1-Wt loss:   Do GI tests if no findings see endo; see me one mo to discuss  2-covid 19 symptoms. She goes by cab to methadone clinic several times a week so lots of chances to be exposed. I've messaged clinic mgr to see how our system goes about getting people a test     Phone call duration:  16 minutes    Fredy Merritt MD

## 2020-05-12 NOTE — NURSING NOTE
Chief Complaint   Patient presents with     Cough     Pt has a cough that is dry and productive at time. Duration 3-5 days.     Symptoms     Pt has low-grade fever, sore throat, and headaches that come and go. Pt also states body aches all over. Duration 3-5 days.      Janene Jensen LPN at 10:34 AM on 5/12/2020.

## 2020-05-13 ENCOUNTER — VIRTUAL VISIT (OUTPATIENT)
Dept: PSYCHIATRY | Facility: CLINIC | Age: 65
End: 2020-05-13
Attending: PSYCHIATRY & NEUROLOGY
Payer: COMMERCIAL

## 2020-05-13 ENCOUNTER — TELEPHONE (OUTPATIENT)
Dept: INTERNAL MEDICINE | Facility: CLINIC | Age: 65
End: 2020-05-13

## 2020-05-13 ENCOUNTER — TELEPHONE (OUTPATIENT)
Dept: GASTROENTEROLOGY | Facility: CLINIC | Age: 65
End: 2020-05-13

## 2020-05-13 DIAGNOSIS — F13.20 BENZODIAZEPINE DEPENDENCE (H): Primary | ICD-10-CM

## 2020-05-13 NOTE — TELEPHONE ENCOUNTER
Called patient to let her know she will need to be tested for COVID-19 before EGD scheduled 5/28.  Patient stated she went to [facility] thru for COVID-19 testing yesterday 5/12/2020.  Patient was told she would receive results in 5 days.    Lucy GRACIA LPN  Hepatology Clinic

## 2020-05-13 NOTE — PROGRESS NOTES
"  ----------------------------------------------------------------------------------------------------------  [facility]   Psychiatry Clinic Progress Note  Addiction Medicine/Psychiatry                      TELEPHONE VISIT  Demetra Richardson is a 64 year old pt. who is being evaluated via a billable telephone visit.      Patient has given verbal consent for a telephone visit?  yes   How would the pt like to obtain the AVS? Purple Harryhart  Start Time:  8:32 AM          End Time:  8:58    The patient has been notified of following by the  at the time of the visit verification call: \"This telephone visit will be conducted via a call between you and your physician/provider. We have found that certain health care needs can be provided without the need for a physical exam.  This service lets us provide the care you need with a short phone conversation.  If a prescription is necessary we can send it directly to your pharmacy.  If lab work is needed we can place an order for that and you can then stop by our lab to have the test done at a later time.Telephone visits are billed at different rates depending on your insurance coverage. During this emergency period, for some insurers they may be billed the same as an in-person visit. Please reach out to your insurance provider with any questions. If during the course of the call the physician/provider feels a telephone visit is not appropriate, you will not be charged for this service.\"        IDENTIFICATION:  Demetra Richardson is a 64 year old female with previous psychiatric/substance use  diagnoses of opioid use disorder, benzodiazepine use disorder, anxiety NOS, and borderline traits.   Pt presents for ongoing psychiatric/substance use follow-up. Patient was seen for initial evaluation on 1/22/20.  SUBJECTIVE / INTERIM HISTORY                                                 Demetra Richardson is a 63 year old female who prefers " "the name \"Radha\". She presented for the current evaluation as part of the intake process for the Addiction Medicine Program ([facility]). The following information was obtained through a clinical interview and chart review. Her PCP Dr. Merritt referred her to Dr. Velasquez for evaluation of depression on 7/9/19 and Dr. Velasquez referred her for this assessment for NATALIA-specific treatment. Chart review indicates prior diagnoses of depression, anxiety, borderline personality disorder, benzodiazepine use disorder, and opioid use disorder.  Interim history:   She had not problem after finishing her clonazepam. She had 15 pills and took them mostly daily. When she ran out, she did not miss them. She is pleased to have finished her taper and feels good not to be taking the medicine.    She saw Dr. Gentile a couple of times since her last visit and really appreciated the ability to talk. She has missed a couple of weeks because she has been feeling bad, but she would like to continue. She on the schedule with Dr. Gentile every Thursday at 11am, and plans to do it tomorow    She has been getting an ongoing work-up with her PCP. She is seeing a GI specialist to investigate reported weight loss and abdominal pain. She reports current cough and malaise. She went to get herself a Covid test yesterday and is waiting on her results.      She has figured out her housing for when her current lease runs out. She will be moving into assisted living with a CADI waiver in August 2020.    She has stopped tapering her methadone for now. Her current dose is 97mg per week. She feels this provides her good stability. She has postponed all plans of tapering methadone.  Current Substance Use- She continues to deny illicit use.   Last use of substance(s): denies for months. Went to ED for unintentional overdose October 2019  Current recovery activities: going to methadone clinic             SYMPTOMS- reports cough, malaise.   MEDICAL ROS- Denies chest " pain, SOB, MSK pain    RELEVANT SOCIAL/FAMILY                                                     Patient  Reported     FINANCIAL SUPPORT- Currently on SSI. Worked as a  for 25 years previously.   LIVING SITUATION / RELATIONSHIPS- Lives by herself in apartment currently, but now has a CADI waiver and planning to go into assisted living 2020  SOCIAL/ SPIRITUAL SUPPORT- Has 23 year old son Kenneth who lives here, doing doctorate program in PT at . Daughter is 32, living in [place], is pregnant with her first grandchild.   FEELS SAFE AT HOME- Yes    PSYCHIATRIC and SUBSTANCE USE HISTORY      SIB [method, most recent]- None  Suicide Attempt [#, recent, method]- Denies. Per hx, did once overdose on Xanax, butalbital  Suicidal Ideation Hx [passive, active]- Denies.Of note, history notes suicidal ideation a multiple hospitalizations with plan to overdose at one point. She denied this.      Psychosis Hx- None  Psych Hosp [ #, most recent, committed]- Notes that she chose to go in to the hospital after her SO  ~  ECT [#, most recent]- None     Eating Disorder- None     Detailed Substance Use History:     Substance Age first use Quality/character Age max use Most recent use   Alcohol 19 College parties ~22 college   Cannabis 19 Rare, none in years NA ~ age 46   Cocaine none none none none   Stimulants none none none none   Opioids ~45 Progressive, daily On methadone Prescribed May 2019   Sedatives ~46 Progressive uncontrolled 64 Currently prescribed   Hallucinogens none none none none   Inhalants none none none none   Other none none none none   OTC drugs none none none none   Nicotine none none none none      Below is a report given to Dr. Gentile given 10/2019. Differences in todays report noted.      SUBSTANCE USE HISTORY SYNOPSIS:   [note IV use]  Past substances- Opiates - progressive, daily, on methadone since 2019  Treatment- [#, most recent] 3 residential, + 3 residential stopped early.  "Last [facility] in ~2016  Medical consequences- [withdrawal, sz] unintentional overdose  HIV/Hepatitis- Negative at last check  Legal consequences- denies    PAST MEDICATION TRIALS         Drug /  Start Date Dose (mg) Helpful Adverse Effects DC Reason / Date   Lexapro 2004           Effexor 2004     Felt bad     buspirone           amitriptyline           lithium       Stigma - \"Didn't like the idea of being on it\"   Depakote       Stigma - \"Didn't like the idea of being on it\"   Lamotrigine 9394-0253 200 ?       Risperidone 2017           Zyprexa 2015 5 BID         Seroquel 2014 100   Felt \"snowed\"     Xanax       Had withdrawal seizure   Klonopin           Ativan           Valium           phenobarbital       For seizure   gabapentin 600 QID     For fibromyalgia   Lyrica       For fibromyalgia   Hydroxyzine 2016 50 no Dry mouth     Benadryl       For allergies   Trazodone 2013 100         melatonin           propranolol 10 TID         clonidine 0.1     For acute withdrawal   Suboxone           methadone 97             UPDATED MEDICAL / SURGICAL HISTORY                pregnant [if applicable]--No     Medical problems: Lupus, pleural effusion    CARE TEAM:          PCP- Fredy Merritt                 Therapist- Jay Butterfield with [facility]- Office: [phone] - [employer]. Reports having to stop because of insurance.  Rheum- Parastoo Fazeli                      ALLERGY   Penicillins  MEDICATIONS                                                                       bold meds Rx     Current Outpatient Medications   Medication Sig     aspirin-acetaminophen-caffeine (EXCEDRIN MIGRAINE) 250-250-65 MG tablet Take 1 tablet by mouth 2 times daily as needed for headaches     clonazePAM (KLONOPIN) 0.5 MG tablet Take 0.5 tablets (0.25 mg) by mouth daily as needed for anxiety     docusate sodium (COLACE) 100 MG capsule Take 1 capsule (100 mg) by mouth 2 times daily (Patient not taking: Reported on " 4/8/2020)     escitalopram (LEXAPRO) 10 MG tablet Take 1 tablet (10 mg) by mouth daily     ferrous sulfate (FEROSUL) 325 (65 Fe) MG tablet Take 1 tablet (325 mg) by mouth 2 times daily With meals     folic acid (FOLVITE) 1 MG tablet Take 1 tablet (1 mg) by mouth daily     furosemide (LASIX) 20 MG tablet Take 1 tablet (20 mg) by mouth daily (Patient not taking: Reported on 4/8/2020)     hydroxychloroquine (PLAQUENIL) 200 MG tablet Take 1 tablet (200 mg) by mouth 2 times daily (with meals)     hydrOXYzine (VISTARIL) 25 MG capsule TAKE 1 CAPSULE BY MOUTH TWICE A DAY AS NEEDED FOR ANXIETY     methadone (DOLOPHINE-INTENSOL) 10 MG/ML (HIGH CONC) solution Take 11.3 mLs (113 mg) by mouth daily [facility] [place] [phone]    Dose as of 4/29/19 (Patient taking differently: Take 97 mg by mouth daily [facility] [phone]    Dose as of 4/29/19 )     naloxone (NARCAN) 4 MG/0.1ML nasal spray Spray 1 spray (4 mg) into one nostril alternating nostrils once as needed for opioid reversal repeat every 2-3 minutes until responsive (Patient not taking: Reported on 10/23/2019)     pantoprazole (PROTONIX) 40 MG EC tablet Take 1 tablet (40 mg) by mouth every morning (before breakfast)     polyethylene glycol (MIRALAX/GLYCOLAX) packet Take 17 g by mouth 2 times daily (Patient not taking: Reported on 10/23/2019)     pregabalin (LYRICA) 150 MG capsule Take 1 capsule (150 mg) by mouth 3 times daily     Pregabalin (LYRICA) 200 MG capsule TAKE 1 CAPSULE (200 MG) BY MOUTH 2 TIMES DAILY THIS IS A 30-DAY SUPPLY.     valACYclovir (VALTREX) 500 MG tablet Take 1 tablet (500 mg) by mouth 2 times daily for 3 days     No current facility-administered medications for this visit.      PSYCHOTROPIC DRUG INTERACTION CHECK was unremarkable   [details below if applicable]  VITALS   LMP  (LMP Unknown)    PHQ9           TODAY = not done             LABS                                                                                                  None  MENTAL STATUS EXAM                                                            Orientation: full, x3  Alertness: alert  and oriented  Appearance: unable to asses  Behavior/Demeanor: unable to asses   Speech: increased rate  Language: no problems  Psychomotor: unable to assess  Mood: description consistent with euthymia  Affect: unable to assess  Thought Process/Associations: unremarkable  Thought Content:  Reports none;  Denies suicidal ideation, violent ideation and delusions  Perception:  Reports none;  Denies auditory hallucinations and visual hallucinations  Insight: fair  Judgment: fair  Cognition: does  appear grossly intact; formal cognitive testing was not done    ASSESSMENT                                                                             This patient is a 64 year old female who has opioid use disorder in long term remission on methadone, benzodiazepine use disorder - currently on prescription benzodiazepines, anxiety disorder unspecified and history of borderline personality diagnosis who presents for continued treatment of substance use disorders.     OUD - continue at [facility]. She is currently at 97mg daily and comfortable at that dose. She has previously discussed tapering off methadone and switch to suboxone for reasons of stigma and quality of life. We will be here to prescribe suboxone if she desires.    BUD - in remission. Patient should no longer receive prescriptions for BZD.    Anxiety disorder - patient endorses symptoms offered and reports financial anxiety. Will continue lexapro.      [place] PRESCRIPTION MONITORING PROGRAM [] was checked today: not using controlled substances besides prescribed below.      TREATMENT RISK STATEMENT: The risks, benefits, alternatives and potential adverse effects have been explained and are understood by the pt. The pt agrees to the treatment plan with the ability to do so. Discussion of specific concerns included- risk of  continued benzodiazepine use. The pt knows to call the clinic for any problems or access emergency care if needed. There are no medical considerations relevant to treatment, as noted above. Substance use is a problem as noted above. Drug interaction check for meds prior to visit is in the MEDICATIONS section.     DIAGNOSES         Psychiatric:  Anxiety Disorder  Borderline traits    Substance Use:  Opioid Use Disorder on maintenance therapy in remission  Benzodiazepine use disorder    PLAN                                                                                                              1) PSYCHOTROPIC MEDICATIONS:  - discontinued clonazepam. Patient understands that benzodiazepine prescriptions are not right for her.  - continue lexapro 10mg daily     2) THERAPY:  Patient will continue seeing Dr. Gentile on Thursdays. She has also inquired with her PCP about other therapy options     3) NEXT DUE:  LABS- none at this time. UDS done at methadone clinic.     4) REFERRALS:    none     5) RTC: 4 weeks     6) CRISIS NUMBERS:   Provided routinely in AVS.     Addiction Medicine Fellow: Fredy Bradley MD    Patient seen and discussed with staff psychiatrist, Dr. Christensen. Supervisor is Dr. Christensen     TELEHEALTH ATTENDING ATTESTATION  Following the ACGME guidelines on telemedicine and direct supervision due to COVID-19, I was concurrently participating in and/or monitoring the patient care through appropriate telecommunication technology.  I discussed the key portions of the service with the resident, including the mental status examination and developing the plan of care. I reviewed key portions of the history with the resident. I agree with the findings and plan as documented in this note.   Jennifer Christensen MD

## 2020-05-14 ENCOUNTER — VIRTUAL VISIT (OUTPATIENT)
Dept: PSYCHIATRY | Facility: CLINIC | Age: 65
End: 2020-05-14
Attending: PSYCHOLOGIST
Payer: COMMERCIAL

## 2020-05-14 DIAGNOSIS — F13.20 BENZODIAZEPINE DEPENDENCE (H): Primary | ICD-10-CM

## 2020-05-14 NOTE — TELEPHONE ENCOUNTER
Upon reviewing the chard, pt told hepatology that she had covid-19 testing at [facility] on 5/12/20, will receive the result in 5 days.

## 2020-05-19 NOTE — PROGRESS NOTES
Individual Psychotherapy Session (telephone session)     Telephone Visit Details  Demetra Richardson is a 64 year old pt. who is being evaluated via a telephone visit.     Type of service:  Telephone visit for psychotherapy  Time of service:    Start Time:  11:05        End Time:  12:00   Reason for Telephone Visit: Patient unable to travel due to Covid-19.  Originating Site (patient location): Patient's home  Distant Site (provider location): Remote location  Mode of Communication:  Video Conference via [email or web address]  Consent:  Patient has given verbal consent for telephone visit?: Yes     Care Provider: Leighann Gentile, PhD,   Participants: Patient and writer  DSM-5 Diagnoses:   Opioid use disorder, on maintenance therapy  Benzodiazepine use disorder  Cluster B traits (by chart review)  Anxiety (r/o DARRIUS)  MDD, in remission    SUBJECTIVE: Radha is feeling more optimistic about her physical health as she has confidence her providers  will be able to identify the issue that is causing weight loss and night sweats. Radha is walking 45 minutes several days per week. She reports eating well. She has been thinking a lot about her father death just over a year ago. She denied depressed mood although does endorse unhelpful, self-critical thoughts of herself. Low mood is a trigger to substance use. She denied cravings and is finding that the taper off Klonopin has been easier than she expected. She has completely tapered off klonopin and stated that she is proud of herself for doing so.      TREATMENT: I helped Radha identify and gently challenge unhelpful self-critical thoughts and we continued to identify where these thoughts have come from. Unhelpful thoughts contribute to low mood which has been a trigger to relapse. .I encouraged her to continue to engage in self-care strategies that she historically has found to be helpful (eg, writing). For HW, Radha agreed to reach out to her sisters for social support.      MSE:  Alertness: alert and oriented  Speech: normal  Language: intact  Mood: anxious  Affect: full range; was congruent to mood; was congruent to content  Thought Process/Associations: unremarkable  Thought Content:  Reports none;  Denies suicidal ideation and violent ideation  Perception:  Reports none;  Denies auditory hallucinations and visual hallucinations  Insight: adequate for  safety  Judgment: adequate for safety  Cognition: (6) does  appear grossly intact; formal cognitive testing was not done    PLAN:   1) Weekly therapy with writer   2) Medication management with addiction medicine fellow under [facility] supervision    Treatment plan due for review by: Patricia 3, 2020    Performed and documented by: Leighann Gentile, PhD, LP

## 2020-05-19 NOTE — PROGRESS NOTES
Individual Psychotherapy Session                                Telephone Visit Details  Demetra Richardson is a 64 year old pt. who is being evaluated via a telephone visit.     Type of service:  Telephone visit for psychotherapy  Time of service:    Start Time:  11:05        End Time:  12:00   Reason for Telephone Visit: Patient unable to travel due to Covid-19.  Originating Site (patient location): Patient's home  Distant Site (provider location): Remote location  Mode of Communication:  Video Conference via [email or web address]  Consent:  Patient has given verbal consent for telephone visit?: Yes     Care Provider: Leighann Gentile, PhD, LP  Participants: Patient and writer  DSM-5 Diagnoses:   Opioid use disorder, on maintenance therapy  Benzodiazepine use disorder  Cluster B traits (by chart review)  Anxiety (r/o DARRIUS)  MDD, in remission    SUBJECTIVE: Radha is feeling more optimistic about her physical health, feeling more confident that her providers will be able to identify the issue that is causing weight loss and night sweats. Radha is walking 45 minutes several days per week. She reports eating well. She has been thinking a lot about her father death just over a year ago. She denied depressed mood although does endorse unhelpful, self-critical thoughts of herself. Low mood is a trigger to substance use. She denied cravings and is finding that the taper off Klonopin has been easier than she expected.     TREATMENT: I helped Radha identify and gently challenge unhelpful self-critical thoughts and we began to identify where these thoughts have come from. I also provided supportive therapy as Radha continues to grieve the loss of her father. I encouraged her to continue to engage in self-care strategies that she historically has found to be helpful (eg, writing).     MSE:  Alertness: alert and oriented  Speech: normal  Language: intact  Mood: anxious  Affect: full range; was congruent to mood; was congruent to  content  Thought Process/Associations: unremarkable  Thought Content:  Reports none;  Denies suicidal ideation and violent ideation  Perception:  Reports none;  Denies auditory hallucinations and visual hallucinations  Insight: adequate for  safety  Judgment: adequate for safety  Cognition: (6) does  appear grossly intact; formal cognitive testing was not done    PLAN:   1) Weekly therapy with writer   2) Continue klonopin taper leading to total cessation under the supervision of Dr. Bradley    Treatment plan due for review by: Patricia 3, 2020    Performed and documented by: Leighann Gentile, PhD, LP

## 2020-05-21 ENCOUNTER — TELEPHONE (OUTPATIENT)
Dept: GASTROENTEROLOGY | Facility: CLINIC | Age: 65
End: 2020-05-21

## 2020-05-21 DIAGNOSIS — R63.4 WEIGHT LOSS: Primary | ICD-10-CM

## 2020-05-21 NOTE — TELEPHONE ENCOUNTER
Patient Name: Demetra Richardson   : 1955  MRN: [id number]       :  N/A    Nabeel Pending    VM with information needed to complete pre-assessment call.  Request pt contact Endoscopy Pre-assessment RN to complete upcoming procedure information.  Telephone call-back number provided.    Caren Palmer RN  [facility]    Additional Information regarding appointment:  _____________________________________________________      Patient scheduled for:  EGD / Colonoscopy    Indication for procedure. Other iron deficiency anemia, Weight loss     Sedation Type: MAC    Procedure Provider:  Maicol      Referring Provider. (Maicol); [facility]    Arrival time verified: 2020    Facility location verified:   [address] [address]  [address], [id number]    Pt meets medical necessity for outpatient procedure in hospital Endoscopy Unit: Not provided @ [facility] presently      History & Physical: [x] No, As of 2020    FeSO4    Hx: Seizure disorder, Lupus, Opioid et benzo dependence on agonist tx  __________________________________________________      Prep Type:   [x]Golytely  (2-day?) - See meds Pharmacy : (not sent)  [x] NPO /p Golytely, No solid food /p 2200 the night before      Patient taking any blood thinners ? No      Electronic implanted medical devices ? No      GI / Hepatology History: Yes: Sclerosing chlolangitis      Heart disease ? Yes: Ortho hypotension      Lung disease ? No      Sleep apnea ? No      Diabetic ? No      Kidney disease ? No  _______________________________________________    Caren Palmer RN  [facility]

## 2020-05-22 ENCOUNTER — VIRTUAL VISIT (OUTPATIENT)
Dept: PSYCHIATRY | Facility: CLINIC | Age: 65
End: 2020-05-22
Attending: PSYCHOLOGIST
Payer: COMMERCIAL

## 2020-05-22 DIAGNOSIS — F13.20 BENZODIAZEPINE DEPENDENCE (H): Primary | ICD-10-CM

## 2020-05-26 ENCOUNTER — TELEPHONE (OUTPATIENT)
Dept: FAMILY MEDICINE | Facility: CLINIC | Age: 65
End: 2020-05-26

## 2020-05-26 DIAGNOSIS — Z11.59 ENCOUNTER FOR SCREENING FOR OTHER VIRAL DISEASES: ICD-10-CM

## 2020-05-26 PROCEDURE — 99000 SPECIMEN HANDLING OFFICE-LAB: CPT | Performed by: INTERNAL MEDICINE

## 2020-05-26 PROCEDURE — 87635 SARS-COV-2 COVID-19 AMP PRB: CPT | Performed by: INTERNAL MEDICINE

## 2020-05-26 RX ORDER — BISACODYL 5 MG/1
10 TABLET, DELAYED RELEASE ORAL DAILY
Qty: 4 TABLET | Refills: 0 | Status: SHIPPED | OUTPATIENT
Start: 2020-05-26 | End: 2020-08-03

## 2020-05-26 NOTE — TELEPHONE ENCOUNTER
[facility] Call Center    Phone Message    May a detailed message be left on voicemail: yes     Reason for Call: Other: Pt would like a call back to schedule a pre-op.  Pt's procedures are this thursday. Please follow up.      Action Taken: Message routed to:  [facility] PCC    Travel Screening: Not Applicable

## 2020-05-26 NOTE — TELEPHONE ENCOUNTER
Patient Name: Demetra Richardson   : 1955  MRN: [id number]        :  N/A    Nabeel Pending      Confirmed that she has ongoing challenge with constipation.  Needs 2-day prep. Not enough time /a Thursday procedure.     She did not return previous pre-assessment call (2020)    Transferred to  to reschedule    Rx sent to Pharmacy so that patient may  in advance of next scheduled procedure.     Additional Information regarding appointment:  _____________________________________________________       Patient scheduled for:  EGD / Colonoscopy    Indication for procedure. Other iron deficiency anemia, Weight loss     Sedation Type: MAC    Original Provider:  Maicol                     Referring Provider. (Maicol); Shaina    Original Procedure Date/Check-in Time: 2020 / [facility]      Pt meets medical necessity for outpatient procedure in hospital Endoscopy Unit: Not provided @ [facility] presently        History & Physical: [x]? No, As of 2020    FeSO4     Hx: Seizure disorder, Lupus, Opioid et benzo dependence on agonist tx  __________________________________________________       Prep Type:   [x]?Golytely  (2-day?) - See meds Pharmacy : [id number] [place] [address] [facility]  [x]? NPO /p Golytely, No solid food /p 2200 the night before       Patient taking any blood thinners ? No       Electronic implanted medical devices ? No       GI / Hepatology History: Yes: Sclerosing chlolangitis       Heart disease ? Yes: Ortho hypotension       Lung disease ? No       Sleep apnea ? No       Diabetic ? No       Kidney disease ? No      Instructions given:   [x] Reviewed         Pre procedure teaching completed: [x] Yes - Reviewed      IV Sedation: [x] No questions regarding Sedation as ordered    _______________________________________________    Caren Palmer  RN  [facility]

## 2020-05-27 ENCOUNTER — HOSPITAL ENCOUNTER (OUTPATIENT)
Facility: CLINIC | Age: 65
End: 2020-05-27
Attending: INTERNAL MEDICINE | Admitting: INTERNAL MEDICINE
Payer: COMMERCIAL

## 2020-05-27 DIAGNOSIS — F41.9 ANXIETY DISORDER, UNSPECIFIED TYPE: ICD-10-CM

## 2020-05-27 DIAGNOSIS — Z11.59 ENCOUNTER FOR SCREENING FOR OTHER VIRAL DISEASES: Primary | ICD-10-CM

## 2020-05-27 LAB
SARS-COV-2 RNA SPEC QL NAA+PROBE: NOT DETECTED
SPECIMEN SOURCE: NORMAL

## 2020-05-27 NOTE — TELEPHONE ENCOUNTER
I called patient back, she is not having procedure anymore as she did not start the prep in time. She will call us back to reschedule pre op  Latosha North EMT at 10:21 AM on 5/27/2020.

## 2020-05-28 NOTE — TELEPHONE ENCOUNTER
hydrOXYzine (VISTARIL) 25 MG  Last refilled: 4/29/20  Qty: 60    Last seen: 5/13/20  RTC:  4 WEEKS  Cancel: 0  No-show: 0  Next appt: NONE  30 day jasmin refill PENDING- including instructions for patient to call the clinic and schedule an appointment.  Scheduling has been notified to contact the pt for appointment.  Refill pended and routed to the provider for review/determination due to   UNSIGNED  NOTE

## 2020-06-01 RX ORDER — HYDROXYZINE PAMOATE 25 MG/1
CAPSULE ORAL
Qty: 60 CAPSULE | Refills: 0 | Status: SHIPPED | OUTPATIENT
Start: 2020-06-01 | End: 2020-07-16

## 2020-06-04 ENCOUNTER — VIRTUAL VISIT (OUTPATIENT)
Dept: PSYCHIATRY | Facility: CLINIC | Age: 65
End: 2020-06-04
Attending: PSYCHOLOGIST
Payer: COMMERCIAL

## 2020-06-04 DIAGNOSIS — F13.20 BENZODIAZEPINE DEPENDENCE (H): Primary | ICD-10-CM

## 2020-06-04 NOTE — PROGRESS NOTES
Individual Psychotherapy Session (telephone session)     Telephone Visit Details  Demetra Richardson is a 64 year old pt. who is being evaluated via a telephone visit.     Type of service:  Telephone visit for psychotherapy  Time of service:    Start Time:  11:00        End Time:  11:25  Reason for Telephone Visit: Patient unable to travel due to Covid-19.  Originating Site (patient location): Patient's home  Distant Site (provider location): Remote location  Mode of Communication:  Video Conference via [email or web address]  Consent:  Patient has given verbal consent for telephone visit?: Yes     Care Provider: Leighann Gentile, PhD,   Participants: Patient and writer  DSM-5 Diagnoses:   Opioid use disorder, on maintenance therapy  Benzodiazepine use disorder  Cluster B traits (by chart review)  Anxiety (r/o DARRIUS)  MDD, in remission    SUBJECTIVE: Radha is feeling more optimistic about her physical health as she has confidence her providers will be able to identify the issue that is causing weight loss, night sweats, body aches/ pain, and fatigue. Radha is walking 45 minutes several days per week. She reports eating healthy meals, although often finds it difficult to keep food down. She denied depressed mood although does endorse unhelpful, self-critical thoughts of herself. Low mood is a trigger to substance use. She denied cravings and is has completely tapered off klonopin. Proud of herself for doing so. She reported decreased anxiety now that is beginning to develop a plan for where she will be living when her lease runs out at the end of July. Her son has been helping her to look at apartments. Radha requested session end early due to feeling particularly fatigued.      TREATMENT: I provided supportive therapy related to her health concerns and encouraged her to engaged in self-care activities. Radha continues to reach out to her family for social support regularly, which she finds helpful.      MSE:  Alertness: alert  and oriented  Speech: normal  Language: intact  Mood: anxious  Affect: full range; was congruent to mood; was congruent to content  Thought Process/Associations: unremarkable  Thought Content:  Reports none;  Denies suicidal ideation and violent ideation  Perception:  Reports none;  Denies auditory hallucinations and visual hallucinations  Insight: adequate for  safety  Judgment: adequate for safety  Cognition: (6) does  appear grossly intact; formal cognitive testing was not done    PLAN:   1) Weekly therapy with writer   2) Medication management with addiction medicine fellow under Amparo supervision    Treatment plan due for review by: Patricia 3, 2020    Performed and documented by: Leighann Gentile, PhD, LP

## 2020-06-11 ENCOUNTER — VIRTUAL VISIT (OUTPATIENT)
Dept: PSYCHIATRY | Facility: CLINIC | Age: 65
End: 2020-06-11
Attending: PSYCHOLOGIST
Payer: COMMERCIAL

## 2020-06-11 DIAGNOSIS — F13.20 BENZODIAZEPINE DEPENDENCE (H): Primary | ICD-10-CM

## 2020-06-11 NOTE — PROGRESS NOTES
Spoke to Radha briefly and she indicated that she was not feeling well and would like to postpone our session until next week. No safety concerns reported.

## 2020-06-18 ENCOUNTER — VIRTUAL VISIT (OUTPATIENT)
Dept: PSYCHIATRY | Facility: CLINIC | Age: 65
End: 2020-06-18
Attending: PSYCHOLOGIST
Payer: COMMERCIAL

## 2020-06-18 DIAGNOSIS — F13.20 BENZODIAZEPINE DEPENDENCE (H): Primary | ICD-10-CM

## 2020-06-22 ENCOUNTER — TELEPHONE (OUTPATIENT)
Dept: GASTROENTEROLOGY | Facility: CLINIC | Age: 65
End: 2020-06-22

## 2020-06-22 NOTE — TELEPHONE ENCOUNTER
Patient Name: Demetra Richardson   : 1955  MRN: [id number]       :  N/A    Nabeel Active  Additional Information regarding appointment:  _____________________________________________________      Patient scheduled for:  EGD / Colonoscopy    Indication for procedure. Other iron deficiency anemia, Weight loss     Sedation Type: MAC    Procedure Provider:  Maciol      Referring Provider. Shaina    Arrival time verified: .2020    Facility location verified:   [facility] - [address] [address]  1st Floor, R[address] 1[address]    [x] N/A for this Payor ([place] [employer])      History & Physical:  [x] No, PAC number provided.      __________________________________________________      Prep Type:   [x]Golytely  : Pharmacy : Confirmed pick-up /p last scheduled procedure.   [x] NPO /p Golytely      Patient taking any blood thinners ? No      Electronic implanted medical devices ? No      GI / Hepatology History: Yes: Sclerosing chlolangitis      Heart disease ? Yes: Ortho hypotension      Lung disease ? No      Sleep apnea ? No      Diabetic ? No      Kidney disease ? No      Instructions given: [x] Rec'd & Read   [x] Reviewed         Pre procedure teaching completed: [x] Yes - Reviewed      IV Sedation: [x] No questions regarding Sedation as ordered       : Transportation from procedure & responsible adult to be with patient following procedure for a minimum of 24 hrs (MAC): [x] [employer], has arranged - confirmed will have post-procedure companionship as required      COVID-19 Screening was completed via BPA Screening.  _____________________________    [x] Pre-Assessment Call  _____________________________  [x] Negative Travel Screen          [x] COVID appt scheduled JAQUI DAVIS MD [place] - 2020 - 1300 - Pt stated during our call that she was not going to show up for her appointment but stated she planned on  "\"just going tomorrow\".  Upon completion of our pre-assessment call I transferred her to COVID  to reschedule COVID test for tomorrow.     _______________________________________________    Caren Palmer RN  [facility]   "

## 2020-06-23 DIAGNOSIS — Z11.59 ENCOUNTER FOR SCREENING FOR OTHER VIRAL DISEASES: ICD-10-CM

## 2020-06-23 PROCEDURE — U0003 INFECTIOUS AGENT DETECTION BY NUCLEIC ACID (DNA OR RNA); SEVERE ACUTE RESPIRATORY SYNDROME CORONAVIRUS 2 (SARS-COV-2) (CORONAVIRUS DISEASE [COVID-19]), AMPLIFIED PROBE TECHNIQUE, MAKING USE OF HIGH THROUGHPUT TECHNOLOGIES AS DESCRIBED BY CMS-2020-01-R: HCPCS | Performed by: INTERNAL MEDICINE

## 2020-06-23 NOTE — PROGRESS NOTES
[address] [address]  [phone]  Dept: [phone]    PRE-OP EVALUATION:  Today's date: 2020    Demetra Richardson (: 1955) presents for pre-operative evaluation assessment as requested by Zacarias Li MD.  She requires evaluation and anesthesia risk assessment prior to undergoing surgery/procedure for treatment of Other iron deficiency anemia,Weight loss and abdominal pain.    Proposed Surgery/ Procedure: ESOPHAGOGASTRODUODENOSCOPY (EGD) ,COLONOSCOPY   Date of Surgery/ Procedure: 20  Time of Surgery/ Procedure: 8:30am  Hospital/Surgical Facility: [facility]  Fax number for surgical facility: within the system  Primary Physician: Fredy Merritt  Type of Anesthesia Anticipated: to be determined    Patient has a Health Care Directive or Living Will:  NO    1. NO - Do you have a history of heart attack, stroke, stent, bypass or surgery on an artery in the head, neck, heart or legs?  2. NO - Do you ever have any pain or discomfort in your chest?  3. NO - Do you have a history of  Heart Failure?  4. NO - Are you troubled by shortness of breath when: walking on the level, up a slight hill or at night?  5. NO - Do you currently have a cold, bronchitis or other respiratory infection?  6. NO - Do you have a cough, shortness of breath or wheezing?  7. NO - Do you sometimes get pains in the calves of your legs when you walk?  8. NO - Do you or anyone in your family have previous history of blood clots?  9. NO - Do you or does anyone in your family have a serious bleeding problem such as prolonged bleeding following surgeries or cuts?  10. YES - Have you ever had problems with anemia or been told to take iron pills?  11. NO - Have you had any abnormal blood loss such as black, tarry or bloody stools, or abnormal vaginal bleeding?  12. YES - Have you ever had a blood transfusion?  Had maybe a dozen over the years due to anemia and no reactions  13. NO - Have you or any of your relatives ever had problems with anesthesia?  14. NO - Do you have sleep apnea, excessive snoring or daytime drowsiness?  15. NO - Do you have any prosthetic heart valves?  16. NO - Do you have prosthetic joints?  17. NO - Is there any chance that you may be pregnant?      HPI:     HPI related to upcoming procedure: lost 34 lbs in the past year, chronic pain and stomach issues, bleeding ulcers and history of abdominal surgeries, resulting in severe anemia requiring transfusions of iron and blood, appears Primary Care Provider has pended anemia studies will check today     Last smear normocytic normochromic, does have history if iron deficiency anemia as well. Last Hgb early May 7.8 and pt reports always symptomatic so this is nothing new for her, pain and paresthesias often in entire body and also reports poor nutrition, lyrica helps for neuropathies     Complex history noted below     History of seizure, chronic pain, bipolar borderline and anxiety, substance abuse currently on methadone    Lupus --reports has been stable    No new pain in legs or sick symptoms        MEDICAL HISTORY:     Patient Active Problem List    Diagnosis Date Noted     Nephrolithiasis 07/09/2012     Priority: High     1.7cm stone at left UPJ       Polysubstance overdose 01/22/2019     Priority: Medium     BCC (basal cell carcinoma), face 09/20/2018     Priority: Medium     Specimen #: [id number]   Collected: 9/17/2018   Received: 9/17/2018   Reported: 9/19/2018 18:14   Ordering Phy(s): EDD ROBERTS     For improved result formatting, select 'View Enhanced Report Format' under    Linked Documents section.     SPECIMEN(S):   A: Skin, periorbital medial left   B: Skin, paranasal right below eyelid     FINAL DIAGNOSIS:   A. Skin, periorbital medial left:   - Basal cell carcinoma, nodular type, extending to the lateral and deep   margins - (see  description)     B. Skin, paranasal right below eyelid:   - Basal cell carcinoma, infiltrating type, extending to the lateral and   deep margins - (see description)        Borderline personality disorder (H) 05/10/2016     Priority: Medium     Behavior disturbance 10/15/2015     Priority: Medium     Overdose 10/14/2015     Priority: Medium     Iron deficiency anemia 05/16/2013     Priority: Medium     S/p gastric bypass 2/2 trauma.  Cannot absorb iron orally.  Requires parenteral iron infusions periodically dependent on iron panels and CBC.  updating diagnosis code for icd10 cutover       Bipolar 1 disorder, mixed, moderate (H) 02/07/2013     Priority: Medium     Seizure (H) 02/03/2013     Priority: Medium     Microcytic anemia 07/09/2012     Priority: Medium     Likely due to Fe deficiency due to malabsorption from bariatric surgery       Migraines 07/09/2012     Priority: Medium     Leukopenia 07/09/2012     Priority: Medium     Hydronephrosis 07/09/2012     Priority: Medium     Benzodiazepine dependence (H) 04/14/2012     Priority: Medium     Drug-induced mood disorder (H) 04/14/2012     Priority: Medium     Chronic pain 12/06/2009     Priority: Medium     (Problem list name updated by automated process. Provider to review and confirm.)       Lupus (H) 12/06/2009     Priority: Medium     Sclerosing cholangitis 12/06/2009     Priority: Medium     Depression 12/06/2009     Priority: Medium     Chronic pain disorder 12/06/2009     Priority: Medium     Orthostatic hypotension 12/06/2009     Priority: Medium     S/P partial gastrectomy 12/06/2009     Priority: Medium     Opioid dependence on agonist therapy (H) 12/28/2006     Priority: Medium     Anxiety disorder 08/25/2006     Priority: Medium      Past Medical History:   Diagnosis Date     Basal cell carcinoma     Left-sided, skin over over medial orbit/nasal bone. Removed Oct 2018.     Benzodiazepine dependence (H)     With h/o withdrawal seizure x 1     Bipolar 1  disorder, mixed, moderate (H) 2013     Chronic pain     Back, legs.     Eosinophilic gastroenteritis 2010     Epidural abscess 2005    x4     Fibromyalgia      Generalised anxiety disorder      GERD (gastroesophageal reflux disease)      Major depressive disorder      Migraine      Nephrolithiasis     S/p left sided lithotripsy     Opiate dependence (H)      Osteoarthritis      Osteoporosis      PUD (peptic ulcer disease)      SLE (systemic lupus erythematosus) (H)      Past Surgical History:   Procedure Laterality Date     BACK SURGERY  04    L4-5 epidural abscess     BACK SURGERY  04    L3-4 spinal stenosis     BACK SURGERY  04    Lt psoas abscess     BRONCHOSCOPY FLEXIBLE N/A 2019    Procedure: Flexible Bronchoscopy;  Surgeon: Patrice Regalado MD;  Location: [facility] OR      SECTION      x 2     CHOLECYSTECTOMY  -     CLOSED REDUCTION, PERCUTANEOUS PINNING FINGER, COMBINED  8/10/2011    Procedure:COMBINED CLOSED REDUCTION, PERCUTANEOUS PINNING FINGER; 5th Proximal Phalanx; Surgeon:RADHA BUITRAGO; Location:[facility]     COLONOSCOPY       GASTRECTOMY  2005    Bilat truncal vagotomy, hemigastrectomy, RnY gastrojejunostomy     GASTROJEJUNOSTOMY  2011    Procedure:GASTROJEJUNOSTOMY; exploratory laparotmy with revision of gastrojejunostomy, Antrectomy, Miles-en-y, Gastrojejunostomy; Surgeon:JULIUS HELM; Location:[facility] OR     INSERT CHEST TUBE N/A 2019    Procedure: INSERTION, CATHETER, INTERCOSTAL, FOR DRAINAGE;  Surgeon: Melissa Nichols MD;  Location: [facility] GI     LASER HOLMIUM LITHOTRIPSY URETER(S), INSERT STENT, COMBINED      Procedure: COMBINED CYSTOSCOPY, URETEROSCOPY, LASER HOLMIUM LITHOTRIPSY URETER(S), INSERT STENT;;  Surgeon: Blair Correa MD;  Location: [facility]     LASER HOLMIUM NEPHROLITHOTOMY VIA PERCUTANEOUS NEPHROSTOMY  2012    Procedure: LASER HOLMIUM NEPHROLITHOTOMY VIA PERCUTANEOUS NEPHROSTOMY;  proceedure should read: Left  Percutaneous Access, Left Percutaneous ultrasonic Nephrolithotomy, Ureteroscopy Holmium Laser Lithotripsy Stent Placement ;  Surgeon: Blair Correa MD;  Location: [facility]     MAMMOPLASTY AUGMENTATION BILATERAL       OPEN REDUCTION INTERNAL FIXATION WRIST Left 1/11/2016    Procedure: OPEN REDUCTION INTERNAL FIXATION WRIST;  Surgeon: Darling Ellis MD;  Location: [facility]     RECONSTRUCT BREAST, IMPLANT PROSTHESIS, COMBINED       THORACOSCOPIC DECORTICATION LUNG Left 5/7/2019    Procedure: Left Video Assisted Thoracoscopic Decortication, Intercostal Nerve Cryo Analgesia, Flexible Bronchoscopy;  Surgeon: Patrice Regalado MD;  Location: [facility] OR     Current Outpatient Medications   Medication Sig Dispense Refill     aspirin-acetaminophen-caffeine (EXCEDRIN MIGRAINE) 250-250-65 MG tablet Take 1 tablet by mouth 2 times daily as needed for headaches       docusate sodium (COLACE) 100 MG capsule Take 1 capsule (100 mg) by mouth 2 times daily       escitalopram (LEXAPRO) 10 MG tablet Take 1 tablet (10 mg) by mouth daily 30 tablet 1     ferrous sulfate (FEROSUL) 325 (65 Fe) MG tablet Take 1 tablet (325 mg) by mouth 2 times daily With meals 60 tablet 11     folic acid (FOLVITE) 1 MG tablet Take 1 tablet (1 mg) by mouth daily 30 tablet 11     hydroxychloroquine (PLAQUENIL) 200 MG tablet Take 1 tablet (200 mg) by mouth 2 times daily (with meals) 60 tablet 5     hydrOXYzine (VISTARIL) 25 MG capsule TAKE 1 CAPSULE BY MOUTH TWICE A DAY AS NEEDED FOR ANXIETY 60 capsule 0     methadone (DOLOPHINE-INTENSOL) 10 MG/ML (HIGH CONC) solution Take 11.3 mLs (113 mg) by mouth daily Rufina Marcial [phone]    Dose as of 4/29/19 (Patient taking differently: Take 97 mg by mouth daily Rufina Marcial [phone]    Dose as of 4/29/19 ) 300 mL 0     pantoprazole (PROTONIX) 40 MG EC tablet Take 1 tablet (40 mg) by mouth every morning (before breakfast) 90 tablet 3     pregabalin (LYRICA) 150 MG capsule Take 1 capsule  "(150 mg) by mouth 3 times daily 90 capsule 3     Pregabalin (LYRICA) 200 MG capsule TAKE 1 CAPSULE (200 MG) BY MOUTH 2 TIMES DAILY THIS IS A 30-DAY SUPPLY. 60 capsule 1     furosemide (LASIX) 20 MG tablet Take 1 tablet (20 mg) by mouth daily (Patient not taking: Reported on 4/8/2020) 1 tablet 0     naloxone (NARCAN) 4 MG/0.1ML nasal spray Spray 1 spray (4 mg) into one nostril alternating nostrils once as needed for opioid reversal repeat every 2-3 minutes until responsive (Patient not taking: Reported on 10/23/2019) 1 each 1     polyethylene glycol (MIRALAX/GLYCOLAX) packet Take 17 g by mouth 2 times daily (Patient not taking: Reported on 10/23/2019)       valACYclovir (VALTREX) 500 MG tablet Take 1 tablet (500 mg) by mouth 2 times daily for 3 days 6 tablet 1     OTC products: None, except as noted above    Allergies   Allergen Reactions     Penicillins Hives     Hives in childhood.      Latex Allergy: NO    Social History     Tobacco Use     Smoking status: Never Smoker     Smokeless tobacco: Never Used   Substance Use Topics     Alcohol use: No     Comment: none in 10 years     History   Drug Use No       REVIEW OF SYSTEMS:   Constitutional, neuro, ENT, endocrine, pulmonary, cardiac, gastrointestinal, genitourinary, musculoskeletal, integument and psychiatric systems are negative, except as otherwise noted.    EXAM:   BP 90/60   Pulse 84   Temp 97.9  F (36.6  C) (Oral)   Ht 1.6 m (5' 3\")   Wt 52.6 kg (116 lb)   LMP  (LMP Unknown)   SpO2 96%   Breastfeeding No   BMI 20.55 kg/m      GENERAL APPEARANCE: alert, active, no distress, pale and thin     EYES: EOMI, PERRL     HENT: ear canals and TM's normal and nose and mouth without ulcers or lesions     NECK: no adenopathy, no asymmetry, masses, or scars and thyroid normal to palpation     RESP: lungs clear to auscultation - no rales, rhonchi or wheezes     CV: regular rates and rhythm, normal S1 S2, no S3 or S4 and no murmur, click or rub     ABDOMEN: bowel " sounds normal, liver span normal to percussion, no bruits heard, no palpable masses, tender generlized, well-healed incisions  and spleen slightly enlarged via percussion     MS: extremities normal- no gross deformities noted and tender to palpation all areas of body for the most part      SKIN: no suspicious lesions or rashes     NEURO: Normal strength and tone, sensory exam grossly normal, mentation intact and speech normal     PSYCH: mentation appears normal. and affect normal/bright     LYMPHATICS: No cervical adenopathy    DIAGNOSTICS:     EKG from 5/1/20: appears normal, NSR, unchanged from previous tracings  Labs Resulted Today:   Results for orders placed or performed in visit on 06/24/20   CBC with platelets and differential     Status: Abnormal   Result Value Ref Range    WBC 8.9 4.0 - 11.0 10e9/L    RBC Count 3.78 (L) 3.8 - 5.2 10e12/L    Hemoglobin 7.9 (LL) 11.7 - 15.7 g/dL    Hematocrit 28.0 (L) 35.0 - 47.0 %    MCV 74 (L) 78 - 100 fl    MCH 20.9 (L) 26.5 - 33.0 pg    MCHC 28.2 (L) 31.5 - 36.5 g/dL    RDW 17.1 (H) 10.0 - 15.0 %    Platelet Count 499 (H) 150 - 450 10e9/L    % Neutrophils 57.1 %    % Lymphocytes 19.1 %    % Monocytes 16.2 %    % Eosinophils 7.4 %    % Basophils 0.2 %    Absolute Neutrophil 5.1 1.6 - 8.3 10e9/L    Absolute Lymphocytes 1.7 0.8 - 5.3 10e9/L    Absolute Monocytes 1.4 (H) 0.0 - 1.3 10e9/L    Absolute Eosinophils 0.7 0.0 - 0.7 10e9/L    Absolute Basophils 0.0 0.0 - 0.2 10e9/L    Diff Method Automated Method    Comprehensive metabolic panel     Status: Abnormal   Result Value Ref Range    Sodium 138 133 - 144 mmol/L    Potassium 4.0 3.4 - 5.3 mmol/L    Chloride 108 94 - 109 mmol/L    Carbon Dioxide 24 20 - 32 mmol/L    Anion Gap 6 3 - 14 mmol/L    Glucose 95 70 - 99 mg/dL    Urea Nitrogen 25 7 - 30 mg/dL    Creatinine 0.79 0.52 - 1.04 mg/dL    GFR Estimate 79 >60 mL/min/[1.73_m2]    GFR Estimate If Black >90 >60 mL/min/[1.73_m2]    Calcium 7.8 (L) 8.5 - 10.1 mg/dL    Bilirubin  Total <0.1 (L) 0.2 - 1.3 mg/dL    Albumin 2.0 (L) 3.4 - 5.0 g/dL    Protein Total 5.9 (L) 6.8 - 8.8 g/dL    Alkaline Phosphatase 121 40 - 150 U/L    ALT 19 0 - 50 U/L    AST 23 0 - 45 U/L   Vitamin D Deficiency     Status: None   Result Value Ref Range    Vitamin D Deficiency screening 21 20 - 75 ug/L   Ferritin     Status: Abnormal   Result Value Ref Range    Ferritin 6 (L) 8 - 252 ng/mL   Iron and iron binding capacity     Status: Abnormal   Result Value Ref Range    Iron 11 (L) 35 - 180 ug/dL    Iron Binding Cap 289 240 - 430 ug/dL    Iron Saturation Index 4 (L) 15 - 46 %     Labs Drawn and in Process:   Unresulted Labs Ordered in the Past 30 Days of this Admission     No orders found from 5/25/2020 to 6/25/2020.          Recent Labs   Lab Test 05/01/20  1259 04/24/20  1203 04/14/20  0958 11/13/19  1117  05/07/19  0616  04/29/19  1000   HGB 7.8* 9.3* 9.0* 9.1*   < > 9.6*   < >  --     317 223 262   < > 439   < >  --    INR  --   --   --   --   --  1.17*  --  1.38*   NA  --   --  140 137   < > 135   < >  --    POTASSIUM  --   --  4.9 4.5   < > 4.7   < >  --    CR  --   --  0.69 0.80   < > 0.66   < >  --     < > = values in this interval not displayed.        IMPRESSION:   Reason for surgery/procedure: Other iron deficiency anemia,Weight loss and abdominal pain/ ESOPHAGOGASTRODUODENOSCOPY (EGD) ,COLONOSCOPY   Diagnosis/reason for consult: preoperative evaluation    The proposed surgical procedure is considered LOW risk.    REVISED CARDIAC RISK INDEX  The patient has the following serious cardiovascular risks for perioperative complications such as (MI, PE, VFib and 3  AV Block):  No serious cardiac risks  INTERPRETATION: 0 risks: Class I (very low risk - 0.4% complication rate)    The patient has the following additional risks for perioperative complications:  High tolerance to opioid analgesics due to methadone patient       ICD-10-CM    1. Preop general physical exam  Z01.818 CBC with platelets and  differential     Comprehensive metabolic panel   2. Weight loss  R63.4    3. Normochromic normocytic anemia  D64.9 CBC with platelets and differential   4. Paresthesias  R20.2 Vitamin D Deficiency   5. Iron deficiency anemia, unspecified iron deficiency anemia type  D50.9 Ferritin   6. Borderline personality disorder (H)  F60.3    7. Anemia, unspecified type  D64.9 CBC with platelets and differential     Ferritin     Iron and iron binding capacity   8. Other chronic pain  G89.29    9. Splenomegaly  R16.1        RECOMMENDATIONS:     --Consult hospital rounder / IM to assist post-op medical management    Anemia  Anemia that is severe although Hgb 7.9 for her this is stable (last 7.8 and was untreated at that time). Consulted with her surgeon and per Joseph Milian MD he also discussed with pt's Primary Care Provider who agrees pt does not require a blood transfusion prior to the procedure     --Patient is to take all scheduled medications on the day of surgery EXCEPT for modifications listed below. No ASA or NSAID or fish oil     APPROVAL GIVEN to proceed with proposed procedure, without further diagnostic evaluation       Signed Electronically by: VINOD Bianchi CNP    Copy of this evaluation report is provided to requesting physician.    [facility] Preop Guidelines    Revised Cardiac Risk Index

## 2020-06-24 ENCOUNTER — OFFICE VISIT (OUTPATIENT)
Dept: FAMILY MEDICINE | Facility: CLINIC | Age: 65
End: 2020-06-24
Payer: COMMERCIAL

## 2020-06-24 ENCOUNTER — TELEPHONE (OUTPATIENT)
Dept: FAMILY MEDICINE | Facility: CLINIC | Age: 65
End: 2020-06-24

## 2020-06-24 ENCOUNTER — TELEPHONE (OUTPATIENT)
Dept: GASTROENTEROLOGY | Facility: CLINIC | Age: 65
End: 2020-06-24

## 2020-06-24 VITALS
HEIGHT: 63 IN | HEART RATE: 84 BPM | DIASTOLIC BLOOD PRESSURE: 60 MMHG | OXYGEN SATURATION: 96 % | BODY MASS INDEX: 20.55 KG/M2 | TEMPERATURE: 97.9 F | WEIGHT: 116 LBS | SYSTOLIC BLOOD PRESSURE: 90 MMHG

## 2020-06-24 DIAGNOSIS — R20.2 PARESTHESIAS: ICD-10-CM

## 2020-06-24 DIAGNOSIS — R63.4 WEIGHT LOSS: ICD-10-CM

## 2020-06-24 DIAGNOSIS — R16.1 SPLENOMEGALY: ICD-10-CM

## 2020-06-24 DIAGNOSIS — D64.9 NORMOCHROMIC NORMOCYTIC ANEMIA: ICD-10-CM

## 2020-06-24 DIAGNOSIS — F60.3 BORDERLINE PERSONALITY DISORDER (H): ICD-10-CM

## 2020-06-24 DIAGNOSIS — D64.9 ANEMIA, UNSPECIFIED TYPE: ICD-10-CM

## 2020-06-24 DIAGNOSIS — Z01.818 PREOP GENERAL PHYSICAL EXAM: Primary | ICD-10-CM

## 2020-06-24 DIAGNOSIS — D50.9 IRON DEFICIENCY ANEMIA, UNSPECIFIED IRON DEFICIENCY ANEMIA TYPE: ICD-10-CM

## 2020-06-24 DIAGNOSIS — G89.29 OTHER CHRONIC PAIN: ICD-10-CM

## 2020-06-24 LAB
ALBUMIN SERPL-MCNC: 2 G/DL (ref 3.4–5)
ALP SERPL-CCNC: 121 U/L (ref 40–150)
ALT SERPL W P-5'-P-CCNC: 19 U/L (ref 0–50)
ANION GAP SERPL CALCULATED.3IONS-SCNC: 6 MMOL/L (ref 3–14)
AST SERPL W P-5'-P-CCNC: 23 U/L (ref 0–45)
BASOPHILS # BLD AUTO: 0 10E9/L (ref 0–0.2)
BASOPHILS NFR BLD AUTO: 0.2 %
BILIRUB SERPL-MCNC: <0.1 MG/DL (ref 0.2–1.3)
BUN SERPL-MCNC: 25 MG/DL (ref 7–30)
CALCIUM SERPL-MCNC: 7.8 MG/DL (ref 8.5–10.1)
CHLORIDE SERPL-SCNC: 108 MMOL/L (ref 94–109)
CO2 SERPL-SCNC: 24 MMOL/L (ref 20–32)
CREAT SERPL-MCNC: 0.79 MG/DL (ref 0.52–1.04)
DIFFERENTIAL METHOD BLD: ABNORMAL
EOSINOPHIL # BLD AUTO: 0.7 10E9/L (ref 0–0.7)
EOSINOPHIL NFR BLD AUTO: 7.4 %
ERYTHROCYTE [DISTWIDTH] IN BLOOD BY AUTOMATED COUNT: 17.1 % (ref 10–15)
FERRITIN SERPL-MCNC: 6 NG/ML (ref 8–252)
GFR SERPL CREATININE-BSD FRML MDRD: 79 ML/MIN/{1.73_M2}
GLUCOSE SERPL-MCNC: 95 MG/DL (ref 70–99)
HCT VFR BLD AUTO: 28 % (ref 35–47)
HGB BLD-MCNC: 7.9 G/DL (ref 11.7–15.7)
IRON SATN MFR SERPL: 4 % (ref 15–46)
IRON SERPL-MCNC: 11 UG/DL (ref 35–180)
LYMPHOCYTES # BLD AUTO: 1.7 10E9/L (ref 0.8–5.3)
LYMPHOCYTES NFR BLD AUTO: 19.1 %
MCH RBC QN AUTO: 20.9 PG (ref 26.5–33)
MCHC RBC AUTO-ENTMCNC: 28.2 G/DL (ref 31.5–36.5)
MCV RBC AUTO: 74 FL (ref 78–100)
MONOCYTES # BLD AUTO: 1.4 10E9/L (ref 0–1.3)
MONOCYTES NFR BLD AUTO: 16.2 %
NEUTROPHILS # BLD AUTO: 5.1 10E9/L (ref 1.6–8.3)
NEUTROPHILS NFR BLD AUTO: 57.1 %
PLATELET # BLD AUTO: 499 10E9/L (ref 150–450)
POTASSIUM SERPL-SCNC: 4 MMOL/L (ref 3.4–5.3)
PROT SERPL-MCNC: 5.9 G/DL (ref 6.8–8.8)
RBC # BLD AUTO: 3.78 10E12/L (ref 3.8–5.2)
SARS-COV-2 RNA SPEC QL NAA+PROBE: NOT DETECTED
SODIUM SERPL-SCNC: 138 MMOL/L (ref 133–144)
SPECIMEN SOURCE: NORMAL
TIBC SERPL-MCNC: 289 UG/DL (ref 240–430)
WBC # BLD AUTO: 8.9 10E9/L (ref 4–11)

## 2020-06-24 PROCEDURE — 80053 COMPREHEN METABOLIC PANEL: CPT | Performed by: NURSE PRACTITIONER

## 2020-06-24 PROCEDURE — 82728 ASSAY OF FERRITIN: CPT | Performed by: NURSE PRACTITIONER

## 2020-06-24 PROCEDURE — 99215 OFFICE O/P EST HI 40 MIN: CPT | Performed by: NURSE PRACTITIONER

## 2020-06-24 PROCEDURE — 36415 COLL VENOUS BLD VENIPUNCTURE: CPT | Performed by: NURSE PRACTITIONER

## 2020-06-24 PROCEDURE — 85025 COMPLETE CBC W/AUTO DIFF WBC: CPT | Performed by: NURSE PRACTITIONER

## 2020-06-24 PROCEDURE — 82306 VITAMIN D 25 HYDROXY: CPT | Performed by: NURSE PRACTITIONER

## 2020-06-24 PROCEDURE — 83540 ASSAY OF IRON: CPT | Performed by: NURSE PRACTITIONER

## 2020-06-24 PROCEDURE — 83550 IRON BINDING TEST: CPT | Performed by: NURSE PRACTITIONER

## 2020-06-24 RX ORDER — LIDOCAINE 40 MG/G
CREAM TOPICAL
Status: CANCELLED | OUTPATIENT
Start: 2020-06-24

## 2020-06-24 RX ORDER — ONDANSETRON 2 MG/ML
4 INJECTION INTRAMUSCULAR; INTRAVENOUS
Status: CANCELLED | OUTPATIENT
Start: 2020-06-24

## 2020-06-24 ASSESSMENT — MIFFLIN-ST. JEOR: SCORE: 1050.3

## 2020-06-24 NOTE — LETTER
2020      RE: Demetra Richardson  [address]  [place] [address]         [address]  [facility] [id number] [phone]  Dept: [phone]    PRE-OP EVALUATION:  Today's date: 2020    Demetra Richardson (: 1955) presents for pre-operative evaluation assessment as requested by Zacarias Li MD.  She requires evaluation and anesthesia risk assessment prior to undergoing surgery/procedure for treatment of Other iron deficiency anemia,Weight loss and abdominal pain.    Proposed Surgery/ Procedure: ESOPHAGOGASTRODUODENOSCOPY (EGD) ,COLONOSCOPY   Date of Surgery/ Procedure: 20  Time of Surgery/ Procedure: 8:30am  Hospital/Surgical Facility: [facility]  Fax number for surgical facility: within the system  Primary Physician: Fredy Merritt  Type of Anesthesia Anticipated: to be determined    Patient has a Health Care Directive or Living Will:  NO    1. NO - Do you have a history of heart attack, stroke, stent, bypass or surgery on an artery in the head, neck, heart or legs?  2. NO - Do you ever have any pain or discomfort in your chest?  3. NO - Do you have a history of  Heart Failure?  4. NO - Are you troubled by shortness of breath when: walking on the level, up a slight hill or at night?  5. NO - Do you currently have a cold, bronchitis or other respiratory infection?  6. NO - Do you have a cough, shortness of breath or wheezing?  7. NO - Do you sometimes get pains in the calves of your legs when you walk?  8. NO - Do you or anyone in your family have previous history of blood clots?  9. NO - Do you or does anyone in your family have a serious bleeding problem such as prolonged bleeding following surgeries or cuts?  10. YES - Have you ever had problems with anemia or been told to take iron pills?  11. NO - Have you had any abnormal blood loss such as black,  tarry or bloody stools, or abnormal vaginal bleeding?  12. YES - Have you ever had a blood transfusion? Had maybe a dozen over the years due to anemia and no reactions  13. NO - Have you or any of your relatives ever had problems with anesthesia?  14. NO - Do you have sleep apnea, excessive snoring or daytime drowsiness?  15. NO - Do you have any prosthetic heart valves?  16. NO - Do you have prosthetic joints?  17. NO - Is there any chance that you may be pregnant?      HPI:     HPI related to upcoming procedure: lost 34 lbs in the past year, chronic pain and stomach issues, bleeding ulcers and history of abdominal surgeries, resulting in severe anemia requiring transfusions of iron and blood, appears Primary Care Provider has pended anemia studies will check today     Last smear normocytic normochromic, does have history if iron deficiency anemia as well. Last Hgb early May 7.8 and pt reports always symptomatic so this is nothing new for her, pain and paresthesias often in entire body and also reports poor nutrition, lyrica helps for neuropathies     Complex history noted below     History of seizure, chronic pain, bipolar borderline and anxiety, substance abuse currently on methadone    Lupus --reports has been stable    No new pain in legs or sick symptoms        MEDICAL HISTORY:     Patient Active Problem List    Diagnosis Date Noted     Nephrolithiasis 07/09/2012     Priority: High     1.7cm stone at left UPJ       Polysubstance overdose 01/22/2019     Priority: Medium     BCC (basal cell carcinoma), face 09/20/2018     Priority: Medium     Specimen #: [id number]   Collected: 9/17/2018   Received: 9/17/2018   Reported: 9/19/2018 18:14   Ordering Phy(s): EDD ROBERTS     For improved result formatting, select 'View Enhanced Report Format' under    Linked Documents section.     SPECIMEN(S):   A: Skin, periorbital medial left   B: Skin, paranasal right below eyelid     FINAL DIAGNOSIS:   A. Skin, periorbital  medial left:   - Basal cell carcinoma, nodular type, extending to the lateral and deep   margins - (see description)     B. Skin, paranasal right below eyelid:   - Basal cell carcinoma, infiltrating type, extending to the lateral and   deep margins - (see description)        Borderline personality disorder (H) 05/10/2016     Priority: Medium     Behavior disturbance 10/15/2015     Priority: Medium     Overdose 10/14/2015     Priority: Medium     Iron deficiency anemia 05/16/2013     Priority: Medium     S/p gastric bypass 2/2 trauma.  Cannot absorb iron orally.  Requires parenteral iron infusions periodically dependent on iron panels and CBC.  updating diagnosis code for icd10 cutover       Bipolar 1 disorder, mixed, moderate (H) 02/07/2013     Priority: Medium     Seizure (H) 02/03/2013     Priority: Medium     Microcytic anemia 07/09/2012     Priority: Medium     Likely due to Fe deficiency due to malabsorption from bariatric surgery       Migraines 07/09/2012     Priority: Medium     Leukopenia 07/09/2012     Priority: Medium     Hydronephrosis 07/09/2012     Priority: Medium     Benzodiazepine dependence (H) 04/14/2012     Priority: Medium     Drug-induced mood disorder (H) 04/14/2012     Priority: Medium     Chronic pain 12/06/2009     Priority: Medium     (Problem list name updated by automated process. Provider to review and confirm.)       Lupus (H) 12/06/2009     Priority: Medium     Sclerosing cholangitis 12/06/2009     Priority: Medium     Depression 12/06/2009     Priority: Medium     Chronic pain disorder 12/06/2009     Priority: Medium     Orthostatic hypotension 12/06/2009     Priority: Medium     S/P partial gastrectomy 12/06/2009     Priority: Medium     Opioid dependence on agonist therapy (H) 12/28/2006     Priority: Medium     Anxiety disorder 08/25/2006     Priority: Medium      Past Medical History:   Diagnosis Date     Basal cell carcinoma     Left-sided, skin over over medial orbit/nasal  bone. Removed Oct 2018.     Benzodiazepine dependence (H)     With h/o withdrawal seizure x 1     Bipolar 1 disorder, mixed, moderate (H) 2013     Chronic pain     Back, legs.     Eosinophilic gastroenteritis 2010     Epidural abscess 2005    x4     Fibromyalgia      Generalised anxiety disorder      GERD (gastroesophageal reflux disease)      Major depressive disorder      Migraine      Nephrolithiasis     S/p left sided lithotripsy     Opiate dependence (H)      Osteoarthritis      Osteoporosis      PUD (peptic ulcer disease)      SLE (systemic lupus erythematosus) (H)      Past Surgical History:   Procedure Laterality Date     BACK SURGERY  04    L4-5 epidural abscess     BACK SURGERY  04    L3-4 spinal stenosis     BACK SURGERY  04    Lt psoas abscess     BRONCHOSCOPY FLEXIBLE N/A 2019    Procedure: Flexible Bronchoscopy;  Surgeon: Patrice Regalado MD;  Location: [facility] OR      SECTION      x 2     CHOLECYSTECTOMY  -     CLOSED REDUCTION, PERCUTANEOUS PINNING FINGER, COMBINED  8/10/2011    Procedure:COMBINED CLOSED REDUCTION, PERCUTANEOUS PINNING FINGER; 5th Proximal Phalanx; Surgeon:RADHA BUITRAGO; Location:[facility]     COLONOSCOPY       GASTRECTOMY  2005    Bilat truncal vagotomy, hemigastrectomy, RnY gastrojejunostomy     GASTROJEJUNOSTOMY  2011    Procedure:GASTROJEJUNOSTOMY; exploratory laparotmy with revision of gastrojejunostomy, Antrectomy, Miles-en-y, Gastrojejunostomy; Surgeon:JULIUS HELM; Location:[facility] OR     INSERT CHEST TUBE N/A 2019    Procedure: INSERTION, CATHETER, INTERCOSTAL, FOR DRAINAGE;  Surgeon: Melissa Nichols MD;  Location: [facility] GI     LASER HOLMIUM LITHOTRIPSY URETER(S), INSERT STENT, COMBINED      Procedure: COMBINED CYSTOSCOPY, URETEROSCOPY, LASER HOLMIUM LITHOTRIPSY URETER(S), INSERT STENT;;  Surgeon: Blair Correa MD;  Location: [facility]     LASER HOLMIUM NEPHROLITHOTOMY VIA PERCUTANEOUS NEPHROSTOMY  2012     Procedure: LASER HOLMIUM NEPHROLITHOTOMY VIA PERCUTANEOUS NEPHROSTOMY;  proceedure should read: Left Percutaneous Access, Left Percutaneous ultrasonic Nephrolithotomy, Ureteroscopy Holmium Laser Lithotripsy Stent Placement ;  Surgeon: Blair Correa MD;  Location: [facility]     MAMMOPLASTY AUGMENTATION BILATERAL       OPEN REDUCTION INTERNAL FIXATION WRIST Left 1/11/2016    Procedure: OPEN REDUCTION INTERNAL FIXATION WRIST;  Surgeon: Darling Ellis MD;  Location: [facility]     RECONSTRUCT BREAST, IMPLANT PROSTHESIS, COMBINED       THORACOSCOPIC DECORTICATION LUNG Left 5/7/2019    Procedure: Left Video Assisted Thoracoscopic Decortication, Intercostal Nerve Cryo Analgesia, Flexible Bronchoscopy;  Surgeon: Patrice Regalado MD;  Location: [facility] OR     Current Outpatient Medications   Medication Sig Dispense Refill     aspirin-acetaminophen-caffeine (EXCEDRIN MIGRAINE) 250-250-65 MG tablet Take 1 tablet by mouth 2 times daily as needed for headaches       docusate sodium (COLACE) 100 MG capsule Take 1 capsule (100 mg) by mouth 2 times daily       escitalopram (LEXAPRO) 10 MG tablet Take 1 tablet (10 mg) by mouth daily 30 tablet 1     ferrous sulfate (FEROSUL) 325 (65 Fe) MG tablet Take 1 tablet (325 mg) by mouth 2 times daily With meals 60 tablet 11     folic acid (FOLVITE) 1 MG tablet Take 1 tablet (1 mg) by mouth daily 30 tablet 11     hydroxychloroquine (PLAQUENIL) 200 MG tablet Take 1 tablet (200 mg) by mouth 2 times daily (with meals) 60 tablet 5     hydrOXYzine (VISTARIL) 25 MG capsule TAKE 1 CAPSULE BY MOUTH TWICE A DAY AS NEEDED FOR ANXIETY 60 capsule 0     methadone (DOLOPHINE-INTENSOL) 10 MG/ML (HIGH CONC) solution Take 11.3 mLs (113 mg) by mouth daily Rufina Marcial [phone]    Dose as of 4/29/19 (Patient taking differently: Take 97 mg by mouth daily Rufina Marcial [phone]    Dose as of 4/29/19 ) 300 mL 0     pantoprazole (PROTONIX) 40 MG EC tablet Take 1 tablet (40 mg) by  "mouth every morning (before breakfast) 90 tablet 3     pregabalin (LYRICA) 150 MG capsule Take 1 capsule (150 mg) by mouth 3 times daily 90 capsule 3     Pregabalin (LYRICA) 200 MG capsule TAKE 1 CAPSULE (200 MG) BY MOUTH 2 TIMES DAILY THIS IS A 30-DAY SUPPLY. 60 capsule 1     furosemide (LASIX) 20 MG tablet Take 1 tablet (20 mg) by mouth daily (Patient not taking: Reported on 4/8/2020) 1 tablet 0     naloxone (NARCAN) 4 MG/0.1ML nasal spray Spray 1 spray (4 mg) into one nostril alternating nostrils once as needed for opioid reversal repeat every 2-3 minutes until responsive (Patient not taking: Reported on 10/23/2019) 1 each 1     polyethylene glycol (MIRALAX/GLYCOLAX) packet Take 17 g by mouth 2 times daily (Patient not taking: Reported on 10/23/2019)       valACYclovir (VALTREX) 500 MG tablet Take 1 tablet (500 mg) by mouth 2 times daily for 3 days 6 tablet 1     OTC products: None, except as noted above    Allergies   Allergen Reactions     Penicillins Hives     Hives in childhood.      Latex Allergy: NO    Social History     Tobacco Use     Smoking status: Never Smoker     Smokeless tobacco: Never Used   Substance Use Topics     Alcohol use: No     Comment: none in 10 years     History   Drug Use No       REVIEW OF SYSTEMS:   Constitutional, neuro, ENT, endocrine, pulmonary, cardiac, gastrointestinal, genitourinary, musculoskeletal, integument and psychiatric systems are negative, except as otherwise noted.    EXAM:   BP 90/60   Pulse 84   Temp 97.9  F (36.6  C) (Oral)   Ht 1.6 m (5' 3\")   Wt 52.6 kg (116 lb)   LMP  (LMP Unknown)   SpO2 96%   Breastfeeding No   BMI 20.55 kg/m      GENERAL APPEARANCE: alert, active, no distress, pale and thin     EYES: EOMI, PERRL     HENT: ear canals and TM's normal and nose and mouth without ulcers or lesions     NECK: no adenopathy, no asymmetry, masses, or scars and thyroid normal to palpation     RESP: lungs clear to auscultation - no rales, rhonchi or wheezes     " CV: regular rates and rhythm, normal S1 S2, no S3 or S4 and no murmur, click or rub     ABDOMEN: bowel sounds normal, liver span normal to percussion, no bruits heard, no palpable masses, tender generlized, well-healed incisions  and spleen slightly enlarged via percussion     MS: extremities normal- no gross deformities noted and tender to palpation all areas of body for the most part      SKIN: no suspicious lesions or rashes     NEURO: Normal strength and tone, sensory exam grossly normal, mentation intact and speech normal     PSYCH: mentation appears normal. and affect normal/bright     LYMPHATICS: No cervical adenopathy    DIAGNOSTICS:     EKG from 5/1/20: appears normal, NSR, unchanged from previous tracings  Labs Resulted Today:   Results for orders placed or performed in visit on 06/24/20   CBC with platelets and differential     Status: Abnormal   Result Value Ref Range    WBC 8.9 4.0 - 11.0 10e9/L    RBC Count 3.78 (L) 3.8 - 5.2 10e12/L    Hemoglobin 7.9 (LL) 11.7 - 15.7 g/dL    Hematocrit 28.0 (L) 35.0 - 47.0 %    MCV 74 (L) 78 - 100 fl    MCH 20.9 (L) 26.5 - 33.0 pg    MCHC 28.2 (L) 31.5 - 36.5 g/dL    RDW 17.1 (H) 10.0 - 15.0 %    Platelet Count 499 (H) 150 - 450 10e9/L    % Neutrophils 57.1 %    % Lymphocytes 19.1 %    % Monocytes 16.2 %    % Eosinophils 7.4 %    % Basophils 0.2 %    Absolute Neutrophil 5.1 1.6 - 8.3 10e9/L    Absolute Lymphocytes 1.7 0.8 - 5.3 10e9/L    Absolute Monocytes 1.4 (H) 0.0 - 1.3 10e9/L    Absolute Eosinophils 0.7 0.0 - 0.7 10e9/L    Absolute Basophils 0.0 0.0 - 0.2 10e9/L    Diff Method Automated Method    Comprehensive metabolic panel     Status: Abnormal   Result Value Ref Range    Sodium 138 133 - 144 mmol/L    Potassium 4.0 3.4 - 5.3 mmol/L    Chloride 108 94 - 109 mmol/L    Carbon Dioxide 24 20 - 32 mmol/L    Anion Gap 6 3 - 14 mmol/L    Glucose 95 70 - 99 mg/dL    Urea Nitrogen 25 7 - 30 mg/dL    Creatinine 0.79 0.52 - 1.04 mg/dL    GFR Estimate 79 >60  mL/min/[1.73_m2]    GFR Estimate If Black >90 >60 mL/min/[1.73_m2]    Calcium 7.8 (L) 8.5 - 10.1 mg/dL    Bilirubin Total <0.1 (L) 0.2 - 1.3 mg/dL    Albumin 2.0 (L) 3.4 - 5.0 g/dL    Protein Total 5.9 (L) 6.8 - 8.8 g/dL    Alkaline Phosphatase 121 40 - 150 U/L    ALT 19 0 - 50 U/L    AST 23 0 - 45 U/L   Vitamin D Deficiency     Status: None   Result Value Ref Range    Vitamin D Deficiency screening 21 20 - 75 ug/L   Ferritin     Status: Abnormal   Result Value Ref Range    Ferritin 6 (L) 8 - 252 ng/mL   Iron and iron binding capacity     Status: Abnormal   Result Value Ref Range    Iron 11 (L) 35 - 180 ug/dL    Iron Binding Cap 289 240 - 430 ug/dL    Iron Saturation Index 4 (L) 15 - 46 %     Labs Drawn and in Process:   Unresulted Labs Ordered in the Past 30 Days of this Admission     No orders found from 5/25/2020 to 6/25/2020.          Recent Labs   Lab Test 05/01/20  1259 04/24/20  1203 04/14/20  0958 11/13/19  1117  05/07/19  0616  04/29/19  1000   HGB 7.8* 9.3* 9.0* 9.1*   < > 9.6*   < >  --     317 223 262   < > 439   < >  --    INR  --   --   --   --   --  1.17*  --  1.38*   NA  --   --  140 137   < > 135   < >  --    POTASSIUM  --   --  4.9 4.5   < > 4.7   < >  --    CR  --   --  0.69 0.80   < > 0.66   < >  --     < > = values in this interval not displayed.        IMPRESSION:   Reason for surgery/procedure: Other iron deficiency anemia,Weight loss and abdominal pain/ ESOPHAGOGASTRODUODENOSCOPY (EGD) ,COLONOSCOPY   Diagnosis/reason for consult: preoperative evaluation    The proposed surgical procedure is considered LOW risk.    REVISED CARDIAC RISK INDEX  The patient has the following serious cardiovascular risks for perioperative complications such as (MI, PE, VFib and 3  AV Block):  No serious cardiac risks  INTERPRETATION: 0 risks: Class I (very low risk - 0.4% complication rate)    The patient has the following additional risks for perioperative complications:  High tolerance to opioid  analgesics due to methadone patient       ICD-10-CM    1. Preop general physical exam  Z01.818 CBC with platelets and differential     Comprehensive metabolic panel   2. Weight loss  R63.4    3. Normochromic normocytic anemia  D64.9 CBC with platelets and differential   4. Paresthesias  R20.2 Vitamin D Deficiency   5. Iron deficiency anemia, unspecified iron deficiency anemia type  D50.9 Ferritin   6. Borderline personality disorder (H)  F60.3    7. Anemia, unspecified type  D64.9 CBC with platelets and differential     Ferritin     Iron and iron binding capacity   8. Other chronic pain  G89.29    9. Splenomegaly  R16.1        RECOMMENDATIONS:     --Consult hospital rounder / IM to assist post-op medical management    Anemia  Anemia that is severe although Hgb 7.9 for her this is stable (last 7.8 and was untreated at that time). Consulted with her surgeon and per Joseph Milian MD he also discussed with pt's Primary Care Provider who agrees pt does not require a blood transfusion prior to the procedure     --Patient is to take all scheduled medications on the day of surgery EXCEPT for modifications listed below. No ASA or NSAID or fish oil     APPROVAL GIVEN to proceed with proposed procedure, without further diagnostic evaluation       Signed Electronically by: VINOD Bianchi CNP    Copy of this evaluation report is provided to requesting physician.    [place] Preop Guidelines    Revised Cardiac Risk Index    VINOD Bianchi CNP

## 2020-06-24 NOTE — PROGRESS NOTES
I called Radha for therapy video visit, but she reported that she is not feeling well with nausea and GI distress. No acute safety concerns identified. We agreed to reschedule for 2 weeks from today.

## 2020-06-24 NOTE — TELEPHONE ENCOUNTER
Please notify pt her Hgb is 7.9 today (stable from early May when was 7.8).     RN to also please notify her surgery office (and pt's Primary Care Provider team), please let me know if they wish to speak with me personally, I am seeing patients so could consult with them in between. Procedure is scheduled for tomorrow.      I am not going to clear her for her procedure unless her Primary Care Provider is okay with this and/or surgeon--  as pt is complex and this is first time I have seen pt, follow up on anemia was unclear in the chart after May    Will need to consult with them or look through chart to see if more of a plan for anemia, or if someone else is managing pt thought her Primary Care Provider is.     Also her spleen seems a bit enlarged, so I'd like to do an ultrasound of this before her procedure unless there is concern of active bleeding but no sign of this being urgent procedure, but none that I can tell? If her Primary Care Provider or surgeon want her to get an infusion prior to surgery, we will need to notify pt to go to ER.     Thanks so much   Cynthia BROCK CNP

## 2020-06-24 NOTE — TELEPHONE ENCOUNTER
Left  for gastro clinic where patient is having procedure to call us back and to review labs per Cynthia Moon. Received a provider to provider line to have PCP Fredy rose. Will give to Eliot co-worker.   Chika Bower RN   [facility]     Unable to larry LUA, no pager.     Eliot Silvestre RN   [facility]

## 2020-06-24 NOTE — LETTER
Patient:  Demetra Richardson  :   1955  MRN:     [id number]        Ms. Demetra Richardson  [address]   [place] [address]        2020    Dear Ms. Dylan,    Thank you for choosing the [facility] for your healthcare needs.  We appreciate the opportunity to serve you.    The following are your recent test results.           Await endoscopy you are still low on iron perhaps those tests will reveal a cause          Resulted Orders   Iron and iron binding capacity   Result Value Ref Range    Iron 11 (L) 35 - 180 ug/dL    Iron Binding Cap 289 240 - 430 ug/dL    Iron Saturation Index 4 (L) 15 - 46 %       ***    Please contact your provider if you have any questions or concerns.  We look forward to serving your needs in the future.      Sincerely,        [facility] Staff

## 2020-06-24 NOTE — TELEPHONE ENCOUNTER
Health Call Center    Phone Message    May a detailed message be left on voicemail: yes     Reason for Call: Other: Call Back: Patient would like a call back from Dr. Merritt to discuss her lab results. Patient states she did push her procedure out due to her low hemoglobin. Patient reported she did not get a call back from the clinic about her results and would like to know what to do next. Please advise.     Action Taken: Message routed to:  [facility]    Travel Screening: Not Applicable

## 2020-06-25 LAB — DEPRECATED CALCIDIOL+CALCIFEROL SERPL-MC: 21 UG/L (ref 20–75)

## 2020-06-25 NOTE — TELEPHONE ENCOUNTER
Cold transfer to [facility] Triage.     Patient calling back to discuss lab results and plan. She had procedure that had been cancelled due to her low hemoglobin.     This nurse informed patient that message has been forwarded to Dr. Merritt and nurse yesterday.     Reassured patient that message will be routed to follow with patient to discuss results and plan.

## 2020-06-29 ENCOUNTER — VIRTUAL VISIT (OUTPATIENT)
Dept: FAMILY MEDICINE | Facility: CLINIC | Age: 65
End: 2020-06-29
Payer: COMMERCIAL

## 2020-06-29 ENCOUNTER — HOSPITAL ENCOUNTER (OUTPATIENT)
Facility: AMBULATORY SURGERY CENTER | Age: 65
End: 2020-06-29
Attending: INTERNAL MEDICINE
Payer: COMMERCIAL

## 2020-06-29 ENCOUNTER — TELEPHONE (OUTPATIENT)
Dept: INTERNAL MEDICINE | Facility: CLINIC | Age: 65
End: 2020-06-29

## 2020-06-29 DIAGNOSIS — R63.4 WEIGHT LOSS: Primary | ICD-10-CM

## 2020-06-29 DIAGNOSIS — Z11.59 ENCOUNTER FOR SCREENING FOR OTHER VIRAL DISEASES: Primary | ICD-10-CM

## 2020-06-29 DIAGNOSIS — D50.9 ANEMIA, IRON DEFICIENCY: ICD-10-CM

## 2020-06-29 PROBLEM — R16.1 SPLENOMEGALY: Status: ACTIVE | Noted: 2020-06-29

## 2020-06-29 RX ORDER — HEPARIN SODIUM (PORCINE) LOCK FLUSH IV SOLN 100 UNIT/ML 100 UNIT/ML
5 SOLUTION INTRAVENOUS
Status: CANCELLED | OUTPATIENT
Start: 2020-06-29

## 2020-06-29 RX ORDER — HEPARIN SODIUM,PORCINE 10 UNIT/ML
5 VIAL (ML) INTRAVENOUS
Status: CANCELLED | OUTPATIENT
Start: 2020-06-29

## 2020-06-29 NOTE — TELEPHONE ENCOUNTER
Pt had 2 doses of Injectafer on 3/22/17 & 3/29/17. I placed the same iron infusion order. She will be contacted after scheduling with [facility].    Soon-Mi  -------------------------------------------------------------        ----- Message from Fredy Merritt MD sent at 6/29/2020  9:54 AM CDT -----  She is anemic with low iron  Does not absorb iron well in diet or pill form  In past had IV iron  She thinks about a year ago last time, tolerated  Can you see what she got last time and reorder the same way including pre meds, in our IV clinic

## 2020-06-29 NOTE — NURSING NOTE
Chief Complaint   Patient presents with     Results     Pt would like to discuss lab results (hemaglobin)      EMIR Sabillon at 7:58 AM sign on 6/29/2020

## 2020-07-02 ENCOUNTER — VIRTUAL VISIT (OUTPATIENT)
Dept: PSYCHIATRY | Facility: CLINIC | Age: 65
End: 2020-07-02
Attending: PSYCHOLOGIST
Payer: COMMERCIAL

## 2020-07-02 ENCOUNTER — INFUSION THERAPY VISIT (OUTPATIENT)
Dept: INFUSION THERAPY | Facility: CLINIC | Age: 65
End: 2020-07-02
Attending: FAMILY MEDICINE
Payer: COMMERCIAL

## 2020-07-02 VITALS
SYSTOLIC BLOOD PRESSURE: 101 MMHG | HEART RATE: 73 BPM | OXYGEN SATURATION: 97 % | RESPIRATION RATE: 16 BRPM | DIASTOLIC BLOOD PRESSURE: 66 MMHG | TEMPERATURE: 97.7 F

## 2020-07-02 DIAGNOSIS — F13.20 BENZODIAZEPINE DEPENDENCE (H): Primary | ICD-10-CM

## 2020-07-02 DIAGNOSIS — D50.9 IRON DEFICIENCY ANEMIA: Primary | ICD-10-CM

## 2020-07-02 PROCEDURE — 25800030 ZZH RX IP 258 OP 636: Mod: ZF | Performed by: FAMILY MEDICINE

## 2020-07-02 PROCEDURE — 25000128 H RX IP 250 OP 636: Mod: ZF | Performed by: FAMILY MEDICINE

## 2020-07-02 PROCEDURE — 96374 THER/PROPH/DIAG INJ IV PUSH: CPT

## 2020-07-02 RX ORDER — HEPARIN SODIUM (PORCINE) LOCK FLUSH IV SOLN 100 UNIT/ML 100 UNIT/ML
5 SOLUTION INTRAVENOUS
Status: CANCELLED | OUTPATIENT
Start: 2020-07-09

## 2020-07-02 RX ORDER — HEPARIN SODIUM,PORCINE 10 UNIT/ML
5 VIAL (ML) INTRAVENOUS
Status: CANCELLED | OUTPATIENT
Start: 2020-07-09

## 2020-07-02 RX ADMIN — SODIUM CHLORIDE 750 MG: 900 INJECTION, SOLUTION INTRAVENOUS at 15:37

## 2020-07-02 NOTE — LETTER
7/2/2020         RE: Demetra Richardson  [address]  [place] [address]        Dear Colleague,    Thank you for referring your patient, Demetra Richardson, to the [facility]. Please see a copy of my visit note below.    Nursing Note  Demetra Richardson [facility] for:   Chief Complaint   Patient presents with     Infusion     IV Injectafer     During today's [facility] appointment, orders from Dr. Vasquez were completed.  Frequency: weekly dose 1 of 2.    Progress note:  Patient identification verified by name and date of birth.  Assessment completed.  Vitals recorded in Doc Flowsheets.  Patient was provided with education regarding medication/procedure and possible side effects.  Patient verbalized understanding.     present during visit today: Not Applicable.    Treatment Conditions: Patient monitored for 30 minutes after medication was administered.    Premedications: were not ordered.    Drug Waste Record: No    Infusion length and rate: infusion given over approximately 15 minutes + 30 minute observation.  500 ml/hr.    Labs: were not ordered for this appointment.    Vascular access: peripheral IV placed today.    EGD/colonoscopy on 7/17 to evaluate cause of low hgb.     Post Infusion Assessment:  Patient tolerated infusion without incident.  Patient observed for 30 minutes post Injectafer per protocol.  Blood return noted pre and post infusion.  Site patent and intact, free from redness, edema or discomfort.  No evidence of extravasations.     Discharge Plan:   AVS given to patient.   Follow up plan of care with: ongoing infusions at [facility]. Staff message sent to scheduling team for second appt.   Discharge instructions were reviewed with patient.  Patient/representative verbalized understanding of discharge  "instructions and all questions answered.  Patient discharged from [facility] in stable condition.    Coral Diego RN     Administrations This Visit     ferric carboxymaltose (INJECTAFER) 750 mg in sodium chloride 0.9 % 100 mL intermittent infusion     Admin Date  07/02/2020 Action  New Bag Dose  750 mg Rate  500 mL/hr Route  Intravenous Administered By  Coral Diego RN              Vital signs:  Temp: 97.7  F (36.5  C) Temp src: Oral BP: 99/63 Pulse: 73   Resp: 18 SpO2: 97 % O2 Device: None (Room air)        Estimated body mass index is 20.55 kg/m  as calculated from the following:    Height as of 6/24/20: 1.6 m (5' 3\").    Weight as of 6/24/20: 52.6 kg (116 lb).            Again, thank you for allowing me to participate in the care of your patient.        Sincerely,        [facility] [place]    "

## 2020-07-02 NOTE — PATIENT INSTRUCTIONS
Dear Demetra Richardson    Thank you for choosing [facility] ([facility] for your infusion.  The following information is a summary of our appointment as well as important reminders.      Patient Education     Ferric carboxymaltose Solution for injection  What is this medicine?  FERRIC CARBOXYMALTOSE (ferr-ik car-box-ee-mol-toes) is an iron complex. Iron is used to make healthy red blood cells, which carry oxygen and nutrients throughout the body. This medicine is used to treat anemia in people with chronic kidney disease or people who cannot take iron by mouth.  This medicine may be used for other purposes; ask your health care provider or pharmacist if you have questions.  What should I tell my health care provider before I take this medicine?  They need to know if you have any of these conditions:    anemia not caused by low iron levels    high levels of iron in the blood    liver disease    an unusual or allergic reaction to iron, other medicines, foods, dyes, or preservatives    pregnant or trying to get pregnant    breast-feeding  How should I use this medicine?  This medicine is for infusion into a vein. It is given by a health care professional in a hospital or clinic setting.  Talk to your pediatrician regarding the use of this medicine in children. Special care may be needed.  Overdosage: If you think you've taken too much of this medicine contact a poison control center or emergency room at once.  NOTE: This medicine is only for you. Do not share this medicine with others.  What if I miss a dose?  It is important not to miss your dose. Call your doctor or health care professional if you are unable to keep an appointment.  What may interact with this medicine?  Do not take this medicine with any of the following medications:  deferoxamine  dimercaprol  other iron products  This medicine may also interact with the following  medications:  chloramphenicol  deferasirox  This list may not describe all possible interactions. Give your health care provider a list of all the medicines, herbs, non-prescription drugs, or dietary supplements you use. Also tell them if you smoke, drink alcohol, or use illegal drugs. Some items may interact with your medicine.  What should I watch for while using this medicine?  Visit your doctor or health care professional regularly. Tell your doctor if your symptoms do not start to get better or if they get worse. You may need blood work done while you are taking this medicine.  You may need to follow a special diet. Talk to your doctor. Foods that contain iron include: whole grains/cereals, dried fruits, beans, or peas, leafy green vegetables, and organ meats (liver, kidney).  What side effects may I notice from receiving this medicine?  Side effects that you should report to your doctor or health care professional as soon as possible:  allergic reactions like skin rash, itching or hives, swelling of the face, lips, or tonguebreathing problems  changes in blood pressure  feeling faint or lightheaded, falls  flushing, sweating, or hot feelings  Side effects that usually do not require medical attention (Report these to your doctor or health care professional if they continue or are bothersome.):  changes in taste  constipation  dizziness  headache  nausea  pain, redness, or irritation at site where injected  vomiting  This list may not describe all possible side effects. Call your doctor for medical advice about side effects. You may report side effects to FDA at 1-289-FDA-7814.  Where should I keep my medicine?  This drug is given in a hospital or clinic and will not be stored at home.  NOTE: This sheet is a summary. It may not cover all possible information. If you have questions about this medicine, talk to your doctor, pharmacist, or health care provider.  NOTE:This sheet is a summary. It may not cover all  possible information. If you have questions about this medicine, talk to your doctor, pharmacist, or health care provider. Copyright  2016 Gold Standard             We look forward in seeing you on your next appointment here at [facility] ([facility]).  Please don t hesitate to call us at [phone] to reschedule any of your appointments or to speak with one of the [facility] registered nurses.  It was a pleasure taking care of you today.    Sincerely,    [facility]  [address] [place] [address]  [address]  Phone:  [phone]

## 2020-07-02 NOTE — PROGRESS NOTES
Nursing Note  Demetra Richardson presents today [facility] for:   Chief Complaint   Patient presents with     Infusion     IV Injectafer     During today's [facility] appointment, orders from Dr. Vasquez were completed.  Frequency: weekly dose 1 of 2.    Progress note:  Patient identification verified by name and date of birth.  Assessment completed.  Vitals recorded in Doc Flowsheets.  Patient was provided with education regarding medication/procedure and possible side effects.  Patient verbalized understanding.     present during visit today: Not Applicable.    Treatment Conditions: Patient monitored for 30 minutes after medication was administered.    Premedications: were not ordered.    Drug Waste Record: No    Infusion length and rate: infusion given over approximately 15 minutes + 30 minute observation.  500 ml/hr.    Labs: were not ordered for this appointment.    Vascular access: peripheral IV placed today.    EGD/colonoscopy on 7/17 to evaluate cause of low hgb.     Post Infusion Assessment:  Patient tolerated infusion without incident.  Patient observed for 30 minutes post Injectafer per protocol.  Blood return noted pre and post infusion.  Site patent and intact, free from redness, edema or discomfort.  No evidence of extravasations.     Discharge Plan:   AVS given to patient.   Follow up plan of care with: ongoing infusions at [facility]. Staff message sent to scheduling team for second appt.   Discharge instructions were reviewed with patient.  Patient/representative verbalized understanding of discharge instructions and all questions answered.  Patient discharged from [facility] in stable condition.    Coral Diego RN     Administrations This Visit     ferric carboxymaltose (INJECTAFER) 750 mg in sodium chloride 0.9 % 100 mL intermittent infusion     Admin  "Date  07/02/2020 Action  New Bag Dose  750 mg Rate  500 mL/hr Route  Intravenous Administered By  Coral Diego RN              Vital signs:  Temp: 97.7  F (36.5  C) Temp src: Oral BP: 99/63 Pulse: 73   Resp: 18 SpO2: 97 % O2 Device: None (Room air)        Estimated body mass index is 20.55 kg/m  as calculated from the following:    Height as of 6/24/20: 1.6 m (5' 3\").    Weight as of 6/24/20: 52.6 kg (116 lb).          "

## 2020-07-07 DIAGNOSIS — Z11.59 ENCOUNTER FOR SCREENING FOR OTHER VIRAL DISEASES: Primary | ICD-10-CM

## 2020-07-09 NOTE — PROGRESS NOTES
Individual Psychotherapy Session (telephone session)     Telephone Visit Details  Demetra Richardson is a 64 year old pt. who is being evaluated via a telephone visit.     Type of service:  Telephone visit for psychotherapy  Time of service:    Start Time:  11:15        End Time:  12:00  Reason for Telephone Visit: Patient unable to travel due to Covid-19.  Originating Site (patient location): Patient's home  Distant Site (provider location): Remote location  Mode of Communication:  Video Conference via [email or web address]  Consent:  Patient has given verbal consent for telephone visit?: Yes     Care Provider: Leighann Gentile, PhD,   Participants: Patient and writer  DSM-5 Diagnoses:   Opioid use disorder, on maintenance therapy  Benzodiazepine use disorder, in early remission  Cluster B traits (by chart review)  Anxiety (r/o DARRIUS)  MDD, in remission    SUBJECTIVE: Radha is feeling more optimistic about her physical health as she has confidence her providers will be able to identify the issue that is causing weight loss, night sweats, body aches/ pain, and fatigue. Radha is walking 45 minutes several days per week. She reports eating healthy meals, although often finds it difficult to keep food down. She denied depressed mood although does endorse unhelpful, self-critical thoughts of herself. Low mood is a trigger to substance use. She denied cravings and is has completely tapered off klonopin. Proud of herself for doing so. She reported decreased anxiety, although at times when feeling overwhelmed has urges to use klonopin. She reported that anxiety also increased not that she has a plan for where to live, which she is excited about. Will be moving into an assisted living facility at the end of the month.     TREATMENT: I provided supportive therapy related to her health concerns and encouraged her to engaged in self-care activities (eg, healthy meals, sleep, light exercise). Radha continues to reach out to her family for  social support regularly, which she finds helpful. Given symptom improvement, we agreed to reduce frequency of our meetings to every 2 weeks.     MSE:  Alertness: alert and oriented  Speech: normal  Language: intact  Mood: anxious  Affect: full range; was congruent to mood; was congruent to content  Thought Process/Associations: unremarkable  Thought Content:  Reports none;  Denies suicidal ideation and violent ideation  Perception:  Reports none;  Denies auditory hallucinations and visual hallucinations  Insight: adequate for  safety  Judgment: adequate for safety  Cognition: (6) does  appear grossly intact; formal cognitive testing was not done    PLAN:   1) Therapy every 2 weeks with writer  2) Medication management with addiction medicine fellow under [facility] supervision    Treatment plan due for review by: Patricia 3, 2020    Performed and documented by: Leighann Lewis (Skalski), PhD, LP

## 2020-07-10 NOTE — PROGRESS NOTES
"  Individual Psychotherapy Session (telephone session)     Telephone Visit Details  Demetra Richardson is a 64 year old pt. who is being evaluated via a telephone visit.     Type of service:  Telephone visit for psychotherapy  Time of service:    Start Time:  11:05        End Time:  11:45   Reason for Telephone Visit: Patient unable to travel due to Covid-19.  Originating Site (patient location): Patient's home  Distant Site (provider location): Remote location  Mode of Communication: Telephone call  Consent:  Patient has given verbal consent for telephone visit?: Yes     Care Provider: Leighann Gentile, PhD,   Participants: Patient and writer  DSM-5 Diagnoses:   Opioid use disorder, on maintenance therapy  Benzodiazepine use disorder  Cluster B traits (by chart review)  Anxiety (r/o DARRIUS)  MDD, in remission    SUBJECTIVE: Radha is feeling more optimistic about her physical health as she has confidence her providers  will be able to identify the issue that is causing weight loss and night sweats. Radha is walking 45 minutes several days per week. She reports eating well. She has been thinking a lot about her father death just over a year ago. She denied depressed mood although does endorse unhelpful, self-critical thoughts of herself. Low mood is a trigger to substance use. She denied cravings and is finding that the taper off Klonopin has been easier than she expected. She has completely tapered off klonopin and stated that she is proud of herself for doing so.    She rescheduled our appointment yesterday because she was looking for apartments with her son. She liked the one they looked at, but feels anxious about making the wrong decision.     TREATMENT: I helped Radha identify pros/ cons of the apartment and challenge unhelpful thoughts related to where she \"should\" live and potential loss of independence if she transitions to independent living.    MSE:  Alertness: alert and oriented  Speech: normal  Language: " intact  Mood: anxious  Affect: full range; was congruent to mood; was congruent to content  Thought Process/Associations: unremarkable  Thought Content:  Reports none;  Denies suicidal ideation and violent ideation  Perception:  Reports none;  Denies auditory hallucinations and visual hallucinations  Insight: adequate for  safety  Judgment: adequate for safety  Cognition: (6) does  appear grossly intact; formal cognitive testing was not done    PLAN:   1) Weekly therapy with writer   2) Medication management with addiction medicine fellow under Dr. Christensen supervision    Treatment plan due for review by: Patricia 3, 2020    Performed and documented by: Leighann Gentile, PhD, LP

## 2020-07-15 DIAGNOSIS — F41.9 ANXIETY DISORDER, UNSPECIFIED TYPE: ICD-10-CM

## 2020-07-16 ENCOUNTER — VIRTUAL VISIT (OUTPATIENT)
Dept: FAMILY MEDICINE | Facility: CLINIC | Age: 65
End: 2020-07-16
Payer: COMMERCIAL

## 2020-07-16 ENCOUNTER — VIRTUAL VISIT (OUTPATIENT)
Dept: PSYCHIATRY | Facility: CLINIC | Age: 65
End: 2020-07-16
Attending: PSYCHOLOGIST
Payer: COMMERCIAL

## 2020-07-16 DIAGNOSIS — F41.9 ANXIETY DISORDER, UNSPECIFIED TYPE: ICD-10-CM

## 2020-07-16 DIAGNOSIS — F41.9 ANXIETY DISORDER, UNSPECIFIED TYPE: Primary | ICD-10-CM

## 2020-07-16 DIAGNOSIS — B00.9 HERPES SIMPLEX VIRUS INFECTION: ICD-10-CM

## 2020-07-16 DIAGNOSIS — D64.9 NORMOCHROMIC NORMOCYTIC ANEMIA: ICD-10-CM

## 2020-07-16 DIAGNOSIS — R19.02 LEFT UPPER QUADRANT ABDOMINAL MASS: Primary | ICD-10-CM

## 2020-07-16 RX ORDER — ESCITALOPRAM OXALATE 20 MG/1
20 TABLET ORAL DAILY
Qty: 30 TABLET | Refills: 1 | Status: SHIPPED | OUTPATIENT
Start: 2020-07-16 | End: 2020-09-25

## 2020-07-16 RX ORDER — VALACYCLOVIR HYDROCHLORIDE 500 MG/1
500 TABLET, FILM COATED ORAL 2 TIMES DAILY
Qty: 6 TABLET | Refills: 1 | Status: SHIPPED | OUTPATIENT
Start: 2020-07-16 | End: 2020-11-03

## 2020-07-16 RX ORDER — HYDROXYZINE PAMOATE 25 MG/1
CAPSULE ORAL
Qty: 60 CAPSULE | Refills: 0 | Status: SHIPPED | OUTPATIENT
Start: 2020-07-16 | End: 2020-09-25

## 2020-07-16 NOTE — NURSING NOTE
Chief Complaint   Patient presents with     Recheck Medication     Pt would like to discuss Lexapro doseage     Samantha Leon, EMT at 2:31 PM sign on 7/16/2020

## 2020-07-16 NOTE — PROGRESS NOTES
"Video Visit Technology for this patient: No video technology available to patient, please call patient over the phone [phone]    Demetra Richardson is a 64 year old female who is being evaluated via a billable telephone visit.      The patient has been notified of following:     \"This telephone visit will be conducted via a call between you and your physician/provider. We have found that certain health care needs can be provided without the need for a physical exam.  This service lets us provide the care you need with a short phone conversation.  If a prescription is necessary we can send it directly to your pharmacy.  If lab work is needed we can place an order for that and you can then stop by our lab to have the test done at a later time.    Telephone visits are billed at different rates depending on your insurance coverage. During this emergency period, for some insurers they may be billed the same as an in-person visit.  Please reach out to your insurance provider with any questions.    If during the course of the call the physician/provider feels a telephone visit is not appropriate, you will not be charged for this service.\"    Patient has given verbal consent for Telephone visit?  Yes    What phone number would you like to be contacted at? [phone]    How would you like to obtain your AVS? Mail a copy     1-anxiety worse; seeing therapist switching to new psychiatrist so gap period. Moves in two weeks to assisted living. In past when anxiety flared tolerated and responded to 20 mg/day lexapro, requests trial that. Prn atarax helps.  2-last mo or so luq mass to her own palpation, she is convinved enlarged spleen, not on April ct.   3-had IV iron, tolerated    GI procedures in Aug    Past Medical History:   Diagnosis Date     Basal cell carcinoma     Left-sided, skin over over medial orbit/nasal bone. Removed Oct 2018.     Benzodiazepine dependence (H)     With h/o withdrawal seizure x 1     Bipolar " 1 disorder, mixed, moderate (H) 2013     Chronic pain     Back, legs.     Eosinophilic gastroenteritis 2010     Epidural abscess 2005    x4     Fibromyalgia      Generalised anxiety disorder      GERD (gastroesophageal reflux disease)      Major depressive disorder      Migraine      Nephrolithiasis     S/p left sided lithotripsy     Opiate dependence (H)      Osteoarthritis      Osteoporosis      PUD (peptic ulcer disease)      SLE (systemic lupus erythematosus) (H)      Past Surgical History:   Procedure Laterality Date     BACK SURGERY  04    L4-5 epidural abscess     BACK SURGERY  04    L3-4 spinal stenosis     BACK SURGERY  04    Lt psoas abscess     BRONCHOSCOPY FLEXIBLE N/A 2019    Procedure: Flexible Bronchoscopy;  Surgeon: Patrice Regalado MD;  Location: [facility] OR      SECTION      x 2     CHOLECYSTECTOMY  -     CLOSED REDUCTION, PERCUTANEOUS PINNING FINGER, COMBINED  8/10/2011    Procedure:COMBINED CLOSED REDUCTION, PERCUTANEOUS PINNING FINGER; 5th Proximal Phalanx; Surgeon:RADHA BUITRAGO; Location:[facility]     COLONOSCOPY       GASTRECTOMY  2005    Bilat truncal vagotomy, hemigastrectomy, RnY gastrojejunostomy     GASTROJEJUNOSTOMY  2011    Procedure:GASTROJEJUNOSTOMY; exploratory laparotmy with revision of gastrojejunostomy, Antrectomy, Miles-en-y, Gastrojejunostomy; Surgeon:JULIUS HELM; Location:[facility] OR     INSERT CHEST TUBE N/A 2019    Procedure: INSERTION, CATHETER, INTERCOSTAL, FOR DRAINAGE;  Surgeon: Melissa Nichols MD;  Location: [facility] GI     LASER HOLMIUM LITHOTRIPSY URETER(S), INSERT STENT, COMBINED      Procedure: COMBINED CYSTOSCOPY, URETEROSCOPY, LASER HOLMIUM LITHOTRIPSY URETER(S), INSERT STENT;;  Surgeon: Blair Correa MD;  Location: [facility] OR     LASER HOLMIUM NEPHROLITHOTOMY VIA PERCUTANEOUS NEPHROSTOMY  2012    Procedure: LASER HOLMIUM NEPHROLITHOTOMY VIA PERCUTANEOUS NEPHROSTOMY;  proceedure should read: Left  Percutaneous Access, Left Percutaneous ultrasonic Nephrolithotomy, Ureteroscopy Holmium Laser Lithotripsy Stent Placement ;  Surgeon: Blair Correa MD;  Location: [facility] OR     MAMMOPLASTY AUGMENTATION BILATERAL       OPEN REDUCTION INTERNAL FIXATION WRIST Left 1/11/2016    Procedure: OPEN REDUCTION INTERNAL FIXATION WRIST;  Surgeon: Darling Ellis MD;  Location: [facility] OR     RECONSTRUCT BREAST, IMPLANT PROSTHESIS, COMBINED       THORACOSCOPIC DECORTICATION LUNG Left 5/7/2019    Procedure: Left Video Assisted Thoracoscopic Decortication, Intercostal Nerve Cryo Analgesia, Flexible Bronchoscopy;  Surgeon: Patrice Regalado MD;  Location: [facility] OR     Current Outpatient Medications   Medication     aspirin-acetaminophen-caffeine (EXCEDRIN MIGRAINE) 250-250-65 MG tablet     docusate sodium (COLACE) 100 MG capsule     escitalopram (LEXAPRO) 10 MG tablet     escitalopram (LEXAPRO) 20 MG tablet     ferrous sulfate (FEROSUL) 325 (65 Fe) MG tablet     folic acid (FOLVITE) 1 MG tablet     hydroxychloroquine (PLAQUENIL) 200 MG tablet     hydrOXYzine (VISTARIL) 25 MG capsule     methadone (DOLOPHINE-INTENSOL) 10 MG/ML (HIGH CONC) solution     pantoprazole (PROTONIX) 40 MG EC tablet     pregabalin (LYRICA) 150 MG capsule     Pregabalin (LYRICA) 200 MG capsule     valACYclovir (VALTREX) 500 MG tablet     furosemide (LASIX) 20 MG tablet     naloxone (NARCAN) 4 MG/0.1ML nasal spray     polyethylene glycol (MIRALAX/GLYCOLAX) packet     No current facility-administered medications for this visit.      Allergies   Allergen Reactions     Penicillins Hives     Hives in childhood.     Exam  Awake alert NAD normal affect    A/P  Anxiety: increase lexapro, refill atarax, cont w/ switching psychiatryists  EGD/colonoscopy as planned  LUQ ultrasound  CBC post IV iron  See me one mo post move    13 minute call    Fredy Merritt MD

## 2020-07-17 RX ORDER — HYDROXYZINE PAMOATE 25 MG/1
CAPSULE ORAL
Qty: 60 CAPSULE | Refills: 0 | OUTPATIENT
Start: 2020-07-17

## 2020-07-21 NOTE — PROGRESS NOTES
"  Individual Psychotherapy Session (telephone session)     Telephone Visit Details  Demetra Richardson is a 64 year old pt. who is being evaluated via a telephone visit.     Type of service:  Telephone visit for psychotherapy  Time of service:    Start Time:  12:15        End Time:  11:00  Reason for Telephone Visit: Patient unable to travel due to Covid-19.  Originating Site (patient location): Patient's home  Distant Site (provider location): Remote location  Mode of Communication:  Video Conference via [email or web address]  Consent:  Patient has given verbal consent for telephone visit?: Yes     Care Provider: Leighann Lewis (Skalski), PhD, LP  Participants: Patient and writer  DSM-5 Diagnoses:   Opioid use disorder, on maintenance therapy  Benzodiazepine use disorder, in early remission  Cluster B traits (by chart review)  Anxiety (r/o DARRIUS)  MDD, in remission    SUBJECTIVE: Radha is feeling more optimistic about her physical health as she has confidence her providers will be able to identify the issue that is causing weight loss, night sweats, body aches/ pain, and fatigue. Radha is walking 45 minutes several days per week. She reports eating healthy meals, although often finds it difficult to keep food down. She denied depressed mood although does endorse unhelpful, self-critical thoughts of herself. Low mood is a trigger to substance use. She denied cravings and is has completely tapered off klonopin. Proud of herself for doing so.    Significant anxiety, specifically related to her upcoming move at the end of the month to an assisted living facility.     TREATMENT: I helped Radha identify and challenge unhelpful thinking related to her upcoming move. She was able to recognize that most of her fears are \"worst case scenario\" and unlikely to occur. She was able to identify reasons why she is looking forward to the move, including opportunity for more social connectedness.     MSE:  Alertness: alert and oriented  Speech: " normal  Language: intact  Mood: anxious  Affect: full range; was congruent to mood; was congruent to content  Thought Process/Associations: unremarkable  Thought Content:  Reports none;  Denies suicidal ideation and violent ideation  Perception:  Reports none;  Denies auditory hallucinations and visual hallucinations  Insight: adequate for  safety  Judgment: adequate for safety  Cognition: (6) does  appear grossly intact; formal cognitive testing was not done    PLAN:   1) Therapy every 2 weeks with writer  2) [facility]    Treatment plan due for review by: Patricia 3, 2020    Performed and documented by: Leighann Lewis (Skalski), PhD, LP

## 2020-07-23 ENCOUNTER — ANCILLARY PROCEDURE (OUTPATIENT)
Dept: ULTRASOUND IMAGING | Facility: CLINIC | Age: 65
End: 2020-07-23
Attending: FAMILY MEDICINE
Payer: COMMERCIAL

## 2020-07-23 ENCOUNTER — TELEPHONE (OUTPATIENT)
Dept: FAMILY MEDICINE | Facility: CLINIC | Age: 65
End: 2020-07-23

## 2020-07-23 DIAGNOSIS — R19.02 LEFT UPPER QUADRANT ABDOMINAL MASS: ICD-10-CM

## 2020-07-27 ENCOUNTER — TELEPHONE (OUTPATIENT)
Dept: FAMILY MEDICINE | Facility: CLINIC | Age: 65
End: 2020-07-27

## 2020-07-27 NOTE — TELEPHONE ENCOUNTER
[facility] Call Center    Phone Message    May a detailed message be left on voicemail: yes     Reason for Call: Other: The pt did not feel the imaging was done correctly so doesn't think you will get a good result. And she is still not feeling good so wants to find out what's going on. Please call the pt to discuss. Thanks..      Action Taken: Patient transferred to: [facility] med    Travel Screening: Not Applicable

## 2020-07-28 NOTE — TELEPHONE ENCOUNTER
1-I could see her  2-I do not know if he would be able to do this but she has endoscopy Aug 6, her concern was her spleen was too big based on something she could feel, perhaps endoscopy MD could do abdomen exam at that time as it will be in person, I could reach out and ask if she would like. The imaging did see her spleen.

## 2020-07-29 NOTE — TELEPHONE ENCOUNTER
I called and left  for the patient with information listed from Dr. Merritt below. She should call us back for phone appointment with alyssa tomorrow or Friday for imaging follow up if she has specific questions.  Latosha North, EMT at 2:57 PM on 7/29/2020.

## 2020-07-30 ENCOUNTER — TELEPHONE (OUTPATIENT)
Dept: PSYCHIATRY | Facility: CLINIC | Age: 65
End: 2020-07-30

## 2020-07-30 NOTE — TELEPHONE ENCOUNTER
Left VM to follow-up on missed appointment, encouraging Radha to contact clinic if she would like to reschedule.

## 2020-08-03 ENCOUNTER — TELEPHONE (OUTPATIENT)
Dept: GASTROENTEROLOGY | Facility: CLINIC | Age: 65
End: 2020-08-03

## 2020-08-03 DIAGNOSIS — Z11.59 ENCOUNTER FOR SCREENING FOR OTHER VIRAL DISEASES: ICD-10-CM

## 2020-08-03 LAB
SARS-COV-2 RNA SPEC QL NAA+PROBE: NOT DETECTED
SPECIMEN SOURCE: NORMAL

## 2020-08-03 PROCEDURE — U0003 INFECTIOUS AGENT DETECTION BY NUCLEIC ACID (DNA OR RNA); SEVERE ACUTE RESPIRATORY SYNDROME CORONAVIRUS 2 (SARS-COV-2) (CORONAVIRUS DISEASE [COVID-19]), AMPLIFIED PROBE TECHNIQUE, MAKING USE OF HIGH THROUGHPUT TECHNOLOGIES AS DESCRIBED BY CMS-2020-01-R: HCPCS | Performed by: INTERNAL MEDICINE

## 2020-08-03 NOTE — TELEPHONE ENCOUNTER
FUTURE VISIT INFORMATION      SURGERY INFORMATION:    Date: 20    Location: [facility]    Surgeon:  Zacarias Duran MD    Anesthesia Type:  MAC    Procedure: ESOPHAGOGASTRODUODENOSCOPY (EGD) EGD & Colonoscopy (Rx) - Patient wanted to reschedule w/Dr Milian at [facility] COLONOSCOPY EGD & Colonoscopy (Rx) - Patient wanted to reschedule w/Dr Milian at [facility]     RECORDS REQUESTED FROM:       Primary Care Provider: Fredy Merritt MD - [facility]    Pertinent Medical History: hypotension    Most recent EKG+ Tracin20    Most recent ECHO: 19

## 2020-08-03 NOTE — TELEPHONE ENCOUNTER
Patient scheduled for Colonoscopy/EGD    Indication for procedure. Other iron deficiency anemia; Weight loss    Referring Provider. Fredy Merritt MD    ? No     Arrival time verified? 6:30 AM    Facility location verified? [address]    Instructions given regarding prep and procedure Patient declined review    Prep Type Golytely    Are you taking any anticoagulants or blood thinners? No     Instructions given? N/a     Electronic implanted devices? Denies     Pre procedure teaching completed? Yes    Transportation from procedure?  policy reviewed. Instructed patient to have someone stay with her for 24 hours post exam    H&P / Pre op physical completed?  Patient will get

## 2020-08-03 NOTE — PHARMACY - PREOPERATIVE ASSESSMENT CENTER
Pain Medication Clarification Note - PAC Pharmacist  Demetra Richardson was seen and interviewed during time of PAC Clinic appointment on August 3, 2020 in preparation for the planned procedure with Dr Maicol Macias on 8/6/20 at [facility] for EGD and colonoscopy.     The current plan is for the procedure to be a same day procedure with patient discharging home after the procedure.  The purpose of this note is to verify the patient's home pain regimen.  If the plan changes and the patient is to be admitted to inpatient status postoperatively and primary team would like assistance with managing postoperative pain please consult the inpatient pain management service for specific pain management recommendations (available at [phone] from 8 AM - 3 PM Mon - Fri and available via phone answering service 24/7 at [phone]).     Based on [place] Prescription Monitoring Profile and patient interview:     - OUTPATIENT MEDICATIONS (related to pain management):  -- Long-acting opioid: methadone concentrated solution (10 mg/mL) - take 97 mg by mouth once daily in the morning. Gets this at [facility]. Verified dose via call to ROBERT Thompson on August 4, 2020.  -- Short-acting opioid: none  -- Oral adjuvant(s): lyrica 150 mg PO TID  -- Topicals: none    Guanaco Barajas RPH  August 3, 2020  3:05 PM

## 2020-08-04 ENCOUNTER — ANESTHESIA EVENT (OUTPATIENT)
Dept: GASTROENTEROLOGY | Facility: CLINIC | Age: 65
End: 2020-08-04
Payer: COMMERCIAL

## 2020-08-04 ENCOUNTER — PRE VISIT (OUTPATIENT)
Dept: SURGERY | Facility: CLINIC | Age: 65
End: 2020-08-04

## 2020-08-04 ENCOUNTER — TELEPHONE (OUTPATIENT)
Dept: GASTROENTEROLOGY | Facility: CLINIC | Age: 65
End: 2020-08-04

## 2020-08-04 ENCOUNTER — OFFICE VISIT (OUTPATIENT)
Dept: SURGERY | Facility: CLINIC | Age: 65
End: 2020-08-04
Payer: COMMERCIAL

## 2020-08-04 VITALS
RESPIRATION RATE: 17 BRPM | BODY MASS INDEX: 21.44 KG/M2 | HEIGHT: 63 IN | DIASTOLIC BLOOD PRESSURE: 71 MMHG | WEIGHT: 121 LBS | SYSTOLIC BLOOD PRESSURE: 105 MMHG | HEART RATE: 64 BPM | TEMPERATURE: 97.9 F | OXYGEN SATURATION: 97 %

## 2020-08-04 DIAGNOSIS — D64.9 NORMOCHROMIC NORMOCYTIC ANEMIA: ICD-10-CM

## 2020-08-04 DIAGNOSIS — Z01.818 PREOP EXAMINATION: Primary | ICD-10-CM

## 2020-08-04 DIAGNOSIS — Z11.59 ENCOUNTER FOR SCREENING FOR OTHER VIRAL DISEASES: ICD-10-CM

## 2020-08-04 LAB
BASOPHILS # BLD AUTO: 0 10E9/L (ref 0–0.2)
BASOPHILS NFR BLD AUTO: 0.4 %
DIFFERENTIAL METHOD BLD: ABNORMAL
EOSINOPHIL # BLD AUTO: 0.6 10E9/L (ref 0–0.7)
EOSINOPHIL NFR BLD AUTO: 7.9 %
ERYTHROCYTE [DISTWIDTH] IN BLOOD BY AUTOMATED COUNT: ABNORMAL % (ref 10–15)
HCT VFR BLD AUTO: 38.5 % (ref 35–47)
HGB BLD-MCNC: 10.9 G/DL (ref 11.7–15.7)
IMM GRANULOCYTES # BLD: 0 10E9/L (ref 0–0.4)
IMM GRANULOCYTES NFR BLD: 0.3 %
LYMPHOCYTES # BLD AUTO: 0.9 10E9/L (ref 0.8–5.3)
LYMPHOCYTES NFR BLD AUTO: 12.4 %
MCH RBC QN AUTO: 26.5 PG (ref 26.5–33)
MCHC RBC AUTO-ENTMCNC: 28.3 G/DL (ref 31.5–36.5)
MCV RBC AUTO: 93 FL (ref 78–100)
MONOCYTES # BLD AUTO: 0.6 10E9/L (ref 0–1.3)
MONOCYTES NFR BLD AUTO: 7.9 %
NEUTROPHILS # BLD AUTO: 5.3 10E9/L (ref 1.6–8.3)
NEUTROPHILS NFR BLD AUTO: 71.1 %
NRBC # BLD AUTO: 0 10*3/UL
NRBC BLD AUTO-RTO: 0 /100
PLATELET # BLD AUTO: 199 10E9/L (ref 150–450)
RBC # BLD AUTO: 4.12 10E12/L (ref 3.8–5.2)
WBC # BLD AUTO: 7.5 10E9/L (ref 4–11)

## 2020-08-04 ASSESSMENT — MIFFLIN-ST. JEOR: SCORE: 1072.98

## 2020-08-04 ASSESSMENT — PAIN SCALES - GENERAL: PAINLEVEL: NO PAIN (0)

## 2020-08-04 ASSESSMENT — LIFESTYLE VARIABLES: TOBACCO_USE: 0

## 2020-08-04 NOTE — ANESTHESIA PREPROCEDURE EVALUATION
Anesthesia Pre-Procedure Evaluation    Patient: Demetra Richardson   MRN:     [id number] Gender:   female   Age:    64 year old :      1955        Preoperative Diagnosis: Other iron deficiency anemia [D50.8]  Weight loss [R63.4]   Procedure(s):  ESOPHAGOGASTRODUODENOSCOPY (EGD)  COLONOSCOPY     LABS:  CBC:   Lab Results   Component Value Date    WBC 8.9 2020    WBC 6.9 2020    HGB 7.9 (LL) 2020    HGB 7.8 (L) 2020    HCT 28.0 (L) 2020    HCT 28.5 (L) 2020     (H) 2020     2020     BMP:   Lab Results   Component Value Date     2020     2020    POTASSIUM 4.0 2020    POTASSIUM 4.9 2020    CHLORIDE 108 2020    CHLORIDE 109 2020    CO2 24 2020    CO2 27 2020    BUN 25 2020    BUN 18 2020    CR 0.79 2020    CR 0.69 2020    GLC 95 2020     (H) 2020     COAGS:   Lab Results   Component Value Date    PTT 45 (H) 2019    INR 1.17 (H) 2019    FIBR 219 2011     POC:   Lab Results   Component Value Date    BGM 92 2019    HCG Negative 04/15/2015    HCGS Negative 2010     OTHER:   Lab Results   Component Value Date    PH 7.40 2019    LACT 1.0 2019    A1C 5.5 2011    RAFAELA 7.8 (L) 2020    PHOS 5.0 (H) 2019    MAG 1.7 2019    ALBUMIN 2.0 (L) 2020    PROTTOTAL 5.9 (L) 2020    ALT 19 2020    AST 23 2020    GGT 1,223 (H) 2019    ALKPHOS 121 2020    BILITOTAL <0.1 (L) 2020    LIPASE 32 (L) 2019    AMYLASE 33 2016    TSH 1.63 2020    T4 0.81 2013    CRP 3.9 2019    SED 23 2019        Preop Vitals    BP Readings from Last 3 Encounters:   20 101/66   20 90/60   19 98/62    Pulse Readings from Last 3 Encounters:   20 73   20 84   19 71      Resp Readings from Last 3 Encounters:   20 16  "  10/11/19 15   19 16    SpO2 Readings from Last 3 Encounters:   20 97%   20 96%   19 96%      Temp Readings from Last 1 Encounters:   20 97.7  F (36.5  C) (Oral)    Ht Readings from Last 1 Encounters:   20 1.6 m (5' 3\")      Wt Readings from Last 1 Encounters:   20 52.6 kg (116 lb)    Estimated body mass index is 20.55 kg/m  as calculated from the following:    Height as of 20: 1.6 m (5' 3\").    Weight as of 20: 52.6 kg (116 lb).     LDA:        Past Medical History:   Diagnosis Date     Anemia      Basal cell carcinoma     Left-sided, skin over over medial orbit/nasal bone. Removed Oct 2018.     Benzodiazepine dependence (H)     With h/o withdrawal seizure x 1     Bipolar 1 disorder, mixed, moderate (H) 2013     Chronic pain     Back, legs.     Eosinophilic gastroenteritis 2010     Epidural abscess 2005    x4     Fibromyalgia      Generalised anxiety disorder      GERD (gastroesophageal reflux disease)      Major depressive disorder      Migraine      Nephrolithiasis     S/p left sided lithotripsy     Opiate dependence (H)      Osteoarthritis      Osteoporosis      PUD (peptic ulcer disease)      SLE (systemic lupus erythematosus) (H) 2009     Weight loss       Past Surgical History:   Procedure Laterality Date     BACK SURGERY  04    L4-5 epidural abscess     BACK SURGERY  04    L3-4 spinal stenosis     BACK SURGERY  04    Lt psoas abscess     BRONCHOSCOPY FLEXIBLE N/A 2019    Procedure: Flexible Bronchoscopy;  Surgeon: Patrice Regalado MD;  [facility]: [facility]      SECTION      x 2     CHOLECYSTECTOMY  2-     CLOSED REDUCTION, PERCUTANEOUS PINNING FINGER, COMBINED  8/10/2011    Procedure:COMBINED CLOSED REDUCTION, PERCUTANEOUS PINNING FINGER; 5th Proximal Phalanx; Surgeon:RADHA BUITRAGO; Location:[facility]     COLONOSCOPY       GASTRECTOMY  2005    Bilat truncal vagotomy, hemigastrectomy, RnY gastrojejunostomy     " GASTROJEJUNOSTOMY  6/2/2011    Procedure:GASTROJEJUNOSTOMY; exploratory laparotmy with revision of gastrojejunostomy, Antrectomy, Miles-en-y, Gastrojejunostomy; Surgeon:JULIUS HELM; Location:[facility]     INSERT CHEST TUBE N/A 4/29/2019    Procedure: INSERTION, CATHETER, INTERCOSTAL, FOR DRAINAGE;  Surgeon: Melissa Nichols MD;  [facility]: [facility]     LASER HOLMIUM LITHOTRIPSY URETER(S), INSERT STENT, COMBINED      Procedure: COMBINED CYSTOSCOPY, URETEROSCOPY, LASER HOLMIUM LITHOTRIPSY URETER(S), INSERT STENT;;  Surgeon: Blair Correa MD;  Location: [facility]     LASER HOLMIUM NEPHROLITHOTOMY VIA PERCUTANEOUS NEPHROSTOMY  7/11/2012    Procedure: LASER HOLMIUM NEPHROLITHOTOMY VIA PERCUTANEOUS NEPHROSTOMY;  proceedure should read: Left Percutaneous Access, Left Percutaneous ultrasonic Nephrolithotomy, Ureteroscopy Holmium Laser Lithotripsy Stent Placement ;  Surgeon: Blair Correa MD;  Location: [facility]     MAMMOPLASTY AUGMENTATION BILATERAL       OPEN REDUCTION INTERNAL FIXATION WRIST Left 1/11/2016    Procedure: OPEN REDUCTION INTERNAL FIXATION WRIST;  Surgeon: Darling Ellis MD;  Location: [facility]     RECONSTRUCT BREAST, IMPLANT PROSTHESIS, COMBINED       THORACOSCOPIC DECORTICATION LUNG Left 5/7/2019    Procedure: Left Video Assisted Thoracoscopic Decortication, Intercostal Nerve Cryo Analgesia, Flexible Bronchoscopy;  Surgeon: Patrice Regalado MD;  Location: [facility]      Allergies   Allergen Reactions     Penicillins Hives     Hives in childhood.        Anesthesia Evaluation     . Pt has had prior anesthetic. Type: General and MAC    No history of anesthetic complications          ROS/MED HX    ENT/Pulmonary: Comment: History of left VATS decortication 5/7/19     (-) tobacco use   Neurologic: Comment: H/o epidural abscess  Fibromyalgia    (+)migraines,     Cardiovascular:  - neg cardiovascular ROS   (+) ----. : . . . :. . Previous cardiac testing Echodate:4/26/19results:Interpretation  Summary  Global and regional left ventricular function is normal with an EF of 55-60%.  Left ventricular diastolic function is normal.  The right ventricle is normal size. Global right ventricular function is  normal.  Mild aortic stenosis is present.  IVC is normal, RA pressure is normal.  No pericardial effusion is present.  date: results:ECG reviewed date:5/1/20 results:SR date: results:          METS/Exercise Tolerance:  4 - Raking leaves, gardening   Hematologic:     (+) Anemia, History of Transfusion no previous transfusion reaction Other Hematologic Disorder-iron infusions      Musculoskeletal: Comment: DJD  (+) arthritis,  other musculoskeletal- SLE      GI/Hepatic: Comment: Primary sclerosing cholangitis. PUD.    (+) GERD Asymptomatic on medication, bowel prep, hepatitis liver disease,       Renal/Genitourinary:     (+) Nephrolithiasis ,       Endo:  - neg endo ROS       Psychiatric:     (+) psychiatric history bipolar, anxiety and depression      Infectious Disease:  - neg infectious disease ROS       Malignancy:      - no malignancy   Other: Comment: Methadone program   (+) C-spine cleared: N/A, H/O Chronic Pain,H/O chronic opiod use , no other significant disability                        PHYSICAL EXAM:   Mental Status/Neuro: A/A/O; Age Appropriate   Airway: Facies: Feasible  Mallampati: I  Mouth/Opening: Full  TM distance: > 6 cm  Neck ROM: Full   Respiratory: Auscultation: CTAB     Resp. Rate: Normal     Resp. Effort: Normal      CV: Rhythm: Regular  Heart: Murmur (Systolic)  Edema: None   Comments: Permanent bridges involving upper and lower front teeth     Dental: Details                Assessment:   ASA SCORE: 3       NPO Status: NPO Appropriate     Plan:   Anes. Type:  MAC   Pre-Medication: None   Induction:  N/a   Airway: Native Airway   Access/Monitoring: PIV   Maintenance: N/a     Postop Plan:   Postop Pain: None  Postop Sedation/Airway: Not planned     PONV Management: Adult Risk Factors:  Female   Prevention: Ondansetron     CONSENT: Direct conversation   Plan and risks discussed with: Patient   Blood Products: Consent Deferred (Minimal Blood Loss)                PAC Discussion and Assessment    ASA Classification: 3  Case is suitable for: [place]  Anesthetic techniques and relevant risks discussed: MAC with GA as backup  Invasive monitoring and risk discussed: No  Types:   Possibility and Risk of blood transfusion discussed: No  NPO instructions given:   Additional anesthetic preparation and risks discussed:   Needs early admission to pre-op area:   Other:     PAC Resident/NP Anesthesia Assessment:  Demetra Richardson is a 64 year old female scheduled to undergo EGD/colonoscopy on 8/6/20 by Dr. Maicol Macias. She has the following specific operative considerations:   - RCRI : No serious cardiac risks.    - VTE risk: 0.5%  - TORREY # of risks 1/8 = Low risk  - Risk of PONV score = 3.  If > 2, anti-emetic intervention recommended. If 3 or > anti emetic intervention recommended with two or more meds    --Weight loss, anemia, unexplained with above procedures now planned with MAC. Patient comfortable with plan. Hgb today: 10.9.  --No cardiac history, symptoms or meds. Has had past evaluations for episodes of loss of consciousness. Mild aortic stenosis on echo on 4/26/19. Systolic murmur present. Last EKG SR. Activity somewhat limited due to pain.   --Nonsmoker. Denies pulmonary symptoms. History of left VATS procedure for decortication in 5/7/19. Able to lay flat  --GERD. Does not take Protonix.    --Migraines Excedrin migraine prn.   --History of mild lupus per Rheumatology note. Plaquenil on list but patient not taking.   --Chronic pain with chronic opioid use. On methadone and will take on day of procedure. Will take Lyrica on day of procedure.   --Past history of seizure attributed to benzodiazepine withdrawal.   --Anxiety/depression/bipolar. Will take Lexapro on day of procedure. Hydroxyzine  prn.    Patient was discussed with Dr White.        Reviewed and Signed by PAC Mid-Level Provider/Resident  Mid-Level Provider/Resident: VINOD Brown CNS  Date: 8/4/20  Time: 8:17am    Attending Anesthesiologist Anesthesia Assessment:        Anesthesiologist:   Date:   Time:   Pass/Fail:   Disposition:     PAC Pharmacist Assessment:        Pharmacist:   Date:   Time:    VINOD Giraldo

## 2020-08-04 NOTE — TELEPHONE ENCOUNTER
[facility]

## 2020-08-04 NOTE — H&P
Pre-Operative H & P     CC:  Preoperative exam to assess for increased cardiopulmonary risk while undergoing surgery and anesthesia.    Date of Encounter: 8/4/2020  Primary Care Physician:  Fredy Merritt  Reason for visit: Other iron deficiency anemia [D50.8]  Weight loss [R63.4]    HPI  Demetra Richardson is a 64 year old female who presents for pre-operative H & P in preparation for EGD, colonoscopy with Dr. Maicol Macias on 8/6/20 at [facility] GI lab. History is obtained from the patient and medical records.    Patient with recent history of significant weight loss and anemia s/p iron infusions, last on 7/2/20. She has been recently followed by Dr. Maicol Macias and above procedure have been recommended for further evaluation.     Her history is otherwise complex with GERD, bleeding ulcers, PSC, lupus, OA, DJD, seizure, migraines, anxiety, depression, chronic pain, opioid dependency, substance abuse, and bipolar disorder.     For primary care she is followed by Dr. Merritt.     Today she has two concerns, one is that she feels that her left upper quadrant is larger, protruding more than right, and she has had a rash on her bilateral forearms with some purulent drainage.    Past Medical History  Past Medical History:   Diagnosis Date     Anemia      Basal cell carcinoma     Left-sided, skin over over medial orbit/nasal bone. Removed Oct 2018.     Benzodiazepine dependence (H)     With h/o withdrawal seizure x 1     Bipolar 1 disorder, mixed, moderate (H) 2/7/2013     Chronic pain     Back, legs.     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     Epidural abscess 2005    x4     Fibromyalgia      Generalised anxiety disorder      GERD (gastroesophageal reflux disease)      Major depressive disorder      Migraine      Nephrolithiasis     S/p left sided lithotripsy     Opiate dependence (H)      Osteoarthritis      Osteoporosis      PUD (peptic ulcer disease)      SLE  (systemic lupus erythematosus) (H) 2009     Weight loss        Past Surgical History  Past Surgical History:   Procedure Laterality Date     BACK SURGERY  04    L4-5 epidural abscess     BACK SURGERY  04    L3-4 spinal stenosis     BACK SURGERY  04    Lt psoas abscess     BRONCHOSCOPY FLEXIBLE N/A 2019    Procedure: Flexible Bronchoscopy;  Surgeon: Patrice Regalado MD;  Location: [facility] OR      SECTION      x 2     CHOLECYSTECTOMY       CLOSED REDUCTION, PERCUTANEOUS PINNING FINGER, COMBINED  8/10/2011    Procedure:COMBINED CLOSED REDUCTION, PERCUTANEOUS PINNING FINGER; 5th Proximal Phalanx; Surgeon:RADHA BUITRAGO; Location:[facility]     COLONOSCOPY       GASTRECTOMY  2005    Bilat truncal vagotomy, hemigastrectomy, RnY gastrojejunostomy     GASTROJEJUNOSTOMY  2011    Procedure:GASTROJEJUNOSTOMY; exploratory laparotmy with revision of gastrojejunostomy, Antrectomy, Miles-en-y, Gastrojejunostomy; Surgeon:JULIUS HELM; Location:[facility] OR     INSERT CHEST TUBE N/A 2019    Procedure: INSERTION, CATHETER, INTERCOSTAL, FOR DRAINAGE;  Surgeon: Melissa Nichols MD;  Location: [facility] GI     LASER HOLMIUM LITHOTRIPSY URETER(S), INSERT STENT, COMBINED      Procedure: COMBINED CYSTOSCOPY, URETEROSCOPY, LASER HOLMIUM LITHOTRIPSY URETER(S), INSERT STENT;;  Surgeon: Blair Correa MD;  Location: [facility]     LASER HOLMIUM NEPHROLITHOTOMY VIA PERCUTANEOUS NEPHROSTOMY  2012    Procedure: LASER HOLMIUM NEPHROLITHOTOMY VIA PERCUTANEOUS NEPHROSTOMY;  proceedure should read: Left Percutaneous Access, Left Percutaneous ultrasonic Nephrolithotomy, Ureteroscopy Holmium Laser Lithotripsy Stent Placement ;  Surgeon: Blair Correa MD;  Location: [facility]     MAMMOPLASTY AUGMENTATION BILATERAL       OPEN REDUCTION INTERNAL FIXATION WRIST Left 2016    Procedure: OPEN REDUCTION INTERNAL FIXATION WRIST;  Surgeon: Darling Ellis MD;  Location: [facility] OR     RECONSTRUCT BREAST,  IMPLANT PROSTHESIS, COMBINED       THORACOSCOPIC DECORTICATION LUNG Left 5/7/2019    Procedure: Left Video Assisted Thoracoscopic Decortication, Intercostal Nerve Cryo Analgesia, Flexible Bronchoscopy;  Surgeon: Patrice Regalado MD;  Location: [facility] OR       Hx of Blood transfusions/reactions: Yes, in past. No known reactions. Recent iron infusion.     Hx of abnormal bleeding or anti-platelet use: Denies.     Menstrual history: No LMP recorded (lmp unknown). Patient is postmenopausal.    Steroid use in the last year: Denies.     Personal or FH with difficulty with Anesthesia:  Denies. Patient draws attention to EGD from 2011 where she woke up in the hospital with a chest tube. Procedure note indicates that she became hypoxic during the procedure. It is difficult to discern exactly what happened during that event with the notes that are available. She has few details.    Prior to Admission Medications  Current Outpatient Medications   Medication Sig Dispense Refill     aspirin-acetaminophen-caffeine (EXCEDRIN MIGRAINE) 250-250-65 MG tablet Take 1 tablet by mouth 2 times daily as needed for headaches       escitalopram (LEXAPRO) 20 MG tablet Take 1 tablet (20 mg) by mouth daily 30 tablet 1     ferrous sulfate (FEROSUL) 325 (65 Fe) MG tablet Take 325 mg by mouth daily (with breakfast)       folic acid (FOLVITE) 1 MG tablet Take 1 tablet (1 mg) by mouth daily (Patient not taking: Reported on 8/3/2020) 30 tablet 11     hydroxychloroquine (PLAQUENIL) 200 MG tablet Take 1 tablet (200 mg) by mouth 2 times daily (with meals) (Patient not taking: Reported on 8/3/2020) 60 tablet 5     hydrOXYzine (VISTARIL) 25 MG capsule TAKE 1 CAPSULE BY MOUTH TWICE A DAY AS NEEDED FOR ANXIETY 60 capsule 0     ibuprofen (ADVIL/MOTRIN) 200 MG tablet Take 400-800 mg by mouth every 8 hours as needed for mild pain       methadone (DOLOPHINE-INTENSOL) 10 MG/ML (HIGH CONC) solution Take 11.3 mLs (113 mg) by mouth daily Rufina Marcial  [phone]    Dose as of 4/29/19 (Patient taking differently: Take 97 mg by mouth daily Rufina Marcial [phone]    Dose as of 4/29/19 ) 300 mL 0     naloxone (NARCAN) 4 MG/0.1ML nasal spray Spray 1 spray (4 mg) into one nostril alternating nostrils once as needed for opioid reversal repeat every 2-3 minutes until responsive (Patient not taking: Reported on 8/3/2020) 1 each 1     pregabalin (LYRICA) 150 MG capsule Take 1 capsule (150 mg) by mouth 3 times daily 90 capsule 3     valACYclovir (VALTREX) 500 MG tablet Take 1 tablet (500 mg) by mouth 2 times daily (Patient taking differently: Take 500 mg by mouth 2 times daily as needed (herpes breakout) ) 6 tablet 1       Allergies  Allergies   Allergen Reactions     Penicillins Hives     Hives in childhood.       Social History  Social History     Socioeconomic History     Marital status: Single     Spouse name: Not on file     Number of children: Not on file     Years of education: Not on file     Highest education level: Not on file   Occupational History     Not on file   Social Needs     Financial resource strain: Not on file     Food insecurity     Worry: Not on file     Inability: Not on file     Transportation needs     Medical: Not on file     Non-medical: Not on file   Tobacco Use     Smoking status: Never Smoker     Smokeless tobacco: Never Used   Substance and Sexual Activity     Alcohol use: No     Comment: none in 10 years     Drug use: No     Sexual activity: Not Currently   Lifestyle     Physical activity     Days per week: Not on file     Minutes per session: Not on file     Stress: Not on file   Relationships     Social connections     Talks on phone: Not on file     Gets together: Not on file     Attends Zoroastrian service: Not on file     Active member of club or organization: Not on file     Attends meetings of clubs or organizations: Not on file     Relationship status: Not on file     Intimate partner violence     Fear of current or ex  partner: Not on file     Emotionally abused: Not on file     Physically abused: Not on file     Forced sexual activity: Not on file   Other Topics Concern     Parent/sibling w/ CABG, MI or angioplasty before 65F 55M? Not Asked   Social History Narrative    Intake 4/16/15:        H/o divorce but most recently living with SO Alfonso. Alfonso recently passed away.         In past employed as a .         Tobacco use: denies    Alcohol use: denies    Drug use: daily pot use for 30 years. Currently with sobriety.            Family History  Family History   Problem Relation Age of Onset     Substance Abuse Mother      Family History Negative Father      Substance Abuse Maternal Grandfather      Substance Abuse Brother      Liver Disease No family hx of      Colon Cancer No family hx of      Ulcerative Colitis No family hx of      Crohn's Disease No family hx of        Personally reviewed    Lab update today:  Lab Results   Component Value Date    WBC 7.5 08/04/2020     Lab Results   Component Value Date    RBC 4.12 08/04/2020     Lab Results   Component Value Date    HGB 10.9 08/04/2020     Lab Results   Component Value Date    HCT 38.5 08/04/2020     Lab Results   Component Value Date    MCV 93 08/04/2020     Lab Results   Component Value Date    MCH 26.5 08/04/2020     Lab Results   Component Value Date    MCHC 28.3 08/04/2020     Lab Results   Component Value Date    RDW Dimorphic population - unable to calculate 08/04/2020     Lab Results   Component Value Date     08/04/2020       LABS:  CBC:   Lab Results   Component Value Date    WBC 8.9 06/24/2020    WBC 6.9 05/01/2020    HGB 7.9 (LL) 06/24/2020    HGB 7.8 (L) 05/01/2020    HCT 28.0 (L) 06/24/2020    HCT 28.5 (L) 05/01/2020     (H) 06/24/2020     05/01/2020     BMP:   Lab Results   Component Value Date     06/24/2020     04/14/2020    POTASSIUM 4.0 06/24/2020    POTASSIUM 4.9 04/14/2020    CHLORIDE 108 06/24/2020    CHLORIDE  "109 04/14/2020    CO2 24 06/24/2020    CO2 27 04/14/2020    BUN 25 06/24/2020    BUN 18 04/14/2020    CR 0.79 06/24/2020    CR 0.69 04/14/2020    GLC 95 06/24/2020     (H) 04/14/2020     COAGS:   Lab Results   Component Value Date    PTT 45 (H) 04/29/2019    INR 1.17 (H) 05/07/2019    FIBR 219 06/02/2011     POC:   Lab Results   Component Value Date    BGM 92 05/07/2019    HCG Negative 04/15/2015    HCGS Negative 11/05/2010     OTHER:   Lab Results   Component Value Date    PH 7.40 05/07/2019    LACT 1.0 05/03/2019    A1C 5.5 06/02/2011    RAFAELA 7.8 (L) 06/24/2020    PHOS 5.0 (H) 05/07/2019    MAG 1.7 05/07/2019    ALBUMIN 2.0 (L) 06/24/2020    PROTTOTAL 5.9 (L) 06/24/2020    ALT 19 06/24/2020    AST 23 06/24/2020    GGT 1,223 (H) 05/06/2019    ALKPHOS 121 06/24/2020    BILITOTAL <0.1 (L) 06/24/2020    LIPASE 32 (L) 04/28/2019    AMYLASE 33 05/20/2016    TSH 1.63 04/14/2020    T4 0.81 02/04/2013    CRP 3.9 11/13/2019    SED 23 11/13/2019        Preop Vitals    BP Readings from Last 3 Encounters:   07/02/20 101/66   06/24/20 90/60   11/26/19 98/62    Pulse Readings from Last 3 Encounters:   07/02/20 73   06/24/20 84   11/26/19 71      Resp Readings from Last 3 Encounters:   07/02/20 16   10/11/19 15   06/19/19 16    SpO2 Readings from Last 3 Encounters:   07/02/20 97%   06/24/20 96%   11/26/19 96%      Temp Readings from Last 1 Encounters:   07/02/20 97.7  F (36.5  C) (Oral)    Ht Readings from Last 1 Encounters:   06/24/20 1.6 m (5' 3\")      Wt Readings from Last 1 Encounters:   06/24/20 52.6 kg (116 lb)    Estimated body mass index is 20.55 kg/m  as calculated from the following:    Height as of 6/24/20: 1.6 m (5' 3\").    Weight as of 6/24/20: 52.6 kg (116 lb).     ROS/MED HX    The complete review of systems is negative other than noted in the HPI or here.     ENT/Pulmonary: Comment: History of left VATS decortication 5/7/19     (-) tobacco use   Neurologic: Comment: H/o epidural abscess  Fibromyalgia  " "  (+)migraines,     Cardiovascular:  - neg cardiovascular ROS   (+) ----. : . . . :. . Previous cardiac testing Echodate:4/26/19results:date: results:ECG reviewed date:5/1/20 results:SR date: results:          METS/Exercise Tolerance:  4 - Raking leaves, gardening   Hematologic:     (+) Anemia, History of Transfusion no previous transfusion reaction Other Hematologic Disorder-iron infusions      Musculoskeletal: Comment: DJD  (+) arthritis,  other musculoskeletal- SLE      GI/Hepatic: Comment: Primary sclerosing cholangitis. PUD.    (+) GERD Asymptomatic on medication, hepatitis liver disease,       Renal/Genitourinary:     (+) Nephrolithiasis ,       Endo:  - neg endo ROS       Psychiatric:     (+) psychiatric history bipolar, anxiety and depression      Infectious Disease:  - neg infectious disease ROS       Malignancy:      - no malignancy   Other: Comment: Methadone program   (+) C-spine cleared: N/A, H/O Chronic Pain,H/O chronic opiod use , no other significant disability              PHYSICAL EXAM:   Mental Status/Neuro: A/A/O; Age Appropriate   Airway: Facies: Feasible  Mallampati: I  Mouth/Opening: Full  TM distance: > 6 cm  Neck ROM: Full   Respiratory: Auscultation: CTAB     Resp. Rate: Normal     Resp. Effort: Normal      CV: Rhythm: Regular  Heart: Normal Sounds  Edema: None   Comments:        Temp: 97.9  F (36.6  C) Temp src: Oral BP: 105/71 Pulse: 64   Resp: 17 SpO2: 97 %         121 lbs 0 oz  5' 3\"   Body mass index is 21.43 kg/m .       Physical Exam  Constitutional: Awake, alert, cooperative, no apparent distress, and appears stated age.  Eyes: Pupils equal, round and reactive to light, extra ocular muscles intact, sclera clear, conjunctiva normal.  HENT: Normocephalic, oral pharynx with moist mucus membranes, good dentition. No goiter appreciated.   Respiratory: Clear to auscultation bilaterally, no crackles or wheezing. No cough or obvious dyspnea.  Cardiovascular: Regular rate and rhythm, and " systolic murmur noted.  Carotids +2, no bruits. No edema. Palpable pulses to radial  DP and PT arteries.   GI: Hypoactive bowel sounds, soft, non-distended, tenderness on left side, no masses palpated. Surgical scars: well healed. When she stands it does looks like her left upper quadrant protrudes slightly more.   Lymph/Hematologic: No cervical lymphadenopathy and no supraclavicular lymphadenopathy.  Genitourinary: Deferred.   Skin: Warm and dry.  Few scattered rash/lesions on bilateral forearms. Dry with no signs of infection.  Musculoskeletal: Full ROM of neck. There is no redness, warmth, or swelling of the joints. Gross motor strength is normal.    Neurologic: Awake, alert, oriented to name, place and time. Cranial nerves II-XII are grossly intact. Gait is normal.   Neuropsychiatric: Calm, cooperative. Normal affect.     EKG: Personally reviewed 5/1/20 Sinus rhythm  Cardiac echo: 4/26/19   Interpretation Summary  Global and regional left ventricular function is normal with an EF of 55-60%.  Left ventricular diastolic function is normal.  The right ventricle is normal size. Global right ventricular function is  normal.  Mild aortic stenosis is present.  IVC is normal, RA pressure is normal.  No pericardial effusion is present.     This study was compared with the study from 11/5/2014. Mild aortic stenosis is  new in this study.  A left pleural effusion is present.  Abd US 7/23/20  IMPRESSION:       Left renal cortical complicated cyst, stable since 2015. This likely  is proteinaceous or hemorrhagic. Otherwise normal left upper quadrant  ultrasound exam.    Imaging and cardiac testing reviewed by this provider    Outside records reviewed from: Care Everywhere    ASSESSMENT and PLAN  Demetra Richardson is a 64 year old female scheduled to undergo EGD/colonoscopy on 8/6/20 by Dr. Maicol Macias. She has the following specific operative considerations:   - RCRI : No serious cardiac risks.    - Anesthesia  considerations:  Refer to PAC assessment in anesthesia records  - VTE risk: 0.5%  - TORREY # of risks 1/8 = Low risk  - Risk of PONV score = 3.  If > 2, anti-emetic intervention recommended. If 3 or > anti emetic intervention recommended with two or more meds    --Weight loss, anemia, unexplained with above procedures now planned with MAC. Patient comfortable with plan. Hgb today: 10.9.  --No cardiac history, symptoms or meds. Has had past evaluations for episodes of loss of consciousness. Mild aortic stenosis on echo on 4/26/19. Systolic murmur present. Last EKG SR. Activity somewhat limited due to pain.   --Nonsmoker. Denies pulmonary symptoms. History of left VATS procedure for decortication in 5/7/19. Able to lay flat  --GERD. Does not take Protonix.    --Migraines Excedrin migraine prn.   --History of mild lupus per Rheumatology note. Plaquenil on list but patient not taking.   --Chronic pain with chronic opioid use. On methadone and will take on day of procedure. Will take Lyrica on day of procedure.   --Past history of seizure attributed to benzodiazepine withdrawal.   --Anxiety/depression/bipolar. Will take Lexapro on day of procedure. Hydroxyzine prn.    Confirmed that patient has received instructions from GI lab regarding date, arrival time, eating and drinking instructions and bowel prep. She will receive her methadone before coming in. She will also take Lyrica and Lexapro on day of procedure. An AVS was offered but patient declined. Questions answered.       Patient was discussed with Dr White.    VINOD Giraldo CNS  Preoperative Assessment Center  [facility]  Phone: [phone]  Fax: [phone]

## 2020-08-06 ENCOUNTER — ANESTHESIA (OUTPATIENT)
Dept: GASTROENTEROLOGY | Facility: CLINIC | Age: 65
End: 2020-08-06
Payer: COMMERCIAL

## 2020-08-06 ENCOUNTER — HOSPITAL ENCOUNTER (OUTPATIENT)
Facility: CLINIC | Age: 65
Discharge: HOME | End: 2020-08-06
Attending: INTERNAL MEDICINE | Admitting: INTERNAL MEDICINE
Payer: COMMERCIAL

## 2020-08-06 ENCOUNTER — MEDICAL CORRESPONDENCE (OUTPATIENT)
Dept: HEALTH INFORMATION MANAGEMENT | Facility: CLINIC | Age: 65
End: 2020-08-06

## 2020-08-06 VITALS
HEART RATE: 54 BPM | BODY MASS INDEX: 21.44 KG/M2 | TEMPERATURE: 97.5 F | SYSTOLIC BLOOD PRESSURE: 129 MMHG | OXYGEN SATURATION: 97 % | HEIGHT: 63 IN | DIASTOLIC BLOOD PRESSURE: 77 MMHG | WEIGHT: 121 LBS | RESPIRATION RATE: 7 BRPM

## 2020-08-06 DIAGNOSIS — K52.9 JEJUNITIS: ICD-10-CM

## 2020-08-06 DIAGNOSIS — K21.00 REFLUX ESOPHAGITIS: Primary | ICD-10-CM

## 2020-08-06 LAB
COLONOSCOPY: NORMAL
UPPER GI ENDOSCOPY: NORMAL

## 2020-08-06 PROCEDURE — 25000125 ZZHC RX 250: Performed by: NURSE ANESTHETIST, CERTIFIED REGISTERED

## 2020-08-06 PROCEDURE — 43239 EGD BIOPSY SINGLE/MULTIPLE: CPT | Performed by: INTERNAL MEDICINE

## 2020-08-06 PROCEDURE — 37000009 ZZH ANESTHESIA TECHNICAL FEE, EACH ADDTL 15 MIN: Performed by: INTERNAL MEDICINE

## 2020-08-06 PROCEDURE — 25000132 ZZH RX MED GY IP 250 OP 250 PS 637: Performed by: INTERNAL MEDICINE

## 2020-08-06 PROCEDURE — 25000128 H RX IP 250 OP 636: Performed by: NURSE ANESTHETIST, CERTIFIED REGISTERED

## 2020-08-06 PROCEDURE — 88305 TISSUE EXAM BY PATHOLOGIST: CPT | Performed by: INTERNAL MEDICINE

## 2020-08-06 PROCEDURE — 88342 IMHCHEM/IMCYTCHM 1ST ANTB: CPT | Performed by: INTERNAL MEDICINE

## 2020-08-06 PROCEDURE — 37000008 ZZH ANESTHESIA TECHNICAL FEE, 1ST 30 MIN: Performed by: INTERNAL MEDICINE

## 2020-08-06 PROCEDURE — 25800030 ZZH RX IP 258 OP 636: Performed by: NURSE ANESTHETIST, CERTIFIED REGISTERED

## 2020-08-06 PROCEDURE — 45378 DIAGNOSTIC COLONOSCOPY: CPT | Performed by: INTERNAL MEDICINE

## 2020-08-06 RX ORDER — LIDOCAINE HYDROCHLORIDE 20 MG/ML
INJECTION, SOLUTION INFILTRATION; PERINEURAL PRN
Status: DISCONTINUED | OUTPATIENT
Start: 2020-08-06 | End: 2020-08-06

## 2020-08-06 RX ORDER — ONDANSETRON 4 MG/1
4 TABLET, ORALLY DISINTEGRATING ORAL EVERY 30 MIN PRN
Status: DISCONTINUED | OUTPATIENT
Start: 2020-08-06 | End: 2020-08-06 | Stop reason: HOSPADM

## 2020-08-06 RX ORDER — LIDOCAINE 40 MG/G
CREAM TOPICAL
Status: DISCONTINUED | OUTPATIENT
Start: 2020-08-06 | End: 2020-08-06 | Stop reason: HOSPADM

## 2020-08-06 RX ORDER — SODIUM CHLORIDE, SODIUM LACTATE, POTASSIUM CHLORIDE, CALCIUM CHLORIDE 600; 310; 30; 20 MG/100ML; MG/100ML; MG/100ML; MG/100ML
INJECTION, SOLUTION INTRAVENOUS CONTINUOUS PRN
Status: DISCONTINUED | OUTPATIENT
Start: 2020-08-06 | End: 2020-08-06

## 2020-08-06 RX ORDER — NALOXONE HYDROCHLORIDE 0.4 MG/ML
.1-.4 INJECTION, SOLUTION INTRAMUSCULAR; INTRAVENOUS; SUBCUTANEOUS
Status: CANCELLED | OUTPATIENT
Start: 2020-08-06 | End: 2020-08-07

## 2020-08-06 RX ORDER — PROPOFOL 10 MG/ML
INJECTION, EMULSION INTRAVENOUS CONTINUOUS PRN
Status: DISCONTINUED | OUTPATIENT
Start: 2020-08-06 | End: 2020-08-06

## 2020-08-06 RX ORDER — NALOXONE HYDROCHLORIDE 0.4 MG/ML
.1-.4 INJECTION, SOLUTION INTRAMUSCULAR; INTRAVENOUS; SUBCUTANEOUS
Status: DISCONTINUED | OUTPATIENT
Start: 2020-08-06 | End: 2020-08-06 | Stop reason: HOSPADM

## 2020-08-06 RX ORDER — ONDANSETRON 2 MG/ML
4 INJECTION INTRAMUSCULAR; INTRAVENOUS EVERY 30 MIN PRN
Status: DISCONTINUED | OUTPATIENT
Start: 2020-08-06 | End: 2020-08-06 | Stop reason: HOSPADM

## 2020-08-06 RX ORDER — ONDANSETRON 2 MG/ML
4 INJECTION INTRAMUSCULAR; INTRAVENOUS EVERY 6 HOURS PRN
Status: CANCELLED | OUTPATIENT
Start: 2020-08-06

## 2020-08-06 RX ORDER — SIMETHICONE
LIQUID (ML) MISCELLANEOUS PRN
Status: DISCONTINUED | OUTPATIENT
Start: 2020-08-06 | End: 2020-08-06 | Stop reason: HOSPADM

## 2020-08-06 RX ORDER — SODIUM CHLORIDE, SODIUM LACTATE, POTASSIUM CHLORIDE, CALCIUM CHLORIDE 600; 310; 30; 20 MG/100ML; MG/100ML; MG/100ML; MG/100ML
INJECTION, SOLUTION INTRAVENOUS CONTINUOUS
Status: DISCONTINUED | OUTPATIENT
Start: 2020-08-06 | End: 2020-08-06 | Stop reason: HOSPADM

## 2020-08-06 RX ORDER — PANTOPRAZOLE SODIUM 40 MG/1
40 TABLET, DELAYED RELEASE ORAL DAILY
Qty: 60 TABLET | Refills: 3 | Status: SHIPPED | OUTPATIENT
Start: 2020-08-06 | End: 2021-03-17

## 2020-08-06 RX ORDER — PROPOFOL 10 MG/ML
INJECTION, EMULSION INTRAVENOUS PRN
Status: DISCONTINUED | OUTPATIENT
Start: 2020-08-06 | End: 2020-08-06

## 2020-08-06 RX ORDER — FLUMAZENIL 0.1 MG/ML
0.2 INJECTION, SOLUTION INTRAVENOUS
Status: CANCELLED | OUTPATIENT
Start: 2020-08-06 | End: 2020-08-06

## 2020-08-06 RX ORDER — EPHEDRINE SULFATE 50 MG/ML
INJECTION, SOLUTION INTRAMUSCULAR; INTRAVENOUS; SUBCUTANEOUS PRN
Status: DISCONTINUED | OUTPATIENT
Start: 2020-08-06 | End: 2020-08-06

## 2020-08-06 RX ORDER — ONDANSETRON 4 MG/1
4 TABLET, ORALLY DISINTEGRATING ORAL EVERY 6 HOURS PRN
Status: CANCELLED | OUTPATIENT
Start: 2020-08-06

## 2020-08-06 RX ORDER — MEPERIDINE HYDROCHLORIDE 50 MG/ML
12.5 INJECTION INTRAMUSCULAR; INTRAVENOUS; SUBCUTANEOUS
Status: DISCONTINUED | OUTPATIENT
Start: 2020-08-06 | End: 2020-08-06 | Stop reason: HOSPADM

## 2020-08-06 RX ORDER — ONDANSETRON 2 MG/ML
4 INJECTION INTRAMUSCULAR; INTRAVENOUS
Status: DISCONTINUED | OUTPATIENT
Start: 2020-08-06 | End: 2020-08-06 | Stop reason: HOSPADM

## 2020-08-06 RX ADMIN — Medication 5 MG: at 08:13

## 2020-08-06 RX ADMIN — TOPICAL ANESTHETIC 1 EACH: 200 SPRAY DENTAL; PERIODONTAL at 07:54

## 2020-08-06 RX ADMIN — PROPOFOL 20 MG: 10 INJECTION, EMULSION INTRAVENOUS at 08:32

## 2020-08-06 RX ADMIN — SODIUM CHLORIDE, POTASSIUM CHLORIDE, SODIUM LACTATE AND CALCIUM CHLORIDE: 600; 310; 30; 20 INJECTION, SOLUTION INTRAVENOUS at 07:54

## 2020-08-06 RX ADMIN — LIDOCAINE HYDROCHLORIDE 60 MG: 20 INJECTION, SOLUTION INFILTRATION; PERINEURAL at 07:58

## 2020-08-06 RX ADMIN — PROPOFOL 20 MG: 10 INJECTION, EMULSION INTRAVENOUS at 08:35

## 2020-08-06 RX ADMIN — PROPOFOL 40 MG: 10 INJECTION, EMULSION INTRAVENOUS at 08:01

## 2020-08-06 RX ADMIN — PROPOFOL 20 MG: 10 INJECTION, EMULSION INTRAVENOUS at 08:43

## 2020-08-06 RX ADMIN — PROPOFOL 20 MG: 10 INJECTION, EMULSION INTRAVENOUS at 08:03

## 2020-08-06 RX ADMIN — PROPOFOL 150 MCG/KG/MIN: 10 INJECTION, EMULSION INTRAVENOUS at 07:58

## 2020-08-06 ASSESSMENT — MIFFLIN-ST. JEOR: SCORE: 1072.98

## 2020-08-06 NOTE — ANESTHESIA CARE TRANSFER NOTE
Patient: Demetra Richardson    Procedure(s):  ESOPHAGOGASTRODUODENOSCOPY, WITH BIOPSY  COLONOSCOPY    Diagnosis: Other iron deficiency anemia [D50.8]  Weight loss [R63.4]  Diagnosis Additional Information: No value filed.    Anesthesia Type:   MAC     Note:  Airway :Room Air  Destination: Endo.  Comments: Alert and exchanging well. VSS. Report given to RN.       Vitals: (Last set prior to Anesthesia Care Transfer)    CRNA VITALS  8/6/2020 0829 - 8/6/2020 0908      8/6/2020             Pulse:  61    Ht Rate:  61    SpO2:  100 %    Resp Rate (observed):  16                Electronically Signed By: VINOD Vivar CRNA  August 6, 2020  9:08 AM

## 2020-08-06 NOTE — LETTER
August 14, 2020      Demetra Richardson  [address] [place] [address]        Dear ,    The pathology results returned from the biopsies taken at your recent endoscopy.    The jejunum (small bowel) biopsies showed inflammation and ulceration as expected.  There was no evidence of infection or malignancy.    As we discussed, we will monitor you for now and see how things are going at our next visit in 3 months.  I have spoke to Dr Merritt about starting iron infusions.      Sincerely,               Zacarias Milian MD   [facility], [place], ENDOSCOPY  [address] [facility] [place] [address]  Phone: [phone]

## 2020-08-06 NOTE — ANESTHESIA POSTPROCEDURE EVALUATION
Anesthesia POST Procedure Evaluation    Patient: Demetra Richardson   MRN:     [id number] Gender:   female   Age:    64 year old :      1955        Preoperative Diagnosis: Other iron deficiency anemia [D50.8]  Weight loss [R63.4]   Procedure(s):  ESOPHAGOGASTRODUODENOSCOPY, WITH BIOPSY  COLONOSCOPY   Postop Comments: No value filed.     Anesthesia Type: MAC          Postop Pain Control: Uneventful            Sign Out: Well controlled pain   PONV: No   Neuro/Psych: Uneventful            Sign Out: Acceptable/Baseline neuro status   Airway/Respiratory: Uneventful            Sign Out: Acceptable/Baseline resp. status   CV/Hemodynamics: Uneventful            Sign Out: Acceptable CV status   Other NRE: NONE   DID A NON-ROUTINE EVENT OCCUR? No         Last Anesthesia Record Vitals:  CRNA VITALS  2020 0829 - 2020 0929      2020             Pulse:  61    Ht Rate:  61    SpO2:  100 %    Resp Rate (observed):  16          Last PACU Vitals:  Vitals Value Taken Time   BP     Temp     Pulse     Resp     SpO2     Temp src     NIBP 111/71 2020  8:55 AM   Pulse 61 2020  8:59 AM   SpO2 100 % 2020  8:59 AM   Resp     Temp     Ht Rate 61 2020  8:59 AM   Temp 2           Electronically Signed By: Zhao Au MD, 2020, 9:43 AM

## 2020-08-06 NOTE — OR NURSING
Procedure: EGD with biopsies, colonoscopy without intervention  Sedation: Monitored anesthesia care  Specimens: x1 jar to path  O2: Titrated per anesthesia    Patient tolerated procedure well. Patient stable on transfer to endo recovery by anesthesia staff.

## 2020-08-10 ENCOUNTER — TELEPHONE (OUTPATIENT)
Dept: GASTROENTEROLOGY | Facility: CLINIC | Age: 65
End: 2020-08-10

## 2020-08-10 NOTE — TELEPHONE ENCOUNTER
Patient would like a call from Dr. Milian to discuss EGD results. Has questions about medication (pantoprazole) prescribed and efficacy.  Feels that she may need an additional medication, I.e., prednisone.     Lucy GRACIA LPN  Hepatology Clinic      [facility]    Phone Message    May a detailed message be left on voicemail: yes     Reason for Call: Requesting Results   Name/type of test: Esophaogastroduodenoscopy- pt stated that she already received her results but has concerns about them as well as the plan moving forward. She would like a call back to discuss. Pt also requested an appt. I offered first available with Dr. Milian in Mid September but she declined stating that is too far out. Please discuss. Thank you!  Date of test: 8/6/20  Was test done at a location other than [facility] (Please fill in the location if not [facility])?: No      Action Taken: Message routed to:  [facility] ([facility]): Hepatology    Travel Screening: Not Applicable

## 2020-08-12 ENCOUNTER — TELEPHONE (OUTPATIENT)
Dept: GASTROENTEROLOGY | Facility: CLINIC | Age: 65
End: 2020-08-12

## 2020-08-12 NOTE — TELEPHONE ENCOUNTER
Called patient to review endoscopy findings.    EGD notable for mild esophagitis and ulcerated stenotic GJ anastomosis and ulcerated efferent jejunum.  Biopsies pending.    Patient reports occasional nausea and vomiting (1-2 times per week max).  No abdominal pain.      Prior hx EGD with stenting of GJ anastomosis in 2011 noted.  GJ anastomosis 2010 with eosinophils noted. Several jejunal bx 2011 noted.        Plan  1.  PPI PO Q24 for life  2.  Follow-up pending biopsies  3.  Start iron infusions for iron deficiency anemia  4.  Consider luminal GI consult if symptoms progress or iron deficiency anemia unable to be managed medically  5.  Follow-up with me in Nov 2020 for ?liver disease    Discussed with patient    Zacarias Milian MD  Hepatology  [facility]

## 2020-08-13 ENCOUNTER — VIRTUAL VISIT (OUTPATIENT)
Dept: PSYCHIATRY | Facility: CLINIC | Age: 65
End: 2020-08-13
Attending: PSYCHOLOGIST
Payer: COMMERCIAL

## 2020-08-13 DIAGNOSIS — F41.9 ANXIETY: Primary | ICD-10-CM

## 2020-08-13 LAB — COPATH REPORT: NORMAL

## 2020-08-16 DIAGNOSIS — M79.7 FIBROMYALGIA: ICD-10-CM

## 2020-08-17 RX ORDER — PREGABALIN 150 MG/1
150 CAPSULE ORAL 3 TIMES DAILY
Qty: 90 CAPSULE | Refills: 3 | Status: SHIPPED | OUTPATIENT
Start: 2020-08-17 | End: 2021-01-22

## 2020-08-17 NOTE — TELEPHONE ENCOUNTER
Patient Requested  pregabalin (LYRICA) 150 MG capsule   Last Filled  4/28/2020  Last Office Visit  04/20/20  Next Office Visit     Checked  08/17/2020    DX:     Pharmacy:     PATI BRANHAM CMA at 3:37 PM on 8/17/2020.

## 2020-08-18 ENCOUNTER — TELEPHONE (OUTPATIENT)
Dept: GASTROENTEROLOGY | Facility: CLINIC | Age: 65
End: 2020-08-18

## 2020-08-18 ENCOUNTER — PATIENT OUTREACH (OUTPATIENT)
Dept: INTERNAL MEDICINE | Facility: CLINIC | Age: 65
End: 2020-08-18

## 2020-08-18 NOTE — TELEPHONE ENCOUNTER
Called patient and let her know biopsies from EGD reviewed.   There is inflammation and ulceration but no infection or cancer.  Will see how she does on iron infusions.       Patient should receive a call to set up 3 month follow up visit with Dr. Milian.    Pt verbalized understanding and had no further questions or concerns.    Lucy GRACIA LPN  Hepatology Clinic

## 2020-08-18 NOTE — TELEPHONE ENCOUNTER
Pt already has iron infusion order on 6/29/20 and she had one time infusion on 7/2, still has 2 more infusion left. I am trying to schedule her iron infusion. Left a message to the pt to call me back.    Soon-Mi  -------------------------------------------------------------    ----- Message from Fredy Merritt MD sent at 8/13/2020  9:02 AM CDT -----  Regarding: RE: mutual patient, iron deficiency anemia  Thanks so much! Very helpful info.    Soon mi can you see if she has had IV iron in past if so we'd set up same to make sure tolerated.  I think she has had it.    Joseph I'm sure you know in her chart it says she has some sort of autoimmune liver disease so if you don't think that is the case that would be great for her.    Edgar  ----- Message -----  From: Zacarias Duran MD  Sent: 8/12/2020   5:46 PM CDT  To: Fredy Merritt MD  Subject: mutual patient, iron deficiency anemia           Andrés Hernández,    I scoped ms Richardson last week.  No evidence of malignancy to account for her iron-deficiency.    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  I think that this would explain the iron-deficiency anemia.    I am waiting for jejunum biopsies to come back.  I started her on a PPI (she also had some mild esophagitis).      I think that she would benefit from monthly iron infusions if you can set this up.  She doesn't have significant symptoms from the stenosis so we can observe this for now.      I will see her for ?liver disease in 3 months.  If her LFTs are normal then, I will discharge her back to you as I don't think that she has chronic liver disease.      She might benefit from being seen at the general/luminal GI clinic if we have difficulties addressing her iron deficiency or if she does develop symptoms from the GJ stenosis.    Let me know if you have any questions.  My cell is [phone]    Thanks,    Joseph

## 2020-08-21 ENCOUNTER — TELEPHONE (OUTPATIENT)
Dept: FAMILY MEDICINE | Facility: CLINIC | Age: 65
End: 2020-08-21

## 2020-08-21 NOTE — TELEPHONE ENCOUNTER
I spoke to Radha in regards to [facility] forms to provide home care services. I asked her if she has any special diet, she is following, and if she has any current treatment orders, other than meds. She provided me with the answer however would like me to hold off on faxing the form to [facility] because she is unaware of the request. She will call back to inform us if she would like the form to be faxed to [facility] or not.     Tia Quijano 8/21/20 11:59AM

## 2020-08-27 ENCOUNTER — VIRTUAL VISIT (OUTPATIENT)
Dept: PSYCHIATRY | Facility: CLINIC | Age: 65
End: 2020-08-27
Attending: PSYCHOLOGIST
Payer: COMMERCIAL

## 2020-08-27 DIAGNOSIS — F41.9 ANXIETY: Primary | ICD-10-CM

## 2020-08-27 NOTE — PROGRESS NOTES
Individual Psychotherapy Session (telephone session)     Telephone Visit Details  Demetra Richardson is a 64 year old pt. who is being evaluated via a telephone visit.     Type of service:  Telephone visit for psychotherapy  Time of service:    Start Time:  11:05        End Time:  12:00  Reason for Telephone Visit: Patient unable to travel due to Covid-19.  Originating Site (patient location): Patient's home  Distant Site (provider location): Remote location  Mode of Communication:  Video Conference via [email or web address]  Consent:  Patient has given verbal consent for telephone visit?: Yes     Care Provider: Leighann Lewis (Skalski), PhD, LP  Participants: Patient and writer  DSM-5 Diagnoses:   Opioid use disorder, on maintenance therapy  Benzodiazepine use disorder, in early remission  Anxiety (r/o DARRIUS)  MDD, in sustained remission    SUBJECTIVE: Improved mood, although still does not like new assisted living situation. She is focusing on positive aspects and being proactive about changing what she is able to. No urges to use substances.     TREATMENT: Radha experienced multiple stressors yesterday, which today have been resolved. We explored her coping strategies and what she might do differently in the future. Encouraged continued self-care activities, including continuing to go on regular walks which she finds helpful in improving mood.     MSE:  Alertness: alert and oriented  Speech: normal  Language: intact  Mood: anxious  Affect: full range; was congruent to mood; was congruent to content  Thought Process/Associations: unremarkable  Thought Content:  Reports none;  Denies suicidal ideation and violent ideation  Perception:  Reports none;  Denies auditory hallucinations and visual hallucinations  Insight: adequate for  safety  Judgment: adequate for safety  Cognition: (6) does  appear grossly intact; formal cognitive testing was not done    PLAN:   1) Therapy every 2 weeks with writer  2) Medication management with  addiction medicine fellow [facility]    Performed and documented by: Leighann Lewis (Skalski), PhD, LP

## 2020-08-27 NOTE — PROGRESS NOTES
Individual Psychotherapy Session (telephone session)     Telephone Visit Details  Demetra Richardson is a 64 year old pt. who is being evaluated via a telephone visit.     Type of service:  Telephone visit for psychotherapy  Time of service:    Start Time:  11:15        End Time:  12:00  Reason for Telephone Visit: Patient unable to travel due to Covid-19.  Originating Site (patient location): Patient's home  Distant Site (provider location): Remote location  Mode of Communication:  Video Conference via [email or web address]  Consent:  Patient has given verbal consent for telephone visit?: Yes     Care Provider: Leighann Lewis (Skalski), PhD, LP  Participants: Patient and writer  DSM-5 Diagnoses:   Opioid use disorder, on maintenance therapy  Benzodiazepine use disorder, in early remission  Anxiety (r/o DARRIUS)  MDD, in remission    SUBJECTIVE: Radha moved into assisted living facility and at this point does not like it, increased stress finding her studio not clean and food terrible. Lower mood and increase anxiety.     TREATMENT: I helped Radha problem-solve aspects of her new living situation that she is able to control and identify some positive aspects. Reviewed coping strategies.     MSE:  Alertness: alert and oriented  Speech: normal  Language: intact  Mood: anxious  Affect: full range; was congruent to mood; was congruent to content  Thought Process/Associations: unremarkable  Thought Content:  Reports none;  No suicidal ideation or violent ideation reported  Perception:  Reports none;  Denies auditory hallucinations and visual hallucinations  Insight: adequate for  safety  Judgment: adequate for safety  Cognition: (6) does  appear grossly intact; formal cognitive testing was not done    PLAN:   1) Therapy every weeks with writer  2) Medication management with addiction medicine fellow under [facility] supervision    Treatment plan due for review by: Patricia 3, 2020    Performed and documented by: Leighann Lewis (Skalski),  PhD, KANWAL

## 2020-08-31 DIAGNOSIS — M32.9 SYSTEMIC LUPUS ERYTHEMATOSUS, UNSPECIFIED SLE TYPE, UNSPECIFIED ORGAN INVOLVEMENT STATUS (H): ICD-10-CM

## 2020-08-31 NOTE — LETTER
Demetra Richardson  [address] [place] [address]    Dear Demetra Richardson,     Regular eye exams are required while taking hydroxychloroquine (Plaquenil). Eye exams should be completed by an eye specialist who is experienced in monitoring for hydroxychloroquine toxicity (a rare effect of the drug that can damage your eyes and vision).  These may be yearly or as determined by your eye specialist.     Although vision problems and loss of sight while taking hydroxychloroquine are very rare, notify your doctor immediately if you notice changes in your vision. The goal of screening is to detect toxicity before your vision is significantly or noticeably impacted. Failing to get proper screening exams puts you at risk of vision changes which may or may not be reversible.    Per the American Academy of Ophthalmology recommendations (2016), screening tests performed may include a 10-2 visual field test, spectral-domain optical coherence tomography (SD OCT), or other screening tests as determined by the eye specialist, including a multifocal electroretinogram (mfERG) or fundus auto-fluorescence (FAF).    We received a refill request from your pharmacy. A copy of your current eye exam was not found in your medical record. Your hydroxychloroquine prescription has been refilled with a limited supply pending confirmation you have had an eye exam to test for hydroxychloroquine toxicity.      We encourage you to bring this letter to your eye exam to discuss the exams that will be performed during your visit. Please request your eye clinic fax or mail a copy of your eye exam report to our clinic indicating that testing was completed for hydroxychloroquine toxicity screening. The exam notes must specifically comment on the potential for hydroxychloroquine toxicity and outline recommended follow-up.    If you have questions about hydroxychloroquine or the information in this letter, please call the clinic at  [phone] or talk to your provider at your next office visit.                        2    Eye Specialist Letter  Dear Eye Specialist,  To ensure safe use of Plaquenil (hydroxychloroquine), we are requesting your assistance in providing the following information. Please return this form to our clinic via mail or fax, or incorporate this information into your visit summary. Your note must indicate whether or not there is evidence of toxicity from Plaquenil use. For questions regarding this form, please call [phone].  Patient Name:   :             Date of Exam:    The following exams were completed during this visit in accordance with the American Academy of Ophthalmology recommendations (2016):  ? 10-2 automated visual field  ? Spectral-domain optical coherence tomography (SD OCT)  ? Multifocal electroretinogram (mfERG)  ? Fundus autofluorescence (FAF)  ? Other (please specify)  Please select from the following:  ? The findings from the above exams are not suggestive of toxicity from Plaquenil (hydroxychloroquine).  ? The findings from the above exams may suggest toxicity from Plaquenil (hydroxychloroquine).  Directly contact the clinic and fax this form.  Please provide additional guidance on whether or not the medication may be continued from your perspective:  Date of next recommended Plaquenil (hydroxychloroquine) screening eye exam:   ? 5 years  ? 1 year  ? 6 months  Other (please specify):   Eye Specialist Name (print):                                                                Date:  Eye Specialist Signature:

## 2020-09-02 NOTE — TELEPHONE ENCOUNTER
hydroxychloroquine (PLAQUENIL) 200 MG tablet       Last Written Prescription Date:  10-23-19  Last Fill Quantity: 60,   # refills: 5  Last Office Visit : 10-23-19 (RTC 6 M)  Future Office visit:  None  Last Eye Exam: 9-1-17   Eye Letter sent    Routing refill request to provider for review/approval because:  Last Eye exam > 1 Y  Last clinic appt > 6 M  Gap in refills    Scheduling has been notified to contact the pt for appointment.

## 2020-09-03 ENCOUNTER — TELEPHONE (OUTPATIENT)
Dept: GASTROENTEROLOGY | Facility: CLINIC | Age: 65
End: 2020-09-03

## 2020-09-03 ENCOUNTER — TELEPHONE (OUTPATIENT)
Dept: FAMILY MEDICINE | Facility: CLINIC | Age: 65
End: 2020-09-03

## 2020-09-03 RX ORDER — HYDROXYCHLOROQUINE SULFATE 200 MG/1
200 TABLET, FILM COATED ORAL 2 TIMES DAILY WITH MEALS
Qty: 60 TABLET | Refills: 2 | OUTPATIENT
Start: 2020-09-03

## 2020-09-03 NOTE — TELEPHONE ENCOUNTER
I already left a detailed message to the pt regarding iron infusion on 8/18/20. Please see my note.    I left another detailed message to the pt that she could call for scheduling for iron infusion at [phone], or call me back with questions.

## 2020-09-03 NOTE — TELEPHONE ENCOUNTER
Soon mi  She has had iron infusions in the past, can you see what orders were used and reorder, she tolerated them well

## 2020-09-03 NOTE — TELEPHONE ENCOUNTER
[facility] Call Center    Phone Message    May a detailed message be left on voicemail: yes     Reason for Call: Other: Pt calling in she would like a call back to discuss the infusions, the infusion center and her PCP atre not aware of any infusions ordered, pt informed she is not happy and would like a call back to discuss what she needs to do next. Please call back to discuss further, thank you      Action Taken: Message routed to:  [facility] hepatology     Travel Screening: Not Applicable

## 2020-09-03 NOTE — TELEPHONE ENCOUNTER
Left another detailed message to the pt regarding scheduling iron infusion. [facility] phone number given, so she can call for scheduling.

## 2020-09-03 NOTE — TELEPHONE ENCOUNTER
[facility] Call Center    Phone Message    May a detailed message be left on voicemail: yes     Reason for Call: Other: The patient said Dr.Lim Fritz Gilberto supposed to be in communication about the patient having to have infusion services she would like a call from from care team to check the status of this concerns thank you.     Action Taken: Message routed to:  [facility] pcc    Travel Screening: Not Applicable

## 2020-09-04 NOTE — TELEPHONE ENCOUNTER
Duplicate.  I already called the pt x 2 and left a detailed message regarding this matter. Please see my note on 8/18/20 encounter.

## 2020-09-10 ENCOUNTER — VIRTUAL VISIT (OUTPATIENT)
Dept: PSYCHIATRY | Facility: CLINIC | Age: 65
End: 2020-09-10
Attending: PSYCHOLOGIST
Payer: COMMERCIAL

## 2020-09-10 DIAGNOSIS — F41.9 ANXIETY: Primary | ICD-10-CM

## 2020-09-11 ENCOUNTER — VIRTUAL VISIT (OUTPATIENT)
Dept: RHEUMATOLOGY | Facility: CLINIC | Age: 65
End: 2020-09-11
Attending: INTERNAL MEDICINE
Payer: COMMERCIAL

## 2020-09-11 DIAGNOSIS — M53.3 PAIN OF LEFT SACROILIAC JOINT: Primary | ICD-10-CM

## 2020-09-11 DIAGNOSIS — M32.9 SYSTEMIC LUPUS ERYTHEMATOSUS, UNSPECIFIED SLE TYPE, UNSPECIFIED ORGAN INVOLVEMENT STATUS (H): ICD-10-CM

## 2020-09-11 RX ORDER — HYDROXYCHLOROQUINE SULFATE 200 MG/1
200 TABLET, FILM COATED ORAL DAILY
Qty: 30 TABLET | Refills: 3 | Status: SHIPPED | OUTPATIENT
Start: 2020-09-11 | End: 2021-03-15

## 2020-09-11 ASSESSMENT — PAIN SCALES - GENERAL: PAINLEVEL: SEVERE PAIN (7)

## 2020-09-11 NOTE — PROGRESS NOTES
"Demetra Richardson is a 64 year old female who is being evaluated via a billable telephone visit.      The patient has been notified of following:     \"This telephone visit will be conducted via a call between you and your physician/provider. We have found that certain health care needs can be provided without the need for a physical exam.  This service lets us provide the care you need with a short phone conversation.  If a prescription is necessary we can send it directly to your pharmacy.  If lab work is needed we can place an order for that and you can then stop by our lab to have the test done at a later time.    Telephone visits are billed at different rates depending on your insurance coverage. During this emergency period, for some insurers they may be billed the same as an in-person visit.  Please reach out to your insurance provider with any questions.    If during the course of the call the physician/provider feels a telephone visit is not appropriate, you will not be charged for this service.\"    Patient has given verbal consent for Telephone visit?  Yes    What phone number would you like to be contacted at? [phone]    How would you like to obtain your AVS? Mail a copy    Phone call duration: 48 minutes     Sam Narayanan MD        "
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
## 2020-09-11 NOTE — PATIENT INSTRUCTIONS
MRI pelvis (B/L SI joint MRI with and without IV contrast) and labs    Follow up long nodule with PCP    Re-try plaquenil 200 mg a day    See GI     Return in 2 months

## 2020-09-15 ENCOUNTER — TELEPHONE (OUTPATIENT)
Dept: RHEUMATOLOGY | Facility: CLINIC | Age: 65
End: 2020-09-15

## 2020-09-15 ENCOUNTER — INFUSION THERAPY VISIT (OUTPATIENT)
Dept: INFUSION THERAPY | Facility: CLINIC | Age: 65
End: 2020-09-15
Attending: FAMILY MEDICINE
Payer: COMMERCIAL

## 2020-09-15 VITALS
OXYGEN SATURATION: 97 % | TEMPERATURE: 97.6 F | DIASTOLIC BLOOD PRESSURE: 74 MMHG | RESPIRATION RATE: 16 BRPM | SYSTOLIC BLOOD PRESSURE: 129 MMHG | HEART RATE: 58 BPM

## 2020-09-15 DIAGNOSIS — D50.9 IRON DEFICIENCY ANEMIA: ICD-10-CM

## 2020-09-15 DIAGNOSIS — M53.3 PAIN OF LEFT SACROILIAC JOINT: ICD-10-CM

## 2020-09-15 DIAGNOSIS — M32.9 SYSTEMIC LUPUS ERYTHEMATOSUS, UNSPECIFIED SLE TYPE, UNSPECIFIED ORGAN INVOLVEMENT STATUS (H): Primary | ICD-10-CM

## 2020-09-15 LAB
ALBUMIN SERPL-MCNC: 2 G/DL (ref 3.4–5)
ALBUMIN UR-MCNC: NEGATIVE MG/DL
ALT SERPL W P-5'-P-CCNC: 25 U/L (ref 0–50)
APPEARANCE UR: CLEAR
AST SERPL W P-5'-P-CCNC: 21 U/L (ref 0–45)
BASOPHILS # BLD AUTO: 0 10E9/L (ref 0–0.2)
BASOPHILS NFR BLD AUTO: 0.6 %
BILIRUB UR QL STRIP: NEGATIVE
COLOR UR AUTO: YELLOW
CREAT SERPL-MCNC: 0.67 MG/DL (ref 0.52–1.04)
CREAT UR-MCNC: 66 MG/DL
CRP SERPL-MCNC: 13.4 MG/L (ref 0–8)
DIFFERENTIAL METHOD BLD: ABNORMAL
EOSINOPHIL # BLD AUTO: 0.9 10E9/L (ref 0–0.7)
EOSINOPHIL NFR BLD AUTO: 17.5 %
ERYTHROCYTE [DISTWIDTH] IN BLOOD BY AUTOMATED COUNT: 16 % (ref 10–15)
ERYTHROCYTE [SEDIMENTATION RATE] IN BLOOD BY WESTERGREN METHOD: 43 MM/H (ref 0–30)
GFR SERPL CREATININE-BSD FRML MDRD: >90 ML/MIN/{1.73_M2}
GLUCOSE UR STRIP-MCNC: NEGATIVE MG/DL
HCT VFR BLD AUTO: 37 % (ref 35–47)
HGB BLD-MCNC: 10.8 G/DL (ref 11.7–15.7)
HGB UR QL STRIP: NEGATIVE
IMM GRANULOCYTES # BLD: 0 10E9/L (ref 0–0.4)
IMM GRANULOCYTES NFR BLD: 0.2 %
KETONES UR STRIP-MCNC: NEGATIVE MG/DL
LEUKOCYTE ESTERASE UR QL STRIP: ABNORMAL
LYMPHOCYTES # BLD AUTO: 1.2 10E9/L (ref 0.8–5.3)
LYMPHOCYTES NFR BLD AUTO: 23.3 %
MCH RBC QN AUTO: 27.4 PG (ref 26.5–33)
MCHC RBC AUTO-ENTMCNC: 29.2 G/DL (ref 31.5–36.5)
MCV RBC AUTO: 94 FL (ref 78–100)
MONOCYTES # BLD AUTO: 0.5 10E9/L (ref 0–1.3)
MONOCYTES NFR BLD AUTO: 9 %
MUCOUS THREADS #/AREA URNS LPF: PRESENT /LPF
NEUTROPHILS # BLD AUTO: 2.6 10E9/L (ref 1.6–8.3)
NEUTROPHILS NFR BLD AUTO: 49.4 %
NITRATE UR QL: NEGATIVE
NRBC # BLD AUTO: 0 10*3/UL
NRBC BLD AUTO-RTO: 0 /100
PH UR STRIP: 6 PH (ref 5–7)
PLATELET # BLD AUTO: 231 10E9/L (ref 150–450)
PROT UR-MCNC: 0.19 G/L
PROT/CREAT 24H UR: 0.29 G/G CR (ref 0–0.2)
RBC # BLD AUTO: 3.94 10E12/L (ref 3.8–5.2)
RBC #/AREA URNS AUTO: 4 /HPF (ref 0–2)
SOURCE: ABNORMAL
SP GR UR STRIP: 1.02 (ref 1–1.03)
SQUAMOUS #/AREA URNS AUTO: <1 /HPF (ref 0–1)
UROBILINOGEN UR STRIP-MCNC: 0 MG/DL (ref 0–2)
WBC # BLD AUTO: 5.2 10E9/L (ref 4–11)
WBC #/AREA URNS AUTO: 6 /HPF (ref 0–5)

## 2020-09-15 PROCEDURE — 84460 ALANINE AMINO (ALT) (SGPT): CPT | Performed by: INTERNAL MEDICINE

## 2020-09-15 PROCEDURE — 25000128 H RX IP 250 OP 636: Mod: ZF | Performed by: FAMILY MEDICINE

## 2020-09-15 PROCEDURE — 82040 ASSAY OF SERUM ALBUMIN: CPT | Performed by: INTERNAL MEDICINE

## 2020-09-15 PROCEDURE — 86160 COMPLEMENT ANTIGEN: CPT | Performed by: INTERNAL MEDICINE

## 2020-09-15 PROCEDURE — 36415 COLL VENOUS BLD VENIPUNCTURE: CPT

## 2020-09-15 PROCEDURE — 82565 ASSAY OF CREATININE: CPT | Performed by: INTERNAL MEDICINE

## 2020-09-15 PROCEDURE — 85025 COMPLETE CBC W/AUTO DIFF WBC: CPT | Performed by: INTERNAL MEDICINE

## 2020-09-15 PROCEDURE — 85652 RBC SED RATE AUTOMATED: CPT | Performed by: INTERNAL MEDICINE

## 2020-09-15 PROCEDURE — 81001 URINALYSIS AUTO W/SCOPE: CPT | Performed by: INTERNAL MEDICINE

## 2020-09-15 PROCEDURE — 86225 DNA ANTIBODY NATIVE: CPT | Performed by: INTERNAL MEDICINE

## 2020-09-15 PROCEDURE — 25800030 ZZH RX IP 258 OP 636: Mod: ZF | Performed by: FAMILY MEDICINE

## 2020-09-15 PROCEDURE — 84450 TRANSFERASE (AST) (SGOT): CPT | Performed by: INTERNAL MEDICINE

## 2020-09-15 PROCEDURE — 96365 THER/PROPH/DIAG IV INF INIT: CPT

## 2020-09-15 PROCEDURE — 84156 ASSAY OF PROTEIN URINE: CPT | Performed by: INTERNAL MEDICINE

## 2020-09-15 PROCEDURE — 86140 C-REACTIVE PROTEIN: CPT | Performed by: INTERNAL MEDICINE

## 2020-09-15 RX ORDER — HEPARIN SODIUM,PORCINE 10 UNIT/ML
5 VIAL (ML) INTRAVENOUS
Status: CANCELLED | OUTPATIENT
Start: 2020-09-17

## 2020-09-15 RX ORDER — HEPARIN SODIUM (PORCINE) LOCK FLUSH IV SOLN 100 UNIT/ML 100 UNIT/ML
5 SOLUTION INTRAVENOUS
Status: CANCELLED | OUTPATIENT
Start: 2020-09-17

## 2020-09-15 RX ADMIN — FERRIC CARBOXYMALTOSE INJECTION 750 MG: 50 INJECTION, SOLUTION INTRAVENOUS at 11:23

## 2020-09-15 NOTE — LETTER
September 23, 2020      Demetra العلي Dylan  [address] [place] [address]        Dear ,    We are writing to inform you of your test results.    Normal lupus labs but increased inflammation markers (ESR, CRP). Have you had the pelvis MRI scheduled?    Resulted Orders   Protein  random urine with Creat Ratio   Result Value Ref Range    Protein Random Urine 0.19 g/L    Protein Total Urine g/gr Creatinine 0.29 (H) 0 - 0.2 g/g Cr   UA with Microscopic reflex to Culture   Result Value Ref Range    Color Urine Yellow     Appearance Urine Clear     Glucose Urine Negative NEG^Negative mg/dL    Bilirubin Urine Negative NEG^Negative    Ketones Urine Negative NEG^Negative mg/dL    Specific Gravity Urine 1.018 1.003 - 1.035    Blood Urine Negative NEG^Negative    pH Urine 6.0 5.0 - 7.0 pH    Protein Albumin Urine Negative NEG^Negative mg/dL    Urobilinogen mg/dL 0.0 0.0 - 2.0 mg/dL    Nitrite Urine Negative NEG^Negative    Leukocyte Esterase Urine Trace (A) NEG^Negative    Source Unspecified Urine     WBC Urine 6 (H) 0 - 5 /HPF    RBC Urine 4 (H) 0 - 2 /HPF    Squamous Epithelial /HPF Urine <1 0 - 1 /HPF    Mucous Urine Present (A) NEG^Negative /LPF   Erythrocyte sedimentation rate auto   Result Value Ref Range    Sed Rate 43 (H) 0 - 30 mm/h   DNA double stranded antibodies   Result Value Ref Range    DNA-ds 8 <10 IU/mL      Comment:      Negative   CRP inflammation   Result Value Ref Range    CRP Inflammation 13.4 (H) 0.0 - 8.0 mg/L   Complement C3   Result Value Ref Range    Complement C3 114 81 - 157 mg/dL   Complement C4   Result Value Ref Range    Complement C4 32 13 - 39 mg/dL   Creatinine   Result Value Ref Range    Creatinine 0.67 0.52 - 1.04 mg/dL    GFR Estimate >90 >60 mL/min/[1.73_m2]      Comment:      Non  GFR Calc  Starting 12/18/2018, serum creatinine based estimated GFR (eGFR) will be   calculated using the Chronic Kidney Disease Epidemiology Collaboration   (CKD-EPI)  equation.      GFR Estimate If Black >90 >60 mL/min/[1.73_m2]      Comment:       GFR Calc  Starting 12/18/2018, serum creatinine based estimated GFR (eGFR) will be   calculated using the Chronic Kidney Disease Epidemiology Collaboration   (CKD-EPI) equation.     CBC with platelets differential   Result Value Ref Range    WBC 5.2 4.0 - 11.0 10e9/L    RBC Count 3.94 3.8 - 5.2 10e12/L    Hemoglobin 10.8 (L) 11.7 - 15.7 g/dL    Hematocrit 37.0 35.0 - 47.0 %    MCV 94 78 - 100 fl    MCH 27.4 26.5 - 33.0 pg    MCHC 29.2 (L) 31.5 - 36.5 g/dL    RDW 16.0 (H) 10.0 - 15.0 %    Platelet Count 231 150 - 450 10e9/L    Diff Method Automated Method     % Neutrophils 49.4 %    % Lymphocytes 23.3 %    % Monocytes 9.0 %    % Eosinophils 17.5 %    % Basophils 0.6 %    % Immature Granulocytes 0.2 %    Nucleated RBCs 0 0 /100    Absolute Neutrophil 2.6 1.6 - 8.3 10e9/L    Absolute Lymphocytes 1.2 0.8 - 5.3 10e9/L    Absolute Monocytes 0.5 0.0 - 1.3 10e9/L    Absolute Eosinophils 0.9 (H) 0.0 - 0.7 10e9/L    Absolute Basophils 0.0 0.0 - 0.2 10e9/L    Abs Immature Granulocytes 0.0 0 - 0.4 10e9/L    Absolute Nucleated RBC 0.0    AST   Result Value Ref Range    AST 21 0 - 45 U/L   Albumin level   Result Value Ref Range    Albumin 2.0 (L) 3.4 - 5.0 g/dL   ALT   Result Value Ref Range    ALT 25 0 - 50 U/L   Creatinine urine calculation only   Result Value Ref Range    Creatinine Urine 66 mg/dL       If you have any questions or concerns, please call the clinic at the number listed above.       Sincerely,        Sam Narayanan MD

## 2020-09-15 NOTE — TELEPHONE ENCOUNTER
[facility] Call Center    Phone Message    May a detailed message be left on voicemail: yes     Reason for Call: Other: Pt called frustrated that her lab appt was not made for her for today. Nothing available before her infusion, would like team to call lab to see if they can get her in     Action Taken: Message routed to:  [facility] Rheum    Travel Screening: Not Applicable

## 2020-09-15 NOTE — LETTER
9/15/2020         RE: Demetra Richardson  [address] [place] [address]        Dear Colleague,    Thank you for referring your patient, Demetra Richardson, to the [facility]ED[facility]. Please see a copy of my visit note below.    Nursing Note  Demetra Richardson [facility] for:   Chief Complaint   Patient presents with     Infusion     Injectafer     During [facility]'s [facility] appointment, orders from Dr. Merritt were completed.  Frequency: today is dose 2 of 2 total    Progress note:  Patient identification verified by name and date of birth.  Assessment completed.  Vitals recorded in Doc Flowsheets.  Patient was provided with education regarding medication/procedure and possible side effects.  Patient verbalized understanding.     present during visit today: Not Applicable.    Treatment Conditions: Patient monitored for 30 minutes after medication was administered.    Premedications: were not ordered.    Drug Waste Record: No    Infusion length and rate:  infusion given over approximately 15 minutes  500 ml/hr.    Labs: were drawn per orders.     Vascular access: peripheral IV placed today.    Post Infusion Assessment:  Patient tolerated infusion without incident.     Discharge Plan:   Follow up plan of care with: ongoing infusions at [facility]. and primary care provider,  Discharge instructions were reviewed with patient.  Patient/representative verbalized understanding of discharge instructions and all questions answered.  Patient discharged from [facility] in stable condition.    Martha Barone RN    Administrations This Visit     ferric carboxymaltose (INJECTAFER) 750 mg in sodium chloride 0.9 % 100 mL intermittent infusion     Admin Date  09/15/2020 Action  New Bag Dose  750 mg Route  Intravenous  Administered By  Martha Barone, RN                /74   Pulse 58   Temp 97.6  F (36.4  C) (Oral)   Resp 16   LMP  (LMP Unknown)   SpO2 97%       Again, thank you for allowing me to participate in the care of your patient.        Sincerely,        [facility]

## 2020-09-15 NOTE — TELEPHONE ENCOUNTER
Pt is having infusion done today at [facility].  They will grab labs for Dr. Narayanan when they place IV.    Updated pt.    Deirdre Russell RN  Rheumatology Clinic

## 2020-09-15 NOTE — PROGRESS NOTES
Individual Psychotherapy Session (telephone session)     Telephone Visit Details  Demetra Richardson is a 64 year old pt. who is being evaluated via a telephone visit.     Type of service:  Telephone visit for psychotherapy  Time of service:    Start Time:  11:05        End Time:  11:50  Reason for Telephone Visit: Patient unable to travel due to Covid-19.  Originating Site (patient location): Patient's home  Distant Site (provider location): Remote location  Mode of Communication:  Video Conference via [email or web address]  Consent:  Patient has given verbal consent for telephone visit?: Yes     Care Provider: Leighann Lewis (Skalski), PhD, LP  Participants: Patient and writer  DSM-5 Diagnoses:   Opioid use disorder, on maintenance therapy  Benzodiazepine use disorder, in early remission  Anxiety (r/o DARRIUS)  MDD, in sustained remission    SUBJECTIVE: Increased anxiety and irritability, does not like her living situation; is being proactive about looking for other options. Using coping skills, including walking, diaphragmatic breathing, and listening to Pentecostal music. Very low energy, which she believes may be related to her anemia. Experiencing daily migraines. No urges to use substances.     TREATMENT: I provided supportive listening and emotional validation. I also helped Radha identify coping strategies to help with increased anxiety.     MSE:  Alertness: alert and oriented  Speech: normal  Language: intact  Mood: anxious  Affect: full range; was congruent to mood; was congruent to content  Thought Process/Associations: unremarkable  Thought Content:  Reports none  Perception:  Reports none;  Insight: adequate for  safety  Judgment: adequate for safety  Cognition: (6) does  appear grossly intact; formal cognitive testing was not done    PLAN:   1) RTC 1 week for therapy    Performed and documented by: Leighann Lewis (Skalski), PhD, LP

## 2020-09-15 NOTE — PROGRESS NOTES
Nursing Note  Demetra Richardson [facility] for:   Chief Complaint   Patient presents with     Infusion     Injectafer     During [facility]'s [facility] appointment, orders from Dr. Merritt were completed.  Frequency: today is dose 2 of 2 total    Progress note:  Patient identification verified by name and date of birth.  Assessment completed.  Vitals recorded in Doc Flowsheets.  Patient was provided with education regarding medication/procedure and possible side effects.  Patient verbalized understanding.     present during visit today: Not Applicable.    Treatment Conditions: Patient monitored for 30 minutes after medication was administered.    Premedications: were not ordered.    Drug Waste Record: No    Infusion length and rate:  infusion given over approximately 15 minutes  500 ml/hr.    Labs: were drawn per orders.     Vascular access: peripheral IV placed today.    Post Infusion Assessment:  Patient tolerated infusion without incident.     Discharge Plan:   Follow up plan of care with: ongoing infusions at [facility]. and primary care provider,  Discharge instructions were reviewed with patient.  Patient/representative verbalized understanding of discharge instructions and all questions answered.  Patient discharged from [facility] in stable condition.    Martha Barone RN    Administrations This Visit     ferric carboxymaltose (INJECTAFER) 750 mg in sodium chloride 0.9 % 100 mL intermittent infusion     Admin Date  09/15/2020 Action  New Bag Dose  750 mg Route  Intravenous Administered By  Martha Barone RN                /74   Pulse 58   Temp 97.6  F (36.4  C) (Oral)   Resp 16   LMP  (LMP Unknown)   SpO2 97%

## 2020-09-16 LAB
C3 SERPL-MCNC: 114 MG/DL (ref 81–157)
C4 SERPL-MCNC: 32 MG/DL (ref 13–39)
DSDNA AB SER-ACNC: 8 IU/ML

## 2020-09-17 ENCOUNTER — VIRTUAL VISIT (OUTPATIENT)
Dept: PSYCHIATRY | Facility: CLINIC | Age: 65
End: 2020-09-17
Attending: PSYCHOLOGIST
Payer: COMMERCIAL

## 2020-09-17 DIAGNOSIS — F41.9 ANXIETY: Primary | ICD-10-CM

## 2020-09-22 ENCOUNTER — TELEPHONE (OUTPATIENT)
Dept: FAMILY MEDICINE | Facility: CLINIC | Age: 65
End: 2020-09-22

## 2020-09-22 DIAGNOSIS — F41.9 ANXIETY DISORDER, UNSPECIFIED TYPE: ICD-10-CM

## 2020-09-22 NOTE — TELEPHONE ENCOUNTER
[facility] Call Center    Phone Message    May a detailed message be left on voicemail: yes     Reason for Call: Other:      Radha would like for Dr Merritt to call her back to discuss results from her 8/6/20 she had an upper endoscopy and a colonoscopy.     Pt feels she needs a 2nd opinion on what her treatement and next steps should be.        Action Taken: Message routed to:  [facility] PCC    Travel Screening: Not Applicable

## 2020-09-22 NOTE — TELEPHONE ENCOUNTER
I offered [facility] in person appointment this Friday. Patient is happy with this and will discuss her symptoms/follow up with Dr. Merritt at that time.   Latosha North, EMT at 1:56 PM on 9/22/2020.

## 2020-09-22 NOTE — TELEPHONE ENCOUNTER
[facility] Call Center    Phone Message    May a detailed message be left on voicemail: yes     Reason for Call: Symptoms or Concerns     If patient has red-flag symptoms, warm transfer to triage line    Current symptom or concern: Sinus Infection -  Headache, Sinus Pain, Tooth Pain.         Symptoms have been present for:  1 week(s)    Has patient previously been seen for this? No    By : NA    Date: NA    Are there any new or worsening symptoms? Yes: Worsening.       Pt has tried over the counter meds and nothing seems to be helping.   Pt is requesting a Z Pack.         Action Taken: Message routed to:  [facility]    Travel Screening: Not Applicable

## 2020-09-22 NOTE — TELEPHONE ENCOUNTER
I spoke with the patient on the phone. I offered [facility] in person appointment this Friday. Patient is happy with this and will discuss her symptoms/follow up with Dr. Merritt at that time.   Latosha North, EMT at 1:56 PM on 9/22/2020.

## 2020-09-24 NOTE — TELEPHONE ENCOUNTER
ESCITALOPRAM 20 MG TABLET   Last Written Prescription Date:  7/16/2020  Last Fill Quantity: 30,   # refills: 1  Last Office Visit : 7/16/2020  Future Office visit:  9/25/2020  Routing refill request to provider for review/approval because:  Only a 60 day supply sent to pharm last order?  Is it ok to refill for  90 days with refills for Pt care.           HYDROXYZINE BECKY 25 MG CAP   Last Written Prescription Date:  7/16/2020  Last Fill Quantity: 60,   # refills: 0  Last Office Visit : 7/16/2020  Future Office visit:  9/25/2020  Routing refill request to provider for review/approval because:  Is it okay to add refills to current order due to continuous refill request for Pt care.         Rossy Travis RN  Central Triage Red Flags/Med Refills

## 2020-09-24 NOTE — RESULT ENCOUNTER NOTE
Normal lupus labs but increased inflammation markers (ESR, CRP). Have you had the pelvis MRI scheduled?

## 2020-09-25 ENCOUNTER — OFFICE VISIT (OUTPATIENT)
Dept: FAMILY MEDICINE | Facility: CLINIC | Age: 65
End: 2020-09-25
Payer: COMMERCIAL

## 2020-09-25 VITALS
HEART RATE: 56 BPM | OXYGEN SATURATION: 97 % | SYSTOLIC BLOOD PRESSURE: 126 MMHG | DIASTOLIC BLOOD PRESSURE: 83 MMHG | BODY MASS INDEX: 20.73 KG/M2 | WEIGHT: 117 LBS

## 2020-09-25 DIAGNOSIS — Z78.0 POSTMENOPAUSAL STATUS: ICD-10-CM

## 2020-09-25 DIAGNOSIS — F41.9 ANXIETY DISORDER, UNSPECIFIED TYPE: ICD-10-CM

## 2020-09-25 DIAGNOSIS — K52.9 JEJUNAL INFLAMMATION: Primary | ICD-10-CM

## 2020-09-25 DIAGNOSIS — Z00.00 HEALTH CARE MAINTENANCE: ICD-10-CM

## 2020-09-25 DIAGNOSIS — R51.9 NONINTRACTABLE HEADACHE, UNSPECIFIED CHRONICITY PATTERN, UNSPECIFIED HEADACHE TYPE: ICD-10-CM

## 2020-09-25 RX ORDER — HYDROXYZINE PAMOATE 25 MG/1
CAPSULE ORAL
Qty: 60 CAPSULE | Refills: 0 | Status: SHIPPED | OUTPATIENT
Start: 2020-09-25 | End: 2020-10-15

## 2020-09-25 RX ORDER — BUTALBITAL, ACETAMINOPHEN AND CAFFEINE 50; 325; 40 MG/1; MG/1; MG/1
1 TABLET ORAL EVERY 6 HOURS PRN
Qty: 12 TABLET | Refills: 0 | Status: ON HOLD | OUTPATIENT
Start: 2020-09-25 | End: 2021-07-03

## 2020-09-25 RX ORDER — ESCITALOPRAM OXALATE 20 MG/1
20 TABLET ORAL DAILY
Qty: 30 TABLET | Refills: 1 | Status: SHIPPED | OUTPATIENT
Start: 2020-09-25 | End: 2020-12-05

## 2020-09-25 RX ORDER — ESCITALOPRAM OXALATE 20 MG/1
20 TABLET ORAL DAILY
Qty: 90 TABLET | Refills: 0 | Status: SHIPPED | OUTPATIENT
Start: 2020-09-25 | End: 2020-11-17

## 2020-09-25 RX ORDER — HYDROXYZINE PAMOATE 25 MG/1
25 CAPSULE ORAL 2 TIMES DAILY PRN
Qty: 60 CAPSULE | Refills: 0 | Status: SHIPPED | OUTPATIENT
Start: 2020-09-25 | End: 2020-10-16

## 2020-09-25 ASSESSMENT — PAIN SCALES - GENERAL: PAINLEVEL: MILD PAIN (3)

## 2020-09-25 NOTE — PATIENT INSTRUCTIONS
Primary Care Center Medication Refill Request Information:  * Please contact your pharmacy regarding ANY request for medication refills.  ** [facility] Prescription Fax = [phone]  * Please allow 3 business days for routine medication refills.  * Please allow 5 business days for controlled substance medication refills.     [facility] Center Test Result notification information:  *You will be notified with in 7-10 days of your appointment day regarding the results of your test.  If you are on MyChart you will be notified as soon as the provider has reviewed the results and signed off on them.    Primary Care Center: [phone]     Neurology [phone] (3rd Floor [facility])     Breast Center ([facility]) [phone] (2nd Floor [facility]) Complete  Breast Center ([facility]) [phone] ([address])     Radiology:  [phone] [facility], [facility] and [place]  [phone] [facility]  [phone] [facility]

## 2020-09-25 NOTE — PROGRESS NOTES
SUBJECTIVE:    Pt is a 64 year old female with pmh of     Patient Active Problem List   Diagnosis     Chronic pain     Nephrolithiasis     Lupus (H)     Anxiety disorder     Microcytic anemia     Migraines     Leukopenia     Hydronephrosis     Seizure (H)     Bipolar 1 disorder, mixed, moderate (H)     Iron deficiency anemia     Depression     Overdose     Behavior disturbance     Borderline personality disorder (H)     BCC (basal cell carcinoma), face     Benzodiazepine dependence (H)     Chronic pain disorder     Opioid dependence on agonist therapy (H)     Orthostatic hypotension     S/P partial gastrectomy     Drug-induced mood disorder (H)     Polysubstance overdose     Anemia, iron deficiency     Splenomegaly       who is here for evaluation of had concerns including Recheck Medication (pt here to follow up on colonoscopy and endoscopy).    Here for a few things  One, wt loss, wt stable last few mo.   Two, jejunal inflammation, she'd like to discuss w/ GI MD who does lumen, will refer  Rvwd egd/colon, need for ppi  Three, low iron, getting iv  Four, HA, chronic, only fidanettet works, rvwd I'm hesitant to long term use of, she's going to [place] and afraid will get bad, agreed to small supply, has tolerated, but then needs to see HA clinic, I told her I won't give long term  Five, hcm, due for mammo/dexa, rvwd, she decliens shots though, offered  Six, just saw rheum, rvwd  Seven, mental health, has new psychiatrist in a mo set up, rvwd meds, stable overall, will give refills till then    Past Medical History:   Diagnosis Date     Anemia      Basal cell carcinoma     Left-sided, skin over over medial orbit/nasal bone. Removed Oct 2018.     Benzodiazepine dependence (H)     With h/o withdrawal seizure x 1     Bipolar 1 disorder, mixed, moderate (H) 2/7/2013     Chronic pain     Back, legs.     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     Epidural abscess 2005    x4     Fibromyalgia      Generalised anxiety disorder       GERD (gastroesophageal reflux disease)      Major depressive disorder      Migraine      Nephrolithiasis     S/p left sided lithotripsy     Opiate dependence (H)      Osteoarthritis      Osteoporosis      PUD (peptic ulcer disease)      SLE (systemic lupus erythematosus) (H) 2009     Weight loss      Past Surgical History:   Procedure Laterality Date     BACK SURGERY  04    L4-5 epidural abscess     BACK SURGERY  04    L3-4 spinal stenosis     BACK SURGERY  04    Lt psoas abscess     BRONCHOSCOPY FLEXIBLE N/A 2019    Procedure: Flexible Bronchoscopy;  Surgeon: Patrice Regalado MD;  Location: [facility] OR      SECTION      x 2     CHOLECYSTECTOMY  -     CLOSED REDUCTION, PERCUTANEOUS PINNING FINGER, COMBINED  8/10/2011    Procedure:COMBINED CLOSED REDUCTION, PERCUTANEOUS PINNING FINGER; 5th Proximal Phalanx; Surgeon:RADHA BUITRAGO; Location[facility]     COLONOSCOPY       COLONOSCOPY N/A 2020    Procedure: COLONOSCOPY;  Surgeon: Zacarias Duran MD;  Location: [facility] GI     ESOPHAGOSCOPY, GASTROSCOPY, DUODENOSCOPY (EGD), COMBINED N/A 2020    Procedure: ESOPHAGOGASTRODUODENOSCOPY, WITH BIOPSY;  Surgeon: Zacarias Duran MD;  Location: [facility]     GASTRECTOMY  2005    Bilat truncal vagotomy, hemigastrectomy, RnY gastrojejunostomy     GASTROJEJUNOSTOMY  2011    Procedure:GASTROJEJUNOSTOMY; exploratory laparotmy with revision of gastrojejunostomy, Antrectomy, Miles-en-y, Gastrojejunostomy; Surgeon:JULIUS HELM; Location:[facility] OR     INSERT CHEST TUBE N/A 2019    Procedure: INSERTION, CATHETER, INTERCOSTAL, FOR DRAINAGE;  Surgeon: Melissa Nichols MD;  Location: [facility]     LASER HOLMIUM LITHOTRIPSY URETER(S), INSERT STENT, COMBINED      Procedure: COMBINED CYSTOSCOPY, URETEROSCOPY, LASER HOLMIUM LITHOTRIPSY URETER(S), INSERT STENT;;  Surgeon: Blair Correa MD;  Location: [facility] OR     LASER HOLMIUM NEPHROLITHOTOMY VIA PERCUTANEOUS NEPHROSTOMY  2012     Procedure: LASER HOLMIUM NEPHROLITHOTOMY VIA PERCUTANEOUS NEPHROSTOMY;  proceedure should read: Left Percutaneous Access, Left Percutaneous ultrasonic Nephrolithotomy, Ureteroscopy Holmium Laser Lithotripsy Stent Placement ;  Surgeon: Blair Correa MD;  Location: [facility] OR     MAMMOPLASTY AUGMENTATION BILATERAL       OPEN REDUCTION INTERNAL FIXATION WRIST Left 1/11/2016    Procedure: OPEN REDUCTION INTERNAL FIXATION WRIST;  Surgeon: Darling Ellis MD;  Location: [facility]     RECONSTRUCT BREAST, IMPLANT PROSTHESIS, COMBINED       THORACOSCOPIC DECORTICATION LUNG Left 5/7/2019    Procedure: Left Video Assisted Thoracoscopic Decortication, Intercostal Nerve Cryo Analgesia, Flexible Bronchoscopy;  Surgeon: Patrice Regalado MD;  Location: [facility] OR     Allergies   Allergen Reactions     Penicillins Hives     Hives in childhood.             Current Outpatient Medications   Medication Sig Dispense Refill     aspirin-acetaminophen-caffeine (EXCEDRIN MIGRAINE) 250-250-65 MG tablet Take 1 tablet by mouth 2 times daily as needed for headaches       butalbital-acetaminophen-caffeine (ESGIC) -40 MG tablet Take 1 tablet by mouth every 6 hours as needed for headaches Use sparingly. 12 tablet 0     escitalopram (LEXAPRO) 20 MG tablet Take 1 tablet (20 mg) by mouth daily 30 tablet 1     ferrous sulfate (FEROSUL) 325 (65 Fe) MG tablet Take 325 mg by mouth daily (with breakfast)       hydroxychloroquine (PLAQUENIL) 200 MG tablet Take 1 tablet (200 mg) by mouth daily 30 tablet 3     ibuprofen (ADVIL/MOTRIN) 200 MG tablet Take 400-800 mg by mouth every 8 hours as needed for mild pain       methadone (DOLOPHINE-INTENSOL) 10 MG/ML (HIGH CONC) solution Take 11.3 mLs (113 mg) by mouth daily Rufina Marcial [phone]    Dose as of 4/29/19 (Patient taking differently: Take 97 mg by mouth daily [facility] [phone]    Dose as of 4/29/19 ) 300 mL 0     pantoprazole (PROTONIX) 40 MG EC tablet Take 1  tablet (40 mg) by mouth daily 60 tablet 3     pregabalin (LYRICA) 150 MG capsule TAKE 1 CAPSULE (150 MG) BY MOUTH 3 TIMES DAILY 90 capsule 3     valACYclovir (VALTREX) 500 MG tablet Take 1 tablet (500 mg) by mouth 2 times daily (Patient taking differently: Take 500 mg by mouth 2 times daily as needed (herpes breakout) ) 6 tablet 1     folic acid (FOLVITE) 1 MG tablet Take 1 tablet (1 mg) by mouth daily (Patient not taking: Reported on 8/3/2020) 30 tablet 11     hydrOXYzine (VISTARIL) 25 MG capsule TAKE 1 CAPSULE BY MOUTH TWICE A DAY AS NEEDED FOR ANXIETY (Patient not taking: Reported on 9/11/2020) 60 capsule 0     naloxone (NARCAN) 4 MG/0.1ML nasal spray Spray 1 spray (4 mg) into one nostril alternating nostrils once as needed for opioid reversal repeat every 2-3 minutes until responsive (Patient not taking: Reported on 8/3/2020) 1 each 1       Social History     Tobacco Use     Smoking status: Never Smoker     Smokeless tobacco: Never Used   Substance Use Topics     Alcohol use: No     Comment: none in 10 years     Drug use: No         OBJECTIVE:  /83 (BP Location: Right arm, Patient Position: Sitting, Cuff Size: Adult Regular)   Pulse 56   Wt 53.1 kg (117 lb)   LMP  (LMP Unknown)   SpO2 97%   BMI 20.73 kg/m    GENERAL APPEARANCE: Alert, no acute distress  NECK: No adenopathy,masses or thyromegaly  RESP: lungs clear to auscultation     CV: normal rate, regular rhythm,1/6 sys murmur or gallop  ABDOMEN: soft, no organomegaly, masses or tenderness  MS: extremities normal, no peripheral edema  NEURO: Alert, oriented, speech and mentation normal  PSYCHE: mentation appears normal, affect and mood normal    ASSESSMENT/PLAN:    Murmur: noted echo last year, no aortic stenosis/insuff sx on ROS, monitor  See new psych as planned, rf in meantime  Jejunal inflammation: cont ppi, see lumen gi  HA: small amount fiorcet and she knows not long term fills, see HA clinic  Low iron: see me back post consults, recheck  then  Dexa f/u osteoprosis      SOPHIA COPE MD

## 2020-09-30 NOTE — PROGRESS NOTES
"  Individual Psychotherapy Session (telephone session)     Telephone Visit Details  Demetra Richardson is a 64 year old pt. who is being evaluated via a telephone visit.     Type of service:  Telephone visit for psychotherapy  Time of service:    Start Time:  10:05        End Time:  11:05  Reason for Telephone Visit: Patient unable to travel due to Covid-19.  Originating Site (patient location): Patient's home  Distant Site (provider location): Remote location  Mode of Communication:  Video Conference via [email or web address]  Consent:  Patient has given verbal consent for telephone visit?: Yes     Care Provider: Leighann Lewis (Skalski), PhD, LP  Participants: Patient and writer  DSM-5 Diagnoses:   Opioid use disorder, on maintenance therapy  Benzodiazepine use disorder, in early remission  Anxiety (r/o DARRIUS)  MDD, in sustained remission    SUBJECTIVE: Radha described lower mood; conflict with her family over finances. Does not like her living situation; is being proactive about looking for other options. Using coping skills, including walking, diaphragmatic breathing, and listening to Rastafari music. Very low energy, which she believes may be related to her anemia. Experiencing daily migraines. No urges to use substances    TREATMENT: I provided supportive listening and emotional validation. I also provided information about effective communication, including feedback on letter she write her siblings and how it might be perceive (eg, use of \"I\" statements).      MSE:  Alertness: alert and oriented  Speech: normal  Language: intact  Mood: anxious  Affect: full range; was congruent to mood; was congruent to content  Thought Process/Associations: unremarkable  Thought Content:  Reports none  Perception:  Reports none;  Insight: adequate for  safety  Judgment: adequate for safety  Cognition: (6) does  appear grossly intact; formal cognitive testing was not done    PLAN:   1) RTC 1 week for therapy    Performed and documented by: " Leighann Lewis (Skalski), PhD, LP

## 2020-10-06 ENCOUNTER — TELEPHONE (OUTPATIENT)
Dept: RHEUMATOLOGY | Facility: CLINIC | Age: 65
End: 2020-10-06

## 2020-10-06 NOTE — TELEPHONE ENCOUNTER
[facility] Call Center    Phone Message    May a detailed message be left on voicemail: yes     Reason for Call: Other: the pt states that she spoke with Dr Narayanan who said the clinic would start having in person visits. The pt wants to come in, and she can't wait much longer for a visit. Please call the pt to discuss. Thanks.    Action Taken: Message routed to:  [facility]    Travel Screening: Not Applicable

## 2020-10-07 NOTE — TELEPHONE ENCOUNTER
Discussed with Dr. Narayanan, she will see pt in clinic on 10/29 at 2:30.      Called and spoke with pt, she will accept that date for inperson appt.    Deirdre Russell RN  Rheumatology Clinic

## 2020-10-08 ENCOUNTER — VIRTUAL VISIT (OUTPATIENT)
Dept: PSYCHIATRY | Facility: CLINIC | Age: 65
End: 2020-10-08
Attending: PSYCHOLOGIST
Payer: COMMERCIAL

## 2020-10-08 DIAGNOSIS — F13.20 BENZODIAZEPINE DEPENDENCE (H): Primary | ICD-10-CM

## 2020-10-08 PROCEDURE — 90837 PSYTX W PT 60 MINUTES: CPT | Mod: 95 | Performed by: PSYCHOLOGIST

## 2020-10-12 DIAGNOSIS — F41.9 ANXIETY DISORDER, UNSPECIFIED TYPE: ICD-10-CM

## 2020-10-15 ENCOUNTER — VIRTUAL VISIT (OUTPATIENT)
Dept: PSYCHIATRY | Facility: CLINIC | Age: 65
End: 2020-10-15
Attending: PSYCHOLOGIST
Payer: COMMERCIAL

## 2020-10-15 DIAGNOSIS — F13.20 BENZODIAZEPINE DEPENDENCE (H): Primary | ICD-10-CM

## 2020-10-15 PROCEDURE — 90837 PSYTX W PT 60 MINUTES: CPT | Mod: TEL | Performed by: PSYCHOLOGIST

## 2020-10-15 NOTE — TELEPHONE ENCOUNTER
hydrOXYzine (VISTARIL) 25 MG capsule   Last Written Prescription Date:  9/25/20  Last Fill Quantity: 60,   # refills: 0  Last Office Visit : 9/25/20  Future Office visit:  none    Routing refill request to provider for review/approval because: [facility] protocol   [facility]

## 2020-10-15 NOTE — PROGRESS NOTES
Individual Psychotherapy Session (telephone session)     Telephone Visit Details  Demetra Richardson is a 64 year old pt. who is being evaluated via a telephone visit.     Type of service:  Telephone visit for psychotherapy  Time of service:    Start Time:  10:05        End Time:  11:00  Reason for Telephone Visit: Patient unable to travel due to Covid-19.  Originating Site (patient location): Patient's home  Distant Site (provider location): Remote location  Mode of Communication:  Video Conference via Doxy[employer]  Consent:  Patient has given verbal consent for telephone visit?: Yes     Care Provider: Leighann Lewis (Skalski), PhD, LP  Participants: Patient and writer  DSM-5 Diagnoses:   Opioid use disorder, on maintenance therapy  Benzodiazepine use disorder, in early remission  Anxiety (r/o DARRIUS)  MDD, in sustained remission    SUBJECTIVE: Radha denied any urges/ thoughts of use and has not encountered any high-risk situations. Anxiety was improved over the past week until this morning with increased stress related to planning travel to [place] to see her daughter for [place], as her son does not want her to be there for the entire time.    TREATMENT: I provided supportive listening, emotional validation, and problem-solving strategies.      MSE:  Alertness: alert and oriented  Speech: normal  Language: intact  Mood: anxious  Affect: full range; was congruent to mood; was congruent to content  Thought Process/Associations: unremarkable  Thought Content:  Reports none  Perception:  Reports none;  Insight: adequate for  safety  Judgment: adequate for safety  Cognition: (6) does  appear grossly intact; formal cognitive testing was not done    PLAN:   1) RTC 1 week for therapy    Performed and documented by: Leighann Lewis (Skalski), PhD, LP

## 2020-10-22 NOTE — PROGRESS NOTES
Individual Psychotherapy Session (telephone session)     Telephone Visit Details  Demetra Richardson is a 64 year old pt. who is being evaluated via a telephone visit.     Type of service:  Telephone visit for psychotherapy  Time of service:    Start Time:  10:05        End Time:  11:00  Reason for Telephone Visit: Patient unable to travel due to Covid-19.  Originating Site (patient location): Patient's home  Distant Site (provider location): Remote location  Mode of Communication:  Video Conference via [email or web address]  Consent:  Patient has given verbal consent for telephone visit?: Yes     Care Provider: Leighann Lewis (Skalski), PhD, LP  Participants: Patient and writer  DSM-5 Diagnoses:   Opioid use disorder, on maintenance therapy  Benzodiazepine use disorder, in early remission  Anxiety (r/o DARRIUS)  MDD, in sustained remission    SUBJECTIVE: Radha reported increased health issues (flare-up of her herpes) and higher stress that led to urges for benzo use, which has not happened in months. She reached out to dealer, but it fell through, and now Radha is relieved and feels very motivated to remain abstinent.     Does not like her living situation; is being proactive about looking for other options. Using coping skills, including walking, diaphragmatic breathing, and listening to Yazdanism music. Very low energy, which she believes may be related to her anemia. Experiencing frequent migraines.    TREATMENT: I helped Radha conduct functional analysis to identify factors that led to urges to use (primarily stress), and strategies to avoid use if her dealer initiates contact with her. Radha is highly motivated to remain abstinent and confident in her ability to do so.     MSE:  Alertness: alert and oriented  Speech: normal  Language: intact  Mood: anxious  Affect: full range; was congruent to mood; was congruent to content  Thought Process/Associations: unremarkable  Thought Content:  Reports none  Perception:  Reports  none;  Insight: adequate for  safety  Judgment: adequate for safety  Cognition: (6) does  appear grossly intact; formal cognitive testing was not done    PLAN:   1) RTC 1 week for therapy    Performed and documented by: Leighann Lewis (Skalski), PhD, LP

## 2020-10-23 ENCOUNTER — TELEPHONE (OUTPATIENT)
Dept: FAMILY MEDICINE | Facility: CLINIC | Age: 65
End: 2020-10-23

## 2020-10-23 RX ORDER — HYDROXYZINE PAMOATE 25 MG/1
CAPSULE ORAL
Qty: 60 CAPSULE | Refills: 0 | Status: SHIPPED | OUTPATIENT
Start: 2020-10-23 | End: 2020-12-11

## 2020-10-23 NOTE — TELEPHONE ENCOUNTER
Orders extended 2 months.  Message left for patient.    PATI BRANHAM CMA at 10:57 AM on 10/23/2020.

## 2020-10-23 NOTE — TELEPHONE ENCOUNTER
[facility] Call [facility]    Phone Message    May a detailed message be left on voicemail: yes     Reason for Call: Order(s): Other: Dexa and mammogram- Please call pt when the orders have been placed in the chart.  Reason for requested: The orders  on 10/25/2020 for both orders  Date needed: As soon as he can get the orders in  Provider name: Dr Merritt      Action Taken: Message routed to:  [facility]    Travel Screening: Not Applicable

## 2020-11-02 ENCOUNTER — TELEPHONE (OUTPATIENT)
Dept: FAMILY MEDICINE | Facility: CLINIC | Age: 65
End: 2020-11-02

## 2020-11-02 DIAGNOSIS — B00.9 HERPES SIMPLEX VIRUS INFECTION: ICD-10-CM

## 2020-11-02 NOTE — TELEPHONE ENCOUNTER
acyclovir      NOT ON MEDICATION LIST    Last Office Visit : 9-  Future Office visit:  none    Routing refill request to provider for review/approval because:  Drug not active on patient's medication list.  Patient is having outbreaks every month. Please call patient if there's any questions      Kathleen M Doege RN

## 2020-11-02 NOTE — TELEPHONE ENCOUNTER
[facility] Call Center    Phone Message    May a detailed message be left on voicemail: yes     Reason for Call: Medication Refill Request    Has the patient contacted the pharmacy for the refill? Yes   Name of medication being requested: Aciclovir  Provider who prescribed the medication: Dr. Merritt   Pharmacy:    [facility] [id number] [place] [address] [address] AT [facility]      Date medication is needed: ASAP - Patient is having outbreaks every month. Please call patient if there's any questions.          Action Taken: Message routed to:  [facility]    Travel Screening: Not Applicable

## 2020-11-03 RX ORDER — VALACYCLOVIR HYDROCHLORIDE 500 MG/1
500 TABLET, FILM COATED ORAL 2 TIMES DAILY
Qty: 6 TABLET | Refills: 1 | Status: SHIPPED | OUTPATIENT
Start: 2020-11-03 | End: 2020-12-21

## 2020-11-05 ENCOUNTER — VIRTUAL VISIT (OUTPATIENT)
Dept: PSYCHIATRY | Facility: CLINIC | Age: 65
End: 2020-11-05
Attending: PSYCHOLOGIST
Payer: COMMERCIAL

## 2020-11-05 DIAGNOSIS — F41.9 ANXIETY: Primary | ICD-10-CM

## 2020-11-05 PROCEDURE — 90832 PSYTX W PT 30 MINUTES: CPT | Mod: 95 | Performed by: PSYCHOLOGIST

## 2020-11-05 NOTE — PROGRESS NOTES
"  Individual Psychotherapy Session (telephone session)     Telephone Visit Details  Demetra Richardson is a 64 year old pt. who is being evaluated via a telephone visit.     Type of service:  Telephone visit for psychotherapy  Time of service:    Start Time:  11:03        End Time:  11:20  Reason for Telephone Visit: Patient unable to travel due to Covid-19.  Originating Site (patient location): Patient's home  Distant Site (provider location): Remote location  Mode of Communication:  Telephone call   Consent:  Patient has given verbal consent for telephone visit?: Yes     Care Provider: Leighann Lewis (Skalski), PhD, LP  Participants: Patient and writer  DSM-5 Diagnoses:   Opioid use disorder, on maintenance therapy  Benzodiazepine use disorder, in early remission  Anxiety (r/o DARRIUS)  MDD, in sustained remission    SUBJECTIVE: No urges/ thoughts of substance use. Denied depressed mood or anxiety. Feels particularly exhausted today, which she believes is due to new outbreak of herpes. Reflected that now that she is experiencing less anxiety, is feeling more boredom.    TREATMENT: We began to identify activities to help cope with boredom (eg, go to [employer], watch a show); Radha recognizes she feels better when has a routine. Radha requested a shorter session today given that she feels particularly exhausted. No endorsement of SI.      MSE:  Alertness: alert and oriented  Speech: normal  Language: intact  Mood: \"good\"  Affect: full range; was congruent to mood; was congruent to content  Thought Process/Associations: unremarkable  Thought Content:  Reports none  Perception:  Reports none;  Insight: adequate for  safety  Judgment: adequate for safety  Cognition: (6) does  appear grossly intact; formal cognitive testing was not done    PLAN:   1) RTC 1 week for therapy    Performed and documented by: Leighann Lewis (Skalski), PhD, LP      "

## 2020-11-06 RX ORDER — ESCITALOPRAM OXALATE 10 MG/1
TABLET ORAL
Qty: 30 TABLET | Refills: 10 | OUTPATIENT
Start: 2020-11-06

## 2020-11-12 ENCOUNTER — TELEPHONE (OUTPATIENT)
Dept: PSYCHIATRY | Facility: CLINIC | Age: 65
End: 2020-11-12

## 2020-11-12 NOTE — TELEPHONE ENCOUNTER
I called Radha avz for our appointment, but she did not answer. I encouraged her to call the clinic next week if she would like to reschedule before our appointment next week.

## 2020-11-17 ENCOUNTER — VIRTUAL VISIT (OUTPATIENT)
Dept: GASTROENTEROLOGY | Facility: CLINIC | Age: 65
End: 2020-11-17
Attending: INTERNAL MEDICINE
Payer: COMMERCIAL

## 2020-11-17 DIAGNOSIS — K52.9 JEJUNITIS: ICD-10-CM

## 2020-11-17 DIAGNOSIS — D50.8 OTHER IRON DEFICIENCY ANEMIA: Primary | ICD-10-CM

## 2020-11-17 DIAGNOSIS — Z98.0 H/O BILLROTH II OPERATION: ICD-10-CM

## 2020-11-17 DIAGNOSIS — K91.89 ANASTOMOTIC STRICTURE OF GASTROJEJUNOSTOMY: ICD-10-CM

## 2020-11-17 PROBLEM — R16.1 SPLENOMEGALY: Status: RESOLVED | Noted: 2020-06-29 | Resolved: 2020-11-17

## 2020-11-17 PROCEDURE — 99214 OFFICE O/P EST MOD 30 MIN: CPT | Mod: 25 | Performed by: INTERNAL MEDICINE

## 2020-11-17 PROCEDURE — 99358 PROLONG SERVICE W/O CONTACT: CPT | Mod: 25 | Performed by: INTERNAL MEDICINE

## 2020-11-17 ASSESSMENT — PAIN SCALES - GENERAL: PAINLEVEL: MODERATE PAIN (5)

## 2020-11-17 NOTE — PROGRESS NOTES
"Demetra Richardson is a 65 year old female who is being evaluated via a billable telephone visit.      The patient has been notified of following:     \"This telephone visit will be conducted via a call between you and your physician/provider. We have found that certain health care needs can be provided without the need for a physical exam.  This service lets us provide the care you need with a short phone conversation.  If a prescription is necessary we can send it directly to your pharmacy.  If lab work is needed we can place an order for that and you can then stop by our lab to have the test done at a later time.    Telephone visits are billed at different rates depending on your insurance coverage. During this emergency period, for some insurers they may be billed the same as an in-person visit.  Please reach out to your insurance provider with any questions.    If during the course of the call the physician/provider feels a telephone visit is not appropriate, you will not be charged for this service.\"    Patient has given verbal consent for Telephone visit?  Yes    What phone number would you like to be contacted at? [phone]    How would you like to obtain your AVS? Mail a copy    Phone call duration: 14 minutes  _________________________________________________________________________________    [facility]    Hepatology follow-up    Follow-up visit for ?PSC, iron deficiency anemia    Subjective:  65 year old female    Last visit 05/2020.  Review of patient's imaging with radiology showed no evidence of PSC or cirrhosis.  The patient underwent EGD and colonoscopy in 08/2020.  EGD showed mild esophagitis, evidence of patent Billroth II gastrojejunostomy with moderate stenosis at the gastrojejunal anastomosis with ulceration in the efferent limb.  There was also diffuse severe inflammation with ulceration in the jejunum.  Colonoscopy had a poor prep with a moderate amount of " semi-liquid stool.  No obvious colonic masses were visualized.  The patient was then started on regular iron infusions with her hemoglobin increasing from 7.8 in June to 10.8 in Sep.      The patient is well today.  Her weight is now stable.  She continues to complain of left upper quadrant pain.  This pain is constant and worse with bending over.  The pain is associated with nausea, which occurs 3-4 times per week, resulting in vomiting of meals.  No melena, hematemesis or hematochezia.      The patient has ongoing issues with constipation.  She takes 3-4 docusate per day.  She has not yet tried MiraLax on account of cost.      The patient denies jaundice, itch, lower extremity edema, abdominal distention, lethargy or confusion.      No history of fevers, sweats or chills.  No cough or dyspnea.      The patient does not drink alcohol.  She now lives in assisted living as planned.  She practices social distancing and wears a mask when out in public.  There are no cases of COVID-19 at her assisted living facility.       Medical hx Surgical hx   Past Medical History:   Diagnosis Date     Anemia      Basal cell carcinoma     Left-sided, skin over over medial orbit/nasal bone. Removed Oct 2018.     Benzodiazepine dependence (H)     With h/o withdrawal seizure x 1     Bipolar 1 disorder, mixed, moderate (H) 2/7/2013     Chronic pain     Back, legs.     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     Epidural abscess 2005    x4     Fibromyalgia      Generalised anxiety disorder      GERD (gastroesophageal reflux disease)      Major depressive disorder      Migraine      Nephrolithiasis     S/p left sided lithotripsy     Opiate dependence (H)      Osteoarthritis      Osteoporosis      PUD (peptic ulcer disease)      SLE (systemic lupus erythematosus) (H) 2009     Weight loss       Past Surgical History:   Procedure Laterality Date     BACK SURGERY  7-16-04    L4-5 epidural abscess     BACK SURGERY  8-4-04    L3-4 spinal  stenosis     BACK SURGERY  04    Lt psoas abscess     BRONCHOSCOPY FLEXIBLE N/A 2019    Procedure: Flexible Bronchoscopy;  Surgeon: Patrice Regalado MD;  Location: [facility]      SECTION      x 2     CHOLECYSTECTOMY  -     CLOSED REDUCTION, PERCUTANEOUS PINNING FINGER, COMBINED  8/10/2011    Procedure:COMBINED CLOSED REDUCTION, PERCUTANEOUS PINNING FINGER; 5th Proximal Phalanx; Surgeon:RADHA BUITRAGO; [facility]     COLONOSCOPY       COLONOSCOPY N/A 2020    Procedure: COLONOSCOPY;  Surgeon: Zacarias Duran MD;  Location: [facility]     ESOPHAGOSCOPY, GASTROSCOPY, DUODENOSCOPY (EGD), COMBINED N/A 2020    Procedure: ESOPHAGOGASTRODUODENOSCOPY, WITH BIOPSY;  Surgeon: Zacarias Duran MD;  Location: [facility]     GASTRECTOMY  2005    Bilat truncal vagotomy, hemigastrectomy, RnY gastrojejunostomy     GASTROJEJUNOSTOMY  2011    Procedure:GASTROJEJUNOSTOMY; exploratory laparotmy with revision of gastrojejunostomy, Antrectomy, Miles-en-y, Gastrojejunostomy; Surgeon:JULIUS HELM; Location:[facility]     INSERT CHEST TUBE N/A 2019    Procedure: INSERTION, CATHETER, INTERCOSTAL, FOR DRAINAGE;  Surgeon: Melissa Nichols MD;  Location: [facility]     LASER HOLMIUM LITHOTRIPSY URETER(S), INSERT STENT, COMBINED      Procedure: COMBINED CYSTOSCOPY, URETEROSCOPY, LASER HOLMIUM LITHOTRIPSY URETER(S), INSERT STENT;;  Surgeon: Blair Correa MD;  Location: [facility]     LASER HOLMIUM NEPHROLITHOTOMY VIA PERCUTANEOUS NEPHROSTOMY  2012    Procedure: LASER HOLMIUM NEPHROLITHOTOMY VIA PERCUTANEOUS NEPHROSTOMY;  proceedure should read: Left Percutaneous Access, Left Percutaneous ultrasonic Nephrolithotomy, Ureteroscopy Holmium Laser Lithotripsy Stent Placement ;  Surgeon: Blair Correa MD;  Location: [facility]     MAMMOPLASTY AUGMENTATION BILATERAL       OPEN REDUCTION INTERNAL FIXATION WRIST Left 2016    Procedure: OPEN REDUCTION INTERNAL FIXATION WRIST;  Surgeon:  Darling Ellis MD;  Location: [facility]     RECONSTRUCT BREAST, IMPLANT PROSTHESIS, COMBINED       THORACOSCOPIC DECORTICATION LUNG Left 5/7/2019    Procedure: Left Video Assisted Thoracoscopic Decortication, Intercostal Nerve Cryo Analgesia, Flexible Bronchoscopy;  Surgeon: Patrice Regalado MD;  Location: [facility]          Medications  Prior to Admission medications    Medication Sig Start Date End Date Taking? Authorizing Provider   escitalopram (LEXAPRO) 20 MG tablet Take 1 tablet (20 mg) by mouth daily 9/25/20  Yes Fredy Merritt MD   folic acid (FOLVITE) 1 MG tablet Take 1 tablet (1 mg) by mouth daily 4/27/20  Yes Fredy Merritt MD   hydroxychloroquine (PLAQUENIL) 200 MG tablet Take 1 tablet (200 mg) by mouth daily 9/11/20  Yes Sam Narayanan MD   hydrOXYzine (VISTARIL) 25 MG capsule TAKE 1 CAPSULE BY MOUTH TWICE A DAY AS NEEDED FOR ANXIETY 10/23/20  Yes Fredy Merritt MD   ibuprofen (ADVIL/MOTRIN) 200 MG tablet Take 400-800 mg by mouth every 8 hours as needed for mild pain   Yes Unknown, Entered By History   methadone (DOLOPHINE-INTENSOL) 10 MG/ML (HIGH CONC) solution Take 11.3 mLs (113 mg) by mouth daily Rufina Marcial [phone]    Dose as of 4/29/19  Patient taking differently: Take 104 mg by mouth daily Vagregory Marcial [phone]    Dose as of 4/29/19 5/14/19  Yes Gio Finn MD   pantoprazole (PROTONIX) 40 MG EC tablet Take 1 tablet (40 mg) by mouth daily 8/6/20  Yes Zacarias Duran MD   pregabalin (LYRICA) 150 MG capsule TAKE 1 CAPSULE (150 MG) BY MOUTH 3 TIMES DAILY 8/17/20  Yes Fredy Merritt MD   valACYclovir (VALTREX) 500 MG tablet Take 1 tablet (500 mg) by mouth 2 times daily 11/3/20  Yes Fredy Merritt MD   butalbital-acetaminophen-caffeine (ESGIC) -40 MG tablet Take 1 tablet by mouth every 6 hours as needed for headaches Use sparingly.  Patient not taking: Reported on 11/17/2020 9/25/20   Fredy Merritt MD   naloxone  (NARCAN) 4 MG/0.1ML nasal spray Spray 1 spray (4 mg) into one nostril alternating nostrils once as needed for opioid reversal repeat every 2-3 minutes until responsive  Patient not taking: Reported on 8/3/2020 7/14/19 7/14/20  Juan C Velasquez MD       Allergies  Allergies   Allergen Reactions     Penicillins Hives     Hives in childhood.       Review of systems  A 10-point review of systems was negative    Examination  N/A    Laboratory  Lab Results   Component Value Date     06/24/2020    POTASSIUM 4.0 06/24/2020    CHLORIDE 108 06/24/2020    CO2 24 06/24/2020    BUN 25 06/24/2020    CR 0.67 09/15/2020       Lab Results   Component Value Date    BILITOTAL <0.1 06/24/2020    ALT 25 09/15/2020    AST 21 09/15/2020    ALKPHOS 121 06/24/2020       Lab Results   Component Value Date    ALBUMIN 2.0 09/15/2020    PROTTOTAL 5.9 06/24/2020        Lab Results   Component Value Date    WBC 5.2 09/15/2020    HGB 10.8 09/15/2020    MCV 94 09/15/2020     09/15/2020       Lab Results   Component Value Date    INR 1.17 05/07/2019     Jejunal bx Mar 2010 reviewed  Jejunal bx Aug 2020 reviewed    Radiology  EGD Aug 2020 personally reviewed  Colonoscopy Aug 2020 personally reviewed  CT C/A/P April 2020 personally reviewed with radiology  MR Abdomen July 2015 personally reviewed with radiology    Assessment  65-year-old female who presents for follow-up of possible PSC and iron-deficiency anemia.  No evidence of PSC or cirrhosis following further review of imaging and pathology.  Abdominal pain symptoms are likely musculoskeletal.  Nausea and vomiting however may be related to anastomotic stenosis.  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  Etiology of jejunal ulceration remains unclear- evidence of eosinophils on a jejunal anastomotic biopsy in 03/2010 but no evidence of chronicity on biopsy in August therefore will refer to luminal GI for further evaluation and ongoing management.  No  additional follow-up is required in hepatology clinic.    Plan  1.  Check CMP, INR, CBC  2.  Continue iron infusions  3.  Continue PPI  4.  No NSAIDs  5.  Luminal GI referral  6.  Follow-up with PCP    Zacarias Milian MD  Hepatology  [facility]    Approximately 35 minutes of non face-to-face time were spent in review of the patient's medical record to date.  This included review of previous: clinic visits, hospital records, lab results, imaging studies, and procedural documentation.  The findings from this review are summarized in the above note.

## 2020-11-17 NOTE — LETTER
"    11/17/2020         RE: Demetra Richardson  [address] [place] [address]        Dear Colleague,    Thank you for referring your patient, Deemtra Richardson, to the [facility] [place]. Please see a copy of my visit note below.    Demetra Richardson is a 65 year old female who is being evaluated via a billable telephone visit.      The patient has been notified of following:     \"This telephone visit will be conducted via a call between you and your physician/provider. We have found that certain health care needs can be provided without the need for a physical exam.  This service lets us provide the care you need with a short phone conversation.  If a prescription is necessary we can send it directly to your pharmacy.  If lab work is needed we can place an order for that and you can then stop by our lab to have the test done at a later time.    Telephone visits are billed at different rates depending on your insurance coverage. During this emergency period, for some insurers they may be billed the same as an in-person visit.  Please reach out to your insurance provider with any questions.    If during the course of the call the physician/provider feels a telephone visit is not appropriate, you will not be charged for this service.\"    Patient has given verbal consent for Telephone visit?  Yes    What phone number would you like to be contacted at? [phone]    How would you like to obtain your AVS? Mail a copy    Phone call duration: 14 minutes  _________________________________________________________________________________    [facility]    Hepatology follow-up    Follow-up visit for ?PSC, iron deficiency anemia    Subjective:  65 year old female    Last visit 05/2020.  Review of patient's imaging with radiology showed no evidence of PSC or cirrhosis.  The patient underwent EGD and colonoscopy in 08/2020.  EGD showed mild esophagitis, " evidence of patent Billroth II gastrojejunostomy with moderate stenosis at the gastrojejunal anastomosis with ulceration in the efferent limb.  There was also diffuse severe inflammation with ulceration in the jejunum.  Colonoscopy had a poor prep with a moderate amount of semi-liquid stool.  No obvious colonic masses were visualized.  The patient was then started on regular iron infusions with her hemoglobin increasing from 7.8 in June to 10.8 in Sep.      The patient is well today.  Her weight is now stable.  She continues to complain of left upper quadrant pain.  This pain is constant and worse with bending over.  The pain is associated with nausea, which occurs 3-4 times per week, resulting in vomiting of meals.  No melena, hematemesis or hematochezia.      The patient has ongoing issues with constipation.  She takes 3-4 docusate per day.  She has not yet tried MiraLax on account of cost.      The patient denies jaundice, itch, lower extremity edema, abdominal distention, lethargy or confusion.      No history of fevers, sweats or chills.  No cough or dyspnea.      The patient does not drink alcohol.  She now lives in assisted living as planned.  She practices social distancing and wears a mask when out in public.  There are no cases of COVID-19 at her assisted living facility.       Medical hx Surgical hx   Past Medical History:   Diagnosis Date     Anemia      Basal cell carcinoma     Left-sided, skin over over medial orbit/nasal bone. Removed Oct 2018.     Benzodiazepine dependence (H)     With h/o withdrawal seizure x 1     Bipolar 1 disorder, mixed, moderate (H) 2/7/2013     Chronic pain     Back, legs.     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     Epidural abscess 2005    x4     Fibromyalgia      Generalised anxiety disorder      GERD (gastroesophageal reflux disease)      Major depressive disorder      Migraine      Nephrolithiasis     S/p left sided lithotripsy     Opiate dependence (H)       Osteoarthritis      Osteoporosis      PUD (peptic ulcer disease)      SLE (systemic lupus erythematosus) (H) 2009     Weight loss       Past Surgical History:   Procedure Laterality Date     BACK SURGERY  04    L4-5 epidural abscess     BACK SURGERY  04    L3-4 spinal stenosis     BACK SURGERY  04    Lt psoas abscess     BRONCHOSCOPY FLEXIBLE N/A 2019    Procedure: Flexible Bronchoscopy;  Surgeon: Patrice Regalado MD;  Location: [facility] OR      SECTION      x 2     CHOLECYSTECTOMY  -     CLOSED REDUCTION, PERCUTANEOUS PINNING FINGER, COMBINED  8/10/2011    Procedure:COMBINED CLOSED REDUCTION, PERCUTANEOUS PINNING FINGER; 5th Proximal Phalanx; Surgeon:RADHA BUITRAGO; [facility]     COLONOSCOPY       COLONOSCOPY N/A 2020    Procedure: COLONOSCOPY;  Surgeon: Zacarias Duran MD;  Location: [facility]     ESOPHAGOSCOPY, GASTROSCOPY, DUODENOSCOPY (EGD), COMBINED N/A 2020    Procedure: ESOPHAGOGASTRODUODENOSCOPY, WITH BIOPSY;  Surgeon: Zacarias Duran MD;  Location: [facility]     GASTRECTOMY  2005    Bilat truncal vagotomy, hemigastrectomy, RnY gastrojejunostomy     GASTROJEJUNOSTOMY  2011    Procedure:GASTROJEJUNOSTOMY; exploratory laparotmy with revision of gastrojejunostomy, Antrectomy, Miles-en-y, Gastrojejunostomy; Surgeon:JULIUS HELM; Location:[facility] OR     INSERT CHEST TUBE N/A 2019    Procedure: INSERTION, CATHETER, INTERCOSTAL, FOR DRAINAGE;  Surgeon: Melissa Nichols MD;  Location: [facility]     LASER HOLMIUM LITHOTRIPSY URETER(S), INSERT STENT, COMBINED      Procedure: COMBINED CYSTOSCOPY, URETEROSCOPY, LASER HOLMIUM LITHOTRIPSY URETER(S), INSERT STENT;;  Surgeon: Blair Correa MD;  Location: [facility]     LASER HOLMIUM NEPHROLITHOTOMY VIA PERCUTANEOUS NEPHROSTOMY  2012    Procedure: LASER HOLMIUM NEPHROLITHOTOMY VIA PERCUTANEOUS NEPHROSTOMY;  proceedure should read: Left Percutaneous Access, Left Percutaneous ultrasonic  Nephrolithotomy, Ureteroscopy Holmium Laser Lithotripsy Stent Placement ;  Surgeon: Blair Correa MD;  Location: [facility]     MAMMOPLASTY AUGMENTATION BILATERAL       OPEN REDUCTION INTERNAL FIXATION WRIST Left 1/11/2016    Procedure: OPEN REDUCTION INTERNAL FIXATION WRIST;  Surgeon: Darling Ellis MD;  Location: [facility]     RECONSTRUCT BREAST, IMPLANT PROSTHESIS, COMBINED       THORACOSCOPIC DECORTICATION LUNG Left 5/7/2019    Procedure: Left Video Assisted Thoracoscopic Decortication, Intercostal Nerve Cryo Analgesia, Flexible Bronchoscopy;  Surgeon: Patrice Regalado MD;  Location: [facility] OR          Medications  Prior to Admission medications    Medication Sig Start Date End Date Taking? Authorizing Provider   escitalopram (LEXAPRO) 20 MG tablet Take 1 tablet (20 mg) by mouth daily 9/25/20  Yes Fredy Merritt MD   folic acid (FOLVITE) 1 MG tablet Take 1 tablet (1 mg) by mouth daily 4/27/20  Yes Fredy Merritt MD   hydroxychloroquine (PLAQUENIL) 200 MG tablet Take 1 tablet (200 mg) by mouth daily 9/11/20  Yes Sam Narayanan MD   hydrOXYzine (VISTARIL) 25 MG capsule TAKE 1 CAPSULE BY MOUTH TWICE A DAY AS NEEDED FOR ANXIETY 10/23/20  Yes Fredy Merritt MD   ibuprofen (ADVIL/MOTRIN) 200 MG tablet Take 400-800 mg by mouth every 8 hours as needed for mild pain   Yes Unknown, Entered By History   methadone (DOLOPHINE-INTENSOL) 10 MG/ML (HIGH CONC) solution Take 11.3 mLs (113 mg) by mouth daily Rufina Marcial [phone]    Dose as of 4/29/19  Patient taking differently: Take 104 mg by mouth daily Rufina Marcial [phone]    Dose as of 4/29/19 5/14/19  Yes Gio Finn MD   pantoprazole (PROTONIX) 40 MG EC tablet Take 1 tablet (40 mg) by mouth daily 8/6/20  Yes Zacarias Duran MD   pregabalin (LYRICA) 150 MG capsule TAKE 1 CAPSULE (150 MG) BY MOUTH 3 TIMES DAILY 8/17/20  Yes Fredy Merritt MD   valACYclovir (VALTREX) 500 MG tablet Take 1 tablet (500 mg) by  mouth 2 times daily 11/3/20  Yes Fredy Merritt MD   butalbital-acetaminophen-caffeine (ESGIC) -40 MG tablet Take 1 tablet by mouth every 6 hours as needed for headaches Use sparingly.  Patient not taking: Reported on 11/17/2020 9/25/20   Fredy Merritt MD   naloxone (NARCAN) 4 MG/0.1ML nasal spray Spray 1 spray (4 mg) into one nostril alternating nostrils once as needed for opioid reversal repeat every 2-3 minutes until responsive  Patient not taking: Reported on 8/3/2020 7/14/19 7/14/20  Juan C Velasquez MD       Allergies  Allergies   Allergen Reactions     Penicillins Hives     Hives in childhood.       Review of systems  A 10-point review of systems was negative    Examination  N/A    Laboratory  Lab Results   Component Value Date     06/24/2020    POTASSIUM 4.0 06/24/2020    CHLORIDE 108 06/24/2020    CO2 24 06/24/2020    BUN 25 06/24/2020    CR 0.67 09/15/2020       Lab Results   Component Value Date    BILITOTAL <0.1 06/24/2020    ALT 25 09/15/2020    AST 21 09/15/2020    ALKPHOS 121 06/24/2020       Lab Results   Component Value Date    ALBUMIN 2.0 09/15/2020    PROTTOTAL 5.9 06/24/2020        Lab Results   Component Value Date    WBC 5.2 09/15/2020    HGB 10.8 09/15/2020    MCV 94 09/15/2020     09/15/2020       Lab Results   Component Value Date    INR 1.17 05/07/2019     Jejunal bx Mar 2010 reviewed  Jejunal bx Aug 2020 reviewed    Radiology  EGD Aug 2020 personally reviewed  Colonoscopy Aug 2020 personally reviewed  CT C/A/P April 2020 personally reviewed with radiology  MR Abdomen July 2015 personally reviewed with radiology    Assessment  65-year-old female who presents for follow-up of possible PSC and iron-deficiency anemia.  No evidence of PSC or cirrhosis following further review of imaging and pathology.  Abdominal pain symptoms are likely musculoskeletal.  Nausea and vomiting however may be related to anastomotic stenosis.  Iron-deficiency anemia secondary to  jejunal ulceration with bleeding.  Etiology of jejunal ulceration remains unclear- evidence of eosinophils on a jejunal anastomotic biopsy in 03/2010 but no evidence of chronicity on biopsy in August therefore will refer to luminal GI for further evaluation and ongoing management.  No additional follow-up is required in hepatology clinic.    Plan  1.  Check CMP, INR, CBC  2.  Continue iron infusions  3.  Continue PPI  4.  No NSAIDs  5.  Luminal GI referral  6.  Follow-up with PCP    Zacarias Milian MD  Hepatology  [facility]    Approximately 35 minutes of non face-to-face time were spent in review of the patient's medical record to date.  This included review of previous: clinic visits, hospital records, lab results, imaging studies, and procedural documentation.  The findings from this review are summarized in the above note.      Again, thank you for allowing me to participate in the care of your patient.        Sincerely,        Zacarias Milian MD

## 2020-11-19 ENCOUNTER — VIRTUAL VISIT (OUTPATIENT)
Dept: PSYCHIATRY | Facility: CLINIC | Age: 65
End: 2020-11-19
Attending: PSYCHOLOGIST
Payer: COMMERCIAL

## 2020-11-19 DIAGNOSIS — F11.21 OPIOID USE DISORDER, SEVERE, IN SUSTAINED REMISSION, ON MAINTENANCE THERAPY, DEPENDENCE (H): Primary | ICD-10-CM

## 2020-11-19 PROCEDURE — 90837 PSYTX W PT 60 MINUTES: CPT | Mod: 95 | Performed by: PSYCHOLOGIST

## 2020-11-19 NOTE — PROGRESS NOTES
"  Individual Psychotherapy Session (telephone session)     Telephone Visit Details  Demetra Richardson is a 64 year old pt. who is being evaluated via a telephone visit.     Type of service:  Telephone visit for psychotherapy  Time of service:    Start Time:  12:08        End Time:  1:05  Reason for Telephone Visit: Patient unable to travel due to Covid-19.  Originating Site (patient location): Patient's home  Distant Site (provider location): Remote location  Mode of Communication:  Telephone call   Consent:  Patient has given verbal consent for telephone visit?: Yes     Care Provider: Leighann Leiws (Skalski), PhD, LP  Participants: Patient and writer  DSM-5 Diagnoses:   Opioid use disorder, on maintenance therapy  Benzodiazepine use disorder, in early remission  Anxiety (r/o DARRIUS)  MDD, in sustained remission    SUBJECTIVE: No urges/ thoughts of substance use. \"Everything is good.\" Denied significant anxiety.    TREATMENT: Radha's privileges have increased and she is being offered additional take-homes at methadone clinic; she can reduce from 3x per week to 1x per week. Pt is unsure if this is a good idea since it is tempting to use early when she has so much methadone at home. We explored the pros/ cons and ultimately Radha decided to speak with her provider about staying 1x per week.       MSE:  Alertness: alert and oriented  Speech: normal  Language: intact  Mood: consistent with euthymia   Affect: full range; was congruent to mood; was congruent to content  Thought Process/Associations: unremarkable  Thought Content:  Reports none  Perception:  Reports none;  Insight: adequate for  safety  Judgment: adequate for safety  Cognition: (6) does  appear grossly intact; formal cognitive testing was not done    PLAN:   1) RTC 2 weeks for therapy    Performed and documented by: Leighann Lewis (Skalski), PhD, LP      "

## 2020-11-30 DIAGNOSIS — D50.8 OTHER IRON DEFICIENCY ANEMIA: Primary | ICD-10-CM

## 2020-11-30 DIAGNOSIS — K83.09 SCLEROSING CHOLANGITIS (H): ICD-10-CM

## 2020-12-01 NOTE — TELEPHONE ENCOUNTER
REFERRAL INFORMATION:    Referring Provider:  Dr. Milian     Referring Clinic:  [facility]     Reason for Visit/Diagnosis: Jejunal ulcer     FUTURE VISIT INFORMATION:    Appointment Date: 1/5/2021    Appointment Time: 9:30 AM      NOTES STATUS DETAILS   OFFICE NOTE from Referring Provider Internal 11/17/2020 Office visit with Dr. Milian    OFFICE NOTE from Other Specialist Internal 9/25/2020 Office visit with Dr. Fredy Merritt ([facility])    HOSPITAL DISCHARGE SUMMARY/  ED VISITS N/A    OPERATIVE REPORT N/A    MEDICATION LIST Internal         ENDOSCOPY  Internal EGD: 8/6/2020, 3/18/11, 3/10/11, 1/5/11, more in EPIC   COLONOSCOPY Internal 8/6/2020, 3/23/06   ERCP N/A    EUS N/A    STOOL TESTING N/A    PERTINENT LABS Internal    PATHOLOGY REPORTS (RELATED) Internal 8/6/2020   IMAGING (CT, MRI, EGD, MRCP, Small Bowel Follow Through/SBT, MR/CT Enterography) Internal US Abdomen: 7/23/2020

## 2020-12-02 DIAGNOSIS — F41.9 ANXIETY DISORDER, UNSPECIFIED TYPE: ICD-10-CM

## 2020-12-05 NOTE — TELEPHONE ENCOUNTER
ESCITALOPRAM 20 MG TABLET      Last Written Prescription Date:  9/25/20  Last Fill Quantity: 30,   # refills: 1  Last Office Visit : 9/25/20  Future Office visit:  None scheduled    Routing refill request to provider for review/approval because:  Per last dictation:  [facility], mental health, has new psychiatrist in a mo set up, rvwd meds, stable overall, will give refills till then    Primary care continue to prescribe or defer to psych?

## 2020-12-07 RX ORDER — ESCITALOPRAM OXALATE 20 MG/1
20 TABLET ORAL DAILY
Qty: 30 TABLET | Refills: 5 | Status: SHIPPED | OUTPATIENT
Start: 2020-12-07 | End: 2021-05-27

## 2020-12-08 DIAGNOSIS — F41.9 ANXIETY DISORDER, UNSPECIFIED TYPE: ICD-10-CM

## 2020-12-11 RX ORDER — HYDROXYZINE PAMOATE 25 MG/1
CAPSULE ORAL
Qty: 60 CAPSULE | Refills: 1 | Status: SHIPPED | OUTPATIENT
Start: 2020-12-11 | End: 2021-02-10

## 2020-12-11 NOTE — TELEPHONE ENCOUNTER
HYDROXYZINE BECKY 25 MG CAP      Last Written Prescription Date:  10-23-20  Last Fill Quantity: 60,   # refills: 0  Last Office Visit : 9-25-20  Future Office visit:  None    7-16-20 PCP clinic note:  A/P  Anxiety: increase lexapro, refill atarax, cont w/ switching psychiatrists    9-25-20 Clinic note:  See new psych as planned, rf in meantime      Routing refill request to provider for review/approval because:  Associated diagnosis not on protocol  ? If she has establish care with new Psychiatrist,        (See Psychologist here)

## 2020-12-21 DIAGNOSIS — B00.9 HERPES SIMPLEX VIRUS INFECTION: ICD-10-CM

## 2020-12-21 RX ORDER — VALACYCLOVIR HYDROCHLORIDE 500 MG/1
500 TABLET, FILM COATED ORAL 2 TIMES DAILY
Qty: 6 TABLET | Refills: 2 | Status: SHIPPED | OUTPATIENT
Start: 2020-12-21 | End: 2021-01-12

## 2020-12-21 NOTE — TELEPHONE ENCOUNTER
[facility] Call Center    Phone Message    May a detailed message be left on voicemail: yes     Reason for Call: Medication Refill Request    Has the patient contacted the pharmacy for the refill? Yes   Name of medication being requested: valACYclovir (VALTREX) 500 MG tablet  Provider who prescribed the medication: Dr. Merritt  Pharmacy: [facility] [address] [place] [address] [facility]  Date medication is needed: patient states she has had an outbreak and needs filled tomorrow, because she is leaving town on Wednesday morning. Please call if this is not possible.      Action Taken: Message routed to:  [facility] P[facility]    Travel Screening: Not Applicable

## 2021-01-05 ENCOUNTER — VIRTUAL VISIT (OUTPATIENT)
Dept: GASTROENTEROLOGY | Facility: CLINIC | Age: 66
End: 2021-01-05
Payer: COMMERCIAL

## 2021-01-05 ENCOUNTER — PRE VISIT (OUTPATIENT)
Dept: GASTROENTEROLOGY | Facility: CLINIC | Age: 66
End: 2021-01-05

## 2021-01-05 VITALS — BODY MASS INDEX: 21.26 KG/M2 | HEIGHT: 63 IN | WEIGHT: 120 LBS

## 2021-01-05 DIAGNOSIS — K91.89 ANASTOMOTIC STRICTURE OF GASTROJEJUNOSTOMY: ICD-10-CM

## 2021-01-05 DIAGNOSIS — K59.03 DRUG-INDUCED CONSTIPATION: Primary | ICD-10-CM

## 2021-01-05 PROCEDURE — 99215 OFFICE O/P EST HI 40 MIN: CPT | Mod: 95 | Performed by: INTERNAL MEDICINE

## 2021-01-05 RX ORDER — POLYETHYLENE GLYCOL 3350 17 G/17G
POWDER, FOR SOLUTION ORAL
Qty: 225 G | Refills: 3 | Status: SHIPPED | OUTPATIENT
Start: 2021-01-05 | End: 2021-08-30

## 2021-01-05 ASSESSMENT — MIFFLIN-ST. JEOR: SCORE: 1063.45

## 2021-01-05 NOTE — LETTER
1/5/2021      RE: Demetra Richardson  [address] [place] [address]      Dear Colleague,    Thank you for referring your patient, Demetra Richardson, to the [facility] [place]. Please see a copy of my visit note below.    Demetra Richardson is a 65 year old female who is being evaluated via a billable telephone visit.      What phone number would you like to be contacted at? [phone]  How would you like to obtain your AVS? Mail a copy    Subjective      Demetra Richardson is a 65 year old female who presents to clinic today for the following health issues:  Constipation, iron deficiency anemia, abdominal pain, early satiety    Assessment/Plan:  We had a long discussion about the symptoms which are multifactorial.    Early satiety and abdominal pain is due to a combination of things: RNYG and possible mecahnical obstruction due to stenosis and ulceration in combination with delayed gatsric emptying from methadone  Patient states that she had significant improvement in her symptoms after the surgery and wants to get it again. We discussed that this may not be feasible in light of active inflmamation, overall health status and risk, and techncial limitations with re-operation. I will place a referral to General Surgery and see what they advise. I also relayed that we may not be able to completely eliminate the pain but that treating the constipation may help   -eat six small meals per day  -PPI twice a day   -referral to surgery    Constipation is likely worsened with use of methadone (opiod-induced constipation). She had success with miralax in the past but due to costs, did not continue this. I placed a prescription for this, hopefully it will be cheaper for her to obtain the medication.  -start miralax 1 capful one day for one week then increase to twice a day    MARÍA secondary ?occult blood loss in setting of jejunal inflammation and ulceration and  decreased absorption  Labs ordered by Dr Milian. Patient will follow up with FP for additional iron infusions if needed  -check labs    Weight loss  Weight currently stable. Lost 40 lbs in 2019 -2020. May benefit from nutrition visit in the future.     RTC in 4 weeks.    Kiki Guaman MD  Associate Professor of Medicine  Division of Gastroenterology, Hepatology and Nutrition  [facility]     HPI   Ms. Richardson is a 59-year-old female referred from hepatology clinic for abdominal pain and constipation. Her past medical history is significant for bipolar disorder, DJD status post multiple back surgeries, SLE, recurrent kidney stones. Her GI history includes a prior disgnaosis of eosinophilic gastroenteritis, previously on budesonide, peptic ulcer disease, status post initial ?gastrectomy and Billroth II in 2005 with revision in 2011. Her original surgery was for gastric ulcer and gastric outlet obstruction.Her revision occurred following episodes of recurrent small bowel obstructions related to stenosis or strictures at the GJ anastomosis that had previously been treated with dilations with EGD.      She has had a complicated liver workup (see Dr Milian s recent notes) but has been deemed to not have PSC and no futher follow up in Hepatology clinic is needed.  Her current issues include MARÍA (now treated with iron infusions) most likely due to decreased absorption and possible blood loss secondary to ulcerations in the jejunum seen on most recent egd. Results of recent EGD and colonoscopy reviewed.     She is referred for further eval of abdominal pain and constipation. She describes the pain as located in the upper left area below the breast and under the rib. She has pain constantly but it is worsened with eating. She can only eat a little at.a time. If she eats a normal adult meal,she gets nauseous and feels like she needs to throw up.  She sometimes has to lay on her back so she stretches out the area. She  "thinks that the food \"hits a spot in the area of anastomosis and then doesn t go through\" She sometimes sees swelling.. what looks like a softball and she has pain in that specific area and holding it down sometimes helps. Not taking Prilosec before the endoscopy. Now taking it twice a day and trying to be compliant.     She describes the constipation as a longstanding issue that has been recently getting worse. She reports having a BM every ten days with only a \"rabbit poop\" in between, +straining, +hard stools. On methadone for many years for chronic pain.    Lost 40 pounds over the past year or so but since the last six months has been stable. Only eating and drinking small amounts of food every couple of hours.    Most recent imaging and labs reviewed.     Review of Systems   Constitutional, HEENT, cardiovascular, pulmonary, GI, , musculoskeletal, neuro, skin, endocrine and psych systems are negative, except as otherwise noted.    PERTINENT PAST MEDICAL HISTORY:  Past Medical History:   Diagnosis Date     Anemia      Basal cell carcinoma     Left-sided, skin over over medial orbit/nasal bone. Removed Oct 2018.     Benzodiazepine dependence (H)     With h/o withdrawal seizure x 1     Bipolar 1 disorder, mixed, moderate (H) 2/7/2013     Chronic pain     Back, legs.     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     Epidural abscess 2005    x4     Fibromyalgia      Generalised anxiety disorder      GERD (gastroesophageal reflux disease)      Major depressive disorder      Migraine      Nephrolithiasis     S/p left sided lithotripsy     Opiate dependence (H)      Osteoarthritis      Osteoporosis      PUD (peptic ulcer disease)      SLE (systemic lupus erythematosus) (H) 2009     Weight loss        PREVIOUS SURGERIES:  Past Surgical History:   Procedure Laterality Date     BACK SURGERY  7-16-04    L4-5 epidural abscess     BACK SURGERY  8-4-04    L3-4 spinal stenosis     BACK SURGERY  11-4-04    Lt psoas abscess     " BRONCHOSCOPY FLEXIBLE N/A 2019    Procedure: Flexible Bronchoscopy;  Surgeon: Patrice Regalado MD;  Location: [facility]      SECTION      x 2     CHOLECYSTECTOMY  -     CLOSED REDUCTION, PERCUTANEOUS PINNING FINGER, COMBINED  8/10/2011    Procedure:COMBINED CLOSED REDUCTION, PERCUTANEOUS PINNING FINGER; 5th Proximal Phalanx; Surgeon:RADHA BUITRAGO; [facility]     COLONOSCOPY       COLONOSCOPY N/A 2020    Procedure: COLONOSCOPY;  Surgeon: Zacarias Duran MD;  Location: [facility]     ESOPHAGOSCOPY, GASTROSCOPY, DUODENOSCOPY (EGD), COMBINED N/A 2020    Procedure: ESOPHAGOGASTRODUODENOSCOPY, WITH BIOPSY;  Surgeon: Zacarias Duran MD;  Location: [facility]     GASTRECTOMY  2005    Bilat truncal vagotomy, hemigastrectomy, RnY gastrojejunostomy     GASTROJEJUNOSTOMY  2011    Procedure:GASTROJEJUNOSTOMY; exploratory laparotmy with revision of gastrojejunostomy, Antrectomy, Miles-en-y, Gastrojejunostomy; Surgeon:JULIUS HELM; Location:[facility]     INSERT CHEST TUBE N/A 2019    Procedure: INSERTION, CATHETER, INTERCOSTAL, FOR DRAINAGE;  Surgeon: Melissa Nichols MD;  Location: [facility]     LASER HOLMIUM LITHOTRIPSY URETER(S), INSERT STENT, COMBINED      Procedure: COMBINED CYSTOSCOPY, URETEROSCOPY, LASER HOLMIUM LITHOTRIPSY URETER(S), INSERT STENT;;  Surgeon: Blair Correa MD;  Location: [facility]     LASER HOLMIUM NEPHROLITHOTOMY VIA PERCUTANEOUS NEPHROSTOMY  2012    Procedure: LASER HOLMIUM NEPHROLITHOTOMY VIA PERCUTANEOUS NEPHROSTOMY;  proceedure should read: Left Percutaneous Access, Left Percutaneous ultrasonic Nephrolithotomy, Ureteroscopy Holmium Laser Lithotripsy Stent Placement ;  Surgeon: Blair Correa MD;  Location: [facility]     MAMMOPLASTY AUGMENTATION BILATERAL       OPEN REDUCTION INTERNAL FIXATION WRIST Left 2016    Procedure: OPEN REDUCTION INTERNAL FIXATION WRIST;  Surgeon: Darling Ellis MD;  Location: [facility]     RECONSTRUCT  BREAST, IMPLANT PROSTHESIS, COMBINED       THORACOSCOPIC DECORTICATION LUNG Left 5/7/2019    Procedure: Left Video Assisted Thoracoscopic Decortication, Intercostal Nerve Cryo Analgesia, Flexible Bronchoscopy;  Surgeon: Patrice Regalado MD;  Location: [facility]       ALLERGIES:     Allergies   Allergen Reactions     Penicillins Hives     Hives in childhood.       PERTINENT MEDICATIONS:    Current Outpatient Medications:      escitalopram (LEXAPRO) 20 MG tablet, Take 1 tablet (20 mg) by mouth daily, Disp: 30 tablet, Rfl: 5     folic acid (FOLVITE) 1 MG tablet, Take 1 tablet (1 mg) by mouth daily, Disp: 30 tablet, Rfl: 11     hydroxychloroquine (PLAQUENIL) 200 MG tablet, Take 1 tablet (200 mg) by mouth daily, Disp: 30 tablet, Rfl: 3     hydrOXYzine (VISTARIL) 25 MG capsule, TAKE 1 CAPSULE BY MOUTH TWICE A DAY AS NEEDED FOR ANXIETY, Disp: 60 capsule, Rfl: 1     ibuprofen (ADVIL/MOTRIN) 200 MG tablet, Take 400-800 mg by mouth every 8 hours as needed for mild pain, Disp: , Rfl:      methadone (DOLOPHINE-INTENSOL) 10 MG/ML (HIGH CONC) solution, Take 11.3 mLs (113 mg) by mouth daily [facility] [phone]    Dose as of 4/29/19 (Patient taking differently: Take 104 mg by mouth daily [facility] [phone]    Dose as of 4/29/19), Disp: 300 mL, Rfl: 0     pantoprazole (PROTONIX) 40 MG EC tablet, Take 1 tablet (40 mg) by mouth daily, Disp: 60 tablet, Rfl: 3     polyethylene glycol (MIRALAX) 17 GM/Dose powder, 1 capful (17 g) in 8 oz of liquid, Disp: 225 g, Rfl: 3     pregabalin (LYRICA) 150 MG capsule, TAKE 1 CAPSULE (150 MG) BY MOUTH 3 TIMES DAILY, Disp: 90 capsule, Rfl: 3     valACYclovir (VALTREX) 500 MG tablet, Take 1 tablet (500 mg) by mouth 2 times daily, Disp: 6 tablet, Rfl: 2     butalbital-acetaminophen-caffeine (ESGIC) -40 MG tablet, Take 1 tablet by mouth every 6 hours as needed for headaches Use sparingly. (Patient not taking: Reported on 11/17/2020), Disp: 12 tablet, Rfl: 0      naloxone (NARCAN) 4 MG/0.1ML nasal spray, Spray 1 spray (4 mg) into one nostril alternating nostrils once as needed for opioid reversal repeat every 2-3 minutes until responsive (Patient not taking: Reported on 8/3/2020), Disp: 1 each, Rfl: 1    SOCIAL HISTORY:  Social History     Socioeconomic History     Marital status: Single     Spouse name: Not on file     Number of children: Not on file     Years of education: Not on file     Highest education level: Not on file   Occupational History     Not on file   Social Needs     Financial resource strain: Not on file     Food insecurity     Worry: Not on file     Inability: Not on file     Transportation needs     Medical: Not on file     Non-medical: Not on file   Tobacco Use     Smoking status: Never Smoker     Smokeless tobacco: Never Used   Substance and Sexual Activity     Alcohol use: No     Comment: none in 10 years     Drug use: No     Sexual activity: Not Currently   Lifestyle     Physical activity     Days per week: Not on file     Minutes per session: Not on file     Stress: Not on file   Relationships     Social connections     Talks on phone: Not on file     Gets together: Not on file     Attends [employer] service: Not on file     Active member of club or organization: Not on file     Attends meetings of clubs or organizations: Not on file     Relationship status: Not on file     Intimate partner violence     Fear of current or ex partner: Not on file     Emotionally abused: Not on file     Physically abused: Not on file     Forced sexual activity: Not on file   Other Topics Concern     Parent/sibling w/ CABG, MI or angioplasty before 65F 55M? Not Asked   Social History Narrative    Intake 4/16/15:        H/o divorce but most recently living with JUANIS Hamilton. Alfonso recently passed away.         In past employed as a .         Tobacco use: denies    Alcohol use: denies    Drug use: daily pot use for 30 years. Currently with sobriety.            FAMILY  HISTORY:  Family History   Problem Relation Age of Onset     Substance Abuse Mother      Family History Negative Father      Substance Abuse Maternal Grandfather      Substance Abuse Brother      Liver Disease No family hx of      Colon Cancer No family hx of      Ulcerative Colitis No family hx of      Crohn's Disease No family hx of        Past/family/social history reviewed and no changes        Objective       Vitals:  No vitals were obtained today due to virtual visit.    Physical Exam   alert and no distress  PSYCH: Alert and oriented times 3; coherent speech, normal   rate and volume, able to articulate logical thoughts, able   to abstract reason, no tangential thoughts, no hallucinations   or delusions  Her affect is normal and pleasant  RESP: No cough, no audible wheezing, able to talk in full sentences  Remainder of exam unable to be completed due to telephone visits      Phone call duration: 45 minutes, 30 minutes spent reviewing the chart     Kiki Guaman MD

## 2021-01-05 NOTE — NURSING NOTE
"Chief Complaint   Patient presents with     New Patient       Vitals:    01/05/21 0918   Weight: 54.4 kg (120 lb)   Height: 1.6 m (5' 3\")       Body mass index is 21.26 kg/m .    Tia Olson CMA    "

## 2021-01-05 NOTE — PROGRESS NOTES
Demetra Richardson is a 65 year old female who is being evaluated via a billable telephone visit.      What phone number would you like to be contacted at? [phone]  How would you like to obtain your AVS? Mail a copy    Subjective      Demetra Richardson is a 65 year old female who presents to clinic today for the following health issues:  Constipation, iron deficiency anemia, abdominal pain, early satiety    Assessment/Plan:  We had a long discussion about the symptoms which are multifactorial.    Early satiety and abdominal pain is due to a combination of things: RNYG and possible mecahnical obstruction due to stenosis and ulceration in combination with delayed gatsric emptying from methadone  Patient states that she had significant improvement in her symptoms after the surgery and wants to get it again. We discussed that this may not be feasible in light of active inflmamation, overall health status and risk, and techncial limitations with re-operation. I will place a referral to General Surgery and see what they advise. I also relayed that we may not be able to completely eliminate the pain but that treating the constipation may help   -eat six small meals per day  -PPI twice a day   -referral to surgery    Constipation is likely worsened with use of methadone (opiod-induced constipation). She had success with miralax in the past but due to costs, did not continue this. I placed a prescription for this, hopefully it will be cheaper for her to obtain the medication.  -start miralax 1 capful one day for one week then increase to twice a day    MARÍA secondary ?occult blood loss in setting of jejunal inflammation and ulceration and decreased absorption  Labs ordered by Dr Milian. Patient will follow up with FP for additional iron infusions if needed  -check labs    Weight loss  Weight currently stable. Lost 40 lbs in 2019 -2020. May benefit from nutrition visit in the future.     RTC in 4 weeks.    Kiki Guaman,  "MD  Associate Professor of Medicine  Division of Gastroenterology, Hepatology and Nutrition  [facility]     HPI   Ms. Richardson is a 59-year-old female referred from hepatology clinic for abdominal pain and constipation. Her past medical history is significant for bipolar disorder, DJD status post multiple back surgeries, SLE, recurrent kidney stones. Her GI history includes a prior disgnaosis of eosinophilic gastroenteritis, previously on budesonide, peptic ulcer disease, status post initial ?gastrectomy and Billroth II in 2005 with revision in 2011. Her original surgery was for gastric ulcer and gastric outlet obstruction.Her revision occurred following episodes of recurrent small bowel obstructions related to stenosis or strictures at the GJ anastomosis that had previously been treated with dilations with EGD.      She has had a complicated liver workup (see Dr Milian s recent notes) but has been deemed to not have PSC and no futher follow up in Hepatology clinic is needed.  Her current issues include MARÍA (now treated with iron infusions) most likely due to decreased absorption and possible blood loss secondary to ulcerations in the jejunum seen on most recent egd. Results of recent EGD and colonoscopy reviewed.     She is referred for further eval of abdominal pain and constipation. She describes the pain as located in the upper left area below the breast and under the rib. She has pain constantly but it is worsened with eating. She can only eat a little at.a time. If she eats a normal adult meal,she gets nauseous and feels like she needs to throw up.  She sometimes has to lay on her back so she stretches out the area. She thinks that the food \"hits a spot in the area of anastomosis and then doesn t go through\" She sometimes sees swelling.. what looks like a softball and she has pain in that specific area and holding it down sometimes helps. Not taking Prilosec before the endoscopy. Now taking it twice " "a day and trying to be compliant.     She describes the constipation as a longstanding issue that has been recently getting worse. She reports having a BM every ten days with only a \"rabbit poop\" in between, +straining, +hard stools. On methadone for many years for chronic pain.    Lost 40 pounds over the past year or so but since the last six months has been stable. Only eating and drinking small amounts of food every couple of hours.    Most recent imaging and labs reviewed.     Review of Systems   Constitutional, HEENT, cardiovascular, pulmonary, GI, , musculoskeletal, neuro, skin, endocrine and psych systems are negative, except as otherwise noted.    PERTINENT PAST MEDICAL HISTORY:  Past Medical History:   Diagnosis Date     Anemia      Basal cell carcinoma     Left-sided, skin over over medial orbit/nasal bone. Removed Oct 2018.     Benzodiazepine dependence (H)     With h/o withdrawal seizure x 1     Bipolar 1 disorder, mixed, moderate (H) 2013     Chronic pain     Back, legs.     Eosinophilic gastroenteritis 2010     Epidural abscess 2005    x4     Fibromyalgia      Generalised anxiety disorder      GERD (gastroesophageal reflux disease)      Major depressive disorder      Migraine      Nephrolithiasis     S/p left sided lithotripsy     Opiate dependence (H)      Osteoarthritis      Osteoporosis      PUD (peptic ulcer disease)      SLE (systemic lupus erythematosus) (H) 2009     Weight loss        PREVIOUS SURGERIES:  Past Surgical History:   Procedure Laterality Date     BACK SURGERY  04    L4-5 epidural abscess     BACK SURGERY  04    L3-4 spinal stenosis     BACK SURGERY  04    Lt psoas abscess     BRONCHOSCOPY FLEXIBLE N/A 2019    Procedure: Flexible Bronchoscopy;  Surgeon: Patrice Regalado MD;  Location: [facility]      SECTION      x 2     CHOLECYSTECTOMY  2-     CLOSED REDUCTION, PERCUTANEOUS PINNING FINGER, COMBINED  8/10/2011    Procedure:COMBINED CLOSED " REDUCTION, PERCUTANEOUS PINNING FINGER; 5th Proximal Phalanx; Surgeon:RADHA BUITRAGO; [facility]     COLONOSCOPY       COLONOSCOPY N/A 8/6/2020    Procedure: COLONOSCOPY;  Surgeon: Zacarias Duran MD;  Location: [facility]     ESOPHAGOSCOPY, GASTROSCOPY, DUODENOSCOPY (EGD), COMBINED N/A 8/6/2020    Procedure: ESOPHAGOGASTRODUODENOSCOPY, WITH BIOPSY;  Surgeon: Zacarias Duran MD;  Location: [facility]     GASTRECTOMY  11-    Bilat truncal vagotomy, hemigastrectomy, RnY gastrojejunostomy     GASTROJEJUNOSTOMY  6/2/2011    Procedure:GASTROJEJUNOSTOMY; exploratory laparotmy with revision of gastrojejunostomy, Antrectomy, Miles-en-y, Gastrojejunostomy; Surgeon:JULIUS HELM; Location:[facility]     INSERT CHEST TUBE N/A 4/29/2019    Procedure: INSERTION, CATHETER, INTERCOSTAL, FOR DRAINAGE;  Surgeon: Melissa Nichols MD;  Location: [facility]     LASER HOLMIUM LITHOTRIPSY URETER(S), INSERT STENT, COMBINED      Procedure: COMBINED CYSTOSCOPY, URETEROSCOPY, LASER HOLMIUM LITHOTRIPSY URETER(S), INSERT STENT;;  Surgeon: Blair Correa MD;  [facility]     LASER HOLMIUM NEPHROLITHOTOMY VIA PERCUTANEOUS NEPHROSTOMY  7/11/2012    Procedure: LASER HOLMIUM NEPHROLITHOTOMY VIA PERCUTANEOUS NEPHROSTOMY;  proceedure should read: Left Percutaneous Access, Left Percutaneous ultrasonic Nephrolithotomy, Ureteroscopy Holmium Laser Lithotripsy Stent Placement ;  Surgeon: Blair Correa MD;  [facility]     MAMMOPLASTY AUGMENTATION BILATERAL       OPEN REDUCTION INTERNAL FIXATION WRIST Left 1/11/2016    Procedure: OPEN REDUCTION INTERNAL FIXATION WRIST;  Surgeon: Darling Ellis MD;  [facility]     RECONSTRUCT BREAST, IMPLANT PROSTHESIS, COMBINED       THORACOSCOPIC DECORTICATION LUNG Left 5/7/2019    Procedure: Left Video Assisted Thoracoscopic Decortication, Intercostal Nerve Cryo Analgesia, Flexible Bronchoscopy;  Surgeon: Patrice Regalado MD;  Location: [facility]       ALLERGIES:      Allergies   Allergen Reactions     Penicillins Hives     Hives in childhood.       PERTINENT MEDICATIONS:    Current Outpatient Medications:      escitalopram (LEXAPRO) 20 MG tablet, Take 1 tablet (20 mg) by mouth daily, Disp: 30 tablet, Rfl: 5     folic acid (FOLVITE) 1 MG tablet, Take 1 tablet (1 mg) by mouth daily, Disp: 30 tablet, Rfl: 11     hydroxychloroquine (PLAQUENIL) 200 MG tablet, Take 1 tablet (200 mg) by mouth daily, Disp: 30 tablet, Rfl: 3     hydrOXYzine (VISTARIL) 25 MG capsule, TAKE 1 CAPSULE BY MOUTH TWICE A DAY AS NEEDED FOR ANXIETY, Disp: 60 capsule, Rfl: 1     ibuprofen (ADVIL/MOTRIN) 200 MG tablet, Take 400-800 mg by mouth every 8 hours as needed for mild pain, Disp: , Rfl:      methadone (DOLOPHINE-INTENSOL) 10 MG/ML (HIGH CONC) solution, Take 11.3 mLs (113 mg) by mouth daily [facility] [phone]    Dose as of 4/29/19 (Patient taking differently: Take 104 mg by mouth daily [facility] [phone]    Dose as of 4/29/19), Disp: 300 mL, Rfl: 0     pantoprazole (PROTONIX) 40 MG EC tablet, Take 1 tablet (40 mg) by mouth daily, Disp: 60 tablet, Rfl: 3     polyethylene glycol (MIRALAX) 17 GM/Dose powder, 1 capful (17 g) in 8 oz of liquid, Disp: 225 g, Rfl: 3     pregabalin (LYRICA) 150 MG capsule, TAKE 1 CAPSULE (150 MG) BY MOUTH 3 TIMES DAILY, Disp: 90 capsule, Rfl: 3     valACYclovir (VALTREX) 500 MG tablet, Take 1 tablet (500 mg) by mouth 2 times daily, Disp: 6 tablet, Rfl: 2     butalbital-acetaminophen-caffeine (ESGIC) -40 MG tablet, Take 1 tablet by mouth every 6 hours as needed for headaches Use sparingly. (Patient not taking: Reported on 11/17/2020), Disp: 12 tablet, Rfl: 0     naloxone (NARCAN) 4 MG/0.1ML nasal spray, Spray 1 spray (4 mg) into one nostril alternating nostrils once as needed for opioid reversal repeat every 2-3 minutes until responsive (Patient not taking: Reported on 8/3/2020), Disp: 1 each, Rfl: 1    SOCIAL HISTORY:  Social History      Socioeconomic History     Marital status: Single     Spouse name: Not on file     Number of children: Not on file     Years of education: Not on file     Highest education level: Not on file   Occupational History     Not on file   Social Needs     Financial resource strain: Not on file     Food insecurity     Worry: Not on file     Inability: Not on file     Transportation needs     Medical: Not on file     Non-medical: Not on file   Tobacco Use     Smoking status: Never Smoker     Smokeless tobacco: Never Used   Substance and Sexual Activity     Alcohol use: No     Comment: none in 10 years     Drug use: No     Sexual activity: Not Currently   Lifestyle     Physical activity     Days per week: Not on file     Minutes per session: Not on file     Stress: Not on file   Relationships     Social connections     Talks on phone: Not on file     Gets together: Not on file     Attends Druze service: Not on file     Active member of club or organization: Not on file     Attends meetings of clubs or organizations: Not on file     Relationship status: Not on file     Intimate partner violence     Fear of current or ex partner: Not on file     Emotionally abused: Not on file     Physically abused: Not on file     Forced sexual activity: Not on file   Other Topics Concern     Parent/sibling w/ CABG, MI or angioplasty before 65F 55M? Not Asked   Social History Narrative    Intake 4/16/15:        H/o divorce but most recently living with JUANIS Hamilton. Alfonso recently passed away.         In past employed as a .         Tobacco use: denies    Alcohol use: denies    Drug use: daily pot use for 30 years. Currently with sobriety.            FAMILY HISTORY:  Family History   Problem Relation Age of Onset     Substance Abuse Mother      Family History Negative Father      Substance Abuse Maternal Grandfather      Substance Abuse Brother      Liver Disease No family hx of      Colon Cancer No family hx of      Ulcerative  Colitis No family hx of      Crohn's Disease No family hx of        Past/family/social history reviewed and no changes        Objective       Vitals:  No vitals were obtained today due to virtual visit.    Physical Exam   alert and no distress  PSYCH: Alert and oriented times 3; coherent speech, normal   rate and volume, able to articulate logical thoughts, able   to abstract reason, no tangential thoughts, no hallucinations   or delusions  Her affect is normal and pleasant  RESP: No cough, no audible wheezing, able to talk in full sentences  Remainder of exam unable to be completed due to telephone visits      Phone call duration: 45 minutes, 30 minutes spent reviewing the chart

## 2021-01-07 DIAGNOSIS — K83.09 SCLEROSING CHOLANGITIS (H): ICD-10-CM

## 2021-01-07 DIAGNOSIS — D50.8 OTHER IRON DEFICIENCY ANEMIA: ICD-10-CM

## 2021-01-07 LAB
ALBUMIN SERPL-MCNC: 1.6 G/DL (ref 3.4–5)
ALP SERPL-CCNC: 139 U/L (ref 40–150)
ALT SERPL W P-5'-P-CCNC: 20 U/L (ref 0–50)
ANION GAP SERPL CALCULATED.3IONS-SCNC: <1 MMOL/L (ref 3–14)
AST SERPL W P-5'-P-CCNC: 24 U/L (ref 0–45)
BILIRUB SERPL-MCNC: <0.1 MG/DL (ref 0.2–1.3)
BUN SERPL-MCNC: 18 MG/DL (ref 7–30)
CALCIUM SERPL-MCNC: 8.1 MG/DL (ref 8.5–10.1)
CHLORIDE SERPL-SCNC: 107 MMOL/L (ref 94–109)
CO2 SERPL-SCNC: 34 MMOL/L (ref 20–32)
CREAT SERPL-MCNC: 0.68 MG/DL (ref 0.52–1.04)
ERYTHROCYTE [DISTWIDTH] IN BLOOD BY AUTOMATED COUNT: 13.2 % (ref 10–15)
FERRITIN SERPL-MCNC: 18 NG/ML (ref 8–252)
GFR SERPL CREATININE-BSD FRML MDRD: >90 ML/MIN/{1.73_M2}
GLUCOSE SERPL-MCNC: 86 MG/DL (ref 70–99)
HCT VFR BLD AUTO: 42.5 % (ref 35–47)
HGB BLD-MCNC: 12.8 G/DL (ref 11.7–15.7)
INR PPP: 0.93 (ref 0.86–1.14)
IRON SATN MFR SERPL: 18 % (ref 15–46)
IRON SERPL-MCNC: 37 UG/DL (ref 35–180)
MCH RBC QN AUTO: 29.2 PG (ref 26.5–33)
MCHC RBC AUTO-ENTMCNC: 30.1 G/DL (ref 31.5–36.5)
MCV RBC AUTO: 97 FL (ref 78–100)
PLATELET # BLD AUTO: 301 10E9/L (ref 150–450)
POTASSIUM SERPL-SCNC: 4.2 MMOL/L (ref 3.4–5.3)
PROT SERPL-MCNC: 4.9 G/DL (ref 6.8–8.8)
RBC # BLD AUTO: 4.38 10E12/L (ref 3.8–5.2)
SODIUM SERPL-SCNC: 141 MMOL/L (ref 133–144)
TIBC SERPL-MCNC: 208 UG/DL (ref 240–430)
WBC # BLD AUTO: 5.6 10E9/L (ref 4–11)

## 2021-01-07 PROCEDURE — 82728 ASSAY OF FERRITIN: CPT | Performed by: PATHOLOGY

## 2021-01-07 PROCEDURE — 83540 ASSAY OF IRON: CPT | Performed by: PATHOLOGY

## 2021-01-07 PROCEDURE — 80053 COMPREHEN METABOLIC PANEL: CPT | Performed by: PATHOLOGY

## 2021-01-07 PROCEDURE — 36415 COLL VENOUS BLD VENIPUNCTURE: CPT | Performed by: PATHOLOGY

## 2021-01-07 PROCEDURE — 83550 IRON BINDING TEST: CPT | Performed by: PATHOLOGY

## 2021-01-07 PROCEDURE — 85610 PROTHROMBIN TIME: CPT | Performed by: PATHOLOGY

## 2021-01-07 PROCEDURE — 85027 COMPLETE CBC AUTOMATED: CPT | Performed by: PATHOLOGY

## 2021-01-08 NOTE — TELEPHONE ENCOUNTER
REFERRAL INFORMATION:    Referring Provider:  Dr. Guaman     Referring Clinic:  [facility]     Reason for Visit/Diagnosis: Anastomotic stricture of gastrojejunostomy. HX RNYG 2005 with revision in 2011       FUTURE VISIT INFORMATION:    Appointment Date: 1/21/2021    Appointment Time: 10 AM      NOTES RECORD STATUS  DETAILS   OFFICE NOTE from Referring Provider Internal 1/5/2021 Office visit with Dr. Guaman      OFFICE NOTE from Other Specialists Internal 11/17/2020 Office visit with Dr. Zacarias Macias ([facility])     9/25/2020 Office visit with Dr. Fredy Merritt ([facility])    HOSPITAL DISCHARGE SUMMARY/ ED VISITS  N/A    OPERATIVE REPORT Internal 6/2/11 (Exploratory laparotmy with revision of gastrojejunostomy, Antrectomy, Miles-en-y, Gastrojejunostomy)     ENDOSCOPY (EGD)  Internal 8/6/2020, 3/18/11, 3/10/11, more in EPIC   PERTINENT LABS Internal    PATHOLOGY REPORTS (RELATED) Internal 8/6/2020   IMAGING (CT, MRI, US, XR)  Internal US Abdomen: 7/23/2020  CT Chest Abdomen Pelvis: 4/20/2020

## 2021-01-11 DIAGNOSIS — B00.9 HERPES SIMPLEX VIRUS INFECTION: ICD-10-CM

## 2021-01-11 NOTE — TELEPHONE ENCOUNTER
[facility]    Phone Message    May a detailed message be left on voicemail: yes     Reason for Call: Medication Refill Request    Has the patient contacted the pharmacy for the refill? Yes   Name of medication being requested: valACYclovir (VALTREX) 500 MG tablet  Provider who prescribed the medication:   Pharmacy:  [facility] [id number] [place] [address] [address] AT [facility]  Date medication is needed: asap     Pt is experiencing a painful outbreak.      Action Taken: Message routed to:  [facility]    Travel Screening: Not Applicable

## 2021-01-12 NOTE — TELEPHONE ENCOUNTER
[facility] Call [facility]    Phone Message    May a detailed message be left on voicemail: yes     Reason for Call: Medication Question or concern regarding medication   Prescription Clarification  Name of Medication:  valACYclovir (VALTREX) 500 MG tablet  Prescribing Provider: Dr Merritt   Pharmacy:      [facility] [id number] [place] [address] [address] AT [facility]     What on the order needs clarification? Pt needs to pick this up tomorrow morning. She would like to also discuss getting refills on this so she can take this on a everyday basis?She has an outbreak every month and when she is has a outbreak she is sick for 10-14 days. Please call to discuss further.          Action Taken: Message routed to:  [facility]    Travel Screening: Not Applicable

## 2021-01-13 ENCOUNTER — DOCUMENTATION ONLY (OUTPATIENT)
Dept: CARE COORDINATION | Facility: CLINIC | Age: 66
End: 2021-01-13

## 2021-01-13 RX ORDER — VALACYCLOVIR HYDROCHLORIDE 500 MG/1
500 TABLET, FILM COATED ORAL 2 TIMES DAILY
Qty: 60 TABLET | Refills: 1 | Status: SHIPPED | OUTPATIENT
Start: 2021-01-13 | End: 2022-02-16

## 2021-01-13 NOTE — TELEPHONE ENCOUNTER
[facility] Call [facility]    Phone Message    May a detailed message be left on voicemail: yes     Reason for Call: Medication Question or concern regarding medication   Prescription Clarification  Name of Medication: valACYclovir (VALTREX) 500 MG tablet     Prescribing Provider: Fredy Merritt MD   Pharmacy: [facility] [id number] [place] [address] [address] AT [facility]   What on the order needs clarification?   The patient is still waiting for the Rx to be filled please review and follow up with the patient on the updates thank you.          Action Taken: Message routed to:  [facility]    Travel Screening: Not Applicable

## 2021-01-14 ENCOUNTER — HEALTH MAINTENANCE LETTER (OUTPATIENT)
Age: 66
End: 2021-01-14

## 2021-01-15 ENCOUNTER — TELEPHONE (OUTPATIENT)
Dept: FAMILY MEDICINE | Facility: CLINIC | Age: 66
End: 2021-01-15

## 2021-01-15 NOTE — TELEPHONE ENCOUNTER
Returned patient's call to discuss lab results.  No answer.  Will try again later.     Lucy GRACIA LPN  Hepatology Clinic      [facility]    Phone Message    May a detailed message be left on voicemail: yes     Reason for Call: Other: FYI:  Patient wants to talk to nurse about lab results. Patient did schedule a telephone appointment with Dr. Merritt for 01/25/2021 to discuss the same thing. Please call patient to advise, per patient's request.    Action Taken: Message routed to:  [facility]    Travel Screening: Not Applicable

## 2021-01-16 ENCOUNTER — NURSE TRIAGE (OUTPATIENT)
Dept: NURSING | Facility: CLINIC | Age: 66
End: 2021-01-16

## 2021-01-16 ENCOUNTER — VIRTUAL VISIT (OUTPATIENT)
Dept: URGENT CARE | Facility: CLINIC | Age: 66
End: 2021-01-16
Payer: COMMERCIAL

## 2021-01-16 DIAGNOSIS — R00.2 PALPITATIONS: ICD-10-CM

## 2021-01-16 DIAGNOSIS — R60.0 BILATERAL LEG EDEMA: Primary | ICD-10-CM

## 2021-01-16 PROCEDURE — 99215 OFFICE O/P EST HI 40 MIN: CPT | Mod: TEL | Performed by: NURSE PRACTITIONER

## 2021-01-17 NOTE — TELEPHONE ENCOUNTER
Demetra calls and says that her legs- from below her knees to her feet-are swollen, hard, and shiny. Symptom began 3-4 days ago. RN then conferenced pt., with [facility] , for assistance in scheduling a virtual visit with an  Dr. Monica monsalve. COVID 19 Nurse Triage Plan/Patient Instructions    Please be aware that novel coronavirus (COVID-19) may be circulating in the community. If you develop symptoms such as fever, cough, or SOB or if you have concerns about the presence of another infection including coronavirus (COVID-19), please contact your health care provider or visit [email or web address].     Disposition/Instructions    In-Person Visit with provider recommended. Reference Visit Selection Guide.    Thank you for taking steps to prevent the spread of this virus.  o Limit your contact with others.  o Wear a simple mask to cover your cough.  o Wash your hands well and often.    Resources    [facility]: About COVID-19: [email or web address]    CDC: What to Do If You're Sick: www.cdc.gov/coronavirus/2019-ncov/about/steps-when-sick.html    CDC: Ending Home Isolation: www.cdc.gov/coronavirus/2019-ncov/hcp/disposition-in-home-patients.html     CDC: Caring for Someone: www.cdc.gov/coronavirus/2019-ncov/if-you-are-sick/care-for-someone.html     [facility]: Interim Guidance for Hospital Discharge to Home: www.health.[facility]/diseases/coronavirus/hcp/hospdischarge.pdf    [facility] clinical trials (COVID-19 research studies): [email or web address]-clinical-trials     Below are the COVID-19 hotlines at the [place] Department of [facility] ([facility]). Interpreters are available.   o For health questions: Call 018-864-2614 or 1-811.296.3063 (7 a.m. to 7 p.m.)  o For questions about schools and childcare: Call 456-138-3903 or 1-281.699.8333 (7 a.m. to 7 p.m.)                     Additional Information    Negative: Severe difficulty breathing (e.g., struggling for each breath, speaks in single words)     Negative: Looks like a broken bone or dislocated joint (e.g., crooked or deformed)    Negative: Sounds like a life-threatening emergency to the triager    Negative: Chest pain    Negative: Followed a leg injury    Negative: [1] Small area of swelling AND [2] followed an insect bite to the area    Negative: Swelling of one ankle joint    Negative: Swelling of knee is main symptom    Negative: Pregnant    Negative: Postpartum (from 0 to 6 weeks after delivery)    Negative: Difficulty breathing at rest    Negative: Entire foot is cool or blue in comparison to other side    Negative: [1] Can't walk or can barely walk AND [2] new onset    Negative: [1] Difficulty breathing with exertion (e.g., walking) AND [2] new onset or worsening    Negative: [1] Red area or streak AND [2] fever    Negative: [1] Swelling is painful to touch AND [2] fever    Negative: [1] Cast on leg or ankle AND [2] now increased pain    Negative: Patient sounds very sick or weak to the triager    Negative: SEVERE leg swelling (e.g., swelling extends above knee, entire leg is swollen, weeping fluid)    Negative: [1] Red area or streak [2] large (> 2 in. or 5 cm)    Negative: [1] Thigh or calf pain AND [2] only 1 side AND [3] present > 1 hour    Negative: [1] Thigh, calf, or ankle swelling AND [2] only 1 side    [1] Thigh, calf, or ankle swelling AND [2] bilateral AND [3] 1 side is more swollen    Protocols used: LEG SWELLING AND EDEMA-A-AH

## 2021-01-17 NOTE — PROGRESS NOTES
"Demetra Richardson is a 65 year old who is being evaluated via a billable telephone visit.        ICD-10-CM    1. Bilateral leg edema  R60.0    2. Palpitations  R00.2      Advised patient to go to the ER for further evaluation and to rule out any of the possible differential diagnosis listed.    Differential diagnosis includes but is not limited to CHF, DVT, ventricular failure, valvular heart disease, hypertension, peripheral edema, jugular venous distention, hyperthyroidism, hepatic failure, renal failure., atrial fibrillation.    Subjective     Demetra Richardson reports bilateral lower  Extremity edema from the foot up to the knee that has been present for the last 2-3 days. She has had edema prior, but only a small amount at the ankles with a sock line/ She also reports having some strange sensations in her heart, like it's \"flopping around\".     She denies any recent travel but admits she is sitting for much longer periods than normal.    A 10 point ROS is negative other than as noted in HPI.  Past Medical History:   Diagnosis Date     Anemia      Basal cell carcinoma     Left-sided, skin over over medial orbit/nasal bone. Removed Oct 2018.     Benzodiazepine dependence (H)     With h/o withdrawal seizure x 1     Bipolar 1 disorder, mixed, moderate (H) 2/7/2013     Chronic pain     Back, legs.     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     Epidural abscess 2005    x4     Fibromyalgia      Generalised anxiety disorder      GERD (gastroesophageal reflux disease)      Major depressive disorder      Migraine      Nephrolithiasis     S/p left sided lithotripsy     Opiate dependence (H)      Osteoarthritis      Osteoporosis      PUD (peptic ulcer disease)      SLE (systemic lupus erythematosus) (H) 2009     Weight loss          Objective       Vitals:  No vitals were obtained today due to virtual visit.    Physical Exam   Alert, no acute distress  PSYCH: Alert and oriented times 3 normal affect  RESP: No cough, " no audible wheezing, able to talk in full sentences    Remainder of exam unable to be completed due to telephone visits    Phone call duration: 27 minutes    41 minutes spent on the date of the encounter doing chart review, history and exam, documentation and further activities as noted above    VINOD Soto, CNP  [facility]

## 2021-01-21 ENCOUNTER — PRE VISIT (OUTPATIENT)
Dept: SURGERY | Facility: CLINIC | Age: 66
End: 2021-01-21

## 2021-01-21 DIAGNOSIS — M79.7 FIBROMYALGIA: ICD-10-CM

## 2021-01-22 RX ORDER — PREGABALIN 150 MG/1
150 CAPSULE ORAL 3 TIMES DAILY
Qty: 90 CAPSULE | Refills: 3 | Status: SHIPPED | OUTPATIENT
Start: 2021-01-22 | End: 2021-05-27

## 2021-01-22 NOTE — TELEPHONE ENCOUNTER
Patient Requested  pregabalin (LYRICA) 150 MG capsule   Last Filled  11/17/2020  Last Office Visit  04/20/20  Next Office Visit     Checked  01/22/2021    DX:     Pharmacy:     [facility] CMA at 7:53 AM on 1/22/2021.

## 2021-01-25 ENCOUNTER — VIRTUAL VISIT (OUTPATIENT)
Dept: FAMILY MEDICINE | Facility: CLINIC | Age: 66
End: 2021-01-25
Payer: COMMERCIAL

## 2021-01-25 DIAGNOSIS — M79.89 LEG SWELLING: ICD-10-CM

## 2021-01-25 DIAGNOSIS — B00.9 HSV-2 (HERPES SIMPLEX VIRUS 2) INFECTION: Primary | ICD-10-CM

## 2021-01-25 PROCEDURE — 99213 OFFICE O/P EST LOW 20 MIN: CPT | Mod: 95 | Performed by: FAMILY MEDICINE

## 2021-01-25 RX ORDER — VALACYCLOVIR HYDROCHLORIDE 500 MG/1
500 TABLET, FILM COATED ORAL DAILY
Qty: 90 TABLET | Refills: 3 | Status: SHIPPED | OUTPATIENT
Start: 2021-01-25 | End: 2022-02-03

## 2021-01-25 NOTE — NURSING NOTE
Chief Complaint   Patient presents with     Swelling     bilateral leg swelling. Pt did see UC provider on 1/16/21     Kimberly Nissen, EMT at 7:47 AM on 1/25/2021    Video Visit Technology for this patient: No video technology available to patient, please call patient over the phone

## 2021-02-03 ENCOUNTER — VIRTUAL VISIT (OUTPATIENT)
Dept: FAMILY MEDICINE | Facility: CLINIC | Age: 66
End: 2021-02-03
Payer: COMMERCIAL

## 2021-02-03 DIAGNOSIS — M79.89 LEG SWELLING: Primary | ICD-10-CM

## 2021-02-03 PROCEDURE — 99207 PR NO BILLABLE SERVICE THIS VISIT: CPT | Performed by: FAMILY MEDICINE

## 2021-02-03 NOTE — NURSING NOTE
Chief Complaint   Patient presents with     Edema     Pt would like to discuss bilateral leg swelling      EMIR Sabillon at 2:00 PM sign on 2/3/2021

## 2021-02-04 ENCOUNTER — OFFICE VISIT (OUTPATIENT)
Dept: FAMILY MEDICINE | Facility: CLINIC | Age: 66
End: 2021-02-04
Payer: COMMERCIAL

## 2021-02-04 ENCOUNTER — ANCILLARY PROCEDURE (OUTPATIENT)
Dept: ULTRASOUND IMAGING | Facility: CLINIC | Age: 66
End: 2021-02-04
Attending: FAMILY MEDICINE
Payer: COMMERCIAL

## 2021-02-04 ENCOUNTER — ANCILLARY PROCEDURE (OUTPATIENT)
Dept: GENERAL RADIOLOGY | Facility: CLINIC | Age: 66
End: 2021-02-04
Attending: FAMILY MEDICINE
Payer: COMMERCIAL

## 2021-02-04 VITALS
WEIGHT: 130 LBS | BODY MASS INDEX: 23.03 KG/M2 | OXYGEN SATURATION: 95 % | SYSTOLIC BLOOD PRESSURE: 144 MMHG | DIASTOLIC BLOOD PRESSURE: 84 MMHG | HEART RATE: 70 BPM

## 2021-02-04 DIAGNOSIS — R06.09 DOE (DYSPNEA ON EXERTION): ICD-10-CM

## 2021-02-04 DIAGNOSIS — R05.9 COUGH: ICD-10-CM

## 2021-02-04 DIAGNOSIS — M79.89 LEG SWELLING: ICD-10-CM

## 2021-02-04 DIAGNOSIS — M79.89 LEG SWELLING: Primary | ICD-10-CM

## 2021-02-04 LAB
ALBUMIN SERPL-MCNC: 1.6 G/DL (ref 3.4–5)
ALP SERPL-CCNC: 152 U/L (ref 40–150)
ALT SERPL W P-5'-P-CCNC: 21 U/L (ref 0–50)
ANION GAP SERPL CALCULATED.3IONS-SCNC: 2 MMOL/L (ref 3–14)
AST SERPL W P-5'-P-CCNC: 28 U/L (ref 0–45)
BASOPHILS # BLD AUTO: 0.1 10E9/L (ref 0–0.2)
BASOPHILS NFR BLD AUTO: 0.8 %
BILIRUB SERPL-MCNC: 0.1 MG/DL (ref 0.2–1.3)
BUN SERPL-MCNC: 16 MG/DL (ref 7–30)
CALCIUM SERPL-MCNC: 8.1 MG/DL (ref 8.5–10.1)
CHLORIDE SERPL-SCNC: 107 MMOL/L (ref 94–109)
CO2 SERPL-SCNC: 34 MMOL/L (ref 20–32)
CREAT SERPL-MCNC: 0.7 MG/DL (ref 0.52–1.04)
DIFFERENTIAL METHOD BLD: ABNORMAL
EOSINOPHIL # BLD AUTO: 1.4 10E9/L (ref 0–0.7)
EOSINOPHIL NFR BLD AUTO: 22 %
ERYTHROCYTE [DISTWIDTH] IN BLOOD BY AUTOMATED COUNT: 14.2 % (ref 10–15)
GFR SERPL CREATININE-BSD FRML MDRD: >90 ML/MIN/{1.73_M2}
GLUCOSE SERPL-MCNC: 58 MG/DL (ref 70–99)
HCT VFR BLD AUTO: 38.1 % (ref 35–47)
HGB BLD-MCNC: 11.3 G/DL (ref 11.7–15.7)
IMM GRANULOCYTES # BLD: 0 10E9/L (ref 0–0.4)
IMM GRANULOCYTES NFR BLD: 0.6 %
LABORATORY COMMENT REPORT: NORMAL
LYMPHOCYTES # BLD AUTO: 1.4 10E9/L (ref 0.8–5.3)
LYMPHOCYTES NFR BLD AUTO: 22 %
MCH RBC QN AUTO: 28.9 PG (ref 26.5–33)
MCHC RBC AUTO-ENTMCNC: 29.7 G/DL (ref 31.5–36.5)
MCV RBC AUTO: 97 FL (ref 78–100)
MONOCYTES # BLD AUTO: 0.7 10E9/L (ref 0–1.3)
MONOCYTES NFR BLD AUTO: 11.4 %
NEUTROPHILS # BLD AUTO: 2.8 10E9/L (ref 1.6–8.3)
NEUTROPHILS NFR BLD AUTO: 43.2 %
NRBC # BLD AUTO: 0 10*3/UL
NRBC BLD AUTO-RTO: 0 /100
NT-PROBNP SERPL-MCNC: 260 PG/ML (ref 0–125)
PLATELET # BLD AUTO: 252 10E9/L (ref 150–450)
POTASSIUM SERPL-SCNC: 4.6 MMOL/L (ref 3.4–5.3)
PROT SERPL-MCNC: 5.2 G/DL (ref 6.8–8.8)
RBC # BLD AUTO: 3.91 10E12/L (ref 3.8–5.2)
SARS-COV-2 RNA RESP QL NAA+PROBE: NEGATIVE
SARS-COV-2 RNA RESP QL NAA+PROBE: NORMAL
SODIUM SERPL-SCNC: 142 MMOL/L (ref 133–144)
SPECIMEN SOURCE: NORMAL
SPECIMEN SOURCE: NORMAL
WBC # BLD AUTO: 6.4 10E9/L (ref 4–11)

## 2021-02-04 PROCEDURE — 71046 X-RAY EXAM CHEST 2 VIEWS: CPT | Mod: GC | Performed by: RADIOLOGY

## 2021-02-04 PROCEDURE — 85025 COMPLETE CBC W/AUTO DIFF WBC: CPT | Performed by: PATHOLOGY

## 2021-02-04 PROCEDURE — 36415 COLL VENOUS BLD VENIPUNCTURE: CPT | Performed by: PATHOLOGY

## 2021-02-04 PROCEDURE — U0005 INFEC AGEN DETEC AMPLI PROBE: HCPCS | Performed by: PATHOLOGY

## 2021-02-04 PROCEDURE — U0003 INFECTIOUS AGENT DETECTION BY NUCLEIC ACID (DNA OR RNA); SEVERE ACUTE RESPIRATORY SYNDROME CORONAVIRUS 2 (SARS-COV-2) (CORONAVIRUS DISEASE [COVID-19]), AMPLIFIED PROBE TECHNIQUE, MAKING USE OF HIGH THROUGHPUT TECHNOLOGIES AS DESCRIBED BY CMS-2020-01-R: HCPCS | Performed by: PATHOLOGY

## 2021-02-04 PROCEDURE — 83880 ASSAY OF NATRIURETIC PEPTIDE: CPT | Performed by: PATHOLOGY

## 2021-02-04 PROCEDURE — 80053 COMPREHEN METABOLIC PANEL: CPT | Performed by: PATHOLOGY

## 2021-02-04 PROCEDURE — 93970 EXTREMITY STUDY: CPT | Performed by: RADIOLOGY

## 2021-02-04 PROCEDURE — 99214 OFFICE O/P EST MOD 30 MIN: CPT | Performed by: FAMILY MEDICINE

## 2021-02-04 RX ORDER — FUROSEMIDE 20 MG
20 TABLET ORAL DAILY
Qty: 30 TABLET | Refills: 0 | Status: SHIPPED | OUTPATIENT
Start: 2021-02-04 | End: 2021-05-10

## 2021-02-04 NOTE — NURSING NOTE
Chief Complaint   Patient presents with     Recheck Medication     swelling follow up     Kimberly Nissen, EMT at 3:07 PM on 2/4/2021

## 2021-02-04 NOTE — PROGRESS NOTES
Assessment & Plan     Leg swelling  No known DVT history. If neg work up consider do abd image  - furosemide (LASIX) 20 MG tablet; Take 1 tablet (20 mg) by mouth daily  - US Lower Extremity Venous Duplex Bilateral; Future    BENSON (dyspnea on exertion)  If negative consider if needs stress test or PFT  - CBC with platelets differential; Future  - Comprehensive metabolic panel; Future  - N terminal pro BNP; Future  - XR Chest 2 Views; Future  - Echocardiogram Complete; Future    Cough  Lives in assisited living. She did not report this symptoms till during visit today  - Symptomatic COVID-19 Virus (Coronavirus) by PCR; Future        30 minutes spent on the date of the encounter doing chart review, history and exam, documentation and further activities as noted above. Followup pending labs.      Fredy Merritt MD  [facility] [place]    Donna Garcia is a 65 year old who presents to clinic today for the following health issues     HPI a few things. One, 48 hrs sore throat cough, cannot smell or taste well for five years so that is chronic. Some BENSON but already was. No obvious fever. Lives in assisted living no known covid. Also, a few weeks bilat leg swelling, same, no knonw chf or ascities, no recent med changes. Some BENSON too.     Past Medical History:   Diagnosis Date     Anemia      Basal cell carcinoma     Left-sided, skin over over medial orbit/nasal bone. Removed Oct 2018.     Benzodiazepine dependence (H)     With h/o withdrawal seizure x 1     Bipolar 1 disorder, mixed, moderate (H) 2/7/2013     Chronic pain     Back, legs.     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     Epidural abscess 2005    x4     Fibromyalgia      Generalised anxiety disorder      GERD (gastroesophageal reflux disease)      Major depressive disorder      Migraine      Nephrolithiasis     S/p left sided lithotripsy     Opiate dependence (H)      Osteoarthritis      Osteoporosis      PUD (peptic  ulcer disease)      SLE (systemic lupus erythematosus) (H) 2009     Weight loss      Past Surgical History:   Procedure Laterality Date     BACK SURGERY  04    L4-5 epidural abscess     BACK SURGERY  04    L3-4 spinal stenosis     BACK SURGERY  04    Lt psoas abscess     BRONCHOSCOPY FLEXIBLE N/A 2019    Procedure: Flexible Bronchoscopy;  Surgeon: Patrice Regalado MD;  Location: [facility]      SECTION      x 2     CHOLECYSTECTOMY       CLOSED REDUCTION, PERCUTANEOUS PINNING FINGER, COMBINED  8/10/2011    Procedure:COMBINED CLOSED REDUCTION, PERCUTANEOUS PINNING FINGER; 5th Proximal Phalanx; Surgeon:RADHA BUITRAGO; [facility]     COLONOSCOPY       COLONOSCOPY N/A 2020    Procedure: COLONOSCOPY;  Surgeon: Zacarias Duran MD;  Location: [facility]     ESOPHAGOSCOPY, GASTROSCOPY, DUODENOSCOPY (EGD), COMBINED N/A 2020    Procedure: ESOPHAGOGASTRODUODENOSCOPY, WITH BIOPSY;  Surgeon: Zacarias Duran MD;  Location: [facility]     GASTRECTOMY  2005    Bilat truncal vagotomy, hemigastrectomy, RnY gastrojejunostomy     GASTROJEJUNOSTOMY  2011    Procedure:GASTROJEJUNOSTOMY; exploratory laparotmy with revision of gastrojejunostomy, Antrectomy, Miles-en-y, Gastrojejunostomy; Surgeon:JULIUS HELM; Location:[facility]     INSERT CHEST TUBE N/A 2019    Procedure: INSERTION, CATHETER, INTERCOSTAL, FOR DRAINAGE;  Surgeon: Melissa Nichols MD;  Location: [facility]     LASER HOLMIUM LITHOTRIPSY URETER(S), INSERT STENT, COMBINED      Procedure: COMBINED CYSTOSCOPY, URETEROSCOPY, LASER HOLMIUM LITHOTRIPSY URETER(S), INSERT STENT;;  Surgeon: Blair Correa MD;  Location: [facility]     LASER HOLMIUM NEPHROLITHOTOMY VIA PERCUTANEOUS NEPHROSTOMY  2012    Procedure: LASER HOLMIUM NEPHROLITHOTOMY VIA PERCUTANEOUS NEPHROSTOMY;  proceedure should read: Left Percutaneous Access, Left Percutaneous ultrasonic Nephrolithotomy, Ureteroscopy Holmium Laser Lithotripsy Stent  Placement ;  Surgeon: Blair Correa MD;  Location: [facility]     MAMMOPLASTY AUGMENTATION BILATERAL       OPEN REDUCTION INTERNAL FIXATION WRIST Left 1/11/2016    Procedure: OPEN REDUCTION INTERNAL FIXATION WRIST;  Surgeon: Darling Ellis MD;  Location: [facility]     RECONSTRUCT BREAST, IMPLANT PROSTHESIS, COMBINED       THORACOSCOPIC DECORTICATION LUNG Left 5/7/2019    Procedure: Left Video Assisted Thoracoscopic Decortication, Intercostal Nerve Cryo Analgesia, Flexible Bronchoscopy;  Surgeon: Patrice Regalado MD;  Location: [facility]     Current Outpatient Medications   Medication     butalbital-acetaminophen-caffeine (ESGIC) -40 MG tablet     escitalopram (LEXAPRO) 20 MG tablet     furosemide (LASIX) 20 MG tablet     hydroxychloroquine (PLAQUENIL) 200 MG tablet     hydrOXYzine (VISTARIL) 25 MG capsule     ibuprofen (ADVIL/MOTRIN) 200 MG tablet     methadone (DOLOPHINE-INTENSOL) 10 MG/ML (HIGH CONC) solution     pantoprazole (PROTONIX) 40 MG EC tablet     polyethylene glycol (MIRALAX) 17 GM/Dose powder     pregabalin (LYRICA) 150 MG capsule     valACYclovir (VALTREX) 500 MG tablet     valACYclovir (VALTREX) 500 MG tablet     folic acid (FOLVITE) 1 MG tablet     naloxone (NARCAN) 4 MG/0.1ML nasal spray     No current facility-administered medications for this visit.      Allergies   Allergen Reactions     Penicillins Hives     Hives in childhood.               Review of Systems         Objective    BP (!) 144/84   Pulse 70   Wt 59 kg (130 lb)   LMP  (LMP Unknown)   SpO2 95%   BMI 23.03 kg/m    Body mass index is 23.03 kg/m .  Physical Exam   GENERAL: healthy, alert and no distress  NECK: no adenopathy, no asymmetry, masses, or scars and thyroid normal to palpation  RESP: lungs clear to auscultation - no rales, rhonchi or wheezes  CV: regular rate and rhythm, normal S1 S2, no S3 or S4, no murmur, click or rub, no peripheral edema and peripheral pulses strong  ABDOMEN: soft, nontender, no  hepatosplenomegaly, no masses and bowel sounds normal  MS: no gross musculoskeletal defects noted, bilat 3 plus edema no red/warm/is mild tender both

## 2021-02-04 NOTE — PATIENT INSTRUCTIONS
[facility] Medication Refill Request Information:  * Please contact your pharmacy regarding ANY request for medication refills.  ** [facility] Prescription Fax = [phone]  * Please allow 3 business days for routine medication refills.  * Please allow 5 business days for controlled substance medication refills.     [facility] Test Result notification information:  *You will be notified with in 7-10 days of your appointment day regarding the results of your test.  If you are on MyChart you will be notified as soon as the provider has reviewed the results and signed off on them.    [facility]: [phone]     [place] [phone] ([address]rd Floor [address], [address])

## 2021-02-04 NOTE — PROGRESS NOTES
Radha is a 65 year old who is being evaluated via a billable video visit.      How would you like to obtain your AVS? MyChart  If the video visit is dropped, the invitation should be resent by:   Will anyone else be joining your video visit?   Phone call. Turns out pt has zero video capability. I'm using video template as epic won't allow me to erase that template.    Leg swelling. See me in person tmrw.  Fredy Merritt MD      Subjective     Radha is a 65 year old who presents to clinic today for the following health issues     HPI Lets swollen. Same. Recent call rvwd. No changes. I offered in person visit last time pt did not make appt for that so I set one up while she was still on the phone today.        Review of Systems         Objective           Vitals:  No vitals were obtained today due to virtual visit.    Physical Exam   Awake alert NAD        5 minute call

## 2021-02-08 DIAGNOSIS — F41.9 ANXIETY DISORDER, UNSPECIFIED TYPE: ICD-10-CM

## 2021-02-10 RX ORDER — HYDROXYZINE PAMOATE 25 MG/1
25 CAPSULE ORAL 2 TIMES DAILY PRN
Qty: 60 CAPSULE | Refills: 11 | Status: SHIPPED | OUTPATIENT
Start: 2021-02-10 | End: 2021-03-12

## 2021-02-22 ENCOUNTER — BEH TREATMENT PLAN (OUTPATIENT)
Dept: PSYCHIATRY | Facility: CLINIC | Age: 66
End: 2021-02-22

## 2021-02-22 ENCOUNTER — VIRTUAL VISIT (OUTPATIENT)
Dept: PSYCHIATRY | Facility: CLINIC | Age: 66
End: 2021-02-22
Attending: PSYCHOLOGIST
Payer: COMMERCIAL

## 2021-02-22 DIAGNOSIS — F11.21 OPIOID USE DISORDER, SEVERE, IN SUSTAINED REMISSION, ON MAINTENANCE THERAPY, DEPENDENCE (H): Primary | ICD-10-CM

## 2021-02-22 PROCEDURE — 90834 PSYTX W PT 45 MINUTES: CPT | Mod: 95 | Performed by: PSYCHOLOGIST

## 2021-02-23 ENCOUNTER — HOSPITAL ENCOUNTER (OUTPATIENT)
Dept: BEHAVIORAL HEALTH | Facility: CLINIC | Age: 66
Discharge: HOME | End: 2021-02-23
Attending: FAMILY MEDICINE | Admitting: FAMILY MEDICINE
Payer: COMMERCIAL

## 2021-02-23 PROCEDURE — 90791 PSYCH DIAGNOSTIC EVALUATION: CPT | Mod: TEL | Performed by: SOCIAL WORKER

## 2021-02-23 ASSESSMENT — ANXIETY QUESTIONNAIRES
5. BEING SO RESTLESS THAT IT IS HARD TO SIT STILL: NOT AT ALL
IF YOU CHECKED OFF ANY PROBLEMS ON THIS QUESTIONNAIRE, HOW DIFFICULT HAVE THESE PROBLEMS MADE IT FOR YOU TO DO YOUR WORK, TAKE CARE OF THINGS AT HOME, OR GET ALONG WITH OTHER PEOPLE: SOMEWHAT DIFFICULT
1. FEELING NERVOUS, ANXIOUS, OR ON EDGE: MORE THAN HALF THE DAYS
2. NOT BEING ABLE TO STOP OR CONTROL WORRYING: MORE THAN HALF THE DAYS
GAD7 TOTAL SCORE: 8
7. FEELING AFRAID AS IF SOMETHING AWFUL MIGHT HAPPEN: SEVERAL DAYS
4. TROUBLE RELAXING: SEVERAL DAYS
6. BECOMING EASILY ANNOYED OR IRRITABLE: NOT AT ALL
3. WORRYING TOO MUCH ABOUT DIFFERENT THINGS: MORE THAN HALF THE DAYS

## 2021-02-23 ASSESSMENT — PATIENT HEALTH QUESTIONNAIRE - PHQ9: SUM OF ALL RESPONSES TO PHQ QUESTIONS 1-9: 4

## 2021-02-23 NOTE — PROGRESS NOTES
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 anxiety and worry about a number of events or activities (such as work or school performance).    - The person finds it difficult to control the worry.   - Muscle tension.    - Sleep disturbance (difficulty falling or staying asleep, or restless unsatisfying sleep).    - The focus of the anxiety and worry is not confined to features of an Axis I disorder.   - The anxiety, worry, or physical symptoms cause clinically significant distress or impairment in social, occupational, or other important areas of functioning.    - The disturbance is not due to the direct physiological effects of a substance (e.g., a drug of abuse, a medication) or a general medical condition (e.g., hyperthyroidism) and does not occur exclusively during a Mood Disorder, a Psychotic Disorder, or a Pervasive Developmental Disorder.   - Depressed mood. Note: In children and adolescents, can be irritable mood.     - Significant weight loss in appetite.    - Feelings of worthlessness or NA guilt.   B) The symptoms cause clinically significant distress or impairment in social, occupational, or other important areas of functioning  C) The episode is not attributable to the  physiological effects of a substance or to another medical condition  D) The occurence of major depressive episode is not better explained by other thought / psychotic disorders  OP BEH NATALIA CRITERIA: Substance is often taken in larger amounts or over a longer period than was intended.  Met for:  Opiates and Sedative, hypnotic, or anxiolytics, There is persistent desire or unsuccessful efforts to cut down or control use of the substance.  Met for:  Opiates and Sedative, hypnotic, or anxiolytics,  A great deal of time is spent in activities necessary to obtain the substance, use the substance, or recover from its effects.  Met for:  Opiates and Sedative, hypnotic, or anxiolytics, Craving, or a strong desire or urge to use the substance.  Met for:  Opiates and Sedative, hypnotic, or anxiolytics, Recurrent use of the substance resulting in a failure to fulfill major role obligations at work, school, or home.  Met for:  Opiates and Sedative, hypnotic, or anxiolytics, Continued use of the substance despite having persistent or recurrent social or interpersonal problems caused or exacerbated by the effects of its use.  Met for:  Opiates and Sedative, hypnotic, or anxiolytics, Important social, occupational, or recreational activities are given up or reduced because of the substance.  Met for:  Opiates and Sedative, hypnotic, or anxiolytics, Recurrent use of the substance in which it is physically hazardous.  Met for:  Opiates and Sedative, hypnotic, or anxiolytics, Use of the substance is continued despite knowledge of having a persistent or recurrent physical or psychological problem that is likely to have been cause or exacerbated by the substance.  Met for:  Opiates and Sedative, hypnotic, or anxiolytics, Tolerance:  either a need for markedly increased amounts of the substance to achieve the desired effect or a markedly diminished effect with continued use of the dame amount of the substance.  Met for:  Opiates and  Sedative, hypnotic, or anxiolytics, Withdrawal:  either patient endorses characteristic withdrawal syndrome for the substance or the substance (or closely related substance) is taken to relieve or avoid withdrawal symptoms.  Met for:  Opiates and Sedative, hypnotic, or anxiolytics    Functional Status:  Patient reports the following functional impairments: chronic disease management, health maintenance, relationship(s), social interactions and work / vocational responsibilities.     WHODAS:   WHODAS 2.0 Total Score 3/16/2020 2/23/2021   Total Score 14 31     Programmatic care:  Current LOCUS was assessed and patient needs the following level of care based on score IOP  .    Clinical Summary:  1. Reason for assessment: referred self needs higher level of support due to worsening anxiety in context of slow taper off methadone after 6 years.  .  2. Psychosocial, Cultural and Contextual Factors: history of NATALIA, in remission, chronic pain, grief and loss, transition issues [place] to move from [place].  .  3. Principal DSM5 Diagnoses  (Sustained by DSM5 Criteria Listed Above):   296.32 (F33.1) Major Depressive Disorder, Recurrent Episode, Moderate _ and With mixed features  300.00 (F41.9) Unspecified Anxiety Disorder.  4. 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  5. Provisional Diagnosis:No other symptoms were reported during the assessment that would indicate alternate diagnoses.  Should symptoms arise during the course of treatment the diagnoses can be updated at that time.  .  6. Prognosis: Expect Improvement.  7. Likely consequences of symptoms if not treated: Without treatment patient more than likely will experience a continuation of symptoms with decreased daily  functioning, requiring an increased level of care.  .  8. Client strengths include:  committed to sobriety, has a previous history of therapy, intelligent, motivated, support of family, friends and providers, supportive, wants to learn, willing to ask questions and willing to relate to others .     Recommendations:     1. Plan for Safety and Risk Management:   A safety and risk management plan has been developed including: Patient consented to co-developed safety plan.  Safety and risk management plan was completed.  Patient agreed to use safety plan should any safety concerns arise.  A copy was given to the patient..          Report to child / adult protection services was NA.     2. Patient's identified none identified.     3. Initial Treatment will focus on:    Anxiety -Provide psycho-education, structure, and support to improve functioning and manage symptoms. Assess for safety as necessary  Alcohol / Substance Use - maintain abstinence and provide support as she continues to taper from Methadone.     4. Resources/Service Plan:    services are not indicated.   Modifications to assist communication are not indicated.   Additional disability accommodations are not indicated.      5. Collaboration:   Collaboration / coordination of treatment will be initiated with the following  support professionals: DIONNE.      6.  Referrals:   The following referral(s) will be initiated: [facility]. Next Scheduled Appointment: 2.25.21.     A Release of Information has been obtained for the following: outpatient therapist and emergency contact.    7. NATALIA:    NATALIA:  Discussed the general effects of drugs and alcohol on health and well-being. Provider gave patient printed information about the effects of chemical use on their health and well being. Recommendations:  NA .     8. Records:   These were reviewed at time of assessment.   Information in this assessment was obtained from the medical record and   provided by patient who is a good historian.    Patient will have open access to their mental health medical record.      Provider Name/ Credentials:  Christina Amaya, [facility], [phone]    February 23, 2021

## 2021-02-23 NOTE — PROGRESS NOTES
Outpatient Treatment Plan Summary  [facility]    Date of Initial Service: 10/21/2019  Date of Current Treatment Plan: 2/22/21        1. DSM-5 Diagnoses:  Opioid use disorder, on maintenance therapy  Benzodiazepine use disorder, in early remission  MDD, in sustained remission  Unspecified anxiety   2. Current symptoms and circumstances that substantiate the diagnosis:  History of problematic opioid and benzodiazepine use, but no current use. No mood symptoms. Currently anxiety is well managed with the use of coping skills (eg, walks, talking to family).     3. Risk assessment:          Suicide:          Assessed Level of Immediate Risk: None         Ideation: None         Plan: None         Means: None         Intent: None        Homicide/Violence:         Assessed Level of Immediate Risk: None         Ideation: None         Plan: None         Means: None         Intent: None     TREATMENT  PLAN:          SYMPTOMS; PROBLEMS   MEASURABLE GOALS;    FUNCTIONAL IMPROVEMENT INTERVENTIONS GAINS MADE   Substance use disorders   Maintain abstinence from all illicit substances CBT and supportive therapy     Significant gains have been made as Radha is currently abstinent; ongoing support will be helpful to ensure these gains are maintained, particularly as she tapers down her methadone dose    Isolation, limited social support   Increase social engagement CBT with a behavioral focus  Minimal. This will be a priority in the course of the next few months.    Anxiety   Maintain gains and continue to use skills (eg, going for walks, talking with family, cognitive re-framing) regularly CBT and supportive therapy   Significant gains have been made as Radha regularly uses skills and currently experiences low anxiety.      4. Frequency of sessions: Weekly    5. Discharge and aftercare goals: Patient will continue therapy until symptoms remission. We will reassess progress in 6 months.     6. Expected duration of  treatment: 6 months    Due to COVID-19, this visit was conducted virtually and patient was unable to sign Acknowledgement of Current Treatment Plan document. Patient verbally consented to this treatment plan.

## 2021-02-24 ENCOUNTER — TELEPHONE (OUTPATIENT)
Dept: BEHAVIORAL HEALTH | Facility: CLINIC | Age: 66
End: 2021-02-24

## 2021-02-24 ASSESSMENT — ANXIETY QUESTIONNAIRES: GAD7 TOTAL SCORE: 8

## 2021-02-24 NOTE — TELEPHONE ENCOUNTER
Called Radha today to complete admission to the 55+ Program.  No answer.  Message left requesting a return call.  Will attempt again tomorrow morning.    Kiya Chatman, OTR/L

## 2021-02-25 ENCOUNTER — TELEPHONE (OUTPATIENT)
Dept: BEHAVIORAL HEALTH | Facility: CLINIC | Age: 66
End: 2021-02-25

## 2021-02-25 NOTE — TELEPHONE ENCOUNTER
"Radha returned my call at 0840 and stated she has changed her mind about starting the 55+ Program at this time.  Reported the \"computer makes her too anxious\" and she would prefer to do groups when she is able to attend in person.   Validated her experience yet encouraged her to try the program with assistance getting started from this writer but she declined.  She requests we contact her when we begin to offer in person services again.      Radha reported she plans to continue to see her individual therapist, Leighann Lewis (Skulski), PhD, LP weekly and feels that is sufficient for now.  No safety concerns reported.  Will inform the team of her decision.    Kiya Chatman, OTR/L  "

## 2021-03-01 ENCOUNTER — NURSE TRIAGE (OUTPATIENT)
Dept: NURSING | Facility: CLINIC | Age: 66
End: 2021-03-01

## 2021-03-01 NOTE — TELEPHONE ENCOUNTER
[facility]: Nurse Triage Note  SITUATION/BACKGROUND                                                      Demetra Richardson is a 65 year old female who calls with bilateral lower extremity swelling .        Allergies:   Allergies   Allergen Reactions     Penicillins Hives     Hives in childhood.       ASSESSMENT      65 year old female calls today with lower extremity swelling   She states she was seen at the begging of February with Dr. Merritt for leg swelling   She states the swelling has been more consistent now.  She has been taking lasix 20 mg every day and elevating her legs.  The swelling continues daily  It is very painful  She has tingling and numbness occasionally in her feet which throws her balance off.  She denies chest pain or SOB.  She does state that she lives in an assisted living complex and has her O2 sats checked daily -her O2 sats run low in the 80's about 50% of the time which is strange because her breathing is fine  She is wondering if it has to do with her circulation.  She states her hands also swell off and on. She had labs drawn at her last visit and has not received the results.  Briefly reviewed results but some labs are off so told her will have the clinic review with Dr. Merritt..     Instructed her to continue to take her lasix and elevate her legs   Will forward info to primary care pool to call back with further recommendations and lab results                RECOMMENDATION/PLAN                                                      RECOMMENDED DISPOSITION:  See above  Will comply with recommendation: Yes    If further questions/concerns or if symptoms do not improve, worsen or new symptoms develop, call your PCP or [phone] to talk with the Resident on call, as soon as possible.    Guideline used: leg swelling  Telephone Triage Protocols for Nurses, Fifth Edition, Ellen Pompa, ROBERT, RN

## 2021-03-05 ENCOUNTER — TELEPHONE (OUTPATIENT)
Dept: SURGERY | Facility: CLINIC | Age: 66
End: 2021-03-05

## 2021-03-05 NOTE — TELEPHONE ENCOUNTER
I called and left VM, patient should call us back and schedule either in person or phone/virtual follow up with Dr. Merritt to discuss her symptoms per his request.  Latosha North, EMT at 12:32 PM on 3/5/2021.

## 2021-03-05 NOTE — TELEPHONE ENCOUNTER
REFERRAL INFORMATION:    Referring Provider:  Dr. Guaman    Referring Clinic:  [facility]     Reason for Visit/Diagnosis: Anastomotic stricture of gastrojejunostomy        FUTURE VISIT INFORMATION:    Appointment Date: 3/11/2021    Appointment Time: 10 AM      NOTES RECORD STATUS  DETAILS   OFFICE NOTE from Referring Provider Internal 1/5/2021 Office visit with Dr. Guaman      OFFICE NOTE from Other Specialists Internal 11/17/2020 Office visit with Dr. Zacarias Milian ([facility])    9/25/2020 Office visit with Dr. Fredy Merritt ([facility])     HOSPITAL DISCHARGE SUMMARY/ ED VISITS  N/A    OPERATIVE REPORT N/A    ENDOSCOPY (EGD)  Internal 8/6/2020, 3/18/11, 3/10/11   PERTINENT LABS Internal    PATHOLOGY REPORTS (RELATED) Internal 8/6/2020   IMAGING (CT, MRI, US, XR)  Internal CT Chest Abdomen Pelvis: 4/20/2020

## 2021-03-10 ENCOUNTER — VIRTUAL VISIT (OUTPATIENT)
Dept: PSYCHIATRY | Facility: CLINIC | Age: 66
End: 2021-03-10
Attending: PSYCHOLOGIST
Payer: COMMERCIAL

## 2021-03-10 DIAGNOSIS — F41.9 ANXIETY: Primary | ICD-10-CM

## 2021-03-10 PROCEDURE — 90834 PSYTX W PT 45 MINUTES: CPT | Mod: 95 | Performed by: PSYCHOLOGIST

## 2021-03-11 ENCOUNTER — PRE VISIT (OUTPATIENT)
Dept: SURGERY | Facility: CLINIC | Age: 66
End: 2021-03-11

## 2021-03-11 ENCOUNTER — OFFICE VISIT (OUTPATIENT)
Dept: SURGERY | Facility: CLINIC | Age: 66
End: 2021-03-11
Payer: COMMERCIAL

## 2021-03-11 VITALS
WEIGHT: 132.4 LBS | HEIGHT: 63 IN | OXYGEN SATURATION: 95 % | BODY MASS INDEX: 23.46 KG/M2 | HEART RATE: 71 BPM | SYSTOLIC BLOOD PRESSURE: 120 MMHG | DIASTOLIC BLOOD PRESSURE: 69 MMHG

## 2021-03-11 DIAGNOSIS — K91.89 ANASTOMOTIC STRICTURE OF GASTROJEJUNOSTOMY: ICD-10-CM

## 2021-03-11 PROCEDURE — 99204 OFFICE O/P NEW MOD 45 MIN: CPT | Performed by: SURGERY

## 2021-03-11 ASSESSMENT — PAIN SCALES - GENERAL: PAINLEVEL: MODERATE PAIN (5)

## 2021-03-11 ASSESSMENT — MIFFLIN-ST. JEOR: SCORE: 1119.69

## 2021-03-11 NOTE — LETTER
3/11/2021       RE: Demetra Richardson  [address] [place] [address]     Dear Colleague,    Thank you for referring your patient, Demetra Richardson, to the [facility] GENERAL SURGERY [facility]INIC [place] at [facility]. Please see a copy of my visit note below.    New General Surgery Consultation Note        Demetra Richardson  [id number]  1955  March 11, 2021     Requesting Provider: Kiki Guaman     Dear Fredy Benites,     I had the pleasure of seeing your patient, Demetra Richardson is a 65 year old female who presents to clinic today for the following health issues       CHIEF COMPLAINT:  Has pain on left side of abdomen.    Reports stabbing pain on left side of abdomen.       Assessment & Plan   Problem List Items Addressed This Visit     None      Visit Diagnoses     Anastomotic stricture of gastrojejunostomy             Will do upper endoscopy by me.  Can be with anesthesia for MAC on a non-Wednesday; Eliu to schedule.      Eats like a 'bird'.  Has eaten this way since 2005 when she had Billroth II by Dr. Valdez for gastric outlet obstruction.    Feels like she has to 'lay out her stomach'; pain goes away when she regurgitates most of the days food.    Had revisional surgery by Dr. Pope.    Has constipation as well with q4 day bowel movements.    Feels the pain on the left side within 5-10 minutes of eating.  Hits left side and cannot go any further.    Has been going on for at least 1 year.    Has had an upper/lower endoscopy.    Review of external notes as documented above   Lab Results   Component Value Date    WBC 6.4 02/04/2021     Lab Results   Component Value Date    RBC 3.91 02/04/2021     Lab Results   Component Value Date    HGB 11.3 02/04/2021     Lab Results   Component Value Date    HCT 38.1 02/04/2021     No components found for: MCT  Lab Results   Component Value Date    MCV 97 02/04/2021     Lab Results    Component Value Date    MCH 28.9 02/04/2021     Lab Results   Component Value Date    MCHC 29.7 02/04/2021     Lab Results   Component Value Date    RDW 14.2 02/04/2021     Lab Results   Component Value Date     02/04/2021     Last Comprehensive Metabolic Panel:  Sodium   Date Value Ref Range Status   02/04/2021 142 133 - 144 mmol/L Final     Potassium   Date Value Ref Range Status   02/04/2021 4.6 3.4 - 5.3 mmol/L Final     Chloride   Date Value Ref Range Status   02/04/2021 107 94 - 109 mmol/L Final     Carbon Dioxide   Date Value Ref Range Status   02/04/2021 34 (H) 20 - 32 mmol/L Final     Anion Gap   Date Value Ref Range Status   02/04/2021 2 (L) 3 - 14 mmol/L Final     Glucose   Date Value Ref Range Status   02/04/2021 58 (L) 70 - 99 mg/dL Final     Urea Nitrogen   Date Value Ref Range Status   02/04/2021 16 7 - 30 mg/dL Final     Creatinine   Date Value Ref Range Status   02/04/2021 0.70 0.52 - 1.04 mg/dL Final     GFR Estimate   Date Value Ref Range Status   02/04/2021 >90 >60 mL/min/[1.73_m2] Final     Comment:     Non  GFR Calc  Starting 12/18/2018, serum creatinine based estimated GFR (eGFR) will be   calculated using the Chronic Kidney Disease Epidemiology Collaboration   (CKD-EPI) equation.       Calcium   Date Value Ref Range Status   02/04/2021 8.1 (L) 8.5 - 10.1 mg/dL Final     INR/Prothrombin Time  Liver Function Studies -   Recent Labs   Lab Test 02/04/21  1716   PROTTOTAL 5.2*   ALBUMIN 1.6*   BILITOTAL 0.1*   ALKPHOS 152*   AST 28   ALT 21     [unfilled]    Independent interpretation of a test performed by another physician/other qualified health care professional (not separately reported) - endoscopy; prior CT scans.    Discussion of management or test interpretation with external physician/other qualified healthcare professional/appropriate source - no    Diagnosis or treatment significantly limited by social determinants of health - no    30 minutes spent on the date of  the encounter doing chart review, review of outside records, review of test results, interpretation of tests, patient visit and documentation       HISTORY OF PRESENT ILLNESS:    LOCATION:   epigastric and left-sided abdominal pain.    Long-lasting.    Has been present for last several years.    Had billroth II style antrectomy/truncal vagotomy and gastrojejunostomy by Dr. Powell, .    Subsequently had revision gastrojejunostomy and retained antrum resection with jejunal feeding tube placement by Dr. Pope.      ASSOCIATED SIGNS/SYMPTOMS:   regurgitation / gastric outlet syndrome.       ROS    PAST MEDICAL HISTORY:  Past Medical History:   Diagnosis Date     Anemia      Basal cell carcinoma     Left-sided, skin over over medial orbit/nasal bone. Removed Oct 2018.     Benzodiazepine dependence (H)     With h/o withdrawal seizure x 1     Bipolar 1 disorder, mixed, moderate (H) 2013     Chronic pain     Back, legs.     Depressive disorder      Eosinophilic gastroenteritis 2010     Epidural abscess 2005    x4     Fibromyalgia      Generalised anxiety disorder      GERD (gastroesophageal reflux disease)      Major depressive disorder      Migraine      Nephrolithiasis     S/p left sided lithotripsy     Opiate dependence (H)      Osteoarthritis      Osteoporosis      PUD (peptic ulcer disease)      SLE (systemic lupus erythematosus) (H) 2009     Weight loss         PAST SURGICAL HISTORY:  Past Surgical History:   Procedure Laterality Date     BACK SURGERY  04    L4-5 epidural abscess     BACK SURGERY  04    L3-4 spinal stenosis     BACK SURGERY  04    Lt psoas abscess     BRONCHOSCOPY FLEXIBLE N/A 2019    Procedure: Flexible Bronchoscopy;  Surgeon: Patrice Regalado MD;  [facility]      SECTION      x 2     CHOLECYSTECTOMY  2-     CLOSED REDUCTION, PERCUTANEOUS PINNING FINGER, COMBINED  8/10/2011    Procedure:COMBINED CLOSED REDUCTION, PERCUTANEOUS PINNING FINGER;  5th Proximal Phalanx; Surgeon:RADHA BUITRAGO; [facility]     COLONOSCOPY       COLONOSCOPY N/A 8/6/2020    Procedure: COLONOSCOPY;  Surgeon: Zacarias Duran MD;  [facility]     ESOPHAGOSCOPY, GASTROSCOPY, DUODENOSCOPY (EGD), COMBINED N/A 8/6/2020    Procedure: ESOPHAGOGASTRODUODENOSCOPY, WITH BIOPSY;  Surgeon: Zacarias Duran MD;  [facility]     GASTRECTOMY  11-    Bilat truncal vagotomy, hemigastrectomy, RnY gastrojejunostomy     GASTROJEJUNOSTOMY  6/2/2011    Procedure:GASTROJEJUNOSTOMY; exploratory laparotmy with revision of gastrojejunostomy, Antrectomy, Miles-en-y, Gastrojejunostomy; Surgeon:JULIUS HELM; [facility]     INSERT CHEST TUBE N/A 4/29/2019    Procedure: INSERTION, CATHETER, INTERCOSTAL, FOR DRAINAGE;  Surgeon: Melissa Nichols MD;  [facility]     LASER HOLMIUM LITHOTRIPSY URETER(S), INSERT STENT, COMBINED      Procedure: COMBINED CYSTOSCOPY, URETEROSCOPY, LASER HOLMIUM LITHOTRIPSY URETER(S), INSERT STENT;;  Surgeon: Blair Correa MD;  [facility]     LASER HOLMIUM NEPHROLITHOTOMY VIA PERCUTANEOUS NEPHROSTOMY  7/11/2012    Procedure: LASER HOLMIUM NEPHROLITHOTOMY VIA PERCUTANEOUS NEPHROSTOMY;  proceedure should read: Left Percutaneous Access, Left Percutaneous ultrasonic Nephrolithotomy, Ureteroscopy Holmium Laser Lithotripsy Stent Placement ;  Surgeon: Blair Correa MD;  [facility]     MAMMOPLASTY AUGMENTATION BILATERAL       OPEN REDUCTION INTERNAL FIXATION WRIST Left 1/11/2016    Procedure: OPEN REDUCTION INTERNAL FIXATION WRIST;  Surgeon: Darling Ellis MD;  [facility]     RECONSTRUCT BREAST, IMPLANT PROSTHESIS, COMBINED       THORACOSCOPIC DECORTICATION LUNG Left 5/7/2019    Procedure: Left Video Assisted Thoracoscopic Decortication, Intercostal Nerve Cryo Analgesia, Flexible Bronchoscopy;  Surgeon: Patrice Regalado MD;  Location: [facility]        MEDICATIONS:  Current Outpatient Medications   Medication      butalbital-acetaminophen-caffeine (ESGIC) -40 MG tablet     escitalopram (LEXAPRO) 20 MG tablet     folic acid (FOLVITE) 1 MG tablet     furosemide (LASIX) 20 MG tablet     hydroxychloroquine (PLAQUENIL) 200 MG tablet     hydrOXYzine (VISTARIL) 25 MG capsule     ibuprofen (ADVIL/MOTRIN) 200 MG tablet     methadone (DOLOPHINE-INTENSOL) 10 MG/ML (HIGH CONC) solution     pantoprazole (PROTONIX) 40 MG EC tablet     polyethylene glycol (MIRALAX) 17 GM/Dose powder     pregabalin (LYRICA) 150 MG capsule     valACYclovir (VALTREX) 500 MG tablet     valACYclovir (VALTREX) 500 MG tablet     naloxone (NARCAN) 4 MG/0.1ML nasal spray     No current facility-administered medications for this visit.         ALLERGIES:  Allergies   Allergen Reactions     Penicillins Hives     Hives in childhood.        SOCIAL HISTORY:  Social History     Socioeconomic History     Marital status: Single     Spouse name: None     Number of children: None     Years of education: None     Highest education level: None   Occupational History     None   Social Needs     Financial resource strain: None     Food insecurity     Worry: None     Inability: None     Transportation needs     Medical: None     Non-medical: None   Tobacco Use     Smoking status: Never Smoker     Smokeless tobacco: Never Used   Substance and Sexual Activity     Alcohol use: No     Comment: none in 10 years     Drug use: No     Sexual activity: Not Currently   Lifestyle     Physical activity     Days per week: None     Minutes per session: None     Stress: None   Relationships     Social connections     Talks on phone: None     Gets together: None     Attends Gnosticism service: None     Active member of club or organization: None     Attends meetings of clubs or organizations: None     Relationship status: None     Intimate partner violence     Fear of current or ex partner: None     Emotionally abused: None     Physically abused: None     Forced sexual activity: None  "  Other Topics Concern     Parent/sibling w/ CABG, MI or angioplasty before 65F 55M? Not Asked   Social History Narrative    Intake 4/16/15:        H/o divorce but most recently living with SO Alfonso. Alfonso recently passed away.         In past employed as a .         Tobacco use: denies    Alcohol use: denies    Drug use: daily pot use for 30 years. Currently with sobriety.            FAMILY HISTORY:  Family History   Problem Relation Age of Onset     Substance Abuse Mother      Family History Negative Father      Substance Abuse Maternal Grandfather      Substance Abuse Brother      Liver Disease No family hx of      Colon Cancer No family hx of      Ulcerative Colitis No family hx of      Crohn's Disease No family hx of         CT scan reviewed.    PHYSICAL EXAM:  Objective    /69 (BP Location: Left arm, Patient Position: Sitting, Cuff Size: Adult Regular)   Pulse 71   Ht 1.6 m (5' 3\")   Wt 60.1 kg (132 lb 6.4 oz)   LMP  (LMP Unknown)   SpO2 95%   BMI 23.45 kg/m      Physical Exam   NAD  Breathing unlabored.  Prior incisions from laparotomy on abdomen.    PHYSICAL EXAM AREA OF INTEREST:  Abdomen.      DISCUSSION OF RISKS:  N/a yet.      Sincerely,     Kaveh Clayton MD      "

## 2021-03-11 NOTE — PROGRESS NOTES
New General Surgery Consultation Note        Demetra Richardson  [id number]  1955  March 11, 2021     Requesting Provider: Kiki Guaman     Dear Fredy Benites,     I had the pleasure of seeing your patient, Demetra Richardson is a 65 year old female who presents to clinic today for the following health issues          CHIEF COMPLAINT:  Has pain on left side of abdomen.    Reports stabbing pain on left side of abdomen.       Assessment & Plan   Problem List Items Addressed This Visit     None      Visit Diagnoses     Anastomotic stricture of gastrojejunostomy             Will do upper endoscopy by me.  Can be with anesthesia for MAC on a non-Wednesday; Eliu to schedule.      Eats like a 'bird'.  Has eaten this way since 2005 when she had Billroth II by Dr. Valdez for gastric outlet obstruction.    Feels like she has to 'lay out her stomach'; pain goes away when she regurgitates most of the days food.    Had revisional surgery by Dr. Pope.    Has constipation as well with q4 day bowel movements.    Feels the pain on the left side within 5-10 minutes of eating.  Hits left side and cannot go any further.    Has been going on for at least 1 year.    Has had an upper/lower endoscopy.    Review of external notes as documented above   Lab Results   Component Value Date    WBC 6.4 02/04/2021     Lab Results   Component Value Date    RBC 3.91 02/04/2021     Lab Results   Component Value Date    HGB 11.3 02/04/2021     Lab Results   Component Value Date    HCT 38.1 02/04/2021     No components found for: MCT  Lab Results   Component Value Date    MCV 97 02/04/2021     Lab Results   Component Value Date    MCH 28.9 02/04/2021     Lab Results   Component Value Date    MCHC 29.7 02/04/2021     Lab Results   Component Value Date    RDW 14.2 02/04/2021     Lab Results   Component Value Date     02/04/2021     Last Comprehensive Metabolic Panel:  Sodium   Date Value Ref Range Status   02/04/2021 142 133 -  144 mmol/L Final     Potassium   Date Value Ref Range Status   02/04/2021 4.6 3.4 - 5.3 mmol/L Final     Chloride   Date Value Ref Range Status   02/04/2021 107 94 - 109 mmol/L Final     Carbon Dioxide   Date Value Ref Range Status   02/04/2021 34 (H) 20 - 32 mmol/L Final     Anion Gap   Date Value Ref Range Status   02/04/2021 2 (L) 3 - 14 mmol/L Final     Glucose   Date Value Ref Range Status   02/04/2021 58 (L) 70 - 99 mg/dL Final     Urea Nitrogen   Date Value Ref Range Status   02/04/2021 16 7 - 30 mg/dL Final     Creatinine   Date Value Ref Range Status   02/04/2021 0.70 0.52 - 1.04 mg/dL Final     GFR Estimate   Date Value Ref Range Status   02/04/2021 >90 >60 mL/min/[1.73_m2] Final     Comment:     Non  GFR Calc  Starting 12/18/2018, serum creatinine based estimated GFR (eGFR) will be   calculated using the Chronic Kidney Disease Epidemiology Collaboration   (CKD-EPI) equation.       Calcium   Date Value Ref Range Status   02/04/2021 8.1 (L) 8.5 - 10.1 mg/dL Final     INR/Prothrombin Time  Liver Function Studies -   Recent Labs   Lab Test 02/04/21  1716   PROTTOTAL 5.2*   ALBUMIN 1.6*   BILITOTAL 0.1*   ALKPHOS 152*   AST 28   ALT 21     [unfilled]    Independent interpretation of a test performed by another physician/other qualified health care professional (not separately reported) - endoscopy; prior CT scans.    Discussion of management or test interpretation with external physician/other qualified healthcare professional/appropriate source - no    Diagnosis or treatment significantly limited by social determinants of health - no    30 minutes spent on the date of the encounter doing chart review, review of outside records, review of test results, interpretation of tests, patient visit and documentation       HISTORY OF PRESENT ILLNESS:    LOCATION:   epigastric and left-sided abdominal pain.    Long-lasting.    Has been present for last several years.    Had billroth II style  antrectomy/truncal vagotomy and gastrojejunostomy by Dr. Powell, .    Subsequently had revision gastrojejunostomy and retained antrum resection with jejunal feeding tube placement by Dr. Pope.      ASSOCIATED SIGNS/SYMPTOMS:   regurgitation / gastric outlet syndrome.       ROS    PAST MEDICAL HISTORY:  Past Medical History:   Diagnosis Date     Anemia      Basal cell carcinoma     Left-sided, skin over over medial orbit/nasal bone. Removed Oct 2018.     Benzodiazepine dependence (H)     With h/o withdrawal seizure x 1     Bipolar 1 disorder, mixed, moderate (H) 2013     Chronic pain     Back, legs.     Depressive disorder      Eosinophilic gastroenteritis 2010     Epidural abscess 2005    x4     Fibromyalgia      Generalised anxiety disorder      GERD (gastroesophageal reflux disease)      Major depressive disorder      Migraine      Nephrolithiasis     S/p left sided lithotripsy     Opiate dependence (H)      Osteoarthritis      Osteoporosis      PUD (peptic ulcer disease)      SLE (systemic lupus erythematosus) (H) 2009     Weight loss         PAST SURGICAL HISTORY:  Past Surgical History:   Procedure Laterality Date     BACK SURGERY  04    L4-5 epidural abscess     BACK SURGERY  04    L3-4 spinal stenosis     BACK SURGERY  04    Lt psoas abscess     BRONCHOSCOPY FLEXIBLE N/A 2019    Procedure: Flexible Bronchoscopy;  Surgeon: Patrice Regalado MD;  [facility]      SECTION      x 2     CHOLECYSTECTOMY  -     CLOSED REDUCTION, PERCUTANEOUS PINNING FINGER, COMBINED  8/10/2011    Procedure:COMBINED CLOSED REDUCTION, PERCUTANEOUS PINNING FINGER; 5th Proximal Phalanx; Surgeon:RADHA BUITRAGO; [facility]     COLONOSCOPY       COLONOSCOPY N/A 2020    Procedure: COLONOSCOPY;  Surgeon: Zacarias Duran MD;  [facility]     ESOPHAGOSCOPY, GASTROSCOPY, DUODENOSCOPY (EGD), COMBINED N/A 2020    Procedure: ESOPHAGOGASTRODUODENOSCOPY, WITH BIOPSY;   Surgeon: Zacarias Duran MD;  [facility]     GASTRECTOMY  11-    Bilat truncal vagotomy, hemigastrectomy, RnY gastrojejunostomy     GASTROJEJUNOSTOMY  6/2/2011    Procedure:GASTROJEJUNOSTOMY; exploratory laparotmy with revision of gastrojejunostomy, Antrectomy, Miles-en-y, Gastrojejunostomy; Surgeon:JULIUS HELM; [facility]     INSERT CHEST TUBE N/A 4/29/2019    Procedure: INSERTION, CATHETER, INTERCOSTAL, FOR DRAINAGE;  Surgeon: Melissa Nichols MD;  [facility]     LASER HOLMIUM LITHOTRIPSY URETER(S), INSERT STENT, COMBINED      Procedure: COMBINED CYSTOSCOPY, URETEROSCOPY, LASER HOLMIUM LITHOTRIPSY URETER(S), INSERT STENT;;  Surgeon: Blair Correa MD;  [facility]     LASER HOLMIUM NEPHROLITHOTOMY VIA PERCUTANEOUS NEPHROSTOMY  7/11/2012    Procedure: LASER HOLMIUM NEPHROLITHOTOMY VIA PERCUTANEOUS NEPHROSTOMY;  proceedure should read: Left Percutaneous Access, Left Percutaneous ultrasonic Nephrolithotomy, Ureteroscopy Holmium Laser Lithotripsy Stent Placement ;  Surgeon: Blair Correa MD;  [facility]     MAMMOPLASTY AUGMENTATION BILATERAL       OPEN REDUCTION INTERNAL FIXATION WRIST Left 1/11/2016    Procedure: OPEN REDUCTION INTERNAL FIXATION WRIST;  Surgeon: Darling Ellis MD;  [facility]     RECONSTRUCT BREAST, IMPLANT PROSTHESIS, COMBINED       THORACOSCOPIC DECORTICATION LUNG Left 5/7/2019    Procedure: Left Video Assisted Thoracoscopic Decortication, Intercostal Nerve Cryo Analgesia, Flexible Bronchoscopy;  Surgeon: Patrice Regalado MD;  [facility]        MEDICATIONS:  Current Outpatient Medications   Medication     butalbital-acetaminophen-caffeine (ESGIC) -40 MG tablet     escitalopram (LEXAPRO) 20 MG tablet     folic acid (FOLVITE) 1 MG tablet     furosemide (LASIX) 20 MG tablet     hydroxychloroquine (PLAQUENIL) 200 MG tablet     hydrOXYzine (VISTARIL) 25 MG capsule     ibuprofen (ADVIL/MOTRIN) 200 MG tablet      methadone (DOLOPHINE-INTENSOL) 10 MG/ML (HIGH CONC) solution     pantoprazole (PROTONIX) 40 MG EC tablet     polyethylene glycol (MIRALAX) 17 GM/Dose powder     pregabalin (LYRICA) 150 MG capsule     valACYclovir (VALTREX) 500 MG tablet     valACYclovir (VALTREX) 500 MG tablet     naloxone (NARCAN) 4 MG/0.1ML nasal spray     No current facility-administered medications for this visit.         ALLERGIES:  Allergies   Allergen Reactions     Penicillins Hives     Hives in childhood.        SOCIAL HISTORY:  Social History     Socioeconomic History     Marital status: Single     Spouse name: None     Number of children: None     Years of education: None     Highest education level: None   Occupational History     None   Social Needs     Financial resource strain: None     Food insecurity     Worry: None     Inability: None     Transportation needs     Medical: None     Non-medical: None   Tobacco Use     Smoking status: Never Smoker     Smokeless tobacco: Never Used   Substance and Sexual Activity     Alcohol use: No     Comment: none in 10 years     Drug use: No     Sexual activity: Not Currently   Lifestyle     Physical activity     Days per week: None     Minutes per session: None     Stress: None   Relationships     Social connections     Talks on phone: None     Gets together: None     Attends Druze service: None     Active member of club or organization: None     Attends meetings of clubs or organizations: None     Relationship status: None     Intimate partner violence     Fear of current or ex partner: None     Emotionally abused: None     Physically abused: None     Forced sexual activity: None   Other Topics Concern     Parent/sibling w/ CABG, MI or angioplasty before 65F 55M? Not Asked   Social History Narrative    Intake 4/16/15:        H/o divorce but most recently living with JUANIS Hamilton. Alfonso recently passed away.         In past employed as a .         Tobacco use: denies    Alcohol use: denies  "   Drug use: daily pot use for 30 years. Currently with sobriety.            FAMILY HISTORY:  Family History   Problem Relation Age of Onset     Substance Abuse Mother      Family History Negative Father      Substance Abuse Maternal Grandfather      Substance Abuse Brother      Liver Disease No family hx of      Colon Cancer No family hx of      Ulcerative Colitis No family hx of      Crohn's Disease No family hx of         CT scan reviewed.    PHYSICAL EXAM:  Objective    /69 (BP Location: Left arm, Patient Position: Sitting, Cuff Size: Adult Regular)   Pulse 71   Ht 1.6 m (5' 3\")   Wt 60.1 kg (132 lb 6.4 oz)   LMP  (LMP Unknown)   SpO2 95%   BMI 23.45 kg/m      Physical Exam   NAD  Breathing unlabored.  Prior incisions from laparotomy on abdomen.    PHYSICAL EXAM AREA OF INTEREST:  Abdomen.        DISCUSSION OF RISKS:  N/a yet.         Sincerely,     Kaveh Clayton MD    " increased appetite

## 2021-03-11 NOTE — NURSING NOTE
"Chief Complaint   Patient presents with     Consult     Anastomotic stricture of gastrojejunostomy.       Vitals:    03/11/21 1010   BP: 120/69   BP Location: Left arm   Patient Position: Sitting   Cuff Size: Adult Regular   Pulse: 71   SpO2: 95%   Weight: 60.1 kg (132 lb 6.4 oz)   Height: 1.6 m (5' 3\")       Body mass index is 23.45 kg/m .                            TD BALDWIN, EMT    "

## 2021-03-12 ENCOUNTER — OFFICE VISIT (OUTPATIENT)
Dept: FAMILY MEDICINE | Facility: CLINIC | Age: 66
End: 2021-03-12
Payer: COMMERCIAL

## 2021-03-12 VITALS
SYSTOLIC BLOOD PRESSURE: 115 MMHG | BODY MASS INDEX: 23.21 KG/M2 | HEART RATE: 76 BPM | DIASTOLIC BLOOD PRESSURE: 74 MMHG | OXYGEN SATURATION: 92 % | WEIGHT: 131 LBS

## 2021-03-12 DIAGNOSIS — M79.604 BILATERAL LEG PAIN: Primary | ICD-10-CM

## 2021-03-12 DIAGNOSIS — F41.9 ANXIETY DISORDER, UNSPECIFIED TYPE: ICD-10-CM

## 2021-03-12 DIAGNOSIS — M79.605 BILATERAL LEG PAIN: Primary | ICD-10-CM

## 2021-03-12 DIAGNOSIS — M79.89 LEG SWELLING: ICD-10-CM

## 2021-03-12 DIAGNOSIS — E88.09 HYPOALBUMINEMIA: ICD-10-CM

## 2021-03-12 PROCEDURE — 99214 OFFICE O/P EST MOD 30 MIN: CPT | Performed by: FAMILY MEDICINE

## 2021-03-12 RX ORDER — HYDROXYZINE PAMOATE 25 MG/1
25 CAPSULE ORAL 2 TIMES DAILY PRN
Qty: 60 CAPSULE | Refills: 11 | Status: SHIPPED | OUTPATIENT
Start: 2021-03-12 | End: 2022-03-23

## 2021-03-12 RX ORDER — AMITRIPTYLINE HYDROCHLORIDE 10 MG/1
10 TABLET ORAL AT BEDTIME
Qty: 30 TABLET | Refills: 0 | Status: SHIPPED | OUTPATIENT
Start: 2021-03-12 | End: 2021-05-10

## 2021-03-12 RX ORDER — GABAPENTIN 300 MG/1
300 CAPSULE ORAL 3 TIMES DAILY
Qty: 90 CAPSULE | Refills: 1 | Status: SHIPPED | OUTPATIENT
Start: 2021-03-12 | End: 2021-03-12

## 2021-03-12 ASSESSMENT — PAIN SCALES - GENERAL: PAINLEVEL: NO PAIN (0)

## 2021-03-12 NOTE — PROGRESS NOTES
Assessment & Plan     Bilateral leg pain  Will augment lyrica w/ this, likely neuralgia due to edema  - amitriptyline (ELAVIL) 10 MG tablet; Take 1 tablet (10 mg) by mouth At Bedtime    Leg swelling  Mohit hose, thigh high  - MISCELLANEOUS DME SUPPLY OR ACCESSORY, NOT OTHERWISE SPECIFIED    Anxiety disorder, unspecified type  Prn, needs refill  - hydrOXYzine (VISTARIL) 25 MG capsule; Take 1 capsule (25 mg) by mouth 2 times daily as needed for anxiety    Low albumin: per pt would eat more if could, working w/ GI (Alec appt rvwd) and now G Surg, might have stricture to treat      35 minutes spent on the date of the encounter doing chart review, history and exam, documentation and further activities as noted above    Return in about 1 month (around 4/12/2021).    Fredy Merritt MD  [facility] [place]    Donna Garcia is a 65 year old who presents for the following health issues     HPI Here in f/u. Legs still swollen. On hi dose Lyrica. Legs hurt. Took Lasix, did not help swelling much. Never did echo. However her low abl/protein would trigger osmotic pressure low, then edema. Discussed, she discussed w/ G Surg, she understands. H/o GI surgery. She thinks she consumes protein, so likely does not absorb it, per recent hepatology visit no intrinsic liver disease. Having EGD soon per pt.   Has psychiatrist elsewhere.        Review of Systems         Objective    /74 (BP Location: Right arm, Patient Position: Sitting, Cuff Size: Adult Regular)   Pulse 76   Wt 59.4 kg (131 lb)   LMP  (LMP Unknown)   SpO2 92%   Breastfeeding No   BMI 23.21 kg/m    Body mass index is 23.21 kg/m .  Physical Exam   GENERAL: healthy, alert and no distress  RESP: lungs clear to auscultation - no rales, rhonchi or wheezes  CV: regular rate and rhythm, normal S1 S2, no S3 or S4, no murmur, click or rub, no peripheral edema and peripheral pulses strong  ABDOMEN: soft, nontender, no  hepatosplenomegaly, no masses and bowel sounds normal  MS: Two plus edema both legs; not red/warm, skin is intact

## 2021-03-12 NOTE — NURSING NOTE
Chief Complaint   Patient presents with     Edema     pt would like to discuss edema      EMIR Sabillon at 10:58 AM sign on 3/12/2021

## 2021-03-12 NOTE — PATIENT INSTRUCTIONS
ECHO [phone] (3rd Floor [facility] [place] [place] [facility], 3[address]S)

## 2021-03-15 ENCOUNTER — ANCILLARY PROCEDURE (OUTPATIENT)
Dept: GENERAL RADIOLOGY | Facility: CLINIC | Age: 66
End: 2021-03-15
Attending: INTERNAL MEDICINE
Payer: COMMERCIAL

## 2021-03-15 ENCOUNTER — TELEPHONE (OUTPATIENT)
Dept: GASTROENTEROLOGY | Facility: CLINIC | Age: 66
End: 2021-03-15

## 2021-03-15 ENCOUNTER — OFFICE VISIT (OUTPATIENT)
Dept: RHEUMATOLOGY | Facility: CLINIC | Age: 66
End: 2021-03-15
Attending: INTERNAL MEDICINE
Payer: COMMERCIAL

## 2021-03-15 VITALS
HEART RATE: 55 BPM | SYSTOLIC BLOOD PRESSURE: 119 MMHG | DIASTOLIC BLOOD PRESSURE: 73 MMHG | TEMPERATURE: 97.9 F | WEIGHT: 130 LBS | OXYGEN SATURATION: 95 % | BODY MASS INDEX: 23.03 KG/M2

## 2021-03-15 DIAGNOSIS — M25.561 ACUTE PAIN OF RIGHT KNEE: ICD-10-CM

## 2021-03-15 DIAGNOSIS — D50.9 ANEMIA, IRON DEFICIENCY: ICD-10-CM

## 2021-03-15 DIAGNOSIS — R68.2 DRY MOUTH: ICD-10-CM

## 2021-03-15 DIAGNOSIS — M32.9 SYSTEMIC LUPUS ERYTHEMATOSUS, UNSPECIFIED SLE TYPE, UNSPECIFIED ORGAN INVOLVEMENT STATUS (H): ICD-10-CM

## 2021-03-15 DIAGNOSIS — M32.9 SYSTEMIC LUPUS ERYTHEMATOSUS, UNSPECIFIED SLE TYPE, UNSPECIFIED ORGAN INVOLVEMENT STATUS (H): Primary | ICD-10-CM

## 2021-03-15 LAB
BASOPHILS # BLD AUTO: 0 10E9/L (ref 0–0.2)
BASOPHILS NFR BLD AUTO: 0.6 %
CRP SERPL-MCNC: 24.6 MG/L (ref 0–8)
DEPRECATED CALCIDIOL+CALCIFEROL SERPL-MC: 15 UG/L (ref 20–75)
DIFFERENTIAL METHOD BLD: ABNORMAL
EOSINOPHIL # BLD AUTO: 0.9 10E9/L (ref 0–0.7)
EOSINOPHIL NFR BLD AUTO: 17.4 %
ERYTHROCYTE [DISTWIDTH] IN BLOOD BY AUTOMATED COUNT: 13.3 % (ref 10–15)
ERYTHROCYTE [SEDIMENTATION RATE] IN BLOOD BY WESTERGREN METHOD: 46 MM/H (ref 0–30)
HCT VFR BLD AUTO: 36.1 % (ref 35–47)
HGB BLD-MCNC: 10.4 G/DL (ref 11.7–15.7)
IMM GRANULOCYTES # BLD: 0 10E9/L (ref 0–0.4)
IMM GRANULOCYTES NFR BLD: 0.2 %
LYMPHOCYTES # BLD AUTO: 0.9 10E9/L (ref 0.8–5.3)
LYMPHOCYTES NFR BLD AUTO: 16.8 %
MCH RBC QN AUTO: 28.1 PG (ref 26.5–33)
MCHC RBC AUTO-ENTMCNC: 28.8 G/DL (ref 31.5–36.5)
MCV RBC AUTO: 98 FL (ref 78–100)
MONOCYTES # BLD AUTO: 0.7 10E9/L (ref 0–1.3)
MONOCYTES NFR BLD AUTO: 13 %
NEUTROPHILS # BLD AUTO: 2.8 10E9/L (ref 1.6–8.3)
NEUTROPHILS NFR BLD AUTO: 52 %
NRBC # BLD AUTO: 0 10*3/UL
NRBC BLD AUTO-RTO: 0 /100
PLATELET # BLD AUTO: 214 10E9/L (ref 150–450)
RBC # BLD AUTO: 3.7 10E12/L (ref 3.8–5.2)
TSH SERPL DL<=0.005 MIU/L-ACNC: 1.88 MU/L (ref 0.4–4)
WBC # BLD AUTO: 5.3 10E9/L (ref 4–11)

## 2021-03-15 PROCEDURE — 86140 C-REACTIVE PROTEIN: CPT | Performed by: PATHOLOGY

## 2021-03-15 PROCEDURE — 82306 VITAMIN D 25 HYDROXY: CPT | Performed by: PATHOLOGY

## 2021-03-15 PROCEDURE — 73560 X-RAY EXAM OF KNEE 1 OR 2: CPT | Mod: RT | Performed by: RADIOLOGY

## 2021-03-15 PROCEDURE — 85025 COMPLETE CBC W/AUTO DIFF WBC: CPT | Performed by: PATHOLOGY

## 2021-03-15 PROCEDURE — 82180 ASSAY OF ASCORBIC ACID: CPT | Mod: 90 | Performed by: PATHOLOGY

## 2021-03-15 PROCEDURE — 84443 ASSAY THYROID STIM HORMONE: CPT | Performed by: PATHOLOGY

## 2021-03-15 PROCEDURE — 86431 RHEUMATOID FACTOR QUANT: CPT | Performed by: PATHOLOGY

## 2021-03-15 PROCEDURE — 86160 COMPLEMENT ANTIGEN: CPT | Performed by: PATHOLOGY

## 2021-03-15 PROCEDURE — 36415 COLL VENOUS BLD VENIPUNCTURE: CPT | Performed by: PATHOLOGY

## 2021-03-15 PROCEDURE — 85652 RBC SED RATE AUTOMATED: CPT | Performed by: PATHOLOGY

## 2021-03-15 PROCEDURE — 99214 OFFICE O/P EST MOD 30 MIN: CPT | Performed by: INTERNAL MEDICINE

## 2021-03-15 PROCEDURE — 84425 ASSAY OF VITAMIN B-1: CPT | Mod: 90 | Performed by: PATHOLOGY

## 2021-03-15 PROCEDURE — 99000 SPECIMEN HANDLING OFFICE-LAB: CPT | Performed by: PATHOLOGY

## 2021-03-15 PROCEDURE — 84630 ASSAY OF ZINC: CPT | Mod: 90 | Performed by: PATHOLOGY

## 2021-03-15 PROCEDURE — 86225 DNA ANTIBODY NATIVE: CPT | Performed by: PATHOLOGY

## 2021-03-15 PROCEDURE — G0463 HOSPITAL OUTPT CLINIC VISIT: HCPCS

## 2021-03-15 PROCEDURE — 86200 CCP ANTIBODY: CPT | Performed by: PATHOLOGY

## 2021-03-15 RX ORDER — PILOCARPINE HYDROCHLORIDE 5 MG/1
5 TABLET, FILM COATED ORAL 4 TIMES DAILY
Qty: 360 TABLET | Refills: 1 | Status: ON HOLD | OUTPATIENT
Start: 2021-03-15 | End: 2021-06-08

## 2021-03-15 RX ORDER — HYDROXYCHLOROQUINE SULFATE 200 MG/1
200 TABLET, FILM COATED ORAL 2 TIMES DAILY WITH MEALS
Qty: 180 TABLET | Refills: 1 | Status: SHIPPED | OUTPATIENT
Start: 2021-03-15 | End: 2023-01-01
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
## 2021-03-15 NOTE — PATIENT INSTRUCTIONS
Labs today, R knee x-ray    Try the plaquenil 200 mg twice a day with food    Try salagen 4 times a day for dry mouth    Biotene mouthwash, ACT lozenges for dry mouth    Consider dermatology appointment and MRI of pelvis    Return in 3-4 months

## 2021-03-15 NOTE — LETTER
March 24, 2021      Demetra العلي Dylan  [address] [place] [address]        Dear ,    We are writing to inform you of your test results.    Your zinc level is low. Low zinc level which is common in malabsorption, could cause tiredness, hair loss, diarrhea and dermatitis (could explain skin lesions you have). I recommend to start taking over the counter zinc 30 mg a day (nature made is a good brand to try).      I faxed a vitamin D prescription to your pharmacy to replace low vitamin D.      We put you back on plaquenil for increased inflammation markers (ESR/CRP).    Resulted Orders   Vitamin B1 whole blood   Result Value Ref Range    Vitamin B1 Whole Blood Level 166 70 - 180 nmol/L      Comment:      (Note)  INTERPRETIVE INFORMATION: Vitamin B1, Whole Blood  This assay measures the concentration of thiamine   diphosphate (TDP), the primary active form of vitamin B1.   Approximately 90 percent of vitamin B1 present in whole   blood is TDP. Thiamine and thiamine monophosphate, which   comprise the remaining 10 percent, are not measured.  This test was developed and its performance characteristics   determined by [facility]. It has not been cleared or   approved by the US Food and Drug Administration. This test   was performed in a CLIA certified laboratory and is   intended for clinical purposes.  Performed By: [facility]  [address] [place] [id number]  : Jacquelin Baum MD     TSH with free T4 reflex   Result Value Ref Range    TSH 1.88 0.40 - 4.00 mU/L   Vitamin C   Result Value Ref Range    Vitamin C 78 23 - 114 umol/L      Comment:      (Note)  INTERPRETIVE DATA: Vitamin C (Ascorbic Acid), Plasma  Vitamin C concentrations lower than 11 umol/L indicate   deficiency. Concentrations between 11 and 23 umol/L are   consistent with a moderate risk of deficiency due to   inadequate tissue stores.   Vitamin C concentration is reported as micromoles  per liter   (umol/L). To convert concentration to milligrams per   deciliter (mg/dL), multiply the result by 0.0176.  This test was developed and its performance characteristics   determined by [facility]. It has not been cleared or   approved by the US Food and Drug Administration. This test   was performed in a CLIA certified laboratory and is   intended for clinical purposes.  Performed By: [facility]  [address] [place] [address]  : Jacquelin Baum MD     Zinc   Result Value Ref Range    Zinc 50.4 (L) 60.0 - 120.0 ug/dL      Comment:      (Note)  INTERPRETIVE INFORMATION: Zinc, Serum or Plasma  Elevated results may be due to skin or collection-related   contamination, including the use of a noncertified   metal-free collection/transport tube. If contamination   concerns exist due to elevated levels of serum/plasma zinc,   confirmation with a second specimen collected in a   certified metal-free tube is recommended.  Circulating zinc concentrations are dependent on albumin   status and are depressed with malnutrition.  Zinc may also   be lowered with infection, inflammation, stress, oral   contraceptives, and pregnancy.  Zinc may be elevated with   zinc supplementation or fasting.  Elevated zinc   concentrations may interfere with copper absorption.   This test was developed and its performance characteristics   determined by [facility]. It has not been cleared or   approved by the US Food and Drug Administration. This test   was performed in a CLIA certified laboratory and is   intended for clinical purposes.  Pe rformed By: [facility]  [address] [place] [address]  : Jacquelin Baum MD     Complement C4   Result Value Ref Range    Complement C4 37 13 - 39 mg/dL   Complement C3   Result Value Ref Range    Complement C3 119 81 - 157 mg/dL   Cyclic Citrullinated Peptide Antibody IgG   Result Value Ref Range    Cyclic  Citrullinated Peptide Antibody, IgG 3 <7 U/mL      Comment:      Negative   Rheumatoid factor   Result Value Ref Range    Rheumatoid Factor <7 <12 IU/mL   Vitamin D Deficiency   Result Value Ref Range    Vitamin D Deficiency screening 15 (L) 20 - 75 ug/L      Comment:      Season, race, dietary intake, and treatment affect the concentration of   25-hydroxy-Vitamin D. Values may decrease during winter months and increase   during summer months. Values 20-29 ug/L may indicate Vitamin D insufficiency   and values <20 ug/L may indicate Vitamin D deficiency.  Vitamin D determination is routinely performed by an immunoassay specific for   25 hydroxyvitamin D3.  If an individual is on vitamin D2 (ergocalciferol)   supplementation, please specify 25 OH vitamin D2 and D3 level determination by   LCMSMS test [id number].     DNA double stranded antibodies   Result Value Ref Range    DNA-ds 8 <10 IU/mL      Comment:      Negative   Erythrocyte sedimentation rate auto   Result Value Ref Range    Sed Rate 46 (H) 0 - 30 mm/h   CRP inflammation   Result Value Ref Range    CRP Inflammation 24.6 (H) 0.0 - 8.0 mg/L       If you have any questions or concerns, please call the clinic at the number listed above.       Sincerely,      Sam Narayanan MD

## 2021-03-15 NOTE — NURSING NOTE
Chief Complaint   Patient presents with     RECHECK     Lupus     /73 (BP Location: Right arm, Patient Position: Sitting, Cuff Size: Adult Regular)   Pulse 55   Temp 97.9  F (36.6  C)   Wt 59 kg (130 lb)   LMP  (LMP Unknown)   SpO2 95%   BMI 23.03 kg/m        Landon Wallis, EMT

## 2021-03-16 DIAGNOSIS — E55.9 VITAMIN D DEFICIENCY: Primary | ICD-10-CM

## 2021-03-16 DIAGNOSIS — K21.00 REFLUX ESOPHAGITIS: ICD-10-CM

## 2021-03-16 DIAGNOSIS — K52.9 JEJUNITIS: ICD-10-CM

## 2021-03-16 LAB
C3 SERPL-MCNC: 119 MG/DL (ref 81–157)
C4 SERPL-MCNC: 37 MG/DL (ref 13–39)
CCP AB SER IA-ACNC: 3 U/ML
DSDNA AB SER-ACNC: 8 IU/ML
RHEUMATOID FACT SER NEPH-ACNC: <7 IU/ML (ref 0–20)

## 2021-03-16 RX ORDER — ERGOCALCIFEROL 1.25 MG/1
50000 CAPSULE, LIQUID FILLED ORAL
Qty: 12 CAPSULE | Refills: 0 | Status: ON HOLD | OUTPATIENT
Start: 2021-03-16 | End: 2021-09-10

## 2021-03-16 NOTE — RESULT ENCOUNTER NOTE
I faxed a vitamin D prescription to your pharmacy to replace low vitamin D. Inflammation markers are up so plaquenil is a good idea.

## 2021-03-17 ENCOUNTER — TELEPHONE (OUTPATIENT)
Dept: GASTROENTEROLOGY | Facility: CLINIC | Age: 66
End: 2021-03-17

## 2021-03-17 RX ORDER — PANTOPRAZOLE SODIUM 40 MG/1
TABLET, DELAYED RELEASE ORAL
Qty: 90 TABLET | Refills: 2 | Status: ON HOLD | OUTPATIENT
Start: 2021-03-17 | End: 2021-07-03

## 2021-03-17 NOTE — TELEPHONE ENCOUNTER
"Talked with Radha about getting her EGD done with Dr. Clayton under [facility].    Sent message to Eliu Maxwell on 3/15/2021 with this request.   \"Dr. Clayton didn't complete his dictation on this patient. He's in [facility] until Thursday. I'll reach out to him to see what he wants to do, please put on the \"back burner\" until he responds to me\"    Sent another message to Eliu again today-       "

## 2021-03-18 LAB
VIT B1 BLD-MCNC: 166 NMOL/L (ref 70–180)
VIT C SERPL-MCNC: 78 UMOL/L (ref 23–114)
ZINC SERPL-MCNC: 50.4 UG/DL (ref 60–120)

## 2021-03-22 ENCOUNTER — TELEPHONE (OUTPATIENT)
Dept: GASTROENTEROLOGY | Facility: CLINIC | Age: 66
End: 2021-03-22

## 2021-03-22 NOTE — PROGRESS NOTES
Individual Psychotherapy Session (telephone session)     Telephone Visit Details  Demetra Richardson is a 64 year old pt. who is being evaluated via a telephone visit.     Type of service:  Telephone visit for psychotherapy  Time of service:    Start Time:  10:05        End Time:  10:50  Reason for Telephone Visit: Patient unable to travel due to Covid-19.  Originating Site (patient location): Patient's home  Distant Site (provider location): Remote location  Mode of Communication:  Telephone call   Consent:  Patient has given verbal consent for telephone visit?: Yes     Care Provider: Leighann Lewis (Skalski), PhD, LP  Participants: Patient and writer  DSM-5 Diagnoses:   Opioid use disorder, on maintenance therapy  Benzodiazepine use disorder, in early remission  Unspecified anxiety   MDD, in sustained remission    SUBJECTIVE: No urges/ thoughts of substance use. No depressed mood. Minimal anxiety. Described significant health concerns, including abdominal pain and difficulty keeping food down, that she is working with providers to uncover the cause.     TREATMENT: Radha decided not to complete [facility] tx group due to it still being offered remote. Discussed how to problem-solve, and we agreed to try our sessions using video conferencing. Identified long-term goals to decrease isolation, including volunteering and Baptism groups.      MSE:  Alertness: alert and oriented  Speech: normal  Language: intact  Mood: consistent with euthymia   Affect: full range; was congruent to mood; was congruent to content  Thought Process/Associations: unremarkable  Thought Content:  Reports none  Perception:  Reports none;  Insight: adequate for  safety  Judgment: adequate for safety  Cognition: (6) does  appear grossly intact; formal cognitive testing was not done    PLAN:   1) Therapy scheduled for Wed 3/24 at 10am (tx plan due for review end of Aug, 2021)    Performed and documented by: Leighann Lewis (Skalski), PhD, LP

## 2021-03-22 NOTE — TELEPHONE ENCOUNTER
"Left message for Radha to call back and schedule her EGD with Dr. Clayton under deep/mac    Per staff message from Eliu Maxwell \"Dr Clayton just got back with me, he is requesting either 8:45 or 9 a.m. on 4/13\"    I also got approval from Jesenia Nur from the [facility] that those times would work for the staff there.       "

## 2021-03-23 ENCOUNTER — TELEPHONE (OUTPATIENT)
Dept: GASTROENTEROLOGY | Facility: OUTPATIENT CENTER | Age: 66
End: 2021-03-23

## 2021-03-23 ENCOUNTER — HOSPITAL ENCOUNTER (OUTPATIENT)
Facility: CLINIC | Age: 66
End: 2021-03-23
Attending: SURGERY | Admitting: SURGERY
Payer: COMMERCIAL

## 2021-03-23 NOTE — TELEPHONE ENCOUNTER
Patient is scheduled for EGD with Dr. MADRIGAL    Spoke with: PATIENT    Date of Procedure: 4/13/2021    Location: [facility]    Sedation Type: MAC    Conscious Sedation- Needs  for 6 hours after the procedure  MAC/General-Needs  for 24 hours after procedure    Pre-op for [facility] MAC and OR: Y-PT IS AWARE; WILL BE GOING TO PRIMARY    (if yes advise patient they will need a pre-op prior to procedure)      Is patient on blood thinners? -: N   (If yes- inform patient to follow up with PCP or provider for follow up instructions)     Informed patient they will need an adult  : Y  Cannot take any type of public or medical transportation alone    Informed Patient of COVID Test Requirement: Y-SHED    Preferred Pharmacy for Pre Prescription: N/A    Confirmed Nurse will call to complete assessment: N    Additional comments: N/A

## 2021-03-24 ENCOUNTER — VIRTUAL VISIT (OUTPATIENT)
Dept: PSYCHIATRY | Facility: CLINIC | Age: 66
End: 2021-03-24
Attending: PSYCHOLOGIST
Payer: COMMERCIAL

## 2021-03-24 DIAGNOSIS — F41.9 ANXIETY: Primary | ICD-10-CM

## 2021-03-24 PROCEDURE — 90834 PSYTX W PT 45 MINUTES: CPT | Mod: 95 | Performed by: PSYCHOLOGIST

## 2021-03-24 NOTE — RESULT ENCOUNTER NOTE
Your zinc level is also low. Low zinc level which is common in malabsorption, could cause tiredness, hair loss, diarrhea and dermatitis (could explain skin lesions you have). I recommend to start taking over the counter zinc 30 mg a day (nature made is a good brand to try).

## 2021-03-27 DIAGNOSIS — Z11.59 ENCOUNTER FOR SCREENING FOR OTHER VIRAL DISEASES: ICD-10-CM

## 2021-03-30 ENCOUNTER — TELEPHONE (OUTPATIENT)
Dept: FAMILY MEDICINE | Facility: CLINIC | Age: 66
End: 2021-03-30

## 2021-03-30 NOTE — TELEPHONE ENCOUNTER
[facility]    Phone Message    May a detailed message be left on voicemail: yes     Reason for Call: Requesting Results   Name/type of test: recent labs and imaging   Date of test: all recent   Was test done at a location other than [facility] (Please fill in the location if not [facility])?: No      Action Taken: Message routed to:  [facility] ([facility] Patient called requesting the lab results and imaging results mailed to her. I advised patient that Dr. Narayanan ordered the tests so this will be forwarded to the provider to send to her.     Travel Screening: not applicable

## 2021-03-31 ENCOUNTER — ANCILLARY PROCEDURE (OUTPATIENT)
Dept: CARDIOLOGY | Facility: CLINIC | Age: 66
End: 2021-03-31
Attending: FAMILY MEDICINE
Payer: COMMERCIAL

## 2021-03-31 DIAGNOSIS — R06.09 DOE (DYSPNEA ON EXERTION): ICD-10-CM

## 2021-03-31 PROCEDURE — 93306 TTE W/DOPPLER COMPLETE: CPT | Performed by: INTERNAL MEDICINE

## 2021-04-01 NOTE — PROGRESS NOTES
Individual Psychotherapy Session (telephone session)     Telephone Visit Details  Demetra Richardson is a 64 year old pt. who is being evaluated via a telephone visit.     Type of service:  Telephone visit for psychotherapy  Time of service:    Start Time:  10:10        End Time:  10:55  Reason for Telephone Visit: Patient unable to travel due to Covid-19.  Originating Site (patient location): Patient's home  Distant Site (provider location): Remote location  Mode of Communication:  Telephone call   Consent:  Patient has given verbal consent for telephone visit?: Yes     Care Provider: Leighann Lewis (Skalski), PhD, LP  Participants: Patient and writer  DSM-5 Diagnoses:   Opioid use disorder, on maintenance therapy  Benzodiazepine use disorder, in sustained remission  Unspecified anxiety  MDD, in sustained remission    SUBJECTIVE: No urges/ thoughts of substance use. No depressed mood. No significant anxiety. Described significant health concerns, including abdominal pain and difficulty keeping food down; she is working with her medical team to identify and treat symptoms.     TREATMENT: I provided reflective listening and validation, particularly related to stressors surrounding medical condition. We agreed long-term goals to decrease social isolation would be best to pursue when Radha feels better.      MSE:  Alertness: alert and oriented  Speech: normal  Language: intact  Mood: consistent with euthymia   Affect: full range; was congruent to mood; was congruent to content  Thought Process/Associations: unremarkable  Thought Content:  Reports none  Perception:  Reports none;  Insight: adequate for  safety  Judgment: adequate for safety  Cognition: (6) does  appear grossly intact; formal cognitive testing was not done    PLAN:   1) Therapy scheduled for 1 week (tx plan due for review end of Aug, 2021)    Performed and documented by: Leighann Lewis (Skalski), PhD, LP

## 2021-04-01 NOTE — TELEPHONE ENCOUNTER
Lab and imaging results have been placed in the mail per patient request. Patient informed via [employer] message.     Lianna Rayo CMA   4/1/2021 10:31 AM

## 2021-04-02 ENCOUNTER — TELEPHONE (OUTPATIENT)
Dept: GASTROENTEROLOGY | Facility: CLINIC | Age: 66
End: 2021-04-02

## 2021-04-02 NOTE — TELEPHONE ENCOUNTER
Pre assessment questions completed.     Covid test scheduled 4/9/21  Pre-op exam scheduled 4/9/21 with Dr. Merritt    Patient had no questions or concerns at this time.    Nargis Beltre RN

## 2021-04-09 ENCOUNTER — OFFICE VISIT (OUTPATIENT)
Dept: FAMILY MEDICINE | Facility: CLINIC | Age: 66
End: 2021-04-09
Payer: COMMERCIAL

## 2021-04-09 VITALS
HEART RATE: 80 BPM | DIASTOLIC BLOOD PRESSURE: 64 MMHG | WEIGHT: 131 LBS | OXYGEN SATURATION: 94 % | BODY MASS INDEX: 23.21 KG/M2 | SYSTOLIC BLOOD PRESSURE: 97 MMHG

## 2021-04-09 DIAGNOSIS — M79.89 LEG SWELLING: Primary | ICD-10-CM

## 2021-04-09 DIAGNOSIS — I87.2 VENOUS STASIS DERMATITIS OF BOTH LOWER EXTREMITIES: ICD-10-CM

## 2021-04-09 DIAGNOSIS — Z11.59 ENCOUNTER FOR SCREENING FOR OTHER VIRAL DISEASES: ICD-10-CM

## 2021-04-09 DIAGNOSIS — Z01.818 PREOP GENERAL PHYSICAL EXAM: ICD-10-CM

## 2021-04-09 DIAGNOSIS — M79.89 LEG SWELLING: ICD-10-CM

## 2021-04-09 DIAGNOSIS — R14.0 ABDOMINAL BLOATING: ICD-10-CM

## 2021-04-09 LAB
ALBUMIN SERPL-MCNC: 1.3 G/DL (ref 3.4–5)
ALP SERPL-CCNC: 89 U/L (ref 40–150)
ALT SERPL W P-5'-P-CCNC: 16 U/L (ref 0–50)
ANION GAP SERPL CALCULATED.3IONS-SCNC: <1 MMOL/L (ref 3–14)
AST SERPL W P-5'-P-CCNC: 18 U/L (ref 0–45)
BASOPHILS # BLD AUTO: 0.1 10E9/L (ref 0–0.2)
BASOPHILS NFR BLD AUTO: 0.7 %
BILIRUB SERPL-MCNC: <0.1 MG/DL (ref 0.2–1.3)
BUN SERPL-MCNC: 22 MG/DL (ref 7–30)
CALCIUM SERPL-MCNC: 8.1 MG/DL (ref 8.5–10.1)
CHLORIDE SERPL-SCNC: 111 MMOL/L (ref 94–109)
CO2 SERPL-SCNC: 30 MMOL/L (ref 20–32)
CREAT SERPL-MCNC: 0.88 MG/DL (ref 0.52–1.04)
DIFFERENTIAL METHOD BLD: ABNORMAL
EOSINOPHIL # BLD AUTO: 1.1 10E9/L (ref 0–0.7)
EOSINOPHIL NFR BLD AUTO: 11 %
ERYTHROCYTE [DISTWIDTH] IN BLOOD BY AUTOMATED COUNT: 14.2 % (ref 10–15)
GFR SERPL CREATININE-BSD FRML MDRD: 69 ML/MIN/{1.73_M2}
GLUCOSE SERPL-MCNC: 83 MG/DL (ref 70–99)
HCT VFR BLD AUTO: 33 % (ref 35–47)
HGB BLD-MCNC: 9.4 G/DL (ref 11.7–15.7)
IMM GRANULOCYTES # BLD: 0.1 10E9/L (ref 0–0.4)
IMM GRANULOCYTES NFR BLD: 0.6 %
LABORATORY COMMENT REPORT: NORMAL
LYMPHOCYTES # BLD AUTO: 1.3 10E9/L (ref 0.8–5.3)
LYMPHOCYTES NFR BLD AUTO: 12.7 %
MCH RBC QN AUTO: 26.7 PG (ref 26.5–33)
MCHC RBC AUTO-ENTMCNC: 28.5 G/DL (ref 31.5–36.5)
MCV RBC AUTO: 94 FL (ref 78–100)
MONOCYTES # BLD AUTO: 0.9 10E9/L (ref 0–1.3)
MONOCYTES NFR BLD AUTO: 8.8 %
NEUTROPHILS # BLD AUTO: 6.8 10E9/L (ref 1.6–8.3)
NEUTROPHILS NFR BLD AUTO: 66.2 %
NRBC # BLD AUTO: 0 10*3/UL
NRBC BLD AUTO-RTO: 0 /100
PLATELET # BLD AUTO: 218 10E9/L (ref 150–450)
POTASSIUM SERPL-SCNC: 4.5 MMOL/L (ref 3.4–5.3)
PROT SERPL-MCNC: 4.8 G/DL (ref 6.8–8.8)
RBC # BLD AUTO: 3.52 10E12/L (ref 3.8–5.2)
SARS-COV-2 RNA RESP QL NAA+PROBE: NEGATIVE
SARS-COV-2 RNA RESP QL NAA+PROBE: NORMAL
SODIUM SERPL-SCNC: 141 MMOL/L (ref 133–144)
SPECIMEN SOURCE: NORMAL
SPECIMEN SOURCE: NORMAL
WBC # BLD AUTO: 10.2 10E9/L (ref 4–11)

## 2021-04-09 PROCEDURE — U0003 INFECTIOUS AGENT DETECTION BY NUCLEIC ACID (DNA OR RNA); SEVERE ACUTE RESPIRATORY SYNDROME CORONAVIRUS 2 (SARS-COV-2) (CORONAVIRUS DISEASE [COVID-19]), AMPLIFIED PROBE TECHNIQUE, MAKING USE OF HIGH THROUGHPUT TECHNOLOGIES AS DESCRIBED BY CMS-2020-01-R: HCPCS | Performed by: PATHOLOGY

## 2021-04-09 PROCEDURE — 99215 OFFICE O/P EST HI 40 MIN: CPT | Performed by: FAMILY MEDICINE

## 2021-04-09 PROCEDURE — 80053 COMPREHEN METABOLIC PANEL: CPT | Performed by: PATHOLOGY

## 2021-04-09 PROCEDURE — U0005 INFEC AGEN DETEC AMPLI PROBE: HCPCS | Performed by: PATHOLOGY

## 2021-04-09 PROCEDURE — 85025 COMPLETE CBC W/AUTO DIFF WBC: CPT | Performed by: PATHOLOGY

## 2021-04-09 PROCEDURE — 36415 COLL VENOUS BLD VENIPUNCTURE: CPT | Performed by: PATHOLOGY

## 2021-04-09 RX ORDER — TRIAMCINOLONE ACETONIDE 1 MG/G
CREAM TOPICAL 2 TIMES DAILY
Qty: 80 G | Refills: 1 | Status: ON HOLD | OUTPATIENT
Start: 2021-04-09 | End: 2021-06-08

## 2021-04-09 ASSESSMENT — PAIN SCALES - GENERAL: PAINLEVEL: NO PAIN (0)

## 2021-04-09 NOTE — PROGRESS NOTES
Surgeon: Kaveh Clayton MD  Location of the surgery: [facility]  Date of the surgery: 4/13/2021  Procedure: ESOPHAGOGASTRODUODENOSCOPY (EGD)  History of reaction to anesthesia? : NO    [facility] [address] [address]  Phone: [phone]  Fax: [phone]  Primary Provider: Fredy Cope  Pre-op Performing Provider: FREDY COPE      PREOPERATIVE EVALUATION:  Today's date: 4/9/2021    Demetra Richardson is a 65 year old female who presents for a preoperative evaluation.    H/o gastric surgery, having early satiety, low serum protein, no better w/ med trials.         Type of Anesthesia Anticipated: to be determined    Assessment & Plan     The proposed surgical procedure is considered LOW risk.    Leg swelling    - CBC with platelets differential; Future  - Comprehensive metabolic panel; Future  - US Abdomen complete; Future  - US Venous Competency Bilateral; Future    Venous stasis dermatitis of both lower extremities    - triamcinolone (KENALOG) 0.1 % external cream; Apply topically 2 times daily To leg rash    Preop general physical exam  Cleared for surgery    Abdominal bloating  Check for ascities  - US Abdomen complete; Future        Risks and Recommendations:  The patient has the following additional risks and recommendations for perioperative complications:   - No identified additional risk factors other than previously addressed    Medication Instructions:  Patient is on no chronic medications    RECOMMENDATION:  APPROVAL GIVEN to proceed with proposed procedure, without further diagnostic evaluation.        46 minutes spent on the date of the encounter doing chart review, history and exam, documentation and further activities per the note    [id number]}    Subjective     HPI related to upcoming procedure: appetite good. Fills up quickly. Nausea: no Vomit: no. Legs swollen, anemia and low protein.      No flowsheet data  found.    Health Care Directive:  Patient does not have a Health Care Directive or Living Will: Discussed advance care planning with patient; information given to patient to review.    Preoperative Review of :   reviewed - controlled substances reflected in medication list.      Status of Chronic Conditions:  See problem list for active medical problems.  Problems all longstanding and stable, except as noted/documented.  See ROS for pertinent symptoms related to these conditions.      Review of Systems  CONSTITUTIONAL: NEGATIVE for fever, chills, change in weight  INTEGUMENTARY/SKIN: NEGATIVE for worrisome rashes, moles or lesions  EYES: NEGATIVE for vision changes or irritation  ENT/MOUTH: NEGATIVE for ear, mouth and throat problems  RESP: NEGATIVE for significant cough or SOB  BREAST: NEGATIVE for masses, tenderness or discharge  CV: NEGATIVE for chest pain, palpitations or peripheral edema  GI: NEGATIVE for nausea, abdominal pain, heartburn, or change in bowel habits  : NEGATIVE for frequency, dysuria, or hematuria  MUSCULOSKELETAL: NEGATIVE for significant arthralgias or myalgia  NEURO: NEGATIVE for weakness, dizziness or paresthesias  ENDOCRINE: NEGATIVE for temperature intolerance, skin/hair changes  HEME: NEGATIVE for bleeding problems  PSYCHIATRIC: NEGATIVE for changes in mood or affect    Patient Active Problem List    Diagnosis Date Noted     Nephrolithiasis 07/09/2012     Priority: High     1.7cm stone at left [facility]       Anemia, iron deficiency 06/29/2020     Priority: Medium     Polysubstance overdose 01/22/2019     Priority: Medium     BCC (basal cell carcinoma), face 09/20/2018     Priority: Medium     Specimen #: [id number]   Collected: 9/17/2018   Received: 9/17/2018   Reported: 9/19/2018 18:14   Ordering Phy(s): EDD ROBERTS     For improved result formatting, select 'View Enhanced Report Format' under    Linked Documents section.     SPECIMEN(S):   A: Skin, periorbital medial left   B:  Skin, paranasal right below eyelid     FINAL DIAGNOSIS:   A. Skin, periorbital medial left:   - Basal cell carcinoma, nodular type, extending to the lateral and deep   margins - (see description)     B. Skin, paranasal right below eyelid:   - Basal cell carcinoma, infiltrating type, extending to the lateral and   deep margins - (see description)        Borderline personality disorder (H) 05/10/2016     Priority: Medium     Behavior disturbance 10/15/2015     Priority: Medium     Overdose 10/14/2015     Priority: Medium     Iron deficiency anemia 05/16/2013     Priority: Medium     S/p gastric bypass 2/2 trauma.  Cannot absorb iron orally.  Requires parenteral iron infusions periodically dependent on iron panels and CBC.  updating diagnosis code for icd10 cutover       Bipolar 1 disorder, mixed, moderate (H) 02/07/2013     Priority: Medium     Seizure (H) 02/03/2013     Priority: Medium     Microcytic anemia 07/09/2012     Priority: Medium     Likely due to Fe deficiency due to malabsorption from bariatric surgery       Migraines 07/09/2012     Priority: Medium     Hydronephrosis 07/09/2012     Priority: Medium     Benzodiazepine dependence (H) 04/14/2012     Priority: Medium     Drug-induced mood disorder (H) 04/14/2012     Priority: Medium     Chronic pain 12/06/2009     Priority: Medium     (Problem list name updated by automated process. Provider to review and confirm.)       Lupus (H) 12/06/2009     Priority: Medium     Depression 12/06/2009     Priority: Medium     Chronic pain disorder 12/06/2009     Priority: Medium     Orthostatic hypotension 12/06/2009     Priority: Medium     S/P partial gastrectomy 12/06/2009     Priority: Medium     Opioid dependence on agonist therapy (H) 12/28/2006     Priority: Medium     Anxiety disorder 08/25/2006     Priority: Medium      Past Medical History:   Diagnosis Date     Anemia      Basal cell carcinoma     Left-sided, skin over over medial orbit/nasal bone. Removed Oct  .     Benzodiazepine dependence (H)     With h/o withdrawal seizure x 1     Bipolar 1 disorder, mixed, moderate (H) 2013     Chronic pain     Back, legs.     Depressive disorder      Eosinophilic gastroenteritis 2010     Epidural abscess 2005    x4     Fibromyalgia      Generalised anxiety disorder      GERD (gastroesophageal reflux disease)      Major depressive disorder      Migraine      Nephrolithiasis     S/p left sided lithotripsy     Opiate dependence (H)      Osteoarthritis      Osteoporosis      PUD (peptic ulcer disease)      SLE (systemic lupus erythematosus) (H) 2009     Weight loss      Past Surgical History:   Procedure Laterality Date     BACK SURGERY  04    L4-5 epidural abscess     BACK SURGERY  04    L3-4 spinal stenosis     BACK SURGERY  04    Lt psoas abscess     BRONCHOSCOPY FLEXIBLE N/A 2019    Procedure: Flexible Bronchoscopy;  Surgeon: Patrice Regalado MD;  [facility]: [facility]      SECTION      x 2     CHOLECYSTECTOMY  -     CLOSED REDUCTION, PERCUTANEOUS PINNING FINGER, COMBINED  8/10/2011    Procedure:COMBINED CLOSED REDUCTION, PERCUTANEOUS PINNING FINGER; 5th Proximal Phalanx; Surgeon:RADHA BUITRAGO; Location[facility]     COLONOSCOPY       COLONOSCOPY N/A 2020    Procedure: COLONOSCOPY;  Surgeon: Zacarias Duran MD;  [facility]     ESOPHAGOSCOPY, GASTROSCOPY, DUODENOSCOPY (EGD), COMBINED N/A 2020    Procedure: ESOPHAGOGASTRODUODENOSCOPY, WITH BIOPSY;  Surgeon: Zacarias Duran MD;  Location: [facility]     GASTRECTOMY  2005    Bilat truncal vagotomy, hemigastrectomy, RnY gastrojejunostomy     GASTROJEJUNOSTOMY  2011    Procedure:GASTROJEJUNOSTOMY; exploratory laparotmy with revision of gastrojejunostomy, Antrectomy, Miles-en-y, Gastrojejunostomy; Surgeon:JULIUS HELM; Location:[facility]     INSERT CHEST TUBE N/A 2019    Procedure: INSERTION, CATHETER, INTERCOSTAL, FOR DRAINAGE;  Surgeon: Melissa Nichols,  MD;  Location: [facility]     LASER HOLMIUM LITHOTRIPSY URETER(S), INSERT STENT, COMBINED      Procedure: COMBINED CYSTOSCOPY, URETEROSCOPY, LASER HOLMIUM LITHOTRIPSY URETER(S), INSERT STENT;;  Surgeon: Blair Correa MD;  [facility]     LASER HOLMIUM NEPHROLITHOTOMY VIA PERCUTANEOUS NEPHROSTOMY  7/11/2012    Procedure: LASER HOLMIUM NEPHROLITHOTOMY VIA PERCUTANEOUS NEPHROSTOMY;  proceedure should read: Left Percutaneous Access, Left Percutaneous ultrasonic Nephrolithotomy, Ureteroscopy Holmium Laser Lithotripsy Stent Placement ;  Surgeon: Blair Correa MD;  [facility]     MAMMOPLASTY AUGMENTATION BILATERAL       OPEN REDUCTION INTERNAL FIXATION WRIST Left 1/11/2016    Procedure: OPEN REDUCTION INTERNAL FIXATION WRIST;  Surgeon: Darling Ellis MD;  [facility]     RECONSTRUCT BREAST, IMPLANT PROSTHESIS, COMBINED       THORACOSCOPIC DECORTICATION LUNG Left 5/7/2019    Procedure: Left Video Assisted Thoracoscopic Decortication, Intercostal Nerve Cryo Analgesia, Flexible Bronchoscopy;  Surgeon: Patrice Regalado MD;  [facility]     Current Outpatient Medications   Medication Sig Dispense Refill     amitriptyline (ELAVIL) 10 MG tablet Take 1 tablet (10 mg) by mouth At Bedtime 30 tablet 0     butalbital-acetaminophen-caffeine (ESGIC) -40 MG tablet Take 1 tablet by mouth every 6 hours as needed for headaches Use sparingly. 12 tablet 0     escitalopram (LEXAPRO) 20 MG tablet Take 1 tablet (20 mg) by mouth daily 30 tablet 5     folic acid (FOLVITE) 1 MG tablet Take 1 tablet (1 mg) by mouth daily 30 tablet 11     furosemide (LASIX) 20 MG tablet Take 1 tablet (20 mg) by mouth daily 30 tablet 0     hydroxychloroquine (PLAQUENIL) 200 MG tablet Take 1 tablet (200 mg) by mouth 2 times daily (with meals) 180 tablet 1     hydrOXYzine (VISTARIL) 25 MG capsule Take 1 capsule (25 mg) by mouth 2 times daily as needed for anxiety 60 capsule 11     ibuprofen (ADVIL/MOTRIN) 200 MG tablet Take  400-800 mg by mouth every 8 hours as needed for mild pain       methadone (DOLOPHINE-INTENSOL) 10 MG/ML (HIGH CONC) solution Take 11.3 mLs (113 mg) by mouth daily [place] [phone]    Dose as of 4/29/19 (Patient taking differently: Take 104 mg by mouth daily Rufina Marical [phone]    Dose as of 4/29/19) 300 mL 0     pantoprazole (PROTONIX) 40 MG EC tablet TAKE 1 TABLET BY MOUTH EVERY DAY 90 tablet 2     pilocarpine (SALAGEN) 5 MG tablet Take 1 tablet (5 mg) by mouth 4 times daily 360 tablet 1     polyethylene glycol (MIRALAX) 17 GM/Dose powder 1 capful (17 g) in 8 oz of liquid 225 g 3     pregabalin (LYRICA) 150 MG capsule TAKE 1 CAPSULE (150 MG) BY MOUTH 3 TIMES DAILY 90 capsule 3     valACYclovir (VALTREX) 500 MG tablet Take 1 tablet (500 mg) by mouth daily 90 tablet 3     valACYclovir (VALTREX) 500 MG tablet Take 1 tablet (500 mg) by mouth 2 times daily 60 tablet 1     vitamin D2 (ERGOCALCIFEROL) 23545 units (1250 mcg) capsule Take 1 capsule (50,000 Units) by mouth every 7 days 12 capsule 0     naloxone (NARCAN) 4 MG/0.1ML nasal spray Spray 1 spray (4 mg) into one nostril alternating nostrils once as needed for opioid reversal repeat every 2-3 minutes until responsive (Patient not taking: Reported on 8/3/2020) 1 each 1       Allergies   Allergen Reactions     Penicillins Hives     Hives in childhood.        Social History     Tobacco Use     Smoking status: Never Smoker     Smokeless tobacco: Never Used   Substance Use Topics     Alcohol use: No     Comment: none in 10 years     Family History   Problem Relation Age of Onset     Substance Abuse Mother      Family History Negative Father      Substance Abuse Maternal Grandfather      Substance Abuse Brother      Liver Disease No family hx of      Colon Cancer No family hx of      Ulcerative Colitis No family hx of      Crohn's Disease No family hx of      History   Drug Use No         Objective     BP 97/64 (BP Location: Right arm,  Patient Position: Sitting, Cuff Size: Adult Regular)   Pulse 80   Wt 59.4 kg (131 lb)   LMP  (LMP Unknown)   SpO2 94%   BMI 23.21 kg/m      Physical Exam    GENERAL APPEARANCE: healthy, alert and no distress     EYES: EOMI, PERRL     HENT: ear canals and TM's normal and nose and mouth without ulcers or lesions     NECK: no adenopathy, no asymmetry, masses, or scars and thyroid normal to palpation     RESP: lungs clear to auscultation - no rales, rhonchi or wheezes     CV: regular rates and rhythm, normal S1 S2, no S3 or S4 and no murmur, click or rub     ABDOMEN:  soft, nontender, no HSM or masses and bowel sounds normal     MS: extremities normal- no gross deformities noted, no evidence of inflammation in joints, FROM in all extremities.     SKIN: stasis derm both shins     NEURO: Normal strength and tone, sensory exam grossly normal, mentation intact and speech normal     PSYCH: mentation appears normal. and affect normal/bright     LYMPHATICS: No cervical adenopathy  Legs two plus bilat edema    Recent Labs   Lab Test 03/15/21  1529 02/04/21  1716 01/07/21  1110 05/07/19  0616 05/07/19  0616   HGB 10.4* 11.3* 12.8   < > 9.6*    252 301   < > 439   INR  --   --  0.93  --  1.17*   NA  --  142 141   < > 135   POTASSIUM  --  4.6 4.2   < > 4.7   CR  --  0.70 0.68   < > 0.66    < > = values in this interval not displayed.        Diagnostics:  Labs pending at this time.  Results will be reviewed when available.   No EKG required for low risk surgery (cataract, skin procedure, breast biopsy, etc).    Revised Cardiac Risk Index (RCRI):  The patient has the following serious cardiovascular risks for perioperative complications:   - No serious cardiac risks = 0 points     RCRI Interpretation: 0 points: Class I (very low risk - 0.4% complication rate)           Signed Electronically by: Fredy Merritt MD  Copy of this evaluation report is provided to requesting physician.

## 2021-04-09 NOTE — PATIENT INSTRUCTIONS

## 2021-04-13 ENCOUNTER — TELEPHONE (OUTPATIENT)
Dept: GASTROENTEROLOGY | Facility: CLINIC | Age: 66
End: 2021-04-13

## 2021-04-13 NOTE — TELEPHONE ENCOUNTER
1st reschedule attempt - EGD under MAC with Dr. Clayton at [facility] on Tuesday, 05/11 at 1300.    Procedure: Upper Endoscopy    Upper Endoscopy Type: EGD    Upper Endoscopy Sedation: Conscious/Moderate    Upper Endoscopy Reason for Procedure: prior Billroth II    Preferred Location: [facility] Dr. Clayton   Scheduling Instructions: If you have not heard from the scheduling office within 2 business days, please call [phone].           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

## 2021-04-14 ENCOUNTER — TELEPHONE (OUTPATIENT)
Dept: GASTROENTEROLOGY | Facility: CLINIC | Age: 66
End: 2021-04-14

## 2021-04-14 NOTE — TELEPHONE ENCOUNTER
2nd reschedule attempt - EGD under MAC with Dr. Clayton at [facility] on Tuesday, 05/11 at 1300.     Procedure: Upper Endoscopy   Upper Endoscopy Type: EGD   Upper Endoscopy Sedation: Conscious/Moderate   Upper Endoscopy Reason for Procedure: prior Billroth II   Preferred Location: [facility] Dr. Clayton   Scheduling Instructions: If you have not heard from the scheduling office within 2 business days, please call [phone].         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

## 2021-04-22 ENCOUNTER — TELEPHONE (OUTPATIENT)
Dept: GASTROENTEROLOGY | Facility: CLINIC | Age: 66
End: 2021-04-22

## 2021-04-22 ENCOUNTER — HOSPITAL ENCOUNTER (OUTPATIENT)
Facility: CLINIC | Age: 66
End: 2021-04-22
Attending: SURGERY | Admitting: SURGERY
Payer: COMMERCIAL

## 2021-04-22 NOTE — TELEPHONE ENCOUNTER
Patient is scheduled for EGD with Dr. MADRIGAL    Spoke with: FLORIAN    Date of Procedure: 05/11/2021    Location: [facility]    Sedation Type MAC    Conscious Sedation- Needs  for 6 hours after the procedure  MAC/General-Needs  for 24 hours after procedure    Pre-op for [facility] MAC and OR YES    (if yes advise patient they will need a pre-op prior to procedure)      Is patient on blood thinners? -NO (If yes- inform patient to follow up with PCP or provider for follow up instructions)     Informed patient they will need an adult  YES  Cannot take any type of public or medical transportation alone    Informed Patient of COVID Test Requirement YES    Preferred Pharmacy for Pre Prescription NA    Confirmed Nurse will call to complete assessment YES    Additional comments: NA

## 2021-04-28 DIAGNOSIS — Z11.59 ENCOUNTER FOR SCREENING FOR OTHER VIRAL DISEASES: ICD-10-CM

## 2021-04-30 ENCOUNTER — TELEPHONE (OUTPATIENT)
Dept: GASTROENTEROLOGY | Facility: CLINIC | Age: 66
End: 2021-04-30

## 2021-05-05 ENCOUNTER — TELEPHONE (OUTPATIENT)
Dept: GASTROENTEROLOGY | Facility: CLINIC | Age: 66
End: 2021-05-05

## 2021-05-05 NOTE — TELEPHONE ENCOUNTER
Attempted to contact patient regarding upcoming EGD procedure on 5.11.2021 for pre assessment questions.  No answer.  Left message to return call to [phone] #[phone]    Pre-op?  MyChart message sent.  COVID test 5.7.2021    Delia Ramirez RN

## 2021-05-06 NOTE — TELEPHONE ENCOUNTER
3rd attempt to contact patient regarding upcoming EGD procedure on 5.11.2021 for pre assessment questions.  No answer.  Left message to return call to [phone] #3    COVID test 5.7.2021.  Pre-op?    Delia Ramirez RN

## 2021-05-07 DIAGNOSIS — Z11.59 ENCOUNTER FOR SCREENING FOR OTHER VIRAL DISEASES: ICD-10-CM

## 2021-05-07 LAB
LABORATORY COMMENT REPORT: NORMAL
SARS-COV-2 RNA RESP QL NAA+PROBE: NEGATIVE
SARS-COV-2 RNA RESP QL NAA+PROBE: NORMAL
SPECIMEN SOURCE: NORMAL
SPECIMEN SOURCE: NORMAL

## 2021-05-07 PROCEDURE — U0003 INFECTIOUS AGENT DETECTION BY NUCLEIC ACID (DNA OR RNA); SEVERE ACUTE RESPIRATORY SYNDROME CORONAVIRUS 2 (SARS-COV-2) (CORONAVIRUS DISEASE [COVID-19]), AMPLIFIED PROBE TECHNIQUE, MAKING USE OF HIGH THROUGHPUT TECHNOLOGIES AS DESCRIBED BY CMS-2020-01-R: HCPCS | Performed by: PATHOLOGY

## 2021-05-07 PROCEDURE — U0005 INFEC AGEN DETEC AMPLI PROBE: HCPCS | Performed by: PATHOLOGY

## 2021-05-08 ENCOUNTER — TELEPHONE (OUTPATIENT)
Dept: NURSING | Facility: CLINIC | Age: 66
End: 2021-05-08

## 2021-05-08 NOTE — TELEPHONE ENCOUNTER
"Patient called because she \"couldn't remember\" if she had covid test yesterday for upcoming procedure. Checked chart and she indeed had a negative test done. Assured patient that she is ready for upper endoscopy on Tuesday. No triage required.    Radha Mayorga RN  [facility] Nurse Advisor  1:01 PM 5/8/2021    COVID 19 Nurse Triage Plan/Patient Instructions    Please be aware that novel coronavirus (COVID-19) may be circulating in the community. If you develop symptoms such as fever, cough, or SOB or if you have concerns about the presence of another infection including coronavirus (COVID-19), please contact your health care provider or visit [email or web address].     Disposition/Instructions    Home care recommended. Follow home care protocol based instructions.    Thank you for taking steps to prevent the spread of this virus.  o Limit your contact with others.  o Wear a simple mask to cover your cough.  o Wash your hands well and often.    Resources    [facility]: About COVID-19: [email or web address]    CDC: What to Do If You're Sick: www.cdc.gov/coronavirus/2019-ncov/about/steps-when-sick.html    CDC: Ending Home Isolation: www.cdc.gov/coronavirus/2019-ncov/hcp/disposition-in-home-patients.html     CDC: Caring for Someone: www.cdc.gov/coronavirus/2019-ncov/if-you-are-sick/care-for-someone.html     [facility]: Interim Guidance for Hospital Discharge to Home: www.health.[facility].mn.us/diseases/coronavirus/hcp/hospdischarge.pdf    [facility] clinical trials (COVID-19 research studies): clinicalaffairs.[facility]/n-clinical-trials     Below are the COVID-19 hotlines at the [facility] ([facility]). Interpreters are available.   o For health questions: Call 324-125-2105 or 1-809.561.7303 (7 a.m. to 7 p.m.)  o For questions about schools and childcare: Call 764-930-5311 or 1-274.734.9959 (7 a.m. to 7 p.m.)      Coronavirus (COVID-19) Notification    Lab Result   Lab test " 2019-nCoV rRt-PCR OR SARS-COV-2 PCR    Nasopharyngeal AND/OR Oropharyngeal swab is NEGATIVE for 2019-nCoV RNA [OR] SARS-COV-2 RNA (COVID-19) RNA    Your result was negative. This means that we didn't find the virus that causes COVID-19 in your sample. A test may show negative when you do actually have the virus. This can happen when the virus is in the early stages of infection, before you feel illness symptoms.    If you have symptoms   Stay home and away from others (self-isolate) until you meet ALL of the guidelines below:    You've had no fever--and no medicine that reduces fever--for 1 full day (24 hours). And      Your other symptoms have gotten better. For example, your cough or breathing has improved. And   ; At least 10 days have passed since your symptoms started. (If you've been told by a doctor that you have a weak immune system, wait 20 days.)         During this time:    Stay home. Don't go to work, school or anywhere else.     Stay in your own room, including for meals. Use your own bathroom if you can.    Stay away from others in your home. No hugging, kissing or shaking hands. No visitors.    Clean  high touch  surfaces often (doorknobs, counters, handles, etc.). Use a household cleaning spray or wipes. You can find a full list on the EPA website at www.epa.gov/pesticide-registration/list-n-disinfectants-use-against-sars-cov-2.    Cover your mouth and nose with a mask, tissue or other face covering to avoid spreading germs.    Wash your hands and face often with soap and water.    Going back to work  Check with your employer for any guidelines to follow for going back to work.  You are sent a letter for your Employer which will serve as formal document notice that you, the employee, tested negative for COVID-19, as of the testing date shown above.    If your symptoms worsen or other concerning symptoms, contact PCP, oncare or consider returning to Emergency Dept.    Where can I get more  information?    [facility]: [email or web address]    Coronavirus Basics: www.health.[facility].mn./diseases/coronavirus/basics.html    [facility] Hotline (476-095-4957)    Radha Mayorga RN

## 2021-05-10 ENCOUNTER — VIRTUAL VISIT (OUTPATIENT)
Dept: FAMILY MEDICINE | Facility: CLINIC | Age: 66
End: 2021-05-10
Payer: COMMERCIAL

## 2021-05-10 ENCOUNTER — TELEPHONE (OUTPATIENT)
Dept: GASTROENTEROLOGY | Facility: OUTPATIENT CENTER | Age: 66
End: 2021-05-10

## 2021-05-10 DIAGNOSIS — Z01.818 PREOP GENERAL PHYSICAL EXAM: Primary | ICD-10-CM

## 2021-05-10 PROCEDURE — 99214 OFFICE O/P EST MOD 30 MIN: CPT | Mod: 95 | Performed by: FAMILY MEDICINE

## 2021-05-10 NOTE — PROGRESS NOTES
Surgeon: Dr. Clayton  Location of the surgery: [facility]  Date of the surgery: 5/11/21  Procedure: EGD  History of reaction to anesthesia? : NO    No personal or FH easy bleeding or anesthesia reaction    Fredy Merritt MD

## 2021-05-10 NOTE — TELEPHONE ENCOUNTER
Caller: Radha Richardson    Procedure: Upper Endoscopy for Anastomotic stricture of gastrojejunostomy [K91.89]    Date of Procedure Cancelled: 5/11/2021    Ordering Provider:Dr. Clayton     Reason for cancel: Scheduling Error, procedure requires MAC sedation, and was scheduled in a non-MAC room at [facility]      Rescheduled: No- follow up message sent to Eliu Maxwell to determine Dr. Clayton's availability     If rescheduled    Date:    Location:    Note any change or update to original order/sedation:

## 2021-05-10 NOTE — H&P (VIEW-ONLY)
Radha is a 65 year old who is being evaluated via a billable video visit.      How would you like to obtain your AVS? MyChart  If the video visit is dropped, the invitation should be resent by: Send to e-mail at:   [email or web address]  Will anyone else be joining your video visit? No      Video Start Time: 11 am    Assessment & Plan     Preop general physical exam  No need redo labs or do an EKG. She is cleared for surgery on a medical basis. She knows to be NPO DOS till post op cleared to resume po intake.     She will follow with me post op        30 minutes spent on the date of the encounter doing chart review, history and exam, documentation and further activities per the note    Fredy Merritt MD  [facility] [place]    Donna Garcia is a 65 year old who presents for the following health issues  accompanied by her self    HPI Preop done April 9 2021, cleared for surgery see that note/labs. Per pt no interval medical issues, edema same to slightly better. Wt stable.   EGD for early satiety, nausea, low hgb/protein which could be from low intake or low absorption.     Past Medical History:   Diagnosis Date     Anemia      Basal cell carcinoma     Left-sided, skin over over medial orbit/nasal bone. Removed Oct 2018.     Benzodiazepine dependence (H)     With h/o withdrawal seizure x 1     Bipolar 1 disorder, mixed, moderate (H) 2/7/2013     Chronic pain     Back, legs.     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     Epidural abscess 2005    x4     Fibromyalgia      Generalised anxiety disorder      GERD (gastroesophageal reflux disease)      Major depressive disorder      Migraine      Nephrolithiasis     S/p left sided lithotripsy     Opiate dependence (H)      Osteoarthritis      Osteoporosis      PUD (peptic ulcer disease)      SLE (systemic lupus erythematosus) (H) 2009     Weight loss      Past Surgical History:   Procedure  Laterality Date     BACK SURGERY  04    L4-5 epidural abscess     BACK SURGERY  04    L3-4 spinal stenosis     BACK SURGERY  04    Lt psoas abscess     BRONCHOSCOPY FLEXIBLE N/A 2019    Procedure: Flexible Bronchoscopy;  Surgeon: Patrice Regalado MD;  [facility]      SECTION      x 2     CHOLECYSTECTOMY  -     CLOSED REDUCTION, PERCUTANEOUS PINNING FINGER, COMBINED  8/10/2011    Procedure:COMBINED CLOSED REDUCTION, PERCUTANEOUS PINNING FINGER; 5th Proximal Phalanx; Surgeon:RADHA BUITRAGO; [facility]     COLONOSCOPY       COLONOSCOPY N/A 2020    Procedure: COLONOSCOPY;  Surgeon: Zacarias Duran MD;  [facility]     ESOPHAGOSCOPY, GASTROSCOPY, DUODENOSCOPY (EGD), COMBINED N/A 2020    Procedure: ESOPHAGOGASTRODUODENOSCOPY, WITH BIOPSY;  Surgeon: Zacarias Duran MD;  [facility]     GASTRECTOMY  2005    Bilat truncal vagotomy, hemigastrectomy, RnY gastrojejunostomy     GASTROJEJUNOSTOMY  2011    Procedure:GASTROJEJUNOSTOMY; exploratory laparotmy with revision of gastrojejunostomy, Antrectomy, Miles-en-y, Gastrojejunostomy; Surgeon:JULIUS HELM; Location[facility]     INSERT CHEST TUBE N/A 2019    Procedure: INSERTION, CATHETER, INTERCOSTAL, FOR DRAINAGE;  Surgeon: Melissa Nichols MD;  [facility]     LASER HOLMIUM LITHOTRIPSY URETER(S), INSERT STENT, COMBINED      Procedure: COMBINED CYSTOSCOPY, URETEROSCOPY, LASER HOLMIUM LITHOTRIPSY URETER(S), INSERT STENT;;  Surgeon: Blair Correa MD;  [facility]     LASER HOLMIUM NEPHROLITHOTOMY VIA PERCUTANEOUS NEPHROSTOMY  2012    Procedure: LASER HOLMIUM NEPHROLITHOTOMY VIA PERCUTANEOUS NEPHROSTOMY;  proceedure should read: Left Percutaneous Access, Left Percutaneous ultrasonic Nephrolithotomy, Ureteroscopy Holmium Laser Lithotripsy Stent Placement ;  Surgeon: Blair Correa MD;  [facility]     MAMMOPLASTY AUGMENTATION BILATERAL       OPEN REDUCTION  INTERNAL FIXATION WRIST Left 1/11/2016    Procedure: OPEN REDUCTION INTERNAL FIXATION WRIST;  Surgeon: Darling Ellis MD;  Location: [facility]     RECONSTRUCT BREAST, IMPLANT PROSTHESIS, COMBINED       THORACOSCOPIC DECORTICATION LUNG Left 5/7/2019    Procedure: Left Video Assisted Thoracoscopic Decortication, Intercostal Nerve Cryo Analgesia, Flexible Bronchoscopy;  Surgeon: Patrice Regalado MD;  [facility]     Current Outpatient Medications   Medication     butalbital-acetaminophen-caffeine (ESGIC) -40 MG tablet     escitalopram (LEXAPRO) 20 MG tablet     folic acid (FOLVITE) 1 MG tablet     hydroxychloroquine (PLAQUENIL) 200 MG tablet     hydrOXYzine (VISTARIL) 25 MG capsule     ibuprofen (ADVIL/MOTRIN) 200 MG tablet     methadone (DOLOPHINE-INTENSOL) 10 MG/ML (HIGH CONC) solution     pantoprazole (PROTONIX) 40 MG EC tablet     pilocarpine (SALAGEN) 5 MG tablet     polyethylene glycol (MIRALAX) 17 GM/Dose powder     pregabalin (LYRICA) 150 MG capsule     triamcinolone (KENALOG) 0.1 % external cream     valACYclovir (VALTREX) 500 MG tablet     valACYclovir (VALTREX) 500 MG tablet     vitamin D2 (ERGOCALCIFEROL) 76195 units (1250 mcg) capsule     naloxone (NARCAN) 4 MG/0.1ML nasal spray     No current facility-administered medications for this visit.      Allergies   Allergen Reactions     Penicillins Hives     Hives in childhood.     Social History     Socioeconomic History     Marital status: Single     Spouse name: Not on file     Number of children: Not on file     Years of education: Not on file     Highest education level: Not on file   Occupational History     Not on file   Social Needs     Financial resource strain: Not on file     Food insecurity     Worry: Not on file     Inability: Not on file     Transportation needs     Medical: Not on file     Non-medical: Not on file   Tobacco Use     Smoking status: Never Smoker     Smokeless tobacco: Never Used   Substance and Sexual Activity      Alcohol use: No     Comment: none in 10 years     Drug use: No     Sexual activity: Not Currently   Lifestyle     Physical activity     Days per week: Not on file     Minutes per session: Not on file     Stress: Not on file   Relationships     Social connections     Talks on phone: Not on file     Gets together: Not on file     Attends Orthodoxy service: Not on file     Active member of club or organization: Not on file     Attends meetings of clubs or organizations: Not on file     Relationship status: Not on file     Intimate partner violence     Fear of current or ex partner: Not on file     Emotionally abused: Not on file     Physically abused: Not on file     Forced sexual activity: Not on file   Other Topics Concern     Parent/sibling w/ CABG, MI or angioplasty before 65F 55M? Not Asked   Social History Narrative    Intake 4/16/15:        H/o divorce but most recently living with SO Alfonso. Alfonso recently passed away.         In past employed as a .         Tobacco use: denies    Alcohol use: denies    Drug use: daily pot use for 30 years. Currently with sobriety.          Family History   Problem Relation Age of Onset     Substance Abuse Mother      Family History Negative Father      Substance Abuse Maternal Grandfather      Substance Abuse Brother      Liver Disease No family hx of      Colon Cancer No family hx of      Ulcerative Colitis No family hx of      Crohn's Disease No family hx of              Review of Systems   Ten pt ROS done, otherwise negative  Objective           Vitals:  No vitals were obtained today due to virtual visit.    Physical Exam   GENERAL: Healthy, alert and no distress  EYES: Eyes grossly normal to inspection.  No discharge or erythema, or obvious scleral/conjunctival abnormalities.  RESP: No audible wheeze, cough, or visible cyanosis.  No visible retractions or increased work of breathing.    SKIN: Visible skin clear. No significant rash, abnormal pigmentation or  lesions.  NEURO: Cranial nerves grossly intact.  Mentation and speech appropriate for age.  PSYCH: Mentation appears normal, affect normal/bright, judgement and insight intact, normal speech and appearance well-groomed.            Video-Visit Details    Type of service:  Video Visit    Video End Time:11:22    Originating Location (pt. Location): Home    Distant Location (provider location):  [facility] [place]     Platform used for Video Visit: What's Hot

## 2021-05-10 NOTE — PROGRESS NOTES
Radha is a 65 year old who is being evaluated via a billable video visit.      How would you like to obtain your AVS? MyChart  If the video visit is dropped, the invitation should be resent by: Send to e-mail at:   [email or web address]  Will anyone else be joining your video visit? No      Video Start Time: 11 am    Assessment & Plan     Preop general physical exam  No need redo labs or do an EKG. She is cleared for surgery on a medical basis. She knows to be NPO DOS till post op cleared to resume po intake.     She will follow with me post op        30 minutes spent on the date of the encounter doing chart review, history and exam, documentation and further activities per the note    Fredy Merritt MD  [facility] [place]    Donna Garcia is a 65 year old who presents for the following health issues  accompanied by her self    HPI Preop done April 9 2021, cleared for surgery see that note/labs. Per pt no interval medical issues, edema same to slightly better. Wt stable.   EGD for early satiety, nausea, low hgb/protein which could be from low intake or low absorption.     Past Medical History:   Diagnosis Date     Anemia      Basal cell carcinoma     Left-sided, skin over over medial orbit/nasal bone. Removed Oct 2018.     Benzodiazepine dependence (H)     With h/o withdrawal seizure x 1     Bipolar 1 disorder, mixed, moderate (H) 2/7/2013     Chronic pain     Back, legs.     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     Epidural abscess 2005    x4     Fibromyalgia      Generalised anxiety disorder      GERD (gastroesophageal reflux disease)      Major depressive disorder      Migraine      Nephrolithiasis     S/p left sided lithotripsy     Opiate dependence (H)      Osteoarthritis      Osteoporosis      PUD (peptic ulcer disease)      SLE (systemic lupus erythematosus) (H) 2009     Weight loss      Past Surgical History:   Procedure  Laterality Date     BACK SURGERY  04    L4-5 epidural abscess     BACK SURGERY  04    L3-4 spinal stenosis     BACK SURGERY  04    Lt psoas abscess     BRONCHOSCOPY FLEXIBLE N/A 2019    Procedure: Flexible Bronchoscopy;  Surgeon: Patrice Regalado MD;  [facility]      SECTION      x 2     CHOLECYSTECTOMY  -     CLOSED REDUCTION, PERCUTANEOUS PINNING FINGER, COMBINED  8/10/2011    Procedure:COMBINED CLOSED REDUCTION, PERCUTANEOUS PINNING FINGER; 5th Proximal Phalanx; Surgeon:RADHA BUITRAGO; [facility]     COLONOSCOPY       COLONOSCOPY N/A 2020    Procedure: COLONOSCOPY;  Surgeon: Zacarias Duran MD;  [facility]     ESOPHAGOSCOPY, GASTROSCOPY, DUODENOSCOPY (EGD), COMBINED N/A 2020    Procedure: ESOPHAGOGASTRODUODENOSCOPY, WITH BIOPSY;  Surgeon: Zacarias Duran MD;  [facility]     GASTRECTOMY  2005    Bilat truncal vagotomy, hemigastrectomy, RnY gastrojejunostomy     GASTROJEJUNOSTOMY  2011    Procedure:GASTROJEJUNOSTOMY; exploratory laparotmy with revision of gastrojejunostomy, Antrectomy, Miles-en-y, Gastrojejunostomy; Surgeon:JULIUS HELM; [facility]     INSERT CHEST TUBE N/A 2019    Procedure: INSERTION, CATHETER, INTERCOSTAL, FOR DRAINAGE;  Surgeon: Melissa Nichols MD;  [facility]     LASER HOLMIUM LITHOTRIPSY URETER(S), INSERT STENT, COMBINED      Procedure: COMBINED CYSTOSCOPY, URETEROSCOPY, LASER HOLMIUM LITHOTRIPSY URETER(S), INSERT STENT;;  Surgeon: Blair Correa MD;  [facility]     LASER HOLMIUM NEPHROLITHOTOMY VIA PERCUTANEOUS NEPHROSTOMY  2012    Procedure: LASER HOLMIUM NEPHROLITHOTOMY VIA PERCUTANEOUS NEPHROSTOMY;  proceedure should read: Left Percutaneous Access, Left Percutaneous ultrasonic Nephrolithotomy, Ureteroscopy Holmium Laser Lithotripsy Stent Placement ;  Surgeon: Blair Correa MD;  [facility]     MAMMOPLASTY AUGMENTATION BILATERAL       OPEN REDUCTION  INTERNAL FIXATION WRIST Left 1/11/2016    Procedure: OPEN REDUCTION INTERNAL FIXATION WRIST;  Surgeon: Darling Ellis MD;  [facility]     RECONSTRUCT BREAST, IMPLANT PROSTHESIS, COMBINED       THORACOSCOPIC DECORTICATION LUNG Left 5/7/2019    Procedure: Left Video Assisted Thoracoscopic Decortication, Intercostal Nerve Cryo Analgesia, Flexible Bronchoscopy;  Surgeon: Patrice Regalado MD;  [facility]     Current Outpatient Medications   Medication     butalbital-acetaminophen-caffeine (ESGIC) -40 MG tablet     escitalopram (LEXAPRO) 20 MG tablet     folic acid (FOLVITE) 1 MG tablet     hydroxychloroquine (PLAQUENIL) 200 MG tablet     hydrOXYzine (VISTARIL) 25 MG capsule     ibuprofen (ADVIL/MOTRIN) 200 MG tablet     methadone (DOLOPHINE-INTENSOL) 10 MG/ML (HIGH CONC) solution     pantoprazole (PROTONIX) 40 MG EC tablet     pilocarpine (SALAGEN) 5 MG tablet     polyethylene glycol (MIRALAX) 17 GM/Dose powder     pregabalin (LYRICA) 150 MG capsule     triamcinolone (KENALOG) 0.1 % external cream     valACYclovir (VALTREX) 500 MG tablet     valACYclovir (VALTREX) 500 MG tablet     vitamin D2 (ERGOCALCIFEROL) 65630 units (1250 mcg) capsule     naloxone (NARCAN) 4 MG/0.1ML nasal spray     No current facility-administered medications for this visit.      Allergies   Allergen Reactions     Penicillins Hives     Hives in childhood.     Social History     Socioeconomic History     Marital status: Single     Spouse name: Not on file     Number of children: Not on file     Years of education: Not on file     Highest education level: Not on file   Occupational History     Not on file   Social Needs     Financial resource strain: Not on file     Food insecurity     Worry: Not on file     Inability: Not on file     Transportation needs     Medical: Not on file     Non-medical: Not on file   Tobacco Use     Smoking status: Never Smoker     Smokeless tobacco: Never Used   Substance and Sexual Activity      Alcohol use: No     Comment: none in 10 years     Drug use: No     Sexual activity: Not Currently   Lifestyle     Physical activity     Days per week: Not on file     Minutes per session: Not on file     Stress: Not on file   Relationships     Social connections     Talks on phone: Not on file     Gets together: Not on file     Attends Episcopal service: Not on file     Active member of club or organization: Not on file     Attends meetings of clubs or organizations: Not on file     Relationship status: Not on file     Intimate partner violence     Fear of current or ex partner: Not on file     Emotionally abused: Not on file     Physically abused: Not on file     Forced sexual activity: Not on file   Other Topics Concern     Parent/sibling w/ CABG, MI or angioplasty before 65F 55M? Not Asked   Social History Narrative    Intake 4/16/15:        H/o divorce but most recently living with SO Alfonso. Alfonso recently passed away.         In past employed as a .         Tobacco use: denies    Alcohol use: denies    Drug use: daily pot use for 30 years. Currently with sobriety.          Family History   Problem Relation Age of Onset     Substance Abuse Mother      Family History Negative Father      Substance Abuse Maternal Grandfather      Substance Abuse Brother      Liver Disease No family hx of      Colon Cancer No family hx of      Ulcerative Colitis No family hx of      Crohn's Disease No family hx of              Review of Systems   Ten pt ROS done, otherwise negative  Objective           Vitals:  No vitals were obtained today due to virtual visit.    Physical Exam   GENERAL: Healthy, alert and no distress  EYES: Eyes grossly normal to inspection.  No discharge or erythema, or obvious scleral/conjunctival abnormalities.  RESP: No audible wheeze, cough, or visible cyanosis.  No visible retractions or increased work of breathing.    SKIN: Visible skin clear. No significant rash, abnormal pigmentation or  lesions.  NEURO: Cranial nerves grossly intact.  Mentation and speech appropriate for age.  PSYCH: Mentation appears normal, affect normal/bright, judgement and insight intact, normal speech and appearance well-groomed.            Video-Visit Details    Type of service:  Video Visit    Video End Time:11:22    Originating Location (pt. Location): Home    Distant Location (provider location):  [facility] [place]     Platform used for Video Visit: [employer]

## 2021-05-10 NOTE — TELEPHONE ENCOUNTER
Called Radha to discuss if she would be able to change sedation to conscious.  If unable to do so, we would need to reschedule procedure entirely.

## 2021-05-12 ENCOUNTER — TELEPHONE (OUTPATIENT)
Dept: GASTROENTEROLOGY | Facility: OUTPATIENT CENTER | Age: 66
End: 2021-05-12

## 2021-05-12 NOTE — TELEPHONE ENCOUNTER
Current Discharge Medication List  
  
Take these medications at their scheduled times Dose & Instructions Dispensing Information Comments Morning Noon Evening Bedtime  
 docusate sodium 100 mg capsule Commonly known as:  Henreitta Oxford Your next dose is: Today, Tomorrow Other:  ____________ Dose:  100 mg Take 1 Cap by mouth daily for 90 days. Quantity:  90 Cap Refills:  0  
     
   
   
   
  
 oseltamivir 75 mg capsule Commonly known as:  TAMIFLU Start taking on:  3/4/2017 Your next dose is: Today, Tomorrow Other:  ____________ Dose:  75 mg Take 1 Cap by mouth every twenty-four (24) hours for 2 days. Quantity:  2 Cap Refills:  0  
     
   
   
   
  
 pantoprazole 40 mg tablet Commonly known as:  PROTONIX Your next dose is: Today, Tomorrow Other:  ____________ Dose:  40 mg Take 1 Tab by mouth daily. Quantity:  1 Tab Refills:  0  
     
   
   
   
  
 sertraline 50 mg tablet Commonly known as:  ZOLOFT Your next dose is: Today, Tomorrow Other:  ____________ Dose:  50 mg Take 50 mg by mouth daily. Refills:  0 Take these medications as needed Dose & Instructions Dispensing Information Comments Morning Noon Evening Bedtime  
 bisacodyl 10 mg suppository Commonly known as:  DULCOLAX Your next dose is: Today, Tomorrow Other:  ____________ Dose:  10 mg Insert 10 mg into rectum daily as needed. Quantity:  1 Each Refills:  0  
     
   
   
   
  
 tiZANidine 4 mg tablet Commonly known as:  Columbia Rink Your next dose is: Today, Tomorrow Other:  ____________ Dose:  2 mg Take 1 Tab by mouth every eight (8) hours as needed. Quantity:  60 Tab Refills:  0 Where to Get Your Medications Pt was previously working with ALBIN on date w/ Salome for an EGD. Lvm for pt stating 5/25 w/ salome is not an option no longer. No dates now to offer pt. Albin states Eliu is working on possible dates. Pt to call us back if she has any questions.    These medications were sent to [address] [place] [address], [address] [place] [address] Phone:  [phone]  
  bisacodyl 10 mg suppository  
 docusate sodium 100 mg capsule  
 pantoprazole 40 mg tablet Information about where to get these medications is not yet available ! Ask your nurse or doctor about these medications  
  oseltamivir 75 mg capsule

## 2021-05-14 ENCOUNTER — TELEPHONE (OUTPATIENT)
Dept: GASTROENTEROLOGY | Facility: OUTPATIENT CENTER | Age: 66
End: 2021-05-14

## 2021-05-21 DIAGNOSIS — Z11.59 ENCOUNTER FOR SCREENING FOR OTHER VIRAL DISEASES: ICD-10-CM

## 2021-05-26 DIAGNOSIS — M79.7 FIBROMYALGIA: ICD-10-CM

## 2021-05-26 DIAGNOSIS — F41.9 ANXIETY DISORDER, UNSPECIFIED TYPE: ICD-10-CM

## 2021-05-26 NOTE — TELEPHONE ENCOUNTER
Pt's EGD was r/s to 6.8.2021 with MAC sedation.    Writer reviewed pre-assessment questions with patient prior to upcoming EGD on 6.8.2021.  COVID test scheduled for 6.4.2021.  Reviewed date, time, and location of COVID test with pt.     Reviewed EGD prep instructions with patient.     Designated  policy reviewed with patient.     Patient verbalized understanding.  No further questions or concerns.    Delia Ramirez RN

## 2021-05-27 RX ORDER — PREGABALIN 150 MG/1
150 CAPSULE ORAL 3 TIMES DAILY
Qty: 90 CAPSULE | Refills: 1 | Status: SHIPPED | OUTPATIENT
Start: 2021-05-27 | End: 2021-08-04

## 2021-05-27 RX ORDER — ESCITALOPRAM OXALATE 20 MG/1
TABLET ORAL
Qty: 30 TABLET | Refills: 5 | Status: SHIPPED | OUTPATIENT
Start: 2021-05-27 | End: 2021-12-16

## 2021-06-04 DIAGNOSIS — Z11.59 ENCOUNTER FOR SCREENING FOR OTHER VIRAL DISEASES: ICD-10-CM

## 2021-06-04 PROCEDURE — U0003 INFECTIOUS AGENT DETECTION BY NUCLEIC ACID (DNA OR RNA); SEVERE ACUTE RESPIRATORY SYNDROME CORONAVIRUS 2 (SARS-COV-2) (CORONAVIRUS DISEASE [COVID-19]), AMPLIFIED PROBE TECHNIQUE, MAKING USE OF HIGH THROUGHPUT TECHNOLOGIES AS DESCRIBED BY CMS-2020-01-R: HCPCS | Performed by: PATHOLOGY

## 2021-06-04 PROCEDURE — U0005 INFEC AGEN DETEC AMPLI PROBE: HCPCS | Performed by: PATHOLOGY

## 2021-06-08 ENCOUNTER — ANESTHESIA EVENT (OUTPATIENT)
Dept: GASTROENTEROLOGY | Facility: CLINIC | Age: 66
End: 2021-06-08
Payer: COMMERCIAL

## 2021-06-08 ENCOUNTER — HOSPITAL ENCOUNTER (OUTPATIENT)
Facility: CLINIC | Age: 66
Discharge: HOME | End: 2021-06-08
Attending: SURGERY | Admitting: SURGERY
Payer: COMMERCIAL

## 2021-06-08 ENCOUNTER — ANESTHESIA (OUTPATIENT)
Dept: GASTROENTEROLOGY | Facility: CLINIC | Age: 66
End: 2021-06-08
Payer: COMMERCIAL

## 2021-06-08 VITALS
HEART RATE: 63 BPM | SYSTOLIC BLOOD PRESSURE: 130 MMHG | RESPIRATION RATE: 14 BRPM | OXYGEN SATURATION: 97 % | TEMPERATURE: 98.4 F | DIASTOLIC BLOOD PRESSURE: 81 MMHG

## 2021-06-08 LAB — UPPER GI ENDOSCOPY: NORMAL

## 2021-06-08 PROCEDURE — 250N000009 HC RX 250: Performed by: NURSE ANESTHETIST, CERTIFIED REGISTERED

## 2021-06-08 PROCEDURE — 250N000011 HC RX IP 250 OP 636: Performed by: NURSE ANESTHETIST, CERTIFIED REGISTERED

## 2021-06-08 PROCEDURE — 43235 EGD DIAGNOSTIC BRUSH WASH: CPT | Performed by: SURGERY

## 2021-06-08 PROCEDURE — 258N000003 HC RX IP 258 OP 636: Performed by: NURSE ANESTHETIST, CERTIFIED REGISTERED

## 2021-06-08 PROCEDURE — 370N000017 HC ANESTHESIA TECHNICAL FEE, PER MIN: Performed by: SURGERY

## 2021-06-08 RX ORDER — SODIUM CHLORIDE, SODIUM LACTATE, POTASSIUM CHLORIDE, CALCIUM CHLORIDE 600; 310; 30; 20 MG/100ML; MG/100ML; MG/100ML; MG/100ML
INJECTION, SOLUTION INTRAVENOUS CONTINUOUS
Status: DISCONTINUED | OUTPATIENT
Start: 2021-06-08 | End: 2021-06-08 | Stop reason: HOSPADM

## 2021-06-08 RX ORDER — ONDANSETRON 4 MG/1
4 TABLET, ORALLY DISINTEGRATING ORAL EVERY 30 MIN PRN
Status: DISCONTINUED | OUTPATIENT
Start: 2021-06-08 | End: 2021-06-08 | Stop reason: HOSPADM

## 2021-06-08 RX ORDER — ONDANSETRON 2 MG/ML
4 INJECTION INTRAMUSCULAR; INTRAVENOUS EVERY 6 HOURS PRN
Status: DISCONTINUED | OUTPATIENT
Start: 2021-06-08 | End: 2021-06-08 | Stop reason: HOSPADM

## 2021-06-08 RX ORDER — PROCHLORPERAZINE MALEATE 10 MG
10 TABLET ORAL EVERY 6 HOURS PRN
Status: DISCONTINUED | OUTPATIENT
Start: 2021-06-08 | End: 2021-06-08 | Stop reason: HOSPADM

## 2021-06-08 RX ORDER — NALOXONE HYDROCHLORIDE 0.4 MG/ML
0.2 INJECTION, SOLUTION INTRAMUSCULAR; INTRAVENOUS; SUBCUTANEOUS
Status: DISCONTINUED | OUTPATIENT
Start: 2021-06-08 | End: 2021-06-08 | Stop reason: HOSPADM

## 2021-06-08 RX ORDER — NALOXONE HYDROCHLORIDE 0.4 MG/ML
0.4 INJECTION, SOLUTION INTRAMUSCULAR; INTRAVENOUS; SUBCUTANEOUS
Status: DISCONTINUED | OUTPATIENT
Start: 2021-06-08 | End: 2021-06-08 | Stop reason: HOSPADM

## 2021-06-08 RX ORDER — ONDANSETRON 2 MG/ML
4 INJECTION INTRAMUSCULAR; INTRAVENOUS EVERY 30 MIN PRN
Status: DISCONTINUED | OUTPATIENT
Start: 2021-06-08 | End: 2021-06-08 | Stop reason: HOSPADM

## 2021-06-08 RX ORDER — GLYCOPYRROLATE 0.2 MG/ML
INJECTION, SOLUTION INTRAMUSCULAR; INTRAVENOUS PRN
Status: DISCONTINUED | OUTPATIENT
Start: 2021-06-08 | End: 2021-06-08

## 2021-06-08 RX ORDER — MEPERIDINE HYDROCHLORIDE 25 MG/ML
12.5 INJECTION INTRAMUSCULAR; INTRAVENOUS; SUBCUTANEOUS
Status: DISCONTINUED | OUTPATIENT
Start: 2021-06-08 | End: 2021-06-08 | Stop reason: HOSPADM

## 2021-06-08 RX ORDER — FLUMAZENIL 0.1 MG/ML
0.2 INJECTION, SOLUTION INTRAVENOUS
Status: DISCONTINUED | OUTPATIENT
Start: 2021-06-08 | End: 2021-06-08 | Stop reason: HOSPADM

## 2021-06-08 RX ORDER — LIDOCAINE 40 MG/G
CREAM TOPICAL
Status: DISCONTINUED | OUTPATIENT
Start: 2021-06-08 | End: 2021-06-08 | Stop reason: HOSPADM

## 2021-06-08 RX ORDER — SODIUM CHLORIDE, SODIUM LACTATE, POTASSIUM CHLORIDE, CALCIUM CHLORIDE 600; 310; 30; 20 MG/100ML; MG/100ML; MG/100ML; MG/100ML
INJECTION, SOLUTION INTRAVENOUS CONTINUOUS PRN
Status: DISCONTINUED | OUTPATIENT
Start: 2021-06-08 | End: 2021-06-08

## 2021-06-08 RX ORDER — KETAMINE HYDROCHLORIDE 10 MG/ML
INJECTION INTRAMUSCULAR; INTRAVENOUS PRN
Status: DISCONTINUED | OUTPATIENT
Start: 2021-06-08 | End: 2021-06-08

## 2021-06-08 RX ORDER — PROPOFOL 10 MG/ML
INJECTION, EMULSION INTRAVENOUS PRN
Status: DISCONTINUED | OUTPATIENT
Start: 2021-06-08 | End: 2021-06-08

## 2021-06-08 RX ORDER — ONDANSETRON 4 MG/1
4 TABLET, ORALLY DISINTEGRATING ORAL EVERY 6 HOURS PRN
Status: DISCONTINUED | OUTPATIENT
Start: 2021-06-08 | End: 2021-06-08 | Stop reason: HOSPADM

## 2021-06-08 RX ORDER — PROPOFOL 10 MG/ML
INJECTION, EMULSION INTRAVENOUS CONTINUOUS PRN
Status: DISCONTINUED | OUTPATIENT
Start: 2021-06-08 | End: 2021-06-08

## 2021-06-08 RX ADMIN — GLYCOPYRROLATE 0.1 MG: 0.2 INJECTION, SOLUTION INTRAMUSCULAR; INTRAVENOUS at 13:59

## 2021-06-08 RX ADMIN — TOPICAL ANESTHETIC 1 EACH: 200 SPRAY DENTAL; PERIODONTAL at 13:55

## 2021-06-08 RX ADMIN — PROPOFOL 100 MCG/KG/MIN: 10 INJECTION, EMULSION INTRAVENOUS at 13:59

## 2021-06-08 RX ADMIN — Medication 20 MG: at 13:59

## 2021-06-08 RX ADMIN — MIDAZOLAM 2 MG: 1 INJECTION INTRAMUSCULAR; INTRAVENOUS at 13:55

## 2021-06-08 RX ADMIN — PROPOFOL 40 MG: 10 INJECTION, EMULSION INTRAVENOUS at 14:01

## 2021-06-08 RX ADMIN — SODIUM CHLORIDE, POTASSIUM CHLORIDE, SODIUM LACTATE AND CALCIUM CHLORIDE: 600; 310; 30; 20 INJECTION, SOLUTION INTRAVENOUS at 13:53

## 2021-06-08 ASSESSMENT — ENCOUNTER SYMPTOMS: SEIZURES: 1

## 2021-06-08 NOTE — ANESTHESIA POSTPROCEDURE EVALUATION
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    Anesthesia Type:  MAC    Note:  Disposition: Outpatient   Postop Pain Control: Uneventful            Sign Out: Well controlled pain   PONV: No   Neuro/Psych: Uneventful            Sign Out: Acceptable/Baseline neuro status   Airway/Respiratory: Uneventful            Sign Out: Acceptable/Baseline resp. status   CV/Hemodynamics: Uneventful            Sign Out: Acceptable CV status; No obvious hypovolemia; No obvious fluid overload   Other NRE: NONE   DID A NON-ROUTINE EVENT OCCUR? No           Last vitals:  Vitals:    06/08/21 1425 06/08/21 1430 06/08/21 1435   BP: 105/71 114/70 114/74   Pulse:  70 69   Resp:      Temp:      SpO2: 98% 98% 98%       Last vitals prior to Anesthesia Care Transfer:  CRNA VITALS  6/8/2021 1345 - 6/8/2021 1444      6/8/2021             Pulse:  81    SpO2:  99 %          Electronically Signed By: Roxi Gustfason MD  June 8, 2021  2:44 PM

## 2021-06-08 NOTE — ANESTHESIA PREPROCEDURE EVALUATION
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 Removed Oct 2018.     Benzodiazepine dependence (H)     With h/o withdrawal seizure x 1     Bipolar 1 disorder, mixed, moderate (H) 2013     Chronic pain     Back, legs.     Depressive disorder      Eosinophilic gastroenteritis 2010     Epidural abscess 2005    x4     Fibromyalgia      Generalised anxiety disorder      GERD (gastroesophageal reflux disease)      Major depressive disorder      Migraine      Nephrolithiasis     S/p left sided lithotripsy     Opiate dependence (H)      Osteoarthritis      Osteoporosis      PUD (peptic ulcer disease)      SLE (systemic lupus erythematosus) (H) 2009     Weight loss       Past Surgical History:   Procedure Laterality Date     BACK SURGERY  04    L4-5 epidural abscess     BACK SURGERY  04    L3-4 spinal stenosis     BACK SURGERY  04    Lt psoas abscess     BRONCHOSCOPY FLEXIBLE N/A 2019    Procedure: Flexible Bronchoscopy;  Surgeon: Patrice Regalado MD;  Location: [facility]      SECTION      x 2     CHOLECYSTECTOMY  -     CLOSED REDUCTION, PERCUTANEOUS PINNING FINGER, COMBINED  8/10/2011    Procedure:COMBINED CLOSED REDUCTION, PERCUTANEOUS PINNING FINGER; 5th Proximal Phalanx; Surgeon:RADHA BUITRAGO; [facility]     COLONOSCOPY       COLONOSCOPY N/A 2020    Procedure: COLONOSCOPY;  Surgeon: Zacarias Duran MD;  [facility]     ESOPHAGOSCOPY, GASTROSCOPY, DUODENOSCOPY (EGD), COMBINED N/A 2020    Procedure: ESOPHAGOGASTRODUODENOSCOPY, WITH BIOPSY;  Surgeon: Zacarias Duran MD;  [facility]     GASTRECTOMY  2005     Bilat truncal vagotomy, hemigastrectomy, RnY gastrojejunostomy     GASTROJEJUNOSTOMY  6/2/2011    Procedure:GASTROJEJUNOSTOMY; exploratory laparotmy with revision of gastrojejunostomy, Antrectomy, Miles-en-y, Gastrojejunostomy; Surgeon:JULIUS HELM; Location:[facility]     INSERT CHEST TUBE N/A 4/29/2019    Procedure: INSERTION, CATHETER, INTERCOSTAL, FOR DRAINAGE;  Surgeon: Melissa Nichols MD;  Location: [facility]     LASER HOLMIUM LITHOTRIPSY URETER(S), INSERT STENT, COMBINED      Procedure: COMBINED CYSTOSCOPY, URETEROSCOPY, LASER HOLMIUM LITHOTRIPSY URETER(S), INSERT STENT;;  Surgeon: Blair Correa MD;  Location: [facility]     LASER HOLMIUM NEPHROLITHOTOMY VIA PERCUTANEOUS NEPHROSTOMY  7/11/2012    Procedure: LASER HOLMIUM NEPHROLITHOTOMY VIA PERCUTANEOUS NEPHROSTOMY;  proceedure should read: Left Percutaneous Access, Left Percutaneous ultrasonic Nephrolithotomy, Ureteroscopy Holmium Laser Lithotripsy Stent Placement ;  Surgeon: Blair Correa MD;  [facility]     MAMMOPLASTY AUGMENTATION BILATERAL       OPEN REDUCTION INTERNAL FIXATION WRIST Left 1/11/2016    Procedure: OPEN REDUCTION INTERNAL FIXATION WRIST;  Surgeon: Darling Ellis MD;  [facility]     RECONSTRUCT BREAST, IMPLANT PROSTHESIS, COMBINED       THORACOSCOPIC DECORTICATION LUNG Left 5/7/2019    Procedure: Left Video Assisted Thoracoscopic Decortication, Intercostal Nerve Cryo Analgesia, Flexible Bronchoscopy;  Surgeon: Patrice Regalado MD;  Location: [facility]      Allergies   Allergen Reactions     Penicillins Hives     Hives in childhood.      Social History     Tobacco Use     Smoking status: Never Smoker     Smokeless tobacco: Never Used   Substance Use Topics     Alcohol use: No     Comment: none in 10 years      Wt Readings from Last 1 Encounters:   04/09/21 59.4 kg (131 lb)        Anesthesia Evaluation   Pt has had prior anesthetic. Type: General and MAC.        ROS/MED HX  ENT/Pulmonary:       Neurologic:     (+)  migraines, seizures,     Cardiovascular:       METS/Exercise Tolerance:     Hematologic:     (+) anemia,     Musculoskeletal:       GI/Hepatic:     (+) GERD,     Renal/Genitourinary:     (+) renal disease, Nephrolithiasis ,     Endo:       Psychiatric/Substance Use:     (+) psychiatric history depression, anxiety and bipolar Recreational drug usage: Other (Comment).    Infectious Disease:       Malignancy:   (+) Malignancy, History of Skin.    Other:      (+) , H/O Chronic Pain,        Physical Exam    Airway        Mallampati: II   TM distance: > 3 FB   Neck ROM: full   Mouth opening: > 3 cm    Respiratory Devices and Support         Dental  no notable dental history         Cardiovascular   cardiovascular exam normal       Rhythm and rate: regular and normal     Pulmonary   pulmonary exam normal        breath sounds clear to auscultation           OUTSIDE LABS:  CBC:   Lab Results   Component Value Date    WBC 10.2 04/09/2021    WBC 5.3 03/15/2021    HGB 9.4 (L) 04/09/2021    HGB 10.4 (L) 03/15/2021    HCT 33.0 (L) 04/09/2021    HCT 36.1 03/15/2021     04/09/2021     03/15/2021     BMP:   Lab Results   Component Value Date     04/09/2021     02/04/2021    POTASSIUM 4.5 04/09/2021    POTASSIUM 4.6 02/04/2021    CHLORIDE 111 (H) 04/09/2021    CHLORIDE 107 02/04/2021    CO2 30 04/09/2021    CO2 34 (H) 02/04/2021    BUN 22 04/09/2021    BUN 16 02/04/2021    CR 0.88 04/09/2021    CR 0.70 02/04/2021    GLC 83 04/09/2021    GLC 58 (L) 02/04/2021     COAGS:   Lab Results   Component Value Date    PTT 45 (H) 04/29/2019    INR 0.93 01/07/2021    FIBR 219 06/02/2011     POC:   Lab Results   Component Value Date    BGM 92 05/07/2019    HCG Negative 04/15/2015    HCGS Negative 11/05/2010     HEPATIC:   Lab Results   Component Value Date    ALBUMIN 1.3 (L) 04/09/2021    PROTTOTAL 4.8 (L) 04/09/2021    ALT 16 04/09/2021    AST 18 04/09/2021    GGT 1,223 (H) 05/06/2019    ALKPHOS 89 04/09/2021     BILITOTAL <0.1 (L) 04/09/2021     OTHER:   Lab Results   Component Value Date    PH 7.40 05/07/2019    LACT 1.0 05/03/2019    A1C 5.5 06/02/2011    RAFAELA 8.1 (L) 04/09/2021    PHOS 5.0 (H) 05/07/2019    MAG 1.7 05/07/2019    LIPASE 32 (L) 04/28/2019    AMYLASE 33 05/20/2016    TSH 1.88 03/15/2021    T4 0.81 02/04/2013    CRP 24.6 (H) 03/15/2021    SED 46 (H) 03/15/2021       Anesthesia Plan    ASA Status:  3      Anesthesia Type: MAC.     - Reason for MAC: chronic cardiopulmonary disease, straight local not clinically adequate   Induction: Intravenous, Propofol.   Maintenance: TIVA.        Consents    Anesthesia Plan(s) and associated risks, benefits, and realistic alternatives discussed. Questions answered and patient/representative(s) expressed understanding.     - Discussed with:  Patient      - Extended Intubation/Ventilatory Support Discussed: No.      - Patient is DNR/DNI Status: No    Use of blood products discussed: No .     Postoperative Care       PONV prophylaxis: Ondansetron (or other 5HT-3)     Comments:                Roxi Gustafson MD

## 2021-06-08 NOTE — DISCHARGE INSTRUCTIONS
Discharge Instructions after Upper Endoscopy (EGD)    Activity and Diet  * You were given medicine for pain. You may be dizzy or sleepy.  For 24 hours:    Do not drive or use heavy equipment.    Do not make important decisions.    Do not drink any alcohol.  * You may return to your regular diet.    Discomfort  You may have a sore throat for 2 to 3 days. It may help to:    Avoid hot liquids for 24 hours.    Use sore throat lozenges.    Gargle as needed with salt water up to 4 times a day. Mix 1 cup of warm water  with 1 teaspoon of salt. Do not swallow.    You may take Tylenol (acetaminophen) for pain unless your doctor has told you not to.    When to call us:  Problems are rare. Call right away if you have:    Unusual throat pain or trouble swallowing    Unusual pain in belly or chest that is not relieved by belching or passing air    Black stools (tar-like looking bowel movement)    Temperature above 100.6  F. (37.5  C).    If you vomit blood or have severe pain, go to an emergency room.    If you have questions, call:  Monday to Friday, 8 a.m. to 4:30 p.m.: Endoscopy: [phone] (We may have to call you back)    After hours: Hospital: [phone] (Ask for the GI fellow on call)

## 2021-06-08 NOTE — ANESTHESIA CARE TRANSFER NOTE
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    Anesthesia Type:   MAC     Note:    Oropharynx: spontaneously breathing  Level of Consciousness: awake  Oxygen Supplementation: room air    Independent Airway: airway patency satisfactory and stable  Dentition: dentition unchanged  Vital Signs Stable: post-procedure vital signs reviewed and stable  Report to RN Given: handoff report given  Patient transferred to: PACU    Handoff Report: Identifed the Patient, Identified the Reponsible Provider, Reviewed the pertinent medical history, Discussed the surgical course, Reviewed Intra-OP anesthesia mangement and issues during anesthesia, Set expectations for post-procedure period and Allowed opportunity for questions and acknowledgement of understanding      Vitals: (Last set prior to Anesthesia Care Transfer)  CRNA VITALS  6/8/2021 1345 - 6/8/2021 1428      6/8/2021             Pulse:  81    SpO2:  99 %        Electronically Signed By: VINOD Perdomo CRNA  June 8, 2021  2:28 PM

## 2021-06-09 ENCOUNTER — TELEPHONE (OUTPATIENT)
Dept: SURGERY | Facility: CLINIC | Age: 66
End: 2021-06-09

## 2021-06-09 DIAGNOSIS — K31.84 GASTROPARESIS: Primary | ICD-10-CM

## 2021-06-09 NOTE — TELEPHONE ENCOUNTER
Patient's call was transferred to me. Patient had an endoscopy with Dr. Clayton yesterday and is alarmed with the result. She said she is not going to wait weeks to get in to see him to discuss. I did tell her I could get her scheduled for an in-person clinic visit tomorrow and she was very happy with that. She evidently discussed with Dr. Clayton yesterday if she should be started on TPN. Dr. Clayton said that would depend on her labs. I will notify his RN Darling HOLLOWAY to let her know of the need for labs that to be ordered and let her know patient will stop by the lab prior to her 9 a.m. clinic appointment tomorrow.

## 2021-06-10 ENCOUNTER — OFFICE VISIT (OUTPATIENT)
Dept: SURGERY | Facility: CLINIC | Age: 66
End: 2021-06-10
Payer: COMMERCIAL

## 2021-06-10 VITALS
SYSTOLIC BLOOD PRESSURE: 104 MMHG | OXYGEN SATURATION: 98 % | BODY MASS INDEX: 20.5 KG/M2 | WEIGHT: 115.7 LBS | DIASTOLIC BLOOD PRESSURE: 68 MMHG | HEIGHT: 63 IN | TEMPERATURE: 98.1 F | HEART RATE: 78 BPM

## 2021-06-10 DIAGNOSIS — K91.89 ANASTOMOTIC STRICTURE OF GASTROJEJUNOSTOMY: Primary | ICD-10-CM

## 2021-06-10 DIAGNOSIS — E43 SEVERE PROTEIN-CALORIE MALNUTRITION (H): ICD-10-CM

## 2021-06-10 DIAGNOSIS — K31.84 GASTROPARESIS: ICD-10-CM

## 2021-06-10 LAB
ALBUMIN SERPL-MCNC: 1.9 G/DL (ref 3.4–5)
ALP SERPL-CCNC: 95 U/L (ref 40–150)
ALT SERPL W P-5'-P-CCNC: 16 U/L (ref 0–50)
ANION GAP SERPL CALCULATED.3IONS-SCNC: 3 MMOL/L (ref 3–14)
AST SERPL W P-5'-P-CCNC: 20 U/L (ref 0–45)
BILIRUB SERPL-MCNC: 0.1 MG/DL (ref 0.2–1.3)
BUN SERPL-MCNC: 15 MG/DL (ref 7–30)
CALCIUM SERPL-MCNC: 8.4 MG/DL (ref 8.5–10.1)
CHLORIDE SERPL-SCNC: 110 MMOL/L (ref 94–109)
CO2 SERPL-SCNC: 29 MMOL/L (ref 20–32)
CREAT SERPL-MCNC: 0.74 MG/DL (ref 0.52–1.04)
ERYTHROCYTE [DISTWIDTH] IN BLOOD BY AUTOMATED COUNT: 15.4 % (ref 10–15)
GFR SERPL CREATININE-BSD FRML MDRD: 86 ML/MIN/{1.73_M2}
GLUCOSE SERPL-MCNC: 104 MG/DL (ref 70–99)
HCT VFR BLD AUTO: 34.1 % (ref 35–47)
HGB BLD-MCNC: 9.4 G/DL (ref 11.7–15.7)
INR PPP: 0.97 (ref 0.86–1.14)
MAGNESIUM SERPL-MCNC: 2 MG/DL (ref 1.6–2.3)
MCH RBC QN AUTO: 23.2 PG (ref 26.5–33)
MCHC RBC AUTO-ENTMCNC: 27.6 G/DL (ref 31.5–36.5)
MCV RBC AUTO: 84 FL (ref 78–100)
PHOSPHATE SERPL-MCNC: 3.1 MG/DL (ref 2.5–4.5)
PLATELET # BLD AUTO: 272 10E9/L (ref 150–450)
POTASSIUM SERPL-SCNC: 4.1 MMOL/L (ref 3.4–5.3)
PROT SERPL-MCNC: 5.7 G/DL (ref 6.8–8.8)
RBC # BLD AUTO: 4.05 10E12/L (ref 3.8–5.2)
SODIUM SERPL-SCNC: 142 MMOL/L (ref 133–144)
WBC # BLD AUTO: 6.7 10E9/L (ref 4–11)

## 2021-06-10 PROCEDURE — 80053 COMPREHEN METABOLIC PANEL: CPT | Performed by: PATHOLOGY

## 2021-06-10 PROCEDURE — 36415 COLL VENOUS BLD VENIPUNCTURE: CPT | Performed by: PATHOLOGY

## 2021-06-10 PROCEDURE — 99214 OFFICE O/P EST MOD 30 MIN: CPT | Mod: 24 | Performed by: SURGERY

## 2021-06-10 PROCEDURE — 85610 PROTHROMBIN TIME: CPT | Performed by: PATHOLOGY

## 2021-06-10 PROCEDURE — 83735 ASSAY OF MAGNESIUM: CPT | Performed by: PATHOLOGY

## 2021-06-10 PROCEDURE — 85027 COMPLETE CBC AUTOMATED: CPT | Performed by: PATHOLOGY

## 2021-06-10 PROCEDURE — 84100 ASSAY OF PHOSPHORUS: CPT | Performed by: PATHOLOGY

## 2021-06-10 ASSESSMENT — MIFFLIN-ST. JEOR: SCORE: 1043.94

## 2021-06-10 ASSESSMENT — PAIN SCALES - GENERAL: PAINLEVEL: MODERATE PAIN (5)

## 2021-06-10 NOTE — NURSING NOTE
"Chief Complaint   Patient presents with     Follow Up     Follow up endoscopy 06/08/2021       Vitals:    06/10/21 0927   BP: 104/68   BP Location: Left arm   Patient Position: Sitting   Cuff Size: Adult Small   Pulse: 78   Temp: 98.1  F (36.7  C)   TempSrc: Oral   SpO2: 98%   Weight: 52.5 kg (115 lb 11.2 oz)   Height: 1.6 m (5' 3\")       Body mass index is 20.5 kg/m .        Norma Robles CMA    "

## 2021-06-10 NOTE — LETTER
"6/10/2021       RE: Demetra Richardson  [address] [place] [address]     Dear Colleague,    Thank you for referring your patient, Demetra Richardson, to the [facility] [place] [facility]. Please see a copy of my visit note below.    Patient was previously seen in clinic for evaluation of dysphagia and regurgitation associated with life-threatening malnutrition..    Previously, I performed the following operation on her: NONE    Patient had prior B2 antrectomy with gastrojejunostomy, Miles GJ; revised via gastrojejunal resection and reconnection.    I have done upper endoscopy; this documents retained food with large gastric pouch, representing a good portion of her stomach.  There is also a Miles GJ.    Current chief complaint: persistent inability to eat; malnutrition.    Longstanding problems.    Demetra Richardson overall has had the following complaints since the last visit: persistent problems with swallowing/vomiting; f/u of my endoscopy.        On exam:  /68 (BP Location: Left arm, Patient Position: Sitting, Cuff Size: Adult Small)   Pulse 78   Temp 98.1  F (36.7  C) (Oral)   Ht 1.6 m (5' 3\")   Wt 52.5 kg (115 lb 11.2 oz)   LMP  (LMP Unknown)   SpO2 98%   BMI 20.50 kg/m    Gen: alert, NAD  Lung exam: breathing unlabored  Abdominal exam: soft; nontender; nondistended; incisions c/d/i    LABS:     Last complete blood count:  Lab Results   Component Value Date    WBC 9.2 07/06/2021     Lab Results   Component Value Date    RBC 4.13 07/06/2021     Lab Results   Component Value Date    HGB 9.9 07/06/2021     Lab Results   Component Value Date    HCT 34.3 07/06/2021     Lab Results   Component Value Date    MCV 83 07/06/2021     Lab Results   Component Value Date    MCH 24.0 07/06/2021     Lab Results   Component Value Date    MCHC 28.9 07/06/2021     Lab Results   Component Value Date    RDW 18.1 " 07/06/2021     Lab Results   Component Value Date     07/06/2021       Lab Results   Component Value Date    AST 21 07/06/2021     Lab Results   Component Value Date    ALT 26 07/06/2021     Lab Results   Component Value Date    ALBUMIN 2.1 07/06/2021     Lab Results   Component Value Date    PROTTOTAL 6.2 07/06/2021       Last Basic Metabolic Panel:  Lab Results   Component Value Date     07/06/2021      Lab Results   Component Value Date    POTASSIUM 4.2 07/06/2021     Lab Results   Component Value Date    CHLORIDE 106 07/06/2021     Lab Results   Component Value Date    RAFAELA 8.3 07/06/2021     Lab Results   Component Value Date    CO2 28 07/06/2021     Lab Results   Component Value Date    BUN 31 07/06/2021     Lab Results   Component Value Date    CR 0.58 07/06/2021     Lab Results   Component Value Date    GLC 78 07/06/2021       IMAGING:   N/a now    Upper endoscopy:  Reviewed with Radha    Plan:  Will need at least one month TPN.    Then, will do open revision gastrojejunostomy.  This will take about 5 hours of OR time and may require esophagojejunostomy.    No orders of the defined types were placed in this encounter.          Kaveh Clayton MD  Surgery  [phone] (hospital )  [phone] (clinic nurses)                          Again, thank you for allowing me to participate in the care of your patient.      Sincerely,    Kaveh Clayton MD

## 2021-06-11 DIAGNOSIS — E61.1 IRON DEFICIENCY: ICD-10-CM

## 2021-06-15 ENCOUNTER — PATIENT OUTREACH (OUTPATIENT)
Dept: ENDOCRINOLOGY | Facility: CLINIC | Age: 66
End: 2021-06-15

## 2021-06-15 RX ORDER — FERROUS SULFATE 325(65) MG
325 TABLET ORAL 2 TIMES DAILY
Qty: 100 TABLET | Refills: 3 | Status: ON HOLD | OUTPATIENT
Start: 2021-06-15 | End: 2021-09-10

## 2021-06-15 NOTE — TELEPHONE ENCOUNTER
FERROUS SULFATE 325 MG TABLET      Last Written Prescription Date:  4/24/20  Last Fill Quantity: 60,   # refills: 11  Last Office Visit : 5/10/21  Future Office visit:  None scheduled    Routing refill request to provider for review/approval because:  Drug not active on patient's medication list

## 2021-06-15 NOTE — PROGRESS NOTES
Patient notified of admission information.  Patient to report to registration at the hospital 6/16/21 at 10 AM.  To call admissions in the morning to make sure nothing is changed - [phone].

## 2021-06-16 ENCOUNTER — HOSPITAL ENCOUNTER (INPATIENT)
Facility: CLINIC | Age: 66
LOS: 5 days | Discharge: HOME | DRG: 641 | End: 2021-06-21
Attending: SURGERY | Admitting: SURGERY
Payer: COMMERCIAL

## 2021-06-16 DIAGNOSIS — E46 MALNUTRITION (H): ICD-10-CM

## 2021-06-16 DIAGNOSIS — E44.0 PROTEIN-CALORIE MALNUTRITION, MODERATE (H): ICD-10-CM

## 2021-06-16 DIAGNOSIS — E53.8 LOW FOLATE: ICD-10-CM

## 2021-06-16 DIAGNOSIS — G89.29 OTHER CHRONIC PAIN: ICD-10-CM

## 2021-06-16 LAB
ANION GAP SERPL CALCULATED.3IONS-SCNC: 4 MMOL/L (ref 3–14)
BUN SERPL-MCNC: 18 MG/DL (ref 7–30)
CALCIUM SERPL-MCNC: 7.9 MG/DL (ref 8.5–10.1)
CHLORIDE SERPL-SCNC: 109 MMOL/L (ref 94–109)
CO2 SERPL-SCNC: 28 MMOL/L (ref 20–32)
CREAT SERPL-MCNC: 0.74 MG/DL (ref 0.52–1.04)
GFR SERPL CREATININE-BSD FRML MDRD: 85 ML/MIN/{1.73_M2}
GLUCOSE BLDC GLUCOMTR-MCNC: 109 MG/DL (ref 70–99)
GLUCOSE SERPL-MCNC: 96 MG/DL (ref 70–99)
MAGNESIUM SERPL-MCNC: 2 MG/DL (ref 1.6–2.3)
PHOSPHATE SERPL-MCNC: 2.8 MG/DL (ref 2.5–4.5)
POTASSIUM SERPL-SCNC: 4.3 MMOL/L (ref 3.4–5.3)
SODIUM SERPL-SCNC: 141 MMOL/L (ref 133–144)

## 2021-06-16 PROCEDURE — 80048 BASIC METABOLIC PNL TOTAL CA: CPT | Performed by: SURGERY

## 2021-06-16 PROCEDURE — 84100 ASSAY OF PHOSPHORUS: CPT | Performed by: SURGERY

## 2021-06-16 PROCEDURE — 83735 ASSAY OF MAGNESIUM: CPT | Performed by: SURGERY

## 2021-06-16 PROCEDURE — 272N000278 HC DEVICE 5FR SECURACATH

## 2021-06-16 PROCEDURE — 999N001017 HC STATISTIC GLUCOSE BY METER IP

## 2021-06-16 PROCEDURE — 84630 ASSAY OF ZINC: CPT | Performed by: STUDENT IN AN ORGANIZED HEALTH CARE EDUCATION/TRAINING PROGRAM

## 2021-06-16 PROCEDURE — 272N000458 ZZ HC KIT, 5 FR DL BIOFLO OPEN ENDED PICC

## 2021-06-16 PROCEDURE — 3E0436Z INTRODUCTION OF NUTRITIONAL SUBSTANCE INTO CENTRAL VEIN, PERCUTANEOUS APPROACH: ICD-10-PCS | Performed by: SURGERY

## 2021-06-16 PROCEDURE — 272N000103 HC INTRODUCER MICRO SET

## 2021-06-16 PROCEDURE — 272N000201 ZZ HC ADHESIVE SKIN CLOSURE, DERMABOND

## 2021-06-16 PROCEDURE — 250N000013 HC RX MED GY IP 250 OP 250 PS 637: Performed by: STUDENT IN AN ORGANIZED HEALTH CARE EDUCATION/TRAINING PROGRAM

## 2021-06-16 PROCEDURE — 36568 INSJ PICC <5 YR W/O IMAGING: CPT

## 2021-06-16 PROCEDURE — 120N000002 HC R&B MED SURG/OB UMMC

## 2021-06-16 PROCEDURE — 36415 COLL VENOUS BLD VENIPUNCTURE: CPT | Performed by: SURGERY

## 2021-06-16 PROCEDURE — 250N000009 HC RX 250: Performed by: SURGERY

## 2021-06-16 RX ORDER — FERROUS SULFATE 325(65) MG
325 TABLET ORAL 2 TIMES DAILY
Status: DISCONTINUED | OUTPATIENT
Start: 2021-06-16 | End: 2021-06-21 | Stop reason: HOSPADM

## 2021-06-16 RX ORDER — METHADONE HYDROCHLORIDE 10 MG/ML
120 CONCENTRATE ORAL DAILY
Status: DISCONTINUED | OUTPATIENT
Start: 2021-06-17 | End: 2021-06-21 | Stop reason: HOSPADM

## 2021-06-16 RX ORDER — BUTALBITAL, ACETAMINOPHEN AND CAFFEINE 50; 325; 40 MG/1; MG/1; MG/1
1 TABLET ORAL EVERY 6 HOURS PRN
Status: DISCONTINUED | OUTPATIENT
Start: 2021-06-16 | End: 2021-06-16

## 2021-06-16 RX ORDER — PANTOPRAZOLE SODIUM 40 MG/1
40 TABLET, DELAYED RELEASE ORAL DAILY
Status: DISCONTINUED | OUTPATIENT
Start: 2021-06-16 | End: 2021-06-21 | Stop reason: HOSPADM

## 2021-06-16 RX ORDER — LIDOCAINE 40 MG/G
CREAM TOPICAL
Status: DISCONTINUED | OUTPATIENT
Start: 2021-06-16 | End: 2021-06-21 | Stop reason: HOSPADM

## 2021-06-16 RX ORDER — IBUPROFEN 400 MG/1
400-800 TABLET, FILM COATED ORAL EVERY 8 HOURS PRN
Status: DISCONTINUED | OUTPATIENT
Start: 2021-06-16 | End: 2021-06-21 | Stop reason: HOSPADM

## 2021-06-16 RX ORDER — METHADONE HYDROCHLORIDE 10 MG/ML
120 CONCENTRATE ORAL DAILY
Status: DISCONTINUED | OUTPATIENT
Start: 2021-06-16 | End: 2021-06-16

## 2021-06-16 RX ORDER — HEPARIN SODIUM,PORCINE 10 UNIT/ML
2-5 VIAL (ML) INTRAVENOUS
Status: ACTIVE | OUTPATIENT
Start: 2021-06-16 | End: 2021-06-19

## 2021-06-16 RX ORDER — NALOXONE HYDROCHLORIDE 0.4 MG/ML
0.4 INJECTION, SOLUTION INTRAMUSCULAR; INTRAVENOUS; SUBCUTANEOUS
Status: DISCONTINUED | OUTPATIENT
Start: 2021-06-16 | End: 2021-06-21 | Stop reason: HOSPADM

## 2021-06-16 RX ORDER — METHADONE HYDROCHLORIDE 10 MG/ML
120 CONCENTRATE ORAL DAILY
COMMUNITY

## 2021-06-16 RX ORDER — POLYETHYLENE GLYCOL 3350 17 G/17G
17 POWDER, FOR SOLUTION ORAL DAILY
Status: DISCONTINUED | OUTPATIENT
Start: 2021-06-16 | End: 2021-06-16

## 2021-06-16 RX ORDER — NALOXONE HYDROCHLORIDE 0.4 MG/ML
0.2 INJECTION, SOLUTION INTRAMUSCULAR; INTRAVENOUS; SUBCUTANEOUS
Status: DISCONTINUED | OUTPATIENT
Start: 2021-06-16 | End: 2021-06-21 | Stop reason: HOSPADM

## 2021-06-16 RX ORDER — ERGOCALCIFEROL 1.25 MG/1
50000 CAPSULE, LIQUID FILLED ORAL
Status: DISCONTINUED | OUTPATIENT
Start: 2021-06-17 | End: 2021-06-21 | Stop reason: HOSPADM

## 2021-06-16 RX ORDER — LIDOCAINE 40 MG/G
CREAM TOPICAL
Status: ACTIVE | OUTPATIENT
Start: 2021-06-16 | End: 2021-06-19

## 2021-06-16 RX ORDER — FOLIC ACID 1 MG/1
1 TABLET ORAL DAILY
Status: DISCONTINUED | OUTPATIENT
Start: 2021-06-16 | End: 2021-06-21 | Stop reason: HOSPADM

## 2021-06-16 RX ORDER — DEXTROSE MONOHYDRATE 100 MG/ML
INJECTION, SOLUTION INTRAVENOUS CONTINUOUS PRN
Status: DISCONTINUED | OUTPATIENT
Start: 2021-06-16 | End: 2021-06-21 | Stop reason: HOSPADM

## 2021-06-16 RX ORDER — ESCITALOPRAM OXALATE 20 MG/1
20 TABLET ORAL DAILY
Status: DISCONTINUED | OUTPATIENT
Start: 2021-06-16 | End: 2021-06-21 | Stop reason: HOSPADM

## 2021-06-16 RX ORDER — PREGABALIN 75 MG/1
150 CAPSULE ORAL 3 TIMES DAILY
Status: DISCONTINUED | OUTPATIENT
Start: 2021-06-16 | End: 2021-06-21 | Stop reason: HOSPADM

## 2021-06-16 RX ORDER — HYDROXYZINE HYDROCHLORIDE 25 MG/1
25 TABLET, FILM COATED ORAL 2 TIMES DAILY PRN
Status: DISCONTINUED | OUTPATIENT
Start: 2021-06-16 | End: 2021-06-21 | Stop reason: HOSPADM

## 2021-06-16 RX ORDER — POLYETHYLENE GLYCOL 3350 17 G/17G
17 POWDER, FOR SOLUTION ORAL DAILY
Status: DISCONTINUED | OUTPATIENT
Start: 2021-06-16 | End: 2021-06-21 | Stop reason: HOSPADM

## 2021-06-16 RX ORDER — METHADONE HYDROCHLORIDE 10 MG/ML
104 CONCENTRATE ORAL DAILY
Status: DISCONTINUED | OUTPATIENT
Start: 2021-06-17 | End: 2021-06-16

## 2021-06-16 RX ADMIN — I.V. FAT EMULSION 250 ML: 20 EMULSION INTRAVENOUS at 20:30

## 2021-06-16 RX ADMIN — ESCITALOPRAM OXALATE 20 MG: 20 TABLET ORAL at 15:08

## 2021-06-16 RX ADMIN — PREGABALIN 150 MG: 75 CAPSULE ORAL at 20:12

## 2021-06-16 RX ADMIN — PREGABALIN 150 MG: 75 CAPSULE ORAL at 11:36

## 2021-06-16 RX ADMIN — HYDROXYZINE HYDROCHLORIDE 25 MG: 25 TABLET, FILM COATED ORAL at 20:14

## 2021-06-16 RX ADMIN — Medication: at 20:29

## 2021-06-16 ASSESSMENT — ACTIVITIES OF DAILY LIVING (ADL)
ADLS_ACUITY_SCORE: 13

## 2021-06-16 ASSESSMENT — MIFFLIN-ST. JEOR: SCORE: 1033.05

## 2021-06-16 NOTE — PROCEDURES
[facility]    Double Lumen PICC Placement    Date/Time: 6/16/2021 12:24 PM  Performed by: Archana Oliva RN  Authorized by: Kaveh Clayton MD   Indications: vascular access    UNIVERSAL PROTOCOL   Site Marked: Yes  Prior Images Obtained and Reviewed:  Yes  Required items: Required blood products, implants, devices and special equipment available    Patient identity confirmed:  Verbally with patient and arm band  NA - No sedation, light sedation, or local anesthesia  Confirmation Checklist:  Patient's identity using two indicators, relevant allergies, procedure was appropriate and matched the consent or emergent situation and correct equipment/implants were available  Time out: Immediately prior to the procedure a time out was called    Universal Protocol: the Joint Commission Universal Protocol was followed    Preparation: Patient was prepped and draped in usual sterile fashion           ANESTHESIA    Anesthesia: Local infiltration  Local Anesthetic:  Lidocaine 1% without epinephrine  Anesthetic Total (mL):  5      SEDATION    Patient Sedated: No        Preparation: skin prepped with ChloraPrep  Skin prep agent: skin prep agent completely dried prior to procedure  Sterile barriers: maximum sterile barriers were used: cap, mask, sterile gown, sterile gloves, and large sterile sheet  Hand hygiene: hand hygiene performed prior to central venous catheter insertion  Type of line used: PICC and Power PICC  Catheter type: double lumen  Lumen type: non-valved  Catheter size: 5 Fr  Brand: Bard  Lot number: [id number]  Placement method: venipuncture, MST, ultrasound and tip confirmation system  Number of attempts: 1  Successful placement: yes  Orientation: left  Location: brachial vein (medial) (vd 0.45 cm)  Arm circumference: adults 10 cm  Extremity circumference: 21  Visible catheter length: 2  Total catheter length: 43  Dressing and securement: blood  cleaned with CHG, chlorhexidine patch applied, dressing applied, glue, line secured, securement device, site cleaned and sterile dressing applied  Post procedure assessment: blood return through all ports and free fluid flow (3cg technology)  PROCEDURE   Patient Tolerance:  Patient tolerated the procedure well with no immediate complications  Describe Procedure: PICC ok to use

## 2021-06-16 NOTE — PHARMACY
Methadone Clinic Information Note    Clinic Name: [facility] Location ([facility]): [place]  Phone Number: [phone]    The clinic confirmed pt takes 120 mg daily. This is the dose the patient reported taking. Last dose was today at 05:35. Pt goes to the clinic MWF and is given 1-2 doses for the days she doesn't come in.    Salma Daugherty, ShanaD

## 2021-06-16 NOTE — H&P
BARIATRIC SURGERY H and P  2021    Demetra Richardson  : 1955    Date of Service: 2021 10:40 AM    Assessment and Plan:  Demetra Richardson is a 65 year old female with PMH chronic pain (on methadone), fibromyalgia, s/p Billroth II for gastric outlet obstruction in , revision gastrojejunostomy in  with 2 years of intermittent episodes of epigastric and left abdominal pain, nausea, and emesis with resolution of pain after emesis. EGD performed on  which showed mildly stenosed Billroth II gastrojejunostomy. Labs on 6/10/21 revealing protein calorie malnutrition with albumin 1.9, total protein 5.7. Electrolytes today wnl. Admitted for management of nutrition with initiation of TPN, in anticipation of revision surgery due to her recurrent vomitint and GJ stenosis seen on EGD.     - PICC line placement  - Nutrition consult  - Start TPN  - Pain team consulted - recommending to continue PTA methadone dose. Will see during this admission and make plan for periop pain management   - Continue other PTA medications     Discussed with Dr. Matilde Loo MS4     Amy Artis MD  General Surgery PGY1  Pager [phone]    History of Present Illness:    Demetra Richardson is a 65 year old female s/p Billroth II for gastric outlet obstruction in , revision of gastrojejunostomy in , with PMH of chronic pain, fibromyalgia presents as direct admission after EGD on  showed stenosed Billroth II gastrojejunostomy. Over the past 2 years patient reports having progressively worsening postprandial episodes of abdominal pain, nausea, and vomiting. Pain is throughout her abdomen, worse in the epigastric area and left side. Pain typically resolves after vomiting. Last episode of pain and vomiting was yesterday after eating. She does not have pain every day and says the frequency of episodes varies, although seems like in cycles of every 72 hours or so. No abdominal pain or nausea  currently. She has a good appetite and has been eating well but is worried that she is not absorbing the nutrients. Has lost 5-10 lbs in the past month. She also complains of constipation which has been an ongoing issue for her. Typically has a BM every week or few days. Last BM was last week. She does not take any bowel medications at home.     Currently on methadone which she receives at [facility]. Has been on methadone for the last 6 years, current dose 120mg.     Past Medical History:  Past Medical History:   Diagnosis Date     Anemia      Basal cell carcinoma     Left-sided, skin over over medial orbit/nasal bone. Removed Oct 2018.     Benzodiazepine dependence (H)     With h/o withdrawal seizure x 1     Bipolar 1 disorder, mixed, moderate (H) 2013     Chronic pain     Back, legs.     Depressive disorder      Eosinophilic gastroenteritis 2010     Epidural abscess 2005    x4     Fibromyalgia      Generalised anxiety disorder      GERD (gastroesophageal reflux disease)      Major depressive disorder      Migraine      Nephrolithiasis     S/p left sided lithotripsy     Opiate dependence (H)      Osteoarthritis      Osteoporosis      PUD (peptic ulcer disease)      SLE (systemic lupus erythematosus) (H) 2009     Weight loss        Past Surgical History  Past Surgical History:   Procedure Laterality Date     BACK SURGERY  04    L4-5 epidural abscess     BACK SURGERY  04    L3-4 spinal stenosis     BACK SURGERY  04    Lt psoas abscess     BRONCHOSCOPY FLEXIBLE N/A 2019    Procedure: Flexible Bronchoscopy;  Surgeon: Patrice Regalado MD;  Location: [facility]      SECTION      x 2     CHOLECYSTECTOMY  2-     CLOSED REDUCTION, PERCUTANEOUS PINNING FINGER, COMBINED  8/10/2011    Procedure:COMBINED CLOSED REDUCTION, PERCUTANEOUS PINNING FINGER; 5th Proximal Phalanx; Surgeon:RADHA BUITRAGO; Location[facility]     COLONOSCOPY       COLONOSCOPY N/A 2020    Procedure:  COLONOSCOPY;  Surgeon: Zacarias Duran MD;  Location: [facility]     ESOPHAGOSCOPY, GASTROSCOPY, DUODENOSCOPY (EGD), COMBINED N/A 8/6/2020    Procedure: ESOPHAGOGASTRODUODENOSCOPY, WITH BIOPSY;  Surgeon: Zacarias Duran MD;  Location: [facility]     ESOPHAGOSCOPY, GASTROSCOPY, DUODENOSCOPY (EGD), COMBINED N/A 6/8/2021    Procedure: ESOPHAGOGASTRODUODENOSCOPY (EGD);  Surgeon: Kaveh Clayton MD;  Location: [facility]     GASTRECTOMY  11-    Bilat truncal vagotomy, hemigastrectomy, RnY gastrojejunostomy     GASTROJEJUNOSTOMY  6/2/2011    Procedure:GASTROJEJUNOSTOMY; exploratory laparotmy with revision of gastrojejunostomy, Antrectomy, Miles-en-y, Gastrojejunostomy; Surgeon:JULIUS HELM; Location:[facility] OR     INSERT CHEST TUBE N/A 4/29/2019    Procedure: INSERTION, CATHETER, INTERCOSTAL, FOR DRAINAGE;  Surgeon: Melissa Nichols MD;  Location: [facility]     LASER HOLMIUM LITHOTRIPSY URETER(S), INSERT STENT, COMBINED      Procedure: COMBINED CYSTOSCOPY, URETEROSCOPY, LASER HOLMIUM LITHOTRIPSY URETER(S), INSERT STENT;;  Surgeon: Blair Correa MD;  [facility]: [facility]     LASER HOLMIUM NEPHROLITHOTOMY VIA PERCUTANEOUS NEPHROSTOMY  7/11/2012    Procedure: LASER HOLMIUM NEPHROLITHOTOMY VIA PERCUTANEOUS NEPHROSTOMY;  proceedure should read: Left Percutaneous Access, Left Percutaneous ultrasonic Nephrolithotomy, Ureteroscopy Holmium Laser Lithotripsy Stent Placement ;  Surgeon: Blair Correa MD;  Location: [facility]     MAMMOPLASTY AUGMENTATION BILATERAL       OPEN REDUCTION INTERNAL FIXATION WRIST Left 1/11/2016    Procedure: OPEN REDUCTION INTERNAL FIXATION WRIST;  Surgeon: Darling Ellis MD;  [facility]: [facility]     RECONSTRUCT BREAST, IMPLANT PROSTHESIS, COMBINED       THORACOSCOPIC DECORTICATION LUNG Left 5/7/2019    Procedure: Left Video Assisted Thoracoscopic Decortication, Intercostal Nerve Cryo Analgesia, Flexible Bronchoscopy;  Surgeon: Patrice Regalado MD;  Location: [facility] OR        Family History:  Family History   Problem Relation Age of Onset     Substance Abuse Mother      Family History Negative Father      Substance Abuse Maternal Grandfather      Substance Abuse Brother      Liver Disease No family hx of      Colon Cancer No family hx of      Ulcerative Colitis No family hx of      Crohn's Disease No family hx of        Social History:  Social History     Socioeconomic History     Marital status: Single     Spouse name: Not on file     Number of children: Not on file     Years of education: Not on file     Highest education level: Not on file   Occupational History     Not on file   Social Needs     Financial resource strain: Not on file     Food insecurity     Worry: Not on file     Inability: Not on file     Transportation needs     Medical: Not on file     Non-medical: Not on file   Tobacco Use     Smoking status: Never Smoker     Smokeless tobacco: Never Used   Substance and Sexual Activity     Alcohol use: No     Comment: none in 10 years     Drug use: No     Sexual activity: Not Currently   Lifestyle     Physical activity     Days per week: Not on file     Minutes per session: Not on file     Stress: Not on file   Relationships     Social connections     Talks on phone: Not on file     Gets together: Not on file     Attends Judaism service: Not on file     Active member of club or organization: Not on file     Attends meetings of clubs or organizations: Not on file     Relationship status: Not on file     Intimate partner violence     Fear of current or ex partner: Not on file     Emotionally abused: Not on file     Physically abused: Not on file     Forced sexual activity: Not on file   Other Topics Concern     Parent/sibling w/ CABG, MI or angioplasty before 65F 55M? Not Asked   Social History Narrative    Intake 4/16/15:        H/o divorce but most recently living with JUANIS Hamilton. Alfonso recently passed away.         In past employed as a .         Tobacco use:  "denies    Alcohol use: denies    Drug use: daily pot use for 30 years. Currently with sobriety.            Medications:  No current outpatient medications on file.       Allergies:     Allergies   Allergen Reactions     Penicillins Hives     Hives in childhood.       Review of Symptoms:  A 10 point review of symptoms has been conducted and is negative except for that mentioned in the above HPI.    Physical Exam:    Blood pressure 130/81, pulse 75, temperature 97.3  F (36.3  C), temperature source Oral, resp. rate 18, height 1.575 m (5' 2\"), weight 53 kg (116 lb 12.8 oz), SpO2 95 %, not currently breastfeeding.  Gen:    Lying in bed in NAD, A&OX3  Pulm:  Non-labored breathing  Abd:  Soft, non-tender, non-distended, prior incisions from laparotomy on abdomen   Ext:  Warm and well perfused, no obvious deformities. LE with pitting edema to mid calf     Labs:  CBC RESULTS:   Recent Labs   Lab Test 06/10/21  0918   WBC 6.7   RBC 4.05   HGB 9.4*   HCT 34.1*   MCV 84   MCH 23.2*   MCHC 27.6*   RDW 15.4*        Last Basic Metabolic Panel:  Lab Results   Component Value Date     06/10/2021      Lab Results   Component Value Date    POTASSIUM 4.1 06/10/2021     Lab Results   Component Value Date    CHLORIDE 110 06/10/2021     Lab Results   Component Value Date    RAFAELA 8.4 06/10/2021     Lab Results   Component Value Date    CO2 29 06/10/2021     Lab Results   Component Value Date    BUN 15 06/10/2021     Lab Results   Component Value Date    CR 0.74 06/10/2021     Lab Results   Component Value Date     06/10/2021       Imaging:    Upper GI Endoscopy - 06/08/2021  1:38 PM    Impression:           - Normal gastroesophageal junction.   - Stenosed Billroth II gastrojejunostomy was found, characterized by congestion, friable mucosa, inflammation, an intact appearance and mild stenosis.     Rebecca Loo MS4    "

## 2021-06-16 NOTE — PLAN OF CARE
Activity: independent  Neuros:alert and oriented x 4  Cardiac:denies chest pain  Respiratory:room air  GI/:LBM unknown per patient + bowel sounds, voiding  Diet:regular  Skin:Purpura bilateral arms  Lines/Drains:PICC line placed this shift    Admitted/transferred from: home  2 RN full   skin assessment completed by Salma Smallwood RN and Carmen.  Skin assessment finding: issues found purpura bilateral arms    Interventions/actions: other continue to monitor.

## 2021-06-16 NOTE — PROGRESS NOTES
CLINICAL NUTRITION SERVICES - ASSESSMENT NOTE     Nutrition Prescription    RECOMMENDATIONS FOR MDs/PROVIDERS TO ORDER:  - Pt at risk for refeeding syndrome--may need electrolyte replacement orders.     Malnutrition Status:    Severe malnutrition in the context of acute on chronic illness    Recommendations already ordered by Registered Dietitian (RD):  -**Rec begin PN/lipids with following formulation based on 53 kg dosing weight:    65 mL/hr (1560 mL total) with 100 grams dextrose, 80 grams AA and 250 mL lipids q day  After pt has received 100% of first bag of TPN, increase dextrose by 25 gm per day to goal of 150 gm if Phos > 1.9, K+ > 3.0 and Mg > 1.5 and per PharmD/RD discretion.    With goal dextrose will provides: 1330 kcals/day (25 kcal/kg/day), 1.5 g PRO/kg/day, 150 g CHO/day, GIR 2.0 mg/kg/min and 38% % kcals from Fat.    - Check Zn levels (if still low will possibly increase in TPN x 14 days and recommend recheck levels).   - Monitor Phos, Mg and K+ for refeeding (currently already ordered w/ CMP 6/17 and TPN lytes 6/18)  Future/Additional Recommendations:  - Follow labs (including Zn), weights, GI function and po intake.     REASON FOR ASSESSMENT  Demetra Richardson is a/an 65 year old female assessed by the dietitian for Pharmacy/Nutrition to Start and Manage PN:  Line Type: Central Line NOT in place (Line has been ordered, but not yet inserted)    Indication for Parenteral Nutrition Proximal bowel anastomosis    Indication for Parenteral Nutrition Not tolerating enteral nutrition      Chart reviewed. PMH includes chronic pain (on methadone), fibromyalgia, s/p Billroth II for gastric outlet obstruction (due to gastric/peptic ulcer) in 2005, revision gastrojejunostomy in 6/2/2011 with 2 years of intermittent episodes of epigastric and left abdominal pain, nausea, and emesis with resolution of pain after emesis. EGD performed on 6/8 which showed mildly stenosed Billroth II gastrojejunostomy.  Admitted to  "initiate TPN in anticipation of revision surgery due to her recurrent vomiting and G/J stenosis seen on upper GI endoscopy.  (Pt believes this surgery is tentative scheduled in 5 weeks after she is stabilized nutritionally.      NUTRITION HISTORY  Noted pt hed retained food in gastric pouch on 6/8 per GI endoscopy.  TPN consult mentions \"not tolerating enteral nutrition\" but pt has never had TF.  She notes that with her multiple resections of her stomach there likely wouldn't be enough space for any kind of Gtube to the remnant stomach.  Pt notes she eats very small amounts and then may have a large emesis of food she ate 3 days prior.  Difficult to  how much food she is eating and retaining.  She feels liquids are tolerated and instant mashed potatoes.  She doesn't tolerate supplement drinks and feels they just sit in her stomach and then she throws them up later.  Notes she has minimal stool--perhaps 1 every week or longer.     CURRENT NUTRITION ORDERS  Diet: Regular  Intake/Tolerance: No intake recorded yet.  She ordered milk to be heated up so she can add some instant mashed potatoes to it.  Also ordered soup.      LABS  Labs reviewed  K+, Mg, Phos wnl.  Albumin 1.9 w/ no recent CRP to relation to possible inflammation.   Zn 50.4 (L) 3/15/21,  Vit D 15 3/15/21.  Fe 37 (WNL) 1/7/21.  GI--6/8 upper endoscopy: The gastroesophageal junction was normal.        Evidence of gastroparesis or gastric atony in the proximal stomach        found; lots of retained food. Poor motility; however, an opening through        GJ was easy to traverse; this suggests gastric motility problem. A        mildly stenosed Billroth II Miles-en-Y style gastrojejunostomy was found.        The gastrojejunal anastomosis was characterized by congestion, friable        mucosa, inflammation, an intact appearance and mild stenosis. This was        traversed. The efferent limb was examined. The afferent limb was not        examined as it could " "not be found (Miles efferent limb only); however,        cannot exclude other takeoff due to pouch contents.  Per surgeon's 6/8 endoscopy note--potential for \"revisional surgery (would be a pouch resection and Miles-en-Y esophagojejunostomy hook-up. \"    MEDICATIONS  Medications reviewed  FeSulfate 325 mg BID (noted in past that pt required IV Fe due to inability to absorb oral iron---iron competes for Zn for absorption.  Zn currently low), 1 mg folic acid, protonix, miralax, 1250 mcg Vit D2 q 7 days.      ANTHROPOMETRICS  Height: 157.5 cm (5' 2\")  Most Recent Weight: 53 kg (116 lb 12.8 oz)    IBW:50 kg (106% IBW)  BMI: Normal BMI  Weight History: Wt down 15.6# (11.8%) in 3 months.  Suspect most weights within the past 3 months were stated and possibly not accurate.  Wt Readings from Last 20 Encounters:   06/16/21 53 kg (116 lb 12.8 oz)   06/10/21 52.5 kg (115 lb 11.2 oz)   04/09/21 59.4 kg (131 lb)   03/15/21 59 kg (130 lb)   03/12/21 59.4 kg (131 lb)   03/11/21 60.1 kg (132 lb 6.4 oz)   02/04/21 59 kg (130 lb)   01/05/21 54.4 kg (120 lb)   09/25/20 53.1 kg (117 lb)   08/06/20 54.9 kg (121 lb)   08/04/20 54.9 kg (121 lb)   06/24/20 52.6 kg (116 lb)   01/03/20 55.8 kg (123 lb)   12/10/19 59 kg (130 lb)   11/26/19 59 kg (130 lb)   11/13/19 58.1 kg (128 lb)   10/30/19 58.1 kg (128 lb)   10/23/19 58.4 kg (128 lb 12.8 oz)   10/11/19 59.2 kg (130 lb 8 oz)   08/06/19 60.6 kg (133 lb 9.6 oz)       Dosing Weight: 53 kg (6/16 admission wt--106% IBW)    ASSESSED NUTRITION NEEDS  Estimated Energy Needs: 2689-9037 kcals/day (25 - 30 kcals/kg)  Justification: Maintenance (low end for TPN)  Estimated Protein Needs: 64-80 grams protein/day (1.2 - 1.5 grams of pro/kg)  Justification: Repletion with plan for surgery in 5 weeks per pt  Estimated Fluid Needs: (1 mL/kcal)   Justification: Maintenance and Per provider pending fluid status    PHYSICAL FINDINGS  See malnutrition section below.    MALNUTRITION  % Intake: </=75% for >/= 1 " "month (severe)  % Weight Loss: > 7.5% in 3 months (severe)  Subcutaneous Fat Loss: Upper arm:  severe  Muscle Loss: Temporal:  mild, Thoracic region (clavicle):  severe, Upper arm (bicep, tricep):  severe and Dorsal hand:  severe  Fluid Accumulation/Edema: Unable to assess--pt notes legs sore/swollen from \"fibro\" so didn't want me to touch them  Malnutrition Diagnosis: Severe malnutrition in the context of acute on chronic illness    NUTRITION DIAGNOSIS  Inadequate oral intake related to gastric dysmotility as evidenced by 11.8% wt loss in past 3 months, severe fat and muscle wasting and reported po intake.       INTERVENTIONS  Implementation  Nutrition Education: Discussed plan for TPN, role of NFPE.   Collaboration with other providers  Nutrition-related medication management  Parenteral Nutrition/IV Fluids - Initiate     Goals  Total avg nutritional intake to meet a minimum of 25 kcal/kg and 1.2 g PRO/kg daily (per dosing wt 53 kg).     Monitoring/Evaluation  Progress toward goals will be monitored and evaluated per protocol.    Ramandeep Toscano RD, LD   7B (M-F) Pager: [phone]  RD Weekend Pager: [phone]      "

## 2021-06-16 NOTE — LETTER
Transition Communication Hand-off for Care Transitions to Next Level of Care Provider    Name: Demetra Richardson  : 1955  MRN #: [id number]  Primary Care Provider: Fredy Merritt  Primary Clinic: [address] [place] [id number]  Reason for Hospitalization:  Malnutrition (H) [E46]  Admit Date/Time: 2021 10:03 AM  Discharge Date: 21  Payor Source: Payor: MEDICA / Plan: MEDICA ACCESS ABILITY MA / Product Type: HMO /     Reason for Communication Hand-off Referral: Multiple providers/specialties  Other care transition    Discharge Plan: Return to [facility] with Home Infusion  Discharge Plan:      Most Recent Value   Disposition Comments  Likely d/c over weekend      Concern for non-adherence with plan of care:N     Discharge Needs Assessment:  Needs      Most Recent Value   Equipment Currently Used at Home  none   # of Referrals Placed by [facility]  External Care Coordination   PAS Number  -- [N/A]      Follow-up specialty is recommended: No    Follow-up plan:    Future Appointments   Date Time Provider Department Center   2021  9:00 AM Monica Husain, [facility]   2021 11:30 AM Sam Narayanan MD [facility]     Referrals     Future Labs/Procedures    Home infusion referral     Comments:    Your provider has referred you to: [facility]: [facility] [place] [phone]   [email or web address]    Local Address (if different from home address): N/A    Anticipated Length of Therapy: 99 mo    Home Infusion Pharmacist to adjust therapy based on labs and clinical assessments: Yes    Labs:  May draw labs from Venous Catheter: Yes  Home Infusion Pharmacist to order labs based on therapy type and clinical assessments: Yes  Call/Fax Lab Results to: Dr. Clayton    Agency Staff to assess nursing needs for Infusion Therapy.    Access Device Management:  IV Access Type: PICC  Flush with Heparin and Normal Saline IVP PRN and routine site care  (per agency protocol) to maintain access device? Yes          Key Recommendations:  Patient admitted for TPN initiation for malnutrition. The plan is to increase her nutrition for 5 weeks and then schedule for gastric surgery    Matthew Dominguez RN care Coordinator

## 2021-06-17 LAB
ALBUMIN SERPL-MCNC: 1.8 G/DL (ref 3.4–5)
ALP SERPL-CCNC: 124 U/L (ref 40–150)
ALT SERPL W P-5'-P-CCNC: 16 U/L (ref 0–50)
ANION GAP SERPL CALCULATED.3IONS-SCNC: 4 MMOL/L (ref 3–14)
AST SERPL W P-5'-P-CCNC: 28 U/L (ref 0–45)
BILIRUB DIRECT SERPL-MCNC: <0.1 MG/DL (ref 0–0.2)
BILIRUB SERPL-MCNC: 0.2 MG/DL (ref 0.2–1.3)
BUN SERPL-MCNC: 15 MG/DL (ref 7–30)
CALCIUM SERPL-MCNC: 7.4 MG/DL (ref 8.5–10.1)
CHLORIDE SERPL-SCNC: 110 MMOL/L (ref 94–109)
CO2 SERPL-SCNC: 27 MMOL/L (ref 20–32)
CREAT SERPL-MCNC: 0.62 MG/DL (ref 0.52–1.04)
GFR SERPL CREATININE-BSD FRML MDRD: >90 ML/MIN/{1.73_M2}
GLUCOSE BLDC GLUCOMTR-MCNC: 103 MG/DL (ref 70–99)
GLUCOSE BLDC GLUCOMTR-MCNC: 115 MG/DL (ref 70–99)
GLUCOSE BLDC GLUCOMTR-MCNC: 95 MG/DL (ref 70–99)
GLUCOSE SERPL-MCNC: 122 MG/DL (ref 70–99)
INR PPP: 0.9 (ref 0.86–1.14)
MAGNESIUM SERPL-MCNC: 2.1 MG/DL (ref 1.6–2.3)
PHOSPHATE SERPL-MCNC: 2.9 MG/DL (ref 2.5–4.5)
POTASSIUM SERPL-SCNC: 4.1 MMOL/L (ref 3.4–5.3)
PREALB SERPL IA-MCNC: 14 MG/DL (ref 15–45)
PROT SERPL-MCNC: 5.1 G/DL (ref 6.8–8.8)
SODIUM SERPL-SCNC: 140 MMOL/L (ref 133–144)

## 2021-06-17 PROCEDURE — 36415 COLL VENOUS BLD VENIPUNCTURE: CPT | Performed by: STUDENT IN AN ORGANIZED HEALTH CARE EDUCATION/TRAINING PROGRAM

## 2021-06-17 PROCEDURE — 250N000009 HC RX 250: Performed by: SURGERY

## 2021-06-17 PROCEDURE — 36415 COLL VENOUS BLD VENIPUNCTURE: CPT | Performed by: SURGERY

## 2021-06-17 PROCEDURE — 120N000002 HC R&B MED SURG/OB UMMC

## 2021-06-17 PROCEDURE — 250N000013 HC RX MED GY IP 250 OP 250 PS 637: Performed by: STUDENT IN AN ORGANIZED HEALTH CARE EDUCATION/TRAINING PROGRAM

## 2021-06-17 PROCEDURE — 84630 ASSAY OF ZINC: CPT | Performed by: STUDENT IN AN ORGANIZED HEALTH CARE EDUCATION/TRAINING PROGRAM

## 2021-06-17 PROCEDURE — 83735 ASSAY OF MAGNESIUM: CPT | Performed by: SURGERY

## 2021-06-17 PROCEDURE — 99253 IP/OBS CNSLTJ NEW/EST LOW 45: CPT | Performed by: PHYSICIAN ASSISTANT

## 2021-06-17 PROCEDURE — 85610 PROTHROMBIN TIME: CPT | Performed by: SURGERY

## 2021-06-17 PROCEDURE — 80053 COMPREHEN METABOLIC PANEL: CPT | Performed by: SURGERY

## 2021-06-17 PROCEDURE — 999N001017 HC STATISTIC GLUCOSE BY METER IP

## 2021-06-17 PROCEDURE — 84134 ASSAY OF PREALBUMIN: CPT | Performed by: SURGERY

## 2021-06-17 PROCEDURE — 250N000011 HC RX IP 250 OP 636: Performed by: STUDENT IN AN ORGANIZED HEALTH CARE EDUCATION/TRAINING PROGRAM

## 2021-06-17 PROCEDURE — 84100 ASSAY OF PHOSPHORUS: CPT | Performed by: SURGERY

## 2021-06-17 PROCEDURE — 82248 BILIRUBIN DIRECT: CPT | Performed by: SURGERY

## 2021-06-17 RX ORDER — ONDANSETRON 4 MG/1
4 TABLET, ORALLY DISINTEGRATING ORAL EVERY 6 HOURS PRN
Status: DISCONTINUED | OUTPATIENT
Start: 2021-06-17 | End: 2021-06-21 | Stop reason: HOSPADM

## 2021-06-17 RX ADMIN — PREGABALIN 150 MG: 75 CAPSULE ORAL at 07:57

## 2021-06-17 RX ADMIN — IBUPROFEN 800 MG: 400 TABLET, FILM COATED ORAL at 12:24

## 2021-06-17 RX ADMIN — PREGABALIN 150 MG: 75 CAPSULE ORAL at 14:16

## 2021-06-17 RX ADMIN — Medication 5 ML: at 07:55

## 2021-06-17 RX ADMIN — HYDROXYZINE HYDROCHLORIDE 25 MG: 25 TABLET, FILM COATED ORAL at 12:30

## 2021-06-17 RX ADMIN — PREGABALIN 150 MG: 75 CAPSULE ORAL at 20:30

## 2021-06-17 RX ADMIN — Medication 120 MG: at 04:50

## 2021-06-17 RX ADMIN — HYDROXYZINE HYDROCHLORIDE 25 MG: 25 TABLET, FILM COATED ORAL at 20:30

## 2021-06-17 RX ADMIN — PANTOPRAZOLE SODIUM 40 MG: 40 TABLET, DELAYED RELEASE ORAL at 07:57

## 2021-06-17 RX ADMIN — CALCIUM GLUCONATE: 98 INJECTION, SOLUTION INTRAVENOUS at 20:17

## 2021-06-17 RX ADMIN — ESCITALOPRAM OXALATE 20 MG: 20 TABLET ORAL at 16:11

## 2021-06-17 RX ADMIN — I.V. FAT EMULSION 250 ML: 20 EMULSION INTRAVENOUS at 20:17

## 2021-06-17 ASSESSMENT — ACTIVITIES OF DAILY LIVING (ADL)
ADLS_ACUITY_SCORE: 13
DEPENDENT_IADLS:: INDEPENDENT
ADLS_ACUITY_SCORE: 13

## 2021-06-17 ASSESSMENT — MIFFLIN-ST. JEOR: SCORE: 1031.24

## 2021-06-17 NOTE — CONSULTS
Care Management Initial Consult    General Information  Assessment completed with: Radha Faulkner  Type of CM/SW Visit: Offer D/C Planning    Primary Care Provider verified and updated as needed: Yes   Readmission within the last 30 days: no previous admission in last 30 days   Reason for Consult: discharge planning  Advance Care Planning: Advance Care Planning Reviewed: (No Docs)       Communication Assessment  Patient's communication style: spoken language (English or Bilingual)    Hearing Difficulty or Deaf: no   Wear Glasses or Blind: no    Cognitive  Cognitive/Neuro/Behavioral: WDL                      Living Environment:   People in home: alone     Current living Arrangements: assisted living([facility])  Name of Facility: [facility]   Able to return to prior arrangements: yes    Family/Social Support:  Care provided by: self  Provides care for: no one, unable/limited ability to care for self  Marital Status: Single  Children, Sibling(s)          Description of Support System: Supportive    Support Assessment: Adequate family and caregiver support    Current Resources:   Patient receiving home care services: No     Community Resources: None  Equipment currently used at home: none  Supplies currently used at home:      Employment/Financial:  Employment Status: disabled     Financial Concerns: No concerns identified     Lifestyle & Psychosocial Needs  Socioeconomic History     Marital status: Single     Spouse name: Not on file     Number of children: Not on file     Years of education: Not on file     Highest education level: Not on file     Tobacco Use     Smoking status: Never Smoker     Smokeless tobacco: Never Used   Substance and Sexual Activity     Alcohol use: No     Comment: none in 10 years     Drug use: No     Sexual activity: Not Currently     Functional Status:  Prior to admission patient needed assistance:   Dependent ADLs: Independent  Dependent IADLs: Independent     Mental Health  Status:  Mental Health Status: No Current Concerns       Chemical Dependency Status:  Chemical Dependency Status: No Current Concerns           Values/Beliefs:  Spiritual, Cultural Beliefs, Baptism Practices, Values that affect care: no           Additional Information:  Patient admitted for initiation of TPN. Plan is for 5 weeks of TPN to increase her nutritional status so she can have surgery. This RNCC net with patient to discuss home situation and potential discharge planning.   Patient has same understanding of reason for admission. She reports that she lives in an [facility]. She gets meals but no other cares. Reports that there are 2 nurses on site 10-12 hours a week. She does not receive services, cannot. Patient takes care of her own meds. Reports that after 5 weeks of TPN she will have a gastric surgery as half of her stomach doesn't work. She was aware of need for home infusion agency. Reports she has had [facility] for IV abx in the past. Discussed that referral has been sent to [facility] for TPN. She feels she will need a nurse to help her with this at first. Reports that HC agencies can come in to her SARINA. Discussed that [facility] liaison will be in touch with her and will refer to nursing agency if needed. This RNCC has placed a PLC order and home infusion order.  Patient reports that she will call [facility] when ready for discharge.   RNCC will continue to follow and assist with discharge planning as needed.    Matthew Dominguez RN Care Coordinator [phone]

## 2021-06-17 NOTE — PLAN OF CARE
Activity:up ad shelby  Neuros:alert and oriented x 4  Cardiac:denies chest pain  Respiratory:room air  GI/:LBM 6/17, voiding  Diet:regular, continuous TPN  Skin:clean, dry, intact  Lines/Drains:PICC double lumen LUE

## 2021-06-17 NOTE — PLAN OF CARE
"/72 (BP Location: Right arm)   Pulse 66   Temp 95.5  F (35.3  C) (Oral)   Resp 16   Ht 1.575 m (5' 2\")   Wt 53 kg (116 lb 12.8 oz)   LMP  (LMP Unknown)   SpO2 96%   BMI 21.36 kg/m      VSS, mostly complained about pain in finger specifically around her cuticles, SBA  she just needs help to unplug/plug in pumps, voiding not saving, had 2 formed brown BM, L PICC (2) infusing TPN (65 ml/hr) and lipids, ate 2 vanilla puddings and some saltine crackers, active bowel sounds, lungs clear, continue with plan of care  "

## 2021-06-17 NOTE — CONSULTS
"Inpatient Pain Management Service Consultation Note  06/17/21        ADMIT DATE: 6/16/2021 * No surgery found *   DATE OF CONSULT: June 17, 2021  Consult ordered by: Amy guerrero MD>  Consult performed by: Doris Bonds PA-C      REASON FOR PAIN CONSULTATION:  Demetra Richardson is a 65 year old female I am seeing in consultation for evaluation and recommendations regarding her possible future post operative pain management strategies due to current chronic methadone usage.  Tentative plan for gastrectomy 5 weeks from now.    CHIEF PAIN COMPLAINT: LUQ pain    ASSESSMENT:     1. Chronic LUQ pain-stable at this time 5/10 pain with methadone management.  malnutrition -reason for this admission-to start TPN-states fingers hurt due vitamin deficiency.  2. H/o opioid dependence-states she had pain post several surgeries and used OxyContin and became \"addicted\" around 2005.  She subsequently underwent methadone maintenance approximately in 2012,  which has been very beneficial to stave off OxyContin cravings.  States that when she was dependent on OxyContin she was using as \"much as I could get\" and also purchased OxyContin off the streets.  PTA, was on methadone 120mg/day.  States she has been on as low as 97mg/day to as high as 150mg/day. States her goal is to get off methadone in the next year or so.  3. H/o fibromyalgia      -- PDMP reviewed. Requirements prior to admission: methadone 120mg/day as prescribed below:     Clinic Name: [facility] Location (city): [place]  Phone Number: [phone]     The clinic confirmed pt takes 120 mg daily. This is the dose the patient reported taking. Last dose was today at 05:35. Pt goes to the clinic MWF and is given 1-2 doses for the days she doesn't come in.    Primary Care Provider: Fredy Merritt  Chronic Pain Provider:  Dara     TREATMENT RECOMMENDATIONS/PLAN:   1. No change to current pain regimen for this admission. Continue PTA dose of methadone " 120mg PO daily. She states her pain is stable at 5/10 and does not wish to make any changes. States she is here for TPN.      In the future for post operative pain management recommend:  1. Please refer patient to the PAC Clinic at [facility] for post operative pain management plan.  2. Discussed that post op pain management will consist of keeping PO methadone dose the same. However if NPO, IV methadone formulation is available, IV dose is 50% of PO methadone dose.  3. Will consider short acting PO opioids such as oxycodone or Dilaudid, liquid formulation available.  4. If NPO or no enteral route, will consider PCA Dilaudid.   5. Will consider IV ketamine if needed-states that she used this in the past without issues: No history of CVA, no poorly controlled hypertension, no PTSD.  6. Would be open to epidural analgesia but not nerve block (s)  that has the same medication for her lung surgery in 2019 as it did not work.    Radha appreciates pain management options and is willing to pursue PAC clinic visit prior to planned surgery.        Pain Service will Sign Off at this time.     Recommendations were communicated with surgery team  Plan was reviewed by the Pain Service staff anesthesiologist Dr. Christopher.    Thank you for consulting the Inpatient Pain Management Service.  The above recommendations are to be acted upon at the primary team s discretion.    To reach us:  Mon - Friday 8 AM - 3 PM: Pager [phone] (Text Page)  After hours, weekends and holidays: Primary service should call [phone] the answering service for the on-call pain specialist    HISTORY OF PRESENT ILLNESS: Demetra Richardson is a 65 year old female with PMHx, significant for SLE, PSC, Sjogren's, fibromyalgia, opiate dependence and polysubstance abuse with history of OD and withdrawal seizures, epidural abscess, bipolar disorder type II, currently on a methadone program, and is admitted on 6/16/21 for TPN due to malnutrition. She is  s/p  Billroth II for gastric outlet obstruction in 2005, revision gastrojejunostomy in 2011 with 2 years of intermittent episodes of epigastric and left abdominal pain, nausea, and emesis with resolution of pain after emesis. EGD performed on 6/8 which showed mildly stenosed Billroth II gastrojejunostomy. Labs on 6/10/21 revealing protein calorie malnutrition with albumin 1.9, total protein 5.7. Electrolytes today wnl. Admitted for management of nutrition with initiation of TPN, in anticipation of revision surgery due to her recurrent vomitint and GJ stenosis seen on EGD.      Pain service was consulted to assist with post op pain management planning in anticipation of future gastrectomy/revision surgery.    Radha was evaluated today. She is AAOX3 and very pleasant. States that her LUQ pain is stable 5/10. She does not need any medication adjustments for this admission. She is interested in planning for pain management with her future surgery. The plan above was reviewed with her.  Pain management options reviewed. Her questions were answered to the best of my abilities.  She is agreeable to the plan.          REVIEW OF 10 BODY SYSTEMS: 10 point ROS of systems including Constitutional, Eyes, Respiratory, Cardiovascular, Gastroenterology, Genitourinary, Integumentary, Musculoskeletal, Psychiatric were all negative except for pertinent positives noted in my HPI.     INPATIENT MEDICATIONS PERTINENT TO PAIN CONSULT:   Medications related to Pain Management (From now, onward)    Start     Dose/Rate Route Frequency Ordered Stop    06/17/21 0500  methadone (DOLOPHINE-INTENSOL) 10 MG/ML (HIGH CONC) solution 120 mg      120 mg Oral DAILY 06/16/21 1407      06/16/21 1500  polyethylene glycol (MIRALAX) Packet 17 g      17 g Oral DAILY 06/16/21 1435      06/16/21 1400  pregabalin (LYRICA) capsule 150 mg      150 mg Oral 3 TIMES DAILY 06/16/21 1107      06/16/21 1106  ibuprofen (ADVIL/MOTRIN) tablet 400-800 mg      400-800 mg Oral  EVERY 8 HOURS PRN 06/16/21 1107      06/16/21 1106  hydrOXYzine (ATARAX) tablet 25 mg      25 mg Oral 2 TIMES DAILY PRN 06/16/21 1107      06/16/21 1103  lidocaine 1 % 0.1-5 mL      0.1-5 mL Other EVERY 1 HOUR PRN 06/16/21 1105 06/19/21 1102    06/16/21 1103  lidocaine (LMX4) cream       Topical EVERY 1 HOUR PRN 06/16/21 1105 06/19/21 1102    06/16/21 1037  lidocaine (LMX4) cream       Topical EVERY 1 HOUR PRN 06/16/21 1039      06/16/21 1037  lidocaine 1 % 0.1-1 mL      0.1-1 mL Other EVERY 1 HOUR PRN 06/16/21 1039            CURRENT MEDICATIONS:   Current Facility-Administered Medications Ordered in Epic   Medication Dose Route Frequency Provider Last Rate Last Admin     dextrose 10% infusion   Intravenous Continuous PRN Kaveh Clayton MD         escitalopram (LEXAPRO) tablet 20 mg  20 mg Oral Daily Amy Artis MD   20 mg at 06/17/21 1611     ferrous sulfate (FEROSUL) tablet 325 mg  325 mg Oral BID Amy Artis MD         folic acid (FOLVITE) tablet 1 mg  1 mg Oral Daily Amy Artis MD         heparin lock flush 10 UNIT/ML injection 2-5 mL  2-5 mL Intracatheter Once PRN Amy Artis MD   5 mL at 06/17/21 0755     hydrOXYzine (ATARAX) tablet 25 mg  25 mg Oral BID PRN Amy Artis MD   25 mg at 06/17/21 1230     ibuprofen (ADVIL/MOTRIN) tablet 400-800 mg  400-800 mg Oral Q8H PRN Amy Artis MD   800 mg at 06/17/21 1224     lidocaine (LMX4) cream   Topical Q1H PRN Amy rAtis MD         lidocaine (LMX4) cream   Topical Q1H PRN Amy Artis MD         lidocaine 1 % 0.1-1 mL  0.1-1 mL Other Q1H PRN Amy Artis MD         lidocaine 1 % 0.1-5 mL  0.1-5 mL Other Q1H PRN Amy Artis MD         lipids (INTRALIPID) 20 % infusion 250 mL  250 mL Intravenous Once per day on Mon Tue Wed Thu Fri Kaveh Clayton MD         melatonin tablet 1 mg  1 mg Oral At Bedtime PRN Amy Artis MD         methadone (DOLOPHINE-INTENSOL) 10 MG/ML (HIGH CONC) solution 120 mg  120 mg  Oral Daily Amy Artis MD   120 mg at 06/17/21 0450     naloxone (NARCAN) injection 0.2 mg  0.2 mg Intravenous Q2 Min PRN Kaveh Clayton MD        Or     naloxone (NARCAN) injection 0.4 mg  0.4 mg Intravenous Q2 Min PRN Kaveh Clayton MD        Or     naloxone (NARCAN) injection 0.2 mg  0.2 mg Intramuscular Q2 Min PRN Kaveh Clayton MD        Or     naloxone (NARCAN) injection 0.4 mg  0.4 mg Intramuscular Q2 Min PRN Kaveh Clayton MD         ondansetron (ZOFRAN-ODT) ODT tab 4 mg  4 mg Oral Q6H PRN Amy Artis MD         pantoprazole (PROTONIX) EC tablet 40 mg  40 mg Oral Daily Amy Artis MD   40 mg at 06/17/21 0757     parenteral nutrition - ADULT compounded formula   CENTRAL LINE IV TPN CONTINUOUS Kaveh Clayton MD         parenteral nutrition - ADULT compounded formula   CENTRAL LINE IV TPN CONTINUOUS Kaveh Clayton MD 65 mL/hr at 06/17/21 0800 Rate Verify at 06/17/21 0800     polyethylene glycol (MIRALAX) Packet 17 g  17 g Oral Daily Amy Artis MD         pregabalin (LYRICA) capsule 150 mg  150 mg Oral TID Amy Artis MD   150 mg at 06/17/21 1416     sodium chloride (PF) 0.9% PF flush 3 mL  3 mL Intracatheter Q8H Amy Artis MD   3 mL at 06/17/21 1230     sodium chloride (PF) 0.9% PF flush 3 mL  3 mL Intracatheter q1 min prn Amy Artis MD         sodium chloride (PF) 0.9% PF flush 5-50 mL  5-50 mL Intracatheter Once PRN Amy Artis MD   30 mL at 06/17/21 0754     vitamin D2 (ERGOCALCIFEROL) 73982 units (1250 mcg) capsule 50,000 Units  50,000 Units Oral Q7 Days Amy Artis MD         No current [facility]-ordered outpatient medications on file.       HOME/PREVIOUS MEDICATIONS:   Prior to Admission medications    Medication Sig Start Date End Date Taking? Authorizing Provider   aspirin-acetaminophen-caffeine (EXCEDRIN MIGRAINE) 250-250-65 MG tablet Take 2 tablets by mouth 2 times daily   Yes Unknown, Entered By History    escitalopram (LEXAPRO) 20 MG tablet TAKE 1 TABLET BY MOUTH EVERY DAY 5/27/21  Yes Fredy Merritt MD   hydroxychloroquine (PLAQUENIL) 200 MG tablet Take 1 tablet (200 mg) by mouth 2 times daily (with meals) 3/15/21  Yes Sam Narayanan MD   hydrOXYzine (VISTARIL) 25 MG capsule Take 1 capsule (25 mg) by mouth 2 times daily as needed for anxiety 3/12/21  Yes Fredy Merritt MD   ibuprofen (ADVIL/MOTRIN) 200 MG tablet Take 400-800 mg by mouth every 8 hours as needed for mild pain   Yes Unknown, Entered By History   methadone (DOLOPHINE-INTENSOL) 10 MG/ML (HIGH CONC) solution Take 120 mg by mouth daily Takes it at 0530 everyday.   Yes Unknown, Entered By History   pantoprazole (PROTONIX) 40 MG EC tablet TAKE 1 TABLET BY MOUTH EVERY DAY 3/17/21  Yes Zacarias Duran MD   polyethylene glycol (MIRALAX) 17 GM/Dose powder 1 capful (17 g) in 8 oz of liquid 1/5/21  Yes Kiki Guaman MD   pregabalin (LYRICA) 150 MG capsule TAKE 1 CAPSULE (150 MG) BY MOUTH 3 TIMES DAILY 5/27/21  Yes Fredy Merritt MD   valACYclovir (VALTREX) 500 MG tablet Take 1 tablet (500 mg) by mouth daily 1/25/21  Yes Fredy Merritt MD   butalbital-acetaminophen-caffeine (ESGIC) -40 MG tablet Take 1 tablet by mouth every 6 hours as needed for headaches Use sparingly. 9/25/20   Fredy Merritt MD   ferrous sulfate (FEROSUL) 325 (65 Fe) MG tablet Take 1 tablet (325 mg) by mouth 2 times daily With meals 6/15/21   Fredy Merritt MD   valACYclovir (VALTREX) 500 MG tablet Take 1 tablet (500 mg) by mouth 2 times daily 1/13/21   Fredy Merritt MD   vitamin D2 (ERGOCALCIFEROL) 18481 units (1250 mcg) capsule Take 1 capsule (50,000 Units) by mouth every 7 days 3/16/21   Sam Narayanan MD       PAST PAIN TREATMENT: chronic opioid pain management, nerve blocks.    ALLERGIES:    Allergies   Allergen Reactions     Penicillins Hives     Hives in childhood.        PAST MEDICAL AND PSYCHIATRIC HISTORY:    Past Medical  History:   Diagnosis Date     Anemia      Basal cell carcinoma     Left-sided, skin over over medial orbit/nasal bone. Removed Oct 2018.     Benzodiazepine dependence (H)     With h/o withdrawal seizure x 1     Bipolar 1 disorder, mixed, moderate (H) 2013     Chronic pain     Back, legs.     Depressive disorder      Eosinophilic gastroenteritis 2010     Epidural abscess 2005    x4     Fibromyalgia      Generalised anxiety disorder      GERD (gastroesophageal reflux disease)      Major depressive disorder      Migraine      Nephrolithiasis     S/p left sided lithotripsy     Opiate dependence (H)      Osteoarthritis      Osteoporosis      PUD (peptic ulcer disease)      SLE (systemic lupus erythematosus) (H) 2009     Weight loss        PAST SURGICAL HISTORY:   Past Surgical History:   Procedure Laterality Date     BACK SURGERY  04    L4-5 epidural abscess     BACK SURGERY  04    L3-4 spinal stenosis     BACK SURGERY  04    Lt psoas abscess     BRONCHOSCOPY FLEXIBLE N/A 2019    Procedure: Flexible Bronchoscopy;  Surgeon: Patrice Regalado MD;  Location: [facility] OR      SECTION      x 2     CHOLECYSTECTOMY  -     CLOSED REDUCTION, PERCUTANEOUS PINNING FINGER, COMBINED  8/10/2011    Procedure:COMBINED CLOSED REDUCTION, PERCUTANEOUS PINNING FINGER; 5th Proximal Phalanx; Surgeon:RADHA BUITRAGO; Location:[facility]     COLONOSCOPY       COLONOSCOPY N/A 2020    Procedure: COLONOSCOPY;  Surgeon: Zacarias Duran MD;  Location:  GI     ESOPHAGOSCOPY, GASTROSCOPY, DUODENOSCOPY (EGD), COMBINED N/A 2020    Procedure: ESOPHAGOGASTRODUODENOSCOPY, WITH BIOPSY;  Surgeon: Zacarias Duran MD;  Location:  GI     ESOPHAGOSCOPY, GASTROSCOPY, DUODENOSCOPY (EGD), COMBINED N/A 2021    Procedure: ESOPHAGOGASTRODUODENOSCOPY (EGD);  Surgeon: Kaveh Clayton MD;  [facility]     GASTRECTOMY  2005    Bilat truncal vagotomy, hemigastrectomy, RnY gastrojejunostomy      GASTROJEJUNOSTOMY  6/2/2011    Procedure:GASTROJEJUNOSTOMY; exploratory laparotmy with revision of gastrojejunostomy, Antrectomy, Miles-en-y, Gastrojejunostomy; Surgeon:JULIUS HELM; Location:[facility] OR     INSERT CHEST TUBE N/A 4/29/2019    Procedure: INSERTION, CATHETER, INTERCOSTAL, FOR DRAINAGE;  Surgeon: Melissa Nichols MD;  Location: [facility] GI     LASER HOLMIUM LITHOTRIPSY URETER(S), INSERT STENT, COMBINED      Procedure: COMBINED CYSTOSCOPY, URETEROSCOPY, LASER HOLMIUM LITHOTRIPSY URETER(S), INSERT STENT;;  Surgeon: Blair Correa MD;  Location: [facility] OR     LASER HOLMIUM NEPHROLITHOTOMY VIA PERCUTANEOUS NEPHROSTOMY  7/11/2012    Procedure: LASER HOLMIUM NEPHROLITHOTOMY VIA PERCUTANEOUS NEPHROSTOMY;  proceedure should read: Left Percutaneous Access, Left Percutaneous ultrasonic Nephrolithotomy, Ureteroscopy Holmium Laser Lithotripsy Stent Placement ;  Surgeon: Blair Correa MD;  Location: [facility] OR     MAMMOPLASTY AUGMENTATION BILATERAL       OPEN REDUCTION INTERNAL FIXATION WRIST Left 1/11/2016    Procedure: OPEN REDUCTION INTERNAL FIXATION WRIST;  Surgeon: Darling Ellis MD;  Location: [facility]     RECONSTRUCT BREAST, IMPLANT PROSTHESIS, COMBINED       THORACOSCOPIC DECORTICATION LUNG Left 5/7/2019    Procedure: Left Video Assisted Thoracoscopic Decortication, Intercostal Nerve Cryo Analgesia, Flexible Bronchoscopy;  Surgeon: Patrice Regalado MD;  Location: [facility] OR       FAMILY HISTORY: family history includes Family History Negative in her father; Substance Abuse in her brother, maternal grandfather, and mother.    HEALTH & LIFESTYLE PRACTICES:   Tobacco:  reports that she has never smoked. She has never used smokeless tobacco.  Alcohol:  reports no history of alcohol use.  Illicit drugs:  reports no history of drug use.    SOCIAL HISTORY NARRATIVE:   Social History     Socioeconomic History     Marital status: Single     Spouse name: Not on file     Number of children: Not on file      Years of education: Not on file     Highest education level: Not on file   Occupational History     Not on file   Social Needs     Financial resource strain: Not on file     Food insecurity     Worry: Not on file     Inability: Not on file     Transportation needs     Medical: Not on file     Non-medical: Not on file   Tobacco Use     Smoking status: Never Smoker     Smokeless tobacco: Never Used   Substance and Sexual Activity     Alcohol use: No     Comment: none in 10 years     Drug use: No     Sexual activity: Not Currently   Lifestyle     Physical activity     Days per week: Not on file     Minutes per session: Not on file     Stress: Not on file   Relationships     Social connections     Talks on phone: Not on file     Gets together: Not on file     Attends Yazdanism service: Not on file     Active member of club or organization: Not on file     Attends meetings of clubs or organizations: Not on file     Relationship status: Not on file     Intimate partner violence     Fear of current or ex partner: Not on file     Emotionally abused: Not on file     Physically abused: Not on file     Forced sexual activity: Not on file   Other Topics Concern     Parent/sibling w/ CABG, MI or angioplasty before 65F 55M? Not Asked   Social History Narrative    Intake 4/16/15:        H/o divorce but most recently living with JUANIS Hamilton. Alfonso recently passed away.         In past employed as a .         Tobacco use: denies    Alcohol use: denies    Drug use: daily pot use for 30 years. Currently with sobriety.            LABORATORY VALUES:   Last Basic Metabolic Panel:  Lab Results   Component Value Date     06/17/2021      Lab Results   Component Value Date    POTASSIUM 4.1 06/17/2021     Lab Results   Component Value Date    CHLORIDE 110 06/17/2021     Lab Results   Component Value Date    RAFAELA 7.4 06/17/2021     Lab Results   Component Value Date    CO2 27 06/17/2021     Lab Results   Component Value Date    BUN  "15 06/17/2021     Lab Results   Component Value Date    CR 0.62 06/17/2021     Lab Results   Component Value Date     06/17/2021       CBC results:  Lab Results   Component Value Date    WBC 6.7 06/10/2021     Lab Results   Component Value Date    HGB 9.4 06/10/2021     Lab Results   Component Value Date    HCT 34.1 06/10/2021     Lab Results   Component Value Date     06/10/2021       DIAGNOSTIC TESTS:     Labs above reviewed as well as additional relevant diagnostic studies from the EPIC record.     PHYSICAL EXAMINATION:  BP (!) 141/78 (BP Location: Right arm)   Pulse 63   Temp 97.3  F (36.3  C) (Oral)   Resp 18   Ht 1.575 m (5' 2\")   Wt 52.8 kg (116 lb 6.4 oz)   LMP  (LMP Unknown)   SpO2 91%   BMI 21.29 kg/m       EXAM:  CONSTITUTIONAL/GENERAL APPEARANCE: AAOx3. Conversant.  EYES: EOMI, sclerae clear  ENT/NECK: neck is supple  RESPIRATORY: nonlabored breathing on room air  CARDIOVASCULAR: S1 and S2 appreciated  GI:soft, somewhat tender LUQ.  MUSCULOSKELETAL/BACK/SPINE/EXTREMITIES: Moving UE and LE well.       NEURO:  SILT to UE and LE.  SKIN/VASCULAR EXAM:  Dry and warm.  PSYCHIATRIC/BEHAVIORAL/OBSERVATIONS:  No objective signs of pain observed during our interview.   Judgment/Insight -fair   Orientation - x3   Memory -good   Mood and affect - calm, pleasant, cooperative          TIME SPENT: 45  minutes including 20 minutes face-to-face time counseling her  about her diagnosis and treatment options, and coordinating care with the primary team.  DECISION-MAKING: The level of decision-making in this case is high/complex due to the complexity of medical problems, acute/chronic pain, opioid analgesia issues, and behavioral factors.    Doris Bonds PA-C  June 17, 2021, 4:46 PM  Inpatient Pain Management Service          "

## 2021-06-17 NOTE — PROGRESS NOTES
Bariatric Surgery Progress Note    Demetra Richardson  [id number]    PICC line placed and TPN started yesterday. Had 25% dinner. No acute overnight events. Denies pain, mild nausea. Had 2 BM. Ambulating.     Temp:  [95.5  F (35.3  C)-97.8  F (36.6  C)] 97.8  F (36.6  C)  Pulse:  [63-75] 74  Resp:  [16-18] 16  BP: (130-155)/(67-81) 143/76  SpO2:  [95 %-96 %] 95 % RA    Intake/Output Summary (Last 24 hours) at 6/17/2021 0858  Last data filed at 6/17/2021 0619  Gross per 24 hour   Intake 963.36 ml   Output --   Net 963.36 ml     NAD, resting comfortably  regular rate and rhythm  non-labored breathing on RA  soft, non tender, non distended   well healed surgical scars  warm and well perfused   alert, oriented    Labs:  Na 140  K 4.1  Cl 110  Mg 2.1  Phos 2.9  Cr 0.62  LFT nl    65 year old female with hx chronic pain (on methadone), fibromyalgia, s/p Billroth II for gastric outlet obstruction in 2005, revision gastrojejunostomy in 2011 with 2 years of intermittent epigastric/left abd pain, nausea/vomiting, and protein calorie malnutrition. EGD 6/8 showed mildly stenosed Billroth II gastrojejunostomy. Admitted for initiation of TPN, in anticipation of revision surgery to address gastric dysmotility in a few weeks.    - TPN per nutrition, monitor electrolytes, prealbumin pending  - Pain team consult for pain/anxiety med recs and periop pain mgmt plan    - Discharge planning for after TPN cycled     Discussed with staff, Dr. Clayton.    Dia Noel MD  General Surgery PGY-6

## 2021-06-17 NOTE — PLAN OF CARE
"Blood pressure (!) 155/67, pulse 63, temperature 96.4  F (35.8  C), temperature source Oral, resp. rate 16, height 1.575 m (5' 2\"), weight 53 kg (116 lb 12.8 oz), SpO2 95 %, not currently breastfeeding.    Pt is alert and oriented by 4. No complaints of pain. IND with all transfers. Able to make needs known. Started on TPN this evening and will be continuous. Appetite poor ate 25% of dinner. Continent. Had bowel movement this evening. Has L PICC double lumen. One lumen heparin locked and one connected to TPN. Continue with plan of care.  "

## 2021-06-17 NOTE — PROGRESS NOTES
"  CLINICAL NUTRITION SERVICES - BRIEF NOTE     Nutrition Prescription    RECOMMENDATIONS FOR MDs/PROVIDERS TO ORDER:  - Please notify RD/pharmDand care manager re: expected timing of discharge.    Recommendations already ordered by Registered Dietitian (RD):  -**Rec begin PN/lipids with following formulation based on 53 kg dosing weight:    1000 mL total) with 125 grams dextrose, 80 grams AA and 250 mL lipids 5x/wk  Provides: 1022 kcals/day (19 kcal/kg/day), 1.5 g PRO/kg/day, 125 g CHO/day, GIR 1.6 mg/kg/min and 35 % kcals from Fat.    - Continue regular diet as tolerated.  Future/Additional Recommendations:  - Anticipate tomorrow will begin transition to nocturnal run, dropping to 18 hr run Friday noc (as Dex load is low) and if tolerates, decrease to goal of 1L over 12 hr.   - follow for Zn lab and add extra Zn to TPN if low x 10-14 days.     Pt notes something in the TPN is causing her to have increased BMs.  She had 3 formed BMs over PM and noc shift.  She admits that she is anxious and also commented about concerns of what the dextrose in her TPN will do for her anxiety.  She also notes that she having frequent urination--getting up to go to the bathroom \"like every 10 minutes.\"      EVALUATION OF THE PROGRESS TOWARD GOALS   Diet: regular  Nutrition Support: TPN @ 65ml/hr with 100 gm dextrose, 80 gm AA, and daily lipids--to provide 1080 kcal (20 kcal/kg, 1.5 gm protein/kg, GIR of 1.3.    Intake: Pt had some soup and mashed potatoes last night.  On noc shift she had 3 puddings and some crackers.  She had part of an omelet with breakfast.  When asked if she feels whether this will stay down, she said that she wouldn't know as it might all come up in a day or two.  She did comment though last night that pudding, liquids and mashed potatoes usually stayed down.      NEW FINDINGS   Noted labs.   GI--3 formed BMs. Denies nausea at present.      INTERVENTIONS  Education--explained usual course of getting TPN to goal " concentration and transitioning gradually to likely 12 hr run. Also explained increased fluids may have helped alleviate some of her constipation.  Noted that we could decreased total volume as she does drink water throughout the day.   TPN--adjust concentration, volume.    Monitoring/Evaluation  Progress toward goals will be monitored and evaluated per protocol.     Ramandeep Toscano RD, LD   7B (M-F) Pager: [phone]  RD Weekend Pager: [phone]

## 2021-06-17 NOTE — UTILIZATION REVIEW
Admission Status; Secondary Review Determination         Under the authority of the Utilization Management Committee, the utilization review process indicated a secondary review on the above patient.  The review outcome is based on review of the medical records, discussions with staff, and applying clinical experience noted on the date of the review.        (x)      Inpatient Status Appropriate - This patient's medical care is consistent with medical management for inpatient care and reasonable inpatient medical practice.      RATIONALE FOR DETERMINATION   The patient is a 65-year-old female that has a long history of GI obstruction.  She had a Billroth II for gastric outlet obstruction done in 2005 and a revision gastro jejunostomy in 2011.  A recent EGD shows GJ stenosis.  She has starvation and albumin is 1.9.  She has had repeated and persistent vomiting.  She has been started on TPN and nutrition is already consulted and the PICC line has been placed by surgery consultation.  The plan for starting TPN describes protocol that extends through tomorrow to slowly increase flow rate.  Due to need for slow initiation with close observation in the hospital with initiation of TPN likely due to refeeding issues, inpatient status is appropriate.      The severity of illness, intensity of service provided, expected LOS and risk for adverse outcome make the care complex, high risk and appropriate for hospital admission.        The information on this document is developed by the utilization review team in order for the business office to ensure compliance.  This only denotes the appropriateness of proper admission status and does not reflect the quality of care rendered.         The definitions of Inpatient Status and Observation Status used in making the determination above are those provided in the CMS Coverage Manual, Chapter 1 and Chapter 6, section 70.4.      Sincerely,     Rich Jean MD  Physician  Advisor  Utilization Review/ Case Management  [facility].

## 2021-06-18 ENCOUNTER — HOME INFUSION (PRE-WILLOW HOME INFUSION) (OUTPATIENT)
Dept: PHARMACY | Facility: CLINIC | Age: 66
End: 2021-06-18

## 2021-06-18 LAB
ANION GAP SERPL CALCULATED.3IONS-SCNC: 3 MMOL/L (ref 3–14)
BUN SERPL-MCNC: 16 MG/DL (ref 7–30)
CALCIUM SERPL-MCNC: 7.6 MG/DL (ref 8.5–10.1)
CHLORIDE SERPL-SCNC: 109 MMOL/L (ref 94–109)
CO2 SERPL-SCNC: 30 MMOL/L (ref 20–32)
CREAT SERPL-MCNC: 0.63 MG/DL (ref 0.52–1.04)
GFR SERPL CREATININE-BSD FRML MDRD: >90 ML/MIN/{1.73_M2}
GLUCOSE BLDC GLUCOMTR-MCNC: 126 MG/DL (ref 70–99)
GLUCOSE BLDC GLUCOMTR-MCNC: 68 MG/DL (ref 70–99)
GLUCOSE BLDC GLUCOMTR-MCNC: 75 MG/DL (ref 70–99)
GLUCOSE BLDC GLUCOMTR-MCNC: 88 MG/DL (ref 70–99)
GLUCOSE SERPL-MCNC: 114 MG/DL (ref 70–99)
MAGNESIUM SERPL-MCNC: 2.3 MG/DL (ref 1.6–2.3)
PHOSPHATE SERPL-MCNC: 2.9 MG/DL (ref 2.5–4.5)
POTASSIUM SERPL-SCNC: 4.4 MMOL/L (ref 3.4–5.3)
SODIUM SERPL-SCNC: 142 MMOL/L (ref 133–144)

## 2021-06-18 PROCEDURE — 120N000002 HC R&B MED SURG/OB UMMC

## 2021-06-18 PROCEDURE — 999N001017 HC STATISTIC GLUCOSE BY METER IP

## 2021-06-18 PROCEDURE — 250N000013 HC RX MED GY IP 250 OP 250 PS 637: Performed by: STUDENT IN AN ORGANIZED HEALTH CARE EDUCATION/TRAINING PROGRAM

## 2021-06-18 PROCEDURE — 250N000009 HC RX 250: Performed by: SURGERY

## 2021-06-18 PROCEDURE — 36592 COLLECT BLOOD FROM PICC: CPT | Performed by: SURGERY

## 2021-06-18 PROCEDURE — 250N000011 HC RX IP 250 OP 636: Performed by: STUDENT IN AN ORGANIZED HEALTH CARE EDUCATION/TRAINING PROGRAM

## 2021-06-18 PROCEDURE — 84100 ASSAY OF PHOSPHORUS: CPT | Performed by: SURGERY

## 2021-06-18 PROCEDURE — 80048 BASIC METABOLIC PNL TOTAL CA: CPT | Performed by: SURGERY

## 2021-06-18 PROCEDURE — 83735 ASSAY OF MAGNESIUM: CPT | Performed by: SURGERY

## 2021-06-18 RX ORDER — DEXTROSE MONOHYDRATE 25 G/50ML
25-50 INJECTION, SOLUTION INTRAVENOUS
Status: DISCONTINUED | OUTPATIENT
Start: 2021-06-18 | End: 2021-06-21 | Stop reason: HOSPADM

## 2021-06-18 RX ORDER — NICOTINE POLACRILEX 4 MG
15-30 LOZENGE BUCCAL
Status: DISCONTINUED | OUTPATIENT
Start: 2021-06-18 | End: 2021-06-21 | Stop reason: HOSPADM

## 2021-06-18 RX ADMIN — Medication 1 MG: at 01:50

## 2021-06-18 RX ADMIN — PREGABALIN 150 MG: 75 CAPSULE ORAL at 19:58

## 2021-06-18 RX ADMIN — Medication 5 ML: at 08:05

## 2021-06-18 RX ADMIN — ESCITALOPRAM OXALATE 20 MG: 20 TABLET ORAL at 16:26

## 2021-06-18 RX ADMIN — PREGABALIN 150 MG: 75 CAPSULE ORAL at 14:46

## 2021-06-18 RX ADMIN — PANTOPRAZOLE SODIUM 40 MG: 40 TABLET, DELAYED RELEASE ORAL at 08:27

## 2021-06-18 RX ADMIN — IBUPROFEN 800 MG: 400 TABLET, FILM COATED ORAL at 20:12

## 2021-06-18 RX ADMIN — HYDROXYZINE HYDROCHLORIDE 25 MG: 25 TABLET, FILM COATED ORAL at 20:12

## 2021-06-18 RX ADMIN — I.V. FAT EMULSION 250 ML: 20 EMULSION INTRAVENOUS at 19:59

## 2021-06-18 RX ADMIN — IBUPROFEN 800 MG: 400 TABLET, FILM COATED ORAL at 09:26

## 2021-06-18 RX ADMIN — HYDROXYZINE HYDROCHLORIDE 25 MG: 25 TABLET, FILM COATED ORAL at 08:31

## 2021-06-18 RX ADMIN — HYDROXYZINE HYDROCHLORIDE 25 MG: 25 TABLET, FILM COATED ORAL at 01:50

## 2021-06-18 RX ADMIN — CALCIUM GLUCONATE: 98 INJECTION, SOLUTION INTRAVENOUS at 19:58

## 2021-06-18 RX ADMIN — Medication 120 MG: at 05:06

## 2021-06-18 RX ADMIN — PREGABALIN 150 MG: 75 CAPSULE ORAL at 08:26

## 2021-06-18 ASSESSMENT — ACTIVITIES OF DAILY LIVING (ADL)
ADLS_ACUITY_SCORE: 13

## 2021-06-18 ASSESSMENT — MIFFLIN-ST. JEOR: SCORE: 1047.57

## 2021-06-18 NOTE — PLAN OF CARE
Patient AVSS. TPN/Lipids continue via [facility]. Good appetite, ate 75% of breakfast. Generalized chronic pain. Ibuprofen given. Up ad shelby. Voiding adequate.

## 2021-06-18 NOTE — PLAN OF CARE
"/61 (BP Location: Left arm)   Pulse 75   Temp 96.8  F (36  C) (Oral)   Resp 18   Ht 1.575 m (5' 2\")   Wt 54.4 kg (120 lb)   LMP  (LMP Unknown)   SpO2 95%   BMI 21.95 kg/m    Status: Stable on room air  Pain/Nausea: C/o abdominal pain - heat applied, declined prns  Mobility: up ad shelby  Diet: Regular - BS 68 (pt refused apple juice, ate a pudding, team paged for hypoglycemia protocol orders), recheck 126  Labs: Reviewed  Lines: L DL PICC infusing TPN per MAR  Drains: X  Skin/Incisions: no new deficits noted  Neuro: A&Ox4  Respiratory: WDL  Cardiac: WDL  GI: WDL  : Voiding spont, not saving  New Changes: X  Plan: Continue POC    "

## 2021-06-18 NOTE — PROGRESS NOTES
[facility]     Received referral from Matthew Dominguez RNCC for IV TPN.  Benefits verified.  Pt has a [employer] PM[employer] plan and is covered 100% for home TPN.  Spoke with patient to review home infusion services, review benefits and offer choice of providers.  Patient would like to use [facility] for home infusion.  Radha is expected to dc soon and will be going home on cycled TPN.  She has done home IV antibiotic therapy many years ago and will go to the [facility] for initial TPN teaching.  Met with patient at bedside and provided her with information about [facility] services.  Explained about administration method with TPN and pump in a backpack for mobility.  Informed her that home nurses provide ongoing teaching on prep and self-administration of the TPN over the course of the first few days after discharge until she is comfortable with it.  Informed her about supplies and delivery of supplies, storage of TPN, cycle schedule, plan for SNV and 24/7 availability of I staff while on IV therapy.      Radha verbalized understanding of all information given.   She is willing and able to learn and manage home IV therapy. Questions answered.  [facility] Liaison will follow along to assist with discharge home with infusion needs.  Plan for [facility] to provide home nursing visits after discharge.     Thank you for the referral.     ROBERT Hernandez  [facility] Nurse Liaison   [email or web address]  Cell: [phone] M-F  [facility] Main: [phone]

## 2021-06-18 NOTE — PLAN OF CARE
"Blood pressure (!) 141/78, pulse 63, temperature 97.3  F (36.3  C), temperature source Oral, resp. rate 18, height 1.575 m (5' 2\"), weight 52.8 kg (116 lb 6.4 oz), SpO2 91 %, not currently breastfeeding.      Pt is alert and oriented by 4. No complaints of pain. Able to make needs known. IND with all transfers. Regular diet. On continuous TPN. PICC dressing clean dry and intact. Vital signs stable. No significant changes. Continue with plan of care.  "

## 2021-06-18 NOTE — PROGRESS NOTES
Care Management Discharge Note    Discharge Date: 06/20/21  Expected Time of Departure: TBD    Discharge Disposition: Home Infusion, Assisted Living    Discharge Services:  Home Infusion    Discharge DME:  None    Discharge Transportation: health plan transportation-She will call [employer] a ride    Private pay costs discussed: Not applicable-Referred to [facility] and has 100% coverage    PAS Confirmation Code: (N/A)  Patient/family educated on Medicare website which has current facility and service quality ratings: (N/A)    Education Provided on the Discharge Plan:  Yes  Persons Notified of Discharge Plans: patient, [employer] CM  Patient/Family in Agreement with the Plan: yes    Handoff Referral Completed: Yes    Additional Information:  Per team, patient will likely discharge over the weekend. TPN initiation is in progress and has been referred to [facility] to follow. Orders have been placed and PLC referral is in. Patient has a Ness Castillo CM ([phone]). I have provided her with an update. She will follow up with patient when she gets home.       Matthew Dominguez, RN Care Coordinator [phone]

## 2021-06-18 NOTE — PROGRESS NOTES
Bariatric Surgery Progress Note    Demetra Richardson  [id number]    No acute overnight events. Denies abd pain or nausea. Tolerating diet. Ambulating. Voiding.    Temp:  [96.6  F (35.9  C)-97.3  F (36.3  C)] 96.6  F (35.9  C)  Pulse:  [63-74] 65  Resp:  [18] 18  BP: (121-149)/(61-81) 121/61  SpO2:  [91 %-95 %] 95 %    Intake/Output Summary (Last 24 hours) at 6/18/2021 1448  Last data filed at 6/18/2021 1000  Gross per 24 hour   Intake 1707.4 ml   Output --   Net 1707.4 ml     NAD, resting comfortably  soft, non tender, non distended   warm and well perfused   alert, oriented    65 year old female with hx chronic pain (on methadone), fibromyalgia, s/p Billroth II for gastric outlet obstruction in 2005, revision gastrojejunostomy in 2011 with 2 years of intermittent epigastric/left abd pain, nausea/vomiting, and protein calorie malnutrition. EGD 6/8 showed mildly stenosed Billroth II gastrojejunostomy. Admitted for initiation of TPN, in anticipation of revision surgery to address gastric dysmotility in a few weeks.    - TPN per nutrition, dropping to 18 hr cycle today, plan to transition to 12 hr cycle if tolerates  - Monitor electrolytes and replace as needed  - Appreciate pain team periop recs, will refer pt to PAC clinic for post-op pain mgmt plan  - Discharge after tolerating 12 hr cycled TPN - plan for Monday    Discussed with staff.     Dia Noel MD  General Surgery PGY-3

## 2021-06-18 NOTE — PROGRESS NOTES
Therapy: TPN  Insurance:[employer] PMAP      Pt has 100% coverage for TPN    In reference to admission on 6/16/21 to check TPN coverage    Please contact Intake with any questions, [phone] or In Basket pool, [facility] ([phone]).

## 2021-06-18 NOTE — PLAN OF CARE
"[id number]    BP (!) 149/81 (BP Location: Right arm)   Pulse 74   Temp 97.2  F (36.2  C) (Oral)   Resp 18   Ht 1.575 m (5' 2\")   Wt 52.8 kg (116 lb 6.4 oz)   LMP  (LMP Unknown)   SpO2 94%   BMI 21.29 kg/m      Activity: up ad shelby   Neuros: alert and orientated x 4, calm and cooperative   Cardiac: WNL   Respiratory: O2 sats 94% on RA, unlabored   GI/: abdomen soft/non-tender, voiding spont   Diet: regular  Lines: PICC   Incisions/Drains: n/a   Labs: reviewed   Pain/nausea: scheduled methadone, no nausea   New changes this shift: none   Plan:  continue POC   "

## 2021-06-19 ENCOUNTER — APPOINTMENT (OUTPATIENT)
Dept: EDUCATION SERVICES | Facility: CLINIC | Age: 66
End: 2021-06-19
Attending: SURGERY
Payer: COMMERCIAL

## 2021-06-19 LAB
ANION GAP SERPL CALCULATED.3IONS-SCNC: 1 MMOL/L (ref 3–14)
BUN SERPL-MCNC: 23 MG/DL (ref 7–30)
CALCIUM SERPL-MCNC: 7.6 MG/DL (ref 8.5–10.1)
CHLORIDE SERPL-SCNC: 106 MMOL/L (ref 94–109)
CO2 SERPL-SCNC: 31 MMOL/L (ref 20–32)
CREAT SERPL-MCNC: 0.65 MG/DL (ref 0.52–1.04)
GFR SERPL CREATININE-BSD FRML MDRD: >90 ML/MIN/{1.73_M2}
GLUCOSE BLDC GLUCOMTR-MCNC: 107 MG/DL (ref 70–99)
GLUCOSE BLDC GLUCOMTR-MCNC: 109 MG/DL (ref 70–99)
GLUCOSE BLDC GLUCOMTR-MCNC: 125 MG/DL (ref 70–99)
GLUCOSE BLDC GLUCOMTR-MCNC: 143 MG/DL (ref 70–99)
GLUCOSE BLDC GLUCOMTR-MCNC: 84 MG/DL (ref 70–99)
GLUCOSE SERPL-MCNC: 116 MG/DL (ref 70–99)
MAGNESIUM SERPL-MCNC: 2.3 MG/DL (ref 1.6–2.3)
PHOSPHATE SERPL-MCNC: 3.5 MG/DL (ref 2.5–4.5)
POTASSIUM SERPL-SCNC: 4.7 MMOL/L (ref 3.4–5.3)
SODIUM SERPL-SCNC: 139 MMOL/L (ref 133–144)
ZINC SERPL-MCNC: 46.4 UG/DL (ref 60–120)

## 2021-06-19 PROCEDURE — 84100 ASSAY OF PHOSPHORUS: CPT | Performed by: SURGERY

## 2021-06-19 PROCEDURE — 36592 COLLECT BLOOD FROM PICC: CPT | Performed by: SURGERY

## 2021-06-19 PROCEDURE — 250N000013 HC RX MED GY IP 250 OP 250 PS 637: Performed by: STUDENT IN AN ORGANIZED HEALTH CARE EDUCATION/TRAINING PROGRAM

## 2021-06-19 PROCEDURE — 250N000009 HC RX 250: Performed by: SURGERY

## 2021-06-19 PROCEDURE — 999N001017 HC STATISTIC GLUCOSE BY METER IP

## 2021-06-19 PROCEDURE — 120N000002 HC R&B MED SURG/OB UMMC

## 2021-06-19 PROCEDURE — 250N000011 HC RX IP 250 OP 636: Performed by: STUDENT IN AN ORGANIZED HEALTH CARE EDUCATION/TRAINING PROGRAM

## 2021-06-19 PROCEDURE — 80048 BASIC METABOLIC PNL TOTAL CA: CPT | Performed by: SURGERY

## 2021-06-19 PROCEDURE — 83735 ASSAY OF MAGNESIUM: CPT | Performed by: SURGERY

## 2021-06-19 RX ADMIN — Medication: at 20:30

## 2021-06-19 RX ADMIN — Medication 120 MG: at 04:56

## 2021-06-19 RX ADMIN — IBUPROFEN 800 MG: 400 TABLET, FILM COATED ORAL at 23:05

## 2021-06-19 RX ADMIN — PREGABALIN 150 MG: 75 CAPSULE ORAL at 20:31

## 2021-06-19 RX ADMIN — Medication 5 ML: at 09:09

## 2021-06-19 RX ADMIN — PREGABALIN 150 MG: 75 CAPSULE ORAL at 13:35

## 2021-06-19 RX ADMIN — PANTOPRAZOLE SODIUM 40 MG: 40 TABLET, DELAYED RELEASE ORAL at 07:57

## 2021-06-19 RX ADMIN — HYDROXYZINE HYDROCHLORIDE 25 MG: 25 TABLET, FILM COATED ORAL at 20:41

## 2021-06-19 RX ADMIN — PREGABALIN 150 MG: 75 CAPSULE ORAL at 07:57

## 2021-06-19 RX ADMIN — ESCITALOPRAM OXALATE 20 MG: 20 TABLET ORAL at 07:57

## 2021-06-19 ASSESSMENT — ACTIVITIES OF DAILY LIVING (ADL)
ADLS_ACUITY_SCORE: 13

## 2021-06-19 ASSESSMENT — MIFFLIN-ST. JEOR: SCORE: 1036.68

## 2021-06-19 NOTE — PROGRESS NOTES
"  CLINICAL NUTRITION SERVICES - BRIEF NOTE     Nutrition Prescription    RECOMMENDATIONS FOR MDs/PROVIDERS TO ORDER:  -none at present.    Recommendations already ordered by Registered Dietitian (RD):  - Adjust current TPN to 18 hr run. If continues to tolerate, ordered to adjust to goal rate of 1L TPN over 12 hr plus lipids 5x/week.     Future/Additional Recommendations:  --continue to follow intake, labs, GI function.  - check Zn lab (still not back yet). May need addition Zn added to TPN (if po intake drops back to prior level).       EVALUATION OF THE PROGRESS TOWARD GOALS   Diet: regular  Nutrition Support: 1000 ml w/ 125 gm dextrose, 80 gm AA and 20% lipids 5x/wk to provide 1022 kcal (19 kcal/kg), 1.5 gm protein, GIR of 1.6, 35% of kcal from fat.   Intake: Pt is pleasantly surprised by how well she is able to eat here without n/v.  She is eating 25-75% of meals including 1/2 of omelets, BLT sandwich, hamburger.   She also notes she continues to have small formed stools daily (previously she would \"rarely go.\"  Pt also notes that in the past she would barely have 3 cups of fluid all day.      NEW FINDINGS   Based on increased ability to eat and more regular bowel movements, pt suspects that some of her gastric retention was related to severe constipation.  She questions if she continues to be able to eat so well whether the doctors will decide she no longer needs surgery. It is difficult to say whether pt will continue to eat as well at home (having to prep her own meals--relies on quick prep foods like toast and [employer], pudding).   Labs noted.   Noted she is refusing miralax as she is stooling.      INTERVENTIONS  Education-- explained that TPN/lipids dosed to meet all of proteins and majority of carb and fat needs.  Explained that she should take in 2 cups of fluids orally.  Encouraged her to discuss po intake with [facility] RD so that adjustments could be made as needed. "     Monitoring/Evaluation  Progress toward goals will be monitored and evaluated per protocol.     Ramandeep Toscano RD, LD   7B (M-F) Pager: [phone]  RD Weekend Pager: [phone]

## 2021-06-19 NOTE — PLAN OF CARE
"/66 (BP Location: Right arm)   Pulse 68   Temp 96.8  F (36  C) (Oral)   Resp 18   Ht 1.575 m (5' 2\")   Wt 53.3 kg (117 lb 9.6 oz)   LMP  (LMP Unknown)   SpO2 94%   BMI 21.51 kg/m    Status: VSS on RA  Pain/Nausea: denies  Mobility: independent  Diet: regular, tolerating  Labs: reviewed, , 107, 84  Lines: PICC hep locked, cycled TPN starting at 2000  Drains: X  Skin/Incisions: no new deficits noted  Neuro: A&Ox4  Respiratory: WDL on room air  Cardiac: WDL - trace edema BLE  GI: BS+, LBM 6/18  : Voiding spont, not saving  New Changes: X  Plan: Continue POC, discharge once tolerating 12hr TPN cycle      "

## 2021-06-19 NOTE — CONSULTS
Met patient at bedside for PICC and TPN teaching. Patient said she had a PICC in 2005 for antibiotics and was able to care for it by herself at home. Throughout class patient became increasingly distracted but still appreciative and motivated to learn skills. Patient was able to perform flush and cap change on PLC model. Patient also was able to draw up vitamins with needle and syring but became overwhelmed at the part when she turned the vial upside down after injecting the air. After this portion of the class, patient preferred to watch me hook up, prime and take down pump. Homecare will also follow patient at her assisted living. Bedside nurse updated.  Literature given: Handwashing and Skin Care, Getting Ready for Your PICC, Caring for Your PICC at Home, Changing the End Cap, Flushing the Line with Heparin, Saline or Citrate, How to infuse TPN.

## 2021-06-19 NOTE — PROGRESS NOTES
Bariatric Surgery Progress Note    Demetra Richardson  [id number]    No acute overnight events. Denies abd pain or nausea. Tolerating diet. Ambulating. Voiding     Temp:  [96.7  F (35.9  C)-98.6  F (37  C)] 96.7  F (35.9  C)  Pulse:  [71-75] 74  Resp:  [18] 18  BP: (120-123)/(60-72) 120/72  SpO2:  [93 %-96 %] 96 %    Intake/Output Summary (Last 24 hours) at 6/19/2021 1137  Last data filed at 6/19/2021 0909  Gross per 24 hour   Intake 110 ml   Output --   Net 110 ml     NAD, resting comfortably  soft, non tender, non distended   warm and well perfused   alert, oriented    65 year old female with hx chronic pain (on methadone), fibromyalgia, s/p Billroth II for gastric outlet obstruction in 2005, revision gastrojejunostomy in 2011 with 2 years of intermittent epigastric/left abd pain, nausea/vomiting, and protein calorie malnutrition. EGD 6/8 showed mildly stenosed Billroth II gastrojejunostomy. Admitted for initiation of TPN, in anticipation of revision surgery to address gastric dysmotility in a few weeks.    - TPN per nutrition: on 18 hr cycle today, plan to transition to 12 hr cycle if tolerates  - Monitor electrolytes and replace as needed  - Appreciate pain team recs, will refer pt to PAC clinic for post-op pain mgmt plan  - Discharge after tolerating 12 hr cycled TPN - plan for Monday    Discussed with chief resident staff     Fish Rodriguez, MS4     I was present with the medical student who participated in the service and in the documentation of the note. I have verified the history and personally performed the physical exam and medical decision making. I agree with the assessment and plan of care as documented in the note.    Amy Artis MD  General Surgery PGY1  Pager [phone]

## 2021-06-19 NOTE — PLAN OF CARE
Vital signs:  Temp: 98.6  F (37  C) Temp src: Oral BP: 123/60 Pulse: 71   Resp: 18 SpO2: 93 % O2 Device: None (Room air)   Activity: Up independently.   Neuros: A&O x4.   Cardiac: WDL.   Respiratory: WDL on RA. Denies SOB.  GI/: Voiding, not saving. +BS, BM x1.  Diet: Regular.   Lines: Left dbl PICC infusing cycled TPN and lipids.   Labs: .  Pain/nausea: Ibuprofen and atarax given. On methadone. Denies nausea.   Plan: Discharge to home with home infusion for TPN when ready.

## 2021-06-20 LAB
GLUCOSE BLDC GLUCOMTR-MCNC: 127 MG/DL (ref 70–99)
GLUCOSE BLDC GLUCOMTR-MCNC: 82 MG/DL (ref 70–99)
ZINC SERPL-MCNC: 33.4 UG/DL (ref 60–120)

## 2021-06-20 PROCEDURE — 250N000009 HC RX 250: Performed by: SURGERY

## 2021-06-20 PROCEDURE — 999N001017 HC STATISTIC GLUCOSE BY METER IP

## 2021-06-20 PROCEDURE — 250N000013 HC RX MED GY IP 250 OP 250 PS 637: Performed by: STUDENT IN AN ORGANIZED HEALTH CARE EDUCATION/TRAINING PROGRAM

## 2021-06-20 PROCEDURE — 120N000002 HC R&B MED SURG/OB UMMC

## 2021-06-20 PROCEDURE — 250N000011 HC RX IP 250 OP 636: Performed by: SURGERY

## 2021-06-20 RX ORDER — HEPARIN SODIUM,PORCINE 10 UNIT/ML
5-10 VIAL (ML) INTRAVENOUS EVERY 24 HOURS
Status: DISCONTINUED | OUTPATIENT
Start: 2021-06-20 | End: 2021-06-21 | Stop reason: HOSPADM

## 2021-06-20 RX ORDER — HEPARIN SODIUM,PORCINE 10 UNIT/ML
5-10 VIAL (ML) INTRAVENOUS
Status: DISCONTINUED | OUTPATIENT
Start: 2021-06-20 | End: 2021-06-21 | Stop reason: HOSPADM

## 2021-06-20 RX ORDER — LIDOCAINE 40 MG/G
CREAM TOPICAL
Status: DISCONTINUED | OUTPATIENT
Start: 2021-06-20 | End: 2021-06-21 | Stop reason: HOSPADM

## 2021-06-20 RX ADMIN — HYDROXYZINE HYDROCHLORIDE 25 MG: 25 TABLET, FILM COATED ORAL at 21:01

## 2021-06-20 RX ADMIN — PREGABALIN 150 MG: 75 CAPSULE ORAL at 20:58

## 2021-06-20 RX ADMIN — Medication 120 MG: at 04:53

## 2021-06-20 RX ADMIN — POLYETHYLENE GLYCOL 3350 17 G: 17 POWDER, FOR SOLUTION ORAL at 08:11

## 2021-06-20 RX ADMIN — PANTOPRAZOLE SODIUM 40 MG: 40 TABLET, DELAYED RELEASE ORAL at 08:11

## 2021-06-20 RX ADMIN — ESCITALOPRAM OXALATE 20 MG: 20 TABLET ORAL at 08:11

## 2021-06-20 RX ADMIN — PREGABALIN 150 MG: 75 CAPSULE ORAL at 14:24

## 2021-06-20 RX ADMIN — PREGABALIN 150 MG: 75 CAPSULE ORAL at 08:10

## 2021-06-20 RX ADMIN — Medication 10 ML: at 09:22

## 2021-06-20 RX ADMIN — Medication: at 20:51

## 2021-06-20 ASSESSMENT — MIFFLIN-ST. JEOR: SCORE: 1041.22

## 2021-06-20 ASSESSMENT — ACTIVITIES OF DAILY LIVING (ADL)
ADLS_ACUITY_SCORE: 13

## 2021-06-20 NOTE — PLAN OF CARE
"/61 (BP Location: Right arm)   Pulse 65   Temp 96  F (35.6  C) (Oral)   Resp 18   Ht 1.575 m (5' 2\")   Wt 53.3 kg (117 lb 9.6 oz)   LMP  (LMP Unknown)   SpO2 94%   BMI 21.51 kg/m    [id number]  Status: VSS on RA  Pain/Nausea: no prns given this shift  Mobility: independent  Diet: Regular - tolerating diet, , 82  Labs: Reviewed - no AM labs, team aware  Lines: L DL PICC hep locked - TPN cycled over 12hrs, starting at 2000  Drains: X  Skin/Incisions: no new deficits noted  Neuro: WDL  Respiratory: WDL on room air  Cardiac: WDL  GI: LBM 6/18, WDL  : Voiding spont, not saving  New Changes: X  Plan: continue POC, likely discharge tomorrow if tolerating TPN cycle    "

## 2021-06-20 NOTE — PROGRESS NOTES
Bariatric Surgery Progress Note    Demetra Richardson  [id number]    No acute overnight events. No abdominal pain, nausea, or vomiting. Tolerating diet. Ambulating. Voiding     Temp:  [96  F (35.6  C)-97  F (36.1  C)] 96  F (35.6  C)  Pulse:  [65-69] 65  Resp:  [18] 18  BP: (102-125)/(58-66) 125/61  SpO2:  [94 %-95 %] 94 %    Intake/Output Summary (Last 24 hours) at 6/19/2021 1137  Last data filed at 6/19/2021 0909  Gross per 24 hour   Intake 110 ml   Output --   Net 110 ml     General: NAD, resting comfortably  Resp: NLB on RA  Neuro: alert, oriented    65 year old female with hx chronic pain (on methadone), fibromyalgia, s/p Billroth II for gastric outlet obstruction in 2005, revision gastrojejunostomy in 2011 with 2 years of intermittent epigastric/left abd pain, nausea/vomiting, and protein calorie malnutrition. EGD 6/8 showed mildly stenosed Billroth II gastrojejunostomy. Admitted for initiation of TPN, in anticipation of revision surgery to address gastric dysmotility in a few weeks. Doing well.     - TPN per nutrition: started on 12 hr cycle today   - Monitor electrolytes and replace as needed  - Appreciate pain team recs, will refer pt to PAC clinic for post-op pain mgmt plan  - Discharge after tolerating 12 hr cycled TPN - plan for Monday  - PICC teaching completed    Discussed with chief resident who discussed with staff     Rebecca Loo MS4    I was present with the medical student who participated in the service and in the documentation of the note. I have verified the history and personally performed the physical exam and medical decision making. I edited as needed and  agree with the assessment and plan of care as documented in the note.    Amy Artis MD  General Surgery PGY1  Pager [phone]

## 2021-06-20 NOTE — PLAN OF CARE
"Time: 1900 - 0730  Reason for Admission: Initiation of TPN, in anticipation of revision surgery to address gastric dysmotility in a few weeks.   Vitals: /58 (BP Location: Right arm)   Pulse 69   Temp 97  F (36.1  C) (Oral)   Resp 18   Ht 1.575 m (5' 2\")   Wt 53.3 kg (117 lb 9.6 oz)   LMP  (LMP Unknown)   SpO2 95%   BMI 21.51 kg/m       Activity: Up ad shelby.   Pain: PRN ibuprofen given x 1 for abdominal pain/cramping.   Neuro: A&O x 4. Calm and cooperative with cares. Calls appropriately.     Cardiac: WDL. Denies cardiac chest pain.   Respiratory: LS clear. On RA sating 95%. Denies SOB.   GI/: Voiding spontaneously - not saving. BS+. No BM this shift.   Diet: Regular diet with cycled TPN infusing over 12 hours.   Labs: .  Skin: No new deficits found.   LDAs: (L) DL PICC - purple lumen infusing TPN - red lumen hep locked.   New change for this shift: None.   Plan: Possible discharge tomorrow AM. Continue with POC.     "

## 2021-06-21 ENCOUNTER — HOME INFUSION (PRE-WILLOW HOME INFUSION) (OUTPATIENT)
Dept: PHARMACY | Facility: CLINIC | Age: 66
End: 2021-06-21

## 2021-06-21 VITALS
HEIGHT: 62 IN | SYSTOLIC BLOOD PRESSURE: 101 MMHG | BODY MASS INDEX: 21.83 KG/M2 | WEIGHT: 118.6 LBS | HEART RATE: 71 BPM | TEMPERATURE: 96.5 F | DIASTOLIC BLOOD PRESSURE: 40 MMHG | RESPIRATION RATE: 16 BRPM | OXYGEN SATURATION: 94 %

## 2021-06-21 LAB
ALBUMIN SERPL-MCNC: 1.7 G/DL (ref 3.4–5)
ALP SERPL-CCNC: 87 U/L (ref 40–150)
ALT SERPL W P-5'-P-CCNC: 15 U/L (ref 0–50)
ANION GAP SERPL CALCULATED.3IONS-SCNC: 3 MMOL/L (ref 3–14)
AST SERPL W P-5'-P-CCNC: 22 U/L (ref 0–45)
BILIRUB SERPL-MCNC: 0.1 MG/DL (ref 0.2–1.3)
BUN SERPL-MCNC: 32 MG/DL (ref 7–30)
CALCIUM SERPL-MCNC: 7.7 MG/DL (ref 8.5–10.1)
CHLORIDE SERPL-SCNC: 106 MMOL/L (ref 94–109)
CO2 SERPL-SCNC: 31 MMOL/L (ref 20–32)
CREAT SERPL-MCNC: 0.68 MG/DL (ref 0.52–1.04)
GFR SERPL CREATININE-BSD FRML MDRD: >90 ML/MIN/{1.73_M2}
GLUCOSE BLDC GLUCOMTR-MCNC: 122 MG/DL (ref 70–99)
GLUCOSE SERPL-MCNC: 104 MG/DL (ref 70–99)
INR PPP: 0.99 (ref 0.86–1.14)
MAGNESIUM SERPL-MCNC: 2.4 MG/DL (ref 1.6–2.3)
PHOSPHATE SERPL-MCNC: 4.2 MG/DL (ref 2.5–4.5)
POTASSIUM SERPL-SCNC: 4.7 MMOL/L (ref 3.4–5.3)
PREALB SERPL IA-MCNC: 18 MG/DL (ref 15–45)
PROT SERPL-MCNC: 5 G/DL (ref 6.8–8.8)
SODIUM SERPL-SCNC: 140 MMOL/L (ref 133–144)

## 2021-06-21 PROCEDURE — 250N000013 HC RX MED GY IP 250 OP 250 PS 637: Performed by: STUDENT IN AN ORGANIZED HEALTH CARE EDUCATION/TRAINING PROGRAM

## 2021-06-21 PROCEDURE — 36592 COLLECT BLOOD FROM PICC: CPT | Performed by: SURGERY

## 2021-06-21 PROCEDURE — 84100 ASSAY OF PHOSPHORUS: CPT | Performed by: SURGERY

## 2021-06-21 PROCEDURE — 85610 PROTHROMBIN TIME: CPT | Performed by: SURGERY

## 2021-06-21 PROCEDURE — 83735 ASSAY OF MAGNESIUM: CPT | Performed by: SURGERY

## 2021-06-21 PROCEDURE — 250N000011 HC RX IP 250 OP 636: Performed by: SURGERY

## 2021-06-21 PROCEDURE — 80053 COMPREHEN METABOLIC PANEL: CPT | Performed by: SURGERY

## 2021-06-21 PROCEDURE — 84134 ASSAY OF PREALBUMIN: CPT | Performed by: SURGERY

## 2021-06-21 RX ORDER — FOLIC ACID 1 MG/1
1 TABLET ORAL DAILY
Qty: 30 TABLET | Refills: 11 | Status: ON HOLD | OUTPATIENT
Start: 2021-06-21 | End: 2021-09-10

## 2021-06-21 RX ADMIN — Medication 120 MG: at 06:32

## 2021-06-21 RX ADMIN — PANTOPRAZOLE SODIUM 40 MG: 40 TABLET, DELAYED RELEASE ORAL at 08:14

## 2021-06-21 RX ADMIN — PREGABALIN 150 MG: 75 CAPSULE ORAL at 08:15

## 2021-06-21 RX ADMIN — Medication 5 ML: at 07:30

## 2021-06-21 RX ADMIN — Medication 10 ML: at 09:05

## 2021-06-21 RX ADMIN — POLYETHYLENE GLYCOL 3350 17 G: 17 POWDER, FOR SOLUTION ORAL at 08:16

## 2021-06-21 RX ADMIN — ESCITALOPRAM OXALATE 20 MG: 20 TABLET ORAL at 08:15

## 2021-06-21 ASSESSMENT — ACTIVITIES OF DAILY LIVING (ADL)
ADLS_ACUITY_SCORE: 13

## 2021-06-21 NOTE — DISCHARGE SUMMARY
[facility]  Discharge Summary  General Surgery     Demetra Richardson MRN# [id number]   YOB: 1955 Age: 65 year old     Date of Admission:  6/16/2021  Date of Discharge::  6/21/2021  Admitting Physician:  Kaveh Clayton MD  Discharge Physician:  Dr. Clayton  Primary Care Physician:        Fredy Merritt          Admission Diagnoses:     Malnutrition (H) [E46]    Severe protein calorie malnutrition (serum albumin = 1.8).    Gastric outlet obstruction.            Discharge Diagnosis:   There are no discharge diagnoses documented for the most recent discharge.            Procedures:   none            Consultations:   VASCULAR ACCESS ADULT IP CONSULT  PHARMACY/NUTRITION TO START AND MANAGE TPN  PAIN MANAGEMENT ADULT IP CONSULT  VASCULAR ACCESS FOR PICC PLACEMENT ADULT IP CONSULT  PHARMACY IP CONSULT  PHARMACY IP CONSULT  PATIENT LEARNING CENTER IP CONSULT  CARE MANAGEMENT / SOCIAL WORK IP CONSULT  PHARMACY IP CONSULT          Brief History of Illness:   Demetra Richardson is a 65 year old female s/p Billroth II for gastric outlet obstruction in 2005, revision of gastrojejunostomy in 2011, with PMH of chronic pain, fibromyalgia presents as direct admission after EGD on 6/8 showed stenosed Billroth II gastrojejunostomy. Over the past 2 years patient reports having progressively worsening postprandial episodes of abdominal pain, nausea, and vomiting. Pain is throughout her abdomen, worse in the epigastric area and left side. Pain typically resolves after vomiting. Last episode of pain and vomiting was yesterday after eating. She does not have pain every day and says the frequency of episodes varies, although seems like in cycles of every 72 hours or so. No abdominal pain or nausea currently. She has a good appetite and has been eating well but is worried that she is not absorbing the nutrients. Has lost 5-10 lbs in the past month. She also complains of constipation which has  been an ongoing issue for her. Typically has a BM every week or few days. Last BM was last week. She does not take any bowel medications at home.      Currently on methadone which she receives at [facility]. Has been on methadone for the last 6 years.           Hospital Course:   Patient was admitted and Nutrition consulted to initiate TPN. This was done and cycled appropriately. The patient received PICC and TPN teaching. Her labs remained normal.    She was also seen by the pain team for perioperative pain management planning, with their recs/plan as follows:    PAIN TREATMENT RECOMMENDATIONS/PLAN:   1. No change to current pain regimen for this admission. Continue PTA dose of methadone 120mg PO daily. She states her pain is stable at 5/10 and does not wish to make any changes. States she is here for TPN.     In the future for post operative pain management recommend:  1. Please refer patient to the PAC Clinic at [facility] for post operative pain management plan.  2. Discussed that post op pain management will consist of keeping PO methadone dose the same. However if NPO, IV methadone formulation is available, IV dose is 50% of PO methadone dose.  3. Will consider short acting PO opioids such as oxycodone or Dilaudid, liquid formulation available.  4. If NPO or no enteral route, will consider PCA Dilaudid.   5. Will consider IV ketamine if needed-states that she used this in the past without issues: No history of CVA, no poorly controlled hypertension, no PTSD.  6. Would be open to epidural analgesia but not nerve block (s)  that has the same medication for her lung surgery in 2019 as it did not work.     The patient had good understanding of her plan and felt well on the day of discharge. She will schedule surgery with Dr. Clayton's office in ~6 weeks. Once surgery is scheduled, she will need a referral to the PAC clinic at [facility] for pain management plan.            Imaging Studies:     Results for orders placed or  performed in visit on 21   Echocardiogram Complete    Narrative    [id number]  [id number]  [id number]  [id number]^PRISCA^SOPHIA^DEEPIKA     [facility]  Diagnostic and Treamtent-3rd Floor  [address] [place] [address]     Name: NIA CURTIS  MRN: [id number]  : 1955  Study Date: 2021 11:20 AM  Age: 65 yrs  Gender: Female  Patient Location: [facility]  Reason For Study: BENSON (dyspnea on exertion)  Ordering Physician: SOPHIA COPE  Referring Physician: SOPHIA COPE  Performed By: KARIN Torres     BSA: 1.6 m2  Height: 63 in  Weight: 130 lb  HR: 77  BP: 114/73 mmHg  ______________________________________________________________________________  Procedure  Echocardiogram with two-dimensional, color and spectral Doppler performed.  ______________________________________________________________________________  Interpretation Summary  Global and regional left ventricular function is hyperkinetic with an EF >70%.  Right ventricular function, chamber size, wall motion, and thickness are  normal.  Right ventricular systolic pressure is 31mmHg above the right atrial pressure.  The inferior vena cava is normal.  No pericardial effusion is present.  No significant changes noted.  ______________________________________________________________________________  Left Ventricle  Global and regional left ventricular function is hyperkinetic with an EF >70%.  Left ventricular wall thickness is normal. Left ventricular size is normal.  Left ventricular diastolic function is normal. No regional wall motion  abnormalities are seen.     Right Ventricle  Right ventricular function, chamber size, wall motion, and thickness are  normal.     Atria  Both atria appear normal. The atrial septum is intact as assessed by color  Doppler .     Mitral Valve  Mild mitral annular calcification is present.     Aortic Valve  Moderate aortic valve calcification is present.  Mild aortic insufficiency is  present. The mean AoV pressure gradient is 13.0 mmHg. The peak aortic velocity  is 2.4 m/sec.     Tricuspid Valve  The tricuspid valve is normal. Mild tricuspid insufficiency is present. Right  ventricular systolic pressure is 31mmHg above the right atrial pressure.     Pulmonic Valve  The pulmonic valve is normal. Trace pulmonic insufficiency is present.     Vessels  The thoracic aorta is normal. The pulmonary artery and bifurcation cannot be  assessed. The inferior vena cava is normal.     Pericardium  No pericardial effusion is present.     Compared to Previous Study  No significant changes noted.  ______________________________________________________________________________  MMode/2D Measurements & Calculations  IVSd: 0.70 cm     LVIDd: 4.1 cm  LVIDs: 2.5 cm  LVPWd: 0.90 cm  FS: 38.6 %  LV mass(C)d: 95.6 grams  LV mass(C)dI: 59.4 grams/m2  Ao root diam: 3.2 cm  asc Aorta Diam: 3.3 cm  LVOT diam: 1.4 cm  LVOT area: 1.5 cm2  LA Volume (BP): 52.8 ml  LA Volume Index (BP): 32.8 ml/m2  RWT: 0.44     Doppler Measurements & Calculations  MV E max denny: 99.4 cm/sec  MV A max denny: 112.0 cm/sec  MV E/A: 0.89  MV dec slope: 441.0 cm/sec2  MV dec time: 0.23 sec  Ao V2 max: 245.0 cm/sec  Ao max P.0 mmHg  Ao V2 mean: 171.0 cm/sec  Ao mean P.0 mmHg  Ao V2 VTI: 53.1 cm  MARLENA(I,D): 1.1 cm2  MARLENA(V,D): 1.3 cm2  AI P1/2t: 480.2 msec  LV V1 max P.3 mmHg  LV V1 max: 208.0 cm/sec  LV V1 VTI: 38.5 cm  SV(LVOT): 59.3 ml  SI(LVOT): 36.8 ml/m2  PA acc time: 0.14 sec  TR max denny: 276.0 cm/sec  TR max P.5 mmHg  AV Denny Ratio (DI): 0.85  MARLENA Index (cm2/m2): 0.69  E/E' avg: 10.5  Lateral E/e': 9.9  Medial E/e': 11.0     ______________________________________________________________________________  Report approved by: Maryann Chavira 2021 12:39 PM           *Note: Due to a large number of results and/or encounters for the requested time period, some results have not been displayed. A  complete set of results can be found in Results Review.              Medications Prior to Admission:     Medications Prior to Admission   Medication Sig Dispense Refill Last Dose     aspirin-acetaminophen-caffeine (EXCEDRIN MIGRAINE) 250-250-65 MG tablet Take 2 tablets by mouth 2 times daily   6/15/2021 at Unknown time     escitalopram (LEXAPRO) 20 MG tablet TAKE 1 TABLET BY MOUTH EVERY DAY 30 tablet 5 6/15/2021 at AM     hydroxychloroquine (PLAQUENIL) 200 MG tablet Take 1 tablet (200 mg) by mouth 2 times daily (with meals) 180 tablet 1 6/15/2021 at PM     hydrOXYzine (VISTARIL) 25 MG capsule Take 1 capsule (25 mg) by mouth 2 times daily as needed for anxiety 60 capsule 11 6/15/2021 at Unknown time     ibuprofen (ADVIL/MOTRIN) 200 MG tablet Take 400-800 mg by mouth every 8 hours as needed for mild pain   Unknown at Unknown     methadone (DOLOPHINE-INTENSOL) 10 MG/ML (HIGH CONC) solution Take 120 mg by mouth daily Takes it at 0530 everyday.   6/16/2021 at 0530     pantoprazole (PROTONIX) 40 MG EC tablet TAKE 1 TABLET BY MOUTH EVERY DAY 90 tablet 2 6/16/2021 at AM     polyethylene glycol (MIRALAX) 17 GM/Dose powder 1 capful (17 g) in 8 oz of liquid 225 g 3 Past Month at Unknown time     pregabalin (LYRICA) 150 MG capsule TAKE 1 CAPSULE (150 MG) BY MOUTH 3 TIMES DAILY 90 capsule 1 6/15/2021 at PM     valACYclovir (VALTREX) 500 MG tablet Take 1 tablet (500 mg) by mouth daily 90 tablet 3  at Not taking right now     butalbital-acetaminophen-caffeine (ESGIC) -40 MG tablet Take 1 tablet by mouth every 6 hours as needed for headaches Use sparingly. 12 tablet 0 Unknown at Unknown     ferrous sulfate (FEROSUL) 325 (65 Fe) MG tablet Take 1 tablet (325 mg) by mouth 2 times daily With meals 100 tablet 3 Unknown at Unknown     valACYclovir (VALTREX) 500 MG tablet Take 1 tablet (500 mg) by mouth 2 times daily 60 tablet 1      vitamin D2 (ERGOCALCIFEROL) 88138 units (1250 mcg) capsule Take 1 capsule (50,000 Units) by mouth  every 7 days 12 capsule 0  at Not taking rightn now     [DISCONTINUED] folic acid (FOLVITE) 1 MG tablet Take 1 tablet (1 mg) by mouth daily 30 tablet 11               Discharge Medications:     Current Discharge Medication List      START taking these medications    Details   parenteral nutrition - PTA/DISCHARGE ORDER The TPN formula will print on the After Visit Summary Report.  Qty: 1 each, Refills: 0    Associated Diagnoses: Protein-calorie malnutrition, moderate (H)         CONTINUE these medications which have CHANGED    Details   folic acid (FOLVITE) 1 MG tablet Take 1 tablet (1 mg) by mouth daily  Qty: 30 tablet, Refills: 11    Associated Diagnoses: Low folate         CONTINUE these medications which have NOT CHANGED    Details   aspirin-acetaminophen-caffeine (EXCEDRIN MIGRAINE) 250-250-65 MG tablet Take 2 tablets by mouth 2 times daily      escitalopram (LEXAPRO) 20 MG tablet TAKE 1 TABLET BY MOUTH EVERY DAY  Qty: 30 tablet, Refills: 5    Associated Diagnoses: Anxiety disorder, unspecified type      hydroxychloroquine (PLAQUENIL) 200 MG tablet Take 1 tablet (200 mg) by mouth 2 times daily (with meals)  Qty: 180 tablet, Refills: 1    Associated Diagnoses: Systemic lupus erythematosus, unspecified SLE type, unspecified organ involvement status (H)      hydrOXYzine (VISTARIL) 25 MG capsule Take 1 capsule (25 mg) by mouth 2 times daily as needed for anxiety  Qty: 60 capsule, Refills: 11    Associated Diagnoses: Anxiety disorder, unspecified type      ibuprofen (ADVIL/MOTRIN) 200 MG tablet Take 400-800 mg by mouth every 8 hours as needed for mild pain      methadone (DOLOPHINE-INTENSOL) 10 MG/ML (HIGH CONC) solution Take 120 mg by mouth daily Takes it at 0530 everyday.      pantoprazole (PROTONIX) 40 MG EC tablet TAKE 1 TABLET BY MOUTH EVERY DAY  Qty: 90 tablet, Refills: 2    Comments: PT WOULD LIKE TO GET A REFILL. THANKS  Associated Diagnoses: Reflux esophagitis; Jejunitis      polyethylene glycol (MIRALAX) 17  GM/Dose powder 1 capful (17 g) in 8 oz of liquid  Qty: 225 g, Refills: 3    Comments: Take 1 capful in the morning for one week then increase to two capfuls per day (once in the morning and once in the evening)  Associated Diagnoses: Drug-induced constipation      pregabalin (LYRICA) 150 MG capsule TAKE 1 CAPSULE (150 MG) BY MOUTH 3 TIMES DAILY  Qty: 90 capsule, Refills: 1    Comments: This request is for a new prescription for a controlled substance as required by Federal/State law..  Associated Diagnoses: Fibromyalgia      !! valACYclovir (VALTREX) 500 MG tablet Take 1 tablet (500 mg) by mouth daily  Qty: 90 tablet, Refills: 3    Associated Diagnoses: HSV-2 (herpes simplex virus 2) infection      butalbital-acetaminophen-caffeine (ESGIC) -40 MG tablet Take 1 tablet by mouth every 6 hours as needed for headaches Use sparingly.  Qty: 12 tablet, Refills: 0    Associated Diagnoses: Nonintractable headache, unspecified chronicity pattern, unspecified headache type      ferrous sulfate (FEROSUL) 325 (65 Fe) MG tablet Take 1 tablet (325 mg) by mouth 2 times daily With meals  Qty: 100 tablet, Refills: 3    Comments: [E61.1]  Associated Diagnoses: Iron deficiency      !! valACYclovir (VALTREX) 500 MG tablet Take 1 tablet (500 mg) by mouth 2 times daily  Qty: 60 tablet, Refills: 1    Associated Diagnoses: Herpes simplex virus infection      vitamin D2 (ERGOCALCIFEROL) 89400 units (1250 mcg) capsule Take 1 capsule (50,000 Units) by mouth every 7 days  Qty: 12 capsule, Refills: 0    Associated Diagnoses: Vitamin D deficiency       !! - Potential duplicate medications found. Please discuss with provider.                  Medications Discontinued or Adjusted During This Hospitalization:   No change           Antibiotics Prescribed at Discharge:   None prescribed           Day of Discharge Physical Exam:   Temp:  [96.5  F (35.8  C)] 96.5  F (35.8  C)  Pulse:  [62-71] 71  Resp:  [16-18] 18  BP: (124-133)/(72-81)  124/72  SpO2:  [92 %-95 %] 92 %    General: awake, alert, no acute distress, laying comfortably in bed   CV: warm, well perfused   Pulm: breathing comfortably on room air   Abdomen: soft, non-distended, non-tender   Extremities: no edema, moving all extremities spontaneously and without apparent deficit            Final Pathology Result:   none           Discharge Instructions and Follow-Up:     Discharge Procedure Orders   Home infusion referral   Referral Priority: Routine   Number of Visits Requested: 1     Reason for your hospital stay   Order Comments: You were hospitalized to initiate TPN (IV nutrition) in anticipation of future surgery. Your electrolytes remained normal while starting TPN.     Activity   Order Comments: Your activity upon discharge: activity as tolerated     Order Specific Question Answer Comments   Is discharge order? Yes      Discharge Instructions   Order Comments: Discharge Instructions    Dr. Clayton's office will contact you to schedule surgery. Once surgery is scheduled, we will also arrange for you to have an appointment with the Perioperative Anaesthesia team for pain management planning around the time of surgery.     Full Code     Order Specific Question Answer Comments   Code status determined by: Discussion with patient/ legal decision maker      Diet   Order Comments: Follow this diet upon discharge:       Regular Diet Adult     Order Specific Question Answer Comments   Is discharge order? Yes               Home Health Care:     Arranged           Discharge Disposition:     Discharged to home      Condition at discharge: Good    Patient was seen, examined, and discussed on day of discharge with chief resident, who discussed with staff surgeon.      Amy Artis MD  General Surgery PGY1  Pager [phone]

## 2021-06-21 NOTE — PROGRESS NOTES
Care Management Discharge Note    Discharge Date: 06/21/21  Expected Time of Departure: TBD    Discharge Disposition: Home Infusion, Assisted Living    Discharge Services: Home Infusion      Discharge DME: NA     Discharge Transportation: health plan transportation    Private pay costs discussed: Not applicable    PAS Confirmation Code: NA  Patient/family educated on Medicare website which has current facility and service quality ratings: NA    Education Provided on the Discharge Plan: Yes  Persons Notified of Discharge Plans: Patient  Patient/Family in Agreement with the Plan: yes    Handoff Referral Completed: External     Additional Information:  Per MD team patient is medically ready for discharge to home today on TPN.  Referral previously made to [facility].  Patient completed PLC for education.  TPN and supplies to be delivered to the home this afternoon and will have a nurse visit this evening.  Patient plans to take a ride through her Health Plan Transportation, [employer], to home.  RNCC will remain available if further needs arise.    [facility](TPN)  Phone: [phone]  Fax: [phone]           LESLIE Kohler  Phone: [phone]  Pager: [phone]  To contact the weekend RNCC, Page: [phone]

## 2021-06-21 NOTE — PLAN OF CARE
"/72 (BP Location: Right arm)   Pulse 71   Temp 96.5  F (35.8  C) (Oral)   Resp 18   Ht 1.575 m (5' 2\")   Wt 53.8 kg (118 lb 9.6 oz)   LMP  (LMP Unknown)   SpO2 92%   BMI 21.69 kg/m       Activity: Up ad shelby.   Neuro: A&O x 4. Calm and cooperative with cares. Calls appropriately.     Cardiac: WDL. Denies cardiac chest pain.   Respiratory: LS clear. On RA sating 95%. Denies SOB.   GI/: Voiding spontaneously - not saving. BS+. No BM this shift.   Diet: Regular diet with cycled TPN infusing over 12 hours.   Labs:   Skin: No new deficits found.   LDAs: (L) DL PICC - purple lumen infusing TPN  Pain: chron abd pain on schedule methadone  New change for this shift: None.   Plan: Possible discharge today, Continue with POC.              "

## 2021-06-21 NOTE — PLAN OF CARE
"/40 (BP Location: Right arm)   Pulse 71   Temp 96.5  F (35.8  C) (Oral)   Resp 16   Ht 1.575 m (5' 2\")   Wt 53.8 kg (118 lb 9.6 oz)   LMP  (LMP Unknown)   SpO2 94%   BMI 21.69 kg/m      AVSS. Denies pain or nausea. Good intake PO. Reports voiding without difficulties. +BS, + flatus, no BM today. Ambulated in room independently. Double lumens PICC hep locked. AVS printed and reviewed with pt. Pt acknowledged understand discharge eduction materials.  Patient  left at 1440 with all her belongings.   "

## 2021-06-21 NOTE — PROGRESS NOTES
CLINICAL NUTRITION SERVICES - BRIEF NOTE     Nutrition Prescription    RECOMMENDATIONS FOR MDs/PROVIDERS TO ORDER:  -Continue current TPN and lipid plan.   Recommend continuing miralax/bowel Rx.    Recommendations already ordered by Registered Dietitian (RD):  Continue current TPN and lipid plan.     Future/Additional Recommendations:  Anticipate today.      EVALUATION OF THE PROGRESS TOWARD GOALS   Diet: regular  Nutrition Support: 1000 ml over 12 hr w/ 125 gm dextrose,  80 gm AA and 20% lipids 5x/wk (357 kcal) to provide 1022 kcal (19 kcal/kg), 1.5 gm protein, GIR of 1.6, 35% of kcal from fat.   Intake: Pt's oral intake has slowed down a little.  Seems to correlate with her stooling pattern.  She hasn't had a BM since Friday.  Eating 25-75% of meals (usually 50%).           NEW FINDINGS   Pt notes she plans to walk her usual 2 hours so her current oral intake should be more than enough to promote repletion prior to future surgery planned about 4 weeks out.      Last BM: 6/18.   Lab noted.     INTERVENTIONS  Cpntinue current TPN plan.     Monitoring/Evaluation  Progress toward goals will be monitored and evaluated per protocol.     Ramandeep Toscano RD, LD   7B (M-F) Pager: [phone]  RD Weekend Pager: [phone]

## 2021-06-22 DIAGNOSIS — Z90.3 S/P PARTIAL GASTRECTOMY: Primary | ICD-10-CM

## 2021-06-23 ENCOUNTER — TELEPHONE (OUTPATIENT)
Dept: SURGERY | Facility: CLINIC | Age: 66
End: 2021-06-23

## 2021-06-23 NOTE — TELEPHONE ENCOUNTER
[facility] Call Center    Phone Message    May a detailed message be left on voicemail: yes     Reason for Call: Other: Teri from  Home Infusion is calling in asking for a call back. She states that she believes that the patient will be unable to perform her own TPN infusions, and will most likely have to transition to a to a tcu for her TPN administration. Please call back as soon as possible to discuss.    Action Taken: Message routed to:  [facility] ([facility]): Gen Surg    Travel Screening: Not Applicable

## 2021-06-23 NOTE — TELEPHONE ENCOUNTER
Has been out to the patient's house twice.  Patient is not able to perform her own TPN. Recommending TCU.  Patient would need twice daily visits.  They do not have the capacity to do this.    Recommendation sent to Dr. Clayton for orders.

## 2021-06-24 ENCOUNTER — HOME INFUSION (PRE-WILLOW HOME INFUSION) (OUTPATIENT)
Dept: PHARMACY | Facility: CLINIC | Age: 66
End: 2021-06-24

## 2021-06-24 ENCOUNTER — PATIENT OUTREACH (OUTPATIENT)
Dept: CARE COORDINATION | Facility: CLINIC | Age: 66
End: 2021-06-24

## 2021-06-24 ENCOUNTER — MEDICAL CORRESPONDENCE (OUTPATIENT)
Dept: HEALTH INFORMATION MANAGEMENT | Facility: CLINIC | Age: 66
End: 2021-06-24

## 2021-06-24 LAB
ANION GAP SERPL CALCULATED.3IONS-SCNC: 2 MMOL/L (ref 3–14)
BUN SERPL-MCNC: 34 MG/DL (ref 7–30)
CALCIUM SERPL-MCNC: 7.9 MG/DL (ref 8.5–10.1)
CHLORIDE SERPL-SCNC: 106 MMOL/L (ref 94–109)
CO2 SERPL-SCNC: 30 MMOL/L (ref 20–32)
CREAT SERPL-MCNC: 0.62 MG/DL (ref 0.52–1.04)
GFR SERPL CREATININE-BSD FRML MDRD: >90 ML/MIN/{1.73_M2}
GLUCOSE SERPL-MCNC: 99 MG/DL (ref 70–99)
MAGNESIUM SERPL-MCNC: 2.2 MG/DL (ref 1.6–2.3)
PHOSPHATE SERPL-MCNC: 3.6 MG/DL (ref 2.5–4.5)
POTASSIUM SERPL-SCNC: 4.4 MMOL/L (ref 3.4–5.3)
SODIUM SERPL-SCNC: 138 MMOL/L (ref 133–144)

## 2021-06-24 PROCEDURE — 83735 ASSAY OF MAGNESIUM: CPT | Performed by: SURGERY

## 2021-06-24 PROCEDURE — 80048 BASIC METABOLIC PNL TOTAL CA: CPT | Performed by: SURGERY

## 2021-06-24 PROCEDURE — 84100 ASSAY OF PHOSPHORUS: CPT | Performed by: SURGERY

## 2021-06-24 NOTE — PROGRESS NOTES
Social Work Intervention  [facility]    Data/Intervention:    Patient Name:  Demetra Richardson  /Age:  1955 (65 year old)    Visit Type: telephone  Referral Source: Darling Thao RN  Reason for Referral:  TCU placement    Collaborated With:    -Darling Thao RN  -Candy JONES [facility] Resource nurse  -Radha    Psychosocial Information/Concerns:  Request from Darling Thao to assist with possible TCU placement after she received a call from [facility] re Pt struggling with being able to do her own TPN administration. I contacted [facility] and spoke with Candy who confirmed that it's an issue and they are having nurse visits 2x day until alternative arrangements can be made.     Intervention/Education/Resources Provided:  I spoke with the Pt and she indicated that last evening and this morning went a lot better and she thinks she has it down. She said she had to change her mindset and she realizes that she is a visual learner and not auditory. She reported that at the [facility] appt before hospital discharge, she didn't have to return demonstration and she realizes she should have. She also indicated she was over confident as she had done her own IV antibiotics in the past.  She lives in a facility but there are not nurses there at the times she would need them to help her. She can get housekeeping help and help with ADLs if needed. Right now she is only using housekeeping. She does have a [employer]  for the CAD[employer] services she has, [place] [phone] that she has been in touch with. She plans to receive more personal care help after she returns home after her surgery and is hopeful that the surgery is going to improve her quality of life.     Assessment/Plan:  Pt's [facility] nurse was to come shortly after our phone call but anticipate that Pt will be able to continue to admin her TPN on her own. She does not want to go to a TCU. I communicated with Candy from [facility] re pt's  confidence in continuing to do the TPN at home and that they can contact me if further SW needs. LM for Darling as well.      Provided patient/family with contact information and availability.    ARIES Duque, [facility]    [facility]  [phone]

## 2021-06-25 ENCOUNTER — HOME INFUSION (PRE-WILLOW HOME INFUSION) (OUTPATIENT)
Dept: PHARMACY | Facility: CLINIC | Age: 66
End: 2021-06-25

## 2021-06-26 ENCOUNTER — HOME INFUSION (PRE-WILLOW HOME INFUSION) (OUTPATIENT)
Dept: PHARMACY | Facility: CLINIC | Age: 66
End: 2021-06-26

## 2021-06-28 ENCOUNTER — HOME INFUSION (PRE-WILLOW HOME INFUSION) (OUTPATIENT)
Dept: PHARMACY | Facility: CLINIC | Age: 66
End: 2021-06-28

## 2021-06-29 ENCOUNTER — CARE COORDINATION (OUTPATIENT)
Dept: ENDOCRINOLOGY | Facility: CLINIC | Age: 66
End: 2021-06-29

## 2021-06-29 ENCOUNTER — HOSPITAL ENCOUNTER (INPATIENT)
Facility: CLINIC | Age: 66
LOS: 4 days | Discharge: HOME HEALTH | DRG: 377 | End: 2021-07-03
Attending: EMERGENCY MEDICINE | Admitting: INTERNAL MEDICINE
Payer: COMMERCIAL

## 2021-06-29 ENCOUNTER — MEDICAL CORRESPONDENCE (OUTPATIENT)
Dept: HEALTH INFORMATION MANAGEMENT | Facility: CLINIC | Age: 66
End: 2021-06-29

## 2021-06-29 DIAGNOSIS — K21.00 REFLUX ESOPHAGITIS: ICD-10-CM

## 2021-06-29 DIAGNOSIS — G43.009 MIGRAINE WITHOUT AURA AND WITHOUT STATUS MIGRAINOSUS, NOT INTRACTABLE: ICD-10-CM

## 2021-06-29 DIAGNOSIS — K92.2 GASTROINTESTINAL HEMORRHAGE, UNSPECIFIED GASTROINTESTINAL HEMORRHAGE TYPE: ICD-10-CM

## 2021-06-29 DIAGNOSIS — G47.34 NOCTURNAL HYPOXIA: Primary | ICD-10-CM

## 2021-06-29 DIAGNOSIS — D64.9 SYMPTOMATIC ANEMIA: ICD-10-CM

## 2021-06-29 DIAGNOSIS — K52.9 JEJUNITIS: ICD-10-CM

## 2021-06-29 DIAGNOSIS — Z11.52 ENCOUNTER FOR SCREENING LABORATORY TESTING FOR SEVERE ACUTE RESPIRATORY SYNDROME CORONAVIRUS 2 (SARS-COV-2): ICD-10-CM

## 2021-06-29 LAB
ALBUMIN SERPL-MCNC: 2 G/DL (ref 3.4–5)
ALBUMIN SERPL-MCNC: 2 G/DL (ref 3.4–5)
ALP SERPL-CCNC: 124 U/L (ref 40–150)
ALP SERPL-CCNC: 130 U/L (ref 40–150)
ALT SERPL W P-5'-P-CCNC: 21 U/L (ref 0–50)
ALT SERPL W P-5'-P-CCNC: 24 U/L (ref 0–50)
ANION GAP SERPL CALCULATED.3IONS-SCNC: 4 MMOL/L (ref 3–14)
ANION GAP SERPL CALCULATED.3IONS-SCNC: 4 MMOL/L (ref 3–14)
AST SERPL W P-5'-P-CCNC: 27 U/L (ref 0–45)
AST SERPL W P-5'-P-CCNC: 28 U/L (ref 0–45)
BASOPHILS # BLD AUTO: 0.1 10E9/L (ref 0–0.2)
BASOPHILS # BLD AUTO: 0.1 10E9/L (ref 0–0.2)
BASOPHILS NFR BLD AUTO: 0.8 %
BASOPHILS NFR BLD AUTO: 1.2 %
BILIRUB DIRECT SERPL-MCNC: <0.1 MG/DL (ref 0–0.2)
BILIRUB SERPL-MCNC: <0.1 MG/DL (ref 0.2–1.3)
BILIRUB SERPL-MCNC: <0.1 MG/DL (ref 0.2–1.3)
BLD PROD TYP BPU: NORMAL
BLD UNIT ID BPU: 0
BLOOD PRODUCT CODE: NORMAL
BPU ID: NORMAL
BUN SERPL-MCNC: 27 MG/DL (ref 7–30)
BUN SERPL-MCNC: 30 MG/DL (ref 7–30)
CALCIUM SERPL-MCNC: 7.8 MG/DL (ref 8.5–10.1)
CALCIUM SERPL-MCNC: 8.1 MG/DL (ref 8.5–10.1)
CHLORIDE SERPL-SCNC: 109 MMOL/L (ref 94–109)
CHLORIDE SERPL-SCNC: 111 MMOL/L (ref 94–109)
CO2 SERPL-SCNC: 27 MMOL/L (ref 20–32)
CO2 SERPL-SCNC: 28 MMOL/L (ref 20–32)
CREAT SERPL-MCNC: 0.65 MG/DL (ref 0.52–1.04)
CREAT SERPL-MCNC: 0.68 MG/DL (ref 0.52–1.04)
DIFFERENTIAL METHOD BLD: ABNORMAL
DIFFERENTIAL METHOD BLD: ABNORMAL
EOSINOPHIL # BLD AUTO: 0.7 10E9/L (ref 0–0.7)
EOSINOPHIL # BLD AUTO: 0.7 10E9/L (ref 0–0.7)
EOSINOPHIL NFR BLD AUTO: 10.1 %
EOSINOPHIL NFR BLD AUTO: 9 %
ERYTHROCYTE [DISTWIDTH] IN BLOOD BY AUTOMATED COUNT: 15.5 % (ref 10–15)
ERYTHROCYTE [DISTWIDTH] IN BLOOD BY AUTOMATED COUNT: 15.6 % (ref 10–15)
GFR SERPL CREATININE-BSD FRML MDRD: >90 ML/MIN/{1.73_M2}
GFR SERPL CREATININE-BSD FRML MDRD: >90 ML/MIN/{1.73_M2}
GLUCOSE SERPL-MCNC: 102 MG/DL (ref 70–99)
GLUCOSE SERPL-MCNC: 80 MG/DL (ref 70–99)
HCT VFR BLD AUTO: 23.7 % (ref 35–47)
HCT VFR BLD AUTO: 24.6 % (ref 35–47)
HGB BLD-MCNC: 6.5 G/DL (ref 11.7–15.7)
HGB BLD-MCNC: 6.9 G/DL (ref 11.7–15.7)
IMM GRANULOCYTES # BLD: 0 10E9/L (ref 0–0.4)
IMM GRANULOCYTES # BLD: 0 10E9/L (ref 0–0.4)
IMM GRANULOCYTES NFR BLD: 0.4 %
IMM GRANULOCYTES NFR BLD: 0.6 %
LYMPHOCYTES # BLD AUTO: 1 10E9/L (ref 0.8–5.3)
LYMPHOCYTES # BLD AUTO: 1.1 10E9/L (ref 0.8–5.3)
LYMPHOCYTES NFR BLD AUTO: 13.3 %
LYMPHOCYTES NFR BLD AUTO: 15.6 %
MAGNESIUM SERPL-MCNC: 2.1 MG/DL (ref 1.6–2.3)
MCH RBC QN AUTO: 22.1 PG (ref 26.5–33)
MCH RBC QN AUTO: 22.6 PG (ref 26.5–33)
MCHC RBC AUTO-ENTMCNC: 27.4 G/DL (ref 31.5–36.5)
MCHC RBC AUTO-ENTMCNC: 28 G/DL (ref 31.5–36.5)
MCV RBC AUTO: 81 FL (ref 78–100)
MCV RBC AUTO: 81 FL (ref 78–100)
MONOCYTES # BLD AUTO: 0.8 10E9/L (ref 0–1.3)
MONOCYTES # BLD AUTO: 0.9 10E9/L (ref 0–1.3)
MONOCYTES NFR BLD AUTO: 11.5 %
MONOCYTES NFR BLD AUTO: 12.3 %
NEUTROPHILS # BLD AUTO: 4.2 10E9/L (ref 1.6–8.3)
NEUTROPHILS # BLD AUTO: 4.7 10E9/L (ref 1.6–8.3)
NEUTROPHILS NFR BLD AUTO: 61 %
NEUTROPHILS NFR BLD AUTO: 64.2 %
NRBC # BLD AUTO: 0 10*3/UL
NRBC # BLD AUTO: 0 10*3/UL
NRBC BLD AUTO-RTO: 0 /100
NRBC BLD AUTO-RTO: 0 /100
PHOSPHATE SERPL-MCNC: 3.8 MG/DL (ref 2.5–4.5)
PLATELET # BLD AUTO: 243 10E9/L (ref 150–450)
PLATELET # BLD AUTO: 267 10E9/L (ref 150–450)
POTASSIUM SERPL-SCNC: 4.3 MMOL/L (ref 3.4–5.3)
POTASSIUM SERPL-SCNC: 4.5 MMOL/L (ref 3.4–5.3)
PROT SERPL-MCNC: 5.4 G/DL (ref 6.8–8.8)
PROT SERPL-MCNC: 5.7 G/DL (ref 6.8–8.8)
RBC # BLD AUTO: 2.94 10E12/L (ref 3.8–5.2)
RBC # BLD AUTO: 3.05 10E12/L (ref 3.8–5.2)
SODIUM SERPL-SCNC: 141 MMOL/L (ref 133–144)
SODIUM SERPL-SCNC: 142 MMOL/L (ref 133–144)
TRANSFUSION STATUS PATIENT QL: NORMAL
TRANSFUSION STATUS PATIENT QL: NORMAL
TRIGL SERPL-MCNC: 102 MG/DL
WBC # BLD AUTO: 6.9 10E9/L (ref 4–11)
WBC # BLD AUTO: 7.3 10E9/L (ref 4–11)

## 2021-06-29 PROCEDURE — 80053 COMPREHEN METABOLIC PANEL: CPT | Performed by: EMERGENCY MEDICINE

## 2021-06-29 PROCEDURE — 82248 BILIRUBIN DIRECT: CPT | Performed by: SURGERY

## 2021-06-29 PROCEDURE — 120N000002 HC R&B MED SURG/OB UMMC

## 2021-06-29 PROCEDURE — 85025 COMPLETE CBC W/AUTO DIFF WBC: CPT | Performed by: EMERGENCY MEDICINE

## 2021-06-29 PROCEDURE — 86850 RBC ANTIBODY SCREEN: CPT | Performed by: EMERGENCY MEDICINE

## 2021-06-29 PROCEDURE — 84478 ASSAY OF TRIGLYCERIDES: CPT | Performed by: SURGERY

## 2021-06-29 PROCEDURE — 84100 ASSAY OF PHOSPHORUS: CPT | Performed by: SURGERY

## 2021-06-29 PROCEDURE — 85025 COMPLETE CBC W/AUTO DIFF WBC: CPT | Performed by: SURGERY

## 2021-06-29 PROCEDURE — 86901 BLOOD TYPING SEROLOGIC RH(D): CPT | Performed by: EMERGENCY MEDICINE

## 2021-06-29 PROCEDURE — U0003 INFECTIOUS AGENT DETECTION BY NUCLEIC ACID (DNA OR RNA); SEVERE ACUTE RESPIRATORY SYNDROME CORONAVIRUS 2 (SARS-COV-2) (CORONAVIRUS DISEASE [COVID-19]), AMPLIFIED PROBE TECHNIQUE, MAKING USE OF HIGH THROUGHPUT TECHNOLOGIES AS DESCRIBED BY CMS-2020-01-R: HCPCS | Performed by: EMERGENCY MEDICINE

## 2021-06-29 PROCEDURE — 86923 COMPATIBILITY TEST ELECTRIC: CPT | Performed by: EMERGENCY MEDICINE

## 2021-06-29 PROCEDURE — 86900 BLOOD TYPING SEROLOGIC ABO: CPT | Performed by: EMERGENCY MEDICINE

## 2021-06-29 PROCEDURE — 36430 TRANSFUSION BLD/BLD COMPNT: CPT | Performed by: EMERGENCY MEDICINE

## 2021-06-29 PROCEDURE — 83735 ASSAY OF MAGNESIUM: CPT | Performed by: SURGERY

## 2021-06-29 PROCEDURE — P9016 RBC LEUKOCYTES REDUCED: HCPCS | Performed by: EMERGENCY MEDICINE

## 2021-06-29 PROCEDURE — 99285 EMERGENCY DEPT VISIT HI MDM: CPT | Mod: 25 | Performed by: EMERGENCY MEDICINE

## 2021-06-29 PROCEDURE — U0005 INFEC AGEN DETEC AMPLI PROBE: HCPCS | Performed by: EMERGENCY MEDICINE

## 2021-06-29 PROCEDURE — C9803 HOPD COVID-19 SPEC COLLECT: HCPCS | Performed by: EMERGENCY MEDICINE

## 2021-06-29 PROCEDURE — 80053 COMPREHEN METABOLIC PANEL: CPT | Performed by: SURGERY

## 2021-06-29 PROCEDURE — 99291 CRITICAL CARE FIRST HOUR: CPT | Performed by: EMERGENCY MEDICINE

## 2021-06-29 ASSESSMENT — ENCOUNTER SYMPTOMS
COUGH: 0
DYSURIA: 0
CHEST TIGHTNESS: 0
DIZZINESS: 0
DIAPHORESIS: 0
FEVER: 0
SHORTNESS OF BREATH: 0
CHILLS: 0
DIARRHEA: 0
VOMITING: 0
ABDOMINAL PAIN: 0
DIFFICULTY URINATING: 0
NAUSEA: 0
PALPITATIONS: 0
FATIGUE: 1

## 2021-06-29 ASSESSMENT — MIFFLIN-ST. JEOR: SCORE: 1038.49

## 2021-06-29 NOTE — LETTER
Transition Communication Hand-off for Care Transitions to Next Level of Care Provider    Name: Demetra Richardson  : 1955  MRN #: [id number]  Primary Care Provider: Fredy Merritt     Primary Clinic: [address] [place] [id number]     Reason for Hospitalization:  Symptomatic anemia [D64.9]  Admit Date/Time: 2021  7:49 PM  Discharge Date:  July 3, 2021  Payor Source: Payor: MEDICA / Plan: MEDICA ACCESS ABILITY [place] / Product Type: HMO /   Discharge Plan: Home with resumption of home TPN and New Nocturnal Oxygen.     Discharge Needs Assessment:  Needs      Most Recent Value   Equipment Currently Used at Home  none        Future Appointments   Date Time Provider Department Center   2021 11:30 AM Sam Narayanan MD [facility]           Referrals     Future Labs/Procedures    GASTROENTEROLOGY ADULT REF CONSULT ONLY     Comments:    Please be aware that coverage of these services is subject to the terms and limitations of your health insurance plan.  Call member services at your health plan with any benefit or coverage questions.  [facility] will call you to coordinate your care as prescribed by the provider.  If you don t hear from a representative within 2 business days, please call the number listed above.      SLEEP EVALUATION & MANAGEMENT REFERRAL - ADULT -[facility] [place] [phone] (Age 15 and up)     Comments:    Patient with documented nocturnal hypoxia and severe headaches with loud snoring.    Please be aware that coverage of these services is subject to the terms and limitations of your health insurance plan.  Call member services at your health plan with any benefit or coverage questions.      Please bring the following to your appointment:    >>   List of current medications   >>   This referral request   >>   Any documents/labs given to you for this referral    Home infusion referral     Comments:    Please Fax discharge orders to [facility]  Infusion    Ph:  [phone]    Fax: [phone]    Skilled home care RN for resumption of home care services as prior to admission to the acute care hospital setting and to assist with the MD team updated plans of care as outlined in discharge orders.    Skilled home care RN to evaluate medication management, nutrition and hydration evaluation, endurance evaluation, and general status evaluation after discharge from the acute care hospital setting.    Skilled home care RN to assist with home TPN via Port a Cath.  TPN labs and port a cath cares per home care agency routine.    Skilled home care RN to assist with evaluation of respiratory status secondary to Ms. Richardson discharging with new home oxygen during hours of sleep.    Neurology Adult Referral     Comments:    Patient with severe migraines, referred for cefly by inpatient neuro          Supplies     Future Labs/Procedures    Oxygen Adult/Peds     Process Instructions:    By signing this order, the Authorizing Provider is attesting that they have completed a face-to-face evaluation on the patient to determine their need for this equipment in the last 60 days. A new face-to-face evaluation is required each time  A new prescription for one of the specified items is ordered.   If an additional provider completed the evaluation, please indicate their name below.     **As of 2018, an order requisition and face sheet will print for all DME orders. Please give printed order and face sheet to patient if not obtaining product from [facility] DME closet.     Comments:    Oxygen Documentation:   I certify that this patient, Demetra Richardson has been under my care (or a nurse practitioner or physican's assistant working with me). This is the face-to-face encounter for oxygen medical necessity.      Demetra Richardson is now in a chronic stable state and continues to require supplemental oxygen. Patient has continued oxygen desaturation due to nocturnal  hypoxia.    Alternative treatment(s) tried or considered and deemed clinically infective for treatment of nocturnal hypoxia include steroids.  If portability is ordered, is the patient mobile within the home? yes    **Patients who qualify for home O2 coverage under the CMS guidelines require ABG tests or O2 sat readings obtained closest to, but no earlier than 2 days prior to the discharge, as evidence of the need for home oxygen therapy. Testing must be performed while patient is in the chronic stable state. See notes for O2 sats.**          Key Recommendations:      Ester Pruitt RN    AVS/Discharge Summary is the source of truth; this is a helpful guide for improved communication of patient story

## 2021-06-29 NOTE — PROGRESS NOTES
Notified of critical Hgb value on patient.  Dr. Clayton notified of lab level.  Provider would like patient to go the ED for evaluation.  Patient notified to go to the ED for evaluation.

## 2021-06-30 ENCOUNTER — APPOINTMENT (OUTPATIENT)
Dept: GENERAL RADIOLOGY | Facility: CLINIC | Age: 66
DRG: 377 | End: 2021-06-30
Payer: COMMERCIAL

## 2021-06-30 ENCOUNTER — HOME INFUSION (PRE-WILLOW HOME INFUSION) (OUTPATIENT)
Dept: PHARMACY | Facility: CLINIC | Age: 66
End: 2021-06-30

## 2021-06-30 LAB
ABO + RH BLD: NORMAL
ABO + RH BLD: NORMAL
ALBUMIN UR-MCNC: NEGATIVE MG/DL
APPEARANCE UR: CLEAR
BACTERIA #/AREA URNS HPF: ABNORMAL /HPF
BILIRUB UR QL STRIP: NEGATIVE
BLD GP AB SCN SERPL QL: NORMAL
BLD PROD TYP BPU: NORMAL
BLD PROD TYP BPU: NORMAL
BLD UNIT ID BPU: 0
BLOOD BANK CMNT PATIENT-IMP: NORMAL
BLOOD PRODUCT CODE: NORMAL
BPU ID: NORMAL
COLOR UR AUTO: ABNORMAL
GLUCOSE BLDC GLUCOMTR-MCNC: 90 MG/DL (ref 70–99)
GLUCOSE UR STRIP-MCNC: NEGATIVE MG/DL
HGB BLD-MCNC: 10.2 G/DL (ref 11.7–15.7)
HGB BLD-MCNC: 9.9 G/DL (ref 11.7–15.7)
HGB UR QL STRIP: NEGATIVE
KETONES UR STRIP-MCNC: NEGATIVE MG/DL
LABORATORY COMMENT REPORT: NORMAL
LEUKOCYTE ESTERASE UR QL STRIP: NEGATIVE
NITRATE UR QL: NEGATIVE
NUM BPU REQUESTED: 2
PH UR STRIP: 6 PH (ref 5–7)
RBC #/AREA URNS AUTO: 1 /HPF (ref 0–2)
SARS-COV-2 RNA RESP QL NAA+PROBE: NEGATIVE
SOURCE: ABNORMAL
SP GR UR STRIP: 1.01 (ref 1–1.03)
SPECIMEN EXP DATE BLD: NORMAL
SPECIMEN SOURCE: NORMAL
SQUAMOUS #/AREA URNS AUTO: 0 /HPF (ref 0–1)
TRANSFUSION STATUS PATIENT QL: NORMAL
TRANSFUSION STATUS PATIENT QL: NORMAL
UROBILINOGEN UR STRIP-MCNC: NORMAL MG/DL (ref 0–2)
WBC #/AREA URNS AUTO: 1 /HPF (ref 0–5)

## 2021-06-30 PROCEDURE — 250N000011 HC RX IP 250 OP 636: Performed by: STUDENT IN AN ORGANIZED HEALTH CARE EDUCATION/TRAINING PROGRAM

## 2021-06-30 PROCEDURE — 96365 THER/PROPH/DIAG IV INF INIT: CPT | Performed by: EMERGENCY MEDICINE

## 2021-06-30 PROCEDURE — 81001 URINALYSIS AUTO W/SCOPE: CPT | Performed by: EMERGENCY MEDICINE

## 2021-06-30 PROCEDURE — 250N000009 HC RX 250: Performed by: INTERNAL MEDICINE

## 2021-06-30 PROCEDURE — 71045 X-RAY EXAM CHEST 1 VIEW: CPT

## 2021-06-30 PROCEDURE — 99223 1ST HOSP IP/OBS HIGH 75: CPT | Mod: AI | Performed by: INTERNAL MEDICINE

## 2021-06-30 PROCEDURE — 258N000003 HC RX IP 258 OP 636: Performed by: STUDENT IN AN ORGANIZED HEALTH CARE EDUCATION/TRAINING PROGRAM

## 2021-06-30 PROCEDURE — 85018 HEMOGLOBIN: CPT | Performed by: STUDENT IN AN ORGANIZED HEALTH CARE EDUCATION/TRAINING PROGRAM

## 2021-06-30 PROCEDURE — 36415 COLL VENOUS BLD VENIPUNCTURE: CPT

## 2021-06-30 PROCEDURE — 3E0436Z INTRODUCTION OF NUTRITIONAL SUBSTANCE INTO CENTRAL VEIN, PERCUTANEOUS APPROACH: ICD-10-PCS | Performed by: INTERNAL MEDICINE

## 2021-06-30 PROCEDURE — 71045 X-RAY EXAM CHEST 1 VIEW: CPT | Mod: 26 | Performed by: RADIOLOGY

## 2021-06-30 PROCEDURE — P9016 RBC LEUKOCYTES REDUCED: HCPCS | Performed by: EMERGENCY MEDICINE

## 2021-06-30 PROCEDURE — 85018 HEMOGLOBIN: CPT

## 2021-06-30 PROCEDURE — C9113 INJ PANTOPRAZOLE SODIUM, VIA: HCPCS | Performed by: STUDENT IN AN ORGANIZED HEALTH CARE EDUCATION/TRAINING PROGRAM

## 2021-06-30 PROCEDURE — 120N000002 HC R&B MED SURG/OB UMMC

## 2021-06-30 PROCEDURE — 250N000013 HC RX MED GY IP 250 OP 250 PS 637: Performed by: STUDENT IN AN ORGANIZED HEALTH CARE EDUCATION/TRAINING PROGRAM

## 2021-06-30 PROCEDURE — 96376 TX/PRO/DX INJ SAME DRUG ADON: CPT | Performed by: EMERGENCY MEDICINE

## 2021-06-30 PROCEDURE — 99222 1ST HOSP IP/OBS MODERATE 55: CPT | Performed by: NURSE PRACTITIONER

## 2021-06-30 PROCEDURE — 96375 TX/PRO/DX INJ NEW DRUG ADDON: CPT | Performed by: EMERGENCY MEDICINE

## 2021-06-30 PROCEDURE — 96366 THER/PROPH/DIAG IV INF ADDON: CPT | Performed by: EMERGENCY MEDICINE

## 2021-06-30 PROCEDURE — 999N001017 HC STATISTIC GLUCOSE BY METER IP

## 2021-06-30 RX ORDER — FUROSEMIDE 10 MG/ML
20 INJECTION INTRAMUSCULAR; INTRAVENOUS ONCE
Status: COMPLETED | OUTPATIENT
Start: 2021-06-30 | End: 2021-06-30

## 2021-06-30 RX ORDER — ESCITALOPRAM OXALATE 20 MG/1
20 TABLET ORAL DAILY
Status: DISCONTINUED | OUTPATIENT
Start: 2021-06-30 | End: 2021-07-03 | Stop reason: HOSPADM

## 2021-06-30 RX ORDER — METHADONE HYDROCHLORIDE 10 MG/ML
120 CONCENTRATE ORAL DAILY
Status: DISCONTINUED | OUTPATIENT
Start: 2021-06-30 | End: 2021-06-30

## 2021-06-30 RX ORDER — FERROUS SULFATE 325(65) MG
325 TABLET ORAL 2 TIMES DAILY WITH MEALS
Status: DISCONTINUED | OUTPATIENT
Start: 2021-06-30 | End: 2021-07-03 | Stop reason: HOSPADM

## 2021-06-30 RX ORDER — METHADONE HYDROCHLORIDE 10 MG/ML
120 CONCENTRATE ORAL DAILY
Status: DISCONTINUED | OUTPATIENT
Start: 2021-06-30 | End: 2021-07-03 | Stop reason: HOSPADM

## 2021-06-30 RX ORDER — POLYETHYLENE GLYCOL 3350 17 G/17G
17 POWDER, FOR SOLUTION ORAL DAILY PRN
Status: DISCONTINUED | OUTPATIENT
Start: 2021-06-30 | End: 2021-07-03 | Stop reason: HOSPADM

## 2021-06-30 RX ORDER — FOLIC ACID 1 MG/1
1 TABLET ORAL DAILY
Status: DISCONTINUED | OUTPATIENT
Start: 2021-06-30 | End: 2021-07-03 | Stop reason: HOSPADM

## 2021-06-30 RX ORDER — ERGOCALCIFEROL 1.25 MG/1
50000 CAPSULE, LIQUID FILLED ORAL
Status: DISCONTINUED | OUTPATIENT
Start: 2021-06-30 | End: 2021-06-30

## 2021-06-30 RX ORDER — LIDOCAINE 40 MG/G
CREAM TOPICAL
Status: DISCONTINUED | OUTPATIENT
Start: 2021-06-30 | End: 2021-07-03 | Stop reason: HOSPADM

## 2021-06-30 RX ORDER — HYDROXYCHLOROQUINE SULFATE 200 MG/1
200 TABLET, FILM COATED ORAL 2 TIMES DAILY WITH MEALS
Status: DISCONTINUED | OUTPATIENT
Start: 2021-06-30 | End: 2021-07-03 | Stop reason: HOSPADM

## 2021-06-30 RX ORDER — BUTALBITAL, ACETAMINOPHEN AND CAFFEINE 50; 325; 40 MG/1; MG/1; MG/1
1 TABLET ORAL EVERY 6 HOURS PRN
Status: DISCONTINUED | OUTPATIENT
Start: 2021-06-30 | End: 2021-07-02

## 2021-06-30 RX ORDER — HYDROXYZINE HYDROCHLORIDE 25 MG/1
25 TABLET, FILM COATED ORAL 2 TIMES DAILY PRN
Status: DISCONTINUED | OUTPATIENT
Start: 2021-06-30 | End: 2021-07-03 | Stop reason: HOSPADM

## 2021-06-30 RX ORDER — DEXTROSE MONOHYDRATE 100 MG/ML
INJECTION, SOLUTION INTRAVENOUS CONTINUOUS PRN
Status: DISCONTINUED | OUTPATIENT
Start: 2021-06-30 | End: 2021-07-03 | Stop reason: HOSPADM

## 2021-06-30 RX ADMIN — BUTALBITAL, ACETAMINOPHEN, AND CAFFEINE 1 TABLET: 50; 325; 40 TABLET ORAL at 20:07

## 2021-06-30 RX ADMIN — HYDROXYCHLOROQUINE SULFATE 200 MG: 200 TABLET, FILM COATED ORAL at 18:39

## 2021-06-30 RX ADMIN — HYDROXYCHLOROQUINE SULFATE 200 MG: 200 TABLET, FILM COATED ORAL at 09:11

## 2021-06-30 RX ADMIN — ESCITALOPRAM OXALATE 20 MG: 20 TABLET ORAL at 07:57

## 2021-06-30 RX ADMIN — FERROUS SULFATE TAB 325 MG (65 MG ELEMENTAL FE) 325 MG: 325 (65 FE) TAB at 18:39

## 2021-06-30 RX ADMIN — PANTOPRAZOLE SODIUM 40 MG: 40 INJECTION, POWDER, FOR SOLUTION INTRAVENOUS at 18:39

## 2021-06-30 RX ADMIN — PREGABALIN 150 MG: 100 CAPSULE ORAL at 20:07

## 2021-06-30 RX ADMIN — PANTOPRAZOLE SODIUM 8 MG/HR: 40 INJECTION, POWDER, FOR SOLUTION INTRAVENOUS at 02:22

## 2021-06-30 RX ADMIN — FUROSEMIDE 20 MG: 10 INJECTION, SOLUTION INTRAVENOUS at 05:11

## 2021-06-30 RX ADMIN — METHADONE HYDROCHLORIDE 120 MG: 10 CONCENTRATE ORAL at 07:59

## 2021-06-30 RX ADMIN — I.V. FAT EMULSION 250 ML: 20 EMULSION INTRAVENOUS at 21:12

## 2021-06-30 RX ADMIN — Medication: at 21:09

## 2021-06-30 RX ADMIN — PANTOPRAZOLE SODIUM 8 MG/HR: 40 INJECTION, POWDER, FOR SOLUTION INTRAVENOUS at 13:23

## 2021-06-30 RX ADMIN — PREGABALIN 150 MG: 100 CAPSULE ORAL at 07:57

## 2021-06-30 RX ADMIN — PREGABALIN 150 MG: 100 CAPSULE ORAL at 16:14

## 2021-06-30 ASSESSMENT — ACTIVITIES OF DAILY LIVING (ADL)
DIFFICULTY_COMMUNICATING: NO
WALKING_OR_CLIMBING_STAIRS_DIFFICULTY: NO
DOING_ERRANDS_INDEPENDENTLY_DIFFICULTY: NO
FALL_HISTORY_WITHIN_LAST_SIX_MONTHS: NO
CONCENTRATING,_REMEMBERING_OR_MAKING_DECISIONS_DIFFICULTY: NO
PATIENT_/_FAMILY_COMMUNICATION_STYLE: SPOKEN LANGUAGE (ENGLISH OR BILINGUAL)
TOILETING_ISSUES: NO
HEARING_DIFFICULTY_OR_DEAF: NO
DIFFICULTY_EATING/SWALLOWING: NO
ADLS_ACUITY_SCORE: 15
WEAR_GLASSES_OR_BLIND: YES
DRESSING/BATHING_DIFFICULTY: NO

## 2021-06-30 ASSESSMENT — MIFFLIN-ST. JEOR: SCORE: 1056.25

## 2021-06-30 NOTE — CONSULTS
GASTROENTEROLOGY CONSULTATION      Date of Admission:  6/29/2021          ASSESSMENT AND RECOMMENDATIONS:   Assessment:  Demetra Richardson is a 65 year old female admitted on 6/29/2021. She has a history of Billroth II for gastric outlet obstruction in 2005, revision of gastrojejunostomy in 2011, chronic anemia, chronic pain, fibromyalgia and who presented to the ED by ambulance for evaluation of an acute hemoglobin drop from 9.4 to 6.5 discovered on OP surveillance labs for which GI is consulted.    Acute on chronic blood loss anemia  Billroth II anatomy with stenosis   Migraines, fibromyalgia  NSAID use  VSS with MAP up from 78>>> >100 following 2 unit(s) PRBC without significant tachycardia. Symptomatic anemia has also improved significantly with hgb back to 10.2 from 6.5. Given her lack of regular GI prophylaxis and regular NSAID use, she is likely having oozing from GJ anastomosis. She could also have other UGI involvement, such as esophagitis. She has responded well to transfusion and is not having overt GI bleeding, which is suggestive that finding a treatable lesion on EGD is less likely. Given HD stability, PPI prophylaxis re initiation, removal of NSAIDs, patient comorbidities (making sedation risk less trivial, patient preference to avoid repeat EGD and scope on 6/8, will plan to treat with supportive care and monitor. With worsening anemia refractory to transfusion will need to re-evaluate need for repeat EGD.     Recommendations  -Ok to deescalate to BID PPI IV >>> BID PPI PO upon discharge  -Continue to carefully monitor hemodynamics, transfuse hgb < 7 from GI standpoint  -CLD, if hgb stable may advance tomorrow  -With significant over bleeding and worse anemia make NPO and contact luminal  -Strict NSAID avoidance  -Alternative migraine/fibromylagia medical management per primary team    GI will continue to follow    Thank you for involving us in this patient's care. Please do not hesitate to  contact the GI service with any questions or concerns.     Pt care plan discussed with Dr. Duenas, GI staff physician.    VINOD Mehta CNP  Text page  -------------------------------------------------------------------------------------------------------------------          Chief Complaint:   We were asked by Dr. Wilburn of medicine to evaluate this patient with acute on chronic anemia    History is obtained from the patient and the medical record.          History of Present Illness:   Demetra Richardson is a 65 year old female admitted on 6/29/2021. She has a history of Billroth II for gastric outlet obstruction in 2005, revision of gastrojejunostomy in 2011, chronic anemia, chronic pain, fibromyalgia and who presented to the ED by ambulance for evaluation of an acute hemoglobin drop from 9.4 to 6.5 discovered on OP surveillance labs for which GI is consulted.    One week of fatigue, dyspnea with excretion which have improved significant following 2 U RBC here. Hgb also improved to 10.2 from 6.5. She is also on PPI ggt now.     Regular nausea and poor po presumed 2/2 stenosis of GJostomy with plan for TPN nutrition for several weeks and eventual surgical revision of her Billroth II bypass which she has chronically had issues related to poor food clearance and intermittent oozing/bleeding. ALso with recommendation for inpatient nursing facility stay to help administer TPN. At this point she is in assisted living.    EGD 6/8/21 for esophagitis/anemia showed GJostomy with friability and congestion. She reports regularly using NSAIDs with at least two tablets BID of excedrin +/- ibuprofen for migraines and fibromyalgia. Additionally, she has been taking PPI sporadically due to nausea.    She has continued to have semi formed brown stools every few days, last yesterday. No tarry stools, overt rectal bleeding or hematemesis. Intermittent dysphagia for solids that eventually pass spontaneously.             Past  Medical History:   Reviewed and edited as appropriate  Past Medical History:   Diagnosis Date     Anemia      Basal cell carcinoma     Left-sided, skin over over medial orbit/nasal bone. Removed Oct 2018.     Benzodiazepine dependence (H)     With h/o withdrawal seizure x 1     Bipolar 1 disorder, mixed, moderate (H) 2013     Chronic pain     Back, legs.     Depressive disorder      Eosinophilic gastroenteritis 2010     Epidural abscess 2005    x4     Fibromyalgia      Generalised anxiety disorder      GERD (gastroesophageal reflux disease)      Major depressive disorder      Migraine      Nephrolithiasis     S/p left sided lithotripsy     Opiate dependence (H)      Osteoarthritis      Osteoporosis      PUD (peptic ulcer disease)      SLE (systemic lupus erythematosus) (H) 2009     Weight loss             Past Surgical History:   Reviewed and edited as appropriate   Past Surgical History:   Procedure Laterality Date     BACK SURGERY  04    L4-5 epidural abscess     BACK SURGERY  04    L3-4 spinal stenosis     BACK SURGERY  04    Lt psoas abscess     BRONCHOSCOPY FLEXIBLE N/A 2019    Procedure: Flexible Bronchoscopy;  Surgeon: Patrice Regalado MD;  Location: [facility] OR      SECTION      x 2     CHOLECYSTECTOMY  2-     CLOSED REDUCTION, PERCUTANEOUS PINNING FINGER, COMBINED  8/10/2011    Procedure:COMBINED CLOSED REDUCTION, PERCUTANEOUS PINNING FINGER; 5th Proximal Phalanx; Surgeon:RADHA BUITRAGO; Location:[facility]     COLONOSCOPY       COLONOSCOPY N/A 2020    Procedure: COLONOSCOPY;  Surgeon: Zacarias Duran MD;  Location:  GI     ESOPHAGOSCOPY, GASTROSCOPY, DUODENOSCOPY (EGD), COMBINED N/A 2020    Procedure: ESOPHAGOGASTRODUODENOSCOPY, WITH BIOPSY;  Surgeon: Zacarias Duran MD;  Location:  GI     ESOPHAGOSCOPY, GASTROSCOPY, DUODENOSCOPY (EGD), COMBINED N/A 2021    Procedure: ESOPHAGOGASTRODUODENOSCOPY (EGD);  Surgeon: Kaveh Clayton MD;   Location: [facility] GI     GASTRECTOMY  11-    Bilat truncal vagotomy, hemigastrectomy, RnY gastrojejunostomy     GASTROJEJUNOSTOMY  6/2/2011    Procedure:GASTROJEJUNOSTOMY; exploratory laparotmy with revision of gastrojejunostomy, Antrectomy, Miles-en-y, Gastrojejunostomy; Surgeon:JULIUS HELM; Location:[facility] OR     INSERT CHEST TUBE N/A 4/29/2019    Procedure: INSERTION, CATHETER, INTERCOSTAL, FOR DRAINAGE;  Surgeon: Melissa Nichols MD;  Location: [facility] GI     LASER HOLMIUM LITHOTRIPSY URETER(S), INSERT STENT, COMBINED      Procedure: COMBINED CYSTOSCOPY, URETEROSCOPY, LASER HOLMIUM LITHOTRIPSY URETER(S), INSERT STENT;;  Surgeon: Blair Correa MD;  Location: [facility]     LASER HOLMIUM NEPHROLITHOTOMY VIA PERCUTANEOUS NEPHROSTOMY  7/11/2012    Procedure: LASER HOLMIUM NEPHROLITHOTOMY VIA PERCUTANEOUS NEPHROSTOMY;  proceedure should read: Left Percutaneous Access, Left Percutaneous ultrasonic Nephrolithotomy, Ureteroscopy Holmium Laser Lithotripsy Stent Placement ;  Surgeon: Blair Correa MD;  Location: [facility]     MAMMOPLASTY AUGMENTATION BILATERAL       OPEN REDUCTION INTERNAL FIXATION WRIST Left 1/11/2016    Procedure: OPEN REDUCTION INTERNAL FIXATION WRIST;  Surgeon: Darling Ellis MD;  [facility]: [facility]     RECONSTRUCT BREAST, IMPLANT PROSTHESIS, COMBINED       THORACOSCOPIC DECORTICATION LUNG Left 5/7/2019    Procedure: Left Video Assisted Thoracoscopic Decortication, Intercostal Nerve Cryo Analgesia, Flexible Bronchoscopy;  Surgeon: Patrice Regalado MD;  Location: [facility] OR            Previous Endoscopy:     Results for orders placed or performed during the hospital encounter of 08/06/20   COLONOSCOPY   Result Value Ref Range    COLONOSCOPY       [address] [place] [address] [phone]     Endoscopy Department  _______________________________________________________________________________  Patient Name: Demetra Dylan       Procedure Date:  8/6/2020 7:52 AM  MRN: [id number]                       Account Number: [id number]  YOB: 1955             Admit Type: Outpatient  Age: 64                               Room: [facility]  Gender: Female                        Note Status: Finalized  Attending MD: Zacarias Milian MD        Total Sedation Time:   _______________________________________________________________________________     Procedure:           Colonoscopy  Indications:         Iron deficiency anemia, Weight loss  Providers:           Zacarias Milian MD, Ambar Henao  Referring MD:        Fredy Merritt MD  Medicines:           Monitored Anesthesia Care  Complications:       No immediate complications.  _____________________________________________________________________ __________  Procedure:           Pre-Anesthesia Assessment:                       - Prior to the procedure, a History and Physical was                        performed, and patient medications and allergies were                        reviewed. The patient is competent. The risks and                        benefits of the procedure and the sedation options and                        risks were discussed with the patient. All questions                        were answered and informed consent was obtained. Patient                        identification and proposed procedure were verified by                        the physician, the nurse and the anesthesiologist in the                        pre-procedure area in the procedure room. Mental Status                        Examination: alert and oriented. Airway Examination:                        normal oropharyngeal airway and neck mobility.                        Respiratory Examination: clear to auscultation. CV                        E xamination: normal. Prophylactic Antibiotics: The                        patient does not require prophylactic antibiotics. Prior                        Anticoagulants: The patient has  taken no previous                        anticoagulant or antiplatelet agents. ASA Grade                        Assessment: II - A patient with mild systemic disease.                        After reviewing the risks and benefits, the patient was                        deemed in satisfactory condition to undergo the                        procedure. The anesthesia plan was to use monitored                        anesthesia care (MAC). Immediately prior to                        administration of medications, the patient was                        re-assessed for adequacy to receive sedatives. The heart                        rate, respiratory rate, oxygen saturations, blood                        pressure, adequacy of pulmonary ventilation, and                        response to care were monitored throug hout the                        procedure. The physical status of the patient was                        re-assessed after the procedure.                       After obtaining informed consent, the colonoscope was                        passed under direct vision. Throughout the procedure,                        the patient's blood pressure, pulse, and oxygen                        saturations were monitored continuously. The Colonoscope                        was introduced through the anus and advanced to the                        cecum, identified by appendiceal orifice and ileocecal                        valve. The colonoscopy was performed without difficulty.                        The patient tolerated the procedure well. The quality of                        the bowel preparation was poor.                                                                                   Findings:       The perianal and digital rectal examinations were normal.       The colon (entire examined p ortion) revealed significantly excessive        looping. Advancing the scope required changing the patient to a prone         position.       A moderate amount of semi-liquid stool was found in the transverse colon        and in the ascending colon. Lavage of the area was performed using a        large amount of sterile water, resulting in incomplete clearance with        continued poor visualization.       There is no endoscopic evidence of mass or tumor in the entire colon.                                                                                   Impression:          - Preparation of the colon was poor.                       - There was significant looping of the colon.                       - Stool in the transverse colon and in the ascending                        colon.                       - No specimens collected.  Recommendation:      - Discharge patient to home.                       - Resume previous diet.                       - consider iron infusions for iron defic iency anemia                        (related to inflamed jejunum)                       - Repeat colonoscopy in 5 years because the bowel                        preparation was poor.                                                                                     ________________  Zacarias Milian MD  8/6/2020 9:29:08 AM  I was physically present for the entire viewing portion of the exam.  __________________________  Signature of teaching physician  [id number] Milian MD  Number of Addenda: 0    Note Initiated On: 8/6/2020 7:52 AM  Scope In:  Scope Out:              Social History:   Reviewed and edited as appropriate  Social History     Socioeconomic History     Marital status: Single     Spouse name: Not on file     Number of children: Not on file     Years of education: Not on file     Highest education level: Not on file   Occupational History     Not on file   Social Needs     Financial resource strain: Not on file     Food insecurity     Worry: Not on file     Inability: Not on file     Transportation needs     Medical: Not on file      Non-medical: Not on file   Tobacco Use     Smoking status: Never Smoker     Smokeless tobacco: Never Used   Substance and Sexual Activity     Alcohol use: No     Comment: none in 10 years     Drug use: No     Sexual activity: Not Currently   Lifestyle     Physical activity     Days per week: Not on file     Minutes per session: Not on file     Stress: Not on file   Relationships     Social connections     Talks on phone: Not on file     Gets together: Not on file     Attends Restorationism service: Not on file     Active member of club or organization: Not on file     Attends meetings of clubs or organizations: Not on file     Relationship status: Not on file     Intimate partner violence     Fear of current or ex partner: Not on file     Emotionally abused: Not on file     Physically abused: Not on file     Forced sexual activity: Not on file   Other Topics Concern     Parent/sibling w/ CABG, MI or angioplasty before 65F 55M? Not Asked   Social History Narrative    Intake 4/16/15:        H/o divorce but most recently living with JUANIS Hamilton. Alfonso recently passed away.         In past employed as a .         Tobacco use: denies    Alcohol use: denies    Drug use: daily pot use for 30 years. Currently with sobriety.                 Family History:   Reviewed and edited as appropriate  Family History   Problem Relation Age of Onset     Substance Abuse Mother      Family History Negative Father      Substance Abuse Maternal Grandfather      Substance Abuse Brother      Liver Disease No family hx of      Colon Cancer No family hx of      Ulcerative Colitis No family hx of      Crohn's Disease No family hx of             Allergies:   Reviewed and edited as appropriate     Allergies   Allergen Reactions     Penicillins Hives     Hives in childhood.            Medications:     Current Facility-Administered Medications   Medication     butalbital-acetaminophen-caffeine (ESGIC) per tablet 1 tablet     escitalopram  (LEXAPRO) tablet 20 mg     ferrous sulfate (FEROSUL) tablet 325 mg     folic acid (FOLVITE) tablet 1 mg     hydroxychloroquine (PLAQUENIL) tablet 200 mg     hydrOXYzine (ATARAX) tablet 25 mg     lidocaine (LMX4) cream     lidocaine 1 % 0.1-1 mL     melatonin tablet 1 mg     methadone (DOLOPHINE-INTENSOL) 10 MG/ML (HIGH CONC) solution 120 mg     pantoprazole (PROTONIX) 80 mg in sodium chloride 0.9 % 100 mL infusion     polyethylene glycol (MIRALAX) Packet 17 g     pregabalin (LYRICA) capsule 150 mg     sodium chloride (PF) 0.9% PF flush 3 mL     sodium chloride (PF) 0.9% PF flush 3 mL     vitamin D2 (ERGOCALCIFEROL) 92246 units (1250 mcg) capsule 50,000 Units     Current Outpatient Medications   Medication Sig     aspirin-acetaminophen-caffeine (EXCEDRIN MIGRAINE) 250-250-65 MG tablet Take 2 tablets by mouth 2 times daily     butalbital-acetaminophen-caffeine (ESGIC) -40 MG tablet Take 1 tablet by mouth every 6 hours as needed for headaches Use sparingly.     escitalopram (LEXAPRO) 20 MG tablet TAKE 1 TABLET BY MOUTH EVERY DAY     ferrous sulfate (FEROSUL) 325 (65 Fe) MG tablet Take 1 tablet (325 mg) by mouth 2 times daily With meals     folic acid (FOLVITE) 1 MG tablet Take 1 tablet (1 mg) by mouth daily     hydroxychloroquine (PLAQUENIL) 200 MG tablet Take 1 tablet (200 mg) by mouth 2 times daily (with meals)     hydrOXYzine (VISTARIL) 25 MG capsule Take 1 capsule (25 mg) by mouth 2 times daily as needed for anxiety     ibuprofen (ADVIL/MOTRIN) 200 MG tablet Take 400-800 mg by mouth every 8 hours as needed for mild pain     methadone (DOLOPHINE-INTENSOL) 10 MG/ML (HIGH CONC) solution Take 120 mg by mouth daily Takes it at 0530 everyday.     pantoprazole (PROTONIX) 40 MG EC tablet TAKE 1 TABLET BY MOUTH EVERY DAY     parenteral nutrition - PTA/DISCHARGE ORDER The TPN formula will print on the After Visit Summary Report.     polyethylene glycol (MIRALAX) 17 GM/Dose powder 1 capful (17 g) in 8 oz of liquid      "pregabalin (LYRICA) 150 MG capsule TAKE 1 CAPSULE (150 MG) BY MOUTH 3 TIMES DAILY     valACYclovir (VALTREX) 500 MG tablet Take 1 tablet (500 mg) by mouth daily     valACYclovir (VALTREX) 500 MG tablet Take 1 tablet (500 mg) by mouth 2 times daily     vitamin D2 (ERGOCALCIFEROL) 04113 units (1250 mcg) capsule Take 1 capsule (50,000 Units) by mouth every 7 days             Review of Systems:     A complete review of systems was performed and is negative except as noted in the HPI           Physical Exam:   BP (!) 162/80   Pulse 66   Temp 98.1  F (36.7  C) (Oral)   Resp 15   Ht 1.575 m (5' 2\")   Wt 53.5 kg (118 lb)   LMP  (LMP Unknown)   SpO2 98%   Breastfeeding No   BMI 21.58 kg/m    Wt:   Wt Readings from Last 2 Encounters:   06/29/21 53.5 kg (118 lb)   06/20/21 53.8 kg (118 lb 9.6 oz)      Constitutional: cooperative, pleasant, not dyspneic/diaphoretic, no acute distress, cachexia  Eyes: Sclera anicteric/injected  Ears/nose/mouth/throat: Normal oropharynx without ulcers or exudate, mucus membranes moist, hearing intact  Neck: supple without obvious mass  CV: No edema  Respiratory: Unlabored breathing on O2 via facemask  Lymph: No submandibular, supraclavicular or inguinal lymphadenopathy  Abd: Nondistended, +bs, no hepatosplenomegaly, nontender, no peritoneal signs  Skin: warm, perfused, no jaundice  Neuro: AAO x 3  Psych: Normal affect  MSK: No gross deformities         Data:   Labs and imaging below were independently reviewed and interpreted    BMP  Recent Labs   Lab 06/29/21 1958 06/29/21  1255 06/24/21  1045    142 138   POTASSIUM 4.3 4.5 4.4   CHLORIDE 109 111* 106   RAFAELA 7.8* 8.1* 7.9*   CO2 28 27 30   BUN 27 30 34*   CR 0.68 0.65 0.62   * 80 99     CBC  Recent Labs   Lab 06/30/21  0636 06/29/21 1958 06/29/21  1255   WBC  --  6.9 7.3   RBC  --  2.94* 3.05*   HGB 10.2* 6.5* 6.9*   HCT  --  23.7* 24.6*   MCV  --  81 81   MCH  --  22.1* 22.6*   MCHC  --  27.4* 28.0*   RDW  --  15.6* 15.5* "   PLT  --  243 267     INRNo lab results found in last 7 days.  LFTs  Recent Labs   Lab 06/29/21 1958 06/29/21  1255   ALKPHOS 124 130   AST 28 27   ALT 21 24   BILITOTAL <0.1* <0.1*   PROTTOTAL 5.4* 5.7*   ALBUMIN 2.0* 2.0*      PANCNo lab results found in last 7 days.    Imaging:

## 2021-06-30 NOTE — PROGRESS NOTES
"CLINICAL NUTRITION SERVICES - ASSESSMENT NOTE     Nutrition Prescription    Malnutrition Status:    Severe malnutrition in the context of chronic illness    Recommendations already ordered by Registered Dietitian (RD):  --Use dosing weight 54 kg  --Begin TPN, over 12 hr cycle, goal volume 1350 ml/day with initial 125g Dex daily (425kcal, GIR1.6), 80g AA daily (320 kcal), and 250 ml 20% IV lipids 5 days per week.  Micro/Rx: Infuvite and MTE5  - 12 hours cycle provides 20 kcal/kg/day, 1.5 g PRO/kg/day, GIR 1.6 with 32% kcals from Fat.    Future/Additional Recommendations:  - Obtain nutrition history as able  - Increase TPN regimen if pt to remain NPO as current regimen not meeting minimum nutritional needs.      REASON FOR ASSESSMENT  Demetra Richardson is a/an 65 year old female assessed by the dietitian for Pharmacy/Nutrition to Start and Manage PN    NUTRITION HISTORY  - Unable to obtain recent nutrition history from pt as pt sleeping soundly and did not awaken to her name with 3 attempts  - Pt on home TPN.   Per [facility], PN volumen 1000 mL + Intralipid 20% 180 mL = 1180 mL infusion volume  Nutrition Support: 12 hr w/ 125 gm dextrose,  80 gm AA and 20% lipids 5x/wk (357 kcal) to provide 1022 kcal (19 kcal/kg), 1.5 gm protein, GIR of 1.6, 35% of kcal from fat.     CURRENT NUTRITION ORDERS  Diet: NPO  Intake/Tolerance: No intake since admit.    LABS  Labs reviewed    MEDICATIONS  Ferosul  Folic acid  Vit D2    ANTHROPOMETRICS  Height: 157.5 cm (5' 2\")  Most Recent Weight: 53.5 kg (118 lb)    IBW: 50 kg  BMI: Normal BMI  Weight History: 12# (9%)wt loss over past 3 months  Wt Readings from Last 10 Encounters:   06/29/21 53.5 kg (118 lb)   06/20/21 53.8 kg (118 lb 9.6 oz)   06/10/21 52.5 kg (115 lb 11.2 oz)   04/09/21 59.4 kg (131 lb)   03/15/21 59 kg (130 lb)   03/12/21 59.4 kg (131 lb)   03/11/21 60.1 kg (132 lb 6.4 oz)   02/04/21 59 kg (130 lb)   01/05/21 54.4 kg (120 lb)   09/25/20 53.1 kg (117 lb)     Dosing Weight: " 54 kg (ABW 6/29)    ASSESSED NUTRITION NEEDS  Estimated Energy Needs: 7328-8747 kcals/day (25 - 30 kcals/kg)  Justification: Maintenance  Estimated Protein Needs: 65-80 grams protein/day (1.2 - 1.5 grams of pro/kg)  Justification: Maintenance  Estimated Fluid Needs: (1 mL/kcal)   Justification: Maintenance    PHYSICAL FINDINGS  See malnutrition section below.    MALNUTRITION  % Intake: Unable to assess - Pt on TPN, taking some oral  % Weight Loss: > 7.5% in 3 months (severe)  Subcutaneous Fat Loss: Upper arm:  severe - Per previous RD assessment, pt sleeping and unable to arouse  Muscle Loss: Temporal:  mild, Thoracic region (clavicle):  severe, Upper arm (bicep, tricep):  severe and Dorsal hand:  severe - Per previous RD assessment, pt sleeping and unable to arouse  Fluid Accumulation/Edema: None noted  Malnutrition Diagnosis: Severe malnutrition in the context of chronic illness    NUTRITION DIAGNOSIS  Inadequate oral intake related to abdominal discomfort due to gastric outlet obstruction as evidenced by reliance on TPN.     INTERVENTIONS  Implementation  Nutrition Education: Not appropriate at this time due to patient condition   Parenteral Nutrition/IV Fluids - Initiate     Goals  Total avg nutritional intake to meet a minimum of 25 kcal/kg and 1.2 g PRO/kg daily (per dosing wt 54 kg).     Monitoring/Evaluation  Progress toward goals will be monitored and evaluated per protocol.    Nargis Reyes RDN, LD  Pg [phone]  Units covered: 5B (all beds) and 5A (Rooms 5212 through 5219)

## 2021-06-30 NOTE — H&P
[facility]    History and Physical - [facility] (formerly [facility] Night Service        Date of Admission:  6/29/2021    Assessment & Plan      Demetra Richardson is a 65 year old female admitted on 6/29/2021. She has a history of Billroth II for gastric outlet obstruction in 2005, revision of gastrojejunostomy in 2011, with PMH of chronic pain, fibromyalgia and who presented to the ED by ambulance for evaluation of an acute hemoglobin drop from 9.4 to 6.5 discovered on OP surveillance labs. Patient endorses fatigue, pallor, and mild shortness of breath this week. No obvious source of bleeding. She is being admitted for management and work-up of acute on chronic symptomatic anemia.     #Acute on chronic symptomatic anemia  Baseline Hgb 9s. Acute drop in Hgb from 9.4 to 6.5 discovered on outpatient surveillance labs today. Patient endorses fatigue, pallor, mild shortness of breath, and intermittent palpitations. Hemodynamically stable on admission. No abnormal bleeding or bruising. No abdominal pain, melena, or bright red blood per rectum. PICC line intact and without swelling or signs of hematoma. Unclear source of bleeding but consider most likely GI bleed based off patient complex history.   -Receiving 2U pRBCs in ED, will recheck Hgb following completion   -Continuous IV protonix   -Hgb q8hrs  -Holding all NSAIDs  -GI consulted, appreciate recs   -NPO at midnight    #Gastric outlet obstruction S/p Billroth II (2005) w/ revision gastrojejunostomy (2011)  #New (on 6/8 scope) w/ stenosed Billroth II gastrojejunostomy   #Chronic malnutrition   #Hypoalbuminemia, Anasarca   Pt w/ recent obstructive symptoms. Found to have stenosed Billroth II gastrojejunostomy on 6/8. Course has been complicated by chronic malnutrition. Diffuse anasarca on exam, has been attributed to hypoalbuminemia (2.0). Recently admitted 6/16-6/22 to [facility] surgical service for placement of PICC to initiate TPN. Some  "concern pt not able to manage TPN at home discussed TCU placement but no beds, but now endorsing improved understanding.   -Cont TPN   -GI consult as above   -Consider TPN teaching prior to discharge.   -Cont ferrous sulfate, folic acid, vitamin D    #Fibromyalgia   #Chronic Pain   -Cont PTA   -Methadone 120mg daily     #Depression  #Anxiety   -Cont PTA Citalopram   -Cont PTA Hydroxyzine   -If worsening anxiety ok to give prns. Patient has taken clonazepam in past for sx        Diet: NPO per Anesthesia Guidelines for Procedure/Surgery Except for: Meds    DVT Prophylaxis: Pneumatic Compression Devices  Cisneros Catheter: Not present  Fluids: None  Central Lines: None  Code Status: Full Code      Disposition Plan   Expected discharge: >2 midnights      The patient's care was discussed with the Attending Physician, Dr. Wilburn.    Demetra Zuniga MD  [facility]  Securely message with the Vocera Web Console (learn more here)  Text page via [employer] Paging/Directory  Please see sign in/sign out for up to date coverage information  ______________________________________________________________________    Chief Complaint   Symptomatic acute on chronic anemia     History is obtained from the patient    History of Present Illness   Demetra \"Radha\" Dylan is a 65y F w/ PMH of chronic pain, fibromyalgia, Gastric outlet obstruction s/p Billroth II , revision of gastrojejunostomy in 2011 c/b stenosis and recent admission for PICC placement for TPN until planned revision. She presented to the ED by ambulance for evaluation of an acute hemoglobin drop from 9.4 to 6.5 discovered on OP surveillance labs. Per patient has noticed fatigue, pallor, mild dyspnea and intermittent palpitations this week. Denies any obvious bleeding of bruising. No abdominal pain, melena, bright red blood per rectum, or hematemesis. She has not had any swelling or pain near the PICC site. She " does not take any anti-platelet or anti-coagulation medications at home, besides the heparin used to flush the recently placed PICC line. No chest pain or syncope. She states she has had issues with chronic anemia in the past but that for the past year hemoglobin has been stable in 9s which was her goal. She is primarily concerned with identifying any possible sources of bleeding.     Review of Systems    Review Of Systems  Constitutional: +fatigue, negative for fever   Skin: positive for pallor, negative for rash  Ears/Nose/Throat: Negative for epistaxis, gum bleeding   Respiratory: +dyspnea, no cough or wheeze   Cardiovascular: +palpitations, no chest pain or syncope   Gastrointestinal: see above  Genitourinary: No dysuria or frequency   Musculoskeletal: +chronic joint pain  Psychiatric: +anxiety regarding admission   Hematologic/Lymphatic/Immunologic: see above    Past Medical History    I have reviewed this patient's medical history and updated it with pertinent information if needed.   Past Medical History:   Diagnosis Date     Anemia      Basal cell carcinoma     Left-sided, skin over over medial orbit/nasal bone. Removed Oct 2018.     Benzodiazepine dependence (H)     With h/o withdrawal seizure x 1     Bipolar 1 disorder, mixed, moderate (H) 2/7/2013     Chronic pain     Back, legs.     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     Epidural abscess 2005    x4     Fibromyalgia      Generalised anxiety disorder      GERD (gastroesophageal reflux disease)      Major depressive disorder      Migraine      Nephrolithiasis     S/p left sided lithotripsy     Opiate dependence (H)      Osteoarthritis      Osteoporosis      PUD (peptic ulcer disease)      SLE (systemic lupus erythematosus) (H) 2009     Weight loss         Past Surgical History   I have reviewed this patient's surgical history and updated it with pertinent information if needed.  Past Surgical History:   Procedure Laterality  Date     BACK SURGERY  04    L4-5 epidural abscess     BACK SURGERY  04    L3-4 spinal stenosis     BACK SURGERY  04    Lt psoas abscess     BRONCHOSCOPY FLEXIBLE N/A 2019    Procedure: Flexible Bronchoscopy;  Surgeon: Patrice Regalado MD;  Location: [facility] OR      SECTION      x 2     CHOLECYSTECTOMY  2-     CLOSED REDUCTION, PERCUTANEOUS PINNING FINGER, COMBINED  8/10/2011    Procedure:COMBINED CLOSED REDUCTION, PERCUTANEOUS PINNING FINGER; 5th Proximal Phalanx; Surgeon:RADHA BUITRAGO; Location[facility]     COLONOSCOPY       COLONOSCOPY N/A 2020    Procedure: COLONOSCOPY;  Surgeon: Zacarias Duran MD;  Location: [facility] GI     ESOPHAGOSCOPY, GASTROSCOPY, DUODENOSCOPY (EGD), COMBINED N/A 2020    Procedure: ESOPHAGOGASTRODUODENOSCOPY, WITH BIOPSY;  Surgeon: Zacarias Duran MD;  Location: [facility]     ESOPHAGOSCOPY, GASTROSCOPY, DUODENOSCOPY (EGD), COMBINED N/A 2021    Procedure: ESOPHAGOGASTRODUODENOSCOPY (EGD);  Surgeon: Kaveh Clayton MD;  Location: [facility]     GASTRECTOMY  2005    Bilat truncal vagotomy, hemigastrectomy, RnY gastrojejunostomy     GASTROJEJUNOSTOMY  2011    Procedure:GASTROJEJUNOSTOMY; exploratory laparotmy with revision of gastrojejunostomy, Antrectomy, Miles-en-y, Gastrojejunostomy; Surgeon:JULIUS HELM; Location:[facility] OR     INSERT CHEST TUBE N/A 2019    Procedure: INSERTION, CATHETER, INTERCOSTAL, FOR DRAINAGE;  Surgeon: Melissa Nichols MD;  Location: [facility]     LASER HOLMIUM LITHOTRIPSY URETER(S), INSERT STENT, COMBINED      Procedure: COMBINED CYSTOSCOPY, URETEROSCOPY, LASER HOLMIUM LITHOTRIPSY URETER(S), INSERT STENT;;  Surgeon: Blair Correa MD;  Location: [facility]     LASER HOLMIUM NEPHROLITHOTOMY VIA PERCUTANEOUS NEPHROSTOMY  2012    Procedure: LASER HOLMIUM NEPHROLITHOTOMY VIA PERCUTANEOUS NEPHROSTOMY;  proceedure should read: Left Percutaneous Access, Left Percutaneous ultrasonic Nephrolithotomy,  Ureteroscopy Holmium Laser Lithotripsy Stent Placement ;  Surgeon: Blair Correa MD;  Location: [facility]     MAMMOPLASTY AUGMENTATION BILATERAL       OPEN REDUCTION INTERNAL FIXATION WRIST Left 1/11/2016    Procedure: OPEN REDUCTION INTERNAL FIXATION WRIST;  Surgeon: Darling Ellis MD;  Location: [facility]     RECONSTRUCT BREAST, IMPLANT PROSTHESIS, COMBINED       THORACOSCOPIC DECORTICATION LUNG Left 5/7/2019    Procedure: Left Video Assisted Thoracoscopic Decortication, Intercostal Nerve Cryo Analgesia, Flexible Bronchoscopy;  Surgeon: Patrice Regalado MD;  Location: [facility] OR        Social History   Relationships   Social connections     Talks on phone: Not on file     Gets together: Not on file     Attends Orthodox service: Not on file     Active member of club or organization: Not on file     Attends meetings of clubs or organizations: Not on file     Relationship status: Not on file       Family History   I have reviewed this patient's family history and updated it with pertinent information if needed.  Family History   Problem Relation Age of Onset     Substance Abuse Mother      Family History Negative Father      Substance Abuse Maternal Grandfather      Substance Abuse Brother      Liver Disease No family hx of      Colon Cancer No family hx of      Ulcerative Colitis No family hx of      Crohn's Disease No family hx of        Prior to Admission Medications   Prior to Admission Medications   Prescriptions Last Dose Informant Patient Reported? Taking?   aspirin-acetaminophen-caffeine (EXCEDRIN MIGRAINE) 250-250-65 MG tablet   Yes No   Sig: Take 2 tablets by mouth 2 times daily   butalbital-acetaminophen-caffeine (ESGIC) -40 MG tablet   No No   Sig: Take 1 tablet by mouth every 6 hours as needed for headaches Use sparingly.   escitalopram (LEXAPRO) 20 MG tablet   No No   Sig: TAKE 1 TABLET BY MOUTH EVERY DAY   ferrous sulfate (FEROSUL) 325 (65 Fe) MG tablet   No No   Sig: Take 1 tablet (325  mg) by mouth 2 times daily With meals   folic acid (FOLVITE) 1 MG tablet   No No   Sig: Take 1 tablet (1 mg) by mouth daily   hydrOXYzine (VISTARIL) 25 MG capsule   No No   Sig: Take 1 capsule (25 mg) by mouth 2 times daily as needed for anxiety   hydroxychloroquine (PLAQUENIL) 200 MG tablet   No No   Sig: Take 1 tablet (200 mg) by mouth 2 times daily (with meals)   ibuprofen (ADVIL/MOTRIN) 200 MG tablet   Yes No   Sig: Take 400-800 mg by mouth every 8 hours as needed for mild pain   methadone (DOLOPHINE-INTENSOL) 10 MG/ML (HIGH CONC) solution   Yes No   Sig: Take 120 mg by mouth daily Takes it at 0530 everyday.   pantoprazole (PROTONIX) 40 MG EC tablet   No No   Sig: TAKE 1 TABLET BY MOUTH EVERY DAY   parenteral nutrition - PTA/DISCHARGE ORDER   No No   Sig: The TPN formula will print on the After Visit Summary Report.   polyethylene glycol (MIRALAX) 17 GM/Dose powder   No No   Si capful (17 g) in 8 oz of liquid   pregabalin (LYRICA) 150 MG capsule   No No   Sig: TAKE 1 CAPSULE (150 MG) BY MOUTH 3 TIMES DAILY   valACYclovir (VALTREX) 500 MG tablet   No No   Sig: Take 1 tablet (500 mg) by mouth 2 times daily   valACYclovir (VALTREX) 500 MG tablet   No No   Sig: Take 1 tablet (500 mg) by mouth daily   vitamin D2 (ERGOCALCIFEROL) 29728 units (1250 mcg) capsule   No No   Sig: Take 1 capsule (50,000 Units) by mouth every 7 days      Facility-Administered Medications: None     Allergies   Allergies   Allergen Reactions     Penicillins Hives     Hives in childhood.       Physical Exam   Vital Signs: Temp: 97.9  F (36.6  C) Temp src: Oral BP: 132/75 Pulse: 66   Resp: 15 SpO2: 96 % O2 Device: None (Room air)    Weight: 118 lbs 0 oz    General: Adult female resting comfortably, awoke to light touch, no acute distress  HEENT: NC/AT, EOMI, MMM   CVS: normal s1,s2; +3/6 systolic ejection murmur, no rubs or gallops  Resp: comfortable on RA. CTAB, no wheezes or crackles   Abd: Normal active bowel sounds. Soft/non-distended,  non-tender to palpation x4 quadrants. No rebound or guarding.    Musc: no gross joint abnormalities, strength 5+ bilaterally in upper and lower extremities  Extremities: + peripheral edema bilateral LE w/ overlying skin changes  Skin: +pallor. no skin rashes, bruises or petechiae.    Neuro: alert and oriented x4, no gross neurological deficits    Data   Data reviewed today: I reviewed all medications, new labs and imaging results over the last 24 hours.    Recent Labs   Lab 06/29/21  1958 06/29/21  1255 06/24/21  1045   WBC 6.9 7.3  --    HGB 6.5* 6.9*  --    MCV 81 81  --     267  --     142 138   POTASSIUM 4.3 4.5 4.4   CHLORIDE 109 111* 106   CO2 28 27 30   BUN 27 30 34*   CR 0.68 0.65 0.62   ANIONGAP 4 4 2*   RAFAELA 7.8* 8.1* 7.9*   * 80 99   ALBUMIN 2.0* 2.0*  --    PROTTOTAL 5.4* 5.7*  --    BILITOTAL <0.1* <0.1*  --    ALKPHOS 124 130  --    ALT 21 24  --    AST 28 27  --

## 2021-06-30 NOTE — PROGRESS NOTES
[facility]    Internal Medicine Progress Note - [facility] Service    Main Plans for Today   - GI consult  - Neuro consult for migraines, appreciate recs  - monitor Hgb     Assessment & Plan   Demetra Richardson is a 65 year old female admitted on 6/29/2021. She has a history of Billroth II for gastric outlet obstruction in 2005, revision of gastrojejunostomy in 2011, with PMH of chronic pain, fibromyalgia and who presented to the ED by ambulance for evaluation of an acute hemoglobin drop from 9.4 to 6.5 discovered on OP surveillance labs. Patient endorses fatigue, pallor, and mild shortness of breath this week. No obvious source of bleeding. She is being admitted for management and work-up of acute on chronic symptomatic anemia.      #Acute on chronic symptomatic anemia  Baseline Hgb 9s. Acute drop in Hgb from 9.4 to 6.5 discovered on outpatient surveillance labs today. Patient endorses fatigue, pallor, mild shortness of breath, and intermittent palpitations. Hemodynamically stable on admission. No abnormal bleeding or bruising. No abdominal pain, melena, or bright red blood per rectum. PICC line intact and without swelling or signs of hematoma. Unclear source of bleeding but consider most likely GI bleed based off patient complex history.   -s/p 2U pRBC  -Continuous IV protonix changed to BID   > can start PO on discharge  -HDS, will repeat labs in AM  -Holding all NSAIDs  -GI consulted, appreciate recs   -CLD     #Gastric outlet obstruction S/p Billroth II (2005) w/ revision gastrojejunostomy (2011)  #New (on 6/8 scope) w/ stenosed Billroth II gastrojejunostomy   #Chronic malnutrition   #Hypoalbuminemia, Anasarca   Pt w/ recent obstructive symptoms. Found to have stenosed Billroth II gastrojejunostomy on 6/8. Course has been complicated by chronic malnutrition. Diffuse anasarca on exam, has been attributed to hypoalbuminemia (2.0). Recently admitted 6/16-6/22 to [facility]  surgical service for placement of PICC to initiate TPN. Some concern pt not able to manage TPN at home discussed TCU placement but no beds, but now endorsing improved understanding.   -Cont TPN   -GI consult as above   -Consider TPN teaching prior to discharge.   -Cont ferrous sulfate, folic acid, vitamin D     #Fibromyalgia   #Chronic Pain   #Migraine  Pt reports taking excedrin migraine BID for many years, will hold given concern for GI bleed. Pt would prefer to keep using excedrin despite stated risk by primary and GI team. Offered alternative medication to treat migraine, pt denies triptans or caffeine pills. States only NSAIDs work for her once per month migraine. Has not had migraine in 2 years while on excedrin BID.  - neuro consult, appreciate recs   > concern her migraine Rx will delay her hospital dispo and/or result in rapid return after discharge as pt will either be unable to tolerate outpatient migraine or will resume NSAIDs leading to repeat bleed  -Cont PTA   -Methadone 120mg daily      #Depression  #Anxiety   -Cont PTA Citalopram   -Cont PTA Hydroxyzine   -If worsening anxiety ok to give prns. Patient has taken clonazepam in past for sx        Diet: Clear Liquid Diet  parenteral nutrition - ADULT compounded formula CYCLE  Fluids: None  DVT Prophylaxis: Pneumatic Compression Devices  Code Status: Full Code    Disposition Plan   Expected discharge: 2 - 3 days, recommended to prior living arrangement once hemoglobin stable.     Entered: Antoine Thomas 06/30/2021, 5:56 PM   Information in the above section will display in the discharge planner report.      The patient's care was discussed with the Attending Physician, Dr. Horta.    Antoine Thomas MD  Internal Medicine PGY-3  [facility]  Maroon: 1  Pager: [phone]  Please see sticky note for cross cover information    Interval History   No acute events overnight. Preoccupied with resuming her NSAID use, states understanding about  risk but states she thinks she will be fine because she has been using them for years without issue. Has doubts that they would suddenly be related to her current anemia. Initially denied interest in endoscopy, apparently thinking this was for evaluation only and would not be able to provide treatment options. If becomes necessary, pt would be okay with endoscopy for further evaluation and possible treatment. Denies HA currently, dizziness, chest pain, N/V. Does have intermittent baseline abdominal pain LUQ.    4-point review of systems otherwise negative unless stated above.    Physical Exam   Vital Signs: Temp: 98.1  F (36.7  C) Temp src: Oral BP: 137/77 Pulse: 77   Resp: 22 SpO2: 96 % O2 Device: Oxymask Oxygen Delivery: 1 LPM  Weight: 118 lbs 0 oz  General Appearance: AOx3, appear cachectic. Mildly agitated  Respiratory: CTAB, no wheezing, no crackles  Cardiovascular: sinus tachycardia, normal S1/S2, no murmur  GI: no distension, BS+, mildly tender LUQ, no peritoneal signs  Skin: no rash  Neuro: CN grossly intact, no focal neurological deficits

## 2021-06-30 NOTE — ED TRIAGE NOTES
BIBA from home/AL  Hgb of 6.0  No new complaints, chronic fatigue/sleepiness  Recently started on TPN  PICC line  BG 77  130/80  85 BPM  95% on RA  SBA

## 2021-06-30 NOTE — ED NOTES
[facility]   ED Nurse to Floor Handoff     Demetra Richardson is a 65 year old female who speaks English and lives with others,  in a nursing home  They arrived in the ED by ambulance from emergency room    ED Chief Complaint: Abnormal Labs    ED Dx;   Final diagnoses:   Symptomatic anemia         Needed?: No    Allergies:   Allergies   Allergen Reactions     Penicillins Hives     Hives in childhood.   .  Past Medical Hx:   Past Medical History:   Diagnosis Date     Anemia      Basal cell carcinoma     Left-sided, skin over over medial orbit/nasal bone. Removed Oct 2018.     Benzodiazepine dependence (H)     With h/o withdrawal seizure x 1     Bipolar 1 disorder, mixed, moderate (H) 2/7/2013     Chronic pain     Back, legs.     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     Epidural abscess 2005    x4     Fibromyalgia      Generalised anxiety disorder      GERD (gastroesophageal reflux disease)      Major depressive disorder      Migraine      Nephrolithiasis     S/p left sided lithotripsy     Opiate dependence (H)      Osteoarthritis      Osteoporosis      PUD (peptic ulcer disease)      SLE (systemic lupus erythematosus) (H) 2009     Weight loss       Baseline Mental status: WDL  Current Mental Status changes: at basesline    Infection present or suspected this encounter: no  Sepsis suspected: No  Isolation type: No active isolations  Patient tested for COVID 19 prior to admission: YES     Activity level - Baseline/Home:  Stand with Assist  Activity Level - Current:   Stand with Assist    Bariatric equipment needed?: No    In the ED these meds were given:   Medications   pantoprazole (PROTONIX) 80 mg in sodium chloride 0.9 % 100 mL infusion (8 mg/hr Intravenous New Bag 6/30/21 1323)   butalbital-acetaminophen-caffeine (ESGIC) per tablet 1 tablet (has no administration in time range)   escitalopram (LEXAPRO) tablet 20 mg (20 mg Oral  Given 6/30/21 0757)   ferrous sulfate (FEROSUL) tablet 325 mg (325 mg Oral Not Given 6/30/21 0908)   folic acid (FOLVITE) tablet 1 mg (1 mg Oral Not Given 6/30/21 0908)   hydrOXYzine (ATARAX) tablet 25 mg (has no administration in time range)   polyethylene glycol (MIRALAX) Packet 17 g (has no administration in time range)   pregabalin (LYRICA) capsule 150 mg (150 mg Oral Given 6/30/21 0757)   hydroxychloroquine (PLAQUENIL) tablet 200 mg (200 mg Oral Given 6/30/21 0911)   lidocaine 1 % 0.1-1 mL (has no administration in time range)   lidocaine (LMX4) cream (has no administration in time range)   sodium chloride (PF) 0.9% PF flush 3 mL (3 mLs Intracatheter Given 6/30/21 0222)   sodium chloride (PF) 0.9% PF flush 3 mL (has no administration in time range)   melatonin tablet 1 mg (has no administration in time range)   methadone (DOLOPHINE-INTENSOL) 10 MG/ML (HIGH CONC) solution 120 mg (120 mg Oral Given 6/30/21 0759)   furosemide (LASIX) injection 20 mg (20 mg Intravenous Given 6/30/21 0511)       Drips running?  Yes    Home pump  No    Current LDAs  PICC Double Lumen 06/16/21 Left Brachial vein medial (Active)   Number of days: 14       Labs results:   Labs Ordered and Resulted from Time of ED Arrival Up to the Time of Departure from the ED   COMPREHENSIVE METABOLIC PANEL - Abnormal; Notable for the following components:       Result Value    Glucose 102 (*)     Calcium 7.8 (*)     Bilirubin Total <0.1 (*)     Albumin 2.0 (*)     Protein Total 5.4 (*)     All other components within normal limits   CBC WITH PLATELETS DIFFERENTIAL - Abnormal; Notable for the following components:    RBC Count 2.94 (*)     Hemoglobin 6.5 (*)     Hematocrit 23.7 (*)     MCH 22.1 (*)     MCHC 27.4 (*)     RDW 15.6 (*)     All other components within normal limits   HEMOGLOBIN - Abnormal; Notable for the following components:    Hemoglobin 10.2 (*)     All other components within normal limits   ROUTINE UA WITH MICROSCOPIC - Abnormal;  "Notable for the following components:    Bacteria Urine Few (*)     All other components within normal limits   SARS-COV-2 (COVID-19) VIRUS RT-PCR   HEMOGLOBIN   IP ASSIGN PROVIDER TEAM TO TREATMENT TEAM   VITAL SIGNS   PERIPHERAL IV CATHETER   APPLY PNEUMATIC COMPRESSION DEVICE (PCD)   PREPARE RED BLOOD CELL ORDER UNIT   ABO/RH TYPE AND SCREEN   BLOOD COMPONENT   BLOOD COMPONENT       Imaging Studies:   Recent Results (from the past 24 hour(s))   XR Chest Port 1 View    Narrative    EXAM: XR CHEST PORT 1 VIEW  LOCATION: [facility]  DATE/TIME: 6/30/2021 4:38 AM    INDICATION: 65-year-old with hemoglobin drop  COMPARISON: 02/04/2021      Impression    IMPRESSION: Left-sided PICC line with tip over atrial caval junction. No pneumothorax or pleural effusion. No focal consolidation. Cardiac silhouette within normal limits. Tortuous atherosclerotic aorta. Left upper quadrant surgical clips and suture   lines. Breast implants.       Recent vital signs:   /75   Pulse 83   Temp 98.1  F (36.7  C) (Oral)   Resp 14   Ht 1.575 m (5' 2\")   Wt 53.5 kg (118 lb)   LMP  (LMP Unknown)   SpO2 98%   Breastfeeding No   BMI 21.58 kg/m      Noelle Coma Scale Score: 15 (06/29/21 2230)       Cardiac Rhythm: Other  Pt needs tele? No  Skin/wound Issues: redness around ankles     Code Status: Full Code    Pain control: fair    Nausea control: fair    Abnormal labs/tests/findings requiring intervention: See chart     Family present during ED course? No   Family Comments/Social Situation comments: Patient lives in assisted living     Tasks needing completion: None    Jennifer Rocha, RN    [phone] [facility]      "

## 2021-06-30 NOTE — ED PROVIDER NOTES
"    [facility] ([facility])  6/29/21    History     Chief Complaint   Patient presents with     Abnormal Labs     The history is provided by the patient and medical records.     Demetra Richardson is a 65 year old female with a medical history significant for s/p Billroth II for gastric outlet obstruction in 2005, revision of gastrojejunostomy in 2011, with PMH of chronic pain, fibromyalgia  who presents to the ED by ambulance for evaluation of low hemoglobin level (6.0) with associated fatigue/sleepiness.  Patient states \"I cannot stay awake\".  She denies black stools or pain with eating and states that she is eating normally.  Patient is not on blood thinners.  She states she has not yet done her TPN 12-hour tonight and needs methadone in the morning.  Patient has a PICC line in place.    Per chart review, patient was admitted from 6/16/2021-6/21/2021 for evaluation and treatment of severe protein calorie malnutrition (serum albumin 1.8).  EGD on 6/8 showed stenosed Billroth II gastrojejunostomy.  Nutrition consulted to initiate TPN. She was instructed to schedule surgery with Dr. Clayton's office in  ~6 weeks.       Past Medical History:   Diagnosis Date     Anemia      Basal cell carcinoma     Left-sided, skin over over medial orbit/nasal bone. Removed Oct 2018.     Benzodiazepine dependence (H)     With h/o withdrawal seizure x 1     Bipolar 1 disorder, mixed, moderate (H) 2/7/2013     Chronic pain     Back, legs.     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     Epidural abscess 2005    x4     Fibromyalgia      Generalised anxiety disorder      GERD (gastroesophageal reflux disease)      Major depressive disorder      Migraine      Nephrolithiasis     S/p left sided lithotripsy     Opiate dependence (H)      Osteoarthritis      Osteoporosis      PUD (peptic ulcer disease)      SLE (systemic lupus erythematosus) (H) 2009     Weight loss        Past Surgical " History:   Procedure Laterality Date     BACK SURGERY  04    L4-5 epidural abscess     BACK SURGERY  04    L3-4 spinal stenosis     BACK SURGERY  04    Lt psoas abscess     BRONCHOSCOPY FLEXIBLE N/A 2019    Procedure: Flexible Bronchoscopy;  Surgeon: Patrice Regalado MD;  Location: [facility]      SECTION      x 2     CHOLECYSTECTOMY  2-     CLOSED REDUCTION, PERCUTANEOUS PINNING FINGER, COMBINED  8/10/2011    Procedure:COMBINED CLOSED REDUCTION, PERCUTANEOUS PINNING FINGER; 5th Proximal Phalanx; Surgeon:RADHA BUITRAGO; Location:[facility]     COLONOSCOPY       COLONOSCOPY N/A 2020    Procedure: COLONOSCOPY;  Surgeon: Zacarias Duran MD;  Location: [facility]     ESOPHAGOSCOPY, GASTROSCOPY, DUODENOSCOPY (EGD), COMBINED N/A 2020    Procedure: ESOPHAGOGASTRODUODENOSCOPY, WITH BIOPSY;  Surgeon: Zacarias Duran MD;  Location: [facility]     ESOPHAGOSCOPY, GASTROSCOPY, DUODENOSCOPY (EGD), COMBINED N/A 2021    Procedure: ESOPHAGOGASTRODUODENOSCOPY (EGD);  Surgeon: Kaveh Clayton MD;  Location: [facility]     GASTRECTOMY  2005    Bilat truncal vagotomy, hemigastrectomy, RnY gastrojejunostomy     GASTROJEJUNOSTOMY  2011    Procedure:GASTROJEJUNOSTOMY; exploratory laparotmy with revision of gastrojejunostomy, Antrectomy, Miles-en-y, Gastrojejunostomy; Surgeon:JULIUS HELM; Location:[facility]     INSERT CHEST TUBE N/A 2019    Procedure: INSERTION, CATHETER, INTERCOSTAL, FOR DRAINAGE;  Surgeon: Melissa Nichols MD;  Location: [facility]     LASER HOLMIUM LITHOTRIPSY URETER(S), INSERT STENT, COMBINED      Procedure: COMBINED CYSTOSCOPY, URETEROSCOPY, LASER HOLMIUM LITHOTRIPSY URETER(S), INSERT STENT;;  Surgeon: Blair Correa MD;  Location: [facility]     LASER HOLMIUM NEPHROLITHOTOMY VIA PERCUTANEOUS NEPHROSTOMY  2012    Procedure: LASER HOLMIUM NEPHROLITHOTOMY VIA PERCUTANEOUS NEPHROSTOMY;  proceedure should read: Left Percutaneous Access, Left Percutaneous  ultrasonic Nephrolithotomy, Ureteroscopy Holmium Laser Lithotripsy Stent Placement ;  Surgeon: Blair Correa MD;  Location: [facility]     MAMMOPLASTY AUGMENTATION BILATERAL       OPEN REDUCTION INTERNAL FIXATION WRIST Left 1/11/2016    Procedure: OPEN REDUCTION INTERNAL FIXATION WRIST;  Surgeon: Darling Ellis MD;  Location: [facility]     RECONSTRUCT BREAST, IMPLANT PROSTHESIS, COMBINED       THORACOSCOPIC DECORTICATION LUNG Left 5/7/2019    Procedure: Left Video Assisted Thoracoscopic Decortication, Intercostal Nerve Cryo Analgesia, Flexible Bronchoscopy;  Surgeon: Patrice Regalado MD;  Location: [facility]       Family History   Problem Relation Age of Onset     Substance Abuse Mother      Family History Negative Father      Substance Abuse Maternal Grandfather      Substance Abuse Brother      Liver Disease No family hx of      Colon Cancer No family hx of      Ulcerative Colitis No family hx of      Crohn's Disease No family hx of        Social History     Tobacco Use     Smoking status: Never Smoker     Smokeless tobacco: Never Used   Substance Use Topics     Alcohol use: No     Comment: none in 10 years       No current facility-administered medications for this encounter.      Current Outpatient Medications   Medication     aspirin-acetaminophen-caffeine (EXCEDRIN MIGRAINE) 250-250-65 MG tablet     butalbital-acetaminophen-caffeine (ESGIC) -40 MG tablet     escitalopram (LEXAPRO) 20 MG tablet     ferrous sulfate (FEROSUL) 325 (65 Fe) MG tablet     folic acid (FOLVITE) 1 MG tablet     hydroxychloroquine (PLAQUENIL) 200 MG tablet     hydrOXYzine (VISTARIL) 25 MG capsule     ibuprofen (ADVIL/MOTRIN) 200 MG tablet     methadone (DOLOPHINE-INTENSOL) 10 MG/ML (HIGH CONC) solution     pantoprazole (PROTONIX) 40 MG EC tablet     parenteral nutrition - PTA/DISCHARGE ORDER     polyethylene glycol (MIRALAX) 17 GM/Dose powder     pregabalin (LYRICA) 150 MG capsule     valACYclovir (VALTREX) 500 MG tablet  "    valACYclovir (VALTREX) 500 MG tablet     vitamin D2 (ERGOCALCIFEROL) 03532 units (1250 mcg) capsule        Allergies   Allergen Reactions     Penicillins Hives     Hives in childhood.        Review of Systems   Constitutional: Positive for fatigue. Negative for chills, diaphoresis and fever.   HENT: Negative for congestion.    Eyes: Negative for visual disturbance.   Respiratory: Negative for cough, chest tightness and shortness of breath.    Cardiovascular: Negative for chest pain and palpitations.   Gastrointestinal: Negative for abdominal pain, diarrhea, nausea and vomiting.   Genitourinary: Negative for difficulty urinating and dysuria.   Neurological: Negative for dizziness and syncope.   Psychiatric/Behavioral: Negative for behavioral problems and suicidal ideas.   All other systems reviewed and are negative.    A complete review of systems was performed with pertinent positives and negatives noted in the HPI, and all other systems negative.    Physical Exam   BP: 104/64  Pulse: 63  Temp: 98.3  F (36.8  C)  Resp: 16  Height: 157.5 cm (5' 2\")  Weight: 53.5 kg (118 lb)  SpO2: 97 %  Physical Exam  Constitutional:       General: She is not in acute distress.     Appearance: She is not diaphoretic.      Comments: Cachectic   HENT:      Head: Normocephalic.      Mouth/Throat:      Pharynx: No oropharyngeal exudate.   Eyes:      Extraocular Movements: Extraocular movements intact.   Neck:      Musculoskeletal: Neck supple.   Cardiovascular:      Rate and Rhythm: Normal rate and regular rhythm.      Heart sounds: Normal heart sounds.   Pulmonary:      Effort: No respiratory distress.      Breath sounds: Normal breath sounds.   Abdominal:      General: There is no distension.      Palpations: Abdomen is soft.      Tenderness: There is no abdominal tenderness.   Genitourinary:     Rectum: Guaiac result negative.   Musculoskeletal:         General: No deformity.   Skin:     General: Skin is warm and dry. "   Neurological:      Mental Status: She is alert.      Comments: alert   Psychiatric:         Behavior: Behavior normal.         ED Course     8:51 PM  The patient was seen and examined by Mj Callahan DO in Room ED08.     Procedures        Critical Care Addendum    My initial assessment, based on my review of vital signs, focused history, physical exam and interpretation of labs , established that Demetra Richardson has and Severe symptomatic anemia, which requires immediate intervention, and therefore she is critically ill.     After the initial assessment, the care team initiated multiple lab tests and initiated medication therapy with Blood transfusion to provide stabilization care. Due to the critical nature of this patient, I reassessed nursing observations, vital signs, physical exam and mental status multiple times prior to her disposition.     Time also spent performing documentation.     Critical care time (excluding teaching time and procedures): 40 minutes.        Results for orders placed or performed during the hospital encounter of 06/29/21   Comprehensive metabolic panel     Status: Abnormal   Result Value Ref Range    Sodium 141 133 - 144 mmol/L    Potassium 4.3 3.4 - 5.3 mmol/L    Chloride 109 94 - 109 mmol/L    Carbon Dioxide 28 20 - 32 mmol/L    Anion Gap 4 3 - 14 mmol/L    Glucose 102 (H) 70 - 99 mg/dL    Urea Nitrogen 27 7 - 30 mg/dL    Creatinine 0.68 0.52 - 1.04 mg/dL    GFR Estimate >90 >60 mL/min/[1.73_m2]    GFR Estimate If Black >90 >60 mL/min/[1.73_m2]    Calcium 7.8 (L) 8.5 - 10.1 mg/dL    Bilirubin Total <0.1 (L) 0.2 - 1.3 mg/dL    Albumin 2.0 (L) 3.4 - 5.0 g/dL    Protein Total 5.4 (L) 6.8 - 8.8 g/dL    Alkaline Phosphatase 124 40 - 150 U/L    ALT 21 0 - 50 U/L    AST 28 0 - 45 U/L   CBC with platelets differential     Status: Abnormal   Result Value Ref Range    WBC 6.9 4.0 - 11.0 10e9/L    RBC Count 2.94 (L) 3.8 - 5.2 10e12/L    Hemoglobin 6.5 (LL) 11.7 - 15.7 g/dL     Hematocrit 23.7 (L) 35.0 - 47.0 %    MCV 81 78 - 100 fl    MCH 22.1 (L) 26.5 - 33.0 pg    MCHC 27.4 (L) 31.5 - 36.5 g/dL    RDW 15.6 (H) 10.0 - 15.0 %    Platelet Count 243 150 - 450 10e9/L    Diff Method Automated Method     % Neutrophils 61.0 %    % Lymphocytes 15.6 %    % Monocytes 11.5 %    % Eosinophils 10.1 %    % Basophils 1.2 %    % Immature Granulocytes 0.6 %    Nucleated RBCs 0 0 /100    Absolute Neutrophil 4.2 1.6 - 8.3 10e9/L    Absolute Lymphocytes 1.1 0.8 - 5.3 10e9/L    Absolute Monocytes 0.8 0.0 - 1.3 10e9/L    Absolute Eosinophils 0.7 0.0 - 0.7 10e9/L    Absolute Basophils 0.1 0.0 - 0.2 10e9/L    Abs Immature Granulocytes 0.0 0 - 0.4 10e9/L    Absolute Nucleated RBC 0.0    ABO/Rh type and screen     Status: None   Result Value Ref Range    Units Ordered 1     ABO A     RH(D) Pos     Antibody Screen Neg     Test Valid Only At          [facility]    Specimen Expires 07/02/2021     Crossmatch Red Blood Cells    Blood component     Status: None   Result Value Ref Range    Unit Number [id number]     Blood Component Type Red Blood Cells Leukocyte Reduced     Division Number 00     Status of Unit Released to care unit 06/29/2021 2205     Blood Product Code [id number]     Unit Status ISS      Medications - No data to display     Assessments & Plan (with Medical Decision Making)   65-year-old female presents to us with a chief complaint of severe fatigue.  She is chronically malnourished at baseline.  Hemoglobin outpatient today was 6.9.  She was recently started on TPN.  Vital signs today significant for low normal blood pressure.  Hemoglobin is repeated and is 6.5 here.  We will transfuse blood.  Rectal is negative for any evidence of blood.  No obvious source of bleeding.  Patient will be admitted to the internal medicine service.    I have reviewed the nursing notes. I have reviewed the findings, diagnosis, plan and need for follow up with the  patient.    New Prescriptions    No medications on file       Final diagnoses:   Symptomatic anemia     I, Tessa Hooper, am serving as a trained medical scribe to document services personally performed by Mj Callahan DO based on the provider's statements to me on June 29, 2021.  This document has been checked and approved by the attending provider.    IMj DO, was physically present and have reviewed and verified the accuracy of this note documented by Tessa Hooper, medical scribe.      --  Mj Callahan DO  [facility] EMERGENCY DEPARTMENT  6/29/2021     Mj Callahan DO  06/29/21 2220

## 2021-06-30 NOTE — UTILIZATION REVIEW
Admission Status; Secondary Review Determination         Under the authority of the Utilization Management Committee, the utilization review process indicated a secondary review on the above patient.  The review outcome is based on review of the medical records, discussions with staff, and applying clinical experience noted on the date of the review.        (x)      Inpatient Status Appropriate - This patient's medical care is consistent with medical management for inpatient care and reasonable inpatient medical practice.      RATIONALE FOR DETERMINATION   The patient is a 65-year-old female admitted on 6/29/2021.  She presented to the emergency room by ambulance for evaluation of acute hemoglobin drop from 9.4-6.5.  She does have a history of GI bleed and a revision of a gastrojejunostomy in 2011.  She did receive 2 units of packed red cells.  She was started on IV PPI.  She is likely bleeding from a GJ anastomosis and could also have other upper GI involvement such as esophagitis.  She prefers to avoid an EGD but if continued drop in her hemoglobin, she will require an EGD to assess and treat more directly possibly.  Hemoglobin will be followed tomorrow as well as signs of GI bleed.  Agree with inpatient status.    The severity of illness, intensity of service provided, expected LOS and risk for adverse outcome make the care complex, high risk and appropriate for hospital admission.        The information on this document is developed by the utilization review team in order for the business office to ensure compliance.  This only denotes the appropriateness of proper admission status and does not reflect the quality of care rendered.         The definitions of Inpatient Status and Observation Status used in making the determination above are those provided in the CMS Coverage Manual, Chapter 1 and Chapter 6, section 70.4.      Sincerely,     Rich Jean MD  Physician Advisor  Utilization Review/ Case  Management  [facility].

## 2021-06-30 NOTE — PHARMACY
Methadone Clinic Information Note    Clinic Name: [facility] Location ([facility]): [address]  Phone Number: [phone]  Methadone formulation: 10 mg/mL solution  Methadone dose: 120 mg daily  Last dose per patient: morning of 6/29/21  Prescriber's Name: Dr. Sarah Cabrera    Clinic confirmed above dosing. Reports take-home doses through 7/5, with next clinic return date of 7/6/21.     Blair Lora, PharmD, BCPS  [phone]

## 2021-07-01 ENCOUNTER — APPOINTMENT (OUTPATIENT)
Dept: CARDIOLOGY | Facility: CLINIC | Age: 66
DRG: 377 | End: 2021-07-01
Payer: COMMERCIAL

## 2021-07-01 ENCOUNTER — PREP FOR PROCEDURE (OUTPATIENT)
Dept: SURGERY | Facility: CLINIC | Age: 66
End: 2021-07-01

## 2021-07-01 DIAGNOSIS — K31.84 GASTRIC ATONY: Primary | ICD-10-CM

## 2021-07-01 LAB
ALBUMIN SERPL-MCNC: 1.8 G/DL (ref 3.4–5)
ALP SERPL-CCNC: 282 U/L (ref 40–150)
ALT SERPL W P-5'-P-CCNC: 53 U/L (ref 0–50)
ANION GAP SERPL CALCULATED.3IONS-SCNC: 2 MMOL/L (ref 3–14)
AST SERPL W P-5'-P-CCNC: 61 U/L (ref 0–45)
BILIRUB DIRECT SERPL-MCNC: <0.1 MG/DL (ref 0–0.2)
BILIRUB SERPL-MCNC: 0.7 MG/DL (ref 0.2–1.3)
BUN SERPL-MCNC: 24 MG/DL (ref 7–30)
CALCIUM SERPL-MCNC: 7.9 MG/DL (ref 8.5–10.1)
CHLORIDE SERPL-SCNC: 104 MMOL/L (ref 94–109)
CO2 SERPL-SCNC: 33 MMOL/L (ref 20–32)
CREAT SERPL-MCNC: 0.53 MG/DL (ref 0.52–1.04)
ERYTHROCYTE [DISTWIDTH] IN BLOOD BY AUTOMATED COUNT: 16.2 % (ref 10–15)
GFR SERPL CREATININE-BSD FRML MDRD: >90 ML/MIN/{1.73_M2}
GLUCOSE BLDC GLUCOMTR-MCNC: 104 MG/DL (ref 70–99)
GLUCOSE BLDC GLUCOMTR-MCNC: 145 MG/DL (ref 70–99)
GLUCOSE BLDC GLUCOMTR-MCNC: 91 MG/DL (ref 70–99)
GLUCOSE BLDC GLUCOMTR-MCNC: 93 MG/DL (ref 70–99)
GLUCOSE SERPL-MCNC: 106 MG/DL (ref 70–99)
HCT VFR BLD AUTO: 33.8 % (ref 35–47)
HGB BLD-MCNC: 9.6 G/DL (ref 11.7–15.7)
INR PPP: 1.03 (ref 0.86–1.14)
MAGNESIUM SERPL-MCNC: 2.1 MG/DL (ref 1.6–2.3)
MCH RBC QN AUTO: 24.2 PG (ref 26.5–33)
MCHC RBC AUTO-ENTMCNC: 28.4 G/DL (ref 31.5–36.5)
MCV RBC AUTO: 85 FL (ref 78–100)
PHOSPHATE SERPL-MCNC: 3.5 MG/DL (ref 2.5–4.5)
PLATELET # BLD AUTO: 258 10E9/L (ref 150–450)
POTASSIUM SERPL-SCNC: 4.3 MMOL/L (ref 3.4–5.3)
PREALB SERPL IA-MCNC: 14 MG/DL (ref 15–45)
PROT SERPL-MCNC: 5.4 G/DL (ref 6.8–8.8)
RBC # BLD AUTO: 3.97 10E12/L (ref 3.8–5.2)
SODIUM SERPL-SCNC: 139 MMOL/L (ref 133–144)
WBC # BLD AUTO: 13.2 10E9/L (ref 4–11)

## 2021-07-01 PROCEDURE — 999N001017 HC STATISTIC GLUCOSE BY METER IP

## 2021-07-01 PROCEDURE — 82248 BILIRUBIN DIRECT: CPT | Performed by: INTERNAL MEDICINE

## 2021-07-01 PROCEDURE — 80053 COMPREHEN METABOLIC PANEL: CPT | Performed by: INTERNAL MEDICINE

## 2021-07-01 PROCEDURE — 83735 ASSAY OF MAGNESIUM: CPT | Performed by: INTERNAL MEDICINE

## 2021-07-01 PROCEDURE — 250N000011 HC RX IP 250 OP 636: Performed by: STUDENT IN AN ORGANIZED HEALTH CARE EDUCATION/TRAINING PROGRAM

## 2021-07-01 PROCEDURE — 93306 TTE W/DOPPLER COMPLETE: CPT

## 2021-07-01 PROCEDURE — 36592 COLLECT BLOOD FROM PICC: CPT | Performed by: INTERNAL MEDICINE

## 2021-07-01 PROCEDURE — 99222 1ST HOSP IP/OBS MODERATE 55: CPT | Mod: GC | Performed by: PSYCHIATRY & NEUROLOGY

## 2021-07-01 PROCEDURE — 99233 SBSQ HOSP IP/OBS HIGH 50: CPT | Mod: GC | Performed by: INTERNAL MEDICINE

## 2021-07-01 PROCEDURE — 85027 COMPLETE CBC AUTOMATED: CPT | Performed by: INTERNAL MEDICINE

## 2021-07-01 PROCEDURE — 250N000009 HC RX 250: Performed by: INTERNAL MEDICINE

## 2021-07-01 PROCEDURE — 93306 TTE W/DOPPLER COMPLETE: CPT | Mod: 26 | Performed by: INTERNAL MEDICINE

## 2021-07-01 PROCEDURE — 84100 ASSAY OF PHOSPHORUS: CPT | Performed by: INTERNAL MEDICINE

## 2021-07-01 PROCEDURE — C9113 INJ PANTOPRAZOLE SODIUM, VIA: HCPCS | Performed by: STUDENT IN AN ORGANIZED HEALTH CARE EDUCATION/TRAINING PROGRAM

## 2021-07-01 PROCEDURE — 120N000002 HC R&B MED SURG/OB UMMC

## 2021-07-01 PROCEDURE — 84134 ASSAY OF PREALBUMIN: CPT | Performed by: INTERNAL MEDICINE

## 2021-07-01 PROCEDURE — 85610 PROTHROMBIN TIME: CPT | Performed by: INTERNAL MEDICINE

## 2021-07-01 PROCEDURE — 250N000013 HC RX MED GY IP 250 OP 250 PS 637: Performed by: STUDENT IN AN ORGANIZED HEALTH CARE EDUCATION/TRAINING PROGRAM

## 2021-07-01 RX ORDER — CLINDAMYCIN PHOSPHATE 900 MG/50ML
900 INJECTION, SOLUTION INTRAVENOUS
Status: CANCELLED | OUTPATIENT
Start: 2021-07-01

## 2021-07-01 RX ORDER — CLINDAMYCIN PHOSPHATE 900 MG/50ML
900 INJECTION, SOLUTION INTRAVENOUS SEE ADMIN INSTRUCTIONS
Status: CANCELLED | OUTPATIENT
Start: 2021-07-01

## 2021-07-01 RX ADMIN — Medication: at 20:50

## 2021-07-01 RX ADMIN — METHADONE HYDROCHLORIDE 120 MG: 10 CONCENTRATE ORAL at 06:12

## 2021-07-01 RX ADMIN — HYDROXYCHLOROQUINE SULFATE 200 MG: 200 TABLET, FILM COATED ORAL at 08:40

## 2021-07-01 RX ADMIN — FERROUS SULFATE TAB 325 MG (65 MG ELEMENTAL FE) 325 MG: 325 (65 FE) TAB at 18:58

## 2021-07-01 RX ADMIN — BUTALBITAL, ACETAMINOPHEN, AND CAFFEINE 1 TABLET: 50; 325; 40 TABLET ORAL at 05:59

## 2021-07-01 RX ADMIN — FERROUS SULFATE TAB 325 MG (65 MG ELEMENTAL FE) 325 MG: 325 (65 FE) TAB at 08:40

## 2021-07-01 RX ADMIN — I.V. FAT EMULSION 250 ML: 20 EMULSION INTRAVENOUS at 20:50

## 2021-07-01 RX ADMIN — PREGABALIN 150 MG: 100 CAPSULE ORAL at 14:58

## 2021-07-01 RX ADMIN — PANTOPRAZOLE SODIUM 40 MG: 40 INJECTION, POWDER, FOR SOLUTION INTRAVENOUS at 08:40

## 2021-07-01 RX ADMIN — BUTALBITAL, ACETAMINOPHEN, AND CAFFEINE 1 TABLET: 50; 325; 40 TABLET ORAL at 19:18

## 2021-07-01 RX ADMIN — FOLIC ACID 1 MG: 1 TABLET ORAL at 08:40

## 2021-07-01 RX ADMIN — PREGABALIN 150 MG: 100 CAPSULE ORAL at 20:49

## 2021-07-01 RX ADMIN — PREGABALIN 150 MG: 100 CAPSULE ORAL at 08:40

## 2021-07-01 RX ADMIN — ESCITALOPRAM OXALATE 20 MG: 20 TABLET ORAL at 08:40

## 2021-07-01 RX ADMIN — PANTOPRAZOLE SODIUM 40 MG: 40 INJECTION, POWDER, FOR SOLUTION INTRAVENOUS at 16:35

## 2021-07-01 RX ADMIN — HYDROXYCHLOROQUINE SULFATE 200 MG: 200 TABLET, FILM COATED ORAL at 18:57

## 2021-07-01 ASSESSMENT — ACTIVITIES OF DAILY LIVING (ADL)
ADLS_ACUITY_SCORE: 19
ADLS_ACUITY_SCORE: 15
ADLS_ACUITY_SCORE: 19
ADLS_ACUITY_SCORE: 19

## 2021-07-01 NOTE — PROGRESS NOTES
Gastroenterology Inpatient Sign Off Note  -Hgb stable today 9.9 >> 9.6 suggesting no active UGI bleed  -Complete NSAID avoidance  -No further inpatient GI workup indicated at this time  -ADAT  -Continue BID indefinitely at this time    Inpatient GI consults service will sign off. No further recommendations at this time. If primary team has addition questions, please page consult fellow listed in [place].    Current GI Consult Staff: Henrique Schreiber CNP  [facility] GI Team  Text page

## 2021-07-01 NOTE — PLAN OF CARE
Admission/Transfer from: [facility]  2 RN skin assessment completed. Yes  Significant findings include: abdominal scar tissue  WOC Nurse Consult Ordered? No  Was NST/NA able to join during assessment? No  Bed algorithm can be found in [facility] flowsheets and forms binder, was this used for this assessment? No  Was a pulsate mattress ordered? No

## 2021-07-01 NOTE — CONSULTS
[facility]  Neurology Consultation    Patient Name:  Demetra Richardson  MRN:  [id number]    :  1955  Date of Service:  2021  Primary care provider:  Fredy Merritt      Neurology consultation service was asked to see Demetra Richardson by Dr. Suleiman Horta to evaluate migraine treatment.    Chief Complaint:  Migraines    History of Present Illness:   Demetra Richardson is a 65 year old female with history of gastric outlet obstriction s/p Billroth II on TPN, revision gastrojejunostomy , SLE, fibromyalgia, opioid use disorder on methadone, migraines who presents with acute anemia likely 2/2 GI bleed.  Neurology consulted for evaluation of patient's migraine treatment.    Patient with long history of migraine headaches, starting when she was 15.  She describes her migraines as often bilateral, but sometimes unilateral, pulsating in nature.  Often last 72 hours, associated with photophobia, nausea and vomiting.  Has visual and sensation aura, consisting of dots across her vision, as well as numbness that spreads across her forehead.  Patient was getting these migraines twice monthly until she went through menopause around 41 years of age.      Since that time, she gets migraines four times a year.  However, she is so fearful of the debilitative nature of her migraines that she began taking excedrin twice daily for the last 20 years or so.  She has tried several times to decrease her excedrin dosing, but often would have headaches when she tried to cut back.  She occasionally uses ibuprofen, usually once or twice a week.  Patient was given sumatriptan during one of her acute attacks, but had severe nausea and vomiting after taking it.    Denies positional nature of headaches.  No weight loss, fevers, chills.  Headaches do not wake her up from sleep, and have not increased in severity or frequency.  No vision changes.     ROS  A comprehensive ROS was  performed and pertinent findings were included in HPI.     PMH  Past Medical History:   Diagnosis Date     Anemia      Basal cell carcinoma     Left-sided, skin over over medial orbit/nasal bone. Removed Oct 2018.     Benzodiazepine dependence (H)     With h/o withdrawal seizure x 1     Bipolar 1 disorder, mixed, moderate (H) 2013     Chronic pain     Back, legs.     Depressive disorder      Eosinophilic gastroenteritis 2010     Epidural abscess 2005    x4     Fibromyalgia      Generalised anxiety disorder      GERD (gastroesophageal reflux disease)      Major depressive disorder      Migraine      Nephrolithiasis     S/p left sided lithotripsy     Opiate dependence (H)      Osteoarthritis      Osteoporosis      PUD (peptic ulcer disease)      SLE (systemic lupus erythematosus) (H) 2009     Weight loss      Past Surgical History:   Procedure Laterality Date     BACK SURGERY  04    L4-5 epidural abscess     BACK SURGERY  04    L3-4 spinal stenosis     BACK SURGERY  04    Lt psoas abscess     BRONCHOSCOPY FLEXIBLE N/A 2019    Procedure: Flexible Bronchoscopy;  Surgeon: Patrice Regalado MD;  Location: [facility] OR      SECTION      x 2     CHOLECYSTECTOMY  -     CLOSED REDUCTION, PERCUTANEOUS PINNING FINGER, COMBINED  8/10/2011    Procedure:COMBINED CLOSED REDUCTION, PERCUTANEOUS PINNING FINGER; 5th Proximal Phalanx; Surgeon:RADHA BUITRAGO; Location:[facility]     COLONOSCOPY       COLONOSCOPY N/A 2020    Procedure: COLONOSCOPY;  Surgeon: Zacarias Duran MD;  Location:  GI     ESOPHAGOSCOPY, GASTROSCOPY, DUODENOSCOPY (EGD), COMBINED N/A 2020    Procedure: ESOPHAGOGASTRODUODENOSCOPY, WITH BIOPSY;  Surgeon: Zacarias Duran MD;  Location:  GI     ESOPHAGOSCOPY, GASTROSCOPY, DUODENOSCOPY (EGD), COMBINED N/A 2021    Procedure: ESOPHAGOGASTRODUODENOSCOPY (EGD);  Surgeon: Kaveh Clayton MD;  [facility]     GASTRECTOMY  2005    Bilat truncal  vagotomy, hemigastrectomy, RnY gastrojejunostomy     GASTROJEJUNOSTOMY  6/2/2011    Procedure:GASTROJEJUNOSTOMY; exploratory laparotmy with revision of gastrojejunostomy, Antrectomy, Miles-en-y, Gastrojejunostomy; Surgeon:JULIUS HELM; Location:[facility] OR     INSERT CHEST TUBE N/A 4/29/2019    Procedure: INSERTION, CATHETER, INTERCOSTAL, FOR DRAINAGE;  Surgeon: Melissa Nichols MD;  Location: [facility] GI     LASER HOLMIUM LITHOTRIPSY URETER(S), INSERT STENT, COMBINED      Procedure: COMBINED CYSTOSCOPY, URETEROSCOPY, LASER HOLMIUM LITHOTRIPSY URETER(S), INSERT STENT;;  Surgeon: Blair Correa MD;  Location: [facility]     LASER HOLMIUM NEPHROLITHOTOMY VIA PERCUTANEOUS NEPHROSTOMY  7/11/2012    Procedure: LASER HOLMIUM NEPHROLITHOTOMY VIA PERCUTANEOUS NEPHROSTOMY;  proceedure should read: Left Percutaneous Access, Left Percutaneous ultrasonic Nephrolithotomy, Ureteroscopy Holmium Laser Lithotripsy Stent Placement ;  Surgeon: Blair Correa MD;  Location: [facility]     MAMMOPLASTY AUGMENTATION BILATERAL       OPEN REDUCTION INTERNAL FIXATION WRIST Left 1/11/2016    Procedure: OPEN REDUCTION INTERNAL FIXATION WRIST;  Surgeon: Darling Ellis MD;  Location: [facility]     RECONSTRUCT BREAST, IMPLANT PROSTHESIS, COMBINED       THORACOSCOPIC DECORTICATION LUNG Left 5/7/2019    Procedure: Left Video Assisted Thoracoscopic Decortication, Intercostal Nerve Cryo Analgesia, Flexible Bronchoscopy;  Surgeon: Patrice Regalado MD;  Location: [facility] OR       Medications   I have personally reviewed the patient's medication list.     Allergies  I have personally reviewed the patient's allergy list.     Social History  Denies tobacco, alcohol, and recreational drug use .  Previously with opioid use disorder, on methadone    Family History    Uncle on mom's side with MI and CABG at age 40.  No history of bleeding or clotting disorders.  Negative otherwise.      Physical Examination   Vitals: /56 (BP Location: Right arm)    "Pulse 68   Temp 95.3  F (35.2  C) (Oral)   Resp 18   Ht 1.575 m (5' 2\")   Wt 55.3 kg (121 lb 14.6 oz)   LMP  (LMP Unknown)   SpO2 97%   Breastfeeding No   BMI 22.30 kg/m    General: Lying in bed, NAD  Head: NC/AT  Eyes: no icterus, op pink and moist  Cardiac: RRR. Systolic murmur. Extremities warm, no edema.   Respiratory: non-labored on RA  GI: S/NT/ND  Skin: No rash or lesion on exposed skin  Psych: Mood pleasant, affect congruent  Neuro:  Mental status: Awake, alert, attentive, oriented to self, time, place, and circumstance. Language is fluent and coherent with intact comprehension of commands, naming  Cranial nerves: VFF, PERRL, conjugate gaze, EOMI, facial sensation intact, face symmetric, shoulder shrug strong, tongue/uvula midline, no dysarthria.   Motor: Normal bulk and tone. No abnormal movements. 5/5 strength bilaterally in upper and lower extremities.  Sensory: Intact to light touch, pin, vibration, in proximal and distal aspects of all 4 extremities   Gait: Did not assess    Investigations   I have personally reviewed pertinent labs, tests, and radiological imaging. Discussion of notable findings is included under Impression.     Was patient transferred from outside hospital?   No    Impression  Demetra Richardson is a 65 year old female with history of gastric outlet obstriction s/p Billroth II on TPN, revision gastrojejunostomy 2011, SLE, fibromyalgia, opioid use disorder on methadone, migraines who presents with acute anemia likely 2/2 GI bleed.  Neurology consulted for evaluation of patient's migraine treatment.    Difficult patient for primary migraine management, as there is also a component of analgesic rebound headache.  Patient has several risk factors for migraines, including long-term opioid use, fibromyalgia, and SLE.  Likely acute anemia 2/2 gastritis caused migraine on presentation to current hospitalization.  However, patient with rebound headaches from her consistent use of " analgesics, including excedrin and ibuprofen.  Patient would benefit from tapering off her daily excedrin use, as this is likely contributing to her headache burden.  We often use a medrol dose pack to help decrease the rebound headache severity, frequency, and duration while tapering off of excedrin, however, this is not advised in this situation, as patient is admitted for gastritis and acute anemia, and steroids could worsen gastritis.  Recommendation would primarily be education to patient of proper taper of excedrin and other analgesics, and acknowledging that she will likely have breakthrough headaches while attempting to taper off excedrin.      In terms of her migraine disorder, she only has 3-4 migraines per year, so she would not be a candidate for preventative medications.  Her long-term use of opioids is likely contributing to her migraines, as she is on methadone currently.  Often, the recommendation would be a triptan because of her migraine severity, however, patient with adverse reaction to triptan in the past (nausea/vomiting).  At this time, we do not recommend a triptan.  Often, triptans are used more cautiously in elderly individuals, especially those older than 60, as it can increase the risk of torsades and other cardiac arrhythmias, MI, coronary vasospasm, and coronary arrest.  This patient does not have obvious cardiovascular risk factors, but does have elevated ventricular pressures, possibly concerning for pulmonary hypertension.  Also has had intermittent elevated blood pressures in the past.  Given these risk factors and her previous adverse reaction to sumatriptan, current recommendation would be avoiding triptans, if possible.  Patient is already on the maximum recommended dose of both pregabalin and lexapro, both of which can alleviate migraine burden.  We have a patient that has a complex pharmacologic regimen, so our recommendation would be to pursue Cefaly as abortive therapy, which  uses electrical stimulation to modulate the V1 cranial nerve, as most migraines involve the trigeminal nerve to some degree.  This should reduce severity and duration of migraines, and can be used as an outpatient abortive therapy.      Recommendations  - Cefaly for abortive migraine treatment - will need to order for patient as outpatient  - Recommend stopping esgic.  Do not need phenobarbital taper, as patient has not been taking this medication consistently at home.  -  Recommend discontinuation of excedrin as outpatient  - Attempt to use non-pharmacological treatments for rebound headaches (eg ice packs)  - Can use tylenol sparingly for severe, breakthrough headaches.  Attempt to use no more than 1-2 times per week to avoid rebound headaches in the future.  - Do not recommend prophylactic treatment for migraines at this time  - Continue pregabalin 150mg TID  - Continue lexapro 20mg qd    Thank you for involving Neurology in the care of Demetra Richardson.  Neurology will sign off at this time.  Please do not hesitate to call with questions/concerns.     Patient was seen and discussed with attending Dr. Richard Alvarez.    KIEL Gamboa MD

## 2021-07-01 NOTE — PLAN OF CARE
Admitted from the [facility] at 2045 accompanied by staff. Went to the ED from clinic follow up for  hgb of 6.5. Admitted for anemia. History of gastric obstruction, chronic pain and fibromyalgia. Resides in Assisted Living facility. Alert and oriented x 4. Lethargic at times. Up with standby assist. Complains of headache with relief from prn pain med. On 02 inhalation at 1-2 lpm nasal cannula, shifted to face mask when asleep. 02 sats in the low to mid 90's. PICC line in place. Started cycled TPN . Tolerating clear liquids diet. BG monitored every 6 hours. Incontinent of urine. Purewick in place. GI and Neurology consult in place. Rocío signs stable. Will continue to monitor and follow plan of care

## 2021-07-01 NOTE — PROGRESS NOTES
This is a recent snapshot of the patient's [facility] medical record.  For current drug dose and complete information and questions, call [phone] or In Basket pool, [facility] home infusion ([id number])  CSN Number:  [id number]

## 2021-07-01 NOTE — PROGRESS NOTES
[facility]    Internal Medicine Progress Note - [facility] Service    Main Plans for Today      - GI consult  - Neuro consult for migraine  - Hemoglobin tomorrow morning Hgb   - Discuss non pharmacologic pain management options  - Discuss about sleep study in as out patient  - TTEchocardiography  due to TR and raised LA pressure    Assessment & Plan   Demetra Richardson is a 65 year old female admitted on 6/29/2021. She has a history of Billroth II for gastric outlet obstruction in 2005, revision of gastrojejunostomy in 2011, with PMH of chronic pain, fibromyalgia and who presented to the ED by ambulance for evaluation of an acute hemoglobin drop from 9.4 to 6.5 discovered on OP surveillance labs. Patient endorses fatigue, pallor, and mild shortness of breath this week. No obvious source of bleeding. She is being admitted for management and work-up of acute on chronic symptomatic anemia.      #Acute on chronic symptomatic anemia  Baseline Hgb 9s. Acute drop in Hgb from 9.4 to 6.5 discovered on outpatient surveillance labs today. Patient endorses fatigue, pallor, mild shortness of breath, and intermittent palpitations. Hemodynamically stable on admission. No abnormal bleeding or bruising. No abdominal pain, melena, or bright red blood per rectum. PICC line intact and without swelling or signs of hematoma. Unclear source of bleeding but consider most likely GI bleed     -Continuous IV protonix changed to BID   > can start PO on discharge  -HDS, will repeat labs in AM  -Holding all NSAIDs  -GI consulted, appreciate recs                > Continue PPI, Pantoprazole, 40 mg, IV, BID till she discharge,                         change to po when she discharges                > No acute need for endoscopy since her hemoglobin stabilized                         after transfusion                > Stop NSAIDS       #Gastric outlet obstruction S/p Billroth II (2005) w/ revision  gastrojejunostomy (2011)  #New (on 6/8 scope) w/ stenosed Billroth II gastrojejunostomy   #Chronic malnutrition   #Hypoalbuminemia, Anasarca   Pt w/ recent obstructive symptoms. Found to have stenosed Billroth II gastrojejunostomy on 6/8. Course has been complicated by chronic malnutrition. Diffuse anasarca on exam, has been attributed to hypoalbuminemia (2.0). Recently admitted 6/16-6/22 to [facility] surgical service for placement of PICC to initiate TPN. Some concern pt not able to manage TPN at home discussed TCU placement but no beds, but now endorsing improved understanding.   -Cont TPN   -GI consult as above   -Consider TPN teaching prior to discharge.   -Cont ferrous sulfate, folic acid, vitamin D     #Fibromyalgia   #Chronic Pain   #Migraine  Pt reports taking excedrin migraine BID for many years, will hold given concern for GI bleed. Pt would prefer to keep using excedrin despite stated risk by primary and GI team. Offered alternative medication to treat migraine, pt denies triptans or caffeine pills. States only NSAIDs work for her once per month migraine. Has not had migraine in 2 years while on excedrin BID.  - neuro consult, appreciate recs   >  Cefaly for abortive migraine treatment - will need to order for                  patient as outpatient                > Recommend stopping esgic.  Do not need phenobarbital taper, as                patient has not been taking this medication consistently at home.                 > Attempt to use non-pharmacological treatments for rebound                  headaches (eg ice packs)                  > Can use tylenol sparingly for severe, breakthrough headaches.                    > Attempt to use no more than 1-2 times per week to avoid                                 rebound headaches in the future.                  > Do not recommend prophylactic treatment for migraines at this                    time                   > Continue pregabalin 150mg TID                    > Continue lexapro 20mg qd  -Cont PTA   -Methadone 120mg daily      #Depression  #Anxiety   -Cont PTA Citalopram   -Cont PTA Hydroxyzine   -If worsening anxiety ok to give prns. Patient has taken clonazepam in past for sx        Diet: parenteral nutrition - ADULT compounded formula CYCLE  Regular Diet Adult  Fluids: None  DVT Prophylaxis: Pneumatic Compression Devices  Code Status: Full Code    Disposition Plan   Expected discharge: 2 - 3 days, recommended to prior living arrangement once hemoglobin stable.     Entered: Junior Saravia 07/01/2021, 2:21 PM   Information in the above section will display in the discharge planner report.      The patient's care was discussed with the Attending Physician, Dr. Horta.    Antoine Thomas MD  Internal Medicine PGY-3  [facility]  Maroon: 1  Pager: [phone]  Please see sticky note for cross cover information    Interval History   Pt. was noted to have very loud snoring overnight. She complains of morning headaches, and falling asleep in multiple occasions during day.The possible diagnosis of Obstructive sleep apnea is discussed with the pt. Consulted respiratory therapy to do overnight oxygen monitoring to help suggest for sleep study out patient. The patient agreed with the plan.  The pt was not happy with discontinuing exedrin. We had a long discussion about the side effect of NSAID'S to her stomach making the gastric ulcer worse possibly leading to another GI bleed. Upper GI bleeding secondary to NSAID use is the most common explanation for her fast drop in her current Hb from 9 to 6 in 1 week time. She understands the situation but she is worried about withdrawal headache. We offered tylenol for withdrawal headache. We also discussed non pharmacological ways to manage pain.  Denies HA currently, dizziness, chest pain, N/V.   4-point review of systems otherwise negative unless stated above.    Physical Exam   Vital Signs: Temp: 95.3  F (35.2  C) Temp  src: Oral BP: 108/56 Pulse: 68   Resp: 18 SpO2: 97 % O2 Device: Nasal cannula Oxygen Delivery: 1 LPM  Weight: 121 lbs 14.63 oz  General Appearance: AOx3, appear cachectic. Mildly agitated  Respiratory: CTAB, no wheezing, no crackles  Cardiovascular: sinus tachycardia, normal S1/S2, grade III holosystolic murmur in second intercostal space most likely TR ( might have been exaggerated by severe anemia)   GI: no distension, BS+, mildly tender LUQ, no peritoneal signs  Skin: no rash  Neuro: CN grossly intact, no focal neurological deficits

## 2021-07-02 LAB
ANION GAP SERPL CALCULATED.3IONS-SCNC: <1 MMOL/L (ref 3–14)
BUN SERPL-MCNC: 24 MG/DL (ref 7–30)
CALCIUM SERPL-MCNC: 7.8 MG/DL (ref 8.5–10.1)
CHLORIDE SERPL-SCNC: 107 MMOL/L (ref 94–109)
CO2 SERPL-SCNC: 33 MMOL/L (ref 20–32)
CREAT SERPL-MCNC: 0.52 MG/DL (ref 0.52–1.04)
ERYTHROCYTE [DISTWIDTH] IN BLOOD BY AUTOMATED COUNT: 16.5 % (ref 10–15)
GFR SERPL CREATININE-BSD FRML MDRD: >90 ML/MIN/{1.73_M2}
GLUCOSE BLDC GLUCOMTR-MCNC: 101 MG/DL (ref 70–99)
GLUCOSE BLDC GLUCOMTR-MCNC: 118 MG/DL (ref 70–99)
GLUCOSE BLDC GLUCOMTR-MCNC: 156 MG/DL (ref 70–99)
GLUCOSE BLDC GLUCOMTR-MCNC: 95 MG/DL (ref 70–99)
GLUCOSE BLDC GLUCOMTR-MCNC: 99 MG/DL (ref 70–99)
GLUCOSE SERPL-MCNC: 106 MG/DL (ref 70–99)
HCT VFR BLD AUTO: 32 % (ref 35–47)
HGB BLD-MCNC: 9.1 G/DL (ref 11.7–15.7)
MAGNESIUM SERPL-MCNC: 1.9 MG/DL (ref 1.6–2.3)
MCH RBC QN AUTO: 24.5 PG (ref 26.5–33)
MCHC RBC AUTO-ENTMCNC: 28.4 G/DL (ref 31.5–36.5)
MCV RBC AUTO: 86 FL (ref 78–100)
PHOSPHATE SERPL-MCNC: 3.9 MG/DL (ref 2.5–4.5)
PLATELET # BLD AUTO: 226 10E9/L (ref 150–450)
POTASSIUM SERPL-SCNC: 4.5 MMOL/L (ref 3.4–5.3)
RBC # BLD AUTO: 3.72 10E12/L (ref 3.8–5.2)
SODIUM SERPL-SCNC: 140 MMOL/L (ref 133–144)
WBC # BLD AUTO: 8.3 10E9/L (ref 4–11)

## 2021-07-02 PROCEDURE — 250N000009 HC RX 250: Performed by: INTERNAL MEDICINE

## 2021-07-02 PROCEDURE — 83735 ASSAY OF MAGNESIUM: CPT | Performed by: STUDENT IN AN ORGANIZED HEALTH CARE EDUCATION/TRAINING PROGRAM

## 2021-07-02 PROCEDURE — 36592 COLLECT BLOOD FROM PICC: CPT | Performed by: STUDENT IN AN ORGANIZED HEALTH CARE EDUCATION/TRAINING PROGRAM

## 2021-07-02 PROCEDURE — 999N001017 HC STATISTIC GLUCOSE BY METER IP

## 2021-07-02 PROCEDURE — C9113 INJ PANTOPRAZOLE SODIUM, VIA: HCPCS | Performed by: STUDENT IN AN ORGANIZED HEALTH CARE EDUCATION/TRAINING PROGRAM

## 2021-07-02 PROCEDURE — 99233 SBSQ HOSP IP/OBS HIGH 50: CPT | Mod: GC | Performed by: INTERNAL MEDICINE

## 2021-07-02 PROCEDURE — 85027 COMPLETE CBC AUTOMATED: CPT | Performed by: STUDENT IN AN ORGANIZED HEALTH CARE EDUCATION/TRAINING PROGRAM

## 2021-07-02 PROCEDURE — 250N000013 HC RX MED GY IP 250 OP 250 PS 637: Performed by: STUDENT IN AN ORGANIZED HEALTH CARE EDUCATION/TRAINING PROGRAM

## 2021-07-02 PROCEDURE — 120N000002 HC R&B MED SURG/OB UMMC

## 2021-07-02 PROCEDURE — 80048 BASIC METABOLIC PNL TOTAL CA: CPT | Performed by: STUDENT IN AN ORGANIZED HEALTH CARE EDUCATION/TRAINING PROGRAM

## 2021-07-02 PROCEDURE — 84100 ASSAY OF PHOSPHORUS: CPT | Performed by: STUDENT IN AN ORGANIZED HEALTH CARE EDUCATION/TRAINING PROGRAM

## 2021-07-02 PROCEDURE — 250N000011 HC RX IP 250 OP 636: Performed by: STUDENT IN AN ORGANIZED HEALTH CARE EDUCATION/TRAINING PROGRAM

## 2021-07-02 PROCEDURE — 250N000012 HC RX MED GY IP 250 OP 636 PS 637

## 2021-07-02 PROCEDURE — 94762 N-INVAS EAR/PLS OXIMTRY CONT: CPT

## 2021-07-02 RX ORDER — METHYLPREDNISOLONE 4 MG/1
4 TABLET ORAL
Status: DISCONTINUED | OUTPATIENT
Start: 2021-07-03 | End: 2021-07-03 | Stop reason: HOSPADM

## 2021-07-02 RX ORDER — METHYLPREDNISOLONE 8 MG/1
8 TABLET ORAL AT BEDTIME
Status: DISCONTINUED | OUTPATIENT
Start: 2021-07-02 | End: 2021-07-03 | Stop reason: HOSPADM

## 2021-07-02 RX ORDER — METHYLPREDNISOLONE 4 MG/1
4 TABLET ORAL ONCE
Status: COMPLETED | OUTPATIENT
Start: 2021-07-02 | End: 2021-07-02

## 2021-07-02 RX ORDER — METHYLPREDNISOLONE 8 MG/1
8 TABLET ORAL ONCE
Status: COMPLETED | OUTPATIENT
Start: 2021-07-02 | End: 2021-07-02

## 2021-07-02 RX ORDER — METHYLPREDNISOLONE 4 MG/1
4 TABLET ORAL AT BEDTIME
Status: DISCONTINUED | OUTPATIENT
Start: 2021-07-04 | End: 2021-07-03 | Stop reason: HOSPADM

## 2021-07-02 RX ORDER — ACETAMINOPHEN 325 MG/1
650 TABLET ORAL EVERY 6 HOURS PRN
Status: DISCONTINUED | OUTPATIENT
Start: 2021-07-02 | End: 2021-07-03 | Stop reason: HOSPADM

## 2021-07-02 RX ADMIN — BUTALBITAL, ACETAMINOPHEN, AND CAFFEINE 1 TABLET: 50; 325; 40 TABLET ORAL at 00:43

## 2021-07-02 RX ADMIN — METHYLPREDNISOLONE 4 MG: 4 TABLET ORAL at 13:36

## 2021-07-02 RX ADMIN — Medication: at 20:21

## 2021-07-02 RX ADMIN — FERROUS SULFATE TAB 325 MG (65 MG ELEMENTAL FE) 325 MG: 325 (65 FE) TAB at 17:55

## 2021-07-02 RX ADMIN — METHYLPREDNISOLONE 8 MG: 8 TABLET ORAL at 13:34

## 2021-07-02 RX ADMIN — PREGABALIN 150 MG: 100 CAPSULE ORAL at 08:38

## 2021-07-02 RX ADMIN — PREGABALIN 150 MG: 100 CAPSULE ORAL at 20:21

## 2021-07-02 RX ADMIN — FERROUS SULFATE TAB 325 MG (65 MG ELEMENTAL FE) 325 MG: 325 (65 FE) TAB at 08:28

## 2021-07-02 RX ADMIN — METHYLPREDNISOLONE 8 MG: 8 TABLET ORAL at 21:52

## 2021-07-02 RX ADMIN — PANTOPRAZOLE SODIUM 40 MG: 40 INJECTION, POWDER, FOR SOLUTION INTRAVENOUS at 16:25

## 2021-07-02 RX ADMIN — BUTALBITAL, ACETAMINOPHEN, AND CAFFEINE 1 TABLET: 50; 325; 40 TABLET ORAL at 08:38

## 2021-07-02 RX ADMIN — PANTOPRAZOLE SODIUM 40 MG: 40 INJECTION, POWDER, FOR SOLUTION INTRAVENOUS at 08:28

## 2021-07-02 RX ADMIN — FOLIC ACID 1 MG: 1 TABLET ORAL at 08:28

## 2021-07-02 RX ADMIN — ESCITALOPRAM OXALATE 20 MG: 20 TABLET ORAL at 08:28

## 2021-07-02 RX ADMIN — METHADONE HYDROCHLORIDE 120 MG: 10 CONCENTRATE ORAL at 05:41

## 2021-07-02 RX ADMIN — I.V. FAT EMULSION 250 ML: 20 EMULSION INTRAVENOUS at 20:21

## 2021-07-02 RX ADMIN — HYDROXYCHLOROQUINE SULFATE 200 MG: 200 TABLET, FILM COATED ORAL at 08:28

## 2021-07-02 RX ADMIN — METHYLPREDNISOLONE 4 MG: 4 TABLET ORAL at 17:55

## 2021-07-02 RX ADMIN — PREGABALIN 150 MG: 100 CAPSULE ORAL at 13:34

## 2021-07-02 RX ADMIN — HYDROXYCHLOROQUINE SULFATE 200 MG: 200 TABLET, FILM COATED ORAL at 17:55

## 2021-07-02 ASSESSMENT — MIFFLIN-ST. JEOR: SCORE: 1050.74

## 2021-07-02 ASSESSMENT — ACTIVITIES OF DAILY LIVING (ADL)
ADLS_ACUITY_SCORE: 19

## 2021-07-02 NOTE — PLAN OF CARE
[id number]. VSS ex soft BP, SpO2 79% on RA after falling asleep, 1-2 L oxymask for sleep, SpO2 improved to 95%. Lethargic, arouses to voice/gentle shaking. TPN and lipids infusing via L DL PICC purple lumen, red lumen saline locked with blood return noted. Butalbital-acetaminophen given for migraine with relief. Voids without difficulty. Sleeping majority of shift. Was supposed to have pulse oximetry study but RT reported incorrect order placed, unable to obtain correct order. Recheck hgb tomorrow AM. ECHO at bedside today unremarkable. Neuro consulted for migraines, recommend discontinuing excederin and esgic. Recommend following up OP and Cephaly for abortive migraine treatment. Will continue to monitor pain and oxygen needs.

## 2021-07-02 NOTE — PROGRESS NOTES
[facility]    Internal Medicine Progress Note - [facility] Service    Main Plans for Today      - Start Medrol dose pack and discontinue butalbital ( esgic) after consulting with GI on the phone  - Hemoglobin the morning of 7/3  - Discuss non pharmacologic pain management options  - Nocturnal oxcimetry by RT overnight with amount of oxygen the pt required    Assessment & Plan   Demetra Richardson is a 65 year old female admitted on 6/29/2021. She has a history of Billroth II for gastric outlet obstruction in 2005, revision of gastrojejunostomy in 2011, with PMH of chronic pain, fibromyalgia and who presented to the ED by ambulance for evaluation of an acute hemoglobin drop from 9.4 to 6.5 discovered on OP surveillance labs. Patient endorses fatigue, pallor, and mild shortness of breath this week. No obvious source of bleeding. She is being admitted for management and work-up of acute on chronic symptomatic anemia.      #Acute on chronic symptomatic anemia  Baseline Hgb 9s. Acute drop in Hgb from 9.4 to 6.5 discovered on outpatient surveillance labs today. Patient endorses fatigue, pallor, mild shortness of breath, and intermittent palpitations. Hemodynamically stable on admission. No abnormal bleeding or bruising. No abdominal pain, melena, or bright red blood per rectum. PICC line intact and without swelling or signs of hematoma. Unclear source of bleeding but consider most likely GI bleed     -Continuous IV protonix changed to BID   > can start PO on discharge  -HDS, will repeat labs in AM  -Holding all NSAIDs  -GI consulted, appreciate recs                > Continue PPI, Pantoprazole, 40 mg, IV, BID till she discharge,                         change to po when she discharges                > No acute need for endoscopy since her hemoglobin stabilized                         after transfusion                > Stop NSAIDS       #Gastric outlet obstruction S/p Billroth II  (2005) w/ revision gastrojejunostomy (2011)  #New (on 6/8 scope) w/ stenosed Billroth II gastrojejunostomy   #Chronic malnutrition   #Hypoalbuminemia, Anasarca   Pt w/ recent obstructive symptoms. Found to have stenosed Billroth II gastrojejunostomy on 6/8. Course has been complicated by chronic malnutrition. Diffuse anasarca on exam, has been attributed to hypoalbuminemia (2.0). Recently admitted 6/16-6/22 to [facility] surgical service for placement of PICC to initiate TPN. Some concern pt not able to manage TPN at home discussed TCU placement but no beds, but now endorsing improved understanding.   -Cont TPN   -GI consult as above   -Consider TPN teaching prior to discharge.   -Cont ferrous sulfate, folic acid, vitamin D     #Fibromyalgia   #Chronic Pain   #Migraine  Pt reports taking excedrin migraine BID for many years, will hold given concern for GI bleed. Pt would prefer to keep using excedrin despite stated risk by primary and GI team. Offered alternative medication to treat migraine, pt denies triptans or caffeine pills. States only NSAIDs work for her once per month migraine. Has not had migraine in 2 years while on excedrin BID.  - neuro consult, appreciate recs   >  Cefaly for abortive migraine treatment - will need to order for                  patient as outpatient                > Recommend stopping esgic.  Do not need phenobarbital taper, as                patient has not been taking this medication consistently at home.                 > Attempt to use non-pharmacological treatments for rebound                  headaches (eg ice packs)                  > Can use tylenol sparingly for severe, breakthrough headaches.                    > Attempt to use no more than 1-2 times per week to avoid                                 rebound headaches in the future.                  > Do not recommend prophylactic treatment for migraines at this                    time                   > Continue pregabalin 150mg  TID                   > Continue lexapro 20mg qd  -Cont PTA   -Methadone 120mg daily      #Depression  #Anxiety   -Cont PTA Citalopram   -Cont PTA Hydroxyzine   -If worsening anxiety ok to give prns. Patient has taken clonazepam in past for sx        Diet: Regular Diet Adult  parenteral nutrition - ADULT compounded formula CYCLE  parenteral nutrition - ADULT compounded formula CYCLE  Fluids: None  DVT Prophylaxis: Pneumatic Compression Devices  Code Status: Full Code    Disposition Plan   Expected discharge: 2 - 3 days, recommended to prior living arrangement once hemoglobin stable.     Entered: Junior Saravia 07/02/2021, 2:31 PM   Information in the above section will display in the discharge planner report.      The patient's care was discussed with the Attending Physician, Dr. Horta.    Antoine Thomas MD  Internal Medicine PGY-3  [facility]  Maroon: 1  Pager: [phone]  Please see sticky note for cross cover information    Interval History   No significant event overnight. Order was put for nocturnal oximetry check. However RT said order is not put properly and they did not notify the team to change the order. However, the patient saturation was ranging 79-85 on room air as per the overnight nurse record on 7/2/21. But we cannot use this data, as apnea test requires nocturnal oximetry check. We communicated this to the patient and apologized for the inconvenience. We apologized for the inconvenience. In consultation with GI side over the phone  We started medrol dose pack and discontinued butalbital ( esgic). Pt agreed with the plan. We also discussed non pharmacological ways to manage pain.  Denies HA currently, dizziness, chest pain, N/V.     4-point review of systems otherwise negative unless stated above.    Physical Exam   Vital Signs: Temp: 95.6  F (35.3  C) Temp src: Oral BP: 116/65 Pulse: 57   Resp: 16 SpO2: 96 % O2 Device: Nasal cannula Oxygen Delivery: 2 LPM  Weight: 120 lbs 11.2  oz  General Appearance: AOx3, appear cachectic. Mildly agitated  Respiratory: CTAB, no wheezing, no crackles  Cardiovascular: sinus tachycardia, normal S1/S2, grade III holosystolic murmur in second intercostal space most likely TR ( might have been exaggerated by severe anemia)   GI: no distension, BS+, mildly tender LUQ, no peritoneal signs  Skin: no rash  Neuro: CN grossly intact, no focal neurological deficits

## 2021-07-02 NOTE — PLAN OF CARE
"Time 2089-2249     Reason for admission:   Symptomatic anemia [D64.9]     Vitals: BP 90/51 (BP Location: Right arm)   Pulse 63   Temp 96.2  F (35.7  C) (Oral)   Resp 18   Ht 1.575 m (5' 2\")   Wt 55.3 kg (121 lb 14.6 oz)   LMP  (LMP Unknown)   SpO2 92%   Breastfeeding No   BMI 22.30 kg/m       Activity: Independent in room  Pain: reports headache in AM  Neuro: A&Ox4  Cardiac: soft Bps   Respiratory: WDL on RA at day  GI/: voiding regularly this shift  Diet: Regular with fair appetite  Lines: L 2L PICC      This shift: lethargic and sleeping heavily      Plan:     Continue to monitor and follow POC    "

## 2021-07-02 NOTE — PLAN OF CARE
Time 8496-7895    Vitals: Stable. 2-3L O2 via NC or facemask while asleep. Room air when awake.   Activity: Up SBA / independently to bathroom.   Pain: Reported migraine that has not gone away.   Neuro: A/O but very lethargic. Asleep much of shift and sometimes drifts off during conversations. Easily arousable.     Cardiac: No complaints    Respiratory: Snores while asleep and O2 sats in low 80s on room air when asleep.    GI/: No reported issues. Reported recent BM.   Diet: Regular diet. Cycled TPN.   Lines: PICC line double lumen - saline locked while not in use.   Skin/Wounds: Intact from what patient allowed inspected   Endocrine: BGs q 6 hours d/t TPN; 90s-110s.   Labs/imaging: AM labs. Hgb 9.1          New changes this shift: Started steroid pack for migraine management.      Plan: RT pulse oximetry study overnight to determine need for home O2 with sleeping. Possible discharge to home tomorrow.     Continue to monitor and follow POC

## 2021-07-02 NOTE — PLAN OF CARE
"Time: 2330-0730    Reason for admit: Symptomatic anemia [D64.9]  Vitals: /54 (BP Location: Right arm)   Pulse 60   Temp 95.6  F (35.3  C) (Oral)   Resp 16   Ht 1.575 m (5' 2\")   Wt 55.3 kg (121 lb 14.6 oz)   LMP  (LMP Unknown)   SpO2 95%   Breastfeeding No   BMI 22.30 kg/m      New this shift: Pt requires 1-2L O2 while asleep. Pt Aox4 while awake. Voiding w/o difficulty. Pt complained of HA and received PRN ESGIC x1 during shift. Pt slept well throughout the night. Pt received methadone early due to this being her home regimen, doses re-timed.    Plan: Expected discharge: 2 - 3 days, recommended to prior living arrangement once hemoglobin stable.    "

## 2021-07-03 ENCOUNTER — HOME INFUSION (PRE-WILLOW HOME INFUSION) (OUTPATIENT)
Dept: PHARMACY | Facility: CLINIC | Age: 66
End: 2021-07-03

## 2021-07-03 ENCOUNTER — DOCUMENTATION ONLY (OUTPATIENT)
Dept: OTHER | Facility: CLINIC | Age: 66
End: 2021-07-03

## 2021-07-03 VITALS
BODY MASS INDEX: 22.08 KG/M2 | DIASTOLIC BLOOD PRESSURE: 70 MMHG | WEIGHT: 120 LBS | RESPIRATION RATE: 18 BRPM | HEART RATE: 72 BPM | TEMPERATURE: 96.2 F | OXYGEN SATURATION: 97 % | SYSTOLIC BLOOD PRESSURE: 131 MMHG | HEIGHT: 62 IN

## 2021-07-03 LAB
GLUCOSE BLDC GLUCOMTR-MCNC: 130 MG/DL (ref 70–99)
GLUCOSE BLDC GLUCOMTR-MCNC: 164 MG/DL (ref 70–99)
HGB BLD-MCNC: 9.4 G/DL (ref 11.7–15.7)

## 2021-07-03 PROCEDURE — 99239 HOSP IP/OBS DSCHRG MGMT >30: CPT | Mod: GC | Performed by: INTERNAL MEDICINE

## 2021-07-03 PROCEDURE — 250N000012 HC RX MED GY IP 250 OP 636 PS 637

## 2021-07-03 PROCEDURE — 250N000013 HC RX MED GY IP 250 OP 250 PS 637: Performed by: STUDENT IN AN ORGANIZED HEALTH CARE EDUCATION/TRAINING PROGRAM

## 2021-07-03 PROCEDURE — 999N001017 HC STATISTIC GLUCOSE BY METER IP

## 2021-07-03 PROCEDURE — 250N000013 HC RX MED GY IP 250 OP 250 PS 637

## 2021-07-03 PROCEDURE — C9113 INJ PANTOPRAZOLE SODIUM, VIA: HCPCS | Performed by: STUDENT IN AN ORGANIZED HEALTH CARE EDUCATION/TRAINING PROGRAM

## 2021-07-03 PROCEDURE — 85018 HEMOGLOBIN: CPT

## 2021-07-03 PROCEDURE — 250N000011 HC RX IP 250 OP 636: Performed by: STUDENT IN AN ORGANIZED HEALTH CARE EDUCATION/TRAINING PROGRAM

## 2021-07-03 PROCEDURE — 36592 COLLECT BLOOD FROM PICC: CPT

## 2021-07-03 RX ORDER — METHYLPREDNISOLONE 4 MG/1
4 TABLET ORAL
Qty: 3 TABLET | Refills: 0 | Status: ON HOLD | OUTPATIENT
Start: 2021-07-03 | End: 2021-09-10

## 2021-07-03 RX ORDER — METHYLPREDNISOLONE 4 MG/1
4 TABLET ORAL AT BEDTIME
Qty: 3 TABLET | Refills: 0 | Status: ON HOLD | OUTPATIENT
Start: 2021-07-04 | End: 2021-09-10

## 2021-07-03 RX ORDER — PANTOPRAZOLE SODIUM 40 MG/1
40 TABLET, DELAYED RELEASE ORAL 2 TIMES DAILY
Qty: 90 TABLET | Refills: 2 | Status: SHIPPED | OUTPATIENT
Start: 2021-07-03 | End: 2021-08-30 | Stop reason: ALTCHOICE

## 2021-07-03 RX ORDER — ACETAMINOPHEN 325 MG/1
650 TABLET ORAL EVERY 6 HOURS PRN
Qty: 60 TABLET | Refills: 1 | Status: ON HOLD | OUTPATIENT
Start: 2021-07-03 | End: 2021-09-10

## 2021-07-03 RX ORDER — METHYLPREDNISOLONE 4 MG/1
4 TABLET ORAL
Qty: 5 TABLET | Refills: 0 | Status: ON HOLD | OUTPATIENT
Start: 2021-07-04 | End: 2021-09-10

## 2021-07-03 RX ORDER — METHYLPREDNISOLONE 4 MG/1
4 TABLET ORAL
Qty: 2 TABLET | Refills: 0 | Status: ON HOLD | OUTPATIENT
Start: 2021-07-03 | End: 2021-09-10

## 2021-07-03 RX ADMIN — HYDROXYCHLOROQUINE SULFATE 200 MG: 200 TABLET, FILM COATED ORAL at 09:15

## 2021-07-03 RX ADMIN — PREGABALIN 150 MG: 100 CAPSULE ORAL at 09:15

## 2021-07-03 RX ADMIN — PANTOPRAZOLE SODIUM 40 MG: 40 INJECTION, POWDER, FOR SOLUTION INTRAVENOUS at 09:15

## 2021-07-03 RX ADMIN — METHYLPREDNISOLONE 4 MG: 4 TABLET ORAL at 09:14

## 2021-07-03 RX ADMIN — FERROUS SULFATE TAB 325 MG (65 MG ELEMENTAL FE) 325 MG: 325 (65 FE) TAB at 09:15

## 2021-07-03 RX ADMIN — ESCITALOPRAM OXALATE 20 MG: 20 TABLET ORAL at 09:15

## 2021-07-03 RX ADMIN — ACETAMINOPHEN 650 MG: 325 TABLET, FILM COATED ORAL at 00:32

## 2021-07-03 RX ADMIN — ACETAMINOPHEN 650 MG: 325 TABLET, FILM COATED ORAL at 06:29

## 2021-07-03 RX ADMIN — FOLIC ACID 1 MG: 1 TABLET ORAL at 09:15

## 2021-07-03 RX ADMIN — METHADONE HYDROCHLORIDE 120 MG: 10 CONCENTRATE ORAL at 04:46

## 2021-07-03 ASSESSMENT — ACTIVITIES OF DAILY LIVING (ADL)
ADLS_ACUITY_SCORE: 19

## 2021-07-03 ASSESSMENT — MIFFLIN-ST. JEOR: SCORE: 1047.57

## 2021-07-03 NOTE — PROGRESS NOTES
Brief Progress Note:    # Nocturnal hypoxia  Patient noted to have nocturnal hypoxia requiring oxygen supplementation. Overnight oxymetry via RT confirms hypoxia.    Oxygen Documentation:   I certify that this patient, Demetra Richardson has been under my care (or a nurse practitioner or physican's assistant working with me). This is the face-to-face encounter for oxygen medical necessity.      Demetra Richardson is now in a chronic stable state and continues to require supplemental oxygen. Patient has continued oxygen desaturation due to nocturnal hypoxia.    Alternative treatment(s) tried or considered and deemed clinically infective for treatment of nocturnal hypoxia include steroids.  If portability is ordered, is the patient mobile within the home? yes    MD Reagan Hurley 1 Service  Internal Medicine PGY-2

## 2021-07-03 NOTE — PLAN OF CARE
Time: 4491-1486    Reason for admission: Symptomatic anemia    Lethargic although easily aroused, oriented x4, up ad shelby, calls appropriately. VSS on RA, 2L overnight. Tylenol given for 7/10 headache with some improvement noted initially, although continued headache throughout this morning. Voiding spontaneously, pt unsure of when LBM was. L DL PICC infusing cycled TPN/lipids. Regular diet -- ate 100% of dinner and had some snacks overnight. Blanchable redness on buttocks. Most recent Hgb 9.1, q6h  and 130    Shift highlights: RT overnight O2 study done, needing 2L O2 d/t desatting to 87% on RA right when study was initiated    Plan: Hgb monitoring. Possible discharge today

## 2021-07-03 NOTE — PLAN OF CARE
Patient being discharged to home with PICC line intact - both lumens clamped and saline locked. Unable to obtain extension tubing for lumens while in hospital - pt to contact home infusion for assistance. Pt dressed and packed belongings without assist. Discussed discharge medications and questions answered. Pt picking up medications for discharge in the pharmacy. Transport ordered to take patient to lobby via . Pt is calling a med cab for a ride home.     Up ad shelby. PICC intact. Headache present still upon discharge. [facility] has set up home O2 for night for patient.

## 2021-07-03 NOTE — DISCHARGE INSTRUCTIONS
Home Oxygen Company:  [employer]    Phone:  [phone]    Home Oxygen per MD orders for night time use

## 2021-07-03 NOTE — CONSULTS
Care Management Initial Consult    General Information  Assessment completed with: Patient,    Type of CM/SW Visit: Offer D/C Planning    Primary Care Provider verified and updated as needed: Yes   Readmission within the last 30 days:        Reason for Consult: discharge planning  Advance Care Planning:       General Information Comments: (Resume Home TPN and assist with new home oxygen)    Communication Assessment  Patient's communication style: spoken language (English or Bilingual)    Hearing Difficulty or Deaf: no   Wear Glasses or Blind: yes    Cognitive  Cognitive/Neuro/Behavioral: .WDL except  Level of Consciousness: lethargic  Arousal Level: arouses to voice  Orientation: oriented x 4  Mood/Behavior: anxious, cooperative  Best Language: 0 - No aphasia  Speech: slow, clear, logical    Living Environment:   People in home: alone     Current living Arrangements:        Able to return to prior arrangements:         Family/Social Support:  Supportive      Current Resources:   Patient receiving home care services:  Yes, [facility]     Community Resources:    Equipment currently used at home: none  Supplies currently used at home:      Lifestyle & Psychosocial Needs:  (From Chart Flow Sheet)        Socioeconomic History     Marital status: Single     Spouse name: Not on file     Number of children: Not on file     Years of education: Not on file     Highest education level: Not on file     Tobacco Use     Smoking status: Never Smoker     Smokeless tobacco: Never Used   Substance and Sexual Activity     Alcohol use: No     Comment: none in 10 years     Drug use: No     Sexual activity: Not Currently       Functional Status:  Prior to admission patient needed assistance:      Values/Beliefs:  Spiritual, Cultural Beliefs, Latter day Practices, Values that affect care:   No            Care Management Discharge Note    Discharge Date: 07/04/21       Discharge Disposition:  Home with resumption of home care  services and new home oxygen for nocturnal use.    Education Provided on the Discharge Plan:  Yes  Persons Notified of Discharge Plans: Yes  Patient/Family in Agreement with the Plan:  Yes    Handoff Referral Completed: Yes    Additional Information:    Please Fax discharge orders to [facility]     Ph:  [phone]     Fax: [phone]     Skilled home care RN for resumption of home care services as prior to admission to the [facility] hospital setting and to assist with the MD team updated plans of care as outlined in discharge orders.     Skilled home care RN to evaluate medication management, nutrition and hydration evaluation, endurance evaluation, and general status evaluation after discharge from the [facility] hospital setting.     Skilled home care RN to assist with home TPN via [facility].  TPN labs and [facility] cares per home care agency routine.     Skilled home care RN to assist with evaluation of respiratory status secondary to Ms. Richardson discharging with new home oxygen during hours of sleep.     __________________________    Home Oxygen Company:  [facility]    Phone:  [phone]    Home Oxygen per MD orders for night time use    Inpatient cares continue per MD team plans of care.  Care management team is available to assist with updated transition needs prn.      Ester Pruitt,  B.S.N., R.N., P.H.N..  Care Coordinator     Pager /Weekend Pager-  [facility]

## 2021-07-03 NOTE — PROGRESS NOTES
Connected with patient and she would like to use [facility] to service for home oxygen needs. Patient instructed to call us once home from hospital for nocturnal o2 set up . All parties in agreement to plan. Please call [facility] with any questions/concerns .

## 2021-07-03 NOTE — DISCHARGE SUMMARY
[facility]  Discharge Summary - Medicine & Pediatrics       Date of Admission:  6/29/2021  Date of Discharge:  7/3/2021 12:24 PM  Discharging Provider: Dr. Horta  Discharge Service: [facility] 1    Discharge Diagnoses   #Acute on chronic symptomatic anemia  #Upper GIB  #NSAID Educed Gastritis  #Gastric outlet obstruction S/p Billroth II (2005) w/ revision gastrojejunostomy (2011)  #New (on 6/8 scope) w/ stenosed Billroth II gastrojejunostomy   #Chronic malnutrition,severe   #Hypoalbuminemia, Anasarca   #Fibromyalgia   #Chronic Pain   #Migraine  #Nocturnal Hypoxia  #Depression  #Anxiety     Follow-ups Needed After Discharge   Follow-up Appointments     Follow Up and recommended labs and tests      Follow up with primary care provider, Fredy Merritt, within 7 days   for hospital follow- up.  The following labs/tests are recommended: CBC.      You will be referred for neurology follow up, GI follow up, and a sleep   study             Unresulted Labs Ordered in the Past 30 Days of this Admission     No orders found from 5/30/2021 to 6/30/2021.      These results will be followed up by n/a    Discharge Disposition   Discharged to home  Condition at discharge: Stable    Hospital Course   Demetra Richardson was admitted on 6/29/2021 for acute on chronic anemia.  The following problems were addressed during her hospitalization:    #Acute on chronic symptomatic anemia  #Upper GIB  #NSAID Educed Gastritis   Baseline Hgb 9s. Acute drop in Hgb from 9.4 to 6.5 discovered on outpatient surveillance labs today. Patient endorses anemia symptoms. Bleed likely related to thinning of gastric mucosal lining due to use of medications for treatment of migraine. Patient given 2 U RBCs with good response.   -Patient encouraged to stop NSAIDs  -PO protonix 40 mg BID started  -Patient referred for GI follow up at discharge  -Patient should follow up with PCP with CBC monitoring in 1  week      #Fibromyalgia   #Chronic Pain   #Migraine  Pt reports taking excedrin migraine BID for many years, which likely contributed to GIB. Neurology consulted while inpatient for assistance with alternative management methods. Will prescribe patient a Cefaly device. Also recommending medrol dose pack and using tylenol for breakthrough.   -Cefaly ordered  -Patient advised to discontinue NSAIDs as above  -Medrol dose pack ordered   -Use tylenol for breakthrough   -Neurology referral placed for follow up  -Continue PTA pregabalin, lexapro, and methadone    #Nocturnal hypoxia  Patient noted to have nocturnal hypoxia requiring oxygen supplementation. Overnight oxymetry via RT confirms hypoxia. Home oxygen for nocturnal hypoxia set up prior to discharge.     #Gastric outlet obstruction S/p Billroth II (2005) w/ revision gastrojejunostomy (2011)  #New (on 6/8 scope) w/ stenosed Billroth II gastrojejunostomy   #Chronic malnutrition, severe   #Hypoalbuminemia, Anasarca   Pt w/ recent obstructive symptoms. Found to have stenosed Billroth II gastrojejunostomy on 6/8. Course has been complicated by chronic malnutrition. Diffuse anasarca on exam, has been attributed to hypoalbuminemia (2.0). Recently admitted 6/16-6/22 to [facility] surgical service for placement of PICC to initiate TPN. Will continue at discharge along with prescribed supplements.      #Depression  #Anxiety   -Cont PTA Citalopram   -Cont PTA Hydroxyzine     Consultations This Hospital Stay   GI LUMINAL ADULT IP CONSULT  PHARMACY/NUTRITION TO START AND MANAGE TPN  NEUROLOGY GENERAL ADULT IP CONSULT  PHARMACY IP CONSULT  RESPIRATORY CARE IP CONSULT  CARE MANAGEMENT / SOCIAL WORK IP CONSULT    Code Status   Prior       The patient was discussed with MD Harmony Shabazz[address] [address]  Phone:  [phone]  ______________________________________________________________________    Physical Exam   Vital Signs: Temp: 96.2  F (35.7  C) Temp src: Oral BP: 131/70 Pulse: 72   Resp: 18 SpO2: 97 % O2 Device: Nasal cannula Oxygen Delivery: 2 LPM  Weight: 120 lbs 0 oz  General Appearance:  AOx3, appear cachectic. Mildly agitated  Respiratory: CTAB, no wheezing, no crackles  Cardiovascular: sinus tachycardia, normal S1/S2, grade III holosystolic murmur in second intercostal space  GI: no distension, BS+, mildly tender LUQ, no peritoneal signs  Skin: no rash  Neuro: CN grossly intact, no focal neurological deficits       Primary Care Physician   Fredy Merritt    Discharge Orders      Home infusion referral      SLEEP EVALUATION & MANAGEMENT REFERRAL - ADULT -[facility] [place] [phone] (Age 15 and up)      Neurology Adult Referral      GASTROENTEROLOGY ADULT REF CONSULT ONLY      Reason for your hospital stay    You came in because of a bleed in your stomach which is likely due to the use of NSAID medications for your headaches. You were given blood transfusion and your medications were changed for which we saw improvement in your anemia. You will be started on a new pain control regimen for your headaches. You were also noted to have low oxygen levels at night. You will be set up with home oxygen to use at night and referred for a sleep study.     Follow Up and recommended labs and tests    Follow up with primary care provider, Fredy Merritt, within 7 days for hospital follow- up.  The following labs/tests are recommended: CBC.      You will be referred for neurology follow up, GI follow up, and a sleep study     Activity    Your activity upon discharge: activity as tolerated     Oxygen Adult/Peds    Oxygen Documentation:   I certify that this patient, Demetra Richardson has been under my care (or a nurse practitioner or physican's assistant working with me). This is the face-to-face  encounter for oxygen medical necessity.      Demetra Richardson is now in a chronic stable state and continues to require supplemental oxygen. Patient has continued oxygen desaturation due to nocturnal hypoxia.    Alternative treatment(s) tried or considered and deemed clinically infective for treatment of nocturnal hypoxia include steroids.  If portability is ordered, is the patient mobile within the home? yes    **Patients who qualify for home O2 coverage under the CMS guidelines require ABG tests or O2 sat readings obtained closest to, but no earlier than 2 days prior to the discharge, as evidence of the need for home oxygen therapy. Testing must be performed while patient is in the chronic stable state. See notes for O2 sats.**     Diet    Follow this diet upon discharge: Orders Placed This Encounter      Regular Diet Adult      TPN       Significant Results and Procedures   Results for orders placed or performed during the hospital encounter of 21   XR Chest Port 1 View    Narrative    EXAM: XR CHEST PORT 1 VIEW  LOCATION: [facility]  DATE/TIME: 2021 4:38 AM    INDICATION: 65-year-old with hemoglobin drop  COMPARISON: 2021      Impression    IMPRESSION: Left-sided PICC line with tip over atrial caval junction. No pneumothorax or pleural effusion. No focal consolidation. Cardiac silhouette within normal limits. Tortuous atherosclerotic aorta. Left upper quadrant surgical clips and suture   lines. Breast implants.   Echo Complete    Narrative    [id number]  [id number]  [id number]  [id number]^ALONZO^NEHEMIAS     [facility]  Echocardiography Laboratory  [address] [place] [id number]     Name: DEMETRA RICHARDSON  MRN: [id number]  : 1955  Study Date: 2021 01:23 PM  Age: 65 yrs  Gender: Female  Patient Location: [facility]5A  Reason For Study: Murmur  Ordering Physician: NEHEMIAS ROSADO  Performed By: Paloma Barnes RDCS     BSA: 1.5  m2  Height: 62 in  Weight: 121 lb  HR: 59  BP: 108/56 mmHg  ______________________________________________________________________________  Procedure  Complete Portable Echo Adult. Technically difficult study.  ______________________________________________________________________________  Interpretation Summary  Left ventricular size, wall motion and function are normal. The ejection  fraction is 60-65%. No regional wall motion abnormalities are seen.  Right ventricular function, chamber size, wall motion, and thickness are  normal.  The valve leaflets are not well visualized. Trace aortic insufficiency is  present.  Mild tricuspid insufficiency is present. The inferior vena cava was normal in  size with preserved respiratory variability. No pericardial effusion is  present.     There has been no change.  ______________________________________________________________________________  Left Ventricle  Left ventricular size, wall motion and function are normal. The ejection  fraction is 60-65%. Thickening of the anterobasal septum is present. No  regional wall motion abnormalities are seen.     Right Ventricle  Right ventricular function, chamber size, wall motion, and thickness are  normal.     Atria  Both atria appear normal.     Mitral Valve  Mild mitral annular calcification is present.     Aortic Valve  The valve leaflets are not well visualized. Trace aortic insufficiency is  present.     Tricuspid Valve  The tricuspid valve is normal. Mild tricuspid insufficiency is present. The  right ventricular systolic pressure is approximated at 22.2 mmHg plus the  right atrial pressure.     Pulmonic Valve  The pulmonic valve is normal.     Vessels  The aorta root is normal. The inferior vena cava was normal in size with  preserved respiratory variability.     Pericardium  No pericardial effusion is present.     Compared to Previous Study  There has been no  change.  ______________________________________________________________________________  MMode/2D Measurements & Calculations     LVOT diam: 2.1 cm  LVOT area: 3.5 cm2     Doppler Measurements & Calculations  TR max candice: 235.5 cm/sec  TR max P.2 mmHg     ______________________________________________________________________________  Report approved by: Maryann COWART 2021 02:17 PM           *Note: Due to a large number of results and/or encounters for the requested time period, some results have not been displayed. A complete set of results can be found in Results Review.       Discharge Medications   Discharge Medication List as of 7/3/2021 11:16 AM      START taking these medications    Details   acetaminophen (TYLENOL) 325 MG tablet Take 2 tablets (650 mg) by mouth every 6 hours as needed for mild pain or fever, Disp-60 tablet, R-1, E-Prescribe      !! methylPREDNISolone (MEDROL) 4 MG tablet Take 1 tablet (4 mg) by mouth every morning (before breakfast), Disp-5 tablet, R-0, E-Prescribe      !! methylPREDNISolone (MEDROL) 4 MG tablet Take 1 tablet (4 mg) by mouth daily (with lunch), Disp-3 tablet, R-0, E-Prescribe      !! methylPREDNISolone (MEDROL) 4 MG tablet Take 1 tablet (4 mg) by mouth daily (with dinner), Disp-2 tablet, R-0, E-Prescribe      !! methylPREDNISolone (MEDROL) 4 MG tablet Take 1 tablet (4 mg) by mouth At Bedtime, Disp-3 tablet, R-0, E-Prescribe       !! - Potential duplicate medications found. Please discuss with provider.      CONTINUE these medications which have CHANGED    Details   pantoprazole (PROTONIX) 40 MG EC tablet Take 1 tablet (40 mg) by mouth 2 times daily, Disp-90 tablet, R-2, E-Prescribe         CONTINUE these medications which have NOT CHANGED    Details   escitalopram (LEXAPRO) 20 MG tablet TAKE 1 TABLET BY MOUTH EVERY DAY, Disp-30 tablet, R-5, E-Prescribe      hydroxychloroquine (PLAQUENIL) 200 MG tablet Take 1 tablet (200 mg) by mouth 2 times daily (with meals),  Disp-180 tablet, R-1, E-Prescribe      hydrOXYzine (VISTARIL) 25 MG capsule Take 1 capsule (25 mg) by mouth 2 times daily as needed for anxiety, Disp-60 capsule, R-11, E-Prescribe      methadone (DOLOPHINE-INTENSOL) 10 MG/ML (HIGH CONC) solution Take 120 mg by mouth daily Takes it at 0530 everyday., Historical      parenteral nutrition - PTA/DISCHARGE ORDER The TPN formula will print on the After Visit Summary Report., Disp-1 each, R-0, Local Print      polyethylene glycol (MIRALAX) 17 GM/Dose powder 1 capful (17 g) in 8 oz of liquid, Disp-225 g, R-3, E-PrescribeTake 1 capful in the morning for one week then increase to two capfuls per day (once in the morning and once in the evening)      pregabalin (LYRICA) 150 MG capsule TAKE 1 CAPSULE (150 MG) BY MOUTH 3 TIMES DAILY, Disp-90 capsule, R-1, E-PrescribeThis request is for a new prescription for a controlled substance as required by Federal/State law..      ferrous sulfate (FEROSUL) 325 (65 Fe) MG tablet Take 1 tablet (325 mg) by mouth 2 times daily With meals, Disp-100 tablet, R-3, E-Prescribe[E61.1]      folic acid (FOLVITE) 1 MG tablet Take 1 tablet (1 mg) by mouth daily, Disp-30 tablet, R-11, E-Prescribe      !! valACYclovir (VALTREX) 500 MG tablet Take 1 tablet (500 mg) by mouth daily, Disp-90 tablet, R-3, E-Prescribe      !! valACYclovir (VALTREX) 500 MG tablet Take 1 tablet (500 mg) by mouth 2 times daily, Disp-60 tablet, R-1, E-Prescribe      vitamin D2 (ERGOCALCIFEROL) 94550 units (1250 mcg) capsule Take 1 capsule (50,000 Units) by mouth every 7 days, Disp-12 capsule, R-0, E-Prescribe       !! - Potential duplicate medications found. Please discuss with provider.      STOP taking these medications       aspirin-acetaminophen-caffeine (EXCEDRIN MIGRAINE) 250-250-65 MG tablet Comments:   Reason for Stopping:         butalbital-acetaminophen-caffeine (ESGIC) -40 MG tablet Comments:   Reason for Stopping:         ibuprofen (ADVIL/MOTRIN) 200 MG tablet  Comments:   Reason for Stopping:             Allergies   Allergies   Allergen Reactions     Penicillins Hives     Hives in childhood.

## 2021-07-05 ENCOUNTER — PATIENT OUTREACH (OUTPATIENT)
Dept: CARE COORDINATION | Facility: CLINIC | Age: 66
End: 2021-07-05

## 2021-07-05 DIAGNOSIS — Z71.89 OTHER SPECIFIED COUNSELING: ICD-10-CM

## 2021-07-05 NOTE — PROGRESS NOTES
This is a recent snapshot of the patient's [facility] medical record.  For current drug dose and complete information and questions, call [phone] or In Basket pool, [facility] home infusion ([id number])  CSN Number:  [id number]

## 2021-07-05 NOTE — PROGRESS NOTES
This is a recent snapshot of the patient's [facility] medical record.  For current drug dose and complete information and questions, call [phone] or In Basket pool, [facility] home infusion ([id number])  CSN Number:  [id number]

## 2021-07-05 NOTE — PROGRESS NOTES
This is a recent snapshot of the patient's [facility] medical record.  For current drug dose and complete information and questions, call [phone] or In Basket pool, [facility] home infusion ([id number])  CSN Number:  [id number]

## 2021-07-05 NOTE — PROGRESS NOTES
[facility]: Post-Discharge Note  SITUATION                                                      Admission:    Admission Date: 06/29/21   Reason for Admission: Acute on chronic symptomatic anemia  Discharge:   Discharge Date: 07/03/21  Discharge Diagnosis: Acute on chronic symptomatic anemia    BACKGROUND                                                      Demetra Richardson is a 65 year old female admitted on 6/29/2021. She has a history of Billroth II for gastric outlet obstruction in 2005, revision of gastrojejunostomy in 2011, with PMH of chronic pain, fibromyalgia and who presented to the ED by ambulance for evaluation of an acute hemoglobin drop from 9.4 to 6.5 discovered on OP surveillance labs. Patient endorses fatigue, pallor, and mild shortness of breath this week. No obvious source of bleeding. She is being admitted for management and work-up of acute on chronic symptomatic anemia.     ASSESSMENT      Discharge Assessment  Patient reports symptoms are: Improved  Does the patient have all of their medications?: Yes  Does patient know what their new medications are for?: Yes  Does patient have a follow-up appointment scheduled?: Yes  Does patient have any other questions or concerns?: No    Post-op  Did the patient have surgery or a procedure: No  Fever: No  Chills: No  Eating & Drinking: eating and drinking without complaints/concerns  PO Intake: regular diet  Bowel Function: normal  Urinary Status: voiding without complaint/concerns        PLAN                                                      Outpatient Plan:       Follow Up and recommended labs and tests      Follow up with primary care provider, Fredy Merritt, within 7 days   for hospital follow- up.  The following labs/tests are recommended: CBC.         Future Appointments   Date Time Provider Department Center   7/29/2021 11:30 AM Sam Narayanan MD [facility]           Shira Park MA

## 2021-07-06 ENCOUNTER — HOME INFUSION (PRE-WILLOW HOME INFUSION) (OUTPATIENT)
Dept: PHARMACY | Facility: CLINIC | Age: 66
End: 2021-07-06

## 2021-07-06 ENCOUNTER — MEDICAL CORRESPONDENCE (OUTPATIENT)
Dept: HEALTH INFORMATION MANAGEMENT | Facility: CLINIC | Age: 66
End: 2021-07-06

## 2021-07-06 ENCOUNTER — APPOINTMENT (OUTPATIENT)
Dept: LAB | Facility: CLINIC | Age: 66
End: 2021-07-06
Attending: SURGERY
Payer: COMMERCIAL

## 2021-07-06 ENCOUNTER — HOSPITAL ENCOUNTER (OUTPATIENT)
Facility: CLINIC | Age: 66
Setting detail: SPECIMEN
End: 2021-07-06
Payer: COMMERCIAL

## 2021-07-06 LAB
ALBUMIN SERPL-MCNC: 2.1 G/DL (ref 3.4–5)
ALP SERPL-CCNC: 172 U/L (ref 40–150)
ALT SERPL W P-5'-P-CCNC: 26 U/L (ref 0–50)
ANION GAP SERPL CALCULATED.3IONS-SCNC: 3 MMOL/L (ref 3–14)
ANISOCYTOSIS BLD QL SMEAR: SLIGHT
AST SERPL W P-5'-P-CCNC: 21 U/L (ref 0–45)
BASOPHILS # BLD AUTO: 0.1 10E9/L (ref 0–0.2)
BASOPHILS NFR BLD AUTO: 0.9 %
BILIRUB DIRECT SERPL-MCNC: <0.1 MG/DL (ref 0–0.2)
BILIRUB SERPL-MCNC: 0.2 MG/DL (ref 0.2–1.3)
BUN SERPL-MCNC: 31 MG/DL (ref 7–30)
CALCIUM SERPL-MCNC: 8.3 MG/DL (ref 8.5–10.1)
CHLORIDE SERPL-SCNC: 106 MMOL/L (ref 94–109)
CO2 SERPL-SCNC: 28 MMOL/L (ref 20–32)
CREAT SERPL-MCNC: 0.58 MG/DL (ref 0.52–1.04)
DIFFERENTIAL METHOD BLD: ABNORMAL
EOSINOPHIL # BLD AUTO: 1.1 10E9/L (ref 0–0.7)
EOSINOPHIL NFR BLD AUTO: 12.2 %
ERYTHROCYTE [DISTWIDTH] IN BLOOD BY AUTOMATED COUNT: 18.1 % (ref 10–15)
GFR SERPL CREATININE-BSD FRML MDRD: >90 ML/MIN/{1.73_M2}
GLUCOSE SERPL-MCNC: 78 MG/DL (ref 70–99)
HCT VFR BLD AUTO: 34.3 % (ref 35–47)
HGB BLD-MCNC: 9.9 G/DL (ref 11.7–15.7)
LYMPHOCYTES # BLD AUTO: 0.7 10E9/L (ref 0.8–5.3)
LYMPHOCYTES NFR BLD AUTO: 7.8 %
MAGNESIUM SERPL-MCNC: 2.2 MG/DL (ref 1.6–2.3)
MCH RBC QN AUTO: 24 PG (ref 26.5–33)
MCHC RBC AUTO-ENTMCNC: 28.9 G/DL (ref 31.5–36.5)
MCV RBC AUTO: 83 FL (ref 78–100)
MONOCYTES # BLD AUTO: 0.6 10E9/L (ref 0–1.3)
MONOCYTES NFR BLD AUTO: 7 %
MYELOCYTES # BLD: 0.2 10E9/L
MYELOCYTES NFR BLD MANUAL: 2.6 %
NEUTROPHILS # BLD AUTO: 6.4 10E9/L (ref 1.6–8.3)
NEUTROPHILS NFR BLD AUTO: 69.5 %
PHOSPHATE SERPL-MCNC: 3.7 MG/DL (ref 2.5–4.5)
PLATELET # BLD AUTO: 280 10E9/L (ref 150–450)
PLATELET # BLD EST: NORMAL 10*3/UL
POTASSIUM SERPL-SCNC: 4.2 MMOL/L (ref 3.4–5.3)
PROT SERPL-MCNC: 6.2 G/DL (ref 6.8–8.8)
RBC # BLD AUTO: 4.13 10E12/L (ref 3.8–5.2)
SODIUM SERPL-SCNC: 137 MMOL/L (ref 133–144)
TRIGL SERPL-MCNC: 115 MG/DL
WBC # BLD AUTO: 9.2 10E9/L (ref 4–11)

## 2021-07-06 PROCEDURE — 83735 ASSAY OF MAGNESIUM: CPT | Performed by: SURGERY

## 2021-07-06 PROCEDURE — 82248 BILIRUBIN DIRECT: CPT | Performed by: SURGERY

## 2021-07-06 PROCEDURE — 84100 ASSAY OF PHOSPHORUS: CPT | Performed by: SURGERY

## 2021-07-06 PROCEDURE — 85025 COMPLETE CBC W/AUTO DIFF WBC: CPT | Performed by: SURGERY

## 2021-07-06 PROCEDURE — 84478 ASSAY OF TRIGLYCERIDES: CPT | Performed by: SURGERY

## 2021-07-06 PROCEDURE — 80053 COMPREHEN METABOLIC PANEL: CPT | Performed by: SURGERY

## 2021-07-06 NOTE — PROGRESS NOTES
This is a recent snapshot of the patient's [facility] medical record.  For current drug dose and complete information and questions, call [phone] or In [facility] pool, [facility] home infusion ([id number])  CSN Number:  [id number]

## 2021-07-06 NOTE — PROGRESS NOTES
This is a recent snapshot of the patient's [facility] medical record.  For current drug dose and complete information and questions, call [phone] or In Basket pool, [facility] home infusion ([id number])  CSN Number:  [id number]

## 2021-07-07 ENCOUNTER — HOME INFUSION (PRE-WILLOW HOME INFUSION) (OUTPATIENT)
Dept: PHARMACY | Facility: CLINIC | Age: 66
End: 2021-07-07

## 2021-07-07 NOTE — PROGRESS NOTES
This is a recent snapshot of the patient's [facility] medical record.  For current drug dose and complete information and questions, call [phone] or In Basket pool, [facility] home infusion ([id number])  CSN Number:  [id number]

## 2021-07-09 ENCOUNTER — TELEPHONE (OUTPATIENT)
Dept: GASTROENTEROLOGY | Facility: CLINIC | Age: 66
End: 2021-07-09

## 2021-07-09 NOTE — TELEPHONE ENCOUNTER
Spoke with pt to schedule GI follow up visit for end of August per request.  Pt asked for Dr Clayton's team to be reached for ongoing care questions.  Pt scheduled in GI clinic with Martha Michele 8/30 and message sent to Dr Clayton's team per pt request.

## 2021-07-09 NOTE — TELEPHONE ENCOUNTER
[facility] Call Center    Phone Message    May a detailed message be left on voicemail: yes     Reason for Call: Other: Referral received for GI bleed - Pt was recently hospitalized within [facility] and is still currently having black stools, she said she can't wait until September for a consultation - she's going to reach out to referring provider but wanted a message sent as well in case there was anything you could do - thanks!     Action Taken: Other: GI    Travel Screening: Not Applicable

## 2021-07-11 ENCOUNTER — HOME INFUSION (PRE-WILLOW HOME INFUSION) (OUTPATIENT)
Dept: PHARMACY | Facility: CLINIC | Age: 66
End: 2021-07-11

## 2021-07-11 NOTE — PROGRESS NOTES
"Patient was previously seen in clinic for evaluation of dysphagia and regurgitation associated with life-threatening malnutrition..    Previously, I performed the following operation on her: NONE    Patient had prior B2 antrectomy with gastrojejunostomy, Miles GJ; revised via gastrojejunal resection and reconnection.    I have done upper endoscopy; this documents retained food with large gastric pouch, representing a good portion of her stomach.  There is also a Miles GJ.    Current chief complaint: persistent inability to eat; malnutrition.    Longstanding problems.    Demetra Richardson overall has had the following complaints since the last visit: persistent problems with swallowing/vomiting; f/u of my endoscopy.        On exam:  /68 (BP Location: Left arm, Patient Position: Sitting, Cuff Size: Adult Small)   Pulse 78   Temp 98.1  F (36.7  C) (Oral)   Ht 1.6 m (5' 3\")   Wt 52.5 kg (115 lb 11.2 oz)   LMP  (LMP Unknown)   SpO2 98%   BMI 20.50 kg/m    Gen: alert, NAD  Lung exam: breathing unlabored  Abdominal exam: soft; nontender; nondistended; incisions c/d/i    LABS:     Last complete blood count:  Lab Results   Component Value Date    WBC 9.2 07/06/2021     Lab Results   Component Value Date    RBC 4.13 07/06/2021     Lab Results   Component Value Date    HGB 9.9 07/06/2021     Lab Results   Component Value Date    HCT 34.3 07/06/2021     Lab Results   Component Value Date    MCV 83 07/06/2021     Lab Results   Component Value Date    MCH 24.0 07/06/2021     Lab Results   Component Value Date    MCHC 28.9 07/06/2021     Lab Results   Component Value Date    RDW 18.1 07/06/2021     Lab Results   Component Value Date     07/06/2021       Lab Results   Component Value Date    AST 21 07/06/2021     Lab Results   Component Value Date    ALT 26 07/06/2021     Lab Results   Component Value Date    ALBUMIN 2.1 07/06/2021     Lab Results   Component Value Date    PROTTOTAL 6.2 07/06/2021       Last Basic " Metabolic Panel:  Lab Results   Component Value Date     07/06/2021      Lab Results   Component Value Date    POTASSIUM 4.2 07/06/2021     Lab Results   Component Value Date    CHLORIDE 106 07/06/2021     Lab Results   Component Value Date    RAFAELA 8.3 07/06/2021     Lab Results   Component Value Date    CO2 28 07/06/2021     Lab Results   Component Value Date    BUN 31 07/06/2021     Lab Results   Component Value Date    CR 0.58 07/06/2021     Lab Results   Component Value Date    GLC 78 07/06/2021       IMAGING:   N/a now    Upper endoscopy:  Reviewed with Radha    Plan:  Will need at least one month TPN.    Then, will do open revision gastrojejunostomy.  This will take about 5 hours of OR time and may require esophagojejunostomy.    No orders of the defined types were placed in this encounter.          Kaveh Clayton MD  Surgery  [phone] (hospital )  [phone] (clinic nurses)

## 2021-07-12 ENCOUNTER — VIRTUAL VISIT (OUTPATIENT)
Dept: FAMILY MEDICINE | Facility: CLINIC | Age: 66
End: 2021-07-12
Payer: COMMERCIAL

## 2021-07-12 ENCOUNTER — TELEPHONE (OUTPATIENT)
Dept: ENDOCRINOLOGY | Facility: CLINIC | Age: 66
End: 2021-07-12

## 2021-07-12 DIAGNOSIS — Z11.59 ENCOUNTER FOR SCREENING FOR OTHER VIRAL DISEASES: ICD-10-CM

## 2021-07-12 DIAGNOSIS — Z09 HOSPITAL DISCHARGE FOLLOW-UP: Primary | ICD-10-CM

## 2021-07-12 DIAGNOSIS — E88.09 HYPOALBUMINEMIA: ICD-10-CM

## 2021-07-12 PROBLEM — K31.84 GASTRIC ATONY: Status: ACTIVE | Noted: 2021-07-12

## 2021-07-12 PROCEDURE — 99214 OFFICE O/P EST MOD 30 MIN: CPT | Mod: TEL | Performed by: FAMILY MEDICINE

## 2021-07-12 NOTE — PROGRESS NOTES
Radha is a 65 year old who is being evaluated via a billable telephone visit.      What phone number would you like to be contacted at? In [facility]  How would you like to obtain your AVS? MyChart    Assessment & Plan     Hospital discharge follow-up  2 recent inpt stays rvwd, disucssed    Hypoalbuminemia  Tolerates TPN    We agree to make preop soon anticipating august surg (see Dr Clayton notes) she wants to do after August six for family reasons    35 minutes spent on the date of the encounter doing chart review, history and exam, documentation and further activities per the note             Fredy Merritt MD  [facility]   Radha is a 65 year old who presents for the following health issues     HPI Hosp f/u two inpt stays rvwd. Bad migraines, sees neuro soon. Anemia still, referred to GI sees next month. Low oxygen at night, referred to sleep clinic, needs set up, discussed.   Appetite good no n/v, one dark stool last week normal since, on hi dose ppi off asa  Working w/ surg office on date for surg  TPN going well now, last labs rvwd  Walks daily    Past Medical History:   Diagnosis Date     Anemia      Basal cell carcinoma     Left-sided, skin over over medial orbit/nasal bone. Removed Oct 2018.     Benzodiazepine dependence (H)     With h/o withdrawal seizure x 1     Bipolar 1 disorder, mixed, moderate (H) 2/7/2013     Chronic pain     Back, legs.     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     Epidural abscess 2005    x4     Fibromyalgia      Generalised anxiety disorder      GERD (gastroesophageal reflux disease)      Major depressive disorder      Migraine      Nephrolithiasis     S/p left sided lithotripsy     Opiate dependence (H)      Osteoarthritis      Osteoporosis      PUD (peptic ulcer disease)      SLE (systemic lupus erythematosus) (H) 2009     Weight loss      Past Surgical History:   Procedure Laterality Date     BACK  49 y/o female with PMH of HTN, HLD, hyperthyroid with multiple thyroid nodules on Methimazole, arthritis who presents to [facility] ED complaining of SOB for 3 days and dizziness     SUBJECTIVE / OVERNIGHT EVENTS: pt denies chest pain, shortness of breath -seen by ENT CT neck noted       MEDICATIONS  (STANDING):  atorvastatin 10 milliGRAM(s) Oral at bedtime  losartan 100 milliGRAM(s) Oral daily  methimazole 2.5 milliGRAM(s) Oral daily    MEDICATIONS  (PRN):  zolpidem 5 milliGRAM(s) Oral at bedtime PRN Insomnia  zolpidem 5 milliGRAM(s) Oral at bedtime PRN Insomnia    Vital Signs Last 24 Hrs  T(C): 36.7 (01 Oct 2019 21:53), Max: 36.7 (01 Oct 2019 05:31)  T(F): 98.1 (01 Oct 2019 21:53), Max: 98.1 (01 Oct 2019 05:31)  HR: 65 (01 Oct 2019 21:53) (65 - 75)  BP: 117/64 (01 Oct 2019 21:53) (112/59 - 132/42)  RR: 18 (01 Oct 2019 21:53) (17 - 19)  SpO2: 98% (01 Oct 2019 21:53) (98% - 100%)      I&O's Summary    30 Sep 2019 07:  -  01 Oct 2019 07:00  --------------------------------------------------------  IN: 0 mL / OUT: 0 mL / NET: 0 mL    01 Oct 2019 07:  -  01 Oct 2019 23:54  --------------------------------------------------------  IN: 0 mL / OUT: 850 mL / NET: -850 mL        Constitutional: No fever, fatigue  Skin: No rash.  Eyes: No recent vision problems or eye pain.  ENT: No congestion, ear pain, or sore throat.  Cardiovascular: No chest pain or palpation.  Respiratory: No cough, shortness of breath, congestion, or wheezing.  Gastrointestinal: No abdominal pain, nausea, vomiting, or diarrhea.  Genitourinary: No dysuria.  Musculoskeletal: No joint swelling.  Neurologic: No headache.    PHYSICAL EXAM:  GENERAL: NAD  EYES: EOMI, PERRLA  NECK: Supple, No JVD  CHEST/LUNG: cta chaparrita   HEART:  S1 , S2 +  ABDOMEN: soft , bs+  EXTREMITIES:  no edema  NEUROLOGY:alert awake oriented       LABS:                        13.6   7.28  )-----------( 288      ( 01 Oct 2019 05:28 )             43.1     10-    139  |  107  |  12  ----------------------------<  94  3.8   |  18<L>  |  0.58    Ca    9.2      01 Oct 2019 05:28  Mg     2.0     10-    TPro  7.7  /  Alb  4.6  /  TBili  0.4  /  DBili  x   /  AST  17  /  ALT  21  /  AlkPhos  60  09-30          Urinalysis Basic - ( 30 Sep 2019 01:07 )    Color: LIGHT YELLOW / Appearance: CLEAR / S.023 / pH: 8.0  Gluc: NEGATIVE / Ketone: SMALL  / Bili: NEGATIVE / Urobili: NORMAL   Blood: NEGATIVE / Protein: 20 / Nitrite: NEGATIVE   Leuk Esterase: NEGATIVE / RBC: 3-5 / WBC 0-2   Sq Epi: OCC / Non Sq Epi: x / Bacteria: NEGATIVE PRESENTING CC:    SUBJ:       PMH -reviewed admission note, no change since admission  Heart failure: acute [ ] chronic [ ] acute or chronic [ ] diastolic [ ] systolic [ ] combined systolic and diastolic[ ]  KATIA: ATN[ ] renal medullary necrosis [ ] CKD I [ ]CKDII [ ]CKD III [ ]CKD IV [ ]CKD V [ ]Other pathological lesions [ ]    MEDICATIONS  (STANDING):  atorvastatin 10 milliGRAM(s) Oral at bedtime  losartan 100 milliGRAM(s) Oral daily  methimazole 2.5 milliGRAM(s) Oral daily    MEDICATIONS  (PRN):  zolpidem 5 milliGRAM(s) Oral at bedtime PRN Insomnia  zolpidem 5 milliGRAM(s) Oral at bedtime PRN Insomnia          FAMILY HISTORY:  No pertinent family history in first degree relatives    No family history of premature coronary artery disease or sudden cardiac death      REVIEW OF SYSTEMS:  Constitutional: [ ] fever, [ ]weight loss,  [ ]fatigue  Eyes: [ ] visual changes  Respiratory: [ ]shortness of breath;  [ ] cough, [ ]wheezing, [ ]chills, [ ]hemoptysis  Cardiovascular: [ ] chest pain, [ ]palpitations, [ ]dizziness,  [ ]leg swelling[ ]orthopnea[ ]PND  Gastrointestinal: [ ] abdominal pain, [ ]nausea, [ ]vomiting,  [ ]diarrhea   Genitourinary: [ ] dysuria, [ ] hematuria  Neurologic: [ ] headaches [ ] tremors[ ]weakness  Skin: [ ] itching, [ ]burning, [ ] rashes  Endocrine: [ ] heat or cold intolerance  Musculoskeletal: [ ] joint pain or swelling; [ ] muscle, back, or extremity pain  Psychiatric: [ ] depression, [ ]anxiety, [ ]mood swings, or [ ]difficulty sleeping  Hematologic: [ ] easy bruising, [ ] bleeding gums    [x] All remaining systems negative except as per above.   [ ]Unable to obtain.    Vital Signs Last 24 Hrs  T(C): 36.7 (02 Oct 2019 12:45), Max: 36.7 (01 Oct 2019 21:53)  T(F): 98.1 (02 Oct 2019 12:45), Max: 98.1 (01 Oct 2019 21:53)  HR: 76 (02 Oct 2019 12:45) (65 - 76)  BP: 105/54 (02 Oct 2019 12:45) (105/54 - 120/66)  BP(mean): --  RR: 18 (02 Oct 2019 12:45) (16 - 18)  SpO2: 100% (02 Oct 2019 12:45) (97% - 100%)  I&O's Summary    01 Oct 2019 07:01  -  02 Oct 2019 07:00  --------------------------------------------------------  IN: 0 mL / OUT: 1500 mL / NET: -1500 mL        PHYSICAL EXAM:  General: No acute distress BMI-  HEENT: EOMI, PERRL  Neck: Supple, [ ] JVD  Lungs: Equal air entry bilaterally; [ ] rales [ ] wheezing [ ] rhonchi  Heart: Regular rate and rhythm; [ ] murmur   /6 [ ] systolic [ ] diastolic [ ] radiation[ ] rubs [ ]  gallops  Abdomen: Nontender, bowel sounds present  Extremities: No clubbing, cyanosis, [ ] edema  Nervous system:  Alert & Oriented X3, no focal deficits  Psychiatric: Normal affect  Skin: No rashes or lesions    LABS:  10-01    139  |  107  |  12  ----------------------------<  94  3.8   |  18<L>  |  0.58    Ca    9.2      01 Oct 2019 05:28  Mg     2.0     10-01      Creatinine Trend: 0.58<--, 0.63<--                        13.6   7.28  )-----------( 288      ( 01 Oct 2019 05:28 )             43.1       Lipid Panel:   Cardiac Enzymes:           RADIOLOGY:    ECG [my interpretation]:    TELEMETRY:    ECHO:    STRESS TEST:    CATHETERIZATION:      IMPRESSION AND PLAN: SUBJECTIVE / OVERNIGHT EVENTS: pt denies chest pain, shortness of breath     MEDICATIONS  (STANDING):  atorvastatin 10 milliGRAM(s) Oral at bedtime  losartan 100 milliGRAM(s) Oral daily  methimazole 2.5 milliGRAM(s) Oral daily    MEDICATIONS  (PRN):  zolpidem 5 milliGRAM(s) Oral at bedtime PRN Insomnia  zolpidem 5 milliGRAM(s) Oral at bedtime PRN Insomnia    Vital Signs Last 24 Hrs  T(C): 36.7 (02 Oct 2019 12:45), Max: 36.7 (02 Oct 2019 01:12)  T(F): 98.1 (02 Oct 2019 12:45), Max: 98.1 (02 Oct 2019 05:28)  HR: 76 (02 Oct 2019 12:45) (67 - 76)  BP: 105/54 (02 Oct 2019 12:45) (105/54 - 120/66)  BP(mean): --  RR: 18 (02 Oct 2019 12:45) (16 - 18)  SpO2: 100% (02 Oct 2019 12:45) (97% - 100%)    Constitutional: No fever, fatigue  Skin: No rash.  Eyes: No recent vision problems or eye pain.  ENT: No congestion, ear pain, or sore throat.  Cardiovascular: No chest pain or palpation.  Respiratory: No cough, shortness of breath, congestion, or wheezing.  Gastrointestinal: No abdominal pain, nausea, vomiting, or diarrhea.  Genitourinary: No dysuria.  Musculoskeletal: No joint swelling.  Neurologic: No headache.    PHYSICAL EXAM:  GENERAL: NAD  EYES: EOMI, PERRLA  NECK: Supple, No JVD  CHEST/LUNG: cta chaparrita   HEART:  S1 , S2 +  ABDOMEN: soft , bs+  EXTREMITIES:  no edema  NEUROLOGY:alert awake oriented       LABS:  10-01    139  |  107  |  12  ----------------------------<  94  3.8   |  18<L>  |  0.58    Ca    9.2      01 Oct 2019 05:28  Mg     2.0     10-01      Creatinine Trend: 0.58 <--, 0.63 <--                        13.6   7.28  )-----------( 288      ( 01 Oct 2019 05:28 )             43.1     Urine Studies:  Urinalysis Basic - ( 30 Sep 2019 01:07 )    Color: LIGHT YELLOW / Appearance: CLEAR / S.023 / pH: 8.0  Gluc: NEGATIVE / Ketone: SMALL  / Bili: NEGATIVE / Urobili: NORMAL   Blood: NEGATIVE / Protein: 20 / Nitrite: NEGATIVE   Leuk Esterase: NEGATIVE / RBC: 3-5 / WBC 0-2   Sq Epi: OCC / Non Sq Epi:  / Bacteria: NEGATIVE                  Urinalysis Basic - ( 30 Sep 2019 01:07 )    Color: LIGHT YELLOW / Appearance: CLEAR / S.023 / pH: 8.0  Gluc: NEGATIVE / Ketone: SMALL  / Bili: NEGATIVE / Urobili: NORMAL   Blood: NEGATIVE / Protein: 20 / Nitrite: NEGATIVE   Leuk Esterase: NEGATIVE / RBC: 3-5 / WBC 0-2   Sq Epi: OCC / Non Sq Epi: x / Bacteria: NEGATIVE        RADIOLOGY & ADDITIONAL TESTS:    Imaging Personally Reviewed:    Consultant(s) Notes Reviewed:      Care Discussed with Consultants/Other Providers: SURGERY  04    L4-5 epidural abscess     BACK SURGERY  04    L3-4 spinal stenosis     BACK SURGERY  04    Lt psoas abscess     BRONCHOSCOPY FLEXIBLE N/A 2019    Procedure: Flexible Bronchoscopy;  Surgeon: Patrice Regalado MD;  Location: [facility]      SECTION      x 2     CHOLECYSTECTOMY  2-     CLOSED REDUCTION, PERCUTANEOUS PINNING FINGER, COMBINED  8/10/2011    Procedure:COMBINED CLOSED REDUCTION, PERCUTANEOUS PINNING FINGER; 5th Proximal Phalanx; Surgeon:RADHA BUITRAGO; Location[facility]     COLONOSCOPY       COLONOSCOPY N/A 2020    Procedure: COLONOSCOPY;  Surgeon: Zacarias Duran MD;  Location: [facility]     ESOPHAGOSCOPY, GASTROSCOPY, DUODENOSCOPY (EGD), COMBINED N/A 2020    Procedure: ESOPHAGOGASTRODUODENOSCOPY, WITH BIOPSY;  Surgeon: Zacarias Duran MD;  Location: [facility]     ESOPHAGOSCOPY, GASTROSCOPY, DUODENOSCOPY (EGD), COMBINED N/A 2021    Procedure: ESOPHAGOGASTRODUODENOSCOPY (EGD);  Surgeon: Kaveh Clayton MD;  Location: [facility]     GASTRECTOMY  2005    Bilat truncal vagotomy, hemigastrectomy, RnY gastrojejunostomy     GASTROJEJUNOSTOMY  2011    Procedure:GASTROJEJUNOSTOMY; exploratory laparotmy with revision of gastrojejunostomy, Antrectomy, Miles-en-y, Gastrojejunostomy; Surgeon:JULIUS HELM; Location:[facility] OR     INSERT CHEST TUBE N/A 2019    Procedure: INSERTION, CATHETER, INTERCOSTAL, FOR DRAINAGE;  Surgeon: Melissa Nichols MD;  Location: [facility]     LASER HOLMIUM LITHOTRIPSY URETER(S), INSERT STENT, COMBINED      Procedure: COMBINED CYSTOSCOPY, URETEROSCOPY, LASER HOLMIUM LITHOTRIPSY URETER(S), INSERT STENT;;  Surgeon: Blair Correa MD;  [facility]: [facility]     LASER HOLMIUM NEPHROLITHOTOMY VIA PERCUTANEOUS NEPHROSTOMY  2012    Procedure: LASER HOLMIUM NEPHROLITHOTOMY VIA PERCUTANEOUS NEPHROSTOMY;  proceedure should read: Left Percutaneous Access, Left Percutaneous ultrasonic Nephrolithotomy, Ureteroscopy  Holmium Laser Lithotripsy Stent Placement ;  Surgeon: Blair Correa MD;  [facility]: [facility]     MAMMOPLASTY AUGMENTATION BILATERAL       OPEN REDUCTION INTERNAL FIXATION WRIST Left 1/11/2016    Procedure: OPEN REDUCTION INTERNAL FIXATION WRIST;  Surgeon: Darling Ellis MD;  Location: [facility]     RECONSTRUCT BREAST, IMPLANT PROSTHESIS, COMBINED       THORACOSCOPIC DECORTICATION LUNG Left 5/7/2019    Procedure: Left Video Assisted Thoracoscopic Decortication, Intercostal Nerve Cryo Analgesia, Flexible Bronchoscopy;  Surgeon: Patrice Regalado MD;  Location: [facility] OR     Current Outpatient Medications   Medication     acetaminophen (TYLENOL) 325 MG tablet     escitalopram (LEXAPRO) 20 MG tablet     hydroxychloroquine (PLAQUENIL) 200 MG tablet     hydrOXYzine (VISTARIL) 25 MG capsule     methadone (DOLOPHINE-INTENSOL) 10 MG/ML (HIGH CONC) solution     pantoprazole (PROTONIX) 40 MG EC tablet     parenteral nutrition - PTA/DISCHARGE ORDER     polyethylene glycol (MIRALAX) 17 GM/Dose powder     pregabalin (LYRICA) 150 MG capsule     valACYclovir (VALTREX) 500 MG tablet     valACYclovir (VALTREX) 500 MG tablet     ferrous sulfate (FEROSUL) 325 (65 Fe) MG tablet     folic acid (FOLVITE) 1 MG tablet     methylPREDNISolone (MEDROL) 4 MG tablet     methylPREDNISolone (MEDROL) 4 MG tablet     methylPREDNISolone (MEDROL) 4 MG tablet     methylPREDNISolone (MEDROL) 4 MG tablet     vitamin D2 (ERGOCALCIFEROL) 53354 units (1250 mcg) capsule     No current facility-administered medications for this visit.     Allergies   Allergen Reactions     Penicillins Hives     Hives in childhood.     Social History     Socioeconomic History     Marital status: Single     Spouse name: Not on file     Number of children: Not on file     Years of education: Not on file     Highest education level: Not on file   Occupational History     Not on file   Tobacco Use     Smoking status: Never Smoker     Smokeless tobacco: Never Used    Substance and Sexual Activity     Alcohol use: No     Comment: none in 10 years     Drug use: No     Sexual activity: Not Currently   Other Topics Concern     Parent/sibling w/ CABG, MI or angioplasty before 65F 55M? Not Asked   Social History Narrative    Intake 4/16/15:        H/o divorce but most recently living with SO Alfonso. Alfonso recently passed away.         In past employed as a .         Tobacco use: denies    Alcohol use: denies    Drug use: daily pot use for 30 years. Currently with sobriety.          Social Determinants of Health     Financial Resource Strain:      Difficulty of Paying Living Expenses:    Food Insecurity:      Worried About Running Out of Food in the Last Year:      Ran Out of Food in the Last Year:    Transportation Needs:      Lack of Transportation (Medical):      Lack of Transportation (Non-Medical):    Physical Activity:      Days of Exercise per Week:      Minutes of Exercise per Session:    Stress:      Feeling of Stress :    Social Connections:      Frequency of Communication with Friends and Family:      Frequency of Social Gatherings with Friends and Family:      Attends Hoahaoism Services:      Active Member of Clubs or Organizations:      Attends Club or Organization Meetings:      Marital Status:    Intimate Partner Violence:      Fear of Current or Ex-Partner:      Emotionally Abused:      Physically Abused:      Sexually Abused:      Family History   Problem Relation Age of Onset     Substance Abuse Mother      Family History Negative Father      Substance Abuse Maternal Grandfather      Substance Abuse Brother      Liver Disease No family hx of      Colon Cancer No family hx of      Ulcerative Colitis No family hx of      Crohn's Disease No family hx of              Review of Systems         Objective           Vitals:  No vitals were obtained today due to virtual visit.    Physical Exam   healthy, alert and no distress  PSYCH: Alert and oriented times 3;  coherent speech, normal   rate and volume, able to articulate logical thoughts, able   to abstract reason, no tangential thoughts, no hallucinations   or delusions  Her affect is normal  RESP: No cough, no audible wheezing, able to talk in full sentences  Remainder of exam unable to be completed due to telephone visits        Phone call duration: 29 minutes     SUBJECTIVE / OVERNIGHT EVENTS: pt denies chest pain, shortness of breath       MEDICATIONS  (STANDING):  atorvastatin 10 milliGRAM(s) Oral at bedtime  losartan 100 milliGRAM(s) Oral daily  methimazole 2.5 milliGRAM(s) Oral daily    MEDICATIONS  (PRN):  zolpidem 5 milliGRAM(s) Oral at bedtime PRN Insomnia  zolpidem 5 milliGRAM(s) Oral at bedtime PRN Insomnia    Vital Signs Last 24 Hrs  T(C): 36.7 (01 Oct 2019 21:53), Max: 36.7 (01 Oct 2019 05:31)  T(F): 98.1 (01 Oct 2019 21:53), Max: 98.1 (01 Oct 2019 05:31)  HR: 65 (01 Oct 2019 21:53) (65 - 75)  BP: 117/64 (01 Oct 2019 21:53) (112/59 - 132/42)  BP(mean): --  RR: 18 (01 Oct 2019 21:53) (17 - 19)  SpO2: 98% (01 Oct 2019 21:53) (98% - 100%)    CAPILLARY BLOOD GLUCOSE        I&O's Summary    30 Sep 2019 07:  -  01 Oct 2019 07:00  --------------------------------------------------------  IN: 0 mL / OUT: 0 mL / NET: 0 mL    01 Oct 2019 07:  -  01 Oct 2019 23:54  --------------------------------------------------------  IN: 0 mL / OUT: 850 mL / NET: -850 mL        Constitutional: No fever, fatigue  Skin: No rash.  Eyes: No recent vision problems or eye pain.  ENT: No congestion, ear pain, or sore throat.  Cardiovascular: No chest pain or palpation.  Respiratory: No cough, shortness of breath, congestion, or wheezing.  Gastrointestinal: No abdominal pain, nausea, vomiting, or diarrhea.  Genitourinary: No dysuria.  Musculoskeletal: No joint swelling.  Neurologic: No headache.    PHYSICAL EXAM:  GENERAL: NAD  EYES: EOMI, PERRLA  NECK: Supple, No JVD  CHEST/LUNG: cta chaparrita   HEART:  S1 , S2 +  ABDOMEN: soft , bs+  EXTREMITIES:  no edema  NEUROLOGY:alert awake oriented       LABS:                        13.6   7.28  )-----------( 288      ( 01 Oct 2019 05:28 )             43.1     10-    139  |  107  |  12  ----------------------------<  94  3.8   |  18<L>  |  0.58    Ca    9.2      01 Oct 2019 05:28  Mg     2.0     10-01    TPro  7.7  /  Alb  4.6  /  TBili  0.4  /  DBili  x   /  AST  17  /  ALT  21  /  AlkPhos  60  09-30          Urinalysis Basic - ( 30 Sep 2019 01:07 )    Color: LIGHT YELLOW / Appearance: CLEAR / S.023 / pH: 8.0  Gluc: NEGATIVE / Ketone: SMALL  / Bili: NEGATIVE / Urobili: NORMAL   Blood: NEGATIVE / Protein: 20 / Nitrite: NEGATIVE   Leuk Esterase: NEGATIVE / RBC: 3-5 / WBC 0-2   Sq Epi: OCC / Non Sq Epi: x / Bacteria: NEGATIVE        RADIOLOGY & ADDITIONAL TESTS:    Imaging Personally Reviewed:    Consultant(s) Notes Reviewed:      Care Discussed with Consultants/Other Providers:

## 2021-07-12 NOTE — TELEPHONE ENCOUNTER
Left VM message stating I was able to get OR time on 7/26 for her case with Dr. Clayton, start time 7:30 a.m. Asked her to call me back to schedule pre-op H&P and COVID testing.

## 2021-07-12 NOTE — NURSING NOTE
Chief Complaint   Patient presents with     Recheck Medication     pt would like to follow up from [facility]       RichardBanner Isa, PATRICIA, EMT at 7:38 AM on 7/12/2021.

## 2021-07-13 ENCOUNTER — LAB REQUISITION (OUTPATIENT)
Dept: LAB | Facility: CLINIC | Age: 66
End: 2021-07-13
Payer: COMMERCIAL

## 2021-07-13 ENCOUNTER — HOME INFUSION (PRE-WILLOW HOME INFUSION) (OUTPATIENT)
Dept: PHARMACY | Facility: CLINIC | Age: 66
End: 2021-07-13

## 2021-07-13 ENCOUNTER — TELEPHONE (OUTPATIENT)
Dept: ENDOCRINOLOGY | Facility: CLINIC | Age: 66
End: 2021-07-13

## 2021-07-13 ENCOUNTER — MEDICAL CORRESPONDENCE (OUTPATIENT)
Dept: HEALTH INFORMATION MANAGEMENT | Facility: CLINIC | Age: 66
End: 2021-07-13

## 2021-07-13 ENCOUNTER — PATIENT OUTREACH (OUTPATIENT)
Dept: ENDOCRINOLOGY | Facility: CLINIC | Age: 66
End: 2021-07-13

## 2021-07-13 DIAGNOSIS — R11.2 NAUSEA WITH VOMITING, UNSPECIFIED: ICD-10-CM

## 2021-07-13 LAB
ALBUMIN SERPL-MCNC: 2.4 G/DL (ref 3.4–5)
ALP SERPL-CCNC: 226 U/L (ref 40–150)
ALT SERPL W P-5'-P-CCNC: 43 U/L (ref 0–50)
ANION GAP SERPL CALCULATED.3IONS-SCNC: 3 MMOL/L (ref 3–14)
AST SERPL W P-5'-P-CCNC: 21 U/L (ref 0–45)
BASOPHILS # BLD AUTO: 0.1 10E3/UL (ref 0–0.2)
BASOPHILS NFR BLD AUTO: 1 %
BILIRUB DIRECT SERPL-MCNC: <0.1 MG/DL (ref 0–0.2)
BILIRUB SERPL-MCNC: 0.2 MG/DL (ref 0.2–1.3)
BILIRUB SERPL-MCNC: 0.2 MG/DL (ref 0.2–1.3)
BUN SERPL-MCNC: 39 MG/DL (ref 7–30)
CALCIUM SERPL-MCNC: 8.2 MG/DL (ref 8.5–10.1)
CHLORIDE BLD-SCNC: 108 MMOL/L (ref 94–109)
CO2 SERPL-SCNC: 28 MMOL/L (ref 20–32)
CREAT SERPL-MCNC: 0.8 MG/DL (ref 0.52–1.04)
EOSINOPHIL # BLD AUTO: 1.2 10E3/UL (ref 0–0.7)
EOSINOPHIL NFR BLD AUTO: 11 %
ERYTHROCYTE [DISTWIDTH] IN BLOOD BY AUTOMATED COUNT: 19.6 % (ref 10–15)
FASTING STATUS PATIENT QL REPORTED: NORMAL
GFR SERPL CREATININE-BSD FRML MDRD: 78 ML/MIN/1.73M2
GLUCOSE BLD-MCNC: 91 MG/DL (ref 70–99)
HCT VFR BLD AUTO: 35.2 % (ref 35–47)
HGB BLD-MCNC: 10.2 G/DL (ref 11.7–15.7)
HOLD SPECIMEN: NORMAL
IMM GRANULOCYTES # BLD: 0.1 10E3/UL
IMM GRANULOCYTES NFR BLD: 1 %
LYMPHOCYTES # BLD AUTO: 1.3 10E3/UL (ref 0.8–5.3)
LYMPHOCYTES NFR BLD AUTO: 12 %
MAGNESIUM SERPL-MCNC: 2.2 MG/DL (ref 1.6–2.3)
MCH RBC QN AUTO: 24.3 PG (ref 26.5–33)
MCHC RBC AUTO-ENTMCNC: 29 G/DL (ref 31.5–36.5)
MCV RBC AUTO: 84 FL (ref 78–100)
MONOCYTES # BLD AUTO: 1 10E3/UL (ref 0–1.3)
MONOCYTES NFR BLD AUTO: 9 %
NEUTROPHILS # BLD AUTO: 7.6 10E3/UL (ref 1.6–8.3)
NEUTROPHILS NFR BLD AUTO: 66 %
NRBC # BLD AUTO: 0 10E3/UL
NRBC BLD AUTO-RTO: 0 /100
PHOSPHATE SERPL-MCNC: 4.2 MG/DL (ref 2.5–4.5)
PLATELET # BLD AUTO: 394 10E3/UL (ref 150–450)
POTASSIUM BLD-SCNC: 4.1 MMOL/L (ref 3.4–5.3)
PROT SERPL-MCNC: 6.3 G/DL (ref 6.8–8.8)
RBC # BLD AUTO: 4.2 10E6/UL (ref 3.8–5.2)
SODIUM SERPL-SCNC: 139 MMOL/L (ref 133–144)
TRIGL SERPL-MCNC: 112 MG/DL
WBC # BLD AUTO: 11.3 10E3/UL (ref 4–11)

## 2021-07-13 PROCEDURE — 36592 COLLECT BLOOD FROM PICC: CPT | Mod: ORL | Performed by: SURGERY

## 2021-07-13 PROCEDURE — 85025 COMPLETE CBC W/AUTO DIFF WBC: CPT | Performed by: SURGERY

## 2021-07-13 PROCEDURE — 84100 ASSAY OF PHOSPHORUS: CPT | Mod: ORL | Performed by: SURGERY

## 2021-07-13 PROCEDURE — 84478 ASSAY OF TRIGLYCERIDES: CPT | Performed by: SURGERY

## 2021-07-13 PROCEDURE — 82248 BILIRUBIN DIRECT: CPT | Mod: ORL | Performed by: SURGERY

## 2021-07-13 PROCEDURE — 83735 ASSAY OF MAGNESIUM: CPT | Mod: ORL | Performed by: SURGERY

## 2021-07-13 PROCEDURE — 80053 COMPREHEN METABOLIC PANEL: CPT | Mod: ORL | Performed by: SURGERY

## 2021-07-13 NOTE — TELEPHONE ENCOUNTER
"Patient called back to discuss surgery date. She feels she cannot do 7/26 as her son is having his \"white coat\" ceremony on 8/6 and is worried something may come up and she will have to stay in the hospital longer. She feels she cannot disappoint her son by not being at the ceremony. She wonders if she can have the surgery about 6 weeks after last hospital discharge. Discussed with Dr. Clayton. He states she can wait until September. Will work with finding a later surgery date and get back to patient.   "

## 2021-07-13 NOTE — TELEPHONE ENCOUNTER
Left message for Erin from  [facility] to call office regarding questions.  Patient is using oxygen at home.  Sat before O2 was 85%.  On O2 at 2 L she is at 96%.  They are wondering if there are any additional orders.

## 2021-07-13 NOTE — PROGRESS NOTES
"Patient inquiring about surgery date.  States her son has a \"white coat ceremony\" for becoming a Physical Therapist.  This is occurring in August.  Patient notified that she is currently scheduled for 9/10/21 for her surgery.    "

## 2021-07-14 NOTE — PROGRESS NOTES
This is a recent snapshot of the patient's [facility] medical record.  For current drug dose and complete information and questions, call [phone] or In Basket pool, [facility] ([id number])  CSN Number:  [id number]

## 2021-07-15 ENCOUNTER — HOME INFUSION (PRE-WILLOW HOME INFUSION) (OUTPATIENT)
Dept: PHARMACY | Facility: CLINIC | Age: 66
End: 2021-07-15

## 2021-07-15 NOTE — TELEPHONE ENCOUNTER
Erin notified that the orders for O2 should come from patient's PCP. Sleep referral was placed when patient was in the hospital.  Dr. Merritt's nurse notified to follow up with patient.

## 2021-07-16 ENCOUNTER — HOME INFUSION (PRE-WILLOW HOME INFUSION) (OUTPATIENT)
Dept: PHARMACY | Facility: CLINIC | Age: 66
End: 2021-07-16

## 2021-07-16 NOTE — PROGRESS NOTES
This is a recent snapshot of the patient's [facility] medical record.  For current drug dose and complete information and questions, call [phone] or In Basket pool, [facility] ([id number])  CSN Number:  [id number]

## 2021-07-18 ENCOUNTER — PRE VISIT (OUTPATIENT)
Dept: NEUROLOGY | Facility: CLINIC | Age: 66
End: 2021-07-18

## 2021-07-18 NOTE — TELEPHONE ENCOUNTER
FUTURE VISIT INFORMATION      FUTURE VISIT INFORMATION:    Date: 7/22/2021    Time: 230pm    Location: [facility]  REFERRAL INFORMATION:    Referring provider:  Dr. Wilburn     Referring providers clinic:  [facility]     Reason for visit/diagnosis  Migraines     RECORDS REQUESTED FROM:       Clinic name Comments Records Status Imaging Status   Internal ED Visit-6/29/2021    Dr. Merritt-7/12/2021    [facility] Care Everywhere Requested to PACS                       7/19/2021-Request for Images faxed to [employer]-MR @ 515am    7/22/2021-[employer] Images now in PACS-MR @ 641am

## 2021-07-19 ENCOUNTER — TELEPHONE (OUTPATIENT)
Dept: FAMILY MEDICINE | Facility: CLINIC | Age: 66
End: 2021-07-19

## 2021-07-19 ENCOUNTER — MEDICAL CORRESPONDENCE (OUTPATIENT)
Dept: HEALTH INFORMATION MANAGEMENT | Facility: CLINIC | Age: 66
End: 2021-07-19

## 2021-07-20 ENCOUNTER — LAB REQUISITION (OUTPATIENT)
Dept: LAB | Facility: CLINIC | Age: 66
End: 2021-07-20
Payer: COMMERCIAL

## 2021-07-20 DIAGNOSIS — R11.2 NAUSEA WITH VOMITING, UNSPECIFIED: ICD-10-CM

## 2021-07-20 LAB
ALBUMIN SERPL-MCNC: 2.2 G/DL (ref 3.4–5)
ALP SERPL-CCNC: 148 U/L (ref 40–150)
ALT SERPL W P-5'-P-CCNC: 48 U/L (ref 0–50)
ANION GAP SERPL CALCULATED.3IONS-SCNC: 3 MMOL/L (ref 3–14)
AST SERPL W P-5'-P-CCNC: 82 U/L (ref 0–45)
BASOPHILS # BLD AUTO: 0.1 10E3/UL (ref 0–0.2)
BASOPHILS NFR BLD AUTO: 1 %
BILIRUB DIRECT SERPL-MCNC: <0.1 MG/DL (ref 0–0.2)
BILIRUB SERPL-MCNC: 0.1 MG/DL (ref 0.2–1.3)
BILIRUB SERPL-MCNC: 0.1 MG/DL (ref 0.2–1.3)
BUN SERPL-MCNC: 21 MG/DL (ref 7–30)
CALCIUM SERPL-MCNC: 8 MG/DL (ref 8.5–10.1)
CHLORIDE BLD-SCNC: 113 MMOL/L (ref 94–109)
CO2 SERPL-SCNC: 25 MMOL/L (ref 20–32)
CREAT SERPL-MCNC: 0.69 MG/DL (ref 0.52–1.04)
EOSINOPHIL # BLD AUTO: 0.9 10E3/UL (ref 0–0.7)
EOSINOPHIL NFR BLD AUTO: 10 %
ERYTHROCYTE [DISTWIDTH] IN BLOOD BY AUTOMATED COUNT: 20.4 % (ref 10–15)
FASTING STATUS PATIENT QL REPORTED: NORMAL
GFR SERPL CREATININE-BSD FRML MDRD: >90 ML/MIN/1.73M2
GLUCOSE BLD-MCNC: 69 MG/DL (ref 70–99)
HCT VFR BLD AUTO: 31.8 % (ref 35–47)
HGB BLD-MCNC: 9.3 G/DL (ref 11.7–15.7)
HOLD SPECIMEN: NORMAL
IMM GRANULOCYTES # BLD: 0.1 10E3/UL
IMM GRANULOCYTES NFR BLD: 1 %
LYMPHOCYTES # BLD AUTO: 1 10E3/UL (ref 0.8–5.3)
LYMPHOCYTES NFR BLD AUTO: 11 %
MAGNESIUM SERPL-MCNC: 1.9 MG/DL (ref 1.6–2.3)
MCH RBC QN AUTO: 24.4 PG (ref 26.5–33)
MCHC RBC AUTO-ENTMCNC: 29.2 G/DL (ref 31.5–36.5)
MCV RBC AUTO: 84 FL (ref 78–100)
MONOCYTES # BLD AUTO: 0.7 10E3/UL (ref 0–1.3)
MONOCYTES NFR BLD AUTO: 8 %
NEUTROPHILS # BLD AUTO: 6.6 10E3/UL (ref 1.6–8.3)
NEUTROPHILS NFR BLD AUTO: 69 %
NRBC # BLD AUTO: 0 10E3/UL
NRBC BLD AUTO-RTO: 0 /100
PHOSPHATE SERPL-MCNC: 3.4 MG/DL (ref 2.5–4.5)
PLATELET # BLD AUTO: 249 10E3/UL (ref 150–450)
POTASSIUM BLD-SCNC: 4 MMOL/L (ref 3.4–5.3)
PROT SERPL-MCNC: 5.8 G/DL (ref 6.8–8.8)
RBC # BLD AUTO: 3.81 10E6/UL (ref 3.8–5.2)
SODIUM SERPL-SCNC: 141 MMOL/L (ref 133–144)
TRIGL SERPL-MCNC: 75 MG/DL
WBC # BLD AUTO: 9.4 10E3/UL (ref 4–11)

## 2021-07-20 PROCEDURE — 80053 COMPREHEN METABOLIC PANEL: CPT | Performed by: SURGERY

## 2021-07-20 PROCEDURE — 84100 ASSAY OF PHOSPHORUS: CPT | Mod: ORL | Performed by: SURGERY

## 2021-07-20 PROCEDURE — 83735 ASSAY OF MAGNESIUM: CPT | Mod: ORL | Performed by: SURGERY

## 2021-07-20 PROCEDURE — 36592 COLLECT BLOOD FROM PICC: CPT | Mod: ORL | Performed by: SURGERY

## 2021-07-20 PROCEDURE — 85025 COMPLETE CBC W/AUTO DIFF WBC: CPT | Mod: ORL | Performed by: SURGERY

## 2021-07-20 PROCEDURE — 84478 ASSAY OF TRIGLYCERIDES: CPT | Performed by: SURGERY

## 2021-07-20 PROCEDURE — 82248 BILIRUBIN DIRECT: CPT | Mod: ORL | Performed by: SURGERY

## 2021-07-21 ENCOUNTER — HOME INFUSION (PRE-WILLOW HOME INFUSION) (OUTPATIENT)
Dept: PHARMACY | Facility: CLINIC | Age: 66
End: 2021-07-21

## 2021-07-21 ENCOUNTER — TELEPHONE (OUTPATIENT)
Dept: INTERNAL MEDICINE | Facility: CLINIC | Age: 66
End: 2021-07-21

## 2021-07-21 ENCOUNTER — OFFICE VISIT (OUTPATIENT)
Dept: FAMILY MEDICINE | Facility: CLINIC | Age: 66
End: 2021-07-21
Payer: COMMERCIAL

## 2021-07-21 VITALS
HEIGHT: 62 IN | OXYGEN SATURATION: 96 % | HEART RATE: 67 BPM | SYSTOLIC BLOOD PRESSURE: 109 MMHG | WEIGHT: 119.5 LBS | BODY MASS INDEX: 21.99 KG/M2 | DIASTOLIC BLOOD PRESSURE: 62 MMHG

## 2021-07-21 DIAGNOSIS — R01.1 HEART MURMUR: ICD-10-CM

## 2021-07-21 DIAGNOSIS — R79.81 LOW OXYGEN SATURATION: ICD-10-CM

## 2021-07-21 DIAGNOSIS — D50.9 IRON DEFICIENCY ANEMIA, UNSPECIFIED IRON DEFICIENCY ANEMIA TYPE: Primary | ICD-10-CM

## 2021-07-21 DIAGNOSIS — R47.9 SPEECH DISTURBANCE, UNSPECIFIED TYPE: ICD-10-CM

## 2021-07-21 PROCEDURE — 99215 OFFICE O/P EST HI 40 MIN: CPT | Performed by: FAMILY MEDICINE

## 2021-07-21 ASSESSMENT — MIFFLIN-ST. JEOR: SCORE: 1045.3

## 2021-07-21 ASSESSMENT — PAIN SCALES - GENERAL: PAINLEVEL: NO PAIN (0)

## 2021-07-21 NOTE — TELEPHONE ENCOUNTER
Spoke with [facility] nurseErin and informed the recommendation. There was no specific circumstance when checking O2sat. She will recheck oxygen when visiting the pt.    Soon-Mi  ---------------------------------------------------------------------------------------------------        ----- Message from Fredy Merritt MD sent at 7/21/2021  2:50 PM CDT -----  Her home nurse said home oxygnen 75. Here normal. Can you ask that nurse what the circumstance was when it was so long and have her do it next visit and let us know?

## 2021-07-21 NOTE — PROGRESS NOTES
Assessment & Plan     Iron deficiency anemia, unspecified iron deficiency anemia type  If low consider IV iron along w/ TPN if can do so, discussed if can normalize po intake and absorption post GI surgery will improve  - Iron and iron binding capacity; Future  - Ferritin; Future  - Ferritin  - Iron and iron binding capacity    Heart murmur  Recent echo no sig problems, anemia a factor    Low oxygen saturation  As per HPIV    Speech disturbance, unspecified type  Discus at neuro tmrw, normal in visit speech        44 minutes spent on the date of the encounter doing chart review, history and exam, documentation and further activities per the note    Fredy Merritt MD  [facility] FLOR Garcia is a 65 year old who presents for the following health issues  accompanied by her partner:    HPI 1-per pt using home oxygen at night (inpt noted oxygen low nights-has not set up sleep clinic visit yet, knows to do, wants to do after gastric surgery planned in Sept). There is Epic msg that recent HHN visit she had oxygen 75 sat on room air; I've asked my RN to communicate w/ HHN as to the circumstance of that and to continue to check it. No history copd, no heart disease history, has anemia but not low sat or SOB now in clinic. Is on methadone program, discussed conceivably has central sleep apnea from that but would need sleep work up to determine.   2-sees rheum soon again; discussed plan was for her to use hydroxychloroquine, not doing so, encouraged to use it  3-last few weeks some trouble w/ word finding; MR 2016 brain done for same concern. Rvwd methadone and/or lyrica could both cloud thinking at times. Sees Neurology tmrw, encouraged to discuss at that visit.  4-anemia: on TPN some po too till Sept surgery w/ plan then to eat normal diet, although absorption might still be limited due to surgery alteration. Check iron levels, consider if needs IV iron.  5-will make  sure has timely preop w/ me    Past Medical History:   Diagnosis Date     Anemia      Basal cell carcinoma     Left-sided, skin over over medial orbit/nasal bone. Removed Oct 2018.     Benzodiazepine dependence (H)     With h/o withdrawal seizure x 1     Bipolar 1 disorder, mixed, moderate (H) 2013     Chronic pain     Back, legs.     Depressive disorder      Eosinophilic gastroenteritis 2010     Epidural abscess 2005    x4     Fibromyalgia      Generalised anxiety disorder      GERD (gastroesophageal reflux disease)      Major depressive disorder      Migraine      Nephrolithiasis     S/p left sided lithotripsy     Opiate dependence (H)      Osteoarthritis      Osteoporosis      PUD (peptic ulcer disease)      SLE (systemic lupus erythematosus) (H) 2009     Weight loss      Past Surgical History:   Procedure Laterality Date     BACK SURGERY  04    L4-5 epidural abscess     BACK SURGERY  04    L3-4 spinal stenosis     BACK SURGERY  04    Lt psoas abscess     BRONCHOSCOPY FLEXIBLE N/A 2019    Procedure: Flexible Bronchoscopy;  Surgeon: Patrice Regalado MD;  [facility]      SECTION      x 2     CHOLECYSTECTOMY  -     CLOSED REDUCTION, PERCUTANEOUS PINNING FINGER, COMBINED  8/10/2011    Procedure:COMBINED CLOSED REDUCTION, PERCUTANEOUS PINNING FINGER; 5th Proximal Phalanx; Surgeon:RADHA BUITRAGO; [facility]     COLONOSCOPY       COLONOSCOPY N/A 2020    Procedure: COLONOSCOPY;  Surgeon: Zacarias Duran MD;  Location:  GI     ESOPHAGOSCOPY, GASTROSCOPY, DUODENOSCOPY (EGD), COMBINED N/A 2020    Procedure: ESOPHAGOGASTRODUODENOSCOPY, WITH BIOPSY;  Surgeon: Zacarias Duran MD;  Location:  GI     ESOPHAGOSCOPY, GASTROSCOPY, DUODENOSCOPY (EGD), COMBINED N/A 2021    Procedure: ESOPHAGOGASTRODUODENOSCOPY (EGD);  Surgeon: Kaveh Clayton MD;  Location:  GI     GASTRECTOMY  2005    Bilat truncal vagotomy, hemigastrectomy, RnY  gastrojejunostomy     GASTROJEJUNOSTOMY  6/2/2011    Procedure:GASTROJEJUNOSTOMY; exploratory laparotmy with revision of gastrojejunostomy, Antrectomy, Miles-en-y, Gastrojejunostomy; Surgeon:JULIUS HELM; [facility]     INSERT CHEST TUBE N/A 4/29/2019    Procedure: INSERTION, CATHETER, INTERCOSTAL, FOR DRAINAGE;  Surgeon: Melissa Nichols MD;  [facility]: [facility]     LASER HOLMIUM LITHOTRIPSY URETER(S), INSERT STENT, COMBINED      Procedure: COMBINED CYSTOSCOPY, URETEROSCOPY, LASER HOLMIUM LITHOTRIPSY URETER(S), INSERT STENT;;  Surgeon: Blair Correa MD;  [facility]     LASER HOLMIUM NEPHROLITHOTOMY VIA PERCUTANEOUS NEPHROSTOMY  7/11/2012    Procedure: LASER HOLMIUM NEPHROLITHOTOMY VIA PERCUTANEOUS NEPHROSTOMY;  proceedure should read: Left Percutaneous Access, Left Percutaneous ultrasonic Nephrolithotomy, Ureteroscopy Holmium Laser Lithotripsy Stent Placement ;  Surgeon: Blair Correa MD;  [facility]     MAMMOPLASTY AUGMENTATION BILATERAL       OPEN REDUCTION INTERNAL FIXATION WRIST Left 1/11/2016    Procedure: OPEN REDUCTION INTERNAL FIXATION WRIST;  Surgeon: Darling Ellis MD;  [facility]     RECONSTRUCT BREAST, IMPLANT PROSTHESIS, COMBINED       THORACOSCOPIC DECORTICATION LUNG Left 5/7/2019    Procedure: Left Video Assisted Thoracoscopic Decortication, Intercostal Nerve Cryo Analgesia, Flexible Bronchoscopy;  Surgeon: Patrice Regalado MD;  [facility]: [facility]     Current Outpatient Medications   Medication     acetaminophen (TYLENOL) 325 MG tablet     escitalopram (LEXAPRO) 20 MG tablet     hydroxychloroquine (PLAQUENIL) 200 MG tablet     hydrOXYzine (VISTARIL) 25 MG capsule     methadone (DOLOPHINE-INTENSOL) 10 MG/ML (HIGH CONC) solution     methylPREDNISolone (MEDROL) 4 MG tablet     methylPREDNISolone (MEDROL) 4 MG tablet     methylPREDNISolone (MEDROL) 4 MG tablet     methylPREDNISolone (MEDROL) 4 MG tablet     pantoprazole (PROTONIX) 40 MG EC tablet      parenteral nutrition - PTA/DISCHARGE ORDER     polyethylene glycol (MIRALAX) 17 GM/Dose powder     pregabalin (LYRICA) 150 MG capsule     valACYclovir (VALTREX) 500 MG tablet     valACYclovir (VALTREX) 500 MG tablet     vitamin D2 (ERGOCALCIFEROL) 83295 units (1250 mcg) capsule     ferrous sulfate (FEROSUL) 325 (65 Fe) MG tablet     folic acid (FOLVITE) 1 MG tablet     No current facility-administered medications for this visit.     Allergies   Allergen Reactions     Penicillins Hives     Hives in childhood.     Past Medical History:   Diagnosis Date     Anemia      Basal cell carcinoma     Left-sided, skin over over medial orbit/nasal bone. Removed Oct 2018.     Benzodiazepine dependence (H)     With h/o withdrawal seizure x 1     Bipolar 1 disorder, mixed, moderate (H) 2013     Chronic pain     Back, legs.     Depressive disorder      Eosinophilic gastroenteritis 2010     Epidural abscess 2005    x4     Fibromyalgia      Generalised anxiety disorder      GERD (gastroesophageal reflux disease)      Major depressive disorder      Migraine      Nephrolithiasis     S/p left sided lithotripsy     Opiate dependence (H)      Osteoarthritis      Osteoporosis      PUD (peptic ulcer disease)      SLE (systemic lupus erythematosus) (H) 2009     Weight loss      Past Surgical History:   Procedure Laterality Date     BACK SURGERY  04    L4-5 epidural abscess     BACK SURGERY  04    L3-4 spinal stenosis     BACK SURGERY  04    Lt psoas abscess     BRONCHOSCOPY FLEXIBLE N/A 2019    Procedure: Flexible Bronchoscopy;  Surgeon: Patrice Regalado MD;  [facility]      SECTION      x 2     CHOLECYSTECTOMY  2-     CLOSED REDUCTION, PERCUTANEOUS PINNING FINGER, COMBINED  8/10/2011    Procedure:COMBINED CLOSED REDUCTION, PERCUTANEOUS PINNING FINGER; 5th Proximal Phalanx; Surgeon:RADHA BUITRAGO; [facility]     COLONOSCOPY       COLONOSCOPY N/A 2020    Procedure: COLONOSCOPY;   Surgeon: Zacarias Duran MD;  [facility]     ESOPHAGOSCOPY, GASTROSCOPY, DUODENOSCOPY (EGD), COMBINED N/A 8/6/2020    Procedure: ESOPHAGOGASTRODUODENOSCOPY, WITH BIOPSY;  Surgeon: Zacarias Duran MD;  [facility]     ESOPHAGOSCOPY, GASTROSCOPY, DUODENOSCOPY (EGD), COMBINED N/A 6/8/2021    Procedure: ESOPHAGOGASTRODUODENOSCOPY (EGD);  Surgeon: Kaveh Clayton MD;  [facility]: [facility]     GASTRECTOMY  11-    Bilat truncal vagotomy, hemigastrectomy, RnY gastrojejunostomy     GASTROJEJUNOSTOMY  6/2/2011    Procedure:GASTROJEJUNOSTOMY; exploratory laparotmy with revision of gastrojejunostomy, Antrectomy, Miles-en-y, Gastrojejunostomy; Surgeon:JULIUS HELM; [facility]     INSERT CHEST TUBE N/A 4/29/2019    Procedure: INSERTION, CATHETER, INTERCOSTAL, FOR DRAINAGE;  Surgeon: Melissa Nichols MD;  [facility]     LASER HOLMIUM LITHOTRIPSY URETER(S), INSERT STENT, COMBINED      Procedure: COMBINED CYSTOSCOPY, URETEROSCOPY, LASER HOLMIUM LITHOTRIPSY URETER(S), INSERT STENT;;  Surgeon: Blair Correa MD;  [facility]     LASER HOLMIUM NEPHROLITHOTOMY VIA PERCUTANEOUS NEPHROSTOMY  7/11/2012    Procedure: LASER HOLMIUM NEPHROLITHOTOMY VIA PERCUTANEOUS NEPHROSTOMY;  proceedure should read: Left Percutaneous Access, Left Percutaneous ultrasonic Nephrolithotomy, Ureteroscopy Holmium Laser Lithotripsy Stent Placement ;  Surgeon: Blair Correa MD;  [facility]     MAMMOPLASTY AUGMENTATION BILATERAL       OPEN REDUCTION INTERNAL FIXATION WRIST Left 1/11/2016    Procedure: OPEN REDUCTION INTERNAL FIXATION WRIST;  Surgeon: Darling Ellis MD;  [facility]     RECONSTRUCT BREAST, IMPLANT PROSTHESIS, COMBINED       THORACOSCOPIC DECORTICATION LUNG Left 5/7/2019    Procedure: Left Video Assisted Thoracoscopic Decortication, Intercostal Nerve Cryo Analgesia, Flexible Bronchoscopy;  Surgeon: Patrice Regalado MD;  [facility]     Current Outpatient  "Medications   Medication     acetaminophen (TYLENOL) 325 MG tablet     escitalopram (LEXAPRO) 20 MG tablet     hydroxychloroquine (PLAQUENIL) 200 MG tablet     hydrOXYzine (VISTARIL) 25 MG capsule     methadone (DOLOPHINE-INTENSOL) 10 MG/ML (HIGH CONC) solution     methylPREDNISolone (MEDROL) 4 MG tablet     methylPREDNISolone (MEDROL) 4 MG tablet     methylPREDNISolone (MEDROL) 4 MG tablet     methylPREDNISolone (MEDROL) 4 MG tablet     pantoprazole (PROTONIX) 40 MG EC tablet     parenteral nutrition - PTA/DISCHARGE ORDER     polyethylene glycol (MIRALAX) 17 GM/Dose powder     pregabalin (LYRICA) 150 MG capsule     valACYclovir (VALTREX) 500 MG tablet     valACYclovir (VALTREX) 500 MG tablet     vitamin D2 (ERGOCALCIFEROL) 86757 units (1250 mcg) capsule     ferrous sulfate (FEROSUL) 325 (65 Fe) MG tablet     folic acid (FOLVITE) 1 MG tablet     No current facility-administered medications for this visit.     Allergies   Allergen Reactions     Penicillins Hives     Hives in childhood.             Review of Systems     Objective    /62 (BP Location: Right arm, Patient Position: Sitting, Cuff Size: Adult Regular)   Pulse 67   Ht 1.575 m (5' 2\")   Wt 54.2 kg (119 lb 8 oz)   LMP  (LMP Unknown)   SpO2 96%   BMI 21.86 kg/m    Body mass index is 21.86 kg/m .  Physical Exam   GENERAL: healthy, alert and no distress  RESP: lungs clear to auscultation - no rales, rhonchi or wheezes  CV: regular rate and rhythm, normal S1 S2, no S3 or S4, 2/6 murmr, click or rub, no peripheral edema and peripheral pulses strong  MS: no gross musculoskeletal defects noted, no edema  Neuro: normal speech, mentation            "

## 2021-07-21 NOTE — PROGRESS NOTES
This is a recent snapshot of the patient's [facility] medical record.  For current drug dose and complete information and questions, call [phone] or In Basket pool, [facility] home infusion ([id number])  CSN Number:  [id number]

## 2021-07-21 NOTE — NURSING NOTE
The patient was greeted in the 4th floor lobby and escorted back to the clinic. The patient's weight was recorded without incident. The patient was escorted to the exam room without incident. The reason for today's visit was discussed with the patient. The following are the primary complaints of the patient:     Chief Complaint   Patient presents with     Respiratory Problems     The patient reports low SpO2 values. The patient presents with a 95% SpO2 value.      Results     The patient reports that she had labs draw yesterday that she would like to discuss.     Neurologic Problem     The patient reports expressive aphasia that started at least a few weeks. The patient was negative on the Jordan stroke scale.     Gastrointestinal Problem     The patient reports GI problems.       The patient's allergies and medications were reviewed as noted. A set of vitals were recorded as noted without incident. The patient does not have any other questions for the provider.    Yony Wilson, EMT at 2:13 PM on 7/21/2021

## 2021-07-22 ENCOUNTER — VIRTUAL VISIT (OUTPATIENT)
Dept: NEUROLOGY | Facility: CLINIC | Age: 66
End: 2021-07-22
Payer: COMMERCIAL

## 2021-07-22 ENCOUNTER — HOME INFUSION (PRE-WILLOW HOME INFUSION) (OUTPATIENT)
Dept: PHARMACY | Facility: CLINIC | Age: 66
End: 2021-07-22

## 2021-07-22 DIAGNOSIS — R56.9 SEIZURE (H): ICD-10-CM

## 2021-07-22 DIAGNOSIS — R51.9 HEADACHE DISORDER: ICD-10-CM

## 2021-07-22 DIAGNOSIS — R41.89 COGNITIVE CHANGES: Primary | ICD-10-CM

## 2021-07-22 PROCEDURE — 99443 PR PHYSICIAN TELEPHONE EVALUATION 21-30 MIN: CPT | Performed by: NURSE PRACTITIONER

## 2021-07-22 NOTE — PATIENT INSTRUCTIONS
Plan:  Brain MRI for any secondary causes   Wean Excedrin -possibly contributing to rebound headaches   Migraine headache prevention -a trial of Botox -patient is on TPN so poor absorption and cognitive concerns -would avoid adding more medications. Patient is on escitalopram, pregabalin.   Patient will get back to our Clinic about Botox decision PA

## 2021-07-22 NOTE — LETTER
7/22/2021       RE: Demetra Richardson  [address] [place] [address]     Dear Colleague,    Thank you for referring your patient, Demetra Richardson, to the [facility] [place] at [facility]. Please see a copy of my visit note below.    Video Start Time: MIGRAINE DISABILITY ASSESSMENT (MIDAS)    On how many days in the last 3 months did you miss work or school because of your headaches?  4    How many days in the last 3 months was your productivity at work or school reduced by half or more because of your headaches? (Do not include days you counted in question 1 where you missed work or school.)  4    On how many days in the last 3 months did you not do household work (such as housework, home repairs and maintenance, shopping, caring for children and relatives) because of your headaches?  4    How many days in the last 3 months was your productivity in household work reduced by half or more because of your headaches? (Do not include days you counted in question 3 where you did not do household work).  3    On how many days in the last 3 months did you miss family, social, or lesiure activities because of your headaches?  2    MIDAS Total Score: 17    On how many days in the last 3 months did you have a headache? (If a headache lasted more than 1 day, count each day.)   60    On a scale of 0 - 10, on average how painful were these headaches (where 0 = no pain at all, and 10 = pain as bad as it can be.)  5    Assessment and plan:  Headaches possible mixed -chronic daily headaches, chronic migraine and acute analgesics rebound headaches. Pain is suboccipital and worse with neck movements -possible occipital neurologia. Brain MRI in 2015. Complex PMH of lupus, seizures and cognitive changes possibly multifactorial. No updated images since 2015. Given headaches and reported cognitive changes it wouldn't be unreasonable to recommend  brain MRI for any structural causes.   Exam is limited due to a telephone visit-video did not work and technical difficulties  Most recent ophthalmology visit in the last 8 months.   Plan:  Brain MRI for any secondary causes   Wean Excedrin -possibly contributing to rebound headaches -patient is contiplating  Migraine headache prevention -a trial of Botox -patient is on TPN so poor absorption and cognitive concerns -would avoid adding more medications. Patient is on escitalopram, pregabalin.   Patient will get back to our Clinic once she decides if she would like to proceed with Botox injections     Donna Garcia is a 65 year old who presents for the following health issues -headache management   s/p Billroth II for gastric outlet obstruction in 2005, revision of gastrojejunostomy in 2011, TPN, chronic pain, fibromyalgia, iron def anemia, Currently on methadone which she [facility] at [facility], nocturnal hypoxemia, migraine, depression, anxiety, NSAIDs  HPI   Headaches started around 7 grade. Headaches are better than used to be. Headaches are the same. Patient   Patient reports that excedrine was very helpful and was taking for a few years 5 days per week. Excedrin no longer working for patient. Patient reports that migraine headaches are every 3-4 months and may be food related but nagging headaches every day.   Patient reports that headaches controlled with Excedrin but has to get off because of upper GIB. Patient reports that sometimes takes tylenol but it does not do anything. Reports that fioricet worked and would take away headaches.   Pain is localized to the back of the head and upper neck and behind eyes. Pain is throbbing and sharp. Headache gets worse with head moving. Duration 72 hours unless gets a firocet.     Cognitive changes in the last year and before that family would find patient annoying possible multifactorial.     Situational depression and seen a psychiatrist by her choice due to  mood. Has been seeing a psychologist -had weekly calls due to COVID19 and was graduated to as need. Mood is stable now.     Has 8 brothers and sisters.     Review of Systems   Constitutional, HEENT, cardiovascular, pulmonary, GI, , musculoskeletal, neuro, skin, endocrine and psych systeme -on TPN, chronic painexcept as otherwise noted.      Objective    Vitals - Patient Reported  Pain Score: Moderate Pain (5)  Pain Loc: Upper Leg    Physical Exam   alert and no distress  PSYCH: Alert and oriented; coherent speech, normal   rate and volume, able to articulate logical thoughts,RESP: No cough, no audible wheezing, able to talk in full sentences  Remainder of exam unable to be completed due to telephone visits    Diagnostic Test Results:    MR BRAIN W/O & W CONTRAST  4/16/2015 6:24 PM      HISTORY:  acute psychiatric escalation with history of lupus and past  vasculitis findings on previous MR, history of lupus.     TECHNIQUE: Multiplanar, multisequence MRI of the brain without and  with 6.2 mL Gadavist IV contrast material.     COMPARISON: Scan dated 3/20/2014     FINDINGS:  The brain parenchyma, ventricles and subarachnoid spaces  appear normal for age.  There is no evidence of hemorrhage, mass,  acute infarct, or anomaly. There are no gadolinium enhancing lesions.   Mucosal thickening is seen in the maxillary sinuses. The right  maxillary sinus is nearly completely opacified.  The arteries at the  base of the brain and the dural venous sinuses appear patent.          IMPRESSION  IMPRESSION:   1. Brain parenchyma appears normal for age. No bleed, mass, or  infarcts are identified. No change.  2. Maxillary sinus disease.      MECHE FALL MD    Results for FLORIAN CURTIS (MRN [id number]) as of 7/22/2021 15:17   Ref. Range 4/24/2020 12:03   Vitamin B12 Latest Ref Range: 193 - 986 pg/mL 504       Phone call duration: 39 minutes      VINOD Vora, CNP [facility]  Headache certified  [facility]

## 2021-07-22 NOTE — NURSING NOTE
Patient wanted to change appt to a Telephone visit. Please call mobile#  [phone]     Maxwell Meraz

## 2021-07-22 NOTE — PROGRESS NOTES
Radha is a 65 year old who is being evaluated via a billable video visit.      How would you like to obtain your AVS? Mail a copy  If the video visit is dropped, the invitation should be resent by: Text to cell phone: [phone]  Will anyone else be joining your video visit? No      Video Start Time: MIGRAINE DISABILITY ASSESSMENT (MIDAS)    On how many days in the last 3 months did you miss work or school because of your headaches?  4    How many days in the last 3 months was your productivity at work or school reduced by half or more because of your headaches? (Do not include days you counted in question 1 where you missed work or school.)  4    On how many days in the last 3 months did you not do household work (such as housework, home repairs and maintenance, shopping, caring for children and relatives) because of your headaches?  4    How many days in the last 3 months was your productivity in household work reduced by half or more because of your headaches? (Do not include days you counted in question 3 where you did not do household work).  3    On how many days in the last 3 months did you miss family, social, or lesiure activities because of your headaches?  2    MIDAS Total Score: 17    On how many days in the last 3 months did you have a headache? (If a headache lasted more than 1 day, count each day.)   60    On a scale of 0 - 10, on average how painful were these headaches (where 0 = no pain at all, and 10 = pain as bad as it can be.)  5  Video-Visit Details    Type of service:  Video Visit due to technical and transferred     Start visit 3:06 PM    End Time:3:35 PM    Originating Location (pt. Location): Home    Distant Location (provider location):  [facility]     Platform used for Video Visit: [employer]     Assessment and plan:  Headaches possible mixed -chronic daily headaches, chronic migraine and acute analgesics rebound headaches. Pain is suboccipital and worse  with neck movements -possible occipital neurologia. Brain MRI in 2015. Complex PMH of lupus, seizures and cognitive changes possibly multifactorial. No updated images since 2015. Given headaches and reported cognitive changes it wouldn't be unreasonable to recommend brain MRI for any structural causes.   Exam is limited due to a telephone visit-video did not work and technical difficulties  Most recent ophthalmology visit in the last 8 months.   Plan:  Brain MRI for any secondary causes   Wean Excedrin -possibly contributing to rebound headaches -patient is contiplating  Migraine headache prevention -a trial of Botox -patient is on TPN so poor absorption and cognitive concerns -would avoid adding more medications. Patient is on escitalopram, pregabalin.   Patient will get back to our Clinic once she decides if she would like to proceed with Botox injections     Donna Garcia is a 65 year old who presents for the following health issues -headache management   s/p Billroth II for gastric outlet obstruction in 2005, revision of gastrojejunostomy in 2011, TPN, chronic pain, fibromyalgia, iron def anemia, Currently on methadone which she [facility] at [facility], nocturnal hypoxemia, migraine, depression, anxiety, NSAIDs  HPI   Headaches started around 7 grade. Headaches are better than used to be. Headaches are the same. Patient   Patient reports that excedrine was very helpful and was taking for a few years 5 days per week. Excedrin no longer working for patient. Patient reports that migraine headaches are every 3-4 months and may be food related but nagging headaches every day.   Patient reports that headaches controlled with Excedrin but has to get off because of upper GIB. Patient reports that sometimes takes tylenol but it does not do anything. Reports that fioricet worked and would take away headaches.   Pain is localized to the back of the head and upper neck and behind eyes. Pain is throbbing and sharp.  Headache gets worse with head moving. Duration 72 hours unless gets a firocet.     Cognitive changes in the last year and before that family would find patient annoying possible multifactorial.     Situational depression and seen a psychiatrist by her choice due to mood. Has been seeing a psychologist -had weekly calls due to COVID19 and was graduated to as need. Mood is stable now.     Has 8 brothers and sisters.     Review of Systems   Constitutional, HEENT, cardiovascular, pulmonary, GI, , musculoskeletal, neuro, skin, endocrine and psych systeme -on TPN, chronic painexcept as otherwise noted.      Objective    Vitals - Patient Reported  Pain Score: Moderate Pain (5)  Pain Loc: Upper Leg    Physical Exam   alert and no distress  PSYCH: Alert and oriented; coherent speech, normal   rate and volume, able to articulate logical thoughts,RESP: No cough, no audible wheezing, able to talk in full sentences  Remainder of exam unable to be completed due to telephone visits    Diagnostic Test Results:    MR BRAIN W/O & W CONTRAST  4/16/2015 6:24 PM      HISTORY:  acute psychiatric escalation with history of lupus and past  vasculitis findings on previous MR, history of lupus.     TECHNIQUE: Multiplanar, multisequence MRI of the brain without and  with 6.2 mL Gadavist IV contrast material.     COMPARISON: Scan dated 3/20/2014     FINDINGS:  The brain parenchyma, ventricles and subarachnoid spaces  appear normal for age.  There is no evidence of hemorrhage, mass,  acute infarct, or anomaly. There are no gadolinium enhancing lesions.   Mucosal thickening is seen in the maxillary sinuses. The right  maxillary sinus is nearly completely opacified.  The arteries at the  base of the brain and the dural venous sinuses appear patent.          IMPRESSION  IMPRESSION:   1. Brain parenchyma appears normal for age. No bleed, mass, or  infarcts are identified. No change.  2. Maxillary sinus disease.      MECHE FALL MD    Results for  FLORIAN CURTIS (MRN [id number]) as of 7/22/2021 15:17   Ref. Range 4/24/2020 12:03   Vitamin B12 Latest Ref Range: 193 - 986 pg/mL 504       Phone call duration: 39 minutes      VINOD Vora, CNP [facility]  Headache certified  [facility]

## 2021-07-23 NOTE — PROGRESS NOTES
This is a recent snapshot of the patient's [facility] medical record.  For current drug dose and complete information and questions, call [phone] or In Basket pool, fv home infusion ([id number])  CSN Number:  [id number]

## 2021-07-24 ENCOUNTER — HOME INFUSION (PRE-WILLOW HOME INFUSION) (OUTPATIENT)
Dept: PHARMACY | Facility: CLINIC | Age: 66
End: 2021-07-24

## 2021-07-26 NOTE — PROGRESS NOTES
This is a recent snapshot of the patient's [facility] medical record.  For current drug dose and complete information and questions, call [phone] or In Basket pool, fv home infusion ([id number])  CSN Number:  [id number]

## 2021-07-26 NOTE — PROGRESS NOTES
This is a recent snapshot of the patient's [facility] medical record.  For current drug dose and complete information and questions, call [phone] or In Basket pool, fv home infusion ([id number])  CSN Number:  [id number]

## 2021-07-27 ENCOUNTER — LAB REQUISITION (OUTPATIENT)
Dept: LAB | Facility: CLINIC | Age: 66
End: 2021-07-27
Payer: COMMERCIAL

## 2021-07-27 ENCOUNTER — HOME INFUSION (PRE-WILLOW HOME INFUSION) (OUTPATIENT)
Dept: PHARMACY | Facility: CLINIC | Age: 66
End: 2021-07-27

## 2021-07-27 ENCOUNTER — MEDICAL CORRESPONDENCE (OUTPATIENT)
Dept: HEALTH INFORMATION MANAGEMENT | Facility: CLINIC | Age: 66
End: 2021-07-27

## 2021-07-27 DIAGNOSIS — R11.2 NAUSEA WITH VOMITING, UNSPECIFIED: ICD-10-CM

## 2021-07-27 LAB
ALBUMIN SERPL-MCNC: 2.2 G/DL (ref 3.4–5)
ALP SERPL-CCNC: 245 U/L (ref 40–150)
ALT SERPL W P-5'-P-CCNC: 41 U/L (ref 0–50)
ANION GAP SERPL CALCULATED.3IONS-SCNC: 2 MMOL/L (ref 3–14)
AST SERPL W P-5'-P-CCNC: 32 U/L (ref 0–45)
BASOPHILS # BLD AUTO: 0 10E3/UL (ref 0–0.2)
BASOPHILS NFR BLD AUTO: 1 %
BILIRUB DIRECT SERPL-MCNC: <0.1 MG/DL (ref 0–0.2)
BILIRUB SERPL-MCNC: 0.1 MG/DL (ref 0.2–1.3)
BILIRUB SERPL-MCNC: 0.1 MG/DL (ref 0.2–1.3)
BUN SERPL-MCNC: 19 MG/DL (ref 7–30)
CALCIUM SERPL-MCNC: 7.9 MG/DL (ref 8.5–10.1)
CHLORIDE BLD-SCNC: 111 MMOL/L (ref 94–109)
CO2 SERPL-SCNC: 29 MMOL/L (ref 20–32)
CREAT SERPL-MCNC: 0.63 MG/DL (ref 0.52–1.04)
EOSINOPHIL # BLD AUTO: 0.6 10E3/UL (ref 0–0.7)
EOSINOPHIL NFR BLD AUTO: 15 %
ERYTHROCYTE [DISTWIDTH] IN BLOOD BY AUTOMATED COUNT: 20.2 % (ref 10–15)
FASTING STATUS PATIENT QL REPORTED: NORMAL
GFR SERPL CREATININE-BSD FRML MDRD: >90 ML/MIN/1.73M2
GLUCOSE BLD-MCNC: 103 MG/DL (ref 70–99)
HCT VFR BLD AUTO: 33.7 % (ref 35–47)
HGB BLD-MCNC: 9.7 G/DL (ref 11.7–15.7)
HOLD SPECIMEN: NORMAL
IMM GRANULOCYTES # BLD: 0 10E3/UL
IMM GRANULOCYTES NFR BLD: 1 %
LYMPHOCYTES # BLD AUTO: 1.1 10E3/UL (ref 0.8–5.3)
LYMPHOCYTES NFR BLD AUTO: 27 %
MAGNESIUM SERPL-MCNC: 2 MG/DL (ref 1.6–2.3)
MCH RBC QN AUTO: 24.4 PG (ref 26.5–33)
MCHC RBC AUTO-ENTMCNC: 28.8 G/DL (ref 31.5–36.5)
MCV RBC AUTO: 85 FL (ref 78–100)
MONOCYTES # BLD AUTO: 0.5 10E3/UL (ref 0–1.3)
MONOCYTES NFR BLD AUTO: 14 %
NEUTROPHILS # BLD AUTO: 1.7 10E3/UL (ref 1.6–8.3)
NEUTROPHILS NFR BLD AUTO: 42 %
NRBC # BLD AUTO: 0 10E3/UL
NRBC BLD AUTO-RTO: 0 /100
PHOSPHATE SERPL-MCNC: 3.8 MG/DL (ref 2.5–4.5)
PLATELET # BLD AUTO: 219 10E3/UL (ref 150–450)
POTASSIUM BLD-SCNC: 3.9 MMOL/L (ref 3.4–5.3)
PROT SERPL-MCNC: 5.8 G/DL (ref 6.8–8.8)
RBC # BLD AUTO: 3.98 10E6/UL (ref 3.8–5.2)
SODIUM SERPL-SCNC: 142 MMOL/L (ref 133–144)
TRIGL SERPL-MCNC: 95 MG/DL
WBC # BLD AUTO: 3.8 10E3/UL (ref 4–11)

## 2021-07-27 PROCEDURE — 80053 COMPREHEN METABOLIC PANEL: CPT | Mod: ORL | Performed by: SURGERY

## 2021-07-27 PROCEDURE — 84478 ASSAY OF TRIGLYCERIDES: CPT | Mod: ORL | Performed by: SURGERY

## 2021-07-27 PROCEDURE — 83735 ASSAY OF MAGNESIUM: CPT | Performed by: SURGERY

## 2021-07-27 PROCEDURE — 85025 COMPLETE CBC W/AUTO DIFF WBC: CPT | Mod: ORL | Performed by: SURGERY

## 2021-07-27 PROCEDURE — 84100 ASSAY OF PHOSPHORUS: CPT | Mod: ORL | Performed by: SURGERY

## 2021-07-27 PROCEDURE — 82248 BILIRUBIN DIRECT: CPT | Mod: ORL | Performed by: SURGERY

## 2021-07-28 ENCOUNTER — HOME INFUSION (PRE-WILLOW HOME INFUSION) (OUTPATIENT)
Dept: PHARMACY | Facility: CLINIC | Age: 66
End: 2021-07-28

## 2021-07-29 ENCOUNTER — VIRTUAL VISIT (OUTPATIENT)
Dept: RHEUMATOLOGY | Facility: CLINIC | Age: 66
End: 2021-07-29
Attending: INTERNAL MEDICINE
Payer: COMMERCIAL

## 2021-07-29 DIAGNOSIS — Z53.9 ERRONEOUS ENCOUNTER--DISREGARD: Primary | ICD-10-CM

## 2021-07-29 NOTE — PROGRESS NOTES
Left voicemail for patient to call back to set up telemedicine visit, will call again before appointment time.  Genoveva Stack CMA on 7/29/2021 at 11:27 AM

## 2021-07-29 NOTE — LETTER
Date:August 28, 2021      Provider requested that no letter be sent. Do not send.       [facility]

## 2021-07-29 NOTE — PROGRESS NOTES
This is a recent snapshot of the patient's [facility] medical record.  For current drug dose and complete information and questions, call [phone] or In Basket pool, fv home infusion ([id number])  CSN Number:  [id number]

## 2021-07-29 NOTE — PROGRESS NOTES
Left voicemail for patient to call back to set up telemedicine visit, will call again before appointment time.  Genoveva Stack CMA on 7/29/2021 at 10:49 AM

## 2021-07-29 NOTE — LETTER
7/29/2021       RE: Demetra Richardson  [address] [place] [address]     Dear Colleague,    Thank you for referring your patient, Demetra Richardson, to the [facility] [place] at [facility]. Please see a copy of my visit note below.    Left voicemail for patient to call back to set up telemedicine visit, will call again before appointment time.  Genoveva Stack CMA on 7/29/2021 at 10:49 AM      Left voicemail for patient to call back to set up telemedicine visit, will call again before appointment time.  Genoveva Stack CMA on 7/29/2021 at 11:27 AM        Again, thank you for allowing me to participate in the care of your patient.      Sincerely,    Sam Narayanan MD

## 2021-08-03 ENCOUNTER — MEDICAL CORRESPONDENCE (OUTPATIENT)
Dept: HEALTH INFORMATION MANAGEMENT | Facility: CLINIC | Age: 66
End: 2021-08-03

## 2021-08-03 ENCOUNTER — HOME INFUSION (PRE-WILLOW HOME INFUSION) (OUTPATIENT)
Dept: PHARMACY | Facility: CLINIC | Age: 66
End: 2021-08-03

## 2021-08-03 ENCOUNTER — LAB REQUISITION (OUTPATIENT)
Dept: LAB | Facility: CLINIC | Age: 66
End: 2021-08-03
Payer: COMMERCIAL

## 2021-08-03 DIAGNOSIS — R11.2 NAUSEA WITH VOMITING, UNSPECIFIED: ICD-10-CM

## 2021-08-03 LAB
ALBUMIN SERPL-MCNC: 2.3 G/DL (ref 3.4–5)
ALP SERPL-CCNC: 147 U/L (ref 40–150)
ALT SERPL W P-5'-P-CCNC: 23 U/L (ref 0–50)
ANION GAP SERPL CALCULATED.3IONS-SCNC: 2 MMOL/L (ref 3–14)
AST SERPL W P-5'-P-CCNC: 21 U/L (ref 0–45)
BASOPHILS # BLD AUTO: 0 10E3/UL (ref 0–0.2)
BASOPHILS NFR BLD AUTO: 1 %
BILIRUB DIRECT SERPL-MCNC: <0.1 MG/DL (ref 0–0.2)
BILIRUB SERPL-MCNC: 0.1 MG/DL (ref 0.2–1.3)
BILIRUB SERPL-MCNC: 0.1 MG/DL (ref 0.2–1.3)
BUN SERPL-MCNC: 31 MG/DL (ref 7–30)
CALCIUM SERPL-MCNC: 8 MG/DL (ref 8.5–10.1)
CHLORIDE BLD-SCNC: 111 MMOL/L (ref 94–109)
CO2 SERPL-SCNC: 30 MMOL/L (ref 20–32)
CREAT SERPL-MCNC: 0.65 MG/DL (ref 0.52–1.04)
EOSINOPHIL # BLD AUTO: 0.8 10E3/UL (ref 0–0.7)
EOSINOPHIL NFR BLD AUTO: 10 %
ERYTHROCYTE [DISTWIDTH] IN BLOOD BY AUTOMATED COUNT: 19.7 % (ref 10–15)
FASTING STATUS PATIENT QL REPORTED: NORMAL
GFR SERPL CREATININE-BSD FRML MDRD: >90 ML/MIN/1.73M2
GLUCOSE BLD-MCNC: 106 MG/DL (ref 70–99)
HCT VFR BLD AUTO: 33.1 % (ref 35–47)
HGB BLD-MCNC: 9.4 G/DL (ref 11.7–15.7)
HOLD SPECIMEN: NORMAL
IMM GRANULOCYTES # BLD: 0 10E3/UL
IMM GRANULOCYTES NFR BLD: 0 %
LYMPHOCYTES # BLD AUTO: 1.1 10E3/UL (ref 0.8–5.3)
LYMPHOCYTES NFR BLD AUTO: 14 %
MAGNESIUM SERPL-MCNC: 2.2 MG/DL (ref 1.6–2.3)
MCH RBC QN AUTO: 24.4 PG (ref 26.5–33)
MCHC RBC AUTO-ENTMCNC: 28.4 G/DL (ref 31.5–36.5)
MCV RBC AUTO: 86 FL (ref 78–100)
MONOCYTES # BLD AUTO: 1 10E3/UL (ref 0–1.3)
MONOCYTES NFR BLD AUTO: 13 %
NEUTROPHILS # BLD AUTO: 4.8 10E3/UL (ref 1.6–8.3)
NEUTROPHILS NFR BLD AUTO: 62 %
NRBC # BLD AUTO: 0 10E3/UL
NRBC BLD AUTO-RTO: 0 /100
PHOSPHATE SERPL-MCNC: 3.8 MG/DL (ref 2.5–4.5)
PLATELET # BLD AUTO: 211 10E3/UL (ref 150–450)
POTASSIUM BLD-SCNC: 4.3 MMOL/L (ref 3.4–5.3)
PROT SERPL-MCNC: 5.7 G/DL (ref 6.8–8.8)
RBC # BLD AUTO: 3.86 10E6/UL (ref 3.8–5.2)
SODIUM SERPL-SCNC: 143 MMOL/L (ref 133–144)
TRIGL SERPL-MCNC: 106 MG/DL
WBC # BLD AUTO: 7.8 10E3/UL (ref 4–11)

## 2021-08-03 PROCEDURE — 82248 BILIRUBIN DIRECT: CPT | Performed by: SURGERY

## 2021-08-03 PROCEDURE — 84478 ASSAY OF TRIGLYCERIDES: CPT | Performed by: SURGERY

## 2021-08-03 PROCEDURE — 85025 COMPLETE CBC W/AUTO DIFF WBC: CPT | Performed by: SURGERY

## 2021-08-03 PROCEDURE — 83735 ASSAY OF MAGNESIUM: CPT | Performed by: SURGERY

## 2021-08-03 PROCEDURE — 84100 ASSAY OF PHOSPHORUS: CPT | Mod: ORL | Performed by: SURGERY

## 2021-08-03 PROCEDURE — 36592 COLLECT BLOOD FROM PICC: CPT | Mod: ORL | Performed by: SURGERY

## 2021-08-03 PROCEDURE — 80053 COMPREHEN METABOLIC PANEL: CPT | Mod: ORL | Performed by: SURGERY

## 2021-08-03 NOTE — PROGRESS NOTES
This is a recent snapshot of the patient's [facility] medical record.  For current drug dose and complete information and questions, call [phone] or In Basket pool, fv home infusion ([id number])  CSN Number:  [id number]

## 2021-08-04 DIAGNOSIS — M79.7 FIBROMYALGIA: ICD-10-CM

## 2021-08-04 DIAGNOSIS — M32.9 SYSTEMIC LUPUS ERYTHEMATOSUS, UNSPECIFIED SLE TYPE, UNSPECIFIED ORGAN INVOLVEMENT STATUS (H): ICD-10-CM

## 2021-08-04 RX ORDER — PREGABALIN 150 MG/1
150 CAPSULE ORAL 3 TIMES DAILY
Qty: 90 CAPSULE | Refills: 1 | Status: SHIPPED | OUTPATIENT
Start: 2021-08-04 | End: 2021-10-13

## 2021-08-04 NOTE — PROGRESS NOTES
This is a recent snapshot of the patient's [facility] medical record.  For current drug dose and complete information and questions, call [phone] or In Basket pool, fv home infusion ([id number])  CSN Number:  [id number]

## 2021-08-05 ENCOUNTER — HOME INFUSION (PRE-WILLOW HOME INFUSION) (OUTPATIENT)
Dept: PHARMACY | Facility: CLINIC | Age: 66
End: 2021-08-05

## 2021-08-05 NOTE — PROGRESS NOTES
This is a recent snapshot of the patient's [facility] medical record.  For current drug dose and complete information and questions, call [phone] or In Basket pool, fv home infusion ([id number])  CSN Number:  [id number]

## 2021-08-06 ENCOUNTER — HOME INFUSION (PRE-WILLOW HOME INFUSION) (OUTPATIENT)
Dept: PHARMACY | Facility: CLINIC | Age: 66
End: 2021-08-06

## 2021-08-07 RX ORDER — HYDROXYCHLOROQUINE SULFATE 200 MG/1
200 TABLET, FILM COATED ORAL 2 TIMES DAILY WITH MEALS
Qty: 180 TABLET | Refills: 1 | OUTPATIENT
Start: 2021-08-07

## 2021-08-07 NOTE — TELEPHONE ENCOUNTER
Last Clinic Visit: 3/15/21 recommended 3-4 month follow up, No showed virtual appointment 7/29/21.  No upcoming appointment scheduled.  Routed to clinic scheduling for follow up

## 2021-08-09 ENCOUNTER — TELEPHONE (OUTPATIENT)
Dept: FAMILY MEDICINE | Facility: CLINIC | Age: 66
End: 2021-08-09

## 2021-08-09 NOTE — PROGRESS NOTES
This is a recent snapshot of the patient's [facility] medical record.  For current drug dose and complete information and questions, call [phone] or In Basket pool, fv home infusion ([id number])  CSN Number:  [id number]

## 2021-08-09 NOTE — TELEPHONE ENCOUNTER
Left a message for the patient that she has 60 caps and 11 refills that were called in on 3/12/21. Patient will need to contact the pharmacy for more refills. Sarah Martinez LPN 8/9/2021 11:35 AM

## 2021-08-09 NOTE — TELEPHONE ENCOUNTER
[facility] Call Center    Phone Message    May a detailed message be left on voicemail: yes     Reason for Call: Medication Refill Request    Has the patient contacted the pharmacy for the refill? Yes   Name of medication being requested: hydrOXYzine (VISTARIL) 25 MG capsule  Provider who prescribed the medication: Ofstedal   Pharmacy: [facility] [id number] [place] [address] [address] AT [facility]  Date medication is needed: ASAP   Patient is out. Patient states this was supposed to be submitted with her pregabalin last week.       Action Taken: Message routed to:  [facility]    Travel Screening: Not Applicable

## 2021-08-10 ENCOUNTER — MEDICAL CORRESPONDENCE (OUTPATIENT)
Dept: HEALTH INFORMATION MANAGEMENT | Facility: CLINIC | Age: 66
End: 2021-08-10

## 2021-08-10 ENCOUNTER — TRANSFERRED RECORDS (OUTPATIENT)
Dept: HEALTH INFORMATION MANAGEMENT | Facility: CLINIC | Age: 66
End: 2021-08-10

## 2021-08-10 ENCOUNTER — HOME INFUSION (PRE-WILLOW HOME INFUSION) (OUTPATIENT)
Dept: PHARMACY | Facility: CLINIC | Age: 66
End: 2021-08-10

## 2021-08-10 ENCOUNTER — LAB REQUISITION (OUTPATIENT)
Dept: LAB | Facility: CLINIC | Age: 66
End: 2021-08-10
Payer: COMMERCIAL

## 2021-08-10 ENCOUNTER — TELEPHONE (OUTPATIENT)
Dept: FAMILY MEDICINE | Facility: CLINIC | Age: 66
End: 2021-08-10

## 2021-08-10 DIAGNOSIS — R11.2 NAUSEA WITH VOMITING, UNSPECIFIED: ICD-10-CM

## 2021-08-10 DIAGNOSIS — R60.9 FLUID RETENTION: Primary | ICD-10-CM

## 2021-08-10 DIAGNOSIS — R60.9 EDEMA: ICD-10-CM

## 2021-08-10 LAB
ALBUMIN SERPL-MCNC: 1.9 G/DL (ref 3.4–5)
ALP SERPL-CCNC: 121 U/L (ref 40–150)
ALT SERPL W P-5'-P-CCNC: 19 U/L (ref 0–50)
ANION GAP SERPL CALCULATED.3IONS-SCNC: 4 MMOL/L (ref 3–14)
AST SERPL W P-5'-P-CCNC: 25 U/L (ref 0–45)
BASOPHILS # BLD AUTO: 0.1 10E3/UL (ref 0–0.2)
BASOPHILS NFR BLD AUTO: 1 %
BILIRUB DIRECT SERPL-MCNC: <0.1 MG/DL (ref 0–0.2)
BILIRUB SERPL-MCNC: 0.1 MG/DL (ref 0.2–1.3)
BILIRUB SERPL-MCNC: 0.1 MG/DL (ref 0.2–1.3)
BUN SERPL-MCNC: 21 MG/DL (ref 7–30)
CALCIUM SERPL-MCNC: 7.6 MG/DL (ref 8.5–10.1)
CHLORIDE BLD-SCNC: 112 MMOL/L (ref 94–109)
CO2 SERPL-SCNC: 26 MMOL/L (ref 20–32)
CREAT SERPL-MCNC: 0.69 MG/DL (ref 0.52–1.04)
CRP SERPL-MCNC: 39 MG/L (ref 0–8)
EOSINOPHIL # BLD AUTO: 0.9 10E3/UL (ref 0–0.7)
EOSINOPHIL NFR BLD AUTO: 14 %
ERYTHROCYTE [DISTWIDTH] IN BLOOD BY AUTOMATED COUNT: 19.5 % (ref 10–15)
FASTING STATUS PATIENT QL REPORTED: NORMAL
GFR SERPL CREATININE-BSD FRML MDRD: >90 ML/MIN/1.73M2
GLUCOSE BLD-MCNC: 94 MG/DL (ref 70–99)
HCT VFR BLD AUTO: 31.1 % (ref 35–47)
HGB BLD-MCNC: 9.1 G/DL (ref 11.7–15.7)
HOLD SPECIMEN: NORMAL
IMM GRANULOCYTES # BLD: 0 10E3/UL
IMM GRANULOCYTES NFR BLD: 0 %
LYMPHOCYTES # BLD AUTO: 1 10E3/UL (ref 0.8–5.3)
LYMPHOCYTES NFR BLD AUTO: 16 %
MAGNESIUM SERPL-MCNC: 1.9 MG/DL (ref 1.6–2.3)
MCH RBC QN AUTO: 24.7 PG (ref 26.5–33)
MCHC RBC AUTO-ENTMCNC: 29.3 G/DL (ref 31.5–36.5)
MCV RBC AUTO: 84 FL (ref 78–100)
MONOCYTES # BLD AUTO: 1 10E3/UL (ref 0–1.3)
MONOCYTES NFR BLD AUTO: 16 %
NEUTROPHILS # BLD AUTO: 3.4 10E3/UL (ref 1.6–8.3)
NEUTROPHILS NFR BLD AUTO: 53 %
NRBC # BLD AUTO: 0 10E3/UL
NRBC BLD AUTO-RTO: 0 /100
PHOSPHATE SERPL-MCNC: 3.4 MG/DL (ref 2.5–4.5)
PLATELET # BLD AUTO: 251 10E3/UL (ref 150–450)
POTASSIUM BLD-SCNC: 4.1 MMOL/L (ref 3.4–5.3)
PROT SERPL-MCNC: 5.1 G/DL (ref 6.8–8.8)
RBC # BLD AUTO: 3.69 10E6/UL (ref 3.8–5.2)
SODIUM SERPL-SCNC: 142 MMOL/L (ref 133–144)
TRIGL SERPL-MCNC: 110 MG/DL
WBC # BLD AUTO: 6.3 10E3/UL (ref 4–11)

## 2021-08-10 PROCEDURE — 80053 COMPREHEN METABOLIC PANEL: CPT | Performed by: SURGERY

## 2021-08-10 PROCEDURE — 86140 C-REACTIVE PROTEIN: CPT | Mod: ORL | Performed by: SURGERY

## 2021-08-10 PROCEDURE — 85025 COMPLETE CBC W/AUTO DIFF WBC: CPT | Performed by: SURGERY

## 2021-08-10 PROCEDURE — 83735 ASSAY OF MAGNESIUM: CPT | Performed by: SURGERY

## 2021-08-10 PROCEDURE — 84100 ASSAY OF PHOSPHORUS: CPT | Mod: ORL | Performed by: SURGERY

## 2021-08-10 PROCEDURE — 82248 BILIRUBIN DIRECT: CPT | Mod: ORL | Performed by: SURGERY

## 2021-08-10 PROCEDURE — 84478 ASSAY OF TRIGLYCERIDES: CPT | Performed by: SURGERY

## 2021-08-10 PROCEDURE — 36592 COLLECT BLOOD FROM PICC: CPT | Mod: ORL | Performed by: SURGERY

## 2021-08-10 NOTE — TELEPHONE ENCOUNTER
Pt states that her weight was 117 lb 2 days ago and today it is 127 lb, denied any shortness of breath or chest pain. Bowl movement and urination are normal. She is on TPN now. She wonders if she could have water pills as she had the same thing happened in the past had she took lasix.  No appt available tomorrow.

## 2021-08-10 NOTE — TELEPHONE ENCOUNTER
[facility] Call Center    Phone Message    May a detailed message be left on voicemail: yes     Reason for Call: Symptoms or Concerns     If patient has red-flag symptoms, warm transfer to triage line    Current symptom or concern: Edema     Symptoms have been present for:  2 day(s)    Has patient previously been seen for this? No      Are there any new or worsening symptoms? Yes: 10-12 lbs in the last few days of water weight per patient, she has TPN injections. Had nurse at her house yesterday and it was suggested she call for a lasix prescription to be sent to the [facility] [address] [place] [address] AT [facility]    Patient will be taking a medical cab tomorrow to the [facility] and would like the prescription to be ready so she can pick it up then.      Action Taken: Message routed to:  [facility]    Travel Screening: Not Applicable

## 2021-08-11 ENCOUNTER — HOME INFUSION (PRE-WILLOW HOME INFUSION) (OUTPATIENT)
Dept: PHARMACY | Facility: CLINIC | Age: 66
End: 2021-08-11

## 2021-08-11 RX ORDER — FUROSEMIDE 20 MG
20 TABLET ORAL DAILY
Qty: 20 TABLET | Refills: 0 | Status: ON HOLD | OUTPATIENT
Start: 2021-08-11 | End: 2021-09-10

## 2021-08-11 NOTE — PROGRESS NOTES
This is a recent snapshot of the patient's [facility] medical record.  For current drug dose and complete information and questions, call [phone] or In Basket pool, fv home infusion ([id number])  CSN Number:  [id number]

## 2021-08-11 NOTE — TELEPHONE ENCOUNTER
Has retained fluid in past  Lasix 20 mg tab  One a day   Ok 20, one refill  Re: edema    See [facility] provider this or next week

## 2021-08-12 NOTE — PROGRESS NOTES
This is a recent snapshot of the patient's [facility] medical record.  For current drug dose and complete information and questions, call [phone] or In Basket pool, fv home infusion ([id number])  CSN Number:  [id number]

## 2021-08-13 ENCOUNTER — HOME INFUSION (PRE-WILLOW HOME INFUSION) (OUTPATIENT)
Dept: PHARMACY | Facility: CLINIC | Age: 66
End: 2021-08-13

## 2021-08-16 DIAGNOSIS — M32.9 SYSTEMIC LUPUS ERYTHEMATOSUS, UNSPECIFIED SLE TYPE, UNSPECIFIED ORGAN INVOLVEMENT STATUS (H): ICD-10-CM

## 2021-08-16 NOTE — PROGRESS NOTES
This is a recent snapshot of the patient's [facility] medical record.  For current drug dose and complete information and questions, call [phone] or In Basket pool, fv home infusion ([id number])  CSN Number:  [id number]

## 2021-08-16 NOTE — LETTER
Demetra Richardson  [address] [place] [address]    Dear Demetra Richardson,     Regular eye exams are required while taking hydroxychloroquine (Plaquenil). Eye exams should be completed by an eye specialist who is experienced in monitoring for hydroxychloroquine toxicity (a rare effect of the drug that can damage your eyes and vision).  These may be yearly or as determined by your eye specialist.     Although vision problems and loss of sight while taking hydroxychloroquine are very rare, notify your doctor immediately if you notice changes in your vision. The goal of screening is to detect toxicity before your vision is significantly or noticeably impacted. Failing to get proper screening exams puts you at risk of vision changes which may or may not be reversible.    Per the American Academy of Ophthalmology recommendations (2016), screening tests performed may include a 10-2 visual field test, spectral-domain optical coherence tomography (SD OCT), or other screening tests as determined by the eye specialist, including a multifocal electroretinogram (mfERG) or fundus auto-fluorescence (FAF).    We received a refill request from your pharmacy. A copy of your current eye exam was not found in your medical record. Your hydroxychloroquine prescription has been refilled with a limited supply pending confirmation you have had an eye exam to test for hydroxychloroquine toxicity.      We encourage you to bring this letter to your eye exam to discuss the exams that will be performed during your visit. Please request your eye clinic fax or mail a copy of your eye exam report to our clinic indicating that testing was completed for hydroxychloroquine toxicity screening. The exam notes must specifically comment on the potential for hydroxychloroquine toxicity and outline recommended follow-up.    If you have questions about hydroxychloroquine or the information in this letter, please call the clinic at  [phone] or talk to your provider at your next office visit.                        2    Eye Specialist Letter  Dear Eye Specialist,  To ensure safe use of Plaquenil (hydroxychloroquine), we are requesting your assistance in providing the following information. Please return this form to our clinic via mail or fax, or incorporate this information into your visit summary. Your note must indicate whether or not there is evidence of toxicity from Plaquenil use. For questions regarding this form, please call [phone].  Patient Name:   :             Date of Exam:    The following exams were completed during this visit in accordance with the American Academy of Ophthalmology recommendations (2016):  ? 10-2 automated visual field  ? Spectral-domain optical coherence tomography (SD OCT)  ? Multifocal electroretinogram (mfERG)  ? Fundus autofluorescence (FAF)  ? Other (please specify)  Please select from the following:  ? The findings from the above exams are not suggestive of toxicity from Plaquenil (hydroxychloroquine).  ? The findings from the above exams may suggest toxicity from Plaquenil (hydroxychloroquine).  Directly contact the clinic and fax this form.  Please provide additional guidance on whether or not the medication may be continued from your perspective:  Date of next recommended Plaquenil (hydroxychloroquine) screening eye exam:   ? 5 years  ? 1 year  ? 6 months  Other (please specify):   Eye Specialist Name (print):                                                                Date:  Eye Specialist Signature:

## 2021-08-17 ENCOUNTER — MEDICAL CORRESPONDENCE (OUTPATIENT)
Dept: HEALTH INFORMATION MANAGEMENT | Facility: CLINIC | Age: 66
End: 2021-08-17

## 2021-08-17 ENCOUNTER — LAB REQUISITION (OUTPATIENT)
Dept: LAB | Facility: CLINIC | Age: 66
End: 2021-08-17
Payer: COMMERCIAL

## 2021-08-17 DIAGNOSIS — R11.2 NAUSEA WITH VOMITING, UNSPECIFIED: ICD-10-CM

## 2021-08-17 LAB
ALBUMIN SERPL-MCNC: 2 G/DL (ref 3.4–5)
ALP SERPL-CCNC: 116 U/L (ref 40–150)
ALT SERPL W P-5'-P-CCNC: 20 U/L (ref 0–50)
ANION GAP SERPL CALCULATED.3IONS-SCNC: 2 MMOL/L (ref 3–14)
AST SERPL W P-5'-P-CCNC: 27 U/L (ref 0–45)
BASOPHILS # BLD AUTO: 0.1 10E3/UL (ref 0–0.2)
BASOPHILS NFR BLD AUTO: 1 %
BILIRUB DIRECT SERPL-MCNC: <0.1 MG/DL (ref 0–0.2)
BILIRUB SERPL-MCNC: 0.1 MG/DL (ref 0.2–1.3)
BILIRUB SERPL-MCNC: 0.1 MG/DL (ref 0.2–1.3)
BUN SERPL-MCNC: 21 MG/DL (ref 7–30)
CALCIUM SERPL-MCNC: 7.7 MG/DL (ref 8.5–10.1)
CHLORIDE BLD-SCNC: 112 MMOL/L (ref 94–109)
CO2 SERPL-SCNC: 27 MMOL/L (ref 20–32)
CREAT SERPL-MCNC: 0.6 MG/DL (ref 0.52–1.04)
EOSINOPHIL # BLD AUTO: 0.8 10E3/UL (ref 0–0.7)
EOSINOPHIL NFR BLD AUTO: 9 %
ERYTHROCYTE [DISTWIDTH] IN BLOOD BY AUTOMATED COUNT: 19.1 % (ref 10–15)
FASTING STATUS PATIENT QL REPORTED: NORMAL
GFR SERPL CREATININE-BSD FRML MDRD: >90 ML/MIN/1.73M2
GLUCOSE BLD-MCNC: 82 MG/DL (ref 70–99)
HCT VFR BLD AUTO: 31.7 % (ref 35–47)
HGB BLD-MCNC: 9.2 G/DL (ref 11.7–15.7)
HOLD SPECIMEN: NORMAL
IMM GRANULOCYTES # BLD: 0 10E3/UL
IMM GRANULOCYTES NFR BLD: 0 %
LYMPHOCYTES # BLD AUTO: 1.2 10E3/UL (ref 0.8–5.3)
LYMPHOCYTES NFR BLD AUTO: 15 %
MAGNESIUM SERPL-MCNC: 2 MG/DL (ref 1.6–2.3)
MCH RBC QN AUTO: 24.1 PG (ref 26.5–33)
MCHC RBC AUTO-ENTMCNC: 29 G/DL (ref 31.5–36.5)
MCV RBC AUTO: 83 FL (ref 78–100)
MONOCYTES # BLD AUTO: 0.8 10E3/UL (ref 0–1.3)
MONOCYTES NFR BLD AUTO: 9 %
NEUTROPHILS # BLD AUTO: 5.4 10E3/UL (ref 1.6–8.3)
NEUTROPHILS NFR BLD AUTO: 66 %
NRBC # BLD AUTO: 0 10E3/UL
NRBC BLD AUTO-RTO: 0 /100
PHOSPHATE SERPL-MCNC: 4.1 MG/DL (ref 2.5–4.5)
PLATELET # BLD AUTO: 233 10E3/UL (ref 150–450)
POTASSIUM BLD-SCNC: 4.3 MMOL/L (ref 3.4–5.3)
PROT SERPL-MCNC: 5.6 G/DL (ref 6.8–8.8)
RBC # BLD AUTO: 3.82 10E6/UL (ref 3.8–5.2)
SODIUM SERPL-SCNC: 141 MMOL/L (ref 133–144)
TRIGL SERPL-MCNC: 95 MG/DL
WBC # BLD AUTO: 8.2 10E3/UL (ref 4–11)

## 2021-08-17 PROCEDURE — 84630 ASSAY OF ZINC: CPT | Mod: ORL | Performed by: SURGERY

## 2021-08-17 PROCEDURE — 82248 BILIRUBIN DIRECT: CPT | Mod: ORL | Performed by: SURGERY

## 2021-08-17 PROCEDURE — 80053 COMPREHEN METABOLIC PANEL: CPT | Mod: ORL | Performed by: SURGERY

## 2021-08-17 PROCEDURE — 84100 ASSAY OF PHOSPHORUS: CPT | Mod: ORL | Performed by: SURGERY

## 2021-08-17 PROCEDURE — 83735 ASSAY OF MAGNESIUM: CPT | Mod: ORL | Performed by: SURGERY

## 2021-08-17 PROCEDURE — 84478 ASSAY OF TRIGLYCERIDES: CPT | Mod: ORL | Performed by: SURGERY

## 2021-08-17 PROCEDURE — 36592 COLLECT BLOOD FROM PICC: CPT | Mod: ORL | Performed by: SURGERY

## 2021-08-17 PROCEDURE — 85025 COMPLETE CBC W/AUTO DIFF WBC: CPT | Mod: ORL | Performed by: SURGERY

## 2021-08-19 ENCOUNTER — HOME INFUSION (PRE-WILLOW HOME INFUSION) (OUTPATIENT)
Dept: PHARMACY | Facility: CLINIC | Age: 66
End: 2021-08-19

## 2021-08-19 LAB — ZINC SERPL-MCNC: 54.3 UG/DL

## 2021-08-20 RX ORDER — HYDROXYCHLOROQUINE SULFATE 200 MG/1
200 TABLET, FILM COATED ORAL 2 TIMES DAILY WITH MEALS
Qty: 180 TABLET | OUTPATIENT
Start: 2021-08-20

## 2021-08-20 NOTE — PROGRESS NOTES
This is a recent snapshot of the patient's [facility] medical record.  For current drug dose and complete information and questions, call [phone] or In Basket pool, fv home infusion ([id number])  CSN Number:  [id number]

## 2021-08-23 ENCOUNTER — HOME INFUSION (PRE-WILLOW HOME INFUSION) (OUTPATIENT)
Dept: PHARMACY | Facility: CLINIC | Age: 66
End: 2021-08-23

## 2021-08-23 ENCOUNTER — MEDICAL CORRESPONDENCE (OUTPATIENT)
Dept: HEALTH INFORMATION MANAGEMENT | Facility: CLINIC | Age: 66
End: 2021-08-23

## 2021-08-23 NOTE — PROGRESS NOTES
This is a recent snapshot of the patient's [facility] medical record.  For current drug dose and complete information and questions, call [phone] or In Basket pool, fv home infusion ([id number])  CSN Number:  [id number]

## 2021-08-23 NOTE — PROGRESS NOTES
This is a recent snapshot of the patient's [facility] medical record.  For current drug dose and complete information and questions, call [phone] or In Basket pool, fv home infusion ([id number])  CSN Number:  [id number]

## 2021-08-24 ENCOUNTER — LAB REQUISITION (OUTPATIENT)
Dept: LAB | Facility: CLINIC | Age: 66
End: 2021-08-24
Payer: COMMERCIAL

## 2021-08-24 ENCOUNTER — HOME INFUSION (PRE-WILLOW HOME INFUSION) (OUTPATIENT)
Dept: PHARMACY | Facility: CLINIC | Age: 66
End: 2021-08-24

## 2021-08-24 ENCOUNTER — OFFICE VISIT (OUTPATIENT)
Dept: FAMILY MEDICINE | Facility: CLINIC | Age: 66
End: 2021-08-24
Payer: COMMERCIAL

## 2021-08-24 ENCOUNTER — MEDICAL CORRESPONDENCE (OUTPATIENT)
Dept: HEALTH INFORMATION MANAGEMENT | Facility: CLINIC | Age: 66
End: 2021-08-24

## 2021-08-24 VITALS
BODY MASS INDEX: 21.9 KG/M2 | WEIGHT: 119 LBS | HEART RATE: 53 BPM | HEIGHT: 62 IN | OXYGEN SATURATION: 98 % | DIASTOLIC BLOOD PRESSURE: 70 MMHG | SYSTOLIC BLOOD PRESSURE: 124 MMHG

## 2021-08-24 DIAGNOSIS — G89.18 ACUTE POST-OPERATIVE PAIN: Primary | ICD-10-CM

## 2021-08-24 DIAGNOSIS — R11.2 NAUSEA WITH VOMITING, UNSPECIFIED: ICD-10-CM

## 2021-08-24 LAB
ALBUMIN SERPL-MCNC: 2 G/DL (ref 3.4–5)
ALP SERPL-CCNC: 90 U/L (ref 40–150)
ALT SERPL W P-5'-P-CCNC: 19 U/L (ref 0–50)
ANION GAP SERPL CALCULATED.3IONS-SCNC: <1 MMOL/L (ref 3–14)
AST SERPL W P-5'-P-CCNC: 23 U/L (ref 0–45)
ATRIAL RATE - MUSE: 53 BPM
BASOPHILS # BLD AUTO: 0 10E3/UL (ref 0–0.2)
BASOPHILS NFR BLD AUTO: 0 %
BILIRUB DIRECT SERPL-MCNC: <0.1 MG/DL (ref 0–0.2)
BILIRUB SERPL-MCNC: 0.1 MG/DL (ref 0.2–1.3)
BILIRUB SERPL-MCNC: 0.1 MG/DL (ref 0.2–1.3)
BUN SERPL-MCNC: 24 MG/DL (ref 7–30)
CALCIUM SERPL-MCNC: 7.7 MG/DL (ref 8.5–10.1)
CHLORIDE BLD-SCNC: 112 MMOL/L (ref 94–109)
CO2 SERPL-SCNC: 29 MMOL/L (ref 20–32)
CREAT SERPL-MCNC: 0.64 MG/DL (ref 0.52–1.04)
CRP SERPL-MCNC: 13 MG/L (ref 0–8)
DIASTOLIC BLOOD PRESSURE - MUSE: NORMAL MMHG
EOSINOPHIL # BLD AUTO: 0.7 10E3/UL (ref 0–0.7)
EOSINOPHIL NFR BLD AUTO: 9 %
ERYTHROCYTE [DISTWIDTH] IN BLOOD BY AUTOMATED COUNT: 18.6 % (ref 10–15)
FASTING STATUS PATIENT QL REPORTED: NORMAL
FERRITIN SERPL-MCNC: 4 NG/ML (ref 8–252)
GFR SERPL CREATININE-BSD FRML MDRD: >90 ML/MIN/1.73M2
GLUCOSE BLD-MCNC: 82 MG/DL (ref 70–99)
HCT VFR BLD AUTO: 30.6 % (ref 35–47)
HGB BLD-MCNC: 8.7 G/DL (ref 11.7–15.7)
IMM GRANULOCYTES # BLD: 0 10E3/UL
IMM GRANULOCYTES NFR BLD: 0 %
INTERPRETATION ECG - MUSE: NORMAL
IRON SATN MFR SERPL: 3 % (ref 15–46)
IRON SERPL-MCNC: 10 UG/DL (ref 35–180)
IRON SERPL-MCNC: 10 UG/DL (ref 35–180)
LYMPHOCYTES # BLD AUTO: 1 10E3/UL (ref 0.8–5.3)
LYMPHOCYTES NFR BLD AUTO: 14 %
MAGNESIUM SERPL-MCNC: 1.9 MG/DL (ref 1.6–2.3)
MCH RBC QN AUTO: 23.8 PG (ref 26.5–33)
MCHC RBC AUTO-ENTMCNC: 28.4 G/DL (ref 31.5–36.5)
MCV RBC AUTO: 84 FL (ref 78–100)
MONOCYTES # BLD AUTO: 0.6 10E3/UL (ref 0–1.3)
MONOCYTES NFR BLD AUTO: 8 %
NEUTROPHILS # BLD AUTO: 5 10E3/UL (ref 1.6–8.3)
NEUTROPHILS NFR BLD AUTO: 69 %
NRBC # BLD AUTO: 0 10E3/UL
NRBC BLD AUTO-RTO: 0 /100
P AXIS - MUSE: 33 DEGREES
PHOSPHATE SERPL-MCNC: 3.2 MG/DL (ref 2.5–4.5)
PLATELET # BLD AUTO: 243 10E3/UL (ref 150–450)
POTASSIUM BLD-SCNC: 4.1 MMOL/L (ref 3.4–5.3)
PR INTERVAL - MUSE: 184 MS
PROT SERPL-MCNC: 5.4 G/DL (ref 6.8–8.8)
QRS DURATION - MUSE: 86 MS
QT - MUSE: 412 MS
QTC - MUSE: 386 MS
R AXIS - MUSE: 14 DEGREES
RBC # BLD AUTO: 3.66 10E6/UL (ref 3.8–5.2)
SODIUM SERPL-SCNC: 140 MMOL/L (ref 133–144)
SYSTOLIC BLOOD PRESSURE - MUSE: NORMAL MMHG
T AXIS - MUSE: 34 DEGREES
TIBC SERPL-MCNC: 311 UG/DL (ref 240–430)
TRIGL SERPL-MCNC: 66 MG/DL
VENTRICULAR RATE- MUSE: 53 BPM
WBC # BLD AUTO: 7.4 10E3/UL (ref 4–11)

## 2021-08-24 PROCEDURE — 36592 COLLECT BLOOD FROM PICC: CPT | Mod: ORL | Performed by: SURGERY

## 2021-08-24 PROCEDURE — 93010 ELECTROCARDIOGRAM REPORT: CPT | Performed by: FAMILY MEDICINE

## 2021-08-24 PROCEDURE — 82728 ASSAY OF FERRITIN: CPT | Mod: ORL | Performed by: SURGERY

## 2021-08-24 PROCEDURE — 83550 IRON BINDING TEST: CPT | Mod: ORL | Performed by: SURGERY

## 2021-08-24 PROCEDURE — 82248 BILIRUBIN DIRECT: CPT | Mod: ORL | Performed by: SURGERY

## 2021-08-24 PROCEDURE — 85025 COMPLETE CBC W/AUTO DIFF WBC: CPT | Mod: ORL | Performed by: SURGERY

## 2021-08-24 PROCEDURE — 86140 C-REACTIVE PROTEIN: CPT | Mod: ORL | Performed by: SURGERY

## 2021-08-24 PROCEDURE — 83735 ASSAY OF MAGNESIUM: CPT | Mod: ORL | Performed by: SURGERY

## 2021-08-24 PROCEDURE — 80053 COMPREHEN METABOLIC PANEL: CPT | Mod: ORL | Performed by: SURGERY

## 2021-08-24 PROCEDURE — 99215 OFFICE O/P EST HI 40 MIN: CPT | Performed by: FAMILY MEDICINE

## 2021-08-24 PROCEDURE — 83540 ASSAY OF IRON: CPT | Mod: ORL | Performed by: SURGERY

## 2021-08-24 PROCEDURE — 84478 ASSAY OF TRIGLYCERIDES: CPT | Mod: ORL | Performed by: SURGERY

## 2021-08-24 PROCEDURE — 84100 ASSAY OF PHOSPHORUS: CPT | Mod: ORL | Performed by: SURGERY

## 2021-08-24 ASSESSMENT — MIFFLIN-ST. JEOR: SCORE: 1043.03

## 2021-08-24 ASSESSMENT — PAIN SCALES - GENERAL: PAINLEVEL: NO PAIN (0)

## 2021-08-24 NOTE — NURSING NOTE
Chief Complaint   Patient presents with     Pre-Op Exam     ESOPHAGOJEJUNOSTOMY ON 9/10/21       Sheila Ennis EMT at 8:20 AM on 8/24/2021

## 2021-08-24 NOTE — PROGRESS NOTES
Surgeon (please enter first and last name): Kaveh Clayton MD  Location of Surgery: [facility]  Date of Surgery: 9/10/21  Procedure: ESOPHAGOJEJUNOSTOMY  History of reaction to anesthesia? NO    Sheila Ennis, EMT at 8:17 AM on 8/24/2021    [facility] [address]  4TH FLOOR  [facility] [address]  Phone: [phone]  Fax: [phone]  Primary Provider: Fredy Merritt  Pre-op Performing Provider: FREDY MERRITT      PREOPERATIVE EVALUATION:  Today's date: 8/24/2021    Demetra Richardson is a 65 year old female who presents for a preoperative evaluation.    Surgical Information:  above  Where patient plans to recover: At home with family  Fax number for surgical facility: Note does not need to be faxed, will be available electronically in Epic.    Type of Anesthesia Anticipated: to be determined    Assessment & Plan     The proposed surgical procedure is considered INTERMEDIATE risk.    Acute post-operative pain  See recent entry   - Pain Management Referral Aug 18 re: pain clinic referal    Lupus (H)  Sinus rigo otherwise normal  - EKG Performed in Clinic w/ Provider Reading Fee    Labs done weekly for TPN, no need to order further labs now         Risks and Recommendations:  The patient has the following additional risks and recommendations for perioperative complications:   - I just ordered preop pain clinic visit to help w/ post op mgmt, is on methadone from methadone program    Medication Instructions:  DOS preop she will take methadone and PPI only    RECOMMENDATION:  APPROVAL GIVEN to proceed with proposed procedure, without further diagnostic evaluation. Interval labs per surgery will be done for TPN.        53 minutes spent on the date of the encounter doing chart review, history and exam, documentation and further activities per the note    Subjective     HPI related to upcoming procedure: unable to consume normal nutrition does get hungry  but cannot consume adequate amounts orally, has early satiety and pain, plan is open gastrojejunostomy. Malnourished to to Gi complaintgs.  Had original stomach surgery 2005  Preop Questions 8/24/2021   1. Have you ever had a heart attack or stroke? No   2. Have you ever had surgery on your heart or blood vessels, such as a stent placement, a coronary artery bypass, or surgery on an artery in your head, neck, heart, or legs? No   3. Do you have chest pain with activity? No   4. Do you have a history of  heart failure? No   5. Do you currently have a cold, bronchitis or symptoms of other infection? No   6. Do you have a cough, shortness of breath, or wheezing? No   7. Do you or anyone in your family have previous history of blood clots? No   8. Do you or does anyone in your family have a serious bleeding problem such as prolonged bleeding following surgeries or cuts? No   9. Have you ever had problems with anemia or been told to take iron pills? YES - yes has needed iron replacement   10. Have you had any abnormal blood loss such as black, tarry or bloody stools, or abnormal vaginal bleeding? YES - has had GI bleeds in past   11. Have you ever had a blood transfusion? YES - for anemia   11a. Have you ever had a transfusion reaction? No   12. Are you willing to have a blood transfusion if it is medically needed before, during, or after your surgery? Yes   13. Have you or any of your relatives ever had problems with anesthesia? No   14. Do you have sleep apnea, excessive snoring or daytime drowsiness? No   15. Do you have any artifical heart valves or other implanted medical devices like a pacemaker, defibrillator, or continuous glucose monitor? No   16. Do you have artificial joints? No   17. Are you allergic to latex? No       Health Care Directive:  Patient does not have a Health Care Directive or Living Will: she'll discuss w/ family, has a blank    Preoperative Review of :   reviewed - controlled substances  reflected in medication list.      Status of Chronic Conditions:  See problem list for active medical problems.  Problems all longstanding and stable, except as noted/documented.  See ROS for pertinent symptoms related to these conditions.      Review of Systems  CONSTITUTIONAL: NEGATIVE for fever, chills, change in weight  INTEGUMENTARY/SKIN: NEGATIVE for worrisome rashes, moles or lesions  EYES: NEGATIVE for vision changes or irritation  ENT/MOUTH: NEGATIVE for ear, mouth and throat problems  RESP: NEGATIVE for significant cough or SOB  CV: NEGATIVE for chest pain, palpitations or peripheral edema  GI: abdominal pain epigastric and nausea  : NEGATIVE for frequency, dysuria, or hematuria  MUSCULOSKELETAL: NEGATIVE for significant arthralgias or myalgia  NEURO: NEGATIVE for weakness, dizziness or paresthesias  ENDOCRINE: NEGATIVE for temperature intolerance, skin/hair changes  HEME: NEGATIVE for bleeding problems  PSYCHIATRIC: NEGATIVE for changes in mood or affect   Also some leg edema but improving     Patient Active Problem List    Diagnosis Date Noted     Nephrolithiasis 07/09/2012     Priority: High     1.7cm stone at left UPJ       Gastric atony 07/12/2021     Priority: Medium     Added automatically from request for surgery [id number]       Symptomatic anemia 06/29/2021     Priority: Medium     Malnutrition (H) 06/16/2021     Priority: Medium     Anemia, iron deficiency 06/29/2020     Priority: Medium     Polysubstance overdose 01/22/2019     Priority: Medium     BCC (basal cell carcinoma), face 09/20/2018     Priority: Medium     Specimen #: [id number]   Collected: 9/17/2018   Received: 9/17/2018   Reported: 9/19/2018 18:14   Ordering Phy(s): EDD ROBERTS     For improved result formatting, select 'View Enhanced Report Format' under    Linked Documents section.     SPECIMEN(S):   A: Skin, periorbital medial left   B: Skin, paranasal right below eyelid     FINAL DIAGNOSIS:   A. Skin, periorbital medial left:    - Basal cell carcinoma, nodular type, extending to the lateral and deep   margins - (see description)     B. Skin, paranasal right below eyelid:   - Basal cell carcinoma, infiltrating type, extending to the lateral and   deep margins - (see description)        Borderline personality disorder (H) 05/10/2016     Priority: Medium     Behavior disturbance 10/15/2015     Priority: Medium     Overdose 10/14/2015     Priority: Medium     Iron deficiency anemia 05/16/2013     Priority: Medium     S/p gastric bypass 2/2 trauma.  Cannot absorb iron orally.  Requires parenteral iron infusions periodically dependent on iron panels and CBC.  updating diagnosis code for icd10 cutover       Bipolar 1 disorder, mixed, moderate (H) 02/07/2013     Priority: Medium     Seizure (H) 02/03/2013     Priority: Medium     Microcytic anemia 07/09/2012     Priority: Medium     Likely due to Fe deficiency due to malabsorption from bariatric surgery       Migraines 07/09/2012     Priority: Medium     Hydronephrosis 07/09/2012     Priority: Medium     Benzodiazepine dependence (H) 04/14/2012     Priority: Medium     Drug-induced mood disorder (H) 04/14/2012     Priority: Medium     Chronic pain 12/06/2009     Priority: Medium     (Problem list name updated by automated process. Provider to review and confirm.)       Lupus (H) 12/06/2009     Priority: Medium     Depression 12/06/2009     Priority: Medium     Chronic pain disorder 12/06/2009     Priority: Medium     Orthostatic hypotension 12/06/2009     Priority: Medium     S/P partial gastrectomy 12/06/2009     Priority: Medium     Opioid dependence on agonist therapy (H) 12/28/2006     Priority: Medium     Anxiety disorder 08/25/2006     Priority: Medium      Past Medical History:   Diagnosis Date     Anemia      Basal cell carcinoma     Left-sided, skin over over medial orbit/nasal bone. Removed Oct 2018.     Benzodiazepine dependence (H)     With h/o withdrawal seizure x 1     Bipolar 1  disorder, mixed, moderate (H) 2013     Chronic pain     Back, legs.     Depressive disorder      Eosinophilic gastroenteritis 2010     Epidural abscess 2005    x4     Fibromyalgia      Generalised anxiety disorder      GERD (gastroesophageal reflux disease)      Major depressive disorder      Migraine      Nephrolithiasis     S/p left sided lithotripsy     Opiate dependence (H)      Osteoarthritis      Osteoporosis      PUD (peptic ulcer disease)      SLE (systemic lupus erythematosus) (H) 2009     Weight loss      Past Surgical History:   Procedure Laterality Date     BACK SURGERY  04    L4-5 epidural abscess     BACK SURGERY  04    L3-4 spinal stenosis     BACK SURGERY  04    Lt psoas abscess     BRONCHOSCOPY FLEXIBLE N/A 2019    Procedure: Flexible Bronchoscopy;  Surgeon: Patrice Regalado MD;  Location: [facility]      SECTION      x 2     CHOLECYSTECTOMY  -     CLOSED REDUCTION, PERCUTANEOUS PINNING FINGER, COMBINED  8/10/2011    Procedure:COMBINED CLOSED REDUCTION, PERCUTANEOUS PINNING FINGER; 5th Proximal Phalanx; Surgeon:RADHA BUITRAGO; [facility]     COLONOSCOPY       COLONOSCOPY N/A 2020    Procedure: COLONOSCOPY;  Surgeon: Zacarias Duran MD;  Location:  GI     ESOPHAGOSCOPY, GASTROSCOPY, DUODENOSCOPY (EGD), COMBINED N/A 2020    Procedure: ESOPHAGOGASTRODUODENOSCOPY, WITH BIOPSY;  Surgeon: Zacarias Duran MD;  Location:  GI     ESOPHAGOSCOPY, GASTROSCOPY, DUODENOSCOPY (EGD), COMBINED N/A 2021    Procedure: ESOPHAGOGASTRODUODENOSCOPY (EGD);  Surgeon: Kaveh Clayton MD;  Location:  GI     GASTRECTOMY  2005    Bilat truncal vagotomy, hemigastrectomy, RnY gastrojejunostomy     GASTROJEJUNOSTOMY  2011    Procedure:GASTROJEJUNOSTOMY; exploratory laparotmy with revision of gastrojejunostomy, Antrectomy, Miles-en-y, Gastrojejunostomy; Surgeon:JULIUS HELM; Location: OR     INSERT CHEST TUBE N/A 2019     Procedure: INSERTION, CATHETER, INTERCOSTAL, FOR DRAINAGE;  Surgeon: Melissa Nichols MD;  Location: [facility]     LASER HOLMIUM LITHOTRIPSY URETER(S), INSERT STENT, COMBINED      Procedure: COMBINED CYSTOSCOPY, URETEROSCOPY, LASER HOLMIUM LITHOTRIPSY URETER(S), INSERT STENT;;  Surgeon: Blair Correa MD;  [facility]     LASER HOLMIUM NEPHROLITHOTOMY VIA PERCUTANEOUS NEPHROSTOMY  7/11/2012    Procedure: LASER HOLMIUM NEPHROLITHOTOMY VIA PERCUTANEOUS NEPHROSTOMY;  proceedure should read: Left Percutaneous Access, Left Percutaneous ultrasonic Nephrolithotomy, Ureteroscopy Holmium Laser Lithotripsy Stent Placement ;  Surgeon: Blair Correa MD;  [facility]     MAMMOPLASTY AUGMENTATION BILATERAL       OPEN REDUCTION INTERNAL FIXATION WRIST Left 1/11/2016    Procedure: OPEN REDUCTION INTERNAL FIXATION WRIST;  Surgeon: Darling Ellis MD;  [facility]     RECONSTRUCT BREAST, IMPLANT PROSTHESIS, COMBINED       THORACOSCOPIC DECORTICATION LUNG Left 5/7/2019    Procedure: Left Video Assisted Thoracoscopic Decortication, Intercostal Nerve Cryo Analgesia, Flexible Bronchoscopy;  Surgeon: Patrice Regalado MD;  Location: [facility] OR     Current Outpatient Medications   Medication Sig Dispense Refill     acetaminophen (TYLENOL) 325 MG tablet Take 2 tablets (650 mg) by mouth every 6 hours as needed for mild pain or fever 60 tablet 1     escitalopram (LEXAPRO) 20 MG tablet TAKE 1 TABLET BY MOUTH EVERY DAY 30 tablet 5     furosemide (LASIX) 20 MG tablet Take 1 tablet (20 mg) by mouth daily 20 tablet 0     hydroxychloroquine (PLAQUENIL) 200 MG tablet Take 1 tablet (200 mg) by mouth 2 times daily (with meals) 180 tablet 1     hydrOXYzine (VISTARIL) 25 MG capsule Take 1 capsule (25 mg) by mouth 2 times daily as needed for anxiety 60 capsule 11     methadone (DOLOPHINE-INTENSOL) 10 MG/ML (HIGH CONC) solution Take 120 mg by mouth daily Takes it at 0530 everyday.       pantoprazole (PROTONIX) 40 MG EC  tablet Take 1 tablet (40 mg) by mouth 2 times daily 90 tablet 2     parenteral nutrition - PTA/DISCHARGE ORDER The TPN formula will print on the After Visit Summary Report. 1 each 0     polyethylene glycol (MIRALAX) 17 GM/Dose powder 1 capful (17 g) in 8 oz of liquid 225 g 3     pregabalin (LYRICA) 150 MG capsule TAKE 1 CAPSULE (150 MG) BY MOUTH 3 TIMES DAILY 90 capsule 1     valACYclovir (VALTREX) 500 MG tablet Take 1 tablet (500 mg) by mouth daily 90 tablet 3     valACYclovir (VALTREX) 500 MG tablet Take 1 tablet (500 mg) by mouth 2 times daily 60 tablet 1     ferrous sulfate (FEROSUL) 325 (65 Fe) MG tablet Take 1 tablet (325 mg) by mouth 2 times daily With meals (Patient not taking: Reported on 7/21/2021) 100 tablet 3     folic acid (FOLVITE) 1 MG tablet Take 1 tablet (1 mg) by mouth daily (Patient not taking: Reported on 7/21/2021) 30 tablet 11     methylPREDNISolone (MEDROL) 4 MG tablet Take 1 tablet (4 mg) by mouth every morning (before breakfast) (Patient not taking: Reported on 7/22/2021) 5 tablet 0     methylPREDNISolone (MEDROL) 4 MG tablet Take 1 tablet (4 mg) by mouth daily (with lunch) (Patient not taking: Reported on 7/22/2021) 3 tablet 0     methylPREDNISolone (MEDROL) 4 MG tablet Take 1 tablet (4 mg) by mouth daily (with dinner) (Patient not taking: Reported on 7/22/2021) 2 tablet 0     methylPREDNISolone (MEDROL) 4 MG tablet Take 1 tablet (4 mg) by mouth At Bedtime (Patient not taking: Reported on 7/22/2021) 3 tablet 0     vitamin D2 (ERGOCALCIFEROL) 00663 units (1250 mcg) capsule Take 1 capsule (50,000 Units) by mouth every 7 days (Patient not taking: Reported on 7/22/2021) 12 capsule 0       Allergies   Allergen Reactions     Penicillins Hives     Hives in childhood.        Social History     Tobacco Use     Smoking status: Never Smoker     Smokeless tobacco: Never Used   Substance Use Topics     Alcohol use: No     Comment: none in 10 years       History   Drug Use No         Objective     BP  "124/70 (BP Location: Right arm, Patient Position: Sitting, Cuff Size: Adult Regular)   Pulse 53   Ht 1.575 m (5' 2\")   Wt 54 kg (119 lb)   LMP  (LMP Unknown)   SpO2 98%   BMI 21.77 kg/m      Physical Exam    GENERAL APPEARANCE: healthy, alert and no distress     EYES: EOMI, PERRL     HENT: ear canals and TM's normal and nose and mouth without ulcers or lesions     NECK: no adenopathy, no asymmetry, masses, or scars and thyroid normal to palpation     RESP: lungs clear to auscultation - no rales, rhonchi or wheezes     CV: regular rates and rhythm, normal S1 S2, no S3 or S4 and no murmur, click or rub     ABDOMEN:  soft, nontender, no HSM or masses and bowel sounds normal     MS: extremities normal- no gross deformities noted, no evidence of inflammation in joints, FROM in all extremities; 2 plus edema both legs (improved).     SKIN: no suspicious lesions or rashes     NEURO: Normal strength and tone, sensory exam grossly normal, mentation intact and speech normal     PSYCH: mentation appears normal. and affect normal/bright     LYMPHATICS: No cervical adenopathy    Recent Labs   Lab Test 08/17/21  1240 08/10/21  1050 07/02/21  0824 07/01/21  0837 06/24/21  1045 06/21/21  0735   HGB 9.2* 9.1*   < > 9.6*   < >  --     251   < > 258   < >  --    INR  --   --   --  1.03  --  0.99    142   < > 139  --  140   POTASSIUM 4.3 4.1   < > 4.3  --  4.7   CR 0.60 0.69   < > 0.53  --  0.68    < > = values in this interval not displayed.        Diagnostics:  No labs were ordered during this visit.   EKG required for Lupus and not completed in the last 90 days.     Revised Cardiac Risk Index (RCRI):  The patient has the following serious cardiovascular risks for perioperative complications:   - No serious cardiac risks = 0 points     RCRI Interpretation: 0 points: Class I (very low risk - 0.4% complication rate)           Signed Electronically by: Fredy Merritt MD  Copy of this evaluation report is provided " to requesting physician.

## 2021-08-25 NOTE — PROGRESS NOTES
This is a recent snapshot of the patient's [facility] medical record.  For current drug dose and complete information and questions, call [phone] or In [facility] pool, fv home infusion ([id number])  CSN Number:  [id number]

## 2021-08-27 ENCOUNTER — HOME INFUSION (PRE-WILLOW HOME INFUSION) (OUTPATIENT)
Dept: PHARMACY | Facility: CLINIC | Age: 66
End: 2021-08-27

## 2021-08-27 ENCOUNTER — TELEPHONE (OUTPATIENT)
Dept: INTERNAL MEDICINE | Facility: CLINIC | Age: 66
End: 2021-08-27

## 2021-08-27 DIAGNOSIS — D50.9 ANEMIA, IRON DEFICIENCY: ICD-10-CM

## 2021-08-27 RX ORDER — MEPERIDINE HYDROCHLORIDE 25 MG/ML
25 INJECTION INTRAMUSCULAR; INTRAVENOUS; SUBCUTANEOUS EVERY 30 MIN PRN
Status: CANCELLED | OUTPATIENT
Start: 2021-08-27

## 2021-08-27 RX ORDER — HEPARIN SODIUM,PORCINE 10 UNIT/ML
5 VIAL (ML) INTRAVENOUS
Status: CANCELLED | OUTPATIENT
Start: 2021-08-27

## 2021-08-27 RX ORDER — ALBUTEROL SULFATE 90 UG/1
1-2 AEROSOL, METERED RESPIRATORY (INHALATION)
Status: CANCELLED
Start: 2021-08-27

## 2021-08-27 RX ORDER — HEPARIN SODIUM (PORCINE) LOCK FLUSH IV SOLN 100 UNIT/ML 100 UNIT/ML
5 SOLUTION INTRAVENOUS
Status: CANCELLED | OUTPATIENT
Start: 2021-08-27

## 2021-08-27 RX ORDER — NALOXONE HYDROCHLORIDE 0.4 MG/ML
0.2 INJECTION, SOLUTION INTRAMUSCULAR; INTRAVENOUS; SUBCUTANEOUS
Status: CANCELLED | OUTPATIENT
Start: 2021-08-27

## 2021-08-27 RX ORDER — METHYLPREDNISOLONE SODIUM SUCCINATE 125 MG/2ML
125 INJECTION, POWDER, LYOPHILIZED, FOR SOLUTION INTRAMUSCULAR; INTRAVENOUS
Status: CANCELLED
Start: 2021-08-27

## 2021-08-27 RX ORDER — DIPHENHYDRAMINE HYDROCHLORIDE 50 MG/ML
50 INJECTION INTRAMUSCULAR; INTRAVENOUS
Status: CANCELLED
Start: 2021-08-27

## 2021-08-27 RX ORDER — EPINEPHRINE 1 MG/ML
0.3 INJECTION, SOLUTION, CONCENTRATE INTRAVENOUS EVERY 5 MIN PRN
Status: CANCELLED | OUTPATIENT
Start: 2021-08-27

## 2021-08-27 RX ORDER — ALBUTEROL SULFATE 0.83 MG/ML
2.5 SOLUTION RESPIRATORY (INHALATION)
Status: CANCELLED | OUTPATIENT
Start: 2021-08-27

## 2021-08-27 NOTE — TELEPHONE ENCOUNTER
Iron infusion order placed.    [facility]  -----------------------------------------------------        ----- Message from Fredy Merritt MD sent at 8/27/2021  1:35 PM CDT -----  Regarding: FW: iron study results available  Would you know how to order this in [facility] re: iron deficiency anemia  ----- Message -----  From: Carlene Galvan [facility]  Sent: 8/27/2021   1:17 PM CDT  To: Fredy Merritt MD, Ruby Cardona, PharmD, #  Subject: RE: iron study results available                 Hi Dr Merritt,    Our recommendation is Venofer 200mg IV X 5 doses with at least 48 hours between each dose.     If you let us know when you have entered the order we will schedule her for home infusions of iron.     Thanks,     Carlene   ----- Message -----  From: Fredy Merritt MD  Sent: 8/26/2021  12:43 PM CDT  To: Ruby Cardona, PharmD, Carlene Galvan [facility], #  Subject: RE: iron study results available                 Yes she should have some I'm actually not familiar w/ orders for that, I could run it byMark Sammi he has seen her in hematology in the past, unless any of you are familiar w/ it thx  ----- Message -----  From: Carlene Galvan [facility]  Sent: 8/26/2021  11:29 AM CDT  To: Fredy Merritt MD, Ruby Cardona, PharmD, #  Subject: iron study results available                     Hi Dr Merritt,     Radha's iron results are available, she is definitely low and her HGB has dropped again.    She has had venofer 300mg IV in the past ..    last in 2016.     Would you like for order venofer for her?     I am part of the float pool so please respond to all on this message.     Thanks,    Carlene Galvan [facility]   Clinical Pharmacist   [facility]

## 2021-08-30 ENCOUNTER — VIRTUAL VISIT (OUTPATIENT)
Dept: GASTROENTEROLOGY | Facility: CLINIC | Age: 66
End: 2021-08-30
Payer: COMMERCIAL

## 2021-08-30 VITALS — BODY MASS INDEX: 21.77 KG/M2 | WEIGHT: 119 LBS

## 2021-08-30 DIAGNOSIS — D50.0 IRON DEFICIENCY ANEMIA DUE TO CHRONIC BLOOD LOSS: ICD-10-CM

## 2021-08-30 DIAGNOSIS — K59.03 DRUG-INDUCED CONSTIPATION: ICD-10-CM

## 2021-08-30 DIAGNOSIS — K92.2 GASTROINTESTINAL HEMORRHAGE, UNSPECIFIED GASTROINTESTINAL HEMORRHAGE TYPE: ICD-10-CM

## 2021-08-30 DIAGNOSIS — R11.0 NAUSEA: Primary | ICD-10-CM

## 2021-08-30 PROCEDURE — 99214 OFFICE O/P EST MOD 30 MIN: CPT | Mod: 95 | Performed by: PHYSICIAN ASSISTANT

## 2021-08-30 RX ORDER — POLYETHYLENE GLYCOL 3350 17 G/17G
POWDER, FOR SOLUTION ORAL
Qty: 225 G | Refills: 3 | Status: SHIPPED | OUTPATIENT
Start: 2021-08-30

## 2021-08-30 RX ORDER — OMEPRAZOLE 40 MG/1
40 CAPSULE, DELAYED RELEASE ORAL 2 TIMES DAILY
Qty: 120 CAPSULE | Refills: 5 | Status: SHIPPED | OUTPATIENT
Start: 2021-08-30 | End: 2023-01-01 | Stop reason: DRUGHIGH

## 2021-08-30 NOTE — PROGRESS NOTES
Radha is a 65 year old who is being evaluated via a billable telephone visit.      What phone number would you like to be contacted at? [phone]    How would you like to obtain your AVS? Nabeel  Phone call duration: 36 minutes    GI CLINIC VISIT    CC/REFERRING MD:  No ref. provider found  REASON FOR CONSULTATION: Constipation, abdominal pain    ASSESSMENT/PLAN:  #Early satiety/abdominal pain  As scheduled as scheduled with Dr. Clayton for surgery, mechanical obstruction and dysmotility likely contributing significantly to symptoms.  Did have friability on most recent EGD, would recommend PPI prophylaxis at this time.  She reports that Protonix seems to make her nauseated, would recommend omeprazole 40 mg twice daily.  Patient is also wondering about pain management after surgery.  I see that a referral has been placed and I gave her the phone number to schedule below.    #Constipation  Likely secondary to methadone.  Reports that MiraLAX improves her symptoms however she is not taking this regularly.  Would recommend daily use and increasing up to 2-3 times a day as needed.  If continued constipation, could consider trial of Amitiza.    #Anemia  May be due in part to friability at GJ anastomosis, is undergoing esophagojejunostomy surgery with Dr. Clayton.  Should continue to monitor with PCP.     Plan:  -- stop pantoprazole  -- start omeprazole 40 mg twice a day on an empty stomach   -- Miralax can be used to treat constipation and works by increasing the amount of water in the stool. Start with 1 caps in 8 oz of water. If no improvement, increase up to 2-3 times a day  -- call pain clinic as was recommended by your other doctors: [facility] will call you to coordinate your care as prescribed by the provider.  If you don t hear from a representative within 2 business days, please call [phone].  -- please see scheduling information provided below     RTC 2 months     Note completed using voice  recognition software. Some word and grammatical errors may occur.    Thank you for this consultation.  It was a pleasure to participate in the care of this patient; please contact us with any further questions.       Martha Michele PA-C  Division of Gastroenterology, Hepatology & Nutrition  [facility]        HPI  Demetra Richardson is a 65 year old female with a past medical history of Billroth II for gastric outlet obstruction in 2005, revision of gastrojejunostomy in 2011, chronic anemia, chronic pain, fibromyalgia, GI bleed presenting for chronic GI symptoms. She has previously been by Dr. Guaman and our inpatient team, and this is my first encounter with the patient. Please see previous documentation for additional details.     She was recently admitted 6/29/2021 with drop in hemoglobin to 6.5.  She had underwent an EGD earlier in June with the GJ ostomy showing friability and congestion, and reported frequent NSAID use.  Has been on TPN more recently.  She did not have repeat EGD and symptoms were thought to be due to oozing from GJ anastomosis.  She is scheduled for surgery with Dr. Clayton for gastric atony with plan for esophagojejunostomy.    She cannot tolerate much orally. She has been waking up in the middle of the night with vomiting. She is doing TPN 12 hours a day, and will eat small amounts of food three times a day. Will have yogurt and toast, instant mashed potatoes, TV dinners. She does not have sense of smell or taste. She denies significant GERD symptoms. Is taking pantoprazole 40 mg twice a day but she thinks this is causing some nausea.     She also describes LUQ abdominal pain which seems to be random. Is not always associated with a meal, and can be worsened with bending or twisting.     Bowel movements: one time a week, small amount of stool. Is being inconsistent with amount of Miralax and she thinks she is taking this about three times a week. 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     Priority: Medium     Added automatically from request for surgery [id number]       Symptomatic anemia 06/29/2021     Priority: Medium     Malnutrition (H) 06/16/2021     Priority: Medium     Anemia, iron deficiency 06/29/2020     Priority: Medium     Polysubstance overdose 01/22/2019     Priority: Medium     BCC (basal cell carcinoma), face 09/20/2018     Priority: Medium     Specimen #: [id number]   Collected: 9/17/2018   Received: 9/17/2018   Reported: 9/19/2018 18:14   Ordering Phy(s): EDD ROBERTS     For improved result formatting, select 'View Enhanced Report Format' under    Linked Documents section.     SPECIMEN(S):   A: Skin, periorbital medial left   B: Skin, paranasal right below eyelid     FINAL DIAGNOSIS:   A. Skin, periorbital medial left:   - Basal cell carcinoma, nodular type, extending to the lateral and deep   margins - (see description)     B. Skin, paranasal right below eyelid:   - Basal cell carcinoma, infiltrating type, extending to the lateral and   deep margins - (see description)        Borderline personality disorder (H) 05/10/2016     Priority: Medium     Behavior disturbance 10/15/2015     Priority: Medium     Overdose 10/14/2015     Priority: Medium     Iron deficiency anemia 05/16/2013     Priority: Medium     S/p gastric bypass 2/2 trauma.  Cannot absorb iron orally.  Requires parenteral iron infusions periodically dependent on iron panels and CBC.  updating diagnosis code for icd10 cutover       Bipolar 1 disorder, mixed, moderate (H) 02/07/2013     Priority: Medium     Seizure (H) 02/03/2013     Priority: Medium     Microcytic anemia 07/09/2012     Priority: Medium     Likely due to Fe deficiency due to malabsorption from bariatric surgery       Migraines 07/09/2012     Priority:  Medium     Hydronephrosis 2012     Priority: Medium     Benzodiazepine dependence (H) 2012     Priority: Medium     Drug-induced mood disorder (H) 2012     Priority: Medium     Chronic pain 2009     Priority: Medium     (Problem list name updated by automated process. Provider to review and confirm.)       Lupus (H) 2009     Priority: Medium     Depression 2009     Priority: Medium     Chronic pain disorder 2009     Priority: Medium     Orthostatic hypotension 2009     Priority: Medium     S/P partial gastrectomy 2009     Priority: Medium     Opioid dependence on agonist therapy (H) 2006     Priority: Medium     Anxiety disorder 2006     Priority: Medium       PERTINENT PAST MEDICAL HISTORY:  Past Medical History:   Diagnosis Date     Anemia      Basal cell carcinoma     Left-sided, skin over over medial orbit/nasal bone. Removed Oct 2018.     Benzodiazepine dependence (H)     With h/o withdrawal seizure x 1     Bipolar 1 disorder, mixed, moderate (H) 2013     Chronic pain     Back, legs.     Depressive disorder      Eosinophilic gastroenteritis 2010     Epidural abscess 2005    x4     Fibromyalgia      Generalised anxiety disorder      GERD (gastroesophageal reflux disease)      Major depressive disorder      Migraine      Nephrolithiasis     S/p left sided lithotripsy     Opiate dependence (H)      Osteoarthritis      Osteoporosis      PUD (peptic ulcer disease)      SLE (systemic lupus erythematosus) (H) 2009     Weight loss        PREVIOUS SURGERIES:  Past Surgical History:   Procedure Laterality Date     BACK SURGERY  04    L4-5 epidural abscess     BACK SURGERY  04    L3-4 spinal stenosis     BACK SURGERY  04    Lt psoas abscess     BRONCHOSCOPY FLEXIBLE N/A 2019    Procedure: Flexible Bronchoscopy;  Surgeon: Patrice Regalado MD;  Location: [facility]      SECTION      x 2     CHOLECYSTECTOMY  2-      CLOSED REDUCTION, PERCUTANEOUS PINNING FINGER, COMBINED  8/10/2011    Procedure:COMBINED CLOSED REDUCTION, PERCUTANEOUS PINNING FINGER; 5th Proximal Phalanx; Surgeon:RADHA BUITRAGO; [facility]     COLONOSCOPY       COLONOSCOPY N/A 8/6/2020    Procedure: COLONOSCOPY;  Surgeon: Zacarias Duran MD;  Location: [facility]     ESOPHAGOSCOPY, GASTROSCOPY, DUODENOSCOPY (EGD), COMBINED N/A 8/6/2020    Procedure: ESOPHAGOGASTRODUODENOSCOPY, WITH BIOPSY;  Surgeon: Zacarias Duran MD;  Location: [facility]     ESOPHAGOSCOPY, GASTROSCOPY, DUODENOSCOPY (EGD), COMBINED N/A 6/8/2021    Procedure: ESOPHAGOGASTRODUODENOSCOPY (EGD);  Surgeon: Kaveh Clayton MD;  Location: [facility]     GASTRECTOMY  11-    Bilat truncal vagotomy, hemigastrectomy, RnY gastrojejunostomy     GASTROJEJUNOSTOMY  6/2/2011    Procedure:GASTROJEJUNOSTOMY; exploratory laparotmy with revision of gastrojejunostomy, Antrectomy, Miles-en-y, Gastrojejunostomy; Surgeon:JULIUS HELM; Location:[facility]     INSERT CHEST TUBE N/A 4/29/2019    Procedure: INSERTION, CATHETER, INTERCOSTAL, FOR DRAINAGE;  Surgeon: Melissa Nichols MD;  Location: [facility]     LASER HOLMIUM LITHOTRIPSY URETER(S), INSERT STENT, COMBINED      Procedure: COMBINED CYSTOSCOPY, URETEROSCOPY, LASER HOLMIUM LITHOTRIPSY URETER(S), INSERT STENT;;  Surgeon: Blair Correa MD;  [facility]: [facility]     LASER HOLMIUM NEPHROLITHOTOMY VIA PERCUTANEOUS NEPHROSTOMY  7/11/2012    Procedure: LASER HOLMIUM NEPHROLITHOTOMY VIA PERCUTANEOUS NEPHROSTOMY;  proceedure should read: Left Percutaneous Access, Left Percutaneous ultrasonic Nephrolithotomy, Ureteroscopy Holmium Laser Lithotripsy Stent Placement ;  Surgeon: Blair Correa MD;  [facility]     MAMMOPLASTY AUGMENTATION BILATERAL       OPEN REDUCTION INTERNAL FIXATION WRIST Left 1/11/2016    Procedure: OPEN REDUCTION INTERNAL FIXATION WRIST;  Surgeon: Darling Ellis MD;  [facility]: [facility]     RECONSTRUCT BREAST, IMPLANT  PROSTHESIS, COMBINED       THORACOSCOPIC DECORTICATION LUNG Left 5/7/2019    Procedure: Left Video Assisted Thoracoscopic Decortication, Intercostal Nerve Cryo Analgesia, Flexible Bronchoscopy;  Surgeon: Patrice Regalado MD;  Location: [facility]       PREVIOUS ENDOSCOPY:  6/8/20201:  Impression:          - Normal gastroesophageal junction.                        - Stenosed Billroth II gastrojejunostomy was found,                        characterized by congestion, friable mucosa,                        inflammation, an intact appearance and mild stenosis.                        - No specimens collected.     8/6/2020:  Impression:          - Preparation of the colon was poor.                       - There was significant looping of the colon.                       - Stool in the transverse colon and in the ascending                        colon.                       - No specimens collected.  Recommendation:      - Discharge patient to home.                       - Resume previous diet.                       - consider iron infusions for iron defic iency anemia                        (related to inflamed jejunum)                       - Repeat colonoscopy in 5 years because the bowel                        preparation was poor.      ALLERGIES:     Allergies   Allergen Reactions     Penicillins Hives     Hives in childhood.       PERTINENT MEDICATIONS:    Current Outpatient Medications:      acetaminophen (TYLENOL) 325 MG tablet, Take 2 tablets (650 mg) by mouth every 6 hours as needed for mild pain or fever, Disp: 60 tablet, Rfl: 1     escitalopram (LEXAPRO) 20 MG tablet, TAKE 1 TABLET BY MOUTH EVERY DAY, Disp: 30 tablet, Rfl: 5     ferrous sulfate (FEROSUL) 325 (65 Fe) MG tablet, Take 1 tablet (325 mg) by mouth 2 times daily With meals (Patient not taking: Reported on 7/21/2021), Disp: 100 tablet, Rfl: 3     folic acid (FOLVITE) 1 MG tablet, Take 1 tablet (1 mg) by mouth daily (Patient not taking: Reported on  7/21/2021), Disp: 30 tablet, Rfl: 11     furosemide (LASIX) 20 MG tablet, Take 1 tablet (20 mg) by mouth daily, Disp: 20 tablet, Rfl: 0     hydroxychloroquine (PLAQUENIL) 200 MG tablet, Take 1 tablet (200 mg) by mouth 2 times daily (with meals), Disp: 180 tablet, Rfl: 1     hydrOXYzine (VISTARIL) 25 MG capsule, Take 1 capsule (25 mg) by mouth 2 times daily as needed for anxiety, Disp: 60 capsule, Rfl: 11     methadone (DOLOPHINE-INTENSOL) 10 MG/ML (HIGH CONC) solution, Take 120 mg by mouth daily Takes it at 0530 everyday., Disp: , Rfl:      methylPREDNISolone (MEDROL) 4 MG tablet, Take 1 tablet (4 mg) by mouth every morning (before breakfast) (Patient not taking: Reported on 7/22/2021), Disp: 5 tablet, Rfl: 0     methylPREDNISolone (MEDROL) 4 MG tablet, Take 1 tablet (4 mg) by mouth daily (with lunch) (Patient not taking: Reported on 7/22/2021), Disp: 3 tablet, Rfl: 0     methylPREDNISolone (MEDROL) 4 MG tablet, Take 1 tablet (4 mg) by mouth daily (with dinner) (Patient not taking: Reported on 7/22/2021), Disp: 2 tablet, Rfl: 0     methylPREDNISolone (MEDROL) 4 MG tablet, Take 1 tablet (4 mg) by mouth At Bedtime (Patient not taking: Reported on 7/22/2021), Disp: 3 tablet, Rfl: 0     pantoprazole (PROTONIX) 40 MG EC tablet, Take 1 tablet (40 mg) by mouth 2 times daily, Disp: 90 tablet, Rfl: 2     parenteral nutrition - PTA/DISCHARGE ORDER, The TPN formula will print on the After Visit Summary Report., Disp: 1 each, Rfl: 0     polyethylene glycol (MIRALAX) 17 GM/Dose powder, 1 capful (17 g) in 8 oz of liquid, Disp: 225 g, Rfl: 3     pregabalin (LYRICA) 150 MG capsule, TAKE 1 CAPSULE (150 MG) BY MOUTH 3 TIMES DAILY, Disp: 90 capsule, Rfl: 1     valACYclovir (VALTREX) 500 MG tablet, Take 1 tablet (500 mg) by mouth daily, Disp: 90 tablet, Rfl: 3     valACYclovir (VALTREX) 500 MG tablet, Take 1 tablet (500 mg) by mouth 2 times daily, Disp: 60 tablet, Rfl: 1     vitamin D2 (ERGOCALCIFEROL) 88316 units (1250 mcg) capsule,  Take 1 capsule (50,000 Units) by mouth every 7 days (Patient not taking: Reported on 7/22/2021), Disp: 12 capsule, Rfl: 0    SOCIAL HISTORY:  Social History     Socioeconomic History     Marital status: Single     Spouse name: Not on file     Number of children: Not on file     Years of education: Not on file     Highest education level: Not on file   Occupational History     Not on file   Tobacco Use     Smoking status: Never Smoker     Smokeless tobacco: Never Used   Substance and Sexual Activity     Alcohol use: No     Comment: none in 10 years     Drug use: No     Sexual activity: Not Currently   Other Topics Concern     Parent/sibling w/ CABG, MI or angioplasty before 65F 55M? Not Asked   Social History Narrative    Intake 4/16/15:        H/o divorce but most recently living with SO Alfonso. Alfonso recently passed away.         In past employed as a .         Tobacco use: denies    Alcohol use: denies    Drug use: daily pot use for 30 years. Currently with sobriety.          Social Determinants of Health     Financial Resource Strain:      Difficulty of Paying Living Expenses:    Food Insecurity:      Worried About Running Out of Food in the Last Year:      Ran Out of Food in the Last Year:    Transportation Needs:      Lack of Transportation (Medical):      Lack of Transportation (Non-Medical):    Physical Activity:      Days of Exercise per Week:      Minutes of Exercise per Session:    Stress:      Feeling of Stress :    Social Connections:      Frequency of Communication with Friends and Family:      Frequency of Social Gatherings with Friends and Family:      Attends [employer] Services:      Active Member of Clubs or Organizations:      Attends Club or Organization Meetings:      Marital Status:    Intimate Partner Violence:      Fear of Current or Ex-Partner:      Emotionally Abused:      Physically Abused:      Sexually Abused:        FAMILY HISTORY:  Family History   Problem Relation Age of Onset      Substance Abuse Mother      Family History Negative Father      Substance Abuse Maternal Grandfather      Substance Abuse Brother      Liver Disease No family hx of      Colon Cancer No family hx of      Ulcerative Colitis No family hx of      Crohn's Disease No family hx of        Past/family/social history reviewed and no changes    PHYSICAL EXAMINATION:  No physical exam, telephone visit     PERTINENT STUDIES:    Lab Requisition on 08/24/2021   Component Date Value Ref Range Status     CRP Inflammation 08/24/2021 13.0* 0.0 - 8.0 mg/L Final     Sodium 08/24/2021 140  133 - 144 mmol/L Final     Potassium 08/24/2021 4.1  3.4 - 5.3 mmol/L Final     Chloride 08/24/2021 112* 94 - 109 mmol/L Final     Carbon Dioxide (CO2) 08/24/2021 29  20 - 32 mmol/L Final     Anion Gap 08/24/2021 <1* 3 - 14 mmol/L Final     Urea Nitrogen 08/24/2021 24  7 - 30 mg/dL Final     Creatinine 08/24/2021 0.64  0.52 - 1.04 mg/dL Final     Calcium 08/24/2021 7.7* 8.5 - 10.1 mg/dL Final     Glucose 08/24/2021 82  70 - 99 mg/dL Final     Alkaline Phosphatase 08/24/2021 90  40 - 150 U/L Final     AST 08/24/2021 23  0 - 45 U/L Final     ALT 08/24/2021 19  0 - 50 U/L Final     Protein Total 08/24/2021 5.4* 6.8 - 8.8 g/dL Final     Albumin 08/24/2021 2.0* 3.4 - 5.0 g/dL Final     Bilirubin Total 08/24/2021 0.1* 0.2 - 1.3 mg/dL Final     GFR Estimate 08/24/2021 >90  >60 mL/min/1.73m2 Final     Bilirubin Direct 08/24/2021 <0.1  0.0 - 0.2 mg/dL Final     Bilirubin Total 08/24/2021 0.1* 0.2 - 1.3 mg/dL Final     Magnesium 08/24/2021 1.9  1.6 - 2.3 mg/dL Final     Phosphorus 08/24/2021 3.2  2.5 - 4.5 mg/dL Final     Triglycerides 08/24/2021 66  <150 mg/dL Final     Patient Fasting > 8hrs? 08/24/2021 Unknown   Final     Ferritin 08/24/2021 4* 8 - 252 ng/mL Final     Iron 08/24/2021 10* 35 - 180 ug/dL Final     Iron Binding Capacity 08/24/2021 311  240 - 430 ug/dL Final     Iron Sat Index 08/24/2021 3* 15 - 46 % Final     WBC Count 08/24/2021 7.4  4.0 -  11.0 10e3/uL Final     RBC Count 08/24/2021 3.66* 3.80 - 5.20 10e6/uL Final     Hemoglobin 08/24/2021 8.7* 11.7 - 15.7 g/dL Final     Hematocrit 08/24/2021 30.6* 35.0 - 47.0 % Final     MCV 08/24/2021 84  78 - 100 fL Final     MCH 08/24/2021 23.8* 26.5 - 33.0 pg Final     MCHC 08/24/2021 28.4* 31.5 - 36.5 g/dL Final     RDW 08/24/2021 18.6* 10.0 - 15.0 % Final     Platelet Count 08/24/2021 243  150 - 450 10e3/uL Final     % Neutrophils 08/24/2021 69  % Final     % Lymphocytes 08/24/2021 14  % Final     % Monocytes 08/24/2021 8  % Final     % Eosinophils 08/24/2021 9  % Final     % Basophils 08/24/2021 0  % Final     % Immature Granulocytes 08/24/2021 0  % Final     NRBCs per 100 WBC 08/24/2021 0  <1 /100 Final     Absolute Neutrophils 08/24/2021 5.0  1.6 - 8.3 10e3/uL Final     Absolute Lymphocytes 08/24/2021 1.0  0.8 - 5.3 10e3/uL Final     Absolute Monocytes 08/24/2021 0.6  0.0 - 1.3 10e3/uL Final     Absolute Eosinophils 08/24/2021 0.7  0.0 - 0.7 10e3/uL Final     Absolute Basophils 08/24/2021 0.0  0.0 - 0.2 10e3/uL Final     Absolute Immature Granulocytes 08/24/2021 0.0  <=0.0 10e3/uL Final     Absolute NRBCs 08/24/2021 0.0  10e3/uL Final     Iron 08/24/2021 10* 35 - 180 ug/dL Final

## 2021-08-30 NOTE — NURSING NOTE
Chief Complaint   Patient presents with     Follow Up       Vitals:    08/30/21 0826   Weight: 54 kg (119 lb)       Body mass index is 21.77 kg/m .    Tia Olson CMA

## 2021-08-30 NOTE — PATIENT INSTRUCTIONS
It was a pleasure taking care of you today.  I've included a brief summary of our discussion and care plan from today's visit below.  Please review this information with your primary care provider.  ______________________________________________________________________    My recommendations are summarized as follows:    -- stop pantoprazole  -- start omeprazole 40 mg twice a day on an empty stomach   -- Miralax can be used to treat constipation and works by increasing the amount of water in the stool. Start with 1 caps in 8 oz of water. If no improvement, increase up to 2-3 times a day  -- call pain clinic as was recommended by your other doctors: [facility] will call you to coordinate your care as prescribed by the provider.  If you don t hear from a representative within 2 business days, please call [phone].  -- please see scheduling information provided below     Return to GI Clinic in 2 months to review your progress.    ______________________________________________________________________    How do I schedule labs, imaging studies, or procedures that were ordered in clinic today?     Labs: To schedule lab appointment at the [facility], use my chart or call [phone]. If you have a [facility] lab closer to home where you are regularly seen you can give them a call.     Procedures: If a colonoscopy, upper endoscopy, breath test, esophageal manometry, or pH impedence was ordered today, our endoscopy team will call you to schedule this. If you have not heard from our endoscopy team within a week, please call [phone] to schedule.     Imaging Studies: If you were scheduled for a CT scan, X-ray, MRI, ultrasound, HIDA scan or other imaging study, please call [phone] to have this scheduled.     Referral: If a referral to another specialty was ordered, expect a phone call or follow instructions above. If you have not heard from anyone regarding your referral in a week, please  call our clinic to check the status.     Who do I call with any questions after my visit?  Please be in touch if there are any further questions that arise following today's visit.  There are multiple ways to contact your gastroenterology care team.        During business hours, you may reach a Gastroenterology nurse at [phone]      To schedule or reschedule an appointment, please call [phone].       You can always send a secure message through [employer].  [employer] messages are answered by your nurse or doctor typically within 24 hours.  Please allow extra time on weekends and holidays.        For urgent/emergent questions after business hours, you may reach the on-call GI Fellow by contacting the [facility]  at [phone].     How will I get the results of any tests ordered?    You will receive all of your results.  If you have signed up for [employer], any tests ordered at your visit will be available to you after your physician reviews them.  Typically this takes 1-2 weeks.  If there are urgent results that require a change in your care plan, your physician or nurse will call you to discuss the next steps.      What is [employer]?  [employer] is a secure way for you to access all of your healthcare records from the [facility].  It is a web based computer program, so you can sign on to it from any location.  It also allows you to send secure messages to your care team.  I recommend signing up for [employer] access if you have not already done so and are comfortable with using a computer.      How to I schedule a follow-up visit?  If you did not schedule a follow-up visit today, please call [phone] to schedule a follow-up office visit.      Sincerely,    Martha Michele PA-C  Division of Gastroenterology, Hepatology & Nutrition  [facility]

## 2021-08-30 NOTE — LETTER
8/30/2021         RE: Demetra Richardson  [address] [place] [address]        Dear Colleague,    Thank you for referring your patient, Demetra Richardson, to the [facility] [place]. Please see a copy of my visit note below.    GI CLINIC VISIT    CC/REFERRING MD:  No ref. provider found  REASON FOR CONSULTATION: Constipation, abdominal pain    ASSESSMENT/PLAN:  #Early satiety/abdominal pain  As scheduled as scheduled with Dr. Clayton for surgery, mechanical obstruction and dysmotility likely contributing significantly to symptoms.  Did have friability on most recent EGD, would recommend PPI prophylaxis at this time.  She reports that Protonix seems to make her nauseated, would recommend omeprazole 40 mg twice daily.  Patient is also wondering about pain management after surgery.  I see that a referral has been placed and I gave her the phone number to schedule below.    #Constipation  Likely secondary to methadone.  Reports that MiraLAX improves her symptoms however she is not taking this regularly.  Would recommend daily use and increasing up to 2-3 times a day as needed.  If continued constipation, could consider trial of Amitiza.    #Anemia  May be due in part to friability at GJ anastomosis, is undergoing esophagojejunostomy surgery with Dr. Clayton.  Should continue to monitor with PCP.     Plan:  -- stop pantoprazole  -- start omeprazole 40 mg twice a day on an empty stomach   -- Miralax can be used to treat constipation and works by increasing the amount of water in the stool. Start with 1 caps in 8 oz of water. If no improvement, increase up to 2-3 times a day  -- call pain clinic as was recommended by your other doctors: [facility] call you to coordinate your care as prescribed by the provider.  If you don t hear from a representative within 2 business days, please call [phone].  -- please see scheduling information provided below      RTC 2 months     Note completed using voice recognition software. Some word and grammatical errors may occur.    Thank you for this consultation.  It was a pleasure to participate in the care of this patient; please contact us with any further questions.       Martha Michele PA-C  Division of Gastroenterology, Hepatology & Nutrition  [facility]        ELIANE Richardson is a 65 year old female with a past medical history of Billroth II for gastric outlet obstruction in 2005, revision of gastrojejunostomy in 2011, chronic anemia, chronic pain, fibromyalgia, GI bleed presenting for chronic GI symptoms. She has previously been by Dr. Guaman and our inpatient team, and this is my first encounter with the patient. Please see previous documentation for additional details.     She was recently admitted 6/29/2021 with drop in hemoglobin to 6.5.  She had underwent an EGD earlier in June with the GJ ostomy showing friability and congestion, and reported frequent NSAID use.  Has been on TPN more recently.  She did not have repeat EGD and symptoms were thought to be due to oozing from GJ anastomosis.  She is scheduled for surgery with Dr. Clayton for gastric atony with plan for esophagojejunostomy.    She cannot tolerate much orally. She has been waking up in the middle of the night with vomiting. She is doing TPN 12 hours a day, and will eat small amounts of food three times a day. Will have yogurt and toast, instant mashed potatoes, TV dinners. She does not have sense of smell or taste. She denies significant GERD symptoms. Is taking pantoprazole 40 mg twice a day but she thinks this is causing some nausea.     She also describes LUQ abdominal pain which seems to be random. Is not always associated with a meal, and can be worsened with bending or twisting.     Bowel movements: one time a week, small amount of stool. Is being inconsistent with amount of Miralax and she thinks she is taking this about  three times a week. 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     Priority: Medium     Added automatically from request for surgery [id number]       Symptomatic anemia 06/29/2021     Priority: Medium     Malnutrition (H) 06/16/2021     Priority: Medium     Anemia, iron deficiency 06/29/2020     Priority: Medium     Polysubstance overdose 01/22/2019     Priority: Medium     BCC (basal cell carcinoma), face 09/20/2018     Priority: Medium     Specimen #: [id number]   Collected: 9/17/2018   Received: 9/17/2018   Reported: 9/19/2018 18:14   Ordering Phy(s): EDD ROBERTS     For improved result formatting, select 'View Enhanced Report Format' under    Linked Documents section.     SPECIMEN(S):   A: Skin, periorbital medial left   B: Skin, paranasal right below eyelid     FINAL DIAGNOSIS:   A. Skin, periorbital medial left:   - Basal cell carcinoma, nodular type, extending to the lateral and deep   margins - (see description)     B. Skin, paranasal right below eyelid:   - Basal cell carcinoma, infiltrating type, extending to the lateral and   deep margins - (see description)        Borderline personality disorder (H) 05/10/2016     Priority: Medium     Behavior disturbance 10/15/2015     Priority: Medium     Overdose 10/14/2015     Priority: Medium     Iron deficiency anemia 05/16/2013     Priority: Medium     S/p gastric bypass 2/2 trauma.  Cannot absorb iron orally.  Requires parenteral iron infusions periodically dependent on iron panels and CBC.  updating diagnosis code for icd10 cutover       Bipolar 1 disorder, mixed, moderate (H) 02/07/2013     Priority: Medium     Seizure (H) 02/03/2013     Priority: Medium     Microcytic anemia 07/09/2012     Priority: Medium     Likely due to Fe deficiency due to malabsorption from bariatric  surgery       Migraines 2012     Priority: Medium     Hydronephrosis 2012     Priority: Medium     Benzodiazepine dependence (H) 2012     Priority: Medium     Drug-induced mood disorder (H) 2012     Priority: Medium     Chronic pain 2009     Priority: Medium     (Problem list name updated by automated process. Provider to review and confirm.)       Lupus (H) 2009     Priority: Medium     Depression 2009     Priority: Medium     Chronic pain disorder 2009     Priority: Medium     Orthostatic hypotension 2009     Priority: Medium     S/P partial gastrectomy 2009     Priority: Medium     Opioid dependence on agonist therapy (H) 2006     Priority: Medium     Anxiety disorder 2006     Priority: Medium       PERTINENT PAST MEDICAL HISTORY:  Past Medical History:   Diagnosis Date     Anemia      Basal cell carcinoma     Left-sided, skin over over medial orbit/nasal bone. Removed Oct 2018.     Benzodiazepine dependence (H)     With h/o withdrawal seizure x 1     Bipolar 1 disorder, mixed, moderate (H) 2013     Chronic pain     Back, legs.     Depressive disorder      Eosinophilic gastroenteritis 2010     Epidural abscess 2005    x4     Fibromyalgia      Generalised anxiety disorder      GERD (gastroesophageal reflux disease)      Major depressive disorder      Migraine      Nephrolithiasis     S/p left sided lithotripsy     Opiate dependence (H)      Osteoarthritis      Osteoporosis      PUD (peptic ulcer disease)      SLE (systemic lupus erythematosus) (H) 2009     Weight loss        PREVIOUS SURGERIES:  Past Surgical History:   Procedure Laterality Date     BACK SURGERY  04    L4-5 epidural abscess     BACK SURGERY  04    L3-4 spinal stenosis     BACK SURGERY  04    Lt psoas abscess     BRONCHOSCOPY FLEXIBLE N/A 2019    Procedure: Flexible Bronchoscopy;  Surgeon: Patrice Regalado MD;  Location: [facility] OR       SECTION      x 2     CHOLECYSTECTOMY  2-2004     CLOSED REDUCTION, PERCUTANEOUS PINNING FINGER, COMBINED  8/10/2011    Procedure:COMBINED CLOSED REDUCTION, PERCUTANEOUS PINNING FINGER; 5th Proximal Phalanx; Surgeon:RADHA BUITRAGO; Location[facility]     COLONOSCOPY       COLONOSCOPY N/A 8/6/2020    Procedure: COLONOSCOPY;  Surgeon: Zacarias Duran MD;  Location: [facility] GI     ESOPHAGOSCOPY, GASTROSCOPY, DUODENOSCOPY (EGD), COMBINED N/A 8/6/2020    Procedure: ESOPHAGOGASTRODUODENOSCOPY, WITH BIOPSY;  Surgeon: Zacarias Duran MD;  Location: [facility] GI     ESOPHAGOSCOPY, GASTROSCOPY, DUODENOSCOPY (EGD), COMBINED N/A 6/8/2021    Procedure: ESOPHAGOGASTRODUODENOSCOPY (EGD);  Surgeon: Kaveh Clayton MD;  Location: [facility] GI     GASTRECTOMY  11-    Bilat truncal vagotomy, hemigastrectomy, RnY gastrojejunostomy     GASTROJEJUNOSTOMY  6/2/2011    Procedure:GASTROJEJUNOSTOMY; exploratory laparotmy with revision of gastrojejunostomy, Antrectomy, Miles-en-y, Gastrojejunostomy; Surgeon:JULIUS HELM; Location:[facility] OR     INSERT CHEST TUBE N/A 4/29/2019    Procedure: INSERTION, CATHETER, INTERCOSTAL, FOR DRAINAGE;  Surgeon: Melissa Nichols MD;  Location: [facility] GI     LASER HOLMIUM LITHOTRIPSY URETER(S), INSERT STENT, COMBINED      Procedure: COMBINED CYSTOSCOPY, URETEROSCOPY, LASER HOLMIUM LITHOTRIPSY URETER(S), INSERT STENT;;  Surgeon: Blair Correa MD;  [facility]     LASER HOLMIUM NEPHROLITHOTOMY VIA PERCUTANEOUS NEPHROSTOMY  7/11/2012    Procedure: LASER HOLMIUM NEPHROLITHOTOMY VIA PERCUTANEOUS NEPHROSTOMY;  proceedure should read: Left Percutaneous Access, Left Percutaneous ultrasonic Nephrolithotomy, Ureteroscopy Holmium Laser Lithotripsy Stent Placement ;  Surgeon: Blair Correa MD;  [facility]     MAMMOPLASTY AUGMENTATION BILATERAL       OPEN REDUCTION INTERNAL FIXATION WRIST Left 1/11/2016    Procedure: OPEN REDUCTION INTERNAL FIXATION WRIST;  Surgeon: Darling Ellis MD;   [facility]     RECONSTRUCT BREAST, IMPLANT PROSTHESIS, COMBINED       THORACOSCOPIC DECORTICATION LUNG Left 5/7/2019    Procedure: Left Video Assisted Thoracoscopic Decortication, Intercostal Nerve Cryo Analgesia, Flexible Bronchoscopy;  Surgeon: Patrice Regalado MD;  Location: [facility] OR       PREVIOUS ENDOSCOPY:  6/8/20201:  Impression:          - Normal gastroesophageal junction.                        - Stenosed Billroth II gastrojejunostomy was found,                        characterized by congestion, friable mucosa,                        inflammation, an intact appearance and mild stenosis.                        - No specimens collected.     8/6/2020:  Impression:          - Preparation of the colon was poor.                       - There was significant looping of the colon.                       - Stool in the transverse colon and in the ascending                        colon.                       - No specimens collected.  Recommendation:      - Discharge patient to home.                       - Resume previous diet.                       - consider iron infusions for iron defic iency anemia                        (related to inflamed jejunum)                       - Repeat colonoscopy in 5 years because the bowel                        preparation was poor.      ALLERGIES:     Allergies   Allergen Reactions     Penicillins Hives     Hives in childhood.       PERTINENT MEDICATIONS:    Current Outpatient Medications:      acetaminophen (TYLENOL) 325 MG tablet, Take 2 tablets (650 mg) by mouth every 6 hours as needed for mild pain or fever, Disp: 60 tablet, Rfl: 1     escitalopram (LEXAPRO) 20 MG tablet, TAKE 1 TABLET BY MOUTH EVERY DAY, Disp: 30 tablet, Rfl: 5     ferrous sulfate (FEROSUL) 325 (65 Fe) MG tablet, Take 1 tablet (325 mg) by mouth 2 times daily With meals (Patient not taking: Reported on 7/21/2021), Disp: 100 tablet, Rfl: 3     folic acid (FOLVITE) 1 MG tablet, Take 1 tablet (1 mg) by  mouth daily (Patient not taking: Reported on 7/21/2021), Disp: 30 tablet, Rfl: 11     furosemide (LASIX) 20 MG tablet, Take 1 tablet (20 mg) by mouth daily, Disp: 20 tablet, Rfl: 0     hydroxychloroquine (PLAQUENIL) 200 MG tablet, Take 1 tablet (200 mg) by mouth 2 times daily (with meals), Disp: 180 tablet, Rfl: 1     hydrOXYzine (VISTARIL) 25 MG capsule, Take 1 capsule (25 mg) by mouth 2 times daily as needed for anxiety, Disp: 60 capsule, Rfl: 11     methadone (DOLOPHINE-INTENSOL) 10 MG/ML (HIGH CONC) solution, Take 120 mg by mouth daily Takes it at 0530 everyday., Disp: , Rfl:      methylPREDNISolone (MEDROL) 4 MG tablet, Take 1 tablet (4 mg) by mouth every morning (before breakfast) (Patient not taking: Reported on 7/22/2021), Disp: 5 tablet, Rfl: 0     methylPREDNISolone (MEDROL) 4 MG tablet, Take 1 tablet (4 mg) by mouth daily (with lunch) (Patient not taking: Reported on 7/22/2021), Disp: 3 tablet, Rfl: 0     methylPREDNISolone (MEDROL) 4 MG tablet, Take 1 tablet (4 mg) by mouth daily (with dinner) (Patient not taking: Reported on 7/22/2021), Disp: 2 tablet, Rfl: 0     methylPREDNISolone (MEDROL) 4 MG tablet, Take 1 tablet (4 mg) by mouth At Bedtime (Patient not taking: Reported on 7/22/2021), Disp: 3 tablet, Rfl: 0     pantoprazole (PROTONIX) 40 MG EC tablet, Take 1 tablet (40 mg) by mouth 2 times daily, Disp: 90 tablet, Rfl: 2     parenteral nutrition - PTA/DISCHARGE ORDER, The TPN formula will print on the After Visit Summary Report., Disp: 1 each, Rfl: 0     polyethylene glycol (MIRALAX) 17 GM/Dose powder, 1 capful (17 g) in 8 oz of liquid, Disp: 225 g, Rfl: 3     pregabalin (LYRICA) 150 MG capsule, TAKE 1 CAPSULE (150 MG) BY MOUTH 3 TIMES DAILY, Disp: 90 capsule, Rfl: 1     valACYclovir (VALTREX) 500 MG tablet, Take 1 tablet (500 mg) by mouth daily, Disp: 90 tablet, Rfl: 3     valACYclovir (VALTREX) 500 MG tablet, Take 1 tablet (500 mg) by mouth 2 times daily, Disp: 60 tablet, Rfl: 1     vitamin D2  (ERGOCALCIFEROL) 02540 units (1250 mcg) capsule, Take 1 capsule (50,000 Units) by mouth every 7 days (Patient not taking: Reported on 7/22/2021), Disp: 12 capsule, Rfl: 0    SOCIAL HISTORY:  Social History     Socioeconomic History     Marital status: Single     Spouse name: Not on file     Number of children: Not on file     Years of education: Not on file     Highest education level: Not on file   Occupational History     Not on file   Tobacco Use     Smoking status: Never Smoker     Smokeless tobacco: Never Used   Substance and Sexual Activity     Alcohol use: No     Comment: none in 10 years     Drug use: No     Sexual activity: Not Currently   Other Topics Concern     Parent/sibling w/ CABG, MI or angioplasty before 65F 55M? Not Asked   Social History Narrative    Intake 4/16/15:        H/o divorce but most recently living with SO Alfonso. Alfonso recently passed away.         In past employed as a .         Tobacco use: denies    Alcohol use: denies    Drug use: daily pot use for 30 years. Currently with sobriety.          Social Determinants of Health     Financial Resource Strain:      Difficulty of Paying Living Expenses:    Food Insecurity:      Worried About Running Out of Food in the Last Year:      Ran Out of Food in the Last Year:    Transportation Needs:      Lack of Transportation (Medical):      Lack of Transportation (Non-Medical):    Physical Activity:      Days of Exercise per Week:      Minutes of Exercise per Session:    Stress:      Feeling of Stress :    Social Connections:      Frequency of Communication with Friends and Family:      Frequency of Social Gatherings with Friends and Family:      Attends Holiness Services:      Active Member of Clubs or Organizations:      Attends Club or Organization Meetings:      Marital Status:    Intimate Partner Violence:      Fear of Current or Ex-Partner:      Emotionally Abused:      Physically Abused:      Sexually Abused:        FAMILY  HISTORY:  Family History   Problem Relation Age of Onset     Substance Abuse Mother      Family History Negative Father      Substance Abuse Maternal Grandfather      Substance Abuse Brother      Liver Disease No family hx of      Colon Cancer No family hx of      Ulcerative Colitis No family hx of      Crohn's Disease No family hx of        Past/family/social history reviewed and no changes    PHYSICAL EXAMINATION:  No physical exam, telephone visit     PERTINENT STUDIES:    Lab Requisition on 08/24/2021   Component Date Value Ref Range Status     CRP Inflammation 08/24/2021 13.0* 0.0 - 8.0 mg/L Final     Sodium 08/24/2021 140  133 - 144 mmol/L Final     Potassium 08/24/2021 4.1  3.4 - 5.3 mmol/L Final     Chloride 08/24/2021 112* 94 - 109 mmol/L Final     Carbon Dioxide (CO2) 08/24/2021 29  20 - 32 mmol/L Final     Anion Gap 08/24/2021 <1* 3 - 14 mmol/L Final     Urea Nitrogen 08/24/2021 24  7 - 30 mg/dL Final     Creatinine 08/24/2021 0.64  0.52 - 1.04 mg/dL Final     Calcium 08/24/2021 7.7* 8.5 - 10.1 mg/dL Final     Glucose 08/24/2021 82  70 - 99 mg/dL Final     Alkaline Phosphatase 08/24/2021 90  40 - 150 U/L Final     AST 08/24/2021 23  0 - 45 U/L Final     ALT 08/24/2021 19  0 - 50 U/L Final     Protein Total 08/24/2021 5.4* 6.8 - 8.8 g/dL Final     Albumin 08/24/2021 2.0* 3.4 - 5.0 g/dL Final     Bilirubin Total 08/24/2021 0.1* 0.2 - 1.3 mg/dL Final     GFR Estimate 08/24/2021 >90  >60 mL/min/1.73m2 Final     Bilirubin Direct 08/24/2021 <0.1  0.0 - 0.2 mg/dL Final     Bilirubin Total 08/24/2021 0.1* 0.2 - 1.3 mg/dL Final     Magnesium 08/24/2021 1.9  1.6 - 2.3 mg/dL Final     Phosphorus 08/24/2021 3.2  2.5 - 4.5 mg/dL Final     Triglycerides 08/24/2021 66  <150 mg/dL Final     Patient Fasting > 8hrs? 08/24/2021 Unknown   Final     Ferritin 08/24/2021 4* 8 - 252 ng/mL Final     Iron 08/24/2021 10* 35 - 180 ug/dL Final     Iron Binding Capacity 08/24/2021 311  240 - 430 ug/dL Final     Iron Sat Index  08/24/2021 3* 15 - 46 % Final     WBC Count 08/24/2021 7.4  4.0 - 11.0 10e3/uL Final     RBC Count 08/24/2021 3.66* 3.80 - 5.20 10e6/uL Final     Hemoglobin 08/24/2021 8.7* 11.7 - 15.7 g/dL Final     Hematocrit 08/24/2021 30.6* 35.0 - 47.0 % Final     MCV 08/24/2021 84  78 - 100 fL Final     MCH 08/24/2021 23.8* 26.5 - 33.0 pg Final     MCHC 08/24/2021 28.4* 31.5 - 36.5 g/dL Final     RDW 08/24/2021 18.6* 10.0 - 15.0 % Final     Platelet Count 08/24/2021 243  150 - 450 10e3/uL Final     % Neutrophils 08/24/2021 69  % Final     % Lymphocytes 08/24/2021 14  % Final     % Monocytes 08/24/2021 8  % Final     % Eosinophils 08/24/2021 9  % Final     % Basophils 08/24/2021 0  % Final     % Immature Granulocytes 08/24/2021 0  % Final     NRBCs per 100 WBC 08/24/2021 0  <1 /100 Final     Absolute Neutrophils 08/24/2021 5.0  1.6 - 8.3 10e3/uL Final     Absolute Lymphocytes 08/24/2021 1.0  0.8 - 5.3 10e3/uL Final     Absolute Monocytes 08/24/2021 0.6  0.0 - 1.3 10e3/uL Final     Absolute Eosinophils 08/24/2021 0.7  0.0 - 0.7 10e3/uL Final     Absolute Basophils 08/24/2021 0.0  0.0 - 0.2 10e3/uL Final     Absolute Immature Granulocytes 08/24/2021 0.0  <=0.0 10e3/uL Final     Absolute NRBCs 08/24/2021 0.0  10e3/uL Final     Iron 08/24/2021 10* 35 - 180 ug/dL Final       Again, thank you for allowing me to participate in the care of your patient.      Sincerely,    Martha Michele PA-C

## 2021-08-31 ENCOUNTER — LAB REQUISITION (OUTPATIENT)
Dept: LAB | Facility: CLINIC | Age: 66
End: 2021-08-31
Payer: COMMERCIAL

## 2021-08-31 ENCOUNTER — HOME INFUSION (PRE-WILLOW HOME INFUSION) (OUTPATIENT)
Dept: PHARMACY | Facility: CLINIC | Age: 66
End: 2021-08-31

## 2021-08-31 DIAGNOSIS — R11.2 NAUSEA WITH VOMITING, UNSPECIFIED: ICD-10-CM

## 2021-08-31 LAB
ALBUMIN SERPL-MCNC: 2.2 G/DL (ref 3.4–5)
ALP SERPL-CCNC: 216 U/L (ref 40–150)
ALT SERPL W P-5'-P-CCNC: 55 U/L (ref 0–50)
ANION GAP SERPL CALCULATED.3IONS-SCNC: 1 MMOL/L (ref 3–14)
AST SERPL W P-5'-P-CCNC: 43 U/L (ref 0–45)
BASOPHILS # BLD AUTO: 0.1 10E3/UL (ref 0–0.2)
BASOPHILS NFR BLD AUTO: 1 %
BILIRUB DIRECT SERPL-MCNC: <0.1 MG/DL (ref 0–0.2)
BILIRUB SERPL-MCNC: 0.1 MG/DL (ref 0.2–1.3)
BILIRUB SERPL-MCNC: 0.1 MG/DL (ref 0.2–1.3)
BUN SERPL-MCNC: 10 MG/DL (ref 7–30)
CALCIUM SERPL-MCNC: 8.1 MG/DL (ref 8.5–10.1)
CHLORIDE BLD-SCNC: 108 MMOL/L (ref 94–109)
CO2 SERPL-SCNC: 32 MMOL/L (ref 20–32)
CREAT SERPL-MCNC: 0.61 MG/DL (ref 0.52–1.04)
EOSINOPHIL # BLD AUTO: 0.9 10E3/UL (ref 0–0.7)
EOSINOPHIL NFR BLD AUTO: 13 %
ERYTHROCYTE [DISTWIDTH] IN BLOOD BY AUTOMATED COUNT: 17.3 % (ref 10–15)
FASTING STATUS PATIENT QL REPORTED: NORMAL
GFR SERPL CREATININE-BSD FRML MDRD: >90 ML/MIN/1.73M2
GLUCOSE BLD-MCNC: 92 MG/DL (ref 70–99)
HCT VFR BLD AUTO: 33.4 % (ref 35–47)
HGB BLD-MCNC: 9.5 G/DL (ref 11.7–15.7)
IMM GRANULOCYTES # BLD: 0 10E3/UL
IMM GRANULOCYTES NFR BLD: 0 %
LYMPHOCYTES # BLD AUTO: 1.2 10E3/UL (ref 0.8–5.3)
LYMPHOCYTES NFR BLD AUTO: 17 %
MAGNESIUM SERPL-MCNC: 1.8 MG/DL (ref 1.6–2.3)
MCH RBC QN AUTO: 23.8 PG (ref 26.5–33)
MCHC RBC AUTO-ENTMCNC: 28.4 G/DL (ref 31.5–36.5)
MCV RBC AUTO: 84 FL (ref 78–100)
MONOCYTES # BLD AUTO: 0.8 10E3/UL (ref 0–1.3)
MONOCYTES NFR BLD AUTO: 12 %
NEUTROPHILS # BLD AUTO: 3.9 10E3/UL (ref 1.6–8.3)
NEUTROPHILS NFR BLD AUTO: 57 %
NRBC # BLD AUTO: 0 10E3/UL
NRBC BLD AUTO-RTO: 0 /100
PHOSPHATE SERPL-MCNC: 3 MG/DL (ref 2.5–4.5)
PLATELET # BLD AUTO: 298 10E3/UL (ref 150–450)
POTASSIUM BLD-SCNC: 3.8 MMOL/L (ref 3.4–5.3)
PROT SERPL-MCNC: 5.8 G/DL (ref 6.8–8.8)
RBC # BLD AUTO: 4 10E6/UL (ref 3.8–5.2)
SODIUM SERPL-SCNC: 141 MMOL/L (ref 133–144)
TRIGL SERPL-MCNC: 118 MG/DL
WBC # BLD AUTO: 6.9 10E3/UL (ref 4–11)

## 2021-08-31 PROCEDURE — 82248 BILIRUBIN DIRECT: CPT | Mod: ORL | Performed by: SURGERY

## 2021-08-31 PROCEDURE — 85025 COMPLETE CBC W/AUTO DIFF WBC: CPT | Mod: ORL | Performed by: SURGERY

## 2021-08-31 PROCEDURE — 83735 ASSAY OF MAGNESIUM: CPT | Mod: ORL | Performed by: SURGERY

## 2021-08-31 PROCEDURE — 84478 ASSAY OF TRIGLYCERIDES: CPT | Mod: ORL | Performed by: SURGERY

## 2021-08-31 PROCEDURE — 84100 ASSAY OF PHOSPHORUS: CPT | Mod: ORL | Performed by: SURGERY

## 2021-08-31 PROCEDURE — 80053 COMPREHEN METABOLIC PANEL: CPT | Mod: ORL | Performed by: SURGERY

## 2021-09-02 ENCOUNTER — VIRTUAL VISIT (OUTPATIENT)
Dept: PSYCHIATRY | Facility: CLINIC | Age: 66
End: 2021-09-02
Attending: PSYCHOLOGIST

## 2021-09-02 ENCOUNTER — TELEPHONE (OUTPATIENT)
Dept: SURGERY | Facility: CLINIC | Age: 66
End: 2021-09-02

## 2021-09-02 DIAGNOSIS — F41.9 ANXIETY DISORDER, UNSPECIFIED TYPE: Primary | ICD-10-CM

## 2021-09-02 PROCEDURE — 90834 PSYTX W PT 45 MINUTES: CPT | Mod: 95 | Performed by: PSYCHOLOGIST

## 2021-09-02 NOTE — TELEPHONE ENCOUNTER
Called [employer] to check if codes needed a prior authorization: code 96567, [id number], [id number] and [id number]. None of these require prior authorization. Call ref # [id number].

## 2021-09-07 ENCOUNTER — MEDICAL CORRESPONDENCE (OUTPATIENT)
Dept: HEALTH INFORMATION MANAGEMENT | Facility: CLINIC | Age: 66
End: 2021-09-07

## 2021-09-07 ENCOUNTER — HOME INFUSION (PRE-WILLOW HOME INFUSION) (OUTPATIENT)
Dept: PHARMACY | Facility: CLINIC | Age: 66
End: 2021-09-07

## 2021-09-07 ENCOUNTER — TELEPHONE (OUTPATIENT)
Dept: SURGERY | Facility: CLINIC | Age: 66
End: 2021-09-07

## 2021-09-07 ENCOUNTER — LAB REQUISITION (OUTPATIENT)
Dept: LAB | Facility: CLINIC | Age: 66
End: 2021-09-07
Payer: COMMERCIAL

## 2021-09-07 DIAGNOSIS — R11.2 NAUSEA WITH VOMITING, UNSPECIFIED: ICD-10-CM

## 2021-09-07 LAB
ALBUMIN SERPL-MCNC: 2 G/DL (ref 3.4–5)
ALP SERPL-CCNC: 381 U/L (ref 40–150)
ALT SERPL W P-5'-P-CCNC: 98 U/L (ref 0–50)
ANION GAP SERPL CALCULATED.3IONS-SCNC: 1 MMOL/L (ref 3–14)
AST SERPL W P-5'-P-CCNC: 106 U/L (ref 0–45)
BASOPHILS # BLD AUTO: 0 10E3/UL (ref 0–0.2)
BASOPHILS NFR BLD AUTO: 0 %
BILIRUB DIRECT SERPL-MCNC: <0.1 MG/DL (ref 0–0.2)
BILIRUB SERPL-MCNC: <0.1 MG/DL (ref 0.2–1.3)
BUN SERPL-MCNC: 17 MG/DL (ref 7–30)
CALCIUM SERPL-MCNC: 7.8 MG/DL (ref 8.5–10.1)
CHLORIDE BLD-SCNC: 111 MMOL/L (ref 94–109)
CO2 SERPL-SCNC: 28 MMOL/L (ref 20–32)
CREAT SERPL-MCNC: 0.67 MG/DL (ref 0.52–1.04)
EOSINOPHIL # BLD AUTO: 0.9 10E3/UL (ref 0–0.7)
EOSINOPHIL NFR BLD AUTO: 12 %
ERYTHROCYTE [DISTWIDTH] IN BLOOD BY AUTOMATED COUNT: 17.7 % (ref 10–15)
GFR SERPL CREATININE-BSD FRML MDRD: >90 ML/MIN/1.73M2
GLUCOSE BLD-MCNC: 86 MG/DL (ref 70–99)
HCT VFR BLD AUTO: 29 % (ref 35–47)
HGB BLD-MCNC: 8.5 G/DL (ref 11.7–15.7)
HOLD SPECIMEN: NORMAL
IMM GRANULOCYTES # BLD: 0 10E3/UL
IMM GRANULOCYTES NFR BLD: 0 %
LYMPHOCYTES # BLD AUTO: 0.9 10E3/UL (ref 0.8–5.3)
LYMPHOCYTES NFR BLD AUTO: 13 %
MAGNESIUM SERPL-MCNC: 1.9 MG/DL (ref 1.6–2.3)
MCH RBC QN AUTO: 23.7 PG (ref 26.5–33)
MCHC RBC AUTO-ENTMCNC: 29.3 G/DL (ref 31.5–36.5)
MCV RBC AUTO: 81 FL (ref 78–100)
MONOCYTES # BLD AUTO: 0.8 10E3/UL (ref 0–1.3)
MONOCYTES NFR BLD AUTO: 11 %
NEUTROPHILS # BLD AUTO: 4.6 10E3/UL (ref 1.6–8.3)
NEUTROPHILS NFR BLD AUTO: 64 %
NRBC # BLD AUTO: 0 10E3/UL
NRBC BLD AUTO-RTO: 0 /100
PHOSPHATE SERPL-MCNC: 3.7 MG/DL (ref 2.5–4.5)
PLATELET # BLD AUTO: 251 10E3/UL (ref 150–450)
POTASSIUM BLD-SCNC: 4.2 MMOL/L (ref 3.4–5.3)
PROT SERPL-MCNC: 5.7 G/DL (ref 6.8–8.8)
RBC # BLD AUTO: 3.59 10E6/UL (ref 3.8–5.2)
SODIUM SERPL-SCNC: 140 MMOL/L (ref 133–144)
TRIGL SERPL-MCNC: 65 MG/DL
WBC # BLD AUTO: 7.1 10E3/UL (ref 4–11)

## 2021-09-07 PROCEDURE — 84100 ASSAY OF PHOSPHORUS: CPT | Performed by: SURGERY

## 2021-09-07 PROCEDURE — 82248 BILIRUBIN DIRECT: CPT | Performed by: SURGERY

## 2021-09-07 PROCEDURE — 80053 COMPREHEN METABOLIC PANEL: CPT | Performed by: SURGERY

## 2021-09-07 PROCEDURE — 83735 ASSAY OF MAGNESIUM: CPT | Performed by: SURGERY

## 2021-09-07 PROCEDURE — 84478 ASSAY OF TRIGLYCERIDES: CPT | Performed by: SURGERY

## 2021-09-07 PROCEDURE — 85025 COMPLETE CBC W/AUTO DIFF WBC: CPT | Performed by: SURGERY

## 2021-09-07 NOTE — TELEPHONE ENCOUNTER
Patient called stating she was supposed to be seen in the Pain Clinic prior to surgery and has not heard from anyone, states she has called multiple times.   I called the Pain Clinic at [phone] and they state they have tried to call her but she doesn't . Called the patient back and asked her to call right away as her surgery is scheduled for 9/10.

## 2021-09-07 NOTE — PROGRESS NOTES
"  Individual Psychotherapy Session (telephone session)     Telephone Visit Details  Demetra Richardson is a 64 year old pt. who is being evaluated via a telephone visit.     Type of service:  Telephone visit for psychotherapy  Time of service:    Start Time:  10:08        End Time:  10:50  Reason for Telephone Visit: Patient unable to travel due to Covid-19.  Originating Site (patient location): Patient's home  Distant Site (provider location): Remote location  Mode of Communication:  Telephone call   Consent:  Patient has given verbal consent for telephone visit?: Yes     Care Provider: Leighann Lewis, PhD, LP  Participants: Patient and writer  DSM-5 Diagnoses:   Opioid use disorder, on maintenance therapy  Benzodiazepine use disorder, in sustained remission  Unspecified anxiety  MDD, in sustained remission    SUBJECTIVE: High anxiety and panic-like symptoms that have developed in the past few weeks. Very anxious about upcoming surgery next Friday. Grieving death of mother who passed away in May (\"I feel like an orphan\"). Son moved to [place] for clinical rotations. Wants to re-start therapy to build up support system. No recent substance use.    TREATMENT: Assessed symptoms since our last session about 6 months ago. Radha does want to re-start klonopin short-term to help with anxiety. I discussed the risk involved with this medication and encouraged her to consider other options. She understood, but also feels frustrated given that she was able to successfully taper off klonopin in the past. I encouraged her to talk to her medication providers who are the experts in this domain and are best suited to answer medication-related questions.       MSE:  Alertness: alert and oriented  Speech: normal  Language: intact  Mood: consistent with euthymia   Affect: full range; was congruent to mood; was congruent to content  Thought Process/Associations: unremarkable  Thought Content:  Reports none  Perception:  Reports " none;  Insight: adequate for  safety  Judgment: adequate for safety  Cognition: (6) does  appear grossly intact; formal cognitive testing was not done    PLAN:   1) Therapy scheduled for next Wed  2) I will reach out to Dr. Christensen to see if Radha is appropriate to be seen by one of the addiction medicine fellows

## 2021-09-08 ENCOUNTER — HOME INFUSION (PRE-WILLOW HOME INFUSION) (OUTPATIENT)
Dept: PHARMACY | Facility: CLINIC | Age: 66
End: 2021-09-08

## 2021-09-08 ENCOUNTER — TELEPHONE (OUTPATIENT)
Dept: PSYCHIATRY | Facility: CLINIC | Age: 66
End: 2021-09-08

## 2021-09-08 ENCOUNTER — LAB (OUTPATIENT)
Dept: LAB | Facility: CLINIC | Age: 66
End: 2021-09-08
Attending: SURGERY

## 2021-09-08 DIAGNOSIS — Z11.59 ENCOUNTER FOR SCREENING FOR OTHER VIRAL DISEASES: ICD-10-CM

## 2021-09-08 PROCEDURE — U0005 INFEC AGEN DETEC AMPLI PROBE: HCPCS | Performed by: SURGERY

## 2021-09-08 NOTE — PROGRESS NOTES
This is a recent snapshot of the patient's [facility] medical record.  For current drug dose and complete information and questions, call [phone] or In Basket pool, [facility] home infusion ([id number])  CSN Number:  [id number]

## 2021-09-08 NOTE — TELEPHONE ENCOUNTER
I called Radha at the time of her appointment. She apologized for forgetting to cancel but she was out due to needing to have a pre-op covid-19 test. We agreed for her call the clinic after her surgery if she would like to schedule an appointment for individual therapy.

## 2021-09-08 NOTE — OR NURSING
AA: Meds (Methadone)  Received: Today  Marcela Pavon RN  [facility] Anesthesiology; Kaveh Clayton MD  Dear Anesthesia team,     On chart review, I wanted to flag that this pt is taking methadone. Per PCP, pt is to take methadone & PPI dose AM of surgery with Dr. Clayton (9/10/21).       Thank you for taking the time to review,     Shanna Pavon RN   PreAdmission Screening   [facility]

## 2021-09-08 NOTE — OR NURSING
Update per review by Anesthesia:    RE: AA: Meds (Methadone)  Received: Today  Ladonna Lorenzo MD Dalebroux, Deborah E, RN; [facility] Anesthesiology; Kaveh Clayton MD  That is what we would recommend, as long as Dr. Clayton is ok with pills that morning.     Ladnona Lorenzo MD   Anesthesiology   [facility]   --    Shanna Pavon RN  PreAdmission Screening  [facility]

## 2021-09-08 NOTE — PROGRESS NOTES
This is a recent snapshot of the patient's [facility] medical record.  For current drug dose and complete information and questions, call [phone] or In Basket pool, fv home infusion ([id number])  CSN Number:  [id number]

## 2021-09-09 ENCOUNTER — ANESTHESIA EVENT (OUTPATIENT)
Dept: SURGERY | Facility: CLINIC | Age: 66
DRG: 326 | End: 2021-09-09
Payer: COMMERCIAL

## 2021-09-09 LAB — SARS-COV-2 RNA RESP QL NAA+PROBE: NEGATIVE

## 2021-09-09 NOTE — PROGRESS NOTES
This is a recent snapshot of the patient's [facility] medical record.  For current drug dose and complete information and questions, call [phone] or In Basket pool, fv home infusion ([id number])  CSN Number:  [id number]

## 2021-09-10 ENCOUNTER — HOSPITAL ENCOUNTER (INPATIENT)
Facility: CLINIC | Age: 66
LOS: 7 days | Discharge: HOME | DRG: 326 | End: 2021-09-17
Attending: SURGERY | Admitting: SURGERY
Payer: COMMERCIAL

## 2021-09-10 ENCOUNTER — ANESTHESIA (OUTPATIENT)
Dept: SURGERY | Facility: CLINIC | Age: 66
DRG: 326 | End: 2021-09-10
Payer: COMMERCIAL

## 2021-09-10 ENCOUNTER — APPOINTMENT (OUTPATIENT)
Dept: GENERAL RADIOLOGY | Facility: CLINIC | Age: 66
DRG: 326 | End: 2021-09-10
Attending: SURGERY
Payer: COMMERCIAL

## 2021-09-10 DIAGNOSIS — E43 SEVERE PROTEIN-CALORIE MALNUTRITION (H): ICD-10-CM

## 2021-09-10 DIAGNOSIS — F41.9 ANXIETY DISORDER, UNSPECIFIED TYPE: ICD-10-CM

## 2021-09-10 DIAGNOSIS — Z98.84 STATUS POST BARIATRIC SURGERY: ICD-10-CM

## 2021-09-10 DIAGNOSIS — K31.84 GASTRIC ATONY: ICD-10-CM

## 2021-09-10 DIAGNOSIS — E46 MALNUTRITION, UNSPECIFIED TYPE (H): Primary | ICD-10-CM

## 2021-09-10 LAB
ABO/RH(D): NORMAL
ANTIBODY SCREEN: NEGATIVE
CREAT SERPL-MCNC: 0.72 MG/DL (ref 0.52–1.04)
GFR SERPL CREATININE-BSD FRML MDRD: 89 ML/MIN/1.73M2
GLUCOSE BLDC GLUCOMTR-MCNC: 92 MG/DL (ref 70–99)
SPECIMEN EXPIRATION DATE: NORMAL

## 2021-09-10 PROCEDURE — 86901 BLOOD TYPING SEROLOGIC RH(D): CPT | Performed by: ANESTHESIOLOGY

## 2021-09-10 PROCEDURE — 370N000017 HC ANESTHESIA TECHNICAL FEE, PER MIN: Performed by: SURGERY

## 2021-09-10 PROCEDURE — 250N000011 HC RX IP 250 OP 636: Performed by: NURSE ANESTHETIST, CERTIFIED REGISTERED

## 2021-09-10 PROCEDURE — 258N000003 HC RX IP 258 OP 636: Performed by: NURSE ANESTHETIST, CERTIFIED REGISTERED

## 2021-09-10 PROCEDURE — 250N000009 HC RX 250: Performed by: NURSE ANESTHETIST, CERTIFIED REGISTERED

## 2021-09-10 PROCEDURE — 0DQ80ZZ REPAIR SMALL INTESTINE, OPEN APPROACH: ICD-10-PCS | Performed by: SURGERY

## 2021-09-10 PROCEDURE — 250N000011 HC RX IP 250 OP 636: Performed by: SURGERY

## 2021-09-10 PROCEDURE — 360N000076 HC SURGERY LEVEL 3, PER MIN: Performed by: SURGERY

## 2021-09-10 PROCEDURE — 82565 ASSAY OF CREATININE: CPT | Performed by: STUDENT IN AN ORGANIZED HEALTH CARE EDUCATION/TRAINING PROGRAM

## 2021-09-10 PROCEDURE — 0DSA0ZZ REPOSITION JEJUNUM, OPEN APPROACH: ICD-10-PCS | Performed by: SURGERY

## 2021-09-10 PROCEDURE — 250N000025 HC SEVOFLURANE, PER MIN: Performed by: SURGERY

## 2021-09-10 PROCEDURE — 0DB60ZZ EXCISION OF STOMACH, OPEN APPROACH: ICD-10-PCS | Performed by: SURGERY

## 2021-09-10 PROCEDURE — 71045 X-RAY EXAM CHEST 1 VIEW: CPT | Mod: 26 | Performed by: RADIOLOGY

## 2021-09-10 PROCEDURE — 250N000011 HC RX IP 250 OP 636: Performed by: ANESTHESIOLOGY

## 2021-09-10 PROCEDURE — 272N000001 HC OR GENERAL SUPPLY STERILE: Performed by: SURGERY

## 2021-09-10 PROCEDURE — 88304 TISSUE EXAM BY PATHOLOGIST: CPT | Mod: TC | Performed by: SURGERY

## 2021-09-10 PROCEDURE — 258N000003 HC RX IP 258 OP 636: Performed by: ANESTHESIOLOGY

## 2021-09-10 PROCEDURE — 36592 COLLECT BLOOD FROM PICC: CPT | Performed by: ANESTHESIOLOGY

## 2021-09-10 PROCEDURE — 43860 REVJ GSTR/JJ ANAST W/O VGTMY: CPT | Mod: 22 | Performed by: SURGERY

## 2021-09-10 PROCEDURE — 71045 X-RAY EXAM CHEST 1 VIEW: CPT

## 2021-09-10 PROCEDURE — 710N000010 HC RECOVERY PHASE 1, LEVEL 2, PER MIN: Performed by: SURGERY

## 2021-09-10 PROCEDURE — C9290 INJ, BUPIVACAINE LIPOSOME: HCPCS | Performed by: ANESTHESIOLOGY

## 2021-09-10 PROCEDURE — 999N000141 HC STATISTIC PRE-PROCEDURE NURSING ASSESSMENT: Performed by: SURGERY

## 2021-09-10 PROCEDURE — 36592 COLLECT BLOOD FROM PICC: CPT | Performed by: STUDENT IN AN ORGANIZED HEALTH CARE EDUCATION/TRAINING PROGRAM

## 2021-09-10 PROCEDURE — 250N000011 HC RX IP 250 OP 636: Performed by: STUDENT IN AN ORGANIZED HEALTH CARE EDUCATION/TRAINING PROGRAM

## 2021-09-10 PROCEDURE — 258N000003 HC RX IP 258 OP 636: Performed by: STUDENT IN AN ORGANIZED HEALTH CARE EDUCATION/TRAINING PROGRAM

## 2021-09-10 PROCEDURE — 120N000002 HC R&B MED SURG/OB UMMC

## 2021-09-10 PROCEDURE — 0DNW0ZZ RELEASE PERITONEUM, OPEN APPROACH: ICD-10-PCS | Performed by: SURGERY

## 2021-09-10 RX ORDER — CLINDAMYCIN PHOSPHATE 900 MG/50ML
900 INJECTION, SOLUTION INTRAVENOUS SEE ADMIN INSTRUCTIONS
Status: DISCONTINUED | OUTPATIENT
Start: 2021-09-10 | End: 2021-09-10 | Stop reason: HOSPADM

## 2021-09-10 RX ORDER — PROCHLORPERAZINE MALEATE 5 MG
10 TABLET ORAL EVERY 6 HOURS PRN
Status: DISCONTINUED | OUTPATIENT
Start: 2021-09-10 | End: 2021-09-17 | Stop reason: HOSPADM

## 2021-09-10 RX ORDER — ONDANSETRON 2 MG/ML
4 INJECTION INTRAMUSCULAR; INTRAVENOUS EVERY 6 HOURS PRN
Status: DISCONTINUED | OUTPATIENT
Start: 2021-09-10 | End: 2021-09-17 | Stop reason: HOSPADM

## 2021-09-10 RX ORDER — LIDOCAINE 40 MG/G
CREAM TOPICAL
Status: DISCONTINUED | OUTPATIENT
Start: 2021-09-10 | End: 2021-09-10 | Stop reason: HOSPADM

## 2021-09-10 RX ORDER — NALOXONE HYDROCHLORIDE 0.4 MG/ML
0.4 INJECTION, SOLUTION INTRAMUSCULAR; INTRAVENOUS; SUBCUTANEOUS
Status: DISCONTINUED | OUTPATIENT
Start: 2021-09-10 | End: 2021-09-17 | Stop reason: HOSPADM

## 2021-09-10 RX ORDER — HYDROMORPHONE HCL IN WATER/PF 6 MG/30 ML
0.2 PATIENT CONTROLLED ANALGESIA SYRINGE INTRAVENOUS EVERY 5 MIN PRN
Status: DISCONTINUED | OUTPATIENT
Start: 2021-09-10 | End: 2021-09-10 | Stop reason: HOSPADM

## 2021-09-10 RX ORDER — OXYCODONE HYDROCHLORIDE 5 MG/1
5 TABLET ORAL EVERY 4 HOURS PRN
Status: DISCONTINUED | OUTPATIENT
Start: 2021-09-10 | End: 2021-09-10 | Stop reason: HOSPADM

## 2021-09-10 RX ORDER — NALOXONE HYDROCHLORIDE 0.4 MG/ML
0.2 INJECTION, SOLUTION INTRAMUSCULAR; INTRAVENOUS; SUBCUTANEOUS
Status: DISCONTINUED | OUTPATIENT
Start: 2021-09-10 | End: 2021-09-17 | Stop reason: HOSPADM

## 2021-09-10 RX ORDER — ONDANSETRON 2 MG/ML
4 INJECTION INTRAMUSCULAR; INTRAVENOUS EVERY 30 MIN PRN
Status: DISCONTINUED | OUTPATIENT
Start: 2021-09-10 | End: 2021-09-10 | Stop reason: HOSPADM

## 2021-09-10 RX ORDER — LABETALOL HYDROCHLORIDE 5 MG/ML
10 INJECTION, SOLUTION INTRAVENOUS EVERY 4 HOURS PRN
Status: DISCONTINUED | OUTPATIENT
Start: 2021-09-10 | End: 2021-09-17 | Stop reason: HOSPADM

## 2021-09-10 RX ORDER — LIDOCAINE 40 MG/G
CREAM TOPICAL
Status: DISCONTINUED | OUTPATIENT
Start: 2021-09-10 | End: 2021-09-17 | Stop reason: HOSPADM

## 2021-09-10 RX ORDER — SODIUM CHLORIDE, SODIUM LACTATE, POTASSIUM CHLORIDE, CALCIUM CHLORIDE 600; 310; 30; 20 MG/100ML; MG/100ML; MG/100ML; MG/100ML
INJECTION, SOLUTION INTRAVENOUS CONTINUOUS
Status: DISCONTINUED | OUTPATIENT
Start: 2021-09-10 | End: 2021-09-10 | Stop reason: HOSPADM

## 2021-09-10 RX ORDER — ONDANSETRON 4 MG/1
4 TABLET, ORALLY DISINTEGRATING ORAL EVERY 6 HOURS PRN
Status: DISCONTINUED | OUTPATIENT
Start: 2021-09-10 | End: 2021-09-17 | Stop reason: HOSPADM

## 2021-09-10 RX ORDER — FENTANYL CITRATE 50 UG/ML
50 INJECTION, SOLUTION INTRAMUSCULAR; INTRAVENOUS EVERY 5 MIN PRN
Status: DISCONTINUED | OUTPATIENT
Start: 2021-09-10 | End: 2021-09-10 | Stop reason: HOSPADM

## 2021-09-10 RX ORDER — CLINDAMYCIN PHOSPHATE 900 MG/50ML
900 INJECTION, SOLUTION INTRAVENOUS
Status: COMPLETED | OUTPATIENT
Start: 2021-09-10 | End: 2021-09-10

## 2021-09-10 RX ORDER — METHADONE HYDROCHLORIDE 10 MG/ML
20 INJECTION, SOLUTION INTRAMUSCULAR; INTRAVENOUS; SUBCUTANEOUS 3 TIMES DAILY
Status: DISCONTINUED | OUTPATIENT
Start: 2021-09-11 | End: 2021-09-12

## 2021-09-10 RX ORDER — BUPIVACAINE HYDROCHLORIDE 2.5 MG/ML
INJECTION, SOLUTION EPIDURAL; INFILTRATION; INTRACAUDAL PRN
Status: DISCONTINUED | OUTPATIENT
Start: 2021-09-10 | End: 2021-09-10

## 2021-09-10 RX ORDER — ONDANSETRON 4 MG/1
4 TABLET, ORALLY DISINTEGRATING ORAL EVERY 30 MIN PRN
Status: DISCONTINUED | OUTPATIENT
Start: 2021-09-10 | End: 2021-09-10 | Stop reason: HOSPADM

## 2021-09-10 RX ORDER — LABETALOL HYDROCHLORIDE 5 MG/ML
10 INJECTION, SOLUTION INTRAVENOUS
Status: DISCONTINUED | OUTPATIENT
Start: 2021-09-10 | End: 2021-09-10 | Stop reason: HOSPADM

## 2021-09-10 RX ORDER — DIPHENHYDRAMINE HYDROCHLORIDE 50 MG/ML
25 INJECTION INTRAMUSCULAR; INTRAVENOUS EVERY 6 HOURS PRN
Status: DISCONTINUED | OUTPATIENT
Start: 2021-09-10 | End: 2021-09-17 | Stop reason: HOSPADM

## 2021-09-10 RX ORDER — PROPOFOL 10 MG/ML
INJECTION, EMULSION INTRAVENOUS PRN
Status: DISCONTINUED | OUTPATIENT
Start: 2021-09-10 | End: 2021-09-10

## 2021-09-10 RX ORDER — SODIUM CHLORIDE, SODIUM LACTATE, POTASSIUM CHLORIDE, CALCIUM CHLORIDE 600; 310; 30; 20 MG/100ML; MG/100ML; MG/100ML; MG/100ML
INJECTION, SOLUTION INTRAVENOUS CONTINUOUS
Status: DISCONTINUED | OUTPATIENT
Start: 2021-09-10 | End: 2021-09-11

## 2021-09-10 RX ORDER — FENTANYL CITRATE 50 UG/ML
INJECTION, SOLUTION INTRAMUSCULAR; INTRAVENOUS PRN
Status: DISCONTINUED | OUTPATIENT
Start: 2021-09-10 | End: 2021-09-10

## 2021-09-10 RX ORDER — EPHEDRINE SULFATE 50 MG/ML
INJECTION, SOLUTION INTRAMUSCULAR; INTRAVENOUS; SUBCUTANEOUS PRN
Status: DISCONTINUED | OUTPATIENT
Start: 2021-09-10 | End: 2021-09-10

## 2021-09-10 RX ORDER — ONDANSETRON 2 MG/ML
INJECTION INTRAMUSCULAR; INTRAVENOUS PRN
Status: DISCONTINUED | OUTPATIENT
Start: 2021-09-10 | End: 2021-09-10

## 2021-09-10 RX ORDER — DEXAMETHASONE SODIUM PHOSPHATE 4 MG/ML
INJECTION, SOLUTION INTRA-ARTICULAR; INTRALESIONAL; INTRAMUSCULAR; INTRAVENOUS; SOFT TISSUE PRN
Status: DISCONTINUED | OUTPATIENT
Start: 2021-09-10 | End: 2021-09-10

## 2021-09-10 RX ORDER — LIDOCAINE HYDROCHLORIDE 20 MG/ML
INJECTION, SOLUTION INFILTRATION; PERINEURAL PRN
Status: DISCONTINUED | OUTPATIENT
Start: 2021-09-10 | End: 2021-09-10

## 2021-09-10 RX ORDER — DIPHENHYDRAMINE HCL 25 MG
25 CAPSULE ORAL EVERY 6 HOURS PRN
Status: DISCONTINUED | OUTPATIENT
Start: 2021-09-10 | End: 2021-09-17 | Stop reason: HOSPADM

## 2021-09-10 RX ORDER — PHENYLEPHRINE HCL IN 0.9% NACL 50MG/250ML
.1-1 PLASTIC BAG, INJECTION (ML) INTRAVENOUS CONTINUOUS
Status: DISCONTINUED | OUTPATIENT
Start: 2021-09-10 | End: 2021-09-10 | Stop reason: HOSPADM

## 2021-09-10 RX ORDER — METHADONE HYDROCHLORIDE 10 MG/ML
60 INJECTION, SOLUTION INTRAMUSCULAR; INTRAVENOUS; SUBCUTANEOUS EVERY MORNING
Status: DISCONTINUED | OUTPATIENT
Start: 2021-09-11 | End: 2021-09-10

## 2021-09-10 RX ADMIN — SUGAMMADEX 200 MG: 100 INJECTION, SOLUTION INTRAVENOUS at 13:22

## 2021-09-10 RX ADMIN — ROCURONIUM BROMIDE 20 MG: 10 INJECTION INTRAVENOUS at 10:22

## 2021-09-10 RX ADMIN — SODIUM CHLORIDE, POTASSIUM CHLORIDE, SODIUM LACTATE AND CALCIUM CHLORIDE: 600; 310; 30; 20 INJECTION, SOLUTION INTRAVENOUS at 14:46

## 2021-09-10 RX ADMIN — FENTANYL CITRATE 50 MCG: 50 INJECTION, SOLUTION INTRAMUSCULAR; INTRAVENOUS at 13:27

## 2021-09-10 RX ADMIN — BUPIVACAINE HYDROCHLORIDE 20 ML: 2.5 INJECTION, SOLUTION EPIDURAL; INFILTRATION; INTRACAUDAL; PERINEURAL at 14:00

## 2021-09-10 RX ADMIN — DEXAMETHASONE SODIUM PHOSPHATE 4 MG: 4 INJECTION, SOLUTION INTRA-ARTICULAR; INTRALESIONAL; INTRAMUSCULAR; INTRAVENOUS; SOFT TISSUE at 07:46

## 2021-09-10 RX ADMIN — FENTANYL CITRATE 25 MCG: 50 INJECTION, SOLUTION INTRAMUSCULAR; INTRAVENOUS at 14:07

## 2021-09-10 RX ADMIN — PHENYLEPHRINE HYDROCHLORIDE 50 MCG: 10 INJECTION INTRAVENOUS at 07:46

## 2021-09-10 RX ADMIN — ONDANSETRON 4 MG: 2 INJECTION INTRAMUSCULAR; INTRAVENOUS at 13:22

## 2021-09-10 RX ADMIN — ROCURONIUM BROMIDE 20 MG: 10 INJECTION INTRAVENOUS at 11:59

## 2021-09-10 RX ADMIN — FENTANYL CITRATE 100 MCG: 50 INJECTION, SOLUTION INTRAMUSCULAR; INTRAVENOUS at 07:44

## 2021-09-10 RX ADMIN — HYDROMORPHONE HYDROCHLORIDE 0.2 MG: 0.2 INJECTION, SOLUTION INTRAMUSCULAR; INTRAVENOUS; SUBCUTANEOUS at 15:14

## 2021-09-10 RX ADMIN — FENTANYL CITRATE 50 MCG: 50 INJECTION, SOLUTION INTRAMUSCULAR; INTRAVENOUS at 08:43

## 2021-09-10 RX ADMIN — LIDOCAINE HYDROCHLORIDE 50 MG: 20 INJECTION, SOLUTION INFILTRATION; PERINEURAL at 07:44

## 2021-09-10 RX ADMIN — PHENYLEPHRINE HYDROCHLORIDE 100 MCG: 10 INJECTION INTRAVENOUS at 12:36

## 2021-09-10 RX ADMIN — PHENYLEPHRINE HYDROCHLORIDE 0.3 MCG/KG/MIN: 10 INJECTION INTRAVENOUS at 08:02

## 2021-09-10 RX ADMIN — CLINDAMYCIN PHOSPHATE 900 MG: 900 INJECTION, SOLUTION INTRAVENOUS at 13:16

## 2021-09-10 RX ADMIN — HYDROMORPHONE HYDROCHLORIDE 0.2 MG: 0.2 INJECTION, SOLUTION INTRAMUSCULAR; INTRAVENOUS; SUBCUTANEOUS at 14:50

## 2021-09-10 RX ADMIN — PROCHLORPERAZINE EDISYLATE 5 MG: 5 INJECTION INTRAMUSCULAR; INTRAVENOUS at 15:56

## 2021-09-10 RX ADMIN — PHENYLEPHRINE HYDROCHLORIDE 100 MCG: 10 INJECTION INTRAVENOUS at 10:12

## 2021-09-10 RX ADMIN — FENTANYL CITRATE 50 MCG: 50 INJECTION, SOLUTION INTRAMUSCULAR; INTRAVENOUS at 14:41

## 2021-09-10 RX ADMIN — HYDROMORPHONE HYDROCHLORIDE 0.2 MG: 0.2 INJECTION, SOLUTION INTRAMUSCULAR; INTRAVENOUS; SUBCUTANEOUS at 15:27

## 2021-09-10 RX ADMIN — ROCURONIUM BROMIDE 30 MG: 10 INJECTION INTRAVENOUS at 08:56

## 2021-09-10 RX ADMIN — HYDROMORPHONE HYDROCHLORIDE 0.2 MG: 0.2 INJECTION, SOLUTION INTRAMUSCULAR; INTRAVENOUS; SUBCUTANEOUS at 14:16

## 2021-09-10 RX ADMIN — PHENYLEPHRINE HYDROCHLORIDE 100 MCG: 10 INJECTION INTRAVENOUS at 08:16

## 2021-09-10 RX ADMIN — PROPOFOL 50 MG: 10 INJECTION, EMULSION INTRAVENOUS at 07:45

## 2021-09-10 RX ADMIN — Medication 5 MG: at 09:19

## 2021-09-10 RX ADMIN — HYDROMORPHONE HYDROCHLORIDE 0.5 MG: 1 INJECTION, SOLUTION INTRAMUSCULAR; INTRAVENOUS; SUBCUTANEOUS at 12:11

## 2021-09-10 RX ADMIN — FENTANYL CITRATE 100 MCG: 50 INJECTION, SOLUTION INTRAMUSCULAR; INTRAVENOUS at 13:43

## 2021-09-10 RX ADMIN — Medication 5 MG: at 10:01

## 2021-09-10 RX ADMIN — Medication 5 MG: at 08:00

## 2021-09-10 RX ADMIN — Medication: at 15:17

## 2021-09-10 RX ADMIN — FENTANYL CITRATE 50 MCG: 50 INJECTION, SOLUTION INTRAMUSCULAR; INTRAVENOUS at 14:00

## 2021-09-10 RX ADMIN — FENTANYL CITRATE 50 MCG: 50 INJECTION, SOLUTION INTRAMUSCULAR; INTRAVENOUS at 14:29

## 2021-09-10 RX ADMIN — MIDAZOLAM 2 MG: 1 INJECTION INTRAMUSCULAR; INTRAVENOUS at 07:34

## 2021-09-10 RX ADMIN — BUPIVACAINE 20 ML: 13.3 INJECTION, SUSPENSION, LIPOSOMAL INFILTRATION at 14:00

## 2021-09-10 RX ADMIN — FENTANYL CITRATE 50 MCG: 50 INJECTION, SOLUTION INTRAMUSCULAR; INTRAVENOUS at 13:00

## 2021-09-10 RX ADMIN — SODIUM CHLORIDE, POTASSIUM CHLORIDE, SODIUM LACTATE AND CALCIUM CHLORIDE: 600; 310; 30; 20 INJECTION, SOLUTION INTRAVENOUS at 18:03

## 2021-09-10 RX ADMIN — SODIUM CHLORIDE, POTASSIUM CHLORIDE, SODIUM LACTATE AND CALCIUM CHLORIDE: 600; 310; 30; 20 INJECTION, SOLUTION INTRAVENOUS at 07:37

## 2021-09-10 RX ADMIN — HYDROMORPHONE HYDROCHLORIDE 0.5 MG: 1 INJECTION, SOLUTION INTRAMUSCULAR; INTRAVENOUS; SUBCUTANEOUS at 10:26

## 2021-09-10 RX ADMIN — ROCURONIUM BROMIDE 50 MG: 10 INJECTION INTRAVENOUS at 07:45

## 2021-09-10 RX ADMIN — CLINDAMYCIN PHOSPHATE 900 MG: 900 INJECTION, SOLUTION INTRAVENOUS at 07:52

## 2021-09-10 ASSESSMENT — MIFFLIN-ST. JEOR: SCORE: 1054.13

## 2021-09-10 ASSESSMENT — ACTIVITIES OF DAILY LIVING (ADL): ADLS_ACUITY_SCORE: 15

## 2021-09-10 NOTE — PROVIDER NOTIFICATION
Dr. Herbert paged at [phone]: Demetra Katzjt- can her Lyrica 150 mg be added for her fibromyaglia? She missed her 1200 pm dose. Also the opioid tolerant dose reads more similar to a niave dosing, can that be adjusted? Thanks! [place] [id number]    Dilaudid PCA pump order to be increased. Will start PCA pump.

## 2021-09-10 NOTE — ANESTHESIA PREPROCEDURE EVALUATION
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 Removed Oct 2018.     Benzodiazepine dependence (H)     With h/o withdrawal seizure x 1     Bipolar 1 disorder, mixed, moderate (H) 2013     Chronic pain     Back, legs.     Depressive disorder      Eosinophilic gastroenteritis 2010     Epidural abscess 2005    x4     Fibromyalgia      Generalised anxiety disorder      GERD (gastroesophageal reflux disease)      Major depressive disorder      Migraine      Nephrolithiasis     S/p left sided lithotripsy     Opiate dependence (H)      Osteoarthritis      Osteoporosis      PUD (peptic ulcer disease)      SLE (systemic lupus erythematosus) (H) 2009     Weight loss       Past Surgical History:   Procedure Laterality Date     BACK SURGERY  04    L4-5 epidural abscess     BACK SURGERY  04    L3-4 spinal stenosis     BACK SURGERY  04    Lt psoas abscess     BRONCHOSCOPY FLEXIBLE N/A 2019    Procedure: Flexible Bronchoscopy;  Surgeon: Patrice Regalado MD;  [facility]      SECTION      x 2     CHOLECYSTECTOMY  2-     CLOSED REDUCTION, PERCUTANEOUS PINNING FINGER, COMBINED  8/10/2011    Procedure:COMBINED CLOSED REDUCTION, PERCUTANEOUS PINNING FINGER; 5th Proximal Phalanx; Surgeon:RADHA BUITRAGO; [facility]     COLONOSCOPY       COLONOSCOPY N/A 2020    Procedure: COLONOSCOPY;  Surgeon: Zacarias Duran MD;  [facility]     ESOPHAGOSCOPY, GASTROSCOPY, DUODENOSCOPY (EGD),  COMBINED N/A 8/6/2020    Procedure: ESOPHAGOGASTRODUODENOSCOPY, WITH BIOPSY;  Surgeon: Zacarias Duran MD;  [facility]     ESOPHAGOSCOPY, GASTROSCOPY, DUODENOSCOPY (EGD), COMBINED N/A 6/8/2021    Procedure: ESOPHAGOGASTRODUODENOSCOPY (EGD);  Surgeon: Kaveh Clayton MD;  [facility]     GASTRECTOMY  11-    Bilat truncal vagotomy, hemigastrectomy, RnY gastrojejunostomy     GASTROJEJUNOSTOMY  6/2/2011    Procedure:GASTROJEJUNOSTOMY; exploratory laparotmy with revision of gastrojejunostomy, Antrectomy, Miles-en-y, Gastrojejunostomy; Surgeon:JULIUS HELM; Location:[facility]     INSERT CHEST TUBE N/A 4/29/2019    Procedure: INSERTION, CATHETER, INTERCOSTAL, FOR DRAINAGE;  Surgeon: Melissa Nichols MD;  [facility]     LASER HOLMIUM LITHOTRIPSY URETER(S), INSERT STENT, COMBINED      Procedure: COMBINED CYSTOSCOPY, URETEROSCOPY, LASER HOLMIUM LITHOTRIPSY URETER(S), INSERT STENT;;  Surgeon: Blair Correa MD;  [facility]     LASER HOLMIUM NEPHROLITHOTOMY VIA PERCUTANEOUS NEPHROSTOMY  7/11/2012    Procedure: LASER HOLMIUM NEPHROLITHOTOMY VIA PERCUTANEOUS NEPHROSTOMY;  proceedure should read: Left Percutaneous Access, Left Percutaneous ultrasonic Nephrolithotomy, Ureteroscopy Holmium Laser Lithotripsy Stent Placement ;  Surgeon: Blair Correa MD;  [facility]     MAMMOPLASTY AUGMENTATION BILATERAL       OPEN REDUCTION INTERNAL FIXATION WRIST Left 1/11/2016    Procedure: OPEN REDUCTION INTERNAL FIXATION WRIST;  Surgeon: Darling Ellis MD;  [facility]     RECONSTRUCT BREAST, IMPLANT PROSTHESIS, COMBINED       THORACOSCOPIC DECORTICATION LUNG Left 5/7/2019    Procedure: Left Video Assisted Thoracoscopic Decortication, Intercostal Nerve Cryo Analgesia, Flexible Bronchoscopy;  Surgeon: Patrice Regalado MD;  [facility]      Allergies   Allergen Reactions     Penicillins Hives     Hives in childhood.      Social History     Tobacco Use     Smoking  status: Never Smoker     Smokeless tobacco: Never Used   Substance Use Topics     Alcohol use: No     Comment: none in 10 years      Wt Readings from Last 1 Encounters:   09/10/21 53.5 kg (117 lb 15.1 oz)        Anesthesia Evaluation   Pt has had prior anesthetic. Type: General.    No history of anesthetic complications       ROS/MED HX  ENT/Pulmonary: Comment: Hx of prior lung decortication      Neurologic:       Cardiovascular:       METS/Exercise Tolerance:     Hematologic:     (+) anemia,     Musculoskeletal:       GI/Hepatic: Comment: Hx partial gastrectomy with hypokinetic remnant. TPN dependent    (+) GERD, Asymptomatic on medication,     Renal/Genitourinary:       Endo:       Psychiatric/Substance Use:     (+) psychiatric history bipolar and depression H/O chronic opiod use  (on methadone).     Infectious Disease:       Malignancy:       Other:      (+) , H/O Chronic Pain,        Physical Exam    Airway        Mallampati: II   TM distance: > 3 FB   Neck ROM: full   Mouth opening: > 3 cm    Respiratory Devices and Support         Dental  no notable dental history         Cardiovascular          Rhythm and rate: regular and normal     Pulmonary           breath sounds clear to auscultation           OUTSIDE LABS:  CBC:   Lab Results   Component Value Date    WBC 7.1 09/07/2021    WBC 6.9 08/31/2021    HGB 8.5 (L) 09/07/2021    HGB 9.5 (L) 08/31/2021    HCT 29.0 (L) 09/07/2021    HCT 33.4 (L) 08/31/2021     09/07/2021     08/31/2021     BMP:   Lab Results   Component Value Date     09/07/2021     08/31/2021    POTASSIUM 4.2 09/07/2021    POTASSIUM 3.8 08/31/2021    CHLORIDE 111 (H) 09/07/2021    CHLORIDE 108 08/31/2021    CO2 28 09/07/2021    CO2 32 08/31/2021    BUN 17 09/07/2021    BUN 10 08/31/2021    CR 0.67 09/07/2021    CR 0.61 08/31/2021    GLC 92 09/10/2021    GLC 86 09/07/2021     COAGS:   Lab Results   Component Value Date    PTT 45 (H) 04/29/2019    INR 1.03 07/01/2021     FIBR 219 06/02/2011     POC:   Lab Results   Component Value Date     (H) 07/03/2021    HCG Negative 04/15/2015    HCGS Negative 11/05/2010     HEPATIC:   Lab Results   Component Value Date    ALBUMIN 2.0 (L) 09/07/2021    PROTTOTAL 5.7 (L) 09/07/2021    ALT 98 (H) 09/07/2021     (H) 09/07/2021    GGT 1,223 (H) 05/06/2019    ALKPHOS 381 (H) 09/07/2021    BILITOTAL <0.1 (L) 09/07/2021     OTHER:   Lab Results   Component Value Date    PH 7.40 05/07/2019    LACT 1.0 05/03/2019    A1C 5.5 06/02/2011    RAFAELA 7.8 (L) 09/07/2021    PHOS 3.7 09/07/2021    MAG 1.9 09/07/2021    LIPASE 32 (L) 04/28/2019    AMYLASE 33 05/20/2016    TSH 1.88 03/15/2021    T4 0.81 02/04/2013    CRP 13.0 (H) 08/24/2021    SED 46 (H) 03/15/2021       Anesthesia Plan    ASA Status:  3   NPO Status:  NPO Appropriate    Anesthesia Type: General.     - Airway: ETT   Induction: Intravenous.   Maintenance: Balanced.   Techniques and Equipment:     - Lines/Monitors: 2nd IV, Port in situ     Consents    Anesthesia Plan(s) and associated risks, benefits, and realistic alternatives discussed. Questions answered and patient/representative(s) expressed understanding.     - Discussed with:  Patient      - Extended Intubation/Ventilatory Support Discussed: No.      - Patient is DNR/DNI Status: No    Use of blood products discussed: Yes.     - Discussed with: Patient.     - Consented: consented to blood products            Reason for refusal: other.     Postoperative Care    Pain management: IV analgesics, Oral pain medications, Peripheral nerve block (Single Shot).   PONV prophylaxis: Ondansetron (or other 5HT-3), Dexamethasone or Solumedrol     Comments:                Jake Anaya MD

## 2021-09-10 NOTE — ANESTHESIA CARE TRANSFER NOTE
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    Anesthesia Type:   No value filed.     Note:    Oropharynx: oropharynx clear of all foreign objects  Level of Consciousness: awake  Oxygen Supplementation: face mask  Level of Supplemental Oxygen (L/min / FiO2): 6  Independent Airway: airway patency satisfactory and stable  Dentition: dentition unchanged  Vital Signs Stable: post-procedure vital signs reviewed and stable  Report to RN Given: handoff report given  Patient transferred to: PACU    Handoff Report: Identifed the Patient, Identified the Reponsible Provider, Reviewed the pertinent medical history, Discussed the surgical course, Reviewed Intra-OP anesthesia mangement and issues during anesthesia, Set expectations for post-procedure period and Allowed opportunity for questions and acknowledgement of understanding      Vitals:  Vitals Value Taken Time   /74 09/10/21 1350   Temp     Pulse 95 09/10/21 1352   Resp 21 09/10/21 1352   SpO2 96 % 09/10/21 1352   Vitals shown include unvalidated device data.    Electronically Signed By: VINOD GRFAF CRNA  September 10, 2021  1:53 PM

## 2021-09-10 NOTE — ANESTHESIA PROCEDURE NOTES
TAP Procedure Note  Pre-Procedure   Staff -        Anesthesiologist:  Arian Guerra MD       Performed By: anesthesiologist       Location: post-op       Pre-Anesthestic Checklist: patient identified, IV checked, site marked, risks and benefits discussed, informed consent, monitors and equipment checked, pre-op evaluation, at physician/surgeon's request and post-op pain management  Timeout:       Correct Patient: Yes        Correct Procedure: Yes        Correct Site: Yes        Correct Position: Yes        Correct Laterality: Yes        Site Marked: Yes  Procedure Documentation  Procedure: TAP       Laterality: bilateral       Patient Position: supine       Patient Prep/Sterile Barriers: sterile gloves, mask       Skin prep: Chloraprep       Needle Type: short bevel       Needle Gauge: 21.        Needle Length (Inches): 4        Ultrasound guided       1. Ultrasound was used to identify targeted nerve, plexus, vascular marker, or fascial plane and place a needle adjacent to it in real-time.       2. Ultrasound was used to visualize the spread of anesthetic in close proximity to the above referenced structure.       3. A permanent image is entered into the patient's record.    Assessment/Narrative         The placement was negative for: blood aspirated, painful injection and site bleeding       Paresthesias: No.     Bolus given via needle..        Secured via.        Insertion/Infusion Method: Single Shot

## 2021-09-10 NOTE — BRIEF OP NOTE
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]  Post-operative diagnosis Same as pre-operative diagnosis    Procedure: Procedure(s):  Exploratory Laparotomy, Extensive Lysis of Adhesions; Jejunojejunostomy resection and  Revision, Gastrojejunal Resection (partial gastrectomy with enterectomy) with Miles en Y Reconstruction Gastrojejunostomy, Upper Endoscopy  Surgeon: Surgeon(s) and Role:     * Kaveh Clayton MD - Primary  Anesthesia: General   Estimated blood loss: 200 ml  Drains: 19 Fr Ty drain  Specimens:   ID Type Source Tests Collected by Time Destination   1 : Jejunojejunostomy Tissue  SURGICAL PATHOLOGY EXAM Kaveh Clayton MD 9/10/2021  9:50 AM    2 : Gastrojejunostomy Tissue Gastric Esophageal Junction SURGICAL PATHOLOGY EXAM Kaveh Clayton MD 9/10/2021 11:39 AM      Findings:   Midline and upper abdominal adhesions. Enterotomy x 1. Jejunostomy and gastrojejunostomy revised. Drain left near GJ site.   Complications: None.  Implants: * No implants in log *    Dia Noel MD  General Surgery PGY-4  [id number]

## 2021-09-10 NOTE — ANESTHESIA PROCEDURE NOTES
Airway       Patient location during procedure: OR       Procedure Start/Stop Times: 9/10/2021 7:47 AM  Staff -        CRNA: Alec Ames APRN CRNA       Performed By: CRNA  Consent for Airway        Urgency: elective  Indications and Patient Condition       Indications for airway management: lan-procedural       Induction type:intravenous       Mask difficulty assessment: 1 - vent by mask    Final Airway Details       Final airway type: endotracheal airway       Successful airway: ETT - single and Oral  Endotracheal Airway Details        ETT size (mm): 7.0       Cuffed: yes       Successful intubation technique: direct laryngoscopy       DL Blade Type: Santos 2       Grade View of Cords: 2       Adjucts: stylet       Position: Right       Measured from: lips       Secured at (cm): 21       Bite block used: None    Post intubation assessment        Placement verified by: capnometry and equal breath sounds        Number of attempts at approach: 1       Secured with: pink tape       Ease of procedure: easy       Dentition: Intact and Unchanged

## 2021-09-10 NOTE — ANESTHESIA POSTPROCEDURE EVALUATION
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    Anesthesia Type:  No value filed.    Note:  Disposition: Inpatient   Postop Pain Control: Challenging            Challenges/Interventions: Exacerbation of chronic pain            Sign Out: ONGOING pain issues   PONV: No   Neuro/Psych: Uneventful            Sign Out: Acceptable/Baseline neuro status   Airway/Respiratory: Uneventful            Sign Out: Acceptable/Baseline resp. status   CV/Hemodynamics: Uneventful            Sign Out: Acceptable CV status; No obvious hypovolemia; No obvious fluid overload   Other NRE: NONE   DID A NON-ROUTINE EVENT OCCUR?            Last vitals:  Vitals Value Taken Time   /88 09/10/21 1530   Temp 36.5  C (97.7  F) 09/10/21 1500   Pulse 98 09/10/21 1535   Resp 12 09/10/21 1535   SpO2 93 % 09/10/21 1535   Vitals shown include unvalidated device data.    Electronically Signed By: Jake Anaya MD  September 10, 2021  3:36 PM

## 2021-09-10 NOTE — OP NOTE
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], Gastroparesis, Severe hypoproteinemia, Gastrojejunal stricture, Possible gastrojejunal ulcer    Post-operative diagnosis Same   Procedure: Procedure(s):  Exploratory Laparotomy, Extensive Lysis of Adhesions; Jejunojejunostomy resection and  Revision, Gastrojejunal Resection (partial gastrectomy with enterectomy) with Koby en Y Reconstruction Gastrojejunostomy, Upper Endoscopy   Surgeon: Surgeon(s) and Role:     * Kaveh Clayton MD - Primary      * Dia Herbert MD - Assisting   Anesthesia: General     Estimated blood loss: 200 ml   Drains: 19 Fr vaughn drain   Specimens: ID Type Source Tests Collected by Time Destination   1 : Jejunojejunostomy Tissue  SURGICAL PATHOLOGY EXAM Kaveh Clayton MD 9/10/2021  9:50 AM    2 : Gastrojejunostomy Tissue Gastric Esophageal Junction SURGICAL PATHOLOGY EXAM Kaveh Clayton MD 9/10/2021 11:39 AM       Findings: Numerous intraabdominal adhesions requiring extensive lysis of adhesions. Enterotomy x 1.     Koby-en-y anatomy was found. Koby limb pathway was ante-colic. Koby limb length 30 cm.      Complications: None.   Implants: None.       INDICATIONS FOR PROCEDURE  Demetra Richardson is a 65 year old female who has previously undergone bilateral truncal vagotomy, hemigastrectomy and koby-en-y gastrojejunostomy surgery in 2005 for 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 She subsequently had distal gastrectomy, resection of retained antrum, and koby-en-y gastrojejunostomy in 2011 for gastric wall obstruction. Over the last two years the patient reports having progressively worsening postprandial episodes of abdominal pain, nausea and vomiting in conjunction with at least 10 lb weight loss.     After understanding the risks and benefits of proceeding with an exploratory laparotomy, she agreed  to an operation.    General risks related to abdominal surgery were reviewed with the patient.    These include, but are not limited to, death, myocardial infarction, pneumonia, urinary tract infection, deep venous thrombosis with or without pulmonary embolus, abdominal infection from bowel injury or abscess, bowel obstruction, wound infection, and bleeding.    OPERATIVE PROCEDURE:  Under the benefits of general endotracheal anesthesia, the peritoneal cavity was entered through a vertical midline incision at the site of an old laparotomy scar. Multiple adhesions were encountered along the midline. Adhesions between the abdominal wall and the underlying small bowel and omentum were completely dissected using a combination of electrocautery, blunt and sharp dissection. A 2 mm enterotomy was made at an area of very dense adhesion along the midline. This was repaired with 3 interrupted vicryl sutures.Adhesiolysis between loops of bowel was tedious. The adhesions were particularly dense in the epigastric region. Adhesions to the liver edge were also taken down. This portion of the procedure required 120 additional minutes of operating room time.       The anatomy was consistent with prior koby-en-y gastrojejunostomy anatomy. The findings were as noted above. The koby limb was about 30 cm in length. It was determined that jejunojejunostomy anastomosis revision was necessary in order to reposition it more distally to create enough length for the koby limb after gastrojejunostomy revision.     The proximal transection point was chosen on the koby limb just proximal to the prior jejunojejunostomy; the distal transection line was chosen on the common channel just distal to the prior jejunojejunostomy; and another transection point was chosen just proximal to the prior jejunojejunostomy on the biliopancreatic limb. A white load endoGIA stapler was used to transect the bowel at these three sites and prior jejunojejunostomy  anastomosis was resected.     The koby limb and common channel were then lined up in a side to side functional end to end configuration. The bowel was sutured together at the anti-mesenteric aspect with 4-0 PDS.  Enterotomies were made on each piece of bowel and a white load endo-GM stapler device was placed and fired to create the enteroenterostomy. The common enterotomy defect was closed using a white load endoGIA stapler device. The entire anastomosis was oversewn using 4-0 PDS.    In order to make the koby limb a total length of 60 cm, a site 30 cm distal to the new jejunojejunostomy was chosen to reconnect the biliopancreatic limb. The small bowel was lined up in a side to side functional end to side configuration. The bowel was sutured together adjacent to the mesentery with 4-0 PDS to create the posterior layer of anastomosis.  Enterotomies were made on each piece of bowel and a white load endo-GM stapler device was placed and fired to create the enteroenterostomy. The common enterotomy defect was closed using a white load endoGIA stapler device. The entire anastomosis was oversewn using 4-0 PDS.    Omni retractor system was placed to facilitate exposure of the epigastrium. Ligamentous attachments were taken down along the left lobe of the liver and the liver was retracted. The stomach and prior gastrojejunostomy were dissected free. Short gastric vessels were taken with the Ligasure device. Partial gastrectomy with excision of gastrojejunostomy was performed with green load endoGIA stapler device. A 30 ml gastric pouch was left.     The koby limb was brought up in antecolic fashion. Gastric pouch and koby limb were approximated, gastrotomy and enterotomy were made. Stomach pouch was irrigated and thick food particles were removed. Gastrojejunostomy was then performed via two layer hand sewn anastomosis technique with 4-0 PDS. An outer posterior layer was sewn in running fashion, followed by an inner layer  Steve stitch in running fashion from each side of the opening, and an outer anterior layer sewn in running fashion.     An upper endoscopy was performed and gastrojejunostomy evaluated. The anastomosis appeared to be well approximated and there was no bleeding. A leak test was performed using normal saline, which was negative for leak.     Retractor system was removed. Peritoneal lavage was performed using 5 L normal saline. The abdomen was inspected for hemostasis which was achieved.      The fascia was closed using 0-looped PDS and the subcutaneous tissue was irrigated with saline.  The skin was closed with skin staples. Sterile dressings were applied.  The patient tolerated the procedure well.       I was present for all critical components of the operation and all needle and sponge counts were correct x2 at the end of the procedure.    Kaveh Clayton MD  Surgery  [phone] (hospital )        Dia Noel MD  General Surgery PGY-4

## 2021-09-10 NOTE — PHARMACY-PHARMACOTHERAPY NOTE
[facility] Information Note    Clinic Name: [facility] Location: [place]  Phone Number: [phone]

## 2021-09-10 NOTE — PROGRESS NOTES
"BP (!) 140/79   Pulse 106   Temp 97.5  F (36.4  C) (Oral)   Resp (!) 72   Ht 1.6 m (5' 3\")   Wt 53.5 kg (117 lb 15.1 oz)   LMP  (LMP Unknown)   SpO2 94%   BMI 20.89 kg/m      Bilateral posterior TAP block performed without complications after arrival to PACU. 75mcg fentanyl given.  VSS AND 4L simple face mask.  Pt tolerated moderately well.  Will continue to monitor.    "

## 2021-09-11 LAB
ALBUMIN SERPL-MCNC: 1.4 G/DL (ref 3.4–5)
ALP SERPL-CCNC: 285 U/L (ref 40–150)
ALT SERPL W P-5'-P-CCNC: 64 U/L (ref 0–50)
ANION GAP SERPL CALCULATED.3IONS-SCNC: 5 MMOL/L (ref 3–14)
AST SERPL W P-5'-P-CCNC: 68 U/L (ref 0–45)
BILIRUB SERPL-MCNC: 0.4 MG/DL (ref 0.2–1.3)
BUN SERPL-MCNC: 11 MG/DL (ref 7–30)
CALCIUM SERPL-MCNC: 8 MG/DL (ref 8.5–10.1)
CHLORIDE BLD-SCNC: 104 MMOL/L (ref 94–109)
CO2 SERPL-SCNC: 27 MMOL/L (ref 20–32)
CREAT SERPL-MCNC: 0.54 MG/DL (ref 0.52–1.04)
GFR SERPL CREATININE-BSD FRML MDRD: >90 ML/MIN/1.73M2
GLUCOSE BLD-MCNC: 112 MG/DL (ref 70–99)
GLUCOSE BLDC GLUCOMTR-MCNC: 92 MG/DL (ref 70–99)
HOLD SPECIMEN: NORMAL
INR PPP: 1.18 (ref 0.85–1.15)
MAGNESIUM SERPL-MCNC: 2 MG/DL (ref 1.6–2.3)
PHOSPHATE SERPL-MCNC: 2.4 MG/DL (ref 2.5–4.5)
POTASSIUM BLD-SCNC: 3.7 MMOL/L (ref 3.4–5.3)
PROT SERPL-MCNC: 5.3 G/DL (ref 6.8–8.8)
SODIUM SERPL-SCNC: 136 MMOL/L (ref 133–144)
TRIGL SERPL-MCNC: 69 MG/DL

## 2021-09-11 PROCEDURE — 250N000009 HC RX 250: Performed by: SURGERY

## 2021-09-11 PROCEDURE — 84630 ASSAY OF ZINC: CPT | Performed by: STUDENT IN AN ORGANIZED HEALTH CARE EDUCATION/TRAINING PROGRAM

## 2021-09-11 PROCEDURE — 258N000003 HC RX IP 258 OP 636: Performed by: STUDENT IN AN ORGANIZED HEALTH CARE EDUCATION/TRAINING PROGRAM

## 2021-09-11 PROCEDURE — 250N000011 HC RX IP 250 OP 636

## 2021-09-11 PROCEDURE — 85610 PROTHROMBIN TIME: CPT | Performed by: SURGERY

## 2021-09-11 PROCEDURE — 258N000003 HC RX IP 258 OP 636: Performed by: SURGERY

## 2021-09-11 PROCEDURE — 36592 COLLECT BLOOD FROM PICC: CPT | Performed by: SURGERY

## 2021-09-11 PROCEDURE — 250N000011 HC RX IP 250 OP 636: Performed by: STUDENT IN AN ORGANIZED HEALTH CARE EDUCATION/TRAINING PROGRAM

## 2021-09-11 PROCEDURE — 84478 ASSAY OF TRIGLYCERIDES: CPT | Performed by: SURGERY

## 2021-09-11 PROCEDURE — 83735 ASSAY OF MAGNESIUM: CPT | Performed by: SURGERY

## 2021-09-11 PROCEDURE — 80053 COMPREHEN METABOLIC PANEL: CPT | Performed by: SURGERY

## 2021-09-11 PROCEDURE — 120N000002 HC R&B MED SURG/OB UMMC

## 2021-09-11 PROCEDURE — 84100 ASSAY OF PHOSPHORUS: CPT | Performed by: SURGERY

## 2021-09-11 RX ORDER — DEXTROSE MONOHYDRATE 100 MG/ML
INJECTION, SOLUTION INTRAVENOUS CONTINUOUS PRN
Status: DISCONTINUED | OUTPATIENT
Start: 2021-09-11 | End: 2021-09-17 | Stop reason: HOSPADM

## 2021-09-11 RX ORDER — METHOCARBAMOL 100 MG/ML
500 INJECTION, SOLUTION INTRAMUSCULAR; INTRAVENOUS ONCE
Status: COMPLETED | OUTPATIENT
Start: 2021-09-11 | End: 2021-09-11

## 2021-09-11 RX ORDER — HYDROXYZINE HYDROCHLORIDE 25 MG/1
25 TABLET, FILM COATED ORAL
Status: DISCONTINUED | OUTPATIENT
Start: 2021-09-11 | End: 2021-09-11

## 2021-09-11 RX ORDER — DEXTROSE MONOHYDRATE, SODIUM CHLORIDE, AND POTASSIUM CHLORIDE 50; 1.49; 4.5 G/1000ML; G/1000ML; G/1000ML
INJECTION, SOLUTION INTRAVENOUS CONTINUOUS
Status: DISCONTINUED | OUTPATIENT
Start: 2021-09-11 | End: 2021-09-11

## 2021-09-11 RX ORDER — METHOCARBAMOL 500 MG/1
500 TABLET, FILM COATED ORAL 3 TIMES DAILY PRN
Status: DISCONTINUED | OUTPATIENT
Start: 2021-09-11 | End: 2021-09-11

## 2021-09-11 RX ORDER — LORAZEPAM 2 MG/ML
1 INJECTION INTRAMUSCULAR ONCE
Status: COMPLETED | OUTPATIENT
Start: 2021-09-11 | End: 2021-09-11

## 2021-09-11 RX ORDER — DIAZEPAM 10 MG/2ML
2.5 INJECTION, SOLUTION INTRAMUSCULAR; INTRAVENOUS EVERY 6 HOURS PRN
Status: DISCONTINUED | OUTPATIENT
Start: 2021-09-11 | End: 2021-09-12

## 2021-09-11 RX ORDER — SODIUM CHLORIDE AND POTASSIUM CHLORIDE 150; 450 MG/100ML; MG/100ML
INJECTION, SOLUTION INTRAVENOUS CONTINUOUS
Status: DISCONTINUED | OUTPATIENT
Start: 2021-09-11 | End: 2021-09-12

## 2021-09-11 RX ADMIN — LORAZEPAM 1 MG: 2 INJECTION INTRAMUSCULAR; INTRAVENOUS at 04:49

## 2021-09-11 RX ADMIN — POTASSIUM CHLORIDE AND SODIUM CHLORIDE: 450; 150 INJECTION, SOLUTION INTRAVENOUS at 20:00

## 2021-09-11 RX ADMIN — Medication 20 MG: at 11:38

## 2021-09-11 RX ADMIN — ONDANSETRON 4 MG: 2 INJECTION INTRAMUSCULAR; INTRAVENOUS at 15:51

## 2021-09-11 RX ADMIN — SODIUM PHOSPHATE, MONOBASIC, MONOHYDRATE AND SODIUM PHOSPHATE, DIBASIC, ANHYDROUS 9 MMOL: 276; 142 INJECTION, SOLUTION INTRAVENOUS at 18:29

## 2021-09-11 RX ADMIN — SMOFLIPID 250 ML: 6; 6; 5; 3 INJECTION, EMULSION INTRAVENOUS at 21:08

## 2021-09-11 RX ADMIN — PROCHLORPERAZINE EDISYLATE 10 MG: 5 INJECTION INTRAMUSCULAR; INTRAVENOUS at 20:59

## 2021-09-11 RX ADMIN — METHOCARBAMOL 500 MG: 100 INJECTION, SOLUTION INTRAMUSCULAR; INTRAVENOUS at 14:34

## 2021-09-11 RX ADMIN — DIPHENHYDRAMINE HYDROCHLORIDE 25 MG: 50 INJECTION, SOLUTION INTRAMUSCULAR; INTRAVENOUS at 21:51

## 2021-09-11 RX ADMIN — POTASSIUM CHLORIDE, DEXTROSE MONOHYDRATE AND SODIUM CHLORIDE: 150; 5; 450 INJECTION, SOLUTION INTRAVENOUS at 15:07

## 2021-09-11 RX ADMIN — DIAZEPAM 2.5 MG: 5 INJECTION, SOLUTION INTRAMUSCULAR; INTRAVENOUS at 19:29

## 2021-09-11 RX ADMIN — HYDROMORPHONE HYDROCHLORIDE: 10 INJECTION INTRAMUSCULAR; INTRAVENOUS; SUBCUTANEOUS at 18:46

## 2021-09-11 RX ADMIN — Medication 20 MG: at 17:40

## 2021-09-11 RX ADMIN — Medication: at 21:07

## 2021-09-11 RX ADMIN — PROCHLORPERAZINE EDISYLATE 10 MG: 5 INJECTION INTRAMUSCULAR; INTRAVENOUS at 02:32

## 2021-09-11 RX ADMIN — Medication 20 MG: at 06:06

## 2021-09-11 ASSESSMENT — ACTIVITIES OF DAILY LIVING (ADL)
ADLS_ACUITY_SCORE: 15
ADLS_ACUITY_SCORE: 17
ADLS_ACUITY_SCORE: 17
ADLS_ACUITY_SCORE: 15

## 2021-09-11 ASSESSMENT — MIFFLIN-ST. JEOR: SCORE: 1065.13

## 2021-09-11 NOTE — PLAN OF CARE
Arrived on 7B approximately 1730. Sleeping but easily arousable. Lungs clear.  and regular. No bowel sounds heard. Dressing covering midline abdominal incision with two small areas on blood marked from PACU and unchanged. X-ray done to check placement of PICC. Turned x2 from side to side. Pt sleeping and forgetting to use PCA but effective when used. Strict NPO. VSS. Routine post op monitoring and cares. Skin check done with Jennifer JONES. Small abrasion on middle finger of R hand. Small abrasion on R shin. Second toe of R foot with tiny scabbed area.

## 2021-09-11 NOTE — PROGRESS NOTES
CLINICAL NUTRITION SERVICES - ASSESSMENT NOTE     Nutrition Prescription    RECOMMENDATIONS FOR MDs/PROVIDERS TO ORDER:  --Total fluid volume per primary team.  --Recommend changing to a non-dextrose IVF with start of TPN tonight.     Malnutrition Status:    Severe malnutrition in the context of chronic illness    Recommendations already ordered by Registered Dietitian (RD):  TPN:  --Use dosing weight of 53.5 kg  --TPN 12 hr cycle (1 hr taper at start and 1 hr taper at end) - volume per PharmD with new goal of 200g Dex, 80g AA + 250 ml 20% IV lipids 5x/week (SMOF lipids if able) provides 1357 kcals (25 kcal/kg), 1.5 g PRO/kg, with 26% kcals from Fat. Micronutrient supplementation per PharmD - recommend continuing 5 mg Zinc, trace elements (1 ml), and Infuvite (10 ml) daily as per [facility].  --Monitor lytes (K+ >3.0, Mg++ >1.5, and Phos >1.9).        --With elevated LFTs and ongoing weight loss, recommend adjusting Dex and lipids as above for home use (currently, lipids are providing 40% of kcal from fat, with recommendations for 25-30% kcal from fat).     --Checking new Zinc level.     Future/Additional Recommendations:  --Tolerance to new TPN regimen.  --Weight trends.  --NFPE as able.      REASON FOR ASSESSMENT  Demetra Richardson is a/an 65 year old female assessed by the dietitian for Pharmacy/Nutrition to Start and Manage PN - continuation of home TPN    NUTRITION HISTORY  PMH bilateral truncal vagotomy, hemigastrectomy and koby-en-y gastrojejunostomy surgery in 2005 for 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 Subsequently had distal gastrectomy, resection of retained antrum, and koby-en-y gastrojejunostomy in 2011 for gastric wall obstruction. Had progressively worsening postprandial abdominal pain, nausea and vomiting. Now s/p ex lap, extensive BRIDGET, revision of GJ and JJ anastomoses on 9/10.    Pt was last seen by RD on 6/30/2021 with the following home TPN regimen:   Per [facility], PN volume  "1000 mL + Intralipid 20% 180 mL = 1180 mL infusion volume  Nutrition Support: 12 hr w/ 125 gm dextrose,  80 gm AA and 20% lipids 5x/wk (357 kcal) to provide 1022 kcal (19 kcal/kg), 1.5 gm protein, GIR of 1.6, 35% of kcal from fat.        --RD ordered TPN at that time while admitted:             --Use dosing weight 54 kg            --Begin TPN, over 12 hr cycle, goal volume 1350 ml/day with initial 125g Dex daily (425kcal, GIR1.6), 80g AA daily (320 kcal), and 250 ml 20% IV lipids 5 days per week.  Micro/Rx: Infuvite and MTE5            --12 hours cycle provides 20 kcal/kg/day, 1.5 g PRO/kg/day, GIR 1.6 with 32% kcals from Fat  --Pt was noted to have severe muscle/fat wasting during that admission.     Called [facility] x3 regarding pt's most recent home TPN regimen, unable to find a recent scanned order/note from [facility] with TPN regimen in Chart Review. Received most recent scanned TPN regimen and it is similar to above (kcal, protein, cycle timing, and volume are the same), however pt appears to be receiving IV lipids daily, this would increase provisions to 1245 kcal (23 kcal/kg), 80 g pro (1.5 g/kg), and 40% kcal from fat.        --Volume: 1200 ml       --Max infusion rate 118.2 ml/hr with a 1 hr taper at start and end.        --Pt was receiving 5 mg Zinc daily, 1 ml/day trace elements, and infuvite (10 ml).     CURRENT NUTRITION ORDERS  Diet: NPO    LABS  K+, Mg++, Phos --> WNL  Alk Phos 381 (H)  ALT 98 (H)   (H)  TG 65 (WNL)  Zinc 54.3 (L on 8/17)    MEDICATIONS  D5 1/2 NS + KCl 20 mEq/L @ 125 ml/hr  Dilaudid PCA    ANTHROPOMETRICS  Height: 160 cm (5' 3\")  Most Recent Weight: 53.5 kg (117 lb 15.1 oz)    IBW: 52.3 kg  BMI: Normal BMI  Weight History: 11% wt loss x 6 months - severe  Wt Readings from Last 30 Encounters:   09/10/21 53.5 kg (117 lb 15.1 oz)   08/30/21 54 kg (119 lb)   08/24/21 54 kg (119 lb)   07/21/21 54.2 kg (119 lb 8 oz)   07/03/21 54.4 kg (120 lb)   06/20/21 53.8 kg (118 lb 9.6 oz)   06/10/21 " 52.5 kg (115 lb 11.2 oz)   04/09/21 59.4 kg (131 lb)   03/15/21 59 kg (130 lb)   03/12/21 59.4 kg (131 lb)   03/11/21 60.1 kg (132 lb 6.4 oz)   02/04/21 59 kg (130 lb)   01/05/21 54.4 kg (120 lb)   09/25/20 53.1 kg (117 lb)   08/06/20 54.9 kg (121 lb)   08/04/20 54.9 kg (121 lb)   06/24/20 52.6 kg (116 lb)   01/03/20 55.8 kg (123 lb)   12/10/19 59 kg (130 lb)   11/26/19 59 kg (130 lb)   11/13/19 58.1 kg (128 lb)   10/30/19 58.1 kg (128 lb)   10/23/19 58.4 kg (128 lb 12.8 oz)   10/11/19 59.2 kg (130 lb 8 oz)   08/06/19 60.6 kg (133 lb 9.6 oz)   07/09/19 61.7 kg (136 lb 1.6 oz)   06/19/19 61.4 kg (135 lb 6.4 oz)   05/31/19 62.1 kg (136 lb 14.4 oz)   05/17/19 65.8 kg (145 lb)   05/13/19 67.4 kg (148 lb 8 oz)     Dosing Weight: 53.5 kg admission weight    ASSESSED NUTRITION NEEDS  Estimated Energy Needs: 8862-9779 kcals/day (25 - 30 kcals/kg)  Justification: Maintenance on TPN  Estimated Protein Needs: 64-80+ grams protein/day (1.2 - 1.5+ grams of pro/kg)  Justification: Increased needs  Estimated Fluid Needs: (1 mL/kcal)   Justification: Maintenance and Per provider pending fluid status    PHYSICAL FINDINGS  See malnutrition section below.    MALNUTRITION  % Intake: difficult to assess due to unsure of most recent home TPN regimen  % Weight Loss: > 10% in 6 months (severe)  Subcutaneous Fat Loss: Unable to assess but documented as severe during most recent admission and suspect this remains appropriate  Muscle Loss: Unable to assess but documented as severe during most recent admission and suspect this remains appropriate  Fluid Accumulation/Edema: None noted  Malnutrition Diagnosis: Severe malnutrition in the context of chronic illness    NUTRITION DIAGNOSIS  Malnutrition related to inadequate PO intake, altered GI function, and inadequate parenteral nutrition infusion as evidenced by severe 11% weight loss x 6 months with documented severe muscle/fat wasting during recent admission, dependent on TPN (home TPN  providing <100% of low-end energy needs).       INTERVENTIONS  Implementation   Collaboration with other providers - [facility] PharmD, [facility] PharmD  Parenteral Nutrition/IV Fluids - Initiate     Goals  Total avg nutritional intake to meet a minimum of 25 kcal/kg and 1.2 g PRO/kg daily (per dosing wt 53.5 kg).     Monitoring/Evaluation  Progress toward goals will be monitored and evaluated per protocol.    Lindsey Mcdermott, MS, RD, LD, CNSC  Weekend Pager: [phone]  [address] pager [phone]

## 2021-09-11 NOTE — PLAN OF CARE
"/69 (BP Location: Right arm)   Pulse 98   Temp 99.5  F (37.5  C) (Oral)   Resp 19   Ht 1.6 m (5' 3\")   Wt 53.5 kg (117 lb 15.1 oz)   LMP  (LMP Unknown)   SpO2 96%   BMI 20.89 kg/m       Reason for admission: POD 1 Ex lap, lysis of adhesions, Jejunojejunostomy resection and  Revision, Gastrojejunal Resection, with Miles en Y Reconstruction Gastrojejunostomy, Upper Endoscopy  Neuros:  A&Ox4. Able to make needs known. Very anxious  Activity: Not OOB this shift. Independently positioning in bed.   Cardiac: Tachy. Denies cardiac chest pain   Respiratory:. Sating well on 3L oxymask. BENSON. Capno on.   Diet: Stict NPO.   GI/Gu: Cisneros with adeqate output .No BM this shift. Hypoactive BS. Nausea intermittent  Skin/Incisions: Midline incision- shadowing marked.   LDA: (L) SL PICC infusing LR at 125mL/hr and dilaudid PCA  Pain: abdominal and (R) shoulder pain. Dilaudid PCA at 0.2 continuous with 0.2 bumps Q10min, pt reports poor pain control, provider notifed, PCA dose already increased X1 this shift- ativan ordered for anxiety  PRN: Compazine X1  Changes: Tmax of 100.2-provider notified   Plan: Continue POC     RON GARCIA, ROBERT on 9/11/2021 at 4:14 AM       "

## 2021-09-11 NOTE — PLAN OF CARE
"BP (!) 164/87 (BP Location: Right arm)   Pulse 85   Temp 98.9  F (37.2  C) (Axillary)   Resp 17   Ht 1.6 m (5' 3\")   Wt 54.6 kg (120 lb 5.9 oz)   LMP  (LMP Unknown)   SpO2 93%   BMI 21.32 kg/m    Status: POD 1 Exploratory Laparotomy, Extensive Lysis of Adhesions; Jejunojejunostomy resection and Revision, Gastrojejunal Resection (partial gastrectomy with enterectomy) with Miles en Y Reconstruction Gastrojejunostomy, Upper Endoscopy  Pain/Nausea: Reported poor pain control(pt appeared to be sleeping intermittently and drowsy when awake), Dilaudid PCA at 0.2mg continuous, 0.4mg Q10min; 1 time dose of 500mg IV robaxin given; zofran x1(after getting OOB for first time)  Mobility: Ax1 - needs reinforcement for following abdominal precautions, not using call light, bed alarm on  Diet: NPO   Labs: Reviewed, Phos 2.4 - replacing, AM recheck  Lines: R DL PICC with MIVF at 125mL/hr with PCA, other lumen hep locked  Drains: R ABNER to suction - dark green/brown output  Skin/Incisions: Midline incision WDL - drainage marked at 1600  Neuro: Oriented, but impulsive when getting OOB and drowsy  Respiratory: IS encouraged, not able to get past 500mL or demonstrate proper technique. Lungs clear/dim. On 2L oxymask since desats with nasal cannula.  Cardiac: Ex HTN within parameters  GI: Not passing gas, BS faint, nondistended  : Voiding spont since catheter pulled, AUOP  New Changes: prn valium available, PCA increased to 0.4mg Q10min, one time dose robaxin. Urethral catheter pulled at 1445.  Plan: Continue POC    "

## 2021-09-11 NOTE — PHARMACY-PHARMACOTHERAPY NOTE
Bariatric Consult    Medications evaluated as requested per Bariatric Consult. Medications available as liquid formulations have been dispensed when possible.    No medication changes were needed based on the Bariatric Medication Management Policy.    The pharmacist will continue to follow as new medications are ordered.

## 2021-09-11 NOTE — CONSULTS
"Inpatient Pain Management Service Consultation Note  09/11/21        ADMIT DATE: 9/10/2021 1 Day Post-Op   DATE OF CONSULT: 09/10/2021  Consult ordered by: Dia Herbert  Consult performed by: Keyon Au MD  Visit/Communication style:    REASON FOR PAIN CONSULTATION:  Demetra Richardson is a 65 year old female I am seeing in consultation for evaluation and recommendations regarding \"post op pain management on a chronic pain patient     CHIEF PAIN COMPLAINT: Acute on chronic pain. Status post exp lap    ASSESSMENT: first POP day. Still NPO  Primary Care Provider: Fredy Merritt  Chronic Pain Provider:     TREATMENT RECOMMENDATIONS/PLAN:   1. Start Ketamine 5 to 10 mg/hr IV if patient in acute distress despite PCA  2. Dilaudid PCA Continuous 0.2 mg/IV/hr, Bolus 0.4 mg, Lockout 10 min   3. Diazepam 2.5 mg IV every 6 hrs PRN.  4. Keep continuous Saturation and Ventilation monitoring.  5. Resume home medications as soon as PO reasumed  6.   We will keep following     Pain Service will Continue to follow at this time.     Recommendations were discussed with Recommendations left in chart. Please page Pain Pager if find further discursion necessary    Plan was reviewed by the Pain Service staff anesthesiologist Keyon Au M.D    Thank you for consulting the Inpatient Pain Management Service.  The above recommendations are to be acted upon at the primary team s discretion.    To reach us:  Mon - Friday 8 AM - 3 PM: Pager [phone] (Text Page)  After hours, weekends and holidays: Primary service should call [phone] the answering service for the on-call pain specialist    HISTORY OF PRESENT ILLNESS: Demetra Richardson is a 65 year old female with history of Abdominal pain, low back pain and fibromialgia, admitted for exploratory laparotomy 09/10/2021      REVIEW OF 10 BODY SYSTEMS: 10 point ROS of systems including Constitutional, Eyes, Respiratory, Cardiovascular, Gastroenterology, Genitourinary, " "Integumentary, Musculoskeletal, Psychiatric were all negative except for pertinent positives noted in my HPI.     INPATIENT MEDICATIONS PERTINENT TO PAIN CONSULT:   Medications related to Pain Management (From now, onward)    Start     Dose/Rate Route Frequency Ordered Stop    09/11/21 0530  methadone (DOLOPHINE) injection 20 mg      20 mg Intravenous 3 TIMES DAILY 09/10/21 1736      09/11/21 0130  HYDROmorphone (DILAUDID) PCA 0.2 mg/mL OPIOID TOLERANT       Intravenous CONTINUOUS 09/11/21 0112      09/10/21 1704  diphenhydrAMINE (BENADRYL) capsule 25 mg      25 mg Oral EVERY 6 HOURS PRN 09/10/21 1704      09/10/21 1704  diphenhydrAMINE (BENADRYL) injection 25 mg      25 mg Intravenous EVERY 6 HOURS PRN 09/10/21 1704      09/10/21 1704  lidocaine 1 % 0.1-1 mL      0.1-1 mL Other EVERY 1 HOUR PRN 09/10/21 1704      09/10/21 1704  lidocaine (LMX4) cream       Topical EVERY 1 HOUR PRN 09/10/21 1704      09/10/21 1413  bupivacaine liposome (EXPAREL) LA inj was given in the infiltration site to produce post-op analgesia. Duration of action is up to 72 hours. Other \"jeremy\" meds should not be given for 96 hours except for lidocaine 4% patch. This is for INFORMATION ONLY.       Does not apply CONTINUOUS PRN 09/10/21 1413 09/14/21 1412          CURRENT MEDICATIONS:   Current Facility-Administered Medications Ordered in Epic   Medication Dose Route Frequency Provider Last Rate Last Admin     bupivacaine liposome (EXPAREL) LA inj was given in the infiltration site to produce post-op analgesia. Duration of action is up to 72 hours. Other \"jeremy\" meds should not be given for 96 hours except for lidocaine 4% patch. This is for INFORMATION ONLY.   Does not apply Continuous PRN Ty Richard,          diphenhydrAMINE (BENADRYL) capsule 25 mg  25 mg Oral Q6H PRN Dia Herbert MD        Or     diphenhydrAMINE (BENADRYL) injection 25 mg  25 mg Intravenous Q6H PRN Dia Herbert MD         enoxaparin " ANTICOAGULANT (LOVENOX) injection 40 mg  40 mg Subcutaneous Q24H Dia Herbert MD         HYDROmorphone (DILAUDID) PCA 0.2 mg/mL OPIOID TOLERANT   Intravenous Continuous Ness Franks MD   Rate Verify at 09/11/21 0827     labetalol (NORMODYNE/TRANDATE) injection 10 mg  10 mg Intravenous Q4H PRN Dia Herbert MD         lactated ringers infusion   Intravenous Continuous Dia Herbert  mL/hr at 09/11/21 0827 Rate Verify at 09/11/21 0827     lidocaine (LMX4) cream   Topical Q1H PRN Dia Herbert MD         lidocaine 1 % 0.1-1 mL  0.1-1 mL Other Q1H PRN Dia Herbert MD         methadone (DOLOPHINE) injection 20 mg  20 mg Intravenous TID Dia Herbert MD   20 mg at 09/11/21 0606     naloxone (NARCAN) injection 0.2 mg  0.2 mg Intravenous Q2 Min PRN Kaveh Clayton MD        Or     naloxone (NARCAN) injection 0.4 mg  0.4 mg Intravenous Q2 Min PRN Kaveh Clayton MD        Or     naloxone (NARCAN) injection 0.2 mg  0.2 mg Intramuscular Q2 Min PRN Kaveh Clayton MD        Or     naloxone (NARCAN) injection 0.4 mg  0.4 mg Intramuscular Q2 Min PRN Kaveh Clayton MD         ondansetron (ZOFRAN-ODT) ODT tab 4 mg  4 mg Oral Q6H PRN Dia Herbert MD        Or     ondansetron (ZOFRAN) injection 4 mg  4 mg Intravenous Q6H PRN Dia Herbert MD         phenol (CHLORASEPTIC) 1.4 % spray 1-2 mL  1-2 spray Mouth/Throat Q2H PRN Dia Herbert MD         prochlorperazine (COMPAZINE) injection 10 mg  10 mg Intravenous Q6H PRN Dia Herbert MD   10 mg at 09/11/21 0232    Or     prochlorperazine (COMPAZINE) tablet 10 mg  10 mg Oral Q6H PRN Dia Herbert MD         sodium chloride (PF) 0.9% PF flush 3 mL  3 mL Intracatheter Q8H Dia Herbert MD   3 mL at 09/10/21 1804     sodium chloride (PF) 0.9% PF flush 3 mL  3 mL Intracatheter q1 min prn Dia Herbert MD         No current Epic-ordered outpatient  medications on file.       HOME/PREVIOUS MEDICATIONS:   Prior to Admission medications    Medication Sig Start Date End Date Taking? Authorizing Provider   escitalopram (LEXAPRO) 20 MG tablet TAKE 1 TABLET BY MOUTH EVERY DAY 5/27/21  Yes Fredy Merritt MD   hydroxychloroquine (PLAQUENIL) 200 MG tablet Take 1 tablet (200 mg) by mouth 2 times daily (with meals) 3/15/21  Yes aSm Narayanan MD   hydrOXYzine (VISTARIL) 25 MG capsule Take 1 capsule (25 mg) by mouth 2 times daily as needed for anxiety 3/12/21  Yes Fredy Merritt MD   methadone (DOLOPHINE-INTENSOL) 10 MG/ML (HIGH CONC) solution Take 120 mg by mouth daily Takes it at 0530 everyday. Only uses red not yellow   Yes Unknown, Entered By History   parenteral nutrition - PTA/DISCHARGE ORDER The TPN formula will print on the After Visit Summary Report. 6/21/21  Yes Amy Artis MD   polyethylene glycol (MIRALAX) 17 GM/Dose powder 1 capful (17 g) in 8 oz of liquid 8/30/21  Yes Martha Michele PA-C   pregabalin (LYRICA) 150 MG capsule TAKE 1 CAPSULE (150 MG) BY MOUTH 3 TIMES DAILY 8/4/21  Yes Fredy Merritt MD   valACYclovir (VALTREX) 500 MG tablet Take 1 tablet (500 mg) by mouth daily 1/25/21  Yes Fredy Merritt MD   valACYclovir (VALTREX) 500 MG tablet Take 1 tablet (500 mg) by mouth 2 times daily 1/13/21  Yes Fredy Merritt MD   omeprazole (PRILOSEC) 40 MG DR capsule Take 1 capsule (40 mg) by mouth 2 times daily 8/30/21   Martha Michele PA-C           ALLERGIES:    Allergies   Allergen Reactions     Penicillins Hives     Hives in childhood.        PAST MEDICAL AND PSYCHIATRIC HISTORY:    Past Medical History:   Diagnosis Date     Anemia      Basal cell carcinoma     Left-sided, skin over over medial orbit/nasal bone. Removed Oct 2018.     Benzodiazepine dependence (H)     With h/o withdrawal seizure x 1     Bipolar 1 disorder, mixed, moderate (H) 2/7/2013     Chronic pain     Back, legs.     Depressive disorder       Eosinophilic gastroenteritis 2010     Epidural abscess 2005    x4     Fibromyalgia      Generalised anxiety disorder      GERD (gastroesophageal reflux disease)      Major depressive disorder      Migraine      Nephrolithiasis     S/p left sided lithotripsy     Opiate dependence (H)      Osteoarthritis      Osteoporosis      PUD (peptic ulcer disease)      SLE (systemic lupus erythematosus) (H) 2009     Weight loss        PAST SURGICAL HISTORY:   Past Surgical History:   Procedure Laterality Date     BACK SURGERY  04    L4-5 epidural abscess     BACK SURGERY  04    L3-4 spinal stenosis     BACK SURGERY  04    Lt psoas abscess     BRONCHOSCOPY FLEXIBLE N/A 2019    Procedure: Flexible Bronchoscopy;  Surgeon: Patrice Regalado MD;  Location: [facility] OR      SECTION      x 2     CHOLECYSTECTOMY  -     CLOSED REDUCTION, PERCUTANEOUS PINNING FINGER, COMBINED  8/10/2011    Procedure:COMBINED CLOSED REDUCTION, PERCUTANEOUS PINNING FINGER; 5th Proximal Phalanx; Surgeon:RADHA BUITRAGO; Location:[facility]     COLONOSCOPY       COLONOSCOPY N/A 2020    Procedure: COLONOSCOPY;  Surgeon: Zacarias Duran MD;  Location:  GI     ESOPHAGOSCOPY, GASTROSCOPY, DUODENOSCOPY (EGD), COMBINED N/A 2020    Procedure: ESOPHAGOGASTRODUODENOSCOPY, WITH BIOPSY;  Surgeon: Zacarias Duran MD;  Location:  GI     ESOPHAGOSCOPY, GASTROSCOPY, DUODENOSCOPY (EGD), COMBINED N/A 2021    Procedure: ESOPHAGOGASTRODUODENOSCOPY (EGD);  Surgeon: Kaveh Clayton MD;  Location:  GI     GASTRECTOMY  2005    Bilat truncal vagotomy, hemigastrectomy, RnY gastrojejunostomy     GASTROJEJUNOSTOMY  2011    Procedure:GASTROJEJUNOSTOMY; exploratory laparotmy with revision of gastrojejunostomy, Antrectomy, Miles-en-y, Gastrojejunostomy; Surgeon:JULIUS HELM; Location:[facility] OR     INSERT CHEST TUBE N/A 2019    Procedure: INSERTION, CATHETER, INTERCOSTAL, FOR DRAINAGE;  Surgeon:  Melissa Nichols MD;  Location: [facility] GI     LASER HOLMIUM LITHOTRIPSY URETER(S), INSERT STENT, COMBINED      Procedure: COMBINED CYSTOSCOPY, URETEROSCOPY, LASER HOLMIUM LITHOTRIPSY URETER(S), INSERT STENT;;  Surgeon: Blair Correa MD;  Location: [facility] OR     LASER HOLMIUM NEPHROLITHOTOMY VIA PERCUTANEOUS NEPHROSTOMY  7/11/2012    Procedure: LASER HOLMIUM NEPHROLITHOTOMY VIA PERCUTANEOUS NEPHROSTOMY;  proceedure should read: Left Percutaneous Access, Left Percutaneous ultrasonic Nephrolithotomy, Ureteroscopy Holmium Laser Lithotripsy Stent Placement ;  Surgeon: Blair Correa MD;  Location: [facility] OR     MAMMOPLASTY AUGMENTATION BILATERAL       OPEN REDUCTION INTERNAL FIXATION WRIST Left 1/11/2016    Procedure: OPEN REDUCTION INTERNAL FIXATION WRIST;  Surgeon: Darling Ellis MD;  Location: [facility] OR     RECONSTRUCT BREAST, IMPLANT PROSTHESIS, COMBINED       THORACOSCOPIC DECORTICATION LUNG Left 5/7/2019    Procedure: Left Video Assisted Thoracoscopic Decortication, Intercostal Nerve Cryo Analgesia, Flexible Bronchoscopy;  Surgeon: Patrice Regalado MD;  Location: [facility] OR       FAMILY HISTORY: family history includes Family History Negative in her father; Substance Abuse in her brother, maternal grandfather, and mother.    HEALTH & LIFESTYLE PRACTICES:   Tobacco:  reports that she has never smoked. She has never used smokeless tobacco.  Alcohol:  reports no history of alcohol use.  Illicit drugs:  reports no history of drug use.      LABORATORY VALUES:   Last Basic Metabolic Panel:  Lab Results   Component Value Date     09/07/2021     07/06/2021      Lab Results   Component Value Date    POTASSIUM 4.2 09/07/2021    POTASSIUM 4.2 07/06/2021     Lab Results   Component Value Date    CHLORIDE 111 09/07/2021    CHLORIDE 106 07/06/2021     Lab Results   Component Value Date    RAFAELA 7.8 09/07/2021    RAFAELA 8.3 07/06/2021     Lab Results   Component Value Date    CO2 28 09/07/2021    CO2 28  "07/06/2021     Lab Results   Component Value Date    BUN 17 09/07/2021    BUN 31 07/06/2021     Lab Results   Component Value Date    CR 0.72 09/10/2021    CR 0.58 07/06/2021     Lab Results   Component Value Date    GLC 92 09/10/2021    GLC 86 09/07/2021    GLC 78 07/06/2021       CBC results:  Lab Results   Component Value Date    WBC 7.1 09/07/2021    WBC 9.2 07/06/2021     Lab Results   Component Value Date    HGB 8.5 09/07/2021    HGB 9.9 07/06/2021     Lab Results   Component Value Date    HCT 29.0 09/07/2021    HCT 34.3 07/06/2021     Lab Results   Component Value Date     09/07/2021     07/06/2021       DIAGNOSTIC TESTS:   Labs above reviewed as well as additional relevant diagnostic studies from the EPIC record.     PHYSICAL EXAMINATION:  BP (!) 157/83 (BP Location: Right arm)   Pulse 92   Temp 37.1  C (98.8  F) (Axillary)   Resp 14   Ht 1.6 m (5' 3\")   Wt 53.5 kg (117 lb 15.1 oz)   LMP  (LMP Unknown)   SpO2 96%   BMI 20.89 kg/m       Neuros:  A&Ox4. Able to make needs known. Very anxious  Activity: Not OOB this shift. Independently positioning in bed.   Cardiac: Tachy. Denies cardiac chest pain   Respiratory:. Sating well on 3L oxymask. BENSON. Capno on.   Diet: Stict NPO.   GI/Gu: Cisneros with adeqate output .No BM this shift. Hypoactive BS. Nausea intermittent  Skin/Incisions: Midline incision- shadowing marked.       Keyon Au MD  September 11, 2021, 10:19 AM  Inpatient Pain Management Service          "

## 2021-09-11 NOTE — PROGRESS NOTES
MIS/Bariatric Surgery Progress Note    Demetra Richardson  [id number]    No acute overnight events. Vitals nl, on room air. Describes poor pain control this morning, slept intermittently overnight. Ativan given for anxiety. No nausea or emesis. Has not sat up or gotten out of bed. UOP adequate via ascencio.     Temp:  [97.5  F (36.4  C)-100.2  F (37.9  C)] 98.8  F (37.1  C)  Pulse:  [] 92  Resp:  [11-72] 14  BP: (113-157)/() 157/83  SpO2:  [90 %-98 %] 96 %    Intake/Output Summary (Last 24 hours) at 9/11/2021 1019  Last data filed at 9/11/2021 0611  Gross per 24 hour   Intake 819 ml   Output 1750 ml   Net -931 ml     Resting in bed, somewhat uncomfortable, no diaphoresis  regular rate and rhythm  non-labored breathing on room air  Soft, appropriately tender, non-distended, incision with dry dressing minimal serosang saturation  warm and well perfused extremities   alert, oriented    Cr 0.72    Demetra Richardson is a 65 year old female with hx bilateral truncal vagotomy, hemigastrectomy and koby-en-y gastrojejunostomy surgery in 2005 for 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 Subsequently had distal gastrectomy, resection of retained antrum, and koby-en-y gastrojejunostomy in 2011 for gastric wall obstruction. Had progressively worsening postprandial abdominal pain, nausea and vomiting. Now s/p ex lap, extensive BRIDGET, revision of GJ and JJ anastomoses on 9/10.    Continue IV methadone, increase dilaudid PCA, appreciate pain consult   PRN IV valium for anxiety  NPO, mIVF, resume home TPN  Continue ABNER to bulb suction, monitor output  PRN antiemetics   Discontinue ascencio, monitor urine output  Start lovenox for dvt ppt     Dia Noel MD  General Surgery PGY-4

## 2021-09-12 LAB
ALBUMIN SERPL-MCNC: 1.4 G/DL (ref 3.4–5)
ALP SERPL-CCNC: 269 U/L (ref 40–150)
ALT SERPL W P-5'-P-CCNC: 59 U/L (ref 0–50)
ANION GAP SERPL CALCULATED.3IONS-SCNC: 5 MMOL/L (ref 3–14)
AST SERPL W P-5'-P-CCNC: 45 U/L (ref 0–45)
BILIRUB DIRECT SERPL-MCNC: 0.1 MG/DL (ref 0–0.2)
BILIRUB SERPL-MCNC: 0.3 MG/DL (ref 0.2–1.3)
BUN SERPL-MCNC: 16 MG/DL (ref 7–30)
CALCIUM SERPL-MCNC: 7.7 MG/DL (ref 8.5–10.1)
CHLORIDE BLD-SCNC: 101 MMOL/L (ref 94–109)
CO2 SERPL-SCNC: 29 MMOL/L (ref 20–32)
CREAT SERPL-MCNC: 0.47 MG/DL (ref 0.52–1.04)
GFR SERPL CREATININE-BSD FRML MDRD: >90 ML/MIN/1.73M2
GLUCOSE BLD-MCNC: 153 MG/DL (ref 70–99)
GLUCOSE BLDC GLUCOMTR-MCNC: 169 MG/DL (ref 70–99)
INR PPP: 1.12 (ref 0.85–1.15)
MAGNESIUM SERPL-MCNC: 2.2 MG/DL (ref 1.6–2.3)
PHOSPHATE SERPL-MCNC: 2.3 MG/DL (ref 2.5–4.5)
POTASSIUM BLD-SCNC: 3.3 MMOL/L (ref 3.4–5.3)
PROT SERPL-MCNC: 5.7 G/DL (ref 6.8–8.8)
SODIUM SERPL-SCNC: 135 MMOL/L (ref 133–144)

## 2021-09-12 PROCEDURE — 80053 COMPREHEN METABOLIC PANEL: CPT | Performed by: SURGERY

## 2021-09-12 PROCEDURE — 250N000011 HC RX IP 250 OP 636: Performed by: STUDENT IN AN ORGANIZED HEALTH CARE EDUCATION/TRAINING PROGRAM

## 2021-09-12 PROCEDURE — 82248 BILIRUBIN DIRECT: CPT | Performed by: SURGERY

## 2021-09-12 PROCEDURE — 84100 ASSAY OF PHOSPHORUS: CPT | Performed by: SURGERY

## 2021-09-12 PROCEDURE — 83735 ASSAY OF MAGNESIUM: CPT | Performed by: SURGERY

## 2021-09-12 PROCEDURE — 85610 PROTHROMBIN TIME: CPT | Performed by: SURGERY

## 2021-09-12 PROCEDURE — 250N000009 HC RX 250: Performed by: SURGERY

## 2021-09-12 PROCEDURE — 250N000013 HC RX MED GY IP 250 OP 250 PS 637: Performed by: STUDENT IN AN ORGANIZED HEALTH CARE EDUCATION/TRAINING PROGRAM

## 2021-09-12 PROCEDURE — 36592 COLLECT BLOOD FROM PICC: CPT | Performed by: SURGERY

## 2021-09-12 PROCEDURE — 84134 ASSAY OF PREALBUMIN: CPT | Performed by: SURGERY

## 2021-09-12 PROCEDURE — 120N000002 HC R&B MED SURG/OB UMMC

## 2021-09-12 PROCEDURE — 250N000013 HC RX MED GY IP 250 OP 250 PS 637: Performed by: SURGERY

## 2021-09-12 RX ORDER — ESCITALOPRAM OXALATE 10 MG/1
20 TABLET ORAL DAILY
Status: DISCONTINUED | OUTPATIENT
Start: 2021-09-12 | End: 2021-09-17 | Stop reason: HOSPADM

## 2021-09-12 RX ORDER — HYDROXYZINE HYDROCHLORIDE 25 MG/1
25 TABLET, FILM COATED ORAL 2 TIMES DAILY PRN
Status: DISCONTINUED | OUTPATIENT
Start: 2021-09-12 | End: 2021-09-17 | Stop reason: HOSPADM

## 2021-09-12 RX ORDER — METHADONE HYDROCHLORIDE 5 MG/5ML
80 SOLUTION ORAL ONCE
Status: DISCONTINUED | OUTPATIENT
Start: 2021-09-12 | End: 2021-09-12

## 2021-09-12 RX ORDER — HYDROMORPHONE HYDROCHLORIDE 2 MG/1
2-4 TABLET ORAL EVERY 4 HOURS PRN
Status: DISCONTINUED | OUTPATIENT
Start: 2021-09-12 | End: 2021-09-13

## 2021-09-12 RX ORDER — METHADONE HYDROCHLORIDE 10 MG/ML
120 CONCENTRATE ORAL EVERY MORNING
Status: DISCONTINUED | OUTPATIENT
Start: 2021-09-13 | End: 2021-09-17 | Stop reason: HOSPADM

## 2021-09-12 RX ORDER — METHADONE HYDROCHLORIDE 10 MG/ML
80 CONCENTRATE ORAL ONCE
Status: COMPLETED | OUTPATIENT
Start: 2021-09-12 | End: 2021-09-12

## 2021-09-12 RX ORDER — POTASSIUM CHLORIDE 1.5 G/1.58G
20 POWDER, FOR SOLUTION ORAL ONCE
Status: COMPLETED | OUTPATIENT
Start: 2021-09-12 | End: 2021-09-12

## 2021-09-12 RX ADMIN — Medication 20 MG: at 05:49

## 2021-09-12 RX ADMIN — METHADONE HYDROCHLORIDE 80 MG: 10 CONCENTRATE ORAL at 13:47

## 2021-09-12 RX ADMIN — HYDROMORPHONE HYDROCHLORIDE 4 MG: 2 TABLET ORAL at 18:33

## 2021-09-12 RX ADMIN — DIAZEPAM 2.5 MG: 5 INJECTION, SOLUTION INTRAMUSCULAR; INTRAVENOUS at 09:00

## 2021-09-12 RX ADMIN — Medication: at 21:59

## 2021-09-12 RX ADMIN — POTASSIUM CHLORIDE 20 MEQ: 1.5 POWDER, FOR SOLUTION ORAL at 13:48

## 2021-09-12 RX ADMIN — POTASSIUM & SODIUM PHOSPHATES POWDER PACK 280-160-250 MG 1 PACKET: 280-160-250 PACK at 20:35

## 2021-09-12 RX ADMIN — PREGABALIN 150 MG: 25 CAPSULE ORAL at 20:28

## 2021-09-12 RX ADMIN — PREGABALIN 150 MG: 25 CAPSULE ORAL at 13:48

## 2021-09-12 RX ADMIN — DIAZEPAM 2.5 MG: 5 INJECTION, SOLUTION INTRAMUSCULAR; INTRAVENOUS at 01:47

## 2021-09-12 RX ADMIN — HYDROMORPHONE HYDROCHLORIDE 2 MG: 1 INJECTION, SOLUTION INTRAMUSCULAR; INTRAVENOUS; SUBCUTANEOUS at 13:47

## 2021-09-12 RX ADMIN — HYDROMORPHONE HYDROCHLORIDE 1 MG: 1 INJECTION, SOLUTION INTRAMUSCULAR; INTRAVENOUS; SUBCUTANEOUS at 20:30

## 2021-09-12 RX ADMIN — POTASSIUM & SODIUM PHOSPHATES POWDER PACK 280-160-250 MG 1 PACKET: 280-160-250 PACK at 13:48

## 2021-09-12 RX ADMIN — ESCITALOPRAM OXALATE 20 MG: 20 TABLET ORAL at 12:10

## 2021-09-12 ASSESSMENT — ACTIVITIES OF DAILY LIVING (ADL)
ADLS_ACUITY_SCORE: 17

## 2021-09-12 NOTE — PROGRESS NOTES
MIS/Bariatric Surgery Progress Note    Demetra Richardson  [id number]    Continues to describe poor pain control and forgetting to use PCA button. No nausea or emesis. Using IS. Getting out of bed to commode to void.     Temp:  [95.6  F (35.3  C)-98.9  F (37.2  C)] 97.8  F (36.6  C)  Pulse:  [85-93] 92  Resp:  [16-17] 16  BP: (151-164)/(76-88) 151/88  SpO2:  [88 %-98 %] 90 %    Intake/Output Summary (Last 24 hours) at 9/12/2021 1028  Last data filed at 9/12/2021 1000  Gross per 24 hour   Intake 2628 ml   Output 5665 ml   Net -3037 ml     Resting in bed, no acute distress  regular rate and rhythm  non-labored breathing on room air  Soft, appropriately tender, non-distended, incision CDI with staples, no drainage or erythema  warm and well perfused extremities   alert, oriented     Na 135  K 3.3  Phos 2.3  Cr 0.47     Demetra Richardson is a 65 year old female with hx bilateral truncal vagotomy, hemigastrectomy and koby-en-y gastrojejunostomy surgery in 2005 for 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 Subsequently had distal gastrectomy, resection of retained antrum, and koby-en-y gastrojejunostomy in 2011 for gastric wall obstruction. Had progressively worsening postprandial abdominal pain, nausea and vomiting. Now s/p ex lap, extensive BRIDGET, revision of GJ and JJ anastomoses on 9/10. Doing well.      Transition to home methadone, d/c dilaudid PCA, start PO/IV dilaudid PRN. Resume home lyrica. Appreciate pain service recs.   Resume home vistaril for anxiety, d/c IV valium  Start bariatric clear liquid diet, home TPN, no additional IVF  Continue ABNER to bulb suction, monitor output  PRN antiemetics   Voiding independently  Lovenox for dvt ppt  Ambulate QID, PT/OT    Dia Noel MD  General Surgery PGY-4

## 2021-09-12 NOTE — PLAN OF CARE
Reason for admission: POD 2 Ex lap, lysis of adhesions, Jejunojejunostomy resection and  Revision, Gastrojejunal Resection, with Miles en Y Reconstruction Gastrojejunostomy, Upper Endoscopy  Neuros:  A&Ox4. Able to make needs known. Very anxious. Impulsive, not consistently using call light- bed alarm on.   Activity: Ax1 to bedside commode Cardiac: Tachy. Denies cardiac chest pain   Respiratory:. Desating on 2L oxymask, now sating well on 3L oxymask, BENSON. Tachpneic with activity.    Diet: Strict NPO.   GI/Gu: Frequent, urgent urination .No BM this shift. Hypoactive BS. Nausea intermittent  Skin/Incisions: Midline incision- shadowing marked.   LDA: (L) DL PICC infusing cycled TPN + lipids, the other lumen infusing TKO + dilaudid PCA    Pain: pt reporting abdominal pain Dilaudid PCA at 0.2 continuous with 0.4 bumps Q10min, pt reports poor pain control but sleeping intermittently and drowsy when awake.   PRN: Compazine X1. Valium X2  Plan: Continue POC

## 2021-09-12 NOTE — PLAN OF CARE
"BP (!) 145/84 (BP Location: Right arm)   Pulse 91   Temp (!) 96  F (35.6  C) (Axillary)   Resp 16   Ht 1.6 m (5' 3\")   Wt 54.6 kg (120 lb 5.9 oz)   LMP  (LMP Unknown)   SpO2 90%   BMI 21.32 kg/m      Reason for admission: POD 2 Ex lap, lysis of adhesions, Jejunojejunostomy resection and  Revision, Gastrojejunal Resection, with Miles en Y Reconstruction Gastrojejunostomy, Upper Endoscopy  Neuros:  A&Ox4. Able to make needs known. Very anxious. Impulsive, not consistently using call light- bed alarm on.   Activity: Ax1 to bedside commode   Cardiac: Tachy. Denies cardiac chest pain   Respiratory:. RA, 1L 02 due to desating when sleeping   Diet: Bariatric diet clear liquids  GI/Gu: Frequent, urgent urination .No BM this shift. Hypoactive BS. Nausea intermittent  Skin/Incisions: Midline incision- shadowing marked.   LDA: (L) DL PICC SL  Pain: reporting abdominal pain Dilaudid IV, and oral given.  Plan: Continue POC, discontinued Valium.     "

## 2021-09-13 ENCOUNTER — APPOINTMENT (OUTPATIENT)
Dept: OCCUPATIONAL THERAPY | Facility: CLINIC | Age: 66
DRG: 326 | End: 2021-09-13
Attending: STUDENT IN AN ORGANIZED HEALTH CARE EDUCATION/TRAINING PROGRAM
Payer: COMMERCIAL

## 2021-09-13 LAB
ALBUMIN SERPL-MCNC: 1.3 G/DL (ref 3.4–5)
ALP SERPL-CCNC: 209 U/L (ref 40–150)
ALT SERPL W P-5'-P-CCNC: 41 U/L (ref 0–50)
ANION GAP SERPL CALCULATED.3IONS-SCNC: 3 MMOL/L (ref 3–14)
AST SERPL W P-5'-P-CCNC: 24 U/L (ref 0–45)
BILIRUB SERPL-MCNC: 0.2 MG/DL (ref 0.2–1.3)
BUN SERPL-MCNC: 22 MG/DL (ref 7–30)
CALCIUM SERPL-MCNC: 7.7 MG/DL (ref 8.5–10.1)
CHLORIDE BLD-SCNC: 109 MMOL/L (ref 94–109)
CO2 SERPL-SCNC: 28 MMOL/L (ref 20–32)
CREAT SERPL-MCNC: 0.55 MG/DL (ref 0.52–1.04)
CRP SERPL-MCNC: 140 MG/L (ref 0–8)
ERYTHROCYTE [DISTWIDTH] IN BLOOD BY AUTOMATED COUNT: 17.4 % (ref 10–15)
GFR SERPL CREATININE-BSD FRML MDRD: >90 ML/MIN/1.73M2
GLUCOSE BLD-MCNC: 131 MG/DL (ref 70–99)
GLUCOSE BLDC GLUCOMTR-MCNC: 101 MG/DL (ref 70–99)
GLUCOSE BLDC GLUCOMTR-MCNC: 116 MG/DL (ref 70–99)
GLUCOSE BLDC GLUCOMTR-MCNC: 94 MG/DL (ref 70–99)
HCT VFR BLD AUTO: 25.4 % (ref 35–47)
HGB BLD-MCNC: 7.3 G/DL (ref 11.7–15.7)
INR PPP: 1.13 (ref 0.85–1.15)
MAGNESIUM SERPL-MCNC: 2.4 MG/DL (ref 1.6–2.3)
MCH RBC QN AUTO: 23.2 PG (ref 26.5–33)
MCHC RBC AUTO-ENTMCNC: 28.7 G/DL (ref 31.5–36.5)
MCV RBC AUTO: 81 FL (ref 78–100)
PHOSPHATE SERPL-MCNC: 3.4 MG/DL (ref 2.5–4.5)
PLATELET # BLD AUTO: 187 10E3/UL (ref 150–450)
PLATELET # BLD AUTO: 200 10E3/UL (ref 150–450)
POTASSIUM BLD-SCNC: 3.7 MMOL/L (ref 3.4–5.3)
PREALB SERPL IA-MCNC: 6 MG/DL (ref 15–45)
PREALB SERPL IA-MCNC: 7 MG/DL (ref 15–45)
PROT SERPL-MCNC: 5.3 G/DL (ref 6.8–8.8)
RBC # BLD AUTO: 3.14 10E6/UL (ref 3.8–5.2)
SODIUM SERPL-SCNC: 140 MMOL/L (ref 133–144)
TRIGL SERPL-MCNC: 83 MG/DL
WBC # BLD AUTO: 12.5 10E3/UL (ref 4–11)

## 2021-09-13 PROCEDURE — 250N000013 HC RX MED GY IP 250 OP 250 PS 637: Performed by: SURGERY

## 2021-09-13 PROCEDURE — 85049 AUTOMATED PLATELET COUNT: CPT | Performed by: STUDENT IN AN ORGANIZED HEALTH CARE EDUCATION/TRAINING PROGRAM

## 2021-09-13 PROCEDURE — 97535 SELF CARE MNGMENT TRAINING: CPT | Mod: GO | Performed by: OCCUPATIONAL THERAPIST

## 2021-09-13 PROCEDURE — 86140 C-REACTIVE PROTEIN: CPT | Performed by: STUDENT IN AN ORGANIZED HEALTH CARE EDUCATION/TRAINING PROGRAM

## 2021-09-13 PROCEDURE — 82040 ASSAY OF SERUM ALBUMIN: CPT | Performed by: SURGERY

## 2021-09-13 PROCEDURE — 120N000002 HC R&B MED SURG/OB UMMC

## 2021-09-13 PROCEDURE — 99232 SBSQ HOSP IP/OBS MODERATE 35: CPT | Performed by: PHYSICIAN ASSISTANT

## 2021-09-13 PROCEDURE — 36592 COLLECT BLOOD FROM PICC: CPT | Performed by: STUDENT IN AN ORGANIZED HEALTH CARE EDUCATION/TRAINING PROGRAM

## 2021-09-13 PROCEDURE — 250N000011 HC RX IP 250 OP 636: Performed by: SURGERY

## 2021-09-13 PROCEDURE — 84100 ASSAY OF PHOSPHORUS: CPT | Performed by: SURGERY

## 2021-09-13 PROCEDURE — 83735 ASSAY OF MAGNESIUM: CPT | Performed by: SURGERY

## 2021-09-13 PROCEDURE — 84478 ASSAY OF TRIGLYCERIDES: CPT | Performed by: SURGERY

## 2021-09-13 PROCEDURE — 250N000013 HC RX MED GY IP 250 OP 250 PS 637: Performed by: STUDENT IN AN ORGANIZED HEALTH CARE EDUCATION/TRAINING PROGRAM

## 2021-09-13 PROCEDURE — 250N000009 HC RX 250: Performed by: SURGERY

## 2021-09-13 PROCEDURE — 250N000011 HC RX IP 250 OP 636: Performed by: STUDENT IN AN ORGANIZED HEALTH CARE EDUCATION/TRAINING PROGRAM

## 2021-09-13 PROCEDURE — 36592 COLLECT BLOOD FROM PICC: CPT | Performed by: SURGERY

## 2021-09-13 PROCEDURE — 97166 OT EVAL MOD COMPLEX 45 MIN: CPT | Mod: GO | Performed by: OCCUPATIONAL THERAPIST

## 2021-09-13 PROCEDURE — 85610 PROTHROMBIN TIME: CPT | Performed by: SURGERY

## 2021-09-13 PROCEDURE — 84134 ASSAY OF PREALBUMIN: CPT | Performed by: SURGERY

## 2021-09-13 RX ORDER — HYDROMORPHONE HYDROCHLORIDE 1 MG/ML
.3-.5 INJECTION, SOLUTION INTRAMUSCULAR; INTRAVENOUS; SUBCUTANEOUS
Status: DISCONTINUED | OUTPATIENT
Start: 2021-09-13 | End: 2021-09-14

## 2021-09-13 RX ORDER — HEPARIN SODIUM,PORCINE 10 UNIT/ML
5-20 VIAL (ML) INTRAVENOUS
Status: DISCONTINUED | OUTPATIENT
Start: 2021-09-13 | End: 2021-09-17 | Stop reason: HOSPADM

## 2021-09-13 RX ORDER — HYDROMORPHONE HYDROCHLORIDE 4 MG/1
4 TABLET ORAL
Status: DISCONTINUED | OUTPATIENT
Start: 2021-09-13 | End: 2021-09-14

## 2021-09-13 RX ORDER — HEPARIN SODIUM,PORCINE 10 UNIT/ML
5-20 VIAL (ML) INTRAVENOUS EVERY 24 HOURS
Status: DISCONTINUED | OUTPATIENT
Start: 2021-09-13 | End: 2021-09-17 | Stop reason: HOSPADM

## 2021-09-13 RX ORDER — BISACODYL 10 MG
10 SUPPOSITORY, RECTAL RECTAL ONCE
Status: COMPLETED | OUTPATIENT
Start: 2021-09-13 | End: 2021-09-13

## 2021-09-13 RX ADMIN — HYDROMORPHONE HYDROCHLORIDE 4 MG: 4 TABLET ORAL at 19:57

## 2021-09-13 RX ADMIN — HYDROMORPHONE HYDROCHLORIDE 2 MG: 2 TABLET ORAL at 00:37

## 2021-09-13 RX ADMIN — ESCITALOPRAM OXALATE 20 MG: 20 TABLET ORAL at 07:34

## 2021-09-13 RX ADMIN — PROCHLORPERAZINE EDISYLATE 10 MG: 5 INJECTION INTRAMUSCULAR; INTRAVENOUS at 00:25

## 2021-09-13 RX ADMIN — HYDROMORPHONE HYDROCHLORIDE 2 MG: 1 INJECTION, SOLUTION INTRAMUSCULAR; INTRAVENOUS; SUBCUTANEOUS at 09:57

## 2021-09-13 RX ADMIN — PREGABALIN 150 MG: 25 CAPSULE ORAL at 14:08

## 2021-09-13 RX ADMIN — SMOFLIPID 250 ML: 6; 6; 5; 3 INJECTION, EMULSION INTRAVENOUS at 20:49

## 2021-09-13 RX ADMIN — HYDROMORPHONE HYDROCHLORIDE 2 MG: 1 INJECTION, SOLUTION INTRAMUSCULAR; INTRAVENOUS; SUBCUTANEOUS at 03:46

## 2021-09-13 RX ADMIN — PREGABALIN 150 MG: 25 CAPSULE ORAL at 20:02

## 2021-09-13 RX ADMIN — Medication: at 20:49

## 2021-09-13 RX ADMIN — Medication 10 ML: at 10:04

## 2021-09-13 RX ADMIN — BISACODYL 10 MG: 10 SUPPOSITORY RECTAL at 14:36

## 2021-09-13 RX ADMIN — HYDROMORPHONE HYDROCHLORIDE 4 MG: 2 TABLET ORAL at 12:01

## 2021-09-13 RX ADMIN — PREGABALIN 150 MG: 25 CAPSULE ORAL at 07:34

## 2021-09-13 RX ADMIN — HYDROMORPHONE HYDROCHLORIDE 4 MG: 4 TABLET ORAL at 16:22

## 2021-09-13 RX ADMIN — HYDROMORPHONE HYDROCHLORIDE 4 MG: 2 TABLET ORAL at 07:34

## 2021-09-13 RX ADMIN — HYDROMORPHONE HYDROCHLORIDE 0.5 MG: 1 INJECTION, SOLUTION INTRAMUSCULAR; INTRAVENOUS; SUBCUTANEOUS at 22:19

## 2021-09-13 RX ADMIN — METHADONE HYDROCHLORIDE 120 MG: 10 CONCENTRATE ORAL at 05:07

## 2021-09-13 ASSESSMENT — ACTIVITIES OF DAILY LIVING (ADL)
DEPENDENT_IADLS:: INDEPENDENT
ADLS_ACUITY_SCORE: 16

## 2021-09-13 NOTE — PROVIDER NOTIFICATION
Paged surgery provider Ness Franks for FYI.     O2 at 87%, put on 1 1/2 L of O2, now satting 95%.

## 2021-09-13 NOTE — PROGRESS NOTES
MIS/Bariatric Surgery Progress Note    Demetra Richardson  [id number]    No acute overnight events. Pain control is much improved after starting PO medications. Tolerated sips of clear liquids. No nausea or emesis. Ambulating to commode, not outside of room yet. Placed on 1.5L O2 overnight, not requiring now. Voiding independently.    Temp:  [96  F (35.6  C)-97.8  F (36.6  C)] 96.8  F (36  C)  Pulse:  [70-92] 70  Resp:  [16-18] 18  BP: (118-152)/(67-91) 118/67  SpO2:  [87 %-95 %] 95 %    Intake/Output Summary (Last 24 hours) at 9/13/2021 0723  Last data filed at 9/13/2021 0441  Gross per 24 hour   Intake 67 ml   Output 825 ml   Net -758 ml     Resting in bed, comfortable  regular rate and rhythm  non-labored breathing on room air  Soft, appropriately tender, non-distended, incision CDI with staples, no drainage or erythema  warm and well perfused extremities   alert, oriented     Na 140  K 3.7  Phos 3.4  Cr 0.55  Wbc 12.5  Hgb 7.3     Demetra Richardson is a 65 year old female with hx bilateral truncal vagotomy, hemigastrectomy and koby-en-y gastrojejunostomy surgery in 2005 for 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 Subsequently had distal gastrectomy, resection of retained antrum, and koby-en-y gastrojejunostomy in 2011 for gastric wall obstruction. Had progressively worsening postprandial abdominal pain, nausea and vomiting. Now s/p ex lap, extensive BRIDGET, revision of GJ and JJ anastomoses on 9/10. Doing well.      Home methadone and lyrica, PO/IV dilaudid PRN. Appreciate pain service recs. Wean IV dilaudid as able.   Home vistaril for anxiety   Bariatric clear liquid diet, home TPN   Continue ABNER to bulb suction, monitor output  PRN antiemetics   Voiding independently  Lovenox for dvt ppt  Ambulate QID, PT/OT    Dia Noel MD  General Surgery PGY-4

## 2021-09-13 NOTE — PROGRESS NOTES
09/13/21 1328   Quick Adds   Type of Visit Initial Occupational Therapy Evaluation   Living Environment   People in home alone   Current Living Arrangements assisted living   Transportation Anticipated health plan transportation   Living Environment Comments Lives alone in an [facility], no [place] or inside, walk in shower   Self-Care   Usual Activity Tolerance good   Current Activity Tolerance fair   Regular Exercise Yes   Activity/Exercise Type   (walks 2 hrs per day)   Equipment Currently Used at Home   (Has shower chair if needed)   Activity/Exercise/Self-Care Comment Pt does not have any services from [facility], uses medical cabs for transportation, and completes her own laundry, cleaning, cooking, shopping, and medication management. 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 Subsequently had distal gastrectomy, resection of retained antrum, and koby-en-y gastrojejunostomy in 2011 for gastric wall obstruction. Had progressively worsening postprandial abdominal pain, nausea and vomiting. Now s/p ex lap, extensive BRIDGET, revision of GJ and JJ anastomoses on 9/10   Existing Precautions/Restrictions fall;abdominal   Cognitive Status Examination   Cognitive Status Comments Distractible during eval, will monitor   Visual Perception   Visual Acuity Wears reading glasses   Clinical Impression   Criteria for Skilled Therapeutic Interventions Met (OT) yes   OT Diagnosis Decreased I in ADLs due to deconditioning   Assessment  of Occupational Performance 3-5 Performance Deficits   Identified Performance Deficits dressing, bathing, toileting, functional endurance, cognition   Clinical Decision Making Complexity (OT) moderate complexity   Therapy Frequency (OT) 6x/week   Predicted Duration of Therapy 2 weeks   Risk & Benefits of therapy have been explained patient   Total Evaluation Time (Minutes)   Total Evaluation Time (Minutes) 10

## 2021-09-13 NOTE — CONSULTS
Care Management Initial Consult    General Information  Assessment completed with: Patient, Radha  Type of CM/SW Visit: Initial Assessment    Primary Care Provider verified and updated as needed: Yes   Readmission within the last 30 days: no previous admission in last 30 days      Reason for Consult: care coordination/care conference, discharge planning  Advance Care Planning:            Communication Assessment  Patient's communication style: spoken language (English or Bilingual)    Hearing Difficulty or Deaf: no   Wear Glasses or Blind: yes    Cognitive  Cognitive/Neuro/Behavioral: WDL  Level of Consciousness: alert  Arousal Level: opens eyes spontaneously  Orientation: oriented x 4  Mood/Behavior: cooperative, calm  Best Language: 0 - No aphasia  Speech: clear, spontaneous, logical    Living Environment:   People in home: alone     Current living Arrangements: assisted living  Name of Facility: [facility]   Able to return to prior arrangements: yes       Family/Social Support:  Care provided by: other (see comments) (self, assisted living)  Provides care for: no one, unable/limited ability to care for self  Marital Status: Single  Children            Current Resources:   Patient receiving home care services: No     Community Resources: Home Infusion  Equipment currently used at home: none  Supplies currently used at home: Oxygen Tubing/Supplies    Employment/Financial:  Employment Status: disabled        Financial Concerns: No concerns identified           Lifestyle & Psychosocial Needs:  Social Determinants of Health     Tobacco Use: Low Risk      Smoking Tobacco Use: Never Smoker     Smokeless Tobacco Use: Never Used   Alcohol Use:      Frequency of Alcohol Consumption:      Average Number of Drinks:      Frequency of Binge Drinking:    Financial Resource Strain:      Difficulty of Paying Living Expenses:    Food Insecurity:      Worried About Running Out of Food in the Last Year:       Ran Out of Food in the Last Year:    Transportation Needs:      Lack of Transportation (Medical):      Lack of Transportation (Non-Medical):    Physical Activity:      Days of Exercise per Week:      Minutes of Exercise per Session:    Stress:      Feeling of Stress :    Social Connections:      Frequency of Communication with Friends and Family:      Frequency of Social Gatherings with Friends and Family:      Attends Orthodox Services:      Active Member of Clubs or Organizations:      Attends Club or Organization Meetings:      Marital Status:    Intimate Partner Violence:      Fear of Current or Ex-Partner:      Emotionally Abused:      Physically Abused:      Sexually Abused:    Depression: Not at risk     PHQ-2 Score: 0   Housing Stability:      Unable to Pay for Housing in the Last Year:      Number of Places Lived in the Last Year:      Unstable Housing in the Last Year:        Functional Status:  Prior to admission patient needed assistance:   Dependent ADLs:: Independent  Dependent IADLs:: Independent        Chemical Dependency Status:  Chemical Dependency Status: No Current Concerns             Values/Beliefs:  Spiritual, Cultural Beliefs, Orthodox Practices, Values that affect care:  Sabianist          Additional Information:  RNCC met with patient for purposes of discharge planning/care management. [facility] from [facility] for which patient does not use any services at this time. Patient does use [facility] for home O2, [facility] for TPN management via patient's PICC, and patient is open to [facility] if needed at discharge time. 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 Resumption orders entered for home TPN. Patient has medical ride service available at discharge time. RNCC to remain available for any further discharge planning needs as they arise.    Pb Fischer RNCC    CC: LESLIE Kohler 7B  Ph: [phone]

## 2021-09-13 NOTE — PLAN OF CARE
"BP 99/60 (BP Location: Right arm)   Pulse 73   Temp 97.2  F (36.2  C) (Oral)   Resp 16   Ht 1.6 m (5' 3\")   Wt 54.6 kg (120 lb 5.9 oz)   LMP  (LMP Unknown)   SpO2 98%   BMI 21.32 kg/m    Status: VSS on room air(while alert, placed on Oxygen overnight)  Pain/Nausea: Dilaudid PO Q3hrs, IV Q2hrs  Mobility: SBA, up to commode, walked with OT   Diet: Bariatric clears  Labs: Reviewed  Lines: DL PICC hep locked  Drains: R ABNER to bulb suction, scant output  Skin/Incisions: No new deficits noted  Neuro: A&Ox4  Respiratory: on room air, diminished  Cardiac: WDL  GI: Denied passing gas until around 1700(after discussing with patient how diet won't be advanced until bowels moving more/passing gas), BS hypo  : Voiding spont in commode  New Changes: pain med frequency changed  Plan: Continue POC    "

## 2021-09-13 NOTE — PLAN OF CARE
PT: PT orders acknowledged. PT holding on eval today based on discussion with OT that pt mobilizing without any significant gait deficits or LE weakness requiring PT involvement at presesnt. At baseline walks 2 hrs/day. OT will re-assess for needs tomorrow.

## 2021-09-13 NOTE — PROGRESS NOTES
CLINICAL NUTRITION SERVICES - BRIEF NOTE     Nutrition Prescription    RECOMMENDATIONS FOR MDs/PROVIDERS TO ORDER:  - None at present.     Recommendations already ordered by Registered Dietitian (RD):  - Increase AA to 95 gm with tonight's TPN as no current oral protein.  TPN (volume per PharmD) with 12 hr cycle (w/ 1 hr taper up and 1 hr taper down), continue dextrose of 200 gm and 250 ml SMOF lipids 5x/wk (M-F).  Will provide 1417 kcal (26 kcal/kg), 95 gm protein (1.8 gm/kg), 25% of kcal from fat.   - Order CRP to  impact on prealbumin.   - Request wt, not done yesterday or today yet (already ordered q day)    Future/Additional Recommendations:  - NFPE as able.   - Monitor diet advance and adjust TPN as needed.    - Follow up with CRP, Zn labs.      Discussed TPN/lipids with I RD.  Pt was receiving 175 gm dextrose, 80 gm protein and 200 ml of SMOF lipids q day.  Was providing 1315 kcal (25 kcal/kg), 1.5 gm/kg, 30% of kcal from fat.  She was also eating some food and was trying to increase oral protein--added peanut butter and string cheese.  In the past she was skipping her TPN occasionally but RD stressed need for daily TPN and believes she was taking it as ordered for the past 2 weeks.   When initiating TPN/lipids in June, pt was mainly eating carb foods (mashed potatoes, crackers and a lot of pudding to balance out) to balance against 35% of kcal from fat from combination of TPN/lipids.     Spoke with pt briefly but working with OT. Notes that she hasn't had a BM in 2 weeks.  She admits that she hasn't been taking the bowel Rx she had ordered.  She realized that it's out of fear of pain because of the abdominal cramping that goes along with it.  She also c/o that she can taste the texture of the miralax in water even though she stirs it up well. She notes otherwise she doesn't have a sense of taste or smell.  She is anxious to have pudding but doesn't want sugar free version (I mentioned it would  "likely be ordered this way with bariatric diet).      EVALUATION OF THE PROGRESS TOWARD GOALS   Diet: bariatric clear liquids  Nutrition Support: TPN 12 hr cycle (1450ml--1 hr taper at start and 1 hr taper at end) - volume per PharmD with new goal of 200g Dex, 80g AA + 250 ml 20% IV SMOF lipids 5x/week-- provides 1357 kcals (25 kcal/kg), 1.5 g PRO/kg, with 26% kcals from Fat.  Standard infuvite and MTE4 plus 5 mg Zn.     Intake: Pt only taking broth as she dislikes juice. No real appetite.      NEW FINDINGS   Wt: Last checked 9/11 54.6 kg   Labs: K+, Mg and Phos wnl with electrolyte replacement protocol.  Triglycerides 83.  Prealbumin 7.  No recent CRP labs.   WBC 12.5. Blood sugars in epx376m.  Zn lab pending.   LFTs--trending down, Alk Phos 209, ALT/AST wnl.    Last BM: --\"2 weeks ago\" per pt.  Pt notes a suppository is planned.     Unable to do NFPE today as realized OT was in the midst of working with pt--pt doesn't appear to have fat/muscle wasting but unable to  degree with brief visit and wearing a robe.      INTERVENTIONS  Collaborate with other providers--PharmD  Parenteral nutrition--adjust  See recommendations above.     Monitoring/Evaluation  Progress toward goals will be monitored and evaluated per protocol.     Ramandeep Toscano RD, LD   7B (M-F) Pager: [phone]  RD Weekend Pager: [phone]      "

## 2021-09-13 NOTE — PLAN OF CARE
"Time: 1900-0730    VS: /67 (BP Location: Right arm)   Pulse 70   Temp 96.8  F (36  C) (Oral)   Resp 18   Ht 1.6 m (5' 3\")   Wt 54.6 kg (120 lb 5.9 oz)   LMP  (LMP Unknown)   SpO2 91% on RA  BMI 21.32 kg/m    Activity: Assist of 1.   Neuros:  A&O x4. Denies numbness and tingling.   Cardiac:  WDL. Denies chest pain   Respiratory:  Put on 2 L of O2 @0130 to maintain O2 sats above 92%. Lungs clear but diminished. Denies SOB.  GI/:  Voiding spontaneously. +BS, passing flatus. No BM this shift.   Diet:  Bariatric clear liquid. Received cycled TPN.   Skin: Mepliex on coccyx, CDI. Midline abdominal incision, open to air, no drainage. R ABNER drain.   Lines: L DL PICC. Red lumen infusing cycled TPN. Purple lumen SL.   Labs: Reviewed, replacing phosphorous recheck in AM. BG checks with TPN administration.  Pain/nausea:  Denies. Abdominal pain treated with PRN dilaudid PO x 1 and PRN dilaudid IV x 2. PRN Compazine given x 1 for nausea.      "

## 2021-09-13 NOTE — PROGRESS NOTES
Inpatient Pain Service: Follow Up Note  09/13/21    Patient: Demetra Richardson  Admission Date:9/10/2021    3 Days Post-Op     Visit/Communication Style  In person    Chief Pain Endorsement:  Post operative midline abdominal pain    Recommendations were discussed and relayed to surgery team Dr. Herbert  Plan was reviewed by the Inpatient Pain Service, staff attending Dr. Chaney      1. Acute post operative abdominal pain s/p  ex lap, extensive BRIDGET, revision of GJ and JJ anastomoses on 9/10/21.  2. H/o chronic headache; chronic abdominal pain due to past abdominal surgeries:Billroth II for gastric outlet obstruction in 2005, revision gastrojejunostomy in 2011.   3. On methadone maintenance, h/o opioid use disorder from OxyContin use.  On a stable dose of methadone currently and sober for some time now.     -- Outpatient opioid requirements prior to admission: methadone 120mg once daily.   reviewed.    Primary Care Provider: Fredy Merritt  Chronic Pain Provider: methadone clinic      1. Continue with PTA dose of methadone 120mg PO once daily.  2. Change Dilaudid 4mg PO Q 3 hours PRN.  Please try to stay on top of this every 3 hours PRN.  3. Change Dilaudid IV to 0.3-0.5mg IV Q 2 hours PRN if pain is not relieved by oral Dilaudid.  Please use oral Dilaudid first.  4. Start Menthol 5% patches,  1 patch transdermal every 8 hours as needed when Lidocaine 4% patches are off.   5. Bowel regimen per primary team to prevent opioid induced constipation.    Pain Service will Continue to follow at this time    Thank you for consulting the Inpatient Pain Management Service. The above recommendations are to be acted upon at the primary team s discretion.     To reach us:  Mon - Friday 8 AM - 3 PM: Pager [phone] (Text Page)   After hours, weekends and holidays: Primary service should call [phone] The answering service for the on-call pain specialist    PAIN MEDICATION SAFE USE:   Prior to discharge  instruct patient on the following in addition to the medication fact sheet:    Caution: these medications can cause sedation    Take prescription medicine only if it has been prescribed by your doctor    Do not take more medicine or take it more often than instructed     Call your doctor if pain gets worse    Never mix pain medicine with alcohol, sleeping pills, or any illicit drugs    Do not operate heavy machinery, including vehicles, when initiation opioid therapy or increasing dosage    Store prescription opioids in a locked container, whenever possible     Dispose of unused opioids appropriately     Do not stop abruptly once at higher doses.  These medications must be tapered off.    Opioid pain medications do carry the risk for physical dependence and addiction and patients should be counseled about this.     Interval History:  Demetra Richardson was seen today (09/13/21) and she reports that pain is in the abdomen at the midline incision. She states the pain is sharp and pain is rated at 6/10 and the goal is to be at 4/10. States PTA, her pain was 4/10 with using ibuprofen PRN.    We discussed the pain regimen and she feels that her opioid doses are too far apart.  We discussed the pain plan adjustment as above and she is agreeable. Recommend using oral Dilaudid first.  Risks and benefits of opioids reviewed.  FUNCTIONAL STATUS:  Change:      improving  Oral intake:     Clear liquids  Activity level:     Up to commode  Mood:      Stable, calm, pleasant    -- Inpatient Medications Related to Pain Management:   Medications related to Pain Management (From now, onward)    Start     Dose/Rate Route Frequency Ordered Stop    09/13/21 0530  methadone (DOLOPHINE-INTENSOL) 10 MG/ML (HIGH CONC) solution 120 mg      120 mg Oral EVERY MORNING 09/12/21 1143      09/12/21 1400  pregabalin (LYRICA) capsule 150 mg      150 mg Oral 3 TIMES DAILY 09/12/21 1115      09/12/21 1126  HYDROmorphone (DILAUDID) tablet 2-4 mg       "2-4 mg Oral EVERY 4 HOURS PRN 09/12/21 1128      09/12/21 1124  HYDROmorphone (DILAUDID) injection 1-2 mg      1-2 mg Intravenous EVERY 6 HOURS PRN 09/12/21 1128      09/12/21 1111  hydrOXYzine (ATARAX) tablet 25 mg      25 mg Oral 2 TIMES DAILY PRN 09/12/21 1115      09/10/21 1704  diphenhydrAMINE (BENADRYL) capsule 25 mg      25 mg Oral EVERY 6 HOURS PRN 09/10/21 1704      09/10/21 1704  diphenhydrAMINE (BENADRYL) injection 25 mg      25 mg Intravenous EVERY 6 HOURS PRN 09/10/21 1704      09/10/21 1704  lidocaine 1 % 0.1-1 mL      0.1-1 mL Other EVERY 1 HOUR PRN 09/10/21 1704      09/10/21 1704  lidocaine (LMX4) cream       Topical EVERY 1 HOUR PRN 09/10/21 1704      09/10/21 1413  bupivacaine liposome (EXPAREL) LA inj was given in the infiltration site to produce post-op analgesia. Duration of action is up to 72 hours. Other \"jeremy\" meds should not be given for 96 hours except for lidocaine 4% patch. This is for INFORMATION ONLY.       Does not apply CONTINUOUS PRN 09/10/21 1413 09/14/21 1412          LAB DATA:  Recent Labs   Lab 09/13/21  0636 09/12/21  0802 09/11/21  1606 09/07/21  1245   CR 0.55 0.47* 0.54 0.67   WBC 12.5*  --   --  7.1   HGB 7.3*  --   --  8.5*   AST 24 45 68* 106*   ALT 41 59* 64* 98*       /63 (BP Location: Right arm)   Pulse 71   Temp 96.8  F (36  C) (Oral)   Resp 16   Ht 1.6 m (5' 3\")   Wt 54.6 kg (120 lb 5.9 oz)   LMP  (LMP Unknown)   SpO2 95%   BMI 21.32 kg/m     EXAM:  CONSTITUTIONAL/GENERAL APPEARANCE:  Conversant.  EYES: EOMI, sclerae clear  ENT/NECK: neck is supple  RESPIRATORY: CTA, nonlabored  CARDIOVASCULAR: S1 and S2 appreciated  GI:soft, tender to light touch.  Incision-staples intact, clean and dry  MUSCULOSKELETAL/BACK/SPINE/EXTREMITIES: Moving UE and LE independently.      NEURO:  AAOX3  SKIN/VASCULAR EXAM:  Dry and warm.  PSYCHIATRIC/BEHAVIORAL/OBSERVATIONS:  No objective signs of pain observed during our interview.   Judgment/Insight -fair   Orientation - " x3   Memory -good   Mood and affect - calm, pleasant, cooperative        ----------------------------------------------------------------------------------  Doris Bonds PA-C  Inpatient Pain Service     Helpful Resources:  Getting Rid of Unwanted Medications (printable PDF for patients)   Opioid Overdose Prevention Toolkit (printable PDF for patients)   Prescription Opioids: What You Need To Know (printable PDF for patients)

## 2021-09-14 ENCOUNTER — APPOINTMENT (OUTPATIENT)
Dept: OCCUPATIONAL THERAPY | Facility: CLINIC | Age: 66
DRG: 326 | End: 2021-09-14
Attending: SURGERY
Payer: COMMERCIAL

## 2021-09-14 LAB
ANION GAP SERPL CALCULATED.3IONS-SCNC: 3 MMOL/L (ref 3–14)
BUN SERPL-MCNC: 26 MG/DL (ref 7–30)
CALCIUM SERPL-MCNC: 7.8 MG/DL (ref 8.5–10.1)
CHLORIDE BLD-SCNC: 113 MMOL/L (ref 94–109)
CO2 SERPL-SCNC: 25 MMOL/L (ref 20–32)
CREAT SERPL-MCNC: 0.55 MG/DL (ref 0.52–1.04)
GFR SERPL CREATININE-BSD FRML MDRD: >90 ML/MIN/1.73M2
GLUCOSE BLD-MCNC: 114 MG/DL (ref 70–99)
GLUCOSE BLDC GLUCOMTR-MCNC: 124 MG/DL (ref 70–99)
GLUCOSE BLDC GLUCOMTR-MCNC: 89 MG/DL (ref 70–99)
HOLD SPECIMEN: NORMAL
MAGNESIUM SERPL-MCNC: 2.3 MG/DL (ref 1.6–2.3)
PHOSPHATE SERPL-MCNC: 3.9 MG/DL (ref 2.5–4.5)
POTASSIUM BLD-SCNC: 4.2 MMOL/L (ref 3.4–5.3)
SODIUM SERPL-SCNC: 141 MMOL/L (ref 133–144)
ZINC SERPL-MCNC: 36.9 UG/DL

## 2021-09-14 PROCEDURE — 250N000011 HC RX IP 250 OP 636: Performed by: SURGERY

## 2021-09-14 PROCEDURE — 120N000002 HC R&B MED SURG/OB UMMC

## 2021-09-14 PROCEDURE — 97110 THERAPEUTIC EXERCISES: CPT | Mod: GO

## 2021-09-14 PROCEDURE — 97535 SELF CARE MNGMENT TRAINING: CPT | Mod: GO

## 2021-09-14 PROCEDURE — 250N000011 HC RX IP 250 OP 636: Performed by: STUDENT IN AN ORGANIZED HEALTH CARE EDUCATION/TRAINING PROGRAM

## 2021-09-14 PROCEDURE — 83735 ASSAY OF MAGNESIUM: CPT | Performed by: SURGERY

## 2021-09-14 PROCEDURE — 84100 ASSAY OF PHOSPHORUS: CPT | Performed by: SURGERY

## 2021-09-14 PROCEDURE — 250N000009 HC RX 250: Performed by: SURGERY

## 2021-09-14 PROCEDURE — 36592 COLLECT BLOOD FROM PICC: CPT | Performed by: SURGERY

## 2021-09-14 PROCEDURE — 250N000013 HC RX MED GY IP 250 OP 250 PS 637: Performed by: SURGERY

## 2021-09-14 PROCEDURE — 99232 SBSQ HOSP IP/OBS MODERATE 35: CPT | Performed by: PHYSICIAN ASSISTANT

## 2021-09-14 PROCEDURE — 250N000013 HC RX MED GY IP 250 OP 250 PS 637: Performed by: STUDENT IN AN ORGANIZED HEALTH CARE EDUCATION/TRAINING PROGRAM

## 2021-09-14 PROCEDURE — 999N000044 HC STATISTIC CVC DRESSING CHANGE

## 2021-09-14 PROCEDURE — 80048 BASIC METABOLIC PNL TOTAL CA: CPT | Performed by: SURGERY

## 2021-09-14 RX ORDER — HYDROMORPHONE HYDROCHLORIDE 2 MG/1
4-6 TABLET ORAL EVERY 4 HOURS PRN
Status: DISCONTINUED | OUTPATIENT
Start: 2021-09-14 | End: 2021-09-15

## 2021-09-14 RX ORDER — HYDROMORPHONE HYDROCHLORIDE 1 MG/ML
.3-.5 INJECTION, SOLUTION INTRAMUSCULAR; INTRAVENOUS; SUBCUTANEOUS
Status: DISCONTINUED | OUTPATIENT
Start: 2021-09-14 | End: 2021-09-15

## 2021-09-14 RX ORDER — AMOXICILLIN 250 MG
2 CAPSULE ORAL 2 TIMES DAILY
Status: DISCONTINUED | OUTPATIENT
Start: 2021-09-14 | End: 2021-09-17 | Stop reason: HOSPADM

## 2021-09-14 RX ADMIN — HYDROMORPHONE HYDROCHLORIDE 0.5 MG: 1 INJECTION, SOLUTION INTRAMUSCULAR; INTRAVENOUS; SUBCUTANEOUS at 03:59

## 2021-09-14 RX ADMIN — SMOFLIPID 250 ML: 6; 6; 5; 3 INJECTION, EMULSION INTRAVENOUS at 21:00

## 2021-09-14 RX ADMIN — HYDROMORPHONE HYDROCHLORIDE 4 MG: 2 TABLET ORAL at 23:15

## 2021-09-14 RX ADMIN — Medication 10 ML: at 10:35

## 2021-09-14 RX ADMIN — PREGABALIN 150 MG: 25 CAPSULE ORAL at 21:01

## 2021-09-14 RX ADMIN — HYDROMORPHONE HYDROCHLORIDE 0.5 MG: 1 INJECTION, SOLUTION INTRAMUSCULAR; INTRAVENOUS; SUBCUTANEOUS at 15:36

## 2021-09-14 RX ADMIN — HYDROMORPHONE HYDROCHLORIDE 4 MG: 2 TABLET ORAL at 12:28

## 2021-09-14 RX ADMIN — ENOXAPARIN SODIUM 40 MG: 40 INJECTION SUBCUTANEOUS at 08:06

## 2021-09-14 RX ADMIN — HYDROMORPHONE HYDROCHLORIDE 4 MG: 2 TABLET ORAL at 17:46

## 2021-09-14 RX ADMIN — METHADONE HYDROCHLORIDE 120 MG: 10 CONCENTRATE ORAL at 06:15

## 2021-09-14 RX ADMIN — ESCITALOPRAM OXALATE 20 MG: 20 TABLET ORAL at 08:07

## 2021-09-14 RX ADMIN — PREGABALIN 150 MG: 25 CAPSULE ORAL at 08:06

## 2021-09-14 RX ADMIN — HYDROMORPHONE HYDROCHLORIDE 0.5 MG: 1 INJECTION, SOLUTION INTRAMUSCULAR; INTRAVENOUS; SUBCUTANEOUS at 10:34

## 2021-09-14 RX ADMIN — DOCUSATE SODIUM 50 MG AND SENNOSIDES 8.6 MG 2 TABLET: 8.6; 5 TABLET, FILM COATED ORAL at 12:29

## 2021-09-14 RX ADMIN — HYDROMORPHONE HYDROCHLORIDE 4 MG: 4 TABLET ORAL at 02:09

## 2021-09-14 RX ADMIN — HYDROMORPHONE HYDROCHLORIDE 4 MG: 4 TABLET ORAL at 08:07

## 2021-09-14 RX ADMIN — Medication: at 21:00

## 2021-09-14 RX ADMIN — PREGABALIN 150 MG: 25 CAPSULE ORAL at 13:59

## 2021-09-14 ASSESSMENT — ACTIVITIES OF DAILY LIVING (ADL)
ADLS_ACUITY_SCORE: 16

## 2021-09-14 NOTE — PROGRESS NOTES
Inpatient Pain Service: Follow Up Note  09/14/21    Patient: Demetra Richardson  Admission Date:9/10/2021    4 Days Post-Op     Chief Pain Endorsement: midline abdominal pain    Recommendations were discussed and relayed to surgery team, RN  Plan was reviewed by the Inpatient Pain Service, staff attending Dr. Chaney      1. Acute post operative abdominal pain s/p  ex lap, extensive BRIDGET, revision of GJ and JJ anastomoses on 9/10/21.  2. H/o chronic headache; chronic abdominal pain due to past abdominal surgeries:Billroth II for gastric outlet obstruction in 2005, revision gastrojejunostomy in 2011.   3. On methadone maintenance, h/o opioid use disorder from OxyContin use.  On a stable dose of methadone currently and sober for some time now.     -- Outpatient opioid requirements prior to admission: methadone 120mg once daily.   reviewed.     Primary Care Provider: Fredy Merritt  Chronic Pain Provider: methadone clinic    1. Continue with PTA dose of methadone 120mg PO once daily.  2. Change PO  Dilaudid to 4-6mg PO Q 4 hours PRN.    Discussed with Radha that as healing takes place and pain improves, will start tapering PO Dilaudid to eventual discontinuation.  3. Change IV Dilaudid to 0.3-0.5mg IV Q 3 hours PRN. (used 2 doses since yesterday. States it's not helping that much, could consider discontinuing on 9/15/21)  4. Start Menthol 5% patches, 1 patch(es) transdermal every 8 hours as needed.  5. Bowel regimen per primary team to prevent opioid induced constipation. States she is passing flatus and had a BM yesterday.    Pain Service will Continue to follow peripherally, but will not put notes in every day    Thank you for consulting the Inpatient Pain Management Service. The above recommendations are to be acted upon at the primary team s discretion.     To reach us:  Mon - Friday 8 AM - 3 PM: Pager [phone] (Text Page)   After hours, weekends and holidays: Primary service should call [phone]  "The answering service for the on-call pain specialist    PAIN MEDICATION SAFE USE:   Prior to discharge instruct patient on the following in addition to the medication fact sheet:    Caution: these medications can cause sedation    Take prescription medicine only if it has been prescribed by your doctor    Do not take more medicine or take it more often than instructed     Call your doctor if pain gets worse    Never mix pain medicine with alcohol, sleeping pills, or any illicit drugs    Do not operate heavy machinery, including vehicles, when initiation opioid therapy or increasing dosage    Store prescription opioids in a locked container, whenever possible     Dispose of unused opioids appropriately     Do not stop abruptly once at higher doses.  These medications must be tapered off.    Opioid pain medications do carry the risk for physical dependence and addiction and patients should be counseled about this.     Interval History:  Demetra (\"Radha\") SEVERIANO Richardson was seen today (09/14/21) and she reports that pain was not controlled overnight.  However RN reports that she was able to sleep in between cares and at times is sleepy.  She states that she prefers the oral Dilaudid over the IV Dilaudid however feels that she could use a stronger dose.   Reviewed risks and benefits of opioids and recommend using the lowest most effective dose possible to manage pain while minimizing side effects.  Discussed using a lower range of PO Dilaudid when possible.    The pain plan above was reviewed with her and she is agreeable.      FUNCTIONAL STATUS:  Change:      improving  Oral intake:     Clear diet  Activity level:     Up in chair, walked to bathroom  Mood:      Alert and pleasant    -- Inpatient Medications Related to Pain Management:   Medications related to Pain Management (From now, onward)    Start     Dose/Rate Route Frequency Ordered Stop    09/13/21 1600  HYDROmorphone (DILAUDID) tablet 4 mg      4 mg Oral EVERY " "3 HOURS PRN 09/13/21 1559      09/13/21 1600  HYDROmorphone (PF) (DILAUDID) injection 0.3-0.5 mg      0.3-0.5 mg Intravenous EVERY 2 HOURS PRN 09/13/21 1559      09/13/21 0530  methadone (DOLOPHINE-INTENSOL) 10 MG/ML (HIGH CONC) solution 120 mg      120 mg Oral EVERY MORNING 09/12/21 1143      09/12/21 1400  pregabalin (LYRICA) capsule 150 mg      150 mg Oral 3 TIMES DAILY 09/12/21 1115      09/12/21 1111  hydrOXYzine (ATARAX) tablet 25 mg      25 mg Oral 2 TIMES DAILY PRN 09/12/21 1115      09/10/21 1704  diphenhydrAMINE (BENADRYL) capsule 25 mg      25 mg Oral EVERY 6 HOURS PRN 09/10/21 1704      09/10/21 1704  diphenhydrAMINE (BENADRYL) injection 25 mg      25 mg Intravenous EVERY 6 HOURS PRN 09/10/21 1704      09/10/21 1704  lidocaine 1 % 0.1-1 mL      0.1-1 mL Other EVERY 1 HOUR PRN 09/10/21 1704      09/10/21 1704  lidocaine (LMX4) cream       Topical EVERY 1 HOUR PRN 09/10/21 1704      09/10/21 1413  bupivacaine liposome (EXPAREL) LA inj was given in the infiltration site to produce post-op analgesia. Duration of action is up to 72 hours. Other \"jeremy\" meds should not be given for 96 hours except for lidocaine 4% patch. This is for INFORMATION ONLY.       Does not apply CONTINUOUS PRN 09/10/21 1413 09/14/21 1412          LAB DATA:  Recent Labs   Lab 09/14/21  0724 09/13/21  0636 09/12/21  0802 09/11/21  1606 09/07/21  1245   CR 0.55 0.55 0.47* 0.54 0.67   WBC  --  12.5*  --   --  7.1   HGB  --  7.3*  --   --  8.5*   AST  --  24 45 68* 106*   ALT  --  41 59* 64* 98*       BP 92/50 (BP Location: Right arm)   Pulse 75   Temp 97.3  F (36.3  C) (Oral)   Resp 16   Ht 1.6 m (5' 3\")   Wt 54.6 kg (120 lb 5.9 oz)   LMP  (LMP Unknown)   SpO2 93%   BMI 21.32 kg/m     EXAM:  CONSTITUTIONAL/GENERAL APPEARANCE: Conversant.  EYES: EOMI, sclerae clear  ENT/NECK: neck is supple  RESPIRATORY: breathing on room air  CARDIOVASCULAR:HR within normal limits  GI: incision-c,d,i  MUSCULOSKELETAL/BACK/SPINE/EXTREMITIES: Moving " UE and LE independently      NEURO:   AAOx3.  SKIN/VASCULAR EXAM:  Dry and warm.  PSYCHIATRIC/BEHAVIORAL/OBSERVATIONS:  No objective signs of pain observed during our interview.   Judgment/Insight -fair   Orientation - x3   Memory -good   Mood and affect - calm, pleasant, cooperative        ----------------------------------------------------------------------------------  Doris Bonds PA-C  Inpatient Pain Service     Helpful Resources:  Getting Rid of Unwanted Medications (printable PDF for patients)   Opioid Overdose Prevention Toolkit (printable PDF for patients)   Prescription Opioids: What You Need To Know (printable PDF for patients)

## 2021-09-14 NOTE — PLAN OF CARE
"Vital signs:  Temp: 97.5  F (36.4  C) Temp src: Oral BP: 95/51 Pulse: 77   Resp: 16 SpO2: 92 % O2 Device: None (Room air) Oxygen Delivery: 2 LPM Height: 160 cm (5' 3\") Weight: 54.6 kg (120 lb 5.9 oz)  Estimated body mass index is 21.32 kg/m  as calculated from the following:    Height as of this encounter: 1.6 m (5' 3\").    Weight as of this encounter: 54.6 kg (120 lb 5.9 oz).    NEURO: A&Ox4. Pleasant, cooperative, calm.   RESPIRATORY: Appears unlabored, denies cough.VSS on RA.  CARDIAC: BP 95/51, denies lightheadedness. Other VSS.   GI/: BM this shift. Bowel sounds active x4. Voids spontaneously w/o difficulty   DIET: tolerating clear liquids; requesting advancement of diet.  PAIN/NAUSEA: pain rated 4-7 this shift primarily to abdomen and L shoulder. Controlled w/ prn PO/IV dilaudid and scheduled methadone. Offered t-pump, accepted after several offers. Denies nausea.  INCISION/DRAINS: ABNER drain to RUQ, draining scant serosanguinous fluid. Midline abdominal incision has staples, CODY and appears WDL.   IV ACCESS: L PICC has purple lumen infusing TPN and red is saline locked, intermittently used for IV dilaudid.   ACTIVITY: up ad shelby to commode. In bed most of shift, sleeps.   LAB: reviewed  PLAN: continue w/ plan of care      "

## 2021-09-14 NOTE — PLAN OF CARE
PT: Orders received. Chart reviewed and discussed with care team.  PT not indicated due to lack of skilled PT needs. Pt steady with gait and lacks stairs at home per OT.  Defer discharge recommendations to OT.  Will complete orders.

## 2021-09-14 NOTE — PROGRESS NOTES
Individual Psychotherapy Session                                Time of Service: 11:00-11:55 (55 minutes)  Care Provider: Leighann Gentile, PhD, LP  Participants: Patient and writer  DSM-5 Diagnoses:   Opioid use disorder, on maintenance therapy  Benzodiazepine use disorder  Cluster B traits (by chart review)  Anxiety (r/o DARRIUS)  MDD, in remission    SUBJECTIVE: Radha denied depressed mood or anxiety. She endorsed ambivalence about tapering off her klonopin, as recommended by providers. She denied increase in anxiety as a result of taper. Grief over loss of her father; 1 year anniversary of his death was this past month. Radha is worried about future housing options starting in July as this is when she will lose financial assistance from her family.    TREATMENT: We finalized treatment plan, and I provided supportive listening and validation related to loss of her father.     MSE:  Alertness: alert  and oriented  Appearance: casually groomed  Behavior/Demeanor: guarded, cooperative, pleasant and calm, with good eye contact   Speech: normal  Language: intact  Psychomotor: normal or unremarkable  Mood: description consistent with euthymia  Affect: full range; was congruent to mood; was congruent to content  Thought Process/Associations: unremarkable  Thought Content:  Reports none;  Denies suicidal ideation and violent ideation  Perception:  Reports none;  Denies auditory hallucinations and visual hallucinations  Insight: adequate for  safety  Judgment: adequate for safety  Cognition: (6) does  appear grossly intact; formal cognitive testing was not done  Gait and Station: unremarkable    PLAN:   1) Weekly therapy with writer     Treatment plan due for review by: next session    Performed and documented by: Leighann Gentile, PhD, LP    
straw colored

## 2021-09-14 NOTE — PROGRESS NOTES
Provider notified about patient experiencing increased discharge at the midline  abdomen incision site.  Provider called asked to placed an ABD over incision site.

## 2021-09-14 NOTE — PROGRESS NOTES
MIS/Bariatric Surgery Progress Note    Demetra Richardson  [id number]    No acute overnight events. Pain control adequate. Passing flatus and had BM. Tolerating clear liquids. No nausea or emesis. Ambulating a little more.    Temp:  [96.1  F (35.6  C)-97.5  F (36.4  C)] 96.8  F (36  C)  Pulse:  [73-82] 80  Resp:  [14-18] 18  BP: ()/(50-74) 105/74  SpO2:  [92 %-98 %] 95 %    Intake/Output Summary (Last 24 hours) at 9/14/2021 1224  Last data filed at 9/14/2021 1137  Gross per 24 hour   Intake --   Output 1350 ml   Net -1350 ml     Resting in bed, comfortable  regular rate and rhythm  non-labored breathing on room air  Soft, appropriately tender, non-distended, incision CDI with staples, no drainage or erythema  warm and well perfused extremities   alert, oriented     Na 141  K 4.2  Cr 0.55  Mg 2.3  Phos 3.9     Demetra Richardson is a 65 year old female with hx bilateral truncal vagotomy, hemigastrectomy and koby-en-y gastrojejunostomy surgery in 2005 for 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 Subsequently had distal gastrectomy, resection of retained antrum, and koby-en-y gastrojejunostomy in 2011 for gastric wall obstruction. Had progressively worsening postprandial abdominal pain, nausea and vomiting. Now s/p ex lap, extensive BRIDGET, revision of GJ and JJ anastomoses on 9/10. Doing well.      Home methadone and lyrica,  PO/IV dilaudid PRN. Appreciate pain service recs. Weaning IV dilaudid, goal to stop tomorrow.   Home vistaril for anxiety   Advance to bariatric full liquid diet today, cont home TPN   Continue ABNER to bulb suction, will remove prior to d/c, monitor output  PRN antiemetics   Voiding independently  Lovenox for dvt ppt  Ambulate QID, PT/OT  Goal to discharge home in 2 days    Dia Noel MD  General Surgery PGY-4

## 2021-09-15 ENCOUNTER — APPOINTMENT (OUTPATIENT)
Dept: OCCUPATIONAL THERAPY | Facility: CLINIC | Age: 66
DRG: 326 | End: 2021-09-15
Attending: SURGERY
Payer: COMMERCIAL

## 2021-09-15 LAB
ANION GAP SERPL CALCULATED.3IONS-SCNC: 2 MMOL/L (ref 3–14)
BUN SERPL-MCNC: 25 MG/DL (ref 7–30)
CALCIUM SERPL-MCNC: 7.8 MG/DL (ref 8.5–10.1)
CHLORIDE BLD-SCNC: 110 MMOL/L (ref 94–109)
CO2 SERPL-SCNC: 27 MMOL/L (ref 20–32)
CREAT SERPL-MCNC: 0.56 MG/DL (ref 0.52–1.04)
GFR SERPL CREATININE-BSD FRML MDRD: >90 ML/MIN/1.73M2
GLUCOSE BLD-MCNC: 110 MG/DL (ref 70–99)
GLUCOSE BLDC GLUCOMTR-MCNC: 103 MG/DL (ref 70–99)
GLUCOSE BLDC GLUCOMTR-MCNC: 107 MG/DL (ref 70–99)
GLUCOSE BLDC GLUCOMTR-MCNC: 81 MG/DL (ref 70–99)
MAGNESIUM SERPL-MCNC: 2.2 MG/DL (ref 1.6–2.3)
PATH REPORT.COMMENTS IMP SPEC: NORMAL
PATH REPORT.COMMENTS IMP SPEC: NORMAL
PATH REPORT.FINAL DX SPEC: NORMAL
PATH REPORT.GROSS SPEC: NORMAL
PATH REPORT.MICROSCOPIC SPEC OTHER STN: NORMAL
PATH REPORT.RELEVANT HX SPEC: NORMAL
PHOSPHATE SERPL-MCNC: 4.3 MG/DL (ref 2.5–4.5)
PHOTO IMAGE: NORMAL
POTASSIUM BLD-SCNC: 4.4 MMOL/L (ref 3.4–5.3)
SODIUM SERPL-SCNC: 139 MMOL/L (ref 133–144)

## 2021-09-15 PROCEDURE — 250N000013 HC RX MED GY IP 250 OP 250 PS 637: Performed by: SURGERY

## 2021-09-15 PROCEDURE — 83735 ASSAY OF MAGNESIUM: CPT | Performed by: SURGERY

## 2021-09-15 PROCEDURE — 80048 BASIC METABOLIC PNL TOTAL CA: CPT | Performed by: SURGERY

## 2021-09-15 PROCEDURE — 84100 ASSAY OF PHOSPHORUS: CPT | Performed by: SURGERY

## 2021-09-15 PROCEDURE — 250N000011 HC RX IP 250 OP 636: Performed by: SURGERY

## 2021-09-15 PROCEDURE — 250N000013 HC RX MED GY IP 250 OP 250 PS 637: Performed by: STUDENT IN AN ORGANIZED HEALTH CARE EDUCATION/TRAINING PROGRAM

## 2021-09-15 PROCEDURE — 97530 THERAPEUTIC ACTIVITIES: CPT | Mod: GO

## 2021-09-15 PROCEDURE — 97535 SELF CARE MNGMENT TRAINING: CPT | Mod: GO

## 2021-09-15 PROCEDURE — 88307 TISSUE EXAM BY PATHOLOGIST: CPT | Mod: 26 | Performed by: PATHOLOGY

## 2021-09-15 PROCEDURE — 250N000011 HC RX IP 250 OP 636: Performed by: STUDENT IN AN ORGANIZED HEALTH CARE EDUCATION/TRAINING PROGRAM

## 2021-09-15 PROCEDURE — 36592 COLLECT BLOOD FROM PICC: CPT | Performed by: STUDENT IN AN ORGANIZED HEALTH CARE EDUCATION/TRAINING PROGRAM

## 2021-09-15 PROCEDURE — 99207 PR CDG-MDM COMPONENT: MEETS MODERATE - DOWN CODED: CPT | Performed by: PHYSICIAN ASSISTANT

## 2021-09-15 PROCEDURE — 99232 SBSQ HOSP IP/OBS MODERATE 35: CPT | Performed by: PHYSICIAN ASSISTANT

## 2021-09-15 PROCEDURE — 36592 COLLECT BLOOD FROM PICC: CPT | Performed by: SURGERY

## 2021-09-15 PROCEDURE — 250N000009 HC RX 250: Performed by: SURGERY

## 2021-09-15 PROCEDURE — 88304 TISSUE EXAM BY PATHOLOGIST: CPT | Mod: 26 | Performed by: PATHOLOGY

## 2021-09-15 PROCEDURE — 85049 AUTOMATED PLATELET COUNT: CPT | Performed by: STUDENT IN AN ORGANIZED HEALTH CARE EDUCATION/TRAINING PROGRAM

## 2021-09-15 PROCEDURE — 120N000002 HC R&B MED SURG/OB UMMC

## 2021-09-15 RX ORDER — HYDROMORPHONE HYDROCHLORIDE 4 MG/1
4 TABLET ORAL
Status: DISCONTINUED | OUTPATIENT
Start: 2021-09-15 | End: 2021-09-16

## 2021-09-15 RX ADMIN — HYDROMORPHONE HYDROCHLORIDE 4 MG: 4 TABLET ORAL at 16:42

## 2021-09-15 RX ADMIN — CALCIUM GLUCONATE: 98 INJECTION, SOLUTION INTRAVENOUS at 20:04

## 2021-09-15 RX ADMIN — Medication 5 ML: at 07:42

## 2021-09-15 RX ADMIN — PROCHLORPERAZINE EDISYLATE 10 MG: 5 INJECTION INTRAMUSCULAR; INTRAVENOUS at 11:57

## 2021-09-15 RX ADMIN — PREGABALIN 150 MG: 25 CAPSULE ORAL at 07:52

## 2021-09-15 RX ADMIN — PREGABALIN 150 MG: 25 CAPSULE ORAL at 13:47

## 2021-09-15 RX ADMIN — HYDROMORPHONE HYDROCHLORIDE 4 MG: 4 TABLET ORAL at 20:11

## 2021-09-15 RX ADMIN — DOCUSATE SODIUM 50 MG AND SENNOSIDES 8.6 MG 2 TABLET: 8.6; 5 TABLET, FILM COATED ORAL at 07:49

## 2021-09-15 RX ADMIN — ESCITALOPRAM OXALATE 20 MG: 20 TABLET ORAL at 07:49

## 2021-09-15 RX ADMIN — Medication 5 ML: at 05:49

## 2021-09-15 RX ADMIN — Medication 5 ML: at 11:57

## 2021-09-15 RX ADMIN — HYDROMORPHONE HYDROCHLORIDE 4 MG: 2 TABLET ORAL at 03:20

## 2021-09-15 RX ADMIN — PREGABALIN 150 MG: 25 CAPSULE ORAL at 20:02

## 2021-09-15 RX ADMIN — HYDROMORPHONE HYDROCHLORIDE 0.5 MG: 1 INJECTION, SOLUTION INTRAMUSCULAR; INTRAVENOUS; SUBCUTANEOUS at 00:39

## 2021-09-15 RX ADMIN — HYDROMORPHONE HYDROCHLORIDE 4 MG: 2 TABLET ORAL at 07:52

## 2021-09-15 RX ADMIN — HYDROMORPHONE HYDROCHLORIDE 4 MG: 4 TABLET ORAL at 23:58

## 2021-09-15 RX ADMIN — SMOFLIPID 250 ML: 6; 6; 5; 3 INJECTION, EMULSION INTRAVENOUS at 20:04

## 2021-09-15 RX ADMIN — Medication 5 ML: at 00:46

## 2021-09-15 RX ADMIN — METHADONE HYDROCHLORIDE 120 MG: 10 CONCENTRATE ORAL at 05:57

## 2021-09-15 RX ADMIN — HYDROMORPHONE HYDROCHLORIDE 0.5 MG: 1 INJECTION, SOLUTION INTRAMUSCULAR; INTRAVENOUS; SUBCUTANEOUS at 09:09

## 2021-09-15 RX ADMIN — HYDROMORPHONE HYDROCHLORIDE 0.5 MG: 1 INJECTION, SOLUTION INTRAMUSCULAR; INTRAVENOUS; SUBCUTANEOUS at 05:44

## 2021-09-15 RX ADMIN — Medication 5 ML: at 09:09

## 2021-09-15 RX ADMIN — HYDROMORPHONE HYDROCHLORIDE 4 MG: 2 TABLET ORAL at 11:51

## 2021-09-15 ASSESSMENT — ACTIVITIES OF DAILY LIVING (ADL)
ADLS_ACUITY_SCORE: 16

## 2021-09-15 NOTE — PLAN OF CARE
Vitals: Temp: (!) 96.3  F (35.7  C) Temp src: Oral BP: 120/65 Pulse: 83   Resp: 18 SpO2: 96 % O2 Device: None (Room air)        Time: 3530-9950  Reason for admission: Ex laparotomy, lysis of Adhesions, Jejunojejunostomy resection and Revision, Gastrojejunal Resection (partial gastrectomy with enterectomy) with Miles en Y Reconstruction Gastrojejunostomy  Activity:  SBA. Bedside commode. Ambulated w/ OT today.   Pain:  IV diluadid given x1 (discontinued now). PO dilaudid prn given x2 (moved from q4h to q3h now). Hot packs for shoulders.   Neuro: A&O x4. Calls appropriately. Denies numbness/tingling.   Cardiac: WDL.   Respiratory:  WDL. LS clear. Sating mid-90's on RA. Some dyspnea on exertion. Denies SOB/cough.  GI/:  Voiding adequetely. +BM this shift. Using bedside commode.   Diet: Bariatric full liquid.   Lines:  L PICC DL: both lumens hep locked   Incisions/Drains/Skin:  Midline abdominal incision - lower half stapled and WDL, upper half staples removed and packed w/ guaze. ABENR (R) draining green output.   Lab:  Reviewed.   Electrolyte Replacements: None needed.      New changes this shift:  IV dilaudid discontinued. PO dilaudid switched to q3h. Staples removed from upper half of midline abdominal incision d/t drainage/pus. New order to change dressing bid.  Continue plan of care.

## 2021-09-15 NOTE — PROGRESS NOTES
MIS/Bariatric Surgery Progress Note    Demetra Richardson  [id number]    No acute overnight events. Pain control adequate. Passing flatus and having BM. Voiding adequately.     Temp:  [96.3  F (35.7  C)-97.2  F (36.2  C)] 96.3  F (35.7  C)  Pulse:  [69-83] 83  Resp:  [16-18] 18  BP: ()/(56-65) 120/65  SpO2:  [95 %-96 %] 96 %    Intake/Output Summary (Last 24 hours) at 9/15/2021 1152  Last data filed at 9/15/2021 0942  Gross per 24 hour   Intake 1776.6 ml   Output 1905 ml   Net -128.4 ml     Resting in bed, comfortable  regular rate and rhythm  non-labored breathing on room air  Soft, appropriately tender, non-distended  Incision with purulent drainage upper half -- removed most staples (left bottom 1/4 staples intact), purulent drainage expressed, washed out wound and placed wet-to-dry dressing  warm and well perfused extremities   alert, oriented     Na 139  K 4.4  Cr 0.56  Mg 2.2  Phos 4.3     Demetra Richardson is a 65 year old female with hx bilateral truncal vagotomy, hemigastrectomy and koby-en-y gastrojejunostomy surgery in 2005 for 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 Subsequently had distal gastrectomy, resection of retained antrum, and koby-en-y gastrojejunostomy in 2011 for gastric wall obstruction. Had progressively worsening postprandial abdominal pain, nausea and vomiting. Now s/p ex lap, extensive BRIDGET, revision of GJ and JJ anastomoses on 9/10. Doing well.      Home methadone and lyrica, PO/IV dilaudid PRN. Appreciate pain service recs. Stop IV dilaudid today.  Home vistaril for anxiety   Bariatric full liquid diet today, cont home TPN   Superficial wound infection without cellulitis, start BID wet-to-dry dressing changes  Continue ABNER to bulb suction, will remove prior to d/c, monitor output  Voiding independently  Lovenox for dvt ppt, ambulate QID, PT/OT  Goal to discharge home in 1-2 days, will need to arrange for dressing care in addition to home TPN    Dia  MD Bert  General Surgery PGY-4

## 2021-09-15 NOTE — PLAN OF CARE
"BP 98/57 (BP Location: Right arm)   Pulse 69   Temp 96.9  F (36.1  C) (Oral)   Resp 16   Ht 1.6 m (5' 3\")   Wt 54.6 kg (120 lb 5.9 oz)   LMP  (LMP Unknown)   SpO2 96%   BMI 21.32 kg/m        Status: Exploratory Laparotomy, Extensive Lysis of Adhesions; Jejunojejunostomy resection and Revision, Gastrojejunal Resection (partial gastrectomy with enterectomy) with Miles en Y Reconstruction Gastrojejunostomy, Upper Endoscopy  Pain/Nausea: Dilaudid PO Q4hrs, IV Q3hrs  Mobility: SBA, up to commode, walked with OT   Diet: Bariatric Full  Labs: Reviewed  Lines: DL PICC hep locked  Drains: R ABNER to bulb suction, medium thick green output.  Skin/Incisions: scant discharge at incision site, provider notified.  Neuro: A&Ox4  Respiratory: On RA, diminished  Cardiac: WDL  GI: passing gas, LBM 9/14  : Voiding spont in commode  New Changes: pain med frequency changed  Plan: Continue POC  "

## 2021-09-15 NOTE — PROGRESS NOTES
CLINICAL NUTRITION SERVICES - BRIEF NOTE     Nutrition Prescription    RECOMMENDATIONS FOR MDs/PROVIDERS TO ORDER:  - If diet advance feasible--pt does feel hungry but dislikes full liquid items except vanilla Greek yogurt.  She is hungry for crackers and regular pudding (sugar free sent with bariatric diet which she dislikes).     Malnutrition Status:    Severe malnutrition in the context of chronic illness--similar to 9/11 results but including severe fat and muscle wasting.     Recommendations already ordered by Registered Dietitian (RD):  - Continue current TPN and lipids.   Addendum: Order room service w/ assist so pt better understands her options on bariatric FLD.     Future/Additional Recommendations:  - Follow labs, diet advance, GI function.  - Follow up with pt tomorrow re: Bariatric FLD options and options for further diet advance (time frame to be determined by surgery) she is allowed once discharged (surgical resident notes that pt will continue on bariatric FLD for now).      EVALUATION OF THE PROGRESS TOWARD GOALS   Diet: bariatric full liquids  Nutrition Support: PN 12 hr cycle (1 hr taper at start and 1 hr taper at end) - volume per PharmD with 200g Dex, 95g AA + 250 ml 20% IV lipids 5x/week (SMOF lipids if able) provides 1417 kcals (26 kcal/kg), 1.8 g PRO/kg, with 25% kcals from Fat.   Intake: Pt only taking vanilla Greek yogurt with trays, otherwise dislikes all other items on current diet--dislikes juice, sugar free pudding, okay with broth on occasion but provides only minimal calories.       NEW FINDINGS   GI: stooling x 2 today and yesterday.  Pt feels hungry with improved lower GI motility.  Labs noted.     MALNUTRITION  % Intake: difficult to assess due to pt previously not always taking TPN q day as ordered prior to two weeks before admission. TPN was meeting needs over 2 weeks just prior to admission as believed to be taking TPN daily + small amounts of po intake.   % Weight Loss: > 10%  in 6 months (severe)  Subcutaneous Fat Loss: Pt fully clothed. However severe fat wasting noted @ back of arm.  Muscle Loss: Noted w/ pt being fully clothed: Facial--moderate, clavicles: severe, Triceps/biceps--severe.    Fluid Accumulation/Edema: None noted per nursing charting.  Legs to painful w/ fibromyaglia to check for edema.  Malnutrition Diagnosis: Severe malnutrition in the context of chronic illness     INTERVENTIONS  Continue current TPN and lipids.  Recommendations as above.     Monitoring/Evaluation  Progress toward goals will be monitored and evaluated per protocol.     Ramandeep Toscano RD, LD   7B (M-F) Pager: [phone]  RD Weekend Pager: [phone]

## 2021-09-15 NOTE — PLAN OF CARE
"BP 99/56 (BP Location: Right arm)   Pulse 69   Temp 97.2  F (36.2  C) (Oral)   Resp 18   Ht 1.6 m (5' 3\")   Wt 54.6 kg (120 lb 5.9 oz)   LMP  (LMP Unknown)   SpO2 95%   BMI 21.32 kg/m       Activity: Up w/ SBA.   Neuros: A&O x4.   Cardiac: WDL.   Respiratory: WDL on RA.   GI/: Voiding spontaneously. +BS, passing flatus. 1 BM this shift.   Diet: Bariactric Full Liquid.    Skin: Midline incision - small drainage, dressing change done.   Lines: DL PICC infusing TPN & lipids.   Incisions/Drains: R ABNER suction to bulb, green thin output.     Pain/nausea: Managed w/ IV & oral dilaudid. On methadone. Hot packs for shoulder pain.     "

## 2021-09-15 NOTE — PROGRESS NOTES
Care Management Follow Up    Length of Stay (days): 5    Expected Discharge Date: 09/16/2021     Concerns to be Addressed: discharge planning     Patient plan of care discussed at interdisciplinary rounds: Yes    Anticipated Discharge Disposition: Assisted Living, Home Infusion     Anticipated Discharge Services:  Home Infusion  Anticipated Discharge DME: Oxygen    Patient/family educated on Medicare website which has current facility and service quality ratings: yes  Education Provided on the Discharge Plan: yes   Patient/Family in Agreement with the Plan: yes    Referrals Placed by CM/SW: External Care Coordination, Home Infusion  Private pay costs discussed: Not applicable    Additional Information:  Updated by MD team that pt's incision was opened and she now has BID wound packing.  Patient gets nursing visits weekly and could get more frequent visits, but likely not daily.  Met with patient at bedside to discuss discharge plans.  She feels she should be able to be taught the wound care.  Updated [facility] liaisonAshutosh.  Pt concerned about discharging on the weekend as she is on methadone and needs to go to the clinic daily to get doses and they are closed on Sunday.  RNCC will continue to follow and assist with discharge planning as needed.      [facility](home oxygen)  Phone: [phone]    [facility])    [facility] Care CoordinatorNess  Phone: [phone]           LESLIE Kohler  Phone: [phone]  Pager: [phone]  To contact the weekend RNCC, Page: [phone]

## 2021-09-15 NOTE — PROGRESS NOTES
Inpatient Pain Service: Follow Up Note  09/15/21    Patient: Demetra Richardson  Admission Date:9/10/2021    5 Days Post-Op     Chief Pain Endorsement: abdominal pain    Recommendations were discussed and relayed to surgery team  Plan was reviewed by the Inpatient Pain Service, staff attending Dr. Chaney      1. Acute post operative abdominal pain s/p  ex lap, extensive BRIDGET, revision of GJ and JJ anastomoses on 9/10/21.  2. H/o chronic headache; chronic abdominal pain due to past abdominal surgeries:Billroth II for gastric outlet obstruction in 2005, revision gastrojejunostomy in 2011.   3. On methadone maintenance, h/o opioid use disorder from OxyContin use.  On a stable dose of methadone currently and sober for some time now.       -- Outpatient opioid requirements prior to admission: methadone 120mg once daily.   reviewed.     Primary Care Provider: Fredy Merritt  Chronic Pain Provider: methadone clinic    1. Discontinue IV Dilaudid. Radha is in agreement.  2. Change Dilaudid to 4mg PO Q 3 hours PRN.  Please try to stay on top of dose every 3 hours as needed.    Tomorrow, she will reduce to Dilaudid 4mg PO Q 4 hours PRN.    Upon discharge, allow Dilaudid 4mg PO Q 4 hours PRN (max 6tabs/day)x 2 days      Then decrease to Dilaudid 4mg PO Q 5 hours PRN (max 5 tablets/day) x 1day     Then decrease to Dilaudid 4mg PO Q 6 hours PRN (max 4 tablets/day) x 1day     Then decrease to Dilaudid 4mg PO Q 8 hours PRN (max 3 tablets/day) x 1day     Then decrease to Dilaudid 4mg PO Q 12 hours PRN (max 2 tablets/day) x 1day     Then decrease to Dilaudid 4mg PO Q once a day PRN (max 1 tablets/day) x 1day and stop.  (this is 7 days of prescription as Radha requested and agreed upon). #27 tabs.      3. Continue methadone 120mg PO once daily.  4. Bowel regimen per primary team to prevent opioid induced constipation.    Pain Service will Sign Off at this time    Thank you for consulting the Inpatient Pain Management  Service. The above recommendations are to be acted upon at the primary team s discretion.     To reach us:  Mon - Friday 8 AM - 3 PM: Pager [phone] (Text Page)   After hours, weekends and holidays: Primary service should call [phone] The answering service for the on-call pain specialist    PAIN MEDICATION SAFE USE:   Prior to discharge instruct patient on the following in addition to the medication fact sheet:    Caution: these medications can cause sedation    Take prescription medicine only if it has been prescribed by your doctor    Do not take more medicine or take it more often than instructed     Call your doctor if pain gets worse    Never mix pain medicine with alcohol, sleeping pills, or any illicit drugs    Do not operate heavy machinery, including vehicles, when initiation opioid therapy or increasing dosage    Store prescription opioids in a locked container, whenever possible     Dispose of unused opioids appropriately     Do not stop abruptly once at higher doses.  These medications must be tapered off.    Opioid pain medications do carry the risk for physical dependence and addiction and patients should be counseled about this.     Interval History:  Demetra SEVERIANO Richardson was seen today (09/15/21) and she reports that pain has increased due to recent dressing change.  At this time, pain is manageable with current regimen.  She is planning to be discharged tomorrow.  We discussed the pain plan for today and pain plan upon discharge as above which Radha is in agreement.  She has already coordinated with methadone clinic to obtain take home doses of methadone starting on Friday morning.    -- Inpatient Medications Related to Pain Management:   Medications related to Pain Management (From now, onward)    Start     Dose/Rate Route Frequency Ordered Stop    09/14/21 1230  HYDROmorphone (DILAUDID) tablet 4-6 mg      4-6 mg Oral EVERY 4 HOURS PRN 09/14/21 1216      09/14/21 1230  HYDROmorphone (PF)  "(DILAUDID) injection 0.3-0.5 mg      0.3-0.5 mg Intravenous EVERY 3 HOURS PRN 09/14/21 1216      09/14/21 1230  senna-docusate (SENOKOT-S/PERICOLACE) 8.6-50 MG per tablet 2 tablet      2 tablet Oral 2 TIMES DAILY 09/14/21 1223      09/13/21 0530  methadone (DOLOPHINE-INTENSOL) 10 MG/ML (HIGH CONC) solution 120 mg      120 mg Oral EVERY MORNING 09/12/21 1143      09/12/21 1400  pregabalin (LYRICA) capsule 150 mg      150 mg Oral 3 TIMES DAILY 09/12/21 1115      09/12/21 1111  hydrOXYzine (ATARAX) tablet 25 mg      25 mg Oral 2 TIMES DAILY PRN 09/12/21 1115      09/10/21 1704  diphenhydrAMINE (BENADRYL) capsule 25 mg      25 mg Oral EVERY 6 HOURS PRN 09/10/21 1704      09/10/21 1704  diphenhydrAMINE (BENADRYL) injection 25 mg      25 mg Intravenous EVERY 6 HOURS PRN 09/10/21 1704      09/10/21 1704  lidocaine 1 % 0.1-1 mL      0.1-1 mL Other EVERY 1 HOUR PRN 09/10/21 1704      09/10/21 1704  lidocaine (LMX4) cream       Topical EVERY 1 HOUR PRN 09/10/21 1704            LAB DATA:  Recent Labs   Lab 09/15/21  0746 09/14/21  0724 09/13/21  0636 09/12/21  0802 09/12/21  0802 09/11/21  1606 09/11/21  1606   CR 0.56 0.55 0.55   < > 0.47*   < > 0.54   WBC  --   --  12.5*  --   --   --   --    HGB  --   --  7.3*  --   --   --   --    AST  --   --  24  --  45  --  68*   ALT  --   --  41  --  59*  --  64*    < > = values in this interval not displayed.       /65 (BP Location: Right arm)   Pulse 83   Temp (!) 96.3  F (35.7  C) (Oral)   Resp 18   Ht 1.6 m (5' 3\")   Wt 54.6 kg (120 lb 5.9 oz)   LMP  (LMP Unknown)   SpO2 96%   BMI 21.32 kg/m     EXAM:  CONSTITUTIONAL/GENERAL APPEARANCE:  Conversant.  EYES: EOMI, sclerae clear  ENT/NECK: neck is supple  RESPIRATORY:breathing on room air  CARDIOVASCULAR:HR within normal limits  GI:soft, tender to light palpation, Incision with dressing in lace  MUSCULOSKELETAL/BACK/SPINE/EXTREMITIES: Moving UE and LE independently       NEURO:  AAOx3  SKIN/VASCULAR EXAM:  Dry and " warm.  PSYCHIATRIC/BEHAVIORAL/OBSERVATIONS:  No objective signs of pain observed during our interview.   Judgment/Insight -fair   Orientation - x3   Memory -good   Mood and affect - calm, pleasant, cooperative      ----------------------------------------------------------------------------------  Doris Bonds PA-C  Inpatient Pain Service     Helpful Resources:  Getting Rid of Unwanted Medications (printable PDF for patients)   Opioid Overdose Prevention Toolkit (printable PDF for patients)   Prescription Opioids: What You Need To Know (printable PDF for patients)

## 2021-09-16 ENCOUNTER — APPOINTMENT (OUTPATIENT)
Dept: OCCUPATIONAL THERAPY | Facility: CLINIC | Age: 66
DRG: 326 | End: 2021-09-16
Attending: SURGERY
Payer: COMMERCIAL

## 2021-09-16 LAB
GLUCOSE BLDC GLUCOMTR-MCNC: 106 MG/DL (ref 70–99)
GLUCOSE BLDC GLUCOMTR-MCNC: 140 MG/DL (ref 70–99)
HOLD SPECIMEN: NORMAL
MAGNESIUM SERPL-MCNC: 2 MG/DL (ref 1.6–2.3)
PHOSPHATE SERPL-MCNC: 3.7 MG/DL (ref 2.5–4.5)
PLATELET # BLD AUTO: 340 10E3/UL (ref 150–450)
POTASSIUM BLD-SCNC: 4.1 MMOL/L (ref 3.4–5.3)

## 2021-09-16 PROCEDURE — 36415 COLL VENOUS BLD VENIPUNCTURE: CPT | Performed by: SURGERY

## 2021-09-16 PROCEDURE — 97530 THERAPEUTIC ACTIVITIES: CPT | Mod: GO

## 2021-09-16 PROCEDURE — 97535 SELF CARE MNGMENT TRAINING: CPT | Mod: GO

## 2021-09-16 PROCEDURE — 84100 ASSAY OF PHOSPHORUS: CPT | Performed by: SURGERY

## 2021-09-16 PROCEDURE — 250N000011 HC RX IP 250 OP 636: Performed by: SURGERY

## 2021-09-16 PROCEDURE — 120N000002 HC R&B MED SURG/OB UMMC

## 2021-09-16 PROCEDURE — 250N000013 HC RX MED GY IP 250 OP 250 PS 637: Performed by: STUDENT IN AN ORGANIZED HEALTH CARE EDUCATION/TRAINING PROGRAM

## 2021-09-16 PROCEDURE — 250N000013 HC RX MED GY IP 250 OP 250 PS 637: Performed by: SURGERY

## 2021-09-16 PROCEDURE — 84132 ASSAY OF SERUM POTASSIUM: CPT | Performed by: SURGERY

## 2021-09-16 PROCEDURE — 83735 ASSAY OF MAGNESIUM: CPT | Performed by: SURGERY

## 2021-09-16 RX ORDER — HYDROMORPHONE HYDROCHLORIDE 4 MG/1
TABLET ORAL
Qty: 27 TABLET | Refills: 0 | Status: SHIPPED | OUTPATIENT
Start: 2021-09-17 | End: 2021-09-24

## 2021-09-16 RX ORDER — HYDROMORPHONE HYDROCHLORIDE 4 MG/1
4 TABLET ORAL EVERY 4 HOURS PRN
Status: DISCONTINUED | OUTPATIENT
Start: 2021-09-16 | End: 2021-09-17 | Stop reason: HOSPADM

## 2021-09-16 RX ADMIN — Medication 5 ML: at 08:59

## 2021-09-16 RX ADMIN — PREGABALIN 150 MG: 25 CAPSULE ORAL at 21:38

## 2021-09-16 RX ADMIN — PREGABALIN 150 MG: 25 CAPSULE ORAL at 07:48

## 2021-09-16 RX ADMIN — HYDROMORPHONE HYDROCHLORIDE 4 MG: 4 TABLET ORAL at 10:02

## 2021-09-16 RX ADMIN — METHADONE HYDROCHLORIDE 120 MG: 10 CONCENTRATE ORAL at 05:31

## 2021-09-16 RX ADMIN — HYDROMORPHONE HYDROCHLORIDE 4 MG: 4 TABLET ORAL at 05:57

## 2021-09-16 RX ADMIN — HYDROXYZINE HYDROCHLORIDE 25 MG: 25 TABLET, FILM COATED ORAL at 02:36

## 2021-09-16 RX ADMIN — HYDROMORPHONE HYDROCHLORIDE 4 MG: 4 TABLET ORAL at 02:55

## 2021-09-16 RX ADMIN — ESCITALOPRAM OXALATE 20 MG: 20 TABLET ORAL at 07:49

## 2021-09-16 RX ADMIN — PREGABALIN 150 MG: 25 CAPSULE ORAL at 13:01

## 2021-09-16 RX ADMIN — HYDROMORPHONE HYDROCHLORIDE 4 MG: 4 TABLET ORAL at 14:12

## 2021-09-16 RX ADMIN — HYDROMORPHONE HYDROCHLORIDE 4 MG: 4 TABLET ORAL at 21:39

## 2021-09-16 ASSESSMENT — ACTIVITIES OF DAILY LIVING (ADL)
ADLS_ACUITY_SCORE: 11
ADLS_ACUITY_SCORE: 16
ADLS_ACUITY_SCORE: 16
ADLS_ACUITY_SCORE: 13
ADLS_ACUITY_SCORE: 16
ADLS_ACUITY_SCORE: 13

## 2021-09-16 NOTE — PLAN OF CARE
"/60 (BP Location: Right arm)   Pulse 90   Temp 97.8  F (36.6  C) (Oral)   Resp 18   Ht 1.6 m (5' 3\")   Wt 54.6 kg (120 lb 5.9 oz)   LMP  (LMP Unknown)   SpO2 95%   BMI 21.32 kg/m       Activity: Up w/ SBA.  Neuros: A&O x4.   Cardiac: WDL.   Respiratory: WDL on RA. Denies SOB.  GI/: Voiding spontaneously. +BS, passing flatus. No BM this shift, Last BM 9/15.   Diet: Bariatric Full Liquid.  Skin/Incisions: Midline incision - small drainage, dressing change done.   Lines: DL PICC infusing TPN @ 123 mL/hr & lipids 20.8 mL/hr.  Drains: R ABNER suction to bulb, green output.   Pain/nausea: Managed w/ PO dilaudid.     "

## 2021-09-16 NOTE — PROVIDER NOTIFICATION
TPN noted to be off this AM, unsure of timing. BG taken and was 106. Provider notified and called back stating ok to run bag through out day, will consult with dietician.

## 2021-09-16 NOTE — PLAN OF CARE
Vitals: Temp: 98.3  F (36.8  C) Temp src: Oral BP: 110/64 Pulse: 72   Resp: 16 SpO2: 95 % O2 Device: Nasal cannula Oxygen Delivery: 1 LPM      Time: 4915-1761  Reason for admission: Ex laparotomy, lysis of Adhesions, Jejunojejunostomy resection and Revision, Gastrojejunal Resection (partial gastrectomy with enterectomy) with Miles en Y Reconstruction Gastrojejunostomy  Activity:  SBA. Bedside commode. Ambulated w/ OT today.   Pain:  PO prn dilaudid q4h given x2   Neuro: A&O x4. Calls appropriately. Denies numbness/tingling.   Cardiac: WDL.   Respiratory:  WDL. LS clear. Sating mid-90's on RA. Some dyspnea on exertion. Denies SOB/cough.  GI/:  Voiding adequetely. No BM. +BS. Using bedside commode.   Diet: Bariatric full liquid.   Lines:  L PICC DL: purple hep locked, red infusing TPN + lipids.  Incisions/Drains/Skin:  Midline abdominal incision - lower half stapled and WDL, upper half packed wet to dry + ABD. Dressing changed this AM by MD. LEVINE (R) draining green output.   Lab:  Reviewed.   Electrolyte Replacements: None needed     New changes this shift:  Pt to discharge tm by 1100. Continue plan of care.

## 2021-09-16 NOTE — PROGRESS NOTES
MIS/Bariatric Surgery Progress Note    Demetra Richardson  [id number]    No acute overnight events. Afebrile. Pain control adequate on oral medications only. Tolerating bariatric full liquid diet. No nausea or emesis. Continues to pass flatus and have BM. Voiding well. Ambulating.     Temp:  [97.1  F (36.2  C)-98.3  F (36.8  C)] 98.3  F (36.8  C)  Pulse:  [71-90] 72  Resp:  [16-18] 16  BP: (105-117)/(58-64) 110/64  SpO2:  [94 %-96 %] 95 %    Intake/Output Summary (Last 24 hours) at 9/16/2021 0849  Last data filed at 9/16/2021 0808  Gross per 24 hour   Intake 500 ml   Output 1920 ml   Net -1420 ml     Resting in bed, no acute distress  regular rate and rhythm  non-labored breathing on room air  Soft, appropriately tender, non-distended  Wound packing change-- remaining staples removed, fibrinous exudate at wound base, no significant purulent drainage, no surrounding cellulitis. Washed out wound and placed wet-to-dry dressing.  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 Subsequently had distal gastrectomy, resection of retained antrum, and koby-en-y gastrojejunostomy in 2011 for gastric wall obstruction. Had progressively worsening postprandial abdominal pain, nausea and vomiting. Now s/p ex lap, extensive BRIDGET, revision of GJ and JJ anastomoses on 9/10. Doing well.      -Home methadone and lyrica. Continue PO dilaudid PRN taper as recommended by pain service.  -Home vistaril for anxiety   -Bariatric full liquid diet, cont home TPN   -Superficial wound infection without cellulitis, cont BID wet-to-dry dressing changes  -Continue ABNER to bulb suction, will remove prior to d/c, monitor output  -Voiding independently  -Lovenox for dvt ppt, ambulate QID   -Plan  for discharge home tomorrow after further evaluation of wound and patient education on dressing cares  -Arranging for home care to resume home TPN and assist with dressing changes a few times a week    Dia Noel MD  General Surgery PGY-4

## 2021-09-17 ENCOUNTER — APPOINTMENT (OUTPATIENT)
Dept: OCCUPATIONAL THERAPY | Facility: CLINIC | Age: 66
DRG: 326 | End: 2021-09-17
Attending: SURGERY
Payer: COMMERCIAL

## 2021-09-17 ENCOUNTER — HOME INFUSION (PRE-WILLOW HOME INFUSION) (OUTPATIENT)
Dept: PHARMACY | Facility: CLINIC | Age: 66
End: 2021-09-17

## 2021-09-17 VITALS
DIASTOLIC BLOOD PRESSURE: 58 MMHG | OXYGEN SATURATION: 95 % | TEMPERATURE: 96.1 F | WEIGHT: 120.37 LBS | SYSTOLIC BLOOD PRESSURE: 115 MMHG | HEART RATE: 74 BPM | RESPIRATION RATE: 17 BRPM | BODY MASS INDEX: 21.33 KG/M2 | HEIGHT: 63 IN

## 2021-09-17 LAB
ANION GAP SERPL CALCULATED.3IONS-SCNC: 4 MMOL/L (ref 3–14)
BUN SERPL-MCNC: 20 MG/DL (ref 7–30)
CALCIUM SERPL-MCNC: 8.4 MG/DL (ref 8.5–10.1)
CHLORIDE BLD-SCNC: 103 MMOL/L (ref 94–109)
CO2 SERPL-SCNC: 29 MMOL/L (ref 20–32)
CREAT SERPL-MCNC: 0.64 MG/DL (ref 0.52–1.04)
GFR SERPL CREATININE-BSD FRML MDRD: >90 ML/MIN/1.73M2
GLUCOSE BLD-MCNC: 79 MG/DL (ref 70–99)
GLUCOSE BLDC GLUCOMTR-MCNC: 90 MG/DL (ref 70–99)
HOLD SPECIMEN: NORMAL
MAGNESIUM SERPL-MCNC: 2.2 MG/DL (ref 1.6–2.3)
PHOSPHATE SERPL-MCNC: 4.4 MG/DL (ref 2.5–4.5)
POTASSIUM BLD-SCNC: 4.4 MMOL/L (ref 3.4–5.3)
SODIUM SERPL-SCNC: 136 MMOL/L (ref 133–144)

## 2021-09-17 PROCEDURE — 250N000013 HC RX MED GY IP 250 OP 250 PS 637: Performed by: SURGERY

## 2021-09-17 PROCEDURE — 97530 THERAPEUTIC ACTIVITIES: CPT | Mod: GO

## 2021-09-17 PROCEDURE — 80048 BASIC METABOLIC PNL TOTAL CA: CPT | Performed by: SURGERY

## 2021-09-17 PROCEDURE — 250N000013 HC RX MED GY IP 250 OP 250 PS 637: Performed by: STUDENT IN AN ORGANIZED HEALTH CARE EDUCATION/TRAINING PROGRAM

## 2021-09-17 PROCEDURE — 36592 COLLECT BLOOD FROM PICC: CPT | Performed by: SURGERY

## 2021-09-17 PROCEDURE — 84100 ASSAY OF PHOSPHORUS: CPT | Performed by: SURGERY

## 2021-09-17 PROCEDURE — 82306 VITAMIN D 25 HYDROXY: CPT | Performed by: STUDENT IN AN ORGANIZED HEALTH CARE EDUCATION/TRAINING PROGRAM

## 2021-09-17 PROCEDURE — 97535 SELF CARE MNGMENT TRAINING: CPT | Mod: GO

## 2021-09-17 PROCEDURE — 83735 ASSAY OF MAGNESIUM: CPT | Performed by: SURGERY

## 2021-09-17 RX ADMIN — HYDROMORPHONE HYDROCHLORIDE 4 MG: 4 TABLET ORAL at 05:52

## 2021-09-17 RX ADMIN — HYDROXYZINE HYDROCHLORIDE 25 MG: 25 TABLET, FILM COATED ORAL at 08:20

## 2021-09-17 RX ADMIN — PREGABALIN 150 MG: 25 CAPSULE ORAL at 08:15

## 2021-09-17 RX ADMIN — METHADONE HYDROCHLORIDE 120 MG: 10 CONCENTRATE ORAL at 05:01

## 2021-09-17 RX ADMIN — HYDROMORPHONE HYDROCHLORIDE 4 MG: 4 TABLET ORAL at 01:42

## 2021-09-17 RX ADMIN — ESCITALOPRAM OXALATE 20 MG: 20 TABLET ORAL at 08:15

## 2021-09-17 RX ADMIN — HYDROXYZINE HYDROCHLORIDE 25 MG: 25 TABLET, FILM COATED ORAL at 00:53

## 2021-09-17 ASSESSMENT — ACTIVITIES OF DAILY LIVING (ADL)
ADLS_ACUITY_SCORE: 13

## 2021-09-17 NOTE — PLAN OF CARE
"BP 99/64 (BP Location: Right arm)   Pulse 74   Temp 97.5  F (36.4  C) (Oral)   Resp 16   Ht 1.6 m (5' 3\")   Wt 54.6 kg (120 lb 5.9 oz)   LMP  (LMP Unknown)   SpO2 95%   BMI 21.32 kg/m    Status: VSS on room air  Pain/Nausea: PO dilaudid x1, no c/o nausea  Mobility: SBA  Diet: Bariatric fulls  Labs: Reviewed  Lines: DL PICC hep locked  Drains: ABNER pulled at beginning of shift by MD  Skin/Incisions: Pt did own abdominal dressing change with cues from RN, able to complete most of dressing change, needs reinforcement  Neuro: A&Ox4  Respiratory: WDL on room air  Cardiac: WDL  GI: BS+, WDL  : Voiding spont, not saving  New Changes: refused TPN/Lipids, was done this morning, wants to do it tomorrow night  Plan: Continue POC, dressing change in AM with MD, probable discharge tomorrow AM    "

## 2021-09-17 NOTE — DISCHARGE SUMMARY
[facility]  Bariatric Surgery Discharge Summary    Date of Admission: 9/10/2021  Date of Discharge: 9/17/2021   Attending Physician: Kaveh Clayton    Admission Diagnosis:  1. Severe gastric atony  2. Gastroparesis  3. Severe hypoproteinemia  4. Gastrojejunal stricture  5. Possible gastrojejunal ulcer    Please note that patient also presented with severe malnutrition as shown by albumin below 2.0 on multiple occasions in the past year.  She has required TPN for at least 6 weeks to improve nutritional status in order to tolerate surgery.    Discharge Diagnosis:  Same as above  Superficial wound infection without cellulitis     Consultations:  Pain service   Physical therapy  Occupational therapy    Procedures:  Exploratory laparotomy, extensive lysis of adhesions, gastrojejunostomy revision (partial gastrectomy with enterectomy and koby en y reconstruction of gastrojejunostomy), jejunojejunostomy revision, upper endoscopy  (Dr. 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 She subsequently had distal gastrectomy, resection of retained antrum, and koby-en-y gastrojejunostomy in 2011 for gastric wall obstruction. Over the last two years the patient reports having progressively worsening postprandial episodes of abdominal pain, nausea and vomiting in conjunction with at least 10 lb weight loss. After a discussion of the risks, benefits, and alternatives, the patient elected to proceed with surgery.     Hospital Course:  The patient was admitted and taken to the OR by Dr. Clayton for the above stated procedure which she tolerated well without complications. She recovered well, remained afebrile and hemodynamically stable throughout hospitalization. Pain service was  "consulted to assist with management of post-operative pain. On POD5, she developed superficial wound infection without cellulitis which required removal of skin staples and initiation of BID wet to dry packing. Her ABNER drain adjacent to revised gastrojejunostomy had serous output post-operatively, and was removed on POD6. By the day of discharge (POD7), she was tolerating bariatric full liquid diet in good amounts, ambulating, spontaneously voiding and stooling, and pain was controlled on oral medication. She was instructed on post operative care, monitoring and follow up and was discharged to home. She will continue home TPN and BID wet to dry dressing changes at discharge, home cares arranged for these. She will follow up with Dr. Clayton in 2 weeks.     Discharge Physical Exam:  Temp:  [96.1  F (35.6  C)-97.5  F (36.4  C)] 96.1  F (35.6  C)  Pulse:  [74-83] 74  Resp:  [16-17] 17  BP: ()/(58-64) 115/58  SpO2:  [94 %-95 %] 95 %    /58 (BP Location: Right arm)   Pulse 74   Temp (!) 96.1  F (35.6  C) (Oral)   Resp 17   Ht 1.6 m (5' 3\")   Wt 54.6 kg (120 lb 5.9 oz)   LMP  (LMP Unknown)   SpO2 95%   BMI 21.32 kg/m      Gen: alert, female in NAD, laying comfortably in bed  Lungs: non-labored breathing on room air  CV: regular rate and rhythm   Abd: soft, nondistended, appropriately tender  Wound: midline abdominal wound clean, some fibrinous exudate at wound base, no surrounding ceullitis  Ext: moving all extremities spontaneously without apparent deficit    Meds:     Review of your medicines      START taking      Dose / Directions   HYDROmorphone 4 MG tablet  Commonly known as: Dilaudid  Used for: Status post bariatric surgery      Start taking on: September 17, 2021  Take 1 tablet (4 mg) by mouth every 4 hours as needed for breakthrough pain or severe pain for 2 days, THEN 1 tablet (4 mg) 5 x daily PRN for breakthrough pain or severe pain for 1 day, THEN 1 tablet (4 mg) every 6 hours as needed for " breakthrough pain or severe pain for 1 day, THEN 1 tablet (4 mg) every 8 hours as needed for breakthrough pain or severe pain for 1 day, THEN 1 tablet (4 mg) every 12 hours as needed for breakthrough pain or severe pain for 1 day, THEN 1 tablet (4 mg) daily as needed for breakthrough pain or severe pain.  Quantity: 27 tablet  Refills: 0     parenteral nutrition - PTA/DISCHARGE ORDER  Used for: Severe protein-calorie malnutrition (H)      The TPN formula will print on the After Visit Summary Report.  Quantity: 1 each  Refills: 0        CONTINUE these medicines which have NOT CHANGED      Dose / Directions   escitalopram 20 MG tablet  Commonly known as: LEXAPRO  Used for: Anxiety disorder, unspecified type      TAKE 1 TABLET BY MOUTH EVERY DAY  Quantity: 30 tablet  Refills: 5     hydroxychloroquine 200 MG tablet  Commonly known as: PLAQUENIL  Used for: Systemic lupus erythematosus, unspecified SLE type, unspecified organ involvement status (H)      Dose: 200 mg  Take 1 tablet (200 mg) by mouth 2 times daily (with meals)  Quantity: 180 tablet  Refills: 1     hydrOXYzine 25 MG capsule  Commonly known as: VISTARIL  Used for: Anxiety disorder, unspecified type      Dose: 25 mg  Take 1 capsule (25 mg) by mouth 2 times daily as needed for anxiety  Quantity: 60 capsule  Refills: 11     methadone 10 MG/ML (HIGH CONC) solution  Commonly known as: DOLOPHINE-INTENSOL      Dose: 120 mg  Take 120 mg by mouth daily Takes it at 0530 everyday. Only uses red not yellow  Refills: 0     omeprazole 40 MG DR capsule  Commonly known as: priLOSEC  Used for: Nausea, Gastrointestinal hemorrhage, unspecified gastrointestinal hemorrhage type      Dose: 40 mg  Take 1 capsule (40 mg) by mouth 2 times daily  Quantity: 120 capsule  Refills: 5     parenteral nutrition - PTA/DISCHARGE ORDER  Used for: Protein-calorie malnutrition, moderate (H)      The TPN formula will print on the After Visit Summary Report.  Quantity: 1 each  Refills: 0      polyethylene glycol 17 GM/Dose powder  Commonly known as: MiraLax  Used for: Drug-induced constipation      1 capful (17 g) in 8 oz of liquid  Quantity: 225 g  Refills: 3     pregabalin 150 MG capsule  Commonly known as: LYRICA  Used for: Fibromyalgia      Dose: 150 mg  TAKE 1 CAPSULE (150 MG) BY MOUTH 3 TIMES DAILY  Quantity: 90 capsule  Refills: 1     * valACYclovir 500 MG tablet  Commonly known as: VALTREX  Used for: Herpes simplex virus infection      Dose: 500 mg  Take 1 tablet (500 mg) by mouth 2 times daily  Quantity: 60 tablet  Refills: 1     * valACYclovir 500 MG tablet  Commonly known as: VALTREX  Used for: HSV-2 (herpes simplex virus 2) infection      Dose: 500 mg  Take 1 tablet (500 mg) by mouth daily  Quantity: 90 tablet  Refills: 3         * This list has 2 medication(s) that are the same as other medications prescribed for you. Read the directions carefully, and ask your doctor or other care provider to review them with you.               Where to get your medicines      Some of these will need a paper prescription and others can be bought over the counter. Ask your nurse if you have questions.    Bring a paper prescription for each of these medications    HYDROmorphone 4 MG tablet    parenteral nutrition - PTA/DISCHARGE ORDER          Additional instructions:     Home infusion referral      Reason for your hospital stay    Partial gastrectomy and revision of gastrojejunostomy and jejunojejunostomy (removal of part of your stomach, revision of the stomach-to-small-bowel connection and the small-bowel-to-small-bowel connection).     Activity    Your activity upon discharge: activity as tolerated, no lifting > 20 lbs for 4 weeks     Wound care and dressings    Instructions to care for your wound at home: Wash wound in the shower daily with soapy water. Change packing twice daily with wet to dry dressings- place wet gauze within wound (can use q-tip to help pack), then place dry gauze and ABD on top,  hold in place with tape.     Discharge Instructions    After Bariatric Surgery Discharge Instructions  [facility] Comprehensive Weight Management     Note: Ask your nurse to order your medications from the pharmacy. Be sure you have your medications with you when you leave.  Diet on discharge: bariatric full liquid diet    How much fluid should I drink?  Strive for 48-64 ounces daily.  Carry a water bottle with you without a straw or sports top. Drink from it often.  Keep track of how much fluid you drink in a day.  Remember:  -Do not use straws, chew gum or suck on hard candies. They may cause painful gas.    -Sip, don't slurp when you drink.    -Practice small sips using a medicine cup for the first week postop.   -No ice or cold drinks. This could cause gas or spasms.   -No coffee, soda pop or drinks with caffeine. These may cause stomach pain.   -No alcohol. It is bad for your liver and will cause stomach pain.    How often should I do my deep breathing and coughing?  Use your incentive spirometer (small plastic breathing device) every hour while awake after you get home. Using the incentive spirometer helps you deep breath. Continuing to cough and deep breath will help prevent fevers and pneumonia.   If you do not have a fever after one week, you can stop using the incentive spirometer and discard it.     You can continue to take deep breaths without the incentive spirometer every one to two hours while awake for the month after surgery.    What kinds of activity can I do?  Get plenty of rest the first few days after surgery and try to balance rest and activity. You will need some time to recover - you may be more tired than you realize at first. You'll feel better and heal faster if you take good care of yourself.  For 4 weeks after surgery (Please review restrictions at your one week visit, they could change based on how well you are doing):  -Don't lift more than 20 pounds.   -Take 4-5 short walks  every day.  -Don't jog, run, or do belly exercises.  Don't swim, bathe or use a hot tub until your cuts are healed (scabs are gone).  You may shower  Don't plan to fly or take a road trip within the first 1 to 3 months after surgery.  You could get a blood clot in your legs. If you must travel, get up and move around every hour for at least 5 minutes before continuing on your journey.  Your care team can help you decide when it's safe for you to travel.     What can I do for pain control?  You had major belly surgery that involves all layers of your belly muscles. Pain is expected, even for some as far out as 6-8 weeks after surgery. Moving, sneezing, coughing, and breathing will cause discomfort because these activities use your belly muscles.   Please see your after visit summary medication review for what pain medication will be continued, discontinued and newly started for you.    You can take opioid pain medicine if prescribed and if needed. Try to wean off from it as soon as you feel comfortable.   Do not drive while you are taking opioid pain medicine. This is dangerous.  You can take acetaminophen (Tylenol) between your prescribed pain medicine on a scheduled basis OR take it scheduled every 6 hours (check with your care team for specifics).  -Acetaminophen formulation options:    -Liquid   -Caplet (Cut caplet in half before taking)   -Do not take more than 3000 mg Tylenol in a 24 hour period.  -You may also take Tylenol for pain in place of the opioid pain medicine (check with your care team for specifics).   You can apply ice or heat to the affected area(s). Just remember to wrap the ice in something and limit icing sessions to 20 minutes. Excessive icing can irritate the skin or cause skin damage.  You can apply heat with a hot, wet towel or heating pad. Just like cold therapy, limit heat application to 20 minutes. Never sleep with a heating pad on. It could cause severe burns to your skin.  Wear your  binder to support your belly muscles if you have one.  Take this off a little more each day and try to be off completely by 2 weeks after surgery. If you don't need your binder for comfort or support, you don't need to wear it.   You may not be able to sleep in a comfortable position for a few weeks after surgery. This is normal. You may be more comfortable sleeping in a recliner or propped up with 3 pillows for the first couple of weeks after surgery.  Do not take NSAID's (Non-Steroidal Anti-Inflammatory Drugs) (examples: ibuprofen, Motrin, Advil, Aleve, and Naproxen), aspirin, or use pain patches with NSAID's. They will increase your risk of bleeding or getting an ulcer.    Please call the clinic for any of the following pain concerns, we would like to talk to you:  -pain that does not improve with rest  -pain that gets worse and worse  -pain that is not controlled by your pain medicine  -a sudden severe increase in pain    What medications will I need to take after surgery?  It is important to reduce the amount of acid in your new stomach for a couple of months after surgery while it is still healing. We will prescribe an acid reducer or antacid. Take it as directed. This will help prevent ulcers, heartburn and acid reflux.  If you took an acid reducer before you had surgery, your care team will let you know what acid reducer you will take after surgery.   It is okay to swallow any medications smaller than   inch in size.   -If it is larger than   inch, it may need to be cut, crushed, or in a liquid form. Check with your care team about which way is most appropriate for you and your medications.    What should I know about my incisions (cuts)?  Continue twice daily wet to dry dressing changes as directed by your care team.   Do not submerge in water (e.g. No baths, swimming pools, hot tubs) until your care team tells you it is okay and your incisions are completely healed.    Call the clinic if you have any of  these signs or symptoms of infection:  -Redness around the site.  -Drainage that smells bad.  -Drainage that is thick yellow or green.  -An increase in pain around the incision site  -An increase in swelling around the incision site  -Heat or warmth around incision site   -Fever of 101.5  F (38.3  C) or higher when taken under the tongue.    -Chills    Will my urine or bowel movements change?   Your first bowel movements (stools) will likely be liquid. You may also notice old blood or a darker color (black or maroon color) in your bowel movements.  This is not unusual and usually goes away after the first week, if not sooner. You may not have a bowel movement for a week.   If you have not had a bowel movement for at least three days after your surgery date and are passing gas, you can use over the counter stool softeners.  Please stop taking the stool softeners and laxatives if your stools are loose.  Increasing fluids and activity as well as getting off narcotics will help prevent constipation.    Call the clinic if:   -You have stomach pain.   -You continue to have constipation.  -You have excessive bloating after walking and passing gas.    How can I prevent dehydration if I feel nauseated (sick to my stomach) and vomit (throw up)?   Vomiting is not normal after surgery. If you continue to have nausea and vomiting, call the clinic.   Nausea can be a sign of dehydration. That is why it is very important to track your fluids.  Do not nap more than one hour during the day. Set a timer to wake yourself up, if needed. Too much sleep will keep you from drinking enough fluid during the day and lead to dehydration.  No outside activity in hot, humid weather until you can drink 48 to 64 ounces of fluid in 24 hours. If you sweat a lot, your body may lose too much water.  Try to take a 1 ounce sip of water (one medicine cup) every 15 minutes.  Set a timer to remind yourself.    Call the clinic if you have any of these signs  "or symptoms of dehydration:  -Dark colored urine  -Urinating (pass water) less than 2-3 times per day  -Lack of energy  -Nausea  -Dizziness  -Headache    Call the clinic ANY TIME at [phone] if:  -Your pain medicine is not working.  -You have a fever = 101.5 F.  -You have belly or left shoulder pain that gets worse and worse.  -You have a swollen leg with redness, warmth, or pain behind the knee or calf.  -You have chest pain   -You feel very short of breath.  -You have a sudden severe increase in heart rate.  -You have vomiting that gets worse and worse.  -You have constant nausea (feeling sick to your stomach) that does not go away with medication.  -You have trouble swallowing.  -You have an increasing feeling that \"something is not right\".  -You have hiccups that do not stop.  -You have any questions or concerns.    AFTER HOURS QUESTIONS OR CONCERNS: Call [phone] and ask to speak with surgery resident if you are having troubles in the evenings, at night, or on weekends. Please call if you experience increasing abdominal pain, nausea, vomiting, increasing drainage from your wounds, chills, or fever >101.5    If you have to go to the Emergency Room, we prefer you go to the hospital that did your surgery. Please let them know that you had bariatric surgery and to notify your surgeon.    When should I go back to the clinic?  Follow up with your care team in 2 weeks.   If this appointment was not already made, please call: [phone]    Appointments located at [facility] clinic:  Clinics and Surgery Center ([facility] [place] [place])    [address]  [place] [address]     Adult [facility] Follow-up and recommended labs and tests    Follow up with Dr. Clayton in 2 weeks.    Appointments on [place] and/or [facility] (with [facility] or [facility] provider or service). Call [phone] if you haven't heard regarding these appointments within 7 days of discharge.     Diet    Follow this diet upon discharge: " Bariatric Diet Full Liquids       Discussed with Dr. Clayton.  - - - - - - - - - - - - - - - - - -  Dia Noel MD  General Surgery PGY-4  [id number]

## 2021-09-17 NOTE — PROGRESS NOTES
Care Management Discharge Note    Discharge Date: 09/17/2021       Discharge Disposition: Assisted Living, Home Infusion    Discharge Services: Home Care, Home Infusion    Discharge DME: Oxygen    Discharge Transportation: health plan transportation    Private pay costs discussed: Not applicable    PAS Confirmation Code: NA   Patient/family educated on Medicare website which has current facility and service quality ratings: yes    Education Provided on the Discharge Plan: Yes   Persons Notified of Discharge Plans: yes  Patient/Family in Agreement with the Plan: yes    Handoff Referral Completed:External referral    Additional Information:  Per MD team patient is medically ready for discharge to home today.  This writer faxed the TPN recipe to [facility].   They will deliver supplies to the pt's apt later today.  MD's have taught the patient wound care and patient feels comfortable managing at home.  Patient plans on taking a medical cab to home this morning.  She had no further questions or concerns regarding discharge.  RNCC will remain available if further needs arise.      [facility](TPN/nursing visits)  Phone: [phone]  Fax: [phone]      [facility](home oxygen)  Phone: [phone]     [facility] [place]     [facility] Care Coordinator, [place]  Phone: [phone]         Jina Hyde RNCC  Phone: [phone]  Pager: [phone]  To contact the weekend RNCC, Page: [phone]

## 2021-09-17 NOTE — PROGRESS NOTES
CLINICAL NUTRITION SERVICES - BRIEF NOTE     Nutrition Prescription    RECOMMENDATIONS FOR MDs/PROVIDERS TO ORDER:  - recommend checking Vitamin D lab and treating aggressively if low.     Recommendations already ordered by Registered Dietitian (RD):  Continue current TPN and lipids.    Future/Additional Recommendations:  - available for further questions re: diet prior to discharge.   - will discuss plan for usual post bariatric vitamin/minerals supplements as pt is currently receiving much of her needs via TPN.   - If unable to get Vitamin D checked while inpatient will ask [facility] to order and supplement as needed.      Pt noted that surgery told her that her stomach was currently the size of a golf ball.     Per care rounds, TPN hung last night but not started.  Not discovered until day shift.  Plan to run TPN during day and then resume usual 8pm to 8am schedule.     EVALUATION OF THE PROGRESS TOWARD GOALS   Diet: bariatric full iquids  Nutrition Support: PN 12 hr cycle (1 hr taper at start and 1 hr taper at end) - volume per PharmD with 200g Dex, 95g AA + 250 ml 20% IV lipids 5x/week (SMOF lipids if able) provides 1417 kcals (26 kcal/kg), 1.8 g PRO/kg, with 25% kcals from Fat.   Intake: Pt's oral intake remains minimal. Still taking mainly chobani Greek yogurt.       NEW FINDINGS   Noted part of pt's abdominal incision had staples removed and is not packed.  Pt will do packing/dressing changes at home.  Labs noted. Zinc--36.9 9/11. (currently supplemented with 5 mg extra Zn in TPN). Just noted Vitamin D levels not checked in recent past with likely severe deficiency and not supplemented in TPN.     INTERVENTIONS  Education--provided pt with copy of [place] diet booklet for s/p Gastric Bypass--adjusted to recommend frequent small meals vs 3 meals/no snacks and other details not appropriate for pt's intake to avoid further wt loss. Discussed renewed potential for dumping.  Pt notes she will comply w/ limiting  sugar as needed to avoid this.   Parenteral nutrition--continue with current orders.    Monitoring/Evaluation  Progress toward goals will be monitored and evaluated per protocol.     Ramandeep Toscano RD, LD   7B (M-F) Pager: [phone]  RD Weekend Pager: [phone]

## 2021-09-17 NOTE — PLAN OF CARE
Occupational Therapy Discharge Summary    Reason for therapy discharge:    Discharged to home. Pt endorses assist available with heavy ADL/IADL at [facility]     Progress towards therapy goal(s). See goals on Care Plan in [facility] electronic health record for goal details.  Goals partially met.  Barriers to achieving goals:   discharge from facility.    Therapy recommendation(s):    Continue home exercise program.

## 2021-09-17 NOTE — PLAN OF CARE
"BP 99/64 (BP Location: Right arm)   Pulse 74   Temp 97.5  F (36.4  C) (Oral)   Resp 16   Ht 1.6 m (5' 3\")   Wt 54.6 kg (120 lb 5.9 oz)   LMP  (LMP Unknown)   SpO2 95%   BMI 21.32 kg/m       Neuros:  A&Ox4. Able to make needs known.   Activity: SBA  Cardiac:  WNL. Denies cardiac chest pain   Respiratory:  WNL. Sating well on RA. Denies SOB  Diet: Bariatric fulls. TPN. TPN not currently running as pt refused.   GI/Gu:  Voiding without difficulty.No BM this shift. Denies N/V  Skin/Incisions: abdominal dressing- CDI. Skin otherwise intact.   LDA: (R) DL PICC hep locked   Pain: abdominal pain, managed with PRN dilaudid and scheduled methadone   Changes:  No acute changes this shift   Plan: Continue POC. Probable discharge today. MD will do morning dressing change    RON GARCIA RN on 9/17/2021 at 5:08 AM       "

## 2021-09-17 NOTE — PROGRESS NOTES
"Blood pressure 115/58, pulse 74, temperature (!) 96.1  F (35.6  C), temperature source Oral, resp. rate 17, height 1.6 m (5' 3\"), weight 54.6 kg (120 lb 5.9 oz), SpO2 95 %, not currently breastfeeding.    Pt is alert and oriented by 4. Complaints of chronic pain. Able to make needs known. Discharging back to assisted living. All medications transferred to pts main [facility] pharmacy in [place]. PICC hep locked, extensions applied by home infusion nurse. Belongings returned to pt. i called for pt to be picked up and brought home. Adequate for discharge, no questions.  "

## 2021-09-18 ENCOUNTER — PATIENT OUTREACH (OUTPATIENT)
Dept: CARE COORDINATION | Facility: CLINIC | Age: 66
End: 2021-09-18

## 2021-09-18 DIAGNOSIS — Z71.89 OTHER SPECIFIED COUNSELING: ICD-10-CM

## 2021-09-18 LAB — DEPRECATED CALCIDIOL+CALCIFEROL SERPL-MC: 19 UG/L (ref 20–75)

## 2021-09-18 NOTE — PROGRESS NOTES
Clinic Care Coordination Contact  [facility]/Voicemail       Clinical Data: Care Coordinator Outreach  Outreach attempted x 2.  Left message on patient's voicemail with call back information and requested return call.  Plan: Care Coordinator to call again in 1-2 days.    Sabine Lugo, [facility]  [phone]  [facility]

## 2021-09-19 ENCOUNTER — HOME INFUSION (PRE-WILLOW HOME INFUSION) (OUTPATIENT)
Dept: PHARMACY | Facility: CLINIC | Age: 66
End: 2021-09-19

## 2021-09-20 ENCOUNTER — CARE COORDINATION (OUTPATIENT)
Dept: ENDOCRINOLOGY | Facility: CLINIC | Age: 66
End: 2021-09-20

## 2021-09-20 NOTE — PROGRESS NOTES
Discharge summary states patient to have BID wet to dry dressing changes BID.  Spoke with [facility] nurse to clarify how often home care is going out to see patient.  Recent note from home visit. Small amount of drainage, small yellow slough.  Wound bed has granulation tissue.    Home care nurse seeing patient 9/21/21 and will determined how often patient needs to be seen.    Phone call to patient to assess questions regarding her wound. Patient is confused about who is supposed to see her.  Patient is very anxious about dressing changes.  Reassured her about what the wound should look like and how the dressing changes work.  Let patient know she would be assessed tomorrow for how often they will come out for her wound changes.    Spent a total of 20 minutes on call with the patient.

## 2021-09-21 ENCOUNTER — LAB REQUISITION (OUTPATIENT)
Dept: LAB | Facility: CLINIC | Age: 66
End: 2021-09-21
Payer: COMMERCIAL

## 2021-09-21 ENCOUNTER — HOME INFUSION (PRE-WILLOW HOME INFUSION) (OUTPATIENT)
Dept: PHARMACY | Facility: CLINIC | Age: 66
End: 2021-09-21

## 2021-09-21 DIAGNOSIS — Z00.00 ENCOUNTER FOR GENERAL ADULT MEDICAL EXAMINATION WITHOUT ABNORMAL FINDINGS: ICD-10-CM

## 2021-09-21 LAB
ALBUMIN SERPL-MCNC: 1.8 G/DL (ref 3.4–5)
ALP SERPL-CCNC: 201 U/L (ref 40–150)
ALT SERPL W P-5'-P-CCNC: 23 U/L (ref 0–50)
ANION GAP SERPL CALCULATED.3IONS-SCNC: 3 MMOL/L (ref 3–14)
AST SERPL W P-5'-P-CCNC: 27 U/L (ref 0–45)
BASOPHILS # BLD AUTO: 0 10E3/UL (ref 0–0.2)
BASOPHILS NFR BLD AUTO: 0 %
BILIRUB DIRECT SERPL-MCNC: <0.1 MG/DL (ref 0–0.2)
BILIRUB SERPL-MCNC: 0.3 MG/DL (ref 0.2–1.3)
BILIRUB SERPL-MCNC: 0.3 MG/DL (ref 0.2–1.3)
BUN SERPL-MCNC: 18 MG/DL (ref 7–30)
CALCIUM SERPL-MCNC: 8 MG/DL (ref 8.5–10.1)
CHLORIDE BLD-SCNC: 105 MMOL/L (ref 94–109)
CO2 SERPL-SCNC: 28 MMOL/L (ref 20–32)
CREAT SERPL-MCNC: 0.78 MG/DL (ref 0.52–1.04)
EOSINOPHIL # BLD AUTO: 0.5 10E3/UL (ref 0–0.7)
EOSINOPHIL NFR BLD AUTO: 5 %
ERYTHROCYTE [DISTWIDTH] IN BLOOD BY AUTOMATED COUNT: 17 % (ref 10–15)
FASTING STATUS PATIENT QL REPORTED: NORMAL
GFR SERPL CREATININE-BSD FRML MDRD: 81 ML/MIN/1.73M2
GLUCOSE BLD-MCNC: 77 MG/DL (ref 70–99)
HCT VFR BLD AUTO: 25.3 % (ref 35–47)
HGB BLD-MCNC: 7.4 G/DL (ref 11.7–15.7)
IMM GRANULOCYTES # BLD: 0.1 10E3/UL
IMM GRANULOCYTES NFR BLD: 1 %
LYMPHOCYTES # BLD AUTO: 1.1 10E3/UL (ref 0.8–5.3)
LYMPHOCYTES NFR BLD AUTO: 11 %
MAGNESIUM SERPL-MCNC: 2.2 MG/DL (ref 1.6–2.3)
MCH RBC QN AUTO: 23.3 PG (ref 26.5–33)
MCHC RBC AUTO-ENTMCNC: 29.2 G/DL (ref 31.5–36.5)
MCV RBC AUTO: 80 FL (ref 78–100)
MONOCYTES # BLD AUTO: 1 10E3/UL (ref 0–1.3)
MONOCYTES NFR BLD AUTO: 10 %
NEUTROPHILS # BLD AUTO: 7.1 10E3/UL (ref 1.6–8.3)
NEUTROPHILS NFR BLD AUTO: 73 %
NRBC # BLD AUTO: 0 10E3/UL
NRBC BLD AUTO-RTO: 0 /100
PHOSPHATE SERPL-MCNC: 3.1 MG/DL (ref 2.5–4.5)
PLATELET # BLD AUTO: 703 10E3/UL (ref 150–450)
POTASSIUM BLD-SCNC: 3.8 MMOL/L (ref 3.4–5.3)
PROT SERPL-MCNC: 6.6 G/DL (ref 6.8–8.8)
RBC # BLD AUTO: 3.17 10E6/UL (ref 3.8–5.2)
SODIUM SERPL-SCNC: 136 MMOL/L (ref 133–144)
TRIGL SERPL-MCNC: 144 MG/DL
WBC # BLD AUTO: 9.7 10E3/UL (ref 4–11)

## 2021-09-21 PROCEDURE — 82248 BILIRUBIN DIRECT: CPT | Performed by: NURSE PRACTITIONER

## 2021-09-21 PROCEDURE — 84478 ASSAY OF TRIGLYCERIDES: CPT | Performed by: NURSE PRACTITIONER

## 2021-09-21 PROCEDURE — 80053 COMPREHEN METABOLIC PANEL: CPT | Performed by: NURSE PRACTITIONER

## 2021-09-21 PROCEDURE — 84100 ASSAY OF PHOSPHORUS: CPT | Performed by: NURSE PRACTITIONER

## 2021-09-21 PROCEDURE — 85025 COMPLETE CBC W/AUTO DIFF WBC: CPT | Performed by: NURSE PRACTITIONER

## 2021-09-21 PROCEDURE — 83735 ASSAY OF MAGNESIUM: CPT | Performed by: NURSE PRACTITIONER

## 2021-09-22 NOTE — PROGRESS NOTES
This is a recent snapshot of the patient's [facility] medical record.  For current drug dose and complete information and questions, call [phone] or In Basket pool, fv home infusion ([id number])  CSN Number:  [id number]

## 2021-09-22 NOTE — PROGRESS NOTES
This is a recent snapshot of the patient's [facility] medical record.  For current drug dose and complete information and questions, call [phone] or In Basket pool, fv home infusion ([id number])  CSN Number:  [id number]

## 2021-09-23 ENCOUNTER — HOME INFUSION (PRE-WILLOW HOME INFUSION) (OUTPATIENT)
Dept: PHARMACY | Facility: CLINIC | Age: 66
End: 2021-09-23

## 2021-09-24 ENCOUNTER — HOME INFUSION (PRE-WILLOW HOME INFUSION) (OUTPATIENT)
Dept: PHARMACY | Facility: CLINIC | Age: 66
End: 2021-09-24

## 2021-09-24 ENCOUNTER — LAB REQUISITION (OUTPATIENT)
Dept: LAB | Facility: CLINIC | Age: 66
End: 2021-09-24
Payer: COMMERCIAL

## 2021-09-24 DIAGNOSIS — R11.2 NAUSEA WITH VOMITING, UNSPECIFIED: ICD-10-CM

## 2021-09-24 DIAGNOSIS — E43 UNSPECIFIED SEVERE PROTEIN-CALORIE MALNUTRITION (H): ICD-10-CM

## 2021-09-24 LAB
BASOPHILS # BLD AUTO: 0.1 10E3/UL (ref 0–0.2)
BASOPHILS NFR BLD AUTO: 1 %
CRP SERPL-MCNC: 48.1 MG/L (ref 0–8)
EOSINOPHIL # BLD AUTO: 0.3 10E3/UL (ref 0–0.7)
EOSINOPHIL NFR BLD AUTO: 4 %
ERYTHROCYTE [DISTWIDTH] IN BLOOD BY AUTOMATED COUNT: 17.2 % (ref 10–15)
HCT VFR BLD AUTO: 26.3 % (ref 35–47)
HGB BLD-MCNC: 7.3 G/DL (ref 11.7–15.7)
IMM GRANULOCYTES # BLD: 0.1 10E3/UL
IMM GRANULOCYTES NFR BLD: 1 %
LYMPHOCYTES # BLD AUTO: 1.2 10E3/UL (ref 0.8–5.3)
LYMPHOCYTES NFR BLD AUTO: 17 %
MCH RBC QN AUTO: 22.4 PG (ref 26.5–33)
MCHC RBC AUTO-ENTMCNC: 27.8 G/DL (ref 31.5–36.5)
MCV RBC AUTO: 81 FL (ref 78–100)
MONOCYTES # BLD AUTO: 0.5 10E3/UL (ref 0–1.3)
MONOCYTES NFR BLD AUTO: 7 %
NEUTROPHILS # BLD AUTO: 5 10E3/UL (ref 1.6–8.3)
NEUTROPHILS NFR BLD AUTO: 70 %
NRBC # BLD AUTO: 0 10E3/UL
NRBC BLD AUTO-RTO: 0 /100
PLATELET # BLD AUTO: 620 10E3/UL (ref 150–450)
RBC # BLD AUTO: 3.26 10E6/UL (ref 3.8–5.2)
WBC # BLD AUTO: 7.1 10E3/UL (ref 4–11)

## 2021-09-24 PROCEDURE — 85025 COMPLETE CBC W/AUTO DIFF WBC: CPT | Performed by: NURSE PRACTITIONER

## 2021-09-24 PROCEDURE — 36592 COLLECT BLOOD FROM PICC: CPT | Performed by: NURSE PRACTITIONER

## 2021-09-24 PROCEDURE — 84255 ASSAY OF SELENIUM: CPT | Performed by: NURSE PRACTITIONER

## 2021-09-24 PROCEDURE — 84630 ASSAY OF ZINC: CPT | Performed by: NURSE PRACTITIONER

## 2021-09-24 PROCEDURE — 83785 ASSAY OF MANGANESE: CPT | Performed by: NURSE PRACTITIONER

## 2021-09-24 PROCEDURE — 86140 C-REACTIVE PROTEIN: CPT | Performed by: NURSE PRACTITIONER

## 2021-09-24 PROCEDURE — 82525 ASSAY OF COPPER: CPT | Performed by: NURSE PRACTITIONER

## 2021-09-27 ENCOUNTER — HOME INFUSION (PRE-WILLOW HOME INFUSION) (OUTPATIENT)
Dept: PHARMACY | Facility: CLINIC | Age: 66
End: 2021-09-27

## 2021-09-27 LAB — HOLD SPECIMEN: NORMAL

## 2021-09-27 NOTE — PROGRESS NOTES
This is a recent snapshot of the patient's [facility] medical record.  For current drug dose and complete information and questions, call [phone] or In Basket pool, fv home infusion ([id number])  CSN Number:  [id number]

## 2021-09-27 NOTE — PROGRESS NOTES
This is a recent snapshot of the patient's [facility] medical record.  For current drug dose and complete information and questions, call [phone] or In Basket pool, fv home infusion ([id number])  CSN Number:  [id number]

## 2021-09-28 ENCOUNTER — MEDICAL CORRESPONDENCE (OUTPATIENT)
Dept: HEALTH INFORMATION MANAGEMENT | Facility: CLINIC | Age: 66
End: 2021-09-28

## 2021-09-28 ENCOUNTER — HOME INFUSION (PRE-WILLOW HOME INFUSION) (OUTPATIENT)
Dept: PHARMACY | Facility: CLINIC | Age: 66
End: 2021-09-28

## 2021-09-28 ENCOUNTER — PATIENT OUTREACH (OUTPATIENT)
Dept: ENDOCRINOLOGY | Facility: CLINIC | Age: 66
End: 2021-09-28

## 2021-09-28 ENCOUNTER — LAB REQUISITION (OUTPATIENT)
Dept: LAB | Facility: CLINIC | Age: 66
End: 2021-09-28
Payer: COMMERCIAL

## 2021-09-28 DIAGNOSIS — R11.2 NAUSEA WITH VOMITING, UNSPECIFIED: ICD-10-CM

## 2021-09-28 LAB
ALBUMIN SERPL-MCNC: 2.2 G/DL (ref 3.4–5)
ALP SERPL-CCNC: 174 U/L (ref 40–150)
ALT SERPL W P-5'-P-CCNC: 21 U/L (ref 0–50)
ANION GAP SERPL CALCULATED.3IONS-SCNC: 4 MMOL/L (ref 3–14)
AST SERPL W P-5'-P-CCNC: 23 U/L (ref 0–45)
BASOPHILS # BLD AUTO: 0 10E3/UL (ref 0–0.2)
BASOPHILS NFR BLD AUTO: 1 %
BILIRUB DIRECT SERPL-MCNC: <0.1 MG/DL (ref 0–0.2)
BILIRUB SERPL-MCNC: 0.2 MG/DL (ref 0.2–1.3)
BILIRUB SERPL-MCNC: 0.2 MG/DL (ref 0.2–1.3)
BUN SERPL-MCNC: 20 MG/DL (ref 7–30)
CALCIUM SERPL-MCNC: 8.3 MG/DL (ref 8.5–10.1)
CHLORIDE BLD-SCNC: 104 MMOL/L (ref 94–109)
CO2 SERPL-SCNC: 27 MMOL/L (ref 20–32)
CREAT SERPL-MCNC: 1.07 MG/DL (ref 0.52–1.04)
EOSINOPHIL # BLD AUTO: 0.1 10E3/UL (ref 0–0.7)
EOSINOPHIL NFR BLD AUTO: 1 %
ERYTHROCYTE [DISTWIDTH] IN BLOOD BY AUTOMATED COUNT: 16.5 % (ref 10–15)
FASTING STATUS PATIENT QL REPORTED: NORMAL
GFR SERPL CREATININE-BSD FRML MDRD: 55 ML/MIN/1.73M2
GLUCOSE BLD-MCNC: 87 MG/DL (ref 70–99)
HCT VFR BLD AUTO: 26.7 % (ref 35–47)
HGB BLD-MCNC: 7.5 G/DL (ref 11.7–15.7)
HOLD SPECIMEN: NORMAL
IMM GRANULOCYTES # BLD: 0 10E3/UL
IMM GRANULOCYTES NFR BLD: 1 %
LYMPHOCYTES # BLD AUTO: 1.3 10E3/UL (ref 0.8–5.3)
LYMPHOCYTES NFR BLD AUTO: 17 %
MAGNESIUM SERPL-MCNC: 1.9 MG/DL (ref 1.6–2.3)
MCH RBC QN AUTO: 22.5 PG (ref 26.5–33)
MCHC RBC AUTO-ENTMCNC: 28.1 G/DL (ref 31.5–36.5)
MCV RBC AUTO: 80 FL (ref 78–100)
MONOCYTES # BLD AUTO: 0.5 10E3/UL (ref 0–1.3)
MONOCYTES NFR BLD AUTO: 6 %
NEUTROPHILS # BLD AUTO: 5.5 10E3/UL (ref 1.6–8.3)
NEUTROPHILS NFR BLD AUTO: 74 %
NRBC # BLD AUTO: 0 10E3/UL
NRBC BLD AUTO-RTO: 0 /100
PHOSPHATE SERPL-MCNC: 3.8 MG/DL (ref 2.5–4.5)
PLATELET # BLD AUTO: 369 10E3/UL (ref 150–450)
POTASSIUM BLD-SCNC: 4.3 MMOL/L (ref 3.4–5.3)
PROT SERPL-MCNC: 7 G/DL (ref 6.8–8.8)
RBC # BLD AUTO: 3.34 10E6/UL (ref 3.8–5.2)
SODIUM SERPL-SCNC: 135 MMOL/L (ref 133–144)
TRIGL SERPL-MCNC: 127 MG/DL
WBC # BLD AUTO: 7.4 10E3/UL (ref 4–11)

## 2021-09-28 PROCEDURE — 83735 ASSAY OF MAGNESIUM: CPT | Performed by: SURGERY

## 2021-09-28 PROCEDURE — 36592 COLLECT BLOOD FROM PICC: CPT | Performed by: SURGERY

## 2021-09-28 PROCEDURE — 85025 COMPLETE CBC W/AUTO DIFF WBC: CPT | Performed by: SURGERY

## 2021-09-28 PROCEDURE — 84478 ASSAY OF TRIGLYCERIDES: CPT | Performed by: SURGERY

## 2021-09-28 PROCEDURE — 80053 COMPREHEN METABOLIC PANEL: CPT | Performed by: SURGERY

## 2021-09-28 PROCEDURE — 82248 BILIRUBIN DIRECT: CPT | Performed by: SURGERY

## 2021-09-28 PROCEDURE — 84100 ASSAY OF PHOSPHORUS: CPT | Performed by: SURGERY

## 2021-09-28 NOTE — PROGRESS NOTES
Spoke with Dr. Clayton regarding patient status reported by [facility] Pharmacist.    Patient to return to the hospital if she is dehydrated and needs fluids.  If not restarting TPN at home, will need to be readmitted to restart.  Message relayed to [facility] Pharmacist.      VM full.  Unable to leave a message. Will try patient again tomorrow.

## 2021-09-29 ENCOUNTER — CARE COORDINATION (OUTPATIENT)
Dept: ENDOCRINOLOGY | Facility: CLINIC | Age: 66
End: 2021-09-29

## 2021-09-29 ENCOUNTER — HOME INFUSION (PRE-WILLOW HOME INFUSION) (OUTPATIENT)
Dept: PHARMACY | Facility: CLINIC | Age: 66
End: 2021-09-29

## 2021-09-29 NOTE — PROGRESS NOTES
Spoke with Terri, [facility] Pharmacist, regarding plan of care.  Patient to go to hospital for evaluation.  Attempted to call patient and received message that VM is full.  Was not able to leave a message.  Will try again later today.  Pharmacist notified that I was not able to reach the patient.  They will try as well.

## 2021-09-30 ENCOUNTER — HOME INFUSION (PRE-WILLOW HOME INFUSION) (OUTPATIENT)
Dept: PHARMACY | Facility: CLINIC | Age: 66
End: 2021-09-30

## 2021-09-30 ENCOUNTER — OFFICE VISIT (OUTPATIENT)
Dept: ENDOCRINOLOGY | Facility: CLINIC | Age: 66
End: 2021-09-30
Payer: COMMERCIAL

## 2021-09-30 VITALS
SYSTOLIC BLOOD PRESSURE: 90 MMHG | WEIGHT: 103.4 LBS | BODY MASS INDEX: 18.32 KG/M2 | HEIGHT: 63 IN | DIASTOLIC BLOOD PRESSURE: 57 MMHG | HEART RATE: 67 BPM | OXYGEN SATURATION: 98 %

## 2021-09-30 DIAGNOSIS — T14.8XXA OPEN WOUND: Primary | ICD-10-CM

## 2021-09-30 LAB
COPPER SERPL-MCNC: 88.2 UG/DL
MANGANESE BLD-MCNC: 8.4 UG/L
SELENIUM SERPL-MCNC: 73.8 UG/L
ZINC SERPL-MCNC: 99.4 UG/DL

## 2021-09-30 PROCEDURE — 99024 POSTOP FOLLOW-UP VISIT: CPT | Performed by: SURGERY

## 2021-09-30 RX ORDER — HYDROMORPHONE HYDROCHLORIDE 2 MG/1
2 TABLET ORAL 2 TIMES DAILY PRN
Qty: 20 TABLET | Refills: 0 | Status: SHIPPED | OUTPATIENT
Start: 2021-09-30 | End: 2021-10-14

## 2021-09-30 ASSESSMENT — MIFFLIN-ST. JEOR: SCORE: 988.15

## 2021-09-30 ASSESSMENT — PAIN SCALES - GENERAL: PAINLEVEL: NO PAIN (0)

## 2021-09-30 NOTE — PROGRESS NOTES
This is a recent snapshot of the patient's [facility] medical record.  For current drug dose and complete information and questions, call [phone] or In Basket pool, fv home infusion ([id number])  CSN Number:  [id number]

## 2021-09-30 NOTE — LETTER
2021       RE: Demetra Richardson  [address] [place] [address]     Dear Colleague,    Thank you for referring your patient, Demetra Richardson, to the [facility] [place] at [facility]. Please see a copy of my visit note below.    Return General Surgery Note    RE: Demetra Richardson  MR#: [id number]  : 1955  VISIT DATE: Sep 30, 2021      I had the pleasure of seeing your patient, Demetra Richardson, in my post-general surgery assessment clinic.    CHIEF COMPLAINT: Post surgery follow-up    HISTORY OF PRESENT ILLNESS:   65 year old female who is 3 weeks s/p ex lap, BRIDGET, gastrojejunostomy revision (partial gastrectomy with enterectomy and koby en y reconstruction of gastrojejunostomy), jejunojejunostomy revision for gastric dysmotility.    She states she is doing well post-discharge. She has been tolerating greek yogurt and pudding well, and recently began eating cream of wheat. Still has some LUQ abdominal pain with eating. No nausea or emesis.    Has been performing BID wet-to-dry dressings and washing wound daily without issues. No fevers or chills. Still having some pain associated with midline wound. Has about two dilaudid pills left and requesting more for dressing changes. She was seen by home care nurse on 21.     Passing flatus but has not had a bowel movement since discharge, not taking bowel regimen. She received TPN supplies two days post-discharge but did not resume until a few days ago because she did not want to. Labs checked 2 days ago, e-lytes normal.     Weight History:  Weight: 46.9 kg (103 lb 6.4 oz)     Medications:  Current Outpatient Medications   Medication     escitalopram (LEXAPRO) 20 MG tablet     HYDROmorphone (DILAUDID) 2 MG tablet     hydroxychloroquine (PLAQUENIL) 200 MG tablet     hydrOXYzine (VISTARIL) 25 MG capsule     methadone (DOLOPHINE-INTENSOL) 10 MG/ML (HIGH  "CONC) solution     omeprazole (PRILOSEC) 40 MG DR capsule     parenteral nutrition - PTA/DISCHARGE ORDER     parenteral nutrition - PTA/DISCHARGE ORDER     polyethylene glycol (MIRALAX) 17 GM/Dose powder     pregabalin (LYRICA) 150 MG capsule     valACYclovir (VALTREX) 500 MG tablet     valACYclovir (VALTREX) 500 MG tablet     No current facility-administered medications for this visit.     No flowsheet data found.    ROS:  GI: No flowsheet data found.  Skin: No flowsheet data found.  Psych: No flowsheet data found.  Female Only: No flowsheet data found.    LABS/IMAGING/MEDICAL RECORDS REVIEW:   Labs from 9/28/2021 reviewed. Electrolytes normal. Albumin 2.2.     PHYSICAL EXAMINATION:  BP 90/57 (BP Location: Right arm, Patient Position: Chair, Cuff Size: Adult Regular)   Pulse 67   Ht 1.6 m (5' 3\")   Wt 46.9 kg (103 lb 6.4 oz)   LMP  (LMP Unknown)   SpO2 98%   BMI 18.32 kg/m     On exam she has no acute distress  Abdomen is soft, mild tenderness around midline wound, non-distended  Wound with some fibrinous exudate on wound base, overall looks clean and healthy, no surrounding cellulitis, some fascial suture visible, fascia layer intact.     ASSESSMENT AND PLAN:    65 year old female s/p bilateral truncal vagotomy, hemigastrectomy and koby-en-y gastrojejunostomy in 2005 for intractable PUD with GOO, s/p distal gastrectomy, resection of retained antrum, and koby-en-y gastrojejunostomy in 2011 for gastric obstruction. Now 3 weeks s/p ex lap, partial gastrectomy with GJ and JJ revision for gastric dysmotility.    1. Morbid Obesity, historical. Current BMI: Body mass index is 18.32 kg/m .  2. Puree diet x 1 week, then soft diet x 1 week, and continue to advance as tolerated to regular foods.  3. Recommend continuing TPN at this time for further nutritional supplementation and to promote wound healing.  4. Wound healing well, continue local wound cares twice daily. Provided patient with more dressing supplies.  5. " Will prescribe #20 tabs of 2 mg PO dilaudid for breakthrough pain associated with dressing changes.   6. Recommend taking 2 doses of miralax BID for constipation.  7. Return to clinic in 2 weeks.     Sincerely,      Kaveh Clayton MD  Surgery  [phone] (hospital )  [phone] (clinic nurses)    Kaveh Clayton MD  Surgery  [phone] (hospital )  [phone] (clinic nurses)    Note initially prepared by:    Dia Herbert MD      Physician Attestation  I, Kaveh Clayton, saw and evaluated this patient as part of a shared visit.  I have reviewed and discussed with the advanced practice provider and/or resident their history, physical and plan.    I personally reviewed the vital signs, medications, labs and imaging.    My key history or physical exam findings: overall improving; wound still open    Key management decisions made by me: continue tpn until wound closed.    Kaveh Clayton MD  Date of Service (when I saw the patient): 9/30/21

## 2021-09-30 NOTE — PROGRESS NOTES
This is a recent snapshot of the patient's [facility] medical record.  For current drug dose and complete information and questions, call [phone] or In Basket pool, fv home infusion ([id number])  CSN Number:  [id number]

## 2021-09-30 NOTE — NURSING NOTE
".  Chief Complaint   Patient presents with     Follow Up     Foillow-up Post-op Wound Check       Vitals:    09/30/21 1134   BP: 90/57   BP Location: Right arm   Patient Position: Chair   Cuff Size: Adult Regular   Pulse: 67   SpO2: 98%   Weight: 46.9 kg (103 lb 6.4 oz)   Height: 1.6 m (5' 3\")       Body mass index is 18.32 kg/m .      Mid abdominal Wound    Length 11 Inches     Dept 1 cm    Width 2 inches                        "

## 2021-09-30 NOTE — PROGRESS NOTES
Return General Surgery Note    RE: Demetra Richardson  MR#: [id number]  : 1955  VISIT DATE: Sep 30, 2021      I had the pleasure of seeing your patient, Demetra Richardson, in my post-general surgery assessment clinic.    CHIEF COMPLAINT: Post surgery follow-up    HISTORY OF PRESENT ILLNESS:   65 year old female who is 3 weeks s/p ex lap, BRIDGET, gastrojejunostomy revision (partial gastrectomy with enterectomy and koby en y reconstruction of gastrojejunostomy), jejunojejunostomy revision for gastric dysmotility.    She states she is doing well post-discharge. She has been tolerating greek yogurt and pudding well, and recently began eating cream of wheat. Still has some LUQ abdominal pain with eating. No nausea or emesis.    Has been performing BID wet-to-dry dressings and washing wound daily without issues. No fevers or chills. Still having some pain associated with midline wound. Has about two dilaudid pills left and requesting more for dressing changes. She was seen by home care nurse on 21.     Passing flatus but has not had a bowel movement since discharge, not taking bowel regimen. She received TPN supplies two days post-discharge but did not resume until a few days ago because she did not want to. Labs checked 2 days ago, e-lytes normal.     Weight History:  Weight: 46.9 kg (103 lb 6.4 oz)     Medications:  Current Outpatient Medications   Medication     escitalopram (LEXAPRO) 20 MG tablet     HYDROmorphone (DILAUDID) 2 MG tablet     hydroxychloroquine (PLAQUENIL) 200 MG tablet     hydrOXYzine (VISTARIL) 25 MG capsule     methadone (DOLOPHINE-INTENSOL) 10 MG/ML (HIGH CONC) solution     omeprazole (PRILOSEC) 40 MG DR capsule     parenteral nutrition - PTA/DISCHARGE ORDER     parenteral nutrition - PTA/DISCHARGE ORDER     polyethylene glycol (MIRALAX) 17 GM/Dose powder     pregabalin (LYRICA) 150 MG capsule     valACYclovir (VALTREX) 500 MG tablet     valACYclovir (VALTREX) 500 MG tablet     No  "current facility-administered medications for this visit.     No flowsheet data found.    ROS:  GI: No flowsheet data found.  Skin: No flowsheet data found.  Psych: No flowsheet data found.  Female Only: No flowsheet data found.    LABS/IMAGING/MEDICAL RECORDS REVIEW:   Labs from 9/28/2021 reviewed. Electrolytes normal. Albumin 2.2.     PHYSICAL EXAMINATION:  BP 90/57 (BP Location: Right arm, Patient Position: Chair, Cuff Size: Adult Regular)   Pulse 67   Ht 1.6 m (5' 3\")   Wt 46.9 kg (103 lb 6.4 oz)   LMP  (LMP Unknown)   SpO2 98%   BMI 18.32 kg/m     On exam she has no acute distress  Abdomen is soft, mild tenderness around midline wound, non-distended  Wound with some fibrinous exudate on wound base, overall looks clean and healthy, no surrounding cellulitis, some fascial suture visible, fascia layer intact.     ASSESSMENT AND PLAN:    65 year old female s/p bilateral truncal vagotomy, hemigastrectomy and koby-en-y gastrojejunostomy in 2005 for intractable PUD with GOO, s/p distal gastrectomy, resection of retained antrum, and koby-en-y gastrojejunostomy in 2011 for gastric obstruction. Now 3 weeks s/p ex lap, partial gastrectomy with GJ and JJ revision for gastric dysmotility.    1. Morbid Obesity, historical. Current BMI: Body mass index is 18.32 kg/m .  2. Puree diet x 1 week, then soft diet x 1 week, and continue to advance as tolerated to regular foods.  3. Recommend continuing TPN at this time for further nutritional supplementation and to promote wound healing.  4. Wound healing well, continue local wound cares twice daily. Provided patient with more dressing supplies.  5. Will prescribe #20 tabs of 2 mg PO dilaudid for breakthrough pain associated with dressing changes.   6. Recommend taking 2 doses of miralax BID for constipation.  7. Return to clinic in 2 weeks.     Sincerely,      Kaveh Clayton MD  Surgery  [phone] (hospital )  [phone] (clinic nurses)    Kaveh Clayton, " MD  Surgery  [phone] (hospital )  [phone] (clinic nurses)    Note initially prepared by:    Dia Herbert MD      Physician Attestation  I, Kaveh Clayton, saw and evaluated this patient as part of a shared visit.  I have reviewed and discussed with the advanced practice provider and/or resident their history, physical and plan.    I personally reviewed the vital signs, medications, labs and imaging.    My key history or physical exam findings: overall improving; wound still open    Key management decisions made by me: continue tpn until wound closed.    Kaveh Clayton MD  Date of Service (when I saw the patient): 9/30/21

## 2021-10-01 ENCOUNTER — TELEPHONE (OUTPATIENT)
Dept: SURGERY | Facility: CLINIC | Age: 66
End: 2021-10-01

## 2021-10-01 ENCOUNTER — HOME INFUSION (PRE-WILLOW HOME INFUSION) (OUTPATIENT)
Dept: PHARMACY | Facility: CLINIC | Age: 66
End: 2021-10-01

## 2021-10-01 NOTE — TELEPHONE ENCOUNTER
Hello,    I have attempted to call the patient, unable to leave a message as voicemail is full.    I will adjust her TPN to minimize risk of refeeding should she resume infusing.    I sent her text message with this information, no reply.    I have requested RN to see her 2x/wk to assess her status and compliance.     I would ask that if patient continues to refuse infusing that the care team reevaluate her therapy plan.     I will follow up next week.    Thank you!    Nargis Pedraza, PharmD [facility]   Ph: [phone]  Fax: [phone]

## 2021-10-01 NOTE — TELEPHONE ENCOUNTER
Called cecil Yanes, unable to lvm to schedule 2 week follow up in person with Dr. Kaveh Clayton and 2 weeks with Nurse Alexandria, because of full mal box, [facility]

## 2021-10-01 NOTE — PROGRESS NOTES
This is a recent snapshot of the patient's [facility] medical record.  For current drug dose and complete information and questions, call [phone] or In [facility] pool, fv home infusion ([id number])  CSN Number:  [id number]

## 2021-10-01 NOTE — TELEPHONE ENCOUNTER
Hello,    Follow up regarding Radha's TPN .    Patient has not been infusing TPN since her discharge from the hospital 9/17/21.    RN viewed her pump today, she has infused a total of 1/2 of a TPN bag since 9/7/21 (prior to her hospitalization).    Patient visualized on the scale at her residential facility = 103.2 lbs (hospital discharge weight from 9/17/21 = 120 lbs)    Patient reports eating 4 yogurt, 4 pudding cups, and a cup of mashed potatoes per day.  Drinking 2, 16oz bottles of water.    UOP is good    Formed BM yesterday    Please advise on if TPN should be continued given grossly non-compliant and risks associated with intermittent/incomplete infusions.      Thank you!    Nargis Pedraza, PharmD [facility]   Ph: [phone]  Fax: [phone]

## 2021-10-04 ENCOUNTER — HOME INFUSION (PRE-WILLOW HOME INFUSION) (OUTPATIENT)
Dept: PHARMACY | Facility: CLINIC | Age: 66
End: 2021-10-04

## 2021-10-04 NOTE — PROGRESS NOTES
This is a recent snapshot of the patient's [facility] medical record.  For current drug dose and complete information and questions, call [phone] or In Basket pool, fv home infusion ([id number])  CSN Number:  [id number]

## 2021-10-04 NOTE — TELEPHONE ENCOUNTER
Phone call to Nargis Pedraza PharmD regarding patient plan.  They will review patient labs this week and notify the office if there are concerns regarding labs/TPN>

## 2021-10-05 ENCOUNTER — HOME INFUSION (PRE-WILLOW HOME INFUSION) (OUTPATIENT)
Dept: PHARMACY | Facility: CLINIC | Age: 66
End: 2021-10-05

## 2021-10-05 ENCOUNTER — LAB REQUISITION (OUTPATIENT)
Dept: LAB | Facility: CLINIC | Age: 66
End: 2021-10-05
Payer: COMMERCIAL

## 2021-10-05 DIAGNOSIS — R11.2 NAUSEA WITH VOMITING, UNSPECIFIED: ICD-10-CM

## 2021-10-05 LAB
ALBUMIN SERPL-MCNC: 2.2 G/DL (ref 3.4–5)
ALP SERPL-CCNC: 138 U/L (ref 40–150)
ALT SERPL W P-5'-P-CCNC: 17 U/L (ref 0–50)
ANION GAP SERPL CALCULATED.3IONS-SCNC: <1 MMOL/L (ref 3–14)
AST SERPL W P-5'-P-CCNC: 22 U/L (ref 0–45)
BASOPHILS # BLD AUTO: 0.1 10E3/UL (ref 0–0.2)
BASOPHILS NFR BLD AUTO: 2 %
BILIRUB DIRECT SERPL-MCNC: <0.1 MG/DL (ref 0–0.2)
BILIRUB SERPL-MCNC: 0.2 MG/DL (ref 0.2–1.3)
BILIRUB SERPL-MCNC: 0.2 MG/DL (ref 0.2–1.3)
BUN SERPL-MCNC: 21 MG/DL (ref 7–30)
CALCIUM SERPL-MCNC: 7.7 MG/DL (ref 8.5–10.1)
CHLORIDE BLD-SCNC: 110 MMOL/L (ref 94–109)
CO2 SERPL-SCNC: 30 MMOL/L (ref 20–32)
CREAT SERPL-MCNC: 0.76 MG/DL (ref 0.52–1.04)
EOSINOPHIL # BLD AUTO: 0.8 10E3/UL (ref 0–0.7)
EOSINOPHIL NFR BLD AUTO: 18 %
ERYTHROCYTE [DISTWIDTH] IN BLOOD BY AUTOMATED COUNT: 16.5 % (ref 10–15)
GFR SERPL CREATININE-BSD FRML MDRD: 83 ML/MIN/1.73M2
GLUCOSE BLD-MCNC: 89 MG/DL (ref 70–99)
HCT VFR BLD AUTO: 26.4 % (ref 35–47)
HGB BLD-MCNC: 7.6 G/DL (ref 11.7–15.7)
HOLD SPECIMEN: NORMAL
IMM GRANULOCYTES # BLD: 0 10E3/UL
IMM GRANULOCYTES NFR BLD: 0 %
LYMPHOCYTES # BLD AUTO: 1.3 10E3/UL (ref 0.8–5.3)
LYMPHOCYTES NFR BLD AUTO: 28 %
MAGNESIUM SERPL-MCNC: 2.1 MG/DL (ref 1.6–2.3)
MCH RBC QN AUTO: 22.6 PG (ref 26.5–33)
MCHC RBC AUTO-ENTMCNC: 28.8 G/DL (ref 31.5–36.5)
MCV RBC AUTO: 79 FL (ref 78–100)
MONOCYTES # BLD AUTO: 0.5 10E3/UL (ref 0–1.3)
MONOCYTES NFR BLD AUTO: 10 %
NEUTROPHILS # BLD AUTO: 2 10E3/UL (ref 1.6–8.3)
NEUTROPHILS NFR BLD AUTO: 42 %
NRBC # BLD AUTO: 0 10E3/UL
NRBC BLD AUTO-RTO: 0 /100
PHOSPHATE SERPL-MCNC: 2.8 MG/DL (ref 2.5–4.5)
PLAT MORPH BLD: NORMAL
PLATELET # BLD AUTO: 228 10E3/UL (ref 150–450)
POTASSIUM BLD-SCNC: 4.4 MMOL/L (ref 3.4–5.3)
PROT SERPL-MCNC: 6.1 G/DL (ref 6.8–8.8)
RBC # BLD AUTO: 3.36 10E6/UL (ref 3.8–5.2)
RBC MORPH BLD: NORMAL
SODIUM SERPL-SCNC: 140 MMOL/L (ref 133–144)
WBC # BLD AUTO: 4.7 10E3/UL (ref 4–11)

## 2021-10-05 PROCEDURE — 82248 BILIRUBIN DIRECT: CPT | Performed by: SURGERY

## 2021-10-05 PROCEDURE — 84100 ASSAY OF PHOSPHORUS: CPT | Performed by: SURGERY

## 2021-10-05 PROCEDURE — 80053 COMPREHEN METABOLIC PANEL: CPT | Performed by: SURGERY

## 2021-10-05 PROCEDURE — 36592 COLLECT BLOOD FROM PICC: CPT | Performed by: SURGERY

## 2021-10-05 PROCEDURE — 85025 COMPLETE CBC W/AUTO DIFF WBC: CPT | Performed by: SURGERY

## 2021-10-05 PROCEDURE — 83735 ASSAY OF MAGNESIUM: CPT | Performed by: SURGERY

## 2021-10-06 ENCOUNTER — TELEPHONE (OUTPATIENT)
Dept: SURGERY | Facility: CLINIC | Age: 66
End: 2021-10-06

## 2021-10-06 ENCOUNTER — HOME INFUSION (PRE-WILLOW HOME INFUSION) (OUTPATIENT)
Dept: PHARMACY | Facility: CLINIC | Age: 66
End: 2021-10-06

## 2021-10-06 NOTE — TELEPHONE ENCOUNTER
"Hello,    Following up from last week, Radha did attempt infusing her TPN daily through the weekend.  She was starting infusions around 4pm daily and running about 4 hrs before turning off to go to bed.  I had a long discussion with her and discussed what her barriers are to completing the full 12 hr infusion.  Radha stated her 2 biggest issues are with how heavy the bag is to carry around during the day and that it keeps her up at night if she tries to infuse while sleeping.      Radha is doing well with PO fluids, and her creat improved with labs this week with only 1/3 of the TPN volume infused.  I feel comfortable maximally concentrating her TPN to try and make the bag lighter for her to carry.  Radha said she will continue to try and find a time in the day that works to get the infusion done, and she was enthusiastic about have a smaller bag.  Radha did express understanding that she needs to get her TPN infusions done, and was engaged in trying to problem solve through barriers.  She is motivated as she \"doesn't want to end up in a nursing home\".      Weight at RN visit this week = 104lbs    [facility] nursing will continue to see her 2x per week for ongoing assessment.      Thank you!    Nargis Pedraza, PharmD [facility]   Ph: [phone]  Fax: [phone]    "

## 2021-10-06 NOTE — PROGRESS NOTES
This is a recent snapshot of the patient's [facility] medical record.  For current drug dose and complete information and questions, call [phone] or In Basket pool, fv home infusion ([id number])  CSN Number:  [id number]

## 2021-10-07 ENCOUNTER — TELEPHONE (OUTPATIENT)
Dept: SURGERY | Facility: CLINIC | Age: 66
End: 2021-10-07

## 2021-10-07 NOTE — TELEPHONE ENCOUNTER
[facility] Call Center    Phone Message    May a detailed message be left on voicemail: yes     Reason for Call: Other: Radha is calling in asking for a call back. She states that she received a message about scheduling an appt with Dr. Clayton for a follow-up after her bariatric surgery on 9/10. She states that she was transferred from Weight Management to General Surgery but is unsure of which clinic she is supposed to schedule the appt in, and she was a little overwhelmed trying to figure it out and wanted to speak with someone about it for a definitive answer. Please call patient back as soon as possible to discuss.    Action Taken: Message routed to:  [facility] Gen Surg    Travel Screening: Not Applicable

## 2021-10-07 NOTE — PROGRESS NOTES
This is a recent snapshot of the patient's [facility] medical record.  For current drug dose and complete information and questions, call [phone] or In Basket pool, fv home infusion ([id number])  CSN Number:  [id number]

## 2021-10-11 ENCOUNTER — LAB REQUISITION (OUTPATIENT)
Dept: LAB | Facility: CLINIC | Age: 66
End: 2021-10-11
Payer: COMMERCIAL

## 2021-10-11 ENCOUNTER — HOME INFUSION (PRE-WILLOW HOME INFUSION) (OUTPATIENT)
Dept: PHARMACY | Facility: CLINIC | Age: 66
End: 2021-10-11
Payer: COMMERCIAL

## 2021-10-11 DIAGNOSIS — R11.2 NAUSEA WITH VOMITING, UNSPECIFIED: ICD-10-CM

## 2021-10-11 LAB
ALBUMIN SERPL-MCNC: 2.3 G/DL (ref 3.4–5)
ALP SERPL-CCNC: 169 U/L (ref 40–150)
ALT SERPL W P-5'-P-CCNC: 18 U/L (ref 0–50)
ANION GAP SERPL CALCULATED.3IONS-SCNC: 2 MMOL/L (ref 3–14)
AST SERPL W P-5'-P-CCNC: 25 U/L (ref 0–45)
BASOPHILS # BLD AUTO: 0 10E3/UL (ref 0–0.2)
BASOPHILS NFR BLD AUTO: 1 %
BILIRUB DIRECT SERPL-MCNC: <0.1 MG/DL (ref 0–0.2)
BILIRUB SERPL-MCNC: <0.1 MG/DL (ref 0.2–1.3)
BILIRUB SERPL-MCNC: <0.1 MG/DL (ref 0.2–1.3)
BUN SERPL-MCNC: 14 MG/DL (ref 7–30)
CALCIUM SERPL-MCNC: 8 MG/DL (ref 8.5–10.1)
CHLORIDE BLD-SCNC: 106 MMOL/L (ref 94–109)
CO2 SERPL-SCNC: 31 MMOL/L (ref 20–32)
CREAT SERPL-MCNC: 0.73 MG/DL (ref 0.52–1.04)
EOSINOPHIL # BLD AUTO: 0.7 10E3/UL (ref 0–0.7)
EOSINOPHIL NFR BLD AUTO: 17 %
ERYTHROCYTE [DISTWIDTH] IN BLOOD BY AUTOMATED COUNT: 15.9 % (ref 10–15)
FASTING STATUS PATIENT QL REPORTED: NORMAL
GFR SERPL CREATININE-BSD FRML MDRD: 87 ML/MIN/1.73M2
GLUCOSE BLD-MCNC: 87 MG/DL (ref 70–99)
HCT VFR BLD AUTO: 26.1 % (ref 35–47)
HGB BLD-MCNC: 7.1 G/DL (ref 11.7–15.7)
HOLD SPECIMEN: NORMAL
IMM GRANULOCYTES # BLD: 0 10E3/UL
IMM GRANULOCYTES NFR BLD: 0 %
LYMPHOCYTES # BLD AUTO: 0.7 10E3/UL (ref 0.8–5.3)
LYMPHOCYTES NFR BLD AUTO: 16 %
MAGNESIUM SERPL-MCNC: 2.2 MG/DL (ref 1.6–2.3)
MCH RBC QN AUTO: 21.4 PG (ref 26.5–33)
MCHC RBC AUTO-ENTMCNC: 27.2 G/DL (ref 31.5–36.5)
MCV RBC AUTO: 79 FL (ref 78–100)
MONOCYTES # BLD AUTO: 0.4 10E3/UL (ref 0–1.3)
MONOCYTES NFR BLD AUTO: 8 %
NEUTROPHILS # BLD AUTO: 2.6 10E3/UL (ref 1.6–8.3)
NEUTROPHILS NFR BLD AUTO: 58 %
NRBC # BLD AUTO: 0 10E3/UL
NRBC BLD AUTO-RTO: 0 /100
PHOSPHATE SERPL-MCNC: 2.8 MG/DL (ref 2.5–4.5)
PLATELET # BLD AUTO: 209 10E3/UL (ref 150–450)
POTASSIUM BLD-SCNC: 4.5 MMOL/L (ref 3.4–5.3)
PROT SERPL-MCNC: 5.9 G/DL (ref 6.8–8.8)
RBC # BLD AUTO: 3.32 10E6/UL (ref 3.8–5.2)
SODIUM SERPL-SCNC: 139 MMOL/L (ref 133–144)
TRIGL SERPL-MCNC: 95 MG/DL
WBC # BLD AUTO: 4.4 10E3/UL (ref 4–11)

## 2021-10-11 PROCEDURE — 84478 ASSAY OF TRIGLYCERIDES: CPT | Performed by: SURGERY

## 2021-10-11 PROCEDURE — 84100 ASSAY OF PHOSPHORUS: CPT | Performed by: SURGERY

## 2021-10-11 PROCEDURE — 82040 ASSAY OF SERUM ALBUMIN: CPT | Performed by: SURGERY

## 2021-10-11 PROCEDURE — 83735 ASSAY OF MAGNESIUM: CPT | Performed by: SURGERY

## 2021-10-11 PROCEDURE — 82247 BILIRUBIN TOTAL: CPT | Performed by: SURGERY

## 2021-10-11 PROCEDURE — 85025 COMPLETE CBC W/AUTO DIFF WBC: CPT | Performed by: SURGERY

## 2021-10-12 ENCOUNTER — HOME INFUSION (PRE-WILLOW HOME INFUSION) (OUTPATIENT)
Dept: PHARMACY | Facility: CLINIC | Age: 66
End: 2021-10-12

## 2021-10-13 DIAGNOSIS — M79.7 FIBROMYALGIA: ICD-10-CM

## 2021-10-13 RX ORDER — PREGABALIN 150 MG/1
150 CAPSULE ORAL 3 TIMES DAILY
Qty: 90 CAPSULE | Refills: 1 | Status: SHIPPED | OUTPATIENT
Start: 2021-10-13 | End: 2021-12-07

## 2021-10-13 NOTE — TELEPHONE ENCOUNTER
[facility] Call Center    Phone Message    May a detailed message be left on voicemail: yes     Reason for Call: Medication Refill Request    Has the patient contacted the pharmacy for the refill? Yes   Name of medication being requested: Lyrica 150mg  Provider who prescribed the medication: Dr Merritt  Pharmacy: [facility] [id number] [facility] [address] [facility]  Date medication is needed: needs 10/14/2021   Pt needs this tomorrow, she has a ride set up to . She just had surgery and didn't realize how many she had left. She shouldn't be without it and she is out.      Action Taken: Message routed to:  [facility] ([facility] PCC    Travel Screening: Not Applicable

## 2021-10-13 NOTE — TELEPHONE ENCOUNTER
PREGABALIN 150 MG CAPSULE  Last Written Prescription Date:  8/4/2021  Last Fill Quantity: 90,   # refills: 1  Last Office Visit : 8/24/2021  Future Office visit:  Not    Routing refill request to provider for review/approval because:  Out of medication      Pt needs ASAP  Not on refill protocol            Rossy Travis RN  Central Triage Red Flags/Med Refills

## 2021-10-14 ENCOUNTER — HOME INFUSION (PRE-WILLOW HOME INFUSION) (OUTPATIENT)
Dept: PHARMACY | Facility: CLINIC | Age: 66
End: 2021-10-14

## 2021-10-14 ENCOUNTER — OFFICE VISIT (OUTPATIENT)
Dept: ENDOCRINOLOGY | Facility: CLINIC | Age: 66
End: 2021-10-14
Payer: COMMERCIAL

## 2021-10-14 VITALS
BODY MASS INDEX: 20.71 KG/M2 | OXYGEN SATURATION: 97 % | HEART RATE: 65 BPM | HEIGHT: 60 IN | SYSTOLIC BLOOD PRESSURE: 97 MMHG | DIASTOLIC BLOOD PRESSURE: 63 MMHG | WEIGHT: 105.5 LBS

## 2021-10-14 DIAGNOSIS — Z90.3 S/P PARTIAL GASTRECTOMY: Primary | ICD-10-CM

## 2021-10-14 PROCEDURE — 99024 POSTOP FOLLOW-UP VISIT: CPT | Performed by: SURGERY

## 2021-10-14 RX ORDER — HYDROMORPHONE HYDROCHLORIDE 2 MG/1
2 TABLET ORAL EVERY 6 HOURS PRN
Qty: 10 TABLET | Refills: 0 | Status: SHIPPED | OUTPATIENT
Start: 2021-10-14 | End: 2022-02-03

## 2021-10-14 ASSESSMENT — MIFFLIN-ST. JEOR: SCORE: 950.04

## 2021-10-14 ASSESSMENT — PAIN SCALES - GENERAL: PAINLEVEL: MODERATE PAIN (4)

## 2021-10-14 NOTE — LETTER
10/14/2021       RE: Demetra Richardson  [address] [place] [address]     Dear Colleague,    Thank you for referring your patient, Demetra Richardson, to the [facility] WEIGHT MANAGEMENT CLINIC [place] at [facility]. Please see a copy of my visit note below.    Patient underwent prior partial gastrectomy surgery and this occurred 6 weeks ago.    Demetra Richardson overall has the following complaints: feeling better; has some epigastric/chest pains.    Wound remains open, but slowly closing.        On exam:  BP 97/63 (BP Location: Right arm, Patient Position: Chair, Cuff Size: Adult Regular)   Pulse 65   Ht 1.524 m (5')   Wt 47.9 kg (105 lb 8 oz)   LMP  (LMP Unknown)   SpO2 97%   BMI 20.60 kg/m    Lung exam: breathing unlabored  Abdominal exam: soft; nontender; nondistended; incisions c/d; still open a bit; looks healthy and healing overall.    Plan:  F/u in 4 weeks.    Dilaudid 2mg po prn #10 (down from 20 tablets last visit) prescribed for open wound.  Anticipate that this is the last Rx for pain.    No orders of the defined types were placed in this encounter.    Kaveh Clayton MD  Surgery  [phone] (hospital )  [phone] (clinic nurses)

## 2021-10-14 NOTE — PROGRESS NOTES
Patient underwent prior partial gastrectomy surgery and this occurred 6 weeks ago.    Demetra Richardson overall has the following complaints: feeling better; has some epigastric/chest pains.    Wound remains open, but slowly closing.        On exam:  BP 97/63 (BP Location: Right arm, Patient Position: Chair, Cuff Size: Adult Regular)   Pulse 65   Ht 1.524 m (5')   Wt 47.9 kg (105 lb 8 oz)   LMP  (LMP Unknown)   SpO2 97%   BMI 20.60 kg/m    Lung exam: breathing unlabored  Abdominal exam: soft; nontender; nondistended; incisions c/d; still open a bit; looks healthy and healing overall.    Plan:  F/u in 4 weeks.    Dilaudid 2mg po prn #10 (down from 20 tablets last visit) prescribed for open wound.  Anticipate that this is the last Rx for pain.    No orders of the defined types were placed in this encounter.          Kaveh Clayton MD  Surgery  [phone] (hospital )  [phone] (clinic nurses)

## 2021-10-14 NOTE — NURSING NOTE
Chief Complaint   Patient presents with     Follow Up     2 Weeks Follow-up Bairatric Nausea and vomitting       Vitals:    10/14/21 0905   BP: 97/63   BP Location: Right arm   Patient Position: Chair   Cuff Size: Adult Regular   Pulse: 65   SpO2: 97%   Weight: 47.9 kg (105 lb 8 oz)   Height: 1.524 m (5')       Body mass index is 20.6 kg/m .

## 2021-10-15 ENCOUNTER — HOME INFUSION (PRE-WILLOW HOME INFUSION) (OUTPATIENT)
Dept: PHARMACY | Facility: CLINIC | Age: 66
End: 2021-10-15

## 2021-10-18 ENCOUNTER — LAB REQUISITION (OUTPATIENT)
Dept: LAB | Facility: CLINIC | Age: 66
End: 2021-10-18
Payer: COMMERCIAL

## 2021-10-18 ENCOUNTER — HOME INFUSION (PRE-WILLOW HOME INFUSION) (OUTPATIENT)
Dept: PHARMACY | Facility: CLINIC | Age: 66
End: 2021-10-18

## 2021-10-18 DIAGNOSIS — R11.2 NAUSEA WITH VOMITING, UNSPECIFIED: ICD-10-CM

## 2021-10-18 LAB
ALBUMIN SERPL-MCNC: 2.8 G/DL (ref 3.4–5)
ALP SERPL-CCNC: 128 U/L (ref 40–150)
ALT SERPL W P-5'-P-CCNC: 15 U/L (ref 0–50)
ANION GAP SERPL CALCULATED.3IONS-SCNC: <1 MMOL/L (ref 3–14)
AST SERPL W P-5'-P-CCNC: 17 U/L (ref 0–45)
BASOPHILS # BLD AUTO: 0.1 10E3/UL (ref 0–0.2)
BASOPHILS NFR BLD AUTO: 2 %
BILIRUB DIRECT SERPL-MCNC: <0.1 MG/DL (ref 0–0.2)
BILIRUB SERPL-MCNC: 0.2 MG/DL (ref 0.2–1.3)
BILIRUB SERPL-MCNC: 0.2 MG/DL (ref 0.2–1.3)
BUN SERPL-MCNC: 20 MG/DL (ref 7–30)
CALCIUM SERPL-MCNC: 8.2 MG/DL (ref 8.5–10.1)
CHLORIDE BLD-SCNC: 110 MMOL/L (ref 94–109)
CO2 SERPL-SCNC: 28 MMOL/L (ref 20–32)
CREAT SERPL-MCNC: 0.72 MG/DL (ref 0.52–1.04)
EOSINOPHIL # BLD AUTO: 0.5 10E3/UL (ref 0–0.7)
EOSINOPHIL NFR BLD AUTO: 12 %
ERYTHROCYTE [DISTWIDTH] IN BLOOD BY AUTOMATED COUNT: 16.5 % (ref 10–15)
FASTING STATUS PATIENT QL REPORTED: NORMAL
GFR SERPL CREATININE-BSD FRML MDRD: 89 ML/MIN/1.73M2
GLUCOSE BLD-MCNC: 81 MG/DL (ref 70–99)
HCT VFR BLD AUTO: 24.9 % (ref 35–47)
HGB BLD-MCNC: 7.2 G/DL (ref 11.7–15.7)
HOLD SPECIMEN: NORMAL
IMM GRANULOCYTES # BLD: 0 10E3/UL
IMM GRANULOCYTES NFR BLD: 0 %
LYMPHOCYTES # BLD AUTO: 1.2 10E3/UL (ref 0.8–5.3)
LYMPHOCYTES NFR BLD AUTO: 29 %
MAGNESIUM SERPL-MCNC: 2.1 MG/DL (ref 1.6–2.3)
MCH RBC QN AUTO: 21.5 PG (ref 26.5–33)
MCHC RBC AUTO-ENTMCNC: 28.9 G/DL (ref 31.5–36.5)
MCV RBC AUTO: 74 FL (ref 78–100)
MONOCYTES # BLD AUTO: 0.5 10E3/UL (ref 0–1.3)
MONOCYTES NFR BLD AUTO: 11 %
NEUTROPHILS # BLD AUTO: 1.9 10E3/UL (ref 1.6–8.3)
NEUTROPHILS NFR BLD AUTO: 46 %
NRBC # BLD AUTO: 0 10E3/UL
NRBC BLD AUTO-RTO: 0 /100
PHOSPHATE SERPL-MCNC: 3.2 MG/DL (ref 2.5–4.5)
PLATELET # BLD AUTO: 248 10E3/UL (ref 150–450)
POTASSIUM BLD-SCNC: 4.7 MMOL/L (ref 3.4–5.3)
PROT SERPL-MCNC: 6.8 G/DL (ref 6.8–8.8)
RBC # BLD AUTO: 3.35 10E6/UL (ref 3.8–5.2)
SODIUM SERPL-SCNC: 138 MMOL/L (ref 133–144)
TRIGL SERPL-MCNC: 88 MG/DL
WBC # BLD AUTO: 4.1 10E3/UL (ref 4–11)

## 2021-10-18 PROCEDURE — 83735 ASSAY OF MAGNESIUM: CPT | Performed by: SURGERY

## 2021-10-18 PROCEDURE — 82248 BILIRUBIN DIRECT: CPT | Performed by: SURGERY

## 2021-10-18 PROCEDURE — 85025 COMPLETE CBC W/AUTO DIFF WBC: CPT | Performed by: SURGERY

## 2021-10-18 PROCEDURE — 80053 COMPREHEN METABOLIC PANEL: CPT | Performed by: SURGERY

## 2021-10-18 PROCEDURE — 84478 ASSAY OF TRIGLYCERIDES: CPT | Performed by: SURGERY

## 2021-10-18 PROCEDURE — 84100 ASSAY OF PHOSPHORUS: CPT | Performed by: SURGERY

## 2021-10-18 PROCEDURE — 36592 COLLECT BLOOD FROM PICC: CPT | Performed by: SURGERY

## 2021-10-18 NOTE — PROGRESS NOTES
This is a recent snapshot of the patient's [facility] medical record.  For current drug dose and complete information and questions, call [phone] or In Basket pool, fv home infusion ([id number])  CSN Number:  [id number]

## 2021-10-18 NOTE — PROGRESS NOTES
This is a recent snapshot of the patient's [facility] medical record.  For current drug dose and complete information and questions, call [phone] or In Basket pool, fv home infusion ([id number])  CSN Number:  [id number]

## 2021-10-19 ENCOUNTER — HOME INFUSION (PRE-WILLOW HOME INFUSION) (OUTPATIENT)
Dept: PHARMACY | Facility: CLINIC | Age: 66
End: 2021-10-19

## 2021-10-20 NOTE — PROGRESS NOTES
This is a recent snapshot of the patient's [facility] medical record.  For current drug dose and complete information and questions, call [phone] or In Basket pool, fv home infusion ([id number])  CSN Number:  [id number]

## 2021-10-21 ENCOUNTER — HOME INFUSION (PRE-WILLOW HOME INFUSION) (OUTPATIENT)
Dept: PHARMACY | Facility: CLINIC | Age: 66
End: 2021-10-21

## 2021-10-22 NOTE — PROGRESS NOTES
This is a recent snapshot of the patient's [facility] medical record.  For current drug dose and complete information and questions, call [phone] or In Basket pool, fv home infusion ([id number])  CSN Number:  [id number]

## 2021-10-22 NOTE — PROGRESS NOTES
This is a recent snapshot of the patient's [facility] medical record.  For current drug dose and complete information and questions, call [phone] or In Basket pool, fv home infusion ([id number])  CSN Number:  [id number]

## 2021-10-23 ENCOUNTER — HEALTH MAINTENANCE LETTER (OUTPATIENT)
Age: 66
End: 2021-10-23

## 2021-10-25 ENCOUNTER — HOME INFUSION (PRE-WILLOW HOME INFUSION) (OUTPATIENT)
Dept: PHARMACY | Facility: CLINIC | Age: 66
End: 2021-10-25
Payer: COMMERCIAL

## 2021-10-25 ENCOUNTER — LAB REQUISITION (OUTPATIENT)
Dept: LAB | Facility: CLINIC | Age: 66
End: 2021-10-25
Payer: COMMERCIAL

## 2021-10-25 ENCOUNTER — TELEPHONE (OUTPATIENT)
Dept: ENDOCRINOLOGY | Facility: CLINIC | Age: 66
End: 2021-10-25

## 2021-10-25 ENCOUNTER — TELEPHONE (OUTPATIENT)
Dept: SURGERY | Facility: CLINIC | Age: 66
End: 2021-10-25

## 2021-10-25 ENCOUNTER — CARE COORDINATION (OUTPATIENT)
Dept: ENDOCRINOLOGY | Facility: CLINIC | Age: 66
End: 2021-10-25

## 2021-10-25 DIAGNOSIS — R11.2 NAUSEA WITH VOMITING, UNSPECIFIED: ICD-10-CM

## 2021-10-25 LAB
ALBUMIN SERPL-MCNC: 2.8 G/DL (ref 3.4–5)
ALP SERPL-CCNC: 163 U/L (ref 40–150)
ALT SERPL W P-5'-P-CCNC: 25 U/L (ref 0–50)
ANION GAP SERPL CALCULATED.3IONS-SCNC: <1 MMOL/L (ref 3–14)
AST SERPL W P-5'-P-CCNC: 26 U/L (ref 0–45)
BASOPHILS # BLD AUTO: 0.1 10E3/UL (ref 0–0.2)
BASOPHILS NFR BLD AUTO: 1 %
BILIRUB DIRECT SERPL-MCNC: <0.1 MG/DL (ref 0–0.2)
BILIRUB SERPL-MCNC: 0.1 MG/DL (ref 0.2–1.3)
BILIRUB SERPL-MCNC: 0.1 MG/DL (ref 0.2–1.3)
BUN SERPL-MCNC: 17 MG/DL (ref 7–30)
CALCIUM SERPL-MCNC: 8.3 MG/DL (ref 8.5–10.1)
CHLORIDE BLD-SCNC: 110 MMOL/L (ref 94–109)
CO2 SERPL-SCNC: 30 MMOL/L (ref 20–32)
CREAT SERPL-MCNC: 0.58 MG/DL (ref 0.52–1.04)
EOSINOPHIL # BLD AUTO: 0.6 10E3/UL (ref 0–0.7)
EOSINOPHIL NFR BLD AUTO: 13 %
ERYTHROCYTE [DISTWIDTH] IN BLOOD BY AUTOMATED COUNT: 17.4 % (ref 10–15)
FASTING STATUS PATIENT QL REPORTED: NORMAL
GFR SERPL CREATININE-BSD FRML MDRD: >90 ML/MIN/1.73M2
GLUCOSE BLD-MCNC: 94 MG/DL (ref 70–99)
HCT VFR BLD AUTO: 25.3 % (ref 35–47)
HGB BLD-MCNC: 7.2 G/DL (ref 11.7–15.7)
HOLD SPECIMEN: NORMAL
IMM GRANULOCYTES # BLD: 0 10E3/UL
IMM GRANULOCYTES NFR BLD: 0 %
LYMPHOCYTES # BLD AUTO: 1 10E3/UL (ref 0.8–5.3)
LYMPHOCYTES NFR BLD AUTO: 22 %
MAGNESIUM SERPL-MCNC: 2.3 MG/DL (ref 1.6–2.3)
MCH RBC QN AUTO: 21.5 PG (ref 26.5–33)
MCHC RBC AUTO-ENTMCNC: 28.5 G/DL (ref 31.5–36.5)
MCV RBC AUTO: 76 FL (ref 78–100)
MONOCYTES # BLD AUTO: 0.5 10E3/UL (ref 0–1.3)
MONOCYTES NFR BLD AUTO: 11 %
NEUTROPHILS # BLD AUTO: 2.5 10E3/UL (ref 1.6–8.3)
NEUTROPHILS NFR BLD AUTO: 53 %
NRBC # BLD AUTO: 0 10E3/UL
NRBC BLD AUTO-RTO: 0 /100
PHOSPHATE SERPL-MCNC: 3.4 MG/DL (ref 2.5–4.5)
PLATELET # BLD AUTO: 139 10E3/UL (ref 150–450)
POTASSIUM BLD-SCNC: 5.2 MMOL/L (ref 3.4–5.3)
PROT SERPL-MCNC: 6.6 G/DL (ref 6.8–8.8)
RBC # BLD AUTO: 3.35 10E6/UL (ref 3.8–5.2)
SODIUM SERPL-SCNC: 140 MMOL/L (ref 133–144)
TRIGL SERPL-MCNC: 90 MG/DL
WBC # BLD AUTO: 4.7 10E3/UL (ref 4–11)

## 2021-10-25 PROCEDURE — 84100 ASSAY OF PHOSPHORUS: CPT | Performed by: SURGERY

## 2021-10-25 PROCEDURE — 82248 BILIRUBIN DIRECT: CPT | Performed by: SURGERY

## 2021-10-25 PROCEDURE — 83735 ASSAY OF MAGNESIUM: CPT | Performed by: SURGERY

## 2021-10-25 PROCEDURE — 80053 COMPREHEN METABOLIC PANEL: CPT | Performed by: SURGERY

## 2021-10-25 PROCEDURE — 85025 COMPLETE CBC W/AUTO DIFF WBC: CPT | Performed by: SURGERY

## 2021-10-25 PROCEDURE — 84478 ASSAY OF TRIGLYCERIDES: CPT | Performed by: SURGERY

## 2021-10-25 NOTE — TELEPHONE ENCOUNTER
Received phone call from Erin with [facility] ([phone]). She states patient has low-grade temperature of 99.8, experiencing. She mentions patient is not infusing TPN nightly but rather about 3 times a week. Will send message to Darling HOLLOWAY RN to update her.

## 2021-10-25 NOTE — PROGRESS NOTES
"Talked with Erin from [facility] regarding patient status.      New redness on abdomen that wasn't there last week.  Area is hard and warm to touch.  Painful.  Patient running a low grade fever 99.8 F.      Patient's lack of doing TPN is linked to the complaint that the bag is too heavy.  They had decreased the size of the bag to accommodate the patient's complaints.  Still \"too heavy\".      Dr. Clayton notified of patient status.  Patient to be seen in clinic Thursday.      "

## 2021-10-25 NOTE — TELEPHONE ENCOUNTER
LVM with pod number. Per RN, overbook pt to come into clinic Thursday 10/28 to see Dr. Clayton. Any time slot is fine.

## 2021-10-25 NOTE — PROGRESS NOTES
This is a recent snapshot of the patient's [facility] medical record.  For current drug dose and complete information and questions, call [phone] or In Basket pool, fv home infusion ([id number])  CSN Number:  [id number]

## 2021-10-25 NOTE — TELEPHONE ENCOUNTER
Received a phone call from Erin with [facility] [phone]. Saw Demetra and reports her abdominal wound about 4 cm out from the wound bed now showing redness, swelling, warmth, hardness. She is concerned about infection. VM message states she can be called or contact patient directly. Call will be forwarded to clinic RN, Darling KAHN

## 2021-10-26 ENCOUNTER — TELEPHONE (OUTPATIENT)
Dept: SURGERY | Facility: CLINIC | Age: 66
End: 2021-10-26

## 2021-10-26 ENCOUNTER — MEDICAL CORRESPONDENCE (OUTPATIENT)
Dept: HEALTH INFORMATION MANAGEMENT | Facility: CLINIC | Age: 66
End: 2021-10-26

## 2021-10-26 NOTE — TELEPHONE ENCOUNTER
Patient called stating the redness is increasing and more swollen since this morning, wants to know if she can see someone in clinic today or tomorrow rather than wait until Thursday to see Dr. Clayton.   Message forwarded to Darling HOLLOWAY RN in clinic to address.

## 2021-10-27 ENCOUNTER — OFFICE VISIT (OUTPATIENT)
Dept: SURGERY | Facility: CLINIC | Age: 66
End: 2021-10-27
Payer: COMMERCIAL

## 2021-10-27 VITALS
DIASTOLIC BLOOD PRESSURE: 66 MMHG | BODY MASS INDEX: 21.17 KG/M2 | WEIGHT: 107.8 LBS | SYSTOLIC BLOOD PRESSURE: 107 MMHG | TEMPERATURE: 97.6 F | HEART RATE: 63 BPM | HEIGHT: 60 IN | OXYGEN SATURATION: 98 %

## 2021-10-27 DIAGNOSIS — L08.9 WOUND INFECTION: Primary | ICD-10-CM

## 2021-10-27 DIAGNOSIS — T14.8XXA WOUND INFECTION: Primary | ICD-10-CM

## 2021-10-27 PROCEDURE — 99024 POSTOP FOLLOW-UP VISIT: CPT | Performed by: SURGERY

## 2021-10-27 RX ORDER — CEFADROXIL 500 MG/1
500 CAPSULE ORAL 2 TIMES DAILY
Qty: 14 CAPSULE | Refills: 0 | Status: SHIPPED | OUTPATIENT
Start: 2021-10-27 | End: 2021-10-27

## 2021-10-27 ASSESSMENT — PAIN SCALES - GENERAL: PAINLEVEL: SEVERE PAIN (6)

## 2021-10-27 ASSESSMENT — MIFFLIN-ST. JEOR: SCORE: 960.48

## 2021-10-27 NOTE — LETTER
10/27/2021       RE: Demetra Richardson  [address] [place] [address]     Dear Colleague,    Thank you for referring your patient, Demetra Richardson, to the [facility] GENERAL SURGERY CLINIC [place] at [facility]. Please see a copy of my visit note below.    This is a 65-year-old female who is status post exploratory laparotomy with extensive lysis of adhesions and jejunojejunostomy resection following a history of multiple complex surgeries on 9/10.  Her wound has  somewhat and she does have some exposed suture.  She comes in today concerned about some redness around her wound.  I examined her today.  There is minimal erythema around the wound I marked this with a marking pen broadly.  There is no warmth to the skin nor is a induration.  I initially we contemplated starting her on a cephalosporin but given her allergies was less inclined to do that.  My recommendation would be that she keeps her appointment with Dr. Clayton tomorrow if there is any change or worsening from the area that I marked and we can certainly start her on antibiotics.  The patient is afebrile. /66 (BP Location: Left arm, Patient Position: Sitting, Cuff Size: Adult Small)   Pulse 63   Temp 97.6  F (36.4  C) (Oral)   Ht 1.524 m (5')   Wt 48.9 kg (107 lb 12.8 oz)   LMP  (LMP Unknown)   SpO2 98%   BMI 21.05 kg/m    Assessment possible early wound infection.  Patient to follow-up with Dr. Clayton tomorrow.  Should she develop any fevers worsening erythema she should present for more aggressive therapy.      Again, thank you for allowing me to participate in the care of your patient.      Sincerely,    Danilo Durbin MD

## 2021-10-27 NOTE — PROGRESS NOTES
This is a 65-year-old female who is status post exploratory laparotomy with extensive lysis of adhesions and jejunojejunostomy resection following a history of multiple complex surgeries on 9/10.  Her wound has  somewhat and she does have some exposed suture.  She comes in today concerned about some redness around her wound.  I examined her today.  There is minimal erythema around the wound I marked this with a marking pen broadly.  There is no warmth to the skin nor is a induration.  I initially we contemplated starting her on a cephalosporin but given her allergies was less inclined to do that.  My recommendation would be that she keeps her appointment with Dr. Clayton tomorrow if there is any change or worsening from the area that I marked and we can certainly start her on antibiotics.  The patient is afebrile. /66 (BP Location: Left arm, Patient Position: Sitting, Cuff Size: Adult Small)   Pulse 63   Temp 97.6  F (36.4  C) (Oral)   Ht 1.524 m (5')   Wt 48.9 kg (107 lb 12.8 oz)   LMP  (LMP Unknown)   SpO2 98%   BMI 21.05 kg/m    Assessment possible early wound infection.  Patient to follow-up with Dr. Clayton tomorrow.  Should she develop any fevers worsening erythema she should present for more aggressive therapy.

## 2021-10-27 NOTE — PROGRESS NOTES
This is a recent snapshot of the patient's [facility] medical record.  For current drug dose and complete information and questions, call [phone] or In Basket pool, fv home infusion ([id number])  CSN Number:  [id number]

## 2021-10-28 ENCOUNTER — OFFICE VISIT (OUTPATIENT)
Dept: ENDOCRINOLOGY | Facility: CLINIC | Age: 66
End: 2021-10-28
Payer: COMMERCIAL

## 2021-10-28 ENCOUNTER — HOME INFUSION (PRE-WILLOW HOME INFUSION) (OUTPATIENT)
Dept: PHARMACY | Facility: CLINIC | Age: 66
End: 2021-10-28

## 2021-10-28 ENCOUNTER — TELEPHONE (OUTPATIENT)
Dept: SURGERY | Facility: CLINIC | Age: 66
End: 2021-10-28

## 2021-10-28 VITALS
HEIGHT: 62 IN | SYSTOLIC BLOOD PRESSURE: 124 MMHG | DIASTOLIC BLOOD PRESSURE: 75 MMHG | HEART RATE: 71 BPM | WEIGHT: 107.8 LBS | OXYGEN SATURATION: 96 % | BODY MASS INDEX: 19.84 KG/M2

## 2021-10-28 DIAGNOSIS — Z90.3 S/P PARTIAL GASTRECTOMY: Primary | ICD-10-CM

## 2021-10-28 PROCEDURE — 99024 POSTOP FOLLOW-UP VISIT: CPT | Performed by: SURGERY

## 2021-10-28 ASSESSMENT — PAIN SCALES - GENERAL: PAINLEVEL: NO PAIN (0)

## 2021-10-28 ASSESSMENT — MIFFLIN-ST. JEOR: SCORE: 992.23

## 2021-10-28 NOTE — TELEPHONE ENCOUNTER
Ewelina    Radha has been infusing about 25-30% of her prescribed TPN volume.  Radha said she tries to infuse about every other day, lets the pump run about 10 of the 12 hrs prescribed and then turns it off.    Her diet has improved.  Weight and albumin are also improving.     Would you approve of changing her TPN order from daily to every other to day to better reflect her actual use and help to minimize waste?    Thank you!  Nargis Pedraza, PharmD [facility]   Ph: [phone]  Fax: [phone]

## 2021-10-28 NOTE — NURSING NOTE
"Chief Complaint   Patient presents with     Follow Up     2 Week Follow-up Open Wound       Vitals:    10/28/21 0820   BP: 124/75   BP Location: Right arm   Patient Position: Chair   Cuff Size: Adult Regular   Pulse: 71   SpO2: 96%   Weight: 48.9 kg (107 lb 12.8 oz)   Height: 1.575 m (5' 2\")       Body mass index is 19.72 kg/m .      WOUND EVALUATION:    Mid Abdominal Open Wound    Color Red and no ordor Wound is closed no tunnels  Wound looks good    Length 4 inches    Width 10 cm    Dept 0                      "

## 2021-10-28 NOTE — PROGRESS NOTES
This is a recent snapshot of the patient's [facility] medical record.  For current drug dose and complete information and questions, call [phone] or In Basket pool, fv home infusion ([id number])  CSN Number:  [id number]

## 2021-10-28 NOTE — PROGRESS NOTES
This is a recent snapshot of the patient's [facility] medical record.  For current drug dose and complete information and questions, call [phone] or In Basket pool, fv home infusion ([id number])  CSN Number:  [id number]

## 2021-10-28 NOTE — LETTER
"10/28/2021       RE: Demetra Richardson  [address] [place] [address]     Dear Colleague,    Thank you for referring your patient, Demetra Richardson, to the [facility]NAGEMENT [facility]IN[facility] [place] at [facility]. Please see a copy of my visit note below.    Patient underwent prior esophagojejunostomy surgery and this occurred 7 weeks ago.    Demetra Richardson overall has the following complaints: some red streaks on abdominal wall; already seen in clinic just yesterday.    No shakes/chills.    On exam:  /75 (BP Location: Right arm, Patient Position: Chair, Cuff Size: Adult Regular)   Pulse 71   Ht 1.575 m (5' 2\")   Wt 48.9 kg (107 lb 12.8 oz)   LMP  (LMP Unknown)   SpO2 96%   BMI 19.72 kg/m    Lung exam: breathing unlabored  Abdominal exam: soft; nontender; nondistended; incisions has open incision (still); no evidence of cellulitis.    Plan:  Follow-up with me in 4 weeks.    Can probably go off TPN if not actually hooking up.  Then remove PICC.    No orders of the defined types were placed in this encounter.          Kaveh Clayton MD  Surgery  [phone] (hospital )  [phone] (clinic nurses)    "

## 2021-10-28 NOTE — PROGRESS NOTES
This is a recent snapshot of the patient's [facility] medical record.  For current drug dose and complete information and questions, call [phone] or In Basket pool, fv home infusion ([id number])  CSN Number:  [id number]

## 2021-10-29 NOTE — PROGRESS NOTES
This is a recent snapshot of the patient's [facility] medical record.  For current drug dose and complete information and questions, call [phone] or In Basket pool, fv home infusion ([id number])  CSN Number:  [id number]

## 2021-11-01 NOTE — PROGRESS NOTES
"Patient underwent prior esophagojejunostomy surgery and this occurred 7 weeks ago.    Demetra Richardson overall has the following complaints: some red streaks on abdominal wall; already seen in clinic just yesterday.    No shakes/chills.    On exam:  /75 (BP Location: Right arm, Patient Position: Chair, Cuff Size: Adult Regular)   Pulse 71   Ht 1.575 m (5' 2\")   Wt 48.9 kg (107 lb 12.8 oz)   LMP  (LMP Unknown)   SpO2 96%   BMI 19.72 kg/m    Lung exam: breathing unlabored  Abdominal exam: soft; nontender; nondistended; incisions has open incision (still); no evidence of cellulitis.    Plan:  Follow-up with me in 4 weeks.    Can probably go off TPN if not actually hooking up.  Then remove PICC.    No orders of the defined types were placed in this encounter.          Kaveh Clayton MD  Surgery  [phone] (hospital )  [phone] (clinic nurses)                "

## 2021-11-02 ENCOUNTER — HOME INFUSION (PRE-WILLOW HOME INFUSION) (OUTPATIENT)
Dept: PHARMACY | Facility: CLINIC | Age: 66
End: 2021-11-02

## 2021-11-02 ENCOUNTER — LAB REQUISITION (OUTPATIENT)
Dept: LAB | Facility: CLINIC | Age: 66
End: 2021-11-02
Payer: COMMERCIAL

## 2021-11-02 DIAGNOSIS — R11.2 NAUSEA WITH VOMITING, UNSPECIFIED: ICD-10-CM

## 2021-11-02 LAB
ALBUMIN SERPL-MCNC: 2.9 G/DL (ref 3.4–5)
ALP SERPL-CCNC: 211 U/L (ref 40–150)
ALT SERPL W P-5'-P-CCNC: 25 U/L (ref 0–50)
ANION GAP SERPL CALCULATED.3IONS-SCNC: 3 MMOL/L (ref 3–14)
AST SERPL W P-5'-P-CCNC: 33 U/L (ref 0–45)
BASOPHILS # BLD AUTO: 0 10E3/UL (ref 0–0.2)
BASOPHILS NFR BLD AUTO: 0 %
BILIRUB DIRECT SERPL-MCNC: <0.1 MG/DL (ref 0–0.2)
BILIRUB SERPL-MCNC: 0.3 MG/DL (ref 0.2–1.3)
BUN SERPL-MCNC: 13 MG/DL (ref 7–30)
CALCIUM SERPL-MCNC: 8.3 MG/DL (ref 8.5–10.1)
CHLORIDE BLD-SCNC: 108 MMOL/L (ref 94–109)
CO2 SERPL-SCNC: 30 MMOL/L (ref 20–32)
CREAT SERPL-MCNC: 0.7 MG/DL (ref 0.52–1.04)
EOSINOPHIL # BLD AUTO: 0.3 10E3/UL (ref 0–0.7)
EOSINOPHIL NFR BLD AUTO: 6 %
ERYTHROCYTE [DISTWIDTH] IN BLOOD BY AUTOMATED COUNT: 20.3 % (ref 10–15)
FASTING STATUS PATIENT QL REPORTED: NORMAL
GFR SERPL CREATININE-BSD FRML MDRD: >90 ML/MIN/1.73M2
GLUCOSE BLD-MCNC: 106 MG/DL (ref 70–99)
HCT VFR BLD AUTO: 30.7 % (ref 35–47)
HGB BLD-MCNC: 8.6 G/DL (ref 11.7–15.7)
HOLD SPECIMEN: NORMAL
IMM GRANULOCYTES # BLD: 0 10E3/UL
IMM GRANULOCYTES NFR BLD: 0 %
LYMPHOCYTES # BLD AUTO: 0.8 10E3/UL (ref 0.8–5.3)
LYMPHOCYTES NFR BLD AUTO: 18 %
MAGNESIUM SERPL-MCNC: 2.1 MG/DL (ref 1.6–2.3)
MCH RBC QN AUTO: 21.8 PG (ref 26.5–33)
MCHC RBC AUTO-ENTMCNC: 28 G/DL (ref 31.5–36.5)
MCV RBC AUTO: 78 FL (ref 78–100)
MONOCYTES # BLD AUTO: 0.3 10E3/UL (ref 0–1.3)
MONOCYTES NFR BLD AUTO: 6 %
NEUTROPHILS # BLD AUTO: 3.2 10E3/UL (ref 1.6–8.3)
NEUTROPHILS NFR BLD AUTO: 70 %
NRBC # BLD AUTO: 0 10E3/UL
NRBC BLD AUTO-RTO: 0 /100
PHOSPHATE SERPL-MCNC: 3.9 MG/DL (ref 2.5–4.5)
PLATELET # BLD AUTO: 156 10E3/UL (ref 150–450)
POTASSIUM BLD-SCNC: 4.3 MMOL/L (ref 3.4–5.3)
PROT SERPL-MCNC: 7.2 G/DL (ref 6.8–8.8)
RBC # BLD AUTO: 3.95 10E6/UL (ref 3.8–5.2)
SODIUM SERPL-SCNC: 141 MMOL/L (ref 133–144)
TRIGL SERPL-MCNC: 97 MG/DL
WBC # BLD AUTO: 4.6 10E3/UL (ref 4–11)

## 2021-11-02 PROCEDURE — 84100 ASSAY OF PHOSPHORUS: CPT | Performed by: SURGERY

## 2021-11-02 PROCEDURE — 82248 BILIRUBIN DIRECT: CPT | Performed by: SURGERY

## 2021-11-02 PROCEDURE — 84478 ASSAY OF TRIGLYCERIDES: CPT | Performed by: SURGERY

## 2021-11-02 PROCEDURE — 85025 COMPLETE CBC W/AUTO DIFF WBC: CPT | Performed by: SURGERY

## 2021-11-02 PROCEDURE — 80053 COMPREHEN METABOLIC PANEL: CPT | Performed by: SURGERY

## 2021-11-02 PROCEDURE — 83735 ASSAY OF MAGNESIUM: CPT | Performed by: SURGERY

## 2021-11-03 NOTE — PROGRESS NOTES
This is a recent snapshot of the patient's [facility] medical record.  For current drug dose and complete information and questions, call [phone] or In Basket pool, fv home infusion ([id number])  CSN Number:  [id number]

## 2021-11-03 NOTE — PROGRESS NOTES
This is a recent snapshot of the patient's [facility] medical record.  For current drug dose and complete information and questions, call [phone] or In Basket pool, fv home infusion ([id number])  CSN Number:  [id number]

## 2021-11-04 ENCOUNTER — HOME INFUSION (PRE-WILLOW HOME INFUSION) (OUTPATIENT)
Dept: PHARMACY | Facility: CLINIC | Age: 66
End: 2021-11-04
Payer: COMMERCIAL

## 2021-11-05 ENCOUNTER — HOME INFUSION (PRE-WILLOW HOME INFUSION) (OUTPATIENT)
Dept: PHARMACY | Facility: CLINIC | Age: 66
End: 2021-11-05
Payer: COMMERCIAL

## 2021-11-08 ENCOUNTER — HOME INFUSION (PRE-WILLOW HOME INFUSION) (OUTPATIENT)
Dept: PHARMACY | Facility: CLINIC | Age: 66
End: 2021-11-08

## 2021-11-08 ENCOUNTER — LAB REQUISITION (OUTPATIENT)
Dept: LAB | Facility: CLINIC | Age: 66
End: 2021-11-08
Payer: COMMERCIAL

## 2021-11-08 DIAGNOSIS — R11.2 NAUSEA WITH VOMITING, UNSPECIFIED: ICD-10-CM

## 2021-11-08 LAB
ALBUMIN SERPL-MCNC: 3.3 G/DL (ref 3.4–5)
ALP SERPL-CCNC: 192 U/L (ref 40–150)
ALT SERPL W P-5'-P-CCNC: 21 U/L (ref 0–50)
ANION GAP SERPL CALCULATED.3IONS-SCNC: 6 MMOL/L (ref 3–14)
AST SERPL W P-5'-P-CCNC: 19 U/L (ref 0–45)
BASOPHILS # BLD AUTO: 0 10E3/UL (ref 0–0.2)
BASOPHILS NFR BLD AUTO: 0 %
BILIRUB DIRECT SERPL-MCNC: <0.1 MG/DL (ref 0–0.2)
BILIRUB SERPL-MCNC: 0.2 MG/DL (ref 0.2–1.3)
BILIRUB SERPL-MCNC: 0.2 MG/DL (ref 0.2–1.3)
BUN SERPL-MCNC: 15 MG/DL (ref 7–30)
CALCIUM SERPL-MCNC: 8.2 MG/DL (ref 8.5–10.1)
CHLORIDE BLD-SCNC: 107 MMOL/L (ref 94–109)
CO2 SERPL-SCNC: 24 MMOL/L (ref 20–32)
CREAT SERPL-MCNC: 0.77 MG/DL (ref 0.52–1.04)
EOSINOPHIL # BLD AUTO: 0 10E3/UL (ref 0–0.7)
EOSINOPHIL NFR BLD AUTO: 0 %
ERYTHROCYTE [DISTWIDTH] IN BLOOD BY AUTOMATED COUNT: 22.5 % (ref 10–15)
FASTING STATUS PATIENT QL REPORTED: NORMAL
GFR SERPL CREATININE-BSD FRML MDRD: 82 ML/MIN/1.73M2
GLUCOSE BLD-MCNC: 77 MG/DL (ref 70–99)
HCT VFR BLD AUTO: 33.8 % (ref 35–47)
HGB BLD-MCNC: 9.5 G/DL (ref 11.7–15.7)
HOLD SPECIMEN: NORMAL
IMM GRANULOCYTES # BLD: 0 10E3/UL
IMM GRANULOCYTES NFR BLD: 0 %
LYMPHOCYTES # BLD AUTO: 1.1 10E3/UL (ref 0.8–5.3)
LYMPHOCYTES NFR BLD AUTO: 15 %
MAGNESIUM SERPL-MCNC: 2.1 MG/DL (ref 1.6–2.3)
MCH RBC QN AUTO: 22 PG (ref 26.5–33)
MCHC RBC AUTO-ENTMCNC: 28.1 G/DL (ref 31.5–36.5)
MCV RBC AUTO: 78 FL (ref 78–100)
MONOCYTES # BLD AUTO: 0.6 10E3/UL (ref 0–1.3)
MONOCYTES NFR BLD AUTO: 8 %
NEUTROPHILS # BLD AUTO: 5.4 10E3/UL (ref 1.6–8.3)
NEUTROPHILS NFR BLD AUTO: 77 %
NRBC # BLD AUTO: 0 10E3/UL
NRBC BLD AUTO-RTO: 0 /100
PHOSPHATE SERPL-MCNC: 4 MG/DL (ref 2.5–4.5)
PLATELET # BLD AUTO: 332 10E3/UL (ref 150–450)
POTASSIUM BLD-SCNC: 3 MMOL/L (ref 3.4–5.3)
PROT SERPL-MCNC: 7.3 G/DL (ref 6.8–8.8)
RBC # BLD AUTO: 4.31 10E6/UL (ref 3.8–5.2)
SODIUM SERPL-SCNC: 137 MMOL/L (ref 133–144)
TRIGL SERPL-MCNC: 70 MG/DL
WBC # BLD AUTO: 7.1 10E3/UL (ref 4–11)

## 2021-11-08 PROCEDURE — 85025 COMPLETE CBC W/AUTO DIFF WBC: CPT | Performed by: SURGERY

## 2021-11-08 PROCEDURE — 80053 COMPREHEN METABOLIC PANEL: CPT | Performed by: SURGERY

## 2021-11-08 PROCEDURE — 82248 BILIRUBIN DIRECT: CPT | Performed by: SURGERY

## 2021-11-08 PROCEDURE — 83735 ASSAY OF MAGNESIUM: CPT | Performed by: SURGERY

## 2021-11-08 PROCEDURE — 84100 ASSAY OF PHOSPHORUS: CPT | Performed by: SURGERY

## 2021-11-08 PROCEDURE — 36592 COLLECT BLOOD FROM PICC: CPT | Performed by: SURGERY

## 2021-11-08 PROCEDURE — 84478 ASSAY OF TRIGLYCERIDES: CPT | Performed by: SURGERY

## 2021-11-09 ENCOUNTER — HOME INFUSION (PRE-WILLOW HOME INFUSION) (OUTPATIENT)
Dept: PHARMACY | Facility: CLINIC | Age: 66
End: 2021-11-09
Payer: COMMERCIAL

## 2021-11-09 NOTE — PROGRESS NOTES
This is a recent snapshot of the patient's [facility] medical record.  For current drug dose and complete information and questions, call [phone] or In Basket pool, fv home infusion ([id number])  CSN Number:  [id number]

## 2021-11-10 ENCOUNTER — TELEPHONE (OUTPATIENT)
Dept: SURGERY | Facility: CLINIC | Age: 66
End: 2021-11-10
Payer: COMMERCIAL

## 2021-11-10 ENCOUNTER — HOME INFUSION (PRE-WILLOW HOME INFUSION) (OUTPATIENT)
Dept: PHARMACY | Facility: CLINIC | Age: 66
End: 2021-11-10
Payer: COMMERCIAL

## 2021-11-10 NOTE — TELEPHONE ENCOUNTER
Ewelina,    Based on Radha's intermittent use of TPN, I had planned to begin formally weaning her to every other day infusions starting 10/28.  Per RN assessments of her infusion pump, Radha hasn't infused TPN since 10/28/21. Per RN note from her visit 11/8/21,patient concerned about weight loss but reluctant to resume TPN.  Declined to have RN start infusion at the visit.  Documented oral intake was fair to poor, but she is drinking fluids well.    Weight trend:  11/8/21 = 95.6 lbs  11/5/21 = 99 lbs  10/28/21 = 107 lbs  10/25/21 = 108.6 lbs (bilateral ext 1+ pitting edema)    Prior to 10/28, patient was averaging about 25% of her prescribed TPN infused.     I attempted to reach patient to assess, but had to leave a message.    Labs this week with low K at 3.0.      Please advise on what we should do to move forward.     Thank you,  Nargis Pedraza, PharmD [facility]   Ph: [phone]  Fax: [phone]

## 2021-11-11 ENCOUNTER — HOME INFUSION (PRE-WILLOW HOME INFUSION) (OUTPATIENT)
Dept: PHARMACY | Facility: CLINIC | Age: 66
End: 2021-11-11

## 2021-11-11 NOTE — PROGRESS NOTES
This is a recent snapshot of the patient's [facility] medical record.  For current drug dose and complete information and questions, call [phone] or In Basket pool, fv home infusion ([id number])  CSN Number:  [id number]

## 2021-11-11 NOTE — TELEPHONE ENCOUNTER
Andrés Hastings,  I think she needs to be assessed by the provider.  I have tried to reach out to her home care nurse to provide more insight as well.  I think calling her would be helpful, she at times has been resistant to talking to me on the phone.     Thanks,  Jessi

## 2021-11-12 ENCOUNTER — HOME INFUSION (PRE-WILLOW HOME INFUSION) (OUTPATIENT)
Dept: PHARMACY | Facility: CLINIC | Age: 66
End: 2021-11-12
Payer: COMMERCIAL

## 2021-11-12 NOTE — TELEPHONE ENCOUNTER
"I have attempted to reach patient both 11/10 and today, no answer or return call.    [facility] attempted RN visit Thursday 11/11/21 for general assessment -     Telephone call placed to pt to set up SNV for today and pt stated she doesn't need a nurse visit and \" I am doing fine, I don't need a nurse visit, I just flushed my lines and my wound is scabbed over and cleanser with NS.  Tell my doctor I refuse a visit, I don't care,\"    Patient has scheduled weekly RN visit Monday or Tuesday next week.  Do you think the RN should remove her PICC line at that time if she continues to decline further IV therapy?    -Jessi  "

## 2021-11-16 ENCOUNTER — HOME INFUSION (PRE-WILLOW HOME INFUSION) (OUTPATIENT)
Dept: PHARMACY | Facility: CLINIC | Age: 66
End: 2021-11-16
Payer: COMMERCIAL

## 2021-11-16 NOTE — PROGRESS NOTES
This is a recent snapshot of the patient's [facility] medical record.  For current drug dose and complete information and questions, call [phone] or In Basket pool, fv home infusion ([id number])  CSN Number:  [id number]

## 2021-11-22 ENCOUNTER — MEDICAL CORRESPONDENCE (OUTPATIENT)
Dept: HEALTH INFORMATION MANAGEMENT | Facility: CLINIC | Age: 66
End: 2021-11-22
Payer: COMMERCIAL

## 2021-11-22 NOTE — PROGRESS NOTES
This is a recent snapshot of the patient's [facility] medical record.  For current drug dose and complete information and questions, call [phone] or In Basket pool, fv home infusion ([id number])  CSN Number:  [id number]

## 2021-11-23 ENCOUNTER — OFFICE VISIT (OUTPATIENT)
Dept: OPHTHALMOLOGY | Facility: CLINIC | Age: 66
End: 2021-11-23
Payer: COMMERCIAL

## 2021-11-23 DIAGNOSIS — Z01.00 COMPLETE EYE EXAM, ENCOUNTER FOR: Primary | ICD-10-CM

## 2021-11-23 PROCEDURE — 92004 COMPRE OPH EXAM NEW PT 1/>: CPT | Mod: GC | Performed by: STUDENT IN AN ORGANIZED HEALTH CARE EDUCATION/TRAINING PROGRAM

## 2021-11-23 PROCEDURE — G0463 HOSPITAL OUTPT CLINIC VISIT: HCPCS

## 2021-11-23 PROCEDURE — 92060 SENSORIMOTOR EXAMINATION: CPT

## 2021-11-23 PROCEDURE — 92015 DETERMINE REFRACTIVE STATE: CPT

## 2021-11-23 ASSESSMENT — VISUAL ACUITY
OS_CC: 20/25
OD_CC: 20/40
CORRECTION_TYPE: GLASSES
METHOD: SNELLEN - LINEAR
OD_PH_CC: 20/30
OD_CC+: +2

## 2021-11-23 ASSESSMENT — REFRACTION_MANIFEST
OD_SPHERE: -1.25
OS_SPHERE: -0.25
OD_AXIS: 170
OS_CYLINDER: +0.75
OD_ADD: +2.50
OS_AXIS: 170
OD_CYLINDER: +0.75
OS_ADD: +2.50

## 2021-11-23 ASSESSMENT — TONOMETRY
IOP_METHOD: TONOPEN
OS_IOP_MMHG: 11
OD_IOP_MMHG: 10

## 2021-11-23 ASSESSMENT — REFRACTION_WEARINGRX
OD_AXIS: 167
OD_SPHERE: -0.50
OD_CYLINDER: +1.25
OS_AXIS: 174
OS_CYLINDER: +1.00
SPECS_TYPE: SVL
OS_SPHERE: -0.50

## 2021-11-23 ASSESSMENT — PACHYMETRY
OD_CT(UM): 513
OS_CT(UM): 509

## 2021-11-23 ASSESSMENT — EXTERNAL EXAM - LEFT EYE: OS_EXAM: NORMAL

## 2021-11-23 ASSESSMENT — CUP TO DISC RATIO
OS_RATIO: 0.65
OD_RATIO: 0.5

## 2021-11-23 ASSESSMENT — EXTERNAL EXAM - RIGHT EYE: OD_EXAM: NORMAL

## 2021-11-23 ASSESSMENT — CONF VISUAL FIELD
OS_SUPERIOR_TEMPORAL_RESTRICTION: 3
OD_NORMAL: 1

## 2021-11-23 NOTE — PROGRESS NOTES
OPHTHALMOLOGY - General Clinic Patient Note    CC:  Presents to acute ophthalmology clinic today for new glasses prescription and year complete eye exam    HPI:  HPI     Difficulty focusing while watching television. Pt often closes LE to see clearer with RE.   Pt notes that she closed LE as a child. No eye surgery or patching as child.  A few random episodes of double vision, but none today. Pt reports that she often feels that she is unbalanced, more difficulty walking in a straight line.  Pt uncertain if it is related to her vision or if it is a balance problem.  No flashes or floaters.    Pt has a history of BCC and had MOHS procedure done to inner LE about 1 year ago.    CANDY Corrigan November 23, 2021 9:31 AM        Last edited by Diane Santos COMT on 11/23/2021  9:34 AM. (History)        - Patient states she only recently realized that she has the lifelong habit of closing the left eye to look out of the right and that she was told she had misalignment of her eyes. Since her last visit here in 2017 she had Mohs performed in left upper eyelid for BCC in 2019. Believes it was performed at [facility] but no records located. States that she is having difficulty reading with her current reading glasses. Uses two separate pairs of glasses, readers and distance.     Visual symptoms:  - Denies blurred vision with fluctuation, improving with blinking, monocular double vision/ghosting  - Denies eye irritation or tearing  - Denies floaters, flashes of light peripherally  - Does get migraines which she describes as having white flashes but has not had migraines in some time (twice per year)    POHx  - GTTs: Visine PRN,  - Last eye exam: 1 year ago  - Prior eye surgery/laser: MOHS left upper eyelid nasally 2019  - CTL wearer: no  - Glasses: myopia, presbyopia - does not like wearing the distance glasses but does like her readers    PMHx    Past Medical History:   Diagnosis Date     Anemia      Basal cell  carcinoma     Left-sided, skin over over medial orbit/nasal bone. Removed Oct 2018.     Benzodiazepine dependence (H)     With h/o withdrawal seizure x 1     Bipolar 1 disorder, mixed, moderate (H) 2/7/2013     Chronic pain     Back, legs.     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     Epidural abscess 2005    x4     Fibromyalgia      Generalised anxiety disorder      GERD (gastroesophageal reflux disease)      Major depressive disorder      Migraine      Nephrolithiasis     S/p left sided lithotripsy     Opiate dependence (H)      Osteoarthritis      Osteoporosis      PUD (peptic ulcer disease)      SLE (systemic lupus erythematosus) (H) 2009     Weight loss      FH:  - Glaucoma: maybe her maternal grandfather  - AMD: no  - Mother with Fuchs     SH:  - Lives: assisted living studio alone  - Works: no  - EtOH: no  - Tobacco: no    Assessment & Plan:  Myopia, presbyopia, each eye  - Significant change in prescription should improve patient's vision complaints: BCVA 20/20 20/25 J1/J2  Plan:  - Discussed and dispensed mrx - prefers two separate sets of glasses for reading and distance.     Dry eyes OU  Hx of lupus  - History of dry eye but no dry eye symptoms today. Exam does have evidence of chronic dry eye  - History of LL each eye collagen punctal plugs placed in 2017 L[facility]  - On visine  Plan:  - Stop visine  - Start PFAT QID PRN OU    Glaucoma suspect  FHx in granfather, CDR asym  IOP controlled 10/11 today  Pachymetry 513/509  - OCT ONH OU and gonio on return to clinic     FHx of Fuch's dystrophy  No guttata OU on exam today  - Monitor    Constant Left Exotropia  - Measures 4-16D and is associated with fixation switch diplopia  - Could be causing part of patient's visual dissatisfaction but will address refractive error and surface dryness first.   - Monitor    Disposition:  Return in about 3 months (around 2/23/2022) for comprehensive clinic with OCT RNFL, gonio.     Seen and discussed  with Dr. Eric Wallis.    Katina Whalen MD  Resident Physician - PGY2  Department of Ophthalmology   [facility]    Attending Physician Attestation:  Complete documentation of historical and exam elements from today's encounter can be found in the full encounter summary report (not reduplicated in this progress note).  I personally obtained the chief complaint(s) and history of present illness.  I confirmed and edited as necessary the review of systems, past medical/surgical history, family history, social history, and examination findings as documented by others; and I examined the patient myself.  I personally reviewed the relevant tests, images, and reports as documented above.  I formulated and edited as necessary the assessment and plan and discussed the findings and management plan with the patient and family. . - Eric Wallis MD

## 2021-11-23 NOTE — NURSING NOTE
Chief Complaints and History of Present Illnesses   Patient presents with     Blurred Vision Evaluation     Chief Complaint(s) and History of Present Illness(es)     Blurred Vision Evaluation               Comments     Difficulty focusing while watching television. Pt often closes LE to see clearer with RE.   Pt notes that she closed LE as a child. No eye surgery or patching as child.  A few random episodes of double vision, but none today. Pt reports that she often feels that she is unbalanced, more difficulty walking in a straight line.  Pt uncertain if it is related to her vision or if it is a balance problem.  No flashes or floaters.    Pt has a history of BCC and had MOHS procedure done to inner LE about 1 year ago.    CANDY Corrigan November 23, 2021 9:31 AM

## 2021-11-26 ENCOUNTER — TELEPHONE (OUTPATIENT)
Dept: SURGERY | Facility: CLINIC | Age: 66
End: 2021-11-26
Payer: COMMERCIAL

## 2021-11-26 NOTE — TELEPHONE ENCOUNTER
Returned patients phone call. She states that she has been unable to tolerate solid foods due to pain with swallowing and has been living on a full liquid diet for quite some time. She has lost weight and is under 100lb now. States her weight has now been stable but she cannot gain any due to this pain she is having with eating. She is concerned she has a hiatal hernia as she was told this is a possible complication after her surgery. Explained that both Dr. Clayton and Dr. Durbin are out of the office until 12/8 and 12/9. Offered to schedule her with Dr. Calderon for 12/3 but she declined. She wants to see Dr. Clayton. Scheduled for 12/9 at 0900. Advised that if she is unable to get any food or water down and continues to lose weight she needs to be evaluated in the ED. She verbalizes understanding.

## 2021-11-26 NOTE — TELEPHONE ENCOUNTER
[facility] Call Center    Phone Message    May a detailed message be left on voicemail: yes     Reason for Call: Other: Per pt would like to do a follow up appt with . Per pt haven't been able to eat and also she was told she has a Hernia (Hiatal). Please call pt back to get something in for his this coming week if possible. Writer took a look and next available is 12/09. Per pt refuse to take that appt. Please call pt back. Thank you.      Action Taken: Message routed to:  [facility] Gen Surg    Travel Screening: Not Applicable

## 2021-12-05 DIAGNOSIS — M79.7 FIBROMYALGIA: ICD-10-CM

## 2021-12-07 DIAGNOSIS — M79.7 FIBROMYALGIA: ICD-10-CM

## 2021-12-07 RX ORDER — PREGABALIN 150 MG/1
150 CAPSULE ORAL 3 TIMES DAILY
Qty: 90 CAPSULE | Refills: 5 | Status: SHIPPED | OUTPATIENT
Start: 2021-12-07 | End: 2022-05-23

## 2021-12-07 NOTE — TELEPHONE ENCOUNTER
[facility] Call Center    Phone Message    May a detailed message be left on voicemail: yes     Reason for Call: Medication Refill Request    Has the patient contacted the pharmacy for the refill? Yes   Name of medication being requested: pregabalin (LYRICA) 150 MG capsule  Provider who prescribed the medication: Fredy Merritt MD    Pharmacy: [facility] [id number] [place] [address] [facility]  Date medication is needed: asap    Patient requesting meds are filled asap. Patient s cab is coming to pick her up in one hour to go to the pharmacy and she was wondering if it can be filled in time. Patient is also requesting a call back once meds are filled.       Action Taken: Message routed to:  [facility] ANNABEL    Travel Screening: Not Applicable

## 2021-12-08 RX ORDER — PREGABALIN 150 MG/1
150 CAPSULE ORAL 3 TIMES DAILY
Qty: 90 CAPSULE | Refills: 1 | OUTPATIENT
Start: 2021-12-08

## 2021-12-14 DIAGNOSIS — F41.9 ANXIETY DISORDER, UNSPECIFIED TYPE: ICD-10-CM

## 2021-12-16 RX ORDER — ESCITALOPRAM OXALATE 20 MG/1
TABLET ORAL
Qty: 90 TABLET | Refills: 1 | Status: SHIPPED | OUTPATIENT
Start: 2021-12-16 | End: 2022-06-14

## 2021-12-18 ENCOUNTER — HEALTH MAINTENANCE LETTER (OUTPATIENT)
Age: 66
End: 2021-12-18

## 2022-01-01 ENCOUNTER — HEALTH MAINTENANCE LETTER (OUTPATIENT)
Age: 67
End: 2022-01-01

## 2022-01-01 ENCOUNTER — OFFICE VISIT (OUTPATIENT)
Dept: INTERNAL MEDICINE | Facility: CLINIC | Age: 67
End: 2022-01-01
Payer: COMMERCIAL

## 2022-01-01 ENCOUNTER — MEDICAL CORRESPONDENCE (OUTPATIENT)
Dept: HEALTH INFORMATION MANAGEMENT | Facility: CLINIC | Age: 67
End: 2022-01-01

## 2022-01-01 ENCOUNTER — OFFICE VISIT (OUTPATIENT)
Dept: INTERNAL MEDICINE | Facility: CLINIC | Age: 67
End: 2022-01-01
Payer: MEDICARE

## 2022-01-01 VITALS
RESPIRATION RATE: 16 BRPM | BODY MASS INDEX: 20.25 KG/M2 | WEIGHT: 114.3 LBS | SYSTOLIC BLOOD PRESSURE: 134 MMHG | DIASTOLIC BLOOD PRESSURE: 74 MMHG | HEART RATE: 70 BPM | HEIGHT: 63 IN | OXYGEN SATURATION: 95 %

## 2022-01-01 VITALS
BODY MASS INDEX: 19.12 KG/M2 | HEART RATE: 65 BPM | SYSTOLIC BLOOD PRESSURE: 132 MMHG | WEIGHT: 103.9 LBS | DIASTOLIC BLOOD PRESSURE: 82 MMHG | OXYGEN SATURATION: 95 % | TEMPERATURE: 97.4 F | HEIGHT: 62 IN

## 2022-01-01 DIAGNOSIS — F41.9 ANXIETY DISORDER, UNSPECIFIED TYPE: ICD-10-CM

## 2022-01-01 DIAGNOSIS — M32.9 SYSTEMIC LUPUS ERYTHEMATOSUS, UNSPECIFIED SLE TYPE, UNSPECIFIED ORGAN INVOLVEMENT STATUS (H): ICD-10-CM

## 2022-01-01 DIAGNOSIS — H66.002 ACUTE SUPPURATIVE OTITIS MEDIA OF LEFT EAR WITHOUT SPONTANEOUS RUPTURE OF TYMPANIC MEMBRANE, RECURRENCE NOT SPECIFIED: Primary | ICD-10-CM

## 2022-01-01 DIAGNOSIS — K92.2 GASTROINTESTINAL HEMORRHAGE, UNSPECIFIED GASTROINTESTINAL HEMORRHAGE TYPE: ICD-10-CM

## 2022-01-01 DIAGNOSIS — T81.89XD NON-HEALING SURGICAL WOUND, SUBSEQUENT ENCOUNTER: ICD-10-CM

## 2022-01-01 DIAGNOSIS — R11.0 NAUSEA: ICD-10-CM

## 2022-01-01 PROCEDURE — 99214 OFFICE O/P EST MOD 30 MIN: CPT | Performed by: NURSE PRACTITIONER

## 2022-01-01 PROCEDURE — 99214 OFFICE O/P EST MOD 30 MIN: CPT

## 2022-01-01 RX ORDER — HYDROXYCHLOROQUINE SULFATE 200 MG/1
200 TABLET, FILM COATED ORAL 2 TIMES DAILY
Qty: 180 TABLET | Refills: 0 | Status: SHIPPED | OUTPATIENT
Start: 2022-01-01 | End: 2023-01-01

## 2022-01-01 RX ORDER — OMEPRAZOLE 40 MG/1
40 CAPSULE, DELAYED RELEASE ORAL 2 TIMES DAILY
Qty: 120 CAPSULE | Refills: 5 | Status: CANCELLED | OUTPATIENT
Start: 2022-01-01

## 2022-01-01 RX ORDER — CEFDINIR 300 MG/1
300 CAPSULE ORAL 2 TIMES DAILY
Qty: 14 CAPSULE | Refills: 0 | Status: SHIPPED | OUTPATIENT
Start: 2022-01-01 | End: 2023-01-01

## 2022-01-01 RX ORDER — OMEPRAZOLE 40 MG/1
40 CAPSULE, DELAYED RELEASE ORAL DAILY
Qty: 120 CAPSULE | Refills: 0 | Status: SHIPPED | OUTPATIENT
Start: 2022-01-01 | End: 2023-01-01

## 2022-01-01 RX ORDER — HYDROXYZINE PAMOATE 25 MG/1
25 CAPSULE ORAL 2 TIMES DAILY PRN
Qty: 60 CAPSULE | Refills: 0 | Status: SHIPPED | OUTPATIENT
Start: 2022-01-01 | End: 2023-01-01

## 2022-01-01 RX ORDER — HYDROXYCHLOROQUINE SULFATE 200 MG/1
200 TABLET, FILM COATED ORAL 2 TIMES DAILY WITH MEALS
Qty: 180 TABLET | Refills: 1 | Status: CANCELLED | OUTPATIENT
Start: 2022-01-01

## 2022-01-01 RX ORDER — HYDROXYZINE PAMOATE 100 MG
100 CAPSULE ORAL DAILY
Qty: 30 CAPSULE | Refills: 6 | Status: SHIPPED | OUTPATIENT
Start: 2022-01-01 | End: 2023-01-01

## 2022-01-12 ASSESSMENT — ENCOUNTER SYMPTOMS
CHILLS: 0
MUSCLE WEAKNESS: 0
POOR WOUND HEALING: 0
POSTURAL DYSPNEA: 0
DECREASED APPETITE: 1
TROUBLE SWALLOWING: 0
HOARSE VOICE: 0
SHORTNESS OF BREATH: 0
BLOOD IN STOOL: 0
DIARRHEA: 0
HEMOPTYSIS: 0
DOUBLE VISION: 0
EYE PAIN: 0
CONSTIPATION: 0
JOINT SWELLING: 1
ARTHRALGIAS: 1
EYE WATERING: 1
COUGH: 1
ABDOMINAL PAIN: 1
ALTERED TEMPERATURE REGULATION: 0
MYALGIAS: 1
NAUSEA: 1
NIGHT SWEATS: 0
STIFFNESS: 0
SPUTUM PRODUCTION: 1
WEIGHT GAIN: 0
FATIGUE: 1
BLOATING: 0
SNORES LOUDLY: 1
EYE REDNESS: 0
NAIL CHANGES: 1
POLYPHAGIA: 0
TASTE DISTURBANCE: 1
JAUNDICE: 0
SMELL DISTURBANCE: 1
SINUS PAIN: 0
BACK PAIN: 0
DYSPNEA ON EXERTION: 0
BOWEL INCONTINENCE: 0
COUGH DISTURBING SLEEP: 0
HALLUCINATIONS: 0
SINUS CONGESTION: 0
NECK MASS: 0
RECTAL PAIN: 0
VOMITING: 1
WHEEZING: 0
NECK PAIN: 0
EYE IRRITATION: 0
WEIGHT LOSS: 1
SORE THROAT: 0
MUSCLE CRAMPS: 0
INCREASED ENERGY: 1
FEVER: 0
HEARTBURN: 1
SKIN CHANGES: 0
POLYDIPSIA: 0

## 2022-01-13 ENCOUNTER — LAB (OUTPATIENT)
Dept: LAB | Facility: CLINIC | Age: 67
End: 2022-01-13
Payer: COMMERCIAL

## 2022-01-13 ENCOUNTER — OFFICE VISIT (OUTPATIENT)
Dept: SURGERY | Facility: CLINIC | Age: 67
End: 2022-01-13
Payer: COMMERCIAL

## 2022-01-13 VITALS
BODY MASS INDEX: 18.69 KG/M2 | SYSTOLIC BLOOD PRESSURE: 123 MMHG | OXYGEN SATURATION: 97 % | HEART RATE: 76 BPM | WEIGHT: 105.5 LBS | HEIGHT: 63 IN | DIASTOLIC BLOOD PRESSURE: 78 MMHG

## 2022-01-13 DIAGNOSIS — Z98.84 STATUS POST BARIATRIC SURGERY: Primary | ICD-10-CM

## 2022-01-13 DIAGNOSIS — Z98.84 STATUS POST BARIATRIC SURGERY: ICD-10-CM

## 2022-01-13 LAB
ALBUMIN SERPL-MCNC: 3.4 G/DL (ref 3.4–5)
ALP SERPL-CCNC: 532 U/L (ref 40–150)
ALT SERPL W P-5'-P-CCNC: 138 U/L (ref 0–50)
ANION GAP SERPL CALCULATED.3IONS-SCNC: 7 MMOL/L (ref 3–14)
AST SERPL W P-5'-P-CCNC: 108 U/L (ref 0–45)
BILIRUB SERPL-MCNC: 0.2 MG/DL (ref 0.2–1.3)
BUN SERPL-MCNC: 27 MG/DL (ref 7–30)
CALCIUM SERPL-MCNC: 8.5 MG/DL (ref 8.5–10.1)
CHLORIDE BLD-SCNC: 106 MMOL/L (ref 94–109)
CO2 SERPL-SCNC: 29 MMOL/L (ref 20–32)
CREAT SERPL-MCNC: 0.71 MG/DL (ref 0.52–1.04)
DEPRECATED CALCIDIOL+CALCIFEROL SERPL-MC: 20 UG/L (ref 20–75)
ERYTHROCYTE [DISTWIDTH] IN BLOOD BY AUTOMATED COUNT: 22.1 % (ref 10–15)
FERRITIN SERPL-MCNC: 13 NG/ML (ref 8–252)
GFR SERPL CREATININE-BSD FRML MDRD: >90 ML/MIN/1.73M2
GLUCOSE BLD-MCNC: 71 MG/DL (ref 70–99)
HCT VFR BLD AUTO: 34.1 % (ref 35–47)
HGB BLD-MCNC: 10.1 G/DL (ref 11.7–15.7)
MAGNESIUM SERPL-MCNC: 2.1 MG/DL (ref 1.6–2.3)
MCH RBC QN AUTO: 24.8 PG (ref 26.5–33)
MCHC RBC AUTO-ENTMCNC: 29.6 G/DL (ref 31.5–36.5)
MCV RBC AUTO: 84 FL (ref 78–100)
PHOSPHATE SERPL-MCNC: 4.2 MG/DL (ref 2.5–4.5)
PLATELET # BLD AUTO: 139 10E3/UL (ref 150–450)
POTASSIUM BLD-SCNC: 4.2 MMOL/L (ref 3.4–5.3)
PREALB SERPL IA-MCNC: 21 MG/DL (ref 15–45)
PROT SERPL-MCNC: 6.8 G/DL (ref 6.8–8.8)
PTH-INTACT SERPL-MCNC: 39 PG/ML (ref 18–80)
RBC # BLD AUTO: 4.08 10E6/UL (ref 3.8–5.2)
SODIUM SERPL-SCNC: 142 MMOL/L (ref 133–144)
VIT B12 SERPL-MCNC: 350 PG/ML (ref 193–986)
WBC # BLD AUTO: 4.4 10E3/UL (ref 4–11)

## 2022-01-13 PROCEDURE — 84134 ASSAY OF PREALBUMIN: CPT | Performed by: PATHOLOGY

## 2022-01-13 PROCEDURE — 84425 ASSAY OF VITAMIN B-1: CPT | Mod: 90 | Performed by: PATHOLOGY

## 2022-01-13 PROCEDURE — 85027 COMPLETE CBC AUTOMATED: CPT | Performed by: PATHOLOGY

## 2022-01-13 PROCEDURE — 83735 ASSAY OF MAGNESIUM: CPT | Performed by: PATHOLOGY

## 2022-01-13 PROCEDURE — 84446 ASSAY OF VITAMIN E: CPT | Mod: 90 | Performed by: PATHOLOGY

## 2022-01-13 PROCEDURE — 84630 ASSAY OF ZINC: CPT | Mod: 90 | Performed by: PATHOLOGY

## 2022-01-13 PROCEDURE — 84597 ASSAY OF VITAMIN K: CPT | Mod: 90 | Performed by: PATHOLOGY

## 2022-01-13 PROCEDURE — 99214 OFFICE O/P EST MOD 30 MIN: CPT | Mod: GC | Performed by: SURGERY

## 2022-01-13 PROCEDURE — 83970 ASSAY OF PARATHORMONE: CPT | Mod: 90 | Performed by: PATHOLOGY

## 2022-01-13 PROCEDURE — 36415 COLL VENOUS BLD VENIPUNCTURE: CPT | Performed by: PATHOLOGY

## 2022-01-13 PROCEDURE — 82607 VITAMIN B-12: CPT | Performed by: PATHOLOGY

## 2022-01-13 PROCEDURE — 82728 ASSAY OF FERRITIN: CPT | Performed by: PATHOLOGY

## 2022-01-13 PROCEDURE — 82306 VITAMIN D 25 HYDROXY: CPT | Mod: 90 | Performed by: PATHOLOGY

## 2022-01-13 PROCEDURE — 82525 ASSAY OF COPPER: CPT | Mod: 90 | Performed by: PATHOLOGY

## 2022-01-13 PROCEDURE — 99000 SPECIMEN HANDLING OFFICE-LAB: CPT | Performed by: PATHOLOGY

## 2022-01-13 PROCEDURE — 80053 COMPREHEN METABOLIC PANEL: CPT | Performed by: PATHOLOGY

## 2022-01-13 PROCEDURE — 84100 ASSAY OF PHOSPHORUS: CPT | Performed by: PATHOLOGY

## 2022-01-13 PROCEDURE — 84207 ASSAY OF VITAMIN B-6: CPT | Mod: 90 | Performed by: PATHOLOGY

## 2022-01-13 RX ORDER — LANOLIN ALCOHOL/MO/W.PET/CERES
1000 CREAM (GRAM) TOPICAL DAILY
Qty: 90 TABLET | Refills: 0 | Status: SHIPPED | OUTPATIENT
Start: 2022-01-13 | End: 2022-04-13

## 2022-01-13 ASSESSMENT — MIFFLIN-ST. JEOR: SCORE: 984.73

## 2022-01-13 ASSESSMENT — PAIN SCALES - GENERAL: PAINLEVEL: NO PAIN (0)

## 2022-01-13 NOTE — PROGRESS NOTES
Return Surgery Note    RE: Demetra Richardson  MR#: [id number]  : 1955  VISIT DATE: 2022    I had the pleasure of seeing your patient, Demetra Richardson, in my post-surgery assessment clinic.    CHIEF COMPLAINT: Post-surgery follow-up    HISTORY OF PRESENT ILLNESS:  This is a 65-year-old female who is status post exploratory laparotomy with extensive lysis of adhesions and jejunojejunostomy resection following a history of multiple complex surgeries on 9/10/21 with Dr. Clayton. She had an esophagojejunostomy created at that time. She presents today for follow up. She has a midline wound that is still healing. She has putting hydrocortisone cream on it, we discussed not using that cream and further wound cares detailed below. She is not off of TPN and has regained back weight. She endorses feelings of dysphagia and like food is getting stuck in her upper abdomen. This is associated with pain. She has been mainly tolerating only soft foods like mashed potatoes. Had one episode of emesis recently when eating chicken. Radha is not taking any nutritional or vitamin supplementation currently. Denies diarrhea and loose/fatty stools. She has been able to walk 1-2 hours daily.        Weight History:     Weight: 47.9 kg (105 lb 8 oz)     Social History:  No flowsheet data found.    Medications:  Current Outpatient Medications   Medication     cyanocobalamin (VITAMIN B-12) 1000 MCG tablet     escitalopram (LEXAPRO) 20 MG tablet     HYDROmorphone (DILAUDID) 2 MG tablet     hydroxychloroquine (PLAQUENIL) 200 MG tablet     hydrOXYzine (VISTARIL) 25 MG capsule     methadone (DOLOPHINE-INTENSOL) 10 MG/ML (HIGH CONC) solution     omeprazole (PRILOSEC) 40 MG DR capsule     parenteral nutrition - PTA/DISCHARGE ORDER     parenteral nutrition - PTA/DISCHARGE ORDER     polyethylene glycol (MIRALAX) 17 GM/Dose powder     pregabalin (LYRICA) 150 MG capsule     valACYclovir (VALTREX) 500 MG tablet     valACYclovir  "(VALTREX) 500 MG tablet     No current facility-administered medications for this visit.     No flowsheet data found.    ROS:  As per HPI    LABS/IMAGING/MEDICAL RECORDS REVIEW:  No recent labs. Bariatric labs ordered.    PHYSICAL EXAMINATION:  /78 (BP Location: Left arm, Patient Position: Chair, Cuff Size: Adult Regular)   Pulse 76   Ht 1.588 m (5' 2.5\")   Wt 47.9 kg (105 lb 8 oz)   LMP  (LMP Unknown)   SpO2 97%   BMI 18.99 kg/m     Gen: awake, alert, NAD  CV: RRR  Pulm: non-labored breathing  Abd: soft, midline wound is healing well with no surrounding erythema, some areas of granulation tissue with large portions of epithelialization     ASSESSMENT AND PLAN:    65-year-old female who is status post exploratory laparotomy with extensive lysis of adhesions with creation of esophagojejunostomy for gastroparesis/gastric atony on 9/10/21 with Dr. Clayton. She has dysphagia and pain with solid foods.  1. Place adaptic or vaseline over open areas of midline wound to facilitate further healing.   2. Will plan for EGD with Dr. Clayton with general anesthesia to assess for stricture and possible dilation. Provided phone number for scheduling to [facility].  3. Ordered bariatric labs. Instructed patient to start multivitamin BID, calcium-vit D and B12 supplementation. Encouraged patient to work on drinking protein shakes.    Sincerely,      Physician Attestation  I, Kaveh Clayton, saw and evaluated this patient as part of a shared visit.  I have reviewed and discussed with the advanced practice provider and/or resident their history, physical and plan.    I personally reviewed the vital signs, medications, labs and imaging.    My key history or physical exam findings: has malnutrition; persistent dysphagia/regurgitation; labs ordered.  Endoscopy by me ordered for main OR.    Key management decisions made by me: as noted.    Kaveh Clayton MD  Date of Service (when I saw the patient): 01/13/22          Note " initially prepared by:  Amy Villatoro MD    I spent a total of 30 minutes face to face with Demetra during today's office visit. Over 50% of this time was spent counseling the patient and/or coordinating care.  Answers for HPI/ROS submitted by the patient on 1/12/2022  General Symptoms: Yes  Skin Symptoms: Yes  HENT Symptoms: Yes  EYE SYMPTOMS: Yes  HEART SYMPTOMS: No  LUNG SYMPTOMS: Yes  INTESTINAL SYMPTOMS: Yes  URINARY SYMPTOMS: No  GYNECOLOGIC SYMPTOMS: No  BREAST SYMPTOMS: No  SKELETAL SYMPTOMS: Yes  BLOOD SYMPTOMS: No  NERVOUS SYSTEM SYMPTOMS: No  MENTAL HEALTH SYMPTOMS: No  Ear pain: No  Ear discharge: No  Hearing loss: No  Tinnitus: No  Nosebleeds: No  Congestion: No  Sinus pain: No  Trouble swallowing: No   Voice hoarseness: No  Mouth sores: No  Sore throat: No  Tooth pain: No  Gum tenderness: No  Bleeding gums: No  Change in taste: Yes  Change in sense of smell: Yes  Dry mouth: No  Hearing aid used: No  Neck lump: No  Fever: No  Loss of appetite: Yes  Weight loss: Yes  Weight gain: No  Fatigue: Yes  Night sweats: No  Chills: No  Increased stress: No  Excessive hunger: No  Excessive thirst: No  Feeling hot or cold when others believe the temperature is normal: No  Loss of height: No  Post-operative complications: No  Surgical site pain: No  Hallucinations: No  Change in or Loss of Energy: Yes  Hyperactivity: No  Confusion: No  Changes in hair: No  Changes in moles/birth marks: No  Itching: No  Rashes: No  Changes in nails: Yes  Acne: No  Hair in places you don't want it: No  Change in facial hair: No  Warts: No  Non-healing sores: No  Scarring: No  Flaking of skin: No  Color changes of hands/feet in cold : No  Sun sensitivity: No  Skin thickening: No  Eye pain: No  Vision loss: No  Dry eyes: No  Watery eyes: Yes  Eye bulging: No  Double vision: No  Flashing of lights: No  Spots: No  Floaters: No  Redness: No  Crossed eyes: No  Tunnel Vision: No  Yellowing of eyes: No  Eye irritation: No  Cough:  Yes  Sputum or phlegm: Yes  Coughing up blood: No  Difficulty breating or shortness of breath: No  Snoring: Yes  Wheezing: No  Difficulty breathing on exertion: No  Nighttime Cough: No  Difficulty breathing when lying flat: No  Heart burn or indigestion: Yes  Nausea: Yes  Vomiting: Yes  Abdominal pain: Yes  Bloating: No  Constipation: No  Diarrhea: No  Blood in stool: No  Black stools: No  Rectal or Anal pain: No  Fecal incontinence: No  Yellowing of skin or eyes: No  Vomit with blood: No  Change in stools: No  Back pain: No  Muscle aches: Yes  Neck pain: No  Swollen joints: Yes  Joint pain: Yes  Bone pain: No  Muscle cramps: No  Muscle weakness: No  Joint stiffness: No  Bone fracture: No

## 2022-01-13 NOTE — PATIENT INSTRUCTIONS
Take a multivitamin twice a day. Take calcium and vitamin D supplements as well. Start B12 supplementation as prescribed.    Place adaptic (vaseline gauze) over open wound to facilitate healing.    Please obtain nutritional labs as ordered. Will proceed with an EGD (upper endoscopy). Call [facility] to find available times for [phone].

## 2022-01-13 NOTE — NURSING NOTE
"Chief Complaint   Patient presents with     RECHECK     Follow-up Esophagojeunostomy Surgery with TPN and Malnutrition with small wound       Vitals:    01/13/22 0801   BP: 123/78   BP Location: Left arm   Patient Position: Chair   Cuff Size: Adult Regular   Pulse: 76   SpO2: 97%   Weight: 47.9 kg (105 lb 8 oz)   Height: 1.588 m (5' 2.5\")       Body mass index is 18.99 kg/m .      WOUND EVALUATION:      Mid Abdominal Wound  Red-Healing    Length 8 cm    Width 2 cm    Depth 0.1 cm                    "

## 2022-01-13 NOTE — LETTER
2022       RE: Demetra Richardson  [address] [place] [address]     Dear Colleague,    Thank you for referring your patient, Demetra Richardson, to the [facility] [place] [facility]. Please see a copy of my visit note below.    Return Surgery Note    RE: Demetra Richardson  MR#: [id number]  : 1955  VISIT DATE: 2022    I had the pleasure of seeing your patient, Demetra Richardson, in my post-surgery assessment clinic.    CHIEF COMPLAINT: Post-surgery follow-up    HISTORY OF PRESENT ILLNESS:  This is a 65-year-old female who is status post exploratory laparotomy with extensive lysis of adhesions and jejunojejunostomy resection following a history of multiple complex surgeries on 9/10/21 with Dr. Clayton. She had an esophagojejunostomy created at that time. She presents today for follow up. She has a midline wound that is still healing. She has putting hydrocortisone cream on it, we discussed not using that cream and further wound cares detailed below. She is not off of TPN and has regained back weight. She endorses feelings of dysphagia and like food is getting stuck in her upper abdomen. This is associated with pain. She has been mainly tolerating only soft foods like mashed potatoes. Had one episode of emesis recently when eating chicken. Radha is not taking any nutritional or vitamin supplementation currently. Denies diarrhea and loose/fatty stools. She has been able to walk 1-2 hours daily.    Weight History:     Weight: 47.9 kg (105 lb 8 oz)     Social History:  No flowsheet data found.    Medications:  Current Outpatient Medications   Medication     cyanocobalamin (VITAMIN B-12) 1000 MCG tablet     escitalopram (LEXAPRO) 20 MG tablet     HYDROmorphone (DILAUDID) 2 MG tablet     hydroxychloroquine (PLAQUENIL) 200 MG tablet     hydrOXYzine (VISTARIL) 25 MG capsule     methadone  "(DOLOPHINE-INTENSOL) 10 MG/ML (HIGH CONC) solution     omeprazole (PRILOSEC) 40 MG DR capsule     parenteral nutrition - PTA/DISCHARGE ORDER     parenteral nutrition - PTA/DISCHARGE ORDER     polyethylene glycol (MIRALAX) 17 GM/Dose powder     pregabalin (LYRICA) 150 MG capsule     valACYclovir (VALTREX) 500 MG tablet     valACYclovir (VALTREX) 500 MG tablet     No current facility-administered medications for this visit.     No flowsheet data found.    ROS:  As per HPI    LABS/IMAGING/MEDICAL RECORDS REVIEW:  No recent labs. Bariatric labs ordered.    PHYSICAL EXAMINATION:  /78 (BP Location: Left arm, Patient Position: Chair, Cuff Size: Adult Regular)   Pulse 76   Ht 1.588 m (5' 2.5\")   Wt 47.9 kg (105 lb 8 oz)   LMP  (LMP Unknown)   SpO2 97%   BMI 18.99 kg/m     Gen: awake, alert, NAD  CV: RRR  Pulm: non-labored breathing  Abd: soft, midline wound is healing well with no surrounding erythema, some areas of granulation tissue with large portions of epithelialization     ASSESSMENT AND PLAN:    65-year-old female who is status post exploratory laparotomy with extensive lysis of adhesions with creation of esophagojejunostomy for gastroparesis/gastric atony on 9/10/21 with Dr. Clayton. She has dysphagia and pain with solid foods.  1. Place adaptic or vaseline over open areas of midline wound to facilitate further healing.   2. Will plan for EGD with Dr. Clayton with general anesthesia to assess for stricture and possible dilation. Provided phone number for scheduling to [facility].  3. Ordered bariatric labs. Instructed patient to start multivitamin BID, calcium-vit D and B12 supplementation. Encouraged patient to work on drinking protein shakes.    Sincerely,      Physician Attestation  I, Kaveh Clayton, saw and evaluated this patient as part of a shared visit.  I have reviewed and discussed with the advanced practice provider and/or resident their history, physical and plan.    I personally reviewed the " vital signs, medications, labs and imaging.    My key history or physical exam findings: has malnutrition; persistent dysphagia/regurgitation; labs ordered.  Endoscopy by me ordered for main OR.    Key management decisions made by me: as noted.    Kaveh Clayton MD  Date of Service (when I saw the patient): 01/13/22      Note initially prepared by:  Amy Villatoro MD    I spent a total of 30 minutes face to face with Demetra during today's office visit. Over 50% of this time was spent counseling the patient and/or coordinating care.  Answers for HPI/ROS submitted by the patient on 1/12/2022  General Symptoms: Yes  Skin Symptoms: Yes  HENT Symptoms: Yes  EYE SYMPTOMS: Yes  HEART SYMPTOMS: No  LUNG SYMPTOMS: Yes  INTESTINAL SYMPTOMS: Yes  URINARY SYMPTOMS: No  GYNECOLOGIC SYMPTOMS: No  BREAST SYMPTOMS: No  SKELETAL SYMPTOMS: Yes  BLOOD SYMPTOMS: No  NERVOUS SYSTEM SYMPTOMS: No  MENTAL HEALTH SYMPTOMS: No  Ear pain: No  Ear discharge: No  Hearing loss: No  Tinnitus: No  Nosebleeds: No  Congestion: No  Sinus pain: No  Trouble swallowing: No   Voice hoarseness: No  Mouth sores: No  Sore throat: No  Tooth pain: No  Gum tenderness: No  Bleeding gums: No  Change in taste: Yes  Change in sense of smell: Yes  Dry mouth: No  Hearing aid used: No  Neck lump: No  Fever: No  Loss of appetite: Yes  Weight loss: Yes  Weight gain: No  Fatigue: Yes  Night sweats: No  Chills: No  Increased stress: No  Excessive hunger: No  Excessive thirst: No  Feeling hot or cold when others believe the temperature is normal: No  Loss of height: No  Post-operative complications: No  Surgical site pain: No  Hallucinations: No  Change in or Loss of Energy: Yes  Hyperactivity: No  Confusion: No  Changes in hair: No  Changes in moles/birth marks: No  Itching: No  Rashes: No  Changes in nails: Yes  Acne: No  Hair in places you don't want it: No  Change in facial hair: No  Warts: No  Non-healing sores: No  Scarring: No  Flaking of skin:  No  Color changes of hands/feet in cold : No  Sun sensitivity: No  Skin thickening: No  Eye pain: No  Vision loss: No  Dry eyes: No  Watery eyes: Yes  Eye bulging: No  Double vision: No  Flashing of lights: No  Spots: No  Floaters: No  Redness: No  Crossed eyes: No  Tunnel Vision: No  Yellowing of eyes: No  Eye irritation: No  Cough: Yes  Sputum or phlegm: Yes  Coughing up blood: No  Difficulty breating or shortness of breath: No  Snoring: Yes  Wheezing: No  Difficulty breathing on exertion: No  Nighttime Cough: No  Difficulty breathing when lying flat: No  Heart burn or indigestion: Yes  Nausea: Yes  Vomiting: Yes  Abdominal pain: Yes  Bloating: No  Constipation: No  Diarrhea: No  Blood in stool: No  Black stools: No  Rectal or Anal pain: No  Fecal incontinence: No  Yellowing of skin or eyes: No  Vomit with blood: No  Change in stools: No  Back pain: No  Muscle aches: Yes  Neck pain: No  Swollen joints: Yes  Joint pain: Yes  Bone pain: No  Muscle cramps: No  Muscle weakness: No  Joint stiffness: No  Bone fracture: No      Kaveh Clayton MD

## 2022-01-16 LAB
A-TOCOPHEROL VIT E SERPL-MCNC: 4.5 MG/L
BETA+GAMMA TOCOPHEROL SERPL-MCNC: 0.6 MG/L
COPPER SERPL-MCNC: 39.6 UG/DL
ZINC SERPL-MCNC: 66 UG/DL

## 2022-01-18 ENCOUNTER — TELEPHONE (OUTPATIENT)
Dept: GASTROENTEROLOGY | Facility: CLINIC | Age: 67
End: 2022-01-18
Payer: COMMERCIAL

## 2022-01-18 NOTE — LETTER
March 16, 2021      Demetra P Dylan  [address] [place] [address]        Dear MsMaggiegarfieldjt,    We are writing to inform you of your test results.    Mild degenerative arthritis of the knee.    Resulted Orders   XR Knee Right 1/2 Views    Narrative    Exam: AP and lateral views of the right knee dated 3/15/2021.    COMPARISON: None.    CLINICAL HISTORY: Right knee pain.    FINDINGS: AP and lateral views of the right knee were obtained. No  large right knee joint effusion. The bones are well aligned. No  evidence of dislocation. No displaced fractures. Mild narrowing in the  medial and lateral femorotibial joint compartment with osteophytic  spurring.      Impression    IMPRESSION: Mild degenerative changes in the right knee, otherwise no  acute bone abnormality noted.    TABATHA BEATTY MD       If you have any questions or concerns, please call the clinic at the number listed above.       Sincerely,      Sam Narayanan MD           This Rx Request does not qualify for assessment with the OR.  Please review protocol details and the Care Due Message for extra clinical information    Reasons Rx Request may be deferred:  1. Labs/Vitals overdue  2. Labs/Vitals abnormal  3. Patient has been seen in the ED/Hospital since the last PCP visit  4. Medication is non-delegated  5. Provider is a non-participating provider

## 2022-01-18 NOTE — TELEPHONE ENCOUNTER
Called to remind patient of their upcoming appointment with our GI clinic, on Friday 1/28/2022 at 11:20AM with Dr. Grubbs. This appointment is scheduled as a telephone visit. You will receive a call approximately 30 minutes prior to check you in, you must be in [place] for this visit., if your appointment is virtual (video or telephone) you need to be in [place] for the visit. To reschedule or cancel patient to call [phone].    Called and confirmed.    Teresa Umanzor MA

## 2022-01-19 LAB
PHYTONADIONE SERPL-MCNC: 0.29 NMOL/L
PYRIDOXAL PHOS SERPL-SCNC: 15 NMOL/L

## 2022-01-20 LAB — VIT B1 PYROPHOSHATE BLD-SCNC: 125 NMOL/L

## 2022-01-21 NOTE — PROGRESS NOTES
This is a recent snapshot of the patient's [facility] medical record.  For current drug dose and complete information and questions, call [phone] or In Basket pool, [facility] home infusion ([id number])  CSN Number:  [id number]

## 2022-01-21 NOTE — PROGRESS NOTES
This is a recent snapshot of the patient's [facility] medical record.  For current drug dose and complete information and questions, call [phone] or In Basket pool, fv home infusion ([id number])  CSN Number:  [id number]

## 2022-01-24 ENCOUNTER — TELEPHONE (OUTPATIENT)
Dept: ENDOCRINOLOGY | Facility: CLINIC | Age: 67
End: 2022-01-24
Payer: COMMERCIAL

## 2022-01-24 NOTE — TELEPHONE ENCOUNTER
Called patient to schedule endoscopy with possible dilation in the OR with Dr. Clayton. Scheduled 2/7 at 11:15 a.m. [place].    PAC scheduled 2/4 at 8:15 a.m., 5th floor ASC.  COVID-19 lab scheduled 7:45 a.m. 1st floor ASC.

## 2022-01-25 NOTE — TELEPHONE ENCOUNTER
FUTURE VISIT INFORMATION      SURGERY INFORMATION:    Date: 22    Location: [facility] OR    Surgeon:  Kaveh Clayton MD    Anesthesia Type:  General    Procedure: ESOPHAGOGASTRODUODENOSCOPY (EGD), ESOPHAGOGASTRODUODENOSCOPY, WITH DILATION POSSIBLE    Consult: ov 22    RECORDS REQUESTED FROM:        Primary Care Provider: Fredy Merritt MD- [facility]    Pertinent Medical History: orthostatic hypotension     Most recent EKG+ Tracin21    Most recent ECHO: 21

## 2022-01-27 DIAGNOSIS — Z11.59 ENCOUNTER FOR SCREENING FOR OTHER VIRAL DISEASES: Primary | ICD-10-CM

## 2022-02-03 RX ORDER — IBUPROFEN 200 MG
400 TABLET ORAL EVERY 8 HOURS PRN
COMMUNITY

## 2022-02-03 NOTE — PHARMACY - PREOPERATIVE ASSESSMENT CENTER
Methadone Clinic Information Note    Clinic Name: [facility]  Clinic Location ([facility]): [place]  Phone Number: [phone]    Dosing: Methadone concentrated solution (10 mg/mL) - take 120 mg by mouth every morning. Doses in clinic on Mon/Wed/Fri and take home doses the other days.     Verified dosing with [facility]  on February 3, 2022.     Guanaco Barajas, Pharm.D., BCPS

## 2022-02-03 NOTE — PROGRESS NOTES
Preoperative Assessment Center Medication History Note    Medication history completed on February 3, 2022 by this writer. See Epic admission navigator for prior to admission medications. Operating room staff will still need to confirm medications and last dose information on day of surgery.     Medication history interview sources  Patient interview: Yes  Care Everywhere records: Yes  Surescripts pharmacy refill records: Yes  Other (if applicable):     Changes made to PTA medication list  Added: ibuprofen, excedrin.   Deleted: dilaudid, TPN x2 (finished this in October), valtrex duplicate.   Changed: omeprazole, valtrex sig, miralax sig, hydroxychloroquine (not currently taking, has active Rx for this).     Additional medication history information (including reliability of information, actions taken by pharmacist):    -- No recent (within 30 days) course of antibiotics  -- No recent (within 30 days) course of systemic steroids  -- Patient declines being on any other prescription or over-the-counter medications    Pain Medication Quantification - Patient is currently scheduled for a same day surgery. If this plan changes and patient is admitted, the inpatient pain management service could be consulted for specific pain management recommendations (available at pager [phone] from 8 AM - 3 PM Mon - Fri and available via phone answering service 24/7 at [phone]).     - OUTPATIENT MEDICATIONS (related to pain management):  -- Long-acting opioid: methadone 120 mg daily   -- Short-acting opioid: none    Prior to Admission medications    Medication Sig Last Dose Taking? Auth Provider   aspirin-acetaminophen-caffeine (EXCEDRIN MIGRAINE) 250-250-65 MG tablet Take 1 tablet by mouth every 6 hours as needed for headaches Taking Yes Unknown, Entered By History   escitalopram (LEXAPRO) 20 MG tablet TAKE 1 TABLET BY MOUTH EVERY DAY  Patient taking differently: Take 20 mg by mouth daily  Taking Yes Fredy Merritt MD    hydrOXYzine (VISTARIL) 25 MG capsule Take 1 capsule (25 mg) by mouth 2 times daily as needed for anxiety Taking Yes Fredy Merritt MD   ibuprofen (ADVIL/MOTRIN) 200 MG tablet Take 400 mg by mouth every 8 hours as needed for mild pain Taking Yes Unknown, Entered By History   methadone (DOLOPHINE-INTENSOL) 10 MG/ML (HIGH CONC) solution Take 120 mg by mouth daily Takes it at 0530 everyday. Only uses red not yellow    [place] - doses in clinic on Mon/Wed/Fri and take home doses for other days. Taking Yes Unknown, Entered By History   polyethylene glycol (MIRALAX) 17 GM/Dose powder 1 capful (17 g) in 8 oz of liquid  Patient taking differently: Take 17 g by mouth daily as needed for constipation 1 capful (17 g) in 8 oz of liquid Taking Yes Martha Michele PA-C   pregabalin (LYRICA) 150 MG capsule Take 1 capsule (150 mg) by mouth 3 times daily Taking Yes Fredy Merritt MD   valACYclovir (VALTREX) 500 MG tablet Take 1 tablet (500 mg) by mouth 2 times daily  Patient taking differently: Take 500 mg by mouth 2 times daily as needed (when breakouts occur x3 days)  Taking Yes Fredy Merritt MD   cyanocobalamin (VITAMIN B-12) 1000 MCG tablet Take 1 tablet (1,000 mcg) by mouth daily  Patient not taking: Reported on 2/3/2022 Not Taking  Amy Villatoro MD   hydroxychloroquine (PLAQUENIL) 200 MG tablet Take 1 tablet (200 mg) by mouth 2 times daily (with meals)  Patient not taking: Reported on 2/3/2022 Not Taking  Sam Narayanan MD   omeprazole (PRILOSEC) 40 MG DR capsule Take 1 capsule (40 mg) by mouth 2 times daily  Patient not taking: Reported on 2/3/2022 Not Taking  Martha Michele PA-C          Medication history completed by: Guanaco Barajas [facility]

## 2022-02-04 ENCOUNTER — OFFICE VISIT (OUTPATIENT)
Dept: SURGERY | Facility: CLINIC | Age: 67
End: 2022-02-04
Payer: COMMERCIAL

## 2022-02-04 ENCOUNTER — ANESTHESIA EVENT (OUTPATIENT)
Dept: SURGERY | Facility: CLINIC | Age: 67
End: 2022-02-04
Payer: COMMERCIAL

## 2022-02-04 ENCOUNTER — LAB (OUTPATIENT)
Dept: LAB | Facility: CLINIC | Age: 67
End: 2022-02-04
Payer: COMMERCIAL

## 2022-02-04 ENCOUNTER — PRE VISIT (OUTPATIENT)
Dept: SURGERY | Facility: CLINIC | Age: 67
End: 2022-02-04

## 2022-02-04 VITALS
RESPIRATION RATE: 16 BRPM | TEMPERATURE: 97.4 F | BODY MASS INDEX: 20.61 KG/M2 | WEIGHT: 112 LBS | SYSTOLIC BLOOD PRESSURE: 153 MMHG | DIASTOLIC BLOOD PRESSURE: 88 MMHG | OXYGEN SATURATION: 98 % | HEART RATE: 76 BPM | HEIGHT: 62 IN

## 2022-02-04 DIAGNOSIS — Z11.59 ENCOUNTER FOR SCREENING FOR OTHER VIRAL DISEASES: ICD-10-CM

## 2022-02-04 DIAGNOSIS — Z98.84 STATUS POST BARIATRIC SURGERY: ICD-10-CM

## 2022-02-04 DIAGNOSIS — Z01.818 PRE-OP EXAMINATION: Primary | ICD-10-CM

## 2022-02-04 PROCEDURE — 99204 OFFICE O/P NEW MOD 45 MIN: CPT | Performed by: PHYSICIAN ASSISTANT

## 2022-02-04 PROCEDURE — U0005 INFEC AGEN DETEC AMPLI PROBE: HCPCS | Performed by: SURGERY

## 2022-02-04 ASSESSMENT — ENCOUNTER SYMPTOMS: SEIZURES: 1

## 2022-02-04 ASSESSMENT — LIFESTYLE VARIABLES: TOBACCO_USE: 0

## 2022-02-04 ASSESSMENT — MIFFLIN-ST. JEOR: SCORE: 1006.28

## 2022-02-04 ASSESSMENT — PAIN SCALES - GENERAL: PAINLEVEL: NO PAIN (0)

## 2022-02-04 NOTE — ANESTHESIA PREPROCEDURE EVALUATION
Anesthesia Pre-Procedure Evaluation    Patient: Demetra Richardson   MRN: [id number] : 1955        Preoperative Diagnosis: Status post bariatric surgery [Z98.84]    Procedure : Procedure(s):  Esophagogastroduodenoscopy  (EGD),  Possible Dilation  PAC EVALUATION       Past Medical History:   Diagnosis Date     Anemia      Basal cell carcinoma     Left-sided, skin over over medial orbit/nasal bone. Removed Oct 2018.     Benzodiazepine dependence (H)     With h/o withdrawal seizure x 1     Bipolar 1 disorder, mixed, moderate (H) 2013     Chronic pain     Back, legs.     Depressive disorder      Eosinophilic gastroenteritis 2010     Epidural abscess 2005    x4     Fibromyalgia      Generalised anxiety disorder      GERD (gastroesophageal reflux disease)      Major depressive disorder      Migraine      Nephrolithiasis     S/p left sided lithotripsy     Opiate dependence (H)      Osteoarthritis      Osteoporosis      PUD (peptic ulcer disease)      SLE (systemic lupus erythematosus) (H) 2009     Weight loss       Past Surgical History:   Procedure Laterality Date     BACK SURGERY  04    L4-5 epidural abscess     BACK SURGERY  04    L3-4 spinal stenosis     BACK SURGERY  04    Lt psoas abscess     BRONCHOSCOPY FLEXIBLE N/A 2019    Procedure: Flexible Bronchoscopy;  Surgeon: Patrice Regalado MD;  Location: [facility]      SECTION      x 2     CHOLECYSTECTOMY  -     CLOSED REDUCTION, PERCUTANEOUS PINNING FINGER, COMBINED  8/10/2011    Procedure:COMBINED CLOSED REDUCTION, PERCUTANEOUS PINNING FINGER; 5th Proximal Phalanx; Surgeon:RADHA BUITRAGO; [facility]     COLONOSCOPY       COLONOSCOPY N/A 2020    Procedure: COLONOSCOPY;  Surgeon: Zacarias Duran MD;  Location:  GI     ESOPHAGOSCOPY, GASTROSCOPY, DUODENOSCOPY (EGD), COMBINED N/A 2020    Procedure: ESOPHAGOGASTRODUODENOSCOPY, WITH BIOPSY;  Surgeon: Zacarias Duran MD;  Location:  GI      ESOPHAGOSCOPY, GASTROSCOPY, DUODENOSCOPY (EGD), COMBINED N/A 6/8/2021    Procedure: ESOPHAGOGASTRODUODENOSCOPY (EGD);  Surgeon: Kaveh Clayton MD;  Location: [facility]     GASTRECTOMY  11-    Bilat truncal vagotomy, hemigastrectomy, RnY gastrojejunostomy     GASTROJEJUNOSTOMY  6/2/2011    Procedure:GASTROJEJUNOSTOMY; exploratory laparotmy with revision of gastrojejunostomy, Antrectomy, Miles-en-y, Gastrojejunostomy; Surgeon:JULIUS HELM; Location:[facility]     INSERT CHEST TUBE N/A 4/29/2019    Procedure: INSERTION, CATHETER, INTERCOSTAL, FOR DRAINAGE;  Surgeon: Melissa Nichols MD;  Location: [facility]     LAPAROTOMY EXPLORATORY N/A 9/10/2021    Procedure: Exploratory Laparotomy, Extensive Lysis of Adhesions; Jejunojejunostomy resection and  Revision, Gastrojejunal Resection (partial gastrectomy with enterectomy) with Miles en Y Reconstruction Gastrojejunostomy, Upper Endoscopy;  Surgeon: Kaveh Clayton MD;  Location: [facility]     LASER HOLMIUM LITHOTRIPSY URETER(S), INSERT STENT, COMBINED      Procedure: COMBINED CYSTOSCOPY, URETEROSCOPY, LASER HOLMIUM LITHOTRIPSY URETER(S), INSERT STENT;;  Surgeon: Blair Correa MD;  Location: [facility]     LASER HOLMIUM NEPHROLITHOTOMY VIA PERCUTANEOUS NEPHROSTOMY  7/11/2012    Procedure: LASER HOLMIUM NEPHROLITHOTOMY VIA PERCUTANEOUS NEPHROSTOMY;  proceedure should read: Left Percutaneous Access, Left Percutaneous ultrasonic Nephrolithotomy, Ureteroscopy Holmium Laser Lithotripsy Stent Placement ;  Surgeon: Blair Correa MD;  [facility]     MAMMOPLASTY AUGMENTATION BILATERAL       OPEN REDUCTION INTERNAL FIXATION WRIST Left 1/11/2016    Procedure: OPEN REDUCTION INTERNAL FIXATION WRIST;  Surgeon: Darling Ellis MD;  Location: [facility]     RECONSTRUCT BREAST, IMPLANT PROSTHESIS, COMBINED       THORACOSCOPIC DECORTICATION LUNG Left 5/7/2019    Procedure: Left Video Assisted Thoracoscopic Decortication, Intercostal Nerve Cryo Analgesia, Flexible  Bronchoscopy;  Surgeon: Patrice Regalado MD;  Location: [facility]      Allergies   Allergen Reactions     Penicillins Hives     Hives in childhood.      Social History     Tobacco Use     Smoking status: Never Smoker     Smokeless tobacco: Never Used   Substance Use Topics     Alcohol use: No     Comment: none in 10 years      Wt Readings from Last 1 Encounters:   01/13/22 47.9 kg (105 lb 8 oz)        Anesthesia Evaluation   Pt has had prior anesthetic. Type: General and Regional.    No history of anesthetic complications       ROS/MED HX  ENT/Pulmonary: Comment: Hx of prior lung decortication due to pleural effusion and empyema in 2019    Being evaluated for glaucoma     (-) tobacco use   Neurologic:     (+) migraines, seizures (withdrawal seizure),  (-) no CVA and no TIA   Cardiovascular:     (+) -----valvular problems/murmurs mild TI. Previous cardiac testing   Echo: Date: 7/1/21 Results:  Interpretation Summary  Left ventricular size, wall motion and function are normal. The ejection  fraction is 60-65%. No regional wall motion abnormalities are seen.  Right ventricular function, chamber size, wall motion, and thickness are  normal.  The valve leaflets are not well visualized. Trace aortic insufficiency is  present.  Mild tricuspid insufficiency is present. The inferior vena cava was normal in  size with preserved respiratory variability. No pericardial effusion is  present.     There has been no change.    Stress Test: Date: Results:    ECG Reviewed: Date: 4/29/19 Results:  Sinus rhythm, low voltage QRS  Cath: Date: Results:      METS/Exercise Tolerance: >4 METS    Hematologic: Comments: Thrombocytopenia      (+) anemia, history of blood transfusion, no previous transfusion reaction, Known PRBC Anitbodies:No     Musculoskeletal: Comment: Osteoarthritis, osteoporosis    fibromyaglia      GI/Hepatic: Comment: Hx partial gastrectomy with hypokinetic remnant s/p Exploratory Laparotomy, Extensive Lysis of  Adhesions; Jejunojejunostomy resection and  Revision, Gastrojejunal Resection (partial gastrectomy with enterectomy) with Miles en Y Reconstruction Gastrojejunostomy, Upper Endoscopy    Dysphagia    PSC        (+) GERD (history of PUD), Asymptomatic on medication,     Renal/Genitourinary:     (+) Nephrolithiasis ,     Endo:  - neg endo ROS     Psychiatric/Substance Use:     (+) psychiatric history depression and anxiety alcohol abuse (sober ) H/O chronic opiod use  (on methadone).     Infectious Disease: Comment: Herpes        Malignancy:  - neg malignancy ROS     Other: Comment: SLE on plaquenil      (+) , H/O Chronic Pain,        Physical Exam    Airway        Mallampati: II   TM distance: > 3 FB   Neck ROM: full   Mouth opening: > 3 cm    Respiratory Devices and Support         Dental  no notable dental history         Cardiovascular   cardiovascular exam normal       Rhythm and rate: regular and normal     Pulmonary   pulmonary exam normal        breath sounds clear to auscultation           OUTSIDE LABS:  CBC:   Lab Results   Component Value Date    WBC 4.4 01/13/2022    WBC 7.1 11/08/2021    HGB 10.1 (L) 01/13/2022    HGB 9.5 (L) 11/08/2021    HCT 34.1 (L) 01/13/2022    HCT 33.8 (L) 11/08/2021     (L) 01/13/2022     11/08/2021     BMP:   Lab Results   Component Value Date     01/13/2022     11/08/2021    POTASSIUM 4.2 01/13/2022    POTASSIUM 3.0 (L) 11/08/2021    CHLORIDE 106 01/13/2022    CHLORIDE 107 11/08/2021    CO2 29 01/13/2022    CO2 24 11/08/2021    BUN 27 01/13/2022    BUN 15 11/08/2021    CR 0.71 01/13/2022    CR 0.77 11/08/2021    GLC 71 01/13/2022    GLC 77 11/08/2021     COAGS:   Lab Results   Component Value Date    PTT 45 (H) 04/29/2019    INR 1.13 09/13/2021    FIBR 219 06/02/2011     POC:   Lab Results   Component Value Date     (H) 07/03/2021    HCG Negative 04/15/2015    HCGS Negative 11/05/2010     HEPATIC:   Lab Results   Component Value Date    ALBUMIN 3.4  01/13/2022    PROTTOTAL 6.8 01/13/2022     (H) 01/13/2022     (H) 01/13/2022    GGT 1,223 (H) 05/06/2019    ALKPHOS 532 (H) 01/13/2022    BILITOTAL 0.2 01/13/2022     OTHER:   Lab Results   Component Value Date    PH 7.40 05/07/2019    LACT 1.0 05/03/2019    A1C 5.5 06/02/2011    RAFAELA 8.5 01/13/2022    PHOS 4.2 01/13/2022    MAG 2.1 01/13/2022    LIPASE 32 (L) 04/28/2019    AMYLASE 33 05/20/2016    TSH 1.88 03/15/2021    T4 0.81 02/04/2013    CRP 48.1 (H) 09/24/2021    SED 46 (H) 03/15/2021       Anesthesia Plan    ASA Status:  3   NPO Status:  NPO Appropriate    Anesthesia Type: General.     - Airway: ETT      Maintenance: Balanced.        Consents    Anesthesia Plan(s) and associated risks, benefits, and realistic alternatives discussed. Questions answered and patient/representative(s) expressed understanding.    - Discussed:     - Discussed with:  Patient      - Extended Intubation/Ventilatory Support Discussed: No.      - Patient is DNR/DNI Status: No    Use of blood products discussed: No .     Postoperative Care    Pain management: IV analgesics.   PONV prophylaxis: Ondansetron (or other 5HT-3), Dexamethasone or Solumedrol     Comments:                Colleen oPlanco PA-C

## 2022-02-04 NOTE — PATIENT INSTRUCTIONS
Preparing for Your Surgery      Name:  Demetra Richardson   MRN:  [id number]   :  1955   Today's Date:  2022       Arriving for surgery:  Surgery date:  22  Arrival time:  10:50am    Restrictions due to COVID 19       Effective 22 [facility] is implementing the following visitor policy:     1 person may accompany the patient through the Pre-Op process.      Then that person will be asked to leave the building.        There will be no visitors in the Surgery Waiting Room.        Inpatients are allowed 1 consistent visitor for the duration of their stay.      Visitors must wear a mask.      Visitors must not be ill.      Visiting hours are 8 am to 8 pm.    [employer] parking is available for anyone with mobility limitations or disabilities.  ([facility]  24 hours/ 7 days a week; [facility]  7 am- 3:30 pm, Mon- Fri)    Please come to:     [facility] [address]  [address] [place] [address]    - ? parking is available in front of the hospital      -    Please proceed to [address] on the 3rd floor. [phone]?     - ?If you are in need of directions, wheelchair or escort please stop at the Information Desk in the lobby.  Inform the information person that you are here for surgery; a wheelchair and escort to [address] will be provided.?     What can I eat or drink?  -  You may eat and drink normally for up to 8 hours before your surgery. (Until 4:50am on 22)  -  You may have clear liquids until 2 hours before surgery. (Until 10:50am on 22)    Examples of clear liquids:  Water  Clear broth  Juices (apple, white grape, white cranberry  and cider) without pulp  Noncarbonated, powder based beverages  (lemonade and Holland-Aid)  Sodas (Sprite, 7-Up, ginger ale and seltzer)  Coffee or tea (without milk or cream)  Gatorade    -  No Alcohol for at least 24 hours before surgery     Which medicines can I take?    Hold Aspirin for 7  days before surgery. Excedrin migraine because of the aspirin in it  Hold Multivitamins for 7 days before surgery.  Hold Supplements for 7 days before surgery.  Hold Ibuprofen (Advil, Motrin) for 1 day before surgery--unless otherwise directed by surgeon.  Hold Naproxen (Aleve) for 4 days before surgery.    -  DO NOT take these medications the day of surgery:   Miralax    -  PLEASE TAKE these medications the day of surgery:   escitalopram (lexapro)    Hydroxyzine (vistaril) as needed    Omeprazole (prilosec)    Pregabalin (lyrica)   Valacyclovir (valtrex) as needed   Methadone     How do I prepare myself?  - Please take 2 showers before surgery using Scrubcare or Hibiclens soap.    Use this soap only from the neck to your toes.     Leave the soap on your skin for one minute--then rinse thoroughly.      You may use your own shampoo and conditioner; no other hair products.   - Please remove all jewelry and body piercings.  - No lotions, deodorants or fragrance.  - No makeup or fingernail polish.   - Bring your ID and insurance card.    -If you have a Deep Brain Stimulator, Spinal Cord Stimulator or any neuro stimulator device---you must bring the remote control to the hospital     - All patients are required to have a Covid-19 test within 4 days of surgery/procedure.      -Patients will be contacted by the [facility] scheduling team within 1 week of surgery to make an appointment.      - Patients may call the Scheduling team at [phone] if they have not been scheduled within 4 days of  surgery.      ALL PATIENTS GOING HOME THE SAME DAY OF SURGERY ARE REQUIRED TO HAVE A RESPONSIBLE ADULT TO DRIVE AND BE IN ATTENDANCE WITH THEM FOR 24 HOURS FOLLOWING SURGERY.      Questions or Concerns:    - For any questions regarding the day of surgery or your hospital stay, please contact the Pre Admission Nursing Office at [phone].       - If you have health changes between today and your surgery please call your  surgeon.       For questions after surgery please call your surgeons office.

## 2022-02-04 NOTE — H&P (VIEW-ONLY)
Pre-Operative H & P     CC:  Preoperative exam to assess for increased cardiopulmonary risk while undergoing surgery and anesthesia.    Date of Encounter: 2/4/2022  Primary Care Physician:  Fredy Merritt     Reason for visit: pre op examination  Status post bariatric surgery    HPI  Demetra Richardson is a 66 year old female who presents for pre-operative H & P in preparation for  Procedure Information     Case: [id number] Date/Time: 02/07/22 1250    Procedures:       Esophagogastroduodenoscopy  (EGD), (N/A Esophagus)      Possible Dilation (N/A Esophagus)    Anesthesia type: General    Diagnosis: Status post bariatric surgery [Z98.84]    Pre-op diagnosis: Status post bariatric surgery [Z98.84]    Location:  OR [address] [place] [address] OR    Providers: Kaveh Clayton MD      The patient is a 66-year-old woman with a past medical history significant for history of pleural effusion\high edema status post lung decortication, anemia, thrombocytopenia, migraines, history of substance abuse with a history of withdrawal seizure, history of epidural abscess, SLE, herpes, history of nephrolithiasis, osteoarthritis, osteoporosis, anxiety, depression, chronic pain, fibromyalgia and history of partial gastrectomy with hypokinetic remnant status post exploratory laparotomy, lysis of adhesions, resection and revision with Miles-en-Y reconstruction who has been following with Dr. Clayton.  She reports she has dysphagia and all she can eat is mashed potatoes with whole milk.  She has now been counseled for the procedure as above.    History is obtained from the patient and chart review    Hx of abnormal bleeding or anti-platelet use: none    Menstrual history: No LMP recorded (lmp unknown). Patient is postmenopausal.:      Past Medical History  Past Medical History:   Diagnosis Date     Anemia      Basal cell carcinoma     Left-sided, skin over over medial orbit/nasal bone. Removed Oct 2018.     Benzodiazepine dependence (H)     With  h/o withdrawal seizure x 1     Bipolar 1 disorder, mixed, moderate (H) 2013     Chronic pain     Back, legs.     Depressive disorder      Eosinophilic gastroenteritis 2010     Epidural abscess 2005    x4     Fibromyalgia      Generalised anxiety disorder      GERD (gastroesophageal reflux disease)      Major depressive disorder      Migraine      Nephrolithiasis     S/p left sided lithotripsy     Opiate dependence (H)      Osteoarthritis      Osteoporosis      PUD (peptic ulcer disease)      SLE (systemic lupus erythematosus) (H) 2009     Weight loss        Past Surgical History  Past Surgical History:   Procedure Laterality Date     BACK SURGERY  04    L4-5 epidural abscess     BACK SURGERY  04    L3-4 spinal stenosis     BACK SURGERY  04    Lt psoas abscess     BRONCHOSCOPY FLEXIBLE N/A 2019    Procedure: Flexible Bronchoscopy;  Surgeon: Patrice Regalado MD;  Location: [facility] OR      SECTION      x 2     CHOLECYSTECTOMY  -     CLOSED REDUCTION, PERCUTANEOUS PINNING FINGER, COMBINED  8/10/2011    Procedure:COMBINED CLOSED REDUCTION, PERCUTANEOUS PINNING FINGER; 5th Proximal Phalanx; Surgeon:RADHA BUITRAGO; Location:[facility]     COLONOSCOPY       COLONOSCOPY N/A 2020    Procedure: COLONOSCOPY;  Surgeon: Zacarias Duran MD;  Location:  GI     ESOPHAGOSCOPY, GASTROSCOPY, DUODENOSCOPY (EGD), COMBINED N/A 2020    Procedure: ESOPHAGOGASTRODUODENOSCOPY, WITH BIOPSY;  Surgeon: Zacarias Duran MD;  Location:  GI     ESOPHAGOSCOPY, GASTROSCOPY, DUODENOSCOPY (EGD), COMBINED N/A 2021    Procedure: ESOPHAGOGASTRODUODENOSCOPY (EGD);  Surgeon: Kaveh Clayton MD;  Location:  GI     GASTRECTOMY  2005    Bilat truncal vagotomy, hemigastrectomy, RnY gastrojejunostomy     GASTROJEJUNOSTOMY  2011    Procedure:GASTROJEJUNOSTOMY; exploratory laparotmy with revision of gastrojejunostomy, Antrectomy, Miles-en-y, Gastrojejunostomy; Surgeon:ALICJA  JULIUS VILLANUEVA; Location:[facility] OR     INSERT CHEST TUBE N/A 4/29/2019    Procedure: INSERTION, CATHETER, INTERCOSTAL, FOR DRAINAGE;  Surgeon: Melissa Nichols MD;  Location: [facility] GI     LAPAROTOMY EXPLORATORY N/A 9/10/2021    Procedure: Exploratory Laparotomy, Extensive Lysis of Adhesions; Jejunojejunostomy resection and  Revision, Gastrojejunal Resection (partial gastrectomy with enterectomy) with Miles en Y Reconstruction Gastrojejunostomy, Upper Endoscopy;  Surgeon: Kaveh Clayton MD;  Location: [facility] OR     LASER HOLMIUM LITHOTRIPSY URETER(S), INSERT STENT, COMBINED      Procedure: COMBINED CYSTOSCOPY, URETEROSCOPY, LASER HOLMIUM LITHOTRIPSY URETER(S), INSERT STENT;;  Surgeon: Blair Correa MD;  [facility]: [facility]     LASER HOLMIUM NEPHROLITHOTOMY VIA PERCUTANEOUS NEPHROSTOMY  7/11/2012    Procedure: LASER HOLMIUM NEPHROLITHOTOMY VIA PERCUTANEOUS NEPHROSTOMY;  proceedure should read: Left Percutaneous Access, Left Percutaneous ultrasonic Nephrolithotomy, Ureteroscopy Holmium Laser Lithotripsy Stent Placement ;  Surgeon: Blair Correa MD;  [facility]     MAMMOPLASTY AUGMENTATION BILATERAL       OPEN REDUCTION INTERNAL FIXATION WRIST Left 1/11/2016    Procedure: OPEN REDUCTION INTERNAL FIXATION WRIST;  Surgeon: Darling Ellis MD;  Location: [facility]     RECONSTRUCT BREAST, IMPLANT PROSTHESIS, COMBINED       THORACOSCOPIC DECORTICATION LUNG Left 5/7/2019    Procedure: Left Video Assisted Thoracoscopic Decortication, Intercostal Nerve Cryo Analgesia, Flexible Bronchoscopy;  Surgeon: Patrice Regalado MD;  Location: [facility] OR       Prior to Admission Medications  Current Outpatient Medications   Medication Sig Dispense Refill     aspirin-acetaminophen-caffeine (EXCEDRIN MIGRAINE) 250-250-65 MG tablet Take 1 tablet by mouth every 6 hours as needed for headaches       escitalopram (LEXAPRO) 20 MG tablet TAKE 1 TABLET BY MOUTH EVERY DAY (Patient taking differently: Take 20 mg by mouth daily ) 90  tablet 1     hydrOXYzine (VISTARIL) 25 MG capsule Take 1 capsule (25 mg) by mouth 2 times daily as needed for anxiety 60 capsule 11     ibuprofen (ADVIL/MOTRIN) 200 MG tablet Take 400 mg by mouth every 8 hours as needed for mild pain       methadone (DOLOPHINE-INTENSOL) 10 MG/ML (HIGH CONC) solution Take 120 mg by mouth daily Takes it at 0530 everyday. Only uses red not yellow    [facility] - doses in clinic on Mon/Wed/Fri and take home doses for other days.       polyethylene glycol (MIRALAX) 17 GM/Dose powder 1 capful (17 g) in 8 oz of liquid (Patient taking differently: Take 17 g by mouth daily as needed for constipation 1 capful (17 g) in 8 oz of liquid) 225 g 3     pregabalin (LYRICA) 150 MG capsule Take 1 capsule (150 mg) by mouth 3 times daily 90 capsule 5     valACYclovir (VALTREX) 500 MG tablet Take 1 tablet (500 mg) by mouth 2 times daily (Patient taking differently: Take 500 mg by mouth 2 times daily as needed (when breakouts occur x3 days) ) 60 tablet 1     cyanocobalamin (VITAMIN B-12) 1000 MCG tablet Take 1 tablet (1,000 mcg) by mouth daily (Patient not taking: Reported on 2/3/2022) 90 tablet 0     hydroxychloroquine (PLAQUENIL) 200 MG tablet Take 1 tablet (200 mg) by mouth 2 times daily (with meals) (Patient not taking: Reported on 2/3/2022) 180 tablet 1     omeprazole (PRILOSEC) 40 MG DR capsule Take 1 capsule (40 mg) by mouth 2 times daily (Patient not taking: Reported on 2/3/2022) 120 capsule 5       Allergies  Allergies   Allergen Reactions     Penicillins Hives     Hives in childhood.       Social History  Social History     Socioeconomic History     Marital status: Single     Spouse name: Not on file     Number of children: Not on file     Years of education: Not on file     Highest education level: Not on file   Occupational History     Not on file   Tobacco Use     Smoking status: Never Smoker     Smokeless tobacco: Never Used   Substance and Sexual Activity     Alcohol use:  No     Comment: none in 10 years     Drug use: Not Currently     Types: Benzodiazepines, Opiates     Sexual activity: Not Currently   Other Topics Concern     Parent/sibling w/ CABG, MI or angioplasty before 65F 55M? Not Asked   Social History Narrative    Intake 4/16/15:        H/o divorce but most recently living with SO Alfonso. Alfonso recently passed away.         In past employed as a .         Tobacco use: denies    Alcohol use: denies    Drug use: daily pot use for 30 years. Currently with sobriety.          Social Determinants of Health     Financial Resource Strain: Not on file   Food Insecurity: Not on file   Transportation Needs: Not on file   Physical Activity: Not on file   Stress: Not on file   Social Connections: Not on file   Intimate Partner Violence: Not on file   Housing Stability: Not on file       Family History  Family History   Problem Relation Age of Onset     Substance Abuse Mother      Family History Negative Father      Substance Abuse Brother      Substance Abuse Maternal Grandfather      Liver Disease No family hx of      Colon Cancer No family hx of      Ulcerative Colitis No family hx of      Crohn's Disease No family hx of      Glaucoma No family hx of      Macular Degeneration No family hx of      Anesthesia Reaction No family hx of      Deep Vein Thrombosis (DVT) No family hx of        Review of Systems  The complete review of systems is negative other than noted in the HPI or here.   ROS/MED HX  ENT/Pulmonary: Comment: Hx of prior lung decortication due to pleural effusion and empyema in 2019    Being evaluated for glaucoma     (-) tobacco use   Neurologic:     (+) migraines, seizures (withdrawal seizure),  (-) no CVA and no TIA   Cardiovascular:     (+) -----valvular problems/murmurs mild TI. Previous cardiac testing   Echo: Date: 7/1/21 Results:  Interpretation Summary  Left ventricular size, wall motion and function are normal. The ejection  fraction is 60-65%. No regional  "wall motion abnormalities are seen.  Right ventricular function, chamber size, wall motion, and thickness are  normal.  The valve leaflets are not well visualized. Trace aortic insufficiency is  present.  Mild tricuspid insufficiency is present. The inferior vena cava was normal in  size with preserved respiratory variability. No pericardial effusion is  present.     There has been no change.    Stress Test: Date: Results:    ECG Reviewed: Date: 4/29/19 Results:  Sinus rhythm, low voltage QRS  Cath: Date: Results:      METS/Exercise Tolerance: >4 METS    Hematologic: Comments: Thrombocytopenia      (+) anemia, history of blood transfusion, no previous transfusion reaction, Known PRBC Anitbodies:No     Musculoskeletal: Comment: Osteoarthritis, osteoporosis    fibromyaglia      GI/Hepatic: Comment: Hx partial gastrectomy with hypokinetic remnant s/p Exploratory Laparotomy, Extensive Lysis of Adhesions; Jejunojejunostomy resection and  Revision, Gastrojejunal Resection (partial gastrectomy with enterectomy) with Miles en Y Reconstruction Gastrojejunostomy, Upper Endoscopy    Dysphagia    PSC        (+) GERD (history of PUD), Asymptomatic on medication,     Renal/Genitourinary:     (+) Nephrolithiasis ,     Endo:  - neg endo ROS     Psychiatric/Substance Use:     (+) psychiatric history depression and anxiety alcohol abuse (sober ) H/O chronic opiod use  (on methadone).     Infectious Disease: Comment: Herpes        Malignancy:  - neg malignancy ROS     Other: Comment: SLE on plaquenil      (+) , H/O Chronic Pain,          BP (!) 151/94 (BP Location: Right arm, Patient Position: Chair, Cuff Size: Adult Regular)   Pulse 67   Temp 97.4  F (36.3  C) (Oral)   Resp 16   Ht 1.575 m (5' 2\")   Wt 50.8 kg (112 lb)   LMP  (LMP Unknown)   SpO2 98%   Breastfeeding No   BMI 20.49 kg/m      Physical Exam   Constitutional: Awake, alert, cooperative, no apparent distress, and appears stated age. Thin  Eyes: Pupils equal, round " and reactive to light, extra ocular muscles intact, sclera clear, conjunctiva normal.  HENT: Normocephalic, oral pharynx with moist mucus membranes, good dentition - missing back teeth. No goiter appreciated. Mallampati II  Respiratory: Clear to auscultation bilaterally, no crackles or wheezing.  Cardiovascular: Regular rate and rhythm, normal S1 and S2, and no murmur noted.  Carotids +2, no bruits. No edema. Palpable pulses to radial  DP and PT arteries.   GI: Normal bowel sounds, soft, non-distended, non-tender, no masses palpated, no hepatosplenomegaly.  Surgical scars: healing.   Lymph/Hematologic: No cervical lymphadenopathy and no supraclavicular lymphadenopathy.  Genitourinary:  defer  Skin: Warm and dry.  No rashes at anticipated surgical site.   Musculoskeletal: Limited ROM of neck. There is no redness, warmth, or swelling of the joints. Gross motor strength is normal.    Neurologic: Awake, alert, oriented to name, place and time. Cranial nerves II-XII are grossly intact. Gait is normal.   Neuropsychiatric: Calm, cooperative. Normal affect.     Prior Labs/Diagnostic Studies   All labs and imaging personally reviewed  Results for FLORIAN CURTIS (MRN [id number]) as of 2/4/2022 08:19   Ref. Range 1/13/2022 09:38   Sodium Latest Ref Range: 133 - 144 mmol/L 142   Potassium Latest Ref Range: 3.4 - 5.3 mmol/L 4.2   Chloride Latest Ref Range: 94 - 109 mmol/L 106   Carbon Dioxide Latest Ref Range: 20 - 32 mmol/L 29   Urea Nitrogen Latest Ref Range: 7 - 30 mg/dL 27   Creatinine Latest Ref Range: 0.52 - 1.04 mg/dL 0.71   GFR Estimate Latest Ref Range: >60 mL/min/1.73m2 >90   Calcium Latest Ref Range: 8.5 - 10.1 mg/dL 8.5   Anion Gap Latest Ref Range: 3 - 14 mmol/L 7   Magnesium Latest Ref Range: 1.6 - 2.3 mg/dL 2.1   Phosphorus Latest Ref Range: 2.5 - 4.5 mg/dL 4.2   Albumin Latest Ref Range: 3.4 - 5.0 g/dL 3.4   Prealbumin Latest Ref Range: 15 - 45 mg/dL 21   Protein Total Latest Ref Range: 6.8 - 8.8 g/dL 6.8    Bilirubin Total Latest Ref Range: 0.2 - 1.3 mg/dL 0.2   Alkaline Phosphatase Latest Ref Range: 40 - 150 U/L 532 (H)   ALT Latest Ref Range: 0 - 50 U/L 138 (H)   AST Latest Ref Range: 0 - 45 U/L 108 (H)   Copper Latest Ref Range: 80.0 - 155.0 ug/dL 39.6 (L)   Ferritin Latest Ref Range: 8 - 252 ng/mL 13   Vitamin B1 Whole Blood Level Latest Ref Range: 70 - 180 nmol/L 125   Vitamin B12 Latest Ref Range: 193 - 986 pg/mL 350   Vitamin B6 Latest Ref Range: 20.0 - 125.0 nmol/L 15.0 (L)   Vitamin D Deficiency screening Latest Ref Range: 20 - 75 ug/L 20   Vitamin E Latest Ref Range: 5.5 - 18.0 mg/L 4.5 (L)   Vitamin E Gamma Latest Ref Range: 0.0 - 6.0 mg/L 0.6   Vitamin K Latest Ref Range: 0.22 - 4.88 nmol/L 0.29   Zinc Latest Ref Range: 60.0 - 120.0 ug/dL 66.0   Glucose Latest Ref Range: 70 - 99 mg/dL 71   WBC Latest Ref Range: 4.0 - 11.0 10e3/uL 4.4   Hemoglobin Latest Ref Range: 11.7 - 15.7 g/dL 10.1 (L)   Hematocrit Latest Ref Range: 35.0 - 47.0 % 34.1 (L)   Platelet Count Latest Ref Range: 150 - 450 10e3/uL 139 (L)   RBC Count Latest Ref Range: 3.80 - 5.20 10e6/uL 4.08   MCV Latest Ref Range: 78 - 100 fL 84   MCH Latest Ref Range: 26.5 - 33.0 pg 24.8 (L)   MCHC Latest Ref Range: 31.5 - 36.5 g/dL 29.6 (L)   RDW Latest Ref Range: 10.0 - 15.0 % 22.1 (H)        EKG/ stress test - if available please see in ROS above   Echo result w/o MOPS: Interpretation SummaryLeft ventricular size, wall motion and function are normal. The ejectionfraction is 60-65%. No regional wall motion abnormalities are seen.Right ventricular function, chamber size, wall motion, and thickness arenormal.The valve leaflets are not well visualized. Trace aortic insufficiency ispresent.Mild tricuspid insufficiency is present. The inferior vena cava was normal insize with preserved respiratory variability. No pericardial effusion ispresent. There has been no change.        The patient's records and results personally reviewed by this provider.      Outside records reviewed from: Care Everywhere        Assessment      Demetra Richardson is a 66 year old female was seen as a PAC referral for risk assessment and optimization for anesthesia.    Plan/Recommendations  Pt will be optimized for the proposed procedure.  See below for details on the assessment, risk, and preoperative recommendations    NEUROLOGY  - History of Seizure - withdrawal. No ongoing issues.   - Chronic Pain  On chronic opiates, morphine equivilant = 480-1,440 MME/Day   Please access the PAC pharmacist's note for full details. The patient will continue her methadone the morning of the procedure.   -Post Op delirium risk factors:  High co-morbid index   ~ Migraine - hold excedrin for 7 days prior.     ENT  - No current airway concerns.  Will need to be reassessed day of surgery. The patient has limited ROM but had easy intubation in 9/2021    CARDIAC  - No history of CAD, Hypertension and Afib  - METS (Metabolic Equivalents)  Patient performs 4 or more METS exercise without symptoms  Total Score: 0      RCRI-Very low risk: Class 1 0.4% complication rate  Total Score: 0    ~ The patient is walking 1-2 hours a day. She denies any cardiac symptoms.   ~ The patient has no diagnosis of HTN but blood pressure was 154/94 and recheck was 153/88. The patient lives in assisted living and will have her nurses there check her blood pressure today and over the next few days. If it remains >160/90 then she knows she needs to reach out to her primary care provider for further management.     PULMONARY  - Obstructive Sleep Apnea  No current risk of obstructive sleep apnea   TORREY Low Risk  Total Score: 1           TORREY: Over 50 ys old      - Denies asthma or inhaler use  - History of lung decortication 2/2 pleural effusion and empyema in 2019. She denies any respiratory issues.   - Tobacco History      History   Smoking Status     Never Smoker   Smokeless Tobacco     Never Used       GI  - GERD/ history of PUD  - the patient reports she has no symptoms and is not currently taking any medications.   PONV Medium Risk  Total Score: 2           1 AN PONV: Pt is Female    1 AN PONV: Patient is not a current smoker    ~ Hx partial gastrectomy with hypokinetic remnant s/p Exploratory Laparotomy, Extensive Lysis of Adhesions; Jejunojejunostomy resection and  Revision, Gastrojejunal Resection (partial gastrectomy with enterectomy) with Miles en Y Reconstruction Gastrojejunostomy, Upper Endoscopy. The patient now with ongoing dysphagia - procedure as above.     /RENAL  - Baseline Creatinine  0.71 on 1/13/22  ~ History of nephrolithiasis - no ongoing issues.     ENDOCRINE  - BMI: Body mass index is 20.49 kg/m .  Healthy Weight (BMI 18.5-24.9)  - No history of Diabetes Mellitus    HEME  VTE Low Risk 0.26%  Total Score: 1           VTE: Greater than 59 yrs old    - Chronic anemia and thrombocytopenia - The patient was seen by Dr. Chapa in 2014 who felt it was secondary to the patient's gastric bypass. The patient last had transfusion in 6/24/21.     Recommend perioperative use of blood conservation techniques intraoperatively and close monitoring for postoperative bleeding.  Low bleeding risk procedure    MSK  Patient is NOT Frail  Total Score: 1           Frailty: Slower walking speed    ~ Osteoporosis/ osteoarthritis/ fibromyalgia  - Continue lyrica and hold ibuprofen 24 hours. She will continue methadone as above.     PSYCH  - depression, anxiety - (The patient reports she does not have bipolar diagnosis listed in her chart). Continue lexapro and PRN vistaril.   ~ History of substance abuse - currently sober.     Immunology/ ID  ~ SLE - the patient has been followed by Dr. Narayanan and reports all over joint pain. She is not currently on plaquenil.   ~ Herpes - continue valtrex.     The patient is optimized for their procedure. AVS with information on surgery time/arrival time, meds and NPO status given by nursing staff. No further  diagnostic testing indicated.      On the day of service:     Prep time: 13 minutes  Visit time: 25 minutes  Documentation time: 10 minutes  ------------------------------------------  Total time: 48 minutes      Colleen Polanco PA-C  Preoperative Assessment Center  [facility]  [facility]  Phone: [phone]  Fax: [phone]

## 2022-02-04 NOTE — OR NURSING
VM message left as well as [facility] message sent with time change for surgery on 2/7/22.

## 2022-02-04 NOTE — H&P
Pre-Operative H & P     CC:  Preoperative exam to assess for increased cardiopulmonary risk while undergoing surgery and anesthesia.    Date of Encounter: 2/4/2022  Primary Care Physician:  Fredy Merritt     Reason for visit: pre op examination  Status post bariatric surgery    HPI  Demetra Richardson is a 66 year old female who presents for pre-operative H & P in preparation for  Procedure Information     Case: [id number] Date/Time: 02/07/22 1250    Procedures:       Esophagogastroduodenoscopy  (EGD), (N/A Esophagus)      Possible Dilation (N/A Esophagus)    Anesthesia type: General    Diagnosis: Status post bariatric surgery [Z98.84]    Pre-op diagnosis: Status post bariatric surgery [Z98.84]    Location:  OR [address] [place] [address] OR    Providers: Kaveh Clayton MD      The patient is a 66-year-old woman with a past medical history significant for history of pleural effusion\high edema status post lung decortication, anemia, thrombocytopenia, migraines, history of substance abuse with a history of withdrawal seizure, history of epidural abscess, SLE, herpes, history of nephrolithiasis, osteoarthritis, osteoporosis, anxiety, depression, chronic pain, fibromyalgia and history of partial gastrectomy with hypokinetic remnant status post exploratory laparotomy, lysis of adhesions, resection and revision with Miles-en-Y reconstruction who has been following with Dr. Clayton.  She reports she has dysphagia and all she can eat is mashed potatoes with whole milk.  She has now been counseled for the procedure as above.    History is obtained from the patient and chart review    Hx of abnormal bleeding or anti-platelet use: none    Menstrual history: No LMP recorded (lmp unknown). Patient is postmenopausal.:      Past Medical History  Past Medical History:   Diagnosis Date     Anemia      Basal cell carcinoma     Left-sided, skin over over medial orbit/nasal bone. Removed Oct 2018.     Benzodiazepine dependence (H)     With  h/o withdrawal seizure x 1     Bipolar 1 disorder, mixed, moderate (H) 2013     Chronic pain     Back, legs.     Depressive disorder      Eosinophilic gastroenteritis 2010     Epidural abscess 2005    x4     Fibromyalgia      Generalised anxiety disorder      GERD (gastroesophageal reflux disease)      Major depressive disorder      Migraine      Nephrolithiasis     S/p left sided lithotripsy     Opiate dependence (H)      Osteoarthritis      Osteoporosis      PUD (peptic ulcer disease)      SLE (systemic lupus erythematosus) (H) 2009     Weight loss        Past Surgical History  Past Surgical History:   Procedure Laterality Date     BACK SURGERY  04    L4-5 epidural abscess     BACK SURGERY  04    L3-4 spinal stenosis     BACK SURGERY  04    Lt psoas abscess     BRONCHOSCOPY FLEXIBLE N/A 2019    Procedure: Flexible Bronchoscopy;  Surgeon: Patrice Regalado MD;  Location: [facility] OR      SECTION      x 2     CHOLECYSTECTOMY  -     CLOSED REDUCTION, PERCUTANEOUS PINNING FINGER, COMBINED  8/10/2011    Procedure:COMBINED CLOSED REDUCTION, PERCUTANEOUS PINNING FINGER; 5th Proximal Phalanx; Surgeon:RADHA BUITRAGO; Location:[facility]     COLONOSCOPY       COLONOSCOPY N/A 2020    Procedure: COLONOSCOPY;  Surgeon: Zacarias Duran MD;  Location:  GI     ESOPHAGOSCOPY, GASTROSCOPY, DUODENOSCOPY (EGD), COMBINED N/A 2020    Procedure: ESOPHAGOGASTRODUODENOSCOPY, WITH BIOPSY;  Surgeon: Zacarias Duran MD;  Location:  GI     ESOPHAGOSCOPY, GASTROSCOPY, DUODENOSCOPY (EGD), COMBINED N/A 2021    Procedure: ESOPHAGOGASTRODUODENOSCOPY (EGD);  Surgeon: Kaveh Clayton MD;  Location:  GI     GASTRECTOMY  2005    Bilat truncal vagotomy, hemigastrectomy, RnY gastrojejunostomy     GASTROJEJUNOSTOMY  2011    Procedure:GASTROJEJUNOSTOMY; exploratory laparotmy with revision of gastrojejunostomy, Antrectomy, Miles-en-y, Gastrojejunostomy; Surgeon:ALICJA  JULIUS VILLANUEVA; Location:[facility] OR     INSERT CHEST TUBE N/A 4/29/2019    Procedure: INSERTION, CATHETER, INTERCOSTAL, FOR DRAINAGE;  Surgeon: Melissa Nichols MD;  Location: [facility] GI     LAPAROTOMY EXPLORATORY N/A 9/10/2021    Procedure: Exploratory Laparotomy, Extensive Lysis of Adhesions; Jejunojejunostomy resection and  Revision, Gastrojejunal Resection (partial gastrectomy with enterectomy) with Miles en Y Reconstruction Gastrojejunostomy, Upper Endoscopy;  Surgeon: Kaveh Clayton MD;  Location: [facility] OR     LASER HOLMIUM LITHOTRIPSY URETER(S), INSERT STENT, COMBINED      Procedure: COMBINED CYSTOSCOPY, URETEROSCOPY, LASER HOLMIUM LITHOTRIPSY URETER(S), INSERT STENT;;  Surgeon: Blair Correa MD;  [facility]: [facility]     LASER HOLMIUM NEPHROLITHOTOMY VIA PERCUTANEOUS NEPHROSTOMY  7/11/2012    Procedure: LASER HOLMIUM NEPHROLITHOTOMY VIA PERCUTANEOUS NEPHROSTOMY;  proceedure should read: Left Percutaneous Access, Left Percutaneous ultrasonic Nephrolithotomy, Ureteroscopy Holmium Laser Lithotripsy Stent Placement ;  Surgeon: Blair Correa MD;  [facility]     MAMMOPLASTY AUGMENTATION BILATERAL       OPEN REDUCTION INTERNAL FIXATION WRIST Left 1/11/2016    Procedure: OPEN REDUCTION INTERNAL FIXATION WRIST;  Surgeon: Darling Ellis MD;  Location: [facility]     RECONSTRUCT BREAST, IMPLANT PROSTHESIS, COMBINED       THORACOSCOPIC DECORTICATION LUNG Left 5/7/2019    Procedure: Left Video Assisted Thoracoscopic Decortication, Intercostal Nerve Cryo Analgesia, Flexible Bronchoscopy;  Surgeon: Patrice Regalado MD;  Location: [facility] OR       Prior to Admission Medications  Current Outpatient Medications   Medication Sig Dispense Refill     aspirin-acetaminophen-caffeine (EXCEDRIN MIGRAINE) 250-250-65 MG tablet Take 1 tablet by mouth every 6 hours as needed for headaches       escitalopram (LEXAPRO) 20 MG tablet TAKE 1 TABLET BY MOUTH EVERY DAY (Patient taking differently: Take 20 mg by mouth daily ) 90  tablet 1     hydrOXYzine (VISTARIL) 25 MG capsule Take 1 capsule (25 mg) by mouth 2 times daily as needed for anxiety 60 capsule 11     ibuprofen (ADVIL/MOTRIN) 200 MG tablet Take 400 mg by mouth every 8 hours as needed for mild pain       methadone (DOLOPHINE-INTENSOL) 10 MG/ML (HIGH CONC) solution Take 120 mg by mouth daily Takes it at 0530 everyday. Only uses red not yellow    [facility] - doses in clinic on Mon/Wed/Fri and take home doses for other days.       polyethylene glycol (MIRALAX) 17 GM/Dose powder 1 capful (17 g) in 8 oz of liquid (Patient taking differently: Take 17 g by mouth daily as needed for constipation 1 capful (17 g) in 8 oz of liquid) 225 g 3     pregabalin (LYRICA) 150 MG capsule Take 1 capsule (150 mg) by mouth 3 times daily 90 capsule 5     valACYclovir (VALTREX) 500 MG tablet Take 1 tablet (500 mg) by mouth 2 times daily (Patient taking differently: Take 500 mg by mouth 2 times daily as needed (when breakouts occur x3 days) ) 60 tablet 1     cyanocobalamin (VITAMIN B-12) 1000 MCG tablet Take 1 tablet (1,000 mcg) by mouth daily (Patient not taking: Reported on 2/3/2022) 90 tablet 0     hydroxychloroquine (PLAQUENIL) 200 MG tablet Take 1 tablet (200 mg) by mouth 2 times daily (with meals) (Patient not taking: Reported on 2/3/2022) 180 tablet 1     omeprazole (PRILOSEC) 40 MG DR capsule Take 1 capsule (40 mg) by mouth 2 times daily (Patient not taking: Reported on 2/3/2022) 120 capsule 5       Allergies  Allergies   Allergen Reactions     Penicillins Hives     Hives in childhood.       Social History  Social History     Socioeconomic History     Marital status: Single     Spouse name: Not on file     Number of children: Not on file     Years of education: Not on file     Highest education level: Not on file   Occupational History     Not on file   Tobacco Use     Smoking status: Never Smoker     Smokeless tobacco: Never Used   Substance and Sexual Activity     Alcohol use:  No     Comment: none in 10 years     Drug use: Not Currently     Types: Benzodiazepines, Opiates     Sexual activity: Not Currently   Other Topics Concern     Parent/sibling w/ CABG, MI or angioplasty before 65F 55M? Not Asked   Social History Narrative    Intake 4/16/15:        H/o divorce but most recently living with SO Alfonso. Alfonso recently passed away.         In past employed as a .         Tobacco use: denies    Alcohol use: denies    Drug use: daily pot use for 30 years. Currently with sobriety.          Social Determinants of Health     Financial Resource Strain: Not on file   Food Insecurity: Not on file   Transportation Needs: Not on file   Physical Activity: Not on file   Stress: Not on file   Social Connections: Not on file   Intimate Partner Violence: Not on file   Housing Stability: Not on file       Family History  Family History   Problem Relation Age of Onset     Substance Abuse Mother      Family History Negative Father      Substance Abuse Brother      Substance Abuse Maternal Grandfather      Liver Disease No family hx of      Colon Cancer No family hx of      Ulcerative Colitis No family hx of      Crohn's Disease No family hx of      Glaucoma No family hx of      Macular Degeneration No family hx of      Anesthesia Reaction No family hx of      Deep Vein Thrombosis (DVT) No family hx of        Review of Systems  The complete review of systems is negative other than noted in the HPI or here.   ROS/MED HX  ENT/Pulmonary: Comment: Hx of prior lung decortication due to pleural effusion and empyema in 2019    Being evaluated for glaucoma     (-) tobacco use   Neurologic:     (+) migraines, seizures (withdrawal seizure),  (-) no CVA and no TIA   Cardiovascular:     (+) -----valvular problems/murmurs mild TI. Previous cardiac testing   Echo: Date: 7/1/21 Results:  Interpretation Summary  Left ventricular size, wall motion and function are normal. The ejection  fraction is 60-65%. No regional  "wall motion abnormalities are seen.  Right ventricular function, chamber size, wall motion, and thickness are  normal.  The valve leaflets are not well visualized. Trace aortic insufficiency is  present.  Mild tricuspid insufficiency is present. The inferior vena cava was normal in  size with preserved respiratory variability. No pericardial effusion is  present.     There has been no change.    Stress Test: Date: Results:    ECG Reviewed: Date: 4/29/19 Results:  Sinus rhythm, low voltage QRS  Cath: Date: Results:      METS/Exercise Tolerance: >4 METS    Hematologic: Comments: Thrombocytopenia      (+) anemia, history of blood transfusion, no previous transfusion reaction, Known PRBC Anitbodies:No     Musculoskeletal: Comment: Osteoarthritis, osteoporosis    fibromyaglia      GI/Hepatic: Comment: Hx partial gastrectomy with hypokinetic remnant s/p Exploratory Laparotomy, Extensive Lysis of Adhesions; Jejunojejunostomy resection and  Revision, Gastrojejunal Resection (partial gastrectomy with enterectomy) with Miles en Y Reconstruction Gastrojejunostomy, Upper Endoscopy    Dysphagia    PSC        (+) GERD (history of PUD), Asymptomatic on medication,     Renal/Genitourinary:     (+) Nephrolithiasis ,     Endo:  - neg endo ROS     Psychiatric/Substance Use:     (+) psychiatric history depression and anxiety alcohol abuse (sober ) H/O chronic opiod use  (on methadone).     Infectious Disease: Comment: Herpes        Malignancy:  - neg malignancy ROS     Other: Comment: SLE on plaquenil      (+) , H/O Chronic Pain,          BP (!) 151/94 (BP Location: Right arm, Patient Position: Chair, Cuff Size: Adult Regular)   Pulse 67   Temp 97.4  F (36.3  C) (Oral)   Resp 16   Ht 1.575 m (5' 2\")   Wt 50.8 kg (112 lb)   LMP  (LMP Unknown)   SpO2 98%   Breastfeeding No   BMI 20.49 kg/m      Physical Exam   Constitutional: Awake, alert, cooperative, no apparent distress, and appears stated age. Thin  Eyes: Pupils equal, round " and reactive to light, extra ocular muscles intact, sclera clear, conjunctiva normal.  HENT: Normocephalic, oral pharynx with moist mucus membranes, good dentition - missing back teeth. No goiter appreciated. Mallampati II  Respiratory: Clear to auscultation bilaterally, no crackles or wheezing.  Cardiovascular: Regular rate and rhythm, normal S1 and S2, and no murmur noted.  Carotids +2, no bruits. No edema. Palpable pulses to radial  DP and PT arteries.   GI: Normal bowel sounds, soft, non-distended, non-tender, no masses palpated, no hepatosplenomegaly.  Surgical scars: healing.   Lymph/Hematologic: No cervical lymphadenopathy and no supraclavicular lymphadenopathy.  Genitourinary:  defer  Skin: Warm and dry.  No rashes at anticipated surgical site.   Musculoskeletal: Limited ROM of neck. There is no redness, warmth, or swelling of the joints. Gross motor strength is normal.    Neurologic: Awake, alert, oriented to name, place and time. Cranial nerves II-XII are grossly intact. Gait is normal.   Neuropsychiatric: Calm, cooperative. Normal affect.     Prior Labs/Diagnostic Studies   All labs and imaging personally reviewed  Results for FLORIAN CURTIS (MRN [id number]) as of 2/4/2022 08:19   Ref. Range 1/13/2022 09:38   Sodium Latest Ref Range: 133 - 144 mmol/L 142   Potassium Latest Ref Range: 3.4 - 5.3 mmol/L 4.2   Chloride Latest Ref Range: 94 - 109 mmol/L 106   Carbon Dioxide Latest Ref Range: 20 - 32 mmol/L 29   Urea Nitrogen Latest Ref Range: 7 - 30 mg/dL 27   Creatinine Latest Ref Range: 0.52 - 1.04 mg/dL 0.71   GFR Estimate Latest Ref Range: >60 mL/min/1.73m2 >90   Calcium Latest Ref Range: 8.5 - 10.1 mg/dL 8.5   Anion Gap Latest Ref Range: 3 - 14 mmol/L 7   Magnesium Latest Ref Range: 1.6 - 2.3 mg/dL 2.1   Phosphorus Latest Ref Range: 2.5 - 4.5 mg/dL 4.2   Albumin Latest Ref Range: 3.4 - 5.0 g/dL 3.4   Prealbumin Latest Ref Range: 15 - 45 mg/dL 21   Protein Total Latest Ref Range: 6.8 - 8.8 g/dL 6.8    Bilirubin Total Latest Ref Range: 0.2 - 1.3 mg/dL 0.2   Alkaline Phosphatase Latest Ref Range: 40 - 150 U/L 532 (H)   ALT Latest Ref Range: 0 - 50 U/L 138 (H)   AST Latest Ref Range: 0 - 45 U/L 108 (H)   Copper Latest Ref Range: 80.0 - 155.0 ug/dL 39.6 (L)   Ferritin Latest Ref Range: 8 - 252 ng/mL 13   Vitamin B1 Whole Blood Level Latest Ref Range: 70 - 180 nmol/L 125   Vitamin B12 Latest Ref Range: 193 - 986 pg/mL 350   Vitamin B6 Latest Ref Range: 20.0 - 125.0 nmol/L 15.0 (L)   Vitamin D Deficiency screening Latest Ref Range: 20 - 75 ug/L 20   Vitamin E Latest Ref Range: 5.5 - 18.0 mg/L 4.5 (L)   Vitamin E Gamma Latest Ref Range: 0.0 - 6.0 mg/L 0.6   Vitamin K Latest Ref Range: 0.22 - 4.88 nmol/L 0.29   Zinc Latest Ref Range: 60.0 - 120.0 ug/dL 66.0   Glucose Latest Ref Range: 70 - 99 mg/dL 71   WBC Latest Ref Range: 4.0 - 11.0 10e3/uL 4.4   Hemoglobin Latest Ref Range: 11.7 - 15.7 g/dL 10.1 (L)   Hematocrit Latest Ref Range: 35.0 - 47.0 % 34.1 (L)   Platelet Count Latest Ref Range: 150 - 450 10e3/uL 139 (L)   RBC Count Latest Ref Range: 3.80 - 5.20 10e6/uL 4.08   MCV Latest Ref Range: 78 - 100 fL 84   MCH Latest Ref Range: 26.5 - 33.0 pg 24.8 (L)   MCHC Latest Ref Range: 31.5 - 36.5 g/dL 29.6 (L)   RDW Latest Ref Range: 10.0 - 15.0 % 22.1 (H)        EKG/ stress test - if available please see in ROS above   Echo result w/o MOPS: Interpretation SummaryLeft ventricular size, wall motion and function are normal. The ejectionfraction is 60-65%. No regional wall motion abnormalities are seen.Right ventricular function, chamber size, wall motion, and thickness arenormal.The valve leaflets are not well visualized. Trace aortic insufficiency ispresent.Mild tricuspid insufficiency is present. The inferior vena cava was normal insize with preserved respiratory variability. No pericardial effusion ispresent. There has been no change.        The patient's records and results personally reviewed by this provider.      Outside records reviewed from: Care Everywhere        Assessment      Demetra Richardson is a 66 year old female was seen as a PAC referral for risk assessment and optimization for anesthesia.    Plan/Recommendations  Pt will be optimized for the proposed procedure.  See below for details on the assessment, risk, and preoperative recommendations    NEUROLOGY  - History of Seizure - withdrawal. No ongoing issues.   - Chronic Pain  On chronic opiates, morphine equivilant = 480-1,440 MME/Day   Please access the PAC pharmacist's note for full details. The patient will continue her methadone the morning of the procedure.   -Post Op delirium risk factors:  High co-morbid index   ~ Migraine - hold excedrin for 7 days prior.     ENT  - No current airway concerns.  Will need to be reassessed day of surgery. The patient has limited ROM but had easy intubation in 9/2021    CARDIAC  - No history of CAD, Hypertension and Afib  - METS (Metabolic Equivalents)  Patient performs 4 or more METS exercise without symptoms  Total Score: 0      RCRI-Very low risk: Class 1 0.4% complication rate  Total Score: 0    ~ The patient is walking 1-2 hours a day. She denies any cardiac symptoms.   ~ The patient has no diagnosis of HTN but blood pressure was 154/94 and recheck was 153/88. The patient lives in assisted living and will have her nurses there check her blood pressure today and over the next few days. If it remains >160/90 then she knows she needs to reach out to her primary care provider for further management.     PULMONARY  - Obstructive Sleep Apnea  No current risk of obstructive sleep apnea   TORREY Low Risk  Total Score: 1           TORREY: Over 50 ys old      - Denies asthma or inhaler use  - History of lung decortication 2/2 pleural effusion and empyema in 2019. She denies any respiratory issues.   - Tobacco History      History   Smoking Status     Never Smoker   Smokeless Tobacco     Never Used       GI  - GERD/ history of PUD  - the patient reports she has no symptoms and is not currently taking any medications.   PONV Medium Risk  Total Score: 2           1 AN PONV: Pt is Female    1 AN PONV: Patient is not a current smoker    ~ Hx partial gastrectomy with hypokinetic remnant s/p Exploratory Laparotomy, Extensive Lysis of Adhesions; Jejunojejunostomy resection and  Revision, Gastrojejunal Resection (partial gastrectomy with enterectomy) with Miles en Y Reconstruction Gastrojejunostomy, Upper Endoscopy. The patient now with ongoing dysphagia - procedure as above.     /RENAL  - Baseline Creatinine  0.71 on 1/13/22  ~ History of nephrolithiasis - no ongoing issues.     ENDOCRINE  - BMI: Body mass index is 20.49 kg/m .  Healthy Weight (BMI 18.5-24.9)  - No history of Diabetes Mellitus    HEME  VTE Low Risk 0.26%  Total Score: 1           VTE: Greater than 59 yrs old    - Chronic anemia and thrombocytopenia - The patient was seen by Dr. Chapa in 2014 who felt it was secondary to the patient's gastric bypass. The patient last had transfusion in 6/24/21.     Recommend perioperative use of blood conservation techniques intraoperatively and close monitoring for postoperative bleeding.  Low bleeding risk procedure    MSK  Patient is NOT Frail  Total Score: 1           Frailty: Slower walking speed    ~ Osteoporosis/ osteoarthritis/ fibromyalgia  - Continue lyrica and hold ibuprofen 24 hours. She will continue methadone as above.     PSYCH  - depression, anxiety - (The patient reports she does not have bipolar diagnosis listed in her chart). Continue lexapro and PRN vistaril.   ~ History of substance abuse - currently sober.     Immunology/ ID  ~ SLE - the patient has been followed by Dr. Narayanan and reports all over joint pain. She is not currently on plaquenil.   ~ Herpes - continue valtrex.     The patient is optimized for their procedure. AVS with information on surgery time/arrival time, meds and NPO status given by nursing staff. No further  diagnostic testing indicated.      On the day of service:     Prep time: 13 minutes  Visit time: 25 minutes  Documentation time: 10 minutes  ------------------------------------------  Total time: 48 minutes      Colleen Polanco PA-C  Preoperative Assessment Center  [facility]  Phone: [phone]  Fax: [phone]

## 2022-02-05 LAB — SARS-COV-2 RNA RESP QL NAA+PROBE: NEGATIVE

## 2022-02-07 ENCOUNTER — ANESTHESIA (OUTPATIENT)
Dept: SURGERY | Facility: CLINIC | Age: 67
End: 2022-02-07
Payer: COMMERCIAL

## 2022-02-07 ENCOUNTER — HOSPITAL ENCOUNTER (OUTPATIENT)
Facility: CLINIC | Age: 67
Discharge: NURSING, REHAB OR LONG-TERM CARE | End: 2022-02-07
Attending: SURGERY | Admitting: SURGERY
Payer: COMMERCIAL

## 2022-02-07 VITALS
WEIGHT: 112.66 LBS | BODY MASS INDEX: 19.96 KG/M2 | HEART RATE: 72 BPM | SYSTOLIC BLOOD PRESSURE: 168 MMHG | HEIGHT: 63 IN | RESPIRATION RATE: 16 BRPM | OXYGEN SATURATION: 97 % | TEMPERATURE: 98.7 F | DIASTOLIC BLOOD PRESSURE: 91 MMHG

## 2022-02-07 LAB — GLUCOSE BLDC GLUCOMTR-MCNC: 68 MG/DL (ref 70–99)

## 2022-02-07 PROCEDURE — C1769 GUIDE WIRE: HCPCS | Performed by: SURGERY

## 2022-02-07 PROCEDURE — 43239 EGD BIOPSY SINGLE/MULTIPLE: CPT | Mod: GC | Performed by: SURGERY

## 2022-02-07 PROCEDURE — 250N000025 HC SEVOFLURANE, PER MIN: Performed by: SURGERY

## 2022-02-07 PROCEDURE — 82962 GLUCOSE BLOOD TEST: CPT

## 2022-02-07 PROCEDURE — 370N000017 HC ANESTHESIA TECHNICAL FEE, PER MIN: Performed by: SURGERY

## 2022-02-07 PROCEDURE — 272N000001 HC OR GENERAL SUPPLY STERILE: Performed by: SURGERY

## 2022-02-07 PROCEDURE — 258N000003 HC RX IP 258 OP 636

## 2022-02-07 PROCEDURE — 250N000009 HC RX 250

## 2022-02-07 PROCEDURE — 360N000082 HC SURGERY LEVEL 2 W/ FLUORO, PER MIN: Performed by: SURGERY

## 2022-02-07 PROCEDURE — C1726 CATH, BAL DIL, NON-VASCULAR: HCPCS | Performed by: SURGERY

## 2022-02-07 PROCEDURE — 250N000011 HC RX IP 250 OP 636

## 2022-02-07 PROCEDURE — 999N000141 HC STATISTIC PRE-PROCEDURE NURSING ASSESSMENT: Performed by: SURGERY

## 2022-02-07 PROCEDURE — 710N000010 HC RECOVERY PHASE 1, LEVEL 2, PER MIN: Performed by: SURGERY

## 2022-02-07 PROCEDURE — 710N000012 HC RECOVERY PHASE 2, PER MINUTE: Performed by: SURGERY

## 2022-02-07 RX ORDER — OXYCODONE HYDROCHLORIDE 5 MG/1
5 TABLET ORAL EVERY 4 HOURS PRN
Status: DISCONTINUED | OUTPATIENT
Start: 2022-02-07 | End: 2022-02-07 | Stop reason: HOSPADM

## 2022-02-07 RX ORDER — HYDROMORPHONE HCL IN WATER/PF 6 MG/30 ML
0.2 PATIENT CONTROLLED ANALGESIA SYRINGE INTRAVENOUS EVERY 5 MIN PRN
Status: DISCONTINUED | OUTPATIENT
Start: 2022-02-07 | End: 2022-02-07 | Stop reason: HOSPADM

## 2022-02-07 RX ORDER — ONDANSETRON 4 MG/1
4 TABLET, ORALLY DISINTEGRATING ORAL EVERY 30 MIN PRN
Status: DISCONTINUED | OUTPATIENT
Start: 2022-02-07 | End: 2022-02-07 | Stop reason: HOSPADM

## 2022-02-07 RX ORDER — ONDANSETRON 4 MG/1
4 TABLET, ORALLY DISINTEGRATING ORAL
Status: DISCONTINUED | OUTPATIENT
Start: 2022-02-07 | End: 2022-02-07 | Stop reason: HOSPADM

## 2022-02-07 RX ORDER — SODIUM CHLORIDE, SODIUM LACTATE, POTASSIUM CHLORIDE, CALCIUM CHLORIDE 600; 310; 30; 20 MG/100ML; MG/100ML; MG/100ML; MG/100ML
INJECTION, SOLUTION INTRAVENOUS CONTINUOUS
Status: DISCONTINUED | OUTPATIENT
Start: 2022-02-07 | End: 2022-02-07 | Stop reason: HOSPADM

## 2022-02-07 RX ORDER — FENTANYL CITRATE 50 UG/ML
25 INJECTION, SOLUTION INTRAMUSCULAR; INTRAVENOUS EVERY 5 MIN PRN
Status: DISCONTINUED | OUTPATIENT
Start: 2022-02-07 | End: 2022-02-07 | Stop reason: HOSPADM

## 2022-02-07 RX ORDER — SODIUM CHLORIDE, SODIUM LACTATE, POTASSIUM CHLORIDE, CALCIUM CHLORIDE 600; 310; 30; 20 MG/100ML; MG/100ML; MG/100ML; MG/100ML
INJECTION, SOLUTION INTRAVENOUS CONTINUOUS PRN
Status: DISCONTINUED | OUTPATIENT
Start: 2022-02-07 | End: 2022-02-07

## 2022-02-07 RX ORDER — ACETAMINOPHEN 325 MG/1
650 TABLET ORAL
Status: DISCONTINUED | OUTPATIENT
Start: 2022-02-07 | End: 2022-02-07 | Stop reason: HOSPADM

## 2022-02-07 RX ORDER — FENTANYL CITRATE 50 UG/ML
INJECTION, SOLUTION INTRAMUSCULAR; INTRAVENOUS PRN
Status: DISCONTINUED | OUTPATIENT
Start: 2022-02-07 | End: 2022-02-07

## 2022-02-07 RX ORDER — ONDANSETRON 2 MG/ML
INJECTION INTRAMUSCULAR; INTRAVENOUS PRN
Status: DISCONTINUED | OUTPATIENT
Start: 2022-02-07 | End: 2022-02-07

## 2022-02-07 RX ORDER — DEXAMETHASONE SODIUM PHOSPHATE 4 MG/ML
INJECTION, SOLUTION INTRA-ARTICULAR; INTRALESIONAL; INTRAMUSCULAR; INTRAVENOUS; SOFT TISSUE PRN
Status: DISCONTINUED | OUTPATIENT
Start: 2022-02-07 | End: 2022-02-07

## 2022-02-07 RX ORDER — PROPOFOL 10 MG/ML
INJECTION, EMULSION INTRAVENOUS PRN
Status: DISCONTINUED | OUTPATIENT
Start: 2022-02-07 | End: 2022-02-07

## 2022-02-07 RX ORDER — LIDOCAINE HYDROCHLORIDE 20 MG/ML
INJECTION, SOLUTION INFILTRATION; PERINEURAL PRN
Status: DISCONTINUED | OUTPATIENT
Start: 2022-02-07 | End: 2022-02-07

## 2022-02-07 RX ORDER — EPHEDRINE SULFATE 50 MG/ML
INJECTION, SOLUTION INTRAMUSCULAR; INTRAVENOUS; SUBCUTANEOUS PRN
Status: DISCONTINUED | OUTPATIENT
Start: 2022-02-07 | End: 2022-02-07

## 2022-02-07 RX ORDER — ONDANSETRON 2 MG/ML
4 INJECTION INTRAMUSCULAR; INTRAVENOUS EVERY 30 MIN PRN
Status: DISCONTINUED | OUTPATIENT
Start: 2022-02-07 | End: 2022-02-07 | Stop reason: HOSPADM

## 2022-02-07 RX ORDER — LIDOCAINE 40 MG/G
CREAM TOPICAL
Status: DISCONTINUED | OUTPATIENT
Start: 2022-02-07 | End: 2022-02-07 | Stop reason: HOSPADM

## 2022-02-07 RX ADMIN — Medication 10 MG: at 13:05

## 2022-02-07 RX ADMIN — Medication 10 MG: at 13:07

## 2022-02-07 RX ADMIN — LIDOCAINE HYDROCHLORIDE 40 MG: 20 INJECTION, SOLUTION INFILTRATION; PERINEURAL at 12:43

## 2022-02-07 RX ADMIN — PROPOFOL 100 MG: 10 INJECTION, EMULSION INTRAVENOUS at 12:43

## 2022-02-07 RX ADMIN — DEXAMETHASONE SODIUM PHOSPHATE 4 MG: 4 INJECTION, SOLUTION INTRA-ARTICULAR; INTRALESIONAL; INTRAMUSCULAR; INTRAVENOUS; SOFT TISSUE at 12:43

## 2022-02-07 RX ADMIN — FENTANYL CITRATE 50 MCG: 50 INJECTION, SOLUTION INTRAMUSCULAR; INTRAVENOUS at 12:43

## 2022-02-07 RX ADMIN — SODIUM CHLORIDE, POTASSIUM CHLORIDE, SODIUM LACTATE AND CALCIUM CHLORIDE: 600; 310; 30; 20 INJECTION, SOLUTION INTRAVENOUS at 11:57

## 2022-02-07 RX ADMIN — MIDAZOLAM 2 MG: 1 INJECTION INTRAMUSCULAR; INTRAVENOUS at 12:21

## 2022-02-07 RX ADMIN — SUGAMMADEX 200 MG: 100 INJECTION, SOLUTION INTRAVENOUS at 13:10

## 2022-02-07 RX ADMIN — ROCURONIUM BROMIDE 50 MG: 50 INJECTION, SOLUTION INTRAVENOUS at 12:43

## 2022-02-07 RX ADMIN — ONDANSETRON 4 MG: 2 INJECTION INTRAMUSCULAR; INTRAVENOUS at 12:43

## 2022-02-07 ASSESSMENT — MIFFLIN-ST. JEOR: SCORE: 1025.13

## 2022-02-07 NOTE — PROGRESS NOTES
Spoke to Dr. Cole about pudding intake this morning at 0430, high blood pressure, and low blood sugar. No orders received.

## 2022-02-07 NOTE — ANESTHESIA PROCEDURE NOTES
Airway         Procedure Start/Stop Times: 2/7/2022 12:46 PM and 2/7/2022 12:48 PM  Staff -        Anesthesiologist:  Daniel Cole MD       CRNA: Heath Huang APRN CRNA       Performed By: CRNAIndications and Patient Condition       Indications for airway management: lan-procedural       Induction type:intravenous       Mask difficulty assessment: 1 - vent by mask    Final Airway Details       Final airway type: endotracheal airway       Successful airway: ETT - single and Oral  Endotracheal Airway Details        ETT size (mm): 7.0       Cuffed: yes       Successful intubation technique: direct laryngoscopy       DL Blade Type: Santos 2       Grade View of Cords: 1       Adjucts: stylet       Position: Right       Measured from: gums/teeth       Secured at (cm): 21       Bite block used: None    Post intubation assessment        Placement verified by: capnometry, equal breath sounds and chest rise        Number of attempts at approach: 1       Number of other approaches attempted: 0       Secured with: pink tape       Ease of procedure: easy       Dentition: Intact

## 2022-02-07 NOTE — DISCHARGE INSTRUCTIONS
Discharge Instructions   Activity  - No activity restrictions    Incisions  - You do not have any incisions    Drain Care  - You do not have a drain.  Specific care/instructions:  Not Applicable    Medications  - Do not take any additional Tylenol (acetaminophen) while using a narcotic pain medication which includes acetaminophen  - Do not take more than 4,000mg of acetaminophen in any 24 hour period, as this can cause liver damage    Follow-Up:  - Call Dr. Clayton's office to schedule a repeat endoscopy with dilation in approximately 3 weeks  - Call or return sooner than your regularly scheduled visit if you develop any of the following: fever >101.5, uncontrolled pain, uncontrolled nausea or vomiting, as well as increased redness, swelling, or drainage from your wound.   -  A nurse from the General Surgery Clinic will contact you within 24 hours, or the next business day, after your discharge from the hospital.  If you have questions or to schedule a follow up appointment please call the General Surgery Clinic at [phone].  Call [phone] and ask to speak with the Surgery resident on call if you are having troubles in the evenings, at night, or on the weekend.  -  If you had surgery with Dr Clayton or Dr Durbin please call [phone] to schedule your follow up appointment or with any questions or concerns.  -  If you are receiving home care please inform your home care nurse of our contact number.         [facility]  Same-Day EGD Procedure  Adult Discharge Orders & Instructions     You had a procedure known as an Esophagogastroduodenoscopy (EGD) of either the upper gastrointestinal (GI) tract. An UPPER EGD will place a camera into either your mouth or nose to examine your esophagus, stomach, and/or small intestines. Biopsies, small samples of tissue, are often taken to help diagnose and/or classify stages of disease growth. The EGD is also used to help locate areas  of the GI tract that may require further treatment (dilation, stenting, clipping, removal, etc.) or medical interventions (medication specific).      After your procedure   1. Make sure to clarify with your healthcare provider any diet restrictions (For example, clear liquid, low fat, no caffeine, etc.)   2. Do NOT take aspirin containing medications or any other blood-thinning medicines (anticoagulants) until your healthcare provider says it's OK.   3. You MAY be prescribed antibiotics, depending on what was done and/or found during your EUS, make sure to take antibiotics as prescribed by your healthcare provider    For 24 hours after surgery  1. Get plenty of rest.  A responsible adult must stay with you for at least 24 hours after you leave the hospital.   2. Do not drive or use heavy equipment.  If you have weakness or tingling, don't drive or use heavy equipment until this feeling goes away.  3. Do not drink alcohol.  4. Avoid strenuous or risky activities (gym, yoga, cycling, etc.).  Ask for help when climbing stairs.   5. You may feel lightheaded.  IF so, sit for a few minutes before standing.  Have someone help you get up.   6. If you have nausea (feel sick to your stomach): Drink only clear liquids such as apple juice, ginger ale, broth or 7-Up.  Rest may also help.  Be sure to drink enough fluids.  Move to a regular diet as you feel able.  7. If you feel bloated or have too much gas, use a heating pad on your belly to help reduce the discomfort. This should help you feel better.   8. You may have a slight fever. This is normal for the first 24 hours.   9. You may have a dry mouth, a sore throat, muscle aches or trouble sleeping.  These are normal and will go away after 24 hours. A sore throat is most common. Use lozenges or gargle with salt water to ease the discomfort.   10. Do not make important or legal decisions.      Call your doctor for any of the followin. Chest pain, and/or shortness of  breath  2. Abdominal  pain, bloating or cramping that has not improved or does not respond to pain reliving medications (Tylenol or narcotics if prescribed)   3. Difficulty swallowing or feeling as though food or liquids are stuck in your throat   4. Sore throat lasting more than 2 days or pain that has worsened over time   5. Black or tarry stools   6. Nausea and/or vomiting that is not resolving or has not responded to anti-nausea medications prescribed to you   7. It has been over 8 to 10 hours since surgery and you are still not able to urinate (pass water)   8. Headache for over 24 hours   9. Fever over 100.5 F (38 C) lasting more than 24 hours after the procedure   10. Signs of jaundice or blockage (fever, chills, abdominal pain, yellowing of the whites of your eyes, yellowing of your skin, and/or passing darker than normal urine)     To contact a doctor, call:   [ ] Dr Clayton's clinic at [phone]  (Monday thru Friday 8:00am to 4:30pm)   [ ] [phone] and ask for the Gastroenterology resident on call (answered 24 hours a day)   [ ] Emergency Department: [facility]: [phone]   Take it easy when you get home.  Remember, same day surgery DOES NOT MEAN SAME DAY RECOVERY!  Healing is a gradual process.  You will need some time to recover - you may be more tired than you realize at first.  Rest and relax for at least the first 24 hours at home.  You'll feel better and heal faster if you take good care of yourself.

## 2022-02-07 NOTE — ANESTHESIA POSTPROCEDURE EVALUATION
Patient: Demetra Richardson    Procedure: Procedure(s):  Esophagogastroduodenoscopy  (EGD) AND DILATION  .       Diagnosis:Status post bariatric surgery [Z98.84]  Diagnosis Additional Information: No value filed.    Anesthesia Type:  General    Note:  Disposition: Outpatient   Postop Pain Control: Uneventful            Sign Out: Well controlled pain   PONV: No   Neuro/Psych: Uneventful            Sign Out: Acceptable/Baseline neuro status   Airway/Respiratory: Uneventful            Sign Out: AIRWAY IN SITU/Resp. Support   CV/Hemodynamics: Uneventful            Sign Out: Acceptable CV status; No obvious hypovolemia; No obvious fluid overload   Other NRE: NONE   DID A NON-ROUTINE EVENT OCCUR? No           Last vitals:  Vitals Value Taken Time   /90 02/07/22 1400   Temp 35.8  C (96.4  F) 02/07/22 1330   Pulse 72 02/07/22 1406   Resp 16 02/07/22 1345   SpO2 96 % 02/07/22 1406   Vitals shown include unvalidated device data.    Electronically Signed By: Boy Salinas MD  February 7, 2022  2:07 PM

## 2022-02-07 NOTE — OP NOTE
"[facility]    Operative Note    Pre-operative diagnosis: Status post bariatric surgery [Z98.84], dysphagia   Post-operative diagnosis Same   Procedure: Procedure(s):  Esophagogastroduodenoscopy  (EGD) AND DILATION  .   Surgeon: Surgeon(s) and Role:     * Kaveh Clayton MD - Primary     * Kaveh Rondon MD - Resident - Assisting   Anesthesia: General    Estimated blood loss: 1cc   Drains: None   Specimens: * No specimens in log *   Findings: Stricture at prior anastomosis, scope passed with difficulty, after dilation to 13mm slightly easier passage of scope.  No ulerations.  Overall healthy tissue; absolutely no bezoars.   Complications: None.   Implants: None.     COMORBIDITIES:   Past Medical History:   Diagnosis Date     Anemia      Basal cell carcinoma     Left-sided, skin over over medial orbit/nasal bone. Removed Oct 2018.     Benzodiazepine dependence (H)     With h/o withdrawal seizure x 1     Bipolar 1 disorder, mixed, moderate (H) 2/7/2013     Chronic pain     Back, legs.     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     Epidural abscess 2005    x4     Fibromyalgia      Generalised anxiety disorder      GERD (gastroesophageal reflux disease)      Major depressive disorder      Migraine      Nephrolithiasis     S/p left sided lithotripsy     Opiate dependence (H)      Osteoarthritis      Osteoporosis      PUD (peptic ulcer disease)      SLE (systemic lupus erythematosus) (H) 2009     Weight loss        OPERATIVE INDICATIONS: Demetra Richardson is a 66 year old female who is underwent prior esophagojejunostomy who presented with persistent dysphagia.    Height: 160 cm (5' 3\") Weight: 51.1 kg (112 lb 10.5 oz) Body mass index is 19.96 kg/m ..  After understanding the risks and benefits of proceeding with EGD and dilation, she agreed to the procedure.    OPERATIVE PROCEDURE:    A timeout procedure was performed.  Under the benefit " of general anesthesia an oral bite block placed and endoscope (28Fr) introduced orally and advanced to the esophagus.  A stricture at the prior esophagojejunostomy was identified.  With some difficulty the scope was passed.  Next using a balloon dilator the stricture was dilated to 13mm.  There was no active bleeding in any part of the visualized esophagus, or jejunum.  After dilation the scope passed the anastomosis more easily, but was still tight.    I was present for all critical components of the operation and all needle and sponge counts were correct x2 at the end of the procedure.    Kaveh Clayton MD  Surgery  [phone] (hospital )      Future plan: repeat endoscopy with dilation in 4-8 weeks.        Operative report initially prepared by: Kaveh Rondon MD

## 2022-02-07 NOTE — ANESTHESIA CARE TRANSFER NOTE
Patient: Demetra Richardson    Procedure: Procedure(s):  Esophagogastroduodenoscopy  (EGD) AND DILATION  .       Diagnosis: Status post bariatric surgery [Z98.84]  Diagnosis Additional Information: No value filed.    Anesthesia Type:   General     Note:    Oropharynx: oropharynx clear of all foreign objects and spontaneously breathing  Level of Consciousness: awake  Oxygen Supplementation: face mask  Level of Supplemental Oxygen (L/min / FiO2): 6  Independent Airway: airway patency satisfactory and stable  Dentition: dentition unchanged  Vital Signs Stable: post-procedure vital signs reviewed and stable  Report to RN Given: handoff report given  Patient transferred to: PACU    Handoff Report: Identifed the Patient, Identified the Reponsible Provider, Reviewed the pertinent medical history, Discussed the surgical course, Reviewed Intra-OP anesthesia mangement and issues during anesthesia, Set expectations for post-procedure period and Allowed opportunity for questions and acknowledgement of understanding      Vitals:  Vitals Value Taken Time   /72 02/07/22 1325   Temp     Pulse 75 02/07/22 1332   Resp     SpO2 100 % 02/07/22 1332   Vitals shown include unvalidated device data.    Electronically Signed By: VINOD Arora CRNA  February 7, 2022  1:32 PM

## 2022-02-10 ENCOUNTER — PATIENT OUTREACH (OUTPATIENT)
Dept: ENDOCRINOLOGY | Facility: CLINIC | Age: 67
End: 2022-02-10
Payer: COMMERCIAL

## 2022-02-10 NOTE — PROGRESS NOTES
Demetra P Dylan is a patient of Dr. Eduar Clayton that underwent EGD approximately 3 days ago (02/07).  Attempted to contact patient via telephone for a status update and review post-op  teaching.  LM on VM to call office.  Await return call.      Of note:  Pathology:  NA  Wound:  N/A  Follow-up:  Routine  Restrictions:  - No activity restrictions  New medications:  none  Equipment/Supplies:  None

## 2022-02-14 DIAGNOSIS — B00.9 HERPES SIMPLEX VIRUS INFECTION: ICD-10-CM

## 2022-02-14 NOTE — PROGRESS NOTES
This is a recent snapshot of the patient's [facility] medical record.  For current drug dose and complete information and questions, call [phone] or In Basket pool, fv home infusion ([id number])  CSN Number:  [id number]

## 2022-02-16 RX ORDER — VALACYCLOVIR HYDROCHLORIDE 500 MG/1
500 TABLET, FILM COATED ORAL 2 TIMES DAILY
Qty: 60 TABLET | Refills: 1 | Status: SHIPPED | OUTPATIENT
Start: 2022-02-16 | End: 2022-06-19

## 2022-02-16 NOTE — TELEPHONE ENCOUNTER
VALACYCLOVIR  MG TABLET      Last Written Prescription Date:  1/13/21  Last Fill Quantity: 60,   # refills: 1  Last Office Visit : 8/24/21  Future Office visit:  0    Routing refill request to provider for review/approval because:  Drug not [place] the [facility] [place]ill protocol   Requires provider authorization

## 2022-02-16 NOTE — TELEPHONE ENCOUNTER
[facility] Call Center    Phone Message    May a detailed message be left on voicemail: yes     Reason for Call: Medication Refill Request    Has the patient contacted the pharmacy for the refill? Yes    Name of medication being requested:   valACYclovir HCl 500 MG      Provider who prescribed the medication: Dr Merritt  Pharmacy:      [facility] [id number] [place] [address] [facility]    Date medication is needed: pt needs it today  2/16/2022 as she is having an outbreak.    Pt has no medication and needs this ASAP      Action Taken: Message routed to:  [facility]    Travel Screening: Not Applicable

## 2022-02-21 NOTE — PROGRESS NOTES
Individual Psychotherapy Session (telephone session)     Telephone Visit Details  Demetra Richardson is a 64 year old pt. who is being evaluated via a telephone visit.     Type of service:  Telephone visit for psychotherapy  Time of service:    Start Time:  10:10        End Time:  10:51  Reason for Telephone Visit: Patient unable to travel due to Covid-19.  Originating Site (patient location): Patient's home  Distant Site (provider location): Remote location  Mode of Communication:  Telephone call   Consent:  Patient has given verbal consent for telephone visit?: Yes     Care Provider: Leighann Lewis (Skalski), PhD, LP  Participants: Patient and writer  DSM-5 Diagnoses:   Opioid use disorder, on maintenance therapy  Benzodiazepine use disorder, in early remission  Unspecified anxiety   MDD, in sustained remission    SUBJECTIVE: No urges/ thoughts of substance use. Denied depressed mood. She wants to resume therapy due to increased isolation, and increased anxiety over the past couple weeks that largely has resolved. However, she wants to be proactive about maintaining gains. No clear trigger for increased anxiety, and finds strategies such as daily walks and talking with her family helpful. Radha reported that she is reducing methadone by 2-3mg per week and wants to be mindful of having support in place while she is doing this.     TREATMENT: Assessed symptoms since our last visit and developed treatment plan.     MSE:  Alertness: alert and oriented  Speech: normal  Language: intact  Mood: consistent with euthymia   Affect: full range; was congruent to mood; was congruent to content  Thought Process/Associations: unremarkable  Thought Content:  Reports none  Perception:  Reports none;  Insight: adequate for  safety  Judgment: adequate for safety  Cognition: (6) does  appear grossly intact; formal cognitive testing was not done    PLAN:   1) Therapy scheduled for next Wed at 10am (tx plan due for review end of  Aug, 2021)    Performed and documented by: Leighann Lewis (Skalski), PhD, LP       

## 2022-02-23 DIAGNOSIS — Z01.00 COMPLETE EYE EXAM, ENCOUNTER FOR: Primary | ICD-10-CM

## 2022-02-24 ENCOUNTER — OFFICE VISIT (OUTPATIENT)
Dept: OPHTHALMOLOGY | Facility: CLINIC | Age: 67
End: 2022-02-24
Attending: OPHTHALMOLOGY
Payer: COMMERCIAL

## 2022-02-24 DIAGNOSIS — H02.831 DERMATOCHALASIS OF BOTH UPPER EYELIDS: ICD-10-CM

## 2022-02-24 DIAGNOSIS — R68.89 SUSPECTED GLAUCOMA OF BOTH EYES: Primary | ICD-10-CM

## 2022-02-24 DIAGNOSIS — H02.834 DERMATOCHALASIS OF BOTH UPPER EYELIDS: ICD-10-CM

## 2022-02-24 DIAGNOSIS — H04.123 BILATERAL DRY EYES: ICD-10-CM

## 2022-02-24 DIAGNOSIS — H25.13 NUCLEAR SCLEROTIC CATARACT OF BOTH EYES: ICD-10-CM

## 2022-02-24 PROCEDURE — G0463 HOSPITAL OUTPT CLINIC VISIT: HCPCS | Mod: 25

## 2022-02-24 PROCEDURE — 92133 CPTRZD OPH DX IMG PST SGM ON: CPT | Performed by: OPHTHALMOLOGY

## 2022-02-24 PROCEDURE — 92083 EXTENDED VISUAL FIELD XM: CPT | Performed by: OPHTHALMOLOGY

## 2022-02-24 PROCEDURE — 99214 OFFICE O/P EST MOD 30 MIN: CPT | Performed by: OPHTHALMOLOGY

## 2022-02-24 ASSESSMENT — EXTERNAL EXAM - RIGHT EYE: OD_EXAM: NORMAL

## 2022-02-24 ASSESSMENT — REFRACTION_WEARINGRX
OS_SPHERE: -0.50
OD_SPHERE: -0.50
OD_AXIS: 167
OS_CYLINDER: +1.00
OD_CYLINDER: +1.25
OS_AXIS: 174
SPECS_TYPE: SVL

## 2022-02-24 ASSESSMENT — TONOMETRY
OD_IOP_MMHG: 17
IOP_METHOD: TONOPEN
OS_IOP_MMHG: 16

## 2022-02-24 ASSESSMENT — VISUAL ACUITY
OS_CC: 20/25
CORRECTION_TYPE: GLASSES
METHOD: SNELLEN - LINEAR
OD_CC: 20/25
OD_CC+: +1

## 2022-02-24 ASSESSMENT — CONF VISUAL FIELD
OD_INFERIOR_NASAL_RESTRICTION: 3
OS_INFERIOR_NASAL_RESTRICTION: 3
OS_SUPERIOR_NASAL_RESTRICTION: 3

## 2022-02-24 ASSESSMENT — EXTERNAL EXAM - LEFT EYE: OS_EXAM: NORMAL

## 2022-02-24 NOTE — PATIENT INSTRUCTIONS
Start preservative free artificial tears four times a day both eyes     Ok to continue lumify daily in both eyes

## 2022-02-24 NOTE — PROGRESS NOTES
Chief Complaint(s) and History of Present Illness(es)     Glaucoma Suspect Follow Up     Comments: 3 month follow up both eyes              Comments     Pt states vision is the same as last visit. No eye pain today.   No new flashes or floaters. No redness or dryness.    CANDY Corrigan February 24, 2022 9:29 AM                Review of systems for the eyes was negative other than the pertinent positives/negatives listed in the HPI.      Assessment & Plan      Demetra Richardson is a 66 year old female with the following diagnoses:   1. Suspected glaucoma of both eyes    2. Dermatochalasis of both upper eyelids    3. Bilateral dry eyes    4. Nuclear sclerotic cataract of both eyes         Since last visit with Dr. Whalen, the vision has been stable  Stopped visine  Started lumify daily  No other drops being used    Dryness persists  Discussed as contributing factor to blur  Recommend warm compresses  Recommend artifical tears four times a day    Ok to continue lumify    Glaucoma suspect based on cupping and cup asymmetry  FHx in grandfather   maximum intraocular pressure unknown; within normal limits at all visits thus far  Pachymetry 513/509  Baseline OCT nerve fiber layer today with inferior thinning both eyes   Visual field likely confounded by visually significant dermatochalasis  Discussed close monitoring with low threshold to treat      Patient disposition:   Return in about 3 months (around 5/24/2022) for VT only, 24-2 Dynamic VF, OCT NFL.           Attending Physician Attestation:  Complete documentation of historical and exam elements from today's encounter can be found in the full encounter summary report (not reduplicated in this progress note).  I personally obtained the chief complaint(s) and history of present illness.  I confirmed and edited as necessary the review of systems, past medical/surgical history, family history, social history, and examination findings as documented by others;  and I examined the patient myself.  I personally reviewed the relevant tests, images, and reports as documented above.  I formulated and edited as necessary the assessment and plan and discussed the findings and management plan with the patient and family. .I spent a total of 30 minutes face to face with the patient.  Over 50% of this time was spent counseling and coordinating care regarding their diagnosis and management.    - Eric Wallis MD

## 2022-02-24 NOTE — NURSING NOTE
Chief Complaints and History of Present Illnesses   Patient presents with     Glaucoma Suspect Follow Up     3 month follow up both eyes     Chief Complaint(s) and History of Present Illness(es)     Glaucoma Suspect Follow Up     Comments: 3 month follow up both eyes              Comments     Pt states vision is the same as last visit. No eye pain today.   No new flashes or floaters. No redness or dryness.    CANDY Corrigan February 24, 2022 9:29 AM

## 2022-03-02 NOTE — PROGRESS NOTES
This is a recent snapshot of the patient's [facility] medical record.  For current drug dose and complete information and questions, call [phone] or In Basket pool, fv home infusion ([id number])  CSN Number:  [id number]

## 2022-03-02 NOTE — PROGRESS NOTES
This is a recent snapshot of the patient's [facility] medical record.  For current drug dose and complete information and questions, call [phone] or In Basket pool, fv home infusion ([id number])  CSN Number:  [id number]

## 2022-03-02 NOTE — PROGRESS NOTES
This is a recent snapshot of the patient's [facility] medical record.  For current drug dose and complete information and questions, call [phone] or In Basket pool, fv home infusion ([id number])  CSN Number:  [id number]

## 2022-03-09 ENCOUNTER — TELEPHONE (OUTPATIENT)
Dept: SURGERY | Facility: CLINIC | Age: 67
End: 2022-03-09
Payer: COMMERCIAL

## 2022-03-09 NOTE — TELEPHONE ENCOUNTER
Patient called stating she is still having problems getting food down, pain. She states it isn't in her esophagus but seems lower than that. States she will try to eat a thin slice of cheese and has difficulty with that. I told her I would pass the information on to Dr. Clayton's RN, Darling, to call patient to get further information.

## 2022-03-15 DIAGNOSIS — R13.10 DYSPHAGIA: Primary | ICD-10-CM

## 2022-03-15 RX ORDER — CLINDAMYCIN PHOSPHATE 900 MG/50ML
900 INJECTION, SOLUTION INTRAVENOUS SEE ADMIN INSTRUCTIONS
Status: CANCELLED | OUTPATIENT
Start: 2022-03-15

## 2022-03-15 RX ORDER — CLINDAMYCIN PHOSPHATE 900 MG/50ML
900 INJECTION, SOLUTION INTRAVENOUS
Status: CANCELLED | OUTPATIENT
Start: 2022-03-15

## 2022-03-18 ENCOUNTER — TELEPHONE (OUTPATIENT)
Dept: SURGERY | Facility: CLINIC | Age: 67
End: 2022-03-18
Payer: COMMERCIAL

## 2022-03-18 DIAGNOSIS — Z11.59 ENCOUNTER FOR SCREENING FOR OTHER VIRAL DISEASES: Primary | ICD-10-CM

## 2022-03-18 NOTE — TELEPHONE ENCOUNTER
Called patient to discuss I can schedule her endoscopy in the OR on 3/28 at 1 p.m., to arrive at 11 a.m.   Attempted to schedule a PAC appointment but there is nothing available until 4/1. Patient called her primary and can get in for pre-op with Svitlana Martin on 3/24 at the [facility] at 11 a.m.    Scheduled COVID lab on 3/24 at 10:30 a.m. at the [facility].

## 2022-03-20 DIAGNOSIS — F41.9 ANXIETY DISORDER, UNSPECIFIED TYPE: ICD-10-CM

## 2022-03-23 RX ORDER — HYDROXYZINE PAMOATE 25 MG/1
25 CAPSULE ORAL 2 TIMES DAILY PRN
Qty: 60 CAPSULE | Refills: 5 | Status: SHIPPED | OUTPATIENT
Start: 2022-03-23 | End: 2022-06-27

## 2022-03-23 NOTE — TELEPHONE ENCOUNTER
Last Clinic Visit: [facility] 8/24/21  Next Clinic Visit: [facility] 3/24/22

## 2022-03-24 ENCOUNTER — OFFICE VISIT (OUTPATIENT)
Dept: INTERNAL MEDICINE | Facility: CLINIC | Age: 67
End: 2022-03-24
Payer: COMMERCIAL

## 2022-03-24 ENCOUNTER — LAB (OUTPATIENT)
Dept: LAB | Facility: CLINIC | Age: 67
End: 2022-03-24

## 2022-03-24 VITALS
OXYGEN SATURATION: 97 % | HEIGHT: 63 IN | DIASTOLIC BLOOD PRESSURE: 75 MMHG | SYSTOLIC BLOOD PRESSURE: 125 MMHG | WEIGHT: 109.7 LBS | HEART RATE: 75 BPM | BODY MASS INDEX: 19.44 KG/M2

## 2022-03-24 DIAGNOSIS — K21.9 GASTROESOPHAGEAL REFLUX DISEASE WITHOUT ESOPHAGITIS: Primary | ICD-10-CM

## 2022-03-24 DIAGNOSIS — Z01.810 PRE-OPERATIVE CARDIOVASCULAR EXAMINATION: ICD-10-CM

## 2022-03-24 DIAGNOSIS — Z11.59 ENCOUNTER FOR SCREENING FOR OTHER VIRAL DISEASES: ICD-10-CM

## 2022-03-24 LAB — SARS-COV-2 RNA RESP QL NAA+PROBE: NEGATIVE

## 2022-03-24 PROCEDURE — U0005 INFEC AGEN DETEC AMPLI PROBE: HCPCS | Performed by: SURGERY

## 2022-03-24 PROCEDURE — 99215 OFFICE O/P EST HI 40 MIN: CPT | Performed by: NURSE PRACTITIONER

## 2022-03-24 NOTE — CONFIDENTIAL NOTE
Hi Dr. Clayton,    Just a heads up.  She refused any pre-op labs.  Last hemoglobin was 1/13/22: 10.1    Also could consider  Referral to Wound Clinic for abdominal wound..      Svitlana BROCK, CNP

## 2022-03-24 NOTE — NURSING NOTE
Demetra Richardson is a 66 year old female patient that presents today in clinic for the following:    No chief complaint on file.    The patient's allergies and medications were reviewed as noted. A set of vitals were recorded as noted without incident. The patient does not have any other questions for the provider.    Adam Gordon, EMT at 11:01 AM on 3/24/2022

## 2022-03-24 NOTE — PROGRESS NOTES
Demetra Richardson is 66 year old female here at the request of Dr. Clayton for cardiovascular, pulmonary, and perioperative risk assessment prior to surgery.The intended surgical procedure is esophagogastroduodenoscopy with dilation on 3/28/22 with anesthesia. A copy of this note will be sent to the surgeon.    PROBLEM LIST:   Patient Active Problem List   Diagnosis     Chronic pain     Nephrolithiasis     Lupus (H)     Anxiety disorder     Microcytic anemia     Migraines     Hydronephrosis     Seizure (H)     Bipolar 1 disorder, mixed, moderate (H)     Iron deficiency anemia     Depression     Overdose     Behavior disturbance     Borderline personality disorder (H)     BCC (basal cell carcinoma), face     Benzodiazepine dependence (H)     Chronic pain disorder     Opioid dependence on agonist therapy (H)     Orthostatic hypotension     S/P partial gastrectomy     Drug-induced mood disorder (H)     Polysubstance overdose     Anemia, iron deficiency     Malnutrition (H)     Symptomatic anemia     Gastric atony       Past Surgical History:   Procedure Laterality Date     BACK SURGERY  04    L4-5 epidural abscess     BACK SURGERY  04    L3-4 spinal stenosis     BACK SURGERY  04    Lt psoas abscess     BRONCHOSCOPY FLEXIBLE N/A 2019    Procedure: Flexible Bronchoscopy;  Surgeon: Patrice Regalado MD;  Location:  OR      SECTION      x 2     CHOLECYSTECTOMY  2-     CLOSED REDUCTION, PERCUTANEOUS PINNING FINGER, COMBINED  8/10/2011    Procedure:COMBINED CLOSED REDUCTION, PERCUTANEOUS PINNING FINGER; 5th Proximal Phalanx; Surgeon:RADHA BUITRAGO; [facility]     COLONOSCOPY       COLONOSCOPY N/A 2020    Procedure: COLONOSCOPY;  Surgeon: Zacarias Duran MD;  Location:  GI     ESOPHAGOSCOPY, GASTROSCOPY, DUODENOSCOPY (EGD), COMBINED N/A 2020    Procedure: ESOPHAGOGASTRODUODENOSCOPY, WITH BIOPSY;  Surgeon: Zacarias Duran MD;  Location:  GI      ESOPHAGOSCOPY, GASTROSCOPY, DUODENOSCOPY (EGD), COMBINED N/A 6/8/2021    Procedure: ESOPHAGOGASTRODUODENOSCOPY (EGD);  Surgeon: Kaveh Clayton MD;  Location: [facility]     ESOPHAGOSCOPY, GASTROSCOPY, DUODENOSCOPY (EGD), COMBINED N/A 2/7/2022    Procedure: Esophagogastroduodenoscopy  (EGD) AND DILATION;  Surgeon: Kaveh Clayton MD;  Location: [facility]     ESOPHAGOSCOPY, GASTROSCOPY, DUODENOSCOPY (EGD), DILATATION, COMBINED N/A 2/7/2022    Procedure: .;  Surgeon: Kaveh Clayton MD;  Location: [facility]     GASTRECTOMY  11-    Bilat truncal vagotomy, hemigastrectomy, RnY gastrojejunostomy     GASTROJEJUNOSTOMY  6/2/2011    Procedure:GASTROJEJUNOSTOMY; exploratory laparotmy with revision of gastrojejunostomy, Antrectomy, Miles-en-y, Gastrojejunostomy; Surgeon:JULIUS HELM; Location:[facility]     INSERT CHEST TUBE N/A 4/29/2019    Procedure: INSERTION, CATHETER, INTERCOSTAL, FOR DRAINAGE;  Surgeon: Melissa Nichols MD;  Location: [facility]     LAPAROTOMY EXPLORATORY N/A 9/10/2021    Procedure: Exploratory Laparotomy, Extensive Lysis of Adhesions; Jejunojejunostomy resection and  Revision, Gastrojejunal Resection (partial gastrectomy with enterectomy) with Miles en Y Reconstruction Gastrojejunostomy, Upper Endoscopy;  Surgeon: Kaveh Clayton MD;  [facility]     LASER HOLMIUM LITHOTRIPSY URETER(S), INSERT STENT, COMBINED      Procedure: COMBINED CYSTOSCOPY, URETEROSCOPY, LASER HOLMIUM LITHOTRIPSY URETER(S), INSERT STENT;;  Surgeon: Blair Correa MD;  [facility]     LASER HOLMIUM NEPHROLITHOTOMY VIA PERCUTANEOUS NEPHROSTOMY  7/11/2012    Procedure: LASER HOLMIUM NEPHROLITHOTOMY VIA PERCUTANEOUS NEPHROSTOMY;  proceedure should read: Left Percutaneous Access, Left Percutaneous ultrasonic Nephrolithotomy, Ureteroscopy Holmium Laser Lithotripsy Stent Placement ;  Surgeon: Blair Correa MD;  [facility]     MAMMOPLASTY AUGMENTATION BILATERAL       OPEN REDUCTION INTERNAL  FIXATION WRIST Left 1/11/2016    Procedure: OPEN REDUCTION INTERNAL FIXATION WRIST;  Surgeon: Darling Ellis MD;  [facility]: [facility]     RECONSTRUCT BREAST, IMPLANT PROSTHESIS, COMBINED       THORACOSCOPIC DECORTICATION LUNG Left 5/7/2019    Procedure: Left Video Assisted Thoracoscopic Decortication, Intercostal Nerve Cryo Analgesia, Flexible Bronchoscopy;  Surgeon: Patrice Regalado MD;  Location[facility]       Past Medical History:   Diagnosis Date     Anemia      Basal cell carcinoma     Left-sided, skin over over medial orbit/nasal bone. Removed Oct 2018.     Benzodiazepine dependence (H)     With h/o withdrawal seizure x 1     Bipolar 1 disorder, mixed, moderate (H) 2/7/2013     Chronic pain     Back, legs.     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     Epidural abscess 2005    x4     Fibromyalgia      Generalised anxiety disorder      GERD (gastroesophageal reflux disease)      Major depressive disorder      Migraine      Nephrolithiasis     S/p left sided lithotripsy     Opiate dependence (H)      Osteoarthritis      Osteoporosis      PUD (peptic ulcer disease)      SLE (systemic lupus erythematosus) (H) 2009     Weight loss        FAMILY MEDICAL HX:   Family History   Problem Relation Age of Onset     Substance Abuse Mother      Family History Negative Father      Substance Abuse Brother      Substance Abuse Maternal Grandfather      Liver Disease No family hx of      Colon Cancer No family hx of      Ulcerative Colitis No family hx of      Crohn's Disease No family hx of      Glaucoma No family hx of      Macular Degeneration No family hx of      Anesthesia Reaction No family hx of      Deep Vein Thrombosis (DVT) No family hx of        IMMUNIZATION HX:   Immunization History   Administered Date(s) Administered     COVID-19,PF,Moderna 01/25/2021, 02/22/2021     Mantoux Tuberculin Skin Test 05/15/2019     Pneumococcal 23 valent 11/20/2009     Tdap (Adacel,Boostrix) 03/03/2009        MEDICATIONS:   Current Outpatient Medications   Medication Sig Dispense Refill     aspirin-acetaminophen-caffeine (EXCEDRIN MIGRAINE) 250-250-65 MG tablet Take 1 tablet by mouth every 6 hours as needed for headaches       cyanocobalamin (VITAMIN B-12) 1000 MCG tablet Take 1 tablet (1,000 mcg) by mouth daily 90 tablet 0     escitalopram (LEXAPRO) 20 MG tablet TAKE 1 TABLET BY MOUTH EVERY DAY (Patient taking differently: Take 20 mg by mouth daily ) 90 tablet 1     hydrOXYzine (VISTARIL) 25 MG capsule Take 1 capsule (25 mg) by mouth 2 times daily as needed for anxiety 60 capsule 5     ibuprofen (ADVIL/MOTRIN) 200 MG tablet Take 400 mg by mouth every 8 hours as needed for mild pain       methadone (DOLOPHINE-INTENSOL) 10 MG/ML (HIGH CONC) solution Take 120 mg by mouth daily Takes it at 0530 everyday. Only uses red not yellow    [facility] [place] - doses in clinic on Mon/Wed/Fri and take home doses for other days.       omeprazole (PRILOSEC) 40 MG DR capsule Take 1 capsule (40 mg) by mouth 2 times daily 120 capsule 5     polyethylene glycol (MIRALAX) 17 GM/Dose powder 1 capful (17 g) in 8 oz of liquid (Patient taking differently: Take 17 g by mouth daily as needed for constipation 1 capful (17 g) in 8 oz of liquid) 225 g 3     pregabalin (LYRICA) 150 MG capsule Take 1 capsule (150 mg) by mouth 3 times daily 90 capsule 5     valACYclovir (VALTREX) 500 MG tablet Take 1 tablet (500 mg) by mouth 2 times daily 60 tablet 1     hydroxychloroquine (PLAQUENIL) 200 MG tablet Take 1 tablet (200 mg) by mouth 2 times daily (with meals) (Patient not taking: Reported on 3/24/2022) 180 tablet 1       SOCIAL HX:   Social History     Socioeconomic History     Marital status: Single     Spouse name: None     Number of children: None     Years of education: None     Highest education level: None   Occupational History     None   Tobacco Use     Smoking status: Never Smoker     Smokeless tobacco: Never Used   Substance and  Sexual Activity     Alcohol use: No     Comment: none in 10 years     Drug use: Not Currently     Types: Benzodiazepines, Opiates     Sexual activity: Not Currently   Other Topics Concern     Parent/sibling w/ CABG, MI or angioplasty before 65F 55M? Not Asked   Social History Narrative    Intake 4/16/15:        H/o divorce but most recently living with SO Alfonso. Alfonso recently passed away.         In past employed as a .         Tobacco use: denies    Alcohol use: denies    Drug use: daily pot use for 30 years. Currently with sobriety.          Social Determinants of Health     Financial Resource Strain: Not on file   Food Insecurity: Not on file   Transportation Needs: Not on file   Physical Activity: Not on file   Stress: Not on file   Social Connections: Not on file   Intimate Partner Violence: Not on file   Housing Stability: Not on file       This is a LOW risk surgery.    HPI:   Reason for surgery: The patient is a 66-year-old woman with a past medical history significant for history of pleural effusion\high edema status post lung decortication, anemia, thrombocytopenia, migraines, history of substance abuse with a history of withdrawal seizure, history of epidural abscess, SLE, herpes, history of nephrolithiasis, osteoarthritis, osteoporosis, anxiety, depression, chronic pain, fibromyalgia and history of partial gastrectomy with hypokinetic remnant status post exploratory laparotomy, lysis of adhesions, resection and revision with Miles-en-Y reconstruction who has been following with Dr. Clayton.  She reports she has dysphagia and all she can eat is mashed potatoes with whole milk. Pt has dysphagia and unable to eat solid foods due to pain. She had a previous procedure 2/2022 and she did not feel any different after the procedure.     Cardiovascular Risk:  This patient ambulates without assist.  without   chest pain. She IS able to climb a flight of stairs without chest pain.    The patient does not   have chest pain Exercise.    She  does not  have a history of known cardiac disease.   The patient does not  have a history of stroke, and does not  have a history of valvular disease.    Pulmonary Risk:  In terms of risk factors for pulmonary complications, the patient does not  have a history of Asthma    Pulmonary Risk:  In terms of risk factors for pulmonary complications, the patient does not have a history of CHF, COPD, age >60 years, being functionally dependent.       Is this patient going to undergo any of the following procedures that would put him at higher risk of postoperative pulmonary complications:  Prolonged surgery (>3 hours): No  Abdominal surgery/thoracic surgery/neurosurgery/head and neck: No  Vascular/aortic aneurysm repair: No  General anesthesia: No    Perioperative Complications:  The patient does not  have a history of bleeding or clotting problems in the past. The patient has not  had complications from past surgeries.  The patient does not  have a family history of any anesthesia or surgical complications.      ROS:  Constitutional: no fevers, night sweats or unintentional weight change   Eyes: no vision change, diplopia or red eyes   Ears, Nose, Mouth, Throat: no tinnitus or hearing change, no epistaxis or nasal discharge, no oral lesions, throat clear   Cardiovascular: no chest pain, palpitations, or pain with walking, no orthopnea or PND.  She has been wearing compression stockings due to LE edema which has helped.  She states she walks at least 2 hours a day.   Respiratory: no dyspnea, cough, shortness of breath or wheezing   GI: no nausea, vomiting, diarrhea or constipation. She points to her epigastric area to locate where pain is when she eats.  : no change in urine, no dysuria or hematuria  Musculoskeletal: no joint or muscle pain or swelling   Integumentary: no concerning lesions or moles.  She has an open post surgical wounds which she states is slowly healing.   Neuro: no  "loss of strength or sensation, no numbness or tingling, no tremor, no dizziness, no headache   Endo: no polyuria or polydipsia, no temperature intolerance   Heme/Lymph: no concerning bumps, no bleeding problems   Psych: no depression or anxiety, no sleep problems      PHYSICAL EXAM:  /75 (BP Location: Right arm, Patient Position: Sitting, Cuff Size: Adult Regular)   Pulse 75   Ht 1.6 m (5' 3\")   Wt 49.8 kg (109 lb 11.2 oz)   LMP  (LMP Unknown)   SpO2 97%   BMI 19.43 kg/m      Wt Readings from Last 1 Encounters:   03/24/22 49.8 kg (109 lb 11.2 oz)   Constitutional: Thin.Awake, alert, cooperative, no apparent distress.  Eyes: Grossly normal    HENT: She has an upper dental bridge.  Missing back teeth. No goiter appreciated. Mallampati II  Respiratory: Clear to auscultation bilaterally, no crackles or wheezing.  Cardiovascular: Regular rate and rhythm, normal S1 and S2, and 2/4/murmur  Carotids +2, no bruits. No edema. Palpable pulses to radial  DP and PT arteries. . Bilateral lower extremities1+ and c/o pain with deep palpation. Mild color changes consistent with arterio vascular changes.  GI: Normal bowel sounds, soft, non-distended, non-tender, no masses palpated, no hepatosplenomegaly.  Surgical scars: healing.   Lymph/Hematologic: No cervical lymphadenopathy and no supraclavicular lymphadenopathy.  Genitourinary:  defer  Skin: Warm and dry.  open surgical abdominal wound. No redness or discharge.   Musculoskeletal: Gross motor strength is normal.    Neurologic: Awake, alert, oriented to name, place and time.  Gait is normal.   Neuropsychiatric: Calm, cooperative. Normal affect.      A/P:    The patient with   Patient Active Problem List   Diagnosis     Chronic pain     Nephrolithiasis     Lupus (H)     Anxiety disorder     Microcytic anemia     Migraines     Hydronephrosis     Seizure (H)     Bipolar 1 disorder, mixed, moderate (H)     Iron deficiency anemia     Depression     Overdose     Behavior " disturbance     Borderline personality disorder (H)     BCC (basal cell carcinoma), face     Benzodiazepine dependence (H)     Chronic pain disorder     Opioid dependence on agonist therapy (H)     Orthostatic hypotension     S/P partial gastrectomy     Drug-induced mood disorder (H)     Polysubstance overdose     Anemia, iron deficiency     Malnutrition (H)     Symptomatic anemia     Gastric atony    presents prior to surgery for assessment of perioperative risk. The patient is at LOW risk for cardiovascular complications and at LOW risk for pulmonary complications of this LOW risk surgery.      No evidence of functionally compromising cardiac or pulmonary status  Clear for anesthesia and surgery  The patient is recommended to hold aspirin or NSAIDS for 10 days prior to surgery.  The patient is instructed as to which medications to take with sips of water the morning of surgery    Pre-Op Plan:   Proceed with surgery as planned.  No food for 8 hours before surgery.  No liquid for 2 hours before surgery.   Call surgeon  If you develop a fever, respiratory illness or other symptoms.  Hold the following medications the morning of  Surgery: all other medications  Take the following medications the morning of surgery with a sip of water: Methadone.    Letter sent to requesting surgeon  listed above  Written pre-operative instructions given.    Laboratory studies:  She declined any lab tests today despite past history of anemia.    Cardiovascular: EKG was not indicated based on risk assessment.       Total time spent 40 minutes.  More than 50% of the time spent with Ms. Richardson on counseling / coordinating her care    Consider referral to wound Healing Clinic  Please contact our office if there are any further questions or information required about this patient.      Svitlana BROCK, CNP

## 2022-03-25 ENCOUNTER — TELEPHONE (OUTPATIENT)
Dept: SURGERY | Facility: CLINIC | Age: 67
End: 2022-03-25
Payer: COMMERCIAL

## 2022-03-25 NOTE — TELEPHONE ENCOUNTER
Called patient to let her know her endoscopy scheduled on 3/28 has been moved up from 1 p.m. to 11 a.m., to arrive at 9 a.m. [facility] [place].

## 2022-03-28 ENCOUNTER — ANESTHESIA EVENT (OUTPATIENT)
Dept: SURGERY | Facility: CLINIC | Age: 67
End: 2022-03-28
Payer: COMMERCIAL

## 2022-03-28 ENCOUNTER — HOSPITAL ENCOUNTER (OUTPATIENT)
Facility: CLINIC | Age: 67
Discharge: HOME | End: 2022-03-28
Attending: SURGERY | Admitting: SURGERY
Payer: COMMERCIAL

## 2022-03-28 ENCOUNTER — ANESTHESIA (OUTPATIENT)
Dept: SURGERY | Facility: CLINIC | Age: 67
End: 2022-03-28
Payer: COMMERCIAL

## 2022-03-28 VITALS
BODY MASS INDEX: 19.84 KG/M2 | RESPIRATION RATE: 16 BRPM | HEART RATE: 70 BPM | HEIGHT: 62 IN | WEIGHT: 107.81 LBS | DIASTOLIC BLOOD PRESSURE: 90 MMHG | SYSTOLIC BLOOD PRESSURE: 165 MMHG | TEMPERATURE: 98.5 F | OXYGEN SATURATION: 96 %

## 2022-03-28 DIAGNOSIS — R13.10 DYSPHAGIA: ICD-10-CM

## 2022-03-28 LAB — GLUCOSE BLDC GLUCOMTR-MCNC: 76 MG/DL (ref 70–99)

## 2022-03-28 PROCEDURE — 258N000003 HC RX IP 258 OP 636: Performed by: ANESTHESIOLOGY

## 2022-03-28 PROCEDURE — 250N000009 HC RX 250: Performed by: ANESTHESIOLOGY

## 2022-03-28 PROCEDURE — C1769 GUIDE WIRE: HCPCS | Performed by: SURGERY

## 2022-03-28 PROCEDURE — 272N000001 HC OR GENERAL SUPPLY STERILE: Performed by: SURGERY

## 2022-03-28 PROCEDURE — 360N000082 HC SURGERY LEVEL 2 W/ FLUORO, PER MIN: Performed by: SURGERY

## 2022-03-28 PROCEDURE — 250N000025 HC SEVOFLURANE, PER MIN: Performed by: SURGERY

## 2022-03-28 PROCEDURE — 999N000141 HC STATISTIC PRE-PROCEDURE NURSING ASSESSMENT: Performed by: SURGERY

## 2022-03-28 PROCEDURE — 710N000009 HC RECOVERY PHASE 1, LEVEL 1, PER MIN: Performed by: SURGERY

## 2022-03-28 PROCEDURE — C1726 CATH, BAL DIL, NON-VASCULAR: HCPCS | Performed by: SURGERY

## 2022-03-28 PROCEDURE — 250N000011 HC RX IP 250 OP 636: Performed by: ANESTHESIOLOGY

## 2022-03-28 PROCEDURE — 43245 EGD DILATE STRICTURE: CPT | Performed by: SURGERY

## 2022-03-28 PROCEDURE — 370N000017 HC ANESTHESIA TECHNICAL FEE, PER MIN: Performed by: SURGERY

## 2022-03-28 PROCEDURE — 250N000011 HC RX IP 250 OP 636: Performed by: SURGERY

## 2022-03-28 PROCEDURE — 710N000012 HC RECOVERY PHASE 2, PER MINUTE: Performed by: SURGERY

## 2022-03-28 PROCEDURE — 82962 GLUCOSE BLOOD TEST: CPT

## 2022-03-28 RX ORDER — CLINDAMYCIN PHOSPHATE 900 MG/50ML
900 INJECTION, SOLUTION INTRAVENOUS
Status: COMPLETED | OUTPATIENT
Start: 2022-03-28 | End: 2022-03-28

## 2022-03-28 RX ORDER — SODIUM CHLORIDE, SODIUM LACTATE, POTASSIUM CHLORIDE, CALCIUM CHLORIDE 600; 310; 30; 20 MG/100ML; MG/100ML; MG/100ML; MG/100ML
INJECTION, SOLUTION INTRAVENOUS CONTINUOUS
Status: DISCONTINUED | OUTPATIENT
Start: 2022-03-28 | End: 2022-03-28 | Stop reason: HOSPADM

## 2022-03-28 RX ORDER — ONDANSETRON 4 MG/1
4 TABLET, ORALLY DISINTEGRATING ORAL EVERY 30 MIN PRN
Status: DISCONTINUED | OUTPATIENT
Start: 2022-03-28 | End: 2022-03-28 | Stop reason: HOSPADM

## 2022-03-28 RX ORDER — OXYCODONE HYDROCHLORIDE 5 MG/1
5 TABLET ORAL EVERY 4 HOURS PRN
Status: DISCONTINUED | OUTPATIENT
Start: 2022-03-28 | End: 2022-03-28 | Stop reason: HOSPADM

## 2022-03-28 RX ORDER — SODIUM CHLORIDE, SODIUM LACTATE, POTASSIUM CHLORIDE, CALCIUM CHLORIDE 600; 310; 30; 20 MG/100ML; MG/100ML; MG/100ML; MG/100ML
INJECTION, SOLUTION INTRAVENOUS CONTINUOUS PRN
Status: DISCONTINUED | OUTPATIENT
Start: 2022-03-28 | End: 2022-03-28

## 2022-03-28 RX ORDER — HYDROMORPHONE HCL IN WATER/PF 6 MG/30 ML
0.2 PATIENT CONTROLLED ANALGESIA SYRINGE INTRAVENOUS EVERY 5 MIN PRN
Status: DISCONTINUED | OUTPATIENT
Start: 2022-03-28 | End: 2022-03-28 | Stop reason: HOSPADM

## 2022-03-28 RX ORDER — ACETAMINOPHEN 325 MG/1
650 TABLET ORAL
Status: DISCONTINUED | OUTPATIENT
Start: 2022-03-28 | End: 2022-03-28 | Stop reason: HOSPADM

## 2022-03-28 RX ORDER — DEXAMETHASONE SODIUM PHOSPHATE 4 MG/ML
INJECTION, SOLUTION INTRA-ARTICULAR; INTRALESIONAL; INTRAMUSCULAR; INTRAVENOUS; SOFT TISSUE PRN
Status: DISCONTINUED | OUTPATIENT
Start: 2022-03-28 | End: 2022-03-28

## 2022-03-28 RX ORDER — OXYCODONE HYDROCHLORIDE 5 MG/1
5 TABLET ORAL
Status: DISCONTINUED | OUTPATIENT
Start: 2022-03-28 | End: 2022-03-28 | Stop reason: HOSPADM

## 2022-03-28 RX ORDER — FENTANYL CITRATE 50 UG/ML
25 INJECTION, SOLUTION INTRAMUSCULAR; INTRAVENOUS EVERY 5 MIN PRN
Status: DISCONTINUED | OUTPATIENT
Start: 2022-03-28 | End: 2022-03-28 | Stop reason: HOSPADM

## 2022-03-28 RX ORDER — PROPOFOL 10 MG/ML
INJECTION, EMULSION INTRAVENOUS PRN
Status: DISCONTINUED | OUTPATIENT
Start: 2022-03-28 | End: 2022-03-28

## 2022-03-28 RX ORDER — ONDANSETRON 2 MG/ML
4 INJECTION INTRAMUSCULAR; INTRAVENOUS EVERY 30 MIN PRN
Status: DISCONTINUED | OUTPATIENT
Start: 2022-03-28 | End: 2022-03-28 | Stop reason: HOSPADM

## 2022-03-28 RX ORDER — MEPERIDINE HYDROCHLORIDE 25 MG/ML
12.5 INJECTION INTRAMUSCULAR; INTRAVENOUS; SUBCUTANEOUS
Status: DISCONTINUED | OUTPATIENT
Start: 2022-03-28 | End: 2022-03-28 | Stop reason: HOSPADM

## 2022-03-28 RX ORDER — FENTANYL CITRATE 50 UG/ML
INJECTION, SOLUTION INTRAMUSCULAR; INTRAVENOUS PRN
Status: DISCONTINUED | OUTPATIENT
Start: 2022-03-28 | End: 2022-03-28

## 2022-03-28 RX ORDER — FENTANYL CITRATE 50 UG/ML
25 INJECTION, SOLUTION INTRAMUSCULAR; INTRAVENOUS
Status: DISCONTINUED | OUTPATIENT
Start: 2022-03-28 | End: 2022-03-28 | Stop reason: HOSPADM

## 2022-03-28 RX ORDER — ONDANSETRON 2 MG/ML
INJECTION INTRAMUSCULAR; INTRAVENOUS PRN
Status: DISCONTINUED | OUTPATIENT
Start: 2022-03-28 | End: 2022-03-28

## 2022-03-28 RX ORDER — LIDOCAINE 40 MG/G
CREAM TOPICAL
Status: DISCONTINUED | OUTPATIENT
Start: 2022-03-28 | End: 2022-03-28 | Stop reason: HOSPADM

## 2022-03-28 RX ORDER — LIDOCAINE HYDROCHLORIDE 20 MG/ML
INJECTION, SOLUTION INFILTRATION; PERINEURAL PRN
Status: DISCONTINUED | OUTPATIENT
Start: 2022-03-28 | End: 2022-03-28

## 2022-03-28 RX ORDER — CLINDAMYCIN PHOSPHATE 900 MG/50ML
900 INJECTION, SOLUTION INTRAVENOUS SEE ADMIN INSTRUCTIONS
Status: DISCONTINUED | OUTPATIENT
Start: 2022-03-28 | End: 2022-03-28 | Stop reason: HOSPADM

## 2022-03-28 RX ADMIN — SUGAMMADEX 100 MG: 100 INJECTION, SOLUTION INTRAVENOUS at 12:19

## 2022-03-28 RX ADMIN — LIDOCAINE HYDROCHLORIDE 60 MG: 20 INJECTION, SOLUTION INFILTRATION; PERINEURAL at 11:55

## 2022-03-28 RX ADMIN — CLINDAMYCIN PHOSPHATE 900 MG: 900 INJECTION, SOLUTION INTRAVENOUS at 11:54

## 2022-03-28 RX ADMIN — ROCURONIUM BROMIDE 30 MG: 50 INJECTION, SOLUTION INTRAVENOUS at 11:55

## 2022-03-28 RX ADMIN — ONDANSETRON 4 MG: 2 INJECTION INTRAMUSCULAR; INTRAVENOUS at 12:19

## 2022-03-28 RX ADMIN — DEXAMETHASONE SODIUM PHOSPHATE 4 MG: 4 INJECTION, SOLUTION INTRA-ARTICULAR; INTRALESIONAL; INTRAMUSCULAR; INTRAVENOUS; SOFT TISSUE at 12:04

## 2022-03-28 RX ADMIN — PROPOFOL 170 MG: 10 INJECTION, EMULSION INTRAVENOUS at 11:55

## 2022-03-28 RX ADMIN — SODIUM CHLORIDE, POTASSIUM CHLORIDE, SODIUM LACTATE AND CALCIUM CHLORIDE: 600; 310; 30; 20 INJECTION, SOLUTION INTRAVENOUS at 11:38

## 2022-03-28 RX ADMIN — MIDAZOLAM 2 MG: 1 INJECTION INTRAMUSCULAR; INTRAVENOUS at 11:38

## 2022-03-28 RX ADMIN — FENTANYL CITRATE 50 MCG: 50 INJECTION, SOLUTION INTRAMUSCULAR; INTRAVENOUS at 11:55

## 2022-03-28 ASSESSMENT — LIFESTYLE VARIABLES: TOBACCO_USE: 0

## 2022-03-28 ASSESSMENT — ENCOUNTER SYMPTOMS: SEIZURES: 1

## 2022-03-28 NOTE — OP NOTE
"[facility]    Operative Note    Pre-operative diagnosis: Dysphagia [R13.10]   Post-operative diagnosis Same   Procedure: Procedure(s):  ESOPHAGOGASTRODUODENOSCOPY, WITH DILATION   Surgeon: Surgeon(s) and Role:     * Kaveh Clayton MD - Primary     * Marilee Toledo MD - Resident - Assisting   Anesthesia: General    Estimated blood loss: 2 ml   Drains: None   Specimens: None   Findings: Stricture at prior anastomosis; allowed passage of 10mm scope; stricture ~10-12mm, successfully dilated to 16 mm   Complications: None.   Implants: None.     COMORBIDITIES:   Past Medical History:   Diagnosis Date     Anemia      Basal cell carcinoma     Left-sided, skin over over medial orbit/nasal bone. Removed Oct 2018.     Benzodiazepine dependence (H)     With h/o withdrawal seizure x 1     Bipolar 1 disorder, mixed, moderate (H) 2/7/2013     Chronic pain     Back, legs.     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     Epidural abscess 2005    x4     Fibromyalgia      Generalised anxiety disorder      GERD (gastroesophageal reflux disease)      Major depressive disorder      Migraine      Nephrolithiasis     S/p left sided lithotripsy     Opiate dependence (H)      Osteoarthritis      Osteoporosis      PUD (peptic ulcer disease)      SLE (systemic lupus erythematosus) (H) 2009     Weight loss        OPERATIVE INDICATIONS: Demetra Richardson is a 66 year old female who underwent prior esophagojejnostomy with dysphagia, s/p EGD with dilation to 13 mm on 2/7/22.     Height: 157.5 cm (5' 2\") Weight: 48.9 kg (107 lb 12.9 oz) Body mass index is 19.72 kg/m ..  After understanding the risks and benefits of proceeding with EGD, she agreed to the procedure.    OPERATIVE PROCEDURE:    The patient was positioned on the OR bed. General anesthesia was initiated. A timeout procedure was performed.  Oral bite block placed and endoscope (28Fr) introduced " orally and advanced through esophagus into the jejunum. The scope was easily passed, a stricture at the prior esophagojejunostomy was seen. Using a balloon dilator the strictured area was dilated to 16 mm. There was no active bleeding in any part of the visualized esophagus, or jejunum.    I was present for all critical components of the operation and all needle and sponge counts were correct x2 at the end of the procedure.    Kaveh Clayton MD  Surgery  [phone] (hospital )      Plan for repeat endoscopy with dilation in 1 month    Kelli Toledo MD PGY-4

## 2022-03-28 NOTE — ANESTHESIA POSTPROCEDURE EVALUATION
Patient: Demetra Richardson    Procedure: Procedure(s):  ESOPHAGOGASTRODUODENOSCOPY, WITH DILATION       Anesthesia Type:  General    Note:  Disposition: Outpatient   Postop Pain Control:            Sign Out: Pain at Preoperative BASELINE   PONV: No   Neuro/Psych: Uneventful            Sign Out: Acceptable/Baseline neuro status   Airway/Respiratory: Uneventful            Sign Out: Acceptable/Baseline resp. status   CV/Hemodynamics: Uneventful            Sign Out: Acceptable CV status; No obvious hypovolemia; No obvious fluid overload   Other NRE: NONE   DID A NON-ROUTINE EVENT OCCUR? No           Last vitals:  Vitals Value Taken Time   /90 03/28/22 1243   Temp 36.9  C (98.4  F) 03/28/22 1243   Pulse 73 03/28/22 1243   Resp 12 03/28/22 1243   SpO2 97 % 03/28/22 1243       Electronically Signed By: VINOD Jackman CRNA  March 28, 2022  12:48 PM

## 2022-03-28 NOTE — ANESTHESIA CARE TRANSFER NOTE
Patient: Demetra Richardson    Procedure: Procedure(s):  ESOPHAGOGASTRODUODENOSCOPY, WITH DILATION       Diagnosis: Dysphagia [R13.10]  Diagnosis Additional Information: No value filed.    Anesthesia Type:   General     Note:    Oropharynx: oropharynx clear of all foreign objects and spontaneously breathing  Level of Consciousness: awake  Oxygen Supplementation: room air    Independent Airway: airway patency satisfactory and stable  Dentition: dentition unchanged  Vital Signs Stable: post-procedure vital signs reviewed and stable  Report to RN Given: handoff report given  Patient transferred to: PACU    Handoff Report: Identifed the Patient, Identified the Reponsible Provider, Reviewed the pertinent medical history, Discussed the surgical course, Reviewed Intra-OP anesthesia mangement and issues during anesthesia, Set expectations for post-procedure period and Allowed opportunity for questions and acknowledgement of understanding      Vitals:  Vitals Value Taken Time   /90 03/28/22 1243   Temp 36.9  C (98.4  F) 03/28/22 1243   Pulse 73 03/28/22 1243   Resp 12 03/28/22 1243   SpO2 97 % 03/28/22 1243       Electronically Signed By: VINOD Jackman CRNA  March 28, 2022  12:49 PM

## 2022-03-28 NOTE — ANESTHESIA PREPROCEDURE EVALUATION
Anesthesia Pre-Procedure Evaluation    Patient: Demetra Richardson   MRN: [id number] : 1955        Procedure : Procedure(s):  ESOPHAGOGASTRODUODENOSCOPY, WITH DILATION          Past Medical History:   Diagnosis Date     Anemia      Basal cell carcinoma     Left-sided, skin over over medial orbit/nasal bone. Removed Oct 2018.     Benzodiazepine dependence (H)     With h/o withdrawal seizure x 1     Bipolar 1 disorder, mixed, moderate (H) 2013     Chronic pain     Back, legs.     Depressive disorder      Eosinophilic gastroenteritis 2010     Epidural abscess 2005    x4     Fibromyalgia      Generalised anxiety disorder      GERD (gastroesophageal reflux disease)      Major depressive disorder      Migraine      Nephrolithiasis     S/p left sided lithotripsy     Opiate dependence (H)      Osteoarthritis      Osteoporosis      PUD (peptic ulcer disease)      SLE (systemic lupus erythematosus) (H) 2009     Weight loss       Past Surgical History:   Procedure Laterality Date     BACK SURGERY  04    L4-5 epidural abscess     BACK SURGERY  04    L3-4 spinal stenosis     BACK SURGERY  04    Lt psoas abscess     BRONCHOSCOPY FLEXIBLE N/A 2019    Procedure: Flexible Bronchoscopy;  Surgeon: Patrice Regalado MD;  [facility]: [facility]      SECTION      x 2     CHOLECYSTECTOMY  2-     CLOSED REDUCTION, PERCUTANEOUS PINNING FINGER, COMBINED  8/10/2011    Procedure:COMBINED CLOSED REDUCTION, PERCUTANEOUS PINNING FINGER; 5th Proximal Phalanx; Surgeon:RADHA BUITRAGO; [facility]     COLONOSCOPY       COLONOSCOPY N/A 2020    Procedure: COLONOSCOPY;  Surgeon: Zacarias Duran MD;  Location:  GI     ESOPHAGOSCOPY, GASTROSCOPY, DUODENOSCOPY (EGD), COMBINED N/A 2020    Procedure: ESOPHAGOGASTRODUODENOSCOPY, WITH BIOPSY;  Surgeon: Zacarias Duran MD;  Location:  GI     ESOPHAGOSCOPY, GASTROSCOPY, DUODENOSCOPY (EGD), COMBINED N/A 2021    Procedure:  ESOPHAGOGASTRODUODENOSCOPY (EGD);  Surgeon: Kaveh Clayton MD;  Location: [facility]     ESOPHAGOSCOPY, GASTROSCOPY, DUODENOSCOPY (EGD), COMBINED N/A 2/7/2022    Procedure: Esophagogastroduodenoscopy  (EGD) AND DILATION;  Surgeon: Kaveh Clayton MD;  [facility]     ESOPHAGOSCOPY, GASTROSCOPY, DUODENOSCOPY (EGD), DILATATION, COMBINED N/A 2/7/2022    Procedure: .;  Surgeon: Kaveh Clayton MD;  [facility]: [facility]     GASTRECTOMY  11-    Bilat truncal vagotomy, hemigastrectomy, RnY gastrojejunostomy     GASTROJEJUNOSTOMY  6/2/2011    Procedure:GASTROJEJUNOSTOMY; exploratory laparotmy with revision of gastrojejunostomy, Antrectomy, Miles-en-y, Gastrojejunostomy; Surgeon:JULIUS HELM; Location:[facility]     INSERT CHEST TUBE N/A 4/29/2019    Procedure: INSERTION, CATHETER, INTERCOSTAL, FOR DRAINAGE;  Surgeon: Melissa Nichols MD;  Location: [facility]     LAPAROTOMY EXPLORATORY N/A 9/10/2021    Procedure: Exploratory Laparotomy, Extensive Lysis of Adhesions; Jejunojejunostomy resection and  Revision, Gastrojejunal Resection (partial gastrectomy with enterectomy) with Miles en Y Reconstruction Gastrojejunostomy, Upper Endoscopy;  Surgeon: Kaveh Clayton MD;  Location: [facility]     LASER HOLMIUM LITHOTRIPSY URETER(S), INSERT STENT, COMBINED      Procedure: COMBINED CYSTOSCOPY, URETEROSCOPY, LASER HOLMIUM LITHOTRIPSY URETER(S), INSERT STENT;;  Surgeon: Blair Correa MD;  [facility]     LASER HOLMIUM NEPHROLITHOTOMY VIA PERCUTANEOUS NEPHROSTOMY  7/11/2012    Procedure: LASER HOLMIUM NEPHROLITHOTOMY VIA PERCUTANEOUS NEPHROSTOMY;  proceedure should read: Left Percutaneous Access, Left Percutaneous ultrasonic Nephrolithotomy, Ureteroscopy Holmium Laser Lithotripsy Stent Placement ;  Surgeon: Blair Correa MD;  [facility]     MAMMOPLASTY AUGMENTATION BILATERAL       OPEN REDUCTION INTERNAL FIXATION WRIST Left 1/11/2016    Procedure: OPEN REDUCTION INTERNAL FIXATION WRIST;   Surgeon: Darling Ellis MD;  [facility]: [facility]     RECONSTRUCT BREAST, IMPLANT PROSTHESIS, COMBINED       THORACOSCOPIC DECORTICATION LUNG Left 5/7/2019    Procedure: Left Video Assisted Thoracoscopic Decortication, Intercostal Nerve Cryo Analgesia, Flexible Bronchoscopy;  Surgeon: Patrice Regalado MD;  Location: [facility]      Allergies   Allergen Reactions     Penicillins Hives     Hives in childhood.      Social History     Tobacco Use     Smoking status: Never Smoker     Smokeless tobacco: Never Used   Substance Use Topics     Alcohol use: No     Comment: none in 10 years      Wt Readings from Last 1 Encounters:   03/28/22 48.9 kg (107 lb 12.9 oz)        Anesthesia Evaluation   Pt has had prior anesthetic. Type: General and Regional.    No history of anesthetic complications       ROS/MED HX  ENT/Pulmonary: Comment: Hx of prior lung decortication due to pleural effusion and empyema in 2019    Being evaluated for glaucoma     (-) tobacco use   Neurologic:     (+) migraines, seizures (withdrawal seizure),  (-) no CVA and no TIA   Cardiovascular:     (+) -----valvular problems/murmurs mild TI. Previous cardiac testing   Echo: Date: 7/1/21 Results:  Interpretation Summary  Left ventricular size, wall motion and function are normal. The ejection  fraction is 60-65%. No regional wall motion abnormalities are seen.  Right ventricular function, chamber size, wall motion, and thickness are  normal.  The valve leaflets are not well visualized. Trace aortic insufficiency is  present.  Mild tricuspid insufficiency is present. The inferior vena cava was normal in  size with preserved respiratory variability. No pericardial effusion is  present.     There has been no change.    Stress Test: Date: Results:    ECG Reviewed: Date: 4/29/19 Results:  Sinus rhythm, low voltage QRS  Cath: Date: Results:      METS/Exercise Tolerance: >4 METS    Hematologic: Comments: Thrombocytopenia      (+) anemia, history of blood  transfusion, no previous transfusion reaction, Known PRBC Anitbodies:No     Musculoskeletal: Comment: Osteoarthritis, osteoporosis    fibromyaglia      GI/Hepatic: Comment: Hx partial gastrectomy with hypokinetic remnant s/p Exploratory Laparotomy, Extensive Lysis of Adhesions; Jejunojejunostomy resection and  Revision, Gastrojejunal Resection (partial gastrectomy with enterectomy) with Miles en Y Reconstruction Gastrojejunostomy, Upper Endoscopy    Dysphagia    PSC        (+) GERD (history of PUD), Asymptomatic on medication,     Renal/Genitourinary:     (+) Nephrolithiasis ,     Endo:  - neg endo ROS     Psychiatric/Substance Use:     (+) psychiatric history depression and anxiety alcohol abuse (sober ) H/O chronic opiod use  (on methadone).     Infectious Disease: Comment: Herpes        Malignancy:  - neg malignancy ROS     Other: Comment: SLE on plaquenil      (+) , H/O Chronic Pain,        Physical Exam    Airway        Mallampati: II   TM distance: > 3 FB   Neck ROM: full   Mouth opening: > 3 cm    Respiratory Devices and Support         Dental  no notable dental history         Cardiovascular   cardiovascular exam normal          Pulmonary   pulmonary exam normal                OUTSIDE LABS:  CBC:   Lab Results   Component Value Date    WBC 4.4 01/13/2022    WBC 7.1 11/08/2021    HGB 10.1 (L) 01/13/2022    HGB 9.5 (L) 11/08/2021    HCT 34.1 (L) 01/13/2022    HCT 33.8 (L) 11/08/2021     (L) 01/13/2022     11/08/2021     BMP:   Lab Results   Component Value Date     01/13/2022     11/08/2021    POTASSIUM 4.2 01/13/2022    POTASSIUM 3.0 (L) 11/08/2021    CHLORIDE 106 01/13/2022    CHLORIDE 107 11/08/2021    CO2 29 01/13/2022    CO2 24 11/08/2021    BUN 27 01/13/2022    BUN 15 11/08/2021    CR 0.71 01/13/2022    CR 0.77 11/08/2021    GLC 76 03/28/2022    GLC 68 (L) 02/07/2022     COAGS:   Lab Results   Component Value Date    PTT 45 (H) 04/29/2019    INR 1.13 09/13/2021    FIBR 219  06/02/2011     POC:   Lab Results   Component Value Date     (H) 07/03/2021    HCG Negative 04/15/2015    HCGS Negative 11/05/2010     HEPATIC:   Lab Results   Component Value Date    ALBUMIN 3.4 01/13/2022    PROTTOTAL 6.8 01/13/2022     (H) 01/13/2022     (H) 01/13/2022    GGT 1,223 (H) 05/06/2019    ALKPHOS 532 (H) 01/13/2022    BILITOTAL 0.2 01/13/2022     OTHER:   Lab Results   Component Value Date    PH 7.40 05/07/2019    LACT 1.0 05/03/2019    A1C 5.5 06/02/2011    RAFAELA 8.5 01/13/2022    PHOS 4.2 01/13/2022    MAG 2.1 01/13/2022    LIPASE 32 (L) 04/28/2019    AMYLASE 33 05/20/2016    TSH 1.88 03/15/2021    T4 0.81 02/04/2013    CRP 48.1 (H) 09/24/2021    SED 46 (H) 03/15/2021       Anesthesia Plan    ASA Status:  3   NPO Status:  NPO Appropriate    Anesthesia Type: General.     - Airway: ETT   Induction: Intravenous.   Maintenance: Balanced.        Consents    Anesthesia Plan(s) and associated risks, benefits, and realistic alternatives discussed. Questions answered and patient/representative(s) expressed understanding.    - Discussed:     - Discussed with:  Patient      - Extended Intubation/Ventilatory Support Discussed: No.      - Patient is DNR/DNI Status: No    Use of blood products discussed: No .     Postoperative Care    Pain management: IV analgesics.   PONV prophylaxis: Ondansetron (or other 5HT-3), Dexamethasone or Solumedrol     Comments:                FER LOUIS MD

## 2022-03-28 NOTE — ANESTHESIA PROCEDURE NOTES
Airway       Patient location during procedure: OR       Procedure Start/Stop Times: 3/28/2022 11:56 AM  Staff -        CRNA: Viridiana Wlalis APRN CRNA       Performed By: CRNAIndications and Patient Condition       Indications for airway management: lan-procedural        Final Airway Details       Final airway type: endotracheal airway       Successful airway: ETT - single  Endotracheal Airway Details        ETT size (mm): 7.0       Cuffed: yes       Successful intubation technique: direct laryngoscopy       DL Blade Type: Santos 2       Grade View of Cords: 1       Adjucts: stylet       Secured at (cm): 22       Bite Block used: green bite block for procedure.    Post intubation assessment        Placement verified by: capnometry, equal breath sounds and chest rise        Number of attempts at approach: 1       Ease of procedure: easy

## 2022-03-28 NOTE — DISCHARGE INSTRUCTIONS
Same-Day Surgery   Adult Discharge Orders & Instructions   For 24 hours after surgery  1. Get plenty of rest.  A responsible adult must stay with you for at least 24 hours after you leave the hospital.   2. Do not drive or use heavy equipment.  If you have weakness or tingling, don't drive or use heavy equipment until this feeling goes away.  3. Do not drink alcohol.  4. Avoid strenuous or risky activities.  Ask for help when climbing stairs.   5. You may feel lightheaded.  IF so, sit for a few minutes before standing.  Have someone help you get up.   6. If you have nausea (feel sick to your stomach): Drink only clear liquids such as apple juice, ginger ale, broth or 7-Up.  Rest may also help.  Be sure to drink enough fluids.  Move to a regular diet as you feel able.  7. You may have a slight fever. Call the doctor if your fever is over 100 F (37.7 C) (taken under the tongue) or lasts longer than 24 hours.  8. You may have a dry mouth, a sore throat, muscle aches or trouble sleeping.  These should go away after 24 hours.  9. Do not make important or legal decisions.   Call your doctor for any of the followin.  Signs of infection (fever, growing tenderness at the surgery site, a large amount of drainage or bleeding, severe pain, foul-smelling drainage, redness, swelling).    2. It has been over 8 to 10 hours since surgery and you are still not able to urinate (pass water).    3.  Headache for over 24 hours.    To contact a doctor, call Dr Clayton's clinic at [phone]  or:      [phone] and ask for the resident on call for General Surgery (answered 24 hours a day)      Emergency Department: [facility]: [phone]

## 2022-03-30 ENCOUNTER — PATIENT OUTREACH (OUTPATIENT)
Dept: SURGERY | Facility: CLINIC | Age: 67
End: 2022-03-30
Payer: COMMERCIAL

## 2022-03-30 NOTE — PROGRESS NOTES
Demetra Katzzacharyderrek is a patient of Dr. Eduar Clayton that underwent EGD with dilitation approximately 2 days ago (3/28).  Attempted to contact patient via telephone for a status update and review post-op  teaching.  LM on VM to call office.  Await return call.      Of note:  Follow-up:  Check with patient. May want to schedule video or in-person visit in 4-6 weeks for discuss future follow up  Restrictions:  - No activity restrictions  New medications:  None. Do not take NSAIDS. Remind patient to take MVI BID, Ca+, and B12 daily as previously recommended.  Open wound: Continue with current wound care  Diet: Hx of dysphagia. How is she tolerating her diet?

## 2022-03-31 NOTE — PROGRESS NOTES
Attempted to contact patient x 2 for post procedure  telephone call/status update.  LM on VM to call office-contact information provided.

## 2022-04-28 DIAGNOSIS — R13.19 OTHER DYSPHAGIA: Primary | ICD-10-CM

## 2022-04-28 RX ORDER — CLINDAMYCIN PHOSPHATE 900 MG/50ML
900 INJECTION, SOLUTION INTRAVENOUS
Status: CANCELLED | OUTPATIENT
Start: 2022-04-28

## 2022-04-28 RX ORDER — CLINDAMYCIN PHOSPHATE 900 MG/50ML
900 INJECTION, SOLUTION INTRAVENOUS SEE ADMIN INSTRUCTIONS
Status: CANCELLED | OUTPATIENT
Start: 2022-04-28

## 2022-05-04 ENCOUNTER — TELEPHONE (OUTPATIENT)
Dept: SURGERY | Facility: CLINIC | Age: 67
End: 2022-05-04
Payer: COMMERCIAL

## 2022-05-04 NOTE — TELEPHONE ENCOUNTER
Patient called to schedule the endoscopy with dilation with Dr. Clayton. She will be out of town from 5/28 to 6/8. I told her I could not find any OR time before she left on 5/28. She is okay with that. We did schedule the endoscopy on 6/14. Scheduled a COVID test on 6/10 at the [facility]. She will contact her primary care provider to get a pre-op H&P scheduled prior to her leaving town.  She has many questions for Dr. Clayton as to why she continues to need these dilations. I told her to schedule an appointment to maybe discuss other options. She said she has never had a stent placed.

## 2022-05-05 ENCOUNTER — HOSPITAL ENCOUNTER (OUTPATIENT)
Facility: CLINIC | Age: 67
End: 2022-05-05
Attending: SURGERY | Admitting: SURGERY
Payer: COMMERCIAL

## 2022-05-06 DIAGNOSIS — M79.7 FIBROMYALGIA: ICD-10-CM

## 2022-05-09 ENCOUNTER — TELEPHONE (OUTPATIENT)
Dept: SURGERY | Facility: CLINIC | Age: 67
End: 2022-05-09
Payer: COMMERCIAL

## 2022-05-09 NOTE — TELEPHONE ENCOUNTER
Patient called stating she would like to come in to see Dr. Clayton to discuss possible options regarding ongoing endoscopies with dilations. Scheduled 5/12 at 9 a.m.

## 2022-05-17 ENCOUNTER — TELEPHONE (OUTPATIENT)
Dept: FAMILY MEDICINE | Facility: CLINIC | Age: 67
End: 2022-05-17
Payer: COMMERCIAL

## 2022-05-17 DIAGNOSIS — M79.7 FIBROMYALGIA: ICD-10-CM

## 2022-05-17 NOTE — CONFIDENTIAL NOTE
miguel angel for Call: Medication Question or concern regarding medication   Prescription Clarification  Name of Medication: pregabalin (LYRICA) 150 MG capsule   Prescribing Provider: Fredy Merritt MD              Pharmacy: [facility] [id number] [place] [address] AT [facility]              What on the order needs clarification? Patient needing new prior auth for this med. Current prior auth on file is .

## 2022-05-17 NOTE — TELEPHONE ENCOUNTER
[facility] Call Center    Phone Message    May a detailed message be left on voicemail: yes     Reason for Call: Medication Question or concern regarding medication   Prescription Clarification  Name of Medication: pregabalin (LYRICA) 150 MG capsule   Prescribing Provider: Fredy Merritt MD   Pharmacy: [facility] [id number] [place] [address] [address] AT [facility]   What on the order needs clarification? Patient needing new prior auth for this med. Current prior auth on file is .          Action Taken: Message routed to:  [facility]    Travel Screening: Not Applicable

## 2022-05-23 ENCOUNTER — TELEPHONE (OUTPATIENT)
Dept: FAMILY MEDICINE | Facility: CLINIC | Age: 67
End: 2022-05-23
Payer: COMMERCIAL

## 2022-05-23 RX ORDER — PREGABALIN 150 MG/1
150 CAPSULE ORAL 3 TIMES DAILY
Qty: 90 CAPSULE | Refills: 5 | Status: SHIPPED | OUTPATIENT
Start: 2022-05-23 | End: 2022-01-01

## 2022-05-23 NOTE — TELEPHONE ENCOUNTER
P/A is not needed. Medication is covered.  Per pharmacy, they need refills.          Medication: pregabalin (LYRICA) 150 MG capsule  Insurance Company: [employer] - Phone [phone] Fax [phone]  Expected CoPay:      Pharmacy Filling the Rx: [facility] [id number] [facility] [place] [address] AT [facility]  Pharmacy Notified: Yes  Patient Notified: No

## 2022-05-23 NOTE — TELEPHONE ENCOUNTER
[facility] Call Center    Phone Message    May a detailed message be left on voicemail: yes     Reason for Call: Other: pt calling about her medication. Please call with a status of the medication. She is going out town tomorrow and needs it ASAP. Writer told patient to call her insurance as well.     Action Taken: Message routed to:  [facility]    Travel Screening: Not Applicable

## 2022-05-23 NOTE — TELEPHONE ENCOUNTER
[facility] Call Center    Phone Message    May a detailed message be left on voicemail: yes     Reason for Call: Other: Ilsa calling stating she was told from patient that her Lyrica needs a prior authorization and Ilsa wants to discuss that. Please call to discuss thank you.      Action Taken: Message routed to:  [facility]    Travel Screening: Not Applicable

## 2022-05-23 NOTE — TELEPHONE ENCOUNTER
Per Rx refill pt had Rx filled for:    pregabalin (LYRICA) 150 MG capsule 90 capsule 5 12/7/2021  No   Sig - Route: Take 1 capsule (150 mg) by mouth 3 times daily - Oral   Sent to pharmacy as: Pregabalin 150 MG Oral      Pt would be out of refills and would need a new Rx sent to the pharmacy.     PA team checked if patient needs a new PA and a PA is not needed per PA team. Pt just needs refills.  Sarah Martinez LPN 5/23/2022 1:58 PM

## 2022-05-23 NOTE — TELEPHONE ENCOUNTER
The request for authorization was routed on 05/17/2022. Due to the amount of requests that the Central PA Team is receiving, the wait time for submission to insurance companies is longer this year.  It is possible that the telephone encounter was signed and closed out which resulted in the request not being submitted.  Please keep this encounter closed as if it is closed out, it will disappear from the processing pool.

## 2022-05-24 RX ORDER — PREGABALIN 150 MG/1
150 CAPSULE ORAL 3 TIMES DAILY
Qty: 90 CAPSULE | OUTPATIENT
Start: 2022-05-24

## 2022-06-13 ENCOUNTER — TELEPHONE (OUTPATIENT)
Dept: GASTROENTEROLOGY | Facility: CLINIC | Age: 67
End: 2022-06-13
Payer: COMMERCIAL

## 2022-06-13 DIAGNOSIS — F41.9 ANXIETY DISORDER, UNSPECIFIED TYPE: ICD-10-CM

## 2022-06-13 NOTE — TELEPHONE ENCOUNTER
Caller: FLORIAN    Procedure: EGD     Date, Location, and Surgeon of Procedure Cancelled: 6/14/2022, ROHAN MOSER     Ordering Provider:    Reason for cancel (please be detailed, any staff messages or encounters to note?): PATIENT DOES NOT WANT TO DO THIS PROCEDURE AGAIN, SHE HAS ALREADY HAD 2 WANTS TO SPEAK WITH PROVIDER ON WHAT ELSE CAN BE DONE         Rescheduled: NO     If rescheduled:    Date:    Location:    Prep Resent: (changes to prep?)   Covid Test Rescheduled:    Note any change or update to original order/sedation:

## 2022-06-14 RX ORDER — ESCITALOPRAM OXALATE 20 MG/1
20 TABLET ORAL DAILY
Qty: 90 TABLET | Refills: 2 | Status: SHIPPED | OUTPATIENT
Start: 2022-06-14 | End: 2023-01-01

## 2022-06-15 DIAGNOSIS — F41.9 ANXIETY DISORDER, UNSPECIFIED TYPE: ICD-10-CM

## 2022-06-15 DIAGNOSIS — B00.9 HERPES SIMPLEX VIRUS INFECTION: ICD-10-CM

## 2022-06-19 RX ORDER — HYDROXYZINE PAMOATE 25 MG/1
25 CAPSULE ORAL 2 TIMES DAILY PRN
Qty: 60 CAPSULE | Refills: 5 | OUTPATIENT
Start: 2022-06-19

## 2022-06-19 RX ORDER — VALACYCLOVIR HYDROCHLORIDE 500 MG/1
500 TABLET, FILM COATED ORAL 2 TIMES DAILY
Qty: 60 TABLET | Refills: 9 | Status: SHIPPED | OUTPATIENT
Start: 2022-06-19 | End: 2023-01-01

## 2022-06-19 NOTE — TELEPHONE ENCOUNTER
valACYclovir (VALTREX) 500 MG tablet    hydrOXYzine (VISTARIL) 25 MG capsule    3/24/2022  [facility] [place]     Svitlana Martin, APRN [facility]    Internal Medicine

## 2022-06-27 DIAGNOSIS — F41.9 ANXIETY DISORDER, UNSPECIFIED TYPE: ICD-10-CM

## 2022-06-27 RX ORDER — HYDROXYZINE PAMOATE 100 MG
100 CAPSULE ORAL DAILY
Qty: 90 CAPSULE | Refills: 0 | Status: SHIPPED | OUTPATIENT
Start: 2022-06-27 | End: 2022-01-01

## 2022-06-27 RX ORDER — HYDROXYZINE PAMOATE 25 MG/1
25 CAPSULE ORAL 2 TIMES DAILY PRN
Qty: 60 CAPSULE | Refills: 5 | Status: SHIPPED | OUTPATIENT
Start: 2022-06-27 | End: 2022-01-01

## 2022-06-27 NOTE — TELEPHONE ENCOUNTER
Spoke to pharmacy regarding recent prescription request - they reported patient picked up medication and # on the following dates  4/1 - #60  4/28 - #60  5/21 - #30  5/24 - #60  6/7 - #60  6/15 - #60    I then spoke to Radha and she said that her refills weren't being authorized. When I asked the dose she was taking she said that she was taking 2 capsules 3 times per day (150mg total daily) which is essentially a 10 day supply. She was surprised when I reiterated that the script reads 1 tablet BID. She said that she feels that this 150mg dose is working for her, and this is one of the medications she takes for her anxiety.   She's requesting a fill at a higher dose than the 50mg daily.   Will forward to PCP for approval.  Rere Nogueira RN

## 2022-06-27 NOTE — TELEPHONE ENCOUNTER
[facility] Call Center    Phone Message    May a detailed message be left on voicemail: yes     Reason for Call: Patient wants to know why her hydrOXYzine Pamoate 25 is not being refilled. Please call to discuss further.    Action Taken: Message routed to:  [facility]    Travel Screening: Not Applicable

## 2022-08-03 ENCOUNTER — TELEPHONE (OUTPATIENT)
Dept: SURGERY | Facility: CLINIC | Age: 67
End: 2022-08-03

## 2022-08-03 NOTE — TELEPHONE ENCOUNTER
[facility] Call Center    Phone Message    May a detailed message be left on voicemail: yes     Reason for Call: Other: Per pt incision is red maybe infected - doesn't like how it look like and have pain in that area. Per pt would like an asap appt. Writer offered pt 08/11 but pt would like something this week. Please call pt back to discuss. Thank you!      Action Taken: Message routed to:  [facility] Gen surg    Travel Screening: Not Applicable

## 2022-08-05 ENCOUNTER — PATIENT OUTREACH (OUTPATIENT)
Dept: ENDOCRINOLOGY | Facility: CLINIC | Age: 67
End: 2022-08-05

## 2022-08-05 NOTE — PROGRESS NOTES
Patient states incision from her surgery opens intermittently.  States are is tight.  Is using bacitracin on the area when it opens.  Denies fever.  Patient is going to fax a photo to the office.

## 2022-08-11 ENCOUNTER — OFFICE VISIT (OUTPATIENT)
Dept: WOUND CARE | Facility: CLINIC | Age: 67
End: 2022-08-11
Payer: COMMERCIAL

## 2022-08-11 DIAGNOSIS — S31.109A OPEN ABDOMINAL WALL WOUND, INITIAL ENCOUNTER: Primary | ICD-10-CM

## 2022-08-11 PROCEDURE — 99211 OFF/OP EST MAY X REQ PHY/QHP: CPT

## 2022-08-11 NOTE — PROGRESS NOTES
Patient comes to wound clinic for wound assessment per request of dr. Clayton. She has history of a Open surgical wound due to : Exploratory Laparotomy, Extensive Lysis of Adhesions; Jejunojejunostomy resection and  Revision, Gastrojejunal Resection (partial gastrectomy with enterectomy) with Miles en Y Reconstruction Gastrojejunostomy, Upper Endoscopy  09/10/2021     Clean gloves are donned and current dressing removed.     Objective findings: Open abdominal wall wound, initial encounter    Number of wounds: 1    Location: midline  ABDOMEN    Type of wound:   surgical    Wound measured: L9 cm x W2 cm x D 0.3 cm , Thickness: Full Drainage: moderate,        Wound specifics:     Tunneling:  N/A      Undermining: N/A    Wound Base:  no sign of infection, non-granulation and dry looking     Devitalized Tissue or Slough appearance:  adherent       Eschar appearance:  none      Tenderness: Tolerable with discomfort    Odor: none    Periwound Skin: intact some slight erythema near wound in patches               Subjective finding:     Pt states: pt has been using hydroperoxide on the wound. She is frustrated with slow healing process. She would like to see dr Clayton regarding surgical f/u     Patient is assessed for discomfort which is: moderate    Today's status of the wound: initial assessment    Treatment:  Visual inspection, cleansing with Vashe moist gauze solution, Application of topical medication xeroform, Application of clean dressing bordered foam   Pt received the following instructions:    As above  Signs and symptoms of infection taught.  Patient needs to be seen 2 weeks. Treated and follow-up appointment made. Dr. Clayton saw pt and was available for supervision of care if needed or if questions should arise and regarding plan of care.  Alexandria Handley RN, CWON

## 2022-08-29 DIAGNOSIS — F41.9 ANXIETY DISORDER, UNSPECIFIED TYPE: ICD-10-CM

## 2022-08-30 DIAGNOSIS — R13.19 ESOPHAGEAL DYSPHAGIA: Primary | ICD-10-CM

## 2022-08-30 RX ORDER — CLINDAMYCIN PHOSPHATE 900 MG/50ML
900 INJECTION, SOLUTION INTRAVENOUS
Status: CANCELLED | OUTPATIENT
Start: 2022-08-30

## 2022-08-30 RX ORDER — CLINDAMYCIN PHOSPHATE 900 MG/50ML
900 INJECTION, SOLUTION INTRAVENOUS SEE ADMIN INSTRUCTIONS
Status: CANCELLED | OUTPATIENT
Start: 2022-08-30

## 2022-09-07 NOTE — PROGRESS NOTES
"  Assessment & Plan     Acute suppurative otitis media of left ear without spontaneous rupture of tympanic membrane, recurrence not specified  Will treat AOM with Cefdinir 300 mg BID x7 days. Push fluids, rest. She agrees with the plan.   - cefdinir (OMNICEF) 300 MG capsule; Take 1 capsule (300 mg) by mouth 2 times daily    Non-healing surgical wound, subsequent encounter  Will provide order for wound care supplies until she is able to follow-up with Wound Clinic.   - Wound Care Order for DME - ONLY FOR DME    MDM: 2 problem visit, prescription drug management     Return if symptoms worsen or fail to improve.    VINOD Mahmood CNP  [facility] INTERNAL MEDICINE [place]    Donna Garcia is a 66 year old, presenting for the following health issues:  Wound Check, Infection, Ear Problem, Nasal Congestion, Balance/ Vestibular, and Surgical Followup (From September 2021)      HPI     Several concerns today.  Bilateral ear pain, going on over the last 2 weeks, worsening, can't hear right.  Dizzy. L>R.   Also concerned about a possible sinus infection.     Wound not healing from abdominal surgery last Sept (ex lap, lysis of adhesions, Jejunojejunostomy resection and revision, Gastrojejunal resection with Miles en Y reconstruction), wound remains open and draining. Saw Alexandria Handley in Wound clinic last month and was given some Vashe and dressing supplies but is out of these. Dr. Clayton told her they'll put a stent in for the stricture. Can't eat well, hurts to eat.       Review of Systems   Constitutional, HEENT, cardiovascular, pulmonary, gi and gu systems are negative, except as otherwise noted.      Objective    /74 (BP Location: Right arm, Patient Position: Sitting, Cuff Size: Adult Regular)   Pulse 70   Resp 16   Ht 1.6 m (5' 3\")   Wt 51.8 kg (114 lb 4.8 oz)   LMP  (LMP Unknown)   SpO2 95%   Breastfeeding No   BMI 20.25 kg/m    Body mass index is 20.25 kg/m .  Physical " Exam   GENERAL: healthy, alert and no distress  EYES: Eyes grossly normal to inspection, PERRL and conjunctivae and sclerae normal  HENT: L TM distended with suppurative fluid, bony landmarks not visible, nose and mouth without ulcers or lesions  NECK: posterior cervical lymphadenopathy, no asymmetry, masses, or scars and thyroid normal to palpation  RESP: lungs clear to auscultation - no rales, rhonchi or wheezes  CV: regular rate and rhythm, normal S1 S2, no S3 or S4, no murmur, click or rub, no peripheral edema and peripheral pulses strong  ABDOMEN: soft, nontender, no hepatosplenomegaly, no masses and bowel sounds normal  MS: no gross musculoskeletal defects noted, no edema  SKIN: open wound over mid-abdomen 9 cm x2 cm x0.3 cm with moderate serous drainage, mild surrounding erythema  PSYCH: mentation appears intact, fair judgment, no delusions or hallucinations noted, maintains good eye contact, mood congruent behavior

## 2022-09-07 NOTE — NURSING NOTE
"Demetra Richardson is a 66 year old female patient that presents today in clinic for the following:    Chief Complaint   Patient presents with     Wound Check     Infection     Ear Problem     Nasal Congestion     Balance/ Vestibular     Surgical Followup     From September 2021     The patient's allergies and medications were reviewed as noted. A set of vitals were recorded as noted without incident: /74 (BP Location: Right arm, Patient Position: Sitting, Cuff Size: Adult Regular)   Pulse 70   Resp 16   Ht 1.6 m (5' 3\")   Wt 51.8 kg (114 lb 4.8 oz)   LMP  (LMP Unknown)   SpO2 95%   Breastfeeding No   BMI 20.25 kg/m  . The patient does not have any other questions for the provider.    Yony Wilson, EMT at 5:00 PM on 9/7/2022  "

## 2022-09-18 NOTE — NURSING NOTE
Chief Complaint   Patient presents with     RECHECK     Follow up 9/10 DOS.       Vitals:    10/27/21 1027   BP: 107/66   BP Location: Left arm   Patient Position: Sitting   Cuff Size: Adult Small   Pulse: 63   Temp: 97.6  F (36.4  C)   TempSrc: Oral   SpO2: 98%   Weight: 48.9 kg (107 lb 12.8 oz)   Height: 1.524 m (5')       Body mass index is 21.05 kg/m .                          Conchita Moss EMT    
no weight-bearing restrictions

## 2022-09-28 NOTE — TELEPHONE ENCOUNTER
Screening Questions  1. What insurance is in the chart? [employer]    2.  Ordering/Referring Provider: Dr. Guaman to Dr. Clayton      Scheduling Details    Procedure Scheduled: EGD  [facility]  Date of Procedure: Tuesday, June 8, 2021 in MAC Room 3  Location: [facility]  Caller (Please ask for phone number if not scheduled by patient): Radha Richardson       Sedation Type: MAC  Conscious Sedation- Needs  for 6 hours after the procedure  MAC/General-Needs  for 24 hours after procedure    Pre-op Required at [facility] and OR for  MAC sedation: Preop completed with PCP on May 10th with PCP Dr. Merritt  (if yes advise patient they will need a pre-op prior to procedure)      Is patient on blood thinners? -no (If yes- inform patient to follow up with PCP or provider for follow up instructions)     Informed patient they will need an adult  Yes  Cannot take any type of public or medical transportation alone    Informed Patient of COVID Test Requirement Yes, scheduled at [facility] for Friday, June 4th    Confirmed Nurse will call to complete assessment Yes    Additional comments: No    
Never

## 2022-12-07 NOTE — TELEPHONE ENCOUNTER
hydrOXYzine        Last Written Prescription Date:  06/27/22  Last Fill Quantity: 60,   # refills: 5  Last Office Visit : 08/24/21  Future Office visit:  None    Routing refill request to provider for review/approval because:  No recent (12 mo) or future (30 days) visit within the authorizing provider's specialty

## 2022-12-20 NOTE — NURSING NOTE
"Demetra Richardson is a 67 year old female that presents in clinic today for the following:     Chief Complaint   Patient presents with     Urinary Problem     Patient had blood in urine a few times before making appt, had the flu, but has \"turned the corner\" per patient     Derm Problem     Rash/skin issue on stomach     Ear Problem     Middle ear infection per patient       The patient's allergies and medications were reviewed. The patient's vitals were obtained without incident. The patient does not have any other questions for the provider.     Fredy Gonzalez, EMT at 2:14 PM on 12/20/2022.  Primary Care Clinic: [phone]  "

## 2022-12-20 NOTE — ED NOTES
Initial Assessment: VSS. Communicates needs without difficulty. Pleasant and co-op. Pt has c/o left sided pain associated with LLL infection.    Per H&P, pt is a "67-year-old male with a past medical history of hypertension mental retardation and seizures brought in by ambulance from [facility] due to difficulty walking.  As per aide, patient was brought in from program in which they advised that he was not able to walk.  Reports the [facility] staff had to carry the patient off the bus.  No reports of any fall.  Denies fever, vomiting, known head injury, abdominal pain, chest pain, laceration, or any other complaints."    Pt seen for nutrition assessment secondary to NPO x 3 days/hx modified diet.  Pt with hx mental retardation, nonverbal at baseline, admitted from [facility] due to difficulty walking; found to have hip fx, s/p repair.  Labs reviewed, <H/H today, receiving blood transfusions today.  NPO for procedure d/c'd, diet restarted today - pureed with moderately thick liquids.  Private aide at bedside, reports fairly good intake of lunch tray, consuming fluids ate protein/veg.  Aide not familiar with patient at [facility] and unable to provide nutrition related hx PTA.  Per comprehensive chart review/previous RD assessments, pt noted to dislike eggs, fish (communicated to diet office).  Given increased needs and suspected variable intake, will provide magic cup fortified ice cream bid (290kcal, 9g protein/4oz cup).  RD to follow up and will continue to monitor pt's nutrition status.

## 2022-12-20 NOTE — PROGRESS NOTES
PRIMARY CARE CENTER           HPI: Demetra Richardson is a 67 year old female who presents for the following    Feels she cannot eat and cannot gain weight right now. Has been able to eat very limitedly. Has an unhealed open wound from her 2021 surgery. She tried establishing wound care but did not like the care she received. She was given the Vashe that has been helpful for her.   Complains that she gets blisters around the wound that have been hurting her. Thinks that they may be flare up of her shingles. Has hx of genital herpes. Has been taking her valacyclovir regularly.    Complains of pain in her bilateral ears. There is no discharge from the ears. She had a recent sinus and URTI. Complains that when she bends over there is discharge from her sinuses.   No h/o cough or fever    ROS:  10 point ROS neg other than the symptoms noted above in the HPI.         Past Medical History:   Diagnosis Date     Anemia      Basal cell carcinoma     Left-sided, skin over over medial orbit/nasal bone. Removed Oct 2018.     Benzodiazepine dependence (H)     With h/o withdrawal seizure x 1     Bipolar 1 disorder, mixed, moderate (H) 2/7/2013     Chronic pain     Back, legs.     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     Epidural abscess 2005    x4     Fibromyalgia      Generalised anxiety disorder      GERD (gastroesophageal reflux disease)      Major depressive disorder      Migraine      Nephrolithiasis     S/p left sided lithotripsy     Opiate dependence (H)      Osteoarthritis      Osteoporosis      PUD (peptic ulcer disease)      SLE (systemic lupus erythematosus) (H) 2009     Weight loss      Past Surgical History:   Procedure Laterality Date     BACK SURGERY  7-16-04    L4-5 epidural abscess     BACK SURGERY  8-4-04    L3-4 spinal stenosis     BACK SURGERY  11-4-04    Lt psoas abscess     BRONCHOSCOPY FLEXIBLE N/A 5/7/2019    Procedure: Flexible Bronchoscopy;  Surgeon: Patrice Regalado,  MD;  Location: [facility] OR      SECTION      x 2     CHOLECYSTECTOMY  2-2004     CLOSED REDUCTION, PERCUTANEOUS PINNING FINGER, COMBINED  8/10/2011    Procedure:COMBINED CLOSED REDUCTION, PERCUTANEOUS PINNING FINGER; 5th Proximal Phalanx; Surgeon:RADHA BUITRAGO; [facility]     COLONOSCOPY       COLONOSCOPY N/A 2020    Procedure: COLONOSCOPY;  Surgeon: Zacarias Duran MD;  Location: [facility]     ESOPHAGOSCOPY, GASTROSCOPY, DUODENOSCOPY (EGD), COMBINED N/A 2020    Procedure: ESOPHAGOGASTRODUODENOSCOPY, WITH BIOPSY;  Surgeon: Zacarias Duran MD;  Location: [facility]     ESOPHAGOSCOPY, GASTROSCOPY, DUODENOSCOPY (EGD), COMBINED N/A 2021    Procedure: ESOPHAGOGASTRODUODENOSCOPY (EGD);  Surgeon: Kaveh Clayton MD;  [facility]: [facility]     ESOPHAGOSCOPY, GASTROSCOPY, DUODENOSCOPY (EGD), COMBINED N/A 2022    Procedure: Esophagogastroduodenoscopy  (EGD) AND DILATION;  Surgeon: Kaveh Clayton MD;  [facility]     ESOPHAGOSCOPY, GASTROSCOPY, DUODENOSCOPY (EGD), DILATATION, COMBINED N/A 2022    Procedure: .;  Surgeon: Kaveh Clayton MD;  [facility]     ESOPHAGOSCOPY, GASTROSCOPY, DUODENOSCOPY (EGD), DILATATION, COMBINED N/A 3/28/2022    Procedure: ESOPHAGOGASTRODUODENOSCOPY, WITH DILATION;  Surgeon: Kaveh Clayton MD;  [facility]     GASTRECTOMY  2005    Bilat truncal vagotomy, hemigastrectomy, RnY gastrojejunostomy     GASTROJEJUNOSTOMY  2011    Procedure:GASTROJEJUNOSTOMY; exploratory laparotmy with revision of gastrojejunostomy, Antrectomy, Miles-en-y, Gastrojejunostomy; Surgeon:JULIUS HELM; Location[facility]     INSERT CHEST TUBE N/A 2019    Procedure: INSERTION, CATHETER, INTERCOSTAL, FOR DRAINAGE;  Surgeon: Melissa Nichols MD;  [facility]: [facility]     LAPAROTOMY EXPLORATORY N/A 9/10/2021    Procedure: Exploratory Laparotomy, Extensive Lysis of Adhesions; Jejunojejunostomy resection and  Revision, Gastrojejunal Resection (partial  gastrectomy with enterectomy) with Miles en Y Reconstruction Gastrojejunostomy, Upper Endoscopy;  Surgeon: Kaveh Clayton MD;  Location: [facility]     LASER HOLMIUM LITHOTRIPSY URETER(S), INSERT STENT, COMBINED      Procedure: COMBINED CYSTOSCOPY, URETEROSCOPY, LASER HOLMIUM LITHOTRIPSY URETER(S), INSERT STENT;;  Surgeon: Blair Correa MD;  [facility]     LASER HOLMIUM NEPHROLITHOTOMY VIA PERCUTANEOUS NEPHROSTOMY  7/11/2012    Procedure: LASER HOLMIUM NEPHROLITHOTOMY VIA PERCUTANEOUS NEPHROSTOMY;  proceedure should read: Left Percutaneous Access, Left Percutaneous ultrasonic Nephrolithotomy, Ureteroscopy Holmium Laser Lithotripsy Stent Placement ;  Surgeon: Blair Correa MD;  [facility]     MAMMOPLASTY AUGMENTATION BILATERAL       OPEN REDUCTION INTERNAL FIXATION WRIST Left 1/11/2016    Procedure: OPEN REDUCTION INTERNAL FIXATION WRIST;  Surgeon: Darling Ellis MD;  [facility]     RECONSTRUCT BREAST, IMPLANT PROSTHESIS, COMBINED       THORACOSCOPIC DECORTICATION LUNG Left 5/7/2019    Procedure: Left Video Assisted Thoracoscopic Decortication, Intercostal Nerve Cryo Analgesia, Flexible Bronchoscopy;  Surgeon: Patrice Regalado MD;  Location: [facility] OR     Family History   Problem Relation Age of Onset     Substance Abuse Mother      Family History Negative Father      Substance Abuse Brother      Substance Abuse Maternal Grandfather      Liver Disease No family hx of      Colon Cancer No family hx of      Ulcerative Colitis No family hx of      Crohn's Disease No family hx of      Glaucoma No family hx of      Macular Degeneration No family hx of      Anesthesia Reaction No family hx of      Deep Vein Thrombosis (DVT) No family hx of      Social History     Tobacco Use     Smoking status: Never     Smokeless tobacco: Never   Substance Use Topics     Alcohol use: No     Comment: none in 10 years     Drug use: Not Currently     Types: Benzodiazepines, Opiates     Current  "Outpatient Medications   Medication Sig Dispense Refill     aspirin-acetaminophen-caffeine (EXCEDRIN MIGRAINE) 250-250-65 MG tablet Take 1 tablet by mouth every 6 hours as needed for headaches       cefdinir (OMNICEF) 300 MG capsule Take 1 capsule (300 mg) by mouth 2 times daily 14 capsule 0     escitalopram (LEXAPRO) 20 MG tablet Take 1 tablet (20 mg) by mouth daily 90 tablet 2     hydroxychloroquine (PLAQUENIL) 200 MG tablet Take 1 tablet (200 mg) by mouth 2 times daily (with meals) 180 tablet 1     hydrOXYzine (VISTARIL) 100 MG capsule Take 1 capsule (100 mg) by mouth daily 30 capsule 6     hydrOXYzine (VISTARIL) 25 MG capsule TAKE 1 CAPSULE (25 MG) BY MOUTH 2 TIMES DAILY AS NEEDED FOR ITCHING 60 capsule 0     ibuprofen (ADVIL/MOTRIN) 200 MG tablet Take 400 mg by mouth every 8 hours as needed for mild pain       methadone (DOLOPHINE-INTENSOL) 10 MG/ML (HIGH CONC) solution Take 120 mg by mouth daily Takes it at 0530 everyday. Only uses red not yellow    [facility] - doses in clinic on Mon/Wed/Fri and take home doses for other days.       omeprazole (PRILOSEC) 40 MG DR capsule Take 1 capsule (40 mg) by mouth 2 times daily 120 capsule 5     polyethylene glycol (MIRALAX) 17 GM/Dose powder 1 capful (17 g) in 8 oz of liquid (Patient taking differently: Take 17 g by mouth daily as needed for constipation 1 capful (17 g) in 8 oz of liquid) 225 g 3     pregabalin (LYRICA) 150 MG capsule Take 1 capsule (150 mg) by mouth 3 times daily 90 capsule 1     valACYclovir (VALTREX) 500 MG tablet Take 1 tablet (500 mg) by mouth 2 times daily 60 tablet 9        Allergies   Allergen Reactions     Penicillins Hives     Hives in childhood.         /82 (BP Location: Right arm, Patient Position: Sitting, Cuff Size: Adult Small)   Pulse 65   Temp 97.4  F (36.3  C) (Oral)   Ht 1.575 m (5' 2\")   Wt 47.1 kg (103 lb 14.4 oz)   LMP  (LMP Unknown)   SpO2 95%   BMI 19.00 kg/m      Physical Examination:    General:  " Conversant, generally healthy appearing, no acute distress  Head: atraumatic  Eyes:  Pupils 2-3 mm, sclera white, EOM's full, conjunctiva moist, no periorbital swelling    Ears:  TM's normal, EAC's patent, clear of cerumen  Nose:  Nasal passages clear, turbinates not swollen  Throat/Mouth:  No pharyngeal erythema, exudate, ulcers, oral mucosa and tongue moist, normal hard and soft palate  Neck:  Trachea midline, Full AROM, supple, thyroid smooth, symmetric, not enlarged, no nodules, no neck lymphadenopathy  Lungs:  Clear to auscultation throughout, no wheezes, rhonchi or rales. Normal respiratory effort and no intercostal retractions.  C/V:  Regular rate and rhythm, no murmurs, rubs or gallops.  No JVD, no carotid bruits. Radial and DP pulses 2+, equal and regular.  Abdomen:  Open surgical wound is present  Lymph:  No cervical lymph nodes.  Neuro: Alert and oriented, face symmetric. Able to get on/off exam table without assistance.  Strength grossly intact. No tremor.  Gait steady.   M/S:   No joint deformities noted.  No joint swelling.  Skin:   Normal temperature., turgor and texture. No rashes, suspicious lesions, or jaundice on exposed skin surfaces.   Extremities:  No peripheral edema, no digital cyanosis  Psych:  Alert and oriented. Appropriate affect.  Not psychomotor slowed.  No signs of anxiety or agitation.      Assessment and Plan:    #Wound care  #Non-healing surgical wound  She has non healing surgical wound. No necrotic tissue seen. There are erythematous edges and currently the wound is non covered.   Ordered supplies for the wound and requested the patient to follow up with the wound clinic but she says she will not see them. I am concerned regarding risk for infection and the patient was made aware of the risk. She is applying the oral benzocaine on the wound for pain and skin protective ointment or Vaseline.   - Ordered wound care supplies    #Ulcer in the L ear  No concern for middle ear infection  or otitis externa. There is an ulcer present in the ear canal of L ear. The home wound care for the ulcer was discussed but deferred given the concern that patient might end up harming her ear.     Medication refill was done for omeprazole and plaquenil.      Options for treatment and follow-up care were reviewed with the patient.    Demetra Richardson engaged in the decision making process and verbalized understanding of the options discussed and agreed with the final plan.  This patient was discussed and seen with Dr. Shane Bhatt     Dec 20, 2022  LESLEY Chaidez  Internal Medicine Resident (PGY1)  [facility]  Pager: [phone]\

## 2023-01-01 ENCOUNTER — TELEPHONE (OUTPATIENT)
Dept: FAMILY MEDICINE | Facility: CLINIC | Age: 68
End: 2023-01-01
Payer: MEDICARE

## 2023-01-01 ENCOUNTER — DOCUMENTATION ONLY (OUTPATIENT)
Dept: RHEUMATOLOGY | Facility: CLINIC | Age: 68
End: 2023-01-01
Payer: MEDICARE

## 2023-01-01 ENCOUNTER — TELEPHONE (OUTPATIENT)
Dept: RHEUMATOLOGY | Facility: CLINIC | Age: 68
End: 2023-01-01
Payer: MEDICARE

## 2023-01-01 ENCOUNTER — MEDICAL CORRESPONDENCE (OUTPATIENT)
Dept: HEALTH INFORMATION MANAGEMENT | Facility: CLINIC | Age: 68
End: 2023-01-01
Payer: MEDICARE

## 2023-01-01 ENCOUNTER — TELEPHONE (OUTPATIENT)
Dept: FAMILY MEDICINE | Facility: CLINIC | Age: 68
End: 2023-01-01

## 2023-01-01 ENCOUNTER — DOCUMENTATION ONLY (OUTPATIENT)
Dept: FAMILY MEDICINE | Facility: CLINIC | Age: 68
End: 2023-01-01
Payer: MEDICARE

## 2023-01-01 ENCOUNTER — DOCUMENTATION ONLY (OUTPATIENT)
Dept: OTHER | Facility: CLINIC | Age: 68
End: 2023-01-01
Payer: MEDICARE

## 2023-01-01 ENCOUNTER — OFFICE VISIT (OUTPATIENT)
Dept: RHEUMATOLOGY | Facility: CLINIC | Age: 68
End: 2023-01-01
Attending: INTERNAL MEDICINE
Payer: MEDICARE

## 2023-01-01 ENCOUNTER — HEALTH MAINTENANCE LETTER (OUTPATIENT)
Age: 68
End: 2023-01-01

## 2023-01-01 VITALS
HEART RATE: 81 BPM | SYSTOLIC BLOOD PRESSURE: 139 MMHG | DIASTOLIC BLOOD PRESSURE: 75 MMHG | OXYGEN SATURATION: 96 % | WEIGHT: 93 LBS | BODY MASS INDEX: 17.01 KG/M2

## 2023-01-01 VITALS
BODY MASS INDEX: 17.38 KG/M2 | WEIGHT: 95 LBS | DIASTOLIC BLOOD PRESSURE: 72 MMHG | SYSTOLIC BLOOD PRESSURE: 107 MMHG | OXYGEN SATURATION: 100 % | TEMPERATURE: 98.2 F | HEART RATE: 80 BPM

## 2023-01-01 DIAGNOSIS — M32.9 SYSTEMIC LUPUS ERYTHEMATOSUS, UNSPECIFIED SLE TYPE, UNSPECIFIED ORGAN INVOLVEMENT STATUS (H): Primary | ICD-10-CM

## 2023-01-01 DIAGNOSIS — H40.003 GLAUCOMA SUSPECT OF BOTH EYES: Primary | ICD-10-CM

## 2023-01-01 DIAGNOSIS — F41.9 ANXIETY DISORDER, UNSPECIFIED TYPE: ICD-10-CM

## 2023-01-01 DIAGNOSIS — Z79.899 ENCOUNTER FOR EYE EXAM DUE TO HIGH RISK MEDICATION: ICD-10-CM

## 2023-01-01 DIAGNOSIS — Z51.81 MEDICATION MONITORING ENCOUNTER: ICD-10-CM

## 2023-01-01 DIAGNOSIS — K92.2 GASTROINTESTINAL HEMORRHAGE, UNSPECIFIED GASTROINTESTINAL HEMORRHAGE TYPE: ICD-10-CM

## 2023-01-01 DIAGNOSIS — B00.9 HERPES SIMPLEX VIRUS INFECTION: ICD-10-CM

## 2023-01-01 DIAGNOSIS — F41.9 ANXIETY DISORDER, UNSPECIFIED TYPE: Primary | ICD-10-CM

## 2023-01-01 DIAGNOSIS — M32.9 SYSTEMIC LUPUS ERYTHEMATOSUS, UNSPECIFIED SLE TYPE, UNSPECIFIED ORGAN INVOLVEMENT STATUS (H): ICD-10-CM

## 2023-01-01 DIAGNOSIS — Z53.9 DIAGNOSIS NOT YET DEFINED: Primary | ICD-10-CM

## 2023-01-01 DIAGNOSIS — Z79.899 LONG-TERM USE OF PLAQUENIL: Primary | ICD-10-CM

## 2023-01-01 DIAGNOSIS — M79.7 FIBROMYALGIA: ICD-10-CM

## 2023-01-01 PROCEDURE — G0463 HOSPITAL OUTPT CLINIC VISIT: HCPCS | Performed by: INTERNAL MEDICINE

## 2023-01-01 PROCEDURE — 99214 OFFICE O/P EST MOD 30 MIN: CPT | Performed by: INTERNAL MEDICINE

## 2023-01-01 PROCEDURE — 99215 OFFICE O/P EST HI 40 MIN: CPT | Performed by: INTERNAL MEDICINE

## 2023-01-01 RX ORDER — HYDROXYZINE PAMOATE 25 MG/1
25 CAPSULE ORAL 2 TIMES DAILY PRN
OUTPATIENT
Start: 2023-01-01

## 2023-01-01 RX ORDER — HYDROXYCHLOROQUINE SULFATE 200 MG/1
200 TABLET, FILM COATED ORAL 2 TIMES DAILY
Qty: 60 TABLET | Refills: 0 | OUTPATIENT
Start: 2023-01-01

## 2023-01-01 RX ORDER — PREGABALIN 150 MG/1
150 CAPSULE ORAL 3 TIMES DAILY
Qty: 90 CAPSULE | Refills: 0 | Status: SHIPPED | OUTPATIENT
Start: 2023-01-01

## 2023-01-01 RX ORDER — HYDROXYZINE PAMOATE 25 MG/1
25 CAPSULE ORAL 2 TIMES DAILY PRN
Qty: 60 CAPSULE | Refills: 1 | OUTPATIENT
Start: 2023-01-01

## 2023-01-01 RX ORDER — MYCOPHENOLATE MOFETIL 500 MG/1
TABLET ORAL
Qty: 60 TABLET | Refills: 1 | OUTPATIENT
Start: 2023-01-01

## 2023-01-01 RX ORDER — HYDROXYZINE PAMOATE 100 MG
1 CAPSULE ORAL DAILY
Qty: 90 CAPSULE | Refills: 0 | Status: SHIPPED | OUTPATIENT
Start: 2023-01-01 | End: 2023-01-01

## 2023-01-01 RX ORDER — MYCOPHENOLATE MOFETIL 500 MG/1
500 TABLET ORAL 2 TIMES DAILY WITH MEALS
Qty: 60 TABLET | Refills: 1 | Status: SHIPPED | OUTPATIENT
Start: 2023-01-01 | End: 2023-01-01

## 2023-01-01 RX ORDER — HYDROXYZINE PAMOATE 25 MG/1
25 CAPSULE ORAL 2 TIMES DAILY PRN
Qty: 60 CAPSULE | Refills: 3 | Status: SHIPPED | OUTPATIENT
Start: 2023-01-01 | End: 2023-01-01

## 2023-01-01 RX ORDER — ESCITALOPRAM OXALATE 20 MG/1
20 TABLET ORAL DAILY
Qty: 90 TABLET | Refills: 0 | OUTPATIENT
Start: 2023-01-01

## 2023-01-01 RX ORDER — HYDROXYZINE PAMOATE 25 MG/1
25 CAPSULE ORAL 2 TIMES DAILY PRN
Qty: 60 CAPSULE | Refills: 1 | Status: SHIPPED | OUTPATIENT
Start: 2023-01-01 | End: 2023-01-01

## 2023-01-01 RX ORDER — HYDROXYZINE PAMOATE 100 MG
1 CAPSULE ORAL DAILY
Qty: 90 CAPSULE | Refills: 0 | OUTPATIENT
Start: 2023-01-01

## 2023-01-01 RX ORDER — VALACYCLOVIR HYDROCHLORIDE 500 MG/1
TABLET, FILM COATED ORAL
Qty: 60 TABLET | Refills: 9 | Status: SHIPPED | OUTPATIENT
Start: 2023-01-01

## 2023-01-01 RX ORDER — HYDROXYZINE PAMOATE 25 MG/1
25 CAPSULE ORAL 2 TIMES DAILY PRN
Qty: 60 CAPSULE | Refills: 0 | Status: SHIPPED | OUTPATIENT
Start: 2023-01-01 | End: 2023-01-01

## 2023-01-01 RX ORDER — HYDROXYCHLOROQUINE SULFATE 200 MG/1
200 TABLET, FILM COATED ORAL
Qty: 90 TABLET | Refills: 1 | Status: SHIPPED | OUTPATIENT
Start: 2023-01-01

## 2023-01-01 RX ORDER — ESCITALOPRAM OXALATE 20 MG/1
20 TABLET ORAL DAILY
Qty: 30 TABLET | Refills: 2 | Status: SHIPPED | OUTPATIENT
Start: 2023-01-01 | End: 2023-01-01

## 2023-01-01 RX ORDER — HYDROXYZINE PAMOATE 25 MG/1
25 CAPSULE ORAL 2 TIMES DAILY PRN
Qty: 60 CAPSULE | Refills: 1 | Status: SHIPPED | OUTPATIENT
Start: 2023-01-01

## 2023-01-01 RX ORDER — ESCITALOPRAM OXALATE 20 MG/1
TABLET ORAL
Qty: 90 TABLET | Refills: 0 | Status: SHIPPED | OUTPATIENT
Start: 2023-01-01

## 2023-01-01 RX ORDER — HYDROXYZINE PAMOATE 100 MG
1 CAPSULE ORAL DAILY
Qty: 90 CAPSULE | Refills: 0 | Status: SHIPPED | OUTPATIENT
Start: 2023-01-01

## 2023-01-01 RX ORDER — OMEPRAZOLE 40 MG/1
40 CAPSULE, DELAYED RELEASE ORAL DAILY
Qty: 90 CAPSULE | Refills: 2 | Status: SHIPPED | OUTPATIENT
Start: 2023-01-01

## 2023-01-01 ASSESSMENT — PAIN SCALES - GENERAL: PAINLEVEL: EXTREME PAIN (8)

## 2023-02-10 NOTE — TELEPHONE ENCOUNTER
hydroxychloroquine (PLAQUENIL) 200 MG tablet      Last Written Prescription Date:  12/20/22  Last Fill Quantity: 180,   # refills: 0  Last Office Visit : 12/20/22  Future Office visit:  none    Routing refill request to provider for review/approval because:  Drug not on the [facility], [facility] or [facility] refill protocol or controlled substance    *23% cancel/no show history. Last seen by Dr Merritt was 8/24/21.

## 2023-02-10 NOTE — TELEPHONE ENCOUNTER
hydroxyzine        Last Written Prescription Date:  01/10/23  Last Fill Quantity: 60,   # refills: 1  Last Office Visit : 08/24/21  Future Office visit:  None    Routing refill request to provider for review/approval because:  No recent (12 mo) or future (30 days) visit within the authorizing provider's specialty

## 2023-02-13 NOTE — TELEPHONE ENCOUNTER
"RN called patient- reports symptoms started 6 weeks with a severe sore throat, had a fever a few weeks ago that lasted two days (99.0). Pt believes to probably have had COVID-19, but did not get tested. Pt states that \"probably has some sort of virus, green comes out, now it has settled in my lungs\". Pt has trouble breathing, and unable to walk. Pt is at assisted living, where they do check her vitals. She states her oxygen has been \"75-80%\". Pt states \"a nurse checks on me twice a day\". Pt encouraged to be seen, or seek emergency care, especially with O2 that low. Pt reports \"No, I am not doing it. I am not going anywhere\". Pt educated that it is RN job to recommend what is best, and told patient that needs to be seen. Pt reports \"you can document then in there that I say I'm not going\".   Pt is requesting supplemental oxygen as well as antibiotics. Pt encouraged to test for flu, covid, whatever pt is able to at assisted living, and to please call 911 if she cannot breathe.     ANTOLIN ROONEY RN on 2/13/2023 at 3:11 PM    "

## 2023-02-13 NOTE — TELEPHONE ENCOUNTER
[facility] Call Center    Phone Message    May a detailed message be left on voicemail: yes     Reason for Call: Symptoms or Concerns     If patient has red-flag symptoms, warm transfer to triage line    Current symptom or concern: Low oxygen, difficulty breathing     Symptoms have been present for:  2 week(s)    Has patient previously been seen for this? Yes    Are there any new or worsening symptoms? No  Patient thinks she needs oxygen, states he breathing has been difficult for the past 2 weeks.    Action Taken: Message routed to:  [facility]    Travel Screening: Not Applicable

## 2023-03-09 NOTE — TELEPHONE ENCOUNTER
HYDROXYZINE BECKY 25 MG CAP      Last Written Prescription Date:  2/10/23  Last Fill Quantity: 60,   # refills: 0  Last Office Visit : 12/20/22  Future Office visit:  none    Routing refill request to provider for review/approval because:  Overdue for 12mo office visit - law IM in September and December but hasn't seen PCP since 8/24/21. Who is managing this medication?     Already given 30d jasmin refill with instructions to call for appt with PCP. No future appts have been scheduled.

## 2023-03-10 NOTE — TELEPHONE ENCOUNTER
Pt was last seen in clinic on 12/20/2022. Pt last had hydrOXYzine (VISTARIL) 25 MG capsule refilled on 2/10/23 for 30 day supply by PCP. Due for refill 3/12/23. Medication refill sent to pharmacy.     ANTOLIN ROONEY RN on 3/10/2023 at 10:56 AM

## 2023-04-13 NOTE — TELEPHONE ENCOUNTER
[facility] Call Center    Phone Message    Reason for Call: Other: Dr Jay Robledo, pulmonologist at [facility], would like to speak with Dr Narayanan about pt care and pt's current status. Please call Dr Robledo at mobile:  [phone], pager: [phone].     Action Taken: Message routed to:  [facility] ([facility]): [facility]     Travel Screening: Not Applicable

## 2023-04-17 NOTE — TELEPHONE ENCOUNTER
Unit coordinator confirmed patient is on their unit at [facility]-stated if given enough notice, could set up transportation to clinic appt, requested to call back to ;[phone] and ask to speak to Sophia.  Will follow-up with Dr. Narayanan.    Chaya Wagoner, BSN, RN  RN Care Coordinator Rheumatology

## 2023-04-17 NOTE — TELEPHONE ENCOUNTER
Message left for patient to call this RN back-    Dr. Narayanan has  spoken with her pulmonologist, Dr. Narayanan is wondering if she could she do a video visit  on Thu 4/27 at noon add on?     Will also send a SharedReview[employer] message.    JENNIFER NielsonN, RN  RN Care Coordinator Rheumatology

## 2023-04-18 NOTE — TELEPHONE ENCOUNTER
Call to [facility], spoke with Chaya [phone]. She will set up transportation for this Thursday 4/20, to arrive at the [facility] at 0845 for 0900 appt.   Provided call back number to this RN.    Also spoke with patient and discussed appt on Thursday.   Patient verbalized understanding, all questions answered.    DENEEN Nielson, RN  RN Care Coordinator Rheumatology

## 2023-04-20 NOTE — PATIENT INSTRUCTIONS
Labs today    Start cellcept 1 tab twice a day with food    Go back on plaquenil 1 tab a day    Return visit in about 3-4 months
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
## 2023-04-20 NOTE — NURSING NOTE
Chief Complaint   Patient presents with     RECHECK     lupus     /72 (BP Location: Right arm, Patient Position: Sitting, Cuff Size: Adult Small)   Pulse 80   Temp 98.2  F (36.8  C) (Oral)   Wt 43.1 kg (95 lb)   LMP  (LMP Unknown)   SpO2 100%   BMI 17.38 kg/m
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
## 2023-04-24 NOTE — TELEPHONE ENCOUNTER
Message left for patient that lab appt needs to be completed, follow-up from clinic visit with Dr. Narayanan.  Set up appt to follow next appt at [facility] on 5/12. Asked patient to call back to confirm.    JENNIFER NielsonN, RN  RN Care Coordinator Rheumatology

## 2023-04-25 NOTE — TELEPHONE ENCOUNTER
Follow-up call to patient to discuss that she needs to complete the lab work Dr. Narayanan ordered.  Patient stated she just forgot last week.  Scheduled and confirmed lab appt prior to clinic appt on 5/12.    All questions answered.    Chaya Wagoner, JENNIFERN, RN  RN Care Coordinator Rheumatology

## 2023-05-08 NOTE — PROGRESS NOTES
Type of Form Received:     Form Received (Date) 5/8/23   Form Filled out Yes 5/10/23   Placed in provider folder Yes

## 2023-05-10 NOTE — PROGRESS NOTES
Type of Form Received:     Form Received (Date) 5/10/23   Form Filled out Yes 5/15/23   Placed in provider folder Yes

## 2023-05-11 NOTE — TELEPHONE ENCOUNTER
Follow-up call to patient to discuss her lab appt tomorrow and appt with PCP.    Patient stated she was just in the [facility] and has multiple issues going on with her lungs.  Patient is unable to come to lab appt tomorrow.  This RN will cancel lab appt and reach back out to patient next week to determine a [facility] clinic/lab closer to her home in [place] to send her lab orders to.    Patient stated she wants to transfer her PCP to [facility]. This RN discussed the importance of following with your PCP, this RN encouraged her to keep her appt with PCP tomorrow until she is able to establish care with [facility].    Chaya Wagoner, BSN, RN  RN Care Coordinator Rheumatology

## 2023-05-19 NOTE — TELEPHONE ENCOUNTER
MYCOPHENOLATE 500 MG TABLET Take 1 tablet (500 mg) by mouth 2 times daily (with meals)  Last Written Prescription Date:  4/20/23  Last Fill Quantity: 60,   # refills: 1    Last visit date 4/20/23  Next 7/13/23  Labs done at [facility] 5/18/23/ Care Everywhere     WBC 3.5 - 10.5 x10(9)/L 7.0    RBC 3.90 - 5.03 x10(12)/L 3.40 Low     Hemoglobin 12.0 - 15.5 g/dL 8.1 Low     HCT 34.9 - 44.5 % 28.1 Low     MCV 80.0 - 100.0 fL 82.6    MCH 27.6 - 33.3 pg 23.8 Low     MCHC 31.5 - 35.2 g/dL 28.8 Low     RDW 11.9 - 15.5 % 17.9 High     Platelets 150 - 450 x10(9)/L 325    Automated NRBC <=0 /100 WBC 0    Neutrophil Absolute 1.7 - 7.0 10(9)/L 5.4    Lymphocyte Absolute 1.0 - 4.8 10(9)/L 0.5 Low     Monocytes Absolute 0.2 - 0.9 10(9)/L 0.2    Eosinophil Absolute 0.0 - 0.5 10(9)/L 0.8 High     Basophil Absolute 0.0 - 0.3 10(9)/L 0.0    Immature Gran % 0.0 - 0.5 % 0.4           Creatinine 0.55 - 1.02 mg/dL 0.52 Low       ALT (SGPT) <=55 U/L 21      C-Reactive Protein 0.0 - 0.7 mg/dL <0.5        Routing refill request to provider for review/approval because:  DMARD, some labs completed at [facility] 5/18/23. ED 5/10/23, Infectious disease/ [facility] note 5/16/23, on home IV Vanco and Ceftriaxone / flagyl.

## 2023-05-22 NOTE — TELEPHONE ENCOUNTER
Follow up call to patient to discuss that she she should not be taking the mycophenolate while she is on antibiotics.  Patient stated that she never started the medication and she does not need a refill.    Patient stated she will probably be on IV antibiotics for another 3 weeks.    This RN again discussed that she needs to complete lab work for Dr. Narayanan.  Patient asked this RN to follow-up with her at a later date, she is trying to catch a cab right now.    JENNIFER NielsonN, RN  RN Care Coordinator Rheumatology

## 2023-05-24 NOTE — PROGRESS NOTES
Type of Form Received:     Form Received (Date) 5/24/23   Form Filled out Yes 5/31/23   Placed in provider folder Yes

## 2023-05-24 NOTE — PHARMACY-ADMISSION MEDICATION HISTORY
Admission Medication History Completed by Pharmacy    See [facility] Admission Navigator for allergy information, preferred outpatient pharmacy, prior to admission medications and immunization status.     Medication History Sources:     Patient interview    Surescripts fill history    Changes made to PTA medication list (reason):    Added:   o Excedrin (per pt report)    Deleted:   o Folic acid (pt reports not taking, not Rx)  o Naloxone nasal spray (old Rx)    Changed:   o Methadone dose updated to 120 mg po daily (takes at 0530 everyday - per pt report)    Additional Information:    Pt was a good historian of her medications.    Pt receives methadone from [facility]. See Methadone note for more information if needed.    Prior to Admission medications    Medication Sig Last Dose Taking? Auth Provider   aspirin-acetaminophen-caffeine (EXCEDRIN MIGRAINE) 250-250-65 MG tablet Take 2 tablets by mouth 2 times daily 6/15/2021 at Unknown time Yes Unknown, Entered By History   escitalopram (LEXAPRO) 20 MG tablet TAKE 1 TABLET BY MOUTH EVERY DAY 6/15/2021 at AM Yes Fredy Merritt MD   hydroxychloroquine (PLAQUENIL) 200 MG tablet Take 1 tablet (200 mg) by mouth 2 times daily (with meals) 6/15/2021 at PM Yes Sam Narayanan MD   hydrOXYzine (VISTARIL) 25 MG capsule Take 1 capsule (25 mg) by mouth 2 times daily as needed for anxiety 6/15/2021 at Unknown time Yes Fredy Merritt MD   ibuprofen (ADVIL/MOTRIN) 200 MG tablet Take 400-800 mg by mouth every 8 hours as needed for mild pain Unknown at Unknown Yes Unknown, Entered By History   methadone (DOLOPHINE-INTENSOL) 10 MG/ML (HIGH CONC) solution Take 120 mg by mouth daily Takes it at 0530 everyday. 6/16/2021 at 0530 Yes Unknown, Entered By History   pantoprazole (PROTONIX) 40 MG EC tablet TAKE 1 TABLET BY MOUTH EVERY DAY 6/16/2021 at AM Yes Zacarias Duran MD   polyethylene glycol (MIRALAX) 17 GM/Dose powder 1 capful (17 g) in 8 oz of  Is call patient let her know that her refill was completed and that she still has another urine drug screen to complete which has been ordered for her.   liquid Past Month at Unknown time Yes Kiki Guaman MD   pregabalin (LYRICA) 150 MG capsule TAKE 1 CAPSULE (150 MG) BY MOUTH 3 TIMES DAILY 6/15/2021 at PM Yes Fredy Merritt MD   valACYclovir (VALTREX) 500 MG tablet Take 1 tablet (500 mg) by mouth daily  at Not taking right now Yes Fredy Merritt MD   butalbital-acetaminophen-caffeine (ESGIC) -40 MG tablet Take 1 tablet by mouth every 6 hours as needed for headaches Use sparingly. Unknown at Unknown  Fredy Merritt MD   ferrous sulfate (FEROSUL) 325 (65 Fe) MG tablet Take 1 tablet (325 mg) by mouth 2 times daily With meals Unknown at Unknown  Fredy Merritt MD   valACYclovir (VALTREX) 500 MG tablet Take 1 tablet (500 mg) by mouth 2 times daily   Fredy Merritt MD   vitamin D2 (ERGOCALCIFEROL) 89012 units (1250 mcg) capsule Take 1 capsule (50,000 Units) by mouth every 7 days  at Not taking rightn now  Sam Narayanan MD       Date completed: 06/16/21    Medication history completed by: Suman Baca, [facility]

## 2023-05-24 NOTE — TELEPHONE ENCOUNTER
pregabalin (LYRICA) 150 MG capsule  Last Written Prescription Date:   10/5/2022  Last Fill Quantity: 90,   # refills: 1  Last Office Visit :  5/12/2023  Future Office visit:  None    Routing refill request to provider for review/approval because:  Drug not on the [facility], P or [facility] refill protocol or controlled substance      Rossy Travis RN  Central Triage Red Flags/Med Refills

## 2023-05-30 NOTE — TELEPHONE ENCOUNTER
Patient calling again stating that she is still in the pharmacy waiting for a response to the request below.

## 2023-05-30 NOTE — TELEPHONE ENCOUNTER
[facility] Call Center    Phone Message    May a detailed message be left on voicemail: yes     Reason for Call: Other: Patient is at the pharmacy now. And would like an ok for all the medication to go through today as she took a cab to the pharmacy. Patient doesn't want to take a cab 2 days in a row. Please call to discuss further with the pharmacy.       Please call patient ASAP to let her know this will happen today.     Action Taken: Message routed to:  [facility]    Travel Screening: Not Applicable

## 2023-06-01 NOTE — TELEPHONE ENCOUNTER
HYDROXYZINE BECKY 100 MG CAP  Last Written Prescription Date:   9/1/2022  Last Fill Quantity: 30,   # refills: 6  Last Office Visit :( No show on 5/12/2023) 12/20/2023  Future Office visit:  None  Routing refill request to provider for review/approval because:  Pt has 2 Different doses on the med list 25 Mg and 100 Mg.  Which is Pt taking at the present time.       ESCITALOPRAM 20 MG TABLET  Last Written Prescription Date:   5/10/2023  Last Fill Quantity: 30,   # refills: 2  Last Office Visit :( No show on 5/12/2023) 12/20/2023  Future Office visit:  None  Routing refill request to provider for review/approval because:  Second request  Per Protocol Pt needs updated visit for this med.  Please review and set up visit if needed.     Rossy Travis RN  Central Triage Red Flags/Med Refills

## 2023-06-01 NOTE — PROGRESS NOTES
Type of Form Received:     Form Received (Date) 6/1/23   Form Filled out Yes 6/6/23   Placed in provider folder Yes

## 2023-06-21 NOTE — TELEPHONE ENCOUNTER
Message left for patient , noting lab work still needs to be completed for Dr. Narayanan and she will be here at the [facility] on Friday.  Appt made to follow appt with PC[facility].    JENNIFER NielsonN, RN  RN Care Coordinator Rheumatology

## 2023-06-22 NOTE — TELEPHONE ENCOUNTER
Additional voicemail left for patient to confirm lab appt tomorrow after appt with PCP to get labs completed for Dr. Narayanan prior to clinic visit in July.    Encouraged patient to call back with questions.    JENNIFER NielsonN, RN  RN Care Coordinator Rheumatology

## 2023-07-01 NOTE — TELEPHONE ENCOUNTER
12/20/2022  [facility] [place]     Sidney Krishnamurthy MD  Student in organized health care education/training program     Nv: none  5/12/23 no show       pregabalin (LYRICA) 150 MG capsule  Last Written Prescription Date:  5/24/23  Last Fill Quantity: 90,   # refills: 0  Last Office Visit : 12/20/22  Future Office visit:  None     valACYclovir (VALTREX) 500 MG tablet    Last Written Prescription Date:  6/19/22  Last Fill Quantity: 60,   # refills: 9    Scheduling has been notified to contact the pt for appointment.      Routing refill request to provider for review/approval because:  Controlled, no showed appt 5/12/23. Creat L

## 2023-07-04 NOTE — NURSING NOTE
Chief Complaint   Patient presents with     Results     pt would like to discuss CT and lab results       Carlitos Moss CMA, EMT at 8:59 AM on 4/22/2020.   
Patient does not have Mychart. AVS was sent to SEVERIANO Espinoza KARISHMA 12:49 PM  4/23/2020    
Multiple rib fractures
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
## 2023-07-13 NOTE — PATIENT INSTRUCTIONS
Try myfortic when recover from infection    See infectious disease for infected abdominal wound and new rash over R leg    No more prednisone now    Stay on plaquenil    Labs today    Return in about 4-5 months (in person)

## 2023-07-13 NOTE — PROGRESS NOTES
This RN made call to [phone], spoke with nurse that works with Dr. Umaña.  Discussed abd wound and wound/rash on right leg.  ID nurse confirmed that this is a new concern for patient.  She will get a message to Dr. Umaña. She confirmed that patient needs to go to UC or ER to be seen.  The ID team will follow-up with her.    This RN spoke face to face with patient with Dr. Narayanan present.  Confirmed the recommendations from the ID team at [facility].  Provided patient with address and phone number to  in [facility].    Patient had already set up transportation with [employer] to go home. Patient stated she had chills, pain, that she just does not feel well.  This RN stressed that she must go to UC or ER today,  She needs these wounds to be assessed and the ID team will follow-up with her based on the recommendations from  or ER.  Also provided patient with number to Internal Medicine with [phone] since she has not kept her appts with [place]CP [place] [facility]    RN accompanied patient to elevator.  Patient confirmed with this RN that once her transportation brings her home and she has time to collect her thoughts, she will set up to go to .    This RN discussed with patient that I would check in with her tomorrow.  Patient verbalized understanding.    Chaya Wagoner, JENNIFERN, RN  RN Care Coordinator Rheumatology

## 2023-07-14 NOTE — TELEPHONE ENCOUNTER
Message left for patient, checking in after interaction in clinic yesterday.  Patient did not answer phone.  This RN was unable to see an UC ir ER visit charted from yesterday.  Encouraged patient to be seen for her wounds.  Will reach back out to patient next week.    Chaya Wagoner, JENNIFERN, RN  RN Care Coordinator Rheumatology

## 2023-07-20 NOTE — TELEPHONE ENCOUNTER
HYDROXYZINE BECKY 25 MG CAP  Last Written Prescription Date:  3/10/2023  Last Fill Quantity: 60,   # refills: 3  Last Office Visit :  12/20/2022 (No show 5/12/2023)  Future Office visit:  None  Routing refill request to provider for review/approval because:  Second Request  Pt needing updated office visit for this med with Primary Care Provider  Please call Pt to schedule.     Antihistamines Protocol Failed 07/20/2023 10:56 AM   Protocol Details  Recent (12 mo) or future (30 days) visit within the authorizing provider's specialty       Rossy Travis RN  Central Triage Red Flags/Med Refills

## 2023-07-21 NOTE — TELEPHONE ENCOUNTER
Pt was last seen in clinic on 5/12/23. Pt last had hydrOXYzine (VISTARIL) 100 MG capsule refilled on 6/28/23 for a 30 day supply by PCP. Pt no showed and cancelled last visits, needs appt for further refills.      ANTOLIN ROONEY RN on 7/21/2023 at 7:22 AM

## 2023-07-28 NOTE — PROGRESS NOTES
Type of Form Received: Order    Form Received (Date) 7/28/23   Form Filled out Yes 8/3/23   Placed in provider folder Yes

## 2023-07-28 NOTE — PROGRESS NOTES
Type of Form Received:     Form Received (Date) 7/28/23   Form Filled out Yes, 7/28/23   Placed in provider folder Yes

## 2023-07-31 NOTE — PROGRESS NOTES
Type of Form Received:     Form Received (Date) 7/30/23   Form Filled out Yes 8/2/23   Placed in provider folder Yes

## 2023-08-07 NOTE — PROGRESS NOTES
Type of Form Received:     Form Received (Date) 8/7/23   Form Filled out Yes, 8/11/23   Placed in provider folder yes

## 2023-08-11 NOTE — TELEPHONE ENCOUNTER
[facility] Call Center    Phone Message    May a detailed message be left on voicemail: yes     Reason for Call: Other: Needs orders to be signed, they are over 7 days old, will refax today orders ending in # 999, 234.  Please call if questions.       Action Taken: Message routed to:  [facility]    Travel Screening: Not Applicable

## 2023-08-14 NOTE — PROGRESS NOTES
Type of Form Received:     Form Received (Date) 8/14/23   Form Filled out Yes, 8/15/23   Placed in provider folder Yes

## 2023-08-16 NOTE — PROGRESS NOTES
Type of Form Received:     Form Received (Date) 8/16/23   Form Filled out Yes faxed 8/17 AV   Placed in provider folder Yes

## 2023-08-28 NOTE — TELEPHONE ENCOUNTER
The patient had a fall yesterday and got admitted into [facility] with pneumonia, please review and follow up if needed per patient thank you.

## 2023-08-29 NOTE — TELEPHONE ENCOUNTER
[facility] Call Center    Phone Message    May a detailed message be left on voicemail: yes     Reason for Call: Other: Needing a signed current medication list, please fax to: [phone].   Please call if any questions.      Action Taken: Message routed to:  [facility]    Travel Screening: Not Applicable

## 2023-08-29 NOTE — TELEPHONE ENCOUNTER
Med list printed and placed in provider inbox folder with fax number.      Chetan Abel CMA ([facility]) at 10:53 AM on 8/29/2023

## 2023-08-29 NOTE — PROGRESS NOTES
Type of Form Received: [facility] Order #[id number]    Form Received (Date) 8/24/23   Form Filled out Yes, faxed 9/1 AV   Placed in provider folder Yes

## 2023-11-08 NOTE — TELEPHONE ENCOUNTER
She sees Dr Velasquez July 9. I will ok one month supply now, go ahead and do that. But await his visit to decide if I will give it or any psychiatric med ongoing, or if she needs new long term psychiatrist.   negative

## 2025-06-04 NOTE — NURSING NOTE
Chief Complaint   Patient presents with     Pre-Op Exam     pt would like to have preop for 5/11       Carlitos Moss CMA, EMT at 11:10 AM on 5/10/2021.    Detail Level: Detailed

## (undated) DEVICE — ENDO BITE BLOCK ADULT OMNI-BLOC

## (undated) DEVICE — SU SILK 0 SH 30" K834H

## (undated) DEVICE — DRAIN CHEST TUBE 28FR STR 8028

## (undated) DEVICE — Device

## (undated) DEVICE — SYR 30ML SLIP TIP W/O NDL 302833

## (undated) DEVICE — LINEN TOWEL PACK X6 WHITE 5487

## (undated) DEVICE — SPONGE RAY-TEC 4X8" 7318

## (undated) DEVICE — CATH TRAY FOLEY SURESTEP 16FR W/URNE MTR STLK LATEX A303316A

## (undated) DEVICE — SYR 20ML SLIP TIP W/O NDL 302831

## (undated) DEVICE — CLIP HORIZON MED BLUE 002200

## (undated) DEVICE — DRAPE SHEET MED 44X70" 9355

## (undated) DEVICE — SUCTION DRY CHEST DRAIN OASIS 3600-100

## (undated) DEVICE — ESU ELEC BLADE 2.75" COATED/INSULATED E1455

## (undated) DEVICE — ENDO ADPT BRONCH SWIVEL Y A1002

## (undated) DEVICE — ENDO SYSTEM WATER BOTTLE & TUBING W/CO2 FILTER 00711549

## (undated) DEVICE — JELLY LUBRICATING SURGILUBE 2OZ TUBE

## (undated) DEVICE — TIES BANDING T50R

## (undated) DEVICE — BLADE KNIFE SURG 15 371115

## (undated) DEVICE — ESU ELEC BLADE 6" COATED/INSULATED E1455-6

## (undated) DEVICE — SYR 10ML LL W/O NDL 302995

## (undated) DEVICE — KIT ENDO FIRST STEP DISINFECTANT 200ML W/POUCH EP-4

## (undated) DEVICE — SYR 10ML SLIP TIP W/O NDL 303134

## (undated) DEVICE — ENDO CAP AND TUBING STERILE FOR ENDOGATOR  100130

## (undated) DEVICE — SU PDS II 0 TP-1 60" Z991G

## (undated) DEVICE — ESU CLEANER TIP 31142717

## (undated) DEVICE — SU DERMABOND ADVANCED .7ML DNX12

## (undated) DEVICE — ENDO VALVE BX EVIS MAJ-210

## (undated) DEVICE — DRAIN JACKSON PRATT ROUND W/TROCAR 19FR JP-HUR195

## (undated) DEVICE — INFLATION DEVICE BIG 60 ENDO-AN6012

## (undated) DEVICE — LUBRICANT INST KIT ENDO-LUBE 220-90

## (undated) DEVICE — BASIN SET SINGLE STERILE 13752-624

## (undated) DEVICE — LIGHT HANDLE X1 31140133

## (undated) DEVICE — CONNECTOR DRAIN CHEST Y EXTENSION SET 19909

## (undated) DEVICE — DRAIN CHEST TUBE RIGHT ANGLED 28FR 8128

## (undated) DEVICE — SPONGE LAP 18X18" X8435

## (undated) DEVICE — ANTIFOG SOLUTION W/FOAM PAD 31142527

## (undated) DEVICE — SU VICRYL 3-0 SH 8X18" UND J864D

## (undated) DEVICE — CATH BALLN ELATION ESOPH/PYLORIC 9-10-11-12-13MMX180CM EPX10

## (undated) DEVICE — SOL WATER IRRIG 1000ML BOTTLE 2F7114

## (undated) DEVICE — SU VICRYL 0 UR-6 27" J603H

## (undated) DEVICE — ENDO VALVE SUCTION BRONCH EVIS MAJ-209

## (undated) DEVICE — DRAIN JACKSON PRATT ROUND SIL 19FR W/TROCAR LF JP-2232

## (undated) DEVICE — SUCTION MANIFOLD NEPTUNE 2 SYS 4 PORT 0702-020-000

## (undated) DEVICE — CLIP HORIZON LG ORANGE 004200

## (undated) DEVICE — DRSG PRIMAPORE 02X3" 7133

## (undated) DEVICE — ESU GROUND PAD ADULT W/CORD E7507

## (undated) DEVICE — GLOVE PROTEXIS POWDER FREE SMT 7.5  2D72PT75X

## (undated) DEVICE — BALLOON ELATION 180CML 14-15-16.5-18-19MM 5.5CM EPX15

## (undated) DEVICE — WIPES FOLEY CARE SURESTEP PROVON DFC100

## (undated) DEVICE — APPLICATOR COTTON TIP 6"X2 STERILE LF 6012

## (undated) DEVICE — SPONGE KITTNER 30-101

## (undated) DEVICE — DRAPE C-ARM W/STRAPS 42X72" 07-CA104

## (undated) DEVICE — DRAIN JACKSON PRATT RESERVOIR 100ML SU130-1305

## (undated) DEVICE — STPL ENDO LINEAR CUT ECHELON GST 60MM COMPACT PCEE60A

## (undated) DEVICE — SUCTION TIP POOLE K770

## (undated) DEVICE — COVER CAMERA VIDEO LASER 9X96" 04-CC227

## (undated) DEVICE — GUIDEWIRE BENSON STR EXCHANGE .035X260CM TSF-35-260-BH

## (undated) DEVICE — ENDO FORCEP ENDOJAW BIOPSY 2.8MMX160CM FB-220K

## (undated) DEVICE — TUBING SUCTION 10'X3/16" N510

## (undated) DEVICE — PREP CHLORAPREP 26ML TINTED ORANGE  260815

## (undated) DEVICE — LINEN GOWN XLG 5407

## (undated) DEVICE — SU SILK 2-0 TIE 12X30" A305H

## (undated) DEVICE — DRAPE IOBAN INCISE 23X17" 6650EZ

## (undated) DEVICE — SYR BULB IRRIG 50ML LATEX FREE 0035280

## (undated) DEVICE — SU PDS II 4-0 SH 27" Z315H

## (undated) DEVICE — PITCHER STERILE 1000ML  SSK9004A

## (undated) DEVICE — SOL NACL 0.9% IRRIG 1000ML BOTTLE 2F7124

## (undated) DEVICE — SUCTION MANIFOLD DORNOCH ULTRA CART UL-CL500

## (undated) DEVICE — ESU LIGASURE IMPACT OPEN SEALER/DVDR CVD LG JAW LF4418

## (undated) DEVICE — SYR INFLATOR AND GAUGE 60ML M00550601

## (undated) DEVICE — SU SILK 3-0 TIE 12X30" A304H

## (undated) DEVICE — ESU BIPOLAR SEALER AQUAMANTYS 6MM 23-112-1

## (undated) DEVICE — DRSG VASELINE 3X18" 8884414600

## (undated) DEVICE — SYR DILATION COOK 60 ML DS-60CC-S  G27112

## (undated) DEVICE — ESU PENCIL W/COATED BLADE E2450H

## (undated) DEVICE — SOL NACL 0.9% INJ 1000ML BAG 2B1324X

## (undated) DEVICE — ENDO TROCAR OPTICAL ACCESS KII Z-THRD 12X60MM C0R83

## (undated) DEVICE — SPONGE TONSIL W/STRING MED 23275-680

## (undated) DEVICE — LINEN TOWEL PACK X30 5481

## (undated) DEVICE — SUCTION TIP YANKAUER STR K87

## (undated) DEVICE — DRAPE MAYO STAND 23X54 8337

## (undated) DEVICE — ENDO TROCAR SLEEVE KII Z-THREADED 12X100MM CTS22

## (undated) DEVICE — LINEN TOWEL PACK X5 5464

## (undated) DEVICE — SU VICRYL 4-0 PS-2 18" UND J496H

## (undated) DEVICE — SU ETHILON 3-0 PS-1 18" 1663H

## (undated) RX ORDER — DEXAMETHASONE SODIUM PHOSPHATE 4 MG/ML
INJECTION, SOLUTION INTRA-ARTICULAR; INTRALESIONAL; INTRAMUSCULAR; INTRAVENOUS; SOFT TISSUE
Status: DISPENSED
Start: 2022-02-07

## (undated) RX ORDER — ALBUMIN, HUMAN INJ 5% 5 %
SOLUTION INTRAVENOUS
Status: DISPENSED
Start: 2019-05-07

## (undated) RX ORDER — HYDROMORPHONE HYDROCHLORIDE 1 MG/ML
INJECTION, SOLUTION INTRAMUSCULAR; INTRAVENOUS; SUBCUTANEOUS
Status: DISPENSED
Start: 2019-05-07

## (undated) RX ORDER — FENTANYL CITRATE 50 UG/ML
INJECTION, SOLUTION INTRAMUSCULAR; INTRAVENOUS
Status: DISPENSED
Start: 2021-09-10

## (undated) RX ORDER — CLINDAMYCIN PHOSPHATE 900 MG/50ML
INJECTION, SOLUTION INTRAVENOUS
Status: DISPENSED
Start: 2022-03-28

## (undated) RX ORDER — LIDOCAINE HYDROCHLORIDE 20 MG/ML
INJECTION, SOLUTION EPIDURAL; INFILTRATION; INTRACAUDAL; PERINEURAL
Status: DISPENSED
Start: 2022-03-28

## (undated) RX ORDER — HYDROMORPHONE HCL IN WATER/PF 6 MG/30 ML
PATIENT CONTROLLED ANALGESIA SYRINGE INTRAVENOUS
Status: DISPENSED
Start: 2021-09-10

## (undated) RX ORDER — SODIUM CHLORIDE, SODIUM LACTATE, POTASSIUM CHLORIDE, CALCIUM CHLORIDE 600; 310; 30; 20 MG/100ML; MG/100ML; MG/100ML; MG/100ML
INJECTION, SOLUTION INTRAVENOUS
Status: DISPENSED
Start: 2019-05-07

## (undated) RX ORDER — FENTANYL CITRATE 50 UG/ML
INJECTION, SOLUTION INTRAMUSCULAR; INTRAVENOUS
Status: DISPENSED
Start: 2019-04-29

## (undated) RX ORDER — PROPOFOL 10 MG/ML
INJECTION, EMULSION INTRAVENOUS
Status: DISPENSED
Start: 2022-02-07

## (undated) RX ORDER — DIAZEPAM 5 MG
TABLET ORAL
Status: DISPENSED
Start: 2018-10-04

## (undated) RX ORDER — FENTANYL CITRATE 50 UG/ML
INJECTION, SOLUTION INTRAMUSCULAR; INTRAVENOUS
Status: DISPENSED
Start: 2019-05-07

## (undated) RX ORDER — ONDANSETRON 2 MG/ML
INJECTION INTRAMUSCULAR; INTRAVENOUS
Status: DISPENSED
Start: 2021-09-10

## (undated) RX ORDER — FENTANYL CITRATE 50 UG/ML
INJECTION, SOLUTION INTRAMUSCULAR; INTRAVENOUS
Status: DISPENSED
Start: 2022-02-07

## (undated) RX ORDER — LIDOCAINE HYDROCHLORIDE 20 MG/ML
INJECTION, SOLUTION EPIDURAL; INFILTRATION; INTRACAUDAL; PERINEURAL
Status: DISPENSED
Start: 2021-09-10

## (undated) RX ORDER — LIDOCAINE HYDROCHLORIDE 10 MG/ML
INJECTION, SOLUTION EPIDURAL; INFILTRATION; INTRACAUDAL; PERINEURAL
Status: DISPENSED
Start: 2019-04-29

## (undated) RX ORDER — ROCURONIUM BROMIDE 50 MG/5 ML
SYRINGE (ML) INTRAVENOUS
Status: DISPENSED
Start: 2022-03-28

## (undated) RX ORDER — ONDANSETRON 2 MG/ML
INJECTION INTRAMUSCULAR; INTRAVENOUS
Status: DISPENSED
Start: 2022-02-07

## (undated) RX ORDER — EPHEDRINE SULFATE 50 MG/ML
INJECTION, SOLUTION INTRAMUSCULAR; INTRAVENOUS; SUBCUTANEOUS
Status: DISPENSED
Start: 2019-05-07

## (undated) RX ORDER — ONDANSETRON 2 MG/ML
INJECTION INTRAMUSCULAR; INTRAVENOUS
Status: DISPENSED
Start: 2022-03-28

## (undated) RX ORDER — FENTANYL CITRATE 50 UG/ML
INJECTION, SOLUTION INTRAMUSCULAR; INTRAVENOUS
Status: DISPENSED
Start: 2022-03-28

## (undated) RX ORDER — PROPOFOL 10 MG/ML
INJECTION, EMULSION INTRAVENOUS
Status: DISPENSED
Start: 2022-03-28

## (undated) RX ORDER — CLINDAMYCIN PHOSPHATE 900 MG/50ML
INJECTION, SOLUTION INTRAVENOUS
Status: DISPENSED
Start: 2019-05-07

## (undated) RX ORDER — DEXAMETHASONE SODIUM PHOSPHATE 4 MG/ML
INJECTION, SOLUTION INTRA-ARTICULAR; INTRALESIONAL; INTRAMUSCULAR; INTRAVENOUS; SOFT TISSUE
Status: DISPENSED
Start: 2021-09-10

## (undated) RX ORDER — HYDROMORPHONE HYDROCHLORIDE 1 MG/ML
INJECTION, SOLUTION INTRAMUSCULAR; INTRAVENOUS; SUBCUTANEOUS
Status: DISPENSED
Start: 2021-09-10

## (undated) RX ORDER — IPRATROPIUM BROMIDE AND ALBUTEROL SULFATE 2.5; .5 MG/3ML; MG/3ML
SOLUTION RESPIRATORY (INHALATION)
Status: DISPENSED
Start: 2019-05-07

## (undated) RX ORDER — LIDOCAINE HYDROCHLORIDE 20 MG/ML
INJECTION, SOLUTION EPIDURAL; INFILTRATION; INTRACAUDAL; PERINEURAL
Status: DISPENSED
Start: 2019-05-07

## (undated) RX ORDER — GLYCOPYRROLATE 0.2 MG/ML
INJECTION, SOLUTION INTRAMUSCULAR; INTRAVENOUS
Status: DISPENSED
Start: 2019-05-07

## (undated) RX ORDER — ACETAMINOPHEN 500 MG
TABLET ORAL
Status: DISPENSED
Start: 2018-10-04

## (undated) RX ORDER — CALCIUM CHLORIDE 100 MG/ML
INJECTION INTRAVENOUS; INTRAVENTRICULAR
Status: DISPENSED
Start: 2019-05-07

## (undated) RX ORDER — PROPOFOL 10 MG/ML
INJECTION, EMULSION INTRAVENOUS
Status: DISPENSED
Start: 2019-05-07

## (undated) RX ORDER — SODIUM CHLORIDE, SODIUM LACTATE, POTASSIUM CHLORIDE, CALCIUM CHLORIDE 600; 310; 30; 20 MG/100ML; MG/100ML; MG/100ML; MG/100ML
INJECTION, SOLUTION INTRAVENOUS
Status: DISPENSED
Start: 2021-09-10

## (undated) RX ORDER — PROPOFOL 10 MG/ML
INJECTION, EMULSION INTRAVENOUS
Status: DISPENSED
Start: 2021-09-10

## (undated) RX ORDER — CLINDAMYCIN PHOSPHATE 900 MG/50ML
INJECTION, SOLUTION INTRAVENOUS
Status: DISPENSED
Start: 2021-09-10